# Patient Record
Sex: FEMALE | Race: ASIAN | NOT HISPANIC OR LATINO | Employment: OTHER | URBAN - METROPOLITAN AREA
[De-identification: names, ages, dates, MRNs, and addresses within clinical notes are randomized per-mention and may not be internally consistent; named-entity substitution may affect disease eponyms.]

---

## 2017-06-15 DIAGNOSIS — M54.16 LUMBAR RADICULOPATHY: ICD-10-CM

## 2017-06-15 RX ORDER — PREGABALIN 75 MG/1
75 CAPSULE ORAL 3 TIMES DAILY
Qty: 270 CAPSULE | Refills: 1 | Status: SHIPPED | OUTPATIENT
Start: 2017-06-15 | End: 2017-12-05

## 2017-06-15 NOTE — NURSING NOTE
Vashti Mcmillan, CNP refilled Lyrica Rx. Rx faxed to [facility] to [phone] on 6/15/17.

## 2017-12-05 DIAGNOSIS — M54.16 LUMBAR RADICULOPATHY: ICD-10-CM

## 2017-12-05 RX ORDER — PREGABALIN 75 MG/1
75 CAPSULE ORAL 3 TIMES DAILY
Qty: 270 CAPSULE | Refills: 3
Start: 2017-12-05 | End: 2018-12-07

## 2018-06-27 LAB — EJECTION FRACTION: 63

## 2018-12-07 DIAGNOSIS — M54.16 LUMBAR RADICULOPATHY: ICD-10-CM

## 2018-12-07 RX ORDER — PREGABALIN 75 MG/1
75 CAPSULE ORAL 3 TIMES DAILY
Qty: 270 CAPSULE | Refills: 0
Start: 2018-12-07 | End: 2019-02-11

## 2018-12-07 NOTE — TELEPHONE ENCOUNTER
----- Message from OLW Kruse CNP sent at 12/7/2018  9:50 AM CST -----  I got a refill request form from you.  We can refill the Lyrica one more time then have her PCP take over.      Refill called into  mailorder pharmacy on 12/7/18.

## 2019-02-11 ENCOUNTER — APPOINTMENT (OUTPATIENT)
Dept: GENERAL RADIOLOGY | Facility: CLINIC | Age: 74
DRG: 193 | End: 2019-02-11
Attending: EMERGENCY MEDICINE
Payer: MEDICARE

## 2019-02-11 ENCOUNTER — HOSPITAL ENCOUNTER (INPATIENT)
Facility: CLINIC | Age: 74
LOS: 3 days | Discharge: HOME | DRG: 193 | End: 2019-02-14
Attending: EMERGENCY MEDICINE | Admitting: INTERNAL MEDICINE
Payer: MEDICARE

## 2019-02-11 DIAGNOSIS — R06.2 WHEEZING: ICD-10-CM

## 2019-02-11 DIAGNOSIS — J18.9 PNEUMONIA OF LOWER LOBE DUE TO INFECTIOUS ORGANISM, UNSPECIFIED LATERALITY: ICD-10-CM

## 2019-02-11 DIAGNOSIS — J18.9 PNEUMONIA DUE TO INFECTIOUS ORGANISM, UNSPECIFIED LATERALITY, UNSPECIFIED PART OF LUNG: Primary | ICD-10-CM

## 2019-02-11 DIAGNOSIS — R05.9 COUGH: ICD-10-CM

## 2019-02-11 DIAGNOSIS — E86.0 DEHYDRATION: ICD-10-CM

## 2019-02-11 DIAGNOSIS — R50.9 FEVER, UNSPECIFIED FEVER CAUSE: ICD-10-CM

## 2019-02-11 DIAGNOSIS — R09.02 HYPOXIA: ICD-10-CM

## 2019-02-11 DIAGNOSIS — R06.02 SHORTNESS OF BREATH: ICD-10-CM

## 2019-02-11 LAB
ANION GAP SERPL CALCULATED.3IONS-SCNC: 8 MMOL/L (ref 3–14)
BASOPHILS # BLD AUTO: 0.1 10E9/L (ref 0–0.2)
BASOPHILS NFR BLD AUTO: 1 %
BUN SERPL-MCNC: 31 MG/DL (ref 7–30)
CALCIUM SERPL-MCNC: 9 MG/DL (ref 8.5–10.1)
CHLORIDE SERPL-SCNC: 105 MMOL/L (ref 94–109)
CO2 SERPL-SCNC: 26 MMOL/L (ref 20–32)
CREAT SERPL-MCNC: 1.51 MG/DL (ref 0.52–1.04)
DIFFERENTIAL METHOD BLD: ABNORMAL
EOSINOPHIL # BLD AUTO: 0.2 10E9/L (ref 0–0.7)
EOSINOPHIL NFR BLD AUTO: 1.4 %
ERYTHROCYTE [DISTWIDTH] IN BLOOD BY AUTOMATED COUNT: 13.6 % (ref 10–15)
FLUAV+FLUBV AG SPEC QL: NEGATIVE
FLUAV+FLUBV AG SPEC QL: NEGATIVE
GFR SERPL CREATININE-BSD FRML MDRD: 34 ML/MIN/{1.73_M2}
GLUCOSE SERPL-MCNC: 116 MG/DL (ref 70–99)
HCT VFR BLD AUTO: 42.3 % (ref 35–47)
HGB BLD-MCNC: 13.1 G/DL (ref 11.7–15.7)
IMM GRANULOCYTES # BLD: 0.1 10E9/L (ref 0–0.4)
IMM GRANULOCYTES NFR BLD: 0.4 %
LACTATE BLD-SCNC: 1.4 MMOL/L (ref 0.7–2)
LYMPHOCYTES # BLD AUTO: 1.5 10E9/L (ref 0.8–5.3)
LYMPHOCYTES NFR BLD AUTO: 12.8 %
MCH RBC QN AUTO: 29.4 PG (ref 26.5–33)
MCHC RBC AUTO-ENTMCNC: 31 G/DL (ref 31.5–36.5)
MCV RBC AUTO: 95 FL (ref 78–100)
MONOCYTES # BLD AUTO: 1.5 10E9/L (ref 0–1.3)
MONOCYTES NFR BLD AUTO: 12.7 %
NEUTROPHILS # BLD AUTO: 8.5 10E9/L (ref 1.6–8.3)
NEUTROPHILS NFR BLD AUTO: 71.7 %
NRBC # BLD AUTO: 0 10*3/UL
NRBC BLD AUTO-RTO: 0 /100
PLATELET # BLD AUTO: 212 10E9/L (ref 150–450)
PLATELET # BLD AUTO: 242 10E9/L (ref 150–450)
POTASSIUM SERPL-SCNC: 4.2 MMOL/L (ref 3.4–5.3)
PROCALCITONIN SERPL-MCNC: 0.2 NG/ML
RBC # BLD AUTO: 4.46 10E12/L (ref 3.8–5.2)
SODIUM SERPL-SCNC: 139 MMOL/L (ref 133–144)
SPECIMEN SOURCE: NORMAL
TROPONIN I SERPL-MCNC: <0.015 UG/L (ref 0–0.04)
WBC # BLD AUTO: 11.9 10E9/L (ref 4–11)

## 2019-02-11 PROCEDURE — 96365 THER/PROPH/DIAG IV INF INIT: CPT

## 2019-02-11 PROCEDURE — 85049 AUTOMATED PLATELET COUNT: CPT | Performed by: INTERNAL MEDICINE

## 2019-02-11 PROCEDURE — 93005 ELECTROCARDIOGRAM TRACING: CPT

## 2019-02-11 PROCEDURE — 25800030 ZZH RX IP 258 OP 636: Performed by: EMERGENCY MEDICINE

## 2019-02-11 PROCEDURE — 84145 PROCALCITONIN (PCT): CPT | Performed by: INTERNAL MEDICINE

## 2019-02-11 PROCEDURE — 36415 COLL VENOUS BLD VENIPUNCTURE: CPT | Performed by: INTERNAL MEDICINE

## 2019-02-11 PROCEDURE — 71046 X-RAY EXAM CHEST 2 VIEWS: CPT

## 2019-02-11 PROCEDURE — 83605 ASSAY OF LACTIC ACID: CPT | Performed by: EMERGENCY MEDICINE

## 2019-02-11 PROCEDURE — 96361 HYDRATE IV INFUSION ADD-ON: CPT

## 2019-02-11 PROCEDURE — 25800030 ZZH RX IP 258 OP 636: Performed by: INTERNAL MEDICINE

## 2019-02-11 PROCEDURE — 25000125 ZZHC RX 250: Performed by: INTERNAL MEDICINE

## 2019-02-11 PROCEDURE — 99223 1ST HOSP IP/OBS HIGH 75: CPT | Mod: AI | Performed by: INTERNAL MEDICINE

## 2019-02-11 PROCEDURE — 25000132 ZZH RX MED GY IP 250 OP 250 PS 637: Mod: GY | Performed by: INTERNAL MEDICINE

## 2019-02-11 PROCEDURE — 12000000 ZZH R&B MED SURG/OB

## 2019-02-11 PROCEDURE — 40000274 ZZH STATISTIC RCP CONSULT EA 30 MIN

## 2019-02-11 PROCEDURE — 40000275 ZZH STATISTIC RCP TIME EA 10 MIN

## 2019-02-11 PROCEDURE — 25000128 H RX IP 250 OP 636: Performed by: EMERGENCY MEDICINE

## 2019-02-11 PROCEDURE — 84484 ASSAY OF TROPONIN QUANT: CPT | Performed by: EMERGENCY MEDICINE

## 2019-02-11 PROCEDURE — 25000125 ZZHC RX 250: Performed by: EMERGENCY MEDICINE

## 2019-02-11 PROCEDURE — 94640 AIRWAY INHALATION TREATMENT: CPT | Mod: 76

## 2019-02-11 PROCEDURE — 80048 BASIC METABOLIC PNL TOTAL CA: CPT | Performed by: EMERGENCY MEDICINE

## 2019-02-11 PROCEDURE — 99285 EMERGENCY DEPT VISIT HI MDM: CPT | Mod: 25

## 2019-02-11 PROCEDURE — 94640 AIRWAY INHALATION TREATMENT: CPT

## 2019-02-11 PROCEDURE — A9270 NON-COVERED ITEM OR SERVICE: HCPCS | Mod: GY | Performed by: INTERNAL MEDICINE

## 2019-02-11 PROCEDURE — 87804 INFLUENZA ASSAY W/OPTIC: CPT | Performed by: EMERGENCY MEDICINE

## 2019-02-11 PROCEDURE — 85025 COMPLETE CBC W/AUTO DIFF WBC: CPT | Performed by: EMERGENCY MEDICINE

## 2019-02-11 PROCEDURE — 96375 TX/PRO/DX INJ NEW DRUG ADDON: CPT

## 2019-02-11 RX ORDER — PROCHLORPERAZINE MALEATE 5 MG
5 TABLET ORAL EVERY 6 HOURS PRN
Status: DISCONTINUED | OUTPATIENT
Start: 2019-02-11 | End: 2019-02-14 | Stop reason: HOSPADM

## 2019-02-11 RX ORDER — POLYETHYLENE GLYCOL 3350 17 G/17G
17 POWDER, FOR SOLUTION ORAL DAILY PRN
Status: DISCONTINUED | OUTPATIENT
Start: 2019-02-11 | End: 2019-02-14 | Stop reason: HOSPADM

## 2019-02-11 RX ORDER — CEFTRIAXONE 2 G/1
2 INJECTION, POWDER, FOR SOLUTION INTRAMUSCULAR; INTRAVENOUS EVERY 24 HOURS
Status: DISCONTINUED | OUTPATIENT
Start: 2019-02-12 | End: 2019-02-13

## 2019-02-11 RX ORDER — AMOXICILLIN 250 MG
1 CAPSULE ORAL 2 TIMES DAILY PRN
Status: DISCONTINUED | OUTPATIENT
Start: 2019-02-11 | End: 2019-02-14 | Stop reason: HOSPADM

## 2019-02-11 RX ORDER — IPRATROPIUM BROMIDE AND ALBUTEROL SULFATE 2.5; .5 MG/3ML; MG/3ML
3 SOLUTION RESPIRATORY (INHALATION) 4 TIMES DAILY
Status: DISCONTINUED | OUTPATIENT
Start: 2019-02-11 | End: 2019-02-14 | Stop reason: HOSPADM

## 2019-02-11 RX ORDER — MULTIPLE VITAMINS W/ MINERALS TAB 9MG-400MCG
1 TAB ORAL DAILY
COMMUNITY
End: 2022-02-22

## 2019-02-11 RX ORDER — SODIUM CHLORIDE 9 MG/ML
INJECTION, SOLUTION INTRAVENOUS CONTINUOUS
Status: DISCONTINUED | OUTPATIENT
Start: 2019-02-11 | End: 2019-02-14 | Stop reason: HOSPADM

## 2019-02-11 RX ORDER — MULTIPLE VITAMINS W/ MINERALS TAB 9MG-400MCG
1 TAB ORAL DAILY
Status: DISCONTINUED | OUTPATIENT
Start: 2019-02-12 | End: 2019-02-14 | Stop reason: HOSPADM

## 2019-02-11 RX ORDER — PREDNISONE 20 MG/1
60 TABLET ORAL DAILY
Status: DISCONTINUED | OUTPATIENT
Start: 2019-02-11 | End: 2019-02-14 | Stop reason: HOSPADM

## 2019-02-11 RX ORDER — ALBUTEROL SULFATE 0.83 MG/ML
3 SOLUTION RESPIRATORY (INHALATION)
Status: DISCONTINUED | OUTPATIENT
Start: 2019-02-11 | End: 2019-02-14 | Stop reason: HOSPADM

## 2019-02-11 RX ORDER — ONDANSETRON 2 MG/ML
4 INJECTION INTRAMUSCULAR; INTRAVENOUS EVERY 6 HOURS PRN
Status: DISCONTINUED | OUTPATIENT
Start: 2019-02-11 | End: 2019-02-14 | Stop reason: HOSPADM

## 2019-02-11 RX ORDER — OXYCODONE HYDROCHLORIDE 5 MG/1
5 TABLET ORAL EVERY 6 HOURS PRN
Status: DISCONTINUED | OUTPATIENT
Start: 2019-02-11 | End: 2019-02-14 | Stop reason: HOSPADM

## 2019-02-11 RX ORDER — IPRATROPIUM BROMIDE AND ALBUTEROL SULFATE 2.5; .5 MG/3ML; MG/3ML
3 SOLUTION RESPIRATORY (INHALATION)
Status: DISCONTINUED | OUTPATIENT
Start: 2019-02-11 | End: 2019-02-11

## 2019-02-11 RX ORDER — AMLODIPINE BESYLATE 10 MG/1
10 TABLET ORAL DAILY
Status: DISCONTINUED | OUTPATIENT
Start: 2019-02-12 | End: 2019-02-14 | Stop reason: HOSPADM

## 2019-02-11 RX ORDER — HEPARIN SODIUM 5000 [USP'U]/.5ML
5000 INJECTION, SOLUTION INTRAVENOUS; SUBCUTANEOUS EVERY 12 HOURS
Status: DISCONTINUED | OUTPATIENT
Start: 2019-02-11 | End: 2019-02-11

## 2019-02-11 RX ORDER — PROCHLORPERAZINE 25 MG
12.5 SUPPOSITORY, RECTAL RECTAL EVERY 12 HOURS PRN
Status: DISCONTINUED | OUTPATIENT
Start: 2019-02-11 | End: 2019-02-14 | Stop reason: HOSPADM

## 2019-02-11 RX ORDER — ONDANSETRON 4 MG/1
4 TABLET, ORALLY DISINTEGRATING ORAL EVERY 6 HOURS PRN
Status: DISCONTINUED | OUTPATIENT
Start: 2019-02-11 | End: 2019-02-14 | Stop reason: HOSPADM

## 2019-02-11 RX ORDER — NALOXONE HYDROCHLORIDE 0.4 MG/ML
.1-.4 INJECTION, SOLUTION INTRAMUSCULAR; INTRAVENOUS; SUBCUTANEOUS
Status: DISCONTINUED | OUTPATIENT
Start: 2019-02-11 | End: 2019-02-14 | Stop reason: HOSPADM

## 2019-02-11 RX ORDER — LEVOTHYROXINE SODIUM 137 UG/1
137 TABLET ORAL DAILY
COMMUNITY
End: 2023-01-01

## 2019-02-11 RX ORDER — ASPIRIN 81 MG/1
81 TABLET ORAL DAILY
COMMUNITY

## 2019-02-11 RX ORDER — METOPROLOL SUCCINATE 100 MG/1
100 TABLET, EXTENDED RELEASE ORAL DAILY
Status: DISCONTINUED | OUTPATIENT
Start: 2019-02-12 | End: 2019-02-14 | Stop reason: HOSPADM

## 2019-02-11 RX ORDER — PREGABALIN 75 MG/1
75 CAPSULE ORAL 2 TIMES DAILY
COMMUNITY
End: 2023-01-01

## 2019-02-11 RX ORDER — LEVOTHYROXINE SODIUM 125 UG/1
125 TABLET ORAL DAILY
Status: DISCONTINUED | OUTPATIENT
Start: 2019-02-12 | End: 2019-02-14 | Stop reason: HOSPADM

## 2019-02-11 RX ORDER — ASPIRIN 81 MG/1
81 TABLET ORAL DAILY
Status: DISCONTINUED | OUTPATIENT
Start: 2019-02-12 | End: 2019-02-14 | Stop reason: HOSPADM

## 2019-02-11 RX ORDER — HYDRALAZINE HYDROCHLORIDE 50 MG/1
50 TABLET, FILM COATED ORAL 3 TIMES DAILY
COMMUNITY
End: 2023-01-01

## 2019-02-11 RX ORDER — AMLODIPINE BESYLATE 10 MG/1
10 TABLET ORAL DAILY
COMMUNITY
End: 2019-11-20

## 2019-02-11 RX ORDER — CEFTRIAXONE 2 G/1
2 INJECTION, POWDER, FOR SOLUTION INTRAMUSCULAR; INTRAVENOUS ONCE
Status: COMPLETED | OUTPATIENT
Start: 2019-02-11 | End: 2019-02-11

## 2019-02-11 RX ORDER — LOSARTAN POTASSIUM 100 MG/1
50 TABLET ORAL 2 TIMES DAILY
COMMUNITY
End: 2023-01-01

## 2019-02-11 RX ORDER — HYDRALAZINE HYDROCHLORIDE 50 MG/1
50 TABLET, FILM COATED ORAL 2 TIMES DAILY
Status: DISCONTINUED | OUTPATIENT
Start: 2019-02-11 | End: 2019-02-14 | Stop reason: HOSPADM

## 2019-02-11 RX ORDER — AMOXICILLIN 250 MG
2 CAPSULE ORAL 2 TIMES DAILY PRN
Status: DISCONTINUED | OUTPATIENT
Start: 2019-02-11 | End: 2019-02-14 | Stop reason: HOSPADM

## 2019-02-11 RX ORDER — ACETAMINOPHEN 325 MG/1
650 TABLET ORAL EVERY 4 HOURS PRN
Status: DISCONTINUED | OUTPATIENT
Start: 2019-02-11 | End: 2019-02-14 | Stop reason: HOSPADM

## 2019-02-11 RX ORDER — PREGABALIN 75 MG/1
150 CAPSULE ORAL 2 TIMES DAILY
Status: DISCONTINUED | OUTPATIENT
Start: 2019-02-11 | End: 2019-02-14 | Stop reason: HOSPADM

## 2019-02-11 RX ORDER — SODIUM CHLORIDE 9 MG/ML
1000 INJECTION, SOLUTION INTRAVENOUS CONTINUOUS
Status: DISCONTINUED | OUTPATIENT
Start: 2019-02-11 | End: 2019-02-11

## 2019-02-11 RX ORDER — CHOLECALCIFEROL (VITAMIN D3) 50 MCG
2000 TABLET ORAL DAILY
COMMUNITY
End: 2023-01-01

## 2019-02-11 RX ORDER — FUROSEMIDE 40 MG
40 TABLET ORAL DAILY
COMMUNITY
End: 2022-02-22

## 2019-02-11 RX ORDER — BISACODYL 10 MG
10 SUPPOSITORY, RECTAL RECTAL DAILY PRN
Status: DISCONTINUED | OUTPATIENT
Start: 2019-02-11 | End: 2019-02-14 | Stop reason: HOSPADM

## 2019-02-11 RX ORDER — ATORVASTATIN CALCIUM 10 MG/1
10 TABLET, FILM COATED ORAL EVERY EVENING
Status: DISCONTINUED | OUTPATIENT
Start: 2019-02-11 | End: 2019-02-14 | Stop reason: HOSPADM

## 2019-02-11 RX ORDER — ATORVASTATIN CALCIUM 20 MG/1
20 TABLET, FILM COATED ORAL EVERY EVENING
COMMUNITY

## 2019-02-11 RX ORDER — METOPROLOL SUCCINATE 100 MG/1
100 TABLET, EXTENDED RELEASE ORAL DAILY
COMMUNITY
End: 2023-01-01

## 2019-02-11 RX ADMIN — AZITHROMYCIN MONOHYDRATE 500 MG: 500 INJECTION, POWDER, LYOPHILIZED, FOR SOLUTION INTRAVENOUS at 17:48

## 2019-02-11 RX ADMIN — IPRATROPIUM BROMIDE AND ALBUTEROL SULFATE 3 ML: .5; 3 SOLUTION RESPIRATORY (INHALATION) at 20:27

## 2019-02-11 RX ADMIN — IPRATROPIUM BROMIDE AND ALBUTEROL SULFATE 3 ML: .5; 3 SOLUTION RESPIRATORY (INHALATION) at 17:45

## 2019-02-11 RX ADMIN — PREGABALIN 150 MG: 75 CAPSULE ORAL at 19:47

## 2019-02-11 RX ADMIN — IPRATROPIUM BROMIDE AND ALBUTEROL SULFATE 3 ML: .5; 3 SOLUTION RESPIRATORY (INHALATION) at 13:36

## 2019-02-11 RX ADMIN — HYDRALAZINE HYDROCHLORIDE 50 MG: 50 TABLET, FILM COATED ORAL at 19:47

## 2019-02-11 RX ADMIN — CEFTRIAXONE SODIUM 2 G: 2 INJECTION, POWDER, FOR SOLUTION INTRAMUSCULAR; INTRAVENOUS at 17:00

## 2019-02-11 RX ADMIN — PREDNISONE 60 MG: 20 TABLET ORAL at 19:46

## 2019-02-11 RX ADMIN — SODIUM CHLORIDE: 9 INJECTION, SOLUTION INTRAVENOUS at 19:14

## 2019-02-11 RX ADMIN — ATORVASTATIN CALCIUM 10 MG: 10 TABLET, FILM COATED ORAL at 19:47

## 2019-02-11 RX ADMIN — SODIUM CHLORIDE 1000 ML: 9 INJECTION, SOLUTION INTRAVENOUS at 16:12

## 2019-02-11 ASSESSMENT — ACTIVITIES OF DAILY LIVING (ADL)
AMBULATION: 0-->INDEPENDENT
COGNITION: 0 - NO COGNITION ISSUES REPORTED
DRESS: 0-->INDEPENDENT
SWALLOWING: 0-->SWALLOWS FOODS/LIQUIDS WITHOUT DIFFICULTY
TOILETING: 0-->INDEPENDENT
RETIRED_EATING: 0-->INDEPENDENT
FALL_HISTORY_WITHIN_LAST_SIX_MONTHS: NO
RETIRED_COMMUNICATION: 0-->UNDERSTANDS/COMMUNICATES WITHOUT DIFFICULTY
TRANSFERRING: 0-->INDEPENDENT
BATHING: 0-->INDEPENDENT
ADLS_ACUITY_SCORE: 19

## 2019-02-11 ASSESSMENT — MIFFLIN-ST. JEOR: SCORE: 1478.39

## 2019-02-11 NOTE — H&P
[facility]    History and Physical - Hospitalist Service       Date of Admission:  2/11/2019    Assessment & Plan   Matthew Bowen is a 73 year old female admitted on 2/11/2019. She has a past medical history significant for hyperlipidemia, hypertension, hypothyroidism, and restrictive lung disease.  She presents to emergency room with 5 days of shortness of breath and cough.  Has also now developed fevers.  She is diagnosed with pneumonia.    1.  Pneumonia.  Community-acquired.  Check pro-calcitonin.  Does have a listed allergy to cephalosporins.  Allergy was from 50+ years ago.  Daughter is a physician.  Patient and family would like to reattempt cephalosporins at this time.  Was given a dose of IV ceftriaxone in the emergency room.  Continue IV ceftriaxone at this time.  Monitor closely for signs of allergic reaction.  Continue azithromycin.    2.  Acute kidney injury.  Start continuous IV fluids.  Avoid nephrotoxins as able.    3.  Hyperlipidemia.  Restart atorvastatin.    4.  Hypothyroidism.  Restart levothyroxine.    5.  Hypertension.  Restart amlodipine and metoprolol.  Hold losartan and furosemide for now.    6.  Chronic pain syndrome.  Continue Lyrica.  Have pain medications available as needed.    7.  Bronchospasm.  Likely due to pneumonia.  Antibiotics as noted above.  Duo nebs 4 times a day.  Albuterol more often if needed.  Prednisone 60 mg a day.     Diet: Regular  DVT Prophylaxis: Heparin SQ  Aleman Catheter: not present  Code Status: Full code    Disposition Plan   Expected discharge: 2 - 3 days, recommended to prior living arrangement   Entered: Francis Batista DO 02/11/2019, 4:42 PM       Francis Batista DO  [facility]    ______________________________________________________________________    Chief Complaint   Shortness of breath and cough.    History is obtained from the patient    History of Present Illness   Matthew Bowen is a 73 year old female who has  a past medical history significant for hypothyroidism, hyperlipidemia, hypertension, and restrictive lung disease.  She developed shortness of breath and cough 5 days ago.  Cough is productive of gray colored sputum.  Cough might be getting a little bit better since it started.  Shortness of breath is getting worse since it started.  Nothing at home seems to be helping the shortness of breath.  Today, she developed fevers and chills.  She has been coughing so much for the past few days that she is starting to get some pain in the ribs bilaterally when she coughs.  She does have some recent chronic problems with shortness of breath.  She has been working with cardiology.  Has had significant workup for her underlying chronic shortness of breath.  Is scheduled to follow-up with a pulmonologist in 2 weeks.  Current symptoms are significantly different than her chronic recent symptoms.  No known sick contacts.  Has not been eating and drinking as well as usual.  No other complaints.    Review of Systems    The 10 point Review of Systems is negative other than noted in the HPI.    Past Medical History    Hypertension.  Hyperlipidemia.  Restrictive lung disease.  Hypothyroidism.    Past Surgical History   I have reviewed this patient's surgical history and updated it with pertinent information if needed.  History reviewed. No pertinent surgical history.    Social History   I have reviewed this patient's social history and updated it with pertinent information if needed.  Does not smoke.  Rarely drinks alcohol.    Family History   Father with coronary artery disease.  Mother with breast cancer.    Prior to Admission Medications   Prior to Admission Medications   Prescriptions Last Dose Informant Patient Reported? Taking?   ASPIRIN PO   Yes No   Sig: Take 81 mg by mouth daily    Acetaminophen (TYLENOL PO)   Yes No   Sig: Take 500 mg by mouth every 4 hours as needed    Furosemide (LASIX PO)   Yes No   Sig: Take 40 mg by  "mouth daily    LEVOTHYROXINE SODIUM PO   Yes No   Sig: Take 125 mcg by mouth daily    LOSARTAN POTASSIUM PO   Yes No   Sig: Take 100 mg by mouth daily   METOPROLOL TARTRATE PO   Yes No   Sig: Take 100 mg by mouth 2 times daily    amLODIPine (NORVASC) 1 mg/mL   Yes No   Sig: Take 10 mg by mouth daily    amLODIPine-atorvastatin (CADUET) 10-10 MG per tablet   Yes No   Sig: Take 1 tablet by mouth daily   pregabalin (LYRICA) 75 MG capsule   No No   Sig: Take 1 capsule (75 mg) by mouth 3 times daily      Facility-Administered Medications: None     Allergies   Allergies   Allergen Reactions     Cephalosporins Other (See Comments)     02/11/2019 admission [facility]:  Pt reports that hives to a cephalosporin was \"50 years ago.\"  Tolerates amoxicillin and other beta lactams.       Physical Exam   Vital Signs: Temp: 100.3  F (37.9  C) Temp src: Oral BP: 146/72 Pulse: 96   Resp: (!) 32 SpO2: 96 % O2 Device: Nasal cannula Oxygen Delivery: 3 LPM  Weight: 220 lbs 0 oz    Gen:  NAD, A&Ox3.  Eyes:  PERRL, sclera anicteric.  OP:  MMM, no lesions.  Neck:  Supple.  CV:  Regular, no murmurs.  Lung: Extensive wheeze b/l, normal effort.  Ab:  +BS, soft.  Skin:  Warm, dry to touch.  No rash.  Ext:  No pitting edema LE b/l.      Data   Data reviewed today: I reviewed all medications, new labs and imaging results over the last 24 hours. I personally reviewed the EKG tracing showing Sinus rhythm.  No acute ischemia..    Recent Labs   Lab 02/11/19  1320   WBC 11.9*   HGB 13.1   MCV 95         POTASSIUM 4.2   CHLORIDE 105   CO2 26   BUN 31*   CR 1.51*   ANIONGAP 8   BARBARA 9.0   *   TROPI <0.015     "

## 2019-02-11 NOTE — ED NOTES
[facility]  ED Nurse Handoff Report    Matthew Bowen is a 73 year old female   ED Chief complaint: Cough and Shortness of Breath  . ED Diagnosis:   Final diagnoses:   Pneumonia of lower lobe due to infectious organism, unspecified laterality (H)   Cough   Shortness of breath   Fever, unspecified fever cause   Hypoxia   Dehydration     Allergies:   Allergies   Allergen Reactions     Cephalosporins Hives       Code Status: Full Code  Activity level - Baseline/Home:  Independent. Activity Level - Current:   Stand with Assist. Lift room needed: No. Bariatric: No   Needed: No   Isolation: No. Infection: Not Applicable.     Vital Signs:   Vitals:    02/11/19 1255 02/11/19 1336 02/11/19 1400   BP: 146/72     Pulse: 96     Resp: (!) 32     Temp: 100.3  F (37.9  C)     TempSrc: Oral     SpO2: (!) 87% 93% 96%   Weight: 99.8 kg (220 lb)         Cardiac Rhythm:  ,      Pain level: 0-10 Pain Scale: 3  Patient confused: No. Patient Falls Risk: Yes.   Elimination Status: Has voided   Patient Report - Initial Complaint: cough, sob. Focused Assessment: mild wheezes, rhonchi in lungs. Pt reports feeling sob  Tests Performed:   Labs Ordered and Resulted from Time of ED Arrival Up to the Time of Departure from the ED   CBC WITH PLATELETS DIFFERENTIAL - Abnormal; Notable for the following components:       Result Value    WBC 11.9 (*)     MCHC 31.0 (*)     Absolute Neutrophil 8.5 (*)     Absolute Monocytes 1.5 (*)     All other components within normal limits   BASIC METABOLIC PANEL - Abnormal; Notable for the following components:    Glucose 116 (*)     Urea Nitrogen 31 (*)     Creatinine 1.51 (*)     GFR Estimate 34 (*)     GFR Estimate If Black 39 (*)     All other components within normal limits   LACTIC ACID WHOLE BLOOD   TROPONIN I   INFLUENZA A/B ANTIGEN     XR Chest 2 Views   Preliminary Result   IMPRESSION: Two views of the chest are performed. Right lung is   grossly clear. No pneumothorax or pleural  effusions. Subtle   retrocardiac opacity obscures parts of the left hemidiaphragm. Heart   is enlarged. Aorta is calcified. Left lower lobe atelectasis or   infiltrate is possible.        Treatments provided: abx  Family Comments: children in room  OBS brochure/video discussed/provided to patient:  N/A  ED Medications:   Medications   ipratropium - albuterol 0.5 mg/2.5 mg/3 mL (DUONEB) neb solution 3 mL (3 mLs Nebulization Given 2/11/19 7816)   0.9% sodium chloride BOLUS (not administered)     Followed by   sodium chloride 0.9% infusion (not administered)   cefTRIAXone (ROCEPHIN) 2 g vial to attach to  ml bag for ADULTS or NS 50 ml bag for PEDS (not administered)   azithromycin (ZITHROMAX) 500 mg in sodium chloride 0.9 % 250 mL intermittent infusion (not administered)     Drips infusing:  Yes  For the majority of the shift, the patient's behavior Green. Interventions performed were frequent rounding.     Severe Sepsis OR Septic Shock Diagnosis Present: No      ED Nurse Name/Phone Number: Jacinda Freitas,   4:01 PM  RECEIVING UNIT ED HANDOFF REVIEW    Above ED Nurse Handoff Report was reviewed:  YES  Reviewed by: MALLY GLASS on February 11, 2019 at 5:55 PM

## 2019-02-11 NOTE — ED TRIAGE NOTES
Cough and SOB x 5 days- sent from clinic. Sats 90% and fever 103. ABC Intact alert and no distress.

## 2019-02-11 NOTE — ED PROVIDER NOTES
History     Chief Complaint:  Cough    HPI   Matthew Bowen is a 73 year old female, with a history of HTN, HDL, and hypothyroidism, who presents with her  to the emergency department for evaluation of a cough. The patient reports she has been experiencing a worsening productive cough and shortness of breath for five days prior to evaluation. She reports experiencing a fever, but notes that developed later than the cough and shortness of breath. She reports chest pain but believes it is due to the coughing. She denies any vomiting, diarrhea, dysuria, or frequency. She denies any exposure to other ill persons. She denies any history of asthma or use of alcohol, smoking or street drugs.     [facility]  Echo  Final Impressions:   1. Technically limited exam.   2. Normal LV size, borderline wall thickness, normal global systolic function with an estimated EF of 65 - 70%.   3. Grade 1 pattern of LV diastolic filling.   4. Right ventricular cavity size is normal, global systolic RV function is normal.   5. The aortic valve is not well visualized, mild stenosis with mean gradient of 14 mmHg and trivial regurgitation.   6. 2014 AHA/ACC guidelines for asymptomatic patients with mild aortic stenosis recommend followup echo in 3-5 years. Symptomatic patients should be referred for cardiology evaluation  As read by Radiology.    NM Lung Scan Ventilation and Perfusion  IMPRESSION:  1. There are findings consistent with a low probability for pulmonary embolism.  2. There is no convincing evidence for chronic pulmonary thromboembolic disease as a contributing cause of the patient`s pulmonary hypertension.  As read by Radiology.    Allergies:  Cephalosporins: hives     Medications:    Amlodipine  Caduet  Aspirin  Lasix  Levothyroxine  Losartan  Metoprolol  Lyrica    Past Medical History:    Cardiomegaly  HTN  Bell's palsy  Hypothyroidism  HDL  ALAINA    Past Surgical History:    Hysterectomy    section  Breast reduction  Uvalectomy  Bilateral knee replacements  Tympanoplasty  Laminectomy    Family History:    Heart disease  Cancer  Diabetes  HTN    Social History:  Smoking status: Never  Alcohol use: Yes  The patient presents to the emergency department with her .  Marital Status:   [2]     Review of Systems   All other systems reviewed and are negative.      Physical Exam   Patient Vitals for the past 24 hrs:   BP Temp Temp src Pulse Resp SpO2 Weight   02/11/19 1400 -- -- -- -- -- 96 % --   02/11/19 1336 -- -- -- -- -- 93 % --   02/11/19 1255 146/72 100.3  F (37.9  C) Oral 96 (!) 32 (!) 87 % 99.8 kg (220 lb)     Physical Exam  General: Resting on the bed.  Appears somewhat dyspneic.    Head: No obvious trauma to head.  Ears, Nose, Throat:  External ears normal.  Nose normal.    Eyes:  Conjunctivae clear.  Pupils are equal, round, and reactive.   Neck: Normal range of motion.  Neck supple.   CV: Regular rate and rhythm.  No murmurs.      Respiratory: Effort normal and breath sounds with crackles in the bases.  Wheezing appreciable anteriorly.  Gastrointestinal: Soft.  No distension. There is no tenderness.   Musculoskeletal: Non tender non edematous calves  Skin: Skin is warm and dry.  No rash noted.     Emergency Department Course   ECG (16:01:42):  Rate 72 bpm. DE interval 200. QRS duration 76. QT/QTc 416/455. P-R-T axes 52 -40 18. Normal sinus rhythm. Left axis deviation. Inferior infarct, age undetermined. Abnormal ECG. Interpreted at 1610 by Eduarda Aguayo MD.    Imaging:  Radiographic findings were communicated with the patient and family who voiced understanding of the findings.    XR Chest 2 Views  IMPRESSION: Two views of the chest are performed. Right lung is  grossly clear. No pneumothorax or pleural effusions. Subtle  retrocardiac opacity obscures parts of the left hemidiaphragm. Heart  is enlarged. Aorta is calcified. Left lower lobe atelectasis or  infiltrate is  possible.  As read by Radiology.    Laboratory:  CBC: WBC 11.9 (H) o/w WNL (HGB 13.1, )  BMP: Glucose 16 (H), Urea Nitrogen 31 (H), Creatinine 1.51 (H), GFR Estimate 34 (L) o/w WNL  Lactic Acid (1320): 1.4   Influenza A/B antigen: Negative    Interventions:  1336 DuoNeb 3 mL Nebulization  1612 NS 1L IV Bolus  1632 Rocephin 2 g IV   Azithromycin 500 mg IV    Emergency Department Course:  Past medical records, nursing notes, and vitals reviewed.  1306: I performed an exam of the patient and obtained history, as documented above.    IV inserted and blood drawn.    The patient was sent for a chest x-ray while in the emergency department, findings above.    1535: I rechecked the patient. Explained findings to the patient and her .     Findings and plan explained to the Patient and spouse who consents to admission.     1604: Discussed the patient with Dr. Batista, who will admit the patient to an adult med/surg bed for further monitoring, evaluation, and treatment.   Impression & Plan    Medical Decision Makin-year-old female with a history of obstructive sleep apnea, hypertension presents with cough, shortness of breath, fevers.  Vital signs notable for low-grade temp to 100.3 and hypoxia on arrival.  Broad differential was pursued including but not limited to PE, pneumonia, pneumothorax, effusion, acute coronary syndrome, electrolyte metabolic or renal dysfunction, sepsis, CHF, etc.  Patient has previously been worked up for PE and had a recent VQ scan that was negative.  Overall, does not seem likely to be a PE in the setting of recent cough and fevers.  Patient also had a recent echocardiogram that was otherwise unremarkable not overtly concerning for CHF.  Patient does have a preserved EF at this point.  Patient does not appear to be fluid overloaded.  CBC shows mild leukocytosis of 11.9 but otherwise no evidence of anemia.  BMP shows mild acute chronic kidney insufficiency with creatinine bump  to 1.51.  Suspect some mild dehydration.  No other electrolyte or metabolic abnormality.  Lactate is normal, not concerning for severe sepsis or septic shock.  Influenza swab was negative.  EKG looks relatively unremarkable without any acute ischemic changes.  Troponin is negative.  No evidence of arrhythmia.  Considered COPD or asthma but no history of these.  No smoking history.  She does have some mild wheezing, which is likely secondary to a viral illness vs pneumonia.  She had some improvement with nebulizer treatment.  Chest x-ray does show a subtle retrocardiac opacity.  In the setting of patient's cough, fevers felt that patient's diagnosis was likely a pneumonia.  No recent hospitalizations.  Seems most likely to be community-acquired pneumonia.  Had lengthy discussion about cephalosporin use with the patient and it appears that this was a allergic reaction that occurred over 40-50 years ago.  Patient only had hives with it.  No anaphylaxis.  She tolerates penicillins normally therefore felt that this is unlikely to be a true allergic reaction.  Family agrees and would prefer to try ceftriaxone and azithromycin.  Given patient's mild hypoxia in the setting of cough shortness of breath and pneumonia felt that admission was warranted.  Discussed with hospitalist who graciously accepted.    Diagnosis:    ICD-10-CM   1. Pneumonia of lower lobe due to infectious organism, unspecified laterality (H) J18.1   2. Cough R05   3. Shortness of breath R06.02   4. Fever, unspecified fever cause R50.9   5. Hypoxia R09.02   6. Dehydration E86.0     Disposition:  Admitted to adult med/surg.  Racheal Jackson  2/11/2019   [facility] EMERGENCY DEPARTMENT  Scribe Disclosure:  I, Racheal Jackson, am serving as a scribe at 1:06 PM on 2/11/2019 to document services personally performed by Eduarda Aguayo MD based on my observations and the provider's statements to me.      Eduarda Aguayo MD  02/11/19  2052

## 2019-02-11 NOTE — PHARMACY-ADMISSION MEDICATION HISTORY
Admission medication history interview status for this patient is complete. See [facility] admission navigator for allergy information, prior to admission medications and immunization status.     Medication history interview source(s):Patient and Family  Medication history resources (including written lists, pill bottles, clinic record):None  Primary pharmacy: mail order    Changes made to PTA medication list:  Added: vit d, mvi, hydralazine,   Deleted: -  Changed: caduet to amlodipine, atorvastatin separate, metoprolol to xl, lyrica to 150 mg BID, losartan to 50 mg BID    Actions taken by pharmacist (provider contacted, etc):None     Additional medication history information:None    Medication reconciliation/reorder completed by provider prior to medication history? No    Do you take OTC medications (eg tylenol, ibuprofen, fish oil, eye/ear drops, etc)? no(Y/N)    For patients on insulin therapy: no (Y/N)  Lantus/levemir/NPH/Mix 70/30 dose:   (Y/N) (see Med list for doses)   Sliding scale Novolog Y/N  If Yes, do you have a baseline novolog pre-meal dose:  units with meals  Patients eat three meals a day:   Y/N    How many episodes of hypoglycemia do you have per week: _______  How many missed doses do you have per week: ______  How many times do you check your blood glucose per day: _______  Do you have a Continuous glucose monitor (CGM)   Y/N (remind pt that not approved for hospital use)   Any Barriers to therapy - Be specific :  cost of medications, comfortable with giving injections (if applicable), comfortable and confident with current diabetes regimen: Y/N ______________      Prior to Admission medications    Medication Sig Last Dose Taking? Auth Provider   Acetaminophen (TYLENOL PO) Take 500 mg by mouth every 4 hours as needed   Yes Reported, Patient   amLODIPine (NORVASC) 10 MG tablet Take 10 mg by mouth daily 2/11/2019 at Unknown time Yes Unknown, Entered By History   aspirin 81 MG EC tablet Take 81 mg by  mouth daily 2/11/2019 at Unknown time Yes Unknown, Entered By History   atorvastatin (LIPITOR) 10 MG tablet Take 10 mg by mouth every evening 2/10/2019 at Unknown time Yes Unknown, Entered By History   furosemide (LASIX) 40 MG tablet Take 40 mg by mouth daily 2/11/2019 at Unknown time Yes Unknown, Entered By History   hydrALAZINE (APRESOLINE) 50 MG tablet Take 50 mg by mouth 2 times daily 2/11/2019 at x1 Yes Unknown, Entered By History   levothyroxine (SYNTHROID/LEVOTHROID) 125 MCG tablet Take 125 mcg by mouth daily 2/11/2019 at Unknown time Yes Unknown, Entered By History   losartan (COZAAR) 100 MG tablet Take 50 mg by mouth 2 times daily 2/11/2019 at x1 Yes Unknown, Entered By History   metoprolol succinate ER (TOPROL-XL) 100 MG 24 hr tablet Take 100 mg by mouth daily 2/11/2019 at Unknown time Yes Unknown, Entered By History   multivitamin w/minerals (THERA-VIT-M) tablet Take 1 tablet by mouth daily 2/11/2019 at Unknown time Yes Unknown, Entered By History   pregabalin (LYRICA) 75 MG capsule Take 150 mg by mouth 2 times daily 2/11/2019 at x1 Yes Unknown, Entered By History   vitamin D3 (CHOLECALCIFEROL) 1000 units (25 mcg) tablet Take 2,000 Units by mouth daily 2/11/2019 at Unknown time Yes Unknown, Entered By History

## 2019-02-12 ENCOUNTER — APPOINTMENT (OUTPATIENT)
Dept: ULTRASOUND IMAGING | Facility: CLINIC | Age: 74
DRG: 193 | End: 2019-02-12
Attending: INTERNAL MEDICINE
Payer: MEDICARE

## 2019-02-12 LAB
ANION GAP SERPL CALCULATED.3IONS-SCNC: 8 MMOL/L (ref 3–14)
BUN SERPL-MCNC: 33 MG/DL (ref 7–30)
CALCIUM SERPL-MCNC: 8 MG/DL (ref 8.5–10.1)
CHLORIDE SERPL-SCNC: 110 MMOL/L (ref 94–109)
CO2 SERPL-SCNC: 22 MMOL/L (ref 20–32)
CREAT SERPL-MCNC: 1.31 MG/DL (ref 0.52–1.04)
ERYTHROCYTE [DISTWIDTH] IN BLOOD BY AUTOMATED COUNT: 13.5 % (ref 10–15)
GFR SERPL CREATININE-BSD FRML MDRD: 40 ML/MIN/{1.73_M2}
GLUCOSE SERPL-MCNC: 161 MG/DL (ref 70–99)
GRAM STN SPEC: NORMAL
HCT VFR BLD AUTO: 38.7 % (ref 35–47)
HGB BLD-MCNC: 11.6 G/DL (ref 11.7–15.7)
INTERPRETATION ECG - MUSE: NORMAL
Lab: NORMAL
MCH RBC QN AUTO: 28.9 PG (ref 26.5–33)
MCHC RBC AUTO-ENTMCNC: 30 G/DL (ref 31.5–36.5)
MCV RBC AUTO: 96 FL (ref 78–100)
PLATELET # BLD AUTO: 194 10E9/L (ref 150–450)
POTASSIUM SERPL-SCNC: 4.3 MMOL/L (ref 3.4–5.3)
RBC # BLD AUTO: 4.02 10E12/L (ref 3.8–5.2)
SODIUM SERPL-SCNC: 140 MMOL/L (ref 133–144)
SPECIMEN SOURCE: NORMAL
WBC # BLD AUTO: 10.1 10E9/L (ref 4–11)

## 2019-02-12 PROCEDURE — 80048 BASIC METABOLIC PNL TOTAL CA: CPT | Performed by: INTERNAL MEDICINE

## 2019-02-12 PROCEDURE — 87070 CULTURE OTHR SPECIMN AEROBIC: CPT | Performed by: INTERNAL MEDICINE

## 2019-02-12 PROCEDURE — 85027 COMPLETE CBC AUTOMATED: CPT | Performed by: INTERNAL MEDICINE

## 2019-02-12 PROCEDURE — 36415 COLL VENOUS BLD VENIPUNCTURE: CPT | Performed by: INTERNAL MEDICINE

## 2019-02-12 PROCEDURE — 40000275 ZZH STATISTIC RCP TIME EA 10 MIN

## 2019-02-12 PROCEDURE — 25000132 ZZH RX MED GY IP 250 OP 250 PS 637: Mod: GY | Performed by: INTERNAL MEDICINE

## 2019-02-12 PROCEDURE — 94640 AIRWAY INHALATION TREATMENT: CPT | Mod: 76

## 2019-02-12 PROCEDURE — 99232 SBSQ HOSP IP/OBS MODERATE 35: CPT | Performed by: INTERNAL MEDICINE

## 2019-02-12 PROCEDURE — 99207 ZZC APP CREDIT; MD BILLING SHARED VISIT: CPT | Performed by: INTERNAL MEDICINE

## 2019-02-12 PROCEDURE — A9270 NON-COVERED ITEM OR SERVICE: HCPCS | Mod: GY | Performed by: INTERNAL MEDICINE

## 2019-02-12 PROCEDURE — 25000125 ZZHC RX 250: Performed by: INTERNAL MEDICINE

## 2019-02-12 PROCEDURE — 25800030 ZZH RX IP 258 OP 636: Performed by: INTERNAL MEDICINE

## 2019-02-12 PROCEDURE — 25000128 H RX IP 250 OP 636: Performed by: INTERNAL MEDICINE

## 2019-02-12 PROCEDURE — 76705 ECHO EXAM OF ABDOMEN: CPT

## 2019-02-12 PROCEDURE — 94640 AIRWAY INHALATION TREATMENT: CPT

## 2019-02-12 PROCEDURE — 87205 SMEAR GRAM STAIN: CPT | Performed by: INTERNAL MEDICINE

## 2019-02-12 PROCEDURE — 12000000 ZZH R&B MED SURG/OB

## 2019-02-12 RX ORDER — AZITHROMYCIN 250 MG/1
250 TABLET, FILM COATED ORAL DAILY
Status: DISCONTINUED | OUTPATIENT
Start: 2019-02-13 | End: 2019-02-13

## 2019-02-12 RX ADMIN — ACETAMINOPHEN 650 MG: 325 TABLET, FILM COATED ORAL at 22:08

## 2019-02-12 RX ADMIN — PREGABALIN 150 MG: 75 CAPSULE ORAL at 19:56

## 2019-02-12 RX ADMIN — ACETAMINOPHEN 650 MG: 325 TABLET, FILM COATED ORAL at 00:05

## 2019-02-12 RX ADMIN — IPRATROPIUM BROMIDE AND ALBUTEROL SULFATE 3 ML: .5; 3 SOLUTION RESPIRATORY (INHALATION) at 15:25

## 2019-02-12 RX ADMIN — IPRATROPIUM BROMIDE AND ALBUTEROL SULFATE 3 ML: .5; 3 SOLUTION RESPIRATORY (INHALATION) at 06:55

## 2019-02-12 RX ADMIN — IPRATROPIUM BROMIDE AND ALBUTEROL SULFATE 3 ML: .5; 3 SOLUTION RESPIRATORY (INHALATION) at 11:19

## 2019-02-12 RX ADMIN — HYDRALAZINE HYDROCHLORIDE 50 MG: 50 TABLET, FILM COATED ORAL at 19:56

## 2019-02-12 RX ADMIN — AZITHROMYCIN MONOHYDRATE 250 MG: 500 INJECTION, POWDER, LYOPHILIZED, FOR SOLUTION INTRAVENOUS at 09:50

## 2019-02-12 RX ADMIN — VITAMIN D, TAB 1000IU (100/BT) 2000 UNITS: 25 TAB at 08:48

## 2019-02-12 RX ADMIN — METOPROLOL SUCCINATE 100 MG: 100 TABLET, EXTENDED RELEASE ORAL at 08:49

## 2019-02-12 RX ADMIN — PREGABALIN 150 MG: 75 CAPSULE ORAL at 08:47

## 2019-02-12 RX ADMIN — HYDRALAZINE HYDROCHLORIDE 50 MG: 50 TABLET, FILM COATED ORAL at 08:49

## 2019-02-12 RX ADMIN — GUAIFENESIN 10 ML: 100 SOLUTION ORAL at 00:14

## 2019-02-12 RX ADMIN — CEFTRIAXONE SODIUM 2 G: 2 INJECTION, POWDER, FOR SOLUTION INTRAMUSCULAR; INTRAVENOUS at 14:26

## 2019-02-12 RX ADMIN — AMLODIPINE BESYLATE 10 MG: 10 TABLET ORAL at 08:48

## 2019-02-12 RX ADMIN — SODIUM CHLORIDE: 9 INJECTION, SOLUTION INTRAVENOUS at 04:39

## 2019-02-12 RX ADMIN — IPRATROPIUM BROMIDE AND ALBUTEROL SULFATE 3 ML: .5; 3 SOLUTION RESPIRATORY (INHALATION) at 20:45

## 2019-02-12 RX ADMIN — ACETAMINOPHEN 650 MG: 325 TABLET, FILM COATED ORAL at 08:48

## 2019-02-12 RX ADMIN — IPRATROPIUM BROMIDE AND ALBUTEROL SULFATE 3 ML: .5; 3 SOLUTION RESPIRATORY (INHALATION) at 00:32

## 2019-02-12 RX ADMIN — ASPIRIN 81 MG: 81 TABLET, COATED ORAL at 08:48

## 2019-02-12 RX ADMIN — ACETAMINOPHEN 650 MG: 325 TABLET, FILM COATED ORAL at 12:39

## 2019-02-12 RX ADMIN — GUAIFENESIN 10 ML: 100 SOLUTION ORAL at 12:39

## 2019-02-12 RX ADMIN — ATORVASTATIN CALCIUM 10 MG: 10 TABLET, FILM COATED ORAL at 19:56

## 2019-02-12 RX ADMIN — LEVOTHYROXINE SODIUM 125 MCG: 125 TABLET ORAL at 07:46

## 2019-02-12 RX ADMIN — GUAIFENESIN 10 ML: 100 SOLUTION ORAL at 19:55

## 2019-02-12 RX ADMIN — GUAIFENESIN 10 ML: 100 SOLUTION ORAL at 23:59

## 2019-02-12 RX ADMIN — GUAIFENESIN 10 ML: 100 SOLUTION ORAL at 08:47

## 2019-02-12 RX ADMIN — PREDNISONE 60 MG: 20 TABLET ORAL at 08:47

## 2019-02-12 RX ADMIN — MULTIPLE VITAMINS W/ MINERALS TAB 1 TABLET: TAB at 08:48

## 2019-02-12 ASSESSMENT — ACTIVITIES OF DAILY LIVING (ADL)
ADLS_ACUITY_SCORE: 11

## 2019-02-12 ASSESSMENT — MIFFLIN-ST. JEOR: SCORE: 1490.64

## 2019-02-12 NOTE — PLAN OF CARE
Pt A &O. VSS afebrile. 2L NC due to RA sats were 85%. LS diminished bilaterally and equal. Up with SBA to BR. Voiding adequate amounts. Pt refused Heparin so MD ordered PCD's. IV infusing. Losartan and Lasix held per md for creatine level of 1.51 explained to pt and daughter why it was held. Will continue to monitor. Pt plans to go home upon discharge with .

## 2019-02-12 NOTE — PROGRESS NOTES
"[facility] RCAT     Date: 2/11/19   Admission Dx: Pneumonia, bronchospasm  Pulmonary History  bronchospasm  Home Nebulizer/MDI Use: NA  Home Oxygen: NA  Acuity Level (RCAT flow sheet): 3  Aerosol Therapy initiated: Duoneb QID      Pulmonary Hygiene initiated: Coughing technique      Volume Expansion initiated: IS per nursing      Current Oxygen Requirements: 3L   Current SpO2: 93     Re-evaluation date: 2/14/2019    Patient Education:      See \"RT Assessments\" flow sheet for patient assessment scoring and Acuity Level Details.           "

## 2019-02-12 NOTE — PROVIDER NOTIFICATION
pt and daughter do not want to use heparin sq. just ambulation. but they want to talk with you in regards to that. can you call? thanks

## 2019-02-12 NOTE — PLAN OF CARE
A&Ox4, VSS: on 2L oxygen NC, Temp: 99.5. Up SBAx1, gbelt, IV pole to bathroom. Frequent productive cough, sputum culture obtained. Nabs, Robitussin and Tylenol given with relief. IS and fluids encouraged. Nursing continue to Monitor.

## 2019-02-12 NOTE — PROGRESS NOTES
Patient and daughter did not want to use subcutaneous Heparin for DVT prophylaxis so I changed it to PCD's.

## 2019-02-12 NOTE — PROGRESS NOTES
[facility]    Medicine Progress Note - Hospitalist Service       Date of Admission:  2/11/2019  Assessment & Plan   Matthew Bowen is a 73 year old female admitted on 2/11/2019. She has a past medical history significant for hyperlipidemia, hypertension, hypothyroidism, and restrictive lung disease.  She presents to emergency room with 5 days of shortness of breath and cough.  Has also now developed fevers.  She is diagnosed with pneumonia.     1.  Pneumonia.  Community-acquired.  Pro-calcitonin slightly elevated.  Continue azithromycin and IV ceftriaxone for one more day.      2.  Acute kidney injury. Creatinine down from 1.3  today.  Will stop IV fluid. Avoid nephrotoxins as able.     3.  Hyperlipidemia.  Continue atorvastatin.     4.  Hypothyroidism.  Continue Levothyroxine.     5.  Hypertension.  Continue amlodipine and metoprolol.  We will restart losartan and furosemide.     6.  Chronic pain syndrome.  Continue Lyrica.  Have pain medications available as needed.     7.  Bronchospasm.  Likely due to pneumonia.    Still complains of wheezing and shortness of breath.  Antibiotics as noted above.  Duo nebs 4 times a day.  Albuterol more often if needed.  Continue prednisone    8.  Right upper quadrant pain.  Right upper quadrant ultrasound showed no acute abnormality.           Diet: Combination Diet Regular Diet Adult    DVT Prophylaxis: Pneumatic Compression Devices  Aleman Catheter: not present  Code Status: Full Code      Disposition Plan   Expected discharge: Tomorrow, recommended to prior living arrangement   Entered: Francis Batista,  02/12/2019, 4:01 PM           ______________________________________________________________________    Interval History   She stated that she is still having cough and wheezing.  She also complains of mild shortness of breath.  She has no fever.    Data reviewed today: I reviewed all medications, new labs and imaging results over the last 24 hours.      Physical Exam   Vital Signs: Temp: 96.5  F (35.8  C) Temp src: Oral BP: 127/59 Pulse: 80   Resp: 18 SpO2: 95 % O2 Device: (S) None (Room air) Oxygen Delivery: 2 LPM  Weight: 227 lbs 9.6 oz  Gen:  NAD, A&Ox3.  Eyes:  PERRL, sclera anicteric.  OP:  MMM, no lesions.  Neck:  Supple.  CV:  Regular, no murmurs.  Lung:  Minimal wheeze b/l, normal effort.  Ab:  +BS, soft.  Skin:  Warm, dry to touch.  No rash.  Ext:  No pitting edema LE b/l.      Data   Recent Labs   Lab 02/12/19  0647 02/11/19  1934 02/11/19  1320   WBC 10.1  --  11.9*   HGB 11.6*  --  13.1   MCV 96  --  95    212 242     --  139   POTASSIUM 4.3  --  4.2   CHLORIDE 110*  --  105   CO2 22  --  26   BUN 33*  --  31*   CR 1.31*  --  1.51*   ANIONGAP 8  --  8   BARBARA 8.0*  --  9.0   *  --  116*   TROPI  --   --  <0.015

## 2019-02-12 NOTE — PROGRESS NOTES
Patient A&Ox4. VSS. Ambulates SBA. Intermittent productive cough, on RA, scheduled nebs and PRN robitussin. Patient currently NPO until ultrasound of abd which is being done d/t newly developed right side pain. +BS/gas. Voiding in adequate amounts. Will continue to monitor.

## 2019-02-13 LAB
ALBUMIN SERPL-MCNC: 2.7 G/DL (ref 3.4–5)
ALP SERPL-CCNC: 83 U/L (ref 40–150)
ALT SERPL W P-5'-P-CCNC: 59 U/L (ref 0–50)
ANION GAP SERPL CALCULATED.3IONS-SCNC: 9 MMOL/L (ref 3–14)
AST SERPL W P-5'-P-CCNC: 48 U/L (ref 0–45)
BILIRUB SERPL-MCNC: 0.2 MG/DL (ref 0.2–1.3)
BUN SERPL-MCNC: 38 MG/DL (ref 7–30)
CALCIUM SERPL-MCNC: 8.3 MG/DL (ref 8.5–10.1)
CHLORIDE SERPL-SCNC: 115 MMOL/L (ref 94–109)
CO2 SERPL-SCNC: 21 MMOL/L (ref 20–32)
CREAT SERPL-MCNC: 1.16 MG/DL (ref 0.52–1.04)
GFR SERPL CREATININE-BSD FRML MDRD: 46 ML/MIN/{1.73_M2}
GLUCOSE SERPL-MCNC: 122 MG/DL (ref 70–99)
POTASSIUM SERPL-SCNC: 4.4 MMOL/L (ref 3.4–5.3)
PROT SERPL-MCNC: 6.5 G/DL (ref 6.8–8.8)
SODIUM SERPL-SCNC: 145 MMOL/L (ref 133–144)

## 2019-02-13 PROCEDURE — A9270 NON-COVERED ITEM OR SERVICE: HCPCS | Mod: GY | Performed by: INTERNAL MEDICINE

## 2019-02-13 PROCEDURE — 25000132 ZZH RX MED GY IP 250 OP 250 PS 637: Mod: GY | Performed by: INTERNAL MEDICINE

## 2019-02-13 PROCEDURE — 25800030 ZZH RX IP 258 OP 636: Performed by: INTERNAL MEDICINE

## 2019-02-13 PROCEDURE — 80053 COMPREHEN METABOLIC PANEL: CPT | Performed by: INTERNAL MEDICINE

## 2019-02-13 PROCEDURE — 40000275 ZZH STATISTIC RCP TIME EA 10 MIN

## 2019-02-13 PROCEDURE — 99232 SBSQ HOSP IP/OBS MODERATE 35: CPT | Performed by: INTERNAL MEDICINE

## 2019-02-13 PROCEDURE — 94640 AIRWAY INHALATION TREATMENT: CPT | Mod: 76

## 2019-02-13 PROCEDURE — 25000125 ZZHC RX 250: Performed by: INTERNAL MEDICINE

## 2019-02-13 PROCEDURE — 94640 AIRWAY INHALATION TREATMENT: CPT

## 2019-02-13 PROCEDURE — 25000128 H RX IP 250 OP 636: Performed by: INTERNAL MEDICINE

## 2019-02-13 PROCEDURE — 99207 ZZC CDG-MDM COMPONENT: MEETS LOW - DOWN CODED: CPT | Performed by: INTERNAL MEDICINE

## 2019-02-13 PROCEDURE — 12000000 ZZH R&B MED SURG/OB

## 2019-02-13 PROCEDURE — 36415 COLL VENOUS BLD VENIPUNCTURE: CPT | Performed by: INTERNAL MEDICINE

## 2019-02-13 RX ORDER — FUROSEMIDE 40 MG
40 TABLET ORAL DAILY
Status: DISCONTINUED | OUTPATIENT
Start: 2019-02-13 | End: 2019-02-14 | Stop reason: HOSPADM

## 2019-02-13 RX ORDER — LEVOFLOXACIN 250 MG/1
500 TABLET, FILM COATED ORAL DAILY
Status: DISCONTINUED | OUTPATIENT
Start: 2019-02-13 | End: 2019-02-14 | Stop reason: HOSPADM

## 2019-02-13 RX ORDER — LOSARTAN POTASSIUM 50 MG/1
50 TABLET ORAL 2 TIMES DAILY
Status: DISCONTINUED | OUTPATIENT
Start: 2019-02-13 | End: 2019-02-14 | Stop reason: HOSPADM

## 2019-02-13 RX ADMIN — GUAIFENESIN 10 ML: 100 SOLUTION ORAL at 22:56

## 2019-02-13 RX ADMIN — METOPROLOL SUCCINATE 100 MG: 100 TABLET, EXTENDED RELEASE ORAL at 08:15

## 2019-02-13 RX ADMIN — HYDRALAZINE HYDROCHLORIDE 50 MG: 50 TABLET, FILM COATED ORAL at 08:16

## 2019-02-13 RX ADMIN — PREGABALIN 150 MG: 75 CAPSULE ORAL at 20:47

## 2019-02-13 RX ADMIN — LOSARTAN POTASSIUM 50 MG: 50 TABLET ORAL at 20:47

## 2019-02-13 RX ADMIN — AMLODIPINE BESYLATE 10 MG: 10 TABLET ORAL at 08:16

## 2019-02-13 RX ADMIN — SODIUM CHLORIDE: 9 INJECTION, SOLUTION INTRAVENOUS at 02:59

## 2019-02-13 RX ADMIN — VITAMIN D, TAB 1000IU (100/BT) 2000 UNITS: 25 TAB at 08:15

## 2019-02-13 RX ADMIN — AZITHROMYCIN MONOHYDRATE 250 MG: 250 TABLET ORAL at 08:14

## 2019-02-13 RX ADMIN — ACETAMINOPHEN 650 MG: 325 TABLET, FILM COATED ORAL at 02:58

## 2019-02-13 RX ADMIN — CEFTRIAXONE SODIUM 2 G: 2 INJECTION, POWDER, FOR SOLUTION INTRAMUSCULAR; INTRAVENOUS at 13:36

## 2019-02-13 RX ADMIN — IPRATROPIUM BROMIDE AND ALBUTEROL SULFATE 3 ML: .5; 3 SOLUTION RESPIRATORY (INHALATION) at 15:39

## 2019-02-13 RX ADMIN — LEVOFLOXACIN 500 MG: 250 TABLET, FILM COATED ORAL at 14:54

## 2019-02-13 RX ADMIN — IPRATROPIUM BROMIDE AND ALBUTEROL SULFATE 3 ML: .5; 3 SOLUTION RESPIRATORY (INHALATION) at 12:17

## 2019-02-13 RX ADMIN — ATORVASTATIN CALCIUM 10 MG: 10 TABLET, FILM COATED ORAL at 20:47

## 2019-02-13 RX ADMIN — LEVOTHYROXINE SODIUM 125 MCG: 125 TABLET ORAL at 07:52

## 2019-02-13 RX ADMIN — MULTIPLE VITAMINS W/ MINERALS TAB 1 TABLET: TAB at 08:16

## 2019-02-13 RX ADMIN — IPRATROPIUM BROMIDE AND ALBUTEROL SULFATE 3 ML: .5; 3 SOLUTION RESPIRATORY (INHALATION) at 20:08

## 2019-02-13 RX ADMIN — ASPIRIN 81 MG: 81 TABLET, COATED ORAL at 08:15

## 2019-02-13 RX ADMIN — ALBUTEROL SULFATE 2.5 MG: 2.5 SOLUTION RESPIRATORY (INHALATION) at 09:44

## 2019-02-13 RX ADMIN — HYDRALAZINE HYDROCHLORIDE 50 MG: 50 TABLET, FILM COATED ORAL at 20:47

## 2019-02-13 RX ADMIN — PREGABALIN 150 MG: 75 CAPSULE ORAL at 08:15

## 2019-02-13 RX ADMIN — FUROSEMIDE 40 MG: 40 TABLET ORAL at 14:54

## 2019-02-13 RX ADMIN — IPRATROPIUM BROMIDE AND ALBUTEROL SULFATE 3 ML: .5; 3 SOLUTION RESPIRATORY (INHALATION) at 07:35

## 2019-02-13 RX ADMIN — PREDNISONE 60 MG: 20 TABLET ORAL at 08:16

## 2019-02-13 ASSESSMENT — MIFFLIN-ST. JEOR: SCORE: 1513.77

## 2019-02-13 ASSESSMENT — ACTIVITIES OF DAILY LIVING (ADL)
ADLS_ACUITY_SCORE: 11

## 2019-02-13 NOTE — PLAN OF CARE
A&OX4, VSS: stable, CPAP,on sleeping comfortable. Up with SBA, gbelt and IV pole.Productive cough, PRN Robitussin given. Complains right lower abdomen pain. PRN Tylenol given with relief. Possible discharge today.

## 2019-02-13 NOTE — CONSULTS
Care Transition Initial Assessment - RN        Met with: Patient.  DATA   Active Problems:    Pneumonia       Cognitive Status: awake, alert and oriented.  [facility]: [facility]   Primary Care MD Name: Ayala   Contact information and PCP information verified: Yes  Lives With: spouse   Living Arrangements: house                 Insurance concerns: No Insurance issues identified  ASSESSMENT  Patient currently receives the following services:  NONE        Identified issues/concerns regarding health management:     CTS follow pt admitted with PNA dx, pt is noted to have a history of Restricted Lung disease.  Pt explains that she has a an appointment with Pulmonary next Monday,  the 18th. She does not wear Home 02 but does have a CPAP.      Explained per chart review noted visit with Cardiology Dr. Blackwell and inquired about heart failure history in an attempt to identifiy any educational barriers to assist with.  Pt reports that no she has not been told she has heart failure but does note that she has an appt with a pulmonary MD on Feb 18th and a heart cath on the 22nd.   Pt did not have interest in a PCP follow up appointment as she would like to have her other upcoming appt.'s first.      Pt seemed very informed there was no need for a PCP Handoff identified.   Provided PNA action plan       NO further needs     Swati Haro RN, BSN, Care Transitions Specialist   Care Transitions Team  [phone]

## 2019-02-13 NOTE — PROGRESS NOTES
[facility]    Medicine Progress Note - Hospitalist Service       Date of Admission:  2/11/2019  Assessment & Plan   Matthew Bowen is a 73 year old female admitted on 2/11/2019. She has a past medical history significant for hyperlipidemia, hypertension, hypothyroidism, and restrictive lung disease.  She presents to emergency room with 5 days of shortness of breath and cough.  Has also now developed fevers.  She is diagnosed with pneumonia.     1.  Pneumonia.  Community-acquired.   Continue azithromycin and IV ceftriaxone for one more day.   Anticipate discharge on oral antibiotic tomorrow.    2.  Acute kidney injury. Creatinine down from 1.3-1.16 today.  Will stop IV fluid. Avoid nephrotoxins as able.     3.  Hyperlipidemia.  Continue atorvastatin.     4.  Hypothyroidism.  Continue Levothyroxine.     5.  Hypertension.  Continue amlodipine and metoprolol.  We will restart losartan and furosemide.     6.  Chronic pain syndrome.  Continue Lyrica.  Have pain medications available as needed.     7.  Bronchospasm.  Likely due to pneumonia.  Still complains of wheezing and shortness of breath.  Antibiotics as noted above.  Duo nebs 4 times a day.  Albuterol more often if needed.  Will decrease prednisone to 40 mg a day, likely for a short course.    His weight is up by about 12 pounds.  Will resume Lasix    8.  Right upper quadrant pain.  Right upper quadrant ultrasound showed no acute abnormality.         Notified by RN that patient lost the IV line.  Will change her antibiotic to p.o. Levaquin.    Diet: Combination Diet Regular Diet Adult    DVT Prophylaxis: Pneumatic Compression Devices  Aleman Catheter: not present  Code Status: Full Code      Disposition Plan   Expected discharge: Tomorrow, recommended to prior living arrangement   Entered: Rafa Chavez MD, MD 02/13/2019, 2:35 PM           ______________________________________________________________________    Interval History   She stated  that she is still having cough and wheezing.  She also complains of mild shortness of breath.  She has no fever.    Data reviewed today: I reviewed all medications, new labs and imaging results over the last 24 hours.     Physical Exam   Vital Signs: Temp: 97.5  F (36.4  C) Temp src: Oral BP: 130/74 Pulse: 74   Resp: 20 SpO2: 94 % O2 Device: None (Room air)    Weight: 232 lbs 11.2 oz  Gen:  NAD, A&Ox3.  Eyes:  PERRL, sclera anicteric.  OP:  MMM, no lesions.  Neck:  Supple.  CV:  Regular, no murmurs.  Lung:  Minimal wheeze b/l, normal effort.  Ab:  +BS, soft.  Skin:  Warm, dry to touch.  No rash.  Ext:  No pitting edema LE b/l.      Data   Recent Labs   Lab 02/13/19  0650 02/12/19  0647 02/11/19  1934 02/11/19  1320   WBC  --  10.1  --  11.9*   HGB  --  11.6*  --  13.1   MCV  --  96  --  95   PLT  --  194 212 242   * 140  --  139   POTASSIUM 4.4 4.3  --  4.2   CHLORIDE 115* 110*  --  105   CO2 21 22  --  26   BUN 38* 33*  --  31*   CR 1.16* 1.31*  --  1.51*   ANIONGAP 9 8  --  8   BARBARA 8.3* 8.0*  --  9.0   * 161*  --  116*   ALBUMIN 2.7*  --   --   --    PROTTOTAL 6.5*  --   --   --    BILITOTAL 0.2  --   --   --    ALKPHOS 83  --   --   --    ALT 59*  --   --   --    AST 48*  --   --   --    TROPI  --   --   --  <0.015

## 2019-02-13 NOTE — PLAN OF CARE
Pt A&Ox4, VSS aferile. RA. Nonproductive coughing on and off. Ultrasound of abdomin done. Pt c/o right abdominal pain that was sharp when she was getting back to bed and flat MD aware. Once she had her head elevated it subsided. Tylenol was given for the pain. Up with SBA of 1 and gait belt. Creatinine 1.31 with Losartan and Lasix held again. Robitussin for coughing. Tolerated regular diet. BS hypoactive. Continues on Rocephin and Zithromax. Will continue to monitor. Possible discharge tomorrow.

## 2019-02-13 NOTE — PROVIDER NOTIFICATION
Admit hospitalist paged:  pt c/o sharp right abdominal pain now that she is back in bed and coughing a little more. She felt like she would pass out.  She is concerned that she may have a fractured rib from severe coughing she had prior to  admission. Ultrasound of her abdomen was done earlier. please advise if you would like anything additional done.     Dr. Kerr called at 2230 and is aware of her sharp pain and her concern about possible rib fracture. We will continue to monitor her at this time with no additional orders.

## 2019-02-13 NOTE — PROGRESS NOTES
Discharge Planner   Discharge Plans in progress: Home with support of Spouse   Barriers to discharge plan: Medical Readiness   Follow up plan: Pt has Pulmonary and Cardiac Cath lab appt. Scheduled- see RN CC note        Entered by: Swati Haro 02/13/2019 3:11 PM

## 2019-02-13 NOTE — PROVIDER NOTIFICATION
Do we need to continue IV Rocephin? Lost IV access. Pt still SOB, anticipating staying tonight. thanks

## 2019-02-14 VITALS
DIASTOLIC BLOOD PRESSURE: 67 MMHG | HEIGHT: 62 IN | WEIGHT: 231.2 LBS | OXYGEN SATURATION: 94 % | RESPIRATION RATE: 18 BRPM | BODY MASS INDEX: 42.55 KG/M2 | HEART RATE: 82 BPM | SYSTOLIC BLOOD PRESSURE: 158 MMHG | TEMPERATURE: 98 F

## 2019-02-14 LAB
BACTERIA SPEC CULT: NORMAL
PLATELET # BLD AUTO: 278 10E9/L (ref 150–450)
SPECIMEN SOURCE: NORMAL

## 2019-02-14 PROCEDURE — 94640 AIRWAY INHALATION TREATMENT: CPT | Mod: 76

## 2019-02-14 PROCEDURE — A9270 NON-COVERED ITEM OR SERVICE: HCPCS | Mod: GY | Performed by: INTERNAL MEDICINE

## 2019-02-14 PROCEDURE — 99239 HOSP IP/OBS DSCHRG MGMT >30: CPT | Performed by: INTERNAL MEDICINE

## 2019-02-14 PROCEDURE — 25000132 ZZH RX MED GY IP 250 OP 250 PS 637: Mod: GY | Performed by: INTERNAL MEDICINE

## 2019-02-14 PROCEDURE — 25000125 ZZHC RX 250: Performed by: INTERNAL MEDICINE

## 2019-02-14 PROCEDURE — 36415 COLL VENOUS BLD VENIPUNCTURE: CPT | Performed by: INTERNAL MEDICINE

## 2019-02-14 PROCEDURE — 94640 AIRWAY INHALATION TREATMENT: CPT

## 2019-02-14 PROCEDURE — 40000275 ZZH STATISTIC RCP TIME EA 10 MIN

## 2019-02-14 PROCEDURE — 85049 AUTOMATED PLATELET COUNT: CPT | Performed by: INTERNAL MEDICINE

## 2019-02-14 RX ORDER — PREDNISONE 20 MG/1
TABLET ORAL
Qty: 9 TABLET | Refills: 0 | Status: SHIPPED | OUTPATIENT
Start: 2019-02-14 | End: 2019-02-22

## 2019-02-14 RX ORDER — LEVOFLOXACIN 500 MG/1
500 TABLET, FILM COATED ORAL DAILY
Qty: 3 TABLET | Refills: 0 | Status: SHIPPED | OUTPATIENT
Start: 2019-02-15 | End: 2019-02-18

## 2019-02-14 RX ADMIN — IPRATROPIUM BROMIDE AND ALBUTEROL SULFATE 3 ML: .5; 3 SOLUTION RESPIRATORY (INHALATION) at 07:52

## 2019-02-14 RX ADMIN — FUROSEMIDE 40 MG: 40 TABLET ORAL at 09:12

## 2019-02-14 RX ADMIN — MULTIPLE VITAMINS W/ MINERALS TAB 1 TABLET: TAB at 09:11

## 2019-02-14 RX ADMIN — VITAMIN D, TAB 1000IU (100/BT) 2000 UNITS: 25 TAB at 09:11

## 2019-02-14 RX ADMIN — PREGABALIN 150 MG: 75 CAPSULE ORAL at 09:11

## 2019-02-14 RX ADMIN — PREDNISONE 60 MG: 20 TABLET ORAL at 09:11

## 2019-02-14 RX ADMIN — LEVOTHYROXINE SODIUM 125 MCG: 125 TABLET ORAL at 08:08

## 2019-02-14 RX ADMIN — AMLODIPINE BESYLATE 10 MG: 10 TABLET ORAL at 09:12

## 2019-02-14 RX ADMIN — HYDRALAZINE HYDROCHLORIDE 50 MG: 50 TABLET, FILM COATED ORAL at 09:11

## 2019-02-14 RX ADMIN — LEVOFLOXACIN 500 MG: 250 TABLET, FILM COATED ORAL at 09:11

## 2019-02-14 RX ADMIN — IPRATROPIUM BROMIDE AND ALBUTEROL SULFATE 3 ML: .5; 3 SOLUTION RESPIRATORY (INHALATION) at 11:39

## 2019-02-14 RX ADMIN — ASPIRIN 81 MG: 81 TABLET, COATED ORAL at 09:11

## 2019-02-14 RX ADMIN — METOPROLOL SUCCINATE 100 MG: 100 TABLET, EXTENDED RELEASE ORAL at 09:11

## 2019-02-14 RX ADMIN — LOSARTAN POTASSIUM 50 MG: 50 TABLET ORAL at 09:12

## 2019-02-14 ASSESSMENT — ACTIVITIES OF DAILY LIVING (ADL)
ADLS_ACUITY_SCORE: 13

## 2019-02-14 ASSESSMENT — MIFFLIN-ST. JEOR: SCORE: 1506.97

## 2019-02-14 NOTE — PLAN OF CARE
Discharge teaching done, questions answered. Personal belongings left with pt. Pt leaving via private car.

## 2019-02-14 NOTE — PLAN OF CARE
VS: high BP's  Orientation: A/O  Tele: NA  Glucose checks: NA  Activity: independent  Diet: regular  GI: WDL  : WDL  Respiratory: RA  IV: no access  Plan: discharge to home today

## 2019-02-14 NOTE — DISCHARGE SUMMARY
[facility]    Discharge Summary  Hospitalist    Date of Admission:  2/11/2019  Date of Discharge:  2/14/2019  1:00 PM  Discharging Provider: Rafa Chavez MD  Date of Service (when I saw the patient): 02/14/19    Discharge Diagnoses      1.  Pneumonia.  Community-acquired.    2. Acute hypoxic respiratory failure secondary to pneumonia     3.  Acute kidney injury     3.  Hyperlipidemia     4.  Hypothyroidism     5.  Hypertension     6.  Chronic pain syndrome     7.  Bronchospasm.  Likely due to pneumonia     8.  Right upper quadrant pain     History of Present Illness   Matthew Bowen is an 73 year old female who presented with shortness of breath. Please see H&P for details.     Hospital Course   Matthew Bowen is a 73 year old female admitted on 2/11/2019. She has a past medical history significant for hyperlipidemia, hypertension, hypothyroidism, and restrictive lung disease. She presents to emergency room with 5 days of shortness of breath and cough.  Has also now developed fevers. She is diagnosed with pneumonia.     1.  Pneumonia. Community-acquired. She was treated with azithromycin and IV ceftriaxone during hospital course. She was discharged on oral Levaquin to complete course of treatment.     2.  Acute kidney injury. She was initially given IV fluid. Her renal function improved. She was restarted on lasix.      3.  Hyperlipidemia. Continued on atorvastatin.     4.  Hypothyroidism. Continued on Levothyroxine.     5.  Hypertension. Continued on amlodipine and metoprolol. She was also restarted on  losartan and furosemide.     6.  Chronic pain syndrome. Continued on Lyrica.she was put on pain medications available as needed.     7.  Bronchospasm. Likely due to pneumonia. She had wheezing and shortness of breath. She was treated with antibiotics and Duo nebs 4 times a day.She was also put on short course of prednisone. She was restarted on Lasix     8.  Right upper quadrant suspected  to be muscular pain due to cough. Right upper quadrant ultrasound showed no acute abnormality. Her pain improved.     Code Status: Full Code      Significant Results and Procedures   Results for orders placed or performed during the hospital encounter of 02/11/19   XR Chest 2 Views    Narrative    CHEST TWO VIEWS February 11, 2019 3:01 PM     HISTORY: Cough.    COMPARISON: None.      Impression    IMPRESSION: Two views of the chest are performed. Right lung is  grossly clear. No pneumothorax or pleural effusions. Subtle  retrocardiac opacity obscures parts of the left hemidiaphragm. Heart  is enlarged. Aorta is calcified. Left lower lobe atelectasis or  infiltrate is possible.    THEE QURESHI MD   US Abdomen Limited    Narrative    ULTRASOUND ABDOMEN LIMITED  2/12/2019 5:07 PM     HISTORY: Right upper quadrant pain, fevers, suspected pneumonia.    COMPARISON: None.    FINDINGS: Liver is coarse in echogenicity without focal lesions.  Gallbladder demonstrates no cholelithiasis or cholecystitis.  Extrahepatic bile duct is normal in diameter. Pancreas is normal where  visualized. Right kidney is normal in size. There is no  hydronephrosis. Proximal aorta and IVC are nonaneurysmal.      Impression    IMPRESSION: No acute process demonstrated.    REG IZAGUIRRE MD         Pending Results    None  Code Status   Full Code       Primary Care Physician   Giselle Cai        Discharge Disposition   Discharged to home  Condition at discharge: Stable    Consultations This Hospital Stay   CARE COORDINATOR IP CONSULT    Time Spent on this Encounter   I, Rafa Chavez MD, personally saw the patient today and spent greater than 30 minutes discharging this patient.    Discharge Orders      Reason for your hospital stay    pneumonia     Follow-up and recommended labs and tests     Follow up with primary care provider, Giselle Cai, within 7 days     Activity    Your activity upon discharge: activity as tolerated     Full Code  "    Diet    Follow this diet upon discharge: Orders Placed This Encounter      Combination Diet Regular Diet Adult     Discharge Medications   Discharge Medication List as of 2/14/2019 12:07 PM      START taking these medications    Details   levofloxacin (LEVAQUIN) 500 MG tablet Take 1 tablet (500 mg) by mouth daily for 3 days, Disp-3 tablet, R-0, E-Prescribe      predniSONE (DELTASONE) 20 MG tablet Take 40 mg by mouth daily for 2 days, THEN 20 mg daily for 3 days, THEN 10 mg daily for 3 days., Disp-9 tablet, R-0, E-Prescribe         CONTINUE these medications which have NOT CHANGED    Details   Acetaminophen (TYLENOL PO) Take 500 mg by mouth every 4 hours as needed , Historical      amLODIPine (NORVASC) 10 MG tablet Take 10 mg by mouth daily, Historical      aspirin 81 MG EC tablet Take 81 mg by mouth daily, Historical      atorvastatin (LIPITOR) 10 MG tablet Take 10 mg by mouth every evening, Historical      furosemide (LASIX) 40 MG tablet Take 40 mg by mouth daily, Historical      hydrALAZINE (APRESOLINE) 50 MG tablet Take 50 mg by mouth 2 times daily, Historical      levothyroxine (SYNTHROID/LEVOTHROID) 125 MCG tablet Take 125 mcg by mouth daily, Historical      losartan (COZAAR) 100 MG tablet Take 50 mg by mouth 2 times daily, Historical      metoprolol succinate ER (TOPROL-XL) 100 MG 24 hr tablet Take 100 mg by mouth daily, Historical      multivitamin w/minerals (THERA-VIT-M) tablet Take 1 tablet by mouth daily, Historical      pregabalin (LYRICA) 75 MG capsule Take 150 mg by mouth 2 times daily, Historical      vitamin D3 (CHOLECALCIFEROL) 1000 units (25 mcg) tablet Take 2,000 Units by mouth daily, Historical             Allergies   Allergies   Allergen Reactions     Cephalosporins Other (See Comments)     02/11/2019 admission [facility]:  Pt reports that hives to a cephalosporin was \"50 years ago.\"  Tolerates amoxicillin and other beta lactams.     Data   Most Recent 3 CBC's:  Recent Labs   Lab Test " 02/14/19  0633 02/12/19  0647 02/11/19  1934 02/11/19  1320   WBC  --  10.1  --  11.9*   HGB  --  11.6*  --  13.1   MCV  --  96  --  95    194 212 242      Most Recent 3 BMP's:  Recent Labs   Lab Test 02/13/19  0650 02/12/19  0647 02/11/19  1320   * 140 139   POTASSIUM 4.4 4.3 4.2   CHLORIDE 115* 110* 105   CO2 21 22 26   BUN 38* 33* 31*   CR 1.16* 1.31* 1.51*   ANIONGAP 9 8 8   BARABRA 8.3* 8.0* 9.0   * 161* 116*     Most Recent 2 LFT's:  Recent Labs   Lab Test 02/13/19  0650   AST 48*   ALT 59*   ALKPHOS 83   BILITOTAL 0.2     Most Recent INR's and Anticoagulation Dosing History:  Anticoagulation Dose History     Recent Dosing and Labs Latest Ref Rng & Units 9/1/2009    INR 0.86 - 1.14 1.00        Most Recent 3 Troponin's:  Recent Labs   Lab Test 02/11/19  1320   TROPI <0.015     Most Recent Cholesterol Panel:No lab results found.  Most Recent 6 Bacteria Isolates From Any Culture (See EPIC Reports for Culture Details):  Recent Labs   Lab Test 02/12/19  0010   CULT Light growth  Normal deshaun       Most Recent TSH, T4 and A1c Labs:No lab results found.

## 2019-09-27 ENCOUNTER — TRANSFERRED RECORDS (OUTPATIENT)
Dept: HEALTH INFORMATION MANAGEMENT | Facility: CLINIC | Age: 74
End: 2019-09-27

## 2019-09-27 LAB — EJECTION FRACTION: 68

## 2019-11-04 ENCOUNTER — HOSPITAL ENCOUNTER (OUTPATIENT)
Dept: CARDIAC REHAB | Facility: CLINIC | Age: 74
End: 2019-11-04
Attending: INTERNAL MEDICINE
Payer: MEDICARE

## 2019-11-04 PROCEDURE — G0238 OTH RESP PROC, INDIV: HCPCS

## 2019-11-04 PROCEDURE — 40000244 ZZH STATISTIC VISIT PULM REHAB

## 2019-11-04 PROCEDURE — G0237 THERAPEUTIC PROCD STRG ENDUR: HCPCS

## 2019-11-20 ENCOUNTER — APPOINTMENT (OUTPATIENT)
Dept: CT IMAGING | Facility: CLINIC | Age: 74
DRG: 246 | End: 2019-11-20
Attending: EMERGENCY MEDICINE
Payer: MEDICARE

## 2019-11-20 ENCOUNTER — HOSPITAL ENCOUNTER (INPATIENT)
Facility: CLINIC | Age: 74
LOS: 2 days | Discharge: ANOTHER HOSPITAL | DRG: 246 | End: 2019-11-22
Attending: EMERGENCY MEDICINE | Admitting: INTERNAL MEDICINE
Payer: MEDICARE

## 2019-11-20 DIAGNOSIS — I21.4 NON-STEMI (NON-ST ELEVATED MYOCARDIAL INFARCTION) (H): Primary | ICD-10-CM

## 2019-11-20 DIAGNOSIS — I27.20 PULMONARY HYPERTENSION (H): ICD-10-CM

## 2019-11-20 DIAGNOSIS — R79.89 ELEVATED BRAIN NATRIURETIC PEPTIDE (BNP) LEVEL: ICD-10-CM

## 2019-11-20 DIAGNOSIS — R06.09 DYSPNEA ON EXERTION: ICD-10-CM

## 2019-11-20 DIAGNOSIS — I21.4 NSTEMI (NON-ST ELEVATED MYOCARDIAL INFARCTION) (H): ICD-10-CM

## 2019-11-20 PROBLEM — N17.9 ACUTE KIDNEY INJURY SUPERIMPOSED ON CHRONIC KIDNEY DISEASE (H): Status: ACTIVE | Noted: 2019-11-20

## 2019-11-20 PROBLEM — N18.9 ACUTE KIDNEY INJURY SUPERIMPOSED ON CHRONIC KIDNEY DISEASE (H): Status: ACTIVE | Noted: 2019-11-20

## 2019-11-20 LAB
ANION GAP SERPL CALCULATED.3IONS-SCNC: 7 MMOL/L (ref 3–14)
BASOPHILS # BLD AUTO: 0.1 10E9/L (ref 0–0.2)
BASOPHILS NFR BLD AUTO: 1.1 %
BUN SERPL-MCNC: 54 MG/DL (ref 7–30)
CALCIUM SERPL-MCNC: 8.7 MG/DL (ref 8.5–10.1)
CHLORIDE SERPL-SCNC: 109 MMOL/L (ref 94–109)
CO2 SERPL-SCNC: 27 MMOL/L (ref 20–32)
CREAT SERPL-MCNC: 1.58 MG/DL (ref 0.52–1.04)
D DIMER PPP FEU-MCNC: >20 UG/ML FEU (ref 0–0.5)
DIFFERENTIAL METHOD BLD: ABNORMAL
EOSINOPHIL # BLD AUTO: 0.2 10E9/L (ref 0–0.7)
EOSINOPHIL NFR BLD AUTO: 1.8 %
ERYTHROCYTE [DISTWIDTH] IN BLOOD BY AUTOMATED COUNT: 14.6 % (ref 10–15)
GFR SERPL CREATININE-BSD FRML MDRD: 32 ML/MIN/{1.73_M2}
GLUCOSE SERPL-MCNC: 109 MG/DL (ref 70–99)
HCT VFR BLD AUTO: 44 % (ref 35–47)
HGB BLD-MCNC: 13.7 G/DL (ref 11.7–15.7)
IMM GRANULOCYTES # BLD: 0.1 10E9/L (ref 0–0.4)
IMM GRANULOCYTES NFR BLD: 1 %
LYMPHOCYTES # BLD AUTO: 1.3 10E9/L (ref 0.8–5.3)
LYMPHOCYTES NFR BLD AUTO: 12.8 %
MCH RBC QN AUTO: 29.4 PG (ref 26.5–33)
MCHC RBC AUTO-ENTMCNC: 31.1 G/DL (ref 31.5–36.5)
MCV RBC AUTO: 94 FL (ref 78–100)
MONOCYTES # BLD AUTO: 1 10E9/L (ref 0–1.3)
MONOCYTES NFR BLD AUTO: 9.3 %
NEUTROPHILS # BLD AUTO: 7.8 10E9/L (ref 1.6–8.3)
NEUTROPHILS NFR BLD AUTO: 72 %
NRBC # BLD AUTO: 0.2 10*3/UL
NRBC BLD AUTO-RTO: 2 /100
NT-PROBNP SERPL-MCNC: 3368 PG/ML (ref 0–900)
PLATELET # BLD AUTO: 123 10E9/L (ref 150–450)
POTASSIUM SERPL-SCNC: 4.5 MMOL/L (ref 3.4–5.3)
RBC # BLD AUTO: 4.66 10E12/L (ref 3.8–5.2)
SODIUM SERPL-SCNC: 143 MMOL/L (ref 133–144)
TROPONIN I SERPL-MCNC: 0.13 UG/L (ref 0–0.04)
TROPONIN I SERPL-MCNC: 0.14 UG/L (ref 0–0.04)
TROPONIN I SERPL-MCNC: 0.15 UG/L (ref 0–0.04)
TSH SERPL DL<=0.005 MIU/L-ACNC: 1.37 MU/L (ref 0.4–4)
WBC # BLD AUTO: 10.5 10E9/L (ref 4–11)

## 2019-11-20 PROCEDURE — 25000125 ZZHC RX 250: Performed by: EMERGENCY MEDICINE

## 2019-11-20 PROCEDURE — 12000000 ZZH R&B MED SURG/OB

## 2019-11-20 PROCEDURE — 36415 COLL VENOUS BLD VENIPUNCTURE: CPT | Performed by: INTERNAL MEDICINE

## 2019-11-20 PROCEDURE — 96374 THER/PROPH/DIAG INJ IV PUSH: CPT | Mod: 59

## 2019-11-20 PROCEDURE — 83880 ASSAY OF NATRIURETIC PEPTIDE: CPT | Performed by: EMERGENCY MEDICINE

## 2019-11-20 PROCEDURE — 80048 BASIC METABOLIC PNL TOTAL CA: CPT | Performed by: EMERGENCY MEDICINE

## 2019-11-20 PROCEDURE — 84484 ASSAY OF TROPONIN QUANT: CPT | Performed by: INTERNAL MEDICINE

## 2019-11-20 PROCEDURE — 99285 EMERGENCY DEPT VISIT HI MDM: CPT | Mod: 25

## 2019-11-20 PROCEDURE — 84484 ASSAY OF TROPONIN QUANT: CPT | Performed by: EMERGENCY MEDICINE

## 2019-11-20 PROCEDURE — 25000132 ZZH RX MED GY IP 250 OP 250 PS 637: Mod: GY | Performed by: EMERGENCY MEDICINE

## 2019-11-20 PROCEDURE — 85025 COMPLETE CBC W/AUTO DIFF WBC: CPT | Performed by: EMERGENCY MEDICINE

## 2019-11-20 PROCEDURE — 25000128 H RX IP 250 OP 636: Performed by: EMERGENCY MEDICINE

## 2019-11-20 PROCEDURE — 84443 ASSAY THYROID STIM HORMONE: CPT | Performed by: EMERGENCY MEDICINE

## 2019-11-20 PROCEDURE — 85379 FIBRIN DEGRADATION QUANT: CPT | Performed by: EMERGENCY MEDICINE

## 2019-11-20 PROCEDURE — 93005 ELECTROCARDIOGRAM TRACING: CPT

## 2019-11-20 PROCEDURE — 96375 TX/PRO/DX INJ NEW DRUG ADDON: CPT

## 2019-11-20 PROCEDURE — 71275 CT ANGIOGRAPHY CHEST: CPT

## 2019-11-20 PROCEDURE — 25000132 ZZH RX MED GY IP 250 OP 250 PS 637: Mod: GY | Performed by: INTERNAL MEDICINE

## 2019-11-20 PROCEDURE — 99223 1ST HOSP IP/OBS HIGH 75: CPT | Mod: AI | Performed by: INTERNAL MEDICINE

## 2019-11-20 RX ORDER — NITROGLYCERIN 0.4 MG/1
0.4 TABLET SUBLINGUAL EVERY 5 MIN PRN
Status: DISCONTINUED | OUTPATIENT
Start: 2019-11-20 | End: 2019-11-21

## 2019-11-20 RX ORDER — IOPAMIDOL 755 MG/ML
500 INJECTION, SOLUTION INTRAVASCULAR ONCE
Status: COMPLETED | OUTPATIENT
Start: 2019-11-20 | End: 2019-11-20

## 2019-11-20 RX ORDER — PROCHLORPERAZINE MALEATE 5 MG
5 TABLET ORAL EVERY 6 HOURS PRN
Status: DISCONTINUED | OUTPATIENT
Start: 2019-11-20 | End: 2019-11-22 | Stop reason: HOSPADM

## 2019-11-20 RX ORDER — HEPARIN SODIUM 10000 [USP'U]/100ML
1000 INJECTION, SOLUTION INTRAVENOUS CONTINUOUS
Status: DISCONTINUED | OUTPATIENT
Start: 2019-11-20 | End: 2019-11-21

## 2019-11-20 RX ORDER — ATORVASTATIN CALCIUM 10 MG/1
10 TABLET, FILM COATED ORAL EVERY EVENING
Status: DISCONTINUED | OUTPATIENT
Start: 2019-11-20 | End: 2019-11-21

## 2019-11-20 RX ORDER — LIDOCAINE 40 MG/G
CREAM TOPICAL
Status: DISCONTINUED | OUTPATIENT
Start: 2019-11-20 | End: 2019-11-22 | Stop reason: HOSPADM

## 2019-11-20 RX ORDER — FUROSEMIDE 10 MG/ML
40 INJECTION INTRAMUSCULAR; INTRAVENOUS ONCE
Status: COMPLETED | OUTPATIENT
Start: 2019-11-20 | End: 2019-11-20

## 2019-11-20 RX ORDER — METOPROLOL SUCCINATE 100 MG/1
100 TABLET, EXTENDED RELEASE ORAL DAILY
Status: DISCONTINUED | OUTPATIENT
Start: 2019-11-21 | End: 2019-11-22 | Stop reason: HOSPADM

## 2019-11-20 RX ORDER — FLUTICASONE PROPIONATE 50 MCG
2 SPRAY, SUSPENSION (ML) NASAL 2 TIMES DAILY
COMMUNITY
End: 2023-01-01

## 2019-11-20 RX ORDER — PROCHLORPERAZINE 25 MG
12.5 SUPPOSITORY, RECTAL RECTAL EVERY 12 HOURS PRN
Status: DISCONTINUED | OUTPATIENT
Start: 2019-11-20 | End: 2019-11-22 | Stop reason: HOSPADM

## 2019-11-20 RX ORDER — HYDRALAZINE HYDROCHLORIDE 20 MG/ML
10 INJECTION INTRAMUSCULAR; INTRAVENOUS EVERY 4 HOURS PRN
Status: DISCONTINUED | OUTPATIENT
Start: 2019-11-20 | End: 2019-11-21

## 2019-11-20 RX ORDER — AMOXICILLIN 250 MG
1 CAPSULE ORAL 2 TIMES DAILY PRN
Status: DISCONTINUED | OUTPATIENT
Start: 2019-11-20 | End: 2019-11-22 | Stop reason: HOSPADM

## 2019-11-20 RX ORDER — HYDRALAZINE HYDROCHLORIDE 50 MG/1
50 TABLET, FILM COATED ORAL 3 TIMES DAILY
Status: DISCONTINUED | OUTPATIENT
Start: 2019-11-20 | End: 2019-11-21

## 2019-11-20 RX ORDER — VITAMIN B COMPLEX
2000 TABLET ORAL DAILY
Status: DISCONTINUED | OUTPATIENT
Start: 2019-11-21 | End: 2019-11-22 | Stop reason: HOSPADM

## 2019-11-20 RX ORDER — AMOXICILLIN 250 MG
2 CAPSULE ORAL 2 TIMES DAILY PRN
Status: DISCONTINUED | OUTPATIENT
Start: 2019-11-20 | End: 2019-11-22 | Stop reason: HOSPADM

## 2019-11-20 RX ORDER — ASPIRIN 325 MG
325 TABLET ORAL ONCE
Status: COMPLETED | OUTPATIENT
Start: 2019-11-20 | End: 2019-11-20

## 2019-11-20 RX ORDER — LEVOTHYROXINE SODIUM 125 UG/1
125 TABLET ORAL DAILY
Status: DISCONTINUED | OUTPATIENT
Start: 2019-11-21 | End: 2019-11-22 | Stop reason: HOSPADM

## 2019-11-20 RX ORDER — PREGABALIN 75 MG/1
150 CAPSULE ORAL 2 TIMES DAILY
Status: DISCONTINUED | OUTPATIENT
Start: 2019-11-20 | End: 2019-11-22 | Stop reason: HOSPADM

## 2019-11-20 RX ORDER — ONDANSETRON 2 MG/ML
4 INJECTION INTRAMUSCULAR; INTRAVENOUS EVERY 6 HOURS PRN
Status: DISCONTINUED | OUTPATIENT
Start: 2019-11-20 | End: 2019-11-22 | Stop reason: HOSPADM

## 2019-11-20 RX ORDER — ACETAMINOPHEN 325 MG/1
650 TABLET ORAL EVERY 4 HOURS PRN
Status: DISCONTINUED | OUTPATIENT
Start: 2019-11-20 | End: 2019-11-21

## 2019-11-20 RX ORDER — NALOXONE HYDROCHLORIDE 0.4 MG/ML
.1-.4 INJECTION, SOLUTION INTRAMUSCULAR; INTRAVENOUS; SUBCUTANEOUS
Status: DISCONTINUED | OUTPATIENT
Start: 2019-11-20 | End: 2019-11-21

## 2019-11-20 RX ORDER — LOSARTAN POTASSIUM 50 MG/1
50 TABLET ORAL 2 TIMES DAILY
Status: CANCELLED | OUTPATIENT
Start: 2019-11-20

## 2019-11-20 RX ORDER — BISACODYL 10 MG
10 SUPPOSITORY, RECTAL RECTAL DAILY PRN
Status: DISCONTINUED | OUTPATIENT
Start: 2019-11-20 | End: 2019-11-22 | Stop reason: HOSPADM

## 2019-11-20 RX ORDER — FLUTICASONE PROPIONATE 50 MCG
2 SPRAY, SUSPENSION (ML) NASAL 2 TIMES DAILY
Status: DISCONTINUED | OUTPATIENT
Start: 2019-11-20 | End: 2019-11-22 | Stop reason: HOSPADM

## 2019-11-20 RX ORDER — ONDANSETRON 4 MG/1
4 TABLET, ORALLY DISINTEGRATING ORAL EVERY 6 HOURS PRN
Status: DISCONTINUED | OUTPATIENT
Start: 2019-11-20 | End: 2019-11-22 | Stop reason: HOSPADM

## 2019-11-20 RX ORDER — ASPIRIN 81 MG/1
81 TABLET ORAL DAILY
Status: DISCONTINUED | OUTPATIENT
Start: 2019-11-21 | End: 2019-11-21

## 2019-11-20 RX ADMIN — HYDRALAZINE HYDROCHLORIDE 50 MG: 50 TABLET, FILM COATED ORAL at 22:23

## 2019-11-20 RX ADMIN — SODIUM CHLORIDE 90 ML: 9 INJECTION, SOLUTION INTRAVENOUS at 18:17

## 2019-11-20 RX ADMIN — HEPARIN SODIUM 850 UNITS/HR: 10000 INJECTION, SOLUTION INTRAVENOUS at 19:33

## 2019-11-20 RX ADMIN — IOPAMIDOL 75 ML: 755 INJECTION, SOLUTION INTRAVENOUS at 18:17

## 2019-11-20 RX ADMIN — ATORVASTATIN CALCIUM 10 MG: 10 TABLET, FILM COATED ORAL at 22:19

## 2019-11-20 RX ADMIN — PREGABALIN 150 MG: 75 CAPSULE ORAL at 22:19

## 2019-11-20 RX ADMIN — FUROSEMIDE 40 MG: 10 INJECTION, SOLUTION INTRAMUSCULAR; INTRAVENOUS at 17:17

## 2019-11-20 RX ADMIN — ASPIRIN 325 MG ORAL TABLET 325 MG: 325 PILL ORAL at 17:03

## 2019-11-20 ASSESSMENT — ENCOUNTER SYMPTOMS
NAUSEA: 0
DIAPHORESIS: 0
COUGH: 0
CHEST TIGHTNESS: 1
FEVER: 0
SHORTNESS OF BREATH: 1

## 2019-11-20 ASSESSMENT — MIFFLIN-ST. JEOR
SCORE: 1505.6
SCORE: 1474.76

## 2019-11-20 NOTE — Clinical Note
The first balloon was inserted into the circumflex and distal circumflex.Max pressure = 12 haider. Total duration = 30 seconds.     Max pressure = 14 haider. Total duration = 30 seconds.    Balloon reinflated a second time: Max pressure = 14 haider. Total duration = 30 seconds.

## 2019-11-20 NOTE — ED PROVIDER NOTES
History     Chief Complaint:  Shortness of Breath    HPI   Matthew Bowen is a 74 year old female with a history of hyperlipidemia, hypertension, and pulmonary hypertension who presents to the Emergency Department for evaluation of shortness of breath. The patient reports complaint of ongoing shortness of breath with exertion which she states has been worse since her recent surgery. She had a left parotidectomy surgery for a parotid mass on last Thursday (). She complains of feeling short of breath approximately 1 minute into any kind of exertion. She also endorses having intermittent chest tightness, but no chest pain. She states she has about 3-4 episodes of chest tightness per day and this comes on when she is feeling short of breath. She was seen in clinic earlier today and referred to the Emergency Department to rule out acute coronary disease and PE. She denies having any diaphoresis, nausea, arm pain, or leg swelling.  She denies having any recent fevers or cough.    Allergies:  No known drug allergies    Medications:    Aspirin 81 mg  Cholecalciferol   Cozaar  Hydralazine  Lasix  Levothyroxine   Lipitor  Lyrica   Metoprolol succinate  Norvasc    Past Medical History:    Angina pectoris  Bell's palsy  Cardiomegaly  Chronic hip pain  Hyperlipidemia  Hypertension  Hypothyroidism   Iron deficiency anemia  Morbid obesity   Obstructive sleep apnea  Pulmonary hypertension    Past Surgical History:    Breast reduction   section  Hysterectomy  Knee replacement, bilateral  Laminectomy   Left parotidectomy  Tympanoplasty   Uvulectomy     Family History:    Breast cancer  Diabetes  Heart disease  Hypertension    Social History:  The patient presents to the ED with her .  Marital status:   Smoking status: Never Smoker  Alcohol use: No    Review of Systems   Constitutional: Negative for diaphoresis and fever.   Respiratory: Positive for chest tightness and shortness of breath. Negative for  "cough.    Cardiovascular: Negative for chest pain and leg swelling.   Gastrointestinal: Negative for nausea.   All other systems reviewed and are negative.    Physical Exam     Patient Vitals for the past 24 hrs:   BP Temp Temp src Pulse Heart Rate Resp SpO2 Height Weight   11/20/19 2340 (!) 142/66 96.6  F (35.9  C) Oral -- 55 18 96 % -- --   11/20/19 2223 138/58 -- -- -- -- -- -- -- --   11/20/19 2102 (!) 165/91 96.5  F (35.8  C) Oral -- 50 18 97 % 1.549 m (5' 1\") 103.7 kg (228 lb 11.2 oz)   11/20/19 2030 -- -- -- -- 51 19 99 % -- --   11/20/19 2000 (!) 149/107 -- -- (!) 49 (!) 49 13 99 % -- --   11/20/19 1932 -- -- -- -- -- -- 98 % -- --   11/20/19 1930 (!) 184/87 -- -- 52 -- 22 90 % -- --   11/20/19 1900 (!) 162/88 -- -- 52 -- -- 95 % -- --   11/20/19 1800 (!) 191/100 -- -- 51 52 18 95 % -- --   11/20/19 1730 (!) 193/86 -- -- 51 51 29 97 % -- --   11/20/19 1700 (!) 177/84 -- -- 53 55 25 95 % -- --   11/20/19 1630 -- -- -- -- 54 18 96 % -- --   11/20/19 1600 121/76 -- -- 52 55 13 92 % -- --   11/20/19 1530 (!) 140/77 -- -- -- 54 17 94 % -- --   11/20/19 1515 (!) 149/80 -- -- 56 55 21 95 % -- --   11/20/19 1514 (!) 158/77 -- -- -- 57 24 94 % -- --   11/20/19 1400 118/60 97  F (36.1  C) Temporal 63 -- 20 96 % 1.575 m (5' 2\") 105.2 kg (232 lb)       Physical Exam  General: Alert, no acute distress; nontoxic appearing  HEENT:  Moist mucous membranes.  Conjunctiva normal.   CV:  RRR, no m/r/g, skin warm and well perfused  Pulm:  CTAB, no wheezes/ronchi/rales.  No acute distress, breathing comfortably  GI:  Soft, nontender, nondistended.  No rebound or guarding.  Normal bowel sounds  MSK:  Moving all extremities.  No focal areas of edema, erythema, or tenderness  Skin:  WWP, no rashes, 1+ lower extremity edema, skin color normal, no diaphoresis  Psych:  Well-appearing, normal affect, regular speech    Emergency Department Course   ECG (14:07:25):  Rate 59 bpm. ND interval 180. QRS duration 72. QT/QTc 466/461. P-R-T axes " 53 -45 2. Sinus bradycardia. Left axis deviation. Low voltage QRS. Inferior infarct, age undetermined. Cannot rule out Anterior infarct, age undetermined. Abnormal ECG. Interpreted at 1600 by Modesto Ho MD.    Imaging:  Radiographic findings were communicated with the patient who voiced understanding of the findings.    CT CHEST PULMONARY EMBOLISM W CONTRAST:  IMPRESSION:  1.  Negative for pulmonary embolism.  2.  Dense coronary artery calcifications in the LAD and circumflex coronary arteries.  3.  Mosaic attenuation changes in both lungs which can be seen in small airway disease.  As read by Radiology.    Laboratory:  CBC:  (L), o/w WNL (WBC 10.5, HGB 13.7)  BMP: Glucose 109 (H), Urea Nitrogen 54 (H), Creatinine 1.58 (H), GFR Estimate 32 (L), o/w WNL  BNP: 3,368 (H)  Troponin: 0.155 (HH)  D Dimer: >20.0 (H)    Interventions:  1703: Aspirin 325 mg PO  1717: Lasix 40 mg IV  1933: Heparin 25,000 units IV    Emergency Department Course:  Past medical records, nursing notes, and vitals reviewed.  1607: I performed an exam of the patient and obtained history, as documented above.   EKG was obtained, results above.  IV inserted and blood samples were collected and sent for laboratory testing, findings above.    1738: I rechecked the patient and explained the findings.   The patient was sent for a chest CT while in the emergency department, findings above.    1856: I rechecked the patient and explained the findings.    1927: I spoke to Dr. Kerr of the hospitalist service who accepts the patient for admission.     Findings and plan explained to the patient who consents to admission. Discussed the patient with Dr. Kerr, who will admit the patient to a cardiac telemetry bed for further monitoring, evaluation, and treatment.      Impression & Plan      Medical Decision Making:  Matthew Bowen is a 74 year old female who presents to the ER for evaluation of dyspnea on exertion associated with chest  tightness since 11/14.  The chest tightness is new and she is not currently having chest tightness or pain.  EKG shows new T wave inversion compared to previous EKG's 9/27 but otherwise no signs ST elevation.  Her vital signs are stable.  Work-up remarkable for slightly elevated troponin and BNP although patient does not look severely fluid overloaded.  I also considered PE as cause and her d-dimer significantly elevated and therefore PE study was obtained which fortunately was negative for this but did show nonspecific coronary artery calcifications.  No evidence of infectious process as well.  She received IV Lasix initially.  She subsequently received aspirin and given her dyspnea on exertion and chest pain with nonspecific EKG changes, concern for N STEMI and therefore heparin was started.  We will admit the patient to cardiac telemetry under care of medicine service for continued monitoring and care.  Patient admitted in stable condition.    Diagnosis:    ICD-10-CM   1. Dyspnea on exertion R06.09   2. NSTEMI (non-ST elevated myocardial infarction) (H) I21.4   3. Elevated brain natriuretic peptide (BNP) level R79.89       Disposition:  Admitted to cardiac telemetry in the care of Dr. Cirilo Marrufo  11/20/2019   [facility] EMERGENCY DEPARTMENT    Scribe Disclosure:  Anali HLODER, am serving as a scribe at 4:07 PM on 11/20/2019 to document services personally performed by Modesto Ho MD based on my observations and the provider's statements to me.        Modesto Ho MD  11/20/19 1896

## 2019-11-20 NOTE — Clinical Note
Stent deployed in the distal circumflex. Max pressure = 12 haider. Total duration = 30 seconds. Balloon reinflated a second time: Max pressure = 18 haider. Total duration = 30 seconds.

## 2019-11-20 NOTE — ED TRIAGE NOTES
Pt reports she has been dealing with shortness of breath for a long time. Pt had a surgery on Thursday and states the shortness of breath has been worse since then. Pt denies chest pain but does have some tightness. Pt sent over from clinic to rule out acute coronary disease or PE.

## 2019-11-21 ENCOUNTER — SURGERY (OUTPATIENT)
Age: 74
End: 2019-11-21
Payer: MEDICARE

## 2019-11-21 ENCOUNTER — APPOINTMENT (OUTPATIENT)
Dept: CARDIOLOGY | Facility: CLINIC | Age: 74
DRG: 246 | End: 2019-11-21
Attending: INTERNAL MEDICINE
Payer: MEDICARE

## 2019-11-21 LAB
ANION GAP SERPL CALCULATED.3IONS-SCNC: 6 MMOL/L (ref 3–14)
BUN SERPL-MCNC: 47 MG/DL (ref 7–30)
CALCIUM SERPL-MCNC: 8.6 MG/DL (ref 8.5–10.1)
CHLORIDE SERPL-SCNC: 110 MMOL/L (ref 94–109)
CHOLEST SERPL-MCNC: 133 MG/DL
CO2 SERPL-SCNC: 26 MMOL/L (ref 20–32)
CREAT SERPL-MCNC: 1.49 MG/DL (ref 0.52–1.04)
GFR SERPL CREATININE-BSD FRML MDRD: 34 ML/MIN/{1.73_M2}
GLUCOSE SERPL-MCNC: 97 MG/DL (ref 70–99)
HDLC SERPL-MCNC: 48 MG/DL
INTERPRETATION ECG - MUSE: NORMAL
KCT BLD-ACNC: 263 SEC (ref 75–150)
LDLC SERPL CALC-MCNC: 69 MG/DL
LMWH PPP CHRO-ACNC: 0.18 IU/ML
LMWH PPP CHRO-ACNC: 0.47 IU/ML
LMWH PPP CHRO-ACNC: <0.1 IU/ML
NONHDLC SERPL-MCNC: 85 MG/DL
POTASSIUM SERPL-SCNC: 4.4 MMOL/L (ref 3.4–5.3)
SODIUM SERPL-SCNC: 142 MMOL/L (ref 133–144)
TRIGL SERPL-MCNC: 80 MG/DL
TROPONIN I SERPL-MCNC: 0.1 UG/L (ref 0–0.04)
TROPONIN I SERPL-MCNC: 0.13 UG/L (ref 0–0.04)

## 2019-11-21 PROCEDURE — 25000128 H RX IP 250 OP 636: Performed by: INTERNAL MEDICINE

## 2019-11-21 PROCEDURE — 4A023N7 MEASUREMENT OF CARDIAC SAMPLING AND PRESSURE, LEFT HEART, PERCUTANEOUS APPROACH: ICD-10-PCS | Performed by: INTERNAL MEDICINE

## 2019-11-21 PROCEDURE — 27210794 ZZH OR GENERAL SUPPLY STERILE: Performed by: INTERNAL MEDICINE

## 2019-11-21 PROCEDURE — 94640 AIRWAY INHALATION TREATMENT: CPT

## 2019-11-21 PROCEDURE — 25000132 ZZH RX MED GY IP 250 OP 250 PS 637: Mod: GY | Performed by: INTERNAL MEDICINE

## 2019-11-21 PROCEDURE — C1769 GUIDE WIRE: HCPCS | Performed by: INTERNAL MEDICINE

## 2019-11-21 PROCEDURE — C9600 PERC DRUG-EL COR STENT SING: HCPCS | Performed by: INTERNAL MEDICINE

## 2019-11-21 PROCEDURE — 40000275 ZZH STATISTIC RCP TIME EA 10 MIN

## 2019-11-21 PROCEDURE — 25000125 ZZHC RX 250: Performed by: INTERNAL MEDICINE

## 2019-11-21 PROCEDURE — 99232 SBSQ HOSP IP/OBS MODERATE 35: CPT | Performed by: INTERNAL MEDICINE

## 2019-11-21 PROCEDURE — 93306 TTE W/DOPPLER COMPLETE: CPT | Mod: 26 | Performed by: INTERNAL MEDICINE

## 2019-11-21 PROCEDURE — 84484 ASSAY OF TROPONIN QUANT: CPT | Performed by: INTERNAL MEDICINE

## 2019-11-21 PROCEDURE — 25800030 ZZH RX IP 258 OP 636: Performed by: INTERNAL MEDICINE

## 2019-11-21 PROCEDURE — 93458 L HRT ARTERY/VENTRICLE ANGIO: CPT | Mod: 26 | Performed by: INTERNAL MEDICINE

## 2019-11-21 PROCEDURE — C1874 STENT, COATED/COV W/DEL SYS: HCPCS | Performed by: INTERNAL MEDICINE

## 2019-11-21 PROCEDURE — 99222 1ST HOSP IP/OBS MODERATE 55: CPT | Mod: 25 | Performed by: INTERNAL MEDICINE

## 2019-11-21 PROCEDURE — 36415 COLL VENOUS BLD VENIPUNCTURE: CPT | Performed by: INTERNAL MEDICINE

## 2019-11-21 PROCEDURE — 99152 MOD SED SAME PHYS/QHP 5/>YRS: CPT | Performed by: INTERNAL MEDICINE

## 2019-11-21 PROCEDURE — 93458 L HRT ARTERY/VENTRICLE ANGIO: CPT | Performed by: INTERNAL MEDICINE

## 2019-11-21 PROCEDURE — 80061 LIPID PANEL: CPT | Performed by: INTERNAL MEDICINE

## 2019-11-21 PROCEDURE — 93306 TTE W/DOPPLER COMPLETE: CPT

## 2019-11-21 PROCEDURE — B2111ZZ FLUOROSCOPY OF MULTIPLE CORONARY ARTERIES USING LOW OSMOLAR CONTRAST: ICD-10-PCS | Performed by: INTERNAL MEDICINE

## 2019-11-21 PROCEDURE — C1887 CATHETER, GUIDING: HCPCS | Performed by: INTERNAL MEDICINE

## 2019-11-21 PROCEDURE — 92928 PRQ TCAT PLMT NTRAC ST 1 LES: CPT | Mod: LC | Performed by: INTERNAL MEDICINE

## 2019-11-21 PROCEDURE — C1725 CATH, TRANSLUMIN NON-LASER: HCPCS | Performed by: INTERNAL MEDICINE

## 2019-11-21 PROCEDURE — 85520 HEPARIN ASSAY: CPT | Performed by: INTERNAL MEDICINE

## 2019-11-21 PROCEDURE — 85347 COAGULATION TIME ACTIVATED: CPT

## 2019-11-21 PROCEDURE — 027034Z DILATION OF CORONARY ARTERY, ONE ARTERY WITH DRUG-ELUTING INTRALUMINAL DEVICE, PERCUTANEOUS APPROACH: ICD-10-PCS | Performed by: INTERNAL MEDICINE

## 2019-11-21 PROCEDURE — 99153 MOD SED SAME PHYS/QHP EA: CPT | Performed by: INTERNAL MEDICINE

## 2019-11-21 PROCEDURE — C1894 INTRO/SHEATH, NON-LASER: HCPCS | Performed by: INTERNAL MEDICINE

## 2019-11-21 PROCEDURE — 80048 BASIC METABOLIC PNL TOTAL CA: CPT | Performed by: INTERNAL MEDICINE

## 2019-11-21 PROCEDURE — 12000000 ZZH R&B MED SURG/OB

## 2019-11-21 DEVICE — STENT SYNERGY DRUG ELUTING 2.50X28MM  H7493926028250: Type: IMPLANTABLE DEVICE | Status: FUNCTIONAL

## 2019-11-21 RX ORDER — HEPARIN SODIUM 1000 [USP'U]/ML
INJECTION, SOLUTION INTRAVENOUS; SUBCUTANEOUS
Status: DISCONTINUED | OUTPATIENT
Start: 2019-11-21 | End: 2019-11-21 | Stop reason: HOSPADM

## 2019-11-21 RX ORDER — SODIUM CHLORIDE 9 MG/ML
INJECTION, SOLUTION INTRAVENOUS CONTINUOUS
Status: DISCONTINUED | OUTPATIENT
Start: 2019-11-21 | End: 2019-11-21 | Stop reason: HOSPADM

## 2019-11-21 RX ORDER — ACETAMINOPHEN 325 MG/1
650 TABLET ORAL EVERY 4 HOURS PRN
Status: DISCONTINUED | OUTPATIENT
Start: 2019-11-21 | End: 2019-11-22 | Stop reason: HOSPADM

## 2019-11-21 RX ORDER — ASPIRIN 81 MG/1
243 TABLET ORAL ONCE
Status: COMPLETED | OUTPATIENT
Start: 2019-11-21 | End: 2019-11-21

## 2019-11-21 RX ORDER — FLUMAZENIL 0.1 MG/ML
0.2 INJECTION, SOLUTION INTRAVENOUS
Status: ACTIVE | OUTPATIENT
Start: 2019-11-21 | End: 2019-11-22

## 2019-11-21 RX ORDER — NITROGLYCERIN 0.4 MG/1
0.4 TABLET SUBLINGUAL EVERY 5 MIN PRN
Status: DISCONTINUED | OUTPATIENT
Start: 2019-11-21 | End: 2019-11-22 | Stop reason: HOSPADM

## 2019-11-21 RX ORDER — LORAZEPAM 2 MG/ML
0.5 INJECTION INTRAMUSCULAR
Status: DISCONTINUED | OUTPATIENT
Start: 2019-11-21 | End: 2019-11-21 | Stop reason: HOSPADM

## 2019-11-21 RX ORDER — NITROGLYCERIN 5 MG/ML
VIAL (ML) INTRAVENOUS
Status: DISCONTINUED
Start: 2019-11-21 | End: 2019-11-21 | Stop reason: HOSPADM

## 2019-11-21 RX ORDER — VERAPAMIL HYDROCHLORIDE 2.5 MG/ML
INJECTION, SOLUTION INTRAVENOUS
Status: DISCONTINUED
Start: 2019-11-21 | End: 2019-11-21 | Stop reason: HOSPADM

## 2019-11-21 RX ORDER — FENTANYL CITRATE 50 UG/ML
INJECTION, SOLUTION INTRAMUSCULAR; INTRAVENOUS
Status: DISCONTINUED
Start: 2019-11-21 | End: 2019-11-21 | Stop reason: HOSPADM

## 2019-11-21 RX ORDER — SODIUM CHLORIDE 9 MG/ML
INJECTION, SOLUTION INTRAVENOUS CONTINUOUS
Status: ACTIVE | OUTPATIENT
Start: 2019-11-21 | End: 2019-11-22

## 2019-11-21 RX ORDER — POTASSIUM CHLORIDE 1500 MG/1
20 TABLET, EXTENDED RELEASE ORAL
Status: DISCONTINUED | OUTPATIENT
Start: 2019-11-21 | End: 2019-11-21 | Stop reason: HOSPADM

## 2019-11-21 RX ORDER — HEPARIN SODIUM 1000 [USP'U]/ML
INJECTION, SOLUTION INTRAVENOUS; SUBCUTANEOUS
Status: DISCONTINUED
Start: 2019-11-21 | End: 2019-11-21 | Stop reason: HOSPADM

## 2019-11-21 RX ORDER — CLOPIDOGREL 300 MG/1
TABLET, FILM COATED ORAL
Status: DISCONTINUED
Start: 2019-11-21 | End: 2019-11-21 | Stop reason: HOSPADM

## 2019-11-21 RX ORDER — VERAPAMIL HYDROCHLORIDE 2.5 MG/ML
INJECTION, SOLUTION INTRAVENOUS
Status: DISCONTINUED | OUTPATIENT
Start: 2019-11-21 | End: 2019-11-21 | Stop reason: HOSPADM

## 2019-11-21 RX ORDER — CLOPIDOGREL BISULFATE 75 MG/1
TABLET ORAL
Status: DISCONTINUED | OUTPATIENT
Start: 2019-11-21 | End: 2019-11-21 | Stop reason: HOSPADM

## 2019-11-21 RX ORDER — ATROPINE SULFATE 0.1 MG/ML
0.5 INJECTION INTRAVENOUS EVERY 5 MIN PRN
Status: ACTIVE | OUTPATIENT
Start: 2019-11-21 | End: 2019-11-22

## 2019-11-21 RX ORDER — FUROSEMIDE 10 MG/ML
40 INJECTION INTRAMUSCULAR; INTRAVENOUS ONCE
Status: COMPLETED | OUTPATIENT
Start: 2019-11-21 | End: 2019-11-21

## 2019-11-21 RX ORDER — LIDOCAINE 40 MG/G
CREAM TOPICAL
Status: DISCONTINUED | OUTPATIENT
Start: 2019-11-21 | End: 2019-11-21 | Stop reason: HOSPADM

## 2019-11-21 RX ORDER — NITROGLYCERIN 5 MG/ML
VIAL (ML) INTRAVENOUS
Status: DISCONTINUED | OUTPATIENT
Start: 2019-11-21 | End: 2019-11-21 | Stop reason: HOSPADM

## 2019-11-21 RX ORDER — CLOPIDOGREL BISULFATE 75 MG/1
75 TABLET ORAL DAILY
Status: DISCONTINUED | OUTPATIENT
Start: 2019-11-22 | End: 2019-11-22 | Stop reason: HOSPADM

## 2019-11-21 RX ORDER — HYDRALAZINE HYDROCHLORIDE 20 MG/ML
10 INJECTION INTRAMUSCULAR; INTRAVENOUS EVERY 4 HOURS PRN
Status: DISCONTINUED | OUTPATIENT
Start: 2019-11-21 | End: 2019-11-22 | Stop reason: HOSPADM

## 2019-11-21 RX ORDER — FENTANYL CITRATE 50 UG/ML
INJECTION, SOLUTION INTRAMUSCULAR; INTRAVENOUS
Status: DISCONTINUED | OUTPATIENT
Start: 2019-11-21 | End: 2019-11-21 | Stop reason: HOSPADM

## 2019-11-21 RX ORDER — NALOXONE HYDROCHLORIDE 0.4 MG/ML
.2-.4 INJECTION, SOLUTION INTRAMUSCULAR; INTRAVENOUS; SUBCUTANEOUS
Status: DISCONTINUED | OUTPATIENT
Start: 2019-11-21 | End: 2019-11-21

## 2019-11-21 RX ORDER — ASPIRIN 81 MG/1
81 TABLET ORAL DAILY
Status: DISCONTINUED | OUTPATIENT
Start: 2019-11-22 | End: 2019-11-22 | Stop reason: HOSPADM

## 2019-11-21 RX ORDER — FENTANYL CITRATE 50 UG/ML
25-50 INJECTION, SOLUTION INTRAMUSCULAR; INTRAVENOUS
Status: ACTIVE | OUTPATIENT
Start: 2019-11-21 | End: 2019-11-22

## 2019-11-21 RX ORDER — NALOXONE HYDROCHLORIDE 0.4 MG/ML
.1-.4 INJECTION, SOLUTION INTRAMUSCULAR; INTRAVENOUS; SUBCUTANEOUS
Status: DISCONTINUED | OUTPATIENT
Start: 2019-11-21 | End: 2019-11-22 | Stop reason: HOSPADM

## 2019-11-21 RX ORDER — IOPAMIDOL 755 MG/ML
INJECTION, SOLUTION INTRAVASCULAR
Status: DISCONTINUED | OUTPATIENT
Start: 2019-11-21 | End: 2019-11-21 | Stop reason: HOSPADM

## 2019-11-21 RX ORDER — ATORVASTATIN CALCIUM 40 MG/1
80 TABLET, FILM COATED ORAL EVERY EVENING
Status: DISCONTINUED | OUTPATIENT
Start: 2019-11-21 | End: 2019-11-22 | Stop reason: HOSPADM

## 2019-11-21 RX ORDER — ALBUTEROL SULFATE 0.83 MG/ML
2.5 SOLUTION RESPIRATORY (INHALATION)
Status: DISCONTINUED | OUTPATIENT
Start: 2019-11-21 | End: 2019-11-22 | Stop reason: HOSPADM

## 2019-11-21 RX ORDER — LIDOCAINE HYDROCHLORIDE 10 MG/ML
INJECTION, SOLUTION EPIDURAL; INFILTRATION; INTRACAUDAL; PERINEURAL
Status: DISCONTINUED
Start: 2019-11-21 | End: 2019-11-21 | Stop reason: HOSPADM

## 2019-11-21 RX ORDER — LORAZEPAM 0.5 MG/1
0.5 TABLET ORAL
Status: DISCONTINUED | OUTPATIENT
Start: 2019-11-21 | End: 2019-11-21 | Stop reason: HOSPADM

## 2019-11-21 RX ADMIN — HEPARIN SODIUM 1000 UNITS: 1000 INJECTION, SOLUTION INTRAVENOUS; SUBCUTANEOUS at 17:02

## 2019-11-21 RX ADMIN — NITROGLYCERIN 300 MCG: 5 INJECTION, SOLUTION INTRAVENOUS at 17:03

## 2019-11-21 RX ADMIN — PREGABALIN 150 MG: 75 CAPSULE ORAL at 21:55

## 2019-11-21 RX ADMIN — HEPARIN SODIUM 3000 UNITS: 1000 INJECTION, SOLUTION INTRAVENOUS; SUBCUTANEOUS at 16:46

## 2019-11-21 RX ADMIN — ALBUTEROL SULFATE 2.5 MG: 2.5 SOLUTION RESPIRATORY (INHALATION) at 12:49

## 2019-11-21 RX ADMIN — ATORVASTATIN CALCIUM 80 MG: 40 TABLET, FILM COATED ORAL at 21:54

## 2019-11-21 RX ADMIN — NITROGLYCERIN 300 MCG: 5 INJECTION, SOLUTION INTRAVENOUS at 16:58

## 2019-11-21 RX ADMIN — FUROSEMIDE 40 MG: 10 INJECTION, SOLUTION INTRAMUSCULAR; INTRAVENOUS at 17:56

## 2019-11-21 RX ADMIN — MELATONIN 2000 UNITS: at 08:20

## 2019-11-21 RX ADMIN — FENTANYL CITRATE 25 MCG: 50 INJECTION, SOLUTION INTRAMUSCULAR; INTRAVENOUS at 16:27

## 2019-11-21 RX ADMIN — ASPIRIN 243 MG: 81 TABLET, COATED ORAL at 12:15

## 2019-11-21 RX ADMIN — Medication 3000 UNITS: at 08:30

## 2019-11-21 RX ADMIN — IOPAMIDOL 117 ML: 755 INJECTION, SOLUTION INTRAVENOUS at 17:12

## 2019-11-21 RX ADMIN — NITROGLYCERIN 200 MCG: 5 INJECTION, SOLUTION INTRAVENOUS at 16:28

## 2019-11-21 RX ADMIN — SODIUM CHLORIDE: 9 INJECTION, SOLUTION INTRAVENOUS at 13:54

## 2019-11-21 RX ADMIN — HYDRALAZINE HYDROCHLORIDE 10 MG: 20 INJECTION INTRAMUSCULAR; INTRAVENOUS at 17:56

## 2019-11-21 RX ADMIN — HYDRALAZINE HYDROCHLORIDE 50 MG: 50 TABLET, FILM COATED ORAL at 14:25

## 2019-11-21 RX ADMIN — MIDAZOLAM 0.5 MG: 1 INJECTION INTRAMUSCULAR; INTRAVENOUS at 16:26

## 2019-11-21 RX ADMIN — METOPROLOL SUCCINATE 100 MG: 100 TABLET, EXTENDED RELEASE ORAL at 10:00

## 2019-11-21 RX ADMIN — PREGABALIN 150 MG: 75 CAPSULE ORAL at 08:20

## 2019-11-21 RX ADMIN — HEPARIN SODIUM 5000 UNITS: 1000 INJECTION, SOLUTION INTRAVENOUS; SUBCUTANEOUS at 16:31

## 2019-11-21 RX ADMIN — HYDRALAZINE HYDROCHLORIDE 50 MG: 50 TABLET, FILM COATED ORAL at 08:20

## 2019-11-21 RX ADMIN — VERAPAMIL HYDROCHLORIDE 2.5 MG: 2.5 INJECTION, SOLUTION INTRAVENOUS at 16:28

## 2019-11-21 RX ADMIN — LEVOTHYROXINE SODIUM 125 MCG: 125 TABLET ORAL at 08:19

## 2019-11-21 RX ADMIN — HYDRALAZINE HYDROCHLORIDE 10 MG: 20 INJECTION INTRAMUSCULAR; INTRAVENOUS at 14:04

## 2019-11-21 RX ADMIN — LIDOCAINE HYDROCHLORIDE 1 ML: 10 INJECTION, SOLUTION INFILTRATION; PERINEURAL at 16:26

## 2019-11-21 RX ADMIN — CLOPIDOGREL BISULFATE 600 MG: 75 TABLET ORAL at 16:47

## 2019-11-21 RX ADMIN — ASPIRIN 81 MG: 81 TABLET ORAL at 08:20

## 2019-11-21 RX ADMIN — HYDRALAZINE HYDROCHLORIDE 75 MG: 25 TABLET, FILM COATED ORAL at 21:54

## 2019-11-21 ASSESSMENT — ACTIVITIES OF DAILY LIVING (ADL)
ADLS_ACUITY_SCORE: 13

## 2019-11-21 ASSESSMENT — MIFFLIN-ST. JEOR: SCORE: 1466.59

## 2019-11-21 NOTE — H&P
[facility]  History and Physical   Hospitalist Service    Erich Kerr MD    Matthew Bowen MRN# [id number]   YOB: 1945 Age: 74 year old      Date of Admission:  11/20/2019           Assessment and Plan:   Matthew Bowen is a 74-year-old female with history of hypertension, hypercholesterolemia, pulmonary hypertension which is thought to be the cause of one year or more of dyspnea on exertion, hypothyroidism, Bell's palsy, iron deficiency anemia, obesity, obstructive sleep apnea for which she uses CPAP with sleep, and recent left parotidectomy on 11/14 for mass ( which ended up being a benign cyst).  She was seen in the emergency department today with her daughter who is a pediatrician.  She went to clinic earlier in the day for evaluation of worsening shortness of breath and dyspnea on exertion.  She was referred to the emergency department for further evaluation.  She describes approximately one year of dyspnea on exertion.  She has had evaluation over the last year with echocardiogram, stress testing, and cardiac MRI.  She was found to have pulmonary hypertension which was thought to be the cause of her symptoms.  In the last several days she has noticed a dramatic increase in her shortness of breath and dyspnea with exertion.  Particularly, since her surgery on the 14th she has been very short of breath with even minimal activity.  Today, she also noticed some associated chest tightness.  She has not had chest pain or radiation of discomfort.  She has not had associated nausea, vomiting, diaphoresis, edema, or weight gain.  Emergency department evaluation showed some bradycardia and elevated blood pressures but otherwise unremarkable vital signs.  Oxygen saturations were in the mid 90% range on room air. ECG showed sinus rhythm with rate of 60, poor R wave progression in the precordial leads, inverted T waves in leads V1-V3 (previously noted in leads V1 and V2), and  previously noted Q wave in lead AVF and inverted T wave in lead III. No acute ischemic changes were noted.  Basic metabolic panel showed BUN of 54 and creatinine of 1.58 (it seems that her baseline creatinine is closer to 1.4).  CBC was unremarkable (mild thrombocytopenia with platelets of 123).  BNP was elevated at 3368.  Troponin was elevated at 0.155.  D-dimer was elevated at greater than 20.  Initially there was some concern that she may have congestive heart failure so she was given 40 mg of Lasix intravenously in the emergency department.  CT scan of chest was obtained.  This showed no pulmonary embolism.  It did show coronary artery calcification involving the LAD and circumflex arteries.  Also noted was a mosaic attenuation change in both lungs she could be indicative of small airway disease.  I was asked to admit Ms. Bowen to the hospital for further evaluation and treatment of non-ST elevation myocardial infarction, dyspnea on exertion, and acute on chronic kidney injury.    Problem list:    1.  Non-ST elevation myocardial infarction with symptoms of worsening shortness of breath and dyspnea on exertion, laboratory finding of elevated troponin, and imaging finding of coronary artery calcification of the LAD and circumflex arteries on CT.  Patient will be admitted as an inpatient to telemetry. Heparin drip was started in the emergency department and will be continued.  Daily aspirin will be continued. Serial troponins will be monitored.  Echocardiogram will be obtained in the morning.    Cardiology will be consulted.  Patient may require coronary angiogram.  Her renal function may dictate the timing of this.  Nitroglycerin will be available for use as needed for chest pain.    2.  Acute on chronic dyspnea on exertion.    3.  Question of congestive heart failure.  BNP is elevated.  Patient denies weight gain.  On exam she does not appear grossly volume overloaded.  She did receive 40 mg of intravenous Lasix  in the emergency department which was reasonable.  Will monitor for now.  We will hold her prior to admission daily oral Lasix with her acute kidney injury on chronic kidney disease.  Check basic metabolic panel in the morning.  Reassess tomorrow.  Cardiology is being consulted.    4.  Acute kidney injury on chronic kidney disease.  Creatinine is 1.58 here (her baseline seems to be at or just under 1.4).  She did receive a contrast load for CT of chest today.  She did receive IV Lasix for possible congestive heart failure.  Hold prior to admission Lasix and Losartan for now.  Follow serial basic metabolic panel.    5.  Obstructive sleep apnea.  She will use her CPAP machine with sleep.    6.  Hypertension.  Continue prior to admission metoprolol and hydralazine.  We will have IV hydralazine available for use as needed.  I am holding prior to admission Cozaar and Lasix for now with acute kidney injury.    7.  Dyslipidemia.  Continue prior to admission Lipitor.    8.  Pulmonary hypertension.    9.  Hypothyroidism.  Continue prior to admission levothyroxine.  Check TSH with reflex free thyroxine.    Full code  Heparin will cover DVT prophylaxis  Disposition: Admit as inpatient to telemetry           Code Status:   Full Code         Primary Care Physician:   Giselle Cai [phone]         Chief Complaint:   Shortness of breath with minimal activity    History is obtained from Matthew         History of Present Illness:   Matthew Bowen is a 74-year-old female with history of hypertension, hypercholesterolemia, pulmonary hypertension which is thought to be the cause of one year or more of dyspnea on exertion, hypothyroidism, Bell's palsy, iron deficiency anemia, obesity, obstructive sleep apnea for which she uses CPAP with sleep, and recent left parotidectomy on 11/14 for mass ( which ended up being a benign cyst).  She was seen in the emergency department today with her daughter who is a pediatrician.  She went to  clinic earlier in the day for evaluation of worsening shortness of breath and dyspnea on exertion.  She was referred to the emergency department for further evaluation.  She describes approximately one year of dyspnea on exertion.  She has had evaluation over the last year with echocardiogram, stress testing, and cardiac MRI.  She was found to have pulmonary hypertension which was thought to be the cause of her symptoms.  In the last several days she has noticed a dramatic increase in her shortness of breath and dyspnea with exertion.  Particularly, since her surgery on the 14th she has been very short of breath with even minimal activity.  Today, she also noticed some associated chest tightness.  She has not had chest pain or radiation of discomfort.  She has not had associated nausea, vomiting, diaphoresis, edema, or weight gain.  Emergency department evaluation showed some bradycardia and elevated blood pressures but otherwise unremarkable vital signs.  Oxygen saturations were in the mid 90% range on room air. ECG showed sinus rhythm with rate of 60, poor R wave progression in the precordial leads, inverted T waves in leads V1-V3 (previously noted in leads V1 and V2), and previously noted Q wave in lead AVF and inverted T wave in lead III. No acute ischemic changes were noted.  Basic metabolic panel showed BUN of 54 and creatinine of 1.58 (it seems that her baseline creatinine is closer to 1.4).  CBC was unremarkable (mild thrombocytopenia with platelets of 123).  BNP was elevated at 3368.  Troponin was elevated at 0.155.  D-dimer was elevated at greater than 20.  Initially there was some concern that she may have congestive heart failure so she was given 40 mg of Lasix intravenously in the emergency department.  CT scan of chest was obtained.  This showed no pulmonary embolism.  It did show coronary artery calcification involving the LAD and circumflex arteries.  Also noted was a mosaic attenuation change in  both lungs she could be indicative of small airway disease.  I was asked to admit Ms. Bowen to the hospital for further evaluation and treatment of non-ST elevation myocardial infarction, dyspnea on exertion, and acute on chronic kidney injury.           Past Medical History:     Patient Active Problem List   Diagnosis     Pneumonia     Non-STEMI (non-ST elevated myocardial infarction) (H)     Dyspnea on exertion     Acute kidney injury superimposed on chronic kidney disease (H)   Hypertension  Hypercholesterolemia  Pulmonary hypertension  Hypothyroidism  Bell's palsy  Iron deficiency anemia  Obesity with BMI of 42  Obstructive sleep apnea for which she uses CPAP with sleep          Past Surgical History:   Recent left parotidectomy on   Breast reduction surgery   section  Hysterectomy  Bilateral total knee replacements  Spine laminectomy  Tympanoplasty  Uvulectomy         Home Medications:     Prior to Admission medications    Medication Sig Last Dose Taking? Auth Provider   aspirin 81 MG EC tablet Take 81 mg by mouth daily 2019 at am Yes Unknown, Entered By History   atorvastatin (LIPITOR) 10 MG tablet Take 10 mg by mouth every evening 2019 at pm Yes Unknown, Entered By History   fluticasone (FLONASE) 50 MCG/ACT nasal spray Spray 2 sprays into both nostrils 2 times daily 2019 at am Yes Unknown, Entered By History   furosemide (LASIX) 40 MG tablet Take 40 mg by mouth daily 2019 at am Yes Unknown, Entered By History   hydrALAZINE (APRESOLINE) 50 MG tablet Take 50 mg by mouth 3 times daily  2019 at x1 Yes Unknown, Entered By History   levothyroxine (SYNTHROID/LEVOTHROID) 125 MCG tablet Take 125 mcg by mouth daily 2019 at am Yes Unknown, Entered By History   losartan (COZAAR) 100 MG tablet Take 50 mg by mouth 2 times daily 2019 at am Yes Unknown, Entered By History   metoprolol succinate ER (TOPROL-XL) 100 MG 24 hr tablet Take 100 mg by mouth daily 2019 at  "am Yes Unknown, Entered By History   multivitamin w/minerals (THERA-VIT-M) tablet Take 1 tablet by mouth daily 11/20/2019 at am Yes Unknown, Entered By History   pregabalin (LYRICA) 75 MG capsule Take 150 mg by mouth 3 times daily  11/20/2019 at x1 Yes Unknown, Entered By History   vitamin D3 (CHOLECALCIFEROL) 1000 units (25 mcg) tablet Take 2,000 Units by mouth daily 11/20/2019 at am Yes Unknown, Entered By History   Acetaminophen (TYLENOL PO) Take 500 mg by mouth every 4 hours as needed  Unknown at Unknown time  Reported, Patient            Allergies:   No Known Allergies         Social History:     Never smoked tobacco  Does not abuse alcohol          Family History:   Breast cancer  Diabetes  Coronary artery disease  Hypertension           Review of Systems:   The 10 point Review of Systems is negative other than as noted in the HPI.           Physical Exam:   Blood pressure (!) 149/107, pulse (!) 49, temperature 97  F (36.1  C), temperature source Temporal, resp. rate 13, height 1.575 m (5' 2\"), weight 105.2 kg (232 lb), SpO2 99 %.  232 lbs 0 oz      GENERAL: Pleasant and cooperative. No acute distress at rest.  EYES: Pupils equal and round. No scleral erythema or icterus.  ENT: External ears are normal without deformity. Posterior oropharynx is without erythem, swelling, or exudate.  NECK: Supple. No masses or swelling. No tenderness. Thyroid is normal without mass or tenderness.  CHEST: Clear to auscultation. Normal breath sounds. No retractions.   CV: Regular rate and rhythm. No JVD. Pulses normal.  ABDOMEN: Bowel sounds present. No tenderness. No masses or hernia.  EXTREMETIES: No clubbing, cyanosis, or ischemia.  SKIN: Warm and dry to touch. No wounds or rashes.  NEUROLOGIC: Strength and sensation are normal. Deep tendon reflexes are normal. Cranial nerves are normal.             Data:   All new lab and imaging data was reviewed.     Results for orders placed or performed during the hospital " encounter of 11/20/19 (from the past 24 hour(s))   EKG 12 lead   Result Value Ref Range    Interpretation ECG Click View Image link to view waveform and result    Troponin I   Result Value Ref Range    Troponin I ES 0.155 (HH) 0.000 - 0.045 ug/L   Basic metabolic panel   Result Value Ref Range    Sodium 143 133 - 144 mmol/L    Potassium 4.5 3.4 - 5.3 mmol/L    Chloride 109 94 - 109 mmol/L    Carbon Dioxide 27 20 - 32 mmol/L    Anion Gap 7 3 - 14 mmol/L    Glucose 109 (H) 70 - 99 mg/dL    Urea Nitrogen 54 (H) 7 - 30 mg/dL    Creatinine 1.58 (H) 0.52 - 1.04 mg/dL    GFR Estimate 32 (L) >60 mL/min/[1.73_m2]    GFR Estimate If Black 37 (L) >60 mL/min/[1.73_m2]    Calcium 8.7 8.5 - 10.1 mg/dL   CBC with platelets differential   Result Value Ref Range    WBC 10.5 4.0 - 11.0 10e9/L    RBC Count 4.66 3.8 - 5.2 10e12/L    Hemoglobin 13.7 11.7 - 15.7 g/dL    Hematocrit 44.0 35.0 - 47.0 %    MCV 94 78 - 100 fl    MCH 29.4 26.5 - 33.0 pg    MCHC 31.1 (L) 31.5 - 36.5 g/dL    RDW 14.6 10.0 - 15.0 %    Platelet Count 123 (L) 150 - 450 10e9/L    Diff Method Automated Method     % Neutrophils 72.0 %    % Lymphocytes 12.8 %    % Monocytes 9.3 %    % Eosinophils 1.8 %    % Basophils 1.1 %    % Immature Granulocytes 1.0 %    Nucleated RBCs 2 (H) 0 /100    Absolute Neutrophil 7.8 1.6 - 8.3 10e9/L    Absolute Lymphocytes 1.3 0.8 - 5.3 10e9/L    Absolute Monocytes 1.0 0.0 - 1.3 10e9/L    Absolute Eosinophils 0.2 0.0 - 0.7 10e9/L    Absolute Basophils 0.1 0.0 - 0.2 10e9/L    Abs Immature Granulocytes 0.1 0 - 0.4 10e9/L    Absolute Nucleated RBC 0.2    D dimer quantitative   Result Value Ref Range    D Dimer >20.0 (H) 0.0 - 0.50 ug/ml FEU   Nt probnp inpatient   Result Value Ref Range    N-Terminal Pro BNP Inpatient 3,368 (H) 0 - 900 pg/mL   CT Chest Pulmonary Embolism w Contrast    Narrative    EXAM: CT ANGIOGRAM CHEST PULMONARY EMBOLISM W CONTRAST  LOCATION: [facility]  DATE/TIME: 11/20/2019 6:16 PM    INDICATION: Chest  pain. Positive D-dimer. Shortness of breath.  COMPARISON: None.  TECHNIQUE: CT angiogram chest during arterial phase injection IV contrast. 2D and 3D MIP reconstructions were performed by the CT technologist. Dose reduction techniques were used.   CONTRAST: 75 mL [id number]-[id number].    FINDINGS:  ANGIOGRAM CHEST: Pulmonary arteries are normal caliber and negative for pulmonary emboli. Thoracic aorta is negative for dissection. No CT evidence of right heart strain.    LUNGS AND PLEURA: Mosaic attenuation changes in both lungs which can be seen in small airway disease. No significant pleural effusion. No consolidation.    MEDIASTINUM/AXILLAE: Dense coronary artery calcifications in the LAD, circumflex and right coronary arteries. No pericardial effusion.    UPPER ABDOMEN: Normal.    MUSCULOSKELETAL: Normal.      Impression    IMPRESSION:  1.  Negative for pulmonary embolism.  2.  Dense coronary artery calcifications in the LAD and circumflex coronary arteries.  3.  Mosaic attenuation changes in both lungs which can be seen in small airway disease.   Troponin I   Result Value Ref Range    Troponin I ES 0.143 (HH) 0.000 - 0.045 ug/L

## 2019-11-21 NOTE — PLAN OF CARE
Pt to angiogram ~ 1545. No pain all shift. IV heparin/IVF per protocol infused. Dyspneic on exertion to bathroom, desats to 88 room air. Pt requested oxygen for comfort. 2L NC %. Tele SB. BP's elevated this afternoon, discussed with Dr. Weller/Hospitalist - gave IV an po hydralazine. Holding lasix and losartan due to renal function. Bilateral base crackles. Some wheezing on expiration - prn neb given w/ pt stating breathing is improved.

## 2019-11-21 NOTE — PROGRESS NOTES
"Post procedure  She appears comfortable and being transferred to her hospital bed. The access site looks fine. Diagnostic CAG showed long calcified lesion in proximal RCA and focal stenosis in distal  CX. I am uncertain of the \"culprit vessel\", but given constraints of contrast limits and the complexity of RCA lesion which may require atherectomy, we treated CX lesion with plans to stage RCA lesion in 3 to 5 days once effect of contrast out of system... likely early next week.  The patient would prefer to have RCA  procedure done with her [facility] cardiologists.     Her EDP was very high ( 35) so we will give 40 mg IV furosemide this evening and use hydralazine for blood pressure control.    In AM we will reassess and decide on how to approach RCA lesion. If patient is ambulatory and stable, could discharge and proceed with PCI early next week.  If not stable, could arrange transfer to [facility].    I left a message with her daughter , who I spoke with earlier today on the phone.    Nithin  "

## 2019-11-21 NOTE — PLAN OF CARE
Patient up SBA. Denies SOB and chest tightness. Heparin gtt 8.5mL/hr. On tele. C-PAP in place. Tolerating diet. Voiding w/out difficulty. Reports having a BM. Troponin elevated at .131. Cardiology consulted.

## 2019-11-21 NOTE — PHARMACY-ADMISSION MEDICATION HISTORY
Admission medication history interview status for this patient is complete. See [facility] admission navigator for allergy information, prior to admission medications and immunization status.     Medication history interview source(s):Patient  Medication history resources (including written lists, pill bottles, clinic record):[facility] List, Care Everywhere  Primary pharmacy:[facility]    Changes made to PTA medication list:  Added: Flonase  Deleted: Amlodipine  Changed: Hydralazine from bid to tid, Lyrica from bid to tid    Actions taken by pharmacist (provider contacted, etc):None     Additional medication history information:None    Medication reconciliation/reorder completed by provider prior to medication history?  No     Do you take OTC medications (eg tylenol, ibuprofen, fish oil, eye/ear drops, etc)? Yes     For patients on insulin therapy: No    Prior to Admission medications    Medication Sig Last Dose Taking? Auth Provider   aspirin 81 MG EC tablet Take 81 mg by mouth daily 11/20/2019 at am Yes Unknown, Entered By History   atorvastatin (LIPITOR) 10 MG tablet Take 10 mg by mouth every evening 11/19/2019 at pm Yes Unknown, Entered By History   fluticasone (FLONASE) 50 MCG/ACT nasal spray Spray 2 sprays into both nostrils 2 times daily 11/20/2019 at am Yes Unknown, Entered By History   furosemide (LASIX) 40 MG tablet Take 40 mg by mouth daily 11/20/2019 at am Yes Unknown, Entered By History   hydrALAZINE (APRESOLINE) 50 MG tablet Take 50 mg by mouth 3 times daily  11/20/2019 at x1 Yes Unknown, Entered By History   levothyroxine (SYNTHROID/LEVOTHROID) 125 MCG tablet Take 125 mcg by mouth daily 11/20/2019 at am Yes Unknown, Entered By History   losartan (COZAAR) 100 MG tablet Take 50 mg by mouth 2 times daily 11/20/2019 at am Yes Unknown, Entered By History   metoprolol succinate ER (TOPROL-XL) 100 MG 24 hr tablet Take 100 mg by mouth daily 11/20/2019 at am Yes Unknown, Entered By History   multivitamin w/minerals  (THERA-VIT-M) tablet Take 1 tablet by mouth daily 11/20/2019 at am Yes Unknown, Entered By History   pregabalin (LYRICA) 75 MG capsule Take 150 mg by mouth 3 times daily  11/20/2019 at x1 Yes Unknown, Entered By History   vitamin D3 (CHOLECALCIFEROL) 1000 units (25 mcg) tablet Take 2,000 Units by mouth daily 11/20/2019 at am Yes Unknown, Entered By History   Acetaminophen (TYLENOL PO) Take 500 mg by mouth every 4 hours as needed  Unknown at Unknown time  Reported, Patient

## 2019-11-21 NOTE — CONSULTS
Cardiology consultation dictated    Assessment : 74 year old woman with known CAD based upon coronary calcification on CT,  morbid obesity, pulmonary hypertension ( had been on sildenafil), HTN, ALAINA, dyslipidemia , CKDZ ( Cr 1.49 /GFR 40) with progressive exertional dyspnea/cheset tightness, small troponin rise and new T wave inversion in precordial leads. Although pulmonary hypertension with RV strain could cause this picture, her presentation is very concerning for ACS. TTE at bedside shows no obvious RWMA.     Recommendation  1)  CAG possible PCI  2)  Mirian-procedural hydration   3) TTE ( done)   4) agree with current guideline directed medical therapy    I have examined the patient, reviewed the history, medications and pre procedural tests. I have explained to the patient the risks of death, MI, stroke, hematoma, possible urgent bypass surgery for failed PCI, use of stents, thienopyridine agents, possible peripheral vascular complications, arrhythmia, the use of FFR in clinical decision-making and alternative of medical therapy alone in regards to  coronary angiography, and possible percutaneous coronary intervention. The patient voiced understanding and wishes to proceed. The patient has a good right and left  radial pulse, normal right and left ulnar pulse and a normal Noel's sign.

## 2019-11-21 NOTE — PROVIDER NOTIFICATION
BP elevated. IV hydralazine 10mg given at the time Dr Delgadillo contacted RN writer. RN writer spoke to Dr. Weller who asked me to give 1600 dose of po hydralazine now, then recheck. Pt does not have any chest discomfort/symptoms.

## 2019-11-21 NOTE — ED NOTES
[facility]  ED Nurse Handoff Report    Matthew Bowen is a 74 year old female   ED Chief complaint: Shortness of Breath  . ED Diagnosis:   Final diagnoses:   Dyspnea on exertion   NSTEMI (non-ST elevated myocardial infarction) (H)   Elevated brain natriuretic peptide (BNP) level     Allergies: No Known Allergies    Code Status: Full Code  Activity level - Baseline/Home:  Independent. Activity Level - Current:   Stand by Assist. Lift room needed: No. Bariatric: No   Needed: No   Isolation: No. Infection: Not Applicable.     Vital Signs:   Vitals:    11/20/19 1700 11/20/19 1730 11/20/19 1800 11/20/19 1900   BP: (!) 177/84 (!) 193/86 (!) 191/100 (!) 162/88   Pulse: 53 51 51 52   Resp: 25 29 18    Temp:       TempSrc:       SpO2: 95% 97% 95% 95%   Weight:       Height:           Cardiac Rhythm:  ,      Pain level:    Patient confused: No. Patient Falls Risk: Yes.   Elimination Status: Has voided   Patient Report - Initial Complaint: Pt reports she has been dealing with shortness of breath for a long time. Pt had a surgery on Thursday and states the shortness of breath has been worse since then. Pt denies chest pain but does have some tightness. Pt sent over from clinic to rule out acute coronary disease or PE. .     Focused Assessment: Respiratory - Respiratory WDL: all  Rhythm/Pattern, Respiratory: depth regular; pattern regular; unlabored; shortness of breath (and unlabored at rest, labored and SOB with exertion) Expansion/Accessory Muscles/Retractions: expansion symmetric; no retractions; no use of accessory muscles  Breath Sounds: All Fields  Nailbeds: no discoloration  Mucous Membranes: pink; intact; moist  Cough Frequency: no cough   Head To Toe Assessment - All Lung Fields Breath Sounds: Anterior:; equal bilaterally; clear    Tests Performed: ekg, CT chest, labs. Abnormal Results:   Labs Ordered and Resulted from Time of ED Arrival Up to the Time of Departure from the ED   TROPONIN I -  Abnormal; Notable for the following components:       Result Value    Troponin I ES 0.155 (*)     All other components within normal limits   BASIC METABOLIC PANEL - Abnormal; Notable for the following components:    Glucose 109 (*)     Urea Nitrogen 54 (*)     Creatinine 1.58 (*)     GFR Estimate 32 (*)     GFR Estimate If Black 37 (*)     All other components within normal limits   CBC WITH PLATELETS DIFFERENTIAL - Abnormal; Notable for the following components:    MCHC 31.1 (*)     Platelet Count 123 (*)     Nucleated RBCs 2 (*)     All other components within normal limits   D DIMER QUANTITATIVE - Abnormal; Notable for the following components:    D Dimer >20.0 (*)     All other components within normal limits   NT PROBNP INPATIENT - Abnormal; Notable for the following components:    N-Terminal Pro BNP Inpatient 3,368 (*)     All other components within normal limits   TROPONIN I - Abnormal; Notable for the following components:    Troponin I ES 0.143 (*)     All other components within normal limits   MEASURE WEIGHT   NOTIFY PHYSICIAN   NOTIFY PHYSICIAN   PLATELETS MONITORED PER HEPARIN TREATMENT PROTOCOL (FOR MEANINGFUL USE     CT Chest Pulmonary Embolism w Contrast   Final Result   IMPRESSION:   1.  Negative for pulmonary embolism.   2.  Dense coronary artery calcifications in the LAD and circumflex coronary arteries.   3.  Mosaic attenuation changes in both lungs which can be seen in small airway disease.        .   Treatments provided: heparin drip, PIV, lasix   Family Comments: Roque (daughter) at bedside  OBS brochure/video discussed/provided to patient:  N/A  ED Medications:   Medications   heparin 25,000 units in 0.45% NaCl 250 mL infusion (850 Units/hr Intravenous New Bag 11/20/19 1933)   aspirin (ASA) tablet 325 mg (325 mg Oral Given 11/20/19 1703)   furosemide (LASIX) injection 40 mg (40 mg Intravenous Given 11/20/19 1717)   iopamidol (ISOVUE-370) solution 500 mL (75 mLs Intravenous Given 11/20/19  1817)   sodium chloride 0.9 % bag 500mL for CT scan flush use (90 mLs As instructed Given 11/20/19 1817)     Drips infusing:  Yes  For the majority of the shift, the patient's behavior Green. Interventions performed were NA.     Severe Sepsis OR Septic Shock Diagnosis Present: No    RECEIVING UNIT ED HANDOFF REVIEW    Above ED Nurse Handoff Report was reviewed: Yes  Reviewed by: Kymberly Galeano RN on November 20, 2019 at 8:22 PM         ED Nurse Name/Phone Number: Jennifer Cunningham RN,   7:42 PM

## 2019-11-21 NOTE — PLAN OF CARE
A/Ox4, SBA for all transfers, VSS, LS clear on CPAP overnight, Tele SB with prolonged QT, hep gtt maintained at 8.5ml/hr, denies pain and SOB, peak trop 0.155, tolerating low fat/low Na no caffeine diet, discharge plan not yet determined - continue with plan of care.

## 2019-11-21 NOTE — PROGRESS NOTES
[facility]  Hospitalist Progress Note      Assessment & Plan   Matthew Bowen is a 74 year old female who was admitted on 11/20/2019 with chest tightness and an NSTEMI.     NSTEMI w/ CAD   -Cardiology consulted - appreciate assistance  -TTE ordered  -CAG scheduled   -Continue heparin gtt  -ASA 81 mg daily continued   -ACEi held due to renal fxn  -Nitroglycerin prn   -Telemetry monitoring     CARMEN on CKD  -Creatinine baseline 1.4     Hypertension   -ACEi and lasix held due to CARMEN - monitor closely for volume overload  -Metoprolol 100 mg daily  -Hydralazine 75 mg TID  (increased from 50 mg TID); IV hydralazine prn     HLD  -High intensity statin increased atorvastatin 80 mg dialy     Dyspnea s/ hx of pulmonary HTN; Acute hypoxic respiratory failure  -Monitor for signs of volume overload  -Incentive spirometry  -Albuterol prn   -Titrate oxygen; goal >92%    ALAINA   -Compliant with home CPAP    Hypothyroidism  -Levothyroxine continued  -TSH wnl    FEN: Pre-procedure fluids/oral hydration; monitor; NPO until CAG; low fat low sodium, caffeine free  Activity: As tolerated  DVT Prophylaxis: Heparin gtt  Code Status: Full Code  Expected discharge: 2 - 3 days, recommended to prior living arrangement once CAG completed and cardiac plan established.    Jo Ann Delgadillo, DO  Text Page (7am - 6pm, M-F)    Interval History   Patient is resting in bed. Presented to the hospital with chest tightness and SOB. Continues to have some SOB and wheezing. No active chest pain at this time. No abdominal pain, dysuria, hematuria, or change in bowel or bladder fxn. Family present and updated in AM. Discussed with nursing.     -Data reviewed today: I reviewed all new labs and imaging results over the last 24 hours. I personally reviewed the EKG tracing.    Physical Exam   Temp: 97.3  F (36.3  C) Temp src: Oral BP: (!) 180/96 Pulse: (!) 49 Heart Rate: 52 Resp: 22 SpO2: 98 % O2 Device: Nasal cannula Oxygen Delivery: 2 LPM  Vitals:     11/20/19 1400 11/20/19 2102 11/21/19 0630   Weight: 105.2 kg (232 lb) 103.7 kg (228 lb 11.2 oz) 102.9 kg (226 lb 14.4 oz)     Vital Signs with Ranges  Temp:  [96.3  F (35.7  C)-97.3  F (36.3  C)] 97.3  F (36.3  C)  Pulse:  [49-63] 49  Heart Rate:  [49-61] 52  Resp:  [13-29] 22  BP: (118-193)/() 180/84  SpO2:  [89 %-99 %] 98 %  No intake/output data recorded.    Constitutional: Pleasant and cooperative. Mild distress. Increased work of breathing  HEENT: AT. NC. Conjunctiva non-injected. MMM  Respiratory: Increased work of breathing. Moves air bilaterally. Crackles in lung bases. Wheezing present.   Cardiovascular: RRR. No JVD. No murmurs  GI: Obese, round, soft, NT,ND. Normoactive BS+  Skin/Integumen: Warm and dry. No wounds or rashes.     Medications     HEParin 1,100 Units/hr (11/21/19 0830)     - MEDICATION INSTRUCTIONS -       - MEDICATION INSTRUCTIONS -       sodium chloride         aspirin  81 mg Oral Daily     atorvastatin  80 mg Oral QPM     fluticasone  2 spray Both Nostrils BID     heparin (porcine)         hydrALAZINE  50 mg Oral TID     levothyroxine  125 mcg Oral Daily     metoprolol succinate ER  100 mg Oral Daily     nitroGLYcerin         pregabalin  150 mg Oral BID     sodium chloride (PF)  3 mL Intracatheter Q8H     ticagrelor         vitamin D3  2,000 Units Oral Daily     Data   Recent Labs   Lab 11/21/19  0626 11/21/19  0142 11/20/19  2229  11/20/19  1533   WBC  --   --   --   --  10.5   HGB  --   --   --   --  13.7   MCV  --   --   --   --  94   PLT  --   --   --   --  123*     --   --   --  143   POTASSIUM 4.4  --   --   --  4.5   CHLORIDE 110*  --   --   --  109   CO2 26  --   --   --  27   BUN 47*  --   --   --  54*   CR 1.49*  --   --   --  1.58*   ANIONGAP 6  --   --   --  7   BARBARA 8.6  --   --   --  8.7   GLC 97  --   --   --  109*   TROPI 0.101* 0.130* 0.131*   < > 0.155*    < > = values in this interval not displayed.       Recent Results (from the past 24 hour(s))   CT Chest  Pulmonary Embolism w Contrast    Narrative    EXAM: CT ANGIOGRAM CHEST PULMONARY EMBOLISM W CONTRAST  LOCATION: [facility]  DATE/TIME: 2019 6:16 PM    INDICATION: Chest pain. Positive D-dimer. Shortness of breath.  COMPARISON: None.  TECHNIQUE: CT angiogram chest during arterial phase injection IV contrast. 2D and 3D MIP reconstructions were performed by the CT technologist. Dose reduction techniques were used.   CONTRAST: 75 mL Isovue-370.    FINDINGS:  ANGIOGRAM CHEST: Pulmonary arteries are normal caliber and negative for pulmonary emboli. Thoracic aorta is negative for dissection. No CT evidence of right heart strain.    LUNGS AND PLEURA: Mosaic attenuation changes in both lungs which can be seen in small airway disease. No significant pleural effusion. No consolidation.    MEDIASTINUM/AXILLAE: Dense coronary artery calcifications in the LAD, circumflex and right coronary arteries. No pericardial effusion.    UPPER ABDOMEN: Normal.    MUSCULOSKELETAL: Normal.      Impression    IMPRESSION:  1.  Negative for pulmonary embolism.  2.  Dense coronary artery calcifications in the LAD and circumflex coronary arteries.  3.  Mosaic attenuation changes in both lungs which can be seen in small airway disease.   Echocardiogram Complete    Narrative    [id number]  [id number]  [id number]  [id number]^MATA^JOSE^JANIE           [facility]  Echocardiography Laboratory  [address] [place] [address]        Name: ANA VINSON  MRN: [id number]  : 1945  Study Date: 2019 08:37 AM  Age: 74 yrs  Gender: Female  Patient Location: [facility]  Reason For Study: MI - Subendocardial  Ordering Physician: JOSE AUGUST  Performed By: Dominique Krishna     BSA: 2.0 m2  Height: 61 in  Weight: 228 lb  HR: 55  BP: 165/91 mmHg  _____________________________________________________________________________  __        Procedure  Complete Portable Echo  Adult.  _____________________________________________________________________________  __        Interpretation Summary     Technically difficult imaging.  No regional wall motion abnormalities noted.  Left ventricular systolic function is normal.  The visual ejection fraction is estimated at 55-60%.  The left ventricle is normal in size.  There is trace to mild mitral regurgitation.  Mild valvular aortic stenosis.  There is trace aortic regurgitation.     No old studies for comparison.  _____________________________________________________________________________  __        Left Ventricle  The left ventricle is normal in size. There is normal left ventricular wall  thickness. Left ventricular systolic function is normal. The visual ejection  fraction is estimated at 55-60%. Grade I or early diastolic dysfunction. No  regional wall motion abnormalities noted. There is no thrombus seen in the  left ventricle.     Right Ventricle  The right ventricle is normal in structure, function and size. There is no  mass or thrombus in the right ventricle.     Atria  Normal left atrial size. Right atrial size is normal. There is no atrial shunt  seen. The left atrial appendage is not well visualized.     Mitral Valve  The mitral valve leaflets appear normal. There is no evidence of stenosis,  fluttering, or prolapse. There is trace to mild mitral regurgitation. There is  no mitral valve stenosis.        Tricuspid Valve  Normal tricuspid valve. The right ventricular systolic pressure is  approximated at 19.8 mmHg plus the right atrial pressure. Right ventricle  systolic pressure estimate normal. There is mild (1+) tricuspid regurgitation.  There is no tricuspid stenosis.     Aortic Valve  There is mild trileaflet aortic sclerosis. There is trace aortic  regurgitation. Mild valvular aortic stenosis. The mean AoV pressure gradient  is 20.0 mmHg.     Pulmonic Valve  Normal pulmonic valve. There is no pulmonic valvular regurgitation.  There is  no pulmonic valvular stenosis.     Vessels  The aortic root is normal size. Normal size ascending aorta. Inferior vena  cava not well visualized for estimation of right atrial pressure. The  pulmonary artery is normal size.     Pericardium  The pericardium appears normal. There is no pleural effusion.        Rhythm  Sinus rhythm was noted.  _____________________________________________________________________________  __  MMode/2D Measurements & Calculations     IVSd: 1.1 cm  LVIDd: 5.4 cm  LVIDs: 4.2 cm  LVPWd: 1.0 cm  FS: 22.8 %  LV mass(C)d: 223.8 grams  LV mass(C)dI: 112.1 grams/m2  Ao root diam: 3.2 cm  LA dimension: 3.3 cm  asc Aorta Diam: 3.5 cm  LA/Ao: 1.0  LVOT diam: 1.9 cm  LVOT area: 2.9 cm2  LA Volume (BP): 42.2 ml  LA Volume Index (BP): 21.1 ml/m2  RWT: 0.39           Doppler Measurements & Calculations  MV E max sj: 109.0 cm/sec  MV A max sj: 118.4 cm/sec  MV E/A: 0.92  MV dec slope: 340.6 cm/sec2  MV dec time: 0.32 sec  Ao V2 max: 313.6 cm/sec  Ao max P.0 mmHg  Ao V2 mean: 208.7 cm/sec  Ao mean P.0 mmHg  Ao V2 VTI: 77.2 cm  IVAN(I,D): 1.6 cm2  IVAN(V,D): 1.6 cm2  LV V1 max P.4 mmHg  LV V1 max: 168.5 cm/sec  LV V1 VTI: 41.9 cm  SV(LVOT): 123.2 ml  SI(LVOT): 61.7 ml/m2  TR max sj: 222.6 cm/sec  TR max P.8 mmHg  AV Sj Ratio (DI): 0.54  IVAN Index (cm2/m2): 0.80  E/E' av.2  Lateral E/e': 19.1  Medial E/e': 19.3              _____________________________________________________________________________  __        Report approved by: Dr. Ari Myers 2019 01:18 PM

## 2019-11-22 ENCOUNTER — APPOINTMENT (OUTPATIENT)
Dept: OCCUPATIONAL THERAPY | Facility: CLINIC | Age: 74
DRG: 246 | End: 2019-11-22
Attending: INTERNAL MEDICINE
Payer: MEDICARE

## 2019-11-22 VITALS
HEART RATE: 49 BPM | SYSTOLIC BLOOD PRESSURE: 151 MMHG | WEIGHT: 223.9 LBS | HEIGHT: 61 IN | OXYGEN SATURATION: 94 % | DIASTOLIC BLOOD PRESSURE: 94 MMHG | RESPIRATION RATE: 18 BRPM | TEMPERATURE: 97 F | BODY MASS INDEX: 42.27 KG/M2

## 2019-11-22 LAB
ANION GAP SERPL CALCULATED.3IONS-SCNC: 6 MMOL/L (ref 3–14)
BUN SERPL-MCNC: 34 MG/DL (ref 7–30)
CALCIUM SERPL-MCNC: 8.9 MG/DL (ref 8.5–10.1)
CHLORIDE SERPL-SCNC: 108 MMOL/L (ref 94–109)
CO2 SERPL-SCNC: 27 MMOL/L (ref 20–32)
CREAT SERPL-MCNC: 1.3 MG/DL (ref 0.52–1.04)
ERYTHROCYTE [DISTWIDTH] IN BLOOD BY AUTOMATED COUNT: 14.9 % (ref 10–15)
GFR SERPL CREATININE-BSD FRML MDRD: 40 ML/MIN/{1.73_M2}
GLUCOSE SERPL-MCNC: 112 MG/DL (ref 70–99)
HCT VFR BLD AUTO: 39.9 % (ref 35–47)
HGB BLD-MCNC: 12.4 G/DL (ref 11.7–15.7)
MCH RBC QN AUTO: 28.6 PG (ref 26.5–33)
MCHC RBC AUTO-ENTMCNC: 31.1 G/DL (ref 31.5–36.5)
MCV RBC AUTO: 92 FL (ref 78–100)
PLATELET # BLD AUTO: 135 10E9/L (ref 150–450)
POTASSIUM SERPL-SCNC: 4.6 MMOL/L (ref 3.4–5.3)
RBC # BLD AUTO: 4.34 10E12/L (ref 3.8–5.2)
SODIUM SERPL-SCNC: 141 MMOL/L (ref 133–144)
WBC # BLD AUTO: 10.8 10E9/L (ref 4–11)

## 2019-11-22 PROCEDURE — 25000132 ZZH RX MED GY IP 250 OP 250 PS 637: Mod: GY | Performed by: INTERNAL MEDICINE

## 2019-11-22 PROCEDURE — 97535 SELF CARE MNGMENT TRAINING: CPT | Mod: GO | Performed by: REHABILITATION PRACTITIONER

## 2019-11-22 PROCEDURE — 80048 BASIC METABOLIC PNL TOTAL CA: CPT | Performed by: INTERNAL MEDICINE

## 2019-11-22 PROCEDURE — 36415 COLL VENOUS BLD VENIPUNCTURE: CPT | Performed by: INTERNAL MEDICINE

## 2019-11-22 PROCEDURE — 85027 COMPLETE CBC AUTOMATED: CPT | Performed by: INTERNAL MEDICINE

## 2019-11-22 PROCEDURE — 99239 HOSP IP/OBS DSCHRG MGMT >30: CPT | Performed by: INTERNAL MEDICINE

## 2019-11-22 PROCEDURE — 99231 SBSQ HOSP IP/OBS SF/LOW 25: CPT | Performed by: INTERNAL MEDICINE

## 2019-11-22 PROCEDURE — 97530 THERAPEUTIC ACTIVITIES: CPT | Mod: GO | Performed by: REHABILITATION PRACTITIONER

## 2019-11-22 PROCEDURE — 97166 OT EVAL MOD COMPLEX 45 MIN: CPT | Mod: GO | Performed by: REHABILITATION PRACTITIONER

## 2019-11-22 PROCEDURE — 25000128 H RX IP 250 OP 636: Performed by: PHYSICIAN ASSISTANT

## 2019-11-22 PROCEDURE — 97110 THERAPEUTIC EXERCISES: CPT | Mod: GO | Performed by: REHABILITATION PRACTITIONER

## 2019-11-22 RX ORDER — SILDENAFIL CITRATE 20 MG/1
20 TABLET ORAL 3 TIMES DAILY
Start: 2019-11-22 | End: 2022-02-22

## 2019-11-22 RX ORDER — FUROSEMIDE 10 MG/ML
40 INJECTION INTRAMUSCULAR; INTRAVENOUS ONCE
Status: COMPLETED | OUTPATIENT
Start: 2019-11-22 | End: 2019-11-22

## 2019-11-22 RX ORDER — CLOPIDOGREL BISULFATE 75 MG/1
75 TABLET ORAL DAILY
Qty: 30 TABLET | Refills: 0
Start: 2019-11-23 | End: 2023-01-01

## 2019-11-22 RX ORDER — FUROSEMIDE 40 MG
40 TABLET ORAL DAILY
Status: DISCONTINUED | OUTPATIENT
Start: 2019-11-22 | End: 2019-11-22

## 2019-11-22 RX ORDER — SILDENAFIL CITRATE 20 MG/1
20 TABLET ORAL 3 TIMES DAILY
Status: DISCONTINUED | OUTPATIENT
Start: 2019-11-22 | End: 2019-11-22 | Stop reason: HOSPADM

## 2019-11-22 RX ADMIN — HYDRALAZINE HYDROCHLORIDE 75 MG: 25 TABLET, FILM COATED ORAL at 15:52

## 2019-11-22 RX ADMIN — MELATONIN 2000 UNITS: at 08:15

## 2019-11-22 RX ADMIN — SILDENAFIL 20 MG: 20 TABLET, FILM COATED ORAL at 17:16

## 2019-11-22 RX ADMIN — LEVOTHYROXINE SODIUM 125 MCG: 125 TABLET ORAL at 08:15

## 2019-11-22 RX ADMIN — FUROSEMIDE 40 MG: 10 INJECTION, SOLUTION INTRAMUSCULAR; INTRAVENOUS at 10:39

## 2019-11-22 RX ADMIN — PREGABALIN 150 MG: 75 CAPSULE ORAL at 08:15

## 2019-11-22 RX ADMIN — HYDRALAZINE HYDROCHLORIDE 75 MG: 25 TABLET, FILM COATED ORAL at 08:15

## 2019-11-22 RX ADMIN — CLOPIDOGREL BISULFATE 75 MG: 75 TABLET ORAL at 08:16

## 2019-11-22 RX ADMIN — ASPIRIN 81 MG: 81 TABLET, COATED ORAL at 08:15

## 2019-11-22 ASSESSMENT — MIFFLIN-ST. JEOR: SCORE: 1452.98

## 2019-11-22 ASSESSMENT — ACTIVITIES OF DAILY LIVING (ADL)
PREVIOUS_RESPONSIBILITIES: MEAL PREP;HOUSEKEEPING;LAUNDRY;SHOPPING;MEDICATION MANAGEMENT;DRIVING
ADLS_ACUITY_SCORE: 15
ADLS_ACUITY_SCORE: 14
ADLS_ACUITY_SCORE: 14
IADL_COMMENTS: SPOUSE CAN A AS NEEDED
ADLS_ACUITY_SCORE: 15
ADLS_ACUITY_SCORE: 15

## 2019-11-22 NOTE — PLAN OF CARE
A/Ox4, SBA for all transfers, VSS, LS clear on RA refused CPAP at HS, Tele SR, TR band removed at 0300 (complicated removal r/t bleeding) - CMS intact - pt noncompliant with wearing arm board - using arm frequently despite education and various interventions, denies pain and SOB, tolerating low fat/low Na diet, discharge plan 1-2 days - will need to go to [facility] for follow up care - will assess in AM to determine further plan of care.    0615 - Pt had episode of confusion this AM - wandered out into hallway looking for . Was calm and cooperative and easily redirected. Verbalizes feeling confused and needed to be reoriented several times. Pt refused CPAP overnight - therefore did not fall asleep until 0500.

## 2019-11-22 NOTE — PROGRESS NOTES
Spoke with bedside RN in regards to ordered EKG. RN does not believe they need it. Unsure about the order. Told RN to notify if it is needed.      Gisele Portillo, RRT

## 2019-11-22 NOTE — PROGRESS NOTES
Cardiology      I called [facility] and they are aware to expect this patient.     Saskia Noe PA-C 11/22/2019 12:51 PM

## 2019-11-22 NOTE — DISCHARGE SUMMARY
[facility]  Discharge Summary Hospitalist    Date of Admission:  11/20/2019  Date of Discharge:  11/22/2019  Discharging Provider: Jo Ann Delgadillo DO  Date of Service (when I saw the patient): 11/22/19    Discharge Diagnoses   NSTEMI s/p CAG s/p circ lesions PCI  Pulmonary HTN  Chronic SOB  Acute hypoxic respiratory failure  HFpEF  DM  Severe ALAINA  CARMEN with CKD  Morbid Obesity  HTN  HLD  Hypothyroidism     History of Present Illness   Matthew Bowen is an 74 year old female who presented to [facility] on 11/20/2019 with increased chest tightness and was found to have a dynamic change in her troponins.    Hospital Course   Matthew Bowen was admitted on 11/20/2019.  The following problems were addressed during her hospitalization:    NSTEMI status post coronary angiogram with PCI to the distal circumflex artery: Patient had increased chest tightness admission without ischemic EKG changes.  Troponins remain elevated and cardiology was consulted.  Coronary angiogram completed on 11/21 with stent placed in the distal circumflex artery. Heparin ordered on admission and continued. ACEi held due to renal dysfunction on admission but can be resumed. ASA 81 mg continued. D-dimer was elevated and CTA was negative for PE. Showed dense calcifications in the LAD and circumflex arteries. Patient was transferred to [facility] for further interventions of the RCA occlusion per [facility].     Acute kidney injury: Mildly elevated Cr of 1.49 on admission. Improved on recheck with minimal intervention. Baseline Cr. 1.3    Acute hypoxic respiratory failure with acute on chronic HFpEF; pHTN: Secondary to volume overload and pulmonary edema. Patient treated with lasix and symptoms improved. Titrated off oxygen. Has chronic SOB with pHTN. Resumed home sildenafil 20 mg TID.     Jo Ann Delgadillo DO    Significant Results and Procedures   11/21/19 CAG: long calcified 80% lesion in proximal RCA and focal 80% stenosis in distal  Circ.    11/21/19 TTE IMPRESSION:  Technically difficult imaging.  No regional wall motion abnormalities noted.  Left ventricular systolic function is normal.  The visual ejection fraction is estimated at 55-60%.  The left ventricle is normal in size.  There is trace to mild mitral regurgitation.  Mild valvular aortic stenosis.  There is trace aortic regurgitation.     The ECG showed a sinus rhythm with new T-wave inversions in V1 through V3.  Her troponin level was mildly positive at 0.155.     Pending Results  None    Code Status   Full Code       Primary Care Physician   Giselle Cai    Physical Exam   Temp: 97  F (36.1  C) Temp src: Oral BP: (!) 151/94  Heart Rate: 67 Resp: 18 SpO2: 94 % O2 Device: None (Room air) Oxygen Delivery: 2 LPM  Vitals:    11/20/19 2102 11/21/19 0630 11/22/19 0657   Weight: 103.7 kg (228 lb 11.2 oz) 102.9 kg (226 lb 14.4 oz) 101.6 kg (223 lb 14.4 oz)     Vital Signs with Ranges  Temp:  [96.3  F (35.7  C)-97.8  F (36.6  C)] 97  F (36.1  C)  Heart Rate:  [48-73] 67  Resp:  [14-20] 18  BP: (115-190)/() 151/94  SpO2:  [93 %-99 %] 94 %  I/O last 3 completed shifts:  In: 480 [P.O.:480]  Out: 3550 [Urine:3550]    Constitutional: Pleasant and cooperative.NAD. Anxious for transfer.  HEENT: AT. NC. Conjunctiva non-injected. MMM  Respiratory: Comfortable without increased work of breathing. Moves air bilaterally. Crackles in lung bases. No wheezing  Cardiovascular: RRR. No JVD. No murmurs  GI: Obese, round, soft, NT,ND. Normoactive BS+  Skin/Integumen: Warm and dry. No wounds or rashes.      Discharge Disposition   Transferred to [facility] per Dr. Zapata  Condition at discharge: Stable    Consultations This Hospital Stay   PHARMACY TO DOSE HEPARIN  CARDIOLOGY IP CONSULT  PHARMACY TO DOSE HEPARIN  PHARMACY DISCHARGE EDUCATION BY PHARMACIST  NUTRITION SERVICES ADULT IP CONSULT  CARDIAC REHAB IP CONSULT  PHARMACY IP CONSULT  PHARMACY IP CONSULT  SMOKING CESSATION PROGRAM IP  CONSULT    Time Spent on this Encounter   IJo Ann DO, personally saw the patient today and spent greater than 30 minutes discharging this patient.    Discharge Orders      CARDIAC REHAB REFERRAL      Reason for your hospital stay    NSTEMI - transferred to [facility]     Activity - Up ad gricelda     Follow Up and recommended labs and tests    Will be arranged on discharge from [facility]     Full Code     Advance Diet as Tolerated    Follow this diet upon discharge: Orders Placed This Encounter      Low Fat Diet     Discharge Medications   Current Discharge Medication List      START taking these medications    Details   clopidogrel (PLAVIX) 75 MG tablet Take 1 tablet (75 mg) by mouth daily  Qty: 30 tablet, Refills: 0    Associated Diagnoses: Non-STEMI (non-ST elevated myocardial infarction) (H)      sildenafil (REVATIO) 20 MG tablet Take 1 tablet (20 mg) by mouth 3 times daily    Associated Diagnoses: Pulmonary hypertension (H)         CONTINUE these medications which have NOT CHANGED    Details   aspirin 81 MG EC tablet Take 81 mg by mouth daily      atorvastatin (LIPITOR) 10 MG tablet Take 10 mg by mouth every evening      fluticasone (FLONASE) 50 MCG/ACT nasal spray Spray 2 sprays into both nostrils 2 times daily      furosemide (LASIX) 40 MG tablet Take 40 mg by mouth daily      hydrALAZINE (APRESOLINE) 50 MG tablet Take 50 mg by mouth 3 times daily       levothyroxine (SYNTHROID/LEVOTHROID) 125 MCG tablet Take 125 mcg by mouth daily      losartan (COZAAR) 100 MG tablet Take 50 mg by mouth 2 times daily      metoprolol succinate ER (TOPROL-XL) 100 MG 24 hr tablet Take 100 mg by mouth daily      multivitamin w/minerals (THERA-VIT-M) tablet Take 1 tablet by mouth daily      pregabalin (LYRICA) 75 MG capsule Take 150 mg by mouth 3 times daily       vitamin D3 (CHOLECALCIFEROL) 1000 units (25 mcg) tablet Take 2,000 Units by mouth daily      Acetaminophen (TYLENOL PO) Take 500 mg by mouth every 4 hours as needed             Allergies   No Known Allergies  Data   Most Recent 3 CBC's:  Recent Labs   Lab Test 11/22/19  0721 11/20/19  1533 02/14/19  0633 02/12/19  0647   WBC 10.8 10.5  --  10.1   HGB 12.4 13.7  --  11.6*   MCV 92 94  --  96   * 123* 278 194      Most Recent 3 BMP's:  Recent Labs   Lab Test 11/22/19  0721 11/21/19  0626 11/20/19  1533    142 143   POTASSIUM 4.6 4.4 4.5   CHLORIDE 108 110* 109   CO2 27 26 27   BUN 34* 47* 54*   CR 1.30* 1.49* 1.58*   ANIONGAP 6 6 7   BARBARA 8.9 8.6 8.7   * 97 109*     Most Recent 2 LFT's:  Recent Labs   Lab Test 02/13/19  0650   AST 48*   ALT 59*   ALKPHOS 83   BILITOTAL 0.2     Most Recent INR's and Anticoagulation Dosing History:  Anticoagulation Dose History     Recent Dosing and Labs Latest Ref Rng & Units 9/1/2009    INR 0.86 - 1.14 1.00        Most Recent 3 Troponin's:  Recent Labs   Lab Test 11/21/19  0626 11/21/19  0142 11/20/19  2229   TROPI 0.101* 0.130* 0.131*     Most Recent Cholesterol Panel:  Recent Labs   Lab Test 11/21/19  1935   CHOL 133   LDL 69   HDL 48*   TRIG 80     Most Recent 6 Bacteria Isolates From Any Culture (See EPIC Reports for Culture Details):  Recent Labs   Lab Test 02/12/19  0010   CULT Light growth  Normal deshaun       Most Recent TSH, T4 and A1c Labs:  Recent Labs   Lab Test 11/20/19  1533   TSH 1.37     Results for orders placed or performed during the hospital encounter of 11/20/19   CT Chest Pulmonary Embolism w Contrast    Narrative    EXAM: CT ANGIOGRAM CHEST PULMONARY EMBOLISM W CONTRAST  LOCATION: [facility]  DATE/TIME: 11/20/2019 6:16 PM    INDICATION: Chest pain. Positive D-dimer. Shortness of breath.  COMPARISON: None.  TECHNIQUE: CT angiogram chest during arterial phase injection IV contrast. 2D and 3D MIP reconstructions were performed by the CT technologist. Dose reduction techniques were used.   CONTRAST: 75 mL Isovue-370.    FINDINGS:  ANGIOGRAM CHEST: Pulmonary arteries are normal caliber and negative for  pulmonary emboli. Thoracic aorta is negative for dissection. No CT evidence of right heart strain.    LUNGS AND PLEURA: Mosaic attenuation changes in both lungs which can be seen in small airway disease. No significant pleural effusion. No consolidation.    MEDIASTINUM/AXILLAE: Dense coronary artery calcifications in the LAD, circumflex and right coronary arteries. No pericardial effusion.    UPPER ABDOMEN: Normal.    MUSCULOSKELETAL: Normal.      Impression    IMPRESSION:  1.  Negative for pulmonary embolism.  2.  Dense coronary artery calcifications in the LAD and circumflex coronary arteries.  3.  Mosaic attenuation changes in both lungs which can be seen in small airway disease.   Echocardiogram Complete    Narrative    [id number]  [id number]  [id number]  [id number]^MATA^JOSE^JANIE           [facility]  Echocardiography Laboratory  [address] [place] [address]        Name: ANA VINSON  MRN: [id number]  : 1945  Study Date: 2019 08:37 AM  Age: 74 yrs  Gender: Female  Patient Location: [facility]  Reason For Study: MI - Subendocardial  Ordering Physician: JOSE AUGUST  Performed By: Domniique Krishna     BSA: 2.0 m2  Height: 61 in  Weight: 228 lb  HR: 55  BP: 165/91 mmHg  _____________________________________________________________________________  __        Procedure  Complete Portable Echo Adult.  _____________________________________________________________________________  __        Interpretation Summary     Technically difficult imaging.  No regional wall motion abnormalities noted.  Left ventricular systolic function is normal.  The visual ejection fraction is estimated at 55-60%.  The left ventricle is normal in size.  There is trace to mild mitral regurgitation.  Mild valvular aortic stenosis.  There is trace aortic regurgitation.     No old studies for comparison.  _____________________________________________________________________________  __        Left  Ventricle  The left ventricle is normal in size. There is normal left ventricular wall  thickness. Left ventricular systolic function is normal. The visual ejection  fraction is estimated at 55-60%. Grade I or early diastolic dysfunction. No  regional wall motion abnormalities noted. There is no thrombus seen in the  left ventricle.     Right Ventricle  The right ventricle is normal in structure, function and size. There is no  mass or thrombus in the right ventricle.     Atria  Normal left atrial size. Right atrial size is normal. There is no atrial shunt  seen. The left atrial appendage is not well visualized.     Mitral Valve  The mitral valve leaflets appear normal. There is no evidence of stenosis,  fluttering, or prolapse. There is trace to mild mitral regurgitation. There is  no mitral valve stenosis.        Tricuspid Valve  Normal tricuspid valve. The right ventricular systolic pressure is  approximated at 19.8 mmHg plus the right atrial pressure. Right ventricle  systolic pressure estimate normal. There is mild (1+) tricuspid regurgitation.  There is no tricuspid stenosis.     Aortic Valve  There is mild trileaflet aortic sclerosis. There is trace aortic  regurgitation. Mild valvular aortic stenosis. The mean AoV pressure gradient  is 20.0 mmHg.     Pulmonic Valve  Normal pulmonic valve. There is no pulmonic valvular regurgitation. There is  no pulmonic valvular stenosis.     Vessels  The aortic root is normal size. Normal size ascending aorta. Inferior vena  cava not well visualized for estimation of right atrial pressure. The  pulmonary artery is normal size.     Pericardium  The pericardium appears normal. There is no pleural effusion.        Rhythm  Sinus rhythm was noted.  _____________________________________________________________________________  __  MMode/2D Measurements & Calculations     IVSd: 1.1 cm  LVIDd: 5.4 cm  LVIDs: 4.2 cm  LVPWd: 1.0 cm  FS: 22.8 %  LV mass(C)d: 223.8 grams  LV  mass(C)dI: 112.1 grams/m2  Ao root diam: 3.2 cm  LA dimension: 3.3 cm  asc Aorta Diam: 3.5 cm  LA/Ao: 1.0  LVOT diam: 1.9 cm  LVOT area: 2.9 cm2  LA Volume (BP): 42.2 ml  LA Volume Index (BP): 21.1 ml/m2  RWT: 0.39           Doppler Measurements & Calculations  MV E max sj: 109.0 cm/sec  MV A max sj: 118.4 cm/sec  MV E/A: 0.92  MV dec slope: 340.6 cm/sec2  MV dec time: 0.32 sec  Ao V2 max: 313.6 cm/sec  Ao max P.0 mmHg  Ao V2 mean: 208.7 cm/sec  Ao mean P.0 mmHg  Ao V2 VTI: 77.2 cm  IVAN(I,D): 1.6 cm2  IVAN(V,D): 1.6 cm2  LV V1 max P.4 mmHg  LV V1 max: 168.5 cm/sec  LV V1 VTI: 41.9 cm  SV(LVOT): 123.2 ml  SI(LVOT): 61.7 ml/m2  TR max sj: 222.6 cm/sec  TR max P.8 mmHg  AV Sj Ratio (DI): 0.54  IVAN Index (cm2/m2): 0.80  E/E' av.2  Lateral E/e': 19.1  Medial E/e': 19.3              _____________________________________________________________________________  __        Report approved by: Dr. Ari Myers 2019 01:18 PM      Cardiac Catheterization    Narrative      Left ventricular filling pressures are severely elevated .    Prox RCA to Mid RCA lesion is 80% stenosed.    Mid LAD lesion is 25% stenosed.    Dist Cx lesion is 80% stenosed.    1st Mrg lesion is 50% stenosed.     1.  Successful angioplasty and stenting of a distal circumflex coronary   artery extending into the fourth obtuse marginal.  An 80% stenosis was   reduced to 0% with LANEY grade III flow placing a 2.5 x 20 mm Synergy   drug-eluting stent.  2.  There is a long moderate heavily calcified stenosis extending from the   proximal to mid right coronary artery tightest stenosis in the midsection   in the 70 to 80% range.  3.  Left ventricular end-diastolic pressure of 35 mmHg plus right atrial   pressure.  No ventriculogram performed due to contrast considerations.

## 2019-11-22 NOTE — PROGRESS NOTES
CM following patient as she was on the heart failure list. Angio yesterday showed long calcified lesion in proximal RCA and focal stenosis in distal. No mention of heart failure by Cardiologist or hospitalist after admission. No HF education done. Plans to stage RCA lesion in 3 to 5 days once contrast out of system. Patient wants to follow with her [facility] cardiologists.     CM will continue to follow patient until discharge for any additional needs.     Malinda Barnett RN, BSN, CPHN, CM  Inpatient Care Coordination  [facility]  [phone]

## 2019-11-22 NOTE — PLAN OF CARE
"CR/OT- Patient is  74-year-old woman with coronary artery disease, pulmonary hypertension, morbid obesity, severe obstructive sleep apnea, hypertension, and recent left parotid tumor resection, was admitted for dyspnea, chest tightness, abnormal ECG and elevated troponin level. 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  She has a chronic history of exertional dyspnea, which markedly worsened since her parotid surgery.  She also has been experiencing new episodes of chest tightness and heaviness.  She was seen in an outpatient clinic yesterday and because of worsening dyspnea was sent to the [facility] Emergency Room.  She underwent a CT of the chest that showed no pulmonary embolus, but there was again dense coronary calcification in the LAD and circumflex.\"    Per cardiology note on 11/21- \"Diagnostic CAG showed long calcified lesion in proximal RCA and focal stenosis in distal  CX. I am uncertain of the \"culprit vessel\", but given constraints of contrast limits and the complexity of RCA lesion which may require atherectomy, we treated CX lesion with plans to stage RCA lesion in 3 to 5 days once effect of contrast out of system... likely early next week.  The patient would prefer to have RCA  procedure done with her [facility] cardiologists.\"  Discharge Planner OT   Patient plan for discharge: per cardiology team, patient has opted to transfer to [facility]  Current status: BP at rest: 123/53, after short walk /102, HR remained in 70's and O2 on RA was 96% or above. Patient ambulating slow and unsteady requiring hand hold A for balance and support. Patient reporting similar dyspnea with exertion she was experiencing prior to this admission. Tolerating approx 50 feet of mobility before needing to return to room. Sat to rest before toileting, again experiencing increased SOB during and after toileting. Patient and spouse also report " mild confusion since left parotid tumor resection 1 week ago. Status and activity tolerance was discussed with cardiology team   Barriers to return to prior living situation: stairs to access upper level (12-15 steps), unsteady with mobility, OLMSTEAD with minimal activity, mild confusion  Recommendations for discharge: spoke with cardiology team and patient is opting to transfer to [facility], will defer further recommendations to new care team.  Rationale for recommendations: patient has opted to transfer to [facility] and resume care with her previous cardiology team. Will discharge from OT/CR services.       Entered by: Holly Dixon 11/22/2019 9:57 AM        Cardiac Rehab Discharge Summary    Reason for therapy discharge:    Discharged to patient opting to transfer to [facility] for further care     Progress towards therapy goal(s). See goals on Care Plan in [facility] electronic health record for goal details.  Goals not met.  Barriers to achieving goals:   discharge on same date as initial evaluation.    Therapy recommendation(s):    Defer further cardiac rehab to new care team

## 2019-11-22 NOTE — PROGRESS NOTES
"   11/22/19 3617   Quick Adds   Type of Visit Initial Occupational Therapy Evaluation   Living Environment   Lives With spouse   Living Arrangements house   Home Accessibility stairs within home   Living Environment Comment Lives with  in house, needs divided between floors (main bedroom/bathroom upstairs). Walk in shower with bench.    Stairs Within Home, Primary   Stairs, Within Home, Primary 12-15   Self-Care   Equipment Currently Used at Home   (pt has access to shower chair )   Activity/Exercise/Self-Care Comment reports SOB with activity (ambulating to bathroom in room), not present at baseline.    Functional Level   Ambulation 0-->independent   Transferring 0-->independent   Toileting 0-->independent   Bathing 0-->independent   Dressing 0-->independent   Eating 0-->independent   Communication 0-->understands/communicates without difficulty   Swallowing 0-->swallows foods/liquids without difficulty   Cognition 2 - difficulty with organizing thoughts   Fall history within last six months no   Which of the above functional risks had a recent onset or change? ambulation;cognition;dressing;transferring;toileting;bathing   Prior Functional Level Comment previously IND in direct self cares and IADLs   General Information   Onset of Illness/Injury or Date of Surgery - Date 11/20/19   Referring Physician Dr. Andre   Additional Occupational Profile Info/Pertinent History of Current Problem  Patient is 74-year-old woman with coronary artery disease, pulmonary hypertension, morbid obesity, severe obstructive sleep apnea, hypertension, and recent left parotid tumor resection, was admitted for dyspnea, chest tightness, abnormal ECG and elevated troponin level. 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  She has a chronic history of exertional dyspnea, which markedly worsened since her parotid surgery.  She also has been experiencing new episodes of " "chest tightness and heaviness.  She was seen in an outpatient clinic yesterday and because of worsening dyspnea was sent to the [facility] Emergency Room.  She underwent a CT of the chest that showed no pulmonary embolus, but there was again dense coronary calcification in the LAD and circumflex.\" Per cardiology note on 11/21- \"Diagnostic CAG showed long calcified lesion in proximal RCA and focal stenosis in distal  CX. I am uncertain of the \"culprit vessel\", but given constraints of contrast limits and the complexity of RCA lesion which may require atherectomy, we treated CX lesion with plans to stage RCA lesion in 3 to 5 days once effect of contrast out of system... likely early next week.  The patient would prefer to have RCA  procedure done with her [facility] cardiologists.\"   Precautions/Limitations fall precautions   Weight-Bearing Status - LUE nonweight-bearing  (due to angio site at L wrist)   General Observations patient was in chair with spouse present upon arrival, agreeable to activity with OT   Cognitive Status Examination   Orientation orientation to person, place and time   Level of Consciousness alert  (mild confusion per spouse, ongoing since parotid tumor resec)   Follows Commands (Cognition) WNL   Memory intact   Cognitive Comment Pt's  reports pt displayed confusion following surgery 1 wk prior, pt reports being confused at time of eval.     Visual Perception   Visual Perception Wears glasses   Pain Assessment   Patient Currently in Pain No   Posture   Posture not impaired   Range of Motion (ROM)   ROM Quick Adds No deficits were identified   Strength   Strength Comments general weakness and decontitioning noted    Hand Strength   Hand Strength Comments intact   Muscle Tone Assessment   Muscle Tone Quick Adds No deficits were identified   Coordination   Upper Extremity Coordination No deficits were identified   Mobility   Bed Mobility Comments not assessed   Transfer Skill: " "Sit to Stand   Level of [place]: Sit/Stand contact guard   Physical Assist/Nonphysical Assist: Sit/Stand supervision   Transfer Skill: Sit to Stand full weight-bearing   Toilet Transfer   Toilet Transfer Comments treatment initiated-defer to OT daily note for details   Balance   Balance Comments decreased balance noted, not at baseline, further assessment recommended by PT to evaluate for need for AD at dc   Grooming   Level of [place]: Grooming   (teratment initiated-defer to OT daily note for details)   Eating/Self Feeding   Level of [place]: Eating independent   Instrumental Activities of Daily Living (IADL)   Previous Responsibilities meal prep;housekeeping;laundry;shopping;medication management;driving   IADL Comments spouse can A as needed   Activities of Daily Living Analysis   Impairments Contributing to Impaired Activities of Daily Living balance impaired;strength decreased  (OLMSTEAD with minimal activity)   Clinical Impression   Criteria for Skilled Therapeutic Interventions Met yes, treatment indicated   OT Diagnosis decreased IND with ADLs/IADLs   Influenced by the following impairments balance impaired;strength decreased, OLMSTEAD with minimal activity   Assessment of Occupational Performance 5 or more Performance Deficits   Identified Performance Deficits decreased IND with ADLs/IADLs- dsg, toileting, bathing, functional mobility, community mobility, household chores   Clinical Decision Making (Complexity) High complexity   Therapy Frequency Daily   Predicted Duration of Therapy Intervention (days/wks) 1 time session   Anticipated Discharge Disposition Other (see comments)  (pt opts to transfer care to [facility])   Risks and Benefits of Treatment have been explained. Yes   Patient, Family & other staff in agreement with plan of care Yes   [facility] AM-PAC TM \"6 Clicks\"   2016, Trustees of [facility], under license to [employer].  All rights reserved.   6 Clicks Short Forms Daily " "Activity Inpatient Short Form   [facility] AM-PAC  \"6 Clicks\" Daily Activity Inpatient Short Form   1. Putting on and taking off regular lower body clothing? 3 - A Little   2. Bathing (including washing, rinsing, drying)? 2 - A Lot   3. Toileting, which includes using toilet, bedpan or urinal? 3 - A Little   4. Putting on and taking off regular upper body clothing? 3 - A Little   5. Taking care of personal grooming such as brushing teeth? 3 - A Little   6. Eating meals? 4 - None   Daily Activity Raw Score (Score out of 24.Lower scores equate to lower levels of function) 18   Total Evaluation Time   Total Evaluation Time (Minutes) 10     "

## 2019-11-22 NOTE — PROGRESS NOTES
[facility]  Cardiology Progress Note    Outpatient cardiologist: Dr. Ryan Blackwell at [facility]     Date of Service (when I saw the patient): 11/22/2019     Summary: Matthew Bowen is a 74 year old female with history of mixed pulmonary arterial hypertension picture with recent RCH on 3/11/19 with moderate PAH with elevated TPG and elevated diastolic pulmonary gradient.   Work-up has excluded CTEPH and significant rheumatologic disease. Recent cardiac MRI did not show any significant underlying structural or infiltrative abnormalities.  Vasodilator study was negative with inhaled NO and borderline fluid challenge response. She has been on on sildenafil since 03/2019 with significant improvement.   She also has a component of HFpEF, diabetes, ALAINA on CPAP, obesity, CKD, hypothyroidism, newly diagnosed obstructive CAD.   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   Since then she had worsening of her chronic SOB, and new chest heaviness and tightness.   She was seen in the clinic due to her sxs and sent to the ER on 11/20/2019.  D-Dimer >20.  CT chest negative for PE, but did show dense coronary calcification in the LAD and circumflex.    The ECG showed a sinus rhythm with new T-wave inversions in V1 through V3.  Her troponin level was mildly positive at 0.155.   Echo 11/21/19: LVEF 55-60%, no RWMAs, normal RV size and function, trace to mild MR, mild aortic stenosis, trace AI.   Cardiac cath 11/21/19: long calcified 80% pRCA lesion, 80% focal dCirc lesion.   S/p LIDA to Circ with plans for staged PCI of RCA.       Interval History   Tele: Sinus    No chest heaviness today, but still significant SOB compared to baseline.  She has some increased SOB at rest and significant OLMSTEAD.  She would not feel safe to discharge home with her current SOB and she and her  request transfer to [facility].         Assessment & Plan   "  CAD  - Presented with worsening of her baseline SOB, also new chest heaviness and chest tightness  - EKG showed TWI in anterior precordial leads  - Trop mildly elevated 0.155, then downward trending   - Echo 11/21/19: LVEF 55-60%, no RWMAs, normal RV size and function, trace to mild MR, mild aortic stenosis, trace AI.   - Cardiac cath 11/21/19: long calcified 80% lesion in proximal RCA and focal 80% stenosis in distal Circ.  Russ Andre and Nithin reviewed images.  No clear \"culprit\" vessel, but due to CKD and recent contrast use for PE study and complexity of RCA lesion, they treated the Circ lesion with plans for staged PCI of RCA  - LVEDP was elevated at 35 mmHg.  She did receive a dose of IV Lasix 40 mg  - ASA 81 mg lifelong, Plavix x 1 year min, Toprol  mg, atorvastatin 80 mg (increased from 10 mg)  - She walked with cardiac rehab today.  She continues to have increased SOB/OLMSTEAD from her baseline.  No clear chest heaviness or tightness.    We discussed options of transfer to [facility] for staged PCI of RCA this admission vs discharge to home with outpatient staged PCI of RCA.  She and her  prefer to transfer.       Mixed pulmonary arterial hypertension picture with WHO group 1 component  - Follows with [facility], Dr. Ryan Blackwell  - Ventilation perfusion scan excluded chronic thromboembolic pulmonary hypertension  - No history of rheumatic disease  - Cardiac MRI showed normal right and left ventricular chamber size and systolic performance  - Vasodilator study was negative with inhaled nitric oxide  - [facility] notes indicate she was on sildenafil 20 mg TID, but I do not see this here on her home med list  - Recently placed in pulmonary rehabilitation, but desaturated with exercise      Chronic SOB  - Multifactorial from mixed PAH above, but also component of HFpEF, likely deconditioning  - Recently placed in pulmonary rehabilitation, but desaturated with exercise      HFpEF  - NT pro BNP " was 3368  - Echo 11/21/19: LVEF 55-60%, no RWMAs, normal RV size and function, trace to mild MR, mild aortic stenosis, trace AI.   - LVEDP was 35 mmHg  - She received IV Lasix 40 mg after her cath  - Looks like admission weight 11/20/19 was 228 lbs and today 11/22/19 weight is 223 lbs  - Net negative I/Os 3.4 L  - She continues to have increased SOB and some mild increased JVD on exam.  Additional IV Lasix 40 mg today.      Diabetes      Severe ALAINA  - Status post previous unsuccessful uvulectomy  - On CPAP machine      Morbid obesity      CKD   - Creat was 1.58 on admission 11/20/19  - She had CT dye for CTPE and for cath  - creat 11/22/19 1.3  - Follow      HTN  - Home meds Lasix 40 mg, hydralazine 50 mg TID, losartan 50 mg BID, Toprol  mg  - She had SBPs up to 200 mmHg this admission  - Currently on hydralazine 75 mg TID, Toprol  mg.  ARB was being held due to CKD and need for dye  - /74 early AM, now 123/53  - Additional IV Lasix 40 mg today        DISPO: Will transfer to [facility] for staged PCI of RCA.  Will transfer hospitalist to hospitalist with general cardiology consultation.       Saskia Noe PA-C    Cardiology staff  I have personally examined the patient, taken her interim history, reviewed labs including her coronary angiogram and PCI and discussed management in detail with her and her family. She still feels she is too dyspneic to go home. No chest pain. We have administered extra furosemide for treatment of suspected acute diastolic heart failure ( LVEDP 34 yesterday). I would advise staged revascularization of RCA, but in view of renal insufficiency, this procedure cannot be performed before early next week. She may require atherectomy and significant contrast to complete.    In the meantime I would advise continued diuresis with serial BMPs to follow renal function and to see if we can improve her dyspnea. We have offered to continue her care in [facility] with transfer to [facility]  and PCI next week, but she and her family clearly prefer transfer to [facility] to complete her care. Will leave to the [facility] hospitalist service to arrange transfer today.       Patient Active Problem List   Diagnosis     Pneumonia     Non-STEMI (non-ST elevated myocardial infarction) (H)     Dyspnea on exertion     Acute kidney injury superimposed on chronic kidney disease (H)       Physical Exam   Temp: 97  F (36.1  C) Temp src: Oral BP: (!) 151/114  Heart Rate: 57(tele) Resp: 18 SpO2: 93 % O2 Device: None (Room air) Oxygen Delivery: 2 LPM  Vitals:    11/20/19 2102 11/21/19 0630 11/22/19 0657   Weight: 103.7 kg (228 lb 11.2 oz) 102.9 kg (226 lb 14.4 oz) 101.6 kg (223 lb 14.4 oz)     Vital Signs with Ranges  Temp:  [96.1  F (35.6  C)-97.8  F (36.6  C)] 97  F (36.1  C)  Heart Rate:  [48-66] 57  Resp:  [14-22] 18  BP: (115-200)/() 151/114  SpO2:  [93 %-99 %] 93 %  I/O last 3 completed shifts:  In: 50 [P.O.:50]  Out: 3450 [Urine:3450]    Constitutional: NAD. Obese  Respiratory: CTAB with rales or wheezing  Cardiovascular: RRR, s1s2, soft systolic murmur appreciated, mild JVD, no pitting edema  GI: obese, soft, BS+  Skin: warm, no rashes  Musculoskeletal: Moving all extremities, no pitting edema, LRA site: 2+ radial pulse, Noel test is normal   Neurologic: Alert  Neuropsychiatric: Normal affect       Data   Recent Labs   Lab 11/22/19  0721 11/21/19  0626 11/21/19  0142 11/20/19  2229  11/20/19  1533   WBC 10.8  --   --   --   --  10.5   HGB 12.4  --   --   --   --  13.7   MCV 92  --   --   --   --  94   *  --   --   --   --  123*    142  --   --   --  143   POTASSIUM 4.6 4.4  --   --   --  4.5   CHLORIDE 108 110*  --   --   --  109   CO2 27 26  --   --   --  27   BUN 34* 47*  --   --   --  54*   CR 1.30* 1.49*  --   --   --  1.58*   ANIONGAP 6 6  --   --   --  7   BARBARA 8.9 8.6  --   --   --  8.7   * 97  --   --   --  109*   TROPI  --  0.101* 0.130* 0.131*   < > 0.155*    < > = values in this  interval not displayed.     Recent Results (from the past 24 hour(s))   Echocardiogram Complete    Narrative    [id number]  [id number]  [id number]  [id number]^MATA^JOSE^JANIE           [facility]  Echocardiography Laboratory  [address] [place] [address]        Name: ANA VINSON  MRN: [id number]  : 1945  Study Date: 2019 08:37 AM  Age: 74 yrs  Gender: Female  Patient Location: [facility]  Reason For Study: MI - Subendocardial  Ordering Physician: JOSE AUGUST  Performed By: Dominique Krishna     BSA: 2.0 m2  Height: 61 in  Weight: 228 lb  HR: 55  BP: 165/91 mmHg  _____________________________________________________________________________  __        Procedure  Complete Portable Echo Adult.  _____________________________________________________________________________  __        Interpretation Summary     Technically difficult imaging.  No regional wall motion abnormalities noted.  Left ventricular systolic function is normal.  The visual ejection fraction is estimated at 55-60%.  The left ventricle is normal in size.  There is trace to mild mitral regurgitation.  Mild valvular aortic stenosis.  There is trace aortic regurgitation.     No old studies for comparison.  _____________________________________________________________________________  __        Left Ventricle  The left ventricle is normal in size. There is normal left ventricular wall  thickness. Left ventricular systolic function is normal. The visual ejection  fraction is estimated at 55-60%. Grade I or early diastolic dysfunction. No  regional wall motion abnormalities noted. There is no thrombus seen in the  left ventricle.     Right Ventricle  The right ventricle is normal in structure, function and size. There is no  mass or thrombus in the right ventricle.     Atria  Normal left atrial size. Right atrial size is normal. There is no atrial shunt  seen. The left atrial appendage is not well visualized.     Mitral  Valve  The mitral valve leaflets appear normal. There is no evidence of stenosis,  fluttering, or prolapse. There is trace to mild mitral regurgitation. There is  no mitral valve stenosis.        Tricuspid Valve  Normal tricuspid valve. The right ventricular systolic pressure is  approximated at 19.8 mmHg plus the right atrial pressure. Right ventricle  systolic pressure estimate normal. There is mild (1+) tricuspid regurgitation.  There is no tricuspid stenosis.     Aortic Valve  There is mild trileaflet aortic sclerosis. There is trace aortic  regurgitation. Mild valvular aortic stenosis. The mean AoV pressure gradient  is 20.0 mmHg.     Pulmonic Valve  Normal pulmonic valve. There is no pulmonic valvular regurgitation. There is  no pulmonic valvular stenosis.     Vessels  The aortic root is normal size. Normal size ascending aorta. Inferior vena  cava not well visualized for estimation of right atrial pressure. The  pulmonary artery is normal size.     Pericardium  The pericardium appears normal. There is no pleural effusion.        Rhythm  Sinus rhythm was noted.  _____________________________________________________________________________  __  MMode/2D Measurements & Calculations     IVSd: 1.1 cm  LVIDd: 5.4 cm  LVIDs: 4.2 cm  LVPWd: 1.0 cm  FS: 22.8 %  LV mass(C)d: 223.8 grams  LV mass(C)dI: 112.1 grams/m2  Ao root diam: 3.2 cm  LA dimension: 3.3 cm  asc Aorta Diam: 3.5 cm  LA/Ao: 1.0  LVOT diam: 1.9 cm  LVOT area: 2.9 cm2  LA Volume (BP): 42.2 ml  LA Volume Index (BP): 21.1 ml/m2  RWT: 0.39           Doppler Measurements & Calculations  MV E max caitlin: 109.0 cm/sec  MV A max caitlin: 118.4 cm/sec  MV E/A: 0.92  MV dec slope: 340.6 cm/sec2  MV dec time: 0.32 sec  Ao V2 max: 313.6 cm/sec  Ao max P.0 mmHg  Ao V2 mean: 208.7 cm/sec  Ao mean P.0 mmHg  Ao V2 VTI: 77.2 cm  IVAN(I,D): 1.6 cm2  IVAN(V,D): 1.6 cm2  LV V1 max P.4 mmHg  LV V1 max: 168.5 cm/sec  LV V1 VTI: 41.9 cm  SV(LVOT): 123.2 ml  SI(LVOT):  61.7 ml/m2  TR max sj: 222.6 cm/sec  TR max P.8 mmHg  AV Sj Ratio (DI): 0.54  IVAN Index (cm2/m2): 0.80  E/E' av.2  Lateral E/e': 19.1  Medial E/e': 19.3              _____________________________________________________________________________  __        Report approved by: Dr. Ari Myers 2019 01:18 PM      Cardiac Catheterization    Narrative      Left ventricular filling pressures are severely elevated .    Prox RCA to Mid RCA lesion is 80% stenosed.    Mid LAD lesion is 25% stenosed.    Dist Cx lesion is 80% stenosed.    1st Mrg lesion is 50% stenosed.     1.  Successful angioplasty and stenting of a distal circumflex coronary   artery extending into the fourth obtuse marginal.  An 80% stenosis was   reduced to 0% with LANEY grade III flow placing a 2.5 x 20 mm Synergy   drug-eluting stent.  2.  There is a long moderate heavily calcified stenosis extending from the   proximal to mid right coronary artery tightest stenosis in the midsection   in the 70 to 80% range.  3.  Left ventricular end-diastolic pressure of 35 mmHg plus right atrial   pressure.  No ventriculogram performed due to contrast considerations.       OUTSIDE Imaging reports from [facility]:    Echo 18   1. Normal LV size, borderline wall thickness, normal global systolic function with an estimated EF of 60 - 65%.   2. The aortic valve is normal and trileaflet, mild stenosis and trivial regurgitation. The aortic valve peak velocity is 2.40 m/s, the peak gradient is 23.0 mmHg, and the mean gradient is 12.0 mmHg. The aortic valve area is 1.64 cmÂ .   3. Right ventricular cavity size is normal, global systolic RV function is normal.   4. The mitral valve is normal, mild mitral regurgitation.  LVID (d) 4.6 cm     CT Cardiac Coronary 18  1.  Extensive nonobstructive coronary disease.   2.  Compromised coronary scan, but no lesions identified.   3.  Would suggest aggressive risk factor modification.   4.  Pulmonary CT  angiogram will be reported separately by Radiology.       Cardiac MRI 9/27/19  1. Normal left ventricular systolic function, LVEF 68%.  2. Normal right ventricular systolic function, RVEF 64%.  3. Mildly dilated main PA.  4. Normal pericardium.  5. Normal lungs.  6. No infiltrative cardiomyopathy.  7. Mild mid anteroseptal replacement fibrosis.      Medications     - MEDICATION INSTRUCTIONS -       - MEDICATION INSTRUCTIONS -       - MEDICATION INSTRUCTIONS -       Percutaneous Coronary Intervention orders placed (this is information for BPA alerting)       ACE/ARB/ARNI NOT PRESCRIBED         aspirin  81 mg Oral Daily     atorvastatin  80 mg Oral QPM     clopidogrel  75 mg Oral Daily     fluticasone  2 spray Both Nostrils BID     hydrALAZINE  75 mg Oral TID     levothyroxine  125 mcg Oral Daily     metoprolol succinate ER  100 mg Oral Daily     pregabalin  150 mg Oral BID     sodium chloride (PF)  3 mL Intracatheter Q8H     vitamin D3  2,000 Units Oral Daily

## 2019-11-22 NOTE — PLAN OF CARE
Vss with elevated BP this am that improved after am meds, no co pain/cp, salgado/sob.   Tele SR.  L radial site WDL but pt needs frequent reminders to NOT use that arm, repeated education performed throughout the shift.  Up to chair all day and did ambulate in halls with CR and up to the bathroom with SBA.  Alarms on for safety, L side of face slightly swollen.  Plan to transfer to [facility] for PCI to RCA, transport scheduled for 1900, report given to PATRICIA Barnes  ([facility] is aware she has requested H and P, progress notes and DC summary be printed and sent with the pt).  Continue poc and monitoring.

## 2019-11-22 NOTE — PLAN OF CARE
VSS, last uj=427/97. LS are dim, 97 on 2L. Pt has her Cpap on now. Pt had angio this shift, jm well, but TR band is still on, as it began to bleed. TR now has 7cc of air. CMS intact.  Pt up to  BR w sba x1, IV Lasix given, voiding good amts. POC reviewed w/ pt and family. Tele is SB.

## 2019-11-23 NOTE — PLAN OF CARE
Pt picked up by [facility]. Gabriel called to update about time patient left [facility]. Family present in room and took all pt belongings. AVS given to [facility].

## 2019-12-19 ENCOUNTER — HOSPITAL ENCOUNTER (OUTPATIENT)
Dept: CARDIAC REHAB | Facility: CLINIC | Age: 74
End: 2019-12-19
Attending: INTERNAL MEDICINE
Payer: MEDICARE

## 2019-12-19 DIAGNOSIS — I21.4 NON-STEMI (NON-ST ELEVATED MYOCARDIAL INFARCTION) (H): ICD-10-CM

## 2019-12-19 PROCEDURE — 93798 PHYS/QHP OP CAR RHAB W/ECG: CPT | Performed by: OCCUPATIONAL THERAPIST

## 2019-12-19 PROCEDURE — 93797 PHYS/QHP OP CAR RHAB WO ECG: CPT | Performed by: OCCUPATIONAL THERAPIST

## 2019-12-19 PROCEDURE — 40000575 ZZH STATISTIC OP CARDIAC VISIT #2: Performed by: OCCUPATIONAL THERAPIST

## 2019-12-19 PROCEDURE — 40000116 ZZH STATISTIC OP CR VISIT: Performed by: OCCUPATIONAL THERAPIST

## 2019-12-30 ENCOUNTER — HOSPITAL ENCOUNTER (OUTPATIENT)
Dept: CARDIAC REHAB | Facility: CLINIC | Age: 74
End: 2019-12-30
Attending: INTERNAL MEDICINE
Payer: MEDICARE

## 2019-12-30 PROCEDURE — 93798 PHYS/QHP OP CAR RHAB W/ECG: CPT

## 2019-12-30 PROCEDURE — 40000116 ZZH STATISTIC OP CR VISIT

## 2020-01-06 ENCOUNTER — HOSPITAL ENCOUNTER (OUTPATIENT)
Dept: CARDIAC REHAB | Facility: CLINIC | Age: 75
End: 2020-01-06
Attending: INTERNAL MEDICINE
Payer: MEDICARE

## 2020-01-06 PROCEDURE — 40000116 ZZH STATISTIC OP CR VISIT

## 2020-01-06 PROCEDURE — 93798 PHYS/QHP OP CAR RHAB W/ECG: CPT

## 2020-01-08 ENCOUNTER — HOSPITAL ENCOUNTER (OUTPATIENT)
Dept: CARDIAC REHAB | Facility: CLINIC | Age: 75
End: 2020-01-08
Attending: INTERNAL MEDICINE
Payer: MEDICARE

## 2020-01-08 PROCEDURE — 40000116 ZZH STATISTIC OP CR VISIT

## 2020-01-08 PROCEDURE — 93798 PHYS/QHP OP CAR RHAB W/ECG: CPT

## 2020-01-10 ENCOUNTER — HOSPITAL ENCOUNTER (OUTPATIENT)
Dept: CARDIAC REHAB | Facility: CLINIC | Age: 75
End: 2020-01-10
Attending: INTERNAL MEDICINE
Payer: MEDICARE

## 2020-01-10 PROCEDURE — 40000116 ZZH STATISTIC OP CR VISIT: Performed by: OCCUPATIONAL THERAPIST

## 2020-01-10 PROCEDURE — 93798 PHYS/QHP OP CAR RHAB W/ECG: CPT | Performed by: OCCUPATIONAL THERAPIST

## 2020-01-13 ENCOUNTER — HOSPITAL ENCOUNTER (OUTPATIENT)
Dept: CARDIAC REHAB | Facility: CLINIC | Age: 75
End: 2020-01-13
Attending: INTERNAL MEDICINE
Payer: MEDICARE

## 2020-01-13 PROCEDURE — 40000116 ZZH STATISTIC OP CR VISIT

## 2020-01-13 PROCEDURE — 93798 PHYS/QHP OP CAR RHAB W/ECG: CPT

## 2020-01-15 ENCOUNTER — HOSPITAL ENCOUNTER (OUTPATIENT)
Dept: CARDIAC REHAB | Facility: CLINIC | Age: 75
End: 2020-01-15
Attending: INTERNAL MEDICINE
Payer: MEDICARE

## 2020-01-15 PROCEDURE — 40000116 ZZH STATISTIC OP CR VISIT

## 2020-01-15 PROCEDURE — 93798 PHYS/QHP OP CAR RHAB W/ECG: CPT

## 2020-01-17 ENCOUNTER — HOSPITAL ENCOUNTER (OUTPATIENT)
Dept: CARDIAC REHAB | Facility: CLINIC | Age: 75
End: 2020-01-17
Attending: INTERNAL MEDICINE
Payer: MEDICARE

## 2020-01-17 PROCEDURE — 93798 PHYS/QHP OP CAR RHAB W/ECG: CPT

## 2020-01-17 PROCEDURE — 40000116 ZZH STATISTIC OP CR VISIT

## 2020-01-20 ENCOUNTER — HOSPITAL ENCOUNTER (OUTPATIENT)
Dept: CARDIAC REHAB | Facility: CLINIC | Age: 75
End: 2020-01-20
Attending: INTERNAL MEDICINE
Payer: MEDICARE

## 2020-01-20 PROCEDURE — 40000116 ZZH STATISTIC OP CR VISIT

## 2020-01-20 PROCEDURE — 93798 PHYS/QHP OP CAR RHAB W/ECG: CPT

## 2020-01-22 ENCOUNTER — HOSPITAL ENCOUNTER (OUTPATIENT)
Dept: CARDIAC REHAB | Facility: CLINIC | Age: 75
End: 2020-01-22
Attending: INTERNAL MEDICINE
Payer: MEDICARE

## 2020-01-22 PROCEDURE — 40000116 ZZH STATISTIC OP CR VISIT

## 2020-01-22 PROCEDURE — 93797 PHYS/QHP OP CAR RHAB WO ECG: CPT | Mod: 59

## 2020-01-22 PROCEDURE — 40000575 ZZH STATISTIC OP CARDIAC VISIT #2

## 2020-01-22 PROCEDURE — 93798 PHYS/QHP OP CAR RHAB W/ECG: CPT

## 2020-01-24 ENCOUNTER — HOSPITAL ENCOUNTER (OUTPATIENT)
Dept: CARDIAC REHAB | Facility: CLINIC | Age: 75
End: 2020-01-24
Attending: INTERNAL MEDICINE
Payer: MEDICARE

## 2020-01-24 PROCEDURE — 40000116 ZZH STATISTIC OP CR VISIT

## 2020-01-24 PROCEDURE — 93798 PHYS/QHP OP CAR RHAB W/ECG: CPT

## 2020-01-24 PROCEDURE — 93797 PHYS/QHP OP CAR RHAB WO ECG: CPT

## 2020-01-24 PROCEDURE — 40000575 ZZH STATISTIC OP CARDIAC VISIT #2

## 2020-01-27 ENCOUNTER — HOSPITAL ENCOUNTER (OUTPATIENT)
Dept: CARDIAC REHAB | Facility: CLINIC | Age: 75
End: 2020-01-27
Attending: INTERNAL MEDICINE
Payer: MEDICARE

## 2020-01-27 PROCEDURE — 93798 PHYS/QHP OP CAR RHAB W/ECG: CPT

## 2020-01-27 PROCEDURE — 40000116 ZZH STATISTIC OP CR VISIT

## 2020-01-29 ENCOUNTER — HOSPITAL ENCOUNTER (OUTPATIENT)
Dept: CARDIAC REHAB | Facility: CLINIC | Age: 75
End: 2020-01-29
Attending: INTERNAL MEDICINE
Payer: MEDICARE

## 2020-01-29 PROCEDURE — 40000116 ZZH STATISTIC OP CR VISIT

## 2020-01-29 PROCEDURE — 93798 PHYS/QHP OP CAR RHAB W/ECG: CPT

## 2020-01-31 ENCOUNTER — HOSPITAL ENCOUNTER (OUTPATIENT)
Dept: CARDIAC REHAB | Facility: CLINIC | Age: 75
End: 2020-01-31
Attending: INTERNAL MEDICINE
Payer: MEDICARE

## 2020-01-31 PROCEDURE — 93798 PHYS/QHP OP CAR RHAB W/ECG: CPT

## 2020-01-31 PROCEDURE — 40000116 ZZH STATISTIC OP CR VISIT

## 2020-02-10 ENCOUNTER — HOSPITAL ENCOUNTER (OUTPATIENT)
Dept: CARDIAC REHAB | Facility: CLINIC | Age: 75
End: 2020-02-10
Attending: INTERNAL MEDICINE
Payer: MEDICARE

## 2020-02-10 PROCEDURE — 93798 PHYS/QHP OP CAR RHAB W/ECG: CPT

## 2020-02-10 PROCEDURE — 40000116 ZZH STATISTIC OP CR VISIT

## 2020-02-12 ENCOUNTER — HOSPITAL ENCOUNTER (OUTPATIENT)
Dept: CARDIAC REHAB | Facility: CLINIC | Age: 75
End: 2020-02-12
Attending: INTERNAL MEDICINE
Payer: MEDICARE

## 2020-02-12 PROCEDURE — 40000116 ZZH STATISTIC OP CR VISIT

## 2020-02-12 PROCEDURE — 93798 PHYS/QHP OP CAR RHAB W/ECG: CPT

## 2020-02-14 ENCOUNTER — HOSPITAL ENCOUNTER (OUTPATIENT)
Dept: CARDIAC REHAB | Facility: CLINIC | Age: 75
End: 2020-02-14
Attending: INTERNAL MEDICINE
Payer: MEDICARE

## 2020-02-14 PROCEDURE — 93798 PHYS/QHP OP CAR RHAB W/ECG: CPT

## 2020-02-14 PROCEDURE — 40000116 ZZH STATISTIC OP CR VISIT

## 2020-02-17 ENCOUNTER — HOSPITAL ENCOUNTER (OUTPATIENT)
Dept: CARDIAC REHAB | Facility: CLINIC | Age: 75
End: 2020-02-17
Attending: INTERNAL MEDICINE
Payer: MEDICARE

## 2020-02-17 PROCEDURE — 93798 PHYS/QHP OP CAR RHAB W/ECG: CPT

## 2020-02-17 PROCEDURE — 40000116 ZZH STATISTIC OP CR VISIT

## 2020-02-19 ENCOUNTER — HOSPITAL ENCOUNTER (OUTPATIENT)
Dept: CARDIAC REHAB | Facility: CLINIC | Age: 75
End: 2020-02-19
Attending: INTERNAL MEDICINE
Payer: MEDICARE

## 2020-02-19 PROCEDURE — 40000116 ZZH STATISTIC OP CR VISIT

## 2020-02-19 PROCEDURE — 93798 PHYS/QHP OP CAR RHAB W/ECG: CPT

## 2020-02-21 ENCOUNTER — HOSPITAL ENCOUNTER (OUTPATIENT)
Dept: CARDIAC REHAB | Facility: CLINIC | Age: 75
End: 2020-02-21
Attending: INTERNAL MEDICINE
Payer: MEDICARE

## 2020-02-21 PROCEDURE — 93798 PHYS/QHP OP CAR RHAB W/ECG: CPT

## 2020-02-21 PROCEDURE — 40000116 ZZH STATISTIC OP CR VISIT

## 2020-02-24 ENCOUNTER — HOSPITAL ENCOUNTER (OUTPATIENT)
Dept: CARDIAC REHAB | Facility: CLINIC | Age: 75
End: 2020-02-24
Attending: INTERNAL MEDICINE
Payer: MEDICARE

## 2020-02-24 PROCEDURE — 93798 PHYS/QHP OP CAR RHAB W/ECG: CPT | Performed by: OCCUPATIONAL THERAPIST

## 2020-02-24 PROCEDURE — 40000116 ZZH STATISTIC OP CR VISIT: Performed by: OCCUPATIONAL THERAPIST

## 2020-02-26 ENCOUNTER — HOSPITAL ENCOUNTER (OUTPATIENT)
Dept: CARDIAC REHAB | Facility: CLINIC | Age: 75
End: 2020-02-26
Attending: INTERNAL MEDICINE
Payer: MEDICARE

## 2020-02-26 PROCEDURE — 40000116 ZZH STATISTIC OP CR VISIT

## 2020-02-26 PROCEDURE — 93798 PHYS/QHP OP CAR RHAB W/ECG: CPT

## 2020-02-28 ENCOUNTER — HOSPITAL ENCOUNTER (OUTPATIENT)
Dept: CARDIAC REHAB | Facility: CLINIC | Age: 75
End: 2020-02-28
Attending: INTERNAL MEDICINE
Payer: MEDICARE

## 2020-02-28 PROCEDURE — 40000116 ZZH STATISTIC OP CR VISIT

## 2020-02-28 PROCEDURE — 93798 PHYS/QHP OP CAR RHAB W/ECG: CPT

## 2020-03-09 ENCOUNTER — HOSPITAL ENCOUNTER (OUTPATIENT)
Dept: CARDIAC REHAB | Facility: CLINIC | Age: 75
End: 2020-03-09
Attending: INTERNAL MEDICINE
Payer: MEDICARE

## 2020-03-09 PROCEDURE — 93798 PHYS/QHP OP CAR RHAB W/ECG: CPT

## 2020-03-09 PROCEDURE — 40000116 ZZH STATISTIC OP CR VISIT

## 2020-03-11 ENCOUNTER — HOSPITAL ENCOUNTER (OUTPATIENT)
Dept: CARDIAC REHAB | Facility: CLINIC | Age: 75
End: 2020-03-11
Attending: INTERNAL MEDICINE
Payer: MEDICARE

## 2020-03-11 PROCEDURE — 40000116 ZZH STATISTIC OP CR VISIT

## 2020-03-11 PROCEDURE — 93798 PHYS/QHP OP CAR RHAB W/ECG: CPT

## 2021-11-22 ENCOUNTER — MEDICAL CORRESPONDENCE (OUTPATIENT)
Dept: HEALTH INFORMATION MANAGEMENT | Facility: CLINIC | Age: 76
End: 2021-11-22
Payer: COMMERCIAL

## 2022-02-08 DIAGNOSIS — Z11.59 ENCOUNTER FOR SCREENING FOR OTHER VIRAL DISEASES: Primary | ICD-10-CM

## 2022-02-22 RX ORDER — ISOSORBIDE DINITRATE 20 MG/1
20 TABLET ORAL
COMMUNITY
Start: 2020-01-02

## 2022-02-22 RX ORDER — AMLODIPINE BESYLATE 5 MG/1
5 TABLET ORAL 2 TIMES DAILY
COMMUNITY
Start: 2021-12-21

## 2022-02-22 RX ORDER — TORSEMIDE 20 MG/1
20 TABLET ORAL EVERY OTHER DAY
COMMUNITY
Start: 2022-01-10 | End: 2023-01-01

## 2022-02-22 RX ORDER — SERTRALINE HYDROCHLORIDE 100 MG/1
150 TABLET, FILM COATED ORAL DAILY
COMMUNITY
Start: 2022-01-27 | End: 2023-01-01

## 2022-02-22 RX ORDER — POTASSIUM CHLORIDE 1500 MG/1
40 TABLET, EXTENDED RELEASE ORAL DAILY
COMMUNITY
Start: 2022-01-27 | End: 2023-01-01

## 2022-02-22 RX ORDER — CETIRIZINE HYDROCHLORIDE 10 MG/1
10 TABLET ORAL DAILY PRN
COMMUNITY
Start: 2021-06-01 | End: 2023-01-01

## 2022-02-22 RX ORDER — METHOCARBAMOL 500 MG/1
TABLET, FILM COATED ORAL
Status: ON HOLD | COMMUNITY
Start: 2021-11-20 | End: 2022-02-28

## 2022-02-28 ENCOUNTER — ANESTHESIA (OUTPATIENT)
Dept: SURGERY | Facility: CLINIC | Age: 77
End: 2022-02-28
Payer: COMMERCIAL

## 2022-02-28 ENCOUNTER — ANESTHESIA EVENT (OUTPATIENT)
Dept: SURGERY | Facility: CLINIC | Age: 77
End: 2022-02-28
Payer: COMMERCIAL

## 2022-02-28 ENCOUNTER — HOSPITAL ENCOUNTER (OUTPATIENT)
Facility: CLINIC | Age: 77
Discharge: HOME | End: 2022-02-28
Attending: INTERNAL MEDICINE | Admitting: INTERNAL MEDICINE
Payer: COMMERCIAL

## 2022-02-28 VITALS
BODY MASS INDEX: 40.84 KG/M2 | RESPIRATION RATE: 18 BRPM | WEIGHT: 208 LBS | HEART RATE: 77 BPM | SYSTOLIC BLOOD PRESSURE: 140 MMHG | DIASTOLIC BLOOD PRESSURE: 94 MMHG | TEMPERATURE: 98.6 F | OXYGEN SATURATION: 98 % | HEIGHT: 60 IN

## 2022-02-28 LAB — COLONOSCOPY: NORMAL

## 2022-02-28 PROCEDURE — 258N000003 HC RX IP 258 OP 636: Performed by: NURSE ANESTHETIST, CERTIFIED REGISTERED

## 2022-02-28 PROCEDURE — 360N000075 HC SURGERY LEVEL 2, PER MIN: Performed by: INTERNAL MEDICINE

## 2022-02-28 PROCEDURE — 250N000011 HC RX IP 250 OP 636: Performed by: NURSE ANESTHETIST, CERTIFIED REGISTERED

## 2022-02-28 PROCEDURE — 250N000009 HC RX 250: Performed by: NURSE ANESTHETIST, CERTIFIED REGISTERED

## 2022-02-28 PROCEDURE — 710N000012 HC RECOVERY PHASE 2, PER MINUTE: Performed by: INTERNAL MEDICINE

## 2022-02-28 PROCEDURE — 88305 TISSUE EXAM BY PATHOLOGIST: CPT | Mod: 26 | Performed by: PATHOLOGY

## 2022-02-28 PROCEDURE — 370N000017 HC ANESTHESIA TECHNICAL FEE, PER MIN: Performed by: INTERNAL MEDICINE

## 2022-02-28 PROCEDURE — 88305 TISSUE EXAM BY PATHOLOGIST: CPT | Mod: TC | Performed by: INTERNAL MEDICINE

## 2022-02-28 PROCEDURE — 272N000001 HC OR GENERAL SUPPLY STERILE: Performed by: INTERNAL MEDICINE

## 2022-02-28 PROCEDURE — 999N000141 HC STATISTIC PRE-PROCEDURE NURSING ASSESSMENT: Performed by: INTERNAL MEDICINE

## 2022-02-28 RX ORDER — HYDROMORPHONE HCL IN WATER/PF 6 MG/30 ML
0.2 PATIENT CONTROLLED ANALGESIA SYRINGE INTRAVENOUS EVERY 5 MIN PRN
Status: DISCONTINUED | OUTPATIENT
Start: 2022-02-28 | End: 2022-02-28 | Stop reason: HOSPADM

## 2022-02-28 RX ORDER — HYDRALAZINE HYDROCHLORIDE 20 MG/ML
2.5-5 INJECTION INTRAMUSCULAR; INTRAVENOUS EVERY 10 MIN PRN
Status: DISCONTINUED | OUTPATIENT
Start: 2022-02-28 | End: 2022-02-28 | Stop reason: HOSPADM

## 2022-02-28 RX ORDER — SODIUM CHLORIDE, SODIUM LACTATE, POTASSIUM CHLORIDE, CALCIUM CHLORIDE 600; 310; 30; 20 MG/100ML; MG/100ML; MG/100ML; MG/100ML
INJECTION, SOLUTION INTRAVENOUS CONTINUOUS PRN
Status: DISCONTINUED | OUTPATIENT
Start: 2022-02-28 | End: 2022-02-28

## 2022-02-28 RX ORDER — ONDANSETRON 2 MG/ML
4 INJECTION INTRAMUSCULAR; INTRAVENOUS EVERY 30 MIN PRN
Status: DISCONTINUED | OUTPATIENT
Start: 2022-02-28 | End: 2022-02-28 | Stop reason: HOSPADM

## 2022-02-28 RX ORDER — POLYETHYLENE GLYCOL 3350 17 G/17G
1 POWDER, FOR SOLUTION ORAL DAILY PRN
COMMUNITY
End: 2023-01-01

## 2022-02-28 RX ORDER — ALBUTEROL SULFATE 90 UG/1
2 AEROSOL, METERED RESPIRATORY (INHALATION) EVERY 4 HOURS PRN
COMMUNITY
End: 2023-01-01

## 2022-02-28 RX ORDER — FENTANYL CITRATE 50 UG/ML
INJECTION, SOLUTION INTRAMUSCULAR; INTRAVENOUS PRN
Status: DISCONTINUED | OUTPATIENT
Start: 2022-02-28 | End: 2022-02-28

## 2022-02-28 RX ORDER — LABETALOL HYDROCHLORIDE 5 MG/ML
10 INJECTION, SOLUTION INTRAVENOUS
Status: DISCONTINUED | OUTPATIENT
Start: 2022-02-28 | End: 2022-02-28

## 2022-02-28 RX ORDER — FLUMAZENIL 0.1 MG/ML
0.2 INJECTION, SOLUTION INTRAVENOUS
Status: DISCONTINUED | OUTPATIENT
Start: 2022-02-28 | End: 2022-02-28 | Stop reason: HOSPADM

## 2022-02-28 RX ORDER — PROCHLORPERAZINE MALEATE 5 MG
5 TABLET ORAL EVERY 6 HOURS PRN
Status: DISCONTINUED | OUTPATIENT
Start: 2022-02-28 | End: 2022-02-28 | Stop reason: HOSPADM

## 2022-02-28 RX ORDER — HALOPERIDOL 5 MG/ML
1 INJECTION INTRAMUSCULAR
Status: DISCONTINUED | OUTPATIENT
Start: 2022-02-28 | End: 2022-02-28 | Stop reason: HOSPADM

## 2022-02-28 RX ORDER — DIMENHYDRINATE 50 MG/ML
25 INJECTION, SOLUTION INTRAMUSCULAR; INTRAVENOUS
Status: DISCONTINUED | OUTPATIENT
Start: 2022-02-28 | End: 2022-02-28

## 2022-02-28 RX ORDER — KETAMINE HYDROCHLORIDE 10 MG/ML
INJECTION INTRAMUSCULAR; INTRAVENOUS PRN
Status: DISCONTINUED | OUTPATIENT
Start: 2022-02-28 | End: 2022-02-28

## 2022-02-28 RX ORDER — ONDANSETRON 4 MG/1
4 TABLET, ORALLY DISINTEGRATING ORAL EVERY 30 MIN PRN
Status: DISCONTINUED | OUTPATIENT
Start: 2022-02-28 | End: 2022-02-28 | Stop reason: HOSPADM

## 2022-02-28 RX ORDER — LIDOCAINE 40 MG/G
CREAM TOPICAL
Status: DISCONTINUED | OUTPATIENT
Start: 2022-02-28 | End: 2022-02-28 | Stop reason: HOSPADM

## 2022-02-28 RX ORDER — LABETALOL HYDROCHLORIDE 5 MG/ML
10 INJECTION, SOLUTION INTRAVENOUS
Status: DISCONTINUED | OUTPATIENT
Start: 2022-02-28 | End: 2022-02-28 | Stop reason: HOSPADM

## 2022-02-28 RX ORDER — OXYCODONE HYDROCHLORIDE 5 MG/1
5 TABLET ORAL EVERY 4 HOURS PRN
Status: DISCONTINUED | OUTPATIENT
Start: 2022-02-28 | End: 2022-02-28 | Stop reason: HOSPADM

## 2022-02-28 RX ORDER — FENTANYL CITRATE 50 UG/ML
25 INJECTION, SOLUTION INTRAMUSCULAR; INTRAVENOUS
Status: DISCONTINUED | OUTPATIENT
Start: 2022-02-28 | End: 2022-02-28 | Stop reason: HOSPADM

## 2022-02-28 RX ORDER — NALOXONE HYDROCHLORIDE 0.4 MG/ML
0.4 INJECTION, SOLUTION INTRAMUSCULAR; INTRAVENOUS; SUBCUTANEOUS
Status: DISCONTINUED | OUTPATIENT
Start: 2022-02-28 | End: 2022-02-28 | Stop reason: HOSPADM

## 2022-02-28 RX ORDER — DIMENHYDRINATE 50 MG/ML
25 INJECTION, SOLUTION INTRAMUSCULAR; INTRAVENOUS
Status: DISCONTINUED | OUTPATIENT
Start: 2022-02-28 | End: 2022-02-28 | Stop reason: HOSPADM

## 2022-02-28 RX ORDER — ACETAMINOPHEN 325 MG/1
975 TABLET ORAL ONCE
Status: DISCONTINUED | OUTPATIENT
Start: 2022-02-28 | End: 2022-02-28 | Stop reason: HOSPADM

## 2022-02-28 RX ORDER — SODIUM CHLORIDE, SODIUM LACTATE, POTASSIUM CHLORIDE, CALCIUM CHLORIDE 600; 310; 30; 20 MG/100ML; MG/100ML; MG/100ML; MG/100ML
INJECTION, SOLUTION INTRAVENOUS CONTINUOUS
Status: DISCONTINUED | OUTPATIENT
Start: 2022-02-28 | End: 2022-02-28 | Stop reason: HOSPADM

## 2022-02-28 RX ORDER — ONDANSETRON 4 MG/1
4 TABLET, ORALLY DISINTEGRATING ORAL EVERY 6 HOURS PRN
Status: DISCONTINUED | OUTPATIENT
Start: 2022-02-28 | End: 2022-02-28 | Stop reason: HOSPADM

## 2022-02-28 RX ORDER — HYDROMORPHONE HCL IN WATER/PF 6 MG/30 ML
0.2 PATIENT CONTROLLED ANALGESIA SYRINGE INTRAVENOUS EVERY 5 MIN PRN
Status: DISCONTINUED | OUTPATIENT
Start: 2022-02-28 | End: 2022-02-28

## 2022-02-28 RX ORDER — OXYCODONE HYDROCHLORIDE 5 MG/1
5 TABLET ORAL EVERY 4 HOURS PRN
Status: DISCONTINUED | OUTPATIENT
Start: 2022-02-28 | End: 2022-02-28

## 2022-02-28 RX ORDER — NALOXONE HYDROCHLORIDE 0.4 MG/ML
0.2 INJECTION, SOLUTION INTRAMUSCULAR; INTRAVENOUS; SUBCUTANEOUS
Status: DISCONTINUED | OUTPATIENT
Start: 2022-02-28 | End: 2022-02-28 | Stop reason: HOSPADM

## 2022-02-28 RX ORDER — ALBUTEROL SULFATE 0.83 MG/ML
2.5 SOLUTION RESPIRATORY (INHALATION) EVERY 4 HOURS PRN
Status: DISCONTINUED | OUTPATIENT
Start: 2022-02-28 | End: 2022-02-28 | Stop reason: HOSPADM

## 2022-02-28 RX ORDER — FENTANYL CITRATE 50 UG/ML
25 INJECTION, SOLUTION INTRAMUSCULAR; INTRAVENOUS EVERY 5 MIN PRN
Status: DISCONTINUED | OUTPATIENT
Start: 2022-02-28 | End: 2022-02-28 | Stop reason: HOSPADM

## 2022-02-28 RX ORDER — DIPHENHYDRAMINE HYDROCHLORIDE 50 MG/ML
12.5 INJECTION INTRAMUSCULAR; INTRAVENOUS EVERY 6 HOURS PRN
Status: DISCONTINUED | OUTPATIENT
Start: 2022-02-28 | End: 2022-02-28 | Stop reason: HOSPADM

## 2022-02-28 RX ORDER — DIPHENHYDRAMINE HCL 12.5MG/5ML
12.5 LIQUID (ML) ORAL EVERY 6 HOURS PRN
Status: DISCONTINUED | OUTPATIENT
Start: 2022-02-28 | End: 2022-02-28 | Stop reason: HOSPADM

## 2022-02-28 RX ORDER — PROPOFOL 10 MG/ML
INJECTION, EMULSION INTRAVENOUS PRN
Status: DISCONTINUED | OUTPATIENT
Start: 2022-02-28 | End: 2022-02-28

## 2022-02-28 RX ORDER — TIZANIDINE 2 MG/1
2 TABLET ORAL
COMMUNITY
End: 2023-01-01

## 2022-02-28 RX ORDER — LIDOCAINE HYDROCHLORIDE 10 MG/ML
INJECTION, SOLUTION INFILTRATION; PERINEURAL PRN
Status: DISCONTINUED | OUTPATIENT
Start: 2022-02-28 | End: 2022-02-28

## 2022-02-28 RX ORDER — ALBUTEROL SULFATE 0.83 MG/ML
2.5 SOLUTION RESPIRATORY (INHALATION) EVERY 4 HOURS PRN
Status: DISCONTINUED | OUTPATIENT
Start: 2022-02-28 | End: 2022-02-28

## 2022-02-28 RX ORDER — ONDANSETRON 2 MG/ML
4 INJECTION INTRAMUSCULAR; INTRAVENOUS
Status: DISCONTINUED | OUTPATIENT
Start: 2022-02-28 | End: 2022-02-28 | Stop reason: HOSPADM

## 2022-02-28 RX ORDER — ACETAMINOPHEN 325 MG/1
975 TABLET ORAL ONCE
Status: DISCONTINUED | OUTPATIENT
Start: 2022-02-28 | End: 2022-02-28

## 2022-02-28 RX ORDER — ONDANSETRON 2 MG/ML
4 INJECTION INTRAMUSCULAR; INTRAVENOUS EVERY 6 HOURS PRN
Status: DISCONTINUED | OUTPATIENT
Start: 2022-02-28 | End: 2022-02-28 | Stop reason: HOSPADM

## 2022-02-28 RX ORDER — MEPERIDINE HYDROCHLORIDE 25 MG/ML
12.5 INJECTION INTRAMUSCULAR; INTRAVENOUS; SUBCUTANEOUS
Status: DISCONTINUED | OUTPATIENT
Start: 2022-02-28 | End: 2022-02-28 | Stop reason: HOSPADM

## 2022-02-28 RX ORDER — DIAZEPAM 10 MG/2ML
2.5 INJECTION, SOLUTION INTRAMUSCULAR; INTRAVENOUS
Status: DISCONTINUED | OUTPATIENT
Start: 2022-02-28 | End: 2022-02-28 | Stop reason: HOSPADM

## 2022-02-28 RX ORDER — ONDANSETRON 2 MG/ML
INJECTION INTRAMUSCULAR; INTRAVENOUS PRN
Status: DISCONTINUED | OUTPATIENT
Start: 2022-02-28 | End: 2022-02-28

## 2022-02-28 RX ADMIN — PROPOFOL 20 MG: 10 INJECTION, EMULSION INTRAVENOUS at 09:34

## 2022-02-28 RX ADMIN — ONDANSETRON HYDROCHLORIDE 4 MG: 2 INJECTION, SOLUTION INTRAVENOUS at 09:34

## 2022-02-28 RX ADMIN — LIDOCAINE HYDROCHLORIDE 20 MG: 10 INJECTION, SOLUTION INFILTRATION; PERINEURAL at 09:34

## 2022-02-28 RX ADMIN — FENTANYL CITRATE 50 MCG: 50 INJECTION, SOLUTION INTRAMUSCULAR; INTRAVENOUS at 09:34

## 2022-02-28 RX ADMIN — Medication 20 MG: at 09:34

## 2022-02-28 RX ADMIN — SODIUM CHLORIDE, POTASSIUM CHLORIDE, SODIUM LACTATE AND CALCIUM CHLORIDE: 600; 310; 30; 20 INJECTION, SOLUTION INTRAVENOUS at 09:10

## 2022-02-28 ASSESSMENT — NEW YORK HEART ASSOCIATION (NYHA) CLASSIFICATION: NYHA FUNCTIONAL CLASS: II

## 2022-02-28 ASSESSMENT — COPD QUESTIONNAIRES
COPD: 1
CAT_SEVERITY: MODERATE

## 2022-02-28 NOTE — ANESTHESIA PREPROCEDURE EVALUATION
"Anesthesia Pre-Procedure Evaluation    Patient: Matthew Bowen   MRN: [id number] : 1945        Procedure : Procedure(s):  COLONOSCOPY          Past Medical History:   Diagnosis Date     Antiplatelet or antithrombotic long-term use      Arthritis      Congestive heart failure (H)      Dyspnea on exertion      Heart attack (H)      Heart murmur      Hypertension      Irregular heart beat      Oxygen dependent     2L continuous at home.     Pulmonary hypertension (H)      Sleep apnea     Bipap     Stented coronary artery     x 1     Thyroid disease      Walking troubles       Past Surgical History:   Procedure Laterality Date     APPENDECTOMY       COLONOSCOPY       CV CORONARY ANGIOGRAM N/A 2019    Procedure: Coronary Angiogram;  Surgeon: Tay Andre MD;  Location:  HEART CARDIAC CATH LAB     CV LEFT HEART CATH N/A 2019    Procedure: Left Heart Cath;  Surgeon: Tay Andre MD;  Location:  HEART CARDIAC CATH LAB     CV PCI STENT DRUG ELUTING N/A 2019    Procedure: PCI Stent Drug Eluting;  Surgeon: Tay Andre MD;  Location:  HEART CARDIAC CATH LAB     GYN SURGERY      Hyst.     ORTHOPEDIC SURGERY      B\" TKs      No Known Allergies   Social History     Tobacco Use     Smoking status: Never Smoker     Smokeless tobacco: Never Used   Substance Use Topics     Alcohol use: Not on file     Comment: 2x/year      Wt Readings from Last 1 Encounters:   22 94.3 kg (208 lb)        Anesthesia Evaluation   Pt has had prior anesthetic. Type: General.    History of anesthetic complications  - PONV.      ROS/MED HX  ENT/Pulmonary:     (+) sleep apnea, moderate, uses CPAP, moderate,  COPD, O2 dependent, during Nighttime, recent URI, resolved,     Neurologic:  - neg neurologic ROS     Cardiovascular:     (+) Dyslipidemia hypertension-----Taking blood thinners Pt has received instructions: Instructions Given to patient: continued. CHF (dysfunction) etiology: diastolic  NYHA " classification: II. Irregular Heartbeat/Palpitations, valvular problems/murmurs pulmonary hypertension, Previous cardiac testing   Echo: Date: Results:    Stress Test: Date: Results:    ECG Reviewed: Date: Results:    Cath: Date: 11/19 Results:    Left ventricular filling pressures are severely elevated .    Prox RCA to Mid RCA lesion is 80% stenosed.    Mid LAD lesion is 25% stenosed.    Dist Cx lesion is 80% stenosed.    1st Mrg lesion is 50% stenosed.        METS/Exercise Tolerance:     Hematologic:  - neg hematologic  ROS     Musculoskeletal:   (+) arthritis,     GI/Hepatic:  - neg GI/hepatic ROS     Renal/Genitourinary: Comment: Class 3 obesity    (+) renal disease, type: CRI, Pt does not require dialysis,     Endo:     (+) thyroid problem,  Thyroid disease - Other nodular disease, Obesity,     Psychiatric/Substance Use:  - neg psychiatric ROS     Infectious Disease:  - neg infectious disease ROS     Malignancy:  - neg malignancy ROS     Other:  - neg other ROS          Physical Exam    Airway        Mallampati: III   TM distance: > 3 FB   Neck ROM: full   Mouth opening: > 3 cm    Respiratory Devices and Support         Dental       (+) missing      Cardiovascular   cardiovascular exam normal       Rhythm and rate: regular and normal     Pulmonary   pulmonary exam normal        breath sounds clear to auscultation       Other findings: Lab Test        11/22/19 11/20/19 02/14/19 02/12/19                       0721          1533          0633          0647          WBC          10.8         10.5          --          10.1          HGB          12.4         13.7          --          11.6*         MCV          92           94            --          96            PLT          135*         123*         278          194            Lab Test        11/22/19 11/21/19 11/20/19                       0721          0626          1533          NA           141          142          143           POTASSIUM     4.6          4.4          4.5           CHLORIDE     108          110*         109           CO2          27           26           27            BUN          34*          47*          54*           CR           1.30*        1.49*        1.58*         ANIONGAP     6            6            7             BARBARA          8.9          8.6          8.7           GLC          112*         97           109*                 EKG Interpretation:   Left axis deviation Low voltage QRS Inferior infarct (cited on or before 11-FEB-2019) Cannot rule out Anterior infarct , age undetermined Abnormal ECG When compared with ECG of 11-FEB-2019 16:01,    OUTSIDE LABS:  CBC:   Lab Results   Component Value Date    WBC 10.8 11/22/2019    WBC 10.5 11/20/2019    HGB 12.4 11/22/2019    HGB 13.7 11/20/2019    HCT 39.9 11/22/2019    HCT 44.0 11/20/2019     (L) 11/22/2019     (L) 11/20/2019     BMP:   Lab Results   Component Value Date     11/22/2019     11/21/2019    POTASSIUM 4.6 11/22/2019    POTASSIUM 4.4 11/21/2019    CHLORIDE 108 11/22/2019    CHLORIDE 110 (H) 11/21/2019    CO2 27 11/22/2019    CO2 26 11/21/2019    BUN 34 (H) 11/22/2019    BUN 47 (H) 11/21/2019    CR 1.30 (H) 11/22/2019    CR 1.49 (H) 11/21/2019     (H) 11/22/2019    GLC 97 11/21/2019     COAGS:   Lab Results   Component Value Date    PTT 34 09/01/2009    INR 1.00 09/01/2009     POC: No results found for: BGM, HCG, HCGS  HEPATIC:   Lab Results   Component Value Date    ALBUMIN 2.7 (L) 02/13/2019    PROTTOTAL 6.5 (L) 02/13/2019    ALT 59 (H) 02/13/2019    AST 48 (H) 02/13/2019    ALKPHOS 83 02/13/2019    BILITOTAL 0.2 02/13/2019     OTHER:   Lab Results   Component Value Date    LACT 1.4 02/11/2019    BARBARA 8.9 11/22/2019    TSH 1.37 11/20/2019       Anesthesia Plan    ASA Status:  3      Anesthesia Type: MAC.     - Reason for MAC: chronic cardiopulmonary disease, straight local not clinically adequate, immobility needed   Induction:  Intravenous.   Maintenance: TIVA.        Consents    Anesthesia Plan(s) and associated risks, benefits, and realistic alternatives discussed. Questions answered and patient/representative(s) expressed understanding.     - Discussed: Risks, Benefits and Alternatives for BOTH SEDATION and the PROCEDURE were discussed     - Discussed with:  Patient      - Extended Intubation/Ventilatory Support Discussed: No.      - Patient is DNR/DNI Status: No    Use of blood products discussed: No .     Postoperative Care    Pain management: IV analgesics, Oral pain medications, Multi-modal analgesia.   PONV prophylaxis: Ondansetron (or other 5HT-3)     Comments:                Rui Babin MD

## 2022-02-28 NOTE — DISCHARGE INSTRUCTIONS
DR. MARCO VALDIVIA M.D.   [facility] PHONE NUMBER:  [phone]    GENERAL ANESTHESIA OR SEDATION ADULT DISCHARGE INSTRUCTIONS   SPECIAL PRECAUTIONS FOR 24 HOURS AFTER SURGERY    IT IS NOT UNUSUAL TO FEEL LIGHT-HEADED OR FAINT, UP TO 24 HOURS AFTER SURGERY OR WHILE TAKING PAIN MEDICATION.  IF YOU HAVE THESE SYMPTOMS; SIT FOR A FEW MINUTES BEFORE STANDING AND HAVE SOMEONE ASSIST YOU WHEN YOU GET UP TO WALK OR USE THE BATHROOM.    YOU SHOULD REST AND RELAX FOR THE NEXT 24 HOURS AND YOU MUST MAKE ARRANGEMENTS TO HAVE SOMEONE STAY WITH YOU FOR AT LEAST 24 HOURS AFTER YOUR DISCHARGE.  AVOID HAZARDOUS AND STRENUOUS ACTIVITIES.  DO NOT MAKE IMPORTANT DECISIONS FOR 24 HOURS.    DO NOT DRIVE ANY VEHICLE OR OPERATE MECHANICAL EQUIPMENT FOR 24 HOURS FOLLOWING THE END OF YOUR SURGERY.  EVEN THOUGH YOU MAY FEEL NORMAL, YOUR REACTIONS MAY BE AFFECTED BY THE MEDICATION YOU HAVE RECEIVED.    DO NOT DRINK ALCOHOLIC BEVERAGES FOR 24 HOURS FOLLOWING YOUR SURGERY.    DRINK CLEAR LIQUIDS (APPLE JUICE, GINGER ALE, 7-UP, BROTH, ETC.).  PROGRESS TO YOUR REGULAR DIET AS YOU FEEL ABLE.    YOU MAY HAVE A DRY MOUTH, A SORE THROAT, MUSCLES ACHES OR TROUBLE SLEEPING.  THESE SHOULD GO AWAY AFTER 24 HOURS.    CALL YOUR DOCTOR FOR ANY OF THE FOLLOWING:  SIGNS OF INFECTION (FEVER, GROWING TENDERNESS AT THE SURGERY SITE, A LARGE AMOUNT OF DRAINAGE OR BLEEDING, SEVERE PAIN, FOUL-SMELLING DRAINAGE, REDNESS OR SWELLING.    IT HAS BEEN OVER 8 TO 10 HOURS SINCE SURGERY AND YOU ARE STILL NOT ABLE TO URINATE (PASS WATER).       COLONOSCOPY DISCHARGE INSTRUCTIONS    You may not drive, use heavy equipment or consume alcohol for 24 hours because the drugs you were given may cause dizziness, drowsiness, forgetfulness and slower reaction time.    You may resume your regular diet and medications.        What to watch for:    Problems rarely occur after the exam.  It is important for you to be aware of the early signs of a possible complication.  Call your doctor  immediately if you notice any of the followin.  Unusual or persistent abdominal pain (pain that does not move around like a gas pain).    2.  Passing bright red blood from your rectum.    3.  Black or bloody stools.    4.  Temperature above 100.6 degrees F (37.5 degrees C)    If you feel this has become a medical emergency please call 911.

## 2022-02-28 NOTE — ANESTHESIA POSTPROCEDURE EVALUATION
Patient: Matthew Bowen    Procedure: Procedure(s):  COLONOSCOPY       Anesthesia Type:  MAC    Note:  Disposition: Outpatient   Postop Pain Control: Uneventful            Sign Out: Well controlled pain   PONV: No   Neuro/Psych: Uneventful            Sign Out: Acceptable/Baseline neuro status   Airway/Respiratory: Uneventful            Sign Out: Acceptable/Baseline resp. status   CV/Hemodynamics: Uneventful            Sign Out: Acceptable CV status; No obvious hypovolemia; No obvious fluid overload   Other NRE: NONE   DID A NON-ROUTINE EVENT OCCUR? No           Last vitals:  Vitals Value Taken Time   /64 02/28/22 1000   Temp 98.6  F (37  C) 02/28/22 0956   Pulse 80 02/28/22 1000   Resp 16 02/28/22 1000   SpO2 95 % 02/28/22 1000       Electronically Signed By: Rui Babin MD  February 28, 2022  10:15 AM

## 2022-02-28 NOTE — ANESTHESIA CARE TRANSFER NOTE
Patient: Matthew Bowen    Procedure: Procedure(s):  COLONOSCOPY       Diagnosis: Colon cancer screening [Z12.11]  Diagnosis Additional Information: No value filed.    Anesthesia Type:   MAC     Note:      Level of Consciousness: awake  Oxygen Supplementation: face mask    Independent Airway: airway patency satisfactory and stable  Dentition: dentition unchanged  Vital Signs Stable: post-procedure vital signs reviewed and stable  Report to RN Given: handoff report given  Patient transferred to: Phase II    Handoff Report: Identifed the Patient, Identified the Reponsible Provider, Reviewed the pertinent medical history, Discussed the surgical course, Reviewed Intra-OP anesthesia mangement and issues during anesthesia, Set expectations for post-procedure period and Allowed opportunity for questions and acknowledgement of understanding      Vitals:  Vitals Value Taken Time   BP     Temp     Pulse     Resp     SpO2         Electronically Signed By: LOW Coates CRNA  February 28, 2022  9:57 AM

## 2022-03-01 LAB
ATRIAL RATE - MUSE: 66 BPM
DIASTOLIC BLOOD PRESSURE - MUSE: NORMAL MMHG
INTERPRETATION ECG - MUSE: NORMAL
P AXIS - MUSE: 57 DEGREES
PATH REPORT.COMMENTS IMP SPEC: NORMAL
PATH REPORT.COMMENTS IMP SPEC: NORMAL
PATH REPORT.FINAL DX SPEC: NORMAL
PATH REPORT.GROSS SPEC: NORMAL
PATH REPORT.MICROSCOPIC SPEC OTHER STN: NORMAL
PATH REPORT.RELEVANT HX SPEC: NORMAL
PHOTO IMAGE: NORMAL
PR INTERVAL - MUSE: 212 MS
QRS DURATION - MUSE: 74 MS
QT - MUSE: 436 MS
QTC - MUSE: 457 MS
R AXIS - MUSE: -55 DEGREES
SYSTOLIC BLOOD PRESSURE - MUSE: NORMAL MMHG
T AXIS - MUSE: 33 DEGREES
VENTRICULAR RATE- MUSE: 66 BPM

## 2023-01-01 ENCOUNTER — APPOINTMENT (OUTPATIENT)
Dept: SPEECH THERAPY | Facility: CLINIC | Age: 78
DRG: 003 | End: 2023-01-01
Payer: COMMERCIAL

## 2023-01-01 ENCOUNTER — APPOINTMENT (OUTPATIENT)
Dept: PHYSICAL THERAPY | Facility: CLINIC | Age: 78
DRG: 207 | End: 2023-01-01
Attending: INTERNAL MEDICINE
Payer: COMMERCIAL

## 2023-01-01 ENCOUNTER — APPOINTMENT (OUTPATIENT)
Dept: RADIOLOGY | Facility: HOSPITAL | Age: 78
DRG: 871 | End: 2023-01-01
Attending: STUDENT IN AN ORGANIZED HEALTH CARE EDUCATION/TRAINING PROGRAM
Payer: COMMERCIAL

## 2023-01-01 ENCOUNTER — HOSPITAL ENCOUNTER (INPATIENT)
Facility: CLINIC | Age: 78
LOS: 18 days | Discharge: ANOTHER HOSPITAL | DRG: 207 | End: 2023-12-23
Attending: INTERNAL MEDICINE | Admitting: STUDENT IN AN ORGANIZED HEALTH CARE EDUCATION/TRAINING PROGRAM
Payer: COMMERCIAL

## 2023-01-01 ENCOUNTER — APPOINTMENT (OUTPATIENT)
Dept: OCCUPATIONAL THERAPY | Facility: CLINIC | Age: 78
DRG: 003 | End: 2023-01-01
Payer: COMMERCIAL

## 2023-01-01 ENCOUNTER — APPOINTMENT (OUTPATIENT)
Dept: SPEECH THERAPY | Facility: CLINIC | Age: 78
DRG: 207 | End: 2023-01-01
Attending: INTERNAL MEDICINE
Payer: COMMERCIAL

## 2023-01-01 ENCOUNTER — APPOINTMENT (OUTPATIENT)
Dept: ULTRASOUND IMAGING | Facility: CLINIC | Age: 78
DRG: 003 | End: 2023-01-01
Attending: INTERNAL MEDICINE
Payer: COMMERCIAL

## 2023-01-01 ENCOUNTER — APPOINTMENT (OUTPATIENT)
Dept: GENERAL RADIOLOGY | Facility: CLINIC | Age: 78
DRG: 003 | End: 2023-01-01
Attending: ANESTHESIOLOGY
Payer: COMMERCIAL

## 2023-01-01 ENCOUNTER — APPOINTMENT (OUTPATIENT)
Dept: GENERAL RADIOLOGY | Facility: CLINIC | Age: 78
DRG: 003 | End: 2023-01-01
Attending: INTERNAL MEDICINE
Payer: COMMERCIAL

## 2023-01-01 ENCOUNTER — APPOINTMENT (OUTPATIENT)
Dept: CT IMAGING | Facility: CLINIC | Age: 78
DRG: 003 | End: 2023-01-01
Attending: ANESTHESIOLOGY
Payer: COMMERCIAL

## 2023-01-01 ENCOUNTER — APPOINTMENT (OUTPATIENT)
Dept: GENERAL RADIOLOGY | Facility: CLINIC | Age: 78
DRG: 003 | End: 2023-01-01
Attending: EMERGENCY MEDICINE
Payer: COMMERCIAL

## 2023-01-01 ENCOUNTER — RECORDS - HEALTHEAST (OUTPATIENT)
Dept: HEALTH INFORMATION MANAGEMENT | Facility: CLINIC | Age: 78
End: 2023-01-01

## 2023-01-01 ENCOUNTER — APPOINTMENT (OUTPATIENT)
Dept: OCCUPATIONAL THERAPY | Facility: CLINIC | Age: 78
DRG: 207 | End: 2023-01-01
Attending: INTERNAL MEDICINE
Payer: COMMERCIAL

## 2023-01-01 ENCOUNTER — APPOINTMENT (OUTPATIENT)
Dept: PHYSICAL THERAPY | Facility: CLINIC | Age: 78
DRG: 207 | End: 2023-01-01
Attending: STUDENT IN AN ORGANIZED HEALTH CARE EDUCATION/TRAINING PROGRAM
Payer: COMMERCIAL

## 2023-01-01 ENCOUNTER — APPOINTMENT (OUTPATIENT)
Dept: PHYSICAL THERAPY | Facility: CLINIC | Age: 78
DRG: 003 | End: 2023-01-01
Payer: COMMERCIAL

## 2023-01-01 ENCOUNTER — APPOINTMENT (OUTPATIENT)
Dept: GENERAL RADIOLOGY | Facility: CLINIC | Age: 78
DRG: 003 | End: 2023-01-01
Attending: HOSPITALIST
Payer: COMMERCIAL

## 2023-01-01 ENCOUNTER — APPOINTMENT (OUTPATIENT)
Dept: SPEECH THERAPY | Facility: CLINIC | Age: 78
DRG: 003 | End: 2023-01-01
Attending: HOSPITALIST
Payer: COMMERCIAL

## 2023-01-01 ENCOUNTER — APPOINTMENT (OUTPATIENT)
Dept: CT IMAGING | Facility: HOSPITAL | Age: 78
DRG: 871 | End: 2023-01-01
Attending: STUDENT IN AN ORGANIZED HEALTH CARE EDUCATION/TRAINING PROGRAM
Payer: COMMERCIAL

## 2023-01-01 ENCOUNTER — APPOINTMENT (OUTPATIENT)
Dept: OCCUPATIONAL THERAPY | Facility: CLINIC | Age: 78
DRG: 207 | End: 2023-01-01
Attending: STUDENT IN AN ORGANIZED HEALTH CARE EDUCATION/TRAINING PROGRAM
Payer: COMMERCIAL

## 2023-01-01 ENCOUNTER — APPOINTMENT (OUTPATIENT)
Dept: PHYSICAL THERAPY | Facility: CLINIC | Age: 78
DRG: 003 | End: 2023-01-01
Attending: HOSPITALIST
Payer: COMMERCIAL

## 2023-01-01 ENCOUNTER — ANESTHESIA EVENT (OUTPATIENT)
Dept: INTENSIVE CARE | Facility: CLINIC | Age: 78
DRG: 003 | End: 2023-01-01
Payer: COMMERCIAL

## 2023-01-01 ENCOUNTER — ANESTHESIA (OUTPATIENT)
Dept: INTENSIVE CARE | Facility: CLINIC | Age: 78
DRG: 003 | End: 2023-01-01
Payer: COMMERCIAL

## 2023-01-01 ENCOUNTER — ANCILLARY PROCEDURE (OUTPATIENT)
Dept: GENERAL RADIOLOGY | Facility: CLINIC | Age: 78
DRG: 207 | End: 2023-01-01
Attending: NURSE PRACTITIONER
Payer: COMMERCIAL

## 2023-01-01 ENCOUNTER — HOSPITAL ENCOUNTER (INPATIENT)
Facility: HOSPITAL | Age: 78
LOS: 2 days | DRG: 871 | End: 2023-12-25
Attending: STUDENT IN AN ORGANIZED HEALTH CARE EDUCATION/TRAINING PROGRAM | Admitting: INTERNAL MEDICINE
Payer: COMMERCIAL

## 2023-01-01 ENCOUNTER — ANESTHESIA EVENT (OUTPATIENT)
Dept: SURGERY | Facility: CLINIC | Age: 78
DRG: 003 | End: 2023-01-01
Payer: COMMERCIAL

## 2023-01-01 ENCOUNTER — APPOINTMENT (OUTPATIENT)
Dept: CT IMAGING | Facility: CLINIC | Age: 78
DRG: 003 | End: 2023-01-01
Attending: HOSPITALIST
Payer: COMMERCIAL

## 2023-01-01 ENCOUNTER — APPOINTMENT (OUTPATIENT)
Dept: CT IMAGING | Facility: CLINIC | Age: 78
DRG: 003 | End: 2023-01-01
Attending: EMERGENCY MEDICINE
Payer: COMMERCIAL

## 2023-01-01 ENCOUNTER — APPOINTMENT (OUTPATIENT)
Dept: OCCUPATIONAL THERAPY | Facility: CLINIC | Age: 78
DRG: 003 | End: 2023-01-01
Attending: INTERNAL MEDICINE
Payer: COMMERCIAL

## 2023-01-01 ENCOUNTER — APPOINTMENT (OUTPATIENT)
Dept: CARDIOLOGY | Facility: HOSPITAL | Age: 78
DRG: 871 | End: 2023-01-01
Attending: INTERNAL MEDICINE
Payer: COMMERCIAL

## 2023-01-01 ENCOUNTER — APPOINTMENT (OUTPATIENT)
Dept: SPEECH THERAPY | Facility: CLINIC | Age: 78
DRG: 207 | End: 2023-01-01
Attending: STUDENT IN AN ORGANIZED HEALTH CARE EDUCATION/TRAINING PROGRAM
Payer: COMMERCIAL

## 2023-01-01 ENCOUNTER — APPOINTMENT (OUTPATIENT)
Dept: CARDIOLOGY | Facility: CLINIC | Age: 78
DRG: 003 | End: 2023-01-01
Attending: HOSPITALIST
Payer: COMMERCIAL

## 2023-01-01 ENCOUNTER — APPOINTMENT (OUTPATIENT)
Dept: GENERAL RADIOLOGY | Facility: CLINIC | Age: 78
DRG: 003 | End: 2023-01-01
Attending: SURGERY
Payer: COMMERCIAL

## 2023-01-01 ENCOUNTER — APPOINTMENT (OUTPATIENT)
Dept: RADIOLOGY | Facility: HOSPITAL | Age: 78
DRG: 871 | End: 2023-01-01
Attending: INTERNAL MEDICINE
Payer: COMMERCIAL

## 2023-01-01 ENCOUNTER — HOSPITAL ENCOUNTER (INPATIENT)
Facility: CLINIC | Age: 78
LOS: 51 days | Discharge: NURSING, REHAB OR LONG-TERM CARE | DRG: 003 | End: 2023-12-05
Attending: EMERGENCY MEDICINE | Admitting: HOSPITALIST
Payer: COMMERCIAL

## 2023-01-01 ENCOUNTER — APPOINTMENT (OUTPATIENT)
Dept: INTERVENTIONAL RADIOLOGY/VASCULAR | Facility: CLINIC | Age: 78
DRG: 003 | End: 2023-01-01
Attending: PHYSICIAN ASSISTANT
Payer: COMMERCIAL

## 2023-01-01 ENCOUNTER — APPOINTMENT (OUTPATIENT)
Dept: CT IMAGING | Facility: CLINIC | Age: 78
DRG: 003 | End: 2023-01-01
Attending: INTERNAL MEDICINE
Payer: COMMERCIAL

## 2023-01-01 ENCOUNTER — ANESTHESIA (OUTPATIENT)
Dept: SURGERY | Facility: CLINIC | Age: 78
DRG: 003 | End: 2023-01-01
Payer: COMMERCIAL

## 2023-01-01 ENCOUNTER — ANCILLARY PROCEDURE (OUTPATIENT)
Dept: ULTRASOUND IMAGING | Facility: HOSPITAL | Age: 78
DRG: 871 | End: 2023-01-01
Attending: INTERNAL MEDICINE
Payer: COMMERCIAL

## 2023-01-01 VITALS
SYSTOLIC BLOOD PRESSURE: 150 MMHG | TEMPERATURE: 99 F | BODY MASS INDEX: 35.62 KG/M2 | DIASTOLIC BLOOD PRESSURE: 72 MMHG | HEART RATE: 81 BPM | HEIGHT: 60 IN | OXYGEN SATURATION: 94 % | RESPIRATION RATE: 10 BRPM | WEIGHT: 181.44 LBS

## 2023-01-01 VITALS
BODY MASS INDEX: 35.26 KG/M2 | SYSTOLIC BLOOD PRESSURE: 95 MMHG | DIASTOLIC BLOOD PRESSURE: 56 MMHG | OXYGEN SATURATION: 92 % | RESPIRATION RATE: 34 BRPM | WEIGHT: 180.56 LBS | TEMPERATURE: 102.3 F

## 2023-01-01 VITALS
BODY MASS INDEX: 36.6 KG/M2 | TEMPERATURE: 99.7 F | OXYGEN SATURATION: 95 % | HEART RATE: 85 BPM | DIASTOLIC BLOOD PRESSURE: 59 MMHG | SYSTOLIC BLOOD PRESSURE: 114 MMHG | WEIGHT: 187.39 LBS | RESPIRATION RATE: 17 BRPM

## 2023-01-01 DIAGNOSIS — S70.11XA CONTUSION OF RIGHT THIGH, INITIAL ENCOUNTER: ICD-10-CM

## 2023-01-01 DIAGNOSIS — Z99.81 OXYGEN DEPENDENT: ICD-10-CM

## 2023-01-01 DIAGNOSIS — J18.9 PNEUMONIA DUE TO INFECTIOUS ORGANISM, UNSPECIFIED LATERALITY, UNSPECIFIED PART OF LUNG: ICD-10-CM

## 2023-01-01 DIAGNOSIS — R53.1 GENERALIZED WEAKNESS: ICD-10-CM

## 2023-01-01 DIAGNOSIS — L24.A2 IRRITANT CONTACT DERMATITIS DUE TO FECAL INCONTINENCE: Primary | ICD-10-CM

## 2023-01-01 DIAGNOSIS — S09.90XA CLOSED HEAD INJURY, INITIAL ENCOUNTER: ICD-10-CM

## 2023-01-01 DIAGNOSIS — R09.02 HYPOXIA: ICD-10-CM

## 2023-01-01 DIAGNOSIS — Z91.89 AT RISK FOR IMPAIRED SKIN INTEGRITY: Primary | ICD-10-CM

## 2023-01-01 DIAGNOSIS — L89.892: ICD-10-CM

## 2023-01-01 LAB
1,3 BETA GLUCAN SER-MCNC: <31 PG/ML
1,3 BETA GLUCAN SER-MCNC: <31 PG/ML
ABO/RH(D): NORMAL
ACID FAST STAIN (ARUP): NORMAL
ACINETOBACTER SPECIES: NOT DETECTED
ALBUMIN MFR UR ELPH: <6 MG/DL
ALBUMIN SERPL BCG-MCNC: 2.4 G/DL (ref 3.5–5.2)
ALBUMIN SERPL BCG-MCNC: 2.7 G/DL (ref 3.5–5.2)
ALBUMIN SERPL BCG-MCNC: 2.9 G/DL (ref 3.5–5.2)
ALBUMIN SERPL BCG-MCNC: 2.9 G/DL (ref 3.5–5.2)
ALBUMIN SERPL BCG-MCNC: 3 G/DL (ref 3.5–5.2)
ALBUMIN SERPL BCG-MCNC: 3.1 G/DL (ref 3.5–5.2)
ALBUMIN SERPL BCG-MCNC: 3.3 G/DL (ref 3.5–5.2)
ALBUMIN SERPL BCG-MCNC: 4.4 G/DL (ref 3.5–5.2)
ALBUMIN UR-MCNC: 10 MG/DL
ALBUMIN UR-MCNC: 100 MG/DL
ALBUMIN UR-MCNC: 20 MG/DL
ALBUMIN UR-MCNC: NEGATIVE MG/DL
ALBUMIN UR-MCNC: NEGATIVE MG/DL
ALLEN'S TEST: ABNORMAL
ALLEN'S TEST: YES
ALLEN'S TEST: YES
ALP SERPL-CCNC: 100 U/L (ref 35–104)
ALP SERPL-CCNC: 105 U/L (ref 40–150)
ALP SERPL-CCNC: 113 U/L (ref 40–150)
ALP SERPL-CCNC: 114 U/L (ref 40–150)
ALP SERPL-CCNC: 120 U/L (ref 40–150)
ALP SERPL-CCNC: 134 U/L (ref 35–104)
ALP SERPL-CCNC: 138 U/L (ref 40–150)
ALP SERPL-CCNC: 89 U/L (ref 40–150)
ALT SERPL W P-5'-P-CCNC: 1559 U/L (ref 0–50)
ALT SERPL W P-5'-P-CCNC: 16 U/L (ref 0–50)
ALT SERPL W P-5'-P-CCNC: 27 U/L (ref 0–50)
ALT SERPL W P-5'-P-CCNC: 35 U/L (ref 0–50)
ALT SERPL W P-5'-P-CCNC: 40 U/L (ref 0–50)
ALT SERPL W P-5'-P-CCNC: 45 U/L (ref 0–50)
ALT SERPL W P-5'-P-CCNC: 46 U/L (ref 0–50)
ALT SERPL W P-5'-P-CCNC: 66 U/L (ref 0–50)
ANA PAT SER IF-IMP: ABNORMAL
ANA SER QL IF: ABNORMAL
ANA SER QL IF: NEGATIVE
ANA TITR SER IF: ABNORMAL {TITER}
ANCA AB PATTERN SER IF-IMP: NORMAL
ANION GAP SERPL CALCULATED.3IONS-SCNC: 10 MMOL/L (ref 7–15)
ANION GAP SERPL CALCULATED.3IONS-SCNC: 11 MMOL/L (ref 7–15)
ANION GAP SERPL CALCULATED.3IONS-SCNC: 12 MMOL/L (ref 7–15)
ANION GAP SERPL CALCULATED.3IONS-SCNC: 13 MMOL/L (ref 7–15)
ANION GAP SERPL CALCULATED.3IONS-SCNC: 14 MMOL/L (ref 7–15)
ANION GAP SERPL CALCULATED.3IONS-SCNC: 15 MMOL/L (ref 7–15)
ANION GAP SERPL CALCULATED.3IONS-SCNC: 16 MMOL/L (ref 7–15)
ANION GAP SERPL CALCULATED.3IONS-SCNC: 19 MMOL/L (ref 7–15)
ANION GAP SERPL CALCULATED.3IONS-SCNC: 22 MMOL/L (ref 7–15)
ANION GAP SERPL CALCULATED.3IONS-SCNC: 27 MMOL/L (ref 7–15)
ANION GAP SERPL CALCULATED.3IONS-SCNC: 4 MMOL/L (ref 7–15)
ANION GAP SERPL CALCULATED.3IONS-SCNC: 5 MMOL/L (ref 7–15)
ANION GAP SERPL CALCULATED.3IONS-SCNC: 5 MMOL/L (ref 7–15)
ANION GAP SERPL CALCULATED.3IONS-SCNC: 6 MMOL/L (ref 7–15)
ANION GAP SERPL CALCULATED.3IONS-SCNC: 7 MMOL/L (ref 7–15)
ANION GAP SERPL CALCULATED.3IONS-SCNC: 8 MMOL/L (ref 7–15)
ANION GAP SERPL CALCULATED.3IONS-SCNC: 9 MMOL/L (ref 7–15)
ANNOTATION COMMENT IMP: NOT DETECTED
ANTIBODY SCREEN: NEGATIVE
APPEARANCE FLD: ABNORMAL
APPEARANCE FLD: ABNORMAL
APPEARANCE UR: ABNORMAL
APPEARANCE UR: CLEAR
ASPERGILLUS AB TITR SER CF: NORMAL {TITER}
AST SERPL W P-5'-P-CCNC: 2128 U/L (ref 0–45)
AST SERPL W P-5'-P-CCNC: 32 U/L (ref 0–45)
AST SERPL W P-5'-P-CCNC: 34 U/L (ref 0–45)
AST SERPL W P-5'-P-CCNC: 37 U/L (ref 0–45)
AST SERPL W P-5'-P-CCNC: 37 U/L (ref 0–45)
AST SERPL W P-5'-P-CCNC: 38 U/L (ref 0–45)
AST SERPL W P-5'-P-CCNC: 45 U/L (ref 0–45)
AST SERPL W P-5'-P-CCNC: 52 U/L (ref 0–45)
ATRIAL RATE - MUSE: 56 BPM
ATRIAL RATE - MUSE: 70 BPM
ATRIAL RATE - MUSE: 88 BPM
ATRIAL RATE - MUSE: 90 BPM
B DERMAT AB SER-ACNC: 0.2 IV
BACTERIA #/AREA URNS HPF: ABNORMAL /HPF
BACTERIA ASPIRATE CULT: ABNORMAL
BACTERIA BLD CULT: ABNORMAL
BACTERIA BLD CULT: ABNORMAL
BACTERIA BLD CULT: NO GROWTH
BACTERIA BRONCH: ABNORMAL
BACTERIA BRONCH: NO GROWTH
BACTERIA BRONCH: NORMAL
BACTERIA SPEC CULT: ABNORMAL
BACTERIA SPEC CULT: ABNORMAL
BACTERIA SPEC CULT: NORMAL
BACTERIA SPT CULT: ABNORMAL
BACTERIA SPT CULT: NORMAL
BACTERIA SPT CULT: NORMAL
BACTERIA UR CULT: ABNORMAL
BACTERIA UR CULT: NORMAL
BASE EXCESS BLDA CALC-SCNC: -0.1 MMOL/L (ref -9–1.8)
BASE EXCESS BLDA CALC-SCNC: -0.4 MMOL/L (ref -9–1.8)
BASE EXCESS BLDA CALC-SCNC: -0.9 MMOL/L (ref -9–1.8)
BASE EXCESS BLDA CALC-SCNC: -12.1 MMOL/L
BASE EXCESS BLDA CALC-SCNC: -12.3 MMOL/L
BASE EXCESS BLDA CALC-SCNC: -17.9 MMOL/L
BASE EXCESS BLDA CALC-SCNC: -2.1 MMOL/L (ref -9–1.8)
BASE EXCESS BLDA CALC-SCNC: -2.6 MMOL/L (ref -9–1.8)
BASE EXCESS BLDA CALC-SCNC: -2.7 MMOL/L (ref -9–1.8)
BASE EXCESS BLDA CALC-SCNC: -2.9 MMOL/L (ref -9–1.8)
BASE EXCESS BLDA CALC-SCNC: -2.9 MMOL/L (ref -9–1.8)
BASE EXCESS BLDA CALC-SCNC: -3.7 MMOL/L (ref -9–1.8)
BASE EXCESS BLDA CALC-SCNC: -4 MMOL/L (ref -9–1.8)
BASE EXCESS BLDA CALC-SCNC: -4.2 MMOL/L (ref -9–1.8)
BASE EXCESS BLDA CALC-SCNC: -4.3 MMOL/L
BASE EXCESS BLDA CALC-SCNC: -6.2 MMOL/L
BASE EXCESS BLDA CALC-SCNC: -7.1 MMOL/L
BASE EXCESS BLDA CALC-SCNC: -9.9 MMOL/L
BASE EXCESS BLDA CALC-SCNC: 0.5 MMOL/L (ref -9–1.8)
BASE EXCESS BLDA CALC-SCNC: 1.2 MMOL/L (ref -9–1.8)
BASE EXCESS BLDA CALC-SCNC: 10.6 MMOL/L (ref -9–1.8)
BASE EXCESS BLDA CALC-SCNC: 2.4 MMOL/L
BASE EXCESS BLDA CALC-SCNC: 2.7 MMOL/L (ref -9–1.8)
BASE EXCESS BLDV CALC-SCNC: -1.9 MMOL/L
BASE EXCESS BLDV CALC-SCNC: 0.3 MMOL/L (ref -7.7–1.9)
BASE EXCESS BLDV CALC-SCNC: 1.7 MMOL/L (ref -7.7–1.9)
BASE EXCESS BLDV CALC-SCNC: 2.2 MMOL/L (ref -7.7–1.9)
BASE EXCESS BLDV CALC-SCNC: 4.3 MMOL/L (ref -7.7–1.9)
BASE EXCESS BLDV CALC-SCNC: 5.1 MMOL/L (ref -7.7–1.9)
BASE EXCESS BLDV CALC-SCNC: 7 MMOL/L (ref -7.7–1.9)
BASE EXCESS BLDV CALC-SCNC: 7.2 MMOL/L (ref -7.7–1.9)
BASO STIPL BLD QL SMEAR: PRESENT
BASO STIPL BLD QL SMEAR: PRESENT
BASO+EOS+MONOS # BLD AUTO: ABNORMAL 10*3/UL
BASO+EOS+MONOS NFR BLD AUTO: ABNORMAL %
BASOPHILS # BLD AUTO: 0 10E3/UL (ref 0–0.2)
BASOPHILS # BLD AUTO: 0 10E3/UL (ref 0–0.2)
BASOPHILS # BLD AUTO: 0.1 10E3/UL (ref 0–0.2)
BASOPHILS # BLD AUTO: ABNORMAL 10*3/UL
BASOPHILS # BLD AUTO: ABNORMAL 10*3/UL
BASOPHILS # BLD MANUAL: 0 10E3/UL (ref 0–0.2)
BASOPHILS # BLD MANUAL: 0 10E3/UL (ref 0–0.2)
BASOPHILS NFR BLD AUTO: 0 %
BASOPHILS NFR BLD AUTO: ABNORMAL %
BASOPHILS NFR BLD AUTO: ABNORMAL %
BASOPHILS NFR BLD MANUAL: 0 %
BASOPHILS NFR BLD MANUAL: 0 %
BILIRUB DIRECT SERPL-MCNC: 0.88 MG/DL (ref 0–0.3)
BILIRUB DIRECT SERPL-MCNC: <0.2 MG/DL (ref 0–0.3)
BILIRUB DIRECT SERPL-MCNC: <0.2 MG/DL (ref 0–0.3)
BILIRUB SERPL-MCNC: 0.2 MG/DL
BILIRUB SERPL-MCNC: 0.3 MG/DL
BILIRUB SERPL-MCNC: 0.3 MG/DL
BILIRUB SERPL-MCNC: 0.4 MG/DL
BILIRUB SERPL-MCNC: 0.5 MG/DL
BILIRUB SERPL-MCNC: 1 MG/DL
BILIRUB UR QL STRIP: NEGATIVE
BLD PROD TYP BPU: NORMAL
BLOOD COMPONENT TYPE: NORMAL
BM IGG SER IF-ACNC: 0 AU/ML
BUN SERPL-MCNC: 100.8 MG/DL (ref 8–23)
BUN SERPL-MCNC: 102 MG/DL (ref 8–23)
BUN SERPL-MCNC: 105.4 MG/DL (ref 8–23)
BUN SERPL-MCNC: 108.6 MG/DL (ref 8–23)
BUN SERPL-MCNC: 109.1 MG/DL (ref 8–23)
BUN SERPL-MCNC: 109.2 MG/DL (ref 8–23)
BUN SERPL-MCNC: 109.5 MG/DL (ref 8–23)
BUN SERPL-MCNC: 109.6 MG/DL (ref 8–23)
BUN SERPL-MCNC: 110.5 MG/DL (ref 8–23)
BUN SERPL-MCNC: 110.5 MG/DL (ref 8–23)
BUN SERPL-MCNC: 116.8 MG/DL (ref 8–23)
BUN SERPL-MCNC: 118.9 MG/DL (ref 8–23)
BUN SERPL-MCNC: 120.9 MG/DL (ref 8–23)
BUN SERPL-MCNC: 123 MG/DL (ref 8–23)
BUN SERPL-MCNC: 126 MG/DL (ref 8–23)
BUN SERPL-MCNC: 128.5 MG/DL (ref 8–23)
BUN SERPL-MCNC: 130.7 MG/DL (ref 8–23)
BUN SERPL-MCNC: 131.1 MG/DL (ref 8–23)
BUN SERPL-MCNC: 131.1 MG/DL (ref 8–23)
BUN SERPL-MCNC: 132.3 MG/DL (ref 8–23)
BUN SERPL-MCNC: 132.4 MG/DL (ref 8–23)
BUN SERPL-MCNC: 132.6 MG/DL (ref 8–23)
BUN SERPL-MCNC: 132.8 MG/DL (ref 8–23)
BUN SERPL-MCNC: 134.6 MG/DL (ref 8–23)
BUN SERPL-MCNC: 134.7 MG/DL (ref 8–23)
BUN SERPL-MCNC: 135.9 MG/DL (ref 8–23)
BUN SERPL-MCNC: 138.7 MG/DL (ref 8–23)
BUN SERPL-MCNC: 139.3 MG/DL (ref 8–23)
BUN SERPL-MCNC: 144.8 MG/DL (ref 8–23)
BUN SERPL-MCNC: 145.5 MG/DL (ref 8–23)
BUN SERPL-MCNC: 147.5 MG/DL (ref 8–23)
BUN SERPL-MCNC: 147.5 MG/DL (ref 8–23)
BUN SERPL-MCNC: 149.9 MG/DL (ref 8–23)
BUN SERPL-MCNC: 155.5 MG/DL (ref 8–23)
BUN SERPL-MCNC: 159.5 MG/DL (ref 8–23)
BUN SERPL-MCNC: 160.8 MG/DL (ref 8–23)
BUN SERPL-MCNC: 165 MG/DL (ref 8–23)
BUN SERPL-MCNC: 166 MG/DL (ref 8–23)
BUN SERPL-MCNC: 166.4 MG/DL (ref 8–23)
BUN SERPL-MCNC: 169.4 MG/DL (ref 8–23)
BUN SERPL-MCNC: 171.2 MG/DL (ref 8–23)
BUN SERPL-MCNC: 171.9 MG/DL (ref 8–23)
BUN SERPL-MCNC: 171.9 MG/DL (ref 8–23)
BUN SERPL-MCNC: 18.4 MG/DL (ref 8–23)
BUN SERPL-MCNC: 18.9 MG/DL (ref 8–23)
BUN SERPL-MCNC: 19.5 MG/DL (ref 8–23)
BUN SERPL-MCNC: 21.7 MG/DL (ref 8–23)
BUN SERPL-MCNC: 26.2 MG/DL (ref 8–23)
BUN SERPL-MCNC: 31.2 MG/DL (ref 8–23)
BUN SERPL-MCNC: 37.4 MG/DL (ref 8–23)
BUN SERPL-MCNC: 45.8 MG/DL (ref 8–23)
BUN SERPL-MCNC: 53.9 MG/DL (ref 8–23)
BUN SERPL-MCNC: 56.1 MG/DL (ref 8–23)
BUN SERPL-MCNC: 56.3 MG/DL (ref 8–23)
BUN SERPL-MCNC: 56.8 MG/DL (ref 8–23)
BUN SERPL-MCNC: 60.6 MG/DL (ref 8–23)
BUN SERPL-MCNC: 63.3 MG/DL (ref 8–23)
BUN SERPL-MCNC: 64.2 MG/DL (ref 8–23)
BUN SERPL-MCNC: 64.3 MG/DL (ref 8–23)
BUN SERPL-MCNC: 65.6 MG/DL (ref 8–23)
BUN SERPL-MCNC: 67.2 MG/DL (ref 8–23)
BUN SERPL-MCNC: 67.5 MG/DL (ref 8–23)
BUN SERPL-MCNC: 68.4 MG/DL (ref 8–23)
BUN SERPL-MCNC: 69.4 MG/DL (ref 8–23)
BUN SERPL-MCNC: 70 MG/DL (ref 8–23)
BUN SERPL-MCNC: 70.1 MG/DL (ref 8–23)
BUN SERPL-MCNC: 72 MG/DL (ref 8–23)
BUN SERPL-MCNC: 74 MG/DL (ref 8–23)
BUN SERPL-MCNC: 74 MG/DL (ref 8–23)
BUN SERPL-MCNC: 75.4 MG/DL (ref 8–23)
BUN SERPL-MCNC: 75.8 MG/DL (ref 8–23)
BUN SERPL-MCNC: 76.2 MG/DL (ref 8–23)
BUN SERPL-MCNC: 76.5 MG/DL (ref 8–23)
BUN SERPL-MCNC: 77.5 MG/DL (ref 8–23)
BUN SERPL-MCNC: 78.3 MG/DL (ref 8–23)
BUN SERPL-MCNC: 78.4 MG/DL (ref 8–23)
BUN SERPL-MCNC: 79 MG/DL (ref 8–23)
BUN SERPL-MCNC: 79.6 MG/DL (ref 8–23)
BUN SERPL-MCNC: 81 MG/DL (ref 8–23)
BUN SERPL-MCNC: 81.3 MG/DL (ref 8–23)
BUN SERPL-MCNC: 81.4 MG/DL (ref 8–23)
BUN SERPL-MCNC: 81.6 MG/DL (ref 8–23)
BUN SERPL-MCNC: 82.6 MG/DL (ref 8–23)
BUN SERPL-MCNC: 82.7 MG/DL (ref 8–23)
BUN SERPL-MCNC: 82.9 MG/DL (ref 8–23)
BUN SERPL-MCNC: 83.6 MG/DL (ref 8–23)
BUN SERPL-MCNC: 83.9 MG/DL (ref 8–23)
BUN SERPL-MCNC: 84.3 MG/DL (ref 8–23)
BUN SERPL-MCNC: 84.8 MG/DL (ref 8–23)
BUN SERPL-MCNC: 85.3 MG/DL (ref 8–23)
BUN SERPL-MCNC: 85.4 MG/DL (ref 8–23)
BUN SERPL-MCNC: 86.8 MG/DL (ref 8–23)
BUN SERPL-MCNC: 87.1 MG/DL (ref 8–23)
BUN SERPL-MCNC: 90.2 MG/DL (ref 8–23)
BUN SERPL-MCNC: 92.1 MG/DL (ref 8–23)
BUN SERPL-MCNC: 93.2 MG/DL (ref 8–23)
BUN SERPL-MCNC: 94.1 MG/DL (ref 8–23)
BUN SERPL-MCNC: 98.2 MG/DL (ref 8–23)
C DIFF TOX B STL QL: NEGATIVE
C PNEUM DNA SPEC QL NAA+PROBE: NOT DETECTED
C-ANCA TITR SER IF: NORMAL {TITER}
CA-I BLD-MCNC: 1.27 MMOL/L (ref 1.11–1.3)
CA-I BLD-MCNC: 5.1 MG/DL (ref 4.4–5.2)
CALCIUM SERPL-MCNC: 10 MG/DL (ref 8.8–10.2)
CALCIUM SERPL-MCNC: 7.1 MG/DL (ref 8.8–10.2)
CALCIUM SERPL-MCNC: 7.7 MG/DL (ref 8.8–10.2)
CALCIUM SERPL-MCNC: 7.9 MG/DL (ref 8.8–10.2)
CALCIUM SERPL-MCNC: 8 MG/DL (ref 8.8–10.2)
CALCIUM SERPL-MCNC: 8.1 MG/DL (ref 8.8–10.2)
CALCIUM SERPL-MCNC: 8.1 MG/DL (ref 8.8–10.2)
CALCIUM SERPL-MCNC: 8.3 MG/DL (ref 8.8–10.2)
CALCIUM SERPL-MCNC: 8.4 MG/DL (ref 8.8–10.2)
CALCIUM SERPL-MCNC: 8.4 MG/DL (ref 8.8–10.2)
CALCIUM SERPL-MCNC: 8.5 MG/DL (ref 8.8–10.2)
CALCIUM SERPL-MCNC: 8.6 MG/DL (ref 8.8–10.2)
CALCIUM SERPL-MCNC: 8.7 MG/DL (ref 8.8–10.2)
CALCIUM SERPL-MCNC: 8.8 MG/DL (ref 8.8–10.2)
CALCIUM SERPL-MCNC: 8.9 MG/DL (ref 8.8–10.2)
CALCIUM SERPL-MCNC: 9 MG/DL (ref 8.8–10.2)
CALCIUM SERPL-MCNC: 9.1 MG/DL (ref 8.8–10.2)
CALCIUM SERPL-MCNC: 9.2 MG/DL (ref 8.8–10.2)
CALCIUM SERPL-MCNC: 9.3 MG/DL (ref 8.8–10.2)
CALCIUM SERPL-MCNC: 9.4 MG/DL (ref 8.8–10.2)
CALCIUM SERPL-MCNC: 9.5 MG/DL (ref 8.8–10.2)
CALCIUM SERPL-MCNC: 9.6 MG/DL (ref 8.8–10.2)
CALCIUM SERPL-MCNC: 9.6 MG/DL (ref 8.8–10.2)
CALCIUM SERPL-MCNC: 9.7 MG/DL (ref 8.8–10.2)
CALCIUM SERPL-MCNC: 9.8 MG/DL (ref 8.8–10.2)
CALCIUM SERPL-MCNC: 9.8 MG/DL (ref 8.8–10.2)
CALCIUM SERPL-MCNC: 9.9 MG/DL (ref 8.8–10.2)
CALCIUM, IONIZED MEASURED: 1.05 MMOL/L (ref 1.11–1.3)
CCP AB SER IA-ACNC: 1.4 U/ML
CELL COUNT BODY FLUID SOURCE: ABNORMAL
CELL COUNT BODY FLUID SOURCE: ABNORMAL
CHLORIDE SERPL-SCNC: 100 MMOL/L (ref 98–107)
CHLORIDE SERPL-SCNC: 101 MMOL/L (ref 98–107)
CHLORIDE SERPL-SCNC: 102 MMOL/L (ref 98–107)
CHLORIDE SERPL-SCNC: 103 MMOL/L (ref 98–107)
CHLORIDE SERPL-SCNC: 104 MMOL/L (ref 98–107)
CHLORIDE SERPL-SCNC: 105 MMOL/L (ref 98–107)
CHLORIDE SERPL-SCNC: 106 MMOL/L (ref 98–107)
CHLORIDE SERPL-SCNC: 107 MMOL/L (ref 98–107)
CHLORIDE SERPL-SCNC: 108 MMOL/L (ref 98–107)
CHLORIDE SERPL-SCNC: 109 MMOL/L (ref 98–107)
CHLORIDE SERPL-SCNC: 110 MMOL/L (ref 98–107)
CHLORIDE SERPL-SCNC: 111 MMOL/L (ref 98–107)
CHLORIDE SERPL-SCNC: 112 MMOL/L (ref 98–107)
CHLORIDE SERPL-SCNC: 113 MMOL/L (ref 98–107)
CHLORIDE SERPL-SCNC: 114 MMOL/L (ref 98–107)
CHLORIDE SERPL-SCNC: 114 MMOL/L (ref 98–107)
CHLORIDE SERPL-SCNC: 115 MMOL/L (ref 98–107)
CHLORIDE SERPL-SCNC: 116 MMOL/L (ref 98–107)
CHLORIDE SERPL-SCNC: 117 MMOL/L (ref 98–107)
CHLORIDE SERPL-SCNC: 118 MMOL/L (ref 98–107)
CHLORIDE SERPL-SCNC: 118 MMOL/L (ref 98–107)
CHLORIDE SERPL-SCNC: 119 MMOL/L (ref 98–107)
CHLORIDE SERPL-SCNC: 120 MMOL/L (ref 98–107)
CHLORIDE SERPL-SCNC: 94 MMOL/L (ref 98–107)
CHLORIDE SERPL-SCNC: 97 MMOL/L (ref 98–107)
CHLORIDE SERPL-SCNC: 98 MMOL/L (ref 98–107)
CHLORIDE SERPL-SCNC: 99 MMOL/L (ref 98–107)
CITROBACTER SPECIES: NOT DETECTED
CK SERPL-CCNC: 36 U/L (ref 26–192)
COCCIDIOIDES AB TITR SER CF: NORMAL {TITER}
CODING SYSTEM: NORMAL
COHGB MFR BLD: 85.6 % (ref 95–96)
COHGB MFR BLD: 88.2 % (ref 95–96)
COHGB MFR BLD: 90.3 % (ref 95–96)
COHGB MFR BLD: 90.7 % (ref 95–96)
COHGB MFR BLD: 92.1 % (ref 95–96)
COHGB MFR BLD: 92.6 % (ref 95–96)
COHGB MFR BLD: 93.6 % (ref 95–96)
COHGB MFR BLD: 96.7 % (ref 95–96)
COLOR FLD: ABNORMAL
COLOR FLD: ABNORMAL
COLOR UR AUTO: ABNORMAL
COLOR UR AUTO: ABNORMAL
COLOR UR AUTO: YELLOW
CREAT SERPL-MCNC: 0.51 MG/DL (ref 0.51–0.95)
CREAT SERPL-MCNC: 0.54 MG/DL (ref 0.51–0.95)
CREAT SERPL-MCNC: 0.54 MG/DL (ref 0.51–0.95)
CREAT SERPL-MCNC: 0.57 MG/DL (ref 0.51–0.95)
CREAT SERPL-MCNC: 0.6 MG/DL (ref 0.51–0.95)
CREAT SERPL-MCNC: 0.62 MG/DL (ref 0.51–0.95)
CREAT SERPL-MCNC: 0.63 MG/DL (ref 0.51–0.95)
CREAT SERPL-MCNC: 0.64 MG/DL (ref 0.51–0.95)
CREAT SERPL-MCNC: 0.64 MG/DL (ref 0.51–0.95)
CREAT SERPL-MCNC: 0.65 MG/DL (ref 0.51–0.95)
CREAT SERPL-MCNC: 0.67 MG/DL (ref 0.51–0.95)
CREAT SERPL-MCNC: 0.69 MG/DL (ref 0.51–0.95)
CREAT SERPL-MCNC: 0.7 MG/DL (ref 0.51–0.95)
CREAT SERPL-MCNC: 0.71 MG/DL (ref 0.51–0.95)
CREAT SERPL-MCNC: 0.72 MG/DL (ref 0.51–0.95)
CREAT SERPL-MCNC: 0.73 MG/DL (ref 0.51–0.95)
CREAT SERPL-MCNC: 0.74 MG/DL (ref 0.51–0.95)
CREAT SERPL-MCNC: 0.75 MG/DL (ref 0.51–0.95)
CREAT SERPL-MCNC: 0.75 MG/DL (ref 0.51–0.95)
CREAT SERPL-MCNC: 0.76 MG/DL (ref 0.51–0.95)
CREAT SERPL-MCNC: 0.81 MG/DL (ref 0.51–0.95)
CREAT SERPL-MCNC: 0.82 MG/DL (ref 0.51–0.95)
CREAT SERPL-MCNC: 0.83 MG/DL (ref 0.51–0.95)
CREAT SERPL-MCNC: 0.83 MG/DL (ref 0.51–0.95)
CREAT SERPL-MCNC: 0.84 MG/DL (ref 0.51–0.95)
CREAT SERPL-MCNC: 0.85 MG/DL (ref 0.51–0.95)
CREAT SERPL-MCNC: 0.89 MG/DL (ref 0.51–0.95)
CREAT SERPL-MCNC: 0.89 MG/DL (ref 0.51–0.95)
CREAT SERPL-MCNC: 0.93 MG/DL (ref 0.51–0.95)
CREAT SERPL-MCNC: 0.94 MG/DL (ref 0.51–0.95)
CREAT SERPL-MCNC: 1.01 MG/DL (ref 0.51–0.95)
CREAT SERPL-MCNC: 1.02 MG/DL (ref 0.51–0.95)
CREAT SERPL-MCNC: 1.03 MG/DL (ref 0.51–0.95)
CREAT SERPL-MCNC: 1.04 MG/DL (ref 0.51–0.95)
CREAT SERPL-MCNC: 1.06 MG/DL (ref 0.51–0.95)
CREAT SERPL-MCNC: 1.08 MG/DL (ref 0.51–0.95)
CREAT SERPL-MCNC: 1.14 MG/DL (ref 0.51–0.95)
CREAT SERPL-MCNC: 1.15 MG/DL (ref 0.51–0.95)
CREAT SERPL-MCNC: 1.15 MG/DL (ref 0.51–0.95)
CREAT SERPL-MCNC: 1.16 MG/DL (ref 0.51–0.95)
CREAT SERPL-MCNC: 1.16 MG/DL (ref 0.51–0.95)
CREAT SERPL-MCNC: 1.17 MG/DL (ref 0.51–0.95)
CREAT SERPL-MCNC: 1.2 MG/DL (ref 0.51–0.95)
CREAT SERPL-MCNC: 1.21 MG/DL (ref 0.51–0.95)
CREAT SERPL-MCNC: 1.21 MG/DL (ref 0.51–0.95)
CREAT SERPL-MCNC: 1.25 MG/DL (ref 0.51–0.95)
CREAT SERPL-MCNC: 1.26 MG/DL (ref 0.51–0.95)
CREAT SERPL-MCNC: 1.29 MG/DL (ref 0.51–0.95)
CREAT SERPL-MCNC: 1.32 MG/DL (ref 0.51–0.95)
CREAT SERPL-MCNC: 1.34 MG/DL (ref 0.51–0.95)
CREAT SERPL-MCNC: 1.35 MG/DL (ref 0.51–0.95)
CREAT SERPL-MCNC: 1.35 MG/DL (ref 0.51–0.95)
CREAT SERPL-MCNC: 1.36 MG/DL (ref 0.51–0.95)
CREAT SERPL-MCNC: 1.36 MG/DL (ref 0.51–0.95)
CREAT SERPL-MCNC: 1.39 MG/DL (ref 0.51–0.95)
CREAT SERPL-MCNC: 1.4 MG/DL (ref 0.51–0.95)
CREAT SERPL-MCNC: 1.4 MG/DL (ref 0.51–0.95)
CREAT SERPL-MCNC: 1.42 MG/DL (ref 0.51–0.95)
CREAT SERPL-MCNC: 1.43 MG/DL (ref 0.51–0.95)
CREAT SERPL-MCNC: 1.46 MG/DL (ref 0.51–0.95)
CREAT SERPL-MCNC: 1.5 MG/DL (ref 0.51–0.95)
CREAT SERPL-MCNC: 1.51 MG/DL (ref 0.51–0.95)
CREAT SERPL-MCNC: 1.52 MG/DL (ref 0.51–0.95)
CREAT SERPL-MCNC: 1.53 MG/DL (ref 0.51–0.95)
CREAT SERPL-MCNC: 1.53 MG/DL (ref 0.51–0.95)
CREAT SERPL-MCNC: 1.54 MG/DL (ref 0.51–0.95)
CREAT SERPL-MCNC: 1.55 MG/DL (ref 0.51–0.95)
CREAT SERPL-MCNC: 1.56 MG/DL (ref 0.51–0.95)
CREAT SERPL-MCNC: 1.58 MG/DL (ref 0.51–0.95)
CREAT SERPL-MCNC: 1.65 MG/DL (ref 0.51–0.95)
CREAT SERPL-MCNC: 1.68 MG/DL (ref 0.51–0.95)
CREAT SERPL-MCNC: 1.7 MG/DL (ref 0.51–0.95)
CREAT SERPL-MCNC: 1.72 MG/DL (ref 0.51–0.95)
CREAT SERPL-MCNC: 1.74 MG/DL (ref 0.51–0.95)
CREAT SERPL-MCNC: 1.77 MG/DL (ref 0.51–0.95)
CREAT SERPL-MCNC: 1.9 MG/DL (ref 0.51–0.95)
CREAT SERPL-MCNC: 1.92 MG/DL (ref 0.51–0.95)
CREAT SERPL-MCNC: 1.92 MG/DL (ref 0.51–0.95)
CREAT SERPL-MCNC: 1.98 MG/DL (ref 0.51–0.95)
CREAT SERPL-MCNC: 2.08 MG/DL (ref 0.51–0.95)
CREAT SERPL-MCNC: 2.08 MG/DL (ref 0.51–0.95)
CREAT SERPL-MCNC: 2.13 MG/DL (ref 0.51–0.95)
CREAT SERPL-MCNC: 2.15 MG/DL (ref 0.51–0.95)
CREAT UR-MCNC: 12.7 MG/DL
CREAT UR-MCNC: 41.6 MG/DL
CROSSMATCH: NORMAL
CRP SERPL-MCNC: 196.67 MG/L
CRP SERPL-MCNC: 33.7 MG/L
CRP SERPL-MCNC: 548.77 MG/L
CRP SERPL-MCNC: 85.14 MG/L
CRP SERPL-MCNC: 99.1 MG/L
CTX-M: NORMAL
CYSTATIN C (ROCHE): 2.7 MG/L (ref 0.6–1)
CYSTATIN C (ROCHE): 3.2 MG/L (ref 0.6–1)
CYSTATIN C (ROCHE): 3.5 MG/L (ref 0.6–1)
CYSTATIN C (ROCHE): 3.7 MG/L (ref 0.6–1)
CYSTATIN C (ROCHE): 4.1 MG/L (ref 0.6–1)
DACRYOCYTES BLD QL SMEAR: SLIGHT
DEPRECATED HCO3 PLAS-SCNC: 13 MMOL/L (ref 22–29)
DEPRECATED HCO3 PLAS-SCNC: 15 MMOL/L (ref 22–29)
DEPRECATED HCO3 PLAS-SCNC: 16 MMOL/L (ref 22–29)
DEPRECATED HCO3 PLAS-SCNC: 18 MMOL/L (ref 22–29)
DEPRECATED HCO3 PLAS-SCNC: 19 MMOL/L (ref 22–29)
DEPRECATED HCO3 PLAS-SCNC: 20 MMOL/L (ref 22–29)
DEPRECATED HCO3 PLAS-SCNC: 21 MMOL/L (ref 22–29)
DEPRECATED HCO3 PLAS-SCNC: 22 MMOL/L (ref 22–29)
DEPRECATED HCO3 PLAS-SCNC: 23 MMOL/L (ref 22–29)
DEPRECATED HCO3 PLAS-SCNC: 24 MMOL/L (ref 22–29)
DEPRECATED HCO3 PLAS-SCNC: 24 MMOL/L (ref 22–29)
DEPRECATED HCO3 PLAS-SCNC: 25 MMOL/L (ref 22–29)
DEPRECATED HCO3 PLAS-SCNC: 26 MMOL/L (ref 22–29)
DEPRECATED HCO3 PLAS-SCNC: 27 MMOL/L (ref 22–29)
DEPRECATED HCO3 PLAS-SCNC: 27 MMOL/L (ref 22–29)
DEPRECATED HCO3 PLAS-SCNC: 28 MMOL/L (ref 22–29)
DEPRECATED HCO3 PLAS-SCNC: 29 MMOL/L (ref 22–29)
DEPRECATED HCO3 PLAS-SCNC: 30 MMOL/L (ref 22–29)
DEPRECATED HCO3 PLAS-SCNC: 31 MMOL/L (ref 22–29)
DEPRECATED HCO3 PLAS-SCNC: 32 MMOL/L (ref 22–29)
DEPRECATED HCO3 PLAS-SCNC: 33 MMOL/L (ref 22–29)
DEPRECATED HCO3 PLAS-SCNC: 34 MMOL/L (ref 22–29)
DEPRECATED HCO3 PLAS-SCNC: 35 MMOL/L (ref 22–29)
DEPRECATED HCO3 PLAS-SCNC: 35 MMOL/L (ref 22–29)
DEPRECATED HCO3 PLAS-SCNC: 36 MMOL/L (ref 22–29)
DEPRECATED HCO3 PLAS-SCNC: 38 MMOL/L (ref 22–29)
DEPRECATED HCO3 PLAS-SCNC: 39 MMOL/L (ref 22–29)
DEPRECATED HCO3 PLAS-SCNC: 41 MMOL/L (ref 22–29)
DEPRECATED M TB RPOB XXX QL NAA+PROBE: NORMAL
DIASTOLIC BLOOD PRESSURE - MUSE: NORMAL MMHG
EGFRCR SERPLBLD CKD-EPI 2021: 23 ML/MIN/1.73M2
EGFRCR SERPLBLD CKD-EPI 2021: 23 ML/MIN/1.73M2
EGFRCR SERPLBLD CKD-EPI 2021: 24 ML/MIN/1.73M2
EGFRCR SERPLBLD CKD-EPI 2021: 24 ML/MIN/1.73M2
EGFRCR SERPLBLD CKD-EPI 2021: 25 ML/MIN/1.73M2
EGFRCR SERPLBLD CKD-EPI 2021: 26 ML/MIN/1.73M2
EGFRCR SERPLBLD CKD-EPI 2021: 26 ML/MIN/1.73M2
EGFRCR SERPLBLD CKD-EPI 2021: 27 ML/MIN/1.73M2
EGFRCR SERPLBLD CKD-EPI 2021: 29 ML/MIN/1.73M2
EGFRCR SERPLBLD CKD-EPI 2021: 30 ML/MIN/1.73M2
EGFRCR SERPLBLD CKD-EPI 2021: 31 ML/MIN/1.73M2
EGFRCR SERPLBLD CKD-EPI 2021: 31 ML/MIN/1.73M2
EGFRCR SERPLBLD CKD-EPI 2021: 33 ML/MIN/1.73M2
EGFRCR SERPLBLD CKD-EPI 2021: 34 ML/MIN/1.73M2
EGFRCR SERPLBLD CKD-EPI 2021: 35 ML/MIN/1.73M2
EGFRCR SERPLBLD CKD-EPI 2021: 36 ML/MIN/1.73M2
EGFRCR SERPLBLD CKD-EPI 2021: 37 ML/MIN/1.73M2
EGFRCR SERPLBLD CKD-EPI 2021: 38 ML/MIN/1.73M2
EGFRCR SERPLBLD CKD-EPI 2021: 39 ML/MIN/1.73M2
EGFRCR SERPLBLD CKD-EPI 2021: 40 ML/MIN/1.73M2
EGFRCR SERPLBLD CKD-EPI 2021: 41 ML/MIN/1.73M2
EGFRCR SERPLBLD CKD-EPI 2021: 42 ML/MIN/1.73M2
EGFRCR SERPLBLD CKD-EPI 2021: 43 ML/MIN/1.73M2
EGFRCR SERPLBLD CKD-EPI 2021: 44 ML/MIN/1.73M2
EGFRCR SERPLBLD CKD-EPI 2021: 46 ML/MIN/1.73M2
EGFRCR SERPLBLD CKD-EPI 2021: 48 ML/MIN/1.73M2
EGFRCR SERPLBLD CKD-EPI 2021: 49 ML/MIN/1.73M2
EGFRCR SERPLBLD CKD-EPI 2021: 52 ML/MIN/1.73M2
EGFRCR SERPLBLD CKD-EPI 2021: 54 ML/MIN/1.73M2
EGFRCR SERPLBLD CKD-EPI 2021: 55 ML/MIN/1.73M2
EGFRCR SERPLBLD CKD-EPI 2021: 55 ML/MIN/1.73M2
EGFRCR SERPLBLD CKD-EPI 2021: 56 ML/MIN/1.73M2
EGFRCR SERPLBLD CKD-EPI 2021: 57 ML/MIN/1.73M2
EGFRCR SERPLBLD CKD-EPI 2021: 62 ML/MIN/1.73M2
EGFRCR SERPLBLD CKD-EPI 2021: 63 ML/MIN/1.73M2
EGFRCR SERPLBLD CKD-EPI 2021: 66 ML/MIN/1.73M2
EGFRCR SERPLBLD CKD-EPI 2021: 66 ML/MIN/1.73M2
EGFRCR SERPLBLD CKD-EPI 2021: 70 ML/MIN/1.73M2
EGFRCR SERPLBLD CKD-EPI 2021: 71 ML/MIN/1.73M2
EGFRCR SERPLBLD CKD-EPI 2021: 72 ML/MIN/1.73M2
EGFRCR SERPLBLD CKD-EPI 2021: 72 ML/MIN/1.73M2
EGFRCR SERPLBLD CKD-EPI 2021: 73 ML/MIN/1.73M2
EGFRCR SERPLBLD CKD-EPI 2021: 74 ML/MIN/1.73M2
EGFRCR SERPLBLD CKD-EPI 2021: 80 ML/MIN/1.73M2
EGFRCR SERPLBLD CKD-EPI 2021: 81 ML/MIN/1.73M2
EGFRCR SERPLBLD CKD-EPI 2021: 81 ML/MIN/1.73M2
EGFRCR SERPLBLD CKD-EPI 2021: 82 ML/MIN/1.73M2
EGFRCR SERPLBLD CKD-EPI 2021: 84 ML/MIN/1.73M2
EGFRCR SERPLBLD CKD-EPI 2021: 85 ML/MIN/1.73M2
EGFRCR SERPLBLD CKD-EPI 2021: 87 ML/MIN/1.73M2
EGFRCR SERPLBLD CKD-EPI 2021: 88 ML/MIN/1.73M2
EGFRCR SERPLBLD CKD-EPI 2021: 89 ML/MIN/1.73M2
EGFRCR SERPLBLD CKD-EPI 2021: 90 ML/MIN/1.73M2
EGFRCR SERPLBLD CKD-EPI 2021: >90 ML/MIN/1.73M2
ELLIPTOCYTES BLD QL SMEAR: SLIGHT
ELLIPTOCYTES BLD QL SMEAR: SLIGHT
ENA SS-A AB SER IA-ACNC: <0.5 U/ML
ENA SS-A AB SER IA-ACNC: NEGATIVE
ENA SS-B IGG SER IA-ACNC: <0.6 U/ML
ENA SS-B IGG SER IA-ACNC: NEGATIVE
ENTEROBACTER SPECIES: NOT DETECTED
ENTEROCOCCUS FAECALIS: NOT DETECTED
ENTEROCOCCUS FAECIUM: NOT DETECTED
EOSINOPHIL # BLD AUTO: 0 10E3/UL (ref 0–0.7)
EOSINOPHIL # BLD AUTO: 0.1 10E3/UL (ref 0–0.7)
EOSINOPHIL # BLD AUTO: 0.1 10E3/UL (ref 0–0.7)
EOSINOPHIL # BLD AUTO: ABNORMAL 10*3/UL
EOSINOPHIL # BLD AUTO: ABNORMAL 10*3/UL
EOSINOPHIL # BLD MANUAL: 0 10E3/UL (ref 0–0.7)
EOSINOPHIL # BLD MANUAL: 0 10E3/UL (ref 0–0.7)
EOSINOPHIL NFR BLD AUTO: 0 %
EOSINOPHIL NFR BLD AUTO: 1 %
EOSINOPHIL NFR BLD AUTO: ABNORMAL %
EOSINOPHIL NFR BLD AUTO: ABNORMAL %
EOSINOPHIL NFR BLD MANUAL: 0 %
EOSINOPHIL NFR BLD MANUAL: 1 %
ERYTHROCYTE [DISTWIDTH] IN BLOOD BY AUTOMATED COUNT: 16.3 % (ref 10–15)
ERYTHROCYTE [DISTWIDTH] IN BLOOD BY AUTOMATED COUNT: 16.7 % (ref 10–15)
ERYTHROCYTE [DISTWIDTH] IN BLOOD BY AUTOMATED COUNT: 16.7 % (ref 10–15)
ERYTHROCYTE [DISTWIDTH] IN BLOOD BY AUTOMATED COUNT: 16.8 % (ref 10–15)
ERYTHROCYTE [DISTWIDTH] IN BLOOD BY AUTOMATED COUNT: 16.9 % (ref 10–15)
ERYTHROCYTE [DISTWIDTH] IN BLOOD BY AUTOMATED COUNT: 17 % (ref 10–15)
ERYTHROCYTE [DISTWIDTH] IN BLOOD BY AUTOMATED COUNT: 17.2 % (ref 10–15)
ERYTHROCYTE [DISTWIDTH] IN BLOOD BY AUTOMATED COUNT: 17.5 % (ref 10–15)
ERYTHROCYTE [DISTWIDTH] IN BLOOD BY AUTOMATED COUNT: 18.1 % (ref 10–15)
ERYTHROCYTE [DISTWIDTH] IN BLOOD BY AUTOMATED COUNT: 18.3 % (ref 10–15)
ERYTHROCYTE [DISTWIDTH] IN BLOOD BY AUTOMATED COUNT: 18.3 % (ref 10–15)
ERYTHROCYTE [DISTWIDTH] IN BLOOD BY AUTOMATED COUNT: 18.4 % (ref 10–15)
ERYTHROCYTE [DISTWIDTH] IN BLOOD BY AUTOMATED COUNT: 18.8 % (ref 10–15)
ERYTHROCYTE [DISTWIDTH] IN BLOOD BY AUTOMATED COUNT: 19.4 % (ref 10–15)
ERYTHROCYTE [DISTWIDTH] IN BLOOD BY AUTOMATED COUNT: 19.6 % (ref 10–15)
ERYTHROCYTE [DISTWIDTH] IN BLOOD BY AUTOMATED COUNT: 19.8 % (ref 10–15)
ERYTHROCYTE [DISTWIDTH] IN BLOOD BY AUTOMATED COUNT: 20.1 % (ref 10–15)
ERYTHROCYTE [DISTWIDTH] IN BLOOD BY AUTOMATED COUNT: 20.4 % (ref 10–15)
ERYTHROCYTE [DISTWIDTH] IN BLOOD BY AUTOMATED COUNT: 20.5 % (ref 10–15)
ERYTHROCYTE [DISTWIDTH] IN BLOOD BY AUTOMATED COUNT: 20.7 % (ref 10–15)
ERYTHROCYTE [DISTWIDTH] IN BLOOD BY AUTOMATED COUNT: 20.7 % (ref 10–15)
ERYTHROCYTE [DISTWIDTH] IN BLOOD BY AUTOMATED COUNT: 20.8 % (ref 10–15)
ERYTHROCYTE [DISTWIDTH] IN BLOOD BY AUTOMATED COUNT: 20.9 % (ref 10–15)
ERYTHROCYTE [DISTWIDTH] IN BLOOD BY AUTOMATED COUNT: 21 % (ref 10–15)
ERYTHROCYTE [DISTWIDTH] IN BLOOD BY AUTOMATED COUNT: 21.1 % (ref 10–15)
ERYTHROCYTE [DISTWIDTH] IN BLOOD BY AUTOMATED COUNT: 21.2 % (ref 10–15)
ERYTHROCYTE [DISTWIDTH] IN BLOOD BY AUTOMATED COUNT: 21.3 % (ref 10–15)
ERYTHROCYTE [DISTWIDTH] IN BLOOD BY AUTOMATED COUNT: 21.5 % (ref 10–15)
ERYTHROCYTE [DISTWIDTH] IN BLOOD BY AUTOMATED COUNT: 21.6 % (ref 10–15)
ERYTHROCYTE [DISTWIDTH] IN BLOOD BY AUTOMATED COUNT: 21.6 % (ref 10–15)
ERYTHROCYTE [DISTWIDTH] IN BLOOD BY AUTOMATED COUNT: 21.8 % (ref 10–15)
ERYTHROCYTE [DISTWIDTH] IN BLOOD BY AUTOMATED COUNT: 21.9 % (ref 10–15)
ERYTHROCYTE [DISTWIDTH] IN BLOOD BY AUTOMATED COUNT: 22 % (ref 10–15)
ERYTHROCYTE [DISTWIDTH] IN BLOOD BY AUTOMATED COUNT: 22.1 % (ref 10–15)
ERYTHROCYTE [DISTWIDTH] IN BLOOD BY AUTOMATED COUNT: 22.4 % (ref 10–15)
ERYTHROCYTE [DISTWIDTH] IN BLOOD BY AUTOMATED COUNT: 22.4 % (ref 10–15)
ERYTHROCYTE [DISTWIDTH] IN BLOOD BY AUTOMATED COUNT: 22.5 % (ref 10–15)
ERYTHROCYTE [DISTWIDTH] IN BLOOD BY AUTOMATED COUNT: 22.8 % (ref 10–15)
ERYTHROCYTE [DISTWIDTH] IN BLOOD BY AUTOMATED COUNT: 22.9 % (ref 10–15)
ERYTHROCYTE [DISTWIDTH] IN BLOOD BY AUTOMATED COUNT: 23 % (ref 10–15)
ERYTHROCYTE [DISTWIDTH] IN BLOOD BY AUTOMATED COUNT: 23.2 % (ref 10–15)
ERYTHROCYTE [DISTWIDTH] IN BLOOD BY AUTOMATED COUNT: 23.3 % (ref 10–15)
ERYTHROCYTE [DISTWIDTH] IN BLOOD BY AUTOMATED COUNT: 23.6 % (ref 10–15)
ERYTHROCYTE [DISTWIDTH] IN BLOOD BY AUTOMATED COUNT: 23.9 % (ref 10–15)
ERYTHROCYTE [DISTWIDTH] IN BLOOD BY AUTOMATED COUNT: 24 % (ref 10–15)
ERYTHROCYTE [DISTWIDTH] IN BLOOD BY AUTOMATED COUNT: 24.4 % (ref 10–15)
ERYTHROCYTE [DISTWIDTH] IN BLOOD BY AUTOMATED COUNT: 24.8 % (ref 10–15)
ERYTHROCYTE [DISTWIDTH] IN BLOOD BY AUTOMATED COUNT: 25.1 % (ref 10–15)
ERYTHROCYTE [SEDIMENTATION RATE] IN BLOOD BY WESTERGREN METHOD: 89 MM/HR (ref 0–30)
ESCHERICHIA COLI: NOT DETECTED
FLUAV H1 2009 PAND RNA SPEC QL NAA+PROBE: NOT DETECTED
FLUAV H1 RNA SPEC QL NAA+PROBE: NOT DETECTED
FLUAV H3 RNA SPEC QL NAA+PROBE: NOT DETECTED
FLUAV RNA SPEC QL NAA+PROBE: NEGATIVE
FLUAV RNA SPEC QL NAA+PROBE: NOT DETECTED
FLUBV RNA RESP QL NAA+PROBE: NEGATIVE
FLUBV RNA SPEC QL NAA+PROBE: NOT DETECTED
G6PD RBC-CCNT: 25.1 U/G HB
GALACTOMANNAN AG BAL QL: NEGATIVE
GALACTOMANNAN AG SERPL QL IA: NEGATIVE
GALACTOMANNAN AG SERPL QL IA: NEGATIVE
GALACTOMANNAN AG SPEC IA-ACNC: 0.06
GALACTOMANNAN AG SPEC IA-ACNC: 0.08
GALACTOMANNAN AG SPEC IA-ACNC: 0.46
GAMMA INTERFERON BACKGROUND BLD IA-ACNC: 0.01 IU/ML
GFR SERPL CREATININE-BSD FRML MDRD: 10 ML/MIN/1.73M2
GFR SERPL CREATININE-BSD FRML MDRD: 12 ML/MIN/1.73M2
GFR SERPL CREATININE-BSD FRML MDRD: 13 ML/MIN/1.73M2
GFR SERPL CREATININE-BSD FRML MDRD: 14 ML/MIN/1.73M2
GFR SERPL CREATININE-BSD FRML MDRD: 18 ML/MIN/1.73M2
GLUCOSE BLD-MCNC: 139 MG/DL (ref 70–125)
GLUCOSE BLDC GLUCOMTR-MCNC: 100 MG/DL (ref 70–99)
GLUCOSE BLDC GLUCOMTR-MCNC: 101 MG/DL (ref 70–99)
GLUCOSE BLDC GLUCOMTR-MCNC: 101 MG/DL (ref 70–99)
GLUCOSE BLDC GLUCOMTR-MCNC: 102 MG/DL (ref 70–99)
GLUCOSE BLDC GLUCOMTR-MCNC: 103 MG/DL (ref 70–99)
GLUCOSE BLDC GLUCOMTR-MCNC: 104 MG/DL (ref 70–99)
GLUCOSE BLDC GLUCOMTR-MCNC: 105 MG/DL (ref 70–99)
GLUCOSE BLDC GLUCOMTR-MCNC: 106 MG/DL (ref 70–99)
GLUCOSE BLDC GLUCOMTR-MCNC: 107 MG/DL (ref 70–99)
GLUCOSE BLDC GLUCOMTR-MCNC: 108 MG/DL (ref 70–99)
GLUCOSE BLDC GLUCOMTR-MCNC: 108 MG/DL (ref 70–99)
GLUCOSE BLDC GLUCOMTR-MCNC: 109 MG/DL (ref 70–99)
GLUCOSE BLDC GLUCOMTR-MCNC: 110 MG/DL (ref 70–99)
GLUCOSE BLDC GLUCOMTR-MCNC: 111 MG/DL (ref 70–99)
GLUCOSE BLDC GLUCOMTR-MCNC: 112 MG/DL (ref 70–99)
GLUCOSE BLDC GLUCOMTR-MCNC: 114 MG/DL (ref 70–99)
GLUCOSE BLDC GLUCOMTR-MCNC: 115 MG/DL (ref 70–99)
GLUCOSE BLDC GLUCOMTR-MCNC: 116 MG/DL (ref 70–99)
GLUCOSE BLDC GLUCOMTR-MCNC: 117 MG/DL (ref 70–99)
GLUCOSE BLDC GLUCOMTR-MCNC: 118 MG/DL (ref 70–99)
GLUCOSE BLDC GLUCOMTR-MCNC: 119 MG/DL (ref 70–99)
GLUCOSE BLDC GLUCOMTR-MCNC: 120 MG/DL (ref 70–99)
GLUCOSE BLDC GLUCOMTR-MCNC: 121 MG/DL (ref 70–99)
GLUCOSE BLDC GLUCOMTR-MCNC: 122 MG/DL (ref 70–99)
GLUCOSE BLDC GLUCOMTR-MCNC: 123 MG/DL (ref 70–99)
GLUCOSE BLDC GLUCOMTR-MCNC: 124 MG/DL (ref 70–99)
GLUCOSE BLDC GLUCOMTR-MCNC: 125 MG/DL (ref 70–99)
GLUCOSE BLDC GLUCOMTR-MCNC: 126 MG/DL (ref 70–99)
GLUCOSE BLDC GLUCOMTR-MCNC: 127 MG/DL (ref 70–99)
GLUCOSE BLDC GLUCOMTR-MCNC: 128 MG/DL (ref 70–99)
GLUCOSE BLDC GLUCOMTR-MCNC: 129 MG/DL (ref 70–99)
GLUCOSE BLDC GLUCOMTR-MCNC: 129 MG/DL (ref 70–99)
GLUCOSE BLDC GLUCOMTR-MCNC: 130 MG/DL (ref 70–99)
GLUCOSE BLDC GLUCOMTR-MCNC: 131 MG/DL (ref 70–99)
GLUCOSE BLDC GLUCOMTR-MCNC: 132 MG/DL (ref 70–99)
GLUCOSE BLDC GLUCOMTR-MCNC: 133 MG/DL (ref 70–99)
GLUCOSE BLDC GLUCOMTR-MCNC: 134 MG/DL (ref 70–99)
GLUCOSE BLDC GLUCOMTR-MCNC: 135 MG/DL (ref 70–99)
GLUCOSE BLDC GLUCOMTR-MCNC: 136 MG/DL (ref 70–99)
GLUCOSE BLDC GLUCOMTR-MCNC: 137 MG/DL (ref 70–99)
GLUCOSE BLDC GLUCOMTR-MCNC: 138 MG/DL (ref 70–99)
GLUCOSE BLDC GLUCOMTR-MCNC: 139 MG/DL (ref 70–99)
GLUCOSE BLDC GLUCOMTR-MCNC: 140 MG/DL (ref 70–99)
GLUCOSE BLDC GLUCOMTR-MCNC: 141 MG/DL (ref 70–99)
GLUCOSE BLDC GLUCOMTR-MCNC: 141 MG/DL (ref 70–99)
GLUCOSE BLDC GLUCOMTR-MCNC: 142 MG/DL (ref 70–99)
GLUCOSE BLDC GLUCOMTR-MCNC: 142 MG/DL (ref 70–99)
GLUCOSE BLDC GLUCOMTR-MCNC: 143 MG/DL (ref 70–99)
GLUCOSE BLDC GLUCOMTR-MCNC: 144 MG/DL (ref 70–99)
GLUCOSE BLDC GLUCOMTR-MCNC: 145 MG/DL (ref 70–99)
GLUCOSE BLDC GLUCOMTR-MCNC: 146 MG/DL (ref 70–99)
GLUCOSE BLDC GLUCOMTR-MCNC: 147 MG/DL (ref 70–99)
GLUCOSE BLDC GLUCOMTR-MCNC: 148 MG/DL (ref 70–99)
GLUCOSE BLDC GLUCOMTR-MCNC: 149 MG/DL (ref 70–99)
GLUCOSE BLDC GLUCOMTR-MCNC: 150 MG/DL (ref 70–99)
GLUCOSE BLDC GLUCOMTR-MCNC: 151 MG/DL (ref 70–99)
GLUCOSE BLDC GLUCOMTR-MCNC: 152 MG/DL (ref 70–99)
GLUCOSE BLDC GLUCOMTR-MCNC: 153 MG/DL (ref 70–99)
GLUCOSE BLDC GLUCOMTR-MCNC: 153 MG/DL (ref 70–99)
GLUCOSE BLDC GLUCOMTR-MCNC: 154 MG/DL (ref 70–99)
GLUCOSE BLDC GLUCOMTR-MCNC: 154 MG/DL (ref 70–99)
GLUCOSE BLDC GLUCOMTR-MCNC: 155 MG/DL (ref 70–99)
GLUCOSE BLDC GLUCOMTR-MCNC: 155 MG/DL (ref 70–99)
GLUCOSE BLDC GLUCOMTR-MCNC: 156 MG/DL (ref 70–99)
GLUCOSE BLDC GLUCOMTR-MCNC: 157 MG/DL (ref 70–99)
GLUCOSE BLDC GLUCOMTR-MCNC: 158 MG/DL (ref 70–99)
GLUCOSE BLDC GLUCOMTR-MCNC: 159 MG/DL (ref 70–99)
GLUCOSE BLDC GLUCOMTR-MCNC: 160 MG/DL (ref 70–99)
GLUCOSE BLDC GLUCOMTR-MCNC: 161 MG/DL (ref 70–99)
GLUCOSE BLDC GLUCOMTR-MCNC: 162 MG/DL (ref 70–99)
GLUCOSE BLDC GLUCOMTR-MCNC: 163 MG/DL (ref 70–99)
GLUCOSE BLDC GLUCOMTR-MCNC: 164 MG/DL (ref 70–99)
GLUCOSE BLDC GLUCOMTR-MCNC: 165 MG/DL (ref 70–99)
GLUCOSE BLDC GLUCOMTR-MCNC: 166 MG/DL (ref 70–99)
GLUCOSE BLDC GLUCOMTR-MCNC: 167 MG/DL (ref 70–99)
GLUCOSE BLDC GLUCOMTR-MCNC: 168 MG/DL (ref 70–99)
GLUCOSE BLDC GLUCOMTR-MCNC: 169 MG/DL (ref 70–99)
GLUCOSE BLDC GLUCOMTR-MCNC: 170 MG/DL (ref 70–99)
GLUCOSE BLDC GLUCOMTR-MCNC: 171 MG/DL (ref 70–99)
GLUCOSE BLDC GLUCOMTR-MCNC: 172 MG/DL (ref 70–99)
GLUCOSE BLDC GLUCOMTR-MCNC: 173 MG/DL (ref 70–99)
GLUCOSE BLDC GLUCOMTR-MCNC: 173 MG/DL (ref 70–99)
GLUCOSE BLDC GLUCOMTR-MCNC: 174 MG/DL (ref 70–99)
GLUCOSE BLDC GLUCOMTR-MCNC: 175 MG/DL (ref 70–99)
GLUCOSE BLDC GLUCOMTR-MCNC: 176 MG/DL (ref 70–99)
GLUCOSE BLDC GLUCOMTR-MCNC: 176 MG/DL (ref 70–99)
GLUCOSE BLDC GLUCOMTR-MCNC: 177 MG/DL (ref 70–99)
GLUCOSE BLDC GLUCOMTR-MCNC: 178 MG/DL (ref 70–99)
GLUCOSE BLDC GLUCOMTR-MCNC: 180 MG/DL (ref 70–99)
GLUCOSE BLDC GLUCOMTR-MCNC: 182 MG/DL (ref 70–99)
GLUCOSE BLDC GLUCOMTR-MCNC: 183 MG/DL (ref 70–99)
GLUCOSE BLDC GLUCOMTR-MCNC: 184 MG/DL (ref 70–99)
GLUCOSE BLDC GLUCOMTR-MCNC: 185 MG/DL (ref 70–99)
GLUCOSE BLDC GLUCOMTR-MCNC: 186 MG/DL (ref 70–99)
GLUCOSE BLDC GLUCOMTR-MCNC: 187 MG/DL (ref 70–99)
GLUCOSE BLDC GLUCOMTR-MCNC: 188 MG/DL (ref 70–99)
GLUCOSE BLDC GLUCOMTR-MCNC: 189 MG/DL (ref 70–99)
GLUCOSE BLDC GLUCOMTR-MCNC: 190 MG/DL (ref 70–99)
GLUCOSE BLDC GLUCOMTR-MCNC: 191 MG/DL (ref 70–99)
GLUCOSE BLDC GLUCOMTR-MCNC: 191 MG/DL (ref 70–99)
GLUCOSE BLDC GLUCOMTR-MCNC: 192 MG/DL (ref 70–99)
GLUCOSE BLDC GLUCOMTR-MCNC: 193 MG/DL (ref 70–99)
GLUCOSE BLDC GLUCOMTR-MCNC: 193 MG/DL (ref 70–99)
GLUCOSE BLDC GLUCOMTR-MCNC: 194 MG/DL (ref 70–99)
GLUCOSE BLDC GLUCOMTR-MCNC: 195 MG/DL (ref 70–99)
GLUCOSE BLDC GLUCOMTR-MCNC: 199 MG/DL (ref 70–99)
GLUCOSE BLDC GLUCOMTR-MCNC: 200 MG/DL (ref 70–99)
GLUCOSE BLDC GLUCOMTR-MCNC: 201 MG/DL (ref 70–99)
GLUCOSE BLDC GLUCOMTR-MCNC: 202 MG/DL (ref 70–99)
GLUCOSE BLDC GLUCOMTR-MCNC: 203 MG/DL (ref 70–99)
GLUCOSE BLDC GLUCOMTR-MCNC: 209 MG/DL (ref 70–99)
GLUCOSE BLDC GLUCOMTR-MCNC: 210 MG/DL (ref 70–99)
GLUCOSE BLDC GLUCOMTR-MCNC: 212 MG/DL (ref 70–99)
GLUCOSE BLDC GLUCOMTR-MCNC: 221 MG/DL (ref 70–99)
GLUCOSE BLDC GLUCOMTR-MCNC: 223 MG/DL (ref 70–99)
GLUCOSE BLDC GLUCOMTR-MCNC: 229 MG/DL (ref 70–99)
GLUCOSE BLDC GLUCOMTR-MCNC: 230 MG/DL (ref 70–99)
GLUCOSE BLDC GLUCOMTR-MCNC: 42 MG/DL (ref 70–99)
GLUCOSE BLDC GLUCOMTR-MCNC: 55 MG/DL (ref 70–99)
GLUCOSE BLDC GLUCOMTR-MCNC: 59 MG/DL (ref 70–99)
GLUCOSE BLDC GLUCOMTR-MCNC: 61 MG/DL (ref 70–99)
GLUCOSE BLDC GLUCOMTR-MCNC: 62 MG/DL (ref 70–99)
GLUCOSE BLDC GLUCOMTR-MCNC: 66 MG/DL (ref 70–99)
GLUCOSE BLDC GLUCOMTR-MCNC: 66 MG/DL (ref 70–99)
GLUCOSE BLDC GLUCOMTR-MCNC: 68 MG/DL (ref 70–99)
GLUCOSE BLDC GLUCOMTR-MCNC: 70 MG/DL (ref 70–99)
GLUCOSE BLDC GLUCOMTR-MCNC: 72 MG/DL (ref 70–99)
GLUCOSE BLDC GLUCOMTR-MCNC: 73 MG/DL (ref 70–99)
GLUCOSE BLDC GLUCOMTR-MCNC: 77 MG/DL (ref 70–99)
GLUCOSE BLDC GLUCOMTR-MCNC: 78 MG/DL (ref 70–99)
GLUCOSE BLDC GLUCOMTR-MCNC: 80 MG/DL (ref 70–99)
GLUCOSE BLDC GLUCOMTR-MCNC: 81 MG/DL (ref 70–99)
GLUCOSE BLDC GLUCOMTR-MCNC: 81 MG/DL (ref 70–99)
GLUCOSE BLDC GLUCOMTR-MCNC: 85 MG/DL (ref 70–99)
GLUCOSE BLDC GLUCOMTR-MCNC: 86 MG/DL (ref 70–99)
GLUCOSE BLDC GLUCOMTR-MCNC: 88 MG/DL (ref 70–99)
GLUCOSE BLDC GLUCOMTR-MCNC: 91 MG/DL (ref 70–99)
GLUCOSE BLDC GLUCOMTR-MCNC: 97 MG/DL (ref 70–99)
GLUCOSE BLDC GLUCOMTR-MCNC: 98 MG/DL (ref 70–99)
GLUCOSE BLDC GLUCOMTR-MCNC: 98 MG/DL (ref 70–99)
GLUCOSE BLDC GLUCOMTR-MCNC: 99 MG/DL (ref 70–99)
GLUCOSE SERPL-MCNC: 104 MG/DL (ref 70–99)
GLUCOSE SERPL-MCNC: 105 MG/DL (ref 70–99)
GLUCOSE SERPL-MCNC: 106 MG/DL (ref 70–99)
GLUCOSE SERPL-MCNC: 107 MG/DL (ref 70–99)
GLUCOSE SERPL-MCNC: 107 MG/DL (ref 70–99)
GLUCOSE SERPL-MCNC: 108 MG/DL (ref 70–99)
GLUCOSE SERPL-MCNC: 110 MG/DL (ref 70–99)
GLUCOSE SERPL-MCNC: 110 MG/DL (ref 70–99)
GLUCOSE SERPL-MCNC: 112 MG/DL (ref 70–99)
GLUCOSE SERPL-MCNC: 113 MG/DL (ref 70–99)
GLUCOSE SERPL-MCNC: 113 MG/DL (ref 70–99)
GLUCOSE SERPL-MCNC: 114 MG/DL (ref 70–99)
GLUCOSE SERPL-MCNC: 115 MG/DL (ref 70–99)
GLUCOSE SERPL-MCNC: 115 MG/DL (ref 70–99)
GLUCOSE SERPL-MCNC: 116 MG/DL (ref 70–99)
GLUCOSE SERPL-MCNC: 116 MG/DL (ref 70–99)
GLUCOSE SERPL-MCNC: 117 MG/DL (ref 70–99)
GLUCOSE SERPL-MCNC: 118 MG/DL (ref 70–99)
GLUCOSE SERPL-MCNC: 118 MG/DL (ref 70–99)
GLUCOSE SERPL-MCNC: 119 MG/DL (ref 70–99)
GLUCOSE SERPL-MCNC: 121 MG/DL (ref 70–99)
GLUCOSE SERPL-MCNC: 122 MG/DL (ref 70–99)
GLUCOSE SERPL-MCNC: 123 MG/DL (ref 70–99)
GLUCOSE SERPL-MCNC: 124 MG/DL (ref 70–99)
GLUCOSE SERPL-MCNC: 124 MG/DL (ref 70–99)
GLUCOSE SERPL-MCNC: 125 MG/DL (ref 70–99)
GLUCOSE SERPL-MCNC: 126 MG/DL (ref 70–99)
GLUCOSE SERPL-MCNC: 127 MG/DL (ref 70–99)
GLUCOSE SERPL-MCNC: 128 MG/DL (ref 70–99)
GLUCOSE SERPL-MCNC: 128 MG/DL (ref 70–99)
GLUCOSE SERPL-MCNC: 129 MG/DL (ref 70–99)
GLUCOSE SERPL-MCNC: 130 MG/DL (ref 70–99)
GLUCOSE SERPL-MCNC: 131 MG/DL (ref 70–99)
GLUCOSE SERPL-MCNC: 131 MG/DL (ref 70–99)
GLUCOSE SERPL-MCNC: 132 MG/DL (ref 70–99)
GLUCOSE SERPL-MCNC: 133 MG/DL (ref 70–99)
GLUCOSE SERPL-MCNC: 134 MG/DL (ref 70–99)
GLUCOSE SERPL-MCNC: 135 MG/DL (ref 70–99)
GLUCOSE SERPL-MCNC: 136 MG/DL (ref 70–99)
GLUCOSE SERPL-MCNC: 137 MG/DL (ref 70–99)
GLUCOSE SERPL-MCNC: 138 MG/DL (ref 70–99)
GLUCOSE SERPL-MCNC: 139 MG/DL (ref 70–99)
GLUCOSE SERPL-MCNC: 140 MG/DL (ref 70–99)
GLUCOSE SERPL-MCNC: 141 MG/DL (ref 70–99)
GLUCOSE SERPL-MCNC: 144 MG/DL (ref 70–99)
GLUCOSE SERPL-MCNC: 146 MG/DL (ref 70–99)
GLUCOSE SERPL-MCNC: 146 MG/DL (ref 70–99)
GLUCOSE SERPL-MCNC: 147 MG/DL (ref 70–99)
GLUCOSE SERPL-MCNC: 148 MG/DL (ref 70–99)
GLUCOSE SERPL-MCNC: 148 MG/DL (ref 70–99)
GLUCOSE SERPL-MCNC: 149 MG/DL (ref 70–99)
GLUCOSE SERPL-MCNC: 150 MG/DL (ref 70–99)
GLUCOSE SERPL-MCNC: 151 MG/DL (ref 70–99)
GLUCOSE SERPL-MCNC: 151 MG/DL (ref 70–99)
GLUCOSE SERPL-MCNC: 152 MG/DL (ref 70–99)
GLUCOSE SERPL-MCNC: 152 MG/DL (ref 70–99)
GLUCOSE SERPL-MCNC: 153 MG/DL (ref 70–99)
GLUCOSE SERPL-MCNC: 154 MG/DL (ref 70–99)
GLUCOSE SERPL-MCNC: 155 MG/DL (ref 70–99)
GLUCOSE SERPL-MCNC: 156 MG/DL (ref 70–99)
GLUCOSE SERPL-MCNC: 157 MG/DL (ref 70–99)
GLUCOSE SERPL-MCNC: 157 MG/DL (ref 70–99)
GLUCOSE SERPL-MCNC: 159 MG/DL (ref 70–99)
GLUCOSE SERPL-MCNC: 160 MG/DL (ref 70–99)
GLUCOSE SERPL-MCNC: 160 MG/DL (ref 70–99)
GLUCOSE SERPL-MCNC: 162 MG/DL (ref 70–99)
GLUCOSE SERPL-MCNC: 162 MG/DL (ref 70–99)
GLUCOSE SERPL-MCNC: 163 MG/DL (ref 70–99)
GLUCOSE SERPL-MCNC: 164 MG/DL (ref 70–99)
GLUCOSE SERPL-MCNC: 165 MG/DL (ref 70–99)
GLUCOSE SERPL-MCNC: 170 MG/DL (ref 70–99)
GLUCOSE SERPL-MCNC: 171 MG/DL (ref 70–99)
GLUCOSE SERPL-MCNC: 172 MG/DL (ref 70–99)
GLUCOSE SERPL-MCNC: 172 MG/DL (ref 70–99)
GLUCOSE SERPL-MCNC: 174 MG/DL (ref 70–99)
GLUCOSE SERPL-MCNC: 176 MG/DL (ref 70–99)
GLUCOSE SERPL-MCNC: 178 MG/DL (ref 70–99)
GLUCOSE SERPL-MCNC: 179 MG/DL (ref 70–99)
GLUCOSE SERPL-MCNC: 185 MG/DL (ref 70–99)
GLUCOSE SERPL-MCNC: 190 MG/DL (ref 70–99)
GLUCOSE SERPL-MCNC: 194 MG/DL (ref 70–99)
GLUCOSE SERPL-MCNC: 196 MG/DL (ref 70–99)
GLUCOSE SERPL-MCNC: 206 MG/DL (ref 70–99)
GLUCOSE SERPL-MCNC: 216 MG/DL (ref 70–99)
GLUCOSE SERPL-MCNC: 216 MG/DL (ref 70–99)
GLUCOSE SERPL-MCNC: 50 MG/DL (ref 70–99)
GLUCOSE SERPL-MCNC: 70 MG/DL (ref 70–99)
GLUCOSE SERPL-MCNC: 72 MG/DL (ref 70–99)
GLUCOSE SERPL-MCNC: 79 MG/DL (ref 70–99)
GLUCOSE SERPL-MCNC: 85 MG/DL (ref 70–99)
GLUCOSE SERPL-MCNC: 96 MG/DL (ref 70–99)
GLUCOSE UR STRIP-MCNC: NEGATIVE MG/DL
GRAM STAIN RESULT: ABNORMAL
GRAM STAIN RESULT: NORMAL
GRANULAR CAST: 3 /LPF
H CAPSUL MYC AB TITR SER CF: NORMAL {TITER}
H CAPSUL YST AB TITR SER CF: NORMAL {TITER}
HADV DNA SPEC QL NAA+PROBE: NOT DETECTED
HBA1C MFR BLD: 5.6 %
HCO3 BLD-SCNC: 13 MMOL/L (ref 23–29)
HCO3 BLD-SCNC: 16 MMOL/L (ref 23–29)
HCO3 BLD-SCNC: 17 MMOL/L (ref 23–29)
HCO3 BLD-SCNC: 18 MMOL/L (ref 23–29)
HCO3 BLD-SCNC: 19 MMOL/L (ref 23–29)
HCO3 BLD-SCNC: 21 MMOL/L (ref 23–29)
HCO3 BLD-SCNC: 23 MMOL/L (ref 21–28)
HCO3 BLD-SCNC: 23 MMOL/L (ref 23–29)
HCO3 BLD-SCNC: 24 MMOL/L (ref 21–28)
HCO3 BLD-SCNC: 25 MMOL/L (ref 21–28)
HCO3 BLD-SCNC: 25 MMOL/L (ref 21–28)
HCO3 BLD-SCNC: 26 MMOL/L (ref 21–28)
HCO3 BLD-SCNC: 27 MMOL/L (ref 21–28)
HCO3 BLD-SCNC: 27 MMOL/L (ref 21–28)
HCO3 BLD-SCNC: 28 MMOL/L (ref 21–28)
HCO3 BLD-SCNC: 29 MMOL/L (ref 21–28)
HCO3 BLD-SCNC: 35 MMOL/L (ref 21–28)
HCO3 BLDA-SCNC: 26 MMOL/L (ref 23–29)
HCO3 BLDV-SCNC: 26 MMOL/L (ref 24–30)
HCO3 BLDV-SCNC: 28 MMOL/L (ref 21–28)
HCO3 BLDV-SCNC: 29 MMOL/L (ref 21–28)
HCO3 BLDV-SCNC: 30 MMOL/L (ref 21–28)
HCO3 BLDV-SCNC: 30 MMOL/L (ref 21–28)
HCO3 BLDV-SCNC: 31 MMOL/L (ref 21–28)
HCO3 BLDV-SCNC: 33 MMOL/L (ref 21–28)
HCO3 BLDV-SCNC: 33 MMOL/L (ref 21–28)
HCOV PNL SPEC NAA+PROBE: NOT DETECTED
HCT VFR BLD AUTO: 22.6 % (ref 35–47)
HCT VFR BLD AUTO: 24.2 % (ref 35–47)
HCT VFR BLD AUTO: 24.5 % (ref 35–47)
HCT VFR BLD AUTO: 24.7 % (ref 35–47)
HCT VFR BLD AUTO: 24.9 % (ref 35–47)
HCT VFR BLD AUTO: 25.2 % (ref 35–47)
HCT VFR BLD AUTO: 25.3 % (ref 35–47)
HCT VFR BLD AUTO: 25.4 % (ref 35–47)
HCT VFR BLD AUTO: 25.5 % (ref 35–47)
HCT VFR BLD AUTO: 25.5 % (ref 35–47)
HCT VFR BLD AUTO: 25.6 % (ref 35–47)
HCT VFR BLD AUTO: 25.7 % (ref 35–47)
HCT VFR BLD AUTO: 25.7 % (ref 35–47)
HCT VFR BLD AUTO: 25.8 % (ref 35–47)
HCT VFR BLD AUTO: 25.8 % (ref 35–47)
HCT VFR BLD AUTO: 25.9 % (ref 35–47)
HCT VFR BLD AUTO: 25.9 % (ref 35–47)
HCT VFR BLD AUTO: 26 % (ref 35–47)
HCT VFR BLD AUTO: 26.1 % (ref 35–47)
HCT VFR BLD AUTO: 26.2 % (ref 35–47)
HCT VFR BLD AUTO: 26.3 % (ref 35–47)
HCT VFR BLD AUTO: 26.6 % (ref 35–47)
HCT VFR BLD AUTO: 26.6 % (ref 35–47)
HCT VFR BLD AUTO: 26.8 % (ref 35–47)
HCT VFR BLD AUTO: 26.9 % (ref 35–47)
HCT VFR BLD AUTO: 27 % (ref 35–47)
HCT VFR BLD AUTO: 27 % (ref 35–47)
HCT VFR BLD AUTO: 27.1 % (ref 35–47)
HCT VFR BLD AUTO: 27.1 % (ref 35–47)
HCT VFR BLD AUTO: 27.2 % (ref 35–47)
HCT VFR BLD AUTO: 27.3 % (ref 35–47)
HCT VFR BLD AUTO: 27.4 % (ref 35–47)
HCT VFR BLD AUTO: 27.6 % (ref 35–47)
HCT VFR BLD AUTO: 27.6 % (ref 35–47)
HCT VFR BLD AUTO: 27.7 % (ref 35–47)
HCT VFR BLD AUTO: 28 % (ref 35–47)
HCT VFR BLD AUTO: 28.3 % (ref 35–47)
HCT VFR BLD AUTO: 28.4 % (ref 35–47)
HCT VFR BLD AUTO: 28.6 % (ref 35–47)
HCT VFR BLD AUTO: 28.7 % (ref 35–47)
HCT VFR BLD AUTO: 28.9 % (ref 35–47)
HCT VFR BLD AUTO: 29 % (ref 35–47)
HCT VFR BLD AUTO: 29.1 % (ref 35–47)
HCT VFR BLD AUTO: 29.5 % (ref 35–47)
HCT VFR BLD AUTO: 30 % (ref 35–47)
HCT VFR BLD AUTO: 30.3 % (ref 35–47)
HCT VFR BLD AUTO: 30.4 % (ref 35–47)
HCT VFR BLD AUTO: 30.4 % (ref 35–47)
HCT VFR BLD AUTO: 30.5 % (ref 35–47)
HCT VFR BLD AUTO: 30.8 % (ref 35–47)
HCT VFR BLD AUTO: 30.9 % (ref 35–47)
HCT VFR BLD AUTO: 31 % (ref 35–47)
HCT VFR BLD AUTO: 32.6 % (ref 35–47)
HCT VFR BLD AUTO: 33.2 % (ref 35–47)
HCT VFR BLD AUTO: 33.3 % (ref 35–47)
HCT VFR BLD AUTO: 33.6 % (ref 35–47)
HCT VFR BLD AUTO: 35.5 % (ref 35–47)
HCT VFR BLD AUTO: 37.1 % (ref 35–47)
HCT VFR BLD AUTO: 37.6 % (ref 35–47)
HGB BLD-MCNC: 10.2 G/DL (ref 11.7–15.7)
HGB BLD-MCNC: 11 G/DL (ref 11.7–15.7)
HGB BLD-MCNC: 11.3 G/DL (ref 11.7–15.7)
HGB BLD-MCNC: 11.6 G/DL (ref 11.7–15.7)
HGB BLD-MCNC: 6.5 G/DL (ref 11.7–15.7)
HGB BLD-MCNC: 6.8 G/DL (ref 11.7–15.7)
HGB BLD-MCNC: 6.9 G/DL (ref 11.7–15.7)
HGB BLD-MCNC: 7 G/DL (ref 11.7–15.7)
HGB BLD-MCNC: 7.1 G/DL (ref 11.7–15.7)
HGB BLD-MCNC: 7.2 G/DL (ref 11.7–15.7)
HGB BLD-MCNC: 7.4 G/DL (ref 11.7–15.7)
HGB BLD-MCNC: 7.5 G/DL (ref 11.7–15.7)
HGB BLD-MCNC: 7.6 G/DL (ref 11.7–15.7)
HGB BLD-MCNC: 7.6 G/DL (ref 11.7–15.7)
HGB BLD-MCNC: 7.7 G/DL (ref 11.7–15.7)
HGB BLD-MCNC: 7.8 G/DL (ref 11.7–15.7)
HGB BLD-MCNC: 7.9 G/DL (ref 11.7–15.7)
HGB BLD-MCNC: 8 G/DL (ref 11.7–15.7)
HGB BLD-MCNC: 8.1 G/DL (ref 11.7–15.7)
HGB BLD-MCNC: 8.2 G/DL (ref 11.7–15.7)
HGB BLD-MCNC: 8.2 G/DL (ref 11.7–15.7)
HGB BLD-MCNC: 8.3 G/DL (ref 11.7–15.7)
HGB BLD-MCNC: 8.4 G/DL (ref 11.7–15.7)
HGB BLD-MCNC: 8.5 G/DL (ref 11.7–15.7)
HGB BLD-MCNC: 8.6 G/DL (ref 11.7–15.7)
HGB BLD-MCNC: 8.7 G/DL (ref 11.7–15.7)
HGB BLD-MCNC: 8.7 G/DL (ref 11.7–15.7)
HGB BLD-MCNC: 8.8 G/DL (ref 11.7–15.7)
HGB BLD-MCNC: 8.9 G/DL (ref 11.7–15.7)
HGB BLD-MCNC: 9 G/DL (ref 11.7–15.7)
HGB BLD-MCNC: 9.1 G/DL (ref 11.7–15.7)
HGB BLD-MCNC: 9.2 G/DL (ref 11.7–15.7)
HGB BLD-MCNC: 9.3 G/DL (ref 11.7–15.7)
HGB BLD-MCNC: 9.4 G/DL (ref 11.7–15.7)
HGB BLD-MCNC: 9.6 G/DL (ref 11.7–15.7)
HGB BLD-MCNC: 9.8 G/DL (ref 11.7–15.7)
HGB BLD-MCNC: 9.9 G/DL (ref 11.7–15.7)
HGB UR QL STRIP: ABNORMAL
HGB UR QL STRIP: ABNORMAL
HGB UR QL STRIP: NEGATIVE
HIV 1+2 AB+HIV1 P24 AG SERPL QL IA: NONREACTIVE
HMPV RNA SPEC QL NAA+PROBE: NOT DETECTED
HOLD SPECIMEN: NORMAL
HPIV1 RNA SPEC QL NAA+PROBE: NOT DETECTED
HPIV2 RNA SPEC QL NAA+PROBE: NOT DETECTED
HPIV3 RNA SPEC QL NAA+PROBE: NOT DETECTED
HPIV4 RNA SPEC QL NAA+PROBE: NOT DETECTED
HYALINE CASTS: 5 /LPF
IGA SERPL-MCNC: 113 MG/DL (ref 84–499)
IGG SERPL-MCNC: 511 MG/DL (ref 610–1616)
IGG SERPL-MCNC: 535 MG/DL (ref 610–1616)
IGG1 SER-MCNC: 339 MG/DL (ref 382–929)
IGG2 SER-MCNC: 115 MG/DL (ref 242–700)
IGG3 SER-MCNC: 43 MG/DL (ref 22–176)
IGG4 SER-MCNC: 3 MG/DL (ref 4–86)
IGM SERPL-MCNC: 35 MG/DL (ref 35–242)
IMM GRANULOCYTES # BLD: 0.1 10E3/UL
IMM GRANULOCYTES # BLD: 0.1 10E3/UL
IMM GRANULOCYTES # BLD: 0.2 10E3/UL
IMM GRANULOCYTES # BLD: 0.6 10E3/UL
IMM GRANULOCYTES # BLD: 0.6 10E3/UL
IMM GRANULOCYTES # BLD: 0.9 10E3/UL
IMM GRANULOCYTES # BLD: ABNORMAL 10*3/UL
IMM GRANULOCYTES # BLD: ABNORMAL 10*3/UL
IMM GRANULOCYTES NFR BLD: 1 %
IMM GRANULOCYTES NFR BLD: 3 %
IMM GRANULOCYTES NFR BLD: 4 %
IMM GRANULOCYTES NFR BLD: 4 %
IMM GRANULOCYTES NFR BLD: ABNORMAL %
IMM GRANULOCYTES NFR BLD: ABNORMAL %
IMP: NORMAL
INR PPP: 1.28 (ref 0.85–1.15)
INTERPRETATION ECG - MUSE: NORMAL
ION CA PH 7.4: ABNORMAL
ISSUE DATE AND TIME: NORMAL
KETONES UR STRIP-MCNC: ABNORMAL MG/DL
KETONES UR STRIP-MCNC: NEGATIVE MG/DL
KLEBSIELLA OXYTOCA: NOT DETECTED
KLEBSIELLA PNEUMONIAE: NOT DETECTED
KOH PREPARATION: ABNORMAL
KPC: NORMAL
L PNEUMO DNA SPEC QL NAA+PROBE: NOT DETECTED
L PNEUMO1 AG UR QL IA: NEGATIVE
LACTATE BLD-SCNC: 1.4 MMOL/L (ref 0.7–2)
LACTATE SERPL-SCNC: 0.5 MMOL/L (ref 0.7–2)
LACTATE SERPL-SCNC: 0.8 MMOL/L (ref 0.7–2)
LACTATE SERPL-SCNC: 1.1 MMOL/L (ref 0.7–2)
LACTATE SERPL-SCNC: 1.2 MMOL/L (ref 0.7–2)
LACTATE SERPL-SCNC: 1.3 MMOL/L (ref 0.7–2)
LACTATE SERPL-SCNC: 1.4 MMOL/L (ref 0.7–2)
LACTATE SERPL-SCNC: 10.9 MMOL/L (ref 0.7–2)
LACTATE SERPL-SCNC: 3.4 MMOL/L (ref 0.7–2)
LACTATE SERPL-SCNC: 3.9 MMOL/L (ref 0.7–2)
LACTATE SERPL-SCNC: 7.2 MMOL/L (ref 0.7–2)
LEGIONELLA DNA SPEC NAA+PROBE: NOT DETECTED
LEUKOCYTE ESTERASE UR QL STRIP: ABNORMAL
LEUKOCYTE ESTERASE UR QL STRIP: NEGATIVE
LEUKOCYTE ESTERASE UR QL STRIP: NEGATIVE
LIPASE SERPL-CCNC: 14 U/L (ref 13–60)
LIPASE SERPL-CCNC: 49 U/L (ref 13–60)
LISTERIA SPECIES (DETECTED/NOT DETECTED): NOT DETECTED
LVEF ECHO: NORMAL
LVEF ECHO: NORMAL
LYMPHOCYTES # BLD AUTO: 0.7 10E3/UL (ref 0.8–5.3)
LYMPHOCYTES # BLD AUTO: 0.9 10E3/UL (ref 0.8–5.3)
LYMPHOCYTES # BLD AUTO: 1.1 10E3/UL (ref 0.8–5.3)
LYMPHOCYTES # BLD AUTO: 1.4 10E3/UL (ref 0.8–5.3)
LYMPHOCYTES # BLD AUTO: 1.6 10E3/UL (ref 0.8–5.3)
LYMPHOCYTES # BLD AUTO: 2.1 10E3/UL (ref 0.8–5.3)
LYMPHOCYTES # BLD AUTO: ABNORMAL 10*3/UL
LYMPHOCYTES # BLD AUTO: ABNORMAL 10*3/UL
LYMPHOCYTES # BLD MANUAL: 0.6 10E3/UL (ref 0.8–5.3)
LYMPHOCYTES # BLD MANUAL: 1.2 10E3/UL (ref 0.8–5.3)
LYMPHOCYTES NFR BLD AUTO: 13 %
LYMPHOCYTES NFR BLD AUTO: 3 %
LYMPHOCYTES NFR BLD AUTO: 4 %
LYMPHOCYTES NFR BLD AUTO: 6 %
LYMPHOCYTES NFR BLD AUTO: 7 %
LYMPHOCYTES NFR BLD AUTO: 7 %
LYMPHOCYTES NFR BLD AUTO: ABNORMAL %
LYMPHOCYTES NFR BLD AUTO: ABNORMAL %
LYMPHOCYTES NFR BLD MANUAL: 66 %
LYMPHOCYTES NFR BLD MANUAL: 72 %
LYMPHOCYTES NFR FLD MANUAL: 4 %
LYMPHOCYTES NFR FLD MANUAL: NORMAL %
M PNEUMO DNA SPEC QL NAA+PROBE: NOT DETECTED
M PNEUMO DNA SPEC QL NAA+PROBE: NOT DETECTED
M TB DNA SPEC QL NAA+PROBE: NOT DETECTED
M TB IFN-G BLD-IMP: ABNORMAL
M TB IFN-G CD4+ BCKGRND COR BLD-ACNC: 0.14 IU/ML
MAGNESIUM SERPL-MCNC: 1.8 MG/DL (ref 1.7–2.3)
MAGNESIUM SERPL-MCNC: 1.8 MG/DL (ref 1.7–2.3)
MAGNESIUM SERPL-MCNC: 1.9 MG/DL (ref 1.7–2.3)
MAGNESIUM SERPL-MCNC: 2 MG/DL (ref 1.7–2.3)
MAGNESIUM SERPL-MCNC: 2.1 MG/DL (ref 1.7–2.3)
MAGNESIUM SERPL-MCNC: 2.2 MG/DL (ref 1.7–2.3)
MAGNESIUM SERPL-MCNC: 2.3 MG/DL (ref 1.7–2.3)
MAGNESIUM SERPL-MCNC: 2.4 MG/DL (ref 1.7–2.3)
MAGNESIUM SERPL-MCNC: 2.5 MG/DL (ref 1.7–2.3)
MAGNESIUM SERPL-MCNC: 2.6 MG/DL (ref 1.7–2.3)
MAGNESIUM SERPL-MCNC: 2.7 MG/DL (ref 1.7–2.3)
MAGNESIUM SERPL-MCNC: 2.8 MG/DL (ref 1.7–2.3)
MAGNESIUM SERPL-MCNC: 2.9 MG/DL (ref 1.7–2.3)
MAGNESIUM SERPL-MCNC: 3 MG/DL (ref 1.7–2.3)
MCH RBC QN AUTO: 25.5 PG (ref 26.5–33)
MCH RBC QN AUTO: 25.6 PG (ref 26.5–33)
MCH RBC QN AUTO: 25.6 PG (ref 26.5–33)
MCH RBC QN AUTO: 25.8 PG (ref 26.5–33)
MCH RBC QN AUTO: 25.9 PG (ref 26.5–33)
MCH RBC QN AUTO: 26 PG (ref 26.5–33)
MCH RBC QN AUTO: 26.1 PG (ref 26.5–33)
MCH RBC QN AUTO: 26.2 PG (ref 26.5–33)
MCH RBC QN AUTO: 26.3 PG (ref 26.5–33)
MCH RBC QN AUTO: 26.4 PG (ref 26.5–33)
MCH RBC QN AUTO: 26.5 PG (ref 26.5–33)
MCH RBC QN AUTO: 26.6 PG (ref 26.5–33)
MCH RBC QN AUTO: 26.7 PG (ref 26.5–33)
MCH RBC QN AUTO: 26.8 PG (ref 26.5–33)
MCH RBC QN AUTO: 26.8 PG (ref 26.5–33)
MCH RBC QN AUTO: 26.9 PG (ref 26.5–33)
MCH RBC QN AUTO: 27 PG (ref 26.5–33)
MCH RBC QN AUTO: 27.1 PG (ref 26.5–33)
MCHC RBC AUTO-ENTMCNC: 27.9 G/DL (ref 31.5–36.5)
MCHC RBC AUTO-ENTMCNC: 28.2 G/DL (ref 31.5–36.5)
MCHC RBC AUTO-ENTMCNC: 28.2 G/DL (ref 31.5–36.5)
MCHC RBC AUTO-ENTMCNC: 28.4 G/DL (ref 31.5–36.5)
MCHC RBC AUTO-ENTMCNC: 28.5 G/DL (ref 31.5–36.5)
MCHC RBC AUTO-ENTMCNC: 28.7 G/DL (ref 31.5–36.5)
MCHC RBC AUTO-ENTMCNC: 28.8 G/DL (ref 31.5–36.5)
MCHC RBC AUTO-ENTMCNC: 28.8 G/DL (ref 31.5–36.5)
MCHC RBC AUTO-ENTMCNC: 28.9 G/DL (ref 31.5–36.5)
MCHC RBC AUTO-ENTMCNC: 28.9 G/DL (ref 31.5–36.5)
MCHC RBC AUTO-ENTMCNC: 29 G/DL (ref 31.5–36.5)
MCHC RBC AUTO-ENTMCNC: 29 G/DL (ref 31.5–36.5)
MCHC RBC AUTO-ENTMCNC: 29.2 G/DL (ref 31.5–36.5)
MCHC RBC AUTO-ENTMCNC: 29.3 G/DL (ref 31.5–36.5)
MCHC RBC AUTO-ENTMCNC: 29.4 G/DL (ref 31.5–36.5)
MCHC RBC AUTO-ENTMCNC: 29.5 G/DL (ref 31.5–36.5)
MCHC RBC AUTO-ENTMCNC: 29.6 G/DL (ref 31.5–36.5)
MCHC RBC AUTO-ENTMCNC: 29.7 G/DL (ref 31.5–36.5)
MCHC RBC AUTO-ENTMCNC: 29.8 G/DL (ref 31.5–36.5)
MCHC RBC AUTO-ENTMCNC: 29.9 G/DL (ref 31.5–36.5)
MCHC RBC AUTO-ENTMCNC: 30 G/DL (ref 31.5–36.5)
MCHC RBC AUTO-ENTMCNC: 30.3 G/DL (ref 31.5–36.5)
MCHC RBC AUTO-ENTMCNC: 30.3 G/DL (ref 31.5–36.5)
MCHC RBC AUTO-ENTMCNC: 30.4 G/DL (ref 31.5–36.5)
MCHC RBC AUTO-ENTMCNC: 30.5 G/DL (ref 31.5–36.5)
MCHC RBC AUTO-ENTMCNC: 30.6 G/DL (ref 31.5–36.5)
MCHC RBC AUTO-ENTMCNC: 30.9 G/DL (ref 31.5–36.5)
MCHC RBC AUTO-ENTMCNC: 31 G/DL (ref 31.5–36.5)
MCHC RBC AUTO-ENTMCNC: 31.1 G/DL (ref 31.5–36.5)
MCHC RBC AUTO-ENTMCNC: 31.3 G/DL (ref 31.5–36.5)
MCHC RBC AUTO-ENTMCNC: 31.3 G/DL (ref 31.5–36.5)
MCHC RBC AUTO-ENTMCNC: 31.4 G/DL (ref 31.5–36.5)
MCHC RBC AUTO-ENTMCNC: 31.6 G/DL (ref 31.5–36.5)
MCHC RBC AUTO-ENTMCNC: 31.7 G/DL (ref 31.5–36.5)
MCHC RBC AUTO-ENTMCNC: 32 G/DL (ref 31.5–36.5)
MCV RBC AUTO: 81 FL (ref 78–100)
MCV RBC AUTO: 81 FL (ref 78–100)
MCV RBC AUTO: 82 FL (ref 78–100)
MCV RBC AUTO: 83 FL (ref 78–100)
MCV RBC AUTO: 83 FL (ref 78–100)
MCV RBC AUTO: 84 FL (ref 78–100)
MCV RBC AUTO: 85 FL (ref 78–100)
MCV RBC AUTO: 86 FL (ref 78–100)
MCV RBC AUTO: 87 FL (ref 78–100)
MCV RBC AUTO: 88 FL (ref 78–100)
MCV RBC AUTO: 89 FL (ref 78–100)
MCV RBC AUTO: 90 FL (ref 78–100)
MCV RBC AUTO: 91 FL (ref 78–100)
MCV RBC AUTO: 92 FL (ref 78–100)
MCV RBC AUTO: 93 FL (ref 78–100)
METAMYELOCYTES # BLD MANUAL: 0.1 10E3/UL
METAMYELOCYTES # BLD MANUAL: 0.2 10E3/UL
METAMYELOCYTES NFR BLD MANUAL: 8 %
METAMYELOCYTES NFR BLD MANUAL: 9 %
MITOGEN IGNF BCKGRD COR BLD-ACNC: -0.01 IU/ML
MITOGEN IGNF BCKGRD COR BLD-ACNC: 0 IU/ML
MONOCYTES # BLD AUTO: 0.7 10E3/UL (ref 0–1.3)
MONOCYTES # BLD AUTO: 1.1 10E3/UL (ref 0–1.3)
MONOCYTES # BLD AUTO: 1.3 10E3/UL (ref 0–1.3)
MONOCYTES # BLD AUTO: 1.5 10E3/UL (ref 0–1.3)
MONOCYTES # BLD AUTO: 1.5 10E3/UL (ref 0–1.3)
MONOCYTES # BLD AUTO: 2 10E3/UL (ref 0–1.3)
MONOCYTES # BLD AUTO: ABNORMAL 10*3/UL
MONOCYTES # BLD AUTO: ABNORMAL 10*3/UL
MONOCYTES # BLD MANUAL: 0 10E3/UL (ref 0–1.3)
MONOCYTES # BLD MANUAL: 0 10E3/UL (ref 0–1.3)
MONOCYTES NFR BLD AUTO: 10 %
MONOCYTES NFR BLD AUTO: 4 %
MONOCYTES NFR BLD AUTO: 5 %
MONOCYTES NFR BLD AUTO: 6 %
MONOCYTES NFR BLD AUTO: 7 %
MONOCYTES NFR BLD AUTO: 8 %
MONOCYTES NFR BLD AUTO: ABNORMAL %
MONOCYTES NFR BLD AUTO: ABNORMAL %
MONOCYTES NFR BLD MANUAL: 0 %
MONOCYTES NFR BLD MANUAL: 1 %
MONOS+MACROS NFR FLD MANUAL: 8 %
MONOS+MACROS NFR FLD MANUAL: NORMAL %
MRSA DNA SPEC QL NAA+PROBE: NEGATIVE
MUCOUS THREADS #/AREA URNS LPF: PRESENT /LPF
MYELOCYTES # BLD MANUAL: 0.1 10E3/UL
MYELOCYTES NFR BLD MANUAL: 4 %
NDM: NORMAL
NEUTROPHILS # BLD AUTO: 12.8 10E3/UL (ref 1.6–8.3)
NEUTROPHILS # BLD AUTO: 16.3 10E3/UL (ref 1.6–8.3)
NEUTROPHILS # BLD AUTO: 16.9 10E3/UL (ref 1.6–8.3)
NEUTROPHILS # BLD AUTO: 18.3 10E3/UL (ref 1.6–8.3)
NEUTROPHILS # BLD AUTO: 18.3 10E3/UL (ref 1.6–8.3)
NEUTROPHILS # BLD AUTO: 18.4 10E3/UL (ref 1.6–8.3)
NEUTROPHILS # BLD AUTO: ABNORMAL 10*3/UL
NEUTROPHILS # BLD AUTO: ABNORMAL 10*3/UL
NEUTROPHILS # BLD MANUAL: 0.2 10E3/UL (ref 1.6–8.3)
NEUTROPHILS # BLD MANUAL: 0.3 10E3/UL (ref 1.6–8.3)
NEUTROPHILS NFR BLD AUTO: 79 %
NEUTROPHILS NFR BLD AUTO: 83 %
NEUTROPHILS NFR BLD AUTO: 84 %
NEUTROPHILS NFR BLD AUTO: 85 %
NEUTROPHILS NFR BLD AUTO: 86 %
NEUTROPHILS NFR BLD AUTO: 88 %
NEUTROPHILS NFR BLD AUTO: ABNORMAL %
NEUTROPHILS NFR BLD AUTO: ABNORMAL %
NEUTROPHILS NFR BLD MANUAL: 19 %
NEUTROPHILS NFR BLD MANUAL: 20 %
NEUTS BAND NFR FLD MANUAL: 89 %
NEUTS BAND NFR FLD MANUAL: NORMAL %
NITRATE UR QL: NEGATIVE
NRBC # BLD AUTO: 0 10E3/UL
NRBC # BLD AUTO: 1.6 10E3/UL
NRBC # BLD AUTO: 10.3 10E3/UL
NRBC # BLD AUTO: 3 10E3/UL
NRBC # BLD AUTO: 8.6 10E3/UL
NRBC BLD AUTO-RTO: 0 /100
NRBC BLD AUTO-RTO: 188 /100
NRBC BLD AUTO-RTO: 473 /100
NRBC BLD MANUAL-RTO: 370 %
NRBC BLD MANUAL-RTO: 572 %
NT-PROBNP SERPL-MCNC: 1456 PG/ML (ref 0–1800)
NT-PROBNP SERPL-MCNC: 2657 PG/ML (ref 0–1800)
NT-PROBNP SERPL-MCNC: 6064 PG/ML (ref 0–1800)
O2/TOTAL GAS SETTING VFR VENT: 100 %
O2/TOTAL GAS SETTING VFR VENT: 30 %
O2/TOTAL GAS SETTING VFR VENT: 40 %
O2/TOTAL GAS SETTING VFR VENT: 50 %
O2/TOTAL GAS SETTING VFR VENT: 50 %
O2/TOTAL GAS SETTING VFR VENT: 55 %
O2/TOTAL GAS SETTING VFR VENT: 60 %
O2/TOTAL GAS SETTING VFR VENT: 65 %
O2/TOTAL GAS SETTING VFR VENT: 70 %
O2/TOTAL GAS SETTING VFR VENT: 70 %
O2/TOTAL GAS SETTING VFR VENT: 75 %
O2/TOTAL GAS SETTING VFR VENT: 75 %
O2/TOTAL GAS SETTING VFR VENT: 80 %
O2/TOTAL GAS SETTING VFR VENT: 85 %
O2/TOTAL GAS SETTING VFR VENT: 90 %
O2/TOTAL GAS SETTING VFR VENT: 95 %
OBSERVATION IMP: NEGATIVE
OBSERVATION IMP: NEGATIVE
OXA (DETECTED/NOT DETECTED): NORMAL
OXYHGB MFR BLD: 74 % (ref 92–100)
OXYHGB MFR BLD: 86.9 % (ref 95–96)
OXYHGB MFR BLD: 87 % (ref 92–100)
OXYHGB MFR BLD: 88 % (ref 95–96)
OXYHGB MFR BLD: 88.8 % (ref 95–96)
OXYHGB MFR BLD: 90.4 % (ref 95–96)
OXYHGB MFR BLD: 90.7 % (ref 95–96)
OXYHGB MFR BLD: 91.9 % (ref 95–96)
OXYHGB MFR BLD: 94 % (ref 92–100)
OXYHGB MFR BLD: 94 % (ref 92–100)
OXYHGB MFR BLD: 94.9 % (ref 95–96)
OXYHGB MFR BLD: 95 % (ref 92–100)
OXYHGB MFR BLD: 96 % (ref 92–100)
OXYHGB MFR BLD: 97 % (ref 92–100)
OXYHGB MFR BLD: 98 % (ref 92–100)
OXYHGB MFR BLDV: 68 % (ref 70–75)
OXYHGB MFR BLDV: 69 % (ref 70–75)
OXYHGB MFR BLDV: 86 % (ref 70–75)
OXYHGB MFR BLDV: 94.6 % (ref 70–75)
P AXIS - MUSE: 44 DEGREES
P AXIS - MUSE: 57 DEGREES
P AXIS - MUSE: 59 DEGREES
P AXIS - MUSE: NORMAL DEGREES
PATH REPORT.COMMENTS IMP SPEC: NORMAL
PATH REPORT.COMMENTS IMP SPEC: NORMAL
PATH REPORT.FINAL DX SPEC: NORMAL
PATH REPORT.FINAL DX SPEC: NORMAL
PATH REPORT.GROSS SPEC: NORMAL
PATH REPORT.MICROSCOPIC SPEC OTHER STN: NORMAL
PATH REPORT.RELEVANT HX SPEC: NORMAL
PCO2 BLD: 39 MM HG (ref 35–45)
PCO2 BLD: 39 MM HG (ref 35–45)
PCO2 BLD: 40 MM HG (ref 35–45)
PCO2 BLD: 43 MM HG (ref 35–45)
PCO2 BLD: 44 MM HG (ref 35–45)
PCO2 BLD: 46 MM HG (ref 35–45)
PCO2 BLD: 47 MM HG (ref 35–45)
PCO2 BLD: 48 MM HG (ref 35–45)
PCO2 BLD: 49 MM HG (ref 35–45)
PCO2 BLD: 50 MM HG (ref 35–45)
PCO2 BLD: 51 MM HG (ref 35–45)
PCO2 BLD: 52 MM HG (ref 35–45)
PCO2 BLD: 54 MM HG (ref 35–45)
PCO2 BLD: 54 MM HG (ref 35–45)
PCO2 BLD: 56 MM HG (ref 35–45)
PCO2 BLD: 57 MM HG (ref 35–45)
PCO2 BLD: 59 MM HG (ref 35–45)
PCO2 BLDA: 74 MM HG (ref 35–45)
PCO2 BLDV: 52 MM HG (ref 40–50)
PCO2 BLDV: 52 MM HG (ref 40–50)
PCO2 BLDV: 56 MM HG (ref 40–50)
PCO2 BLDV: 56 MM HG (ref 40–50)
PCO2 BLDV: 58 MM HG (ref 40–50)
PCO2 BLDV: 62 MM HG (ref 40–50)
PCO2 BLDV: 64 MM HG (ref 40–50)
PCO2 BLDV: 70 MM HG (ref 35–50)
PEEP: 10 CM H2O
PEEP: 8 CM H2O
PEEP: 8 CM H2O
PH BLD: 7.04 [PH] (ref 7.37–7.44)
PH BLD: 7.14 [PH] (ref 7.37–7.44)
PH BLD: 7.2 [PH] (ref 7.37–7.44)
PH BLD: 7.21 [PH] (ref 7.37–7.44)
PH BLD: 7.24 [PH] (ref 7.37–7.44)
PH BLD: 7.26 [PH] (ref 7.35–7.45)
PH BLD: 7.27 [PH] (ref 7.35–7.45)
PH BLD: 7.27 [PH] (ref 7.35–7.45)
PH BLD: 7.28 [PH] (ref 7.35–7.45)
PH BLD: 7.28 [PH] (ref 7.35–7.45)
PH BLD: 7.28 [PH] (ref 7.37–7.44)
PH BLD: 7.29 [PH] (ref 7.35–7.45)
PH BLD: 7.3 [PH] (ref 7.35–7.45)
PH BLD: 7.3 [PH] (ref 7.35–7.45)
PH BLD: 7.3 [PH] (ref 7.37–7.44)
PH BLD: 7.31 [PH] (ref 7.35–7.45)
PH BLD: 7.37 [PH] (ref 7.35–7.45)
PH BLD: 7.37 [PH] (ref 7.35–7.45)
PH BLD: 7.42 [PH] (ref 7.35–7.45)
PH BLD: 7.47 [PH] (ref 7.35–7.45)
PH BLDA: 7.22 [PH] (ref 7.37–7.44)
PH BLDV: 7.18 [PH] (ref 7.35–7.45)
PH BLDV: 7.27 [PH] (ref 7.32–7.43)
PH BLDV: 7.27 [PH] (ref 7.32–7.43)
PH BLDV: 7.33 [PH] (ref 7.32–7.43)
PH BLDV: 7.36 [PH] (ref 7.32–7.43)
PH BLDV: 7.37 [PH] (ref 7.32–7.43)
PH BLDV: 7.37 [PH] (ref 7.32–7.43)
PH BLDV: 7.41 [PH] (ref 7.32–7.43)
PH UR STRIP: 5 [PH] (ref 5–7)
PH UR STRIP: 5.5 [PH] (ref 5–7)
PH UR STRIP: 6 [PH] (ref 5–7)
PH UR STRIP: 6 [PH] (ref 5–8)
PH UR STRIP: 7 [PH] (ref 5–7)
PH: 7.16 (ref 7.35–7.45)
PHOSPHATE SERPL-MCNC: 2 MG/DL (ref 2.5–4.5)
PHOSPHATE SERPL-MCNC: 2.4 MG/DL (ref 2.5–4.5)
PHOSPHATE SERPL-MCNC: 2.5 MG/DL (ref 2.5–4.5)
PHOSPHATE SERPL-MCNC: 2.5 MG/DL (ref 2.5–4.5)
PHOSPHATE SERPL-MCNC: 2.6 MG/DL (ref 2.5–4.5)
PHOSPHATE SERPL-MCNC: 2.9 MG/DL (ref 2.5–4.5)
PHOSPHATE SERPL-MCNC: 2.9 MG/DL (ref 2.5–4.5)
PHOSPHATE SERPL-MCNC: 3.1 MG/DL (ref 2.5–4.5)
PHOSPHATE SERPL-MCNC: 3.2 MG/DL (ref 2.5–4.5)
PHOSPHATE SERPL-MCNC: 3.5 MG/DL (ref 2.5–4.5)
PHOSPHATE SERPL-MCNC: 3.5 MG/DL (ref 2.5–4.5)
PHOSPHATE SERPL-MCNC: 3.7 MG/DL (ref 2.5–4.5)
PHOSPHATE SERPL-MCNC: 3.8 MG/DL (ref 2.5–4.5)
PHOSPHATE SERPL-MCNC: 3.9 MG/DL (ref 2.5–4.5)
PHOSPHATE SERPL-MCNC: 4 MG/DL (ref 2.5–4.5)
PHOSPHATE SERPL-MCNC: 4.1 MG/DL (ref 2.5–4.5)
PHOSPHATE SERPL-MCNC: 4.1 MG/DL (ref 2.5–4.5)
PHOSPHATE SERPL-MCNC: 4.2 MG/DL (ref 2.5–4.5)
PHOSPHATE SERPL-MCNC: 4.3 MG/DL (ref 2.5–4.5)
PHOSPHATE SERPL-MCNC: 4.3 MG/DL (ref 2.5–4.5)
PHOSPHATE SERPL-MCNC: 4.5 MG/DL (ref 2.5–4.5)
PHOSPHATE SERPL-MCNC: 4.5 MG/DL (ref 2.5–4.5)
PHOSPHATE SERPL-MCNC: 4.6 MG/DL (ref 2.5–4.5)
PHOSPHATE SERPL-MCNC: 4.7 MG/DL (ref 2.5–4.5)
PHOSPHATE SERPL-MCNC: 4.8 MG/DL (ref 2.5–4.5)
PHOSPHATE SERPL-MCNC: 4.8 MG/DL (ref 2.5–4.5)
PHOSPHATE SERPL-MCNC: 5.4 MG/DL (ref 2.5–4.5)
PHOSPHATE SERPL-MCNC: 5.4 MG/DL (ref 2.5–4.5)
PHOSPHATE SERPL-MCNC: 5.8 MG/DL (ref 2.5–4.5)
PHOSPHATE SERPL-MCNC: 6.3 MG/DL (ref 2.5–4.5)
PHOSPHATE SERPL-MCNC: 6.4 MG/DL (ref 2.5–4.5)
PHOSPHATE SERPL-MCNC: 6.6 MG/DL (ref 2.5–4.5)
PHOSPHATE SERPL-MCNC: 7.1 MG/DL (ref 2.5–4.5)
PLAT MORPH BLD: ABNORMAL
PLAT MORPH BLD: ABNORMAL
PLATELET # BLD AUTO: 102 10E3/UL (ref 150–450)
PLATELET # BLD AUTO: 113 10E3/UL (ref 150–450)
PLATELET # BLD AUTO: 131 10E3/UL (ref 150–450)
PLATELET # BLD AUTO: 139 10E3/UL (ref 150–450)
PLATELET # BLD AUTO: 141 10E3/UL (ref 150–450)
PLATELET # BLD AUTO: 150 10E3/UL (ref 150–450)
PLATELET # BLD AUTO: 156 10E3/UL (ref 150–450)
PLATELET # BLD AUTO: 157 10E3/UL (ref 150–450)
PLATELET # BLD AUTO: 163 10E3/UL (ref 150–450)
PLATELET # BLD AUTO: 169 10E3/UL (ref 150–450)
PLATELET # BLD AUTO: 172 10E3/UL (ref 150–450)
PLATELET # BLD AUTO: 175 10E3/UL (ref 150–450)
PLATELET # BLD AUTO: 184 10E3/UL (ref 150–450)
PLATELET # BLD AUTO: 185 10E3/UL (ref 150–450)
PLATELET # BLD AUTO: 187 10E3/UL (ref 150–450)
PLATELET # BLD AUTO: 187 10E3/UL (ref 150–450)
PLATELET # BLD AUTO: 194 10E3/UL (ref 150–450)
PLATELET # BLD AUTO: 196 10E3/UL (ref 150–450)
PLATELET # BLD AUTO: 197 10E3/UL (ref 150–450)
PLATELET # BLD AUTO: 198 10E3/UL (ref 150–450)
PLATELET # BLD AUTO: 203 10E3/UL (ref 150–450)
PLATELET # BLD AUTO: 203 10E3/UL (ref 150–450)
PLATELET # BLD AUTO: 204 10E3/UL (ref 150–450)
PLATELET # BLD AUTO: 205 10E3/UL (ref 150–450)
PLATELET # BLD AUTO: 205 10E3/UL (ref 150–450)
PLATELET # BLD AUTO: 210 10E3/UL (ref 150–450)
PLATELET # BLD AUTO: 210 10E3/UL (ref 150–450)
PLATELET # BLD AUTO: 212 10E3/UL (ref 150–450)
PLATELET # BLD AUTO: 215 10E3/UL (ref 150–450)
PLATELET # BLD AUTO: 217 10E3/UL (ref 150–450)
PLATELET # BLD AUTO: 222 10E3/UL (ref 150–450)
PLATELET # BLD AUTO: 223 10E3/UL (ref 150–450)
PLATELET # BLD AUTO: 226 10E3/UL (ref 150–450)
PLATELET # BLD AUTO: 228 10E3/UL (ref 150–450)
PLATELET # BLD AUTO: 231 10E3/UL (ref 150–450)
PLATELET # BLD AUTO: 231 10E3/UL (ref 150–450)
PLATELET # BLD AUTO: 232 10E3/UL (ref 150–450)
PLATELET # BLD AUTO: 234 10E3/UL (ref 150–450)
PLATELET # BLD AUTO: 238 10E3/UL (ref 150–450)
PLATELET # BLD AUTO: 242 10E3/UL (ref 150–450)
PLATELET # BLD AUTO: 244 10E3/UL (ref 150–450)
PLATELET # BLD AUTO: 245 10E3/UL (ref 150–450)
PLATELET # BLD AUTO: 250 10E3/UL (ref 150–450)
PLATELET # BLD AUTO: 254 10E3/UL (ref 150–450)
PLATELET # BLD AUTO: 259 10E3/UL (ref 150–450)
PLATELET # BLD AUTO: 264 10E3/UL (ref 150–450)
PLATELET # BLD AUTO: 268 10E3/UL (ref 150–450)
PLATELET # BLD AUTO: 273 10E3/UL (ref 150–450)
PLATELET # BLD AUTO: 276 10E3/UL (ref 150–450)
PLATELET # BLD AUTO: 282 10E3/UL (ref 150–450)
PLATELET # BLD AUTO: 285 10E3/UL (ref 150–450)
PLATELET # BLD AUTO: 309 10E3/UL (ref 150–450)
PLATELET # BLD AUTO: 322 10E3/UL (ref 150–450)
PLATELET # BLD AUTO: 327 10E3/UL (ref 150–450)
PLATELET # BLD AUTO: 341 10E3/UL (ref 150–450)
PLATELET # BLD AUTO: 351 10E3/UL (ref 150–450)
PLATELET # BLD AUTO: 389 10E3/UL (ref 150–450)
PLATELET # BLD AUTO: 412 10E3/UL (ref 150–450)
PLATELET # BLD AUTO: 419 10E3/UL (ref 150–450)
PLATELET # BLD AUTO: 460 10E3/UL (ref 150–450)
PLATELET # BLD AUTO: 472 10E3/UL (ref 150–450)
PLATELET # BLD AUTO: 484 10E3/UL (ref 150–450)
PLATELET # BLD AUTO: 98 10E3/UL (ref 150–450)
PO2 BLD: 104 MM HG (ref 80–105)
PO2 BLD: 110 MM HG (ref 80–105)
PO2 BLD: 110 MM HG (ref 80–105)
PO2 BLD: 113 MM HG (ref 80–105)
PO2 BLD: 117 MM HG (ref 80–105)
PO2 BLD: 177 MM HG (ref 80–105)
PO2 BLD: 41 MM HG (ref 80–105)
PO2 BLD: 58 MM HG (ref 80–105)
PO2 BLD: 59 MM HG (ref 75–85)
PO2 BLD: 61 MM HG (ref 80–105)
PO2 BLD: 62 MM HG (ref 75–85)
PO2 BLD: 64 MM HG (ref 80–105)
PO2 BLD: 64 MM HG (ref 80–105)
PO2 BLD: 66 MM HG (ref 75–85)
PO2 BLD: 66 MM HG (ref 75–85)
PO2 BLD: 67 MM HG (ref 75–85)
PO2 BLD: 70 MM HG (ref 75–85)
PO2 BLD: 85 MM HG (ref 80–105)
PO2 BLD: 87 MM HG (ref 75–85)
PO2 BLD: 87 MM HG (ref 80–105)
PO2 BLD: 89 MM HG (ref 80–105)
PO2 BLD: 93 MM HG (ref 80–105)
PO2 BLDA: 53 MM HG (ref 75–85)
PO2 BLDV: 125 MM HG (ref 25–47)
PO2 BLDV: 34 MM HG (ref 25–47)
PO2 BLDV: 36 MM HG (ref 25–47)
PO2 BLDV: 38 MM HG (ref 25–47)
PO2 BLDV: 41 MM HG (ref 25–47)
PO2 BLDV: 43 MM HG (ref 25–47)
PO2 BLDV: 55 MM HG (ref 25–47)
PO2 BLDV: 61 MM HG (ref 25–47)
POLYCHROMASIA BLD QL SMEAR: ABNORMAL
POLYCHROMASIA BLD QL SMEAR: SLIGHT
POTASSIUM BLD-SCNC: 5 MMOL/L (ref 3.5–5)
POTASSIUM SERPL-SCNC: 2.6 MMOL/L (ref 3.4–5.3)
POTASSIUM SERPL-SCNC: 2.9 MMOL/L (ref 3.4–5.3)
POTASSIUM SERPL-SCNC: 2.9 MMOL/L (ref 3.4–5.3)
POTASSIUM SERPL-SCNC: 3 MMOL/L (ref 3.4–5.3)
POTASSIUM SERPL-SCNC: 3 MMOL/L (ref 3.4–5.3)
POTASSIUM SERPL-SCNC: 3.1 MMOL/L (ref 3.4–5.3)
POTASSIUM SERPL-SCNC: 3.2 MMOL/L (ref 3.4–5.3)
POTASSIUM SERPL-SCNC: 3.3 MMOL/L (ref 3.4–5.3)
POTASSIUM SERPL-SCNC: 3.4 MMOL/L (ref 3.4–5.3)
POTASSIUM SERPL-SCNC: 3.5 MMOL/L (ref 3.4–5.3)
POTASSIUM SERPL-SCNC: 3.6 MMOL/L (ref 3.4–5.3)
POTASSIUM SERPL-SCNC: 3.7 MMOL/L (ref 3.4–5.3)
POTASSIUM SERPL-SCNC: 3.8 MMOL/L (ref 3.4–5.3)
POTASSIUM SERPL-SCNC: 3.9 MMOL/L (ref 3.4–5.3)
POTASSIUM SERPL-SCNC: 4 MMOL/L (ref 3.4–5.3)
POTASSIUM SERPL-SCNC: 4.1 MMOL/L (ref 3.4–5.3)
POTASSIUM SERPL-SCNC: 4.2 MMOL/L (ref 3.4–5.3)
POTASSIUM SERPL-SCNC: 4.3 MMOL/L (ref 3.4–5.3)
POTASSIUM SERPL-SCNC: 4.4 MMOL/L (ref 3.4–5.3)
POTASSIUM SERPL-SCNC: 4.5 MMOL/L (ref 3.4–5.3)
POTASSIUM SERPL-SCNC: 4.6 MMOL/L (ref 3.4–5.3)
POTASSIUM SERPL-SCNC: 4.7 MMOL/L (ref 3.4–5.3)
POTASSIUM SERPL-SCNC: 4.7 MMOL/L (ref 3.4–5.3)
POTASSIUM SERPL-SCNC: 4.9 MMOL/L (ref 3.4–5.3)
POTASSIUM SERPL-SCNC: 5 MMOL/L (ref 3.4–5.3)
POTASSIUM SERPL-SCNC: 5.1 MMOL/L (ref 3.4–5.3)
POTASSIUM SERPL-SCNC: 5.1 MMOL/L (ref 3.4–5.3)
POTASSIUM SERPL-SCNC: 5.2 MMOL/L (ref 3.4–5.3)
POTASSIUM SERPL-SCNC: 5.4 MMOL/L (ref 3.4–5.3)
POTASSIUM SERPL-SCNC: 5.4 MMOL/L (ref 3.4–5.3)
POTASSIUM SERPL-SCNC: 5.5 MMOL/L (ref 3.4–5.3)
POTASSIUM SERPL-SCNC: 5.7 MMOL/L (ref 3.4–5.3)
POTASSIUM SERPL-SCNC: 5.8 MMOL/L (ref 3.4–5.3)
POTASSIUM SERPL-SCNC: 5.9 MMOL/L (ref 3.4–5.3)
POTASSIUM SERPL-SCNC: 6.1 MMOL/L (ref 3.4–5.3)
POTASSIUM SERPL-SCNC: 6.3 MMOL/L (ref 3.4–5.3)
POTASSIUM SERPL-SCNC: 6.6 MMOL/L (ref 3.4–5.3)
POTASSIUM SERPL-SCNC: 7.2 MMOL/L (ref 3.4–5.3)
PR INTERVAL - MUSE: 182 MS
PR INTERVAL - MUSE: 194 MS
PR INTERVAL - MUSE: 210 MS
PR INTERVAL - MUSE: NORMAL MS
PROCALCITONIN SERPL IA-MCNC: 0.16 NG/ML
PROCALCITONIN SERPL IA-MCNC: 0.59 NG/ML
PROCALCITONIN SERPL IA-MCNC: 0.76 NG/ML
PROCALCITONIN SERPL IA-MCNC: 1 NG/ML
PROCALCITONIN SERPL IA-MCNC: 1.17 NG/ML
PROCALCITONIN SERPL IA-MCNC: 1.23 NG/ML
PROCALCITONIN SERPL IA-MCNC: 2.82 NG/ML
PROT SERPL-MCNC: 4.9 G/DL (ref 6.4–8.3)
PROT SERPL-MCNC: 5.3 G/DL (ref 6.4–8.3)
PROT SERPL-MCNC: 5.5 G/DL (ref 6.4–8.3)
PROT SERPL-MCNC: 5.8 G/DL (ref 6.4–8.3)
PROT SERPL-MCNC: 6.1 G/DL (ref 6.4–8.3)
PROT SERPL-MCNC: 6.4 G/DL (ref 6.4–8.3)
PROT SERPL-MCNC: 6.5 G/DL (ref 6.4–8.3)
PROT SERPL-MCNC: 7.3 G/DL (ref 6.4–8.3)
PROT/CREAT 24H UR: NORMAL MG/G{CREAT}
PROTEUS SPECIES: NOT DETECTED
PSEUDOMONAS AERUGINOSA: NOT DETECTED
QRS DURATION - MUSE: 76 MS
QRS DURATION - MUSE: 80 MS
QRS DURATION - MUSE: 86 MS
QRS DURATION - MUSE: 90 MS
QT - MUSE: 322 MS
QT - MUSE: 390 MS
QT - MUSE: 428 MS
QT - MUSE: 452 MS
QTC - MUSE: 389 MS
QTC - MUSE: 436 MS
QTC - MUSE: 462 MS
QTC - MUSE: 477 MS
QUANTIFERON MITOGEN: 0.15 IU/ML
QUANTIFERON NIL TUBE: 0.01 IU/ML
QUANTIFERON TB1 TUBE: 0 IU/ML
QUANTIFERON TB2 TUBE: 0.01
R AXIS - MUSE: -26 DEGREES
R AXIS - MUSE: -43 DEGREES
R AXIS - MUSE: -45 DEGREES
R AXIS - MUSE: -53 DEGREES
RADIOLOGIST FLAGS: ABNORMAL
RATE: 32 RR/MIN
RATE: 32 RR/MIN
RATE: 34 RR/MIN
RBC # BLD AUTO: 2.48 10E6/UL (ref 3.8–5.2)
RBC # BLD AUTO: 2.65 10E6/UL (ref 3.8–5.2)
RBC # BLD AUTO: 2.69 10E6/UL (ref 3.8–5.2)
RBC # BLD AUTO: 2.72 10E6/UL (ref 3.8–5.2)
RBC # BLD AUTO: 2.74 10E6/UL (ref 3.8–5.2)
RBC # BLD AUTO: 2.76 10E6/UL (ref 3.8–5.2)
RBC # BLD AUTO: 2.77 10E6/UL (ref 3.8–5.2)
RBC # BLD AUTO: 2.82 10E6/UL (ref 3.8–5.2)
RBC # BLD AUTO: 2.83 10E6/UL (ref 3.8–5.2)
RBC # BLD AUTO: 2.84 10E6/UL (ref 3.8–5.2)
RBC # BLD AUTO: 2.85 10E6/UL (ref 3.8–5.2)
RBC # BLD AUTO: 2.89 10E6/UL (ref 3.8–5.2)
RBC # BLD AUTO: 2.89 10E6/UL (ref 3.8–5.2)
RBC # BLD AUTO: 2.92 10E6/UL (ref 3.8–5.2)
RBC # BLD AUTO: 2.93 10E6/UL (ref 3.8–5.2)
RBC # BLD AUTO: 2.94 10E6/UL (ref 3.8–5.2)
RBC # BLD AUTO: 2.95 10E6/UL (ref 3.8–5.2)
RBC # BLD AUTO: 2.96 10E6/UL (ref 3.8–5.2)
RBC # BLD AUTO: 2.98 10E6/UL (ref 3.8–5.2)
RBC # BLD AUTO: 2.99 10E6/UL (ref 3.8–5.2)
RBC # BLD AUTO: 3.01 10E6/UL (ref 3.8–5.2)
RBC # BLD AUTO: 3.01 10E6/UL (ref 3.8–5.2)
RBC # BLD AUTO: 3.02 10E6/UL (ref 3.8–5.2)
RBC # BLD AUTO: 3.03 10E6/UL (ref 3.8–5.2)
RBC # BLD AUTO: 3.03 10E6/UL (ref 3.8–5.2)
RBC # BLD AUTO: 3.04 10E6/UL (ref 3.8–5.2)
RBC # BLD AUTO: 3.07 10E6/UL (ref 3.8–5.2)
RBC # BLD AUTO: 3.09 10E6/UL (ref 3.8–5.2)
RBC # BLD AUTO: 3.11 10E6/UL (ref 3.8–5.2)
RBC # BLD AUTO: 3.12 10E6/UL (ref 3.8–5.2)
RBC # BLD AUTO: 3.12 10E6/UL (ref 3.8–5.2)
RBC # BLD AUTO: 3.17 10E6/UL (ref 3.8–5.2)
RBC # BLD AUTO: 3.2 10E6/UL (ref 3.8–5.2)
RBC # BLD AUTO: 3.22 10E6/UL (ref 3.8–5.2)
RBC # BLD AUTO: 3.24 10E6/UL (ref 3.8–5.2)
RBC # BLD AUTO: 3.25 10E6/UL (ref 3.8–5.2)
RBC # BLD AUTO: 3.26 10E6/UL (ref 3.8–5.2)
RBC # BLD AUTO: 3.3 10E6/UL (ref 3.8–5.2)
RBC # BLD AUTO: 3.33 10E6/UL (ref 3.8–5.2)
RBC # BLD AUTO: 3.34 10E6/UL (ref 3.8–5.2)
RBC # BLD AUTO: 3.36 10E6/UL (ref 3.8–5.2)
RBC # BLD AUTO: 3.37 10E6/UL (ref 3.8–5.2)
RBC # BLD AUTO: 3.41 10E6/UL (ref 3.8–5.2)
RBC # BLD AUTO: 3.42 10E6/UL (ref 3.8–5.2)
RBC # BLD AUTO: 3.46 10E6/UL (ref 3.8–5.2)
RBC # BLD AUTO: 3.48 10E6/UL (ref 3.8–5.2)
RBC # BLD AUTO: 3.48 10E6/UL (ref 3.8–5.2)
RBC # BLD AUTO: 3.5 10E6/UL (ref 3.8–5.2)
RBC # BLD AUTO: 3.63 10E6/UL (ref 3.8–5.2)
RBC # BLD AUTO: 3.78 10E6/UL (ref 3.8–5.2)
RBC # BLD AUTO: 3.84 10E6/UL (ref 3.8–5.2)
RBC # BLD AUTO: 3.88 10E6/UL (ref 3.8–5.2)
RBC # BLD AUTO: 3.89 10E6/UL (ref 3.8–5.2)
RBC # BLD AUTO: 3.91 10E6/UL (ref 3.8–5.2)
RBC # BLD AUTO: 4.19 10E6/UL (ref 3.8–5.2)
RBC # BLD AUTO: 4.32 10E6/UL (ref 3.8–5.2)
RBC # BLD AUTO: 4.4 10E6/UL (ref 3.8–5.2)
RBC #/AREA URNS AUTO: ABNORMAL /HPF
RBC MORPH BLD: ABNORMAL
RBC MORPH BLD: ABNORMAL
RBC URINE: 1 /HPF
RBC URINE: 16 /HPF
RBC URINE: 4 /HPF
RBC URINE: 4 /HPF
RETICS # AUTO: 0.04 10E6/UL (ref 0.03–0.1)
RETICS/RBC NFR AUTO: 1.1 % (ref 0.5–2)
RHEUMATOID FACT SER NEPH-ACNC: 18 IU/ML
RSV RNA SPEC NAA+PROBE: NEGATIVE
RSV RNA SPEC QL NAA+PROBE: NOT DETECTED
RSV RNA SPEC QL NAA+PROBE: NOT DETECTED
RV+EV RNA SPEC QL NAA+PROBE: NOT DETECTED
S PNEUM AG SPEC QL: NEGATIVE
SA TARGET DNA: NEGATIVE
SAO2 % BLDV: 99.4 % (ref 70–75)
SARS-COV-2 RNA RESP QL NAA+PROBE: NEGATIVE
SCANNED LAB RESULT: NORMAL
SMUDGE CELLS BLD QL SMEAR: PRESENT
SODIUM BLD-SCNC: 144 MMOL/L (ref 135–145)
SODIUM SERPL-SCNC: 134 MMOL/L (ref 135–145)
SODIUM SERPL-SCNC: 136 MMOL/L (ref 135–145)
SODIUM SERPL-SCNC: 137 MMOL/L (ref 135–145)
SODIUM SERPL-SCNC: 137 MMOL/L (ref 135–145)
SODIUM SERPL-SCNC: 138 MMOL/L (ref 135–145)
SODIUM SERPL-SCNC: 139 MMOL/L (ref 135–145)
SODIUM SERPL-SCNC: 140 MMOL/L (ref 135–145)
SODIUM SERPL-SCNC: 141 MMOL/L (ref 135–145)
SODIUM SERPL-SCNC: 142 MMOL/L (ref 135–145)
SODIUM SERPL-SCNC: 143 MMOL/L (ref 135–145)
SODIUM SERPL-SCNC: 144 MMOL/L (ref 135–145)
SODIUM SERPL-SCNC: 145 MMOL/L (ref 135–145)
SODIUM SERPL-SCNC: 146 MMOL/L (ref 135–145)
SODIUM SERPL-SCNC: 147 MMOL/L (ref 135–145)
SODIUM SERPL-SCNC: 148 MMOL/L (ref 135–145)
SODIUM SERPL-SCNC: 149 MMOL/L (ref 135–145)
SODIUM SERPL-SCNC: 150 MMOL/L (ref 135–145)
SODIUM SERPL-SCNC: 151 MMOL/L (ref 135–145)
SODIUM SERPL-SCNC: 152 MMOL/L (ref 135–145)
SODIUM SERPL-SCNC: 153 MMOL/L (ref 135–145)
SODIUM SERPL-SCNC: 153 MMOL/L (ref 135–145)
SODIUM SERPL-SCNC: 155 MMOL/L (ref 135–145)
SODIUM UR-SCNC: 21 MMOL/L
SP GR UR STRIP: 1.01 (ref 1–1.03)
SP GR UR STRIP: 1.03 (ref 1–1.03)
SP GR UR STRIP: 1.03 (ref 1–1.03)
SPECIMEN EXPIRATION DATE: NORMAL
SQUAMOUS #/AREA URNS AUTO: ABNORMAL /LPF
SQUAMOUS EPITHELIAL: <1 /HPF
SQUAMOUS EPITHELIAL: ABNORMAL
STAPHYLOCOCCUS AUREUS: NOT DETECTED
STAPHYLOCOCCUS EPIDERMIDIS: DETECTED
STAPHYLOCOCCUS LUGDUNENSIS: NOT DETECTED
STOMATOCYTES BLD QL SMEAR: SLIGHT
STREPTOCOCCUS AGALACTIAE: NOT DETECTED
STREPTOCOCCUS ANGINOSUS GROUP: NOT DETECTED
STREPTOCOCCUS PNEUMONIAE: NOT DETECTED
STREPTOCOCCUS PYOGENES: NOT DETECTED
STREPTOCOCCUS SPECIES: NOT DETECTED
SUBCLASSES, PERCENT: 98 %
SYSTOLIC BLOOD PRESSURE - MUSE: NORMAL MMHG
T AXIS - MUSE: 12 DEGREES
T AXIS - MUSE: 155 DEGREES
T AXIS - MUSE: 20 DEGREES
T AXIS - MUSE: 50 DEGREES
TARGETS BLD QL SMEAR: SLIGHT
TEMPERATURE: 37 DEGREES C
TROPONIN T SERPL HS-MCNC: 418 NG/L
TSH SERPL DL<=0.005 MIU/L-ACNC: 0.51 UIU/ML (ref 0.3–4.2)
UNIT ABO/RH: NORMAL
UNIT NUMBER: NORMAL
UNIT STATUS: NORMAL
UNIT TYPE ISBT: 7300
UROBILINOGEN UR STRIP-ACNC: 0.2 E.U./DL
UROBILINOGEN UR STRIP-MCNC: <2 MG/DL
UROBILINOGEN UR STRIP-MCNC: <2 MG/DL
UROBILINOGEN UR STRIP-MCNC: NORMAL MG/DL
UROBILINOGEN UR STRIP-MCNC: NORMAL MG/DL
UUN UR-MCNC: 239 MG/DL (ref 801–1666)
VANCOMYCIN SERPL-MCNC: 17.4 UG/ML
VANCOMYCIN SERPL-MCNC: 21.6 UG/ML
VANCOMYCIN SERPL-MCNC: 24.2 UG/ML
VENTILATION MODE: ABNORMAL
VENTILATOR TIDAL VOLUME: 280 ML
VENTILATOR TIDAL VOLUME: 320 ML
VENTILATOR TIDAL VOLUME: 320 ML
VENTRICULAR RATE- MUSE: 56 BPM
VENTRICULAR RATE- MUSE: 70 BPM
VENTRICULAR RATE- MUSE: 88 BPM
VENTRICULAR RATE- MUSE: 90 BPM
VIM: NORMAL
WBC # BLD AUTO: 0.8 10E3/UL (ref 4–11)
WBC # BLD AUTO: 1.5 10E3/UL (ref 4–11)
WBC # BLD AUTO: 1.8 10E3/UL (ref 4–11)
WBC # BLD AUTO: 10.1 10E3/UL (ref 4–11)
WBC # BLD AUTO: 10.2 10E3/UL (ref 4–11)
WBC # BLD AUTO: 10.8 10E3/UL (ref 4–11)
WBC # BLD AUTO: 11.1 10E3/UL (ref 4–11)
WBC # BLD AUTO: 11.9 10E3/UL (ref 4–11)
WBC # BLD AUTO: 12.5 10E3/UL (ref 4–11)
WBC # BLD AUTO: 12.7 10E3/UL (ref 4–11)
WBC # BLD AUTO: 12.7 10E3/UL (ref 4–11)
WBC # BLD AUTO: 13.1 10E3/UL (ref 4–11)
WBC # BLD AUTO: 13.8 10E3/UL (ref 4–11)
WBC # BLD AUTO: 14.3 10E3/UL (ref 4–11)
WBC # BLD AUTO: 14.5 10E3/UL (ref 4–11)
WBC # BLD AUTO: 14.7 10E3/UL (ref 4–11)
WBC # BLD AUTO: 15.8 10E3/UL (ref 4–11)
WBC # BLD AUTO: 15.8 10E3/UL (ref 4–11)
WBC # BLD AUTO: 16.1 10E3/UL (ref 4–11)
WBC # BLD AUTO: 16.1 10E3/UL (ref 4–11)
WBC # BLD AUTO: 16.2 10E3/UL (ref 4–11)
WBC # BLD AUTO: 16.2 10E3/UL (ref 4–11)
WBC # BLD AUTO: 16.3 10E3/UL (ref 4–11)
WBC # BLD AUTO: 16.4 10E3/UL (ref 4–11)
WBC # BLD AUTO: 16.7 10E3/UL (ref 4–11)
WBC # BLD AUTO: 16.7 10E3/UL (ref 4–11)
WBC # BLD AUTO: 17.1 10E3/UL (ref 4–11)
WBC # BLD AUTO: 17.3 10E3/UL (ref 4–11)
WBC # BLD AUTO: 17.3 10E3/UL (ref 4–11)
WBC # BLD AUTO: 17.7 10E3/UL (ref 4–11)
WBC # BLD AUTO: 17.8 10E3/UL (ref 4–11)
WBC # BLD AUTO: 17.9 10E3/UL (ref 4–11)
WBC # BLD AUTO: 18 10E3/UL (ref 4–11)
WBC # BLD AUTO: 18.1 10E3/UL (ref 4–11)
WBC # BLD AUTO: 18.6 10E3/UL (ref 4–11)
WBC # BLD AUTO: 19.1 10E3/UL (ref 4–11)
WBC # BLD AUTO: 19.1 10E3/UL (ref 4–11)
WBC # BLD AUTO: 19.2 10E3/UL (ref 4–11)
WBC # BLD AUTO: 19.5 10E3/UL (ref 4–11)
WBC # BLD AUTO: 19.5 10E3/UL (ref 4–11)
WBC # BLD AUTO: 19.6 10E3/UL (ref 4–11)
WBC # BLD AUTO: 19.7 10E3/UL (ref 4–11)
WBC # BLD AUTO: 20 10E3/UL (ref 4–11)
WBC # BLD AUTO: 20 10E3/UL (ref 4–11)
WBC # BLD AUTO: 20.8 10E3/UL (ref 4–11)
WBC # BLD AUTO: 21.3 10E3/UL (ref 4–11)
WBC # BLD AUTO: 21.4 10E3/UL (ref 4–11)
WBC # BLD AUTO: 21.5 10E3/UL (ref 4–11)
WBC # BLD AUTO: 21.5 10E3/UL (ref 4–11)
WBC # BLD AUTO: 22.3 10E3/UL (ref 4–11)
WBC # BLD AUTO: 22.3 10E3/UL (ref 4–11)
WBC # BLD AUTO: 22.4 10E3/UL (ref 4–11)
WBC # BLD AUTO: 22.9 10E3/UL (ref 4–11)
WBC # BLD AUTO: 23.1 10E3/UL (ref 4–11)
WBC # BLD AUTO: 23.1 10E3/UL (ref 4–11)
WBC # BLD AUTO: 23.9 10E3/UL (ref 4–11)
WBC # BLD AUTO: 24.3 10E3/UL (ref 4–11)
WBC # BLD AUTO: 24.5 10E3/UL (ref 4–11)
WBC # BLD AUTO: 25 10E3/UL (ref 4–11)
WBC # BLD AUTO: 26 10E3/UL (ref 4–11)
WBC # BLD AUTO: 26.6 10E3/UL (ref 4–11)
WBC # BLD AUTO: 27.3 10E3/UL (ref 4–11)
WBC # BLD AUTO: 28 10E3/UL (ref 4–11)
WBC # BLD AUTO: 29.2 10E3/UL (ref 4–11)
WBC # BLD AUTO: 30 10E3/UL (ref 4–11)
WBC # BLD AUTO: ABNORMAL 10*3/UL
WBC # FLD AUTO: 1105 /UL
WBC #/AREA URNS AUTO: ABNORMAL /HPF
WBC URINE: 1 /HPF
WBC URINE: 5 /HPF
WBC URINE: 6 /HPF
WBC URINE: 97 /HPF
YEAST #/AREA URNS HPF: ABNORMAL /HPF

## 2023-01-01 PROCEDURE — 250N000009 HC RX 250: Performed by: NURSE PRACTITIONER

## 2023-01-01 PROCEDURE — 999N000040 HC STATISTIC CONSULT NO CHARGE VASC ACCESS

## 2023-01-01 PROCEDURE — 99232 SBSQ HOSP IP/OBS MODERATE 35: CPT | Performed by: STUDENT IN AN ORGANIZED HEALTH CARE EDUCATION/TRAINING PROGRAM

## 2023-01-01 PROCEDURE — 85027 COMPLETE CBC AUTOMATED: CPT | Performed by: HOSPITALIST

## 2023-01-01 PROCEDURE — 49440 PLACE GASTROSTOMY TUBE PERC: CPT

## 2023-01-01 PROCEDURE — 85007 BL SMEAR W/DIFF WBC COUNT: CPT | Performed by: INTERNAL MEDICINE

## 2023-01-01 PROCEDURE — 94003 VENT MGMT INPAT SUBQ DAY: CPT | Performed by: NURSE PRACTITIONER

## 2023-01-01 PROCEDURE — 250N000011 HC RX IP 250 OP 636: Performed by: HOSPITALIST

## 2023-01-01 PROCEDURE — 250N000011 HC RX IP 250 OP 636: Mod: JZ | Performed by: INTERNAL MEDICINE

## 2023-01-01 PROCEDURE — 83735 ASSAY OF MAGNESIUM: CPT | Performed by: INTERNAL MEDICINE

## 2023-01-01 PROCEDURE — 80048 BASIC METABOLIC PNL TOTAL CA: CPT | Performed by: INTERNAL MEDICINE

## 2023-01-01 PROCEDURE — 94003 VENT MGMT INPAT SUBQ DAY: CPT

## 2023-01-01 PROCEDURE — 97162 PT EVAL MOD COMPLEX 30 MIN: CPT | Mod: GP | Performed by: PHYSICAL THERAPIST

## 2023-01-01 PROCEDURE — 250N000013 HC RX MED GY IP 250 OP 250 PS 637: Performed by: HOSPITALIST

## 2023-01-01 PROCEDURE — 999N000009 HC STATISTIC AIRWAY CARE

## 2023-01-01 PROCEDURE — 250N000009 HC RX 250: Performed by: HOSPITALIST

## 2023-01-01 PROCEDURE — 97535 SELF CARE MNGMENT TRAINING: CPT | Mod: GO | Performed by: OCCUPATIONAL THERAPIST

## 2023-01-01 PROCEDURE — 97530 THERAPEUTIC ACTIVITIES: CPT | Mod: GO | Performed by: OCCUPATIONAL THERAPIST

## 2023-01-01 PROCEDURE — 80048 BASIC METABOLIC PNL TOTAL CA: CPT | Performed by: HOSPITALIST

## 2023-01-01 PROCEDURE — 84100 ASSAY OF PHOSPHORUS: CPT | Performed by: INTERNAL MEDICINE

## 2023-01-01 PROCEDURE — 99291 CRITICAL CARE FIRST HOUR: CPT | Performed by: SURGERY

## 2023-01-01 PROCEDURE — 84295 ASSAY OF SERUM SODIUM: CPT | Performed by: ANESTHESIOLOGY

## 2023-01-01 PROCEDURE — 250N000011 HC RX IP 250 OP 636: Performed by: INTERNAL MEDICINE

## 2023-01-01 PROCEDURE — 250N000013 HC RX MED GY IP 250 OP 250 PS 637: Performed by: INTERNAL MEDICINE

## 2023-01-01 PROCEDURE — 85027 COMPLETE CBC AUTOMATED: CPT | Performed by: INTERNAL MEDICINE

## 2023-01-01 PROCEDURE — 200N000001 HC R&B ICU

## 2023-01-01 PROCEDURE — 250N000011 HC RX IP 250 OP 636: Performed by: SURGERY

## 2023-01-01 PROCEDURE — 94640 AIRWAY INHALATION TREATMENT: CPT | Mod: 76

## 2023-01-01 PROCEDURE — 80202 ASSAY OF VANCOMYCIN: CPT | Performed by: HOSPITALIST

## 2023-01-01 PROCEDURE — 82784 ASSAY IGA/IGD/IGG/IGM EACH: CPT | Performed by: INTERNAL MEDICINE

## 2023-01-01 PROCEDURE — 250N000012 HC RX MED GY IP 250 OP 636 PS 637: Performed by: STUDENT IN AN ORGANIZED HEALTH CARE EDUCATION/TRAINING PROGRAM

## 2023-01-01 PROCEDURE — 250N000011 HC RX IP 250 OP 636: Mod: JZ | Performed by: HOSPITALIST

## 2023-01-01 PROCEDURE — 84145 PROCALCITONIN (PCT): CPT | Performed by: STUDENT IN AN ORGANIZED HEALTH CARE EDUCATION/TRAINING PROGRAM

## 2023-01-01 PROCEDURE — 250N000011 HC RX IP 250 OP 636: Performed by: PAIN MEDICINE

## 2023-01-01 PROCEDURE — 94668 MNPJ CHEST WALL SBSQ: CPT

## 2023-01-01 PROCEDURE — 99232 SBSQ HOSP IP/OBS MODERATE 35: CPT | Performed by: INTERNAL MEDICINE

## 2023-01-01 PROCEDURE — 87102 FUNGUS ISOLATION CULTURE: CPT | Performed by: INTERNAL MEDICINE

## 2023-01-01 PROCEDURE — 250N000011 HC RX IP 250 OP 636: Mod: JZ | Performed by: EMERGENCY MEDICINE

## 2023-01-01 PROCEDURE — 71045 X-RAY EXAM CHEST 1 VIEW: CPT

## 2023-01-01 PROCEDURE — 250N000013 HC RX MED GY IP 250 OP 250 PS 637: Performed by: ANESTHESIOLOGY

## 2023-01-01 PROCEDURE — 258N000003 HC RX IP 258 OP 636: Performed by: ANESTHESIOLOGY

## 2023-01-01 PROCEDURE — 99233 SBSQ HOSP IP/OBS HIGH 50: CPT

## 2023-01-01 PROCEDURE — 999N000157 HC STATISTIC RCP TIME EA 10 MIN

## 2023-01-01 PROCEDURE — 88305 TISSUE EXAM BY PATHOLOGIST: CPT | Mod: TC | Performed by: INTERNAL MEDICINE

## 2023-01-01 PROCEDURE — 83605 ASSAY OF LACTIC ACID: CPT | Performed by: STUDENT IN AN ORGANIZED HEALTH CARE EDUCATION/TRAINING PROGRAM

## 2023-01-01 PROCEDURE — 87633 RESP VIRUS 12-25 TARGETS: CPT | Performed by: INTERNAL MEDICINE

## 2023-01-01 PROCEDURE — 250N000009 HC RX 250: Performed by: INTERNAL MEDICINE

## 2023-01-01 PROCEDURE — 999N000127 HC STATISTIC PERIPHERAL IV START W US GUIDANCE

## 2023-01-01 PROCEDURE — 250N000013 HC RX MED GY IP 250 OP 250 PS 637: Performed by: NURSE PRACTITIONER

## 2023-01-01 PROCEDURE — 87305 ASPERGILLUS AG IA: CPT | Performed by: INTERNAL MEDICINE

## 2023-01-01 PROCEDURE — 99233 SBSQ HOSP IP/OBS HIGH 50: CPT | Performed by: INTERNAL MEDICINE

## 2023-01-01 PROCEDURE — C9113 INJ PANTOPRAZOLE SODIUM, VIA: HCPCS | Mod: JZ | Performed by: INTERNAL MEDICINE

## 2023-01-01 PROCEDURE — 250N000013 HC RX MED GY IP 250 OP 250 PS 637: Performed by: PAIN MEDICINE

## 2023-01-01 PROCEDURE — 82565 ASSAY OF CREATININE: CPT | Performed by: INTERNAL MEDICINE

## 2023-01-01 PROCEDURE — 250N000011 HC RX IP 250 OP 636: Performed by: STUDENT IN AN ORGANIZED HEALTH CARE EDUCATION/TRAINING PROGRAM

## 2023-01-01 PROCEDURE — 97530 THERAPEUTIC ACTIVITIES: CPT | Mod: GO

## 2023-01-01 PROCEDURE — 87040 BLOOD CULTURE FOR BACTERIA: CPT | Performed by: INTERNAL MEDICINE

## 2023-01-01 PROCEDURE — 36415 COLL VENOUS BLD VENIPUNCTURE: CPT | Performed by: HOSPITALIST

## 2023-01-01 PROCEDURE — 36600 WITHDRAWAL OF ARTERIAL BLOOD: CPT

## 2023-01-01 PROCEDURE — 84295 ASSAY OF SERUM SODIUM: CPT | Performed by: INTERNAL MEDICINE

## 2023-01-01 PROCEDURE — 87449 NOS EACH ORGANISM AG IA: CPT | Performed by: INTERNAL MEDICINE

## 2023-01-01 PROCEDURE — 97110 THERAPEUTIC EXERCISES: CPT | Mod: GP

## 2023-01-01 PROCEDURE — 87205 SMEAR GRAM STAIN: CPT | Performed by: HOSPITALIST

## 2023-01-01 PROCEDURE — 84443 ASSAY THYROID STIM HORMONE: CPT | Performed by: EMERGENCY MEDICINE

## 2023-01-01 PROCEDURE — 84156 ASSAY OF PROTEIN URINE: CPT | Performed by: NURSE PRACTITIONER

## 2023-01-01 PROCEDURE — 94799 UNLISTED PULMONARY SVC/PX: CPT

## 2023-01-01 PROCEDURE — 94640 AIRWAY INHALATION TREATMENT: CPT

## 2023-01-01 PROCEDURE — 85014 HEMATOCRIT: CPT | Performed by: HOSPITALIST

## 2023-01-01 PROCEDURE — 99233 SBSQ HOSP IP/OBS HIGH 50: CPT | Performed by: HOSPITALIST

## 2023-01-01 PROCEDURE — 250N000013 HC RX MED GY IP 250 OP 250 PS 637: Performed by: SURGERY

## 2023-01-01 PROCEDURE — 120N000017 HC R&B RESPIRATORY CARE

## 2023-01-01 PROCEDURE — 250N000013 HC RX MED GY IP 250 OP 250 PS 637: Performed by: STUDENT IN AN ORGANIZED HEALTH CARE EDUCATION/TRAINING PROGRAM

## 2023-01-01 PROCEDURE — 0B21XFZ CHANGE TRACHEOSTOMY DEVICE IN TRACHEA, EXTERNAL APPROACH: ICD-10-PCS | Performed by: OTOLARYNGOLOGY

## 2023-01-01 PROCEDURE — 999N000253 HC STATISTIC WEANING TRIALS

## 2023-01-01 PROCEDURE — 250N000009 HC RX 250: Performed by: STUDENT IN AN ORGANIZED HEALTH CARE EDUCATION/TRAINING PROGRAM

## 2023-01-01 PROCEDURE — 99291 CRITICAL CARE FIRST HOUR: CPT | Performed by: INTERNAL MEDICINE

## 2023-01-01 PROCEDURE — 96365 THER/PROPH/DIAG IV INF INIT: CPT

## 2023-01-01 PROCEDURE — 82803 BLOOD GASES ANY COMBINATION: CPT | Performed by: INTERNAL MEDICINE

## 2023-01-01 PROCEDURE — 92526 ORAL FUNCTION THERAPY: CPT | Mod: GN | Performed by: SPEECH-LANGUAGE PATHOLOGIST

## 2023-01-01 PROCEDURE — 999N000197 HC STATISTIC WOC PT EDUCATION, 0-15 MIN

## 2023-01-01 PROCEDURE — 84132 ASSAY OF SERUM POTASSIUM: CPT | Performed by: HOSPITALIST

## 2023-01-01 PROCEDURE — 87798 DETECT AGENT NOS DNA AMP: CPT | Performed by: INTERNAL MEDICINE

## 2023-01-01 PROCEDURE — 258N000001 HC RX 258: Performed by: INTERNAL MEDICINE

## 2023-01-01 PROCEDURE — 258N000003 HC RX IP 258 OP 636: Performed by: EMERGENCY MEDICINE

## 2023-01-01 PROCEDURE — 250N000009 HC RX 250: Performed by: ANESTHESIOLOGY

## 2023-01-01 PROCEDURE — G0463 HOSPITAL OUTPT CLINIC VISIT: HCPCS

## 2023-01-01 PROCEDURE — 87071 CULTURE AEROBIC QUANT OTHER: CPT | Performed by: INTERNAL MEDICINE

## 2023-01-01 PROCEDURE — 999N000065 XR CHEST PORT 1 VIEW

## 2023-01-01 PROCEDURE — 87205 SMEAR GRAM STAIN: CPT | Performed by: INTERNAL MEDICINE

## 2023-01-01 PROCEDURE — 83735 ASSAY OF MAGNESIUM: CPT | Performed by: STUDENT IN AN ORGANIZED HEALTH CARE EDUCATION/TRAINING PROGRAM

## 2023-01-01 PROCEDURE — 250N000011 HC RX IP 250 OP 636: Mod: JZ | Performed by: NURSE PRACTITIONER

## 2023-01-01 PROCEDURE — 83036 HEMOGLOBIN GLYCOSYLATED A1C: CPT | Performed by: INTERNAL MEDICINE

## 2023-01-01 PROCEDURE — 85025 COMPLETE CBC W/AUTO DIFF WBC: CPT | Performed by: HOSPITALIST

## 2023-01-01 PROCEDURE — 250N000011 HC RX IP 250 OP 636: Mod: JZ

## 2023-01-01 PROCEDURE — 0HC4XZZ EXTIRPATION OF MATTER FROM NECK SKIN, EXTERNAL APPROACH: ICD-10-PCS | Performed by: OTOLARYNGOLOGY

## 2023-01-01 PROCEDURE — 36415 COLL VENOUS BLD VENIPUNCTURE: CPT | Performed by: INTERNAL MEDICINE

## 2023-01-01 PROCEDURE — 99223 1ST HOSP IP/OBS HIGH 75: CPT | Performed by: INTERNAL MEDICINE

## 2023-01-01 PROCEDURE — 99232 SBSQ HOSP IP/OBS MODERATE 35: CPT | Performed by: HOSPITALIST

## 2023-01-01 PROCEDURE — 87637 SARSCOV2&INF A&B&RSV AMP PRB: CPT | Performed by: HOSPITALIST

## 2023-01-01 PROCEDURE — 87086 URINE CULTURE/COLONY COUNT: CPT | Performed by: ANESTHESIOLOGY

## 2023-01-01 PROCEDURE — 97530 THERAPEUTIC ACTIVITIES: CPT | Mod: GP | Performed by: PHYSICAL THERAPIST

## 2023-01-01 PROCEDURE — 94644 CONT INHLJ TX 1ST HOUR: CPT

## 2023-01-01 PROCEDURE — 99233 SBSQ HOSP IP/OBS HIGH 50: CPT | Performed by: STUDENT IN AN ORGANIZED HEALTH CARE EDUCATION/TRAINING PROGRAM

## 2023-01-01 PROCEDURE — 82803 BLOOD GASES ANY COMBINATION: CPT | Performed by: HOSPITALIST

## 2023-01-01 PROCEDURE — 84132 ASSAY OF SERUM POTASSIUM: CPT | Performed by: INTERNAL MEDICINE

## 2023-01-01 PROCEDURE — 120N000001 HC R&B MED SURG/OB

## 2023-01-01 PROCEDURE — 0B110F4 BYPASS TRACHEA TO CUTANEOUS WITH TRACHEOSTOMY DEVICE, OPEN APPROACH: ICD-10-PCS | Performed by: OTOLARYNGOLOGY

## 2023-01-01 PROCEDURE — 84100 ASSAY OF PHOSPHORUS: CPT | Performed by: STUDENT IN AN ORGANIZED HEALTH CARE EDUCATION/TRAINING PROGRAM

## 2023-01-01 PROCEDURE — 99291 CRITICAL CARE FIRST HOUR: CPT | Mod: 25 | Performed by: INTERNAL MEDICINE

## 2023-01-01 PROCEDURE — 92507 TX SP LANG VOICE COMM INDIV: CPT | Mod: GN

## 2023-01-01 PROCEDURE — 999N000125 HC STATISTIC PATIENT MED CONFERENCE < 30 MIN

## 2023-01-01 PROCEDURE — 99207 PR NO BILLABLE SERVICE THIS VISIT: CPT | Mod: 25 | Performed by: SURGERY

## 2023-01-01 PROCEDURE — 250N000011 HC RX IP 250 OP 636

## 2023-01-01 PROCEDURE — 94660 CPAP INITIATION&MGMT: CPT

## 2023-01-01 PROCEDURE — 99207 PR NO BILLABLE SERVICE THIS VISIT: CPT | Performed by: STUDENT IN AN ORGANIZED HEALTH CARE EDUCATION/TRAINING PROGRAM

## 2023-01-01 PROCEDURE — 83880 ASSAY OF NATRIURETIC PEPTIDE: CPT | Performed by: INTERNAL MEDICINE

## 2023-01-01 PROCEDURE — 83735 ASSAY OF MAGNESIUM: CPT | Performed by: EMERGENCY MEDICINE

## 2023-01-01 PROCEDURE — 31622 DX BRONCHOSCOPE/WASH: CPT

## 2023-01-01 PROCEDURE — 360N000075 HC SURGERY LEVEL 2, PER MIN: Performed by: OTOLARYNGOLOGY

## 2023-01-01 PROCEDURE — 250N000012 HC RX MED GY IP 250 OP 636 PS 637: Performed by: ANESTHESIOLOGY

## 2023-01-01 PROCEDURE — 250N000009 HC RX 250: Performed by: RADIOLOGY

## 2023-01-01 PROCEDURE — 97110 THERAPEUTIC EXERCISES: CPT | Mod: GP | Performed by: PHYSICAL THERAPIST

## 2023-01-01 PROCEDURE — 258N000003 HC RX IP 258 OP 636: Performed by: NURSE ANESTHETIST, CERTIFIED REGISTERED

## 2023-01-01 PROCEDURE — 999N000105 HC STATISTIC NO DOCUMENTATION TO SUPPORT CHARGE

## 2023-01-01 PROCEDURE — 97110 THERAPEUTIC EXERCISES: CPT | Mod: GO | Performed by: OCCUPATIONAL THERAPIST

## 2023-01-01 PROCEDURE — 87040 BLOOD CULTURE FOR BACTERIA: CPT | Performed by: HOSPITALIST

## 2023-01-01 PROCEDURE — 258N000003 HC RX IP 258 OP 636: Performed by: HOSPITALIST

## 2023-01-01 PROCEDURE — 94003 VENT MGMT INPAT SUBQ DAY: CPT | Performed by: INTERNAL MEDICINE

## 2023-01-01 PROCEDURE — 97530 THERAPEUTIC ACTIVITIES: CPT | Mod: GP

## 2023-01-01 PROCEDURE — 82550 ASSAY OF CK (CPK): CPT | Performed by: NURSE PRACTITIONER

## 2023-01-01 PROCEDURE — P9016 RBC LEUKOCYTES REDUCED: HCPCS | Performed by: STUDENT IN AN ORGANIZED HEALTH CARE EDUCATION/TRAINING PROGRAM

## 2023-01-01 PROCEDURE — 92507 TX SP LANG VOICE COMM INDIV: CPT | Mod: GN | Performed by: SPEECH-LANGUAGE PATHOLOGIST

## 2023-01-01 PROCEDURE — 99291 CRITICAL CARE FIRST HOUR: CPT | Performed by: ANESTHESIOLOGY

## 2023-01-01 PROCEDURE — 99291 CRITICAL CARE FIRST HOUR: CPT | Performed by: STUDENT IN AN ORGANIZED HEALTH CARE EDUCATION/TRAINING PROGRAM

## 2023-01-01 PROCEDURE — 99222 1ST HOSP IP/OBS MODERATE 55: CPT | Performed by: HOSPITALIST

## 2023-01-01 PROCEDURE — 99233 SBSQ HOSP IP/OBS HIGH 50: CPT | Performed by: PAIN MEDICINE

## 2023-01-01 PROCEDURE — 999N000065 XR ABDOMEN PORT 1 VIEW

## 2023-01-01 PROCEDURE — 80048 BASIC METABOLIC PNL TOTAL CA: CPT | Performed by: STUDENT IN AN ORGANIZED HEALTH CARE EDUCATION/TRAINING PROGRAM

## 2023-01-01 PROCEDURE — 272N000001 HC OR GENERAL SUPPLY STERILE: Performed by: OTOLARYNGOLOGY

## 2023-01-01 PROCEDURE — 999N000104 HC STATISTIC NO CHARGE

## 2023-01-01 PROCEDURE — 94645 CONT INHLJ TX EACH ADDL HOUR: CPT

## 2023-01-01 PROCEDURE — 93005 ELECTROCARDIOGRAM TRACING: CPT

## 2023-01-01 PROCEDURE — 82805 BLOOD GASES W/O2 SATURATION: CPT | Performed by: INTERNAL MEDICINE

## 2023-01-01 PROCEDURE — 84145 PROCALCITONIN (PCT): CPT | Performed by: EMERGENCY MEDICINE

## 2023-01-01 PROCEDURE — 86481 TB AG RESPONSE T-CELL SUSP: CPT | Performed by: INTERNAL MEDICINE

## 2023-01-01 PROCEDURE — 85027 COMPLETE CBC AUTOMATED: CPT | Performed by: STUDENT IN AN ORGANIZED HEALTH CARE EDUCATION/TRAINING PROGRAM

## 2023-01-01 PROCEDURE — 86900 BLOOD TYPING SEROLOGIC ABO: CPT | Performed by: INTERNAL MEDICINE

## 2023-01-01 PROCEDURE — 84295 ASSAY OF SERUM SODIUM: CPT | Performed by: STUDENT IN AN ORGANIZED HEALTH CARE EDUCATION/TRAINING PROGRAM

## 2023-01-01 PROCEDURE — 99207 PR NO BILLABLE SERVICE THIS VISIT: CPT | Performed by: INTERNAL MEDICINE

## 2023-01-01 PROCEDURE — 82805 BLOOD GASES W/O2 SATURATION: CPT | Performed by: ANESTHESIOLOGY

## 2023-01-01 PROCEDURE — 87103 BLOOD FUNGUS CULTURE: CPT | Performed by: HOSPITALIST

## 2023-01-01 PROCEDURE — 82374 ASSAY BLOOD CARBON DIOXIDE: CPT | Performed by: ANESTHESIOLOGY

## 2023-01-01 PROCEDURE — 250N000011 HC RX IP 250 OP 636: Mod: JZ | Performed by: STUDENT IN AN ORGANIZED HEALTH CARE EDUCATION/TRAINING PROGRAM

## 2023-01-01 PROCEDURE — 250N000011 HC RX IP 250 OP 636: Performed by: ANESTHESIOLOGY

## 2023-01-01 PROCEDURE — 999N000289 HC STATISTIC BRONCHOSCOPY ASST

## 2023-01-01 PROCEDURE — 82803 BLOOD GASES ANY COMBINATION: CPT | Performed by: ANESTHESIOLOGY

## 2023-01-01 PROCEDURE — 74230 X-RAY XM SWLNG FUNCJ C+: CPT

## 2023-01-01 PROCEDURE — 83880 ASSAY OF NATRIURETIC PEPTIDE: CPT | Performed by: NURSE PRACTITIONER

## 2023-01-01 PROCEDURE — 258N000003 HC RX IP 258 OP 636: Performed by: STUDENT IN AN ORGANIZED HEALTH CARE EDUCATION/TRAINING PROGRAM

## 2023-01-01 PROCEDURE — 370N000003 HC ANESTHESIA WARD SERVICE

## 2023-01-01 PROCEDURE — 272N000272 HC CONTINUOUS NEBULIZER MICRO PUMP

## 2023-01-01 PROCEDURE — 80048 BASIC METABOLIC PNL TOTAL CA: CPT | Performed by: ANESTHESIOLOGY

## 2023-01-01 PROCEDURE — 85018 HEMOGLOBIN: CPT | Performed by: INTERNAL MEDICINE

## 2023-01-01 PROCEDURE — 36415 COLL VENOUS BLD VENIPUNCTURE: CPT | Performed by: EMERGENCY MEDICINE

## 2023-01-01 PROCEDURE — 82610 CYSTATIN C: CPT | Performed by: INTERNAL MEDICINE

## 2023-01-01 PROCEDURE — 258N000003 HC RX IP 258 OP 636: Performed by: INTERNAL MEDICINE

## 2023-01-01 PROCEDURE — 272N000220 HC BRONCHOSCOPE, DISPOSABLE

## 2023-01-01 PROCEDURE — 250N000012 HC RX MED GY IP 250 OP 636 PS 637: Performed by: INTERNAL MEDICINE

## 2023-01-01 PROCEDURE — 250N000009 HC RX 250: Performed by: EMERGENCY MEDICINE

## 2023-01-01 PROCEDURE — 83690 ASSAY OF LIPASE: CPT | Performed by: INTERNAL MEDICINE

## 2023-01-01 PROCEDURE — 86923 COMPATIBILITY TEST ELECTRIC: CPT | Performed by: INTERNAL MEDICINE

## 2023-01-01 PROCEDURE — 82803 BLOOD GASES ANY COMBINATION: CPT | Performed by: SURGERY

## 2023-01-01 PROCEDURE — 86698 HISTOPLASMA ANTIBODY: CPT | Performed by: INTERNAL MEDICINE

## 2023-01-01 PROCEDURE — 93010 ELECTROCARDIOGRAM REPORT: CPT | Performed by: INTERNAL MEDICINE

## 2023-01-01 PROCEDURE — 82805 BLOOD GASES W/O2 SATURATION: CPT | Performed by: HOSPITALIST

## 2023-01-01 PROCEDURE — 86606 ASPERGILLUS ANTIBODY: CPT | Performed by: INTERNAL MEDICINE

## 2023-01-01 PROCEDURE — 82570 ASSAY OF URINE CREATININE: CPT | Performed by: STUDENT IN AN ORGANIZED HEALTH CARE EDUCATION/TRAINING PROGRAM

## 2023-01-01 PROCEDURE — 86140 C-REACTIVE PROTEIN: CPT | Performed by: INTERNAL MEDICINE

## 2023-01-01 PROCEDURE — 84295 ASSAY OF SERUM SODIUM: CPT

## 2023-01-01 PROCEDURE — 80202 ASSAY OF VANCOMYCIN: CPT | Performed by: STUDENT IN AN ORGANIZED HEALTH CARE EDUCATION/TRAINING PROGRAM

## 2023-01-01 PROCEDURE — 88305 TISSUE EXAM BY PATHOLOGIST: CPT | Mod: 26 | Performed by: PATHOLOGY

## 2023-01-01 PROCEDURE — 81001 URINALYSIS AUTO W/SCOPE: CPT | Performed by: STUDENT IN AN ORGANIZED HEALTH CARE EDUCATION/TRAINING PROGRAM

## 2023-01-01 PROCEDURE — 71275 CT ANGIOGRAPHY CHEST: CPT

## 2023-01-01 PROCEDURE — 86140 C-REACTIVE PROTEIN: CPT | Performed by: HOSPITALIST

## 2023-01-01 PROCEDURE — 250N000009 HC RX 250

## 2023-01-01 PROCEDURE — 87186 SC STD MICRODIL/AGAR DIL: CPT | Performed by: SURGERY

## 2023-01-01 PROCEDURE — 87070 CULTURE OTHR SPECIMN AEROBIC: CPT | Performed by: INTERNAL MEDICINE

## 2023-01-01 PROCEDURE — 87206 SMEAR FLUORESCENT/ACID STAI: CPT | Performed by: HOSPITALIST

## 2023-01-01 PROCEDURE — 99291 CRITICAL CARE FIRST HOUR: CPT | Mod: 25

## 2023-01-01 PROCEDURE — 99239 HOSP IP/OBS DSCHRG MGMT >30: CPT | Performed by: HOSPITALIST

## 2023-01-01 PROCEDURE — 81001 URINALYSIS AUTO W/SCOPE: CPT | Performed by: ANESTHESIOLOGY

## 2023-01-01 PROCEDURE — 83605 ASSAY OF LACTIC ACID: CPT | Performed by: INTERNAL MEDICINE

## 2023-01-01 PROCEDURE — 92610 EVALUATE SWALLOWING FUNCTION: CPT | Mod: GN | Performed by: SPEECH-LANGUAGE PATHOLOGIST

## 2023-01-01 PROCEDURE — 97166 OT EVAL MOD COMPLEX 45 MIN: CPT | Mod: GO | Performed by: OCCUPATIONAL THERAPIST

## 2023-01-01 PROCEDURE — 97168 OT RE-EVAL EST PLAN CARE: CPT | Mod: GO | Performed by: OCCUPATIONAL THERAPIST

## 2023-01-01 PROCEDURE — 94002 VENT MGMT INPAT INIT DAY: CPT | Mod: ZZRT

## 2023-01-01 PROCEDURE — 31645 BRNCHSC W/THER ASPIR 1ST: CPT | Performed by: INTERNAL MEDICINE

## 2023-01-01 PROCEDURE — 250N000011 HC RX IP 250 OP 636: Performed by: EMERGENCY MEDICINE

## 2023-01-01 PROCEDURE — 36415 COLL VENOUS BLD VENIPUNCTURE: CPT | Performed by: STUDENT IN AN ORGANIZED HEALTH CARE EDUCATION/TRAINING PROGRAM

## 2023-01-01 PROCEDURE — 89050 BODY FLUID CELL COUNT: CPT | Performed by: INTERNAL MEDICINE

## 2023-01-01 PROCEDURE — 71250 CT THORAX DX C-: CPT

## 2023-01-01 PROCEDURE — 87106 FUNGI IDENTIFICATION YEAST: CPT | Performed by: ANESTHESIOLOGY

## 2023-01-01 PROCEDURE — 94667 MNPJ CHEST WALL 1ST: CPT

## 2023-01-01 PROCEDURE — 999N000287 HC ICU ADULT ROUNDING, EACH 10 MINS

## 2023-01-01 PROCEDURE — 85610 PROTHROMBIN TIME: CPT | Performed by: PHYSICIAN ASSISTANT

## 2023-01-01 PROCEDURE — G0463 HOSPITAL OUTPT CLINIC VISIT: HCPCS | Mod: 25

## 2023-01-01 PROCEDURE — P9016 RBC LEUKOCYTES REDUCED: HCPCS | Performed by: INTERNAL MEDICINE

## 2023-01-01 PROCEDURE — 0B21XFZ CHANGE TRACHEOSTOMY DEVICE IN TRACHEA, EXTERNAL APPROACH: ICD-10-PCS | Performed by: NURSE PRACTITIONER

## 2023-01-01 PROCEDURE — 87640 STAPH A DNA AMP PROBE: CPT | Performed by: STUDENT IN AN ORGANIZED HEALTH CARE EDUCATION/TRAINING PROGRAM

## 2023-01-01 PROCEDURE — 97164 PT RE-EVAL EST PLAN CARE: CPT | Mod: GP | Performed by: PHYSICAL THERAPIST

## 2023-01-01 PROCEDURE — 258N000003 HC RX IP 258 OP 636: Performed by: SURGERY

## 2023-01-01 PROCEDURE — 85652 RBC SED RATE AUTOMATED: CPT | Performed by: HOSPITALIST

## 2023-01-01 PROCEDURE — 87389 HIV-1 AG W/HIV-1&-2 AB AG IA: CPT | Performed by: INTERNAL MEDICINE

## 2023-01-01 PROCEDURE — 97112 NEUROMUSCULAR REEDUCATION: CPT | Mod: GP | Performed by: PHYSICAL THERAPIST

## 2023-01-01 PROCEDURE — 999N000125 HC STATISTIC PATIENT MED CONFERENCE < 30 MIN: Performed by: SPEECH-LANGUAGE PATHOLOGIST

## 2023-01-01 PROCEDURE — 87385 HISTOPLASMA CAPSUL AG IA: CPT | Performed by: INTERNAL MEDICINE

## 2023-01-01 PROCEDURE — 250N000013 HC RX MED GY IP 250 OP 250 PS 637: Performed by: EMERGENCY MEDICINE

## 2023-01-01 PROCEDURE — 93971 EXTREMITY STUDY: CPT | Mod: RT

## 2023-01-01 PROCEDURE — 87149 DNA/RNA DIRECT PROBE: CPT | Performed by: STUDENT IN AN ORGANIZED HEALTH CARE EDUCATION/TRAINING PROGRAM

## 2023-01-01 PROCEDURE — 999N000150 HC STATISTIC PT MED CONFERENCE < 30 MIN

## 2023-01-01 PROCEDURE — 81003 URINALYSIS AUTO W/O SCOPE: CPT | Performed by: STUDENT IN AN ORGANIZED HEALTH CARE EDUCATION/TRAINING PROGRAM

## 2023-01-01 PROCEDURE — 87635 SARS-COV-2 COVID-19 AMP PRB: CPT | Performed by: INTERNAL MEDICINE

## 2023-01-01 PROCEDURE — 85060 BLOOD SMEAR INTERPRETATION: CPT | Performed by: PATHOLOGY

## 2023-01-01 PROCEDURE — 87070 CULTURE OTHR SPECIMN AEROBIC: CPT | Performed by: STUDENT IN AN ORGANIZED HEALTH CARE EDUCATION/TRAINING PROGRAM

## 2023-01-01 PROCEDURE — 97110 THERAPEUTIC EXERCISES: CPT | Mod: GO

## 2023-01-01 PROCEDURE — 84145 PROCALCITONIN (PCT): CPT | Performed by: INTERNAL MEDICINE

## 2023-01-01 PROCEDURE — 82248 BILIRUBIN DIRECT: CPT | Performed by: INTERNAL MEDICINE

## 2023-01-01 PROCEDURE — 87070 CULTURE OTHR SPECIMN AEROBIC: CPT | Performed by: HOSPITALIST

## 2023-01-01 PROCEDURE — 93308 TTE F-UP OR LMTD: CPT | Mod: 26 | Performed by: INTERNAL MEDICINE

## 2023-01-01 PROCEDURE — 97165 OT EVAL LOW COMPLEX 30 MIN: CPT | Mod: GO

## 2023-01-01 PROCEDURE — 0W3Q7ZZ CONTROL BLEEDING IN RESPIRATORY TRACT, VIA NATURAL OR ARTIFICIAL OPENING: ICD-10-PCS | Performed by: OTOLARYNGOLOGY

## 2023-01-01 PROCEDURE — 86038 ANTINUCLEAR ANTIBODIES: CPT | Performed by: INTERNAL MEDICINE

## 2023-01-01 PROCEDURE — 250N000011 HC RX IP 250 OP 636: Mod: JZ | Performed by: SURGERY

## 2023-01-01 PROCEDURE — 84145 PROCALCITONIN (PCT): CPT | Performed by: HOSPITALIST

## 2023-01-01 PROCEDURE — 87040 BLOOD CULTURE FOR BACTERIA: CPT | Performed by: EMERGENCY MEDICINE

## 2023-01-01 PROCEDURE — 258N000003 HC RX IP 258 OP 636

## 2023-01-01 PROCEDURE — 84300 ASSAY OF URINE SODIUM: CPT | Performed by: STUDENT IN AN ORGANIZED HEALTH CARE EDUCATION/TRAINING PROGRAM

## 2023-01-01 PROCEDURE — 93306 TTE W/DOPPLER COMPLETE: CPT | Mod: 26 | Performed by: GENERAL ACUTE CARE HOSPITAL

## 2023-01-01 PROCEDURE — 87149 DNA/RNA DIRECT PROBE: CPT | Performed by: SURGERY

## 2023-01-01 PROCEDURE — 87081 CULTURE SCREEN ONLY: CPT | Performed by: INTERNAL MEDICINE

## 2023-01-01 PROCEDURE — 272N000735 HC KIT, CIRCUIT WITH NEB, VOLERA

## 2023-01-01 PROCEDURE — 0BJ08ZZ INSPECTION OF TRACHEOBRONCHIAL TREE, VIA NATURAL OR ARTIFICIAL OPENING ENDOSCOPIC: ICD-10-PCS | Performed by: INTERNAL MEDICINE

## 2023-01-01 PROCEDURE — 5A1955Z RESPIRATORY VENTILATION, GREATER THAN 96 CONSECUTIVE HOURS: ICD-10-PCS | Performed by: INTERNAL MEDICINE

## 2023-01-01 PROCEDURE — 86235 NUCLEAR ANTIGEN ANTIBODY: CPT | Performed by: INTERNAL MEDICINE

## 2023-01-01 PROCEDURE — 84540 ASSAY OF URINE/UREA-N: CPT | Performed by: STUDENT IN AN ORGANIZED HEALTH CARE EDUCATION/TRAINING PROGRAM

## 2023-01-01 PROCEDURE — 250N000011 HC RX IP 250 OP 636: Mod: JW | Performed by: INTERNAL MEDICINE

## 2023-01-01 PROCEDURE — 88108 CYTOPATH CONCENTRATE TECH: CPT | Mod: TC | Performed by: INTERNAL MEDICINE

## 2023-01-01 PROCEDURE — 82805 BLOOD GASES W/O2 SATURATION: CPT | Performed by: STUDENT IN AN ORGANIZED HEALTH CARE EDUCATION/TRAINING PROGRAM

## 2023-01-01 PROCEDURE — 86037 ANCA TITER EACH ANTIBODY: CPT | Performed by: INTERNAL MEDICINE

## 2023-01-01 PROCEDURE — 82330 ASSAY OF CALCIUM: CPT | Performed by: INTERNAL MEDICINE

## 2023-01-01 PROCEDURE — 92523 SPEECH SOUND LANG COMPREHEN: CPT | Mod: GN | Performed by: SPEECH-LANGUAGE PATHOLOGIST

## 2023-01-01 PROCEDURE — 85048 AUTOMATED LEUKOCYTE COUNT: CPT | Performed by: HOSPITALIST

## 2023-01-01 PROCEDURE — 82330 ASSAY OF CALCIUM: CPT | Performed by: STUDENT IN AN ORGANIZED HEALTH CARE EDUCATION/TRAINING PROGRAM

## 2023-01-01 PROCEDURE — 84145 PROCALCITONIN (PCT): CPT | Performed by: ANESTHESIOLOGY

## 2023-01-01 PROCEDURE — 999N000185 HC STATISTIC TRANSPORT TIME EA 15 MIN

## 2023-01-01 PROCEDURE — 99222 1ST HOSP IP/OBS MODERATE 55: CPT | Performed by: INTERNAL MEDICINE

## 2023-01-01 PROCEDURE — 73552 X-RAY EXAM OF FEMUR 2/>: CPT | Mod: RT

## 2023-01-01 PROCEDURE — 82610 CYSTATIN C: CPT | Performed by: NURSE PRACTITIONER

## 2023-01-01 PROCEDURE — C9113 INJ PANTOPRAZOLE SODIUM, VIA: HCPCS | Performed by: INTERNAL MEDICINE

## 2023-01-01 PROCEDURE — 93306 TTE W/DOPPLER COMPLETE: CPT | Mod: 26 | Performed by: INTERNAL MEDICINE

## 2023-01-01 PROCEDURE — 83605 ASSAY OF LACTIC ACID: CPT | Performed by: HOSPITALIST

## 2023-01-01 PROCEDURE — 0DH63UZ INSERTION OF FEEDING DEVICE INTO STOMACH, PERCUTANEOUS APPROACH: ICD-10-PCS | Performed by: RADIOLOGY

## 2023-01-01 PROCEDURE — 250N000011 HC RX IP 250 OP 636: Mod: JZ | Performed by: ANESTHESIOLOGY

## 2023-01-01 PROCEDURE — 86200 CCP ANTIBODY: CPT | Performed by: INTERNAL MEDICINE

## 2023-01-01 PROCEDURE — 36415 COLL VENOUS BLD VENIPUNCTURE: CPT | Performed by: SURGERY

## 2023-01-01 PROCEDURE — 72170 X-RAY EXAM OF PELVIS: CPT

## 2023-01-01 PROCEDURE — 76705 ECHO EXAM OF ABDOMEN: CPT

## 2023-01-01 PROCEDURE — 87493 C DIFF AMPLIFIED PROBE: CPT | Performed by: HOSPITALIST

## 2023-01-01 PROCEDURE — 99239 HOSP IP/OBS DSCHRG MGMT >30: CPT | Mod: 25 | Performed by: INTERNAL MEDICINE

## 2023-01-01 PROCEDURE — 72125 CT NECK SPINE W/O DYE: CPT

## 2023-01-01 PROCEDURE — 99222 1ST HOSP IP/OBS MODERATE 55: CPT | Performed by: STUDENT IN AN ORGANIZED HEALTH CARE EDUCATION/TRAINING PROGRAM

## 2023-01-01 PROCEDURE — 74177 CT ABD & PELVIS W/CONTRAST: CPT

## 2023-01-01 PROCEDURE — 87641 MR-STAPH DNA AMP PROBE: CPT | Performed by: INTERNAL MEDICINE

## 2023-01-01 PROCEDURE — 83605 ASSAY OF LACTIC ACID: CPT | Performed by: EMERGENCY MEDICINE

## 2023-01-01 PROCEDURE — 97150 GROUP THERAPEUTIC PROCEDURES: CPT | Mod: GP

## 2023-01-01 PROCEDURE — 85014 HEMATOCRIT: CPT | Performed by: INTERNAL MEDICINE

## 2023-01-01 PROCEDURE — 87210 SMEAR WET MOUNT SALINE/INK: CPT | Performed by: INTERNAL MEDICINE

## 2023-01-01 PROCEDURE — 5A1945Z RESPIRATORY VENTILATION, 24-96 CONSECUTIVE HOURS: ICD-10-PCS | Performed by: INTERNAL MEDICINE

## 2023-01-01 PROCEDURE — 70450 CT HEAD/BRAIN W/O DYE: CPT

## 2023-01-01 PROCEDURE — 250N000009 HC RX 250: Performed by: PAIN MEDICINE

## 2023-01-01 PROCEDURE — 87206 SMEAR FLUORESCENT/ACID STAI: CPT | Performed by: INTERNAL MEDICINE

## 2023-01-01 PROCEDURE — 87081 CULTURE SCREEN ONLY: CPT | Performed by: STUDENT IN AN ORGANIZED HEALTH CARE EDUCATION/TRAINING PROGRAM

## 2023-01-01 PROCEDURE — 250N000011 HC RX IP 250 OP 636: Performed by: NURSE ANESTHETIST, CERTIFIED REGISTERED

## 2023-01-01 PROCEDURE — 82955 ASSAY OF G6PD ENZYME: CPT | Performed by: INTERNAL MEDICINE

## 2023-01-01 PROCEDURE — 81001 URINALYSIS AUTO W/SCOPE: CPT | Performed by: NURSE PRACTITIONER

## 2023-01-01 PROCEDURE — 94002 VENT MGMT INPAT INIT DAY: CPT

## 2023-01-01 PROCEDURE — 87640 STAPH A DNA AMP PROBE: CPT | Performed by: ANESTHESIOLOGY

## 2023-01-01 PROCEDURE — 87581 M.PNEUMON DNA AMP PROBE: CPT | Performed by: INTERNAL MEDICINE

## 2023-01-01 PROCEDURE — 71045 X-RAY EXAM CHEST 1 VIEW: CPT | Mod: 77

## 2023-01-01 PROCEDURE — 36569 INSJ PICC 5 YR+ W/O IMAGING: CPT

## 2023-01-01 PROCEDURE — C1769 GUIDE WIRE: HCPCS

## 2023-01-01 PROCEDURE — 80053 COMPREHEN METABOLIC PANEL: CPT | Performed by: INTERNAL MEDICINE

## 2023-01-01 PROCEDURE — 82374 ASSAY BLOOD CARBON DIOXIDE: CPT | Performed by: STUDENT IN AN ORGANIZED HEALTH CARE EDUCATION/TRAINING PROGRAM

## 2023-01-01 PROCEDURE — 87899 AGENT NOS ASSAY W/OPTIC: CPT | Performed by: INTERNAL MEDICINE

## 2023-01-01 PROCEDURE — 86431 RHEUMATOID FACTOR QUANT: CPT | Performed by: INTERNAL MEDICINE

## 2023-01-01 PROCEDURE — 83690 ASSAY OF LIPASE: CPT | Performed by: STUDENT IN AN ORGANIZED HEALTH CARE EDUCATION/TRAINING PROGRAM

## 2023-01-01 PROCEDURE — 99222 1ST HOSP IP/OBS MODERATE 55: CPT | Performed by: NURSE PRACTITIONER

## 2023-01-01 PROCEDURE — 71260 CT THORAX DX C+: CPT

## 2023-01-01 PROCEDURE — 85045 AUTOMATED RETICULOCYTE COUNT: CPT | Performed by: HOSPITALIST

## 2023-01-01 PROCEDURE — 84295 ASSAY OF SERUM SODIUM: CPT | Performed by: HOSPITALIST

## 2023-01-01 PROCEDURE — 82610 CYSTATIN C: CPT | Performed by: HOSPITALIST

## 2023-01-01 PROCEDURE — 36620 INSERTION CATHETER ARTERY: CPT | Performed by: INTERNAL MEDICINE

## 2023-01-01 PROCEDURE — 86140 C-REACTIVE PROTEIN: CPT | Performed by: NURSE PRACTITIONER

## 2023-01-01 PROCEDURE — 86900 BLOOD TYPING SEROLOGIC ABO: CPT | Performed by: STUDENT IN AN ORGANIZED HEALTH CARE EDUCATION/TRAINING PROGRAM

## 2023-01-01 PROCEDURE — 0B9C8ZX DRAINAGE OF RIGHT UPPER LUNG LOBE, VIA NATURAL OR ARTIFICIAL OPENING ENDOSCOPIC, DIAGNOSTIC: ICD-10-PCS | Performed by: INTERNAL MEDICINE

## 2023-01-01 PROCEDURE — 272N000448 HC KIT POWER PICC SOLO 5F TRIPLE LUMEN

## 2023-01-01 PROCEDURE — 87106 FUNGI IDENTIFICATION YEAST: CPT | Performed by: INTERNAL MEDICINE

## 2023-01-01 PROCEDURE — 99285 EMERGENCY DEPT VISIT HI MDM: CPT | Mod: 25

## 2023-01-01 PROCEDURE — 80053 COMPREHEN METABOLIC PANEL: CPT | Performed by: EMERGENCY MEDICINE

## 2023-01-01 PROCEDURE — 87641 MR-STAPH DNA AMP PROBE: CPT | Performed by: HOSPITALIST

## 2023-01-01 PROCEDURE — 81001 URINALYSIS AUTO W/SCOPE: CPT | Performed by: EMERGENCY MEDICINE

## 2023-01-01 PROCEDURE — 31624 DX BRONCHOSCOPE/LAVAGE: CPT | Performed by: INTERNAL MEDICINE

## 2023-01-01 PROCEDURE — 84484 ASSAY OF TROPONIN QUANT: CPT | Performed by: STUDENT IN AN ORGANIZED HEALTH CARE EDUCATION/TRAINING PROGRAM

## 2023-01-01 PROCEDURE — 250N000009 HC RX 250: Performed by: NURSE ANESTHETIST, CERTIFIED REGISTERED

## 2023-01-01 PROCEDURE — 96375 TX/PRO/DX INJ NEW DRUG ADDON: CPT

## 2023-01-01 PROCEDURE — 999N000055 HC STATISTIC END TITIAL CO2 MONITORING

## 2023-01-01 PROCEDURE — 250N000025 HC SEVOFLURANE, PER MIN: Performed by: OTOLARYNGOLOGY

## 2023-01-01 PROCEDURE — 87103 BLOOD FUNGUS CULTURE: CPT | Performed by: INTERNAL MEDICINE

## 2023-01-01 PROCEDURE — 87086 URINE CULTURE/COLONY COUNT: CPT | Performed by: SURGERY

## 2023-01-01 PROCEDURE — 85007 BL SMEAR W/DIFF WBC COUNT: CPT | Performed by: STUDENT IN AN ORGANIZED HEALTH CARE EDUCATION/TRAINING PROGRAM

## 2023-01-01 PROCEDURE — 250N000009 HC RX 250: Performed by: OTOLARYNGOLOGY

## 2023-01-01 PROCEDURE — 92526 ORAL FUNCTION THERAPY: CPT | Mod: GN

## 2023-01-01 PROCEDURE — 83516 IMMUNOASSAY NONANTIBODY: CPT | Performed by: INTERNAL MEDICINE

## 2023-01-01 PROCEDURE — 97161 PT EVAL LOW COMPLEX 20 MIN: CPT | Mod: GP | Performed by: PHYSICAL THERAPIST

## 2023-01-01 PROCEDURE — 87116 MYCOBACTERIA CULTURE: CPT | Performed by: HOSPITALIST

## 2023-01-01 PROCEDURE — 87186 SC STD MICRODIL/AGAR DIL: CPT | Performed by: STUDENT IN AN ORGANIZED HEALTH CARE EDUCATION/TRAINING PROGRAM

## 2023-01-01 PROCEDURE — 3E043XZ INTRODUCTION OF VASOPRESSOR INTO CENTRAL VEIN, PERCUTANEOUS APPROACH: ICD-10-PCS | Performed by: SURGERY

## 2023-01-01 PROCEDURE — 99239 HOSP IP/OBS DSCHRG MGMT >30: CPT | Performed by: INTERNAL MEDICINE

## 2023-01-01 PROCEDURE — 88108 CYTOPATH CONCENTRATE TECH: CPT | Mod: 26 | Performed by: PATHOLOGY

## 2023-01-01 PROCEDURE — 0B9D8ZX DRAINAGE OF RIGHT MIDDLE LUNG LOBE, VIA NATURAL OR ARTIFICIAL OPENING ENDOSCOPIC, DIAGNOSTIC: ICD-10-PCS | Performed by: INTERNAL MEDICINE

## 2023-01-01 PROCEDURE — 87637 SARSCOV2&INF A&B&RSV AMP PRB: CPT | Performed by: STUDENT IN AN ORGANIZED HEALTH CARE EDUCATION/TRAINING PROGRAM

## 2023-01-01 PROCEDURE — 5A1955Z RESPIRATORY VENTILATION, GREATER THAN 96 CONSECUTIVE HOURS: ICD-10-PCS | Performed by: NURSE PRACTITIONER

## 2023-01-01 PROCEDURE — 82248 BILIRUBIN DIRECT: CPT | Performed by: STUDENT IN AN ORGANIZED HEALTH CARE EDUCATION/TRAINING PROGRAM

## 2023-01-01 PROCEDURE — 82803 BLOOD GASES ANY COMBINATION: CPT

## 2023-01-01 PROCEDURE — 272N000001 HC OR GENERAL SUPPLY STERILE

## 2023-01-01 PROCEDURE — 86923 COMPATIBILITY TEST ELECTRIC: CPT | Performed by: STUDENT IN AN ORGANIZED HEALTH CARE EDUCATION/TRAINING PROGRAM

## 2023-01-01 PROCEDURE — 87205 SMEAR GRAM STAIN: CPT | Performed by: STUDENT IN AN ORGANIZED HEALTH CARE EDUCATION/TRAINING PROGRAM

## 2023-01-01 PROCEDURE — 3E043XZ INTRODUCTION OF VASOPRESSOR INTO CENTRAL VEIN, PERCUTANEOUS APPROACH: ICD-10-PCS | Performed by: INTERNAL MEDICINE

## 2023-01-01 PROCEDURE — 92611 MOTION FLUOROSCOPY/SWALLOW: CPT | Mod: GN

## 2023-01-01 PROCEDURE — 93306 TTE W/DOPPLER COMPLETE: CPT

## 2023-01-01 PROCEDURE — 370N000017 HC ANESTHESIA TECHNICAL FEE, PER MIN: Performed by: OTOLARYNGOLOGY

## 2023-01-01 PROCEDURE — 92597 ORAL SPEECH DEVICE EVAL: CPT | Mod: GN | Performed by: SPEECH-LANGUAGE PATHOLOGIST

## 2023-01-01 PROCEDURE — 73620 X-RAY EXAM OF FOOT: CPT | Mod: LT

## 2023-01-01 PROCEDURE — 82248 BILIRUBIN DIRECT: CPT | Performed by: NURSE PRACTITIONER

## 2023-01-01 PROCEDURE — 85025 COMPLETE CBC W/AUTO DIFF WBC: CPT | Performed by: EMERGENCY MEDICINE

## 2023-01-01 PROCEDURE — 999N000258 HC STATISTIC TRACH CHANGE

## 2023-01-01 PROCEDURE — 87556 M.TUBERCULO DNA AMP PROBE: CPT | Performed by: INTERNAL MEDICINE

## 2023-01-01 PROCEDURE — 80053 COMPREHEN METABOLIC PANEL: CPT | Performed by: HOSPITALIST

## 2023-01-01 PROCEDURE — 80202 ASSAY OF VANCOMYCIN: CPT | Performed by: INTERNAL MEDICINE

## 2023-01-01 RX ORDER — NALOXONE HYDROCHLORIDE 0.4 MG/ML
0.2 INJECTION, SOLUTION INTRAMUSCULAR; INTRAVENOUS; SUBCUTANEOUS
Status: DISCONTINUED | OUTPATIENT
Start: 2023-01-01 | End: 2023-01-01 | Stop reason: HOSPADM

## 2023-01-01 RX ORDER — TORSEMIDE 20 MG/1
20 TABLET ORAL DAILY
Status: DISCONTINUED | OUTPATIENT
Start: 2023-01-01 | End: 2023-01-01

## 2023-01-01 RX ORDER — FENTANYL CITRATE 50 UG/ML
50 INJECTION, SOLUTION INTRAMUSCULAR; INTRAVENOUS
Status: COMPLETED | OUTPATIENT
Start: 2023-01-01 | End: 2023-01-01

## 2023-01-01 RX ORDER — IOPAMIDOL 755 MG/ML
500 INJECTION, SOLUTION INTRAVASCULAR ONCE
Status: COMPLETED | OUTPATIENT
Start: 2023-01-01 | End: 2023-01-01

## 2023-01-01 RX ORDER — POTASSIUM CHLORIDE 20MEQ/15ML
20 LIQUID (ML) ORAL ONCE
Status: COMPLETED | OUTPATIENT
Start: 2023-01-01 | End: 2023-01-01

## 2023-01-01 RX ORDER — ONDANSETRON 4 MG/1
4 TABLET, ORALLY DISINTEGRATING ORAL EVERY 6 HOURS PRN
Status: DISCONTINUED | OUTPATIENT
Start: 2023-01-01 | End: 2023-01-01 | Stop reason: HOSPADM

## 2023-01-01 RX ORDER — CLOTRIMAZOLE 1 %
CREAM (GRAM) TOPICAL 2 TIMES DAILY
Status: DISCONTINUED | OUTPATIENT
Start: 2023-01-01 | End: 2023-01-01

## 2023-01-01 RX ORDER — DEXMEDETOMIDINE HYDROCHLORIDE 4 UG/ML
.1-1.2 INJECTION, SOLUTION INTRAVENOUS CONTINUOUS
Status: DISCONTINUED | OUTPATIENT
Start: 2023-01-01 | End: 2023-01-01 | Stop reason: HOSPADM

## 2023-01-01 RX ORDER — ALBUTEROL SULFATE 90 UG/1
2 AEROSOL, METERED RESPIRATORY (INHALATION) EVERY 4 HOURS PRN
Status: CANCELLED | OUTPATIENT
Start: 2023-01-01

## 2023-01-01 RX ORDER — HYDROXYZINE HYDROCHLORIDE 25 MG/1
25 TABLET, FILM COATED ORAL EVERY 6 HOURS PRN
Status: DISCONTINUED | OUTPATIENT
Start: 2023-01-01 | End: 2023-01-01 | Stop reason: HOSPADM

## 2023-01-01 RX ORDER — CHLORHEXIDINE GLUCONATE ORAL RINSE 1.2 MG/ML
15 SOLUTION DENTAL EVERY 12 HOURS
Status: DISCONTINUED | OUTPATIENT
Start: 2023-01-01 | End: 2023-01-01 | Stop reason: HOSPADM

## 2023-01-01 RX ORDER — POTASSIUM CHLORIDE 1500 MG/1
40 TABLET, EXTENDED RELEASE ORAL ONCE
Status: COMPLETED | OUTPATIENT
Start: 2023-01-01 | End: 2023-01-01

## 2023-01-01 RX ORDER — FENTANYL CITRATE 50 UG/ML
100 INJECTION, SOLUTION INTRAMUSCULAR; INTRAVENOUS ONCE
Status: COMPLETED | OUTPATIENT
Start: 2023-01-01 | End: 2023-01-01

## 2023-01-01 RX ORDER — ATOVAQUONE 750 MG/5ML
1500 SUSPENSION ORAL DAILY
COMMUNITY

## 2023-01-01 RX ORDER — TIZANIDINE 2 MG/1
2 TABLET ORAL
Status: DISCONTINUED | OUTPATIENT
Start: 2023-01-01 | End: 2023-01-01

## 2023-01-01 RX ORDER — METOLAZONE 5 MG/1
5 TABLET ORAL
Status: DISCONTINUED | OUTPATIENT
Start: 2023-01-01 | End: 2023-01-01

## 2023-01-01 RX ORDER — METHYLPREDNISOLONE SODIUM SUCCINATE 125 MG/2ML
60 INJECTION, POWDER, LYOPHILIZED, FOR SOLUTION INTRAMUSCULAR; INTRAVENOUS EVERY 8 HOURS
Status: DISCONTINUED | OUTPATIENT
Start: 2023-01-01 | End: 2023-01-01 | Stop reason: HOSPADM

## 2023-01-01 RX ORDER — DEXAMETHASONE SODIUM PHOSPHATE 4 MG/ML
INJECTION, SOLUTION INTRA-ARTICULAR; INTRALESIONAL; INTRAMUSCULAR; INTRAVENOUS; SOFT TISSUE PRN
Status: DISCONTINUED | OUTPATIENT
Start: 2023-01-01 | End: 2023-01-01

## 2023-01-01 RX ORDER — METOLAZONE 5 MG/1
5 TABLET ORAL ONCE
Status: COMPLETED | OUTPATIENT
Start: 2023-01-01 | End: 2023-01-01

## 2023-01-01 RX ORDER — METHYLPREDNISOLONE SODIUM SUCCINATE 125 MG/2ML
60 INJECTION, POWDER, LYOPHILIZED, FOR SOLUTION INTRAMUSCULAR; INTRAVENOUS EVERY 24 HOURS
Status: DISCONTINUED | OUTPATIENT
Start: 2023-01-01 | End: 2023-01-01 | Stop reason: ALTCHOICE

## 2023-01-01 RX ORDER — PREGABALIN 20 MG/ML
75 SOLUTION ORAL DAILY
Status: DISCONTINUED | OUTPATIENT
Start: 2023-01-01 | End: 2023-01-01

## 2023-01-01 RX ORDER — MEROPENEM 500 MG/1
500 INJECTION, POWDER, FOR SOLUTION INTRAVENOUS EVERY 12 HOURS
Status: DISCONTINUED | OUTPATIENT
Start: 2023-01-01 | End: 2023-01-01

## 2023-01-01 RX ORDER — NOREPINEPHRINE BITARTRATE 0.06 MG/ML
.01-.6 INJECTION, SOLUTION INTRAVENOUS CONTINUOUS
Status: DISCONTINUED | OUTPATIENT
Start: 2023-01-01 | End: 2023-01-01

## 2023-01-01 RX ORDER — POTASSIUM CHLORIDE 20MEQ/15ML
40 LIQUID (ML) ORAL ONCE
Status: COMPLETED | OUTPATIENT
Start: 2023-01-01 | End: 2023-01-01

## 2023-01-01 RX ORDER — AMLODIPINE BESYLATE 5 MG/1
5 TABLET ORAL DAILY
Status: DISCONTINUED | OUTPATIENT
Start: 2023-01-01 | End: 2023-01-01

## 2023-01-01 RX ORDER — ATOVAQUONE 750 MG/5ML
1500 SUSPENSION ORAL DAILY
Status: DISCONTINUED | OUTPATIENT
Start: 2023-01-01 | End: 2023-01-01

## 2023-01-01 RX ORDER — IPRATROPIUM BROMIDE AND ALBUTEROL SULFATE 2.5; .5 MG/3ML; MG/3ML
3 SOLUTION RESPIRATORY (INHALATION) EVERY 4 HOURS PRN
Status: DISCONTINUED | OUTPATIENT
Start: 2023-01-01 | End: 2023-01-01 | Stop reason: HOSPADM

## 2023-01-01 RX ORDER — PREDNISONE 20 MG/1
40 TABLET ORAL DAILY
Status: DISCONTINUED | OUTPATIENT
Start: 2024-04-24 | End: 2023-01-01 | Stop reason: HOSPADM

## 2023-01-01 RX ORDER — CLOTRIMAZOLE 1 %
CREAM (GRAM) TOPICAL 2 TIMES DAILY
Status: DISCONTINUED | OUTPATIENT
Start: 2023-01-01 | End: 2023-01-01 | Stop reason: HOSPADM

## 2023-01-01 RX ORDER — AZITHROMYCIN 500 MG/5ML
500 INJECTION, POWDER, LYOPHILIZED, FOR SOLUTION INTRAVENOUS ONCE
Status: COMPLETED | OUTPATIENT
Start: 2023-01-01 | End: 2023-01-01

## 2023-01-01 RX ORDER — DEXTROSE MONOHYDRATE 25 G/50ML
25 INJECTION, SOLUTION INTRAVENOUS ONCE
Status: COMPLETED | OUTPATIENT
Start: 2023-01-01 | End: 2023-01-01

## 2023-01-01 RX ORDER — ACETAMINOPHEN 325 MG/1
650 TABLET ORAL
Status: CANCELLED | OUTPATIENT
Start: 2023-01-01

## 2023-01-01 RX ORDER — ALBUTEROL SULFATE 5 MG/ML
2.5 SOLUTION RESPIRATORY (INHALATION) EVERY 4 HOURS PRN
Status: CANCELLED | OUTPATIENT
Start: 2023-01-01

## 2023-01-01 RX ORDER — OXYCODONE HYDROCHLORIDE 5 MG/1
5 TABLET ORAL EVERY 4 HOURS
Status: DISCONTINUED | OUTPATIENT
Start: 2023-01-01 | End: 2023-01-01

## 2023-01-01 RX ORDER — BUMETANIDE 0.5 MG/1
1 TABLET ORAL 3 TIMES DAILY
Status: DISCONTINUED | OUTPATIENT
Start: 2023-01-01 | End: 2023-01-01

## 2023-01-01 RX ORDER — NICOTINE POLACRILEX 4 MG
15-30 LOZENGE BUCCAL
Status: DISCONTINUED | OUTPATIENT
Start: 2023-01-01 | End: 2023-01-01 | Stop reason: HOSPADM

## 2023-01-01 RX ORDER — PREGABALIN 20 MG/ML
75 SOLUTION ORAL DAILY
Status: CANCELLED | OUTPATIENT
Start: 2023-01-01

## 2023-01-01 RX ORDER — PREDNISONE 50 MG/1
50 TABLET ORAL DAILY
Status: DISCONTINUED | OUTPATIENT
Start: 2023-01-01 | End: 2023-01-01

## 2023-01-01 RX ORDER — CHLORHEXIDINE GLUCONATE ORAL RINSE 1.2 MG/ML
15 SOLUTION DENTAL EVERY 12 HOURS
Status: CANCELLED | OUTPATIENT
Start: 2023-01-01

## 2023-01-01 RX ORDER — PHENYLEPHRINE HCL IN 0.9% NACL 50MG/250ML
.1-6 PLASTIC BAG, INJECTION (ML) INTRAVENOUS CONTINUOUS
Status: DISCONTINUED | OUTPATIENT
Start: 2023-01-01 | End: 2023-01-01

## 2023-01-01 RX ORDER — VANCOMYCIN HYDROCHLORIDE 1 G/200ML
1000 INJECTION, SOLUTION INTRAVENOUS EVERY 24 HOURS
Status: DISCONTINUED | OUTPATIENT
Start: 2023-01-01 | End: 2023-01-01

## 2023-01-01 RX ORDER — LIDOCAINE 50 MG/G
OINTMENT TOPICAL 3 TIMES DAILY
Status: CANCELLED | OUTPATIENT
Start: 2023-01-01

## 2023-01-01 RX ORDER — IPRATROPIUM BROMIDE AND ALBUTEROL SULFATE 2.5; .5 MG/3ML; MG/3ML
3 SOLUTION RESPIRATORY (INHALATION)
Status: DISCONTINUED | OUTPATIENT
Start: 2023-01-01 | End: 2023-01-01

## 2023-01-01 RX ORDER — ISOSORBIDE DINITRATE 20 MG/1
20 TABLET ORAL
Status: DISCONTINUED | OUTPATIENT
Start: 2023-01-01 | End: 2023-01-01

## 2023-01-01 RX ORDER — DEXTROSE MONOHYDRATE 25 G/50ML
25-50 INJECTION, SOLUTION INTRAVENOUS
Status: DISCONTINUED | OUTPATIENT
Start: 2023-01-01 | End: 2023-01-01

## 2023-01-01 RX ORDER — OXYCODONE HCL 5 MG/5 ML
5 SOLUTION, ORAL ORAL
Status: DISCONTINUED | OUTPATIENT
Start: 2023-01-01 | End: 2023-01-01 | Stop reason: HOSPADM

## 2023-01-01 RX ORDER — POTASSIUM CHLORIDE 7.45 MG/ML
10 INJECTION INTRAVENOUS ONCE
Status: COMPLETED | OUTPATIENT
Start: 2023-01-01 | End: 2023-01-01

## 2023-01-01 RX ORDER — LIDOCAINE 40 MG/G
CREAM TOPICAL
Status: DISCONTINUED | OUTPATIENT
Start: 2023-01-01 | End: 2023-01-01 | Stop reason: HOSPADM

## 2023-01-01 RX ORDER — POTASSIUM CHLORIDE 1500 MG/1
20 TABLET, EXTENDED RELEASE ORAL ONCE
Status: COMPLETED | OUTPATIENT
Start: 2023-01-01 | End: 2023-01-01

## 2023-01-01 RX ORDER — ACETAMINOPHEN 650 MG/20.3ML
650 LIQUID ORAL EVERY 4 HOURS PRN
Status: DISCONTINUED | OUTPATIENT
Start: 2023-01-01 | End: 2023-01-01 | Stop reason: HOSPADM

## 2023-01-01 RX ORDER — ACETAMINOPHEN 325 MG/1
650 TABLET ORAL 4 TIMES DAILY
Status: DISCONTINUED | OUTPATIENT
Start: 2023-01-01 | End: 2023-01-01

## 2023-01-01 RX ORDER — TORSEMIDE 20 MG/1
20 TABLET ORAL DAILY
Status: DISCONTINUED | OUTPATIENT
Start: 2023-01-01 | End: 2023-01-01 | Stop reason: HOSPADM

## 2023-01-01 RX ORDER — METHYLPREDNISOLONE SODIUM SUCCINATE 125 MG/2ML
60 INJECTION, POWDER, LYOPHILIZED, FOR SOLUTION INTRAMUSCULAR; INTRAVENOUS EVERY 8 HOURS
Status: DISCONTINUED | OUTPATIENT
Start: 2023-01-01 | End: 2023-01-01

## 2023-01-01 RX ORDER — TORSEMIDE 20 MG/1
20 TABLET ORAL EVERY OTHER DAY
Status: DISCONTINUED | OUTPATIENT
Start: 2023-01-01 | End: 2023-01-01 | Stop reason: HOSPADM

## 2023-01-01 RX ORDER — HYDRALAZINE HYDROCHLORIDE 20 MG/ML
5 INJECTION INTRAMUSCULAR; INTRAVENOUS EVERY 4 HOURS PRN
Status: DISCONTINUED | OUTPATIENT
Start: 2023-01-01 | End: 2023-01-01 | Stop reason: HOSPADM

## 2023-01-01 RX ORDER — SIMETHICONE 40MG/0.6ML
40 SUSPENSION, DROPS(FINAL DOSAGE FORM)(ML) ORAL 3 TIMES DAILY
COMMUNITY

## 2023-01-01 RX ORDER — ASPIRIN 81 MG/1
81 TABLET, CHEWABLE ORAL DAILY
Status: DISCONTINUED | OUTPATIENT
Start: 2023-01-01 | End: 2023-01-01

## 2023-01-01 RX ORDER — IPRATROPIUM BROMIDE AND ALBUTEROL SULFATE 2.5; .5 MG/3ML; MG/3ML
SOLUTION RESPIRATORY (INHALATION)
Status: COMPLETED
Start: 2023-01-01 | End: 2023-01-01

## 2023-01-01 RX ORDER — ACETAMINOPHEN 325 MG/1
650 TABLET ORAL 4 TIMES DAILY
Status: CANCELLED | OUTPATIENT
Start: 2023-01-01

## 2023-01-01 RX ORDER — ATORVASTATIN CALCIUM 20 MG/1
20 TABLET, FILM COATED ORAL EVERY EVENING
Status: DISCONTINUED | OUTPATIENT
Start: 2023-01-01 | End: 2023-01-01

## 2023-01-01 RX ORDER — AMOXICILLIN 250 MG
1 CAPSULE ORAL 2 TIMES DAILY
Status: DISCONTINUED | OUTPATIENT
Start: 2023-01-01 | End: 2023-01-01

## 2023-01-01 RX ORDER — ACETAMINOPHEN 325 MG/10.15ML
650 LIQUID ORAL EVERY 4 HOURS PRN
Status: DISCONTINUED | OUTPATIENT
Start: 2023-01-01 | End: 2023-01-01 | Stop reason: HOSPADM

## 2023-01-01 RX ORDER — DIPHENHYDRAMINE HCL 25 MG
50 CAPSULE ORAL
Status: CANCELLED | OUTPATIENT
Start: 2023-01-01

## 2023-01-01 RX ORDER — LORAZEPAM 2 MG/ML
0.5 CONCENTRATE ORAL EVERY 4 HOURS PRN
COMMUNITY

## 2023-01-01 RX ORDER — METHYLPREDNISOLONE SODIUM SUCCINATE 125 MG/2ML
60 INJECTION, POWDER, LYOPHILIZED, FOR SOLUTION INTRAMUSCULAR; INTRAVENOUS EVERY 8 HOURS
Status: CANCELLED | OUTPATIENT
Start: 2023-01-01

## 2023-01-01 RX ORDER — DEXTROSE MONOHYDRATE 100 MG/ML
INJECTION, SOLUTION INTRAVENOUS CONTINUOUS
Status: DISCONTINUED | OUTPATIENT
Start: 2023-01-01 | End: 2023-01-01 | Stop reason: ALTCHOICE

## 2023-01-01 RX ORDER — SODIUM CHLORIDE FOR INHALATION 3 %
3 VIAL, NEBULIZER (ML) INHALATION
Status: DISCONTINUED | OUTPATIENT
Start: 2023-01-01 | End: 2023-01-01

## 2023-01-01 RX ORDER — LABETALOL HYDROCHLORIDE 5 MG/ML
10 INJECTION, SOLUTION INTRAVENOUS EVERY 4 HOURS PRN
Status: DISCONTINUED | OUTPATIENT
Start: 2023-01-01 | End: 2023-01-01

## 2023-01-01 RX ORDER — FUROSEMIDE 10 MG/ML
60 INJECTION INTRAMUSCULAR; INTRAVENOUS EVERY 8 HOURS
Status: DISCONTINUED | OUTPATIENT
Start: 2023-01-01 | End: 2023-01-01

## 2023-01-01 RX ORDER — POTASSIUM CHLORIDE 1.5 G/1.58G
20 POWDER, FOR SOLUTION ORAL ONCE
Status: COMPLETED | OUTPATIENT
Start: 2023-01-01 | End: 2023-01-01

## 2023-01-01 RX ORDER — TORSEMIDE 20 MG/1
20 TABLET ORAL
Status: CANCELLED | OUTPATIENT
Start: 2023-01-01

## 2023-01-01 RX ORDER — HYDRALAZINE HYDROCHLORIDE 25 MG/1
25 TABLET, FILM COATED ORAL 3 TIMES DAILY
Status: CANCELLED | OUTPATIENT
Start: 2023-01-01

## 2023-01-01 RX ORDER — NICOTINE POLACRILEX 4 MG
15-30 LOZENGE BUCCAL
Status: DISCONTINUED | OUTPATIENT
Start: 2023-01-01 | End: 2023-01-01

## 2023-01-01 RX ORDER — ACETAMINOPHEN 650 MG/1
650 SUPPOSITORY RECTAL EVERY 4 HOURS PRN
Status: DISCONTINUED | OUTPATIENT
Start: 2023-01-01 | End: 2023-01-01

## 2023-01-01 RX ORDER — ACETAMINOPHEN 325 MG/1
650 TABLET ORAL EVERY 4 HOURS PRN
Status: DISCONTINUED | OUTPATIENT
Start: 2023-01-01 | End: 2023-01-01

## 2023-01-01 RX ORDER — METRONIDAZOLE 500 MG/100ML
500 INJECTION, SOLUTION INTRAVENOUS EVERY 8 HOURS
Status: DISCONTINUED | OUTPATIENT
Start: 2023-01-01 | End: 2023-01-01

## 2023-01-01 RX ORDER — NALOXONE HYDROCHLORIDE 0.4 MG/ML
0.4 INJECTION, SOLUTION INTRAMUSCULAR; INTRAVENOUS; SUBCUTANEOUS
Status: DISCONTINUED | OUTPATIENT
Start: 2023-01-01 | End: 2023-01-01 | Stop reason: HOSPADM

## 2023-01-01 RX ORDER — ONDANSETRON 4 MG/1
4 TABLET, ORALLY DISINTEGRATING ORAL EVERY 6 HOURS PRN
Status: CANCELLED | OUTPATIENT
Start: 2023-01-01

## 2023-01-01 RX ORDER — OLANZAPINE 2.5 MG/1
2.5 TABLET, FILM COATED ORAL
COMMUNITY

## 2023-01-01 RX ORDER — PREDNISONE 20 MG/1
60 TABLET ORAL 2 TIMES DAILY
Status: COMPLETED | OUTPATIENT
Start: 2023-01-01 | End: 2023-01-01

## 2023-01-01 RX ORDER — HEPARIN SODIUM 5000 [USP'U]/.5ML
5000 INJECTION, SOLUTION INTRAVENOUS; SUBCUTANEOUS EVERY 12 HOURS
Status: DISCONTINUED | OUTPATIENT
Start: 2023-01-01 | End: 2023-01-01

## 2023-01-01 RX ORDER — METOPROLOL SUCCINATE 50 MG/1
100 TABLET, EXTENDED RELEASE ORAL DAILY
Status: DISCONTINUED | OUTPATIENT
Start: 2023-01-01 | End: 2023-01-01

## 2023-01-01 RX ORDER — VECURONIUM BROMIDE 1 MG/ML
10 INJECTION, POWDER, LYOPHILIZED, FOR SOLUTION INTRAVENOUS ONCE
Status: COMPLETED | OUTPATIENT
Start: 2023-01-01 | End: 2023-01-01

## 2023-01-01 RX ORDER — CHLORTHALIDONE 25 MG/1
25 TABLET ORAL DAILY
Status: DISCONTINUED | OUTPATIENT
Start: 2023-01-01 | End: 2023-01-01 | Stop reason: HOSPADM

## 2023-01-01 RX ORDER — OXYCODONE HYDROCHLORIDE 5 MG/1
5 TABLET ORAL
Status: DISCONTINUED | OUTPATIENT
Start: 2023-01-01 | End: 2023-01-01

## 2023-01-01 RX ORDER — LIDOCAINE HYDROCHLORIDE 20 MG/ML
JELLY TOPICAL ONCE
Status: COMPLETED | OUTPATIENT
Start: 2023-01-01 | End: 2023-01-01

## 2023-01-01 RX ORDER — PREDNISONE 20 MG/1
40 TABLET ORAL
Status: DISCONTINUED | OUTPATIENT
Start: 2024-01-03 | End: 2023-01-01 | Stop reason: HOSPADM

## 2023-01-01 RX ORDER — BARIUM SULFATE 400 MG/ML
SUSPENSION ORAL ONCE
Status: DISCONTINUED | OUTPATIENT
Start: 2023-01-01 | End: 2023-01-01

## 2023-01-01 RX ORDER — NALOXONE HYDROCHLORIDE 0.4 MG/ML
0.4 INJECTION, SOLUTION INTRAMUSCULAR; INTRAVENOUS; SUBCUTANEOUS
Status: CANCELLED | OUTPATIENT
Start: 2023-01-01

## 2023-01-01 RX ORDER — PROPOFOL 10 MG/ML
5-75 INJECTION, EMULSION INTRAVENOUS CONTINUOUS
Status: DISCONTINUED | OUTPATIENT
Start: 2023-01-01 | End: 2023-01-01

## 2023-01-01 RX ORDER — DEXTROSE MONOHYDRATE 50 MG/ML
INJECTION, SOLUTION INTRAVENOUS CONTINUOUS
Status: DISCONTINUED | OUTPATIENT
Start: 2023-01-01 | End: 2023-01-01

## 2023-01-01 RX ORDER — PREDNISONE 20 MG/1
40 TABLET ORAL DAILY
Status: DISCONTINUED | OUTPATIENT
Start: 2023-12-28 | End: 2023-01-01

## 2023-01-01 RX ORDER — ALBUTEROL SULFATE 90 UG/1
2 AEROSOL, METERED RESPIRATORY (INHALATION) EVERY 4 HOURS PRN
Status: DISCONTINUED | OUTPATIENT
Start: 2023-01-01 | End: 2023-01-01 | Stop reason: HOSPADM

## 2023-01-01 RX ORDER — SODIUM CHLORIDE FOR INHALATION 3 %
3 VIAL, NEBULIZER (ML) INHALATION EVERY 6 HOURS
Status: DISCONTINUED | OUTPATIENT
Start: 2023-01-01 | End: 2023-01-01

## 2023-01-01 RX ORDER — ACETAMINOPHEN 325 MG/1
325 TABLET ORAL EVERY 6 HOURS PRN
Status: DISCONTINUED | OUTPATIENT
Start: 2023-01-01 | End: 2023-01-01

## 2023-01-01 RX ORDER — POLYETHYLENE GLYCOL 3350 17 G/17G
17 POWDER, FOR SOLUTION ORAL DAILY
Status: DISCONTINUED | OUTPATIENT
Start: 2023-01-01 | End: 2023-01-01

## 2023-01-01 RX ORDER — CETIRIZINE HYDROCHLORIDE 10 MG/1
10 TABLET ORAL DAILY PRN
Status: DISCONTINUED | OUTPATIENT
Start: 2023-01-01 | End: 2023-01-01

## 2023-01-01 RX ORDER — AMLODIPINE BESYLATE 2.5 MG/1
2.5 TABLET ORAL DAILY
Status: DISCONTINUED | OUTPATIENT
Start: 2023-01-01 | End: 2023-01-01

## 2023-01-01 RX ORDER — ACETAMINOPHEN 500 MG
1000 TABLET ORAL ONCE
Status: COMPLETED | OUTPATIENT
Start: 2023-01-01 | End: 2023-01-01

## 2023-01-01 RX ORDER — LEVOTHYROXINE SODIUM 150 UG/1
150 TABLET ORAL DAILY
COMMUNITY

## 2023-01-01 RX ORDER — LEVOTHYROXINE SODIUM 137 UG/1
150 TABLET ORAL DAILY
Status: DISCONTINUED | OUTPATIENT
Start: 2023-01-01 | End: 2023-01-01

## 2023-01-01 RX ORDER — DIPHENHYDRAMINE HYDROCHLORIDE 50 MG/ML
50 INJECTION INTRAMUSCULAR; INTRAVENOUS ONCE
Status: CANCELLED | OUTPATIENT
Start: 2023-01-01

## 2023-01-01 RX ORDER — HEPARIN SODIUM 5000 [USP'U]/.5ML
5000 INJECTION, SOLUTION INTRAVENOUS; SUBCUTANEOUS EVERY 8 HOURS
Status: DISCONTINUED | OUTPATIENT
Start: 2023-01-01 | End: 2023-01-01 | Stop reason: HOSPADM

## 2023-01-01 RX ORDER — MEROPENEM 1 G/1
1 INJECTION, POWDER, FOR SOLUTION INTRAVENOUS EVERY 8 HOURS
Status: DISCONTINUED | OUTPATIENT
Start: 2023-01-01 | End: 2023-01-01 | Stop reason: DRUGHIGH

## 2023-01-01 RX ORDER — PREGABALIN 20 MG/ML
75 SOLUTION ORAL DAILY
Status: DISCONTINUED | OUTPATIENT
Start: 2023-01-01 | End: 2023-01-01 | Stop reason: HOSPADM

## 2023-01-01 RX ORDER — ENOXAPARIN SODIUM 100 MG/ML
40 INJECTION SUBCUTANEOUS EVERY 24 HOURS
Status: DISCONTINUED | OUTPATIENT
Start: 2023-01-01 | End: 2023-01-01

## 2023-01-01 RX ORDER — BISACODYL 10 MG
10 SUPPOSITORY, RECTAL RECTAL DAILY PRN
COMMUNITY

## 2023-01-01 RX ORDER — NALOXONE HYDROCHLORIDE 0.4 MG/ML
0.2 INJECTION, SOLUTION INTRAMUSCULAR; INTRAVENOUS; SUBCUTANEOUS
Status: DISCONTINUED | OUTPATIENT
Start: 2023-01-01 | End: 2023-01-01

## 2023-01-01 RX ORDER — PREGABALIN 75 MG/1
75 CAPSULE ORAL 2 TIMES DAILY
Status: DISCONTINUED | OUTPATIENT
Start: 2023-01-01 | End: 2023-01-01

## 2023-01-01 RX ORDER — HEPARIN SODIUM 5000 [USP'U]/.5ML
5000 INJECTION, SOLUTION INTRAVENOUS; SUBCUTANEOUS EVERY 8 HOURS
Status: DISCONTINUED | OUTPATIENT
Start: 2023-01-01 | End: 2023-01-01

## 2023-01-01 RX ORDER — LIDOCAINE HYDROCHLORIDE AND EPINEPHRINE BITARTRATE 20; .01 MG/ML; MG/ML
INJECTION, SOLUTION SUBCUTANEOUS PRN
Status: DISCONTINUED | OUTPATIENT
Start: 2023-01-01 | End: 2023-01-01 | Stop reason: HOSPADM

## 2023-01-01 RX ORDER — FENTANYL CITRATE 50 UG/ML
25 INJECTION, SOLUTION INTRAMUSCULAR; INTRAVENOUS EVERY 5 MIN PRN
Status: ACTIVE | OUTPATIENT
Start: 2023-01-01 | End: 2023-01-01

## 2023-01-01 RX ORDER — CEFAZOLIN SODIUM 1 G/50ML
2000 SOLUTION INTRAVENOUS ONCE
Status: COMPLETED | OUTPATIENT
Start: 2023-01-01 | End: 2023-01-01

## 2023-01-01 RX ORDER — ACETAMINOPHEN 500 MG
500 TABLET ORAL EVERY 4 HOURS PRN
Status: DISCONTINUED | OUTPATIENT
Start: 2023-01-01 | End: 2023-01-01

## 2023-01-01 RX ORDER — BUMETANIDE 0.25 MG/ML
1 INJECTION INTRAMUSCULAR; INTRAVENOUS EVERY 8 HOURS
Status: DISCONTINUED | OUTPATIENT
Start: 2023-01-01 | End: 2023-01-01

## 2023-01-01 RX ORDER — ATORVASTATIN CALCIUM 20 MG/1
20 TABLET, FILM COATED ORAL EVERY EVENING
Status: DISCONTINUED | OUTPATIENT
Start: 2023-01-01 | End: 2023-01-01 | Stop reason: HOSPADM

## 2023-01-01 RX ORDER — MIDAZOLAM HCL IN 0.9 % NACL/PF 1 MG/ML
1-8 PLASTIC BAG, INJECTION (ML) INTRAVENOUS CONTINUOUS
Status: DISCONTINUED | OUTPATIENT
Start: 2023-01-01 | End: 2023-01-01 | Stop reason: ALTCHOICE

## 2023-01-01 RX ORDER — QUETIAPINE FUMARATE 100 MG/1
100 TABLET, FILM COATED ORAL 2 TIMES DAILY
COMMUNITY

## 2023-01-01 RX ORDER — OXYCODONE HYDROCHLORIDE 5 MG/1
5 TABLET ORAL
Status: CANCELLED | OUTPATIENT
Start: 2023-01-01

## 2023-01-01 RX ORDER — PHENYLEPHRINE HCL IN 0.9% NACL 50MG/250ML
.1-6 PLASTIC BAG, INJECTION (ML) INTRAVENOUS CONTINUOUS
Status: DISCONTINUED | OUTPATIENT
Start: 2023-01-01 | End: 2023-01-01 | Stop reason: HOSPADM

## 2023-01-01 RX ORDER — ACETAMINOPHEN 325 MG/10.15ML
650 LIQUID ORAL EVERY 4 HOURS PRN
Status: DISCONTINUED | OUTPATIENT
Start: 2023-01-01 | End: 2023-01-01

## 2023-01-01 RX ORDER — QUETIAPINE FUMARATE 50 MG/1
50 TABLET, FILM COATED ORAL ONCE
Status: COMPLETED | OUTPATIENT
Start: 2023-01-01 | End: 2023-01-01

## 2023-01-01 RX ORDER — SALIVA STIMULANT COMB. NO.3
1 SPRAY, NON-AEROSOL (ML) MUCOUS MEMBRANE 4 TIMES DAILY
Status: DISCONTINUED | OUTPATIENT
Start: 2023-01-01 | End: 2023-01-01 | Stop reason: HOSPADM

## 2023-01-01 RX ORDER — FENTANYL CITRATE 50 UG/ML
INJECTION, SOLUTION INTRAMUSCULAR; INTRAVENOUS
Status: DISCONTINUED
Start: 2023-01-01 | End: 2023-01-01 | Stop reason: HOSPADM

## 2023-01-01 RX ORDER — OLOPATADINE HYDROCHLORIDE 2 MG/ML
1 SOLUTION/ DROPS OPHTHALMIC DAILY
Status: DISCONTINUED | OUTPATIENT
Start: 2023-01-01 | End: 2023-01-01

## 2023-01-01 RX ORDER — HYDRALAZINE HYDROCHLORIDE 20 MG/ML
10 INJECTION INTRAMUSCULAR; INTRAVENOUS EVERY 4 HOURS PRN
Status: DISCONTINUED | OUTPATIENT
Start: 2023-01-01 | End: 2023-01-01

## 2023-01-01 RX ORDER — HYDRALAZINE HYDROCHLORIDE 25 MG/1
25 TABLET, FILM COATED ORAL 3 TIMES DAILY
Status: DISCONTINUED | OUTPATIENT
Start: 2023-01-01 | End: 2023-01-01 | Stop reason: HOSPADM

## 2023-01-01 RX ORDER — TORSEMIDE 20 MG/1
60 TABLET ORAL EVERY MORNING
Status: DISCONTINUED | OUTPATIENT
Start: 2023-01-01 | End: 2023-01-01

## 2023-01-01 RX ORDER — MICONAZOLE NITRATE 20 MG/G
CREAM TOPICAL 2 TIMES DAILY
Status: DISCONTINUED | OUTPATIENT
Start: 2023-01-01 | End: 2023-01-01 | Stop reason: HOSPADM

## 2023-01-01 RX ORDER — DEXTROSE MONOHYDRATE 50 MG/ML
INJECTION, SOLUTION INTRAVENOUS CONTINUOUS
Status: ACTIVE | OUTPATIENT
Start: 2023-01-01 | End: 2023-01-01

## 2023-01-01 RX ORDER — LEVOTHYROXINE SODIUM 150 UG/1
150 TABLET ORAL
Status: DISCONTINUED | OUTPATIENT
Start: 2023-01-01 | End: 2023-01-01 | Stop reason: HOSPADM

## 2023-01-01 RX ORDER — CEFTRIAXONE 2 G/1
2 INJECTION, POWDER, FOR SOLUTION INTRAMUSCULAR; INTRAVENOUS EVERY 24 HOURS
Qty: 140 ML | Refills: 0 | Status: DISCONTINUED | OUTPATIENT
Start: 2023-01-01 | End: 2023-01-01

## 2023-01-01 RX ORDER — SERTRALINE HYDROCHLORIDE 20 MG/ML
150 SOLUTION ORAL DAILY
COMMUNITY

## 2023-01-01 RX ORDER — DEXTROSE MONOHYDRATE 100 MG/ML
INJECTION, SOLUTION INTRAVENOUS CONTINUOUS PRN
Status: DISCONTINUED | OUTPATIENT
Start: 2023-01-01 | End: 2023-01-01 | Stop reason: HOSPADM

## 2023-01-01 RX ORDER — DEXTROSE MONOHYDRATE 100 MG/ML
INJECTION, SOLUTION INTRAVENOUS CONTINUOUS PRN
Status: CANCELLED | OUTPATIENT
Start: 2023-01-01

## 2023-01-01 RX ORDER — DEXTROSE MONOHYDRATE 25 G/50ML
25-50 INJECTION, SOLUTION INTRAVENOUS
Status: DISCONTINUED | OUTPATIENT
Start: 2023-01-01 | End: 2023-01-01 | Stop reason: HOSPADM

## 2023-01-01 RX ORDER — LOPERAMIDE HCL 1 MG/7.5ML
2 SOLUTION ORAL 4 TIMES DAILY PRN
Status: DISCONTINUED | OUTPATIENT
Start: 2023-01-01 | End: 2023-01-01 | Stop reason: HOSPADM

## 2023-01-01 RX ORDER — FUROSEMIDE 10 MG/ML
40 INJECTION INTRAMUSCULAR; INTRAVENOUS ONCE
Status: COMPLETED | OUTPATIENT
Start: 2023-01-01 | End: 2023-01-01

## 2023-01-01 RX ORDER — ASPIRIN 81 MG/1
81 TABLET, CHEWABLE ORAL DAILY
Status: CANCELLED | OUTPATIENT
Start: 2023-01-01

## 2023-01-01 RX ORDER — METOCLOPRAMIDE HYDROCHLORIDE 5 MG/ML
10 INJECTION INTRAMUSCULAR; INTRAVENOUS ONCE
Status: COMPLETED | OUTPATIENT
Start: 2023-01-01 | End: 2023-01-01

## 2023-01-01 RX ORDER — ACETAMINOPHEN 650 MG/20.3ML
650 LIQUID ORAL 4 TIMES DAILY
Status: DISCONTINUED | OUTPATIENT
Start: 2023-01-01 | End: 2023-01-01 | Stop reason: HOSPADM

## 2023-01-01 RX ORDER — MIDODRINE HYDROCHLORIDE 10 MG/1
10 TABLET ORAL EVERY 8 HOURS
Status: DISCONTINUED | OUTPATIENT
Start: 2023-01-01 | End: 2023-01-01

## 2023-01-01 RX ORDER — SODIUM CHLORIDE FOR INHALATION 3 %
3 VIAL, NEBULIZER (ML) INHALATION ONCE
Status: COMPLETED | OUTPATIENT
Start: 2023-01-01 | End: 2023-01-01

## 2023-01-01 RX ORDER — PREDNISONE 20 MG/1
60 TABLET ORAL
Status: DISCONTINUED | OUTPATIENT
Start: 2023-01-01 | End: 2023-01-01

## 2023-01-01 RX ORDER — VITAMIN B COMPLEX
50 TABLET ORAL DAILY
Status: COMPLETED | OUTPATIENT
Start: 2023-01-01 | End: 2023-01-01

## 2023-01-01 RX ORDER — PROCHLORPERAZINE 25 MG
12.5 SUPPOSITORY, RECTAL RECTAL EVERY 12 HOURS PRN
Status: DISCONTINUED | OUTPATIENT
Start: 2023-01-01 | End: 2023-01-01 | Stop reason: HOSPADM

## 2023-01-01 RX ORDER — CETIRIZINE HYDROCHLORIDE 10 MG/1
10 TABLET ORAL DAILY PRN
COMMUNITY

## 2023-01-01 RX ORDER — BUMETANIDE 0.5 MG/1
1 TABLET ORAL DAILY
Status: DISCONTINUED | OUTPATIENT
Start: 2023-01-01 | End: 2023-01-01

## 2023-01-01 RX ORDER — OXYCODONE HYDROCHLORIDE 5 MG/1
5 TABLET ORAL EVERY 4 HOURS PRN
Status: DISCONTINUED | OUTPATIENT
Start: 2023-01-01 | End: 2023-01-01

## 2023-01-01 RX ORDER — ATOVAQUONE 750 MG/5ML
1500 SUSPENSION ORAL DAILY
Status: CANCELLED | OUTPATIENT
Start: 2023-01-01

## 2023-01-01 RX ORDER — ALBUTEROL SULFATE 0.83 MG/ML
2.5 SOLUTION RESPIRATORY (INHALATION) 4 TIMES DAILY
Status: DISCONTINUED | OUTPATIENT
Start: 2023-01-01 | End: 2023-01-01 | Stop reason: HOSPADM

## 2023-01-01 RX ORDER — FUROSEMIDE 10 MG/ML
40 INJECTION INTRAMUSCULAR; INTRAVENOUS EVERY 12 HOURS
Status: COMPLETED | OUTPATIENT
Start: 2023-01-01 | End: 2023-01-01

## 2023-01-01 RX ORDER — CETIRIZINE HYDROCHLORIDE 10 MG/1
10 TABLET ORAL DAILY PRN
Status: CANCELLED | OUTPATIENT
Start: 2023-01-01

## 2023-01-01 RX ORDER — FENTANYL CITRATE 50 UG/ML
INJECTION, SOLUTION INTRAMUSCULAR; INTRAVENOUS
Status: COMPLETED
Start: 2023-01-01 | End: 2023-01-01

## 2023-01-01 RX ORDER — LIDOCAINE 50 MG/G
OINTMENT TOPICAL 3 TIMES DAILY
Status: DISCONTINUED | OUTPATIENT
Start: 2023-01-01 | End: 2023-01-01 | Stop reason: HOSPADM

## 2023-01-01 RX ORDER — LEVOTHYROXINE SODIUM 75 UG/1
150 TABLET ORAL
Status: DISCONTINUED | OUTPATIENT
Start: 2023-01-01 | End: 2023-01-01 | Stop reason: HOSPADM

## 2023-01-01 RX ORDER — ACETAMINOPHEN 325 MG/1
975 TABLET ORAL 3 TIMES DAILY
Status: DISCONTINUED | OUTPATIENT
Start: 2023-01-01 | End: 2023-01-01

## 2023-01-01 RX ORDER — GUAIFENESIN 600 MG/1
600 TABLET, EXTENDED RELEASE ORAL 2 TIMES DAILY
Status: DISCONTINUED | OUTPATIENT
Start: 2023-01-01 | End: 2023-01-01

## 2023-01-01 RX ORDER — LEVOTHYROXINE SODIUM 150 UG/1
150 TABLET ORAL
Status: CANCELLED | OUTPATIENT
Start: 2023-01-01

## 2023-01-01 RX ORDER — BISACODYL 10 MG
10 SUPPOSITORY, RECTAL RECTAL DAILY PRN
Status: DISCONTINUED | OUTPATIENT
Start: 2023-01-01 | End: 2023-01-01 | Stop reason: HOSPADM

## 2023-01-01 RX ORDER — MEROPENEM 500 MG/1
500 INJECTION, POWDER, FOR SOLUTION INTRAVENOUS EVERY 12 HOURS
Status: COMPLETED | OUTPATIENT
Start: 2023-01-01 | End: 2023-01-01

## 2023-01-01 RX ORDER — POTASSIUM CHLORIDE 20MEQ/15ML
10 LIQUID (ML) ORAL ONCE
Status: COMPLETED | OUTPATIENT
Start: 2023-01-01 | End: 2023-01-01

## 2023-01-01 RX ORDER — CARBOXYMETHYLCELLULOSE SODIUM 5 MG/ML
1 SOLUTION/ DROPS OPHTHALMIC
Status: DISCONTINUED | OUTPATIENT
Start: 2023-01-01 | End: 2023-01-01 | Stop reason: HOSPADM

## 2023-01-01 RX ORDER — NALOXONE HYDROCHLORIDE 0.4 MG/ML
0.2 INJECTION, SOLUTION INTRAMUSCULAR; INTRAVENOUS; SUBCUTANEOUS
Status: CANCELLED | OUTPATIENT
Start: 2023-01-01

## 2023-01-01 RX ORDER — DIPHENHYDRAMINE HCL 25 MG
50 CAPSULE ORAL ONCE
Status: CANCELLED | OUTPATIENT
Start: 2023-01-01 | End: 2023-01-01

## 2023-01-01 RX ORDER — METHYLPREDNISOLONE SODIUM SUCCINATE 125 MG/2ML
60 INJECTION, POWDER, LYOPHILIZED, FOR SOLUTION INTRAMUSCULAR; INTRAVENOUS ONCE
Status: COMPLETED | OUTPATIENT
Start: 2023-01-01 | End: 2023-01-01

## 2023-01-01 RX ORDER — METRONIDAZOLE 500 MG/100ML
500 INJECTION, SOLUTION INTRAVENOUS EVERY 12 HOURS
Status: DISCONTINUED | OUTPATIENT
Start: 2023-01-01 | End: 2023-01-01

## 2023-01-01 RX ORDER — MEROPENEM 500 MG/1
500 INJECTION, POWDER, FOR SOLUTION INTRAVENOUS EVERY 12 HOURS
Status: DISCONTINUED | OUTPATIENT
Start: 2023-01-01 | End: 2023-01-01 | Stop reason: HOSPADM

## 2023-01-01 RX ORDER — ACETAMINOPHEN 325 MG/1
650 TABLET ORAL 4 TIMES DAILY
Status: DISCONTINUED | OUTPATIENT
Start: 2023-01-01 | End: 2023-01-01 | Stop reason: HOSPADM

## 2023-01-01 RX ORDER — FENTANYL CITRATE 50 UG/ML
INJECTION, SOLUTION INTRAMUSCULAR; INTRAVENOUS PRN
Status: DISCONTINUED | OUTPATIENT
Start: 2023-01-01 | End: 2023-01-01

## 2023-01-01 RX ORDER — CLOTRIMAZOLE 1 %
CREAM (GRAM) TOPICAL 2 TIMES DAILY
Status: CANCELLED | OUTPATIENT
Start: 2023-01-01

## 2023-01-01 RX ORDER — CEFAZOLIN SODIUM 1 G/50ML
1250 SOLUTION INTRAVENOUS EVERY 24 HOURS
Status: DISCONTINUED | OUTPATIENT
Start: 2023-01-01 | End: 2023-01-01

## 2023-01-01 RX ORDER — HYDROXYZINE HYDROCHLORIDE 25 MG/1
25 TABLET, FILM COATED ORAL EVERY 6 HOURS PRN
Status: CANCELLED | OUTPATIENT
Start: 2023-01-01

## 2023-01-01 RX ORDER — ACETAMINOPHEN 325 MG/1
650 TABLET ORAL ONCE
Status: CANCELLED | OUTPATIENT
Start: 2023-01-01 | End: 2023-01-01

## 2023-01-01 RX ORDER — VECURONIUM BROMIDE 1 MG/ML
INJECTION, POWDER, LYOPHILIZED, FOR SOLUTION INTRAVENOUS
Status: COMPLETED
Start: 2023-01-01 | End: 2023-01-01

## 2023-01-01 RX ORDER — PROCHLORPERAZINE MALEATE 5 MG
5 TABLET ORAL EVERY 6 HOURS PRN
Status: DISCONTINUED | OUTPATIENT
Start: 2023-01-01 | End: 2023-01-01 | Stop reason: HOSPADM

## 2023-01-01 RX ORDER — ATOVAQUONE 750 MG/5ML
1500 SUSPENSION ORAL DAILY
Status: DISCONTINUED | OUTPATIENT
Start: 2023-01-01 | End: 2023-01-01 | Stop reason: HOSPADM

## 2023-01-01 RX ORDER — MICONAZOLE NITRATE 20 MG/G
CREAM TOPICAL 2 TIMES DAILY
COMMUNITY

## 2023-01-01 RX ORDER — ALBUTEROL SULFATE 0.83 MG/ML
SOLUTION RESPIRATORY (INHALATION)
Status: DISCONTINUED
Start: 2023-01-01 | End: 2023-01-01 | Stop reason: HOSPADM

## 2023-01-01 RX ORDER — TORSEMIDE 20 MG/1
20 TABLET ORAL
Status: DISCONTINUED | OUTPATIENT
Start: 2023-01-01 | End: 2023-01-01

## 2023-01-01 RX ORDER — CEFEPIME HYDROCHLORIDE 2 G/1
2 INJECTION, POWDER, FOR SOLUTION INTRAVENOUS EVERY 12 HOURS
Status: DISCONTINUED | OUTPATIENT
Start: 2023-01-01 | End: 2023-01-01

## 2023-01-01 RX ORDER — CHLORHEXIDINE GLUCONATE ORAL RINSE 1.2 MG/ML
15 SOLUTION DENTAL 2 TIMES DAILY
Status: DISCONTINUED | OUTPATIENT
Start: 2023-01-01 | End: 2023-01-01 | Stop reason: HOSPADM

## 2023-01-01 RX ORDER — SALIVA SUBSTITUTE COMBO NO.2
30 SOLUTION, ORAL MUCOUS MEMBRANE 4 TIMES DAILY
COMMUNITY

## 2023-01-01 RX ORDER — ATORVASTATIN CALCIUM 20 MG/1
20 TABLET, FILM COATED ORAL EVERY EVENING
Status: CANCELLED | OUTPATIENT
Start: 2023-01-01

## 2023-01-01 RX ORDER — CETIRIZINE HYDROCHLORIDE 10 MG/1
10 TABLET ORAL DAILY PRN
Status: DISCONTINUED | OUTPATIENT
Start: 2023-01-01 | End: 2023-01-01 | Stop reason: HOSPADM

## 2023-01-01 RX ORDER — MINERAL OIL/HYDROPHIL PETROLAT
OINTMENT (GRAM) TOPICAL DAILY
COMMUNITY

## 2023-01-01 RX ORDER — ALBUTEROL SULFATE 5 MG/ML
2.5 SOLUTION RESPIRATORY (INHALATION) EVERY 4 HOURS PRN
Status: DISCONTINUED | OUTPATIENT
Start: 2023-01-01 | End: 2023-01-01 | Stop reason: HOSPADM

## 2023-01-01 RX ORDER — ACETYLCYSTEINE 200 MG/ML
2 SOLUTION ORAL; RESPIRATORY (INHALATION) EVERY 4 HOURS
COMMUNITY

## 2023-01-01 RX ORDER — OLANZAPINE 2.5 MG/1
2.5 TABLET, FILM COATED ORAL
Status: DISCONTINUED | OUTPATIENT
Start: 2023-01-01 | End: 2023-01-01 | Stop reason: HOSPADM

## 2023-01-01 RX ORDER — SERTRALINE HYDROCHLORIDE 100 MG/1
100 TABLET, FILM COATED ORAL DAILY
Status: DISCONTINUED | OUTPATIENT
Start: 2023-01-01 | End: 2023-01-01

## 2023-01-01 RX ORDER — SODIUM CHLORIDE, SODIUM LACTATE, POTASSIUM CHLORIDE, CALCIUM CHLORIDE 600; 310; 30; 20 MG/100ML; MG/100ML; MG/100ML; MG/100ML
INJECTION, SOLUTION INTRAVENOUS CONTINUOUS PRN
Status: DISCONTINUED | OUTPATIENT
Start: 2023-01-01 | End: 2023-01-01

## 2023-01-01 RX ORDER — METHYLPREDNISOLONE SODIUM SUCCINATE 125 MG/2ML
60 INJECTION, POWDER, LYOPHILIZED, FOR SOLUTION INTRAMUSCULAR; INTRAVENOUS EVERY 12 HOURS
Status: DISCONTINUED | OUTPATIENT
Start: 2023-01-01 | End: 2023-01-01

## 2023-01-01 RX ORDER — QUETIAPINE FUMARATE 50 MG/1
100 TABLET, FILM COATED ORAL 2 TIMES DAILY
Status: DISCONTINUED | OUTPATIENT
Start: 2023-01-01 | End: 2023-01-01 | Stop reason: HOSPADM

## 2023-01-01 RX ORDER — ALBUTEROL SULFATE 0.83 MG/ML
2.5 SOLUTION RESPIRATORY (INHALATION) 4 TIMES DAILY
COMMUNITY

## 2023-01-01 RX ORDER — PREGABALIN 20 MG/ML
25 SOLUTION ORAL DAILY
Status: DISCONTINUED | OUTPATIENT
Start: 2023-01-01 | End: 2023-01-01 | Stop reason: HOSPADM

## 2023-01-01 RX ORDER — LIDOCAINE 40 MG/G
CREAM TOPICAL
Status: CANCELLED | OUTPATIENT
Start: 2023-01-01

## 2023-01-01 RX ORDER — FUROSEMIDE 10 MG/ML
60 INJECTION INTRAMUSCULAR; INTRAVENOUS ONCE
Status: COMPLETED | OUTPATIENT
Start: 2023-01-01 | End: 2023-01-01

## 2023-01-01 RX ORDER — QUETIAPINE FUMARATE 50 MG/1
50 TABLET, FILM COATED ORAL 2 TIMES DAILY
Status: DISCONTINUED | OUTPATIENT
Start: 2023-01-01 | End: 2023-01-01

## 2023-01-01 RX ORDER — DIPHENHYDRAMINE HYDROCHLORIDE 50 MG/ML
50 INJECTION INTRAMUSCULAR; INTRAVENOUS
Status: CANCELLED | OUTPATIENT
Start: 2023-01-01

## 2023-01-01 RX ORDER — POLYETHYLENE GLYCOL 3350 17 G/17G
17 POWDER, FOR SOLUTION ORAL DAILY PRN
Status: DISCONTINUED | OUTPATIENT
Start: 2023-01-01 | End: 2023-01-01 | Stop reason: HOSPADM

## 2023-01-01 RX ORDER — IOPAMIDOL 755 MG/ML
75 INJECTION, SOLUTION INTRAVASCULAR ONCE
Status: COMPLETED | OUTPATIENT
Start: 2023-01-01 | End: 2023-01-01

## 2023-01-01 RX ORDER — HYDRALAZINE HYDROCHLORIDE 50 MG/1
50 TABLET, FILM COATED ORAL 3 TIMES DAILY
Status: DISCONTINUED | OUTPATIENT
Start: 2023-01-01 | End: 2023-01-01

## 2023-01-01 RX ORDER — MIDODRINE HYDROCHLORIDE 5 MG/1
5 TABLET ORAL
Status: DISCONTINUED | OUTPATIENT
Start: 2023-01-01 | End: 2023-01-01

## 2023-01-01 RX ORDER — SALIVA STIMULANT COMB. NO.3
2 SPRAY, NON-AEROSOL (ML) MUCOUS MEMBRANE 4 TIMES DAILY PRN
Status: DISCONTINUED | OUTPATIENT
Start: 2023-01-01 | End: 2023-01-01

## 2023-01-01 RX ORDER — LABETALOL HYDROCHLORIDE 5 MG/ML
10-20 INJECTION, SOLUTION INTRAVENOUS EVERY 4 HOURS PRN
Status: DISCONTINUED | OUTPATIENT
Start: 2023-01-01 | End: 2023-01-01

## 2023-01-01 RX ORDER — FUROSEMIDE 10 MG/ML
60 INJECTION INTRAMUSCULAR; INTRAVENOUS ONCE
Status: DISCONTINUED | OUTPATIENT
Start: 2023-01-01 | End: 2023-01-01 | Stop reason: HOSPADM

## 2023-01-01 RX ORDER — FERROUS FUMARATE 324(106)MG
324 TABLET ORAL DAILY
Status: DISCONTINUED | OUTPATIENT
Start: 2023-01-01 | End: 2023-01-01

## 2023-01-01 RX ORDER — ONDANSETRON 2 MG/ML
4 INJECTION INTRAMUSCULAR; INTRAVENOUS EVERY 6 HOURS PRN
Status: DISCONTINUED | OUTPATIENT
Start: 2023-01-01 | End: 2023-01-01 | Stop reason: HOSPADM

## 2023-01-01 RX ORDER — DEXTROSE MONOHYDRATE 50 MG/ML
INJECTION, SOLUTION INTRAVENOUS CONTINUOUS
Status: DISCONTINUED | OUTPATIENT
Start: 2023-01-01 | End: 2023-01-01 | Stop reason: HOSPADM

## 2023-01-01 RX ORDER — HYDROMORPHONE HCL IN WATER/PF 6 MG/30 ML
0.2 PATIENT CONTROLLED ANALGESIA SYRINGE INTRAVENOUS
Status: DISCONTINUED | OUTPATIENT
Start: 2023-01-01 | End: 2023-01-01

## 2023-01-01 RX ORDER — LIDOCAINE HYDROCHLORIDE 20 MG/ML
INJECTION, SOLUTION INFILTRATION; PERINEURAL PRN
Status: DISCONTINUED | OUTPATIENT
Start: 2023-01-01 | End: 2023-01-01

## 2023-01-01 RX ORDER — ISOSORBIDE DINITRATE 20 MG/1
20 TABLET ORAL
Status: DISCONTINUED | OUTPATIENT
Start: 2023-01-01 | End: 2023-01-01 | Stop reason: HOSPADM

## 2023-01-01 RX ORDER — LEVALBUTEROL INHALATION SOLUTION 1.25 MG/3ML
1.25 SOLUTION RESPIRATORY (INHALATION) 4 TIMES DAILY
Status: DISCONTINUED | OUTPATIENT
Start: 2023-01-01 | End: 2023-01-01

## 2023-01-01 RX ORDER — NALOXONE HYDROCHLORIDE 0.4 MG/ML
0.4 INJECTION, SOLUTION INTRAMUSCULAR; INTRAVENOUS; SUBCUTANEOUS
Status: DISCONTINUED | OUTPATIENT
Start: 2023-01-01 | End: 2023-01-01

## 2023-01-01 RX ORDER — HYDROMORPHONE HCL IN WATER/PF 6 MG/30 ML
.2-.4 PATIENT CONTROLLED ANALGESIA SYRINGE INTRAVENOUS EVERY 30 MIN PRN
Status: DISCONTINUED | OUTPATIENT
Start: 2023-01-01 | End: 2023-01-01

## 2023-01-01 RX ORDER — AMLODIPINE BESYLATE 5 MG/1
5 TABLET ORAL 2 TIMES DAILY
Status: DISCONTINUED | OUTPATIENT
Start: 2023-01-01 | End: 2023-01-01 | Stop reason: HOSPADM

## 2023-01-01 RX ORDER — PREDNISONE 20 MG/1
20 TABLET ORAL DAILY
Status: DISCONTINUED | OUTPATIENT
Start: 2024-02-22 | End: 2023-01-01

## 2023-01-01 RX ORDER — METHYLPREDNISOLONE SODIUM SUCCINATE 125 MG/2ML
125 INJECTION, POWDER, LYOPHILIZED, FOR SOLUTION INTRAMUSCULAR; INTRAVENOUS EVERY 24 HOURS
Status: DISCONTINUED | OUTPATIENT
Start: 2023-01-01 | End: 2023-01-01

## 2023-01-01 RX ORDER — LORAZEPAM 2 MG/ML
0.5 INJECTION INTRAMUSCULAR
Status: COMPLETED | OUTPATIENT
Start: 2023-01-01 | End: 2023-01-01

## 2023-01-01 RX ORDER — GLIPIZIDE 10 MG/1
1 TABLET ORAL 4 TIMES DAILY PRN
Status: DISCONTINUED | OUTPATIENT
Start: 2023-01-01 | End: 2023-01-01

## 2023-01-01 RX ORDER — LEVOTHYROXINE SODIUM 150 UG/1
150 TABLET ORAL DAILY
Status: DISCONTINUED | OUTPATIENT
Start: 2023-01-01 | End: 2023-01-01

## 2023-01-01 RX ORDER — QUETIAPINE FUMARATE 100 MG/1
100 TABLET, FILM COATED ORAL 2 TIMES DAILY
Status: DISCONTINUED | OUTPATIENT
Start: 2023-01-01 | End: 2023-01-01

## 2023-01-01 RX ORDER — AZITHROMYCIN 500 MG/5ML
500 INJECTION, POWDER, LYOPHILIZED, FOR SOLUTION INTRAVENOUS EVERY 24 HOURS
Qty: 250 ML | Refills: 0 | Status: DISCONTINUED | OUTPATIENT
Start: 2023-01-01 | End: 2023-01-01

## 2023-01-01 RX ORDER — LORAZEPAM 2 MG/ML
0.5 CONCENTRATE ORAL EVERY 4 HOURS PRN
Status: DISCONTINUED | OUTPATIENT
Start: 2023-01-01 | End: 2023-01-01 | Stop reason: HOSPADM

## 2023-01-01 RX ORDER — MINERAL OIL/HYDROPHIL PETROLAT
OINTMENT (GRAM) TOPICAL DAILY
Status: DISCONTINUED | OUTPATIENT
Start: 2023-01-01 | End: 2023-01-01 | Stop reason: HOSPADM

## 2023-01-01 RX ORDER — BUMETANIDE 0.5 MG/1
1 TABLET ORAL
Status: DISCONTINUED | OUTPATIENT
Start: 2023-01-01 | End: 2023-01-01

## 2023-01-01 RX ORDER — SULFAMETHOXAZOLE AND TRIMETHOPRIM 200; 40 MG/5ML; MG/5ML
20 SUSPENSION ORAL
Status: DISCONTINUED | OUTPATIENT
Start: 2023-01-01 | End: 2023-01-01

## 2023-01-01 RX ORDER — PREDNISONE 5 MG/1
5 TABLET ORAL DAILY
Status: DISCONTINUED | OUTPATIENT
Start: 2024-07-31 | End: 2023-01-01 | Stop reason: HOSPADM

## 2023-01-01 RX ORDER — IPRATROPIUM BROMIDE AND ALBUTEROL SULFATE 2.5; .5 MG/3ML; MG/3ML
3 SOLUTION RESPIRATORY (INHALATION) ONCE
Status: COMPLETED | OUTPATIENT
Start: 2023-01-01 | End: 2023-01-01

## 2023-01-01 RX ORDER — NICOTINE POLACRILEX 4 MG
15-30 LOZENGE BUCCAL
Status: CANCELLED | OUTPATIENT
Start: 2023-01-01

## 2023-01-01 RX ORDER — METOLAZONE 5 MG/1
5 TABLET ORAL
Status: COMPLETED | OUTPATIENT
Start: 2023-01-01 | End: 2023-01-01

## 2023-01-01 RX ORDER — NOREPINEPHRINE BITARTRATE 0.02 MG/ML
INJECTION, SOLUTION INTRAVENOUS
Status: COMPLETED
Start: 2023-01-01 | End: 2023-01-01

## 2023-01-01 RX ORDER — LIDOCAINE HYDROCHLORIDE 10 MG/ML
INJECTION, SOLUTION EPIDURAL; INFILTRATION; INTRACAUDAL; PERINEURAL
Status: DISCONTINUED
Start: 2023-01-01 | End: 2023-01-01 | Stop reason: HOSPADM

## 2023-01-01 RX ORDER — MULTIVITAMIN,THERAPEUTIC
1 TABLET ORAL DAILY
Status: DISCONTINUED | OUTPATIENT
Start: 2023-01-01 | End: 2023-01-01

## 2023-01-01 RX ORDER — CEFEPIME HYDROCHLORIDE 2 G/1
2 INJECTION, POWDER, FOR SOLUTION INTRAVENOUS EVERY 24 HOURS
Status: DISCONTINUED | OUTPATIENT
Start: 2023-01-01 | End: 2023-01-01

## 2023-01-01 RX ORDER — HYDRALAZINE HYDROCHLORIDE 20 MG/ML
10-20 INJECTION INTRAMUSCULAR; INTRAVENOUS EVERY 4 HOURS PRN
Status: DISCONTINUED | OUTPATIENT
Start: 2023-01-01 | End: 2023-01-01

## 2023-01-01 RX ORDER — POLYETHYLENE GLYCOL 3350 17 G/17G
17 POWDER, FOR SOLUTION ORAL DAILY PRN
Status: DISCONTINUED | OUTPATIENT
Start: 2023-01-01 | End: 2023-01-01

## 2023-01-01 RX ORDER — TORSEMIDE 5 MG
20 TABLET ORAL DAILY
Status: DISCONTINUED | OUTPATIENT
Start: 2023-01-01 | End: 2023-01-01

## 2023-01-01 RX ORDER — IPRATROPIUM BROMIDE AND ALBUTEROL SULFATE 2.5; .5 MG/3ML; MG/3ML
3 SOLUTION RESPIRATORY (INHALATION)
Status: CANCELLED | OUTPATIENT
Start: 2023-01-01

## 2023-01-01 RX ORDER — GLYCOPYRROLATE 0.2 MG/ML
INJECTION, SOLUTION INTRAMUSCULAR; INTRAVENOUS PRN
Status: DISCONTINUED | OUTPATIENT
Start: 2023-01-01 | End: 2023-01-01

## 2023-01-01 RX ORDER — METHYLPREDNISOLONE SODIUM SUCCINATE 125 MG/2ML
60 INJECTION, POWDER, LYOPHILIZED, FOR SOLUTION INTRAMUSCULAR; INTRAVENOUS EVERY 6 HOURS
Status: DISCONTINUED | OUTPATIENT
Start: 2023-01-01 | End: 2023-01-01 | Stop reason: HOSPADM

## 2023-01-01 RX ORDER — VITAMIN B COMPLEX
50 TABLET ORAL DAILY
Status: DISCONTINUED | OUTPATIENT
Start: 2023-01-01 | End: 2023-01-01

## 2023-01-01 RX ORDER — DEXTROSE MONOHYDRATE 25 G/50ML
25-50 INJECTION, SOLUTION INTRAVENOUS
Status: CANCELLED | OUTPATIENT
Start: 2023-01-01

## 2023-01-01 RX ORDER — METHYLPREDNISOLONE SODIUM SUCCINATE 125 MG/2ML
125 INJECTION, POWDER, LYOPHILIZED, FOR SOLUTION INTRAMUSCULAR; INTRAVENOUS DAILY
Status: DISCONTINUED | OUTPATIENT
Start: 2023-01-01 | End: 2023-01-01

## 2023-01-01 RX ORDER — PREGABALIN 75 MG/1
75 CAPSULE ORAL DAILY
Status: DISCONTINUED | OUTPATIENT
Start: 2023-01-01 | End: 2023-01-01

## 2023-01-01 RX ORDER — CHLORTHALIDONE 25 MG/1
25 TABLET ORAL DAILY
COMMUNITY

## 2023-01-01 RX ORDER — ASPIRIN 81 MG/1
81 TABLET, CHEWABLE ORAL DAILY
Status: DISCONTINUED | OUTPATIENT
Start: 2023-01-01 | End: 2023-01-01 | Stop reason: HOSPADM

## 2023-01-01 RX ORDER — LORAZEPAM 2 MG/ML
0.5 INJECTION INTRAMUSCULAR EVERY 6 HOURS PRN
Status: DISCONTINUED | OUTPATIENT
Start: 2023-01-01 | End: 2023-01-01

## 2023-01-01 RX ORDER — VITAMIN B COMPLEX
50 TABLET ORAL DAILY
Status: CANCELLED | OUTPATIENT
Start: 2023-01-01

## 2023-01-01 RX ORDER — POTASSIUM CHLORIDE 7.45 MG/ML
10 INJECTION INTRAVENOUS
Status: DISCONTINUED | OUTPATIENT
Start: 2023-01-01 | End: 2023-01-01 | Stop reason: ALTCHOICE

## 2023-01-01 RX ORDER — PREDNISONE 20 MG/1
20 TABLET ORAL DAILY
Status: DISCONTINUED | OUTPATIENT
Start: 2024-06-19 | End: 2023-01-01 | Stop reason: HOSPADM

## 2023-01-01 RX ORDER — HYDRALAZINE HYDROCHLORIDE 20 MG/ML
20 INJECTION INTRAMUSCULAR; INTRAVENOUS EVERY 4 HOURS PRN
Status: DISCONTINUED | OUTPATIENT
Start: 2023-01-01 | End: 2023-01-01

## 2023-01-01 RX ORDER — GUAR GUM
1 PACKET (EA) ORAL 3 TIMES DAILY
Status: DISCONTINUED | OUTPATIENT
Start: 2023-01-01 | End: 2023-01-01

## 2023-01-01 RX ORDER — PREDNISONE 50 MG/1
100 TABLET ORAL DAILY
Status: DISCONTINUED | OUTPATIENT
Start: 2023-01-01 | End: 2023-01-01 | Stop reason: HOSPADM

## 2023-01-01 RX ORDER — HYDROXYZINE HYDROCHLORIDE 50 MG/1
50 TABLET, FILM COATED ORAL EVERY 6 HOURS PRN
Status: DISCONTINUED | OUTPATIENT
Start: 2023-01-01 | End: 2023-01-01 | Stop reason: HOSPADM

## 2023-01-01 RX ORDER — AMLODIPINE BESYLATE 5 MG/1
5 TABLET ORAL 2 TIMES DAILY
Status: CANCELLED | OUTPATIENT
Start: 2023-01-01

## 2023-01-01 RX ORDER — HYDROXYZINE HYDROCHLORIDE 50 MG/1
50 TABLET, FILM COATED ORAL EVERY 6 HOURS PRN
Status: CANCELLED | OUTPATIENT
Start: 2023-01-01

## 2023-01-01 RX ORDER — B COMPLEX C NO.10/FOLIC ACID 900MCG/5ML
5 LIQUID (ML) ORAL DAILY
COMMUNITY

## 2023-01-01 RX ORDER — LORAZEPAM 0.5 MG/1
0.5 TABLET ORAL ONCE
Status: COMPLETED | OUTPATIENT
Start: 2023-01-01 | End: 2023-01-01

## 2023-01-01 RX ORDER — B COMPLEX C NO.10/FOLIC ACID 900MCG/5ML
5 LIQUID (ML) ORAL DAILY
Status: CANCELLED | OUTPATIENT
Start: 2023-01-01

## 2023-01-01 RX ORDER — FENTANYL CITRATE 50 UG/ML
25-50 INJECTION, SOLUTION INTRAMUSCULAR; INTRAVENOUS EVERY 5 MIN PRN
Status: DISCONTINUED | OUTPATIENT
Start: 2023-01-01 | End: 2023-01-01

## 2023-01-01 RX ORDER — B COMPLEX C NO.10/FOLIC ACID 900MCG/5ML
5 LIQUID (ML) ORAL DAILY
Status: DISCONTINUED | OUTPATIENT
Start: 2023-01-01 | End: 2023-01-01 | Stop reason: HOSPADM

## 2023-01-01 RX ORDER — MIDAZOLAM HCL IN 0.9 % NACL/PF 1 MG/ML
1-8 PLASTIC BAG, INJECTION (ML) INTRAVENOUS CONTINUOUS
Status: DISCONTINUED | OUTPATIENT
Start: 2023-01-01 | End: 2023-01-01 | Stop reason: HOSPADM

## 2023-01-01 RX ORDER — PROPOFOL 10 MG/ML
INJECTION, EMULSION INTRAVENOUS
Status: COMPLETED
Start: 2023-01-01 | End: 2023-01-01

## 2023-01-01 RX ORDER — CLOPIDOGREL BISULFATE 75 MG/1
75 TABLET ORAL DAILY
Status: DISCONTINUED | OUTPATIENT
Start: 2023-01-01 | End: 2023-01-01

## 2023-01-01 RX ORDER — IPRATROPIUM BROMIDE AND ALBUTEROL SULFATE 2.5; .5 MG/3ML; MG/3ML
3 SOLUTION RESPIRATORY (INHALATION)
Status: DISCONTINUED | OUTPATIENT
Start: 2023-01-01 | End: 2023-01-01 | Stop reason: HOSPADM

## 2023-01-01 RX ORDER — GUAIFENESIN 600 MG/1
15 TABLET, EXTENDED RELEASE ORAL DAILY
Status: DISCONTINUED | OUTPATIENT
Start: 2023-01-01 | End: 2023-01-01

## 2023-01-01 RX ORDER — METHYLPREDNISOLONE SODIUM SUCCINATE 125 MG/2ML
125 INJECTION, POWDER, LYOPHILIZED, FOR SOLUTION INTRAMUSCULAR; INTRAVENOUS ONCE
Status: COMPLETED | OUTPATIENT
Start: 2023-01-01 | End: 2023-01-01

## 2023-01-01 RX ORDER — LIDOCAINE HYDROCHLORIDE AND EPINEPHRINE 10; 10 MG/ML; UG/ML
INJECTION, SOLUTION INFILTRATION; PERINEURAL
Status: DISCONTINUED
Start: 2023-01-01 | End: 2023-01-01 | Stop reason: HOSPADM

## 2023-01-01 RX ORDER — SERTRALINE HYDROCHLORIDE 20 MG/ML
150 SOLUTION ORAL DAILY
Status: DISCONTINUED | OUTPATIENT
Start: 2023-01-01 | End: 2023-01-01 | Stop reason: HOSPADM

## 2023-01-01 RX ORDER — AMOXICILLIN 250 MG
1 CAPSULE ORAL 2 TIMES DAILY PRN
Status: DISCONTINUED | OUTPATIENT
Start: 2023-01-01 | End: 2023-01-01

## 2023-01-01 RX ORDER — OXYCODONE HYDROCHLORIDE 5 MG/1
5 TABLET ORAL EVERY 8 HOURS
Status: DISCONTINUED | OUTPATIENT
Start: 2023-01-01 | End: 2023-01-01

## 2023-01-01 RX ORDER — NOREPINEPHRINE BITARTRATE 0.02 MG/ML
.01-.6 INJECTION, SOLUTION INTRAVENOUS CONTINUOUS
Status: DISCONTINUED | OUTPATIENT
Start: 2023-01-01 | End: 2023-01-01

## 2023-01-01 RX ORDER — OXYCODONE HYDROCHLORIDE 5 MG/1
5-10 TABLET ORAL
Status: DISCONTINUED | OUTPATIENT
Start: 2023-01-01 | End: 2023-01-01 | Stop reason: HOSPADM

## 2023-01-01 RX ORDER — MIDODRINE HYDROCHLORIDE 10 MG/1
10 TABLET ORAL EVERY 6 HOURS
Status: DISCONTINUED | OUTPATIENT
Start: 2023-01-01 | End: 2023-01-01

## 2023-01-01 RX ORDER — MEROPENEM 1 G/1
1 INJECTION, POWDER, FOR SOLUTION INTRAVENOUS EVERY 8 HOURS
Status: DISCONTINUED | OUTPATIENT
Start: 2023-01-01 | End: 2023-01-01

## 2023-01-01 RX ORDER — IPRATROPIUM BROMIDE AND ALBUTEROL SULFATE 2.5; .5 MG/3ML; MG/3ML
3 SOLUTION RESPIRATORY (INHALATION) EVERY 4 HOURS PRN
Status: DISCONTINUED | OUTPATIENT
Start: 2023-01-01 | End: 2023-01-01

## 2023-01-01 RX ORDER — LOPERAMIDE HCL 1 MG/7.5ML
2 SOLUTION ORAL 4 TIMES DAILY PRN
COMMUNITY

## 2023-01-01 RX ORDER — OXYCODONE HCL 5 MG/5 ML
5 SOLUTION, ORAL ORAL
COMMUNITY

## 2023-01-01 RX ORDER — HYDRALAZINE HYDROCHLORIDE 20 MG/ML
5 INJECTION INTRAMUSCULAR; INTRAVENOUS EVERY 4 HOURS PRN
Status: CANCELLED | OUTPATIENT
Start: 2023-01-01

## 2023-01-01 RX ORDER — LABETALOL HYDROCHLORIDE 5 MG/ML
20 INJECTION, SOLUTION INTRAVENOUS EVERY 4 HOURS PRN
Status: DISCONTINUED | OUTPATIENT
Start: 2023-01-01 | End: 2023-01-01

## 2023-01-01 RX ORDER — METHYLPREDNISOLONE SODIUM SUCCINATE 125 MG/2ML
125 INJECTION, POWDER, LYOPHILIZED, FOR SOLUTION INTRAMUSCULAR; INTRAVENOUS
Status: CANCELLED | OUTPATIENT
Start: 2023-01-01

## 2023-01-01 RX ORDER — POTASSIUM CHLORIDE 29.8 MG/ML
20 INJECTION INTRAVENOUS ONCE
Status: COMPLETED | OUTPATIENT
Start: 2023-01-01 | End: 2023-01-01

## 2023-01-01 RX ORDER — DEXMEDETOMIDINE HYDROCHLORIDE 4 UG/ML
.1-1.2 INJECTION, SOLUTION INTRAVENOUS CONTINUOUS
Status: DISCONTINUED | OUTPATIENT
Start: 2023-01-01 | End: 2023-01-01

## 2023-01-01 RX ORDER — PREGABALIN 20 MG/ML
25 SOLUTION ORAL DAILY
COMMUNITY

## 2023-01-01 RX ORDER — ACETYLCYSTEINE 200 MG/ML
2 SOLUTION ORAL; RESPIRATORY (INHALATION) 4 TIMES DAILY
Status: DISCONTINUED | OUTPATIENT
Start: 2023-01-01 | End: 2023-01-01

## 2023-01-01 RX ORDER — OXYCODONE HYDROCHLORIDE 5 MG/1
10 TABLET ORAL
Status: DISCONTINUED | OUTPATIENT
Start: 2023-01-01 | End: 2023-01-01

## 2023-01-01 RX ORDER — FLUTICASONE PROPIONATE 50 MCG
2 SPRAY, SUSPENSION (ML) NASAL 2 TIMES DAILY
Status: DISCONTINUED | OUTPATIENT
Start: 2023-01-01 | End: 2023-01-01

## 2023-01-01 RX ORDER — PREDNISONE 10 MG/1
10 TABLET ORAL DAILY
Status: DISCONTINUED | OUTPATIENT
Start: 2024-07-17 | End: 2023-01-01 | Stop reason: HOSPADM

## 2023-01-01 RX ORDER — MEROPENEM 1 G/1
1 INJECTION, POWDER, FOR SOLUTION INTRAVENOUS EVERY 12 HOURS
Status: DISCONTINUED | OUTPATIENT
Start: 2023-01-01 | End: 2023-01-01

## 2023-01-01 RX ORDER — PREGABALIN 75 MG/1
75 CAPSULE ORAL AT BEDTIME
Status: DISCONTINUED | OUTPATIENT
Start: 2023-01-01 | End: 2023-01-01

## 2023-01-01 RX ORDER — CARBOXYMETHYLCELLULOSE SODIUM 5 MG/ML
1 SOLUTION/ DROPS OPHTHALMIC
Status: CANCELLED | OUTPATIENT
Start: 2023-01-01

## 2023-01-01 RX ORDER — IPRATROPIUM BROMIDE AND ALBUTEROL SULFATE 2.5; .5 MG/3ML; MG/3ML
3 SOLUTION RESPIRATORY (INHALATION) EVERY 4 HOURS
Status: DISCONTINUED | OUTPATIENT
Start: 2023-01-01 | End: 2023-01-01

## 2023-01-01 RX ORDER — PIPERACILLIN SODIUM, TAZOBACTAM SODIUM 4; .5 G/20ML; G/20ML
4.5 INJECTION, POWDER, LYOPHILIZED, FOR SOLUTION INTRAVENOUS EVERY 6 HOURS
Status: DISCONTINUED | OUTPATIENT
Start: 2023-01-01 | End: 2023-01-01

## 2023-01-01 RX ORDER — MEROPENEM 1 G/1
1 INJECTION, POWDER, FOR SOLUTION INTRAVENOUS ONCE
Status: COMPLETED | OUTPATIENT
Start: 2023-01-01 | End: 2023-01-01

## 2023-01-01 RX ORDER — MIDAZOLAM HCL IN 0.9 % NACL/PF 1 MG/ML
1-8 PLASTIC BAG, INJECTION (ML) INTRAVENOUS CONTINUOUS
Status: DISCONTINUED | OUTPATIENT
Start: 2023-01-01 | End: 2023-01-01

## 2023-01-01 RX ORDER — DEXMEDETOMIDINE HYDROCHLORIDE 4 UG/ML
INJECTION, SOLUTION INTRAVENOUS CONTINUOUS PRN
Status: DISCONTINUED | OUTPATIENT
Start: 2023-01-01 | End: 2023-01-01

## 2023-01-01 RX ORDER — LIDOCAINE 50 MG/G
OINTMENT TOPICAL 3 TIMES DAILY
Status: DISCONTINUED | OUTPATIENT
Start: 2023-01-01 | End: 2023-01-01

## 2023-01-01 RX ORDER — INSULIN ASPART 100 [IU]/ML
1-6 INJECTION, SOLUTION INTRAVENOUS; SUBCUTANEOUS EVERY 6 HOURS
COMMUNITY

## 2023-01-01 RX ORDER — BUMETANIDE 0.25 MG/ML
2 INJECTION INTRAMUSCULAR; INTRAVENOUS ONCE
Status: COMPLETED | OUTPATIENT
Start: 2023-01-01 | End: 2023-01-01

## 2023-01-01 RX ORDER — ACETAMINOPHEN 325 MG/10.15ML
650 LIQUID ORAL EVERY 4 HOURS PRN
Status: CANCELLED | OUTPATIENT
Start: 2023-01-01

## 2023-01-01 RX ORDER — PREDNISONE 5 MG/ML
60 SOLUTION ORAL 2 TIMES DAILY WITH MEALS
Status: DISCONTINUED | OUTPATIENT
Start: 2023-01-01 | End: 2023-01-01

## 2023-01-01 RX ORDER — METHYLPREDNISOLONE SODIUM SUCCINATE 125 MG/2ML
60 INJECTION, POWDER, LYOPHILIZED, FOR SOLUTION INTRAMUSCULAR; INTRAVENOUS EVERY 24 HOURS
Status: CANCELLED | OUTPATIENT
Start: 2023-01-01

## 2023-01-01 RX ORDER — LEVOTHYROXINE SODIUM 150 UG/1
150 TABLET ORAL
Status: DISCONTINUED | OUTPATIENT
Start: 2023-01-01 | End: 2023-01-01

## 2023-01-01 RX ORDER — SIMETHICONE 40MG/0.6ML
40 SUSPENSION, DROPS(FINAL DOSAGE FORM)(ML) ORAL 3 TIMES DAILY
Status: DISCONTINUED | OUTPATIENT
Start: 2023-01-01 | End: 2023-01-01 | Stop reason: HOSPADM

## 2023-01-01 RX ORDER — CALCIUM GLUCONATE 20 MG/ML
1 INJECTION, SOLUTION INTRAVENOUS ONCE
Status: COMPLETED | OUTPATIENT
Start: 2023-01-01 | End: 2023-01-01

## 2023-01-01 RX ORDER — CEFTRIAXONE 2 G/1
2 INJECTION, POWDER, FOR SOLUTION INTRAMUSCULAR; INTRAVENOUS ONCE
Status: COMPLETED | OUTPATIENT
Start: 2023-01-01 | End: 2023-01-01

## 2023-01-01 RX ORDER — PROPOFOL 10 MG/ML
INJECTION, EMULSION INTRAVENOUS PRN
Status: DISCONTINUED | OUTPATIENT
Start: 2023-01-01 | End: 2023-01-01

## 2023-01-01 RX ORDER — POTASSIUM CHLORIDE 1.5 G/1.58G
20 POWDER, FOR SOLUTION ORAL 2 TIMES DAILY
Status: DISCONTINUED | OUTPATIENT
Start: 2023-01-01 | End: 2023-01-01

## 2023-01-01 RX ORDER — CHLORHEXIDINE GLUCONATE ORAL RINSE 1.2 MG/ML
15 SOLUTION DENTAL 2 TIMES DAILY
COMMUNITY

## 2023-01-01 RX ORDER — PREGABALIN 20 MG/ML
75 SOLUTION ORAL 2 TIMES DAILY
Status: DISCONTINUED | OUTPATIENT
Start: 2023-01-01 | End: 2023-01-01

## 2023-01-01 RX ORDER — PREDNISONE 50 MG/1
50 TABLET ORAL 2 TIMES DAILY WITH MEALS
Status: DISCONTINUED | OUTPATIENT
Start: 2023-01-01 | End: 2023-01-01 | Stop reason: HOSPADM

## 2023-01-01 RX ORDER — HEPARIN SODIUM 5000 [USP'U]/.5ML
5000 INJECTION, SOLUTION INTRAVENOUS; SUBCUTANEOUS EVERY 8 HOURS
Status: CANCELLED | OUTPATIENT
Start: 2023-01-01

## 2023-01-01 RX ORDER — HYDROXYZINE HYDROCHLORIDE 25 MG/1
50 TABLET, FILM COATED ORAL EVERY 6 HOURS PRN
Status: DISCONTINUED | OUTPATIENT
Start: 2023-01-01 | End: 2023-01-01

## 2023-01-01 RX ORDER — VITAMIN B COMPLEX
50 TABLET ORAL DAILY
Status: DISCONTINUED | OUTPATIENT
Start: 2023-01-01 | End: 2023-01-01 | Stop reason: HOSPADM

## 2023-01-01 RX ORDER — QUETIAPINE FUMARATE 50 MG/1
150 TABLET, FILM COATED ORAL 2 TIMES DAILY
Status: DISCONTINUED | OUTPATIENT
Start: 2023-01-01 | End: 2023-01-01

## 2023-01-01 RX ORDER — POTASSIUM CHLORIDE 29.8 MG/ML
20 INJECTION INTRAVENOUS
Status: COMPLETED | OUTPATIENT
Start: 2023-01-01 | End: 2023-01-01

## 2023-01-01 RX ORDER — FLUMAZENIL 0.1 MG/ML
0.2 INJECTION, SOLUTION INTRAVENOUS
Status: DISCONTINUED | OUTPATIENT
Start: 2023-01-01 | End: 2023-01-01

## 2023-01-01 RX ORDER — HYDROXYZINE HYDROCHLORIDE 25 MG/1
25 TABLET, FILM COATED ORAL EVERY 6 HOURS PRN
Status: DISCONTINUED | OUTPATIENT
Start: 2023-01-01 | End: 2023-01-01

## 2023-01-01 RX ORDER — PREDNISONE 50 MG/1
50 TABLET ORAL DAILY
Status: DISCONTINUED | OUTPATIENT
Start: 2024-03-27 | End: 2023-01-01 | Stop reason: HOSPADM

## 2023-01-01 RX ORDER — ONDANSETRON 2 MG/ML
4 INJECTION INTRAMUSCULAR; INTRAVENOUS EVERY 6 HOURS PRN
Status: CANCELLED | OUTPATIENT
Start: 2023-01-01

## 2023-01-01 RX ORDER — ACETYLCYSTEINE 200 MG/ML
2 SOLUTION ORAL; RESPIRATORY (INHALATION) 2 TIMES DAILY
Status: DISCONTINUED | OUTPATIENT
Start: 2023-01-01 | End: 2023-01-01 | Stop reason: HOSPADM

## 2023-01-01 RX ORDER — QUETIAPINE FUMARATE 100 MG/1
100 TABLET, FILM COATED ORAL ONCE
Status: COMPLETED | OUTPATIENT
Start: 2023-01-01 | End: 2023-01-01

## 2023-01-01 RX ORDER — BISACODYL 10 MG
10 SUPPOSITORY, RECTAL RECTAL DAILY PRN
Status: CANCELLED | OUTPATIENT
Start: 2023-01-01

## 2023-01-01 RX ADMIN — ASPIRIN 81 MG CHEWABLE TABLET 81 MG: 81 TABLET CHEWABLE at 08:36

## 2023-01-01 RX ADMIN — SODIUM CHLORIDE 250 MG: 9 INJECTION, SOLUTION INTRAVENOUS at 18:10

## 2023-01-01 RX ADMIN — ANORECTAL OINTMENT: 15.7; .44; 24; 20.6 OINTMENT TOPICAL at 09:06

## 2023-01-01 RX ADMIN — DEXTROSE MONOHYDRATE 1000 ML: 100 INJECTION, SOLUTION INTRAVENOUS at 10:28

## 2023-01-01 RX ADMIN — POTASSIUM CHLORIDE 20 MEQ: 1.5 POWDER, FOR SOLUTION ORAL at 08:25

## 2023-01-01 RX ADMIN — DEXMEDETOMIDINE HYDROCHLORIDE 1.2 MCG/KG/HR: 400 INJECTION INTRAVENOUS at 17:16

## 2023-01-01 RX ADMIN — ASPIRIN 81 MG CHEWABLE TABLET 81 MG: 81 TABLET CHEWABLE at 07:56

## 2023-01-01 RX ADMIN — QUETIAPINE FUMARATE 150 MG: 50 TABLET, FILM COATED ORAL at 21:23

## 2023-01-01 RX ADMIN — ANORECTAL OINTMENT: 15.7; .44; 24; 20.6 OINTMENT TOPICAL at 10:00

## 2023-01-01 RX ADMIN — HYDROMORPHONE HYDROCHLORIDE 0.2 MG: 0.2 INJECTION, SOLUTION INTRAMUSCULAR; INTRAVENOUS; SUBCUTANEOUS at 04:29

## 2023-01-01 RX ADMIN — DEXTROSE MONOHYDRATE 1000 ML: 100 INJECTION, SOLUTION INTRAVENOUS at 15:49

## 2023-01-01 RX ADMIN — HYDRALAZINE HYDROCHLORIDE 25 MG: 25 TABLET, FILM COATED ORAL at 05:01

## 2023-01-01 RX ADMIN — METHYLPREDNISOLONE SODIUM SUCCINATE 62.5 MG: 125 INJECTION INTRAMUSCULAR; INTRAVENOUS at 11:22

## 2023-01-01 RX ADMIN — HYDRALAZINE HYDROCHLORIDE 25 MG: 25 TABLET, FILM COATED ORAL at 21:18

## 2023-01-01 RX ADMIN — IPRATROPIUM BROMIDE AND ALBUTEROL SULFATE 3 ML: .5; 3 SOLUTION RESPIRATORY (INHALATION) at 07:00

## 2023-01-01 RX ADMIN — ATORVASTATIN CALCIUM 20 MG: 20 TABLET, FILM COATED ORAL at 20:20

## 2023-01-01 RX ADMIN — QUETIAPINE 100 MG: 100 TABLET ORAL at 09:27

## 2023-01-01 RX ADMIN — Medication 50 MCG: at 08:40

## 2023-01-01 RX ADMIN — IPRATROPIUM BROMIDE AND ALBUTEROL SULFATE 3 ML: .5; 3 SOLUTION RESPIRATORY (INHALATION) at 15:37

## 2023-01-01 RX ADMIN — IPRATROPIUM BROMIDE AND ALBUTEROL SULFATE 3 ML: .5; 3 SOLUTION RESPIRATORY (INHALATION) at 19:49

## 2023-01-01 RX ADMIN — LEVOTHYROXINE SODIUM 150 MCG: 75 TABLET ORAL at 05:56

## 2023-01-01 RX ADMIN — ACETYLCYSTEINE 2 ML: 200 SOLUTION ORAL; RESPIRATORY (INHALATION) at 12:25

## 2023-01-01 RX ADMIN — ACETAMINOPHEN 650 MG: 650 SOLUTION ORAL at 08:31

## 2023-01-01 RX ADMIN — HEPARIN SODIUM 5000 UNITS: 5000 INJECTION, SOLUTION INTRAVENOUS; SUBCUTANEOUS at 20:55

## 2023-01-01 RX ADMIN — IPRATROPIUM BROMIDE AND ALBUTEROL SULFATE 3 ML: .5; 3 SOLUTION RESPIRATORY (INHALATION) at 03:17

## 2023-01-01 RX ADMIN — HEPARIN SODIUM 5000 UNITS: 5000 INJECTION, SOLUTION INTRAVENOUS; SUBCUTANEOUS at 07:54

## 2023-01-01 RX ADMIN — ATOVAQUONE 1500 MG: 750 SUSPENSION ORAL at 08:29

## 2023-01-01 RX ADMIN — IPRATROPIUM BROMIDE AND ALBUTEROL SULFATE 3 ML: 2.5; .5 SOLUTION RESPIRATORY (INHALATION) at 16:11

## 2023-01-01 RX ADMIN — MICONAZOLE NITRATE: 20 CREAM TOPICAL at 22:01

## 2023-01-01 RX ADMIN — Medication 50 MCG: at 08:02

## 2023-01-01 RX ADMIN — SODIUM CHLORIDE SOLN NEBU 3% 3 ML: 3 NEBU SOLN at 07:07

## 2023-01-01 RX ADMIN — HYDROCORTISONE SODIUM SUCCINATE 50 MG: 100 INJECTION, POWDER, FOR SOLUTION INTRAMUSCULAR; INTRAVENOUS at 17:09

## 2023-01-01 RX ADMIN — CEFEPIME 2 G: 2 INJECTION, POWDER, FOR SOLUTION INTRAVENOUS at 08:49

## 2023-01-01 RX ADMIN — PREDNISONE 60 MG: 50 TABLET ORAL at 08:29

## 2023-01-01 RX ADMIN — INSULIN ASPART 1 UNITS: 100 INJECTION, SOLUTION INTRAVENOUS; SUBCUTANEOUS at 15:28

## 2023-01-01 RX ADMIN — ACETYLCYSTEINE 2 ML: 200 SOLUTION ORAL; RESPIRATORY (INHALATION) at 07:56

## 2023-01-01 RX ADMIN — METOLAZONE 5 MG: 5 TABLET ORAL at 15:38

## 2023-01-01 RX ADMIN — HEPARIN SODIUM 5000 UNITS: 5000 INJECTION, SOLUTION INTRAVENOUS; SUBCUTANEOUS at 20:58

## 2023-01-01 RX ADMIN — ALBUTEROL SULFATE 2.5 MG: 2.5 SOLUTION RESPIRATORY (INHALATION) at 08:08

## 2023-01-01 RX ADMIN — AMLODIPINE BESYLATE 5 MG: 5 TABLET ORAL at 21:12

## 2023-01-01 RX ADMIN — MIDAZOLAM HYDROCHLORIDE 7 MG/HR: 1 INJECTION, SOLUTION INTRAVENOUS at 18:58

## 2023-01-01 RX ADMIN — PREDNISONE 60 MG: 50 TABLET ORAL at 08:15

## 2023-01-01 RX ADMIN — IPRATROPIUM BROMIDE AND ALBUTEROL SULFATE 3 ML: .5; 3 SOLUTION RESPIRATORY (INHALATION) at 12:00

## 2023-01-01 RX ADMIN — ASPIRIN 81 MG CHEWABLE TABLET 81 MG: 81 TABLET CHEWABLE at 08:12

## 2023-01-01 RX ADMIN — ACETAMINOPHEN 975 MG: 325 TABLET, FILM COATED ORAL at 11:16

## 2023-01-01 RX ADMIN — CHLORHEXIDINE GLUCONATE 15 ML: 1.2 SOLUTION ORAL at 20:23

## 2023-01-01 RX ADMIN — Medication 50 MCG: at 09:03

## 2023-01-01 RX ADMIN — PIPERACILLIN AND TAZOBACTAM 4.5 G: 4; .5 INJECTION, POWDER, FOR SOLUTION INTRAVENOUS at 17:09

## 2023-01-01 RX ADMIN — INSULIN ASPART 1 UNITS: 100 INJECTION, SOLUTION INTRAVENOUS; SUBCUTANEOUS at 16:43

## 2023-01-01 RX ADMIN — INSULIN ASPART 1 UNITS: 100 INJECTION, SOLUTION INTRAVENOUS; SUBCUTANEOUS at 23:55

## 2023-01-01 RX ADMIN — METOPROLOL TARTRATE 12.5 MG: 100 TABLET, FILM COATED ORAL at 21:31

## 2023-01-01 RX ADMIN — SODIUM CHLORIDE 84 ML: 9 INJECTION, SOLUTION INTRAVENOUS at 13:53

## 2023-01-01 RX ADMIN — HYDROMORPHONE HYDROCHLORIDE 0.2 MG: 0.2 INJECTION, SOLUTION INTRAMUSCULAR; INTRAVENOUS; SUBCUTANEOUS at 18:39

## 2023-01-01 RX ADMIN — IPRATROPIUM BROMIDE AND ALBUTEROL SULFATE 3 ML: .5; 3 SOLUTION RESPIRATORY (INHALATION) at 11:51

## 2023-01-01 RX ADMIN — DEXMEDETOMIDINE HYDROCHLORIDE 0.2 MCG/KG/HR: 400 INJECTION INTRAVENOUS at 09:24

## 2023-01-01 RX ADMIN — POTASSIUM CHLORIDE 20 MEQ: 1.5 POWDER, FOR SOLUTION ORAL at 08:29

## 2023-01-01 RX ADMIN — GUAIFENESIN 600 MG: 600 TABLET, EXTENDED RELEASE ORAL at 08:47

## 2023-01-01 RX ADMIN — HEPARIN SODIUM 5000 UNITS: 5000 INJECTION, SOLUTION INTRAVENOUS; SUBCUTANEOUS at 20:23

## 2023-01-01 RX ADMIN — LIDOCAINE HYDROCHLORIDE 0.9 ML: 10 INJECTION, SOLUTION EPIDURAL; INFILTRATION; INTRACAUDAL; PERINEURAL at 11:25

## 2023-01-01 RX ADMIN — MICONAZOLE NITRATE: 20 CREAM TOPICAL at 09:06

## 2023-01-01 RX ADMIN — ATOVAQUONE 1500 MG: 750 SUSPENSION ORAL at 09:18

## 2023-01-01 RX ADMIN — Medication 40 MG: at 07:03

## 2023-01-01 RX ADMIN — METRONIDAZOLE 500 MG: 500 INJECTION, SOLUTION INTRAVENOUS at 01:23

## 2023-01-01 RX ADMIN — Medication 6 MCG/KG/MIN: at 01:04

## 2023-01-01 RX ADMIN — IPRATROPIUM BROMIDE AND ALBUTEROL SULFATE 3 ML: 2.5; .5 SOLUTION RESPIRATORY (INHALATION) at 15:05

## 2023-01-01 RX ADMIN — EPOPROSTENOL 20 NG/KG/MIN: 1.5 INJECTION, POWDER, LYOPHILIZED, FOR SOLUTION INTRAVENOUS at 03:51

## 2023-01-01 RX ADMIN — OXYCODONE HYDROCHLORIDE 5 MG: 5 TABLET ORAL at 04:46

## 2023-01-01 RX ADMIN — METHYLPREDNISOLONE SODIUM SUCCINATE 62.5 MG: 125 INJECTION INTRAMUSCULAR; INTRAVENOUS at 03:08

## 2023-01-01 RX ADMIN — Medication 1 SPRAY: at 09:42

## 2023-01-01 RX ADMIN — Medication 5 ML: at 09:16

## 2023-01-01 RX ADMIN — CHLORHEXIDINE GLUCONATE 15 ML: 1.2 SOLUTION ORAL at 08:25

## 2023-01-01 RX ADMIN — IPRATROPIUM BROMIDE AND ALBUTEROL SULFATE 3 ML: .5; 3 SOLUTION RESPIRATORY (INHALATION) at 07:35

## 2023-01-01 RX ADMIN — DEXMEDETOMIDINE HYDROCHLORIDE 1 MCG/KG/HR: 400 INJECTION INTRAVENOUS at 09:24

## 2023-01-01 RX ADMIN — LEVOTHYROXINE SODIUM 150 MCG: 0.03 TABLET ORAL at 08:25

## 2023-01-01 RX ADMIN — DEXTROSE MONOHYDRATE: 50 INJECTION, SOLUTION INTRAVENOUS at 04:34

## 2023-01-01 RX ADMIN — AMLODIPINE BESYLATE 5 MG: 5 TABLET ORAL at 21:31

## 2023-01-01 RX ADMIN — ISOSORBIDE DINITRATE 20 MG: 20 TABLET ORAL at 09:18

## 2023-01-01 RX ADMIN — GLUCAGON 1 MG: KIT at 11:54

## 2023-01-01 RX ADMIN — LEVOTHYROXINE SODIUM 150 MCG: 0.07 TABLET ORAL at 06:06

## 2023-01-01 RX ADMIN — IPRATROPIUM BROMIDE AND ALBUTEROL SULFATE 3 ML: .5; 3 SOLUTION RESPIRATORY (INHALATION) at 07:40

## 2023-01-01 RX ADMIN — PHENYLEPHRINE HYDROCHLORIDE 0.3 MCG/KG/MIN: 10 INJECTION INTRAVENOUS at 11:50

## 2023-01-01 RX ADMIN — IPRATROPIUM BROMIDE AND ALBUTEROL SULFATE 3 ML: .5; 3 SOLUTION RESPIRATORY (INHALATION) at 11:40

## 2023-01-01 RX ADMIN — ACETAMINOPHEN 975 MG: 325 TABLET, FILM COATED ORAL at 21:13

## 2023-01-01 RX ADMIN — CLOTRIMAZOLE: 0.01 CREAM TOPICAL at 20:38

## 2023-01-01 RX ADMIN — METRONIDAZOLE 500 MG: 500 INJECTION, SOLUTION INTRAVENOUS at 00:38

## 2023-01-01 RX ADMIN — METHYLPREDNISOLONE SODIUM SUCCINATE 125 MG: 125 INJECTION, POWDER, FOR SOLUTION INTRAMUSCULAR; INTRAVENOUS at 08:44

## 2023-01-01 RX ADMIN — HYDROXYZINE HYDROCHLORIDE 25 MG: 25 TABLET ORAL at 19:21

## 2023-01-01 RX ADMIN — LOPERAMIDE HCL 2 MG: 1 SOLUTION ORAL at 05:56

## 2023-01-01 RX ADMIN — MINERAL OIL AND PETROLATUM: 150; 830 OINTMENT OPHTHALMIC at 20:29

## 2023-01-01 RX ADMIN — QUETIAPINE 150 MG: 100 TABLET ORAL at 20:36

## 2023-01-01 RX ADMIN — SERTRALINE HYDROCHLORIDE 150 MG: 100 TABLET ORAL at 08:02

## 2023-01-01 RX ADMIN — SERTRALINE HYDROCHLORIDE 150 MG: 100 TABLET ORAL at 08:42

## 2023-01-01 RX ADMIN — PANTOPRAZOLE SODIUM 40 MG: 40 INJECTION, POWDER, LYOPHILIZED, FOR SOLUTION INTRAVENOUS at 08:26

## 2023-01-01 RX ADMIN — HYDRALAZINE HYDROCHLORIDE 5 MG: 20 INJECTION INTRAMUSCULAR; INTRAVENOUS at 01:14

## 2023-01-01 RX ADMIN — IPRATROPIUM BROMIDE AND ALBUTEROL SULFATE 3 ML: .5; 3 SOLUTION RESPIRATORY (INHALATION) at 19:30

## 2023-01-01 RX ADMIN — ACETAMINOPHEN 650 MG: 650 SOLUTION ORAL at 08:43

## 2023-01-01 RX ADMIN — CHLORHEXIDINE GLUCONATE 15 ML: 1.2 SOLUTION ORAL at 20:21

## 2023-01-01 RX ADMIN — ATORVASTATIN CALCIUM 20 MG: 20 TABLET, FILM COATED ORAL at 20:42

## 2023-01-01 RX ADMIN — SERTRALINE HYDROCHLORIDE 150 MG: 20 SOLUTION ORAL at 08:43

## 2023-01-01 RX ADMIN — QUETIAPINE 150 MG: 100 TABLET ORAL at 20:02

## 2023-01-01 RX ADMIN — SENNOSIDES AND DOCUSATE SODIUM 1 TABLET: 50; 8.6 TABLET ORAL at 08:10

## 2023-01-01 RX ADMIN — LEVALBUTEROL HYDROCHLORIDE 1.25 MG: 1.25 SOLUTION RESPIRATORY (INHALATION) at 15:51

## 2023-01-01 RX ADMIN — CLOTRIMAZOLE: 0.01 CREAM TOPICAL at 20:33

## 2023-01-01 RX ADMIN — ACETAMINOPHEN 650 MG: 325 TABLET, FILM COATED ORAL at 21:59

## 2023-01-01 RX ADMIN — BUMETANIDE 1 MG: 0.25 INJECTION INTRAMUSCULAR; INTRAVENOUS at 09:57

## 2023-01-01 RX ADMIN — Medication 5 ML: at 09:26

## 2023-01-01 RX ADMIN — OXYCODONE HYDROCHLORIDE 5 MG: 5 TABLET ORAL at 06:11

## 2023-01-01 RX ADMIN — METHYLPREDNISOLONE SODIUM SUCCINATE 62.5 MG: 125 INJECTION INTRAMUSCULAR; INTRAVENOUS at 01:54

## 2023-01-01 RX ADMIN — CLOTRIMAZOLE: 0.01 CREAM TOPICAL at 20:24

## 2023-01-01 RX ADMIN — INSULIN ASPART 2 UNITS: 100 INJECTION, SOLUTION INTRAVENOUS; SUBCUTANEOUS at 23:57

## 2023-01-01 RX ADMIN — PROPOFOL 45 MCG/KG/MIN: 10 INJECTION, EMULSION INTRAVENOUS at 03:27

## 2023-01-01 RX ADMIN — VANCOMYCIN HYDROCHLORIDE 750 MG: 1 INJECTION, POWDER, LYOPHILIZED, FOR SOLUTION INTRAVENOUS at 18:29

## 2023-01-01 RX ADMIN — INSULIN ASPART 1 UNITS: 100 INJECTION, SOLUTION INTRAVENOUS; SUBCUTANEOUS at 16:17

## 2023-01-01 RX ADMIN — IPRATROPIUM BROMIDE AND ALBUTEROL SULFATE 3 ML: .5; 3 SOLUTION RESPIRATORY (INHALATION) at 07:58

## 2023-01-01 RX ADMIN — HEPARIN SODIUM 5000 UNITS: 5000 INJECTION, SOLUTION INTRAVENOUS; SUBCUTANEOUS at 22:35

## 2023-01-01 RX ADMIN — ATORVASTATIN CALCIUM 20 MG: 20 TABLET, FILM COATED ORAL at 20:41

## 2023-01-01 RX ADMIN — HYDRALAZINE HYDROCHLORIDE 25 MG: 25 TABLET, FILM COATED ORAL at 06:02

## 2023-01-01 RX ADMIN — HEPARIN SODIUM 5000 UNITS: 5000 INJECTION, SOLUTION INTRAVENOUS; SUBCUTANEOUS at 13:04

## 2023-01-01 RX ADMIN — DEXMEDETOMIDINE HYDROCHLORIDE 0.8 MCG/KG/HR: 400 INJECTION INTRAVENOUS at 04:45

## 2023-01-01 RX ADMIN — LEVOTHYROXINE SODIUM 150 MCG: 150 TABLET ORAL at 06:10

## 2023-01-01 RX ADMIN — SIMETHICONE 40 MG: 20 EMULSION ORAL at 14:13

## 2023-01-01 RX ADMIN — MEROPENEM 500 MG: 500 INJECTION, POWDER, FOR SOLUTION INTRAVENOUS at 23:23

## 2023-01-01 RX ADMIN — METRONIDAZOLE 500 MG: 500 INJECTION, SOLUTION INTRAVENOUS at 17:10

## 2023-01-01 RX ADMIN — DEXMEDETOMIDINE HYDROCHLORIDE 0.6 MCG/KG/HR: 400 INJECTION INTRAVENOUS at 09:29

## 2023-01-01 RX ADMIN — PROPOFOL 10 MCG/KG/MIN: 10 INJECTION, EMULSION INTRAVENOUS at 09:40

## 2023-01-01 RX ADMIN — ASPIRIN 81 MG CHEWABLE TABLET 81 MG: 81 TABLET CHEWABLE at 08:32

## 2023-01-01 RX ADMIN — Medication 50 MCG: at 08:36

## 2023-01-01 RX ADMIN — IPRATROPIUM BROMIDE AND ALBUTEROL SULFATE 3 ML: .5; 3 SOLUTION RESPIRATORY (INHALATION) at 11:24

## 2023-01-01 RX ADMIN — HEPARIN SODIUM 5000 UNITS: 5000 INJECTION, SOLUTION INTRAVENOUS; SUBCUTANEOUS at 05:56

## 2023-01-01 RX ADMIN — EPOPROSTENOL 20 NG/KG/MIN: 1.5 INJECTION, POWDER, LYOPHILIZED, FOR SOLUTION INTRAVENOUS at 07:45

## 2023-01-01 RX ADMIN — IPRATROPIUM BROMIDE AND ALBUTEROL SULFATE 3 ML: 2.5; .5 SOLUTION RESPIRATORY (INHALATION) at 11:35

## 2023-01-01 RX ADMIN — IPRATROPIUM BROMIDE AND ALBUTEROL SULFATE 3 ML: 2.5; .5 SOLUTION RESPIRATORY (INHALATION) at 15:02

## 2023-01-01 RX ADMIN — PREGABALIN 75 MG: 20 SOLUTION ORAL at 08:21

## 2023-01-01 RX ADMIN — HEPARIN SODIUM 5000 UNITS: 5000 INJECTION, SOLUTION INTRAVENOUS; SUBCUTANEOUS at 21:12

## 2023-01-01 RX ADMIN — CLOTRIMAZOLE: 0.01 CREAM TOPICAL at 20:12

## 2023-01-01 RX ADMIN — METHYLPREDNISOLONE SODIUM SUCCINATE 62.5 MG: 125 INJECTION INTRAMUSCULAR; INTRAVENOUS at 02:09

## 2023-01-01 RX ADMIN — SERTRALINE HYDROCHLORIDE 150 MG: 100 TABLET ORAL at 08:31

## 2023-01-01 RX ADMIN — SERTRALINE HYDROCHLORIDE 150 MG: 20 SOLUTION ORAL at 08:29

## 2023-01-01 RX ADMIN — METHYLPREDNISOLONE SODIUM SUCCINATE 62.5 MG: 125 INJECTION INTRAMUSCULAR; INTRAVENOUS at 04:04

## 2023-01-01 RX ADMIN — Medication 5 ML: at 08:12

## 2023-01-01 RX ADMIN — METHYLPREDNISOLONE SODIUM SUCCINATE 62.5 MG: 125 INJECTION INTRAMUSCULAR; INTRAVENOUS at 04:22

## 2023-01-01 RX ADMIN — MEROPENEM 500 MG: 500 INJECTION, POWDER, FOR SOLUTION INTRAVENOUS at 11:43

## 2023-01-01 RX ADMIN — CEFEPIME 2 G: 2 INJECTION, POWDER, FOR SOLUTION INTRAVENOUS at 09:21

## 2023-01-01 RX ADMIN — METHYLPREDNISOLONE SODIUM SUCCINATE 125 MG: 125 INJECTION, POWDER, FOR SOLUTION INTRAMUSCULAR; INTRAVENOUS at 09:21

## 2023-01-01 RX ADMIN — INSULIN ASPART 1 UNITS: 100 INJECTION, SOLUTION INTRAVENOUS; SUBCUTANEOUS at 08:54

## 2023-01-01 RX ADMIN — POTASSIUM CHLORIDE 20 MEQ: 1.5 POWDER, FOR SOLUTION ORAL at 20:32

## 2023-01-01 RX ADMIN — CHLORTHALIDONE 25 MG: 25 TABLET ORAL at 08:35

## 2023-01-01 RX ADMIN — MIDODRINE HYDROCHLORIDE 5 MG: 5 TABLET ORAL at 12:25

## 2023-01-01 RX ADMIN — MIDAZOLAM HYDROCHLORIDE 7 MG/HR: 1 INJECTION, SOLUTION INTRAVENOUS at 17:44

## 2023-01-01 RX ADMIN — Medication 1 PACKET: at 17:11

## 2023-01-01 RX ADMIN — IPRATROPIUM BROMIDE AND ALBUTEROL SULFATE 3 ML: .5; 3 SOLUTION RESPIRATORY (INHALATION) at 15:28

## 2023-01-01 RX ADMIN — QUETIAPINE 150 MG: 100 TABLET ORAL at 20:07

## 2023-01-01 RX ADMIN — INSULIN ASPART 1 UNITS: 100 INJECTION, SOLUTION INTRAVENOUS; SUBCUTANEOUS at 12:41

## 2023-01-01 RX ADMIN — MINERAL OIL AND PETROLATUM: 150; 830 OINTMENT OPHTHALMIC at 04:34

## 2023-01-01 RX ADMIN — QUETIAPINE FUMARATE 150 MG: 50 TABLET, FILM COATED ORAL at 20:53

## 2023-01-01 RX ADMIN — IPRATROPIUM BROMIDE AND ALBUTEROL SULFATE 3 ML: .5; 3 SOLUTION RESPIRATORY (INHALATION) at 16:05

## 2023-01-01 RX ADMIN — INSULIN ASPART 2 UNITS: 100 INJECTION, SOLUTION INTRAVENOUS; SUBCUTANEOUS at 00:23

## 2023-01-01 RX ADMIN — MIDODRINE HYDROCHLORIDE 5 MG: 5 TABLET ORAL at 18:02

## 2023-01-01 RX ADMIN — ASPIRIN 81 MG: 81 TABLET, COATED ORAL at 08:57

## 2023-01-01 RX ADMIN — HEPARIN SODIUM 5000 UNITS: 5000 INJECTION, SOLUTION INTRAVENOUS; SUBCUTANEOUS at 05:13

## 2023-01-01 RX ADMIN — ACETAMINOPHEN 650 MG: 650 SOLUTION ORAL at 18:21

## 2023-01-01 RX ADMIN — VASOPRESSIN 2.4 UNITS/HR: 20 INJECTION, SOLUTION INTRAMUSCULAR; SUBCUTANEOUS at 10:15

## 2023-01-01 RX ADMIN — HYDRALAZINE HYDROCHLORIDE 50 MG: 50 TABLET, FILM COATED ORAL at 16:57

## 2023-01-01 RX ADMIN — SERTRALINE HYDROCHLORIDE 150 MG: 100 TABLET ORAL at 13:12

## 2023-01-01 RX ADMIN — METOPROLOL TARTRATE 12.5 MG: 100 TABLET, FILM COATED ORAL at 09:26

## 2023-01-01 RX ADMIN — ISOSORBIDE DINITRATE 20 MG: 20 TABLET ORAL at 16:40

## 2023-01-01 RX ADMIN — POTASSIUM CHLORIDE 20 MEQ: 29.8 INJECTION, SOLUTION INTRAVENOUS at 06:39

## 2023-01-01 RX ADMIN — IPRATROPIUM BROMIDE AND ALBUTEROL SULFATE 3 ML: .5; 3 SOLUTION RESPIRATORY (INHALATION) at 07:20

## 2023-01-01 RX ADMIN — FUROSEMIDE 40 MG: 10 INJECTION, SOLUTION INTRAMUSCULAR; INTRAVENOUS at 10:57

## 2023-01-01 RX ADMIN — WHITE PETROLATUM: 1.75 OINTMENT TOPICAL at 16:30

## 2023-01-01 RX ADMIN — ACETAMINOPHEN 975 MG: 325 TABLET, FILM COATED ORAL at 08:48

## 2023-01-01 RX ADMIN — METHYLPREDNISOLONE SODIUM SUCCINATE 62.5 MG: 125 INJECTION INTRAMUSCULAR; INTRAVENOUS at 09:39

## 2023-01-01 RX ADMIN — THERA TABS 1 TABLET: TAB at 08:17

## 2023-01-01 RX ADMIN — BUMETANIDE 1 MG: 0.5 TABLET ORAL at 16:11

## 2023-01-01 RX ADMIN — METOPROLOL TARTRATE 12.5 MG: 100 TABLET, FILM COATED ORAL at 20:34

## 2023-01-01 RX ADMIN — ACETAMINOPHEN 650 MG: 650 SOLUTION ORAL at 20:54

## 2023-01-01 RX ADMIN — PREDNISONE 60 MG: 50 TABLET ORAL at 07:52

## 2023-01-01 RX ADMIN — ACETYLCYSTEINE 2 ML: 200 SOLUTION ORAL; RESPIRATORY (INHALATION) at 20:45

## 2023-01-01 RX ADMIN — HEPARIN SODIUM 5000 UNITS: 10000 INJECTION, SOLUTION INTRAVENOUS; SUBCUTANEOUS at 05:15

## 2023-01-01 RX ADMIN — Medication 1 PACKET: at 08:42

## 2023-01-01 RX ADMIN — SULFAMETHOXAZOLE AND TRIMETHOPRIM 160 MG: 200; 40 SUSPENSION ORAL at 08:19

## 2023-01-01 RX ADMIN — PROPOFOL 45 MCG/KG/MIN: 10 INJECTION, EMULSION INTRAVENOUS at 08:16

## 2023-01-01 RX ADMIN — SENNOSIDES AND DOCUSATE SODIUM 1 TABLET: 8.6; 5 TABLET ORAL at 18:35

## 2023-01-01 RX ADMIN — HYDRALAZINE HYDROCHLORIDE 50 MG: 50 TABLET, FILM COATED ORAL at 21:09

## 2023-01-01 RX ADMIN — PREDNISONE 60 MG: 50 TABLET ORAL at 08:26

## 2023-01-01 RX ADMIN — HEPARIN SODIUM 5000 UNITS: 5000 INJECTION, SOLUTION INTRAVENOUS; SUBCUTANEOUS at 05:59

## 2023-01-01 RX ADMIN — SERTRALINE HYDROCHLORIDE 150 MG: 100 TABLET ORAL at 08:01

## 2023-01-01 RX ADMIN — ASPIRIN 81 MG CHEWABLE TABLET 81 MG: 81 TABLET CHEWABLE at 08:26

## 2023-01-01 RX ADMIN — OXYCODONE HYDROCHLORIDE 5 MG: 5 TABLET ORAL at 14:11

## 2023-01-01 RX ADMIN — ACETYLCYSTEINE 2 ML: 200 SOLUTION ORAL; RESPIRATORY (INHALATION) at 11:40

## 2023-01-01 RX ADMIN — LORAZEPAM 0.5 MG: 2 INJECTION INTRAMUSCULAR; INTRAVENOUS at 19:29

## 2023-01-01 RX ADMIN — SERTRALINE HYDROCHLORIDE 150 MG: 20 SOLUTION ORAL at 08:41

## 2023-01-01 RX ADMIN — HYDROXYZINE HYDROCHLORIDE 25 MG: 25 TABLET ORAL at 12:01

## 2023-01-01 RX ADMIN — MIDAZOLAM 1 MG: 1 INJECTION INTRAMUSCULAR; INTRAVENOUS at 09:24

## 2023-01-01 RX ADMIN — MEROPENEM 500 MG: 500 INJECTION, POWDER, FOR SOLUTION INTRAVENOUS at 21:55

## 2023-01-01 RX ADMIN — Medication 40 MG: at 08:39

## 2023-01-01 RX ADMIN — HYDROXYZINE HYDROCHLORIDE 50 MG: 25 TABLET ORAL at 20:34

## 2023-01-01 RX ADMIN — ACETAMINOPHEN 975 MG: 325 TABLET, FILM COATED ORAL at 08:00

## 2023-01-01 RX ADMIN — ALBUTEROL SULFATE 2.5 MG: 2.5 SOLUTION RESPIRATORY (INHALATION) at 16:03

## 2023-01-01 RX ADMIN — INSULIN ASPART 1 UNITS: 100 INJECTION, SOLUTION INTRAVENOUS; SUBCUTANEOUS at 16:21

## 2023-01-01 RX ADMIN — CETIRIZINE HYDROCHLORIDE 10 MG: 10 TABLET ORAL at 13:15

## 2023-01-01 RX ADMIN — OXYCODONE HYDROCHLORIDE 5 MG: 5 TABLET ORAL at 02:34

## 2023-01-01 RX ADMIN — ACETYLCYSTEINE 2 ML: 200 SOLUTION ORAL; RESPIRATORY (INHALATION) at 15:29

## 2023-01-01 RX ADMIN — IPRATROPIUM BROMIDE AND ALBUTEROL SULFATE 3 ML: 2.5; .5 SOLUTION RESPIRATORY (INHALATION) at 15:59

## 2023-01-01 RX ADMIN — Medication 5 ML: at 08:00

## 2023-01-01 RX ADMIN — HYDROMORPHONE HYDROCHLORIDE 0.2 MG: 0.2 INJECTION, SOLUTION INTRAMUSCULAR; INTRAVENOUS; SUBCUTANEOUS at 05:25

## 2023-01-01 RX ADMIN — AMLODIPINE BESYLATE 5 MG: 5 TABLET ORAL at 08:41

## 2023-01-01 RX ADMIN — Medication 15 ML: at 08:12

## 2023-01-01 RX ADMIN — METRONIDAZOLE 500 MG: 500 INJECTION, SOLUTION INTRAVENOUS at 16:35

## 2023-01-01 RX ADMIN — CHLORHEXIDINE GLUCONATE 15 ML: 1.2 SOLUTION ORAL at 20:33

## 2023-01-01 RX ADMIN — LEVOTHYROXINE SODIUM 150 MCG: 150 TABLET ORAL at 05:20

## 2023-01-01 RX ADMIN — CISATRACURIUM BESYLATE 3.5 MCG/KG/MIN: 10 INJECTION INTRAVENOUS at 18:32

## 2023-01-01 RX ADMIN — QUETIAPINE FUMARATE 150 MG: 50 TABLET, FILM COATED ORAL at 21:20

## 2023-01-01 RX ADMIN — INSULIN ASPART 1 UNITS: 100 INJECTION, SOLUTION INTRAVENOUS; SUBCUTANEOUS at 05:19

## 2023-01-01 RX ADMIN — DEXMEDETOMIDINE HYDROCHLORIDE 0.7 MCG/KG/HR: 400 INJECTION INTRAVENOUS at 10:47

## 2023-01-01 RX ADMIN — NOREPINEPHRINE BITARTRATE 0.17 MCG/KG/MIN: 0.02 INJECTION, SOLUTION INTRAVENOUS at 08:47

## 2023-01-01 RX ADMIN — ACETYLCYSTEINE 2 ML: 200 SOLUTION ORAL; RESPIRATORY (INHALATION) at 07:09

## 2023-01-01 RX ADMIN — Medication 50 MCG: at 08:18

## 2023-01-01 RX ADMIN — SIMETHICONE 40 MG: 20 EMULSION ORAL at 09:04

## 2023-01-01 RX ADMIN — INSULIN ASPART 1 UNITS: 100 INJECTION, SOLUTION INTRAVENOUS; SUBCUTANEOUS at 17:01

## 2023-01-01 RX ADMIN — POTASSIUM CHLORIDE 20 MEQ: 1.5 POWDER, FOR SOLUTION ORAL at 08:04

## 2023-01-01 RX ADMIN — Medication 50 MCG: at 08:42

## 2023-01-01 RX ADMIN — HEPARIN SODIUM 5000 UNITS: 5000 INJECTION, SOLUTION INTRAVENOUS; SUBCUTANEOUS at 13:29

## 2023-01-01 RX ADMIN — ACETYLCYSTEINE 2 ML: 200 SOLUTION ORAL; RESPIRATORY (INHALATION) at 20:24

## 2023-01-01 RX ADMIN — CETIRIZINE HYDROCHLORIDE 10 MG: 10 TABLET ORAL at 10:34

## 2023-01-01 RX ADMIN — ATORVASTATIN CALCIUM 20 MG: 20 TABLET, FILM COATED ORAL at 20:12

## 2023-01-01 RX ADMIN — IPRATROPIUM BROMIDE AND ALBUTEROL SULFATE 3 ML: .5; 3 SOLUTION RESPIRATORY (INHALATION) at 20:19

## 2023-01-01 RX ADMIN — MEROPENEM 1 G: 1 INJECTION, POWDER, FOR SOLUTION INTRAVENOUS at 13:21

## 2023-01-01 RX ADMIN — IPRATROPIUM BROMIDE AND ALBUTEROL SULFATE 3 ML: .5; 3 SOLUTION RESPIRATORY (INHALATION) at 12:01

## 2023-01-01 RX ADMIN — Medication 1 PACKET: at 09:05

## 2023-01-01 RX ADMIN — EPOPROSTENOL 10 NG/KG/MIN: 1.5 INJECTION, POWDER, LYOPHILIZED, FOR SOLUTION INTRAVENOUS at 23:12

## 2023-01-01 RX ADMIN — PREGABALIN 75 MG: 20 SOLUTION ORAL at 08:12

## 2023-01-01 RX ADMIN — METHYLPREDNISOLONE SODIUM SUCCINATE 125 MG: 125 INJECTION, POWDER, FOR SOLUTION INTRAMUSCULAR; INTRAVENOUS at 12:34

## 2023-01-01 RX ADMIN — METOPROLOL TARTRATE 12.5 MG: 100 TABLET, FILM COATED ORAL at 08:36

## 2023-01-01 RX ADMIN — QUETIAPINE 150 MG: 100 TABLET ORAL at 20:10

## 2023-01-01 RX ADMIN — Medication 1 SPRAY: at 17:02

## 2023-01-01 RX ADMIN — IPRATROPIUM BROMIDE AND ALBUTEROL SULFATE 3 ML: 2.5; .5 SOLUTION RESPIRATORY (INHALATION) at 15:48

## 2023-01-01 RX ADMIN — Medication 40 MG: at 08:46

## 2023-01-01 RX ADMIN — IPRATROPIUM BROMIDE AND ALBUTEROL SULFATE 3 ML: 2.5; .5 SOLUTION RESPIRATORY (INHALATION) at 19:40

## 2023-01-01 RX ADMIN — EPOPROSTENOL 20 NG/KG/MIN: 1.5 INJECTION, POWDER, LYOPHILIZED, FOR SOLUTION INTRAVENOUS at 15:34

## 2023-01-01 RX ADMIN — ANORECTAL OINTMENT: 15.7; .44; 24; 20.6 OINTMENT TOPICAL at 10:27

## 2023-01-01 RX ADMIN — CLOTRIMAZOLE: 0.01 CREAM TOPICAL at 08:20

## 2023-01-01 RX ADMIN — SIMETHICONE 40 MG: 20 EMULSION ORAL at 20:42

## 2023-01-01 RX ADMIN — DEXMEDETOMIDINE HYDROCHLORIDE 1 MCG/KG/HR: 400 INJECTION INTRAVENOUS at 11:45

## 2023-01-01 RX ADMIN — DEXMEDETOMIDINE HYDROCHLORIDE 0.8 MCG/KG/HR: 400 INJECTION INTRAVENOUS at 21:28

## 2023-01-01 RX ADMIN — IPRATROPIUM BROMIDE AND ALBUTEROL SULFATE 3 ML: .5; 3 SOLUTION RESPIRATORY (INHALATION) at 07:56

## 2023-01-01 RX ADMIN — INSULIN ASPART: 100 INJECTION, SOLUTION INTRAVENOUS; SUBCUTANEOUS at 08:12

## 2023-01-01 RX ADMIN — Medication 2 MCG/KG/MIN: at 17:03

## 2023-01-01 RX ADMIN — IOPAMIDOL 65 ML: 755 INJECTION, SOLUTION INTRAVENOUS at 13:53

## 2023-01-01 RX ADMIN — VASOPRESSIN 0.5 UNITS/HR: 20 INJECTION INTRAVENOUS at 22:57

## 2023-01-01 RX ADMIN — METHYLPREDNISOLONE SODIUM SUCCINATE 125 MG: 125 INJECTION, POWDER, FOR SOLUTION INTRAMUSCULAR; INTRAVENOUS at 05:24

## 2023-01-01 RX ADMIN — BUMETANIDE 1 MG: 0.25 INJECTION INTRAMUSCULAR; INTRAVENOUS at 17:18

## 2023-01-01 RX ADMIN — CHLORHEXIDINE GLUCONATE 15 ML: 1.2 SOLUTION ORAL at 20:01

## 2023-01-01 RX ADMIN — AMLODIPINE BESYLATE 5 MG: 5 TABLET ORAL at 21:09

## 2023-01-01 RX ADMIN — INSULIN ASPART 1 UNITS: 100 INJECTION, SOLUTION INTRAVENOUS; SUBCUTANEOUS at 21:20

## 2023-01-01 RX ADMIN — DEXMEDETOMIDINE HYDROCHLORIDE 0.6 MCG/KG/HR: 400 INJECTION INTRAVENOUS at 13:23

## 2023-01-01 RX ADMIN — METRONIDAZOLE 500 MG: 500 INJECTION, SOLUTION INTRAVENOUS at 08:27

## 2023-01-01 RX ADMIN — AMLODIPINE BESYLATE 5 MG: 5 TABLET ORAL at 08:43

## 2023-01-01 RX ADMIN — HEPARIN SODIUM 5000 UNITS: 5000 INJECTION, SOLUTION INTRAVENOUS; SUBCUTANEOUS at 22:02

## 2023-01-01 RX ADMIN — POTASSIUM CHLORIDE 40 MEQ: 20 SOLUTION ORAL at 02:55

## 2023-01-01 RX ADMIN — AMLODIPINE BESYLATE 5 MG: 5 TABLET ORAL at 20:31

## 2023-01-01 RX ADMIN — ACETAMINOPHEN 650 MG: 325 TABLET, FILM COATED ORAL at 07:13

## 2023-01-01 RX ADMIN — QUETIAPINE FUMARATE 150 MG: 50 TABLET, FILM COATED ORAL at 08:29

## 2023-01-01 RX ADMIN — PROPOFOL 30 MCG/KG/MIN: 10 INJECTION, EMULSION INTRAVENOUS at 15:52

## 2023-01-01 RX ADMIN — SERTRALINE HYDROCHLORIDE 150 MG: 100 TABLET ORAL at 08:17

## 2023-01-01 RX ADMIN — INSULIN ASPART 1 UNITS: 100 INJECTION, SOLUTION INTRAVENOUS; SUBCUTANEOUS at 07:56

## 2023-01-01 RX ADMIN — MIDAZOLAM 1 MG: 1 INJECTION INTRAMUSCULAR; INTRAVENOUS at 05:32

## 2023-01-01 RX ADMIN — IPRATROPIUM BROMIDE AND ALBUTEROL SULFATE 3 ML: 2.5; .5 SOLUTION RESPIRATORY (INHALATION) at 12:00

## 2023-01-01 RX ADMIN — ACETYLCYSTEINE 2 ML: 200 SOLUTION ORAL; RESPIRATORY (INHALATION) at 07:10

## 2023-01-01 RX ADMIN — LEVOTHYROXINE SODIUM 150 MCG: 150 TABLET ORAL at 06:19

## 2023-01-01 RX ADMIN — Medication 50 MCG: at 08:19

## 2023-01-01 RX ADMIN — ATORVASTATIN CALCIUM 20 MG: 20 TABLET, FILM COATED ORAL at 20:31

## 2023-01-01 RX ADMIN — QUETIAPINE 150 MG: 100 TABLET ORAL at 08:03

## 2023-01-01 RX ADMIN — QUETIAPINE 100 MG: 100 TABLET ORAL at 09:57

## 2023-01-01 RX ADMIN — CETIRIZINE HYDROCHLORIDE 10 MG: 10 TABLET ORAL at 18:46

## 2023-01-01 RX ADMIN — METRONIDAZOLE 500 MG: 500 INJECTION, SOLUTION INTRAVENOUS at 00:20

## 2023-01-01 RX ADMIN — LEVOTHYROXINE SODIUM 150 MCG: 0.07 TABLET ORAL at 18:36

## 2023-01-01 RX ADMIN — ACETAMINOPHEN 650 MG: 650 SOLUTION ORAL at 08:46

## 2023-01-01 RX ADMIN — BUMETANIDE 1 MG: 0.25 INJECTION INTRAMUSCULAR; INTRAVENOUS at 09:22

## 2023-01-01 RX ADMIN — ASPIRIN 81 MG: 81 TABLET, CHEWABLE ORAL at 09:14

## 2023-01-01 RX ADMIN — ACETAMINOPHEN 650 MG: 325 TABLET, FILM COATED ORAL at 21:56

## 2023-01-01 RX ADMIN — Medication 5 ML: at 08:02

## 2023-01-01 RX ADMIN — HEPARIN SODIUM 5000 UNITS: 5000 INJECTION, SOLUTION INTRAVENOUS; SUBCUTANEOUS at 22:19

## 2023-01-01 RX ADMIN — IPRATROPIUM BROMIDE AND ALBUTEROL SULFATE 3 ML: 2.5; .5 SOLUTION RESPIRATORY (INHALATION) at 15:50

## 2023-01-01 RX ADMIN — Medication 50 MCG: at 08:12

## 2023-01-01 RX ADMIN — SERTRALINE HYDROCHLORIDE 150 MG: 100 TABLET ORAL at 08:21

## 2023-01-01 RX ADMIN — DEXMEDETOMIDINE HYDROCHLORIDE 1 MCG/KG/HR: 400 INJECTION INTRAVENOUS at 08:09

## 2023-01-01 RX ADMIN — AZITHROMYCIN MONOHYDRATE 500 MG: 500 INJECTION, POWDER, LYOPHILIZED, FOR SOLUTION INTRAVENOUS at 15:13

## 2023-01-01 RX ADMIN — PREGABALIN 75 MG: 20 SOLUTION ORAL at 08:50

## 2023-01-01 RX ADMIN — ACETAMINOPHEN 650 MG: 650 SOLUTION ORAL at 08:19

## 2023-01-01 RX ADMIN — TORSEMIDE 20 MG: 20 TABLET ORAL at 08:36

## 2023-01-01 RX ADMIN — Medication 1 SPRAY: at 21:18

## 2023-01-01 RX ADMIN — SERTRALINE HYDROCHLORIDE 150 MG: 100 TABLET ORAL at 08:25

## 2023-01-01 RX ADMIN — SERTRALINE HYDROCHLORIDE 150 MG: 100 TABLET ORAL at 07:39

## 2023-01-01 RX ADMIN — METRONIDAZOLE 500 MG: 500 INJECTION, SOLUTION INTRAVENOUS at 16:28

## 2023-01-01 RX ADMIN — QUETIAPINE FUMARATE 150 MG: 50 TABLET, FILM COATED ORAL at 21:12

## 2023-01-01 RX ADMIN — ASPIRIN 81 MG CHEWABLE TABLET 81 MG: 81 TABLET CHEWABLE at 08:28

## 2023-01-01 RX ADMIN — FENTANYL CITRATE 100 MCG: 50 INJECTION, SOLUTION INTRAMUSCULAR; INTRAVENOUS at 03:59

## 2023-01-01 RX ADMIN — AMLODIPINE BESYLATE 5 MG: 5 TABLET ORAL at 08:18

## 2023-01-01 RX ADMIN — Medication 1 PACKET: at 09:32

## 2023-01-01 RX ADMIN — CEFEPIME 2 G: 2 INJECTION, POWDER, FOR SOLUTION INTRAVENOUS at 08:12

## 2023-01-01 RX ADMIN — INSULIN ASPART 1 UNITS: 100 INJECTION, SOLUTION INTRAVENOUS; SUBCUTANEOUS at 23:45

## 2023-01-01 RX ADMIN — DEXTROSE MONOHYDRATE 50 ML: 25 INJECTION, SOLUTION INTRAVENOUS at 18:37

## 2023-01-01 RX ADMIN — Medication 50 MCG: at 08:25

## 2023-01-01 RX ADMIN — Medication 1 PACKET: at 21:40

## 2023-01-01 RX ADMIN — Medication 50 MCG: at 08:26

## 2023-01-01 RX ADMIN — HYDROXYZINE HYDROCHLORIDE 25 MG: 25 TABLET ORAL at 14:05

## 2023-01-01 RX ADMIN — IPRATROPIUM BROMIDE AND ALBUTEROL SULFATE 3 ML: .5; 3 SOLUTION RESPIRATORY (INHALATION) at 08:03

## 2023-01-01 RX ADMIN — PREGABALIN 75 MG: 20 SOLUTION ORAL at 08:44

## 2023-01-01 RX ADMIN — METRONIDAZOLE 500 MG: 500 INJECTION, SOLUTION INTRAVENOUS at 09:44

## 2023-01-01 RX ADMIN — PROPOFOL 30 MCG/KG/MIN: 10 INJECTION, EMULSION INTRAVENOUS at 10:57

## 2023-01-01 RX ADMIN — POTASSIUM CHLORIDE 20 MEQ: 1.5 POWDER, FOR SOLUTION ORAL at 20:21

## 2023-01-01 RX ADMIN — INSULIN ASPART 1 UNITS: 100 INJECTION, SOLUTION INTRAVENOUS; SUBCUTANEOUS at 17:07

## 2023-01-01 RX ADMIN — ISOSORBIDE DINITRATE 20 MG: 20 TABLET ORAL at 17:07

## 2023-01-01 RX ADMIN — QUETIAPINE 100 MG: 100 TABLET ORAL at 20:05

## 2023-01-01 RX ADMIN — Medication 50 MCG: at 08:31

## 2023-01-01 RX ADMIN — Medication 5 ML: at 08:22

## 2023-01-01 RX ADMIN — HYDROMORPHONE HYDROCHLORIDE 0.4 MG: 0.2 INJECTION, SOLUTION INTRAMUSCULAR; INTRAVENOUS; SUBCUTANEOUS at 01:35

## 2023-01-01 RX ADMIN — IPRATROPIUM BROMIDE AND ALBUTEROL SULFATE 3 ML: .5; 3 SOLUTION RESPIRATORY (INHALATION) at 11:57

## 2023-01-01 RX ADMIN — Medication 20 MG: at 11:19

## 2023-01-01 RX ADMIN — Medication 5 ML: at 08:01

## 2023-01-01 RX ADMIN — Medication 50 MCG: at 08:57

## 2023-01-01 RX ADMIN — ACETYLCYSTEINE 2 ML: 200 SOLUTION ORAL; RESPIRATORY (INHALATION) at 15:51

## 2023-01-01 RX ADMIN — HEPARIN SODIUM 5000 UNITS: 5000 INJECTION, SOLUTION INTRAVENOUS; SUBCUTANEOUS at 21:56

## 2023-01-01 RX ADMIN — ACETAMINOPHEN 650 MG: 325 TABLET, FILM COATED ORAL at 17:58

## 2023-01-01 RX ADMIN — Medication 0.4 MG/HR: at 05:06

## 2023-01-01 RX ADMIN — TORSEMIDE 20 MG: 20 TABLET ORAL at 08:26

## 2023-01-01 RX ADMIN — QUETIAPINE FUMARATE 150 MG: 50 TABLET, FILM COATED ORAL at 20:35

## 2023-01-01 RX ADMIN — ISOSORBIDE DINITRATE 20 MG: 20 TABLET ORAL at 17:09

## 2023-01-01 RX ADMIN — IPRATROPIUM BROMIDE AND ALBUTEROL SULFATE 3 ML: .5; 3 SOLUTION RESPIRATORY (INHALATION) at 07:28

## 2023-01-01 RX ADMIN — ACETYLCYSTEINE 2 ML: 200 SOLUTION ORAL; RESPIRATORY (INHALATION) at 20:00

## 2023-01-01 RX ADMIN — Medication 1 SPRAY: at 09:48

## 2023-01-01 RX ADMIN — Medication 50 MCG: at 08:49

## 2023-01-01 RX ADMIN — CLOTRIMAZOLE: 0.01 CREAM TOPICAL at 11:39

## 2023-01-01 RX ADMIN — METHYLPREDNISOLONE SODIUM SUCCINATE 62.5 MG: 125 INJECTION INTRAMUSCULAR; INTRAVENOUS at 04:18

## 2023-01-01 RX ADMIN — PIPERACILLIN AND TAZOBACTAM 4.5 G: 4; .5 INJECTION, POWDER, FOR SOLUTION INTRAVENOUS at 12:29

## 2023-01-01 RX ADMIN — Medication 1 PACKET: at 15:42

## 2023-01-01 RX ADMIN — CHLORHEXIDINE GLUCONATE 0.12% ORAL RINSE 15 ML: 1.2 LIQUID ORAL at 20:50

## 2023-01-01 RX ADMIN — LIDOCAINE: 50 OINTMENT TOPICAL at 22:45

## 2023-01-01 RX ADMIN — ACETAMINOPHEN 650 MG: 650 SOLUTION ORAL at 12:01

## 2023-01-01 RX ADMIN — Medication 100 MCG/HR: at 08:18

## 2023-01-01 RX ADMIN — MICONAZOLE NITRATE: 20 CREAM TOPICAL at 09:21

## 2023-01-01 RX ADMIN — BUMETANIDE 1 MG: 0.5 TABLET ORAL at 16:22

## 2023-01-01 RX ADMIN — HEPARIN SODIUM 5000 UNITS: 5000 INJECTION, SOLUTION INTRAVENOUS; SUBCUTANEOUS at 05:49

## 2023-01-01 RX ADMIN — LIDOCAINE HYDROCHLORIDE 8 ML: 10; .005 INJECTION, SOLUTION EPIDURAL; INFILTRATION; INTRACAUDAL; PERINEURAL at 12:07

## 2023-01-01 RX ADMIN — HEPARIN SODIUM 5000 UNITS: 5000 INJECTION, SOLUTION INTRAVENOUS; SUBCUTANEOUS at 20:40

## 2023-01-01 RX ADMIN — ACETAMINOPHEN 650 MG: 650 SOLUTION ORAL at 08:42

## 2023-01-01 RX ADMIN — AMLODIPINE BESYLATE 5 MG: 5 TABLET ORAL at 08:16

## 2023-01-01 RX ADMIN — SERTRALINE HYDROCHLORIDE 150 MG: 100 TABLET ORAL at 08:26

## 2023-01-01 RX ADMIN — POTASSIUM CHLORIDE 10 MEQ: 20 SOLUTION ORAL at 16:12

## 2023-01-01 RX ADMIN — IPRATROPIUM BROMIDE AND ALBUTEROL SULFATE 3 ML: .5; 3 SOLUTION RESPIRATORY (INHALATION) at 15:08

## 2023-01-01 RX ADMIN — HYDROMORPHONE HYDROCHLORIDE 0.2 MG: 0.2 INJECTION, SOLUTION INTRAMUSCULAR; INTRAVENOUS; SUBCUTANEOUS at 02:42

## 2023-01-01 RX ADMIN — SERTRALINE HYDROCHLORIDE 150 MG: 100 TABLET ORAL at 07:53

## 2023-01-01 RX ADMIN — ATORVASTATIN CALCIUM 20 MG: 20 TABLET, FILM COATED ORAL at 20:54

## 2023-01-01 RX ADMIN — OXYCODONE HYDROCHLORIDE 5 MG: 5 TABLET ORAL at 18:05

## 2023-01-01 RX ADMIN — PIPERACILLIN AND TAZOBACTAM 4.5 G: 4; .5 INJECTION, POWDER, FOR SOLUTION INTRAVENOUS at 17:18

## 2023-01-01 RX ADMIN — CETIRIZINE HYDROCHLORIDE 10 MG: 10 TABLET ORAL at 15:44

## 2023-01-01 RX ADMIN — Medication 1 SPRAY: at 08:45

## 2023-01-01 RX ADMIN — LEVOTHYROXINE SODIUM 150 MCG: 150 TABLET ORAL at 05:55

## 2023-01-01 RX ADMIN — PREGABALIN 25 MG: 20 SOLUTION ORAL at 09:38

## 2023-01-01 RX ADMIN — ISOSORBIDE DINITRATE 20 MG: 20 TABLET ORAL at 08:42

## 2023-01-01 RX ADMIN — METHYLPREDNISOLONE SODIUM SUCCINATE 62.5 MG: 125 INJECTION INTRAMUSCULAR; INTRAVENOUS at 10:46

## 2023-01-01 RX ADMIN — ALBUTEROL SULFATE 2.5 MG: 2.5 SOLUTION RESPIRATORY (INHALATION) at 15:18

## 2023-01-01 RX ADMIN — CHLORHEXIDINE GLUCONATE 15 ML: 1.2 SOLUTION ORAL at 08:28

## 2023-01-01 RX ADMIN — DEXTROSE MONOHYDRATE 50 ML: 25 INJECTION, SOLUTION INTRAVENOUS at 23:11

## 2023-01-01 RX ADMIN — INSULIN ASPART 1 UNITS: 100 INJECTION, SOLUTION INTRAVENOUS; SUBCUTANEOUS at 20:28

## 2023-01-01 RX ADMIN — HYDRALAZINE HYDROCHLORIDE 5 MG: 20 INJECTION INTRAMUSCULAR; INTRAVENOUS at 10:47

## 2023-01-01 RX ADMIN — METOPROLOL TARTRATE 12.5 MG: 100 TABLET, FILM COATED ORAL at 09:41

## 2023-01-01 RX ADMIN — MINERAL OIL AND PETROLATUM: 150; 830 OINTMENT OPHTHALMIC at 12:17

## 2023-01-01 RX ADMIN — Medication 5 ML: at 08:19

## 2023-01-01 RX ADMIN — EPOPROSTENOL 10 NG/KG/MIN: 1.5 INJECTION, POWDER, LYOPHILIZED, FOR SOLUTION INTRAVENOUS at 03:58

## 2023-01-01 RX ADMIN — QUETIAPINE FUMARATE 150 MG: 50 TABLET, FILM COATED ORAL at 20:49

## 2023-01-01 RX ADMIN — MEROPENEM 1 G: 1 INJECTION, POWDER, FOR SOLUTION INTRAVENOUS at 06:02

## 2023-01-01 RX ADMIN — HYDROMORPHONE HYDROCHLORIDE 0.4 MG: 0.2 INJECTION, SOLUTION INTRAMUSCULAR; INTRAVENOUS; SUBCUTANEOUS at 00:50

## 2023-01-01 RX ADMIN — MIDODRINE HYDROCHLORIDE 10 MG: 10 TABLET ORAL at 00:15

## 2023-01-01 RX ADMIN — CEFEPIME 2 G: 2 INJECTION, POWDER, FOR SOLUTION INTRAVENOUS at 08:30

## 2023-01-01 RX ADMIN — DEXTROSE MONOHYDRATE: 50 INJECTION, SOLUTION INTRAVENOUS at 22:42

## 2023-01-01 RX ADMIN — CHLORHEXIDINE GLUCONATE 0.12% ORAL RINSE 15 ML: 1.2 LIQUID ORAL at 20:53

## 2023-01-01 RX ADMIN — PROPOFOL 35 MCG/KG/MIN: 10 INJECTION, EMULSION INTRAVENOUS at 11:57

## 2023-01-01 RX ADMIN — Medication 40 MG: at 07:28

## 2023-01-01 RX ADMIN — SERTRALINE HYDROCHLORIDE 150 MG: 100 TABLET ORAL at 08:39

## 2023-01-01 RX ADMIN — LIDOCAINE: 50 OINTMENT TOPICAL at 20:43

## 2023-01-01 RX ADMIN — LEVOTHYROXINE SODIUM 150 MCG: 150 TABLET ORAL at 06:07

## 2023-01-01 RX ADMIN — SERTRALINE HYDROCHLORIDE 150 MG: 100 TABLET ORAL at 07:57

## 2023-01-01 RX ADMIN — QUETIAPINE FUMARATE 150 MG: 50 TABLET, FILM COATED ORAL at 09:18

## 2023-01-01 RX ADMIN — ROCURONIUM BROMIDE 30 MG: 50 INJECTION, SOLUTION INTRAVENOUS at 18:38

## 2023-01-01 RX ADMIN — GUAIFENESIN 600 MG: 600 TABLET, EXTENDED RELEASE ORAL at 19:24

## 2023-01-01 RX ADMIN — METHYLPREDNISOLONE SODIUM SUCCINATE 125 MG: 125 INJECTION, POWDER, FOR SOLUTION INTRAMUSCULAR; INTRAVENOUS at 08:04

## 2023-01-01 RX ADMIN — PROPOFOL 45 MCG/KG/MIN: 10 INJECTION, EMULSION INTRAVENOUS at 07:40

## 2023-01-01 RX ADMIN — Medication 40 MG: at 08:29

## 2023-01-01 RX ADMIN — ATORVASTATIN CALCIUM 20 MG: 20 TABLET, FILM COATED ORAL at 19:31

## 2023-01-01 RX ADMIN — Medication 40 MG: at 08:17

## 2023-01-01 RX ADMIN — HEPARIN SODIUM 5000 UNITS: 5000 INJECTION, SOLUTION INTRAVENOUS; SUBCUTANEOUS at 08:17

## 2023-01-01 RX ADMIN — METRONIDAZOLE 500 MG: 500 INJECTION, SOLUTION INTRAVENOUS at 01:45

## 2023-01-01 RX ADMIN — ATOVAQUONE 1500 MG: 750 SUSPENSION ORAL at 08:22

## 2023-01-01 RX ADMIN — Medication 50 MCG: at 08:20

## 2023-01-01 RX ADMIN — METOPROLOL TARTRATE 12.5 MG: 100 TABLET, FILM COATED ORAL at 20:53

## 2023-01-01 RX ADMIN — CHLORHEXIDINE GLUCONATE 15 ML: 1.2 SOLUTION ORAL at 08:18

## 2023-01-01 RX ADMIN — LEVOTHYROXINE SODIUM 150 MCG: 75 TABLET ORAL at 06:20

## 2023-01-01 RX ADMIN — IPRATROPIUM BROMIDE AND ALBUTEROL SULFATE 3 ML: .5; 3 SOLUTION RESPIRATORY (INHALATION) at 11:17

## 2023-01-01 RX ADMIN — POTASSIUM & SODIUM PHOSPHATES POWDER PACK 280-160-250 MG 1 PACKET: 280-160-250 PACK at 08:28

## 2023-01-01 RX ADMIN — SERTRALINE HYDROCHLORIDE 150 MG: 100 TABLET ORAL at 08:19

## 2023-01-01 RX ADMIN — LEVOTHYROXINE SODIUM 150 MCG: 75 TABLET ORAL at 06:10

## 2023-01-01 RX ADMIN — PIPERACILLIN AND TAZOBACTAM 4.5 G: 4; .5 INJECTION, POWDER, FOR SOLUTION INTRAVENOUS at 18:23

## 2023-01-01 RX ADMIN — METOLAZONE 5 MG: 5 TABLET ORAL at 16:15

## 2023-01-01 RX ADMIN — INSULIN ASPART 1 UNITS: 100 INJECTION, SOLUTION INTRAVENOUS; SUBCUTANEOUS at 09:06

## 2023-01-01 RX ADMIN — AMLODIPINE BESYLATE 5 MG: 5 TABLET ORAL at 09:04

## 2023-01-01 RX ADMIN — Medication 2 MCG/KG/MIN: at 05:13

## 2023-01-01 RX ADMIN — POLYETHYLENE GLYCOL 3350 17 G: 17 POWDER, FOR SOLUTION ORAL at 08:09

## 2023-01-01 RX ADMIN — IPRATROPIUM BROMIDE AND ALBUTEROL SULFATE 3 ML: .5; 3 SOLUTION RESPIRATORY (INHALATION) at 16:20

## 2023-01-01 RX ADMIN — HYDRALAZINE HYDROCHLORIDE 25 MG: 25 TABLET, FILM COATED ORAL at 16:14

## 2023-01-01 RX ADMIN — Medication 1 PACKET: at 08:01

## 2023-01-01 RX ADMIN — IPRATROPIUM BROMIDE AND ALBUTEROL SULFATE 3 ML: .5; 3 SOLUTION RESPIRATORY (INHALATION) at 15:17

## 2023-01-01 RX ADMIN — IPRATROPIUM BROMIDE AND ALBUTEROL SULFATE 3 ML: .5; 3 SOLUTION RESPIRATORY (INHALATION) at 20:02

## 2023-01-01 RX ADMIN — Medication 0.4 MG/HR: at 05:03

## 2023-01-01 RX ADMIN — PREDNISONE 60 MG: 20 TABLET ORAL at 08:13

## 2023-01-01 RX ADMIN — INSULIN ASPART 1 UNITS: 100 INJECTION, SOLUTION INTRAVENOUS; SUBCUTANEOUS at 12:56

## 2023-01-01 RX ADMIN — HYDROCORTISONE SODIUM SUCCINATE 50 MG: 100 INJECTION, POWDER, FOR SOLUTION INTRAMUSCULAR; INTRAVENOUS at 18:13

## 2023-01-01 RX ADMIN — PREGABALIN 75 MG: 20 SOLUTION ORAL at 08:52

## 2023-01-01 RX ADMIN — INSULIN ASPART 1 UNITS: 100 INJECTION, SOLUTION INTRAVENOUS; SUBCUTANEOUS at 16:06

## 2023-01-01 RX ADMIN — PREDNISONE 60 MG: 5 SOLUTION ORAL at 09:31

## 2023-01-01 RX ADMIN — METOPROLOL TARTRATE 12.5 MG: 100 TABLET, FILM COATED ORAL at 08:12

## 2023-01-01 RX ADMIN — PREGABALIN 75 MG: 20 SOLUTION ORAL at 08:06

## 2023-01-01 RX ADMIN — IPRATROPIUM BROMIDE AND ALBUTEROL SULFATE 3 ML: .5; 3 SOLUTION RESPIRATORY (INHALATION) at 07:29

## 2023-01-01 RX ADMIN — IPRATROPIUM BROMIDE AND ALBUTEROL SULFATE 3 ML: .5; 3 SOLUTION RESPIRATORY (INHALATION) at 20:20

## 2023-01-01 RX ADMIN — HEPARIN SODIUM 5000 UNITS: 5000 INJECTION, SOLUTION INTRAVENOUS; SUBCUTANEOUS at 20:02

## 2023-01-01 RX ADMIN — VASOPRESSIN 2.4 UNITS/HR: 20 INJECTION, SOLUTION INTRAMUSCULAR; SUBCUTANEOUS at 19:26

## 2023-01-01 RX ADMIN — HEPARIN SODIUM 5000 UNITS: 5000 INJECTION, SOLUTION INTRAVENOUS; SUBCUTANEOUS at 08:42

## 2023-01-01 RX ADMIN — HYDROMORPHONE HYDROCHLORIDE 0.3 MG: 0.2 INJECTION, SOLUTION INTRAMUSCULAR; INTRAVENOUS; SUBCUTANEOUS at 15:43

## 2023-01-01 RX ADMIN — QUETIAPINE FUMARATE 50 MG: 50 TABLET ORAL at 19:47

## 2023-01-01 RX ADMIN — ACETAMINOPHEN 650 MG: 650 SOLUTION ORAL at 12:09

## 2023-01-01 RX ADMIN — DEXTROSE MONOHYDRATE: 100 INJECTION, SOLUTION INTRAVENOUS at 00:18

## 2023-01-01 RX ADMIN — BUMETANIDE 1 MG: 0.5 TABLET ORAL at 08:16

## 2023-01-01 RX ADMIN — LIDOCAINE: 50 OINTMENT TOPICAL at 21:53

## 2023-01-01 RX ADMIN — QUETIAPINE 150 MG: 100 TABLET ORAL at 20:30

## 2023-01-01 RX ADMIN — FUROSEMIDE 40 MG: 10 INJECTION, SOLUTION INTRAMUSCULAR; INTRAVENOUS at 00:09

## 2023-01-01 RX ADMIN — DEXMEDETOMIDINE HYDROCHLORIDE 1.2 MCG/KG/HR: 400 INJECTION INTRAVENOUS at 13:27

## 2023-01-01 RX ADMIN — DEXMEDETOMIDINE HYDROCHLORIDE 1 MCG/KG/HR: 400 INJECTION INTRAVENOUS at 19:42

## 2023-01-01 RX ADMIN — ATORVASTATIN CALCIUM 20 MG: 20 TABLET, FILM COATED ORAL at 19:41

## 2023-01-01 RX ADMIN — CLOPIDOGREL BISULFATE 75 MG: 75 TABLET ORAL at 13:23

## 2023-01-01 RX ADMIN — AMLODIPINE BESYLATE 5 MG: 5 TABLET ORAL at 08:48

## 2023-01-01 RX ADMIN — METHYLPREDNISOLONE SODIUM SUCCINATE 62.5 MG: 125 INJECTION INTRAMUSCULAR; INTRAVENOUS at 20:31

## 2023-01-01 RX ADMIN — LEVOTHYROXINE SODIUM 150 MCG: 75 TABLET ORAL at 05:48

## 2023-01-01 RX ADMIN — QUETIAPINE 150 MG: 100 TABLET ORAL at 08:11

## 2023-01-01 RX ADMIN — IPRATROPIUM BROMIDE AND ALBUTEROL SULFATE 3 ML: .5; 3 SOLUTION RESPIRATORY (INHALATION) at 07:19

## 2023-01-01 RX ADMIN — HYDROMORPHONE HYDROCHLORIDE 0.2 MG: 0.2 INJECTION, SOLUTION INTRAMUSCULAR; INTRAVENOUS; SUBCUTANEOUS at 11:53

## 2023-01-01 RX ADMIN — HYDROCORTISONE SODIUM SUCCINATE 50 MG: 100 INJECTION, POWDER, FOR SOLUTION INTRAMUSCULAR; INTRAVENOUS at 11:10

## 2023-01-01 RX ADMIN — METHYLPREDNISOLONE SODIUM SUCCINATE 62.5 MG: 125 INJECTION INTRAMUSCULAR; INTRAVENOUS at 02:23

## 2023-01-01 RX ADMIN — CHLORHEXIDINE GLUCONATE 15 ML: 1.2 SOLUTION ORAL at 20:20

## 2023-01-01 RX ADMIN — DEXMEDETOMIDINE HYDROCHLORIDE 0.6 MCG/KG/HR: 400 INJECTION INTRAVENOUS at 22:44

## 2023-01-01 RX ADMIN — AMLODIPINE BESYLATE 5 MG: 5 TABLET ORAL at 08:20

## 2023-01-01 RX ADMIN — SENNOSIDES AND DOCUSATE SODIUM 1 TABLET: 50; 8.6 TABLET ORAL at 20:25

## 2023-01-01 RX ADMIN — Medication 1 PACKET: at 22:17

## 2023-01-01 RX ADMIN — MIDODRINE HYDROCHLORIDE 5 MG: 5 TABLET ORAL at 08:43

## 2023-01-01 RX ADMIN — PROPOFOL 50 MCG/KG/MIN: 10 INJECTION, EMULSION INTRAVENOUS at 17:32

## 2023-01-01 RX ADMIN — ATOVAQUONE 1500 MG: 750 SUSPENSION ORAL at 08:25

## 2023-01-01 RX ADMIN — ACETAMINOPHEN 650 MG: 650 SOLUTION ORAL at 16:25

## 2023-01-01 RX ADMIN — ASPIRIN 81 MG: 81 TABLET, COATED ORAL at 08:40

## 2023-01-01 RX ADMIN — CHLORHEXIDINE GLUCONATE 15 ML: 1.2 SOLUTION ORAL at 08:29

## 2023-01-01 RX ADMIN — ATORVASTATIN CALCIUM 20 MG: 20 TABLET, FILM COATED ORAL at 19:24

## 2023-01-01 RX ADMIN — LEVOTHYROXINE SODIUM 150 MCG: 75 TABLET ORAL at 05:50

## 2023-01-01 RX ADMIN — INSULIN ASPART 1 UNITS: 100 INJECTION, SOLUTION INTRAVENOUS; SUBCUTANEOUS at 04:16

## 2023-01-01 RX ADMIN — INSULIN ASPART 1 UNITS: 100 INJECTION, SOLUTION INTRAVENOUS; SUBCUTANEOUS at 15:52

## 2023-01-01 RX ADMIN — Medication 1 MG: at 00:04

## 2023-01-01 RX ADMIN — Medication 40 MG: at 06:57

## 2023-01-01 RX ADMIN — PREGABALIN 75 MG: 75 CAPSULE ORAL at 21:08

## 2023-01-01 RX ADMIN — HEPARIN SODIUM 5000 UNITS: 5000 INJECTION, SOLUTION INTRAVENOUS; SUBCUTANEOUS at 20:05

## 2023-01-01 RX ADMIN — CEFEPIME 2 G: 2 INJECTION, POWDER, FOR SOLUTION INTRAVENOUS at 09:39

## 2023-01-01 RX ADMIN — HEPARIN SODIUM 5000 UNITS: 5000 INJECTION, SOLUTION INTRAVENOUS; SUBCUTANEOUS at 20:54

## 2023-01-01 RX ADMIN — DEXMEDETOMIDINE HYDROCHLORIDE 0.5 MCG/KG/HR: 400 INJECTION INTRAVENOUS at 03:47

## 2023-01-01 RX ADMIN — ASPIRIN 81 MG CHEWABLE TABLET 81 MG: 81 TABLET CHEWABLE at 08:16

## 2023-01-01 RX ADMIN — Medication 40 MG: at 08:25

## 2023-01-01 RX ADMIN — LEVOTHYROXINE SODIUM 150 MCG: 150 TABLET ORAL at 06:14

## 2023-01-01 RX ADMIN — LEVOTHYROXINE SODIUM 150 MCG: 150 TABLET ORAL at 05:56

## 2023-01-01 RX ADMIN — Medication 40 MG: at 09:18

## 2023-01-01 RX ADMIN — LOPERAMIDE HCL 2 MG: 1 SOLUTION ORAL at 21:53

## 2023-01-01 RX ADMIN — ACETYLCYSTEINE 2 ML: 200 SOLUTION ORAL; RESPIRATORY (INHALATION) at 07:28

## 2023-01-01 RX ADMIN — CHLORHEXIDINE GLUCONATE 15 ML: 1.2 SOLUTION ORAL at 20:30

## 2023-01-01 RX ADMIN — ORAL VEHICLES - SUSP 5 MG: SUSPENSION at 11:01

## 2023-01-01 RX ADMIN — IPRATROPIUM BROMIDE AND ALBUTEROL SULFATE 3 ML: .5; 3 SOLUTION RESPIRATORY (INHALATION) at 20:50

## 2023-01-01 RX ADMIN — INSULIN ASPART 1 UNITS: 100 INJECTION, SOLUTION INTRAVENOUS; SUBCUTANEOUS at 16:19

## 2023-01-01 RX ADMIN — QUETIAPINE FUMARATE 150 MG: 50 TABLET, FILM COATED ORAL at 09:41

## 2023-01-01 RX ADMIN — HYDROXYZINE HYDROCHLORIDE 25 MG: 25 TABLET ORAL at 00:22

## 2023-01-01 RX ADMIN — MIDAZOLAM HYDROCHLORIDE 1 MG: 1 INJECTION, SOLUTION INTRAMUSCULAR; INTRAVENOUS at 05:09

## 2023-01-01 RX ADMIN — Medication 0.4 MG/HR: at 06:41

## 2023-01-01 RX ADMIN — HYDRALAZINE HYDROCHLORIDE 5 MG: 20 INJECTION INTRAMUSCULAR; INTRAVENOUS at 05:19

## 2023-01-01 RX ADMIN — PROPOFOL 25 MCG/KG/MIN: 10 INJECTION, EMULSION INTRAVENOUS at 13:39

## 2023-01-01 RX ADMIN — IPRATROPIUM BROMIDE AND ALBUTEROL SULFATE 3 ML: .5; 3 SOLUTION RESPIRATORY (INHALATION) at 16:11

## 2023-01-01 RX ADMIN — POTASSIUM CHLORIDE 20 MEQ: 1.5 POWDER, FOR SOLUTION ORAL at 06:16

## 2023-01-01 RX ADMIN — CHLORHEXIDINE GLUCONATE 15 ML: 1.2 SOLUTION ORAL at 07:56

## 2023-01-01 RX ADMIN — METHYLPREDNISOLONE SODIUM SUCCINATE 62.5 MG: 125 INJECTION, POWDER, FOR SOLUTION INTRAMUSCULAR; INTRAVENOUS at 05:20

## 2023-01-01 RX ADMIN — METRONIDAZOLE 500 MG: 500 INJECTION, SOLUTION INTRAVENOUS at 16:32

## 2023-01-01 RX ADMIN — ISOSORBIDE DINITRATE 20 MG: 20 TABLET ORAL at 08:37

## 2023-01-01 RX ADMIN — ATOVAQUONE 1500 MG: 750 SUSPENSION ORAL at 08:46

## 2023-01-01 RX ADMIN — OXYCODONE HYDROCHLORIDE 5 MG: 5 TABLET ORAL at 11:52

## 2023-01-01 RX ADMIN — PREGABALIN 75 MG: 75 CAPSULE ORAL at 22:05

## 2023-01-01 RX ADMIN — INSULIN ASPART 1 UNITS: 100 INJECTION, SOLUTION INTRAVENOUS; SUBCUTANEOUS at 22:43

## 2023-01-01 RX ADMIN — PREGABALIN 75 MG: 20 SOLUTION ORAL at 09:18

## 2023-01-01 RX ADMIN — EPOPROSTENOL 20 NG/KG/MIN: 1.5 INJECTION, POWDER, LYOPHILIZED, FOR SOLUTION INTRAVENOUS at 22:46

## 2023-01-01 RX ADMIN — ACETAMINOPHEN 650 MG: 650 SOLUTION ORAL at 20:51

## 2023-01-01 RX ADMIN — ACETYLCYSTEINE 2 ML: 200 SOLUTION ORAL; RESPIRATORY (INHALATION) at 12:06

## 2023-01-01 RX ADMIN — NOREPINEPHRINE BITARTRATE 0.21 MCG/KG/MIN: 0.02 INJECTION, SOLUTION INTRAVENOUS at 17:32

## 2023-01-01 RX ADMIN — PREDNISONE 60 MG: 5 SOLUTION ORAL at 17:18

## 2023-01-01 RX ADMIN — HYDROXYZINE HYDROCHLORIDE 25 MG: 25 TABLET ORAL at 08:51

## 2023-01-01 RX ADMIN — SERTRALINE HYDROCHLORIDE 150 MG: 100 TABLET ORAL at 08:27

## 2023-01-01 RX ADMIN — Medication 5 ML: at 08:43

## 2023-01-01 RX ADMIN — METRONIDAZOLE 500 MG: 500 INJECTION, SOLUTION INTRAVENOUS at 17:04

## 2023-01-01 RX ADMIN — OXYCODONE HYDROCHLORIDE 5 MG: 5 SOLUTION ORAL at 19:13

## 2023-01-01 RX ADMIN — SERTRALINE HYDROCHLORIDE 150 MG: 20 SOLUTION ORAL at 08:24

## 2023-01-01 RX ADMIN — NOREPINEPHRINE BITARTRATE 0.47 MCG/KG/MIN: 1 INJECTION, SOLUTION, CONCENTRATE INTRAVENOUS at 01:07

## 2023-01-01 RX ADMIN — SULFAMETHOXAZOLE AND TRIMETHOPRIM 160 MG: 200; 40 SUSPENSION ORAL at 09:22

## 2023-01-01 RX ADMIN — CHLORHEXIDINE GLUCONATE 15 ML: 1.2 SOLUTION ORAL at 08:08

## 2023-01-01 RX ADMIN — OXYCODONE HYDROCHLORIDE 5 MG: 5 TABLET ORAL at 00:04

## 2023-01-01 RX ADMIN — SENNOSIDES AND DOCUSATE SODIUM 1 TABLET: 8.6; 5 TABLET ORAL at 02:05

## 2023-01-01 RX ADMIN — METHYLPREDNISOLONE SODIUM SUCCINATE 62.5 MG: 125 INJECTION INTRAMUSCULAR; INTRAVENOUS at 18:10

## 2023-01-01 RX ADMIN — GLYCOPYRROLATE 0.2 MG: 0.2 INJECTION, SOLUTION INTRAMUSCULAR; INTRAVENOUS at 11:20

## 2023-01-01 RX ADMIN — IPRATROPIUM BROMIDE AND ALBUTEROL SULFATE 3 ML: .5; 3 SOLUTION RESPIRATORY (INHALATION) at 08:55

## 2023-01-01 RX ADMIN — MIDAZOLAM HYDROCHLORIDE 2 MG: 1 INJECTION, SOLUTION INTRAMUSCULAR; INTRAVENOUS at 20:14

## 2023-01-01 RX ADMIN — TORSEMIDE 20 MG: 20 TABLET ORAL at 08:31

## 2023-01-01 RX ADMIN — VASOPRESSIN 0.5 UNITS/HR: 20 INJECTION INTRAVENOUS at 13:18

## 2023-01-01 RX ADMIN — IPRATROPIUM BROMIDE AND ALBUTEROL SULFATE 3 ML: 2.5; .5 SOLUTION RESPIRATORY (INHALATION) at 11:32

## 2023-01-01 RX ADMIN — MIDAZOLAM 2 MG: 1 INJECTION INTRAMUSCULAR; INTRAVENOUS at 04:04

## 2023-01-01 RX ADMIN — ISOSORBIDE DINITRATE 20 MG: 20 TABLET ORAL at 12:32

## 2023-01-01 RX ADMIN — IPRATROPIUM BROMIDE AND ALBUTEROL SULFATE 3 ML: .5; 3 SOLUTION RESPIRATORY (INHALATION) at 19:37

## 2023-01-01 RX ADMIN — IPRATROPIUM BROMIDE AND ALBUTEROL SULFATE 3 ML: .5; 3 SOLUTION RESPIRATORY (INHALATION) at 11:29

## 2023-01-01 RX ADMIN — ASPIRIN 81 MG CHEWABLE TABLET 81 MG: 81 TABLET CHEWABLE at 08:20

## 2023-01-01 RX ADMIN — DEXMEDETOMIDINE HYDROCHLORIDE 0.6 MCG/KG/HR: 400 INJECTION INTRAVENOUS at 06:41

## 2023-01-01 RX ADMIN — LEVOTHYROXINE SODIUM 150 MCG: 150 TABLET ORAL at 06:35

## 2023-01-01 RX ADMIN — ATORVASTATIN CALCIUM 20 MG: 20 TABLET, FILM COATED ORAL at 20:47

## 2023-01-01 RX ADMIN — POTASSIUM CHLORIDE 40 MEQ: 1500 TABLET, EXTENDED RELEASE ORAL at 08:57

## 2023-01-01 RX ADMIN — METRONIDAZOLE 500 MG: 500 INJECTION, SOLUTION INTRAVENOUS at 08:21

## 2023-01-01 RX ADMIN — ASPIRIN 81 MG CHEWABLE TABLET 81 MG: 81 TABLET CHEWABLE at 08:31

## 2023-01-01 RX ADMIN — CHLORHEXIDINE GLUCONATE 15 ML: 1.2 SOLUTION ORAL at 07:52

## 2023-01-01 RX ADMIN — HYDROMORPHONE HYDROCHLORIDE 0.4 MG: 0.2 INJECTION, SOLUTION INTRAMUSCULAR; INTRAVENOUS; SUBCUTANEOUS at 04:34

## 2023-01-01 RX ADMIN — AMLODIPINE BESYLATE 5 MG: 5 TABLET ORAL at 11:44

## 2023-01-01 RX ADMIN — METOLAZONE 5 MG: 5 TABLET ORAL at 10:51

## 2023-01-01 RX ADMIN — QUETIAPINE FUMARATE 150 MG: 50 TABLET, FILM COATED ORAL at 08:20

## 2023-01-01 RX ADMIN — METRONIDAZOLE 500 MG: 500 INJECTION, SOLUTION INTRAVENOUS at 08:45

## 2023-01-01 RX ADMIN — OXYCODONE HYDROCHLORIDE 5 MG: 5 TABLET ORAL at 09:59

## 2023-01-01 RX ADMIN — ACETAMINOPHEN 650 MG: 325 TABLET, FILM COATED ORAL at 00:27

## 2023-01-01 RX ADMIN — HYDROMORPHONE HYDROCHLORIDE 0.2 MG: 0.2 INJECTION, SOLUTION INTRAMUSCULAR; INTRAVENOUS; SUBCUTANEOUS at 03:54

## 2023-01-01 RX ADMIN — INSULIN ASPART 1 UNITS: 100 INJECTION, SOLUTION INTRAVENOUS; SUBCUTANEOUS at 19:57

## 2023-01-01 RX ADMIN — NOREPINEPHRINE BITARTRATE 0.23 MCG/KG/MIN: 0.02 INJECTION, SOLUTION INTRAVENOUS at 14:09

## 2023-01-01 RX ADMIN — BUMETANIDE 1 MG: 0.25 INJECTION INTRAMUSCULAR; INTRAVENOUS at 18:18

## 2023-01-01 RX ADMIN — PIPERACILLIN AND TAZOBACTAM 4.5 G: 4; .5 INJECTION, POWDER, FOR SOLUTION INTRAVENOUS at 12:35

## 2023-01-01 RX ADMIN — INSULIN ASPART 1 UNITS: 100 INJECTION, SOLUTION INTRAVENOUS; SUBCUTANEOUS at 04:19

## 2023-01-01 RX ADMIN — Medication 100 MCG/HR: at 19:00

## 2023-01-01 RX ADMIN — IPRATROPIUM BROMIDE AND ALBUTEROL SULFATE 3 ML: .5; 3 SOLUTION RESPIRATORY (INHALATION) at 11:35

## 2023-01-01 RX ADMIN — HEPARIN SODIUM 5000 UNITS: 5000 INJECTION, SOLUTION INTRAVENOUS; SUBCUTANEOUS at 10:23

## 2023-01-01 RX ADMIN — ASPIRIN 81 MG: 81 TABLET, COATED ORAL at 08:31

## 2023-01-01 RX ADMIN — LEVOTHYROXINE SODIUM 150 MCG: 150 TABLET ORAL at 06:03

## 2023-01-01 RX ADMIN — INSULIN ASPART 1 UNITS: 100 INJECTION, SOLUTION INTRAVENOUS; SUBCUTANEOUS at 06:37

## 2023-01-01 RX ADMIN — DOCUSATE SODIUM 50 MG: 50 LIQUID ORAL at 20:47

## 2023-01-01 RX ADMIN — Medication 40 MG: at 08:13

## 2023-01-01 RX ADMIN — LEVOTHYROXINE SODIUM 150 MCG: 75 TABLET ORAL at 05:43

## 2023-01-01 RX ADMIN — INSULIN ASPART 1 UNITS: 100 INJECTION, SOLUTION INTRAVENOUS; SUBCUTANEOUS at 12:39

## 2023-01-01 RX ADMIN — METRONIDAZOLE 500 MG: 500 INJECTION, SOLUTION INTRAVENOUS at 02:03

## 2023-01-01 RX ADMIN — MINERAL OIL AND PETROLATUM: 150; 830 OINTMENT OPHTHALMIC at 04:12

## 2023-01-01 RX ADMIN — MEROPENEM 1 G: 1 INJECTION, POWDER, FOR SOLUTION INTRAVENOUS at 05:09

## 2023-01-01 RX ADMIN — INSULIN ASPART 1 UNITS: 100 INJECTION, SOLUTION INTRAVENOUS; SUBCUTANEOUS at 04:52

## 2023-01-01 RX ADMIN — ATORVASTATIN CALCIUM 20 MG: 20 TABLET, FILM COATED ORAL at 21:31

## 2023-01-01 RX ADMIN — MIDODRINE HYDROCHLORIDE 10 MG: 10 TABLET ORAL at 00:25

## 2023-01-01 RX ADMIN — HEPARIN SODIUM 5000 UNITS: 5000 INJECTION, SOLUTION INTRAVENOUS; SUBCUTANEOUS at 11:11

## 2023-01-01 RX ADMIN — MIDAZOLAM IN SODIUM CHLORIDE 1 MG/HR: 1 INJECTION INTRAVENOUS at 20:58

## 2023-01-01 RX ADMIN — Medication 150 MCG/HR: at 04:25

## 2023-01-01 RX ADMIN — ACETYLCYSTEINE 2 ML: 200 SOLUTION ORAL; RESPIRATORY (INHALATION) at 15:28

## 2023-01-01 RX ADMIN — METOPROLOL TARTRATE 12.5 MG: 100 TABLET, FILM COATED ORAL at 21:53

## 2023-01-01 RX ADMIN — DEXMEDETOMIDINE HYDROCHLORIDE 0.7 MCG/KG/HR: 400 INJECTION INTRAVENOUS at 15:31

## 2023-01-01 RX ADMIN — OXYCODONE HYDROCHLORIDE 5 MG: 5 TABLET ORAL at 20:37

## 2023-01-01 RX ADMIN — SIMETHICONE 40 MG: 20 EMULSION ORAL at 13:52

## 2023-01-01 RX ADMIN — CETIRIZINE HYDROCHLORIDE 10 MG: 10 TABLET ORAL at 14:04

## 2023-01-01 RX ADMIN — OXYCODONE HYDROCHLORIDE 5 MG: 5 SOLUTION ORAL at 00:44

## 2023-01-01 RX ADMIN — IPRATROPIUM BROMIDE AND ALBUTEROL SULFATE 3 ML: .5; 3 SOLUTION RESPIRATORY (INHALATION) at 20:26

## 2023-01-01 RX ADMIN — PROPOFOL 20 MCG/KG/MIN: 10 INJECTION, EMULSION INTRAVENOUS at 04:11

## 2023-01-01 RX ADMIN — MICONAZOLE NITRATE: 20 CREAM TOPICAL at 21:15

## 2023-01-01 RX ADMIN — MIDAZOLAM HYDROCHLORIDE 7 MG/HR: 1 INJECTION, SOLUTION INTRAVENOUS at 20:13

## 2023-01-01 RX ADMIN — Medication 1 PACKET: at 22:37

## 2023-01-01 RX ADMIN — INSULIN ASPART 1 UNITS: 100 INJECTION, SOLUTION INTRAVENOUS; SUBCUTANEOUS at 11:54

## 2023-01-01 RX ADMIN — Medication 50 MCG: at 08:27

## 2023-01-01 RX ADMIN — BUMETANIDE 1 MG: 0.5 TABLET ORAL at 07:52

## 2023-01-01 RX ADMIN — IPRATROPIUM BROMIDE AND ALBUTEROL SULFATE 3 ML: .5; 3 SOLUTION RESPIRATORY (INHALATION) at 08:42

## 2023-01-01 RX ADMIN — METHYLPREDNISOLONE SODIUM SUCCINATE 62.5 MG: 125 INJECTION INTRAMUSCULAR; INTRAVENOUS at 18:09

## 2023-01-01 RX ADMIN — MEROPENEM 500 MG: 500 INJECTION, POWDER, FOR SOLUTION INTRAVENOUS at 10:08

## 2023-01-01 RX ADMIN — PREGABALIN 75 MG: 20 SOLUTION ORAL at 08:56

## 2023-01-01 RX ADMIN — POTASSIUM CHLORIDE 40 MEQ: 20 SOLUTION ORAL at 05:11

## 2023-01-01 RX ADMIN — MIDAZOLAM HYDROCHLORIDE 7 MG/HR: 1 INJECTION, SOLUTION INTRAVENOUS at 00:36

## 2023-01-01 RX ADMIN — HEPARIN SODIUM 5000 UNITS: 5000 INJECTION, SOLUTION INTRAVENOUS; SUBCUTANEOUS at 05:48

## 2023-01-01 RX ADMIN — Medication 1 PACKET: at 21:20

## 2023-01-01 RX ADMIN — INSULIN ASPART 1 UNITS: 100 INJECTION, SOLUTION INTRAVENOUS; SUBCUTANEOUS at 16:47

## 2023-01-01 RX ADMIN — SIMETHICONE 40 MG: 20 EMULSION ORAL at 08:12

## 2023-01-01 RX ADMIN — ATORVASTATIN CALCIUM 20 MG: 20 TABLET, FILM COATED ORAL at 19:43

## 2023-01-01 RX ADMIN — QUETIAPINE FUMARATE 150 MG: 50 TABLET, FILM COATED ORAL at 09:04

## 2023-01-01 RX ADMIN — HYDRALAZINE HYDROCHLORIDE 50 MG: 50 TABLET, FILM COATED ORAL at 08:37

## 2023-01-01 RX ADMIN — HYDROXYZINE HYDROCHLORIDE 25 MG: 25 TABLET ORAL at 20:17

## 2023-01-01 RX ADMIN — ACETAMINOPHEN 500 MG: 500 TABLET, FILM COATED ORAL at 18:39

## 2023-01-01 RX ADMIN — LEVOTHYROXINE SODIUM 150 MCG: 0.07 TABLET ORAL at 13:22

## 2023-01-01 RX ADMIN — CHLORHEXIDINE GLUCONATE 15 ML: 1.2 SOLUTION ORAL at 09:46

## 2023-01-01 RX ADMIN — CHLORHEXIDINE GLUCONATE 15 ML: 1.2 SOLUTION ORAL at 09:37

## 2023-01-01 RX ADMIN — ACETAMINOPHEN 650 MG: 650 SOLUTION ORAL at 21:11

## 2023-01-01 RX ADMIN — Medication 40 MG: at 07:53

## 2023-01-01 RX ADMIN — ASPIRIN 81 MG: 81 TABLET, COATED ORAL at 09:06

## 2023-01-01 RX ADMIN — QUETIAPINE FUMARATE 150 MG: 50 TABLET, FILM COATED ORAL at 08:17

## 2023-01-01 RX ADMIN — CHLORHEXIDINE GLUCONATE 0.12% ORAL RINSE 15 ML: 1.2 LIQUID ORAL at 21:12

## 2023-01-01 RX ADMIN — MIDAZOLAM HYDROCHLORIDE 2 MG: 1 INJECTION, SOLUTION INTRAMUSCULAR; INTRAVENOUS at 00:20

## 2023-01-01 RX ADMIN — MEROPENEM 1 G: 1 INJECTION, POWDER, FOR SOLUTION INTRAVENOUS at 22:09

## 2023-01-01 RX ADMIN — ACETAMINOPHEN 650 MG: 325 TABLET, FILM COATED ORAL at 20:12

## 2023-01-01 RX ADMIN — Medication 40 MG: at 06:34

## 2023-01-01 RX ADMIN — ACETYLCYSTEINE 2 ML: 200 SOLUTION ORAL; RESPIRATORY (INHALATION) at 07:13

## 2023-01-01 RX ADMIN — IOPAMIDOL 75 ML: 755 INJECTION, SOLUTION INTRAVENOUS at 05:15

## 2023-01-01 RX ADMIN — OXYCODONE HYDROCHLORIDE 5 MG: 5 TABLET ORAL at 05:29

## 2023-01-01 RX ADMIN — ISOSORBIDE DINITRATE 20 MG: 20 TABLET ORAL at 12:23

## 2023-01-01 RX ADMIN — IPRATROPIUM BROMIDE AND ALBUTEROL SULFATE 3 ML: .5; 3 SOLUTION RESPIRATORY (INHALATION) at 11:22

## 2023-01-01 RX ADMIN — METOLAZONE 5 MG: 5 TABLET ORAL at 10:14

## 2023-01-01 RX ADMIN — IPRATROPIUM BROMIDE AND ALBUTEROL SULFATE 3 ML: .5; 3 SOLUTION RESPIRATORY (INHALATION) at 15:30

## 2023-01-01 RX ADMIN — ISOSORBIDE DINITRATE 20 MG: 20 TABLET ORAL at 17:18

## 2023-01-01 RX ADMIN — SERTRALINE HYDROCHLORIDE 150 MG: 100 TABLET ORAL at 07:56

## 2023-01-01 RX ADMIN — Medication 1 PACKET: at 15:47

## 2023-01-01 RX ADMIN — HEPARIN SODIUM 5000 UNITS: 5000 INJECTION, SOLUTION INTRAVENOUS; SUBCUTANEOUS at 20:53

## 2023-01-01 RX ADMIN — MIDODRINE HYDROCHLORIDE 10 MG: 10 TABLET ORAL at 08:40

## 2023-01-01 RX ADMIN — Medication 15 ML: at 08:39

## 2023-01-01 RX ADMIN — HYDROMORPHONE HYDROCHLORIDE 0.4 MG: 0.2 INJECTION, SOLUTION INTRAMUSCULAR; INTRAVENOUS; SUBCUTANEOUS at 00:22

## 2023-01-01 RX ADMIN — PREGABALIN 75 MG: 75 CAPSULE ORAL at 20:07

## 2023-01-01 RX ADMIN — CHLORHEXIDINE GLUCONATE 0.12% ORAL RINSE 15 ML: 1.2 LIQUID ORAL at 21:46

## 2023-01-01 RX ADMIN — Medication 5 ML: at 07:46

## 2023-01-01 RX ADMIN — POTASSIUM CHLORIDE 40 MEQ: 20 SOLUTION ORAL at 05:29

## 2023-01-01 RX ADMIN — AMLODIPINE BESYLATE 5 MG: 5 TABLET ORAL at 20:51

## 2023-01-01 RX ADMIN — Medication 40 MG: at 06:41

## 2023-01-01 RX ADMIN — METHYLPREDNISOLONE SODIUM SUCCINATE 62.5 MG: 125 INJECTION INTRAMUSCULAR; INTRAVENOUS at 17:35

## 2023-01-01 RX ADMIN — PROPOFOL 45 MCG/KG/MIN: 10 INJECTION, EMULSION INTRAVENOUS at 19:38

## 2023-01-01 RX ADMIN — ATORVASTATIN CALCIUM 20 MG: 20 TABLET, FILM COATED ORAL at 20:05

## 2023-01-01 RX ADMIN — ACETAMINOPHEN 650 MG: 650 SOLUTION ORAL at 21:13

## 2023-01-01 RX ADMIN — ATORVASTATIN CALCIUM 20 MG: 20 TABLET, FILM COATED ORAL at 20:07

## 2023-01-01 RX ADMIN — QUETIAPINE 150 MG: 100 TABLET ORAL at 08:47

## 2023-01-01 RX ADMIN — AMLODIPINE BESYLATE 5 MG: 5 TABLET ORAL at 21:27

## 2023-01-01 RX ADMIN — CHLORHEXIDINE GLUCONATE 15 ML: 1.2 SOLUTION ORAL at 08:38

## 2023-01-01 RX ADMIN — ATORVASTATIN CALCIUM 20 MG: 20 TABLET, FILM COATED ORAL at 21:18

## 2023-01-01 RX ADMIN — SENNOSIDES AND DOCUSATE SODIUM 1 TABLET: 8.6; 5 TABLET ORAL at 08:01

## 2023-01-01 RX ADMIN — METOLAZONE 5 MG: 5 TABLET ORAL at 08:12

## 2023-01-01 RX ADMIN — Medication 1 PACKET: at 08:23

## 2023-01-01 RX ADMIN — ASPIRIN 81 MG: 81 TABLET, CHEWABLE ORAL at 08:17

## 2023-01-01 RX ADMIN — HYDROMORPHONE HYDROCHLORIDE 0.2 MG: 0.2 INJECTION, SOLUTION INTRAMUSCULAR; INTRAVENOUS; SUBCUTANEOUS at 23:58

## 2023-01-01 RX ADMIN — AMLODIPINE BESYLATE 5 MG: 5 TABLET ORAL at 08:17

## 2023-01-01 RX ADMIN — LABETALOL HYDROCHLORIDE 20 MG: 5 INJECTION, SOLUTION INTRAVENOUS at 06:33

## 2023-01-01 RX ADMIN — DEXMEDETOMIDINE HYDROCHLORIDE 0.8 MCG/KG/HR: 400 INJECTION INTRAVENOUS at 23:23

## 2023-01-01 RX ADMIN — CLOTRIMAZOLE: 0.01 CREAM TOPICAL at 21:16

## 2023-01-01 RX ADMIN — BUMETANIDE 1 MG: 0.5 TABLET ORAL at 08:29

## 2023-01-01 RX ADMIN — ACETAMINOPHEN 650 MG: 650 SUPPOSITORY RECTAL at 16:58

## 2023-01-01 RX ADMIN — Medication 50 MCG: at 08:30

## 2023-01-01 RX ADMIN — EPOPROSTENOL 20 NG/KG/MIN: 1.5 INJECTION, POWDER, LYOPHILIZED, FOR SOLUTION INTRAVENOUS at 10:56

## 2023-01-01 RX ADMIN — DEXMEDETOMIDINE HYDROCHLORIDE 0.9 MCG/KG/HR: 400 INJECTION INTRAVENOUS at 00:36

## 2023-01-01 RX ADMIN — SENNOSIDES AND DOCUSATE SODIUM 1 TABLET: 8.6; 5 TABLET ORAL at 16:39

## 2023-01-01 RX ADMIN — EPOPROSTENOL 20 NG/KG/MIN: 1.5 INJECTION, POWDER, LYOPHILIZED, FOR SOLUTION INTRAVENOUS at 22:26

## 2023-01-01 RX ADMIN — CHLORHEXIDINE GLUCONATE 0.12% ORAL RINSE 15 ML: 1.2 LIQUID ORAL at 08:43

## 2023-01-01 RX ADMIN — PREDNISONE 60 MG: 20 TABLET ORAL at 16:43

## 2023-01-01 RX ADMIN — LEVOTHYROXINE SODIUM 150 MCG: 150 TABLET ORAL at 06:04

## 2023-01-01 RX ADMIN — MIDAZOLAM HYDROCHLORIDE 2 MG: 1 INJECTION, SOLUTION INTRAMUSCULAR; INTRAVENOUS at 05:15

## 2023-01-01 RX ADMIN — SERTRALINE HYDROCHLORIDE 150 MG: 100 TABLET ORAL at 09:30

## 2023-01-01 RX ADMIN — ATOVAQUONE 1500 MG: 750 SUSPENSION ORAL at 09:32

## 2023-01-01 RX ADMIN — METOLAZONE 5 MG: 5 TABLET ORAL at 09:39

## 2023-01-01 RX ADMIN — CHLORHEXIDINE GLUCONATE 15 ML: 1.2 SOLUTION ORAL at 19:41

## 2023-01-01 RX ADMIN — MEROPENEM 500 MG: 500 INJECTION, POWDER, FOR SOLUTION INTRAVENOUS at 05:09

## 2023-01-01 RX ADMIN — INSULIN ASPART 1 UNITS: 100 INJECTION, SOLUTION INTRAVENOUS; SUBCUTANEOUS at 20:08

## 2023-01-01 RX ADMIN — POTASSIUM CHLORIDE 10 MEQ: 7.46 INJECTION, SOLUTION INTRAVENOUS at 05:00

## 2023-01-01 RX ADMIN — ISOSORBIDE DINITRATE 20 MG: 20 TABLET ORAL at 12:19

## 2023-01-01 RX ADMIN — QUETIAPINE FUMARATE 50 MG: 50 TABLET ORAL at 08:14

## 2023-01-01 RX ADMIN — INSULIN ASPART 2 UNITS: 100 INJECTION, SOLUTION INTRAVENOUS; SUBCUTANEOUS at 23:47

## 2023-01-01 RX ADMIN — AMLODIPINE BESYLATE 2.5 MG: 2.5 TABLET ORAL at 08:46

## 2023-01-01 RX ADMIN — MEROPENEM 1 G: 1 INJECTION, POWDER, FOR SOLUTION INTRAVENOUS at 21:33

## 2023-01-01 RX ADMIN — IPRATROPIUM BROMIDE AND ALBUTEROL SULFATE 3 ML: .5; 3 SOLUTION RESPIRATORY (INHALATION) at 19:33

## 2023-01-01 RX ADMIN — QUETIAPINE FUMARATE 150 MG: 50 TABLET, FILM COATED ORAL at 21:32

## 2023-01-01 RX ADMIN — QUETIAPINE 150 MG: 100 TABLET ORAL at 20:21

## 2023-01-01 RX ADMIN — EPOPROSTENOL 20 NG/KG/MIN: 1.5 INJECTION, POWDER, LYOPHILIZED, FOR SOLUTION INTRAVENOUS at 07:28

## 2023-01-01 RX ADMIN — HYDRALAZINE HYDROCHLORIDE 25 MG: 25 TABLET, FILM COATED ORAL at 06:25

## 2023-01-01 RX ADMIN — ACETAMINOPHEN 975 MG: 325 TABLET, FILM COATED ORAL at 09:00

## 2023-01-01 RX ADMIN — PREGABALIN 75 MG: 20 SOLUTION ORAL at 08:41

## 2023-01-01 RX ADMIN — IPRATROPIUM BROMIDE AND ALBUTEROL SULFATE 3 ML: .5; 3 SOLUTION RESPIRATORY (INHALATION) at 07:22

## 2023-01-01 RX ADMIN — HYDROXYZINE HYDROCHLORIDE 25 MG: 25 TABLET ORAL at 16:09

## 2023-01-01 RX ADMIN — HEPARIN SODIUM 5000 UNITS: 5000 INJECTION, SOLUTION INTRAVENOUS; SUBCUTANEOUS at 21:24

## 2023-01-01 RX ADMIN — ACETAMINOPHEN 975 MG: 325 TABLET, FILM COATED ORAL at 21:08

## 2023-01-01 RX ADMIN — SERTRALINE HYDROCHLORIDE 150 MG: 100 TABLET ORAL at 08:35

## 2023-01-01 RX ADMIN — CHLORHEXIDINE GLUCONATE 0.12% ORAL RINSE 15 ML: 1.2 LIQUID ORAL at 09:04

## 2023-01-01 RX ADMIN — HYDROMORPHONE HYDROCHLORIDE 0.2 MG: 0.2 INJECTION, SOLUTION INTRAMUSCULAR; INTRAVENOUS; SUBCUTANEOUS at 04:58

## 2023-01-01 RX ADMIN — LORAZEPAM 0.5 MG: 2 INJECTION INTRAMUSCULAR; INTRAVENOUS at 05:35

## 2023-01-01 RX ADMIN — AMLODIPINE BESYLATE 5 MG: 5 TABLET ORAL at 20:57

## 2023-01-01 RX ADMIN — DOCUSATE SODIUM 50 MG: 50 LIQUID ORAL at 20:27

## 2023-01-01 RX ADMIN — VASOPRESSIN 2.4 UNITS/HR: 20 INJECTION INTRAVENOUS at 19:35

## 2023-01-01 RX ADMIN — HEPARIN SODIUM 5000 UNITS: 5000 INJECTION, SOLUTION INTRAVENOUS; SUBCUTANEOUS at 05:34

## 2023-01-01 RX ADMIN — METHYLPREDNISOLONE SODIUM SUCCINATE 62.5 MG: 125 INJECTION INTRAMUSCULAR; INTRAVENOUS at 10:43

## 2023-01-01 RX ADMIN — PREGABALIN 75 MG: 20 SOLUTION ORAL at 08:40

## 2023-01-01 RX ADMIN — PREDNISONE 60 MG: 20 TABLET ORAL at 08:17

## 2023-01-01 RX ADMIN — INSULIN ASPART 2 UNITS: 100 INJECTION, SOLUTION INTRAVENOUS; SUBCUTANEOUS at 16:35

## 2023-01-01 RX ADMIN — QUETIAPINE 150 MG: 100 TABLET ORAL at 21:08

## 2023-01-01 RX ADMIN — QUETIAPINE FUMARATE 150 MG: 50 TABLET, FILM COATED ORAL at 20:17

## 2023-01-01 RX ADMIN — ATOVAQUONE 1500 MG: 750 SUSPENSION ORAL at 14:44

## 2023-01-01 RX ADMIN — PREGABALIN 25 MG: 20 SOLUTION ORAL at 08:30

## 2023-01-01 RX ADMIN — HEPARIN SODIUM 5000 UNITS: 5000 INJECTION, SOLUTION INTRAVENOUS; SUBCUTANEOUS at 08:04

## 2023-01-01 RX ADMIN — QUETIAPINE FUMARATE 150 MG: 50 TABLET, FILM COATED ORAL at 08:42

## 2023-01-01 RX ADMIN — ACETYLCYSTEINE 2 ML: 200 SOLUTION ORAL; RESPIRATORY (INHALATION) at 08:03

## 2023-01-01 RX ADMIN — FUROSEMIDE 60 MG: 10 INJECTION, SOLUTION INTRAMUSCULAR; INTRAVENOUS at 10:08

## 2023-01-01 RX ADMIN — SIMETHICONE 40 MG: 20 EMULSION ORAL at 13:47

## 2023-01-01 RX ADMIN — OXYCODONE HYDROCHLORIDE 5 MG: 5 TABLET ORAL at 12:10

## 2023-01-01 RX ADMIN — HEPARIN SODIUM 5000 UNITS: 5000 INJECTION, SOLUTION INTRAVENOUS; SUBCUTANEOUS at 08:33

## 2023-01-01 RX ADMIN — DEXMEDETOMIDINE HYDROCHLORIDE 0.5 MCG/KG/HR: 400 INJECTION INTRAVENOUS at 01:52

## 2023-01-01 RX ADMIN — ISOSORBIDE DINITRATE 20 MG: 20 TABLET ORAL at 16:16

## 2023-01-01 RX ADMIN — ATORVASTATIN CALCIUM 20 MG: 20 TABLET, FILM COATED ORAL at 20:30

## 2023-01-01 RX ADMIN — CLOTRIMAZOLE: 0.01 CREAM TOPICAL at 20:22

## 2023-01-01 RX ADMIN — ATORVASTATIN CALCIUM 20 MG: 20 TABLET, FILM COATED ORAL at 20:50

## 2023-01-01 RX ADMIN — CLOTRIMAZOLE: 0.01 CREAM TOPICAL at 08:26

## 2023-01-01 RX ADMIN — INSULIN ASPART 1 UNITS: 100 INJECTION, SOLUTION INTRAVENOUS; SUBCUTANEOUS at 17:25

## 2023-01-01 RX ADMIN — ASPIRIN 81 MG CHEWABLE TABLET 81 MG: 81 TABLET CHEWABLE at 08:38

## 2023-01-01 RX ADMIN — MIDAZOLAM HYDROCHLORIDE 1 MG: 1 INJECTION, SOLUTION INTRAMUSCULAR; INTRAVENOUS at 02:27

## 2023-01-01 RX ADMIN — CLOTRIMAZOLE: 0.01 CREAM TOPICAL at 20:17

## 2023-01-01 RX ADMIN — PREGABALIN 75 MG: 75 CAPSULE ORAL at 06:04

## 2023-01-01 RX ADMIN — BUMETANIDE 1 MG: 0.5 TABLET ORAL at 08:26

## 2023-01-01 RX ADMIN — ACETAMINOPHEN 650 MG: 650 SOLUTION ORAL at 16:20

## 2023-01-01 RX ADMIN — PREDNISONE 60 MG: 20 TABLET ORAL at 09:04

## 2023-01-01 RX ADMIN — Medication 15 ML: at 08:26

## 2023-01-01 RX ADMIN — CHLORHEXIDINE GLUCONATE 0.12% ORAL RINSE 15 ML: 1.2 LIQUID ORAL at 21:14

## 2023-01-01 RX ADMIN — ISOSORBIDE DINITRATE 20 MG: 20 TABLET ORAL at 09:21

## 2023-01-01 RX ADMIN — DEXMEDETOMIDINE HYDROCHLORIDE 0.5 MCG/KG/HR: 400 INJECTION INTRAVENOUS at 22:24

## 2023-01-01 RX ADMIN — MIDODRINE HYDROCHLORIDE 10 MG: 10 TABLET ORAL at 08:49

## 2023-01-01 RX ADMIN — IPRATROPIUM BROMIDE AND ALBUTEROL SULFATE 3 ML: 2.5; .5 SOLUTION RESPIRATORY (INHALATION) at 12:49

## 2023-01-01 RX ADMIN — LEVOTHYROXINE SODIUM 150 MCG: 150 TABLET ORAL at 06:33

## 2023-01-01 RX ADMIN — DEXTROSE 15 G: 15 GEL ORAL at 08:14

## 2023-01-01 RX ADMIN — IPRATROPIUM BROMIDE AND ALBUTEROL SULFATE 3 ML: 2.5; .5 SOLUTION RESPIRATORY (INHALATION) at 20:00

## 2023-01-01 RX ADMIN — METOPROLOL TARTRATE 12.5 MG: 100 TABLET, FILM COATED ORAL at 20:59

## 2023-01-01 RX ADMIN — INSULIN ASPART 1 UNITS: 100 INJECTION, SOLUTION INTRAVENOUS; SUBCUTANEOUS at 12:31

## 2023-01-01 RX ADMIN — Medication 5 ML: at 08:18

## 2023-01-01 RX ADMIN — MIDODRINE HYDROCHLORIDE 10 MG: 10 TABLET ORAL at 03:57

## 2023-01-01 RX ADMIN — INSULIN ASPART 1 UNITS: 100 INJECTION, SOLUTION INTRAVENOUS; SUBCUTANEOUS at 16:14

## 2023-01-01 RX ADMIN — HYDRALAZINE HYDROCHLORIDE 25 MG: 25 TABLET, FILM COATED ORAL at 12:40

## 2023-01-01 RX ADMIN — Medication 1 MG: at 23:32

## 2023-01-01 RX ADMIN — ACETYLCYSTEINE 2 ML: 200 SOLUTION ORAL; RESPIRATORY (INHALATION) at 20:08

## 2023-01-01 RX ADMIN — DEXMEDETOMIDINE HYDROCHLORIDE 0.5 MCG/KG/HR: 400 INJECTION INTRAVENOUS at 12:30

## 2023-01-01 RX ADMIN — AMLODIPINE BESYLATE 5 MG: 5 TABLET ORAL at 08:19

## 2023-01-01 RX ADMIN — SERTRALINE HYDROCHLORIDE 150 MG: 100 TABLET ORAL at 08:40

## 2023-01-01 RX ADMIN — ALTEPLASE 2 MG: 2.2 INJECTION, POWDER, LYOPHILIZED, FOR SOLUTION INTRAVENOUS at 16:15

## 2023-01-01 RX ADMIN — DEXTROSE MONOHYDRATE: 50 INJECTION, SOLUTION INTRAVENOUS at 18:20

## 2023-01-01 RX ADMIN — PREDNISONE 60 MG: 50 TABLET ORAL at 08:25

## 2023-01-01 RX ADMIN — ATORVASTATIN CALCIUM 20 MG: 20 TABLET, FILM COATED ORAL at 19:37

## 2023-01-01 RX ADMIN — IPRATROPIUM BROMIDE AND ALBUTEROL SULFATE 3 ML: .5; 3 SOLUTION RESPIRATORY (INHALATION) at 08:12

## 2023-01-01 RX ADMIN — SODIUM ZIRCONIUM CYCLOSILICATE 10 G: 10 POWDER, FOR SUSPENSION ORAL at 18:11

## 2023-01-01 RX ADMIN — ISOSORBIDE DINITRATE 20 MG: 20 TABLET ORAL at 09:16

## 2023-01-01 RX ADMIN — HEPARIN SODIUM 5000 UNITS: 5000 INJECTION, SOLUTION INTRAVENOUS; SUBCUTANEOUS at 08:25

## 2023-01-01 RX ADMIN — NOREPINEPHRINE BITARTRATE 0.03 MCG/KG/MIN: 0.02 INJECTION, SOLUTION INTRAVENOUS at 10:08

## 2023-01-01 RX ADMIN — MEROPENEM 500 MG: 500 INJECTION, POWDER, FOR SOLUTION INTRAVENOUS at 05:16

## 2023-01-01 RX ADMIN — Medication 5 ML: at 09:41

## 2023-01-01 RX ADMIN — THERA TABS 1 TABLET: TAB at 09:06

## 2023-01-01 RX ADMIN — CHLORHEXIDINE GLUCONATE 0.12% ORAL RINSE 15 ML: 1.2 LIQUID ORAL at 09:26

## 2023-01-01 RX ADMIN — DEXMEDETOMIDINE HYDROCHLORIDE 1 MCG/KG/HR: 400 INJECTION INTRAVENOUS at 05:39

## 2023-01-01 RX ADMIN — MIDAZOLAM 1 MG: 1 INJECTION INTRAMUSCULAR; INTRAVENOUS at 19:40

## 2023-01-01 RX ADMIN — SERTRALINE HYDROCHLORIDE 150 MG: 100 TABLET ORAL at 07:52

## 2023-01-01 RX ADMIN — ACETAMINOPHEN 650 MG: 650 SOLUTION ORAL at 09:15

## 2023-01-01 RX ADMIN — PREGABALIN 75 MG: 20 SOLUTION ORAL at 08:13

## 2023-01-01 RX ADMIN — QUETIAPINE FUMARATE 50 MG: 50 TABLET ORAL at 20:28

## 2023-01-01 RX ADMIN — LIDOCAINE: 50 OINTMENT TOPICAL at 22:42

## 2023-01-01 RX ADMIN — INSULIN ASPART 1 UNITS: 100 INJECTION, SOLUTION INTRAVENOUS; SUBCUTANEOUS at 16:20

## 2023-01-01 RX ADMIN — AMLODIPINE BESYLATE 5 MG: 5 TABLET ORAL at 08:25

## 2023-01-01 RX ADMIN — IPRATROPIUM BROMIDE AND ALBUTEROL SULFATE 3 ML: 2.5; .5 SOLUTION RESPIRATORY (INHALATION) at 20:04

## 2023-01-01 RX ADMIN — SODIUM CHLORIDE 250 MG: 9 INJECTION, SOLUTION INTRAVENOUS at 17:15

## 2023-01-01 RX ADMIN — FENTANYL CITRATE 25 MCG: 50 INJECTION INTRAMUSCULAR; INTRAVENOUS at 10:38

## 2023-01-01 RX ADMIN — QUETIAPINE 150 MG: 100 TABLET ORAL at 20:32

## 2023-01-01 RX ADMIN — HEPARIN SODIUM 5000 UNITS: 5000 INJECTION, SOLUTION INTRAVENOUS; SUBCUTANEOUS at 20:00

## 2023-01-01 RX ADMIN — INSULIN ASPART 1 UNITS: 100 INJECTION, SOLUTION INTRAVENOUS; SUBCUTANEOUS at 04:14

## 2023-01-01 RX ADMIN — DEXAMETHASONE SODIUM PHOSPHATE 4 MG: 4 INJECTION, SOLUTION INTRA-ARTICULAR; INTRALESIONAL; INTRAMUSCULAR; INTRAVENOUS; SOFT TISSUE at 11:19

## 2023-01-01 RX ADMIN — HEPARIN SODIUM 5000 UNITS: 5000 INJECTION, SOLUTION INTRAVENOUS; SUBCUTANEOUS at 20:57

## 2023-01-01 RX ADMIN — OXYCODONE HYDROCHLORIDE 5 MG: 5 TABLET ORAL at 09:54

## 2023-01-01 RX ADMIN — TORSEMIDE 20 MG: 20 TABLET ORAL at 08:17

## 2023-01-01 RX ADMIN — AZITHROMYCIN MONOHYDRATE 250 MG: 500 INJECTION, POWDER, LYOPHILIZED, FOR SOLUTION INTRAVENOUS at 14:33

## 2023-01-01 RX ADMIN — PREDNISONE 60 MG: 20 TABLET ORAL at 09:26

## 2023-01-01 RX ADMIN — EPOPROSTENOL 10 NG/KG/MIN: 1.5 INJECTION, POWDER, LYOPHILIZED, FOR SOLUTION INTRAVENOUS at 20:05

## 2023-01-01 RX ADMIN — AMLODIPINE BESYLATE 5 MG: 5 TABLET ORAL at 08:13

## 2023-01-01 RX ADMIN — BUMETANIDE 1 MG: 0.25 INJECTION INTRAMUSCULAR; INTRAVENOUS at 18:02

## 2023-01-01 RX ADMIN — Medication 40 MG: at 08:48

## 2023-01-01 RX ADMIN — METOPROLOL TARTRATE 12.5 MG: 100 TABLET, FILM COATED ORAL at 08:17

## 2023-01-01 RX ADMIN — ACETYLCYSTEINE 2 ML: 200 SOLUTION ORAL; RESPIRATORY (INHALATION) at 20:20

## 2023-01-01 RX ADMIN — DEXMEDETOMIDINE HYDROCHLORIDE 0.5 MCG/KG/HR: 400 INJECTION INTRAVENOUS at 02:09

## 2023-01-01 RX ADMIN — HEPARIN SODIUM 5000 UNITS: 5000 INJECTION, SOLUTION INTRAVENOUS; SUBCUTANEOUS at 06:13

## 2023-01-01 RX ADMIN — PREGABALIN 75 MG: 75 CAPSULE ORAL at 21:13

## 2023-01-01 RX ADMIN — METHYLPREDNISOLONE SODIUM SUCCINATE 62.5 MG: 125 INJECTION INTRAMUSCULAR; INTRAVENOUS at 02:44

## 2023-01-01 RX ADMIN — ACETAMINOPHEN 650 MG: 325 SUSPENSION ORAL at 03:34

## 2023-01-01 RX ADMIN — IPRATROPIUM BROMIDE AND ALBUTEROL SULFATE 3 ML: .5; 3 SOLUTION RESPIRATORY (INHALATION) at 20:44

## 2023-01-01 RX ADMIN — HYDROXYZINE HYDROCHLORIDE 50 MG: 50 TABLET, FILM COATED ORAL at 22:37

## 2023-01-01 RX ADMIN — HEPARIN SODIUM 5000 UNITS: 5000 INJECTION, SOLUTION INTRAVENOUS; SUBCUTANEOUS at 21:40

## 2023-01-01 RX ADMIN — ACETAMINOPHEN 650 MG: 650 SOLUTION ORAL at 12:51

## 2023-01-01 RX ADMIN — METHYLPREDNISOLONE SODIUM SUCCINATE 62.5 MG: 125 INJECTION INTRAMUSCULAR; INTRAVENOUS at 11:12

## 2023-01-01 RX ADMIN — LEVALBUTEROL HYDROCHLORIDE 1.25 MG: 1.25 SOLUTION RESPIRATORY (INHALATION) at 15:27

## 2023-01-01 RX ADMIN — SODIUM ZIRCONIUM CYCLOSILICATE 15 G: 10 POWDER, FOR SUSPENSION ORAL at 09:08

## 2023-01-01 RX ADMIN — CHLORHEXIDINE GLUCONATE 15 ML: 1.2 SOLUTION ORAL at 07:49

## 2023-01-01 RX ADMIN — INSULIN ASPART 1 UNITS: 100 INJECTION, SOLUTION INTRAVENOUS; SUBCUTANEOUS at 16:10

## 2023-01-01 RX ADMIN — HYDROXYZINE HYDROCHLORIDE 25 MG: 25 TABLET ORAL at 00:29

## 2023-01-01 RX ADMIN — GUAIFENESIN 600 MG: 600 TABLET, EXTENDED RELEASE ORAL at 18:38

## 2023-01-01 RX ADMIN — BISACODYL 10 MG: 10 SUPPOSITORY RECTAL at 13:16

## 2023-01-01 RX ADMIN — MICONAZOLE NITRATE: 20 CREAM TOPICAL at 08:37

## 2023-01-01 RX ADMIN — SIMETHICONE 40 MG: 20 EMULSION ORAL at 09:15

## 2023-01-01 RX ADMIN — ACETAMINOPHEN 650 MG: 650 SOLUTION ORAL at 12:23

## 2023-01-01 RX ADMIN — SODIUM CHLORIDE, POTASSIUM CHLORIDE, SODIUM LACTATE AND CALCIUM CHLORIDE 500 ML: 600; 310; 30; 20 INJECTION, SOLUTION INTRAVENOUS at 16:08

## 2023-01-01 RX ADMIN — SERTRALINE HYDROCHLORIDE 150 MG: 100 TABLET ORAL at 08:24

## 2023-01-01 RX ADMIN — HYDRALAZINE HYDROCHLORIDE 5 MG: 20 INJECTION INTRAMUSCULAR; INTRAVENOUS at 12:56

## 2023-01-01 RX ADMIN — CHLORHEXIDINE GLUCONATE 15 ML: 1.2 SOLUTION ORAL at 20:05

## 2023-01-01 RX ADMIN — THERA TABS 1 TABLET: TAB at 18:28

## 2023-01-01 RX ADMIN — Medication 1 PACKET: at 21:55

## 2023-01-01 RX ADMIN — NOREPINEPHRINE BITARTRATE 0.33 MCG/KG/MIN: 1 INJECTION, SOLUTION, CONCENTRATE INTRAVENOUS at 20:09

## 2023-01-01 RX ADMIN — PREGABALIN 75 MG: 75 CAPSULE ORAL at 20:55

## 2023-01-01 RX ADMIN — SERTRALINE HYDROCHLORIDE 150 MG: 20 SOLUTION ORAL at 08:19

## 2023-01-01 RX ADMIN — LEVOTHYROXINE SODIUM 150 MCG: 75 TABLET ORAL at 06:12

## 2023-01-01 RX ADMIN — Medication 40 MG: at 07:15

## 2023-01-01 RX ADMIN — ACETAMINOPHEN 650 MG: 650 SOLUTION ORAL at 17:06

## 2023-01-01 RX ADMIN — Medication 50 MCG: at 08:13

## 2023-01-01 RX ADMIN — ACETYLCYSTEINE 2 ML: 200 SOLUTION ORAL; RESPIRATORY (INHALATION) at 20:36

## 2023-01-01 RX ADMIN — MINERAL OIL AND PETROLATUM: 150; 830 OINTMENT OPHTHALMIC at 04:02

## 2023-01-01 RX ADMIN — Medication 5 ML: at 08:36

## 2023-01-01 RX ADMIN — DEXTROSE MONOHYDRATE: 50 INJECTION, SOLUTION INTRAVENOUS at 05:54

## 2023-01-01 RX ADMIN — MICONAZOLE NITRATE: 20 CREAM TOPICAL at 20:58

## 2023-01-01 RX ADMIN — PREGABALIN 25 MG: 20 SOLUTION ORAL at 08:42

## 2023-01-01 RX ADMIN — QUETIAPINE 150 MG: 100 TABLET ORAL at 08:01

## 2023-01-01 RX ADMIN — FENTANYL CITRATE 50 MCG: 50 INJECTION, SOLUTION INTRAMUSCULAR; INTRAVENOUS at 10:15

## 2023-01-01 RX ADMIN — PREDNISONE 60 MG: 20 TABLET ORAL at 08:29

## 2023-01-01 RX ADMIN — IPRATROPIUM BROMIDE AND ALBUTEROL SULFATE 3 ML: .5; 3 SOLUTION RESPIRATORY (INHALATION) at 09:13

## 2023-01-01 RX ADMIN — Medication 6 MCG/KG/MIN: at 02:52

## 2023-01-01 RX ADMIN — CLOTRIMAZOLE: 0.01 CREAM TOPICAL at 20:29

## 2023-01-01 RX ADMIN — POTASSIUM CHLORIDE 20 MEQ: 1.5 POWDER, FOR SOLUTION ORAL at 08:16

## 2023-01-01 RX ADMIN — Medication 15 ML: at 13:20

## 2023-01-01 RX ADMIN — MIDAZOLAM HYDROCHLORIDE 1 MG: 1 INJECTION, SOLUTION INTRAMUSCULAR; INTRAVENOUS at 20:13

## 2023-01-01 RX ADMIN — CEFEPIME 2 G: 2 INJECTION, POWDER, FOR SOLUTION INTRAVENOUS at 08:17

## 2023-01-01 RX ADMIN — METHYLPREDNISOLONE SODIUM SUCCINATE 62.5 MG: 125 INJECTION INTRAMUSCULAR; INTRAVENOUS at 05:14

## 2023-01-01 RX ADMIN — PREDNISONE 60 MG: 20 TABLET ORAL at 08:47

## 2023-01-01 RX ADMIN — CLOTRIMAZOLE: 0.01 CREAM TOPICAL at 08:03

## 2023-01-01 RX ADMIN — BUMETANIDE 1 MG: 0.5 TABLET ORAL at 15:52

## 2023-01-01 RX ADMIN — HEPARIN SODIUM 5000 UNITS: 5000 INJECTION, SOLUTION INTRAVENOUS; SUBCUTANEOUS at 08:40

## 2023-01-01 RX ADMIN — Medication 5 ML: at 08:25

## 2023-01-01 RX ADMIN — ACETYLCYSTEINE 2 ML: 200 SOLUTION ORAL; RESPIRATORY (INHALATION) at 07:51

## 2023-01-01 RX ADMIN — OXYCODONE HYDROCHLORIDE 10 MG: 5 TABLET ORAL at 02:28

## 2023-01-01 RX ADMIN — SENNOSIDES AND DOCUSATE SODIUM 1 TABLET: 8.6; 5 TABLET ORAL at 20:05

## 2023-01-01 RX ADMIN — ASPIRIN 81 MG: 81 TABLET, CHEWABLE ORAL at 08:13

## 2023-01-01 RX ADMIN — ATOVAQUONE 1500 MG: 750 SUSPENSION ORAL at 12:18

## 2023-01-01 RX ADMIN — PREGABALIN 75 MG: 20 SOLUTION ORAL at 08:00

## 2023-01-01 RX ADMIN — Medication 50 MCG: at 09:31

## 2023-01-01 RX ADMIN — CHLORHEXIDINE GLUCONATE 15 ML: 1.2 SOLUTION ORAL at 20:08

## 2023-01-01 RX ADMIN — HYDRALAZINE HYDROCHLORIDE 50 MG: 50 TABLET, FILM COATED ORAL at 08:16

## 2023-01-01 RX ADMIN — Medication 0.4 MG/HR: at 08:35

## 2023-01-01 RX ADMIN — HEPARIN SODIUM 5000 UNITS: 5000 INJECTION, SOLUTION INTRAVENOUS; SUBCUTANEOUS at 21:26

## 2023-01-01 RX ADMIN — ACETAMINOPHEN 650 MG: 650 SOLUTION ORAL at 21:27

## 2023-01-01 RX ADMIN — PROPOFOL 50 MCG/KG/MIN: 10 INJECTION, EMULSION INTRAVENOUS at 13:33

## 2023-01-01 RX ADMIN — MICONAZOLE NITRATE: 20 CREAM TOPICAL at 08:49

## 2023-01-01 RX ADMIN — OXYCODONE HYDROCHLORIDE 5 MG: 5 SOLUTION ORAL at 19:24

## 2023-01-01 RX ADMIN — Medication 5 ML: at 08:23

## 2023-01-01 RX ADMIN — DEXMEDETOMIDINE HYDROCHLORIDE 0.4 MCG/KG/HR: 400 INJECTION INTRAVENOUS at 20:00

## 2023-01-01 RX ADMIN — INSULIN ASPART 1 UNITS: 100 INJECTION, SOLUTION INTRAVENOUS; SUBCUTANEOUS at 12:34

## 2023-01-01 RX ADMIN — VASOPRESSIN 2.4 UNITS/HR: 20 INJECTION INTRAVENOUS at 04:08

## 2023-01-01 RX ADMIN — SODIUM BICARBONATE: 84 INJECTION, SOLUTION INTRAVENOUS at 15:01

## 2023-01-01 RX ADMIN — IPRATROPIUM BROMIDE AND ALBUTEROL SULFATE 3 ML: .5; 3 SOLUTION RESPIRATORY (INHALATION) at 20:21

## 2023-01-01 RX ADMIN — ASPIRIN 81 MG: 81 TABLET, COATED ORAL at 08:37

## 2023-01-01 RX ADMIN — ACETAMINOPHEN 650 MG: 650 SOLUTION ORAL at 08:17

## 2023-01-01 RX ADMIN — MICONAZOLE NITRATE: 20 CREAM TOPICAL at 21:18

## 2023-01-01 RX ADMIN — ATOVAQUONE 1500 MG: 750 SUSPENSION ORAL at 07:46

## 2023-01-01 RX ADMIN — Medication 30 MG: at 11:15

## 2023-01-01 RX ADMIN — EPOPROSTENOL 20 NG/KG/MIN: 1.5 INJECTION, POWDER, LYOPHILIZED, FOR SOLUTION INTRAVENOUS at 05:15

## 2023-01-01 RX ADMIN — HYDRALAZINE HYDROCHLORIDE 50 MG: 50 TABLET, FILM COATED ORAL at 21:18

## 2023-01-01 RX ADMIN — MIDAZOLAM HYDROCHLORIDE 7 MG/HR: 1 INJECTION, SOLUTION INTRAVENOUS at 01:57

## 2023-01-01 RX ADMIN — SERTRALINE HYDROCHLORIDE 150 MG: 20 SOLUTION ORAL at 09:03

## 2023-01-01 RX ADMIN — WHITE PETROLATUM: 1.75 OINTMENT TOPICAL at 09:42

## 2023-01-01 RX ADMIN — VASOPRESSIN 2.4 UNITS/HR: 20 INJECTION, SOLUTION INTRAMUSCULAR; SUBCUTANEOUS at 00:13

## 2023-01-01 RX ADMIN — ISOSORBIDE DINITRATE 20 MG: 20 TABLET ORAL at 11:16

## 2023-01-01 RX ADMIN — LEVOTHYROXINE SODIUM 150 MCG: 150 TABLET ORAL at 06:05

## 2023-01-01 RX ADMIN — QUETIAPINE FUMARATE 150 MG: 50 TABLET, FILM COATED ORAL at 08:12

## 2023-01-01 RX ADMIN — INSULIN ASPART 1 UNITS: 100 INJECTION, SOLUTION INTRAVENOUS; SUBCUTANEOUS at 16:15

## 2023-01-01 RX ADMIN — IPRATROPIUM BROMIDE AND ALBUTEROL SULFATE 3 ML: 2.5; .5 SOLUTION RESPIRATORY (INHALATION) at 16:16

## 2023-01-01 RX ADMIN — PREGABALIN 75 MG: 20 SOLUTION ORAL at 09:14

## 2023-01-01 RX ADMIN — ACETAMINOPHEN 650 MG: 650 SOLUTION ORAL at 16:16

## 2023-01-01 RX ADMIN — AMLODIPINE BESYLATE 5 MG: 5 TABLET ORAL at 08:42

## 2023-01-01 RX ADMIN — CLOTRIMAZOLE: 0.01 CREAM TOPICAL at 08:01

## 2023-01-01 RX ADMIN — INSULIN ASPART 1 UNITS: 100 INJECTION, SOLUTION INTRAVENOUS; SUBCUTANEOUS at 12:26

## 2023-01-01 RX ADMIN — Medication 0.5 MCG/KG/MIN: at 23:17

## 2023-01-01 RX ADMIN — Medication 150 MCG/HR: at 20:38

## 2023-01-01 RX ADMIN — BUMETANIDE 1 MG: 0.5 TABLET ORAL at 11:35

## 2023-01-01 RX ADMIN — SODIUM CHLORIDE 250 MG: 9 INJECTION, SOLUTION INTRAVENOUS at 06:13

## 2023-01-01 RX ADMIN — IPRATROPIUM BROMIDE AND ALBUTEROL SULFATE 3 ML: 2.5; .5 SOLUTION RESPIRATORY (INHALATION) at 19:19

## 2023-01-01 RX ADMIN — ASPIRIN 81 MG: 81 TABLET, COATED ORAL at 13:23

## 2023-01-01 RX ADMIN — CLOTRIMAZOLE: 0.01 CREAM TOPICAL at 20:21

## 2023-01-01 RX ADMIN — MICONAZOLE NITRATE: 20 CREAM TOPICAL at 09:33

## 2023-01-01 RX ADMIN — ATOVAQUONE 1500 MG: 750 SUSPENSION ORAL at 08:35

## 2023-01-01 RX ADMIN — ACETAMINOPHEN 650 MG: 325 TABLET, FILM COATED ORAL at 17:39

## 2023-01-01 RX ADMIN — IPRATROPIUM BROMIDE AND ALBUTEROL SULFATE 3 ML: .5; 3 SOLUTION RESPIRATORY (INHALATION) at 11:33

## 2023-01-01 RX ADMIN — HYDRALAZINE HYDROCHLORIDE 50 MG: 50 TABLET, FILM COATED ORAL at 08:57

## 2023-01-01 RX ADMIN — METHYLPREDNISOLONE SODIUM SUCCINATE 62.5 MG: 125 INJECTION INTRAMUSCULAR; INTRAVENOUS at 12:26

## 2023-01-01 RX ADMIN — POTASSIUM & SODIUM PHOSPHATES POWDER PACK 280-160-250 MG 1 PACKET: 280-160-250 PACK at 14:08

## 2023-01-01 RX ADMIN — PREGABALIN 75 MG: 20 SOLUTION ORAL at 08:46

## 2023-01-01 RX ADMIN — Medication 1 SPRAY: at 17:41

## 2023-01-01 RX ADMIN — INSULIN ASPART 1 UNITS: 100 INJECTION, SOLUTION INTRAVENOUS; SUBCUTANEOUS at 04:39

## 2023-01-01 RX ADMIN — PIPERACILLIN AND TAZOBACTAM 4.5 G: 4; .5 INJECTION, POWDER, FOR SOLUTION INTRAVENOUS at 00:42

## 2023-01-01 RX ADMIN — MICONAZOLE NITRATE: 20 CREAM TOPICAL at 10:27

## 2023-01-01 RX ADMIN — NOREPINEPHRINE BITARTRATE 0.3 MCG/KG/MIN: 0.02 INJECTION, SOLUTION INTRAVENOUS at 18:34

## 2023-01-01 RX ADMIN — CLOTRIMAZOLE: 0.01 CREAM TOPICAL at 08:40

## 2023-01-01 RX ADMIN — DEXTROSE MONOHYDRATE: 50 INJECTION, SOLUTION INTRAVENOUS at 10:50

## 2023-01-01 RX ADMIN — MINERAL OIL AND PETROLATUM: 150; 830 OINTMENT OPHTHALMIC at 12:58

## 2023-01-01 RX ADMIN — SIMETHICONE 40 MG: 20 EMULSION ORAL at 21:13

## 2023-01-01 RX ADMIN — Medication 1 SPRAY: at 22:01

## 2023-01-01 RX ADMIN — MIDAZOLAM HYDROCHLORIDE 2 MG: 1 INJECTION, SOLUTION INTRAMUSCULAR; INTRAVENOUS at 21:43

## 2023-01-01 RX ADMIN — Medication 50 MCG/HR: at 06:58

## 2023-01-01 RX ADMIN — CISATRACURIUM BESYLATE 3.5 MCG/KG/MIN: 10 INJECTION INTRAVENOUS at 00:36

## 2023-01-01 RX ADMIN — POTASSIUM & SODIUM PHOSPHATES POWDER PACK 280-160-250 MG 1 PACKET: 280-160-250 PACK at 12:43

## 2023-01-01 RX ADMIN — Medication 50 MCG: at 08:41

## 2023-01-01 RX ADMIN — Medication 1 PACKET: at 22:34

## 2023-01-01 RX ADMIN — CHLORHEXIDINE GLUCONATE 15 ML: 1.2 SOLUTION ORAL at 20:16

## 2023-01-01 RX ADMIN — SERTRALINE HYDROCHLORIDE 150 MG: 100 TABLET ORAL at 08:08

## 2023-01-01 RX ADMIN — AMLODIPINE BESYLATE 5 MG: 5 TABLET ORAL at 08:00

## 2023-01-01 RX ADMIN — HYDRALAZINE HYDROCHLORIDE 25 MG: 25 TABLET, FILM COATED ORAL at 04:18

## 2023-01-01 RX ADMIN — SERTRALINE HYDROCHLORIDE 150 MG: 100 TABLET ORAL at 08:20

## 2023-01-01 RX ADMIN — CLOTRIMAZOLE: 0.01 CREAM TOPICAL at 21:43

## 2023-01-01 RX ADMIN — IPRATROPIUM BROMIDE AND ALBUTEROL SULFATE 3 ML: .5; 3 SOLUTION RESPIRATORY (INHALATION) at 16:37

## 2023-01-01 RX ADMIN — HEPARIN SODIUM 5000 UNITS: 5000 INJECTION, SOLUTION INTRAVENOUS; SUBCUTANEOUS at 08:29

## 2023-01-01 RX ADMIN — Medication 1 PACKET: at 16:08

## 2023-01-01 RX ADMIN — ATORVASTATIN CALCIUM 20 MG: 20 TABLET, FILM COATED ORAL at 19:47

## 2023-01-01 RX ADMIN — ATORVASTATIN CALCIUM 20 MG: 20 TABLET, FILM COATED ORAL at 20:53

## 2023-01-01 RX ADMIN — SIMETHICONE 40 MG: 20 EMULSION ORAL at 08:42

## 2023-01-01 RX ADMIN — CHLORHEXIDINE GLUCONATE 15 ML: 1.2 SOLUTION ORAL at 20:07

## 2023-01-01 RX ADMIN — HEPARIN SODIUM 5000 UNITS: 5000 INJECTION, SOLUTION INTRAVENOUS; SUBCUTANEOUS at 21:55

## 2023-01-01 RX ADMIN — CLOTRIMAZOLE: 0.01 CREAM TOPICAL at 08:41

## 2023-01-01 RX ADMIN — LEVOTHYROXINE SODIUM 150 MCG: 0.07 TABLET ORAL at 05:20

## 2023-01-01 RX ADMIN — IPRATROPIUM BROMIDE AND ALBUTEROL SULFATE 3 ML: .5; 3 SOLUTION RESPIRATORY (INHALATION) at 12:03

## 2023-01-01 RX ADMIN — QUETIAPINE FUMARATE 150 MG: 50 TABLET, FILM COATED ORAL at 20:40

## 2023-01-01 RX ADMIN — TORSEMIDE 20 MG: 20 TABLET ORAL at 20:57

## 2023-01-01 RX ADMIN — LORAZEPAM 0.5 MG: 2 CONCENTRATE ORAL at 19:32

## 2023-01-01 RX ADMIN — Medication 5 ML: at 09:31

## 2023-01-01 RX ADMIN — ATORVASTATIN CALCIUM 20 MG: 20 TABLET, FILM COATED ORAL at 20:23

## 2023-01-01 RX ADMIN — LORAZEPAM 0.5 MG: 2 INJECTION INTRAMUSCULAR; INTRAVENOUS at 23:58

## 2023-01-01 RX ADMIN — Medication 1 MG: at 20:07

## 2023-01-01 RX ADMIN — HEPARIN SODIUM 5000 UNITS: 5000 INJECTION, SOLUTION INTRAVENOUS; SUBCUTANEOUS at 13:36

## 2023-01-01 RX ADMIN — CLOTRIMAZOLE: 0.01 CREAM TOPICAL at 09:28

## 2023-01-01 RX ADMIN — ASPIRIN 81 MG: 81 TABLET, CHEWABLE ORAL at 08:19

## 2023-01-01 RX ADMIN — METOPROLOL TARTRATE 12.5 MG: 100 TABLET, FILM COATED ORAL at 21:20

## 2023-01-01 RX ADMIN — INSULIN ASPART 1 UNITS: 100 INJECTION, SOLUTION INTRAVENOUS; SUBCUTANEOUS at 20:11

## 2023-01-01 RX ADMIN — METRONIDAZOLE 500 MG: 500 INJECTION, SOLUTION INTRAVENOUS at 08:47

## 2023-01-01 RX ADMIN — ACETAMINOPHEN 650 MG: 650 SOLUTION ORAL at 08:12

## 2023-01-01 RX ADMIN — INSULIN ASPART 1 UNITS: 100 INJECTION, SOLUTION INTRAVENOUS; SUBCUTANEOUS at 16:38

## 2023-01-01 RX ADMIN — HYDRALAZINE HYDROCHLORIDE 10 MG: 20 INJECTION INTRAMUSCULAR; INTRAVENOUS at 06:09

## 2023-01-01 RX ADMIN — MINERAL OIL AND PETROLATUM: 150; 830 OINTMENT OPHTHALMIC at 20:14

## 2023-01-01 RX ADMIN — IPRATROPIUM BROMIDE AND ALBUTEROL SULFATE 3 ML: .5; 3 SOLUTION RESPIRATORY (INHALATION) at 00:24

## 2023-01-01 RX ADMIN — IPRATROPIUM BROMIDE AND ALBUTEROL SULFATE 3 ML: .5; 3 SOLUTION RESPIRATORY (INHALATION) at 19:48

## 2023-01-01 RX ADMIN — WHITE PETROLATUM 57.7 %-MINERAL OIL 31.9 % EYE OINTMENT: at 08:24

## 2023-01-01 RX ADMIN — CLOTRIMAZOLE: 0.01 CREAM TOPICAL at 20:57

## 2023-01-01 RX ADMIN — IPRATROPIUM BROMIDE AND ALBUTEROL SULFATE 3 ML: .5; 3 SOLUTION RESPIRATORY (INHALATION) at 07:50

## 2023-01-01 RX ADMIN — ASPIRIN 81 MG CHEWABLE TABLET 81 MG: 81 TABLET CHEWABLE at 09:30

## 2023-01-01 RX ADMIN — DEXMEDETOMIDINE HYDROCHLORIDE 0.5 MCG/KG/HR: 400 INJECTION INTRAVENOUS at 13:30

## 2023-01-01 RX ADMIN — Medication 50 MCG: at 07:58

## 2023-01-01 RX ADMIN — HYDROCORTISONE SODIUM SUCCINATE 50 MG: 100 INJECTION, POWDER, FOR SOLUTION INTRAMUSCULAR; INTRAVENOUS at 23:33

## 2023-01-01 RX ADMIN — CISATRACURIUM BESYLATE 3 MCG/KG/MIN: 10 INJECTION INTRAVENOUS at 12:21

## 2023-01-01 RX ADMIN — METHYLPREDNISOLONE SODIUM SUCCINATE 62.5 MG: 125 INJECTION INTRAMUSCULAR; INTRAVENOUS at 20:05

## 2023-01-01 RX ADMIN — LEVALBUTEROL HYDROCHLORIDE 1.25 MG: 1.25 SOLUTION RESPIRATORY (INHALATION) at 08:51

## 2023-01-01 RX ADMIN — METHYLPREDNISOLONE SODIUM SUCCINATE 62.5 MG: 125 INJECTION, POWDER, FOR SOLUTION INTRAMUSCULAR; INTRAVENOUS at 23:22

## 2023-01-01 RX ADMIN — HYDRALAZINE HYDROCHLORIDE 50 MG: 50 TABLET, FILM COATED ORAL at 20:48

## 2023-01-01 RX ADMIN — MINERAL OIL AND PETROLATUM: 150; 830 OINTMENT OPHTHALMIC at 19:48

## 2023-01-01 RX ADMIN — OXYCODONE HYDROCHLORIDE 5 MG: 5 SOLUTION ORAL at 00:29

## 2023-01-01 RX ADMIN — METHYLPREDNISOLONE SODIUM SUCCINATE 62.5 MG: 125 INJECTION INTRAMUSCULAR; INTRAVENOUS at 18:32

## 2023-01-01 RX ADMIN — ACETAMINOPHEN 650 MG: 650 SOLUTION ORAL at 20:33

## 2023-01-01 RX ADMIN — POTASSIUM & SODIUM PHOSPHATES POWDER PACK 280-160-250 MG 1 PACKET: 280-160-250 PACK at 06:29

## 2023-01-01 RX ADMIN — GUAIFENESIN 600 MG: 600 TABLET, EXTENDED RELEASE ORAL at 21:18

## 2023-01-01 RX ADMIN — SERTRALINE HYDROCHLORIDE 150 MG: 20 SOLUTION ORAL at 08:36

## 2023-01-01 RX ADMIN — LEVOTHYROXINE SODIUM 150 MCG: 150 TABLET ORAL at 06:11

## 2023-01-01 RX ADMIN — CHLORHEXIDINE GLUCONATE 0.12% ORAL RINSE 15 ML: 1.2 LIQUID ORAL at 09:20

## 2023-01-01 RX ADMIN — MICONAZOLE NITRATE: 20 CREAM TOPICAL at 08:44

## 2023-01-01 RX ADMIN — HYDROMORPHONE HYDROCHLORIDE 0.2 MG: 0.2 INJECTION, SOLUTION INTRAMUSCULAR; INTRAVENOUS; SUBCUTANEOUS at 08:52

## 2023-01-01 RX ADMIN — Medication 40 MG: at 08:18

## 2023-01-01 RX ADMIN — MIDODRINE HYDROCHLORIDE 10 MG: 10 TABLET ORAL at 16:28

## 2023-01-01 RX ADMIN — ACETAMINOPHEN 650 MG: 325 TABLET, FILM COATED ORAL at 08:18

## 2023-01-01 RX ADMIN — GUAIFENESIN 600 MG: 600 TABLET, EXTENDED RELEASE ORAL at 07:38

## 2023-01-01 RX ADMIN — HEPARIN SODIUM 5000 UNITS: 5000 INJECTION, SOLUTION INTRAVENOUS; SUBCUTANEOUS at 21:19

## 2023-01-01 RX ADMIN — INSULIN ASPART 1 UNITS: 100 INJECTION, SOLUTION INTRAVENOUS; SUBCUTANEOUS at 09:21

## 2023-01-01 RX ADMIN — IPRATROPIUM BROMIDE AND ALBUTEROL SULFATE 3 ML: .5; 3 SOLUTION RESPIRATORY (INHALATION) at 20:51

## 2023-01-01 RX ADMIN — CLOTRIMAZOLE: 0.01 CREAM TOPICAL at 21:39

## 2023-01-01 RX ADMIN — ISOSORBIDE DINITRATE 20 MG: 20 TABLET ORAL at 16:43

## 2023-01-01 RX ADMIN — GUAIFENESIN 600 MG: 600 TABLET, EXTENDED RELEASE ORAL at 09:06

## 2023-01-01 RX ADMIN — CHLORHEXIDINE GLUCONATE 15 ML: 1.2 SOLUTION ORAL at 08:15

## 2023-01-01 RX ADMIN — FENTANYL CITRATE 100 MCG: 50 INJECTION, SOLUTION INTRAMUSCULAR; INTRAVENOUS at 06:46

## 2023-01-01 RX ADMIN — CHLORHEXIDINE GLUCONATE 15 ML: 1.2 SOLUTION ORAL at 20:24

## 2023-01-01 RX ADMIN — Medication 5 ML: at 08:45

## 2023-01-01 RX ADMIN — DEXTROSE MONOHYDRATE: 50 INJECTION, SOLUTION INTRAVENOUS at 22:19

## 2023-01-01 RX ADMIN — QUETIAPINE 150 MG: 100 TABLET ORAL at 08:18

## 2023-01-01 RX ADMIN — IPRATROPIUM BROMIDE AND ALBUTEROL SULFATE 3 ML: .5; 3 SOLUTION RESPIRATORY (INHALATION) at 07:47

## 2023-01-01 RX ADMIN — ISOSORBIDE DINITRATE 20 MG: 20 TABLET ORAL at 09:41

## 2023-01-01 RX ADMIN — CHLORHEXIDINE GLUCONATE 0.12% ORAL RINSE 15 ML: 1.2 LIQUID ORAL at 21:31

## 2023-01-01 RX ADMIN — HYDRALAZINE HYDROCHLORIDE 50 MG: 50 TABLET, FILM COATED ORAL at 15:44

## 2023-01-01 RX ADMIN — AMLODIPINE BESYLATE 5 MG: 5 TABLET ORAL at 07:53

## 2023-01-01 RX ADMIN — POTASSIUM CHLORIDE 20 MEQ: 1.5 POWDER, FOR SOLUTION ORAL at 12:09

## 2023-01-01 RX ADMIN — IPRATROPIUM BROMIDE AND ALBUTEROL SULFATE 3 ML: 2.5; .5 SOLUTION RESPIRATORY (INHALATION) at 19:18

## 2023-01-01 RX ADMIN — AMLODIPINE BESYLATE 5 MG: 5 TABLET ORAL at 21:23

## 2023-01-01 RX ADMIN — HYDROMORPHONE HYDROCHLORIDE 0.4 MG: 0.2 INJECTION, SOLUTION INTRAMUSCULAR; INTRAVENOUS; SUBCUTANEOUS at 20:21

## 2023-01-01 RX ADMIN — ACETYLCYSTEINE 2 ML: 200 SOLUTION ORAL; RESPIRATORY (INHALATION) at 19:18

## 2023-01-01 RX ADMIN — SERTRALINE HYDROCHLORIDE 150 MG: 20 SOLUTION ORAL at 09:15

## 2023-01-01 RX ADMIN — AMLODIPINE BESYLATE 2.5 MG: 2.5 TABLET ORAL at 08:38

## 2023-01-01 RX ADMIN — GUAIFENESIN 600 MG: 600 TABLET, EXTENDED RELEASE ORAL at 21:08

## 2023-01-01 RX ADMIN — ACETYLCYSTEINE 2 ML: 200 SOLUTION ORAL; RESPIRATORY (INHALATION) at 11:24

## 2023-01-01 RX ADMIN — Medication 40 MG: at 08:01

## 2023-01-01 RX ADMIN — HEPARIN SODIUM 5000 UNITS: 5000 INJECTION, SOLUTION INTRAVENOUS; SUBCUTANEOUS at 06:19

## 2023-01-01 RX ADMIN — PREDNISONE 60 MG: 50 TABLET ORAL at 08:16

## 2023-01-01 RX ADMIN — HEPARIN SODIUM 5000 UNITS: 5000 INJECTION, SOLUTION INTRAVENOUS; SUBCUTANEOUS at 20:30

## 2023-01-01 RX ADMIN — PIPERACILLIN AND TAZOBACTAM 4.5 G: 4; .5 INJECTION, POWDER, FOR SOLUTION INTRAVENOUS at 22:34

## 2023-01-01 RX ADMIN — OXYCODONE HYDROCHLORIDE 5 MG: 5 TABLET ORAL at 02:00

## 2023-01-01 RX ADMIN — EPOPROSTENOL 10 NG/KG/MIN: 1.5 INJECTION, POWDER, LYOPHILIZED, FOR SOLUTION INTRAVENOUS at 05:34

## 2023-01-01 RX ADMIN — SIMETHICONE 40 MG: 20 EMULSION ORAL at 20:34

## 2023-01-01 RX ADMIN — Medication 1 PACKET: at 21:21

## 2023-01-01 RX ADMIN — LEVOTHYROXINE SODIUM 150 MCG: 150 TABLET ORAL at 05:33

## 2023-01-01 RX ADMIN — ACETAMINOPHEN 650 MG: 650 SOLUTION ORAL at 09:26

## 2023-01-01 RX ADMIN — SERTRALINE HYDROCHLORIDE 150 MG: 100 TABLET ORAL at 08:41

## 2023-01-01 RX ADMIN — AMLODIPINE BESYLATE 5 MG: 5 TABLET ORAL at 08:29

## 2023-01-01 RX ADMIN — QUETIAPINE 150 MG: 100 TABLET ORAL at 20:54

## 2023-01-01 RX ADMIN — HYDRALAZINE HYDROCHLORIDE 25 MG: 25 TABLET, FILM COATED ORAL at 20:12

## 2023-01-01 RX ADMIN — LEVOTHYROXINE SODIUM 150 MCG: 75 TABLET ORAL at 05:45

## 2023-01-01 RX ADMIN — QUETIAPINE FUMARATE 150 MG: 50 TABLET, FILM COATED ORAL at 23:11

## 2023-01-01 RX ADMIN — LEVOTHYROXINE SODIUM 150 MCG: 75 TABLET ORAL at 06:01

## 2023-01-01 RX ADMIN — LEVALBUTEROL HYDROCHLORIDE 1.25 MG: 1.25 SOLUTION RESPIRATORY (INHALATION) at 08:39

## 2023-01-01 RX ADMIN — PREDNISONE 50 MG: 50 TABLET ORAL at 08:43

## 2023-01-01 RX ADMIN — IPRATROPIUM BROMIDE AND ALBUTEROL SULFATE 3 ML: .5; 3 SOLUTION RESPIRATORY (INHALATION) at 16:28

## 2023-01-01 RX ADMIN — HEPARIN SODIUM 5000 UNITS: 5000 INJECTION, SOLUTION INTRAVENOUS; SUBCUTANEOUS at 13:53

## 2023-01-01 RX ADMIN — CLOTRIMAZOLE: 0.01 CREAM TOPICAL at 21:47

## 2023-01-01 RX ADMIN — Medication 50 MCG: at 08:17

## 2023-01-01 RX ADMIN — PROPOFOL 35 MCG/KG/MIN: 10 INJECTION, EMULSION INTRAVENOUS at 00:14

## 2023-01-01 RX ADMIN — Medication 2 MCG/KG/MIN: at 09:25

## 2023-01-01 RX ADMIN — HYDRALAZINE HYDROCHLORIDE 5 MG: 20 INJECTION INTRAMUSCULAR; INTRAVENOUS at 18:44

## 2023-01-01 RX ADMIN — INSULIN ASPART 1 UNITS: 100 INJECTION, SOLUTION INTRAVENOUS; SUBCUTANEOUS at 20:16

## 2023-01-01 RX ADMIN — ATORVASTATIN CALCIUM 20 MG: 20 TABLET, FILM COATED ORAL at 21:19

## 2023-01-01 RX ADMIN — HYDRALAZINE HYDROCHLORIDE 25 MG: 25 TABLET, FILM COATED ORAL at 21:03

## 2023-01-01 RX ADMIN — Medication 40 MG: at 08:03

## 2023-01-01 RX ADMIN — HEPARIN SODIUM 5000 UNITS: 5000 INJECTION, SOLUTION INTRAVENOUS; SUBCUTANEOUS at 06:12

## 2023-01-01 RX ADMIN — AMLODIPINE BESYLATE 5 MG: 5 TABLET ORAL at 20:40

## 2023-01-01 RX ADMIN — THERA TABS 1 TABLET: TAB at 08:37

## 2023-01-01 RX ADMIN — OXYCODONE HYDROCHLORIDE 5 MG: 5 TABLET ORAL at 06:09

## 2023-01-01 RX ADMIN — BUMETANIDE 1 MG: 0.5 TABLET ORAL at 22:06

## 2023-01-01 RX ADMIN — HEPARIN SODIUM 5000 UNITS: 5000 INJECTION, SOLUTION INTRAVENOUS; SUBCUTANEOUS at 13:51

## 2023-01-01 RX ADMIN — HEPARIN SODIUM 5000 UNITS: 5000 INJECTION, SOLUTION INTRAVENOUS; SUBCUTANEOUS at 06:03

## 2023-01-01 RX ADMIN — IPRATROPIUM BROMIDE AND ALBUTEROL SULFATE 3 ML: .5; 3 SOLUTION RESPIRATORY (INHALATION) at 20:00

## 2023-01-01 RX ADMIN — PREDNISONE 60 MG: 20 TABLET ORAL at 09:16

## 2023-01-01 RX ADMIN — LEVOTHYROXINE SODIUM 150 MCG: 75 TABLET ORAL at 06:57

## 2023-01-01 RX ADMIN — Medication 50 MCG: at 08:08

## 2023-01-01 RX ADMIN — INSULIN ASPART 1 UNITS: 100 INJECTION, SOLUTION INTRAVENOUS; SUBCUTANEOUS at 04:01

## 2023-01-01 RX ADMIN — SENNOSIDES AND DOCUSATE SODIUM 1 TABLET: 8.6; 5 TABLET ORAL at 20:00

## 2023-01-01 RX ADMIN — CHLORHEXIDINE GLUCONATE 0.12% ORAL RINSE 15 ML: 1.2 LIQUID ORAL at 08:13

## 2023-01-01 RX ADMIN — ACETAMINOPHEN 650 MG: 325 TABLET, FILM COATED ORAL at 04:22

## 2023-01-01 RX ADMIN — PREDNISONE 50 MG: 50 TABLET ORAL at 16:20

## 2023-01-01 RX ADMIN — QUETIAPINE FUMARATE 150 MG: 50 TABLET, FILM COATED ORAL at 08:30

## 2023-01-01 RX ADMIN — INSULIN ASPART 1 UNITS: 100 INJECTION, SOLUTION INTRAVENOUS; SUBCUTANEOUS at 20:33

## 2023-01-01 RX ADMIN — CHLORHEXIDINE GLUCONATE 15 ML: 1.2 SOLUTION ORAL at 20:06

## 2023-01-01 RX ADMIN — Medication 1 PACKET: at 17:26

## 2023-01-01 RX ADMIN — METHYLPREDNISOLONE SODIUM SUCCINATE 62.5 MG: 125 INJECTION INTRAMUSCULAR; INTRAVENOUS at 02:07

## 2023-01-01 RX ADMIN — METRONIDAZOLE 500 MG: 500 INJECTION, SOLUTION INTRAVENOUS at 00:26

## 2023-01-01 RX ADMIN — Medication 100 MCG/HR: at 18:26

## 2023-01-01 RX ADMIN — LIDOCAINE HYDROCHLORIDE 1.5 ML: 10 INJECTION, SOLUTION EPIDURAL; INFILTRATION; INTRACAUDAL; PERINEURAL at 00:55

## 2023-01-01 RX ADMIN — CHLORHEXIDINE GLUCONATE 15 ML: 1.2 SOLUTION ORAL at 07:40

## 2023-01-01 RX ADMIN — CHLORHEXIDINE GLUCONATE 15 ML: 1.2 SOLUTION ORAL at 09:26

## 2023-01-01 RX ADMIN — MINERAL OIL AND PETROLATUM: 150; 830 OINTMENT OPHTHALMIC at 03:59

## 2023-01-01 RX ADMIN — Medication 1 SPRAY: at 21:14

## 2023-01-01 RX ADMIN — ROCURONIUM BROMIDE 20 MG: 50 INJECTION, SOLUTION INTRAVENOUS at 18:43

## 2023-01-01 RX ADMIN — Medication 40 MG: at 06:50

## 2023-01-01 RX ADMIN — MIDAZOLAM HYDROCHLORIDE 2 MG: 1 INJECTION, SOLUTION INTRAMUSCULAR; INTRAVENOUS at 08:46

## 2023-01-01 RX ADMIN — CHLORHEXIDINE GLUCONATE 15 ML: 1.2 SOLUTION ORAL at 20:53

## 2023-01-01 RX ADMIN — AMLODIPINE BESYLATE 5 MG: 5 TABLET ORAL at 08:57

## 2023-01-01 RX ADMIN — IPRATROPIUM BROMIDE AND ALBUTEROL SULFATE 3 ML: .5; 3 SOLUTION RESPIRATORY (INHALATION) at 20:32

## 2023-01-01 RX ADMIN — PREDNISONE 60 MG: 20 TABLET ORAL at 16:29

## 2023-01-01 RX ADMIN — ISOSORBIDE DINITRATE 20 MG: 20 TABLET ORAL at 08:18

## 2023-01-01 RX ADMIN — GUAIFENESIN 600 MG: 600 TABLET, EXTENDED RELEASE ORAL at 08:17

## 2023-01-01 RX ADMIN — IPRATROPIUM BROMIDE AND ALBUTEROL SULFATE 3 ML: .5; 3 SOLUTION RESPIRATORY (INHALATION) at 11:26

## 2023-01-01 RX ADMIN — SODIUM CHLORIDE 250 MG: 9 INJECTION, SOLUTION INTRAVENOUS at 06:01

## 2023-01-01 RX ADMIN — IPRATROPIUM BROMIDE AND ALBUTEROL SULFATE 3 ML: .5; 3 SOLUTION RESPIRATORY (INHALATION) at 07:05

## 2023-01-01 RX ADMIN — POTASSIUM CHLORIDE 10 MEQ: 20 SOLUTION ORAL at 05:17

## 2023-01-01 RX ADMIN — CHLORHEXIDINE GLUCONATE 15 ML: 1.2 SOLUTION ORAL at 20:32

## 2023-01-01 RX ADMIN — QUETIAPINE 100 MG: 100 TABLET ORAL at 08:30

## 2023-01-01 RX ADMIN — OXYCODONE HYDROCHLORIDE 5 MG: 5 TABLET ORAL at 14:58

## 2023-01-01 RX ADMIN — HEPARIN SODIUM 5000 UNITS: 5000 INJECTION, SOLUTION INTRAVENOUS; SUBCUTANEOUS at 14:28

## 2023-01-01 RX ADMIN — PREDNISONE 60 MG: 20 TABLET ORAL at 08:23

## 2023-01-01 RX ADMIN — PANTOPRAZOLE SODIUM 40 MG: 40 INJECTION, POWDER, LYOPHILIZED, FOR SOLUTION INTRAVENOUS at 07:50

## 2023-01-01 RX ADMIN — TORSEMIDE 20 MG: 20 TABLET ORAL at 08:43

## 2023-01-01 RX ADMIN — CHLORHEXIDINE GLUCONATE 15 ML: 1.2 SOLUTION ORAL at 08:01

## 2023-01-01 RX ADMIN — PREGABALIN 75 MG: 75 CAPSULE ORAL at 21:18

## 2023-01-01 RX ADMIN — MIDAZOLAM HYDROCHLORIDE 4 MG/HR: 1 INJECTION, SOLUTION INTRAVENOUS at 05:51

## 2023-01-01 RX ADMIN — ACETAMINOPHEN 650 MG: 650 SOLUTION ORAL at 16:45

## 2023-01-01 RX ADMIN — FUROSEMIDE 60 MG: 10 INJECTION, SOLUTION INTRAMUSCULAR; INTRAVENOUS at 17:02

## 2023-01-01 RX ADMIN — Medication 1 SPRAY: at 20:44

## 2023-01-01 RX ADMIN — NOREPINEPHRINE BITARTRATE 0.5 MCG/KG/MIN: 1 INJECTION, SOLUTION, CONCENTRATE INTRAVENOUS at 18:20

## 2023-01-01 RX ADMIN — ACETYLCYSTEINE 2 ML: 200 SOLUTION ORAL; RESPIRATORY (INHALATION) at 08:39

## 2023-01-01 RX ADMIN — IPRATROPIUM BROMIDE AND ALBUTEROL SULFATE 3 ML: 2.5; .5 SOLUTION RESPIRATORY (INHALATION) at 07:33

## 2023-01-01 RX ADMIN — LEVOTHYROXINE SODIUM 150 MCG: 150 TABLET ORAL at 05:43

## 2023-01-01 RX ADMIN — MICONAZOLE NITRATE: 20 CREAM TOPICAL at 20:53

## 2023-01-01 RX ADMIN — CLOTRIMAZOLE: 0.01 CREAM TOPICAL at 20:53

## 2023-01-01 RX ADMIN — Medication 1 PACKET: at 22:00

## 2023-01-01 RX ADMIN — MEROPENEM 1 G: 1 INJECTION, POWDER, FOR SOLUTION INTRAVENOUS at 09:47

## 2023-01-01 RX ADMIN — CHLORHEXIDINE GLUCONATE 15 ML: 1.2 SOLUTION ORAL at 19:43

## 2023-01-01 RX ADMIN — METHYLPREDNISOLONE SODIUM SUCCINATE 62.5 MG: 125 INJECTION INTRAMUSCULAR; INTRAVENOUS at 03:35

## 2023-01-01 RX ADMIN — Medication 50 MCG: at 08:29

## 2023-01-01 RX ADMIN — VASOPRESSIN 2.4 UNITS/HR: 20 INJECTION, SOLUTION INTRAMUSCULAR; SUBCUTANEOUS at 15:52

## 2023-01-01 RX ADMIN — PROPOFOL 35 MCG/KG/MIN: 10 INJECTION, EMULSION INTRAVENOUS at 21:34

## 2023-01-01 RX ADMIN — METOLAZONE 5 MG: 5 TABLET ORAL at 08:36

## 2023-01-01 RX ADMIN — MICONAZOLE NITRATE: 20 CREAM TOPICAL at 21:20

## 2023-01-01 RX ADMIN — ISOSORBIDE DINITRATE 20 MG: 20 TABLET ORAL at 17:10

## 2023-01-01 RX ADMIN — HYDROMORPHONE HYDROCHLORIDE 0.2 MG: 0.2 INJECTION, SOLUTION INTRAMUSCULAR; INTRAVENOUS; SUBCUTANEOUS at 10:29

## 2023-01-01 RX ADMIN — INSULIN ASPART 1 UNITS: 100 INJECTION, SOLUTION INTRAVENOUS; SUBCUTANEOUS at 04:18

## 2023-01-01 RX ADMIN — Medication 5 ML: at 08:48

## 2023-01-01 RX ADMIN — FUROSEMIDE 40 MG: 10 INJECTION, SOLUTION INTRAMUSCULAR; INTRAVENOUS at 11:43

## 2023-01-01 RX ADMIN — IPRATROPIUM BROMIDE AND ALBUTEROL SULFATE 3 ML: .5; 3 SOLUTION RESPIRATORY (INHALATION) at 15:29

## 2023-01-01 RX ADMIN — Medication 1 SPRAY: at 17:51

## 2023-01-01 RX ADMIN — HYDROXYZINE HYDROCHLORIDE 50 MG: 25 TABLET ORAL at 07:03

## 2023-01-01 RX ADMIN — WHITE PETROLATUM: 1.75 OINTMENT TOPICAL at 08:37

## 2023-01-01 RX ADMIN — IPRATROPIUM BROMIDE AND ALBUTEROL SULFATE 3 ML: .5; 3 SOLUTION RESPIRATORY (INHALATION) at 21:21

## 2023-01-01 RX ADMIN — OXYCODONE HYDROCHLORIDE 5 MG: 5 TABLET ORAL at 14:42

## 2023-01-01 RX ADMIN — DEXMEDETOMIDINE HYDROCHLORIDE 0.6 MCG/KG/HR: 400 INJECTION INTRAVENOUS at 11:36

## 2023-01-01 RX ADMIN — ALBUTEROL SULFATE 2.5 MG: 2.5 SOLUTION RESPIRATORY (INHALATION) at 10:05

## 2023-01-01 RX ADMIN — ALBUTEROL SULFATE 2.5 MG: 2.5 SOLUTION RESPIRATORY (INHALATION) at 11:25

## 2023-01-01 RX ADMIN — Medication 50 MCG: at 07:56

## 2023-01-01 RX ADMIN — ASPIRIN 81 MG: 81 TABLET, COATED ORAL at 08:08

## 2023-01-01 RX ADMIN — HYDRALAZINE HYDROCHLORIDE 25 MG: 25 TABLET, FILM COATED ORAL at 08:40

## 2023-01-01 RX ADMIN — ACETYLCYSTEINE 2 ML: 200 SOLUTION ORAL; RESPIRATORY (INHALATION) at 07:33

## 2023-01-01 RX ADMIN — IPRATROPIUM BROMIDE AND ALBUTEROL SULFATE 3 ML: .5; 3 SOLUTION RESPIRATORY (INHALATION) at 15:39

## 2023-01-01 RX ADMIN — IPRATROPIUM BROMIDE AND ALBUTEROL SULFATE 3 ML: .5; 3 SOLUTION RESPIRATORY (INHALATION) at 15:40

## 2023-01-01 RX ADMIN — METOLAZONE 5 MG: 5 TABLET ORAL at 08:47

## 2023-01-01 RX ADMIN — NOREPINEPHRINE BITARTRATE 0.03 MCG/KG/MIN: 0.02 INJECTION, SOLUTION INTRAVENOUS at 04:10

## 2023-01-01 RX ADMIN — HEPARIN SODIUM 5000 UNITS: 5000 INJECTION, SOLUTION INTRAVENOUS; SUBCUTANEOUS at 06:20

## 2023-01-01 RX ADMIN — INSULIN ASPART 1 UNITS: 100 INJECTION, SOLUTION INTRAVENOUS; SUBCUTANEOUS at 12:25

## 2023-01-01 RX ADMIN — HEPARIN SODIUM 5000 UNITS: 5000 INJECTION, SOLUTION INTRAVENOUS; SUBCUTANEOUS at 20:08

## 2023-01-01 RX ADMIN — MIDODRINE HYDROCHLORIDE 10 MG: 10 TABLET ORAL at 16:22

## 2023-01-01 RX ADMIN — SODIUM CHLORIDE 65 ML: 9 INJECTION, SOLUTION INTRAVENOUS at 12:52

## 2023-01-01 RX ADMIN — IPRATROPIUM BROMIDE AND ALBUTEROL SULFATE 3 ML: .5; 3 SOLUTION RESPIRATORY (INHALATION) at 15:41

## 2023-01-01 RX ADMIN — ASPIRIN 81 MG: 81 TABLET, COATED ORAL at 07:53

## 2023-01-01 RX ADMIN — INSULIN ASPART 1 UNITS: 100 INJECTION, SOLUTION INTRAVENOUS; SUBCUTANEOUS at 11:51

## 2023-01-01 RX ADMIN — SIMETHICONE 40 MG: 20 EMULSION ORAL at 09:16

## 2023-01-01 RX ADMIN — DEXTROSE MONOHYDRATE: 50 INJECTION, SOLUTION INTRAVENOUS at 11:24

## 2023-01-01 RX ADMIN — ATOVAQUONE 1500 MG: 750 SUSPENSION ORAL at 08:03

## 2023-01-01 RX ADMIN — LEVOTHYROXINE SODIUM 150 MCG: 0.07 TABLET ORAL at 06:04

## 2023-01-01 RX ADMIN — OXYCODONE HYDROCHLORIDE 5 MG: 5 SOLUTION ORAL at 21:29

## 2023-01-01 RX ADMIN — Medication 50 MCG: at 01:00

## 2023-01-01 RX ADMIN — ATOVAQUONE 1500 MG: 750 SUSPENSION ORAL at 08:28

## 2023-01-01 RX ADMIN — CLOPIDOGREL BISULFATE 75 MG: 75 TABLET ORAL at 08:57

## 2023-01-01 RX ADMIN — FENTANYL CITRATE 25 MCG: 50 INJECTION INTRAMUSCULAR; INTRAVENOUS at 10:31

## 2023-01-01 RX ADMIN — METHYLPREDNISOLONE SODIUM SUCCINATE 62.5 MG: 125 INJECTION INTRAMUSCULAR; INTRAVENOUS at 21:10

## 2023-01-01 RX ADMIN — Medication 5 ML: at 09:29

## 2023-01-01 RX ADMIN — MINERAL OIL AND PETROLATUM: 150; 830 OINTMENT OPHTHALMIC at 20:53

## 2023-01-01 RX ADMIN — Medication 15 ML: at 08:30

## 2023-01-01 RX ADMIN — METOPROLOL TARTRATE 12.5 MG: 100 TABLET, FILM COATED ORAL at 20:16

## 2023-01-01 RX ADMIN — SIMETHICONE 40 MG: 20 EMULSION ORAL at 13:20

## 2023-01-01 RX ADMIN — PREGABALIN 75 MG: 20 SOLUTION ORAL at 08:33

## 2023-01-01 RX ADMIN — CHLORHEXIDINE GLUCONATE 15 ML: 1.2 SOLUTION ORAL at 08:39

## 2023-01-01 RX ADMIN — SENNOSIDES 5 ML: 8.8 LIQUID ORAL at 20:47

## 2023-01-01 RX ADMIN — Medication 0.4 MG/HR: at 05:30

## 2023-01-01 RX ADMIN — IPRATROPIUM BROMIDE AND ALBUTEROL SULFATE 3 ML: .5; 3 SOLUTION RESPIRATORY (INHALATION) at 15:59

## 2023-01-01 RX ADMIN — HEPARIN SODIUM 5000 UNITS: 5000 INJECTION, SOLUTION INTRAVENOUS; SUBCUTANEOUS at 08:18

## 2023-01-01 RX ADMIN — CHLORHEXIDINE GLUCONATE 15 ML: 1.2 SOLUTION ORAL at 19:37

## 2023-01-01 RX ADMIN — Medication 15 ML: at 09:32

## 2023-01-01 RX ADMIN — Medication 5 ML: at 08:17

## 2023-01-01 RX ADMIN — ASPIRIN 81 MG CHEWABLE TABLET 81 MG: 81 TABLET CHEWABLE at 08:02

## 2023-01-01 RX ADMIN — SIMETHICONE 40 MG: 20 EMULSION ORAL at 21:12

## 2023-01-01 RX ADMIN — METOLAZONE 5 MG: 5 TABLET ORAL at 16:20

## 2023-01-01 RX ADMIN — MEROPENEM 500 MG: 500 INJECTION, POWDER, FOR SOLUTION INTRAVENOUS at 17:10

## 2023-01-01 RX ADMIN — IPRATROPIUM BROMIDE AND ALBUTEROL SULFATE 3 ML: .5; 3 SOLUTION RESPIRATORY (INHALATION) at 12:06

## 2023-01-01 RX ADMIN — PREDNISONE 60 MG: 50 TABLET ORAL at 08:30

## 2023-01-01 RX ADMIN — ATORVASTATIN CALCIUM 20 MG: 20 TABLET, FILM COATED ORAL at 20:37

## 2023-01-01 RX ADMIN — AMLODIPINE BESYLATE 5 MG: 5 TABLET ORAL at 08:21

## 2023-01-01 RX ADMIN — ACETYLCYSTEINE 2 ML: 200 SOLUTION ORAL; RESPIRATORY (INHALATION) at 16:05

## 2023-01-01 RX ADMIN — MIDODRINE HYDROCHLORIDE 10 MG: 10 TABLET ORAL at 20:01

## 2023-01-01 RX ADMIN — INSULIN ASPART 1 UNITS: 100 INJECTION, SOLUTION INTRAVENOUS; SUBCUTANEOUS at 13:10

## 2023-01-01 RX ADMIN — ACETYLCYSTEINE 2 ML: 200 SOLUTION ORAL; RESPIRATORY (INHALATION) at 21:02

## 2023-01-01 RX ADMIN — SODIUM BICARBONATE 50 MEQ: 84 INJECTION, SOLUTION INTRAVENOUS at 13:01

## 2023-01-01 RX ADMIN — LEVOTHYROXINE SODIUM 150 MCG: 150 TABLET ORAL at 06:21

## 2023-01-01 RX ADMIN — ATORVASTATIN CALCIUM 20 MG: 20 TABLET, FILM COATED ORAL at 21:12

## 2023-01-01 RX ADMIN — PREGABALIN 75 MG: 75 CAPSULE ORAL at 08:54

## 2023-01-01 RX ADMIN — OXYCODONE HYDROCHLORIDE 5 MG: 5 TABLET ORAL at 20:31

## 2023-01-01 RX ADMIN — QUETIAPINE FUMARATE 50 MG: 50 TABLET ORAL at 11:26

## 2023-01-01 RX ADMIN — PANTOPRAZOLE SODIUM 40 MG: 40 INJECTION, POWDER, LYOPHILIZED, FOR SOLUTION INTRAVENOUS at 08:33

## 2023-01-01 RX ADMIN — CHLORHEXIDINE GLUCONATE 0.12% ORAL RINSE 15 ML: 1.2 LIQUID ORAL at 21:23

## 2023-01-01 RX ADMIN — HYDROMORPHONE HYDROCHLORIDE 0.2 MG: 0.2 INJECTION, SOLUTION INTRAMUSCULAR; INTRAVENOUS; SUBCUTANEOUS at 00:53

## 2023-01-01 RX ADMIN — Medication 40 MG: at 07:54

## 2023-01-01 RX ADMIN — Medication 1 PACKET: at 17:16

## 2023-01-01 RX ADMIN — FENTANYL CITRATE 100 MCG: 50 INJECTION INTRAMUSCULAR; INTRAVENOUS at 10:42

## 2023-01-01 RX ADMIN — OXYCODONE HYDROCHLORIDE 5 MG: 5 TABLET ORAL at 15:58

## 2023-01-01 RX ADMIN — HYDROMORPHONE HYDROCHLORIDE 0.2 MG: 0.2 INJECTION, SOLUTION INTRAMUSCULAR; INTRAVENOUS; SUBCUTANEOUS at 00:36

## 2023-01-01 RX ADMIN — ACETYLCYSTEINE 2 ML: 200 SOLUTION ORAL; RESPIRATORY (INHALATION) at 20:13

## 2023-01-01 RX ADMIN — OXYCODONE HYDROCHLORIDE 5 MG: 5 TABLET ORAL at 22:33

## 2023-01-01 RX ADMIN — IPRATROPIUM BROMIDE AND ALBUTEROL SULFATE 3 ML: .5; 3 SOLUTION RESPIRATORY (INHALATION) at 11:25

## 2023-01-01 RX ADMIN — CLOPIDOGREL BISULFATE 75 MG: 75 TABLET ORAL at 07:39

## 2023-01-01 RX ADMIN — Medication 50 MCG: at 08:16

## 2023-01-01 RX ADMIN — HEPARIN SODIUM 5000 UNITS: 5000 INJECTION, SOLUTION INTRAVENOUS; SUBCUTANEOUS at 22:10

## 2023-01-01 RX ADMIN — DEXMEDETOMIDINE HYDROCHLORIDE 1 MCG/KG/HR: 400 INJECTION INTRAVENOUS at 15:50

## 2023-01-01 RX ADMIN — IPRATROPIUM BROMIDE AND ALBUTEROL SULFATE 3 ML: 2.5; .5 SOLUTION RESPIRATORY (INHALATION) at 07:10

## 2023-01-01 RX ADMIN — IPRATROPIUM BROMIDE AND ALBUTEROL SULFATE 3 ML: .5; 3 SOLUTION RESPIRATORY (INHALATION) at 16:19

## 2023-01-01 RX ADMIN — SODIUM CHLORIDE, SODIUM LACTATE, POTASSIUM CHLORIDE, CALCIUM CHLORIDE: 600; 310; 30; 20 INJECTION, SOLUTION INTRAVENOUS at 10:53

## 2023-01-01 RX ADMIN — LIDOCAINE HYDROCHLORIDE 50 MG: 20 INJECTION, SOLUTION INFILTRATION; PERINEURAL at 18:38

## 2023-01-01 RX ADMIN — SERTRALINE HYDROCHLORIDE 150 MG: 100 TABLET ORAL at 08:12

## 2023-01-01 RX ADMIN — IPRATROPIUM BROMIDE AND ALBUTEROL SULFATE 3 ML: .5; 3 SOLUTION RESPIRATORY (INHALATION) at 08:19

## 2023-01-01 RX ADMIN — ISOSORBIDE DINITRATE 20 MG: 20 TABLET ORAL at 18:20

## 2023-01-01 RX ADMIN — ACETAMINOPHEN 650 MG: 650 SOLUTION ORAL at 12:12

## 2023-01-01 RX ADMIN — LEVOTHYROXINE SODIUM 150 MCG: 150 TABLET ORAL at 05:23

## 2023-01-01 RX ADMIN — CHLORHEXIDINE GLUCONATE 0.12% ORAL RINSE 15 ML: 1.2 LIQUID ORAL at 21:27

## 2023-01-01 RX ADMIN — METOPROLOL TARTRATE 12.5 MG: 100 TABLET, FILM COATED ORAL at 21:59

## 2023-01-01 RX ADMIN — AMLODIPINE BESYLATE 5 MG: 5 TABLET ORAL at 09:21

## 2023-01-01 RX ADMIN — POTASSIUM CHLORIDE 20 MEQ: 20 SOLUTION ORAL at 06:36

## 2023-01-01 RX ADMIN — GUAIFENESIN 600 MG: 600 TABLET, EXTENDED RELEASE ORAL at 21:13

## 2023-01-01 RX ADMIN — BUMETANIDE 1 MG: 0.5 TABLET ORAL at 16:02

## 2023-01-01 RX ADMIN — CHLORHEXIDINE GLUCONATE 0.12% ORAL RINSE 15 ML: 1.2 LIQUID ORAL at 08:21

## 2023-01-01 RX ADMIN — HEPARIN SODIUM 5000 UNITS: 5000 INJECTION, SOLUTION INTRAVENOUS; SUBCUTANEOUS at 08:49

## 2023-01-01 RX ADMIN — IPRATROPIUM BROMIDE AND ALBUTEROL SULFATE 3 ML: .5; 3 SOLUTION RESPIRATORY (INHALATION) at 12:48

## 2023-01-01 RX ADMIN — VASOPRESSIN 1.8 UNITS/HR: 20 INJECTION INTRAVENOUS at 22:19

## 2023-01-01 RX ADMIN — Medication 1 PACKET: at 08:03

## 2023-01-01 RX ADMIN — CETIRIZINE HYDROCHLORIDE 10 MG: 10 TABLET ORAL at 15:54

## 2023-01-01 RX ADMIN — ATORVASTATIN CALCIUM 20 MG: 20 TABLET, FILM COATED ORAL at 20:01

## 2023-01-01 RX ADMIN — Medication 50 MCG: at 08:24

## 2023-01-01 RX ADMIN — CLOPIDOGREL BISULFATE 75 MG: 75 TABLET ORAL at 09:06

## 2023-01-01 RX ADMIN — Medication 5 ML: at 08:29

## 2023-01-01 RX ADMIN — Medication 1 PACKET: at 22:25

## 2023-01-01 RX ADMIN — QUETIAPINE FUMARATE 150 MG: 50 TABLET, FILM COATED ORAL at 21:27

## 2023-01-01 RX ADMIN — DEXMEDETOMIDINE HYDROCHLORIDE 0.6 MCG/KG/HR: 400 INJECTION INTRAVENOUS at 00:17

## 2023-01-01 RX ADMIN — IPRATROPIUM BROMIDE AND ALBUTEROL SULFATE 3 ML: 2.5; .5 SOLUTION RESPIRATORY (INHALATION) at 07:41

## 2023-01-01 RX ADMIN — Medication 125 MCG/HR: at 07:11

## 2023-01-01 RX ADMIN — MIDODRINE HYDROCHLORIDE 5 MG: 5 TABLET ORAL at 17:24

## 2023-01-01 RX ADMIN — IPRATROPIUM BROMIDE AND ALBUTEROL SULFATE 3 ML: 2.5; .5 SOLUTION RESPIRATORY (INHALATION) at 20:13

## 2023-01-01 RX ADMIN — HEPARIN SODIUM 5000 UNITS: 5000 INJECTION, SOLUTION INTRAVENOUS; SUBCUTANEOUS at 21:22

## 2023-01-01 RX ADMIN — OXYCODONE HYDROCHLORIDE 5 MG: 5 TABLET ORAL at 06:34

## 2023-01-01 RX ADMIN — HEPARIN SODIUM 5000 UNITS: 5000 INJECTION, SOLUTION INTRAVENOUS; SUBCUTANEOUS at 22:05

## 2023-01-01 RX ADMIN — ALBUTEROL SULFATE 2.5 MG: 2.5 SOLUTION RESPIRATORY (INHALATION) at 00:07

## 2023-01-01 RX ADMIN — GUAIFENESIN 600 MG: 600 TABLET, EXTENDED RELEASE ORAL at 20:47

## 2023-01-01 RX ADMIN — HYDRALAZINE HYDROCHLORIDE 10 MG: 20 INJECTION INTRAMUSCULAR; INTRAVENOUS at 00:22

## 2023-01-01 RX ADMIN — TORSEMIDE 20 MG: 20 TABLET ORAL at 10:49

## 2023-01-01 RX ADMIN — CEFEPIME 2 G: 2 INJECTION, POWDER, FOR SOLUTION INTRAVENOUS at 08:29

## 2023-01-01 RX ADMIN — ALBUTEROL SULFATE 2.5 MG: 2.5 SOLUTION RESPIRATORY (INHALATION) at 07:17

## 2023-01-01 RX ADMIN — METHYLPREDNISOLONE SODIUM SUCCINATE 62.5 MG: 125 INJECTION INTRAMUSCULAR; INTRAVENOUS at 20:21

## 2023-01-01 RX ADMIN — LEVOTHYROXINE SODIUM 150 MCG: 150 TABLET ORAL at 06:02

## 2023-01-01 RX ADMIN — Medication 100 MCG/HR: at 21:55

## 2023-01-01 RX ADMIN — DEXMEDETOMIDINE HYDROCHLORIDE 0.6 MCG/KG/HR: 400 INJECTION INTRAVENOUS at 03:33

## 2023-01-01 RX ADMIN — ISOSORBIDE DINITRATE 20 MG: 20 TABLET ORAL at 09:26

## 2023-01-01 RX ADMIN — ISOSORBIDE DINITRATE 20 MG: 20 TABLET ORAL at 12:20

## 2023-01-01 RX ADMIN — CHLORHEXIDINE GLUCONATE 0.12% ORAL RINSE 15 ML: 1.2 LIQUID ORAL at 09:14

## 2023-01-01 RX ADMIN — METHYLPREDNISOLONE SODIUM SUCCINATE 62.5 MG: 125 INJECTION, POWDER, FOR SOLUTION INTRAMUSCULAR; INTRAVENOUS at 17:26

## 2023-01-01 RX ADMIN — ATORVASTATIN CALCIUM 20 MG: 20 TABLET, FILM COATED ORAL at 23:11

## 2023-01-01 RX ADMIN — SULFAMETHOXAZOLE AND TRIMETHOPRIM 160 MG: 200; 40 SUSPENSION ORAL at 09:21

## 2023-01-01 RX ADMIN — BUMETANIDE 1 MG: 0.25 INJECTION INTRAMUSCULAR; INTRAVENOUS at 02:57

## 2023-01-01 RX ADMIN — ACETAMINOPHEN 650 MG: 650 SOLUTION ORAL at 12:11

## 2023-01-01 RX ADMIN — IPRATROPIUM BROMIDE AND ALBUTEROL SULFATE 3 ML: .5; 3 SOLUTION RESPIRATORY (INHALATION) at 11:19

## 2023-01-01 RX ADMIN — PREGABALIN 75 MG: 20 SOLUTION ORAL at 08:01

## 2023-01-01 RX ADMIN — ACETAMINOPHEN 650 MG: 650 SOLUTION ORAL at 14:08

## 2023-01-01 RX ADMIN — ASPIRIN 81 MG CHEWABLE TABLET 81 MG: 81 TABLET CHEWABLE at 08:01

## 2023-01-01 RX ADMIN — IPRATROPIUM BROMIDE AND ALBUTEROL SULFATE 3 ML: .5; 3 SOLUTION RESPIRATORY (INHALATION) at 16:29

## 2023-01-01 RX ADMIN — METOPROLOL TARTRATE 12.5 MG: 100 TABLET, FILM COATED ORAL at 21:07

## 2023-01-01 RX ADMIN — LEVOTHYROXINE SODIUM 150 MCG: 0.07 TABLET ORAL at 08:17

## 2023-01-01 RX ADMIN — VASOPRESSIN 2.4 UNITS/HR: 20 INJECTION, SOLUTION INTRAMUSCULAR; SUBCUTANEOUS at 08:58

## 2023-01-01 RX ADMIN — SERTRALINE HYDROCHLORIDE 150 MG: 100 TABLET ORAL at 08:37

## 2023-01-01 RX ADMIN — ASPIRIN 81 MG CHEWABLE TABLET 81 MG: 81 TABLET CHEWABLE at 08:18

## 2023-01-01 RX ADMIN — MEROPENEM 500 MG: 500 INJECTION, POWDER, FOR SOLUTION INTRAVENOUS at 18:11

## 2023-01-01 RX ADMIN — OXYCODONE HYDROCHLORIDE 10 MG: 5 TABLET ORAL at 12:05

## 2023-01-01 RX ADMIN — CHLORHEXIDINE GLUCONATE 0.12% ORAL RINSE 15 ML: 1.2 LIQUID ORAL at 08:17

## 2023-01-01 RX ADMIN — ALBUTEROL SULFATE 2.5 MG: 2.5 SOLUTION RESPIRATORY (INHALATION) at 20:49

## 2023-01-01 RX ADMIN — CLOTRIMAZOLE: 0.01 CREAM TOPICAL at 08:32

## 2023-01-01 RX ADMIN — AZITHROMYCIN MONOHYDRATE 250 MG: 500 INJECTION, POWDER, LYOPHILIZED, FOR SOLUTION INTRAVENOUS at 14:13

## 2023-01-01 RX ADMIN — IPRATROPIUM BROMIDE AND ALBUTEROL SULFATE 3 ML: .5; 3 SOLUTION RESPIRATORY (INHALATION) at 07:37

## 2023-01-01 RX ADMIN — NOREPINEPHRINE BITARTRATE 0.38 MCG/KG/MIN: 0.02 INJECTION, SOLUTION INTRAVENOUS at 13:18

## 2023-01-01 RX ADMIN — CHLORHEXIDINE GLUCONATE 15 ML: 1.2 SOLUTION ORAL at 20:25

## 2023-01-01 RX ADMIN — EPOPROSTENOL 20 NG/KG/MIN: 1.5 INJECTION, POWDER, LYOPHILIZED, FOR SOLUTION INTRAVENOUS at 12:02

## 2023-01-01 RX ADMIN — CLOTRIMAZOLE: 0.01 CREAM TOPICAL at 21:21

## 2023-01-01 RX ADMIN — ATOVAQUONE 1500 MG: 750 SUSPENSION ORAL at 08:45

## 2023-01-01 RX ADMIN — CHLORHEXIDINE GLUCONATE 15 ML: 1.2 SOLUTION ORAL at 08:26

## 2023-01-01 RX ADMIN — HYDROMORPHONE HYDROCHLORIDE 0.3 MG: 0.2 INJECTION, SOLUTION INTRAMUSCULAR; INTRAVENOUS; SUBCUTANEOUS at 11:26

## 2023-01-01 RX ADMIN — NOREPINEPHRINE BITARTRATE 0.18 MCG/KG/MIN: 0.02 INJECTION, SOLUTION INTRAVENOUS at 21:02

## 2023-01-01 RX ADMIN — INSULIN ASPART 2 UNITS: 100 INJECTION, SOLUTION INTRAVENOUS; SUBCUTANEOUS at 12:29

## 2023-01-01 RX ADMIN — HEPARIN SODIUM 5000 UNITS: 5000 INJECTION, SOLUTION INTRAVENOUS; SUBCUTANEOUS at 20:21

## 2023-01-01 RX ADMIN — HEPARIN SODIUM 5000 UNITS: 5000 INJECTION, SOLUTION INTRAVENOUS; SUBCUTANEOUS at 05:43

## 2023-01-01 RX ADMIN — Medication 1 SPRAY: at 14:02

## 2023-01-01 RX ADMIN — VECURONIUM BROMIDE 10 MG: 1 INJECTION, POWDER, LYOPHILIZED, FOR SOLUTION INTRAVENOUS at 06:50

## 2023-01-01 RX ADMIN — IPRATROPIUM BROMIDE AND ALBUTEROL SULFATE 3 ML: 2.5; .5 SOLUTION RESPIRATORY (INHALATION) at 07:47

## 2023-01-01 RX ADMIN — CHLORHEXIDINE GLUCONATE 0.12% ORAL RINSE 15 ML: 1.2 LIQUID ORAL at 08:42

## 2023-01-01 RX ADMIN — Medication 1 PACKET: at 08:02

## 2023-01-01 RX ADMIN — HYDROXYZINE HYDROCHLORIDE 50 MG: 25 TABLET ORAL at 02:14

## 2023-01-01 RX ADMIN — MIDAZOLAM 1 MG: 1 INJECTION INTRAMUSCULAR; INTRAVENOUS at 10:27

## 2023-01-01 RX ADMIN — ASPIRIN 81 MG: 81 TABLET, CHEWABLE ORAL at 09:26

## 2023-01-01 RX ADMIN — DEXMEDETOMIDINE HYDROCHLORIDE 0.8 MCG/KG/HR: 400 INJECTION INTRAVENOUS at 20:36

## 2023-01-01 RX ADMIN — INSULIN ASPART 1 UNITS: 100 INJECTION, SOLUTION INTRAVENOUS; SUBCUTANEOUS at 19:31

## 2023-01-01 RX ADMIN — PREGABALIN 75 MG: 20 SOLUTION ORAL at 11:22

## 2023-01-01 RX ADMIN — OXYCODONE HYDROCHLORIDE 5 MG: 5 TABLET ORAL at 18:04

## 2023-01-01 RX ADMIN — SERTRALINE HYDROCHLORIDE 150 MG: 20 SOLUTION ORAL at 08:13

## 2023-01-01 RX ADMIN — CETIRIZINE HYDROCHLORIDE 10 MG: 10 TABLET ORAL at 08:30

## 2023-01-01 RX ADMIN — MIDAZOLAM HYDROCHLORIDE 1 MG: 1 INJECTION, SOLUTION INTRAMUSCULAR; INTRAVENOUS at 22:09

## 2023-01-01 RX ADMIN — HYDROXYZINE HYDROCHLORIDE 50 MG: 50 TABLET, FILM COATED ORAL at 14:17

## 2023-01-01 RX ADMIN — IPRATROPIUM BROMIDE AND ALBUTEROL SULFATE 3 ML: .5; 3 SOLUTION RESPIRATORY (INHALATION) at 12:32

## 2023-01-01 RX ADMIN — EPOPROSTENOL 20 NG/KG/MIN: 1.5 INJECTION, POWDER, LYOPHILIZED, FOR SOLUTION INTRAVENOUS at 20:23

## 2023-01-01 RX ADMIN — Medication 50 MCG: at 07:57

## 2023-01-01 RX ADMIN — INSULIN ASPART 1 UNITS: 100 INJECTION, SOLUTION INTRAVENOUS; SUBCUTANEOUS at 08:39

## 2023-01-01 RX ADMIN — ACETAMINOPHEN 650 MG: 650 SOLUTION ORAL at 13:54

## 2023-01-01 RX ADMIN — CLOPIDOGREL BISULFATE 75 MG: 75 TABLET ORAL at 18:36

## 2023-01-01 RX ADMIN — METRONIDAZOLE 500 MG: 500 INJECTION, SOLUTION INTRAVENOUS at 18:49

## 2023-01-01 RX ADMIN — VASOPRESSIN 2.4 UNITS/HR: 20 INJECTION INTRAVENOUS at 12:39

## 2023-01-01 RX ADMIN — Medication 1 PACKET: at 15:58

## 2023-01-01 RX ADMIN — VASOPRESSIN 0.7 UNITS/HR: 20 INJECTION INTRAVENOUS at 06:42

## 2023-01-01 RX ADMIN — Medication 50 MCG: at 08:21

## 2023-01-01 RX ADMIN — ACETYLCYSTEINE 2 ML: 200 SOLUTION ORAL; RESPIRATORY (INHALATION) at 11:33

## 2023-01-01 RX ADMIN — IPRATROPIUM BROMIDE AND ALBUTEROL SULFATE 3 ML: .5; 3 SOLUTION RESPIRATORY (INHALATION) at 11:38

## 2023-01-01 RX ADMIN — ACETYLCYSTEINE 2 ML: 200 SOLUTION ORAL; RESPIRATORY (INHALATION) at 15:26

## 2023-01-01 RX ADMIN — ACETYLCYSTEINE 2 ML: 200 SOLUTION ORAL; RESPIRATORY (INHALATION) at 08:51

## 2023-01-01 RX ADMIN — PROPOFOL 35 MCG/KG/MIN: 10 INJECTION, EMULSION INTRAVENOUS at 23:26

## 2023-01-01 RX ADMIN — METOLAZONE 5 MG: 5 TABLET ORAL at 08:15

## 2023-01-01 RX ADMIN — OXYCODONE HYDROCHLORIDE 5 MG: 5 TABLET ORAL at 12:47

## 2023-01-01 RX ADMIN — SODIUM CHLORIDE 250 MG: 9 INJECTION, SOLUTION INTRAVENOUS at 06:35

## 2023-01-01 RX ADMIN — CEFEPIME 2 G: 2 INJECTION, POWDER, FOR SOLUTION INTRAVENOUS at 08:58

## 2023-01-01 RX ADMIN — IPRATROPIUM BROMIDE AND ALBUTEROL SULFATE 3 ML: .5; 3 SOLUTION RESPIRATORY (INHALATION) at 15:45

## 2023-01-01 RX ADMIN — CEFTRIAXONE 2 G: 2 INJECTION, POWDER, FOR SOLUTION INTRAMUSCULAR; INTRAVENOUS at 14:07

## 2023-01-01 RX ADMIN — ISOSORBIDE DINITRATE 20 MG: 20 TABLET ORAL at 12:28

## 2023-01-01 RX ADMIN — PREGABALIN 75 MG: 75 CAPSULE ORAL at 06:11

## 2023-01-01 RX ADMIN — HEPARIN SODIUM 5000 UNITS: 5000 INJECTION, SOLUTION INTRAVENOUS; SUBCUTANEOUS at 05:31

## 2023-01-01 RX ADMIN — LEVOTHYROXINE SODIUM 150 MCG: 75 TABLET ORAL at 06:13

## 2023-01-01 RX ADMIN — CAMPHOR AND MENTHOL: 5; 5 LOTION TOPICAL at 11:31

## 2023-01-01 RX ADMIN — MICONAZOLE NITRATE: 20 CREAM TOPICAL at 23:15

## 2023-01-01 RX ADMIN — METHYLPREDNISOLONE SODIUM SUCCINATE 62.5 MG: 125 INJECTION INTRAMUSCULAR; INTRAVENOUS at 12:05

## 2023-01-01 RX ADMIN — Medication 5 MCG/KG/MIN: at 19:05

## 2023-01-01 RX ADMIN — ATOVAQUONE 1500 MG: 750 SUSPENSION ORAL at 08:39

## 2023-01-01 RX ADMIN — METHYLPREDNISOLONE SODIUM SUCCINATE 125 MG: 125 INJECTION, POWDER, FOR SOLUTION INTRAMUSCULAR; INTRAVENOUS at 09:27

## 2023-01-01 RX ADMIN — CLOPIDOGREL BISULFATE 75 MG: 75 TABLET ORAL at 08:47

## 2023-01-01 RX ADMIN — POLYETHYLENE GLYCOL 3350 17 G: 17 POWDER, FOR SOLUTION ORAL at 16:28

## 2023-01-01 RX ADMIN — INSULIN ASPART 1 UNITS: 100 INJECTION, SOLUTION INTRAVENOUS; SUBCUTANEOUS at 12:13

## 2023-01-01 RX ADMIN — MIDAZOLAM 2 MG: 1 INJECTION INTRAMUSCULAR; INTRAVENOUS at 12:02

## 2023-01-01 RX ADMIN — OXYCODONE HYDROCHLORIDE 5 MG: 5 SOLUTION ORAL at 00:27

## 2023-01-01 RX ADMIN — MINERAL OIL AND PETROLATUM: 150; 830 OINTMENT OPHTHALMIC at 03:57

## 2023-01-01 RX ADMIN — HEPARIN SODIUM 5000 UNITS: 5000 INJECTION, SOLUTION INTRAVENOUS; SUBCUTANEOUS at 14:58

## 2023-01-01 RX ADMIN — Medication 40 MG: at 06:32

## 2023-01-01 RX ADMIN — CISATRACURIUM BESYLATE 3 MCG/KG/MIN: 10 INJECTION INTRAVENOUS at 05:15

## 2023-01-01 RX ADMIN — IPRATROPIUM BROMIDE AND ALBUTEROL SULFATE 3 ML: .5; 3 SOLUTION RESPIRATORY (INHALATION) at 07:51

## 2023-01-01 RX ADMIN — EPOPROSTENOL 10 NG/KG/MIN: 1.5 INJECTION, POWDER, LYOPHILIZED, FOR SOLUTION INTRAVENOUS at 02:50

## 2023-01-01 RX ADMIN — HEPARIN SODIUM 5000 UNITS: 5000 INJECTION, SOLUTION INTRAVENOUS; SUBCUTANEOUS at 22:34

## 2023-01-01 RX ADMIN — QUETIAPINE 150 MG: 100 TABLET ORAL at 20:12

## 2023-01-01 RX ADMIN — HYDRALAZINE HYDROCHLORIDE 25 MG: 25 TABLET, FILM COATED ORAL at 20:57

## 2023-01-01 RX ADMIN — LIDOCAINE: 50 OINTMENT TOPICAL at 09:31

## 2023-01-01 RX ADMIN — MIDAZOLAM HYDROCHLORIDE 4 MG/HR: 1 INJECTION, SOLUTION INTRAVENOUS at 09:32

## 2023-01-01 RX ADMIN — METOPROLOL TARTRATE 12.5 MG: 100 TABLET, FILM COATED ORAL at 21:27

## 2023-01-01 RX ADMIN — Medication 50 MCG: at 08:04

## 2023-01-01 RX ADMIN — ACETYLCYSTEINE 2 ML: 200 SOLUTION ORAL; RESPIRATORY (INHALATION) at 16:14

## 2023-01-01 RX ADMIN — HEPARIN SODIUM 5000 UNITS: 5000 INJECTION, SOLUTION INTRAVENOUS; SUBCUTANEOUS at 22:22

## 2023-01-01 RX ADMIN — CHLORHEXIDINE GLUCONATE 0.12% ORAL RINSE 15 ML: 1.2 LIQUID ORAL at 09:16

## 2023-01-01 RX ADMIN — AMLODIPINE BESYLATE 5 MG: 5 TABLET ORAL at 08:53

## 2023-01-01 RX ADMIN — METOPROLOL TARTRATE 12.5 MG: 100 TABLET, FILM COATED ORAL at 08:42

## 2023-01-01 RX ADMIN — CEFEPIME 2 G: 2 INJECTION, POWDER, FOR SOLUTION INTRAVENOUS at 08:53

## 2023-01-01 RX ADMIN — Medication 0.1 MG/HR: at 09:41

## 2023-01-01 RX ADMIN — THERA TABS 1 TABLET: TAB at 07:38

## 2023-01-01 RX ADMIN — EPOPROSTENOL 20 NG/KG/MIN: 1.5 INJECTION, POWDER, LYOPHILIZED, FOR SOLUTION INTRAVENOUS at 15:40

## 2023-01-01 RX ADMIN — SIMETHICONE 40 MG: 20 EMULSION ORAL at 16:29

## 2023-01-01 RX ADMIN — MIDAZOLAM 2 MG: 1 INJECTION INTRAMUSCULAR; INTRAVENOUS at 11:06

## 2023-01-01 RX ADMIN — VANCOMYCIN HYDROCHLORIDE 1750 MG: 5 INJECTION, POWDER, LYOPHILIZED, FOR SOLUTION INTRAVENOUS at 19:55

## 2023-01-01 RX ADMIN — HYDRALAZINE HYDROCHLORIDE 25 MG: 25 TABLET, FILM COATED ORAL at 12:26

## 2023-01-01 RX ADMIN — LORAZEPAM 0.5 MG: 2 CONCENTRATE ORAL at 23:45

## 2023-01-01 RX ADMIN — ATORVASTATIN CALCIUM 20 MG: 20 TABLET, FILM COATED ORAL at 20:18

## 2023-01-01 RX ADMIN — ALBUTEROL SULFATE 2.5 MG: 2.5 SOLUTION RESPIRATORY (INHALATION) at 12:23

## 2023-01-01 RX ADMIN — ACETYLCYSTEINE 2 ML: 200 SOLUTION ORAL; RESPIRATORY (INHALATION) at 07:37

## 2023-01-01 RX ADMIN — HYDROMORPHONE HYDROCHLORIDE 0.3 MG: 0.2 INJECTION, SOLUTION INTRAMUSCULAR; INTRAVENOUS; SUBCUTANEOUS at 11:14

## 2023-01-01 RX ADMIN — LABETALOL HYDROCHLORIDE 10 MG: 5 INJECTION, SOLUTION INTRAVENOUS at 04:31

## 2023-01-01 RX ADMIN — IPRATROPIUM BROMIDE AND ALBUTEROL SULFATE 3 ML: .5; 3 SOLUTION RESPIRATORY (INHALATION) at 05:42

## 2023-01-01 RX ADMIN — HYDROXYZINE HYDROCHLORIDE 25 MG: 25 TABLET ORAL at 10:07

## 2023-01-01 RX ADMIN — Medication 50 MCG: at 08:48

## 2023-01-01 RX ADMIN — ATORVASTATIN CALCIUM 20 MG: 20 TABLET, FILM COATED ORAL at 20:21

## 2023-01-01 RX ADMIN — CETIRIZINE HYDROCHLORIDE 10 MG: 10 TABLET ORAL at 10:07

## 2023-01-01 RX ADMIN — VANCOMYCIN HYDROCHLORIDE 1000 MG: 1 INJECTION, SOLUTION INTRAVENOUS at 16:01

## 2023-01-01 RX ADMIN — Medication 50 MCG: at 08:23

## 2023-01-01 RX ADMIN — IPRATROPIUM BROMIDE AND ALBUTEROL SULFATE 3 ML: .5; 3 SOLUTION RESPIRATORY (INHALATION) at 16:00

## 2023-01-01 RX ADMIN — ATORVASTATIN CALCIUM 20 MG: 20 TABLET, FILM COATED ORAL at 19:54

## 2023-01-01 RX ADMIN — EPOPROSTENOL 20 NG/KG/MIN: 1.5 INJECTION, POWDER, LYOPHILIZED, FOR SOLUTION INTRAVENOUS at 12:14

## 2023-01-01 RX ADMIN — QUETIAPINE 150 MG: 100 TABLET ORAL at 09:26

## 2023-01-01 RX ADMIN — ASPIRIN 81 MG CHEWABLE TABLET 81 MG: 81 TABLET CHEWABLE at 08:13

## 2023-01-01 RX ADMIN — HEPARIN SODIUM 5000 UNITS: 5000 INJECTION, SOLUTION INTRAVENOUS; SUBCUTANEOUS at 20:41

## 2023-01-01 RX ADMIN — LEVALBUTEROL HYDROCHLORIDE 1.25 MG: 1.25 SOLUTION RESPIRATORY (INHALATION) at 15:53

## 2023-01-01 RX ADMIN — ISOSORBIDE DINITRATE 20 MG: 20 TABLET ORAL at 11:06

## 2023-01-01 RX ADMIN — ACETAMINOPHEN 650 MG: 650 SOLUTION ORAL at 20:15

## 2023-01-01 RX ADMIN — OXYCODONE HYDROCHLORIDE 5 MG: 5 SOLUTION ORAL at 18:42

## 2023-01-01 RX ADMIN — BUMETANIDE 1 MG: 0.25 INJECTION INTRAMUSCULAR; INTRAVENOUS at 09:30

## 2023-01-01 RX ADMIN — PANTOPRAZOLE SODIUM 40 MG: 40 INJECTION, POWDER, LYOPHILIZED, FOR SOLUTION INTRAVENOUS at 08:17

## 2023-01-01 RX ADMIN — INSULIN ASPART 1 UNITS: 100 INJECTION, SOLUTION INTRAVENOUS; SUBCUTANEOUS at 04:05

## 2023-01-01 RX ADMIN — OXYCODONE HYDROCHLORIDE 5 MG: 5 TABLET ORAL at 10:03

## 2023-01-01 RX ADMIN — PREGABALIN 75 MG: 20 SOLUTION ORAL at 09:48

## 2023-01-01 RX ADMIN — MIDAZOLAM HYDROCHLORIDE 6 MG/HR: 1 INJECTION, SOLUTION INTRAVENOUS at 08:59

## 2023-01-01 RX ADMIN — Medication 1 SPRAY: at 08:38

## 2023-01-01 RX ADMIN — ACETAMINOPHEN 650 MG: 650 SOLUTION ORAL at 20:52

## 2023-01-01 RX ADMIN — LEVOTHYROXINE SODIUM 150 MCG: 150 TABLET ORAL at 05:09

## 2023-01-01 RX ADMIN — ACETYLCYSTEINE 2 ML: 200 SOLUTION ORAL; RESPIRATORY (INHALATION) at 20:29

## 2023-01-01 RX ADMIN — ACETAMINOPHEN 650 MG: 325 TABLET, FILM COATED ORAL at 09:29

## 2023-01-01 RX ADMIN — PREDNISONE 60 MG: 50 TABLET ORAL at 08:36

## 2023-01-01 RX ADMIN — AMLODIPINE BESYLATE 5 MG: 5 TABLET ORAL at 20:59

## 2023-01-01 RX ADMIN — ACETYLCYSTEINE 2 ML: 200 SOLUTION ORAL; RESPIRATORY (INHALATION) at 12:13

## 2023-01-01 RX ADMIN — ISOSORBIDE DINITRATE 20 MG: 20 TABLET ORAL at 12:29

## 2023-01-01 RX ADMIN — Medication 50 MCG: at 09:06

## 2023-01-01 RX ADMIN — PREGABALIN 75 MG: 20 SOLUTION ORAL at 09:24

## 2023-01-01 RX ADMIN — MINERAL OIL AND PETROLATUM: 150; 830 OINTMENT OPHTHALMIC at 12:12

## 2023-01-01 RX ADMIN — Medication 1 PACKET: at 16:22

## 2023-01-01 RX ADMIN — Medication 5 ML: at 08:39

## 2023-01-01 RX ADMIN — MINERAL OIL AND PETROLATUM: 150; 830 OINTMENT OPHTHALMIC at 11:35

## 2023-01-01 RX ADMIN — ASPIRIN 81 MG CHEWABLE TABLET 81 MG: 81 TABLET CHEWABLE at 08:42

## 2023-01-01 RX ADMIN — CLOTRIMAZOLE: 0.01 CREAM TOPICAL at 09:14

## 2023-01-01 RX ADMIN — HEPARIN SODIUM 5000 UNITS: 5000 INJECTION, SOLUTION INTRAVENOUS; SUBCUTANEOUS at 14:26

## 2023-01-01 RX ADMIN — HEPARIN SODIUM 5000 UNITS: 5000 INJECTION, SOLUTION INTRAVENOUS; SUBCUTANEOUS at 06:32

## 2023-01-01 RX ADMIN — Medication 1 PACKET: at 22:08

## 2023-01-01 RX ADMIN — AMLODIPINE BESYLATE 5 MG: 5 TABLET ORAL at 23:12

## 2023-01-01 RX ADMIN — IPRATROPIUM BROMIDE AND ALBUTEROL SULFATE 3 ML: 2.5; .5 SOLUTION RESPIRATORY (INHALATION) at 11:00

## 2023-01-01 RX ADMIN — ACETAMINOPHEN 650 MG: 650 SOLUTION ORAL at 21:31

## 2023-01-01 RX ADMIN — Medication 5 ML: at 09:25

## 2023-01-01 RX ADMIN — INSULIN ASPART 1 UNITS: 100 INJECTION, SOLUTION INTRAVENOUS; SUBCUTANEOUS at 20:24

## 2023-01-01 RX ADMIN — LEVOTHYROXINE SODIUM 150 MCG: 150 TABLET ORAL at 06:34

## 2023-01-01 RX ADMIN — ATORVASTATIN CALCIUM 20 MG: 20 TABLET, FILM COATED ORAL at 19:38

## 2023-01-01 RX ADMIN — MINERAL OIL AND PETROLATUM: 150; 830 OINTMENT OPHTHALMIC at 12:18

## 2023-01-01 RX ADMIN — MINERAL OIL AND PETROLATUM: 150; 830 OINTMENT OPHTHALMIC at 11:36

## 2023-01-01 RX ADMIN — ACETYLCYSTEINE 2 ML: 200 SOLUTION ORAL; RESPIRATORY (INHALATION) at 08:08

## 2023-01-01 RX ADMIN — HEPARIN SODIUM 5000 UNITS: 5000 INJECTION, SOLUTION INTRAVENOUS; SUBCUTANEOUS at 22:42

## 2023-01-01 RX ADMIN — MICONAZOLE NITRATE: 20 CREAM TOPICAL at 08:25

## 2023-01-01 RX ADMIN — Medication 40 MG: at 08:42

## 2023-01-01 RX ADMIN — POTASSIUM CHLORIDE 40 MEQ: 1500 TABLET, EXTENDED RELEASE ORAL at 08:16

## 2023-01-01 RX ADMIN — BUMETANIDE 1 MG: 0.25 INJECTION INTRAMUSCULAR; INTRAVENOUS at 02:03

## 2023-01-01 RX ADMIN — SIMETHICONE 40 MG: 20 EMULSION ORAL at 21:27

## 2023-01-01 RX ADMIN — CHLORHEXIDINE GLUCONATE 15 ML: 1.2 SOLUTION ORAL at 19:59

## 2023-01-01 RX ADMIN — METRONIDAZOLE 500 MG: 500 INJECTION, SOLUTION INTRAVENOUS at 09:02

## 2023-01-01 RX ADMIN — Medication 1 PACKET: at 22:10

## 2023-01-01 RX ADMIN — ISOSORBIDE DINITRATE 20 MG: 20 TABLET ORAL at 08:43

## 2023-01-01 RX ADMIN — BUMETANIDE 1 MG: 0.25 INJECTION INTRAMUSCULAR; INTRAVENOUS at 02:08

## 2023-01-01 RX ADMIN — Medication 40 MG: at 06:35

## 2023-01-01 RX ADMIN — HEPARIN SODIUM 5000 UNITS: 5000 INJECTION, SOLUTION INTRAVENOUS; SUBCUTANEOUS at 08:03

## 2023-01-01 RX ADMIN — ATOVAQUONE 1500 MG: 750 SUSPENSION ORAL at 09:24

## 2023-01-01 RX ADMIN — ISOSORBIDE DINITRATE 20 MG: 20 TABLET ORAL at 12:01

## 2023-01-01 RX ADMIN — POTASSIUM CHLORIDE FOR ORAL SOLUTION 20 MEQ: 1.5 POWDER, FOR SOLUTION ORAL at 05:23

## 2023-01-01 RX ADMIN — ACETAMINOPHEN 650 MG: 325 TABLET, FILM COATED ORAL at 12:40

## 2023-01-01 RX ADMIN — ISOSORBIDE DINITRATE 20 MG: 20 TABLET ORAL at 08:35

## 2023-01-01 RX ADMIN — ACETYLCYSTEINE 2 ML: 200 SOLUTION ORAL; RESPIRATORY (INHALATION) at 12:36

## 2023-01-01 RX ADMIN — ACETYLCYSTEINE 2 ML: 200 SOLUTION ORAL; RESPIRATORY (INHALATION) at 20:40

## 2023-01-01 RX ADMIN — ALBUTEROL SULFATE 2.5 MG: 2.5 SOLUTION RESPIRATORY (INHALATION) at 15:56

## 2023-01-01 RX ADMIN — CHLORTHALIDONE 25 MG: 25 TABLET ORAL at 12:31

## 2023-01-01 RX ADMIN — PHENYLEPHRINE HYDROCHLORIDE 0.5 MCG/KG/MIN: 50 INJECTION INTRAVENOUS at 05:34

## 2023-01-01 RX ADMIN — INSULIN ASPART 2 UNITS: 100 INJECTION, SOLUTION INTRAVENOUS; SUBCUTANEOUS at 12:05

## 2023-01-01 RX ADMIN — CHLORHEXIDINE GLUCONATE 0.12% ORAL RINSE 15 ML: 1.2 LIQUID ORAL at 09:03

## 2023-01-01 RX ADMIN — CHLORHEXIDINE GLUCONATE 15 ML: 1.2 SOLUTION ORAL at 08:19

## 2023-01-01 RX ADMIN — CHLORHEXIDINE GLUCONATE 15 ML: 1.2 SOLUTION ORAL at 20:38

## 2023-01-01 RX ADMIN — ALBUTEROL SULFATE 6 PUFF: 90 AEROSOL, METERED RESPIRATORY (INHALATION) at 12:04

## 2023-01-01 RX ADMIN — DEXMEDETOMIDINE HYDROCHLORIDE 0.3 MCG/KG/HR: 400 INJECTION INTRAVENOUS at 17:11

## 2023-01-01 RX ADMIN — CLOTRIMAZOLE: 0.01 CREAM TOPICAL at 08:23

## 2023-01-01 RX ADMIN — ATORVASTATIN CALCIUM 20 MG: 20 TABLET, FILM COATED ORAL at 22:35

## 2023-01-01 RX ADMIN — MIDAZOLAM HYDROCHLORIDE 1 MG/HR: 1 INJECTION, SOLUTION INTRAVENOUS at 08:14

## 2023-01-01 RX ADMIN — MIDAZOLAM HYDROCHLORIDE 7 MG/HR: 1 INJECTION, SOLUTION INTRAVENOUS at 12:24

## 2023-01-01 RX ADMIN — IPRATROPIUM BROMIDE AND ALBUTEROL SULFATE 3 ML: .5; 3 SOLUTION RESPIRATORY (INHALATION) at 07:44

## 2023-01-01 RX ADMIN — CLOTRIMAZOLE: 0.01 CREAM TOPICAL at 20:03

## 2023-01-01 RX ADMIN — VANCOMYCIN HYDROCHLORIDE 2000 MG: 10 INJECTION, POWDER, LYOPHILIZED, FOR SOLUTION INTRAVENOUS at 10:58

## 2023-01-01 RX ADMIN — INSULIN ASPART 2 UNITS: 100 INJECTION, SOLUTION INTRAVENOUS; SUBCUTANEOUS at 16:36

## 2023-01-01 RX ADMIN — CHLORHEXIDINE GLUCONATE 15 ML: 1.2 SOLUTION ORAL at 21:00

## 2023-01-01 RX ADMIN — OXYCODONE HYDROCHLORIDE 5 MG: 5 TABLET ORAL at 17:39

## 2023-01-01 RX ADMIN — INSULIN ASPART 1 UNITS: 100 INJECTION, SOLUTION INTRAVENOUS; SUBCUTANEOUS at 04:06

## 2023-01-01 RX ADMIN — IPRATROPIUM BROMIDE AND ALBUTEROL SULFATE 3 ML: .5; 3 SOLUTION RESPIRATORY (INHALATION) at 11:28

## 2023-01-01 RX ADMIN — HYDROXYZINE HYDROCHLORIDE 25 MG: 25 TABLET ORAL at 21:25

## 2023-01-01 RX ADMIN — IPRATROPIUM BROMIDE AND ALBUTEROL SULFATE 3 ML: .5; 3 SOLUTION RESPIRATORY (INHALATION) at 11:37

## 2023-01-01 RX ADMIN — EPOPROSTENOL 20 NG/KG/MIN: 1.5 INJECTION, POWDER, LYOPHILIZED, FOR SOLUTION INTRAVENOUS at 12:39

## 2023-01-01 RX ADMIN — MIDODRINE HYDROCHLORIDE 10 MG: 10 TABLET ORAL at 14:19

## 2023-01-01 RX ADMIN — MIDAZOLAM HYDROCHLORIDE 1 MG: 1 INJECTION, SOLUTION INTRAMUSCULAR; INTRAVENOUS at 04:43

## 2023-01-01 RX ADMIN — IPRATROPIUM BROMIDE AND ALBUTEROL SULFATE 3 ML: 2.5; .5 SOLUTION RESPIRATORY (INHALATION) at 20:29

## 2023-01-01 RX ADMIN — IPRATROPIUM BROMIDE AND ALBUTEROL SULFATE 3 ML: .5; 3 SOLUTION RESPIRATORY (INHALATION) at 16:41

## 2023-01-01 RX ADMIN — METHYLPREDNISOLONE SODIUM SUCCINATE 62.5 MG: 125 INJECTION, POWDER, FOR SOLUTION INTRAMUSCULAR; INTRAVENOUS at 08:40

## 2023-01-01 RX ADMIN — METOPROLOL TARTRATE 12.5 MG: 100 TABLET, FILM COATED ORAL at 08:18

## 2023-01-01 RX ADMIN — PHENYLEPHRINE HYDROCHLORIDE 150 MCG: 10 INJECTION INTRAVENOUS at 18:43

## 2023-01-01 RX ADMIN — SODIUM CHLORIDE SOLN NEBU 3% 3 ML: 3 NEBU SOLN at 06:17

## 2023-01-01 RX ADMIN — Medication 6 MCG/KG/MIN: at 23:13

## 2023-01-01 RX ADMIN — PROPOFOL 45 MCG/KG/MIN: 10 INJECTION, EMULSION INTRAVENOUS at 12:25

## 2023-01-01 RX ADMIN — MIDAZOLAM HYDROCHLORIDE 2 MG: 1 INJECTION, SOLUTION INTRAMUSCULAR; INTRAVENOUS at 10:13

## 2023-01-01 RX ADMIN — POTASSIUM CHLORIDE 20 MEQ: 1500 TABLET, EXTENDED RELEASE ORAL at 11:39

## 2023-01-01 RX ADMIN — IPRATROPIUM BROMIDE AND ALBUTEROL SULFATE 3 ML: .5; 3 SOLUTION RESPIRATORY (INHALATION) at 11:32

## 2023-01-01 RX ADMIN — INSULIN ASPART 1 UNITS: 100 INJECTION, SOLUTION INTRAVENOUS; SUBCUTANEOUS at 15:12

## 2023-01-01 RX ADMIN — IPRATROPIUM BROMIDE AND ALBUTEROL SULFATE 3 ML: .5; 3 SOLUTION RESPIRATORY (INHALATION) at 20:22

## 2023-01-01 RX ADMIN — HYDRALAZINE HYDROCHLORIDE 10 MG: 20 INJECTION INTRAMUSCULAR; INTRAVENOUS at 07:06

## 2023-01-01 RX ADMIN — Medication 5 ML: at 08:21

## 2023-01-01 RX ADMIN — Medication 40 MG: at 08:41

## 2023-01-01 RX ADMIN — ACETYLCYSTEINE 2 ML: 200 SOLUTION ORAL; RESPIRATORY (INHALATION) at 16:41

## 2023-01-01 RX ADMIN — METOPROLOL TARTRATE 12.5 MG: 100 TABLET, FILM COATED ORAL at 20:43

## 2023-01-01 RX ADMIN — ATORVASTATIN CALCIUM 20 MG: 20 TABLET, FILM COATED ORAL at 21:08

## 2023-01-01 RX ADMIN — HEPARIN SODIUM 5000 UNITS: 5000 INJECTION, SOLUTION INTRAVENOUS; SUBCUTANEOUS at 06:01

## 2023-01-01 RX ADMIN — LEVALBUTEROL HYDROCHLORIDE 1.25 MG: 1.25 SOLUTION RESPIRATORY (INHALATION) at 15:26

## 2023-01-01 RX ADMIN — HYDROMORPHONE HYDROCHLORIDE 0.2 MG: 0.2 INJECTION, SOLUTION INTRAMUSCULAR; INTRAVENOUS; SUBCUTANEOUS at 04:45

## 2023-01-01 RX ADMIN — IPRATROPIUM BROMIDE AND ALBUTEROL SULFATE 3 ML: 2.5; .5 SOLUTION RESPIRATORY (INHALATION) at 19:21

## 2023-01-01 RX ADMIN — PIPERACILLIN AND TAZOBACTAM 4.5 G: 4; .5 INJECTION, POWDER, FOR SOLUTION INTRAVENOUS at 23:20

## 2023-01-01 RX ADMIN — MINERAL OIL AND PETROLATUM: 150; 830 OINTMENT OPHTHALMIC at 04:01

## 2023-01-01 RX ADMIN — HYDROMORPHONE HYDROCHLORIDE 0.4 MG: 0.2 INJECTION, SOLUTION INTRAMUSCULAR; INTRAVENOUS; SUBCUTANEOUS at 00:56

## 2023-01-01 RX ADMIN — HYDROMORPHONE HYDROCHLORIDE 0.4 MG: 0.2 INJECTION, SOLUTION INTRAMUSCULAR; INTRAVENOUS; SUBCUTANEOUS at 11:01

## 2023-01-01 RX ADMIN — SIMETHICONE 40 MG: 20 EMULSION ORAL at 09:38

## 2023-01-01 RX ADMIN — MIDAZOLAM HYDROCHLORIDE 6 MG/HR: 1 INJECTION, SOLUTION INTRAVENOUS at 17:00

## 2023-01-01 RX ADMIN — CHLORHEXIDINE GLUCONATE 15 ML: 1.2 SOLUTION ORAL at 08:11

## 2023-01-01 RX ADMIN — MEROPENEM 500 MG: 500 INJECTION, POWDER, FOR SOLUTION INTRAVENOUS at 18:25

## 2023-01-01 RX ADMIN — MIDAZOLAM HYDROCHLORIDE 4 MG/HR: 1 INJECTION, SOLUTION INTRAVENOUS at 11:41

## 2023-01-01 RX ADMIN — HYDROMORPHONE HYDROCHLORIDE 0.2 MG: 0.2 INJECTION, SOLUTION INTRAMUSCULAR; INTRAVENOUS; SUBCUTANEOUS at 12:00

## 2023-01-01 RX ADMIN — MINERAL OIL AND PETROLATUM: 150; 830 OINTMENT OPHTHALMIC at 11:54

## 2023-01-01 RX ADMIN — IPRATROPIUM BROMIDE AND ALBUTEROL SULFATE 3 ML: 2.5; .5 SOLUTION RESPIRATORY (INHALATION) at 11:20

## 2023-01-01 RX ADMIN — METHYLPREDNISOLONE SODIUM SUCCINATE 62.5 MG: 125 INJECTION INTRAMUSCULAR; INTRAVENOUS at 18:25

## 2023-01-01 RX ADMIN — PREGABALIN 75 MG: 20 SOLUTION ORAL at 08:27

## 2023-01-01 RX ADMIN — PREGABALIN 75 MG: 20 SOLUTION ORAL at 08:29

## 2023-01-01 RX ADMIN — HYDROXYZINE HYDROCHLORIDE 50 MG: 50 TABLET, FILM COATED ORAL at 20:46

## 2023-01-01 RX ADMIN — OXYCODONE HYDROCHLORIDE 5 MG: 5 SOLUTION ORAL at 00:22

## 2023-01-01 RX ADMIN — CHLORHEXIDINE GLUCONATE 0.12% ORAL RINSE 15 ML: 1.2 LIQUID ORAL at 21:19

## 2023-01-01 RX ADMIN — ACETYLCYSTEINE 2 ML: 200 SOLUTION ORAL; RESPIRATORY (INHALATION) at 15:53

## 2023-01-01 RX ADMIN — HEPARIN SODIUM 5000 UNITS: 5000 INJECTION, SOLUTION INTRAVENOUS; SUBCUTANEOUS at 14:23

## 2023-01-01 RX ADMIN — LEVOTHYROXINE SODIUM 150 MCG: 150 TABLET ORAL at 05:29

## 2023-01-01 RX ADMIN — INSULIN ASPART 1 UNITS: 100 INJECTION, SOLUTION INTRAVENOUS; SUBCUTANEOUS at 20:03

## 2023-01-01 RX ADMIN — EPOPROSTENOL 20 NG/KG/MIN: 1.5 INJECTION, POWDER, LYOPHILIZED, FOR SOLUTION INTRAVENOUS at 13:45

## 2023-01-01 RX ADMIN — IPRATROPIUM BROMIDE AND ALBUTEROL SULFATE 3 ML: .5; 3 SOLUTION RESPIRATORY (INHALATION) at 15:56

## 2023-01-01 RX ADMIN — MIDAZOLAM HYDROCHLORIDE 2 MG: 1 INJECTION, SOLUTION INTRAMUSCULAR; INTRAVENOUS at 16:21

## 2023-01-01 RX ADMIN — MEROPENEM 500 MG: 500 INJECTION, POWDER, FOR SOLUTION INTRAVENOUS at 18:09

## 2023-01-01 RX ADMIN — ACETYLCYSTEINE 2 ML: 200 SOLUTION ORAL; RESPIRATORY (INHALATION) at 16:00

## 2023-01-01 RX ADMIN — ACETAMINOPHEN 650 MG: 650 SOLUTION ORAL at 17:11

## 2023-01-01 RX ADMIN — CHLORHEXIDINE GLUCONATE 0.12% ORAL RINSE 15 ML: 1.2 LIQUID ORAL at 20:57

## 2023-01-01 RX ADMIN — PREDNISONE 60 MG: 20 TABLET ORAL at 08:12

## 2023-01-01 RX ADMIN — ISOSORBIDE DINITRATE 20 MG: 20 TABLET ORAL at 08:20

## 2023-01-01 RX ADMIN — HYDRALAZINE HYDROCHLORIDE 50 MG: 50 TABLET, FILM COATED ORAL at 15:18

## 2023-01-01 RX ADMIN — HYDRALAZINE HYDROCHLORIDE 25 MG: 25 TABLET, FILM COATED ORAL at 20:03

## 2023-01-01 RX ADMIN — LORAZEPAM 0.5 MG: 2 CONCENTRATE ORAL at 14:00

## 2023-01-01 RX ADMIN — POTASSIUM CHLORIDE 20 MEQ: 29.8 INJECTION, SOLUTION INTRAVENOUS at 08:44

## 2023-01-01 RX ADMIN — ACETYLCYSTEINE 2 ML: 200 SOLUTION ORAL; RESPIRATORY (INHALATION) at 07:49

## 2023-01-01 RX ADMIN — ISOSORBIDE DINITRATE 20 MG: 20 TABLET ORAL at 12:12

## 2023-01-01 RX ADMIN — HYDROMORPHONE HYDROCHLORIDE 0.4 MG: 0.2 INJECTION, SOLUTION INTRAMUSCULAR; INTRAVENOUS; SUBCUTANEOUS at 14:11

## 2023-01-01 RX ADMIN — ATOVAQUONE 1500 MG: 750 SUSPENSION ORAL at 08:18

## 2023-01-01 RX ADMIN — METOPROLOL TARTRATE 12.5 MG: 100 TABLET, FILM COATED ORAL at 09:16

## 2023-01-01 RX ADMIN — AMLODIPINE BESYLATE 5 MG: 5 TABLET ORAL at 20:12

## 2023-01-01 RX ADMIN — HEPARIN SODIUM 5000 UNITS: 5000 INJECTION, SOLUTION INTRAVENOUS; SUBCUTANEOUS at 22:28

## 2023-01-01 RX ADMIN — Medication 50 MCG: at 08:46

## 2023-01-01 RX ADMIN — HEPARIN SODIUM 5000 UNITS: 5000 INJECTION, SOLUTION INTRAVENOUS; SUBCUTANEOUS at 20:16

## 2023-01-01 RX ADMIN — Medication 1 PACKET: at 08:22

## 2023-01-01 RX ADMIN — METHYLPREDNISOLONE SODIUM SUCCINATE 125 MG: 125 INJECTION, POWDER, FOR SOLUTION INTRAMUSCULAR; INTRAVENOUS at 09:00

## 2023-01-01 RX ADMIN — ACETYLCYSTEINE 2 ML: 200 SOLUTION ORAL; RESPIRATORY (INHALATION) at 19:20

## 2023-01-01 RX ADMIN — CHLORHEXIDINE GLUCONATE 0.12% ORAL RINSE 15 ML: 1.2 LIQUID ORAL at 08:30

## 2023-01-01 RX ADMIN — TORSEMIDE 60 MG: 20 TABLET ORAL at 08:53

## 2023-01-01 RX ADMIN — PREGABALIN 75 MG: 20 SOLUTION ORAL at 08:19

## 2023-01-01 RX ADMIN — PREDNISONE 60 MG: 20 TABLET ORAL at 09:17

## 2023-01-01 RX ADMIN — LABETALOL HYDROCHLORIDE 20 MG: 5 INJECTION, SOLUTION INTRAVENOUS at 07:18

## 2023-01-01 RX ADMIN — WHITE PETROLATUM: 1.75 OINTMENT TOPICAL at 08:16

## 2023-01-01 RX ADMIN — ATOVAQUONE 1500 MG: 750 SUSPENSION ORAL at 08:19

## 2023-01-01 RX ADMIN — HYDROCORTISONE SODIUM SUCCINATE 50 MG: 100 INJECTION, POWDER, FOR SOLUTION INTRAMUSCULAR; INTRAVENOUS at 23:58

## 2023-01-01 RX ADMIN — OXYCODONE HYDROCHLORIDE 10 MG: 5 TABLET ORAL at 15:58

## 2023-01-01 RX ADMIN — Medication 40 MG: at 06:54

## 2023-01-01 RX ADMIN — HEPARIN SODIUM 5000 UNITS: 5000 INJECTION, SOLUTION INTRAVENOUS; SUBCUTANEOUS at 13:47

## 2023-01-01 RX ADMIN — IPRATROPIUM BROMIDE AND ALBUTEROL SULFATE 3 ML: .5; 3 SOLUTION RESPIRATORY (INHALATION) at 20:30

## 2023-01-01 RX ADMIN — IPRATROPIUM BROMIDE AND ALBUTEROL SULFATE 3 ML: .5; 3 SOLUTION RESPIRATORY (INHALATION) at 11:52

## 2023-01-01 RX ADMIN — ALBUTEROL SULFATE 2.5 MG: 2.5 SOLUTION RESPIRATORY (INHALATION) at 07:09

## 2023-01-01 RX ADMIN — ACETAMINOPHEN 650 MG: 650 SOLUTION ORAL at 09:39

## 2023-01-01 RX ADMIN — METHYLPREDNISOLONE SODIUM SUCCINATE 62.5 MG: 125 INJECTION INTRAMUSCULAR; INTRAVENOUS at 20:12

## 2023-01-01 RX ADMIN — MIDAZOLAM HYDROCHLORIDE 2 MG/HR: 1 INJECTION, SOLUTION INTRAVENOUS at 12:09

## 2023-01-01 RX ADMIN — LEVOTHYROXINE SODIUM 150 MCG: 150 TABLET ORAL at 06:30

## 2023-01-01 RX ADMIN — INSULIN ASPART 2 UNITS: 100 INJECTION, SOLUTION INTRAVENOUS; SUBCUTANEOUS at 04:20

## 2023-01-01 RX ADMIN — IPRATROPIUM BROMIDE AND ALBUTEROL SULFATE 3 ML: 2.5; .5 SOLUTION RESPIRATORY (INHALATION) at 15:13

## 2023-01-01 RX ADMIN — LEVALBUTEROL HYDROCHLORIDE 1.25 MG: 1.25 SOLUTION RESPIRATORY (INHALATION) at 12:13

## 2023-01-01 RX ADMIN — PREGABALIN 25 MG: 20 SOLUTION ORAL at 08:12

## 2023-01-01 RX ADMIN — Medication 5 ML: at 08:42

## 2023-01-01 RX ADMIN — ACETYLCYSTEINE 2 ML: 200 SOLUTION ORAL; RESPIRATORY (INHALATION) at 11:36

## 2023-01-01 RX ADMIN — QUETIAPINE 150 MG: 100 TABLET ORAL at 09:31

## 2023-01-01 RX ADMIN — FAMOTIDINE 20 MG: 10 INJECTION, SOLUTION INTRAVENOUS at 11:10

## 2023-01-01 RX ADMIN — OXYCODONE HYDROCHLORIDE 5 MG: 5 SOLUTION ORAL at 17:06

## 2023-01-01 RX ADMIN — PHENYLEPHRINE HYDROCHLORIDE 0.5 MCG/KG/MIN: 50 INJECTION INTRAVENOUS at 21:24

## 2023-01-01 RX ADMIN — PREGABALIN 75 MG: 20 SOLUTION ORAL at 08:42

## 2023-01-01 RX ADMIN — ASPIRIN 81 MG CHEWABLE TABLET 81 MG: 81 TABLET CHEWABLE at 07:40

## 2023-01-01 RX ADMIN — INSULIN ASPART 1 UNITS: 100 INJECTION, SOLUTION INTRAVENOUS; SUBCUTANEOUS at 21:56

## 2023-01-01 RX ADMIN — IPRATROPIUM BROMIDE AND ALBUTEROL SULFATE 3 ML: .5; 3 SOLUTION RESPIRATORY (INHALATION) at 19:22

## 2023-01-01 RX ADMIN — CHLORHEXIDINE GLUCONATE 15 ML: 1.2 SOLUTION ORAL at 19:47

## 2023-01-01 RX ADMIN — HEPARIN SODIUM 5000 UNITS: 5000 INJECTION, SOLUTION INTRAVENOUS; SUBCUTANEOUS at 13:52

## 2023-01-01 RX ADMIN — OXYCODONE HYDROCHLORIDE 5 MG: 5 TABLET ORAL at 20:50

## 2023-01-01 RX ADMIN — IPRATROPIUM BROMIDE AND ALBUTEROL SULFATE 3 ML: .5; 3 SOLUTION RESPIRATORY (INHALATION) at 20:13

## 2023-01-01 RX ADMIN — PREDNISONE 60 MG: 20 TABLET ORAL at 16:45

## 2023-01-01 RX ADMIN — PREGABALIN 75 MG: 20 SOLUTION ORAL at 13:14

## 2023-01-01 RX ADMIN — CHLORHEXIDINE GLUCONATE 0.12% ORAL RINSE 15 ML: 1.2 LIQUID ORAL at 20:17

## 2023-01-01 RX ADMIN — Medication 150 MCG/HR: at 13:24

## 2023-01-01 RX ADMIN — DEXMEDETOMIDINE HYDROCHLORIDE 0.8 MCG/KG/HR: 400 INJECTION INTRAVENOUS at 14:11

## 2023-01-01 RX ADMIN — INSULIN ASPART 1 UNITS: 100 INJECTION, SOLUTION INTRAVENOUS; SUBCUTANEOUS at 12:43

## 2023-01-01 RX ADMIN — ACETAMINOPHEN 650 MG: 650 SOLUTION ORAL at 12:34

## 2023-01-01 RX ADMIN — INSULIN ASPART 1 UNITS: 100 INJECTION, SOLUTION INTRAVENOUS; SUBCUTANEOUS at 16:37

## 2023-01-01 RX ADMIN — LEVALBUTEROL HYDROCHLORIDE 1.25 MG: 1.25 SOLUTION RESPIRATORY (INHALATION) at 20:39

## 2023-01-01 RX ADMIN — METHYLPREDNISOLONE SODIUM SUCCINATE 125 MG: 125 INJECTION, POWDER, FOR SOLUTION INTRAMUSCULAR; INTRAVENOUS at 09:19

## 2023-01-01 RX ADMIN — BUMETANIDE 1 MG: 0.25 INJECTION INTRAMUSCULAR; INTRAVENOUS at 18:27

## 2023-01-01 RX ADMIN — ACETAMINOPHEN 650 MG: 650 SOLUTION ORAL at 16:39

## 2023-01-01 RX ADMIN — METHYLPREDNISOLONE SODIUM SUCCINATE 62.5 MG: 125 INJECTION, POWDER, FOR SOLUTION INTRAMUSCULAR; INTRAVENOUS at 23:24

## 2023-01-01 RX ADMIN — CHLORHEXIDINE GLUCONATE 15 ML: 1.2 SOLUTION ORAL at 08:48

## 2023-01-01 RX ADMIN — HYDROCORTISONE SODIUM SUCCINATE 50 MG: 100 INJECTION, POWDER, FOR SOLUTION INTRAMUSCULAR; INTRAVENOUS at 06:11

## 2023-01-01 RX ADMIN — CHLORHEXIDINE GLUCONATE 15 ML: 1.2 SOLUTION ORAL at 19:54

## 2023-01-01 RX ADMIN — SENNOSIDES AND DOCUSATE SODIUM 1 TABLET: 50; 8.6 TABLET ORAL at 22:05

## 2023-01-01 RX ADMIN — QUETIAPINE FUMARATE 150 MG: 50 TABLET, FILM COATED ORAL at 21:13

## 2023-01-01 RX ADMIN — HEPARIN SODIUM 5000 UNITS: 5000 INJECTION, SOLUTION INTRAVENOUS; SUBCUTANEOUS at 21:51

## 2023-01-01 RX ADMIN — DIATRIZOATE MEGLUMINE AND DIATRIZOATE SODIUM 30 ML: 660; 100 SOLUTION ORAL; RECTAL at 12:07

## 2023-01-01 RX ADMIN — ACETAMINOPHEN 650 MG: 650 SOLUTION ORAL at 20:49

## 2023-01-01 RX ADMIN — ACETAMINOPHEN 975 MG: 325 TABLET, FILM COATED ORAL at 21:17

## 2023-01-01 RX ADMIN — TORSEMIDE 20 MG: 20 TABLET ORAL at 21:08

## 2023-01-01 RX ADMIN — ASPIRIN 81 MG: 81 TABLET, COATED ORAL at 08:17

## 2023-01-01 RX ADMIN — IPRATROPIUM BROMIDE AND ALBUTEROL SULFATE 3 ML: .5; 3 SOLUTION RESPIRATORY (INHALATION) at 16:03

## 2023-01-01 RX ADMIN — SERTRALINE HYDROCHLORIDE 150 MG: 100 TABLET ORAL at 18:28

## 2023-01-01 RX ADMIN — ACETAMINOPHEN 650 MG: 650 SOLUTION ORAL at 23:45

## 2023-01-01 RX ADMIN — CHLORHEXIDINE GLUCONATE 15 ML: 1.2 SOLUTION ORAL at 13:13

## 2023-01-01 RX ADMIN — PREDNISONE 60 MG: 20 TABLET ORAL at 17:31

## 2023-01-01 RX ADMIN — ACETAMINOPHEN 500 MG: 500 TABLET, FILM COATED ORAL at 06:56

## 2023-01-01 RX ADMIN — SERTRALINE HYDROCHLORIDE 150 MG: 20 SOLUTION ORAL at 09:31

## 2023-01-01 RX ADMIN — METHYLPREDNISOLONE SODIUM SUCCINATE 62.5 MG: 125 INJECTION, POWDER, FOR SOLUTION INTRAMUSCULAR; INTRAVENOUS at 12:06

## 2023-01-01 RX ADMIN — HEPARIN SODIUM 5000 UNITS: 5000 INJECTION, SOLUTION INTRAVENOUS; SUBCUTANEOUS at 13:10

## 2023-01-01 RX ADMIN — SERTRALINE HYDROCHLORIDE 150 MG: 100 TABLET ORAL at 08:56

## 2023-01-01 RX ADMIN — LIDOCAINE: 50 OINTMENT TOPICAL at 21:42

## 2023-01-01 RX ADMIN — PREGABALIN 75 MG: 20 SOLUTION ORAL at 08:35

## 2023-01-01 RX ADMIN — MICONAZOLE NITRATE: 20 CREAM TOPICAL at 08:16

## 2023-01-01 RX ADMIN — INSULIN ASPART 1 UNITS: 100 INJECTION, SOLUTION INTRAVENOUS; SUBCUTANEOUS at 01:09

## 2023-01-01 RX ADMIN — ATORVASTATIN CALCIUM 20 MG: 20 TABLET, FILM COATED ORAL at 20:32

## 2023-01-01 RX ADMIN — SERTRALINE HYDROCHLORIDE 150 MG: 100 TABLET ORAL at 07:58

## 2023-01-01 RX ADMIN — ACETYLCYSTEINE 2 ML: 200 SOLUTION ORAL; RESPIRATORY (INHALATION) at 08:28

## 2023-01-01 RX ADMIN — Medication 50 MCG: at 09:26

## 2023-01-01 RX ADMIN — ANORECTAL OINTMENT: 15.7; .44; 24; 20.6 OINTMENT TOPICAL at 09:29

## 2023-01-01 RX ADMIN — METOPROLOL TARTRATE 12.5 MG: 100 TABLET, FILM COATED ORAL at 09:02

## 2023-01-01 RX ADMIN — PIPERACILLIN AND TAZOBACTAM 4.5 G: 4; .5 INJECTION, POWDER, FOR SOLUTION INTRAVENOUS at 11:53

## 2023-01-01 RX ADMIN — CLOTRIMAZOLE: 0.01 CREAM TOPICAL at 20:11

## 2023-01-01 RX ADMIN — CEFEPIME 2 G: 2 INJECTION, POWDER, FOR SOLUTION INTRAVENOUS at 08:33

## 2023-01-01 RX ADMIN — PREGABALIN 75 MG: 20 SOLUTION ORAL at 09:03

## 2023-01-01 RX ADMIN — HEPARIN SODIUM 5000 UNITS: 5000 INJECTION, SOLUTION INTRAVENOUS; SUBCUTANEOUS at 11:09

## 2023-01-01 RX ADMIN — BUMETANIDE 1 MG: 0.5 TABLET ORAL at 16:36

## 2023-01-01 RX ADMIN — ATOVAQUONE 1500 MG: 750 SUSPENSION ORAL at 08:17

## 2023-01-01 RX ADMIN — DEXMEDETOMIDINE HYDROCHLORIDE 1 MCG/KG/HR: 400 INJECTION INTRAVENOUS at 19:06

## 2023-01-01 RX ADMIN — ACETAMINOPHEN 650 MG: 650 SOLUTION ORAL at 12:28

## 2023-01-01 RX ADMIN — DEXTROSE MONOHYDRATE 25 G: 25 INJECTION, SOLUTION INTRAVENOUS at 16:57

## 2023-01-01 RX ADMIN — SERTRALINE HYDROCHLORIDE 150 MG: 100 TABLET ORAL at 07:40

## 2023-01-01 RX ADMIN — ENOXAPARIN SODIUM 40 MG: 40 INJECTION SUBCUTANEOUS at 21:03

## 2023-01-01 RX ADMIN — ASPIRIN 81 MG CHEWABLE TABLET 81 MG: 81 TABLET CHEWABLE at 08:17

## 2023-01-01 RX ADMIN — PREGABALIN 75 MG: 75 CAPSULE ORAL at 20:47

## 2023-01-01 RX ADMIN — SULFAMETHOXAZOLE AND TRIMETHOPRIM 160 MG: 200; 40 SUSPENSION ORAL at 08:46

## 2023-01-01 RX ADMIN — Medication 50 MCG: at 08:15

## 2023-01-01 RX ADMIN — Medication 1 PACKET: at 09:31

## 2023-01-01 RX ADMIN — MICONAZOLE NITRATE: 20 CREAM TOPICAL at 09:25

## 2023-01-01 RX ADMIN — EPOPROSTENOL 10 NG/KG/MIN: 1.5 INJECTION, POWDER, LYOPHILIZED, FOR SOLUTION INTRAVENOUS at 12:11

## 2023-01-01 RX ADMIN — ATOVAQUONE 1500 MG: 750 SUSPENSION ORAL at 08:42

## 2023-01-01 RX ADMIN — INSULIN ASPART 1 UNITS: 100 INJECTION, SOLUTION INTRAVENOUS; SUBCUTANEOUS at 04:53

## 2023-01-01 RX ADMIN — LEVALBUTEROL HYDROCHLORIDE 1.25 MG: 1.25 SOLUTION RESPIRATORY (INHALATION) at 08:10

## 2023-01-01 RX ADMIN — Medication 1 PACKET: at 23:23

## 2023-01-01 RX ADMIN — HYDROXYZINE HYDROCHLORIDE 25 MG: 25 TABLET ORAL at 19:24

## 2023-01-01 RX ADMIN — METOPROLOL TARTRATE 12.5 MG: 100 TABLET, FILM COATED ORAL at 21:12

## 2023-01-01 RX ADMIN — AMLODIPINE BESYLATE 2.5 MG: 2.5 TABLET ORAL at 08:18

## 2023-01-01 RX ADMIN — EPOPROSTENOL 20 NG/KG/MIN: 1.5 INJECTION, POWDER, LYOPHILIZED, FOR SOLUTION INTRAVENOUS at 16:06

## 2023-01-01 RX ADMIN — EPOPROSTENOL 20 NG/KG/MIN: 1.5 INJECTION, POWDER, LYOPHILIZED, FOR SOLUTION INTRAVENOUS at 23:45

## 2023-01-01 RX ADMIN — LEVALBUTEROL HYDROCHLORIDE 1.25 MG: 1.25 SOLUTION RESPIRATORY (INHALATION) at 11:17

## 2023-01-01 RX ADMIN — SODIUM CHLORIDE, POTASSIUM CHLORIDE, SODIUM LACTATE AND CALCIUM CHLORIDE 1000 ML: 600; 310; 30; 20 INJECTION, SOLUTION INTRAVENOUS at 04:13

## 2023-01-01 RX ADMIN — ACETAMINOPHEN 650 MG: 650 SOLUTION ORAL at 17:18

## 2023-01-01 RX ADMIN — ACETAMINOPHEN 650 MG: 650 SOLUTION ORAL at 20:55

## 2023-01-01 RX ADMIN — SIMETHICONE 40 MG: 20 EMULSION ORAL at 20:50

## 2023-01-01 RX ADMIN — PROPOFOL 20 MCG/KG/MIN: 10 INJECTION, EMULSION INTRAVENOUS at 19:49

## 2023-01-01 RX ADMIN — HYDROXYZINE HYDROCHLORIDE 25 MG: 25 TABLET ORAL at 23:23

## 2023-01-01 RX ADMIN — DEXAMETHASONE SODIUM PHOSPHATE 4 MG: 4 INJECTION, SOLUTION INTRA-ARTICULAR; INTRALESIONAL; INTRAMUSCULAR; INTRAVENOUS; SOFT TISSUE at 11:10

## 2023-01-01 RX ADMIN — ANORECTAL OINTMENT 0.44 G: 15.7; .44; 24; 20.6 OINTMENT TOPICAL at 09:14

## 2023-01-01 RX ADMIN — LEVOTHYROXINE SODIUM 150 MCG: 0.07 TABLET ORAL at 06:56

## 2023-01-01 RX ADMIN — MIDAZOLAM HYDROCHLORIDE 7 MG/HR: 1 INJECTION, SOLUTION INTRAVENOUS at 00:48

## 2023-01-01 RX ADMIN — Medication 1 PACKET: at 08:41

## 2023-01-01 RX ADMIN — Medication 9.6 MG: at 12:26

## 2023-01-01 RX ADMIN — VASOPRESSIN 2.4 UNITS/HR: 20 INJECTION INTRAVENOUS at 11:30

## 2023-01-01 RX ADMIN — SODIUM ZIRCONIUM CYCLOSILICATE 10 G: 5 POWDER, FOR SUSPENSION ORAL at 11:22

## 2023-01-01 RX ADMIN — ATOVAQUONE 1500 MG: 750 SUSPENSION ORAL at 09:29

## 2023-01-01 RX ADMIN — INSULIN ASPART 1 UNITS: 100 INJECTION, SOLUTION INTRAVENOUS; SUBCUTANEOUS at 11:46

## 2023-01-01 RX ADMIN — CEFEPIME 2 G: 2 INJECTION, POWDER, FOR SOLUTION INTRAVENOUS at 22:30

## 2023-01-01 RX ADMIN — Medication 5 ML: at 08:41

## 2023-01-01 RX ADMIN — HYDRALAZINE HYDROCHLORIDE 25 MG: 25 TABLET, FILM COATED ORAL at 20:20

## 2023-01-01 RX ADMIN — IPRATROPIUM BROMIDE AND ALBUTEROL SULFATE 3 ML: .5; 3 SOLUTION RESPIRATORY (INHALATION) at 07:45

## 2023-01-01 RX ADMIN — Medication 1 SPRAY: at 16:27

## 2023-01-01 RX ADMIN — MINERAL OIL AND PETROLATUM: 150; 830 OINTMENT OPHTHALMIC at 20:24

## 2023-01-01 RX ADMIN — IPRATROPIUM BROMIDE AND ALBUTEROL SULFATE 3 ML: 2.5; .5 SOLUTION RESPIRATORY (INHALATION) at 11:17

## 2023-01-01 RX ADMIN — SIMETHICONE 40 MG: 20 EMULSION ORAL at 22:00

## 2023-01-01 RX ADMIN — HYDRALAZINE HYDROCHLORIDE 25 MG: 25 TABLET, FILM COATED ORAL at 14:17

## 2023-01-01 RX ADMIN — QUETIAPINE 150 MG: 100 TABLET ORAL at 08:31

## 2023-01-01 RX ADMIN — METRONIDAZOLE 500 MG: 500 INJECTION, SOLUTION INTRAVENOUS at 11:11

## 2023-01-01 RX ADMIN — INSULIN ASPART 1 UNITS: 100 INJECTION, SOLUTION INTRAVENOUS; SUBCUTANEOUS at 16:04

## 2023-01-01 RX ADMIN — HYDRALAZINE HYDROCHLORIDE 25 MG: 25 TABLET, FILM COATED ORAL at 16:15

## 2023-01-01 RX ADMIN — PREDNISONE 60 MG: 20 TABLET ORAL at 08:43

## 2023-01-01 RX ADMIN — IPRATROPIUM BROMIDE AND ALBUTEROL SULFATE 3 ML: .5; 3 SOLUTION RESPIRATORY (INHALATION) at 11:08

## 2023-01-01 RX ADMIN — EPOPROSTENOL 20 NG/KG/MIN: 1.5 INJECTION, POWDER, LYOPHILIZED, FOR SOLUTION INTRAVENOUS at 19:30

## 2023-01-01 RX ADMIN — Medication 1 SPRAY: at 17:04

## 2023-01-01 RX ADMIN — HYDROMORPHONE HYDROCHLORIDE 0.2 MG: 0.2 INJECTION, SOLUTION INTRAMUSCULAR; INTRAVENOUS; SUBCUTANEOUS at 20:30

## 2023-01-01 RX ADMIN — PREGABALIN 75 MG: 20 SOLUTION ORAL at 09:22

## 2023-01-01 RX ADMIN — PREDNISONE 50 MG: 50 TABLET ORAL at 17:02

## 2023-01-01 RX ADMIN — ACETYLCYSTEINE 2 ML: 200 SOLUTION ORAL; RESPIRATORY (INHALATION) at 08:00

## 2023-01-01 RX ADMIN — PREGABALIN 75 MG: 20 SOLUTION ORAL at 09:30

## 2023-01-01 RX ADMIN — CLOTRIMAZOLE: 0.01 CREAM TOPICAL at 20:49

## 2023-01-01 RX ADMIN — POTASSIUM CHLORIDE 20 MEQ: 29.8 INJECTION, SOLUTION INTRAVENOUS at 06:32

## 2023-01-01 RX ADMIN — PREDNISONE 60 MG: 20 TABLET ORAL at 16:39

## 2023-01-01 RX ADMIN — MIDODRINE HYDROCHLORIDE 10 MG: 10 TABLET ORAL at 15:11

## 2023-01-01 RX ADMIN — EPOPROSTENOL 20 NG/KG/MIN: 1.5 INJECTION, POWDER, LYOPHILIZED, FOR SOLUTION INTRAVENOUS at 02:24

## 2023-01-01 RX ADMIN — MEROPENEM 500 MG: 500 INJECTION, POWDER, FOR SOLUTION INTRAVENOUS at 06:04

## 2023-01-01 RX ADMIN — SERTRALINE HYDROCHLORIDE 150 MG: 100 TABLET ORAL at 09:26

## 2023-01-01 RX ADMIN — VASOPRESSIN 1.6 UNITS/HR: 20 INJECTION INTRAVENOUS at 11:23

## 2023-01-01 RX ADMIN — NOREPINEPHRINE BITARTRATE 0.45 MCG/KG/MIN: 1 INJECTION, SOLUTION, CONCENTRATE INTRAVENOUS at 11:58

## 2023-01-01 RX ADMIN — OXYCODONE HYDROCHLORIDE 5 MG: 5 TABLET ORAL at 21:39

## 2023-01-01 RX ADMIN — IPRATROPIUM BROMIDE AND ALBUTEROL SULFATE 3 ML: 2.5; .5 SOLUTION RESPIRATORY (INHALATION) at 12:04

## 2023-01-01 RX ADMIN — HYDRALAZINE HYDROCHLORIDE 10 MG: 20 INJECTION INTRAMUSCULAR; INTRAVENOUS at 06:12

## 2023-01-01 RX ADMIN — ALBUTEROL SULFATE 2.5 MG: 2.5 SOLUTION RESPIRATORY (INHALATION) at 20:00

## 2023-01-01 RX ADMIN — ASPIRIN 81 MG CHEWABLE TABLET 81 MG: 81 TABLET CHEWABLE at 08:14

## 2023-01-01 RX ADMIN — AMLODIPINE BESYLATE 5 MG: 5 TABLET ORAL at 08:26

## 2023-01-01 RX ADMIN — NOREPINEPHRINE BITARTRATE 0.11 MCG/KG/MIN: 0.02 INJECTION, SOLUTION INTRAVENOUS at 02:04

## 2023-01-01 RX ADMIN — ALBUTEROL SULFATE 2.5 MG: 2.5 SOLUTION RESPIRATORY (INHALATION) at 20:21

## 2023-01-01 RX ADMIN — PREGABALIN 75 MG: 20 SOLUTION ORAL at 11:15

## 2023-01-01 RX ADMIN — IPRATROPIUM BROMIDE AND ALBUTEROL SULFATE 3 ML: 2.5; .5 SOLUTION RESPIRATORY (INHALATION) at 12:45

## 2023-01-01 RX ADMIN — AMLODIPINE BESYLATE 5 MG: 5 TABLET ORAL at 08:36

## 2023-01-01 RX ADMIN — QUETIAPINE 150 MG: 100 TABLET ORAL at 08:02

## 2023-01-01 RX ADMIN — PREGABALIN 75 MG: 75 CAPSULE ORAL at 20:25

## 2023-01-01 RX ADMIN — DEXMEDETOMIDINE HYDROCHLORIDE 0.4 MCG/KG/HR: 400 INJECTION INTRAVENOUS at 19:48

## 2023-01-01 RX ADMIN — LOPERAMIDE HCL 2 MG: 1 SOLUTION ORAL at 11:51

## 2023-01-01 RX ADMIN — LOPERAMIDE HCL 2 MG: 1 SOLUTION ORAL at 06:00

## 2023-01-01 RX ADMIN — METOPROLOL TARTRATE 12.5 MG: 100 TABLET, FILM COATED ORAL at 09:29

## 2023-01-01 RX ADMIN — CHLORHEXIDINE GLUCONATE 15 ML: 1.2 SOLUTION ORAL at 20:12

## 2023-01-01 RX ADMIN — CHLORHEXIDINE GLUCONATE 15 ML: 1.2 SOLUTION ORAL at 19:32

## 2023-01-01 RX ADMIN — CHLORHEXIDINE GLUCONATE 0.12% ORAL RINSE 15 ML: 1.2 LIQUID ORAL at 08:33

## 2023-01-01 RX ADMIN — MICONAZOLE NITRATE: 20 CREAM TOPICAL at 21:05

## 2023-01-01 RX ADMIN — TORSEMIDE 20 MG: 20 TABLET ORAL at 09:27

## 2023-01-01 RX ADMIN — IPRATROPIUM BROMIDE AND ALBUTEROL SULFATE 3 ML: .5; 3 SOLUTION RESPIRATORY (INHALATION) at 08:06

## 2023-01-01 RX ADMIN — ALBUTEROL SULFATE 2.5 MG: 2.5 SOLUTION RESPIRATORY (INHALATION) at 11:27

## 2023-01-01 RX ADMIN — QUETIAPINE FUMARATE 150 MG: 50 TABLET, FILM COATED ORAL at 09:21

## 2023-01-01 RX ADMIN — HEPARIN SODIUM 5000 UNITS: 5000 INJECTION, SOLUTION INTRAVENOUS; SUBCUTANEOUS at 14:13

## 2023-01-01 RX ADMIN — Medication 50 MCG/HR: at 13:53

## 2023-01-01 RX ADMIN — INSULIN ASPART 2 UNITS: 100 INJECTION, SOLUTION INTRAVENOUS; SUBCUTANEOUS at 00:48

## 2023-01-01 RX ADMIN — MIDAZOLAM HYDROCHLORIDE 2 MG: 1 INJECTION, SOLUTION INTRAMUSCULAR; INTRAVENOUS at 06:28

## 2023-01-01 RX ADMIN — ACETAMINOPHEN 650 MG: 325 TABLET, FILM COATED ORAL at 04:17

## 2023-01-01 RX ADMIN — MICONAZOLE NITRATE: 20 CREAM TOPICAL at 10:32

## 2023-01-01 RX ADMIN — DOCUSATE SODIUM 50 MG: 50 LIQUID ORAL at 08:29

## 2023-01-01 RX ADMIN — IPRATROPIUM BROMIDE AND ALBUTEROL SULFATE 3 ML: 2.5; .5 SOLUTION RESPIRATORY (INHALATION) at 15:16

## 2023-01-01 RX ADMIN — SULFAMETHOXAZOLE AND TRIMETHOPRIM 160 MG: 200; 40 SUSPENSION ORAL at 09:30

## 2023-01-01 RX ADMIN — HYDROCORTISONE SODIUM SUCCINATE 50 MG: 100 INJECTION, POWDER, FOR SOLUTION INTRAMUSCULAR; INTRAVENOUS at 11:09

## 2023-01-01 RX ADMIN — BUMETANIDE 0.5 MG/HR: 0.25 INJECTION INTRAMUSCULAR; INTRAVENOUS at 13:21

## 2023-01-01 RX ADMIN — ASPIRIN 81 MG CHEWABLE TABLET 81 MG: 81 TABLET CHEWABLE at 08:48

## 2023-01-01 RX ADMIN — WHITE PETROLATUM 57.7 %-MINERAL OIL 31.9 % EYE OINTMENT: at 08:09

## 2023-01-01 RX ADMIN — ATORVASTATIN CALCIUM 20 MG: 20 TABLET, FILM COATED ORAL at 21:13

## 2023-01-01 RX ADMIN — IPRATROPIUM BROMIDE AND ALBUTEROL SULFATE 3 ML: .5; 3 SOLUTION RESPIRATORY (INHALATION) at 20:39

## 2023-01-01 RX ADMIN — Medication 0.4 MG/HR: at 06:11

## 2023-01-01 RX ADMIN — POTASSIUM CHLORIDE 40 MEQ: 20 SOLUTION ORAL at 09:57

## 2023-01-01 RX ADMIN — ACETYLCYSTEINE 2 ML: 200 SOLUTION ORAL; RESPIRATORY (INHALATION) at 07:17

## 2023-01-01 RX ADMIN — SODIUM CHLORIDE 250 MG: 9 INJECTION, SOLUTION INTRAVENOUS at 17:58

## 2023-01-01 RX ADMIN — ATORVASTATIN CALCIUM 20 MG: 20 TABLET, FILM COATED ORAL at 21:47

## 2023-01-01 RX ADMIN — Medication 1 PACKET: at 22:42

## 2023-01-01 RX ADMIN — SERTRALINE HYDROCHLORIDE 150 MG: 100 TABLET ORAL at 08:04

## 2023-01-01 RX ADMIN — PREGABALIN 75 MG: 20 SOLUTION ORAL at 09:27

## 2023-01-01 RX ADMIN — IPRATROPIUM BROMIDE AND ALBUTEROL SULFATE 3 ML: .5; 3 SOLUTION RESPIRATORY (INHALATION) at 20:24

## 2023-01-01 RX ADMIN — HEPARIN SODIUM 5000 UNITS: 5000 INJECTION, SOLUTION INTRAVENOUS; SUBCUTANEOUS at 08:27

## 2023-01-01 RX ADMIN — ACETAMINOPHEN 650 MG: 650 SOLUTION ORAL at 09:32

## 2023-01-01 RX ADMIN — HEPARIN SODIUM 5000 UNITS: 5000 INJECTION, SOLUTION INTRAVENOUS; SUBCUTANEOUS at 06:28

## 2023-01-01 RX ADMIN — IPRATROPIUM BROMIDE AND ALBUTEROL SULFATE 3 ML: 2.5; .5 SOLUTION RESPIRATORY (INHALATION) at 20:36

## 2023-01-01 RX ADMIN — SIMETHICONE 40 MG: 20 EMULSION ORAL at 14:43

## 2023-01-01 RX ADMIN — AMLODIPINE BESYLATE 5 MG: 5 TABLET ORAL at 09:27

## 2023-01-01 RX ADMIN — IPRATROPIUM BROMIDE AND ALBUTEROL SULFATE 3 ML: .5; 3 SOLUTION RESPIRATORY (INHALATION) at 09:02

## 2023-01-01 RX ADMIN — AMLODIPINE BESYLATE 5 MG: 5 TABLET ORAL at 21:58

## 2023-01-01 RX ADMIN — ACETAMINOPHEN 650 MG: 325 TABLET, FILM COATED ORAL at 04:29

## 2023-01-01 RX ADMIN — MINERAL OIL AND PETROLATUM: 150; 830 OINTMENT OPHTHALMIC at 21:39

## 2023-01-01 RX ADMIN — DEXMEDETOMIDINE HYDROCHLORIDE 0.8 MCG/KG/HR: 400 INJECTION INTRAVENOUS at 13:39

## 2023-01-01 RX ADMIN — CHLORHEXIDINE GLUCONATE 15 ML: 1.2 SOLUTION ORAL at 08:02

## 2023-01-01 RX ADMIN — PROPOFOL 30 MCG/KG/MIN: 10 INJECTION, EMULSION INTRAVENOUS at 22:31

## 2023-01-01 RX ADMIN — OXYCODONE HYDROCHLORIDE 5 MG: 5 TABLET ORAL at 13:43

## 2023-01-01 RX ADMIN — TORSEMIDE 20 MG: 20 TABLET ORAL at 08:21

## 2023-01-01 RX ADMIN — LEVOTHYROXINE SODIUM 150 MCG: 0.07 TABLET ORAL at 06:11

## 2023-01-01 RX ADMIN — HYDRALAZINE HYDROCHLORIDE 50 MG: 50 TABLET, FILM COATED ORAL at 08:47

## 2023-01-01 RX ADMIN — MICONAZOLE NITRATE: 20 CREAM TOPICAL at 09:29

## 2023-01-01 RX ADMIN — WHITE PETROLATUM 57.7 %-MINERAL OIL 31.9 % EYE OINTMENT: at 15:40

## 2023-01-01 RX ADMIN — ATORVASTATIN CALCIUM 20 MG: 20 TABLET, FILM COATED ORAL at 20:51

## 2023-01-01 RX ADMIN — LEVALBUTEROL HYDROCHLORIDE 1.25 MG: 1.25 SOLUTION RESPIRATORY (INHALATION) at 20:13

## 2023-01-01 RX ADMIN — IPRATROPIUM BROMIDE AND ALBUTEROL SULFATE 3 ML: .5; 3 SOLUTION RESPIRATORY (INHALATION) at 08:26

## 2023-01-01 RX ADMIN — PREGABALIN 75 MG: 20 SOLUTION ORAL at 08:03

## 2023-01-01 RX ADMIN — ASPIRIN 81 MG: 81 TABLET, CHEWABLE ORAL at 08:36

## 2023-01-01 RX ADMIN — CETIRIZINE HYDROCHLORIDE 10 MG: 10 TABLET ORAL at 13:23

## 2023-01-01 RX ADMIN — IPRATROPIUM BROMIDE AND ALBUTEROL SULFATE 3 ML: .5; 3 SOLUTION RESPIRATORY (INHALATION) at 19:27

## 2023-01-01 RX ADMIN — ASPIRIN 81 MG: 81 TABLET, COATED ORAL at 08:27

## 2023-01-01 RX ADMIN — HEPARIN SODIUM 5000 UNITS: 5000 INJECTION, SOLUTION INTRAVENOUS; SUBCUTANEOUS at 05:15

## 2023-01-01 RX ADMIN — ATOVAQUONE 1500 MG: 750 SUSPENSION ORAL at 08:33

## 2023-01-01 RX ADMIN — LIDOCAINE: 50 OINTMENT TOPICAL at 16:11

## 2023-01-01 RX ADMIN — ACETYLCYSTEINE 2 ML: 200 SOLUTION ORAL; RESPIRATORY (INHALATION) at 20:22

## 2023-01-01 RX ADMIN — OXYCODONE HYDROCHLORIDE 5 MG: 5 TABLET ORAL at 05:10

## 2023-01-01 RX ADMIN — IPRATROPIUM BROMIDE AND ALBUTEROL SULFATE 3 ML: .5; 3 SOLUTION RESPIRATORY (INHALATION) at 15:14

## 2023-01-01 RX ADMIN — POTASSIUM CHLORIDE 20 MEQ: 1500 TABLET, EXTENDED RELEASE ORAL at 09:38

## 2023-01-01 RX ADMIN — CLOTRIMAZOLE: 0.01 CREAM TOPICAL at 08:54

## 2023-01-01 RX ADMIN — ACETYLCYSTEINE 2 ML: 200 SOLUTION ORAL; RESPIRATORY (INHALATION) at 11:52

## 2023-01-01 RX ADMIN — LEVOTHYROXINE SODIUM 150 MCG: 75 TABLET ORAL at 06:28

## 2023-01-01 RX ADMIN — MINERAL OIL AND PETROLATUM: 150; 830 OINTMENT OPHTHALMIC at 04:39

## 2023-01-01 RX ADMIN — CLOTRIMAZOLE: 0.01 CREAM TOPICAL at 08:18

## 2023-01-01 RX ADMIN — METOPROLOL TARTRATE 12.5 MG: 100 TABLET, FILM COATED ORAL at 08:19

## 2023-01-01 RX ADMIN — HYDROXYZINE HYDROCHLORIDE 25 MG: 25 TABLET ORAL at 16:44

## 2023-01-01 RX ADMIN — HYDROMORPHONE HYDROCHLORIDE 0.3 MG: 0.2 INJECTION, SOLUTION INTRAMUSCULAR; INTRAVENOUS; SUBCUTANEOUS at 03:55

## 2023-01-01 RX ADMIN — Medication 1 PACKET: at 21:27

## 2023-01-01 RX ADMIN — CLOTRIMAZOLE: 0.01 CREAM TOPICAL at 08:15

## 2023-01-01 RX ADMIN — ACETAMINOPHEN 650 MG: 650 SOLUTION ORAL at 21:46

## 2023-01-01 RX ADMIN — INSULIN ASPART 2 UNITS: 100 INJECTION, SOLUTION INTRAVENOUS; SUBCUTANEOUS at 20:21

## 2023-01-01 RX ADMIN — QUETIAPINE FUMARATE 100 MG: 50 TABLET ORAL at 21:47

## 2023-01-01 RX ADMIN — NOREPINEPHRINE BITARTRATE 0.3 MCG/KG/MIN: 0.02 INJECTION, SOLUTION INTRAVENOUS at 15:51

## 2023-01-01 RX ADMIN — ACETYLCYSTEINE 2 ML: 200 SOLUTION ORAL; RESPIRATORY (INHALATION) at 19:26

## 2023-01-01 RX ADMIN — PREGABALIN 75 MG: 20 SOLUTION ORAL at 09:33

## 2023-01-01 RX ADMIN — INSULIN ASPART 1 UNITS: 100 INJECTION, SOLUTION INTRAVENOUS; SUBCUTANEOUS at 20:13

## 2023-01-01 RX ADMIN — AZITHROMYCIN MONOHYDRATE 250 MG: 500 INJECTION, POWDER, LYOPHILIZED, FOR SOLUTION INTRAVENOUS at 16:04

## 2023-01-01 RX ADMIN — IPRATROPIUM BROMIDE AND ALBUTEROL SULFATE 3 ML: 2.5; .5 SOLUTION RESPIRATORY (INHALATION) at 15:06

## 2023-01-01 RX ADMIN — INSULIN ASPART 1 UNITS: 100 INJECTION, SOLUTION INTRAVENOUS; SUBCUTANEOUS at 23:57

## 2023-01-01 RX ADMIN — Medication 15 ML: at 08:21

## 2023-01-01 RX ADMIN — ACETYLCYSTEINE 2 ML: 200 SOLUTION ORAL; RESPIRATORY (INHALATION) at 07:58

## 2023-01-01 RX ADMIN — QUETIAPINE 150 MG: 100 TABLET ORAL at 08:38

## 2023-01-01 RX ADMIN — LEVOTHYROXINE SODIUM 150 MCG: 0.07 TABLET ORAL at 05:53

## 2023-01-01 RX ADMIN — HEPARIN SODIUM 5000 UNITS: 5000 INJECTION, SOLUTION INTRAVENOUS; SUBCUTANEOUS at 13:21

## 2023-01-01 RX ADMIN — MEROPENEM 1 G: 1 INJECTION, POWDER, FOR SOLUTION INTRAVENOUS at 22:14

## 2023-01-01 RX ADMIN — MIDAZOLAM HYDROCHLORIDE 2 MG: 1 INJECTION, SOLUTION INTRAMUSCULAR; INTRAVENOUS at 23:37

## 2023-01-01 RX ADMIN — Medication 5 ML: at 08:03

## 2023-01-01 RX ADMIN — MIDAZOLAM HYDROCHLORIDE 1 MG: 1 INJECTION, SOLUTION INTRAMUSCULAR; INTRAVENOUS at 00:06

## 2023-01-01 RX ADMIN — ROCURONIUM BROMIDE 20 MG: 50 INJECTION, SOLUTION INTRAVENOUS at 11:29

## 2023-01-01 RX ADMIN — MEROPENEM 500 MG: 500 INJECTION, POWDER, FOR SOLUTION INTRAVENOUS at 21:07

## 2023-01-01 RX ADMIN — TORSEMIDE 20 MG: 20 TABLET ORAL at 09:21

## 2023-01-01 RX ADMIN — SIMETHICONE 40 MG: 20 EMULSION ORAL at 13:21

## 2023-01-01 RX ADMIN — INSULIN ASPART 1 UNITS: 100 INJECTION, SOLUTION INTRAVENOUS; SUBCUTANEOUS at 17:58

## 2023-01-01 RX ADMIN — HYDRALAZINE HYDROCHLORIDE 20 MG: 20 INJECTION INTRAMUSCULAR; INTRAVENOUS at 08:47

## 2023-01-01 RX ADMIN — WHITE PETROLATUM: 1.75 OINTMENT TOPICAL at 09:19

## 2023-01-01 RX ADMIN — SODIUM CHLORIDE SOLN NEBU 3% 3 ML: 3 NEBU SOLN at 16:58

## 2023-01-01 RX ADMIN — IPRATROPIUM BROMIDE AND ALBUTEROL SULFATE 3 ML: .5; 3 SOLUTION RESPIRATORY (INHALATION) at 20:31

## 2023-01-01 RX ADMIN — ASPIRIN 81 MG: 81 TABLET, CHEWABLE ORAL at 09:40

## 2023-01-01 RX ADMIN — LIDOCAINE: 50 OINTMENT TOPICAL at 08:50

## 2023-01-01 RX ADMIN — HYDROMORPHONE HYDROCHLORIDE 0.3 MG: 0.2 INJECTION, SOLUTION INTRAMUSCULAR; INTRAVENOUS; SUBCUTANEOUS at 09:31

## 2023-01-01 RX ADMIN — IPRATROPIUM BROMIDE AND ALBUTEROL SULFATE 3 ML: 2.5; .5 SOLUTION RESPIRATORY (INHALATION) at 11:24

## 2023-01-01 RX ADMIN — CHLORHEXIDINE GLUCONATE 0.12% ORAL RINSE 15 ML: 1.2 LIQUID ORAL at 20:59

## 2023-01-01 RX ADMIN — ALBUTEROL SULFATE 2.5 MG: 2.5 SOLUTION RESPIRATORY (INHALATION) at 20:42

## 2023-01-01 RX ADMIN — PROPOFOL 120 MG: 10 INJECTION, EMULSION INTRAVENOUS at 09:56

## 2023-01-01 RX ADMIN — HYDRALAZINE HYDROCHLORIDE 25 MG: 25 TABLET, FILM COATED ORAL at 16:08

## 2023-01-01 RX ADMIN — Medication 1 PACKET: at 22:06

## 2023-01-01 RX ADMIN — LEVOTHYROXINE SODIUM 150 MCG: 75 TABLET ORAL at 06:32

## 2023-01-01 RX ADMIN — HEPARIN SODIUM 5000 UNITS: 5000 INJECTION, SOLUTION INTRAVENOUS; SUBCUTANEOUS at 08:01

## 2023-01-01 RX ADMIN — Medication 40 MG: at 09:29

## 2023-01-01 RX ADMIN — ASPIRIN 81 MG CHEWABLE TABLET 81 MG: 81 TABLET CHEWABLE at 07:52

## 2023-01-01 RX ADMIN — ACETYLCYSTEINE 2 ML: 200 SOLUTION ORAL; RESPIRATORY (INHALATION) at 08:11

## 2023-01-01 RX ADMIN — CHLORHEXIDINE GLUCONATE 15 ML: 1.2 SOLUTION ORAL at 20:41

## 2023-01-01 RX ADMIN — ISOSORBIDE DINITRATE 20 MG: 20 TABLET ORAL at 13:04

## 2023-01-01 RX ADMIN — ATORVASTATIN CALCIUM 20 MG: 20 TABLET, FILM COATED ORAL at 19:56

## 2023-01-01 RX ADMIN — ATOVAQUONE 1500 MG: 750 SUSPENSION ORAL at 08:12

## 2023-01-01 RX ADMIN — PREDNISONE 50 MG: 50 TABLET ORAL at 08:31

## 2023-01-01 RX ADMIN — HYDRALAZINE HYDROCHLORIDE 25 MG: 25 TABLET, FILM COATED ORAL at 21:08

## 2023-01-01 RX ADMIN — LEVOTHYROXINE SODIUM 150 MCG: 150 TABLET ORAL at 05:34

## 2023-01-01 RX ADMIN — Medication 5 ML: at 13:20

## 2023-01-01 RX ADMIN — SERTRALINE HYDROCHLORIDE 150 MG: 20 SOLUTION ORAL at 08:46

## 2023-01-01 RX ADMIN — CHLORHEXIDINE GLUCONATE 15 ML: 1.2 SOLUTION ORAL at 20:00

## 2023-01-01 RX ADMIN — PREDNISONE 60 MG: 50 TABLET ORAL at 08:58

## 2023-01-01 RX ADMIN — IPRATROPIUM BROMIDE AND ALBUTEROL SULFATE 3 ML: 2.5; .5 SOLUTION RESPIRATORY (INHALATION) at 07:24

## 2023-01-01 RX ADMIN — SERTRALINE HYDROCHLORIDE 150 MG: 100 TABLET ORAL at 08:48

## 2023-01-01 RX ADMIN — ACETYLCYSTEINE 2 ML: 200 SOLUTION ORAL; RESPIRATORY (INHALATION) at 20:35

## 2023-01-01 RX ADMIN — FUROSEMIDE 60 MG: 10 INJECTION, SOLUTION INTRAMUSCULAR; INTRAVENOUS at 02:01

## 2023-01-01 RX ADMIN — LEVALBUTEROL HYDROCHLORIDE 1.25 MG: 1.25 SOLUTION RESPIRATORY (INHALATION) at 20:29

## 2023-01-01 RX ADMIN — ACETAMINOPHEN 975 MG: 325 TABLET, FILM COATED ORAL at 16:57

## 2023-01-01 RX ADMIN — SODIUM ZIRCONIUM CYCLOSILICATE 10 G: 10 POWDER, FOR SUSPENSION ORAL at 12:09

## 2023-01-01 RX ADMIN — QUETIAPINE 150 MG: 100 TABLET ORAL at 08:48

## 2023-01-01 RX ADMIN — PROPOFOL 20 MCG/KG/MIN: 10 INJECTION, EMULSION INTRAVENOUS at 03:33

## 2023-01-01 RX ADMIN — PREDNISONE 60 MG: 50 TABLET ORAL at 08:27

## 2023-01-01 RX ADMIN — QUETIAPINE FUMARATE 150 MG: 50 TABLET, FILM COATED ORAL at 08:47

## 2023-01-01 RX ADMIN — IPRATROPIUM BROMIDE AND ALBUTEROL SULFATE 3 ML: .5; 3 SOLUTION RESPIRATORY (INHALATION) at 08:41

## 2023-01-01 RX ADMIN — HEPARIN SODIUM 5000 UNITS: 5000 INJECTION, SOLUTION INTRAVENOUS; SUBCUTANEOUS at 20:07

## 2023-01-01 RX ADMIN — AMLODIPINE BESYLATE 5 MG: 5 TABLET ORAL at 20:53

## 2023-01-01 RX ADMIN — AMLODIPINE BESYLATE 5 MG: 5 TABLET ORAL at 08:31

## 2023-01-01 RX ADMIN — HYDRALAZINE HYDROCHLORIDE 25 MG: 25 TABLET, FILM COATED ORAL at 14:08

## 2023-01-01 RX ADMIN — ISOSORBIDE DINITRATE 20 MG: 20 TABLET ORAL at 12:07

## 2023-01-01 RX ADMIN — HEPARIN SODIUM 5000 UNITS: 5000 INJECTION, SOLUTION INTRAVENOUS; SUBCUTANEOUS at 14:33

## 2023-01-01 RX ADMIN — ACETAMINOPHEN 975 MG: 325 TABLET, FILM COATED ORAL at 17:16

## 2023-01-01 RX ADMIN — MICONAZOLE NITRATE: 20 CREAM TOPICAL at 20:45

## 2023-01-01 RX ADMIN — NOREPINEPHRINE BITARTRATE 0.39 MCG/KG/MIN: 1 INJECTION, SOLUTION, CONCENTRATE INTRAVENOUS at 03:15

## 2023-01-01 RX ADMIN — SERTRALINE HYDROCHLORIDE 150 MG: 20 SOLUTION ORAL at 08:12

## 2023-01-01 RX ADMIN — OXYCODONE HYDROCHLORIDE 5 MG: 5 TABLET ORAL at 03:06

## 2023-01-01 RX ADMIN — LEVOTHYROXINE SODIUM 150 MCG: 150 TABLET ORAL at 06:08

## 2023-01-01 RX ADMIN — INSULIN ASPART 1 UNITS: 100 INJECTION, SOLUTION INTRAVENOUS; SUBCUTANEOUS at 16:01

## 2023-01-01 RX ADMIN — SODIUM CHLORIDE SOLN NEBU 3% 3 ML: 3 NEBU SOLN at 07:19

## 2023-01-01 RX ADMIN — PROPOFOL 45 MCG/KG/MIN: 10 INJECTION, EMULSION INTRAVENOUS at 16:21

## 2023-01-01 RX ADMIN — HYDRALAZINE HYDROCHLORIDE 50 MG: 50 TABLET, FILM COATED ORAL at 17:16

## 2023-01-01 RX ADMIN — INSULIN ASPART 1 UNITS: 100 INJECTION, SOLUTION INTRAVENOUS; SUBCUTANEOUS at 12:08

## 2023-01-01 RX ADMIN — AMLODIPINE BESYLATE 2.5 MG: 2.5 TABLET ORAL at 08:02

## 2023-01-01 RX ADMIN — FUROSEMIDE 40 MG: 10 INJECTION, SOLUTION INTRAMUSCULAR; INTRAVENOUS at 11:01

## 2023-01-01 RX ADMIN — ASPIRIN 81 MG: 81 TABLET, COATED ORAL at 18:36

## 2023-01-01 RX ADMIN — METOPROLOL TARTRATE 12.5 MG: 100 TABLET, FILM COATED ORAL at 21:58

## 2023-01-01 RX ADMIN — IPRATROPIUM BROMIDE AND ALBUTEROL SULFATE 3 ML: .5; 3 SOLUTION RESPIRATORY (INHALATION) at 11:05

## 2023-01-01 RX ADMIN — SODIUM CHLORIDE 250 MG: 9 INJECTION, SOLUTION INTRAVENOUS at 18:25

## 2023-01-01 RX ADMIN — Medication 150 MCG/HR: at 12:39

## 2023-01-01 RX ADMIN — DEXMEDETOMIDINE HYDROCHLORIDE 1.1 MCG/KG/HR: 400 INJECTION INTRAVENOUS at 14:54

## 2023-01-01 RX ADMIN — SODIUM CHLORIDE SOLN NEBU 3% 3 ML: 3 NEBU SOLN at 15:41

## 2023-01-01 RX ADMIN — METOPROLOL TARTRATE 12.5 MG: 100 TABLET, FILM COATED ORAL at 09:22

## 2023-01-01 RX ADMIN — POTASSIUM CHLORIDE 40 MEQ: 20 SOLUTION ORAL at 07:40

## 2023-01-01 RX ADMIN — ATORVASTATIN CALCIUM 20 MG: 20 TABLET, FILM COATED ORAL at 20:36

## 2023-01-01 RX ADMIN — HYDRALAZINE HYDROCHLORIDE 50 MG: 50 TABLET, FILM COATED ORAL at 21:13

## 2023-01-01 RX ADMIN — ACETAMINOPHEN 650 MG: 650 SOLUTION ORAL at 08:29

## 2023-01-01 RX ADMIN — Medication 1 PACKET: at 14:59

## 2023-01-01 RX ADMIN — QUETIAPINE 150 MG: 100 TABLET ORAL at 08:41

## 2023-01-01 RX ADMIN — INSULIN ASPART 1 UNITS: 100 INJECTION, SOLUTION INTRAVENOUS; SUBCUTANEOUS at 00:18

## 2023-01-01 RX ADMIN — ENOXAPARIN SODIUM 40 MG: 40 INJECTION SUBCUTANEOUS at 22:16

## 2023-01-01 RX ADMIN — SENNOSIDES AND DOCUSATE SODIUM 1 TABLET: 8.6; 5 TABLET ORAL at 20:23

## 2023-01-01 RX ADMIN — Medication 40 MG: at 07:57

## 2023-01-01 RX ADMIN — GUAIFENESIN 600 MG: 600 TABLET, EXTENDED RELEASE ORAL at 08:37

## 2023-01-01 RX ADMIN — Medication 5 ML: at 08:33

## 2023-01-01 RX ADMIN — QUETIAPINE FUMARATE 150 MG: 50 TABLET, FILM COATED ORAL at 20:42

## 2023-01-01 RX ADMIN — ISOSORBIDE DINITRATE 20 MG: 20 TABLET ORAL at 17:26

## 2023-01-01 RX ADMIN — MICONAZOLE NITRATE: 20 CREAM TOPICAL at 08:40

## 2023-01-01 RX ADMIN — IPRATROPIUM BROMIDE AND ALBUTEROL SULFATE 3 ML: 2.5; .5 SOLUTION RESPIRATORY (INHALATION) at 07:28

## 2023-01-01 RX ADMIN — Medication 1 PACKET: at 15:18

## 2023-01-01 RX ADMIN — Medication 1 PACKET: at 16:15

## 2023-01-01 RX ADMIN — ACETAMINOPHEN 650 MG: 650 SOLUTION ORAL at 16:06

## 2023-01-01 RX ADMIN — HEPARIN SODIUM 5000 UNITS: 5000 INJECTION, SOLUTION INTRAVENOUS; SUBCUTANEOUS at 08:15

## 2023-01-01 RX ADMIN — ISOSORBIDE DINITRATE 20 MG: 20 TABLET ORAL at 08:41

## 2023-01-01 RX ADMIN — DEXMEDETOMIDINE HYDROCHLORIDE 0.6 MCG/KG/HR: 400 INJECTION INTRAVENOUS at 20:06

## 2023-01-01 RX ADMIN — EPOPROSTENOL 20 NG/KG/MIN: 1.5 INJECTION, POWDER, LYOPHILIZED, FOR SOLUTION INTRAVENOUS at 19:22

## 2023-01-01 RX ADMIN — IPRATROPIUM BROMIDE AND ALBUTEROL SULFATE 3 ML: 2.5; .5 SOLUTION RESPIRATORY (INHALATION) at 07:57

## 2023-01-01 RX ADMIN — Medication 50 MCG: at 13:25

## 2023-01-01 RX ADMIN — DEXTROSE 15 G: 15 GEL ORAL at 04:15

## 2023-01-01 RX ADMIN — LIDOCAINE: 50 OINTMENT TOPICAL at 21:05

## 2023-01-01 RX ADMIN — PIPERACILLIN AND TAZOBACTAM 4.5 G: 4; .5 INJECTION, POWDER, FOR SOLUTION INTRAVENOUS at 23:59

## 2023-01-01 RX ADMIN — HYDRALAZINE HYDROCHLORIDE 10 MG: 20 INJECTION INTRAMUSCULAR; INTRAVENOUS at 08:50

## 2023-01-01 RX ADMIN — QUETIAPINE 150 MG: 100 TABLET ORAL at 08:40

## 2023-01-01 RX ADMIN — ASPIRIN 81 MG: 81 TABLET, CHEWABLE ORAL at 09:21

## 2023-01-01 RX ADMIN — HYDRALAZINE HYDROCHLORIDE 25 MG: 25 TABLET, FILM COATED ORAL at 05:13

## 2023-01-01 RX ADMIN — QUETIAPINE FUMARATE 150 MG: 50 TABLET, FILM COATED ORAL at 21:04

## 2023-01-01 RX ADMIN — NOREPINEPHRINE BITARTRATE 0.29 MCG/KG/MIN: 0.02 INJECTION, SOLUTION INTRAVENOUS at 08:16

## 2023-01-01 RX ADMIN — ISOSORBIDE DINITRATE 20 MG: 20 TABLET ORAL at 17:40

## 2023-01-01 RX ADMIN — DEXMEDETOMIDINE HYDROCHLORIDE 1.2 MCG/KG/HR: 400 INJECTION INTRAVENOUS at 09:47

## 2023-01-01 RX ADMIN — INSULIN ASPART 1 UNITS: 100 INJECTION, SOLUTION INTRAVENOUS; SUBCUTANEOUS at 17:04

## 2023-01-01 RX ADMIN — PIPERACILLIN AND TAZOBACTAM 4.5 G: 4; .5 INJECTION, POWDER, FOR SOLUTION INTRAVENOUS at 06:01

## 2023-01-01 RX ADMIN — PHENYLEPHRINE HYDROCHLORIDE 200 MCG: 10 INJECTION INTRAVENOUS at 18:39

## 2023-01-01 RX ADMIN — MICONAZOLE NITRATE: 20 CREAM TOPICAL at 09:42

## 2023-01-01 RX ADMIN — POTASSIUM CHLORIDE 40 MEQ: 20 SOLUTION ORAL at 06:43

## 2023-01-01 RX ADMIN — PIPERACILLIN AND TAZOBACTAM 4.5 G: 4; .5 INJECTION, POWDER, FOR SOLUTION INTRAVENOUS at 05:06

## 2023-01-01 RX ADMIN — LEVOTHYROXINE SODIUM 150 MCG: 150 TABLET ORAL at 06:00

## 2023-01-01 RX ADMIN — ACETAMINOPHEN 650 MG: 650 SOLUTION ORAL at 20:43

## 2023-01-01 RX ADMIN — ACETYLCYSTEINE 2 ML: 200 SOLUTION ORAL; RESPIRATORY (INHALATION) at 07:20

## 2023-01-01 RX ADMIN — QUETIAPINE FUMARATE 150 MG: 50 TABLET, FILM COATED ORAL at 09:15

## 2023-01-01 RX ADMIN — ACETYLCYSTEINE 2 ML: 200 SOLUTION ORAL; RESPIRATORY (INHALATION) at 11:17

## 2023-01-01 RX ADMIN — DEXMEDETOMIDINE HYDROCHLORIDE 0.7 MCG/KG/HR: 400 INJECTION INTRAVENOUS at 00:51

## 2023-01-01 RX ADMIN — Medication 40 MG: at 08:20

## 2023-01-01 RX ADMIN — IPRATROPIUM BROMIDE AND ALBUTEROL SULFATE 3 ML: .5; 3 SOLUTION RESPIRATORY (INHALATION) at 07:16

## 2023-01-01 RX ADMIN — CEFEPIME 2 G: 2 INJECTION, POWDER, FOR SOLUTION INTRAVENOUS at 21:17

## 2023-01-01 RX ADMIN — ATOVAQUONE 1500 MG: 750 SUSPENSION ORAL at 08:43

## 2023-01-01 RX ADMIN — HEPARIN SODIUM 5000 UNITS: 5000 INJECTION, SOLUTION INTRAVENOUS; SUBCUTANEOUS at 21:36

## 2023-01-01 RX ADMIN — TORSEMIDE 20 MG: 20 TABLET ORAL at 09:30

## 2023-01-01 RX ADMIN — INSULIN ASPART 1 UNITS: 100 INJECTION, SOLUTION INTRAVENOUS; SUBCUTANEOUS at 04:45

## 2023-01-01 RX ADMIN — BUMETANIDE 1 MG: 0.5 TABLET ORAL at 08:02

## 2023-01-01 RX ADMIN — HYDROMORPHONE HYDROCHLORIDE 0.2 MG: 0.2 INJECTION, SOLUTION INTRAMUSCULAR; INTRAVENOUS; SUBCUTANEOUS at 06:18

## 2023-01-01 RX ADMIN — IPRATROPIUM BROMIDE AND ALBUTEROL SULFATE 3 ML: .5; 3 SOLUTION RESPIRATORY (INHALATION) at 15:09

## 2023-01-01 RX ADMIN — ACETAMINOPHEN 975 MG: 325 TABLET, FILM COATED ORAL at 15:39

## 2023-01-01 RX ADMIN — POTASSIUM CHLORIDE 20 MEQ: 20 SOLUTION ORAL at 08:32

## 2023-01-01 RX ADMIN — INSULIN ASPART 1 UNITS: 100 INJECTION, SOLUTION INTRAVENOUS; SUBCUTANEOUS at 16:32

## 2023-01-01 RX ADMIN — PREDNISONE 60 MG: 20 TABLET ORAL at 16:23

## 2023-01-01 RX ADMIN — HYDROMORPHONE HYDROCHLORIDE 0.2 MG: 0.2 INJECTION, SOLUTION INTRAMUSCULAR; INTRAVENOUS; SUBCUTANEOUS at 06:44

## 2023-01-01 RX ADMIN — INSULIN ASPART 1 UNITS: 100 INJECTION, SOLUTION INTRAVENOUS; SUBCUTANEOUS at 16:35

## 2023-01-01 RX ADMIN — DEXMEDETOMIDINE HYDROCHLORIDE 0.8 MCG/KG/HR: 400 INJECTION INTRAVENOUS at 07:37

## 2023-01-01 RX ADMIN — PREDNISONE 60 MG: 20 TABLET ORAL at 16:47

## 2023-01-01 RX ADMIN — WHITE PETROLATUM: 1.75 OINTMENT TOPICAL at 08:45

## 2023-01-01 RX ADMIN — MIDODRINE HYDROCHLORIDE 5 MG: 5 TABLET ORAL at 11:23

## 2023-01-01 RX ADMIN — QUETIAPINE 150 MG: 100 TABLET ORAL at 21:55

## 2023-01-01 RX ADMIN — CLOTRIMAZOLE: 0.01 CREAM TOPICAL at 21:36

## 2023-01-01 RX ADMIN — WHITE PETROLATUM: 1.75 OINTMENT TOPICAL at 09:29

## 2023-01-01 RX ADMIN — AMLODIPINE BESYLATE 5 MG: 5 TABLET ORAL at 08:01

## 2023-01-01 RX ADMIN — METHYLPREDNISOLONE SODIUM SUCCINATE 62.5 MG: 125 INJECTION INTRAMUSCULAR; INTRAVENOUS at 21:19

## 2023-01-01 RX ADMIN — ACETYLCYSTEINE 2 ML: 200 SOLUTION ORAL; RESPIRATORY (INHALATION) at 16:07

## 2023-01-01 RX ADMIN — Medication 1 SPRAY: at 12:29

## 2023-01-01 RX ADMIN — HYDRALAZINE HYDROCHLORIDE 25 MG: 25 TABLET, FILM COATED ORAL at 04:09

## 2023-01-01 RX ADMIN — ACETAMINOPHEN 650 MG: 650 SOLUTION ORAL at 12:19

## 2023-01-01 RX ADMIN — ACETAMINOPHEN 650 MG: 650 SOLUTION ORAL at 17:31

## 2023-01-01 RX ADMIN — ACETAMINOPHEN 650 MG: 325 TABLET, FILM COATED ORAL at 12:26

## 2023-01-01 RX ADMIN — DEXMEDETOMIDINE HYDROCHLORIDE 0.8 MCG/KG/HR: 400 INJECTION INTRAVENOUS at 19:41

## 2023-01-01 RX ADMIN — CHLORHEXIDINE GLUCONATE 15 ML: 1.2 SOLUTION ORAL at 08:22

## 2023-01-01 RX ADMIN — PREGABALIN 75 MG: 20 SOLUTION ORAL at 08:25

## 2023-01-01 RX ADMIN — NOREPINEPHRINE BITARTRATE 0.17 MCG/KG/MIN: 0.06 INJECTION, SOLUTION INTRAVENOUS at 11:01

## 2023-01-01 RX ADMIN — INSULIN ASPART 1 UNITS: 100 INJECTION, SOLUTION INTRAVENOUS; SUBCUTANEOUS at 20:30

## 2023-01-01 RX ADMIN — LEVOTHYROXINE SODIUM 150 MCG: 150 TABLET ORAL at 06:09

## 2023-01-01 RX ADMIN — METOPROLOL TARTRATE 12.5 MG: 100 TABLET, FILM COATED ORAL at 08:48

## 2023-01-01 RX ADMIN — VANCOMYCIN HYDROCHLORIDE 1000 MG: 1 INJECTION, SOLUTION INTRAVENOUS at 16:48

## 2023-01-01 RX ADMIN — PREGABALIN 75 MG: 75 CAPSULE ORAL at 08:40

## 2023-01-01 RX ADMIN — ATORVASTATIN CALCIUM 20 MG: 20 TABLET, FILM COATED ORAL at 20:25

## 2023-01-01 RX ADMIN — PANTOPRAZOLE SODIUM 40 MG: 40 INJECTION, POWDER, LYOPHILIZED, FOR SOLUTION INTRAVENOUS at 08:49

## 2023-01-01 RX ADMIN — Medication 40 MG: at 06:40

## 2023-01-01 RX ADMIN — CHLORHEXIDINE GLUCONATE 15 ML: 1.2 SOLUTION ORAL at 08:17

## 2023-01-01 RX ADMIN — IPRATROPIUM BROMIDE AND ALBUTEROL SULFATE 3 ML: .5; 3 SOLUTION RESPIRATORY (INHALATION) at 07:36

## 2023-01-01 RX ADMIN — HYDROMORPHONE HYDROCHLORIDE 0.2 MG: 0.2 INJECTION, SOLUTION INTRAMUSCULAR; INTRAVENOUS; SUBCUTANEOUS at 09:20

## 2023-01-01 RX ADMIN — AMLODIPINE BESYLATE 5 MG: 5 TABLET ORAL at 20:18

## 2023-01-01 RX ADMIN — CHLORHEXIDINE GLUCONATE 15 ML: 1.2 SOLUTION ORAL at 19:36

## 2023-01-01 RX ADMIN — CHLORHEXIDINE GLUCONATE 15 ML: 1.2 SOLUTION ORAL at 07:57

## 2023-01-01 RX ADMIN — IPRATROPIUM BROMIDE AND ALBUTEROL SULFATE 3 ML: .5; 3 SOLUTION RESPIRATORY (INHALATION) at 11:47

## 2023-01-01 RX ADMIN — HYDROMORPHONE HYDROCHLORIDE 0.2 MG: 0.2 INJECTION, SOLUTION INTRAMUSCULAR; INTRAVENOUS; SUBCUTANEOUS at 02:52

## 2023-01-01 RX ADMIN — ACETAMINOPHEN 650 MG: 325 TABLET, FILM COATED ORAL at 09:03

## 2023-01-01 RX ADMIN — SODIUM CHLORIDE SOLN NEBU 3% 3 ML: 3 NEBU SOLN at 02:52

## 2023-01-01 RX ADMIN — METOPROLOL TARTRATE 12.5 MG: 100 TABLET, FILM COATED ORAL at 20:56

## 2023-01-01 RX ADMIN — ACETYLCYSTEINE 2 ML: 200 SOLUTION ORAL; RESPIRATORY (INHALATION) at 20:04

## 2023-01-01 RX ADMIN — CHLORHEXIDINE GLUCONATE 15 ML: 1.2 SOLUTION ORAL at 08:42

## 2023-01-01 RX ADMIN — HEPARIN SODIUM 5000 UNITS: 5000 INJECTION, SOLUTION INTRAVENOUS; SUBCUTANEOUS at 20:26

## 2023-01-01 RX ADMIN — PREDNISONE 60 MG: 20 TABLET ORAL at 18:22

## 2023-01-01 RX ADMIN — MICONAZOLE NITRATE: 20 CREAM TOPICAL at 20:55

## 2023-01-01 RX ADMIN — OXYCODONE HYDROCHLORIDE 10 MG: 5 TABLET ORAL at 23:00

## 2023-01-01 RX ADMIN — CHLORHEXIDINE GLUCONATE 15 ML: 1.2 SOLUTION ORAL at 20:11

## 2023-01-01 RX ADMIN — VASOPRESSIN 2.4 UNITS/HR: 20 INJECTION, SOLUTION INTRAMUSCULAR; SUBCUTANEOUS at 02:56

## 2023-01-01 RX ADMIN — Medication 1 PACKET: at 08:32

## 2023-01-01 RX ADMIN — QUETIAPINE FUMARATE 50 MG: 50 TABLET ORAL at 21:40

## 2023-01-01 RX ADMIN — ATORVASTATIN CALCIUM 20 MG: 20 TABLET, FILM COATED ORAL at 21:27

## 2023-01-01 RX ADMIN — AMLODIPINE BESYLATE 5 MG: 5 TABLET ORAL at 08:46

## 2023-01-01 RX ADMIN — HYDROXYZINE HYDROCHLORIDE 50 MG: 50 TABLET, FILM COATED ORAL at 06:19

## 2023-01-01 RX ADMIN — Medication 1 PACKET: at 08:04

## 2023-01-01 RX ADMIN — Medication 50 MCG: at 09:16

## 2023-01-01 RX ADMIN — IPRATROPIUM BROMIDE AND ALBUTEROL SULFATE 3 ML: 2.5; .5 SOLUTION RESPIRATORY (INHALATION) at 07:13

## 2023-01-01 RX ADMIN — HYDROMORPHONE HYDROCHLORIDE 0.2 MG: 0.2 INJECTION, SOLUTION INTRAMUSCULAR; INTRAVENOUS; SUBCUTANEOUS at 08:07

## 2023-01-01 RX ADMIN — IPRATROPIUM BROMIDE AND ALBUTEROL SULFATE 3 ML: .5; 3 SOLUTION RESPIRATORY (INHALATION) at 16:07

## 2023-01-01 RX ADMIN — CAMPHOR AND MENTHOL: 5; 5 LOTION TOPICAL at 16:13

## 2023-01-01 RX ADMIN — ISOSORBIDE DINITRATE 20 MG: 20 TABLET ORAL at 08:56

## 2023-01-01 RX ADMIN — ATORVASTATIN CALCIUM 20 MG: 20 TABLET, FILM COATED ORAL at 20:57

## 2023-01-01 RX ADMIN — DEXTROSE MONOHYDRATE 300 ML: 100 INJECTION, SOLUTION INTRAVENOUS at 16:35

## 2023-01-01 RX ADMIN — OXYCODONE HYDROCHLORIDE 5 MG: 5 SOLUTION ORAL at 15:25

## 2023-01-01 RX ADMIN — INSULIN ASPART 1 UNITS: 100 INJECTION, SOLUTION INTRAVENOUS; SUBCUTANEOUS at 19:51

## 2023-01-01 RX ADMIN — HYDROMORPHONE HYDROCHLORIDE 0.4 MG: 0.2 INJECTION, SOLUTION INTRAMUSCULAR; INTRAVENOUS; SUBCUTANEOUS at 06:09

## 2023-01-01 RX ADMIN — SERTRALINE HYDROCHLORIDE 150 MG: 20 SOLUTION ORAL at 08:17

## 2023-01-01 RX ADMIN — PANTOPRAZOLE SODIUM 40 MG: 40 INJECTION, POWDER, LYOPHILIZED, FOR SOLUTION INTRAVENOUS at 08:22

## 2023-01-01 RX ADMIN — PREDNISONE 60 MG: 50 TABLET ORAL at 08:14

## 2023-01-01 RX ADMIN — Medication 1 PACKET: at 14:55

## 2023-01-01 RX ADMIN — METHYLPREDNISOLONE SODIUM SUCCINATE 62.5 MG: 125 INJECTION INTRAMUSCULAR; INTRAVENOUS at 13:10

## 2023-01-01 RX ADMIN — HEPARIN SODIUM 5000 UNITS: 5000 INJECTION, SOLUTION INTRAVENOUS; SUBCUTANEOUS at 09:28

## 2023-01-01 RX ADMIN — QUETIAPINE FUMARATE 150 MG: 50 TABLET, FILM COATED ORAL at 08:23

## 2023-01-01 RX ADMIN — Medication 1 PACKET: at 18:18

## 2023-01-01 RX ADMIN — HYDROCORTISONE SODIUM SUCCINATE 50 MG: 100 INJECTION, POWDER, FOR SOLUTION INTRAMUSCULAR; INTRAVENOUS at 06:04

## 2023-01-01 RX ADMIN — HYDROMORPHONE HYDROCHLORIDE 0.2 MG: 0.2 INJECTION, SOLUTION INTRAMUSCULAR; INTRAVENOUS; SUBCUTANEOUS at 19:25

## 2023-01-01 RX ADMIN — CEFEPIME 2 G: 2 INJECTION, POWDER, FOR SOLUTION INTRAVENOUS at 21:10

## 2023-01-01 RX ADMIN — OXYCODONE HYDROCHLORIDE 5 MG: 5 SOLUTION ORAL at 16:20

## 2023-01-01 RX ADMIN — PREGABALIN 75 MG: 20 SOLUTION ORAL at 09:32

## 2023-01-01 RX ADMIN — DEXMEDETOMIDINE HYDROCHLORIDE 0.2 MCG/KG/HR: 400 INJECTION INTRAVENOUS at 00:39

## 2023-01-01 RX ADMIN — MIDODRINE HYDROCHLORIDE 10 MG: 10 TABLET ORAL at 02:00

## 2023-01-01 RX ADMIN — LEVOTHYROXINE SODIUM 150 MCG: 75 TABLET ORAL at 05:34

## 2023-01-01 RX ADMIN — MICONAZOLE NITRATE: 20 CREAM TOPICAL at 20:51

## 2023-01-01 RX ADMIN — IPRATROPIUM BROMIDE AND ALBUTEROL SULFATE 3 ML: .5; 3 SOLUTION RESPIRATORY (INHALATION) at 19:29

## 2023-01-01 RX ADMIN — IPRATROPIUM BROMIDE AND ALBUTEROL SULFATE 3 ML: .5; 3 SOLUTION RESPIRATORY (INHALATION) at 07:55

## 2023-01-01 RX ADMIN — CHLORHEXIDINE GLUCONATE 15 ML: 1.2 SOLUTION ORAL at 08:32

## 2023-01-01 RX ADMIN — HYDROMORPHONE HYDROCHLORIDE 0.2 MG: 0.2 INJECTION, SOLUTION INTRAMUSCULAR; INTRAVENOUS; SUBCUTANEOUS at 13:01

## 2023-01-01 RX ADMIN — PROPOFOL 5 MCG/KG/MIN: 10 INJECTION, EMULSION INTRAVENOUS at 04:16

## 2023-01-01 RX ADMIN — CHLORHEXIDINE GLUCONATE 15 ML: 1.2 SOLUTION ORAL at 08:49

## 2023-01-01 RX ADMIN — IPRATROPIUM BROMIDE AND ALBUTEROL SULFATE 3 ML: .5; 3 SOLUTION RESPIRATORY (INHALATION) at 08:37

## 2023-01-01 RX ADMIN — MICONAZOLE NITRATE: 20 CREAM TOPICAL at 20:18

## 2023-01-01 RX ADMIN — PREDNISONE 60 MG: 20 TABLET ORAL at 16:09

## 2023-01-01 RX ADMIN — HEPARIN SODIUM 5000 UNITS: 5000 INJECTION, SOLUTION INTRAVENOUS; SUBCUTANEOUS at 06:50

## 2023-01-01 RX ADMIN — MIDAZOLAM HYDROCHLORIDE 1 MG: 1 INJECTION, SOLUTION INTRAMUSCULAR; INTRAVENOUS at 17:01

## 2023-01-01 RX ADMIN — AMLODIPINE BESYLATE 5 MG: 5 TABLET ORAL at 09:39

## 2023-01-01 RX ADMIN — MINERAL OIL AND PETROLATUM: 150; 830 OINTMENT OPHTHALMIC at 13:10

## 2023-01-01 RX ADMIN — POTASSIUM CHLORIDE 20 MEQ: 20 SOLUTION ORAL at 06:21

## 2023-01-01 RX ADMIN — HYDRALAZINE HYDROCHLORIDE 25 MG: 25 TABLET, FILM COATED ORAL at 12:58

## 2023-01-01 RX ADMIN — Medication 1 PACKET: at 16:55

## 2023-01-01 RX ADMIN — TORSEMIDE 20 MG: 20 TABLET ORAL at 08:03

## 2023-01-01 RX ADMIN — Medication 50 MCG: at 07:38

## 2023-01-01 RX ADMIN — PREDNISONE 60 MG: 50 TABLET ORAL at 08:03

## 2023-01-01 RX ADMIN — AMLODIPINE BESYLATE 5 MG: 5 TABLET ORAL at 08:15

## 2023-01-01 RX ADMIN — Medication 1 PACKET: at 23:01

## 2023-01-01 RX ADMIN — ACETAMINOPHEN 650 MG: 325 TABLET, FILM COATED ORAL at 11:22

## 2023-01-01 RX ADMIN — MICONAZOLE NITRATE: 20 CREAM TOPICAL at 21:08

## 2023-01-01 RX ADMIN — ACETYLCYSTEINE 2 ML: 200 SOLUTION ORAL; RESPIRATORY (INHALATION) at 20:42

## 2023-01-01 RX ADMIN — IPRATROPIUM BROMIDE AND ALBUTEROL SULFATE 3 ML: .5; 3 SOLUTION RESPIRATORY (INHALATION) at 07:07

## 2023-01-01 RX ADMIN — DEXMEDETOMIDINE HYDROCHLORIDE 0.9 MCG/KG/HR: 400 INJECTION INTRAVENOUS at 22:39

## 2023-01-01 RX ADMIN — IPRATROPIUM BROMIDE AND ALBUTEROL SULFATE 3 ML: 2.5; .5 SOLUTION RESPIRATORY (INHALATION) at 07:51

## 2023-01-01 RX ADMIN — ISOSORBIDE DINITRATE 20 MG: 20 TABLET ORAL at 18:25

## 2023-01-01 RX ADMIN — ISOSORBIDE DINITRATE 20 MG: 20 TABLET ORAL at 16:24

## 2023-01-01 RX ADMIN — HYDROMORPHONE HYDROCHLORIDE 0.2 MG: 0.2 INJECTION, SOLUTION INTRAMUSCULAR; INTRAVENOUS; SUBCUTANEOUS at 01:05

## 2023-01-01 RX ADMIN — IPRATROPIUM BROMIDE AND ALBUTEROL SULFATE 3 ML: 2.5; .5 SOLUTION RESPIRATORY (INHALATION) at 11:03

## 2023-01-01 RX ADMIN — HEPARIN SODIUM 5000 UNITS: 5000 INJECTION, SOLUTION INTRAVENOUS; SUBCUTANEOUS at 08:30

## 2023-01-01 RX ADMIN — Medication 1 SPRAY: at 13:54

## 2023-01-01 RX ADMIN — CEFTRIAXONE 2 G: 2 INJECTION, POWDER, FOR SOLUTION INTRAMUSCULAR; INTRAVENOUS at 15:40

## 2023-01-01 RX ADMIN — IPRATROPIUM BROMIDE AND ALBUTEROL SULFATE 3 ML: .5; 3 SOLUTION RESPIRATORY (INHALATION) at 07:42

## 2023-01-01 RX ADMIN — HYDROMORPHONE HYDROCHLORIDE 0.2 MG: 0.2 INJECTION, SOLUTION INTRAMUSCULAR; INTRAVENOUS; SUBCUTANEOUS at 09:23

## 2023-01-01 RX ADMIN — QUETIAPINE 150 MG: 100 TABLET ORAL at 08:23

## 2023-01-01 RX ADMIN — ASPIRIN 81 MG CHEWABLE TABLET 81 MG: 81 TABLET CHEWABLE at 08:21

## 2023-01-01 RX ADMIN — Medication 50 MCG: at 07:53

## 2023-01-01 RX ADMIN — DEXMEDETOMIDINE HYDROCHLORIDE 1 MCG/KG/HR: 400 INJECTION INTRAVENOUS at 00:39

## 2023-01-01 RX ADMIN — ATOVAQUONE 1500 MG: 750 SUSPENSION ORAL at 08:24

## 2023-01-01 RX ADMIN — POLYETHYLENE GLYCOL 3350 17 G: 17 POWDER, FOR SOLUTION ORAL at 12:28

## 2023-01-01 RX ADMIN — DEXMEDETOMIDINE HYDROCHLORIDE 0.8 MCG/KG/HR: 400 INJECTION INTRAVENOUS at 02:25

## 2023-01-01 RX ADMIN — IPRATROPIUM BROMIDE AND ALBUTEROL SULFATE 3 ML: .5; 3 SOLUTION RESPIRATORY (INHALATION) at 15:42

## 2023-01-01 RX ADMIN — BUMETANIDE 1 MG: 0.5 TABLET ORAL at 08:03

## 2023-01-01 RX ADMIN — LEVOTHYROXINE SODIUM 150 MCG: 75 TABLET ORAL at 06:03

## 2023-01-01 RX ADMIN — LEVOTHYROXINE SODIUM 150 MCG: 150 TABLET ORAL at 05:48

## 2023-01-01 RX ADMIN — POTASSIUM CHLORIDE 20 MEQ: 1.5 POWDER, FOR SOLUTION ORAL at 20:23

## 2023-01-01 RX ADMIN — ISOSORBIDE DINITRATE 20 MG: 20 TABLET ORAL at 16:06

## 2023-01-01 RX ADMIN — POTASSIUM CHLORIDE 40 MEQ: 20 SOLUTION ORAL at 06:55

## 2023-01-01 RX ADMIN — Medication 20 MG: at 10:30

## 2023-01-01 RX ADMIN — CEFEPIME 2 G: 2 INJECTION, POWDER, FOR SOLUTION INTRAVENOUS at 22:03

## 2023-01-01 RX ADMIN — INSULIN ASPART 1 UNITS: 100 INJECTION, SOLUTION INTRAVENOUS; SUBCUTANEOUS at 19:56

## 2023-01-01 RX ADMIN — PIPERACILLIN AND TAZOBACTAM 4.5 G: 4; .5 INJECTION, POWDER, FOR SOLUTION INTRAVENOUS at 06:07

## 2023-01-01 RX ADMIN — MICONAZOLE NITRATE: 20 CREAM TOPICAL at 21:29

## 2023-01-01 RX ADMIN — ACETYLCYSTEINE 2 ML: 200 SOLUTION ORAL; RESPIRATORY (INHALATION) at 16:19

## 2023-01-01 RX ADMIN — IPRATROPIUM BROMIDE AND ALBUTEROL SULFATE 3 ML: .5; 3 SOLUTION RESPIRATORY (INHALATION) at 15:38

## 2023-01-01 RX ADMIN — Medication 1 PACKET: at 08:44

## 2023-01-01 RX ADMIN — LEVOTHYROXINE SODIUM 150 MCG: 150 TABLET ORAL at 05:14

## 2023-01-01 RX ADMIN — ACETAMINOPHEN 975 MG: 325 TABLET, FILM COATED ORAL at 15:18

## 2023-01-01 RX ADMIN — IPRATROPIUM BROMIDE AND ALBUTEROL SULFATE 3 ML: .5; 3 SOLUTION RESPIRATORY (INHALATION) at 08:02

## 2023-01-01 RX ADMIN — ACETAMINOPHEN 650 MG: 650 SOLUTION ORAL at 23:12

## 2023-01-01 RX ADMIN — CHLORHEXIDINE GLUCONATE 15 ML: 1.2 SOLUTION ORAL at 19:40

## 2023-01-01 RX ADMIN — BUMETANIDE 1 MG: 0.5 TABLET ORAL at 15:47

## 2023-01-01 RX ADMIN — LEVALBUTEROL HYDROCHLORIDE 1.25 MG: 1.25 SOLUTION RESPIRATORY (INHALATION) at 11:33

## 2023-01-01 RX ADMIN — Medication 100 MCG/HR: at 05:58

## 2023-01-01 RX ADMIN — Medication 1 PACKET: at 17:45

## 2023-01-01 RX ADMIN — ATOVAQUONE 1500 MG: 750 SUSPENSION ORAL at 18:46

## 2023-01-01 RX ADMIN — OXYCODONE HYDROCHLORIDE 5 MG: 5 SOLUTION ORAL at 21:48

## 2023-01-01 RX ADMIN — MICONAZOLE NITRATE: 20 CREAM TOPICAL at 09:19

## 2023-01-01 RX ADMIN — ACETAMINOPHEN 650 MG: 325 TABLET, FILM COATED ORAL at 20:00

## 2023-01-01 RX ADMIN — ACETAMINOPHEN 650 MG: 650 SOLUTION ORAL at 09:04

## 2023-01-01 RX ADMIN — AZITHROMYCIN MONOHYDRATE 250 MG: 500 INJECTION, POWDER, LYOPHILIZED, FOR SOLUTION INTRAVENOUS at 18:20

## 2023-01-01 RX ADMIN — IPRATROPIUM BROMIDE AND ALBUTEROL SULFATE 3 ML: .5; 3 SOLUTION RESPIRATORY (INHALATION) at 20:46

## 2023-01-01 RX ADMIN — ISOSORBIDE DINITRATE 20 MG: 20 TABLET ORAL at 16:29

## 2023-01-01 RX ADMIN — IPRATROPIUM BROMIDE AND ALBUTEROL SULFATE 3 ML: .5; 3 SOLUTION RESPIRATORY (INHALATION) at 23:02

## 2023-01-01 RX ADMIN — MIDAZOLAM 2 MG: 1 INJECTION INTRAMUSCULAR; INTRAVENOUS at 10:03

## 2023-01-01 RX ADMIN — AMLODIPINE BESYLATE 5 MG: 5 TABLET ORAL at 20:43

## 2023-01-01 RX ADMIN — QUETIAPINE FUMARATE 150 MG: 50 TABLET, FILM COATED ORAL at 09:31

## 2023-01-01 RX ADMIN — ACETAMINOPHEN 975 MG: 325 TABLET, FILM COATED ORAL at 06:25

## 2023-01-01 RX ADMIN — AMLODIPINE BESYLATE 5 MG: 5 TABLET ORAL at 09:32

## 2023-01-01 RX ADMIN — TORSEMIDE 20 MG: 20 TABLET ORAL at 20:12

## 2023-01-01 RX ADMIN — HEPARIN SODIUM 5000 UNITS: 5000 INJECTION, SOLUTION INTRAVENOUS; SUBCUTANEOUS at 05:20

## 2023-01-01 RX ADMIN — ISOSORBIDE DINITRATE 20 MG: 20 TABLET ORAL at 08:17

## 2023-01-01 RX ADMIN — ATORVASTATIN CALCIUM 20 MG: 20 TABLET, FILM COATED ORAL at 21:23

## 2023-01-01 RX ADMIN — DEXMEDETOMIDINE HYDROCHLORIDE 0.6 MCG/KG/HR: 400 INJECTION INTRAVENOUS at 16:08

## 2023-01-01 RX ADMIN — MIDAZOLAM HYDROCHLORIDE 4 MG: 1 INJECTION, SOLUTION INTRAMUSCULAR; INTRAVENOUS at 08:47

## 2023-01-01 RX ADMIN — AMLODIPINE BESYLATE 5 MG: 5 TABLET ORAL at 09:18

## 2023-01-01 RX ADMIN — ASPIRIN 81 MG: 81 TABLET, CHEWABLE ORAL at 08:43

## 2023-01-01 RX ADMIN — ASPIRIN 81 MG: 81 TABLET, CHEWABLE ORAL at 08:42

## 2023-01-01 RX ADMIN — IPRATROPIUM BROMIDE AND ALBUTEROL SULFATE 3 ML: 2.5; .5 SOLUTION RESPIRATORY (INHALATION) at 15:01

## 2023-01-01 RX ADMIN — ACETAMINOPHEN 650 MG: 650 SOLUTION ORAL at 16:38

## 2023-01-01 RX ADMIN — HEPARIN SODIUM 5000 UNITS: 5000 INJECTION, SOLUTION INTRAVENOUS; SUBCUTANEOUS at 22:36

## 2023-01-01 RX ADMIN — TORSEMIDE 20 MG: 20 TABLET ORAL at 08:42

## 2023-01-01 RX ADMIN — CHLORHEXIDINE GLUCONATE 15 ML: 1.2 SOLUTION ORAL at 08:16

## 2023-01-01 RX ADMIN — ACETAMINOPHEN 650 MG: 650 SOLUTION ORAL at 16:47

## 2023-01-01 RX ADMIN — HYDRALAZINE HYDROCHLORIDE 5 MG: 20 INJECTION INTRAMUSCULAR; INTRAVENOUS at 20:54

## 2023-01-01 RX ADMIN — WHITE PETROLATUM: 1.75 OINTMENT TOPICAL at 09:21

## 2023-01-01 RX ADMIN — HYDROMORPHONE HYDROCHLORIDE 0.3 MG: 0.2 INJECTION, SOLUTION INTRAMUSCULAR; INTRAVENOUS; SUBCUTANEOUS at 01:43

## 2023-01-01 RX ADMIN — IPRATROPIUM BROMIDE AND ALBUTEROL SULFATE 3 ML: 2.5; .5 SOLUTION RESPIRATORY (INHALATION) at 20:40

## 2023-01-01 RX ADMIN — METRONIDAZOLE 500 MG: 500 INJECTION, SOLUTION INTRAVENOUS at 09:19

## 2023-01-01 RX ADMIN — INSULIN ASPART 1 UNITS: 100 INJECTION, SOLUTION INTRAVENOUS; SUBCUTANEOUS at 05:00

## 2023-01-01 RX ADMIN — ATORVASTATIN CALCIUM 20 MG: 20 TABLET, FILM COATED ORAL at 20:56

## 2023-01-01 RX ADMIN — CEFEPIME 2 G: 2 INJECTION, POWDER, FOR SOLUTION INTRAVENOUS at 09:06

## 2023-01-01 RX ADMIN — INSULIN ASPART 1 UNITS: 100 INJECTION, SOLUTION INTRAVENOUS; SUBCUTANEOUS at 17:26

## 2023-01-01 RX ADMIN — DEXMEDETOMIDINE HYDROCHLORIDE 0.8 MCG/KG/HR: 400 INJECTION INTRAVENOUS at 07:51

## 2023-01-01 RX ADMIN — DEXTROSE MONOHYDRATE 50 ML: 25 INJECTION, SOLUTION INTRAVENOUS at 09:10

## 2023-01-01 RX ADMIN — VASOPRESSIN 1.6 UNITS/HR: 20 INJECTION INTRAVENOUS at 22:47

## 2023-01-01 RX ADMIN — QUETIAPINE 150 MG: 100 TABLET ORAL at 20:57

## 2023-01-01 RX ADMIN — QUETIAPINE FUMARATE 50 MG: 50 TABLET ORAL at 08:26

## 2023-01-01 RX ADMIN — METHYLPREDNISOLONE SODIUM SUCCINATE 62.5 MG: 125 INJECTION INTRAMUSCULAR; INTRAVENOUS at 12:15

## 2023-01-01 RX ADMIN — MIDAZOLAM 4 MG: 1 INJECTION INTRAMUSCULAR; INTRAVENOUS at 11:23

## 2023-01-01 RX ADMIN — DEXMEDETOMIDINE HYDROCHLORIDE 1.2 MCG/KG/HR: 400 INJECTION INTRAVENOUS at 20:52

## 2023-01-01 RX ADMIN — HYDRALAZINE HYDROCHLORIDE 5 MG: 20 INJECTION INTRAMUSCULAR; INTRAVENOUS at 06:19

## 2023-01-01 RX ADMIN — IPRATROPIUM BROMIDE AND ALBUTEROL SULFATE 3 ML: .5; 3 SOLUTION RESPIRATORY (INHALATION) at 15:18

## 2023-01-01 RX ADMIN — ATOVAQUONE 1500 MG: 750 SUSPENSION ORAL at 08:48

## 2023-01-01 RX ADMIN — IPRATROPIUM BROMIDE AND ALBUTEROL SULFATE 3 ML: .5; 3 SOLUTION RESPIRATORY (INHALATION) at 07:24

## 2023-01-01 RX ADMIN — Medication 5 ML: at 09:03

## 2023-01-01 RX ADMIN — METOPROLOL TARTRATE 12.5 MG: 100 TABLET, FILM COATED ORAL at 09:30

## 2023-01-01 RX ADMIN — IOPAMIDOL 95 ML: 755 INJECTION, SOLUTION INTRAVENOUS at 12:51

## 2023-01-01 RX ADMIN — ISOSORBIDE DINITRATE 20 MG: 20 TABLET ORAL at 08:44

## 2023-01-01 RX ADMIN — CHLORHEXIDINE GLUCONATE 15 ML: 1.2 SOLUTION ORAL at 08:30

## 2023-01-01 RX ADMIN — CLOTRIMAZOLE: 0.01 CREAM TOPICAL at 08:16

## 2023-01-01 RX ADMIN — ASPIRIN 81 MG CHEWABLE TABLET 81 MG: 81 TABLET CHEWABLE at 08:23

## 2023-01-01 RX ADMIN — HEPARIN SODIUM 5000 UNITS: 5000 INJECTION, SOLUTION INTRAVENOUS; SUBCUTANEOUS at 13:32

## 2023-01-01 RX ADMIN — TORSEMIDE 20 MG: 20 TABLET ORAL at 20:21

## 2023-01-01 RX ADMIN — ASPIRIN 81 MG CHEWABLE TABLET 81 MG: 81 TABLET CHEWABLE at 08:46

## 2023-01-01 RX ADMIN — OXYCODONE HYDROCHLORIDE 10 MG: 5 TABLET ORAL at 03:34

## 2023-01-01 RX ADMIN — Medication 15 ML: at 08:09

## 2023-01-01 RX ADMIN — Medication 50 MCG: at 18:36

## 2023-01-01 RX ADMIN — ACETAMINOPHEN 500 MG: 500 TABLET, FILM COATED ORAL at 00:11

## 2023-01-01 RX ADMIN — PHENYLEPHRINE HYDROCHLORIDE 100 MCG: 10 INJECTION INTRAVENOUS at 11:48

## 2023-01-01 RX ADMIN — HYDROMORPHONE HYDROCHLORIDE 0.2 MG: 0.2 INJECTION, SOLUTION INTRAMUSCULAR; INTRAVENOUS; SUBCUTANEOUS at 08:17

## 2023-01-01 RX ADMIN — MEROPENEM 500 MG: 500 INJECTION, POWDER, FOR SOLUTION INTRAVENOUS at 22:11

## 2023-01-01 RX ADMIN — POTASSIUM CHLORIDE 20 MEQ: 1.5 POWDER, FOR SOLUTION ORAL at 20:06

## 2023-01-01 RX ADMIN — ASPIRIN 81 MG: 81 TABLET, CHEWABLE ORAL at 08:20

## 2023-01-01 RX ADMIN — ATOVAQUONE 1500 MG: 750 SUSPENSION ORAL at 09:41

## 2023-01-01 RX ADMIN — AMLODIPINE BESYLATE 5 MG: 5 TABLET ORAL at 08:04

## 2023-01-01 RX ADMIN — CLOTRIMAZOLE: 0.01 CREAM TOPICAL at 08:42

## 2023-01-01 RX ADMIN — DEXMEDETOMIDINE HYDROCHLORIDE 0.5 MCG/KG/HR: 400 INJECTION INTRAVENOUS at 19:36

## 2023-01-01 RX ADMIN — INSULIN ASPART 1 UNITS: 100 INJECTION, SOLUTION INTRAVENOUS; SUBCUTANEOUS at 00:33

## 2023-01-01 RX ADMIN — PREGABALIN 75 MG: 20 SOLUTION ORAL at 09:37

## 2023-01-01 RX ADMIN — ACETYLCYSTEINE 2 ML: 200 SOLUTION ORAL; RESPIRATORY (INHALATION) at 19:21

## 2023-01-01 RX ADMIN — PREDNISONE 60 MG: 20 TABLET ORAL at 16:16

## 2023-01-01 RX ADMIN — METRONIDAZOLE 500 MG: 500 INJECTION, SOLUTION INTRAVENOUS at 02:26

## 2023-01-01 RX ADMIN — CHLORHEXIDINE GLUCONATE 15 ML: 1.2 SOLUTION ORAL at 08:03

## 2023-01-01 RX ADMIN — PIPERACILLIN AND TAZOBACTAM 4.5 G: 4; .5 INJECTION, POWDER, FOR SOLUTION INTRAVENOUS at 18:05

## 2023-01-01 RX ADMIN — MIDAZOLAM 1 MG: 1 INJECTION INTRAMUSCULAR; INTRAVENOUS at 09:06

## 2023-01-01 RX ADMIN — Medication 40 MG: at 06:30

## 2023-01-01 RX ADMIN — SODIUM CHLORIDE 8.2 UNITS: 9 INJECTION, SOLUTION INTRAVENOUS at 16:57

## 2023-01-01 RX ADMIN — ATORVASTATIN CALCIUM 20 MG: 20 TABLET, FILM COATED ORAL at 20:00

## 2023-01-01 RX ADMIN — HEPARIN SODIUM 5000 UNITS: 5000 INJECTION, SOLUTION INTRAVENOUS; SUBCUTANEOUS at 08:21

## 2023-01-01 RX ADMIN — PIPERACILLIN AND TAZOBACTAM 4.5 G: 4; .5 INJECTION, POWDER, FOR SOLUTION INTRAVENOUS at 05:21

## 2023-01-01 RX ADMIN — PREDNISONE 50 MG: 50 TABLET ORAL at 17:06

## 2023-01-01 RX ADMIN — MINERAL OIL AND PETROLATUM: 150; 830 OINTMENT OPHTHALMIC at 04:10

## 2023-01-01 RX ADMIN — CLOTRIMAZOLE: 0.01 CREAM TOPICAL at 21:27

## 2023-01-01 RX ADMIN — FENTANYL CITRATE 50 MCG: 50 INJECTION INTRAMUSCULAR; INTRAVENOUS at 11:27

## 2023-01-01 RX ADMIN — Medication 40 MG: at 08:28

## 2023-01-01 RX ADMIN — INSULIN ASPART 1 UNITS: 100 INJECTION, SOLUTION INTRAVENOUS; SUBCUTANEOUS at 05:14

## 2023-01-01 RX ADMIN — Medication 1 PACKET: at 16:12

## 2023-01-01 RX ADMIN — DEXMEDETOMIDINE HYDROCHLORIDE 0.5 MCG/KG/HR: 400 INJECTION INTRAVENOUS at 10:28

## 2023-01-01 RX ADMIN — ATOVAQUONE 1500 MG: 750 SUSPENSION ORAL at 08:36

## 2023-01-01 RX ADMIN — CHLORHEXIDINE GLUCONATE 0.12% ORAL RINSE 15 ML: 1.2 LIQUID ORAL at 14:04

## 2023-01-01 RX ADMIN — IPRATROPIUM BROMIDE AND ALBUTEROL SULFATE 3 ML: 2.5; .5 SOLUTION RESPIRATORY (INHALATION) at 07:49

## 2023-01-01 RX ADMIN — LIDOCAINE: 50 OINTMENT TOPICAL at 08:17

## 2023-01-01 RX ADMIN — LEVOTHYROXINE SODIUM 150 MCG: 75 TABLET ORAL at 05:31

## 2023-01-01 RX ADMIN — HEPARIN SODIUM 5000 UNITS: 5000 INJECTION, SOLUTION INTRAVENOUS; SUBCUTANEOUS at 20:06

## 2023-01-01 RX ADMIN — IPRATROPIUM BROMIDE AND ALBUTEROL SULFATE 3 ML: .5; 3 SOLUTION RESPIRATORY (INHALATION) at 15:15

## 2023-01-01 RX ADMIN — Medication 50 MCG: at 08:01

## 2023-01-01 RX ADMIN — SERTRALINE HYDROCHLORIDE 150 MG: 100 TABLET ORAL at 08:18

## 2023-01-01 RX ADMIN — ASPIRIN 81 MG CHEWABLE TABLET 81 MG: 81 TABLET CHEWABLE at 13:08

## 2023-01-01 RX ADMIN — ASPIRIN 81 MG CHEWABLE TABLET 81 MG: 81 TABLET CHEWABLE at 08:25

## 2023-01-01 RX ADMIN — PREDNISONE 60 MG: 50 TABLET ORAL at 08:12

## 2023-01-01 RX ADMIN — DEXTROSE MONOHYDRATE: 50 INJECTION, SOLUTION INTRAVENOUS at 09:42

## 2023-01-01 RX ADMIN — ACETAMINOPHEN 650 MG: 325 TABLET, FILM COATED ORAL at 18:25

## 2023-01-01 RX ADMIN — AMLODIPINE BESYLATE 5 MG: 5 TABLET ORAL at 08:37

## 2023-01-01 RX ADMIN — METRONIDAZOLE 500 MG: 500 INJECTION, SOLUTION INTRAVENOUS at 17:11

## 2023-01-01 RX ADMIN — FENTANYL CITRATE 50 MCG: 50 INJECTION INTRAMUSCULAR; INTRAVENOUS at 11:12

## 2023-01-01 RX ADMIN — IPRATROPIUM BROMIDE AND ALBUTEROL SULFATE 3 ML: .5; 3 SOLUTION RESPIRATORY (INHALATION) at 11:36

## 2023-01-01 RX ADMIN — ACETYLCYSTEINE 2 ML: 200 SOLUTION ORAL; RESPIRATORY (INHALATION) at 11:55

## 2023-01-01 RX ADMIN — BUMETANIDE 2 MG: 0.25 INJECTION INTRAMUSCULAR; INTRAVENOUS at 12:11

## 2023-01-01 RX ADMIN — ISOSORBIDE DINITRATE 20 MG: 20 TABLET ORAL at 12:15

## 2023-01-01 RX ADMIN — EPOPROSTENOL 20 NG/KG/MIN: 1.5 INJECTION, POWDER, LYOPHILIZED, FOR SOLUTION INTRAVENOUS at 08:18

## 2023-01-01 RX ADMIN — POLYETHYLENE GLYCOL 3350 17 G: 17 POWDER, FOR SOLUTION ORAL at 18:12

## 2023-01-01 RX ADMIN — METOLAZONE 5 MG: 5 TABLET ORAL at 20:41

## 2023-01-01 RX ADMIN — IPRATROPIUM BROMIDE AND ALBUTEROL SULFATE 3 ML: 2.5; .5 SOLUTION RESPIRATORY (INHALATION) at 11:44

## 2023-01-01 RX ADMIN — LIDOCAINE: 50 OINTMENT TOPICAL at 08:43

## 2023-01-01 RX ADMIN — LEVOTHYROXINE SODIUM 150 MCG: 150 TABLET ORAL at 06:13

## 2023-01-01 RX ADMIN — HYDROMORPHONE HYDROCHLORIDE 0.2 MG: 0.2 INJECTION, SOLUTION INTRAMUSCULAR; INTRAVENOUS; SUBCUTANEOUS at 06:16

## 2023-01-01 RX ADMIN — IPRATROPIUM BROMIDE AND ALBUTEROL SULFATE 3 ML: .5; 3 SOLUTION RESPIRATORY (INHALATION) at 15:34

## 2023-01-01 RX ADMIN — METRONIDAZOLE 500 MG: 500 INJECTION, SOLUTION INTRAVENOUS at 01:29

## 2023-01-01 RX ADMIN — HYDROMORPHONE HYDROCHLORIDE 0.4 MG: 0.2 INJECTION, SOLUTION INTRAMUSCULAR; INTRAVENOUS; SUBCUTANEOUS at 03:39

## 2023-01-01 RX ADMIN — IPRATROPIUM BROMIDE AND ALBUTEROL SULFATE 3 ML: 2.5; .5 SOLUTION RESPIRATORY (INHALATION) at 12:52

## 2023-01-01 RX ADMIN — METOLAZONE 5 MG: 5 TABLET ORAL at 11:04

## 2023-01-01 RX ADMIN — Medication 1 PACKET: at 09:37

## 2023-01-01 RX ADMIN — DEXMEDETOMIDINE HYDROCHLORIDE 0.4 MCG/KG/HR: 400 INJECTION INTRAVENOUS at 08:00

## 2023-01-01 RX ADMIN — TORSEMIDE 60 MG: 20 TABLET ORAL at 08:54

## 2023-01-01 RX ADMIN — CLOTRIMAZOLE: 0.01 CREAM TOPICAL at 07:52

## 2023-01-01 RX ADMIN — MIDODRINE HYDROCHLORIDE 5 MG: 5 TABLET ORAL at 08:31

## 2023-01-01 RX ADMIN — Medication 50 MCG: at 08:44

## 2023-01-01 RX ADMIN — BUMETANIDE 1 MG: 0.25 INJECTION INTRAMUSCULAR; INTRAVENOUS at 02:10

## 2023-01-01 RX ADMIN — Medication 5 ML: at 09:22

## 2023-01-01 RX ADMIN — METRONIDAZOLE 500 MG: 500 INJECTION, SOLUTION INTRAVENOUS at 00:10

## 2023-01-01 RX ADMIN — METRONIDAZOLE 500 MG: 500 INJECTION, SOLUTION INTRAVENOUS at 17:59

## 2023-01-01 RX ADMIN — ATORVASTATIN CALCIUM 20 MG: 20 TABLET, FILM COATED ORAL at 20:16

## 2023-01-01 RX ADMIN — CLOTRIMAZOLE: 0.01 CREAM TOPICAL at 20:01

## 2023-01-01 RX ADMIN — ACETYLCYSTEINE 2 ML: 200 SOLUTION ORAL; RESPIRATORY (INHALATION) at 07:36

## 2023-01-01 RX ADMIN — SIMETHICONE 40 MG: 20 EMULSION ORAL at 08:31

## 2023-01-01 RX ADMIN — ACETYLCYSTEINE 2 ML: 200 SOLUTION ORAL; RESPIRATORY (INHALATION) at 19:29

## 2023-01-01 RX ADMIN — CHLORHEXIDINE GLUCONATE 15 ML: 1.2 SOLUTION ORAL at 22:35

## 2023-01-01 RX ADMIN — Medication 50 MCG: at 08:52

## 2023-01-01 RX ADMIN — METHYLPREDNISOLONE SODIUM SUCCINATE 125 MG: 125 INJECTION, POWDER, FOR SOLUTION INTRAMUSCULAR; INTRAVENOUS at 09:32

## 2023-01-01 RX ADMIN — DEXMEDETOMIDINE HYDROCHLORIDE 1.2 MCG/KG/HR: 400 INJECTION INTRAVENOUS at 00:45

## 2023-01-01 RX ADMIN — Medication 15 ML: at 08:17

## 2023-01-01 RX ADMIN — HEPARIN SODIUM 5000 UNITS: 5000 INJECTION, SOLUTION INTRAVENOUS; SUBCUTANEOUS at 08:13

## 2023-01-01 RX ADMIN — IPRATROPIUM BROMIDE AND ALBUTEROL SULFATE 3 ML: 2.5; .5 SOLUTION RESPIRATORY (INHALATION) at 15:54

## 2023-01-01 RX ADMIN — MINERAL OIL AND PETROLATUM: 150; 830 OINTMENT OPHTHALMIC at 20:16

## 2023-01-01 RX ADMIN — HYDROXYZINE HYDROCHLORIDE 25 MG: 25 TABLET ORAL at 14:23

## 2023-01-01 RX ADMIN — IPRATROPIUM BROMIDE AND ALBUTEROL SULFATE 3 ML: 2.5; .5 SOLUTION RESPIRATORY (INHALATION) at 20:08

## 2023-01-01 RX ADMIN — MIDAZOLAM HYDROCHLORIDE 7 MG/HR: 1 INJECTION, SOLUTION INTRAVENOUS at 11:10

## 2023-01-01 RX ADMIN — ACETYLCYSTEINE 2 ML: 200 SOLUTION ORAL; RESPIRATORY (INHALATION) at 07:47

## 2023-01-01 RX ADMIN — VANCOMYCIN HYDROCHLORIDE 1000 MG: 1 INJECTION, SOLUTION INTRAVENOUS at 15:42

## 2023-01-01 RX ADMIN — PREGABALIN 75 MG: 75 CAPSULE ORAL at 08:48

## 2023-01-01 RX ADMIN — PROPOFOL 35 MCG/KG/MIN: 10 INJECTION, EMULSION INTRAVENOUS at 16:11

## 2023-01-01 RX ADMIN — PREGABALIN 75 MG: 20 SOLUTION ORAL at 08:23

## 2023-01-01 RX ADMIN — SODIUM ZIRCONIUM CYCLOSILICATE 10 G: 10 POWDER, FOR SUSPENSION ORAL at 11:11

## 2023-01-01 RX ADMIN — ACETYLCYSTEINE 2 ML: 200 SOLUTION ORAL; RESPIRATORY (INHALATION) at 19:39

## 2023-01-01 RX ADMIN — SIMETHICONE 40 MG: 20 EMULSION ORAL at 21:08

## 2023-01-01 RX ADMIN — CHLORHEXIDINE GLUCONATE 0.12% ORAL RINSE 15 ML: 1.2 LIQUID ORAL at 08:12

## 2023-01-01 RX ADMIN — PREGABALIN 75 MG: 75 CAPSULE ORAL at 11:16

## 2023-01-01 RX ADMIN — METHYLPREDNISOLONE SODIUM SUCCINATE 62.5 MG: 125 INJECTION, POWDER, FOR SOLUTION INTRAMUSCULAR; INTRAVENOUS at 08:56

## 2023-01-01 RX ADMIN — ROCURONIUM BROMIDE 50 MG: 50 INJECTION, SOLUTION INTRAVENOUS at 11:10

## 2023-01-01 RX ADMIN — POTASSIUM CHLORIDE 20 MEQ: 400 INJECTION, SOLUTION INTRAVENOUS at 09:33

## 2023-01-01 RX ADMIN — METOLAZONE 5 MG: 5 TABLET ORAL at 16:38

## 2023-01-01 RX ADMIN — ISOSORBIDE DINITRATE 20 MG: 20 TABLET ORAL at 13:10

## 2023-01-01 RX ADMIN — OXYCODONE HYDROCHLORIDE 5 MG: 5 TABLET ORAL at 21:21

## 2023-01-01 RX ADMIN — DEXMEDETOMIDINE HYDROCHLORIDE 0.9 MCG/KG/HR: 400 INJECTION INTRAVENOUS at 14:40

## 2023-01-01 RX ADMIN — CLOPIDOGREL BISULFATE 75 MG: 75 TABLET ORAL at 08:37

## 2023-01-01 RX ADMIN — Medication 5 ML: at 08:46

## 2023-01-01 RX ADMIN — OXYCODONE HYDROCHLORIDE 5 MG: 5 TABLET ORAL at 05:24

## 2023-01-01 RX ADMIN — WHITE PETROLATUM: 1.75 OINTMENT TOPICAL at 09:06

## 2023-01-01 RX ADMIN — CLOTRIMAZOLE: 0.01 CREAM TOPICAL at 21:55

## 2023-01-01 RX ADMIN — MINERAL OIL AND PETROLATUM: 150; 830 OINTMENT OPHTHALMIC at 20:54

## 2023-01-01 RX ADMIN — SERTRALINE HYDROCHLORIDE 150 MG: 100 TABLET ORAL at 08:29

## 2023-01-01 RX ADMIN — PREGABALIN 75 MG: 75 CAPSULE ORAL at 08:17

## 2023-01-01 RX ADMIN — MIDODRINE HYDROCHLORIDE 10 MG: 10 TABLET ORAL at 16:15

## 2023-01-01 RX ADMIN — HYDROMORPHONE HYDROCHLORIDE 0.2 MG: 0.2 INJECTION, SOLUTION INTRAMUSCULAR; INTRAVENOUS; SUBCUTANEOUS at 11:18

## 2023-01-01 RX ADMIN — ATORVASTATIN CALCIUM 20 MG: 20 TABLET, FILM COATED ORAL at 20:35

## 2023-01-01 RX ADMIN — IPRATROPIUM BROMIDE AND ALBUTEROL SULFATE 3 ML: .5; 3 SOLUTION RESPIRATORY (INHALATION) at 11:01

## 2023-01-01 RX ADMIN — CLOTRIMAZOLE: 0.01 CREAM TOPICAL at 20:06

## 2023-01-01 RX ADMIN — VANCOMYCIN HYDROCHLORIDE 1000 MG: 1 INJECTION, SOLUTION INTRAVENOUS at 15:58

## 2023-01-01 RX ADMIN — CHLORHEXIDINE GLUCONATE 15 ML: 1.2 SOLUTION ORAL at 08:35

## 2023-01-01 RX ADMIN — INSULIN ASPART 1 UNITS: 100 INJECTION, SOLUTION INTRAVENOUS; SUBCUTANEOUS at 00:54

## 2023-01-01 RX ADMIN — IPRATROPIUM BROMIDE AND ALBUTEROL SULFATE 3 ML: .5; 3 SOLUTION RESPIRATORY (INHALATION) at 16:18

## 2023-01-01 RX ADMIN — ASPIRIN 81 MG CHEWABLE TABLET 81 MG: 81 TABLET CHEWABLE at 07:57

## 2023-01-01 RX ADMIN — CLOTRIMAZOLE: 0.01 CREAM TOPICAL at 08:50

## 2023-01-01 RX ADMIN — Medication 40 MG: at 07:40

## 2023-01-01 RX ADMIN — VANCOMYCIN HYDROCHLORIDE 1000 MG: 1 INJECTION, SOLUTION INTRAVENOUS at 17:57

## 2023-01-01 RX ADMIN — AMLODIPINE BESYLATE 2.5 MG: 2.5 TABLET ORAL at 12:32

## 2023-01-01 RX ADMIN — INSULIN ASPART 1 UNITS: 100 INJECTION, SOLUTION INTRAVENOUS; SUBCUTANEOUS at 00:51

## 2023-01-01 RX ADMIN — METHYLPREDNISOLONE SODIUM SUCCINATE 62.5 MG: 125 INJECTION INTRAMUSCULAR; INTRAVENOUS at 03:07

## 2023-01-01 RX ADMIN — SULFAMETHOXAZOLE AND TRIMETHOPRIM 160 MG: 200; 40 SUSPENSION ORAL at 09:02

## 2023-01-01 RX ADMIN — ALBUTEROL SULFATE 2.5 MG: 2.5 SOLUTION RESPIRATORY (INHALATION) at 16:02

## 2023-01-01 RX ADMIN — IPRATROPIUM BROMIDE AND ALBUTEROL SULFATE 3 ML: .5; 3 SOLUTION RESPIRATORY (INHALATION) at 16:31

## 2023-01-01 RX ADMIN — CHLORHEXIDINE GLUCONATE 15 ML: 1.2 SOLUTION ORAL at 20:43

## 2023-01-01 RX ADMIN — SIMETHICONE 40 MG: 20 EMULSION ORAL at 13:10

## 2023-01-01 RX ADMIN — INSULIN ASPART 1 UNITS: 100 INJECTION, SOLUTION INTRAVENOUS; SUBCUTANEOUS at 19:41

## 2023-01-01 RX ADMIN — EPOPROSTENOL 10 NG/KG/MIN: 1.5 INJECTION, POWDER, LYOPHILIZED, FOR SOLUTION INTRAVENOUS at 16:34

## 2023-01-01 RX ADMIN — SIMETHICONE 40 MG: 20 EMULSION ORAL at 09:27

## 2023-01-01 RX ADMIN — POLYETHYLENE GLYCOL 3350 17 G: 17 POWDER, FOR SOLUTION ORAL at 08:01

## 2023-01-01 RX ADMIN — INSULIN ASPART 1 UNITS: 100 INJECTION, SOLUTION INTRAVENOUS; SUBCUTANEOUS at 20:59

## 2023-01-01 RX ADMIN — PIPERACILLIN AND TAZOBACTAM 4.5 G: 4; .5 INJECTION, POWDER, FOR SOLUTION INTRAVENOUS at 11:20

## 2023-01-01 RX ADMIN — SODIUM BICARBONATE 50 MEQ: 84 INJECTION, SOLUTION INTRAVENOUS at 16:44

## 2023-01-01 RX ADMIN — IPRATROPIUM BROMIDE AND ALBUTEROL SULFATE 3 ML: .5; 3 SOLUTION RESPIRATORY (INHALATION) at 11:55

## 2023-01-01 RX ADMIN — HEPARIN SODIUM 5000 UNITS: 5000 INJECTION, SOLUTION INTRAVENOUS; SUBCUTANEOUS at 21:28

## 2023-01-01 RX ADMIN — IPRATROPIUM BROMIDE AND ALBUTEROL SULFATE 3 ML: .5; 3 SOLUTION RESPIRATORY (INHALATION) at 07:57

## 2023-01-01 RX ADMIN — ACETAMINOPHEN 650 MG: 650 SOLUTION ORAL at 21:03

## 2023-01-01 RX ADMIN — QUETIAPINE 150 MG: 100 TABLET ORAL at 20:44

## 2023-01-01 RX ADMIN — IPRATROPIUM BROMIDE AND ALBUTEROL SULFATE 3 ML: .5; 3 SOLUTION RESPIRATORY (INHALATION) at 21:14

## 2023-01-01 RX ADMIN — HEPARIN SODIUM 5000 UNITS: 5000 INJECTION, SOLUTION INTRAVENOUS; SUBCUTANEOUS at 13:20

## 2023-01-01 RX ADMIN — TORSEMIDE 20 MG: 20 TABLET ORAL at 20:04

## 2023-01-01 RX ADMIN — METRONIDAZOLE 500 MG: 500 INJECTION, SOLUTION INTRAVENOUS at 17:09

## 2023-01-01 RX ADMIN — PREGABALIN 75 MG: 20 SOLUTION ORAL at 07:45

## 2023-01-01 RX ADMIN — HYDRALAZINE HYDROCHLORIDE 10 MG: 20 INJECTION INTRAMUSCULAR; INTRAVENOUS at 02:07

## 2023-01-01 RX ADMIN — LIDOCAINE: 50 OINTMENT TOPICAL at 16:02

## 2023-01-01 RX ADMIN — INSULIN ASPART 1 UNITS: 100 INJECTION, SOLUTION INTRAVENOUS; SUBCUTANEOUS at 16:23

## 2023-01-01 RX ADMIN — PREDNISONE 60 MG: 20 TABLET ORAL at 08:20

## 2023-01-01 RX ADMIN — INSULIN ASPART 2 UNITS: 100 INJECTION, SOLUTION INTRAVENOUS; SUBCUTANEOUS at 04:26

## 2023-01-01 RX ADMIN — Medication 1 PACKET: at 08:28

## 2023-01-01 RX ADMIN — HEPARIN SODIUM 5000 UNITS: 5000 INJECTION, SOLUTION INTRAVENOUS; SUBCUTANEOUS at 23:15

## 2023-01-01 RX ADMIN — SERTRALINE HYDROCHLORIDE 150 MG: 20 SOLUTION ORAL at 09:27

## 2023-01-01 RX ADMIN — CLOPIDOGREL BISULFATE 75 MG: 75 TABLET ORAL at 08:17

## 2023-01-01 RX ADMIN — INSULIN ASPART 1 UNITS: 100 INJECTION, SOLUTION INTRAVENOUS; SUBCUTANEOUS at 12:27

## 2023-01-01 RX ADMIN — OXYCODONE HYDROCHLORIDE 10 MG: 5 TABLET ORAL at 23:16

## 2023-01-01 RX ADMIN — NOREPINEPHRINE BITARTRATE 0.35 MCG/KG/MIN: 0.02 INJECTION, SOLUTION INTRAVENOUS at 11:01

## 2023-01-01 RX ADMIN — HYDROCORTISONE SODIUM SUCCINATE 50 MG: 100 INJECTION, POWDER, FOR SOLUTION INTRAMUSCULAR; INTRAVENOUS at 17:50

## 2023-01-01 RX ADMIN — PROPOFOL 80 MG: 10 INJECTION, EMULSION INTRAVENOUS at 18:38

## 2023-01-01 RX ADMIN — ACETAMINOPHEN 1000 MG: 500 TABLET, FILM COATED ORAL at 12:12

## 2023-01-01 RX ADMIN — Medication 0.3 MG/HR: at 00:07

## 2023-01-01 RX ADMIN — METOPROLOL TARTRATE 12.5 MG: 100 TABLET, FILM COATED ORAL at 08:39

## 2023-01-01 RX ADMIN — OXYCODONE HYDROCHLORIDE 10 MG: 5 TABLET ORAL at 08:13

## 2023-01-01 RX ADMIN — IPRATROPIUM BROMIDE AND ALBUTEROL SULFATE 3 ML: 2.5; .5 SOLUTION RESPIRATORY (INHALATION) at 21:01

## 2023-01-01 RX ADMIN — DEXMEDETOMIDINE HYDROCHLORIDE 0.9 MCG/KG/HR: 400 INJECTION INTRAVENOUS at 06:29

## 2023-01-01 RX ADMIN — IPRATROPIUM BROMIDE AND ALBUTEROL SULFATE 3 ML: .5; 3 SOLUTION RESPIRATORY (INHALATION) at 21:02

## 2023-01-01 RX ADMIN — ISOSORBIDE DINITRATE 20 MG: 20 TABLET ORAL at 11:39

## 2023-01-01 RX ADMIN — IPRATROPIUM BROMIDE AND ALBUTEROL SULFATE 3 ML: .5; 3 SOLUTION RESPIRATORY (INHALATION) at 20:07

## 2023-01-01 RX ADMIN — Medication 50 MCG: at 08:38

## 2023-01-01 RX ADMIN — ACETAMINOPHEN 650 MG: 325 TABLET, FILM COATED ORAL at 21:39

## 2023-01-01 RX ADMIN — MEROPENEM 1 G: 1 INJECTION, POWDER, FOR SOLUTION INTRAVENOUS at 05:26

## 2023-01-01 RX ADMIN — POTASSIUM CHLORIDE 20 MEQ: 20 SOLUTION ORAL at 05:34

## 2023-01-01 RX ADMIN — ENOXAPARIN SODIUM 40 MG: 40 INJECTION SUBCUTANEOUS at 08:08

## 2023-01-01 RX ADMIN — HEPARIN SODIUM 5000 UNITS: 5000 INJECTION, SOLUTION INTRAVENOUS; SUBCUTANEOUS at 08:39

## 2023-01-01 RX ADMIN — IPRATROPIUM BROMIDE AND ALBUTEROL SULFATE 3 ML: 2.5; .5 SOLUTION RESPIRATORY (INHALATION) at 11:45

## 2023-01-01 RX ADMIN — CLOTRIMAZOLE: 0.01 CREAM TOPICAL at 21:17

## 2023-01-01 RX ADMIN — MICONAZOLE NITRATE: 20 CREAM TOPICAL at 21:27

## 2023-01-01 RX ADMIN — TORSEMIDE 20 MG: 20 TABLET ORAL at 08:13

## 2023-01-01 RX ADMIN — METOPROLOL TARTRATE 12.5 MG: 100 TABLET, FILM COATED ORAL at 22:36

## 2023-01-01 RX ADMIN — IPRATROPIUM BROMIDE AND ALBUTEROL SULFATE 3 ML: .5; 3 SOLUTION RESPIRATORY (INHALATION) at 15:33

## 2023-01-01 RX ADMIN — HEPARIN SODIUM 5000 UNITS: 5000 INJECTION, SOLUTION INTRAVENOUS; SUBCUTANEOUS at 08:19

## 2023-01-01 RX ADMIN — LIDOCAINE: 50 OINTMENT TOPICAL at 17:39

## 2023-01-01 RX ADMIN — OXYCODONE HYDROCHLORIDE 5 MG: 5 TABLET ORAL at 22:38

## 2023-01-01 RX ADMIN — POTASSIUM CHLORIDE 40 MEQ: 20 SOLUTION ORAL at 02:34

## 2023-01-01 RX ADMIN — ACETAMINOPHEN 650 MG: 650 SOLUTION ORAL at 13:20

## 2023-01-01 RX ADMIN — SERTRALINE HYDROCHLORIDE 150 MG: 100 TABLET ORAL at 08:28

## 2023-01-01 RX ADMIN — HYDROXYZINE HYDROCHLORIDE 50 MG: 25 TABLET ORAL at 23:53

## 2023-01-01 RX ADMIN — HEPARIN SODIUM 5000 UNITS: 5000 INJECTION, SOLUTION INTRAVENOUS; SUBCUTANEOUS at 05:11

## 2023-01-01 RX ADMIN — HEPARIN SODIUM 5000 UNITS: 5000 INJECTION, SOLUTION INTRAVENOUS; SUBCUTANEOUS at 14:43

## 2023-01-01 RX ADMIN — ACETAMINOPHEN 650 MG: 650 SOLUTION ORAL at 13:04

## 2023-01-01 RX ADMIN — METHYLPREDNISOLONE SODIUM SUCCINATE 62.5 MG: 125 INJECTION, POWDER, FOR SOLUTION INTRAMUSCULAR; INTRAVENOUS at 20:35

## 2023-01-01 RX ADMIN — HYDRALAZINE HYDROCHLORIDE 25 MG: 25 TABLET, FILM COATED ORAL at 20:30

## 2023-01-01 RX ADMIN — MIDAZOLAM HYDROCHLORIDE 7 MG/HR: 1 INJECTION, SOLUTION INTRAVENOUS at 06:05

## 2023-01-01 RX ADMIN — Medication 1 PACKET: at 08:25

## 2023-01-01 RX ADMIN — DEXMEDETOMIDINE HYDROCHLORIDE 0.9 MCG/KG/HR: 400 INJECTION INTRAVENOUS at 19:22

## 2023-01-01 RX ADMIN — IPRATROPIUM BROMIDE AND ALBUTEROL SULFATE 3 ML: .5; 3 SOLUTION RESPIRATORY (INHALATION) at 11:30

## 2023-01-01 RX ADMIN — POTASSIUM & SODIUM PHOSPHATES POWDER PACK 280-160-250 MG 1 PACKET: 280-160-250 PACK at 11:21

## 2023-01-01 RX ADMIN — Medication 40 MG: at 09:22

## 2023-01-01 RX ADMIN — ANORECTAL OINTMENT: 15.7; .44; 24; 20.6 OINTMENT TOPICAL at 14:47

## 2023-01-01 RX ADMIN — METHYLPREDNISOLONE SODIUM SUCCINATE 62.5 MG: 125 INJECTION, POWDER, FOR SOLUTION INTRAMUSCULAR; INTRAVENOUS at 11:57

## 2023-01-01 RX ADMIN — METHYLPREDNISOLONE SODIUM SUCCINATE 62.5 MG: 125 INJECTION INTRAMUSCULAR; INTRAVENOUS at 12:58

## 2023-01-01 RX ADMIN — LORAZEPAM 0.5 MG: 2 INJECTION INTRAMUSCULAR; INTRAVENOUS at 08:46

## 2023-01-01 RX ADMIN — HYDROXYZINE HYDROCHLORIDE 50 MG: 50 TABLET, FILM COATED ORAL at 09:30

## 2023-01-01 RX ADMIN — METOLAZONE 5 MG: 5 TABLET ORAL at 16:22

## 2023-01-01 RX ADMIN — DEXMEDETOMIDINE HYDROCHLORIDE 0.7 MCG/KG/HR: 400 INJECTION INTRAVENOUS at 17:24

## 2023-01-01 RX ADMIN — INSULIN ASPART 1 UNITS: 100 INJECTION, SOLUTION INTRAVENOUS; SUBCUTANEOUS at 11:58

## 2023-01-01 RX ADMIN — PREDNISONE 50 MG: 50 TABLET ORAL at 09:40

## 2023-01-01 RX ADMIN — DEXTROSE 15 G: 15 GEL ORAL at 08:37

## 2023-01-01 RX ADMIN — TORSEMIDE 20 MG: 20 TABLET ORAL at 20:30

## 2023-01-01 RX ADMIN — INSULIN ASPART 1 UNITS: 100 INJECTION, SOLUTION INTRAVENOUS; SUBCUTANEOUS at 08:26

## 2023-01-01 RX ADMIN — QUETIAPINE FUMARATE 150 MG: 50 TABLET, FILM COATED ORAL at 20:58

## 2023-01-01 RX ADMIN — Medication 1 PACKET: at 22:40

## 2023-01-01 RX ADMIN — LEVOTHYROXINE SODIUM 150 MCG: 0.07 TABLET ORAL at 05:31

## 2023-01-01 RX ADMIN — Medication 25 MCG/HR: at 11:27

## 2023-01-01 RX ADMIN — CLOTRIMAZOLE: 0.01 CREAM TOPICAL at 22:44

## 2023-01-01 RX ADMIN — ACETAMINOPHEN 650 MG: 650 SOLUTION ORAL at 21:23

## 2023-01-01 RX ADMIN — FUROSEMIDE 60 MG: 10 INJECTION, SOLUTION INTRAMUSCULAR; INTRAVENOUS at 11:40

## 2023-01-01 RX ADMIN — IPRATROPIUM BROMIDE AND ALBUTEROL SULFATE 3 ML: .5; 3 SOLUTION RESPIRATORY (INHALATION) at 15:19

## 2023-01-01 RX ADMIN — Medication 50 MCG: at 08:37

## 2023-01-01 RX ADMIN — METOPROLOL TARTRATE 12.5 MG: 100 TABLET, FILM COATED ORAL at 08:30

## 2023-01-01 RX ADMIN — BUMETANIDE 1 MG: 0.5 TABLET ORAL at 08:13

## 2023-01-01 RX ADMIN — SERTRALINE HYDROCHLORIDE 150 MG: 100 TABLET ORAL at 08:14

## 2023-01-01 RX ADMIN — POTASSIUM & SODIUM PHOSPHATES POWDER PACK 280-160-250 MG 1 PACKET: 280-160-250 PACK at 15:47

## 2023-01-01 RX ADMIN — Medication 50 MCG: at 07:52

## 2023-01-01 RX ADMIN — HYDROMORPHONE HYDROCHLORIDE 0.2 MG: 0.2 INJECTION, SOLUTION INTRAMUSCULAR; INTRAVENOUS; SUBCUTANEOUS at 21:21

## 2023-01-01 RX ADMIN — DEXMEDETOMIDINE HYDROCHLORIDE 1 MCG/KG/HR: 400 INJECTION INTRAVENOUS at 10:08

## 2023-01-01 RX ADMIN — ISOSORBIDE DINITRATE 20 MG: 20 TABLET ORAL at 18:46

## 2023-01-01 RX ADMIN — WHITE PETROLATUM 57.7 %-MINERAL OIL 31.9 % EYE OINTMENT: at 00:59

## 2023-01-01 RX ADMIN — HYDROCORTISONE SODIUM SUCCINATE 50 MG: 100 INJECTION, POWDER, FOR SOLUTION INTRAMUSCULAR; INTRAVENOUS at 05:21

## 2023-01-01 RX ADMIN — MIDODRINE HYDROCHLORIDE 10 MG: 10 TABLET ORAL at 08:21

## 2023-01-01 RX ADMIN — VASOPRESSIN 1.2 UNITS/HR: 20 INJECTION INTRAVENOUS at 13:28

## 2023-01-01 RX ADMIN — Medication 100 MCG/HR: at 20:43

## 2023-01-01 RX ADMIN — LEVALBUTEROL HYDROCHLORIDE 1.25 MG: 1.25 SOLUTION RESPIRATORY (INHALATION) at 20:35

## 2023-01-01 RX ADMIN — HYDROXYZINE HYDROCHLORIDE 25 MG: 25 TABLET ORAL at 17:30

## 2023-01-01 RX ADMIN — OXYCODONE HYDROCHLORIDE 5 MG: 5 TABLET ORAL at 01:03

## 2023-01-01 RX ADMIN — HYDROXYZINE HYDROCHLORIDE 50 MG: 25 TABLET ORAL at 15:25

## 2023-01-01 RX ADMIN — ASPIRIN 81 MG CHEWABLE TABLET 81 MG: 81 TABLET CHEWABLE at 08:04

## 2023-01-01 RX ADMIN — ACETAMINOPHEN 650 MG: 650 SOLUTION ORAL at 12:15

## 2023-01-01 RX ADMIN — ASPIRIN 81 MG CHEWABLE TABLET 81 MG: 81 TABLET CHEWABLE at 08:40

## 2023-01-01 RX ADMIN — IPRATROPIUM BROMIDE AND ALBUTEROL SULFATE 3 ML: .5; 3 SOLUTION RESPIRATORY (INHALATION) at 20:28

## 2023-01-01 RX ADMIN — HYDROXYZINE HYDROCHLORIDE 25 MG: 25 TABLET ORAL at 19:52

## 2023-01-01 RX ADMIN — VANCOMYCIN HYDROCHLORIDE 2000 MG: 5 INJECTION, POWDER, LYOPHILIZED, FOR SOLUTION INTRAVENOUS at 06:06

## 2023-01-01 RX ADMIN — PREDNISONE 60 MG: 50 TABLET ORAL at 08:31

## 2023-01-01 RX ADMIN — HYDROMORPHONE HYDROCHLORIDE 0.2 MG: 0.2 INJECTION, SOLUTION INTRAMUSCULAR; INTRAVENOUS; SUBCUTANEOUS at 20:48

## 2023-01-01 RX ADMIN — IPRATROPIUM BROMIDE AND ALBUTEROL SULFATE 3 ML: 2.5; .5 SOLUTION RESPIRATORY (INHALATION) at 08:00

## 2023-01-01 RX ADMIN — ACETYLCYSTEINE 2 ML: 200 SOLUTION ORAL; RESPIRATORY (INHALATION) at 20:49

## 2023-01-01 RX ADMIN — MIDAZOLAM 0.5 MG: 1 INJECTION INTRAMUSCULAR; INTRAVENOUS at 10:34

## 2023-01-01 RX ADMIN — NOREPINEPHRINE BITARTRATE 0.03 MCG/KG/MIN: 0.06 INJECTION, SOLUTION INTRAVENOUS at 15:37

## 2023-01-01 RX ADMIN — IPRATROPIUM BROMIDE AND ALBUTEROL SULFATE 3 ML: 2.5; .5 SOLUTION RESPIRATORY (INHALATION) at 07:35

## 2023-01-01 RX ADMIN — HYDRALAZINE HYDROCHLORIDE 5 MG: 20 INJECTION INTRAMUSCULAR; INTRAVENOUS at 00:27

## 2023-01-01 RX ADMIN — AMLODIPINE BESYLATE 5 MG: 5 TABLET ORAL at 09:15

## 2023-01-01 RX ADMIN — CEFEPIME 2 G: 2 INJECTION, POWDER, FOR SOLUTION INTRAVENOUS at 08:16

## 2023-01-01 RX ADMIN — BUMETANIDE 0.5 MG/HR: 0.25 INJECTION INTRAMUSCULAR; INTRAVENOUS at 22:48

## 2023-01-01 RX ADMIN — ACETYLCYSTEINE 2 ML: 200 SOLUTION ORAL; RESPIRATORY (INHALATION) at 20:09

## 2023-01-01 RX ADMIN — DEXMEDETOMIDINE HYDROCHLORIDE 0.6 MCG/KG/HR: 400 INJECTION INTRAVENOUS at 18:13

## 2023-01-01 RX ADMIN — AMLODIPINE BESYLATE 5 MG: 5 TABLET ORAL at 21:19

## 2023-01-01 RX ADMIN — ALTEPLASE 2 MG: 2.2 INJECTION, POWDER, LYOPHILIZED, FOR SOLUTION INTRAVENOUS at 14:12

## 2023-01-01 RX ADMIN — TORSEMIDE 20 MG: 20 TABLET ORAL at 08:25

## 2023-01-01 RX ADMIN — EPOPROSTENOL 20 NG/KG/MIN: 1.5 INJECTION, POWDER, LYOPHILIZED, FOR SOLUTION INTRAVENOUS at 13:57

## 2023-01-01 RX ADMIN — ACETAMINOPHEN 650 MG: 325 TABLET, FILM COATED ORAL at 15:28

## 2023-01-01 RX ADMIN — PREGABALIN 75 MG: 75 CAPSULE ORAL at 06:26

## 2023-01-01 RX ADMIN — HYDRALAZINE HYDROCHLORIDE 5 MG: 20 INJECTION INTRAMUSCULAR; INTRAVENOUS at 04:22

## 2023-01-01 RX ADMIN — AMLODIPINE BESYLATE 5 MG: 5 TABLET ORAL at 20:35

## 2023-01-01 RX ADMIN — CEFEPIME 2 G: 2 INJECTION, POWDER, FOR SOLUTION INTRAVENOUS at 09:51

## 2023-01-01 RX ADMIN — Medication 40 MG: at 13:20

## 2023-01-01 RX ADMIN — HEPARIN SODIUM 5000 UNITS: 5000 INJECTION, SOLUTION INTRAVENOUS; SUBCUTANEOUS at 22:59

## 2023-01-01 RX ADMIN — HEPARIN SODIUM 5000 UNITS: 5000 INJECTION, SOLUTION INTRAVENOUS; SUBCUTANEOUS at 08:54

## 2023-01-01 RX ADMIN — Medication 40 MG: at 08:12

## 2023-01-01 RX ADMIN — IPRATROPIUM BROMIDE AND ALBUTEROL SULFATE 3 ML: .5; 3 SOLUTION RESPIRATORY (INHALATION) at 11:44

## 2023-01-01 RX ADMIN — OXYCODONE HYDROCHLORIDE 5 MG: 5 TABLET ORAL at 08:41

## 2023-01-01 RX ADMIN — ASPIRIN 81 MG CHEWABLE TABLET 81 MG: 81 TABLET CHEWABLE at 08:43

## 2023-01-01 RX ADMIN — CHLORHEXIDINE GLUCONATE 0.12% ORAL RINSE 15 ML: 1.2 LIQUID ORAL at 23:11

## 2023-01-01 RX ADMIN — INSULIN ASPART 1 UNITS: 100 INJECTION, SOLUTION INTRAVENOUS; SUBCUTANEOUS at 00:05

## 2023-01-01 RX ADMIN — METHYLPREDNISOLONE SODIUM SUCCINATE 125 MG: 125 INJECTION, POWDER, FOR SOLUTION INTRAMUSCULAR; INTRAVENOUS at 09:30

## 2023-01-01 RX ADMIN — Medication 50 MCG: at 13:11

## 2023-01-01 RX ADMIN — CLOTRIMAZOLE: 0.01 CREAM TOPICAL at 08:43

## 2023-01-01 RX ADMIN — IPRATROPIUM BROMIDE AND ALBUTEROL SULFATE 3 ML: .5; 3 SOLUTION RESPIRATORY (INHALATION) at 20:45

## 2023-01-01 RX ADMIN — THERA TABS 1 TABLET: TAB at 08:57

## 2023-01-01 RX ADMIN — OXYCODONE HYDROCHLORIDE 5 MG: 5 TABLET ORAL at 12:35

## 2023-01-01 RX ADMIN — HYDRALAZINE HYDROCHLORIDE 25 MG: 25 TABLET, FILM COATED ORAL at 13:43

## 2023-01-01 RX ADMIN — HYDROMORPHONE HYDROCHLORIDE 0.2 MG: 0.2 INJECTION, SOLUTION INTRAMUSCULAR; INTRAVENOUS; SUBCUTANEOUS at 15:35

## 2023-01-01 RX ADMIN — PREGABALIN 75 MG: 20 SOLUTION ORAL at 09:15

## 2023-01-01 RX ADMIN — ISOSORBIDE DINITRATE 20 MG: 20 TABLET ORAL at 16:44

## 2023-01-01 RX ADMIN — MIDAZOLAM 2 MG: 1 INJECTION INTRAMUSCULAR; INTRAVENOUS at 10:13

## 2023-01-01 RX ADMIN — IPRATROPIUM BROMIDE AND ALBUTEROL SULFATE 3 ML: .5; 3 SOLUTION RESPIRATORY (INHALATION) at 12:36

## 2023-01-01 RX ADMIN — Medication 40 MG: at 07:56

## 2023-01-01 RX ADMIN — TORSEMIDE 20 MG: 20 TABLET ORAL at 11:11

## 2023-01-01 RX ADMIN — ACETAMINOPHEN 650 MG: 650 SOLUTION ORAL at 13:10

## 2023-01-01 RX ADMIN — Medication 5 ML: at 08:28

## 2023-01-01 RX ADMIN — HYDROMORPHONE HYDROCHLORIDE 0.2 MG: 0.2 INJECTION, SOLUTION INTRAMUSCULAR; INTRAVENOUS; SUBCUTANEOUS at 06:19

## 2023-01-01 RX ADMIN — CEFEPIME 2 G: 2 INJECTION, POWDER, FOR SOLUTION INTRAVENOUS at 08:10

## 2023-01-01 RX ADMIN — LEVOTHYROXINE SODIUM 150 MCG: 150 TABLET ORAL at 05:10

## 2023-01-01 RX ADMIN — HEPARIN SODIUM 5000 UNITS: 5000 INJECTION, SOLUTION INTRAVENOUS; SUBCUTANEOUS at 09:01

## 2023-01-01 RX ADMIN — ATORVASTATIN CALCIUM 20 MG: 20 TABLET, FILM COATED ORAL at 21:09

## 2023-01-01 RX ADMIN — INSULIN ASPART 1 UNITS: 100 INJECTION, SOLUTION INTRAVENOUS; SUBCUTANEOUS at 12:40

## 2023-01-01 RX ADMIN — BUMETANIDE 1 MG: 0.5 TABLET ORAL at 15:58

## 2023-01-01 RX ADMIN — MEROPENEM 500 MG: 500 INJECTION, POWDER, FOR SOLUTION INTRAVENOUS at 17:35

## 2023-01-01 RX ADMIN — Medication 1 PACKET: at 22:18

## 2023-01-01 RX ADMIN — MIDODRINE HYDROCHLORIDE 10 MG: 10 TABLET ORAL at 08:17

## 2023-01-01 RX ADMIN — DEXMEDETOMIDINE HYDROCHLORIDE 0.9 MCG/KG/HR: 400 INJECTION INTRAVENOUS at 13:58

## 2023-01-01 RX ADMIN — EPOPROSTENOL 10 NG/KG/MIN: 1.5 INJECTION, POWDER, LYOPHILIZED, FOR SOLUTION INTRAVENOUS at 16:27

## 2023-01-01 RX ADMIN — CHLORHEXIDINE GLUCONATE 0.12% ORAL RINSE 15 ML: 1.2 LIQUID ORAL at 20:34

## 2023-01-01 RX ADMIN — ISOSORBIDE DINITRATE 20 MG: 20 TABLET ORAL at 17:16

## 2023-01-01 RX ADMIN — ASPIRIN 81 MG: 81 TABLET, CHEWABLE ORAL at 09:31

## 2023-01-01 RX ADMIN — INSULIN ASPART 1 UNITS: 100 INJECTION, SOLUTION INTRAVENOUS; SUBCUTANEOUS at 04:12

## 2023-01-01 RX ADMIN — ACETYLCYSTEINE 2 ML: 200 SOLUTION ORAL; RESPIRATORY (INHALATION) at 20:01

## 2023-01-01 RX ADMIN — IPRATROPIUM BROMIDE AND ALBUTEROL SULFATE 3 ML: .5; 3 SOLUTION RESPIRATORY (INHALATION) at 19:51

## 2023-01-01 RX ADMIN — ACETAMINOPHEN 650 MG: 650 SOLUTION ORAL at 16:29

## 2023-01-01 RX ADMIN — LIDOCAINE: 50 OINTMENT TOPICAL at 15:28

## 2023-01-01 RX ADMIN — CHLORHEXIDINE GLUCONATE 15 ML: 1.2 SOLUTION ORAL at 08:20

## 2023-01-01 RX ADMIN — SODIUM CHLORIDE 500 ML: 9 INJECTION, SOLUTION INTRAVENOUS at 08:40

## 2023-01-01 RX ADMIN — CEFEPIME 2 G: 2 INJECTION, POWDER, FOR SOLUTION INTRAVENOUS at 20:25

## 2023-01-01 RX ADMIN — IPRATROPIUM BROMIDE AND ALBUTEROL SULFATE 3 ML: .5; 3 SOLUTION RESPIRATORY (INHALATION) at 16:14

## 2023-01-01 RX ADMIN — GUAIFENESIN 600 MG: 600 TABLET, EXTENDED RELEASE ORAL at 08:56

## 2023-01-01 RX ADMIN — Medication 50 MCG: at 07:40

## 2023-01-01 RX ADMIN — SERTRALINE HYDROCHLORIDE 150 MG: 100 TABLET ORAL at 08:46

## 2023-01-01 RX ADMIN — MEROPENEM 1 G: 1 INJECTION, POWDER, FOR SOLUTION INTRAVENOUS at 13:17

## 2023-01-01 RX ADMIN — ACETAMINOPHEN 650 MG: 650 SOLUTION ORAL at 09:20

## 2023-01-01 RX ADMIN — ACETYLCYSTEINE 2 ML: 200 SOLUTION ORAL; RESPIRATORY (INHALATION) at 20:39

## 2023-01-01 RX ADMIN — HYDROXYZINE HYDROCHLORIDE 25 MG: 25 TABLET ORAL at 16:45

## 2023-01-01 RX ADMIN — SODIUM CHLORIDE SOLN NEBU 3% 3 ML: 3 NEBU SOLN at 19:38

## 2023-01-01 RX ADMIN — PROPOFOL 30 MG: 10 INJECTION, EMULSION INTRAVENOUS at 09:58

## 2023-01-01 RX ADMIN — MIDAZOLAM HYDROCHLORIDE 1 MG: 1 INJECTION, SOLUTION INTRAMUSCULAR; INTRAVENOUS at 20:31

## 2023-01-01 RX ADMIN — INSULIN ASPART 1 UNITS: 100 INJECTION, SOLUTION INTRAVENOUS; SUBCUTANEOUS at 16:27

## 2023-01-01 RX ADMIN — IPRATROPIUM BROMIDE AND ALBUTEROL SULFATE 3 ML: .5; 3 SOLUTION RESPIRATORY (INHALATION) at 12:38

## 2023-01-01 RX ADMIN — QUETIAPINE 150 MG: 100 TABLET ORAL at 08:20

## 2023-01-01 RX ADMIN — HEPARIN SODIUM 5000 UNITS: 5000 INJECTION, SOLUTION INTRAVENOUS; SUBCUTANEOUS at 14:31

## 2023-01-01 RX ADMIN — SIMETHICONE 40 MG: 20 EMULSION ORAL at 20:59

## 2023-01-01 RX ADMIN — CHLORHEXIDINE GLUCONATE 15 ML: 1.2 SOLUTION ORAL at 08:41

## 2023-01-01 RX ADMIN — POTASSIUM CHLORIDE 40 MEQ: 20 SOLUTION ORAL at 10:43

## 2023-01-01 RX ADMIN — ASPIRIN 81 MG CHEWABLE TABLET 81 MG: 81 TABLET CHEWABLE at 09:26

## 2023-01-01 RX ADMIN — METOPROLOL TARTRATE 12.5 MG: 100 TABLET, FILM COATED ORAL at 20:50

## 2023-01-01 RX ADMIN — IPRATROPIUM BROMIDE AND ALBUTEROL SULFATE 3 ML: .5; 3 SOLUTION RESPIRATORY (INHALATION) at 08:04

## 2023-01-01 RX ADMIN — INSULIN ASPART 1 UNITS: 100 INJECTION, SOLUTION INTRAVENOUS; SUBCUTANEOUS at 00:35

## 2023-01-01 RX ADMIN — CEFTRIAXONE 2 G: 2 INJECTION, POWDER, FOR SOLUTION INTRAMUSCULAR; INTRAVENOUS at 13:38

## 2023-01-01 RX ADMIN — SERTRALINE HYDROCHLORIDE 150 MG: 20 SOLUTION ORAL at 09:24

## 2023-01-01 RX ADMIN — LEVALBUTEROL HYDROCHLORIDE 1.25 MG: 1.25 SOLUTION RESPIRATORY (INHALATION) at 12:25

## 2023-01-01 RX ADMIN — DEXMEDETOMIDINE HYDROCHLORIDE 1.2 MCG/KG/HR: 400 INJECTION INTRAVENOUS at 21:06

## 2023-01-01 RX ADMIN — MEROPENEM 500 MG: 500 INJECTION, POWDER, FOR SOLUTION INTRAVENOUS at 05:22

## 2023-01-01 RX ADMIN — Medication 50 MCG: at 09:41

## 2023-01-01 RX ADMIN — OXYCODONE HYDROCHLORIDE 5 MG: 5 TABLET ORAL at 10:13

## 2023-01-01 RX ADMIN — HEPARIN SODIUM 5000 UNITS: 5000 INJECTION, SOLUTION INTRAVENOUS; SUBCUTANEOUS at 21:34

## 2023-01-01 RX ADMIN — CHLORHEXIDINE GLUCONATE 15 ML: 1.2 SOLUTION ORAL at 20:10

## 2023-01-01 RX ADMIN — METRONIDAZOLE 500 MG: 500 INJECTION, SOLUTION INTRAVENOUS at 00:09

## 2023-01-01 RX ADMIN — CISATRACURIUM BESYLATE 3 MCG/KG/MIN: 10 INJECTION, SOLUTION INTRAVENOUS at 07:09

## 2023-01-01 RX ADMIN — OXYCODONE HYDROCHLORIDE 5 MG: 5 TABLET ORAL at 04:47

## 2023-01-01 RX ADMIN — OXYCODONE HYDROCHLORIDE 5 MG: 5 SOLUTION ORAL at 23:53

## 2023-01-01 RX ADMIN — METRONIDAZOLE 500 MG: 500 INJECTION, SOLUTION INTRAVENOUS at 08:14

## 2023-01-01 RX ADMIN — VANCOMYCIN HYDROCHLORIDE 750 MG: 1 INJECTION, POWDER, LYOPHILIZED, FOR SOLUTION INTRAVENOUS at 17:45

## 2023-01-01 RX ADMIN — METRONIDAZOLE 500 MG: 500 INJECTION, SOLUTION INTRAVENOUS at 18:20

## 2023-01-01 RX ADMIN — PROPOFOL 20 MCG/KG/MIN: 10 INJECTION, EMULSION INTRAVENOUS at 08:19

## 2023-01-01 RX ADMIN — HEPARIN SODIUM 5000 UNITS: 5000 INJECTION, SOLUTION INTRAVENOUS; SUBCUTANEOUS at 20:12

## 2023-01-01 RX ADMIN — METHYLPREDNISOLONE SODIUM SUCCINATE 62.5 MG: 125 INJECTION INTRAMUSCULAR; INTRAVENOUS at 10:01

## 2023-01-01 RX ADMIN — LEVOTHYROXINE SODIUM 150 MCG: 150 TABLET ORAL at 06:01

## 2023-01-01 RX ADMIN — IPRATROPIUM BROMIDE AND ALBUTEROL SULFATE 3 ML: .5; 3 SOLUTION RESPIRATORY (INHALATION) at 19:16

## 2023-01-01 RX ADMIN — ATORVASTATIN CALCIUM 20 MG: 20 TABLET, FILM COATED ORAL at 20:11

## 2023-01-01 RX ADMIN — ATOVAQUONE 1500 MG: 750 SUSPENSION ORAL at 08:31

## 2023-01-01 RX ADMIN — ACETAMINOPHEN 650 MG: 650 SOLUTION ORAL at 08:22

## 2023-01-01 RX ADMIN — Medication 1 SPRAY: at 21:04

## 2023-01-01 RX ADMIN — CHLORHEXIDINE GLUCONATE 15 ML: 1.2 SOLUTION ORAL at 19:56

## 2023-01-01 RX ADMIN — METOPROLOL TARTRATE 12.5 MG: 100 TABLET, FILM COATED ORAL at 20:44

## 2023-01-01 RX ADMIN — IPRATROPIUM BROMIDE AND ALBUTEROL SULFATE 3 ML: .5; 3 SOLUTION RESPIRATORY (INHALATION) at 15:12

## 2023-01-01 RX ADMIN — MIDAZOLAM HYDROCHLORIDE 2 MG: 1 INJECTION, SOLUTION INTRAMUSCULAR; INTRAVENOUS at 15:32

## 2023-01-01 RX ADMIN — CHLORHEXIDINE GLUCONATE 15 ML: 1.2 SOLUTION ORAL at 08:21

## 2023-01-01 RX ADMIN — HEPARIN SODIUM 5000 UNITS: 5000 INJECTION, SOLUTION INTRAVENOUS; SUBCUTANEOUS at 14:10

## 2023-01-01 RX ADMIN — INSULIN ASPART 1 UNITS: 100 INJECTION, SOLUTION INTRAVENOUS; SUBCUTANEOUS at 23:58

## 2023-01-01 RX ADMIN — QUETIAPINE 150 MG: 100 TABLET ORAL at 21:39

## 2023-01-01 RX ADMIN — SENNOSIDES 5 ML: 8.8 LIQUID ORAL at 20:24

## 2023-01-01 RX ADMIN — PREDNISONE 60 MG: 20 TABLET ORAL at 17:07

## 2023-01-01 RX ADMIN — ACETAMINOPHEN 650 MG: 650 SOLUTION ORAL at 13:32

## 2023-01-01 RX ADMIN — MIDAZOLAM HYDROCHLORIDE 1 MG: 1 INJECTION, SOLUTION INTRAMUSCULAR; INTRAVENOUS at 07:55

## 2023-01-01 RX ADMIN — SERTRALINE HYDROCHLORIDE 150 MG: 100 TABLET ORAL at 09:06

## 2023-01-01 RX ADMIN — HEPARIN SODIUM 5000 UNITS: 5000 INJECTION, SOLUTION INTRAVENOUS; SUBCUTANEOUS at 06:10

## 2023-01-01 RX ADMIN — MINERAL OIL AND PETROLATUM: 150; 830 OINTMENT OPHTHALMIC at 20:03

## 2023-01-01 RX ADMIN — METHYLPREDNISOLONE SODIUM SUCCINATE 125 MG: 125 INJECTION, POWDER, FOR SOLUTION INTRAMUSCULAR; INTRAVENOUS at 09:40

## 2023-01-01 RX ADMIN — QUETIAPINE FUMARATE 150 MG: 50 TABLET, FILM COATED ORAL at 09:27

## 2023-01-01 RX ADMIN — HEPARIN SODIUM 5000 UNITS: 5000 INJECTION, SOLUTION INTRAVENOUS; SUBCUTANEOUS at 08:31

## 2023-01-01 RX ADMIN — POTASSIUM CHLORIDE 20 MEQ: 20 SOLUTION ORAL at 04:34

## 2023-01-01 RX ADMIN — MIDAZOLAM HYDROCHLORIDE 1 MG: 1 INJECTION, SOLUTION INTRAMUSCULAR; INTRAVENOUS at 22:35

## 2023-01-01 RX ADMIN — Medication 1 PACKET: at 21:34

## 2023-01-01 RX ADMIN — VASOPRESSIN 1.2 UNITS/HR: 20 INJECTION INTRAVENOUS at 03:45

## 2023-01-01 RX ADMIN — INSULIN ASPART 1 UNITS: 100 INJECTION, SOLUTION INTRAVENOUS; SUBCUTANEOUS at 00:39

## 2023-01-01 RX ADMIN — MIDAZOLAM HYDROCHLORIDE 7 MG/HR: 1 INJECTION, SOLUTION INTRAVENOUS at 09:27

## 2023-01-01 RX ADMIN — FUROSEMIDE 60 MG: 10 INJECTION, SOLUTION INTRAMUSCULAR; INTRAVENOUS at 03:36

## 2023-01-01 RX ADMIN — Medication 5 ML: at 08:20

## 2023-01-01 RX ADMIN — HYDROMORPHONE HYDROCHLORIDE 0.2 MG: 0.2 INJECTION, SOLUTION INTRAMUSCULAR; INTRAVENOUS; SUBCUTANEOUS at 06:26

## 2023-01-01 RX ADMIN — MIDAZOLAM 0.5 MG: 1 INJECTION INTRAMUSCULAR; INTRAVENOUS at 10:39

## 2023-01-01 RX ADMIN — Medication 50 MCG: at 08:28

## 2023-01-01 RX ADMIN — MEROPENEM 500 MG: 500 INJECTION, POWDER, FOR SOLUTION INTRAVENOUS at 10:58

## 2023-01-01 RX ADMIN — PREGABALIN 75 MG: 20 SOLUTION ORAL at 08:17

## 2023-01-01 RX ADMIN — INSULIN ASPART 1 UNITS: 100 INJECTION, SOLUTION INTRAVENOUS; SUBCUTANEOUS at 04:09

## 2023-01-01 RX ADMIN — METRONIDAZOLE 500 MG: 500 INJECTION, SOLUTION INTRAVENOUS at 08:54

## 2023-01-01 RX ADMIN — ACETYLCYSTEINE 2 ML: 200 SOLUTION ORAL; RESPIRATORY (INHALATION) at 07:57

## 2023-01-01 RX ADMIN — MINERAL OIL AND PETROLATUM: 150; 830 OINTMENT OPHTHALMIC at 11:55

## 2023-01-01 RX ADMIN — PREDNISONE 60 MG: 20 TABLET ORAL at 09:21

## 2023-01-01 RX ADMIN — IPRATROPIUM BROMIDE AND ALBUTEROL SULFATE 3 ML: 2.5; .5 SOLUTION RESPIRATORY (INHALATION) at 19:26

## 2023-01-01 RX ADMIN — DEXMEDETOMIDINE HYDROCHLORIDE 1.1 MCG/KG/HR: 400 INJECTION INTRAVENOUS at 05:35

## 2023-01-01 RX ADMIN — SENNOSIDES 5 ML: 8.8 LIQUID ORAL at 08:24

## 2023-01-01 RX ADMIN — ASPIRIN 81 MG: 81 TABLET, COATED ORAL at 08:41

## 2023-01-01 RX ADMIN — PANTOPRAZOLE SODIUM 40 MG: 40 INJECTION, POWDER, LYOPHILIZED, FOR SOLUTION INTRAVENOUS at 08:08

## 2023-01-01 RX ADMIN — METRONIDAZOLE 500 MG: 500 INJECTION, SOLUTION INTRAVENOUS at 09:38

## 2023-01-01 RX ADMIN — ASPIRIN 81 MG: 81 TABLET, CHEWABLE ORAL at 08:28

## 2023-01-01 RX ADMIN — LEVOTHYROXINE SODIUM 150 MCG: 150 TABLET ORAL at 06:16

## 2023-01-01 RX ADMIN — SIMETHICONE 40 MG: 20 EMULSION ORAL at 14:33

## 2023-01-01 RX ADMIN — ATORVASTATIN CALCIUM 20 MG: 20 TABLET, FILM COATED ORAL at 18:39

## 2023-01-01 RX ADMIN — HYDRALAZINE HYDROCHLORIDE 25 MG: 25 TABLET, FILM COATED ORAL at 12:16

## 2023-01-01 RX ADMIN — LEVOTHYROXINE SODIUM 150 MCG: 150 TABLET ORAL at 05:52

## 2023-01-01 RX ADMIN — INSULIN ASPART 1 UNITS: 100 INJECTION, SOLUTION INTRAVENOUS; SUBCUTANEOUS at 20:22

## 2023-01-01 RX ADMIN — ATORVASTATIN CALCIUM 20 MG: 20 TABLET, FILM COATED ORAL at 20:04

## 2023-01-01 RX ADMIN — ACETAMINOPHEN 650 MG: 650 SOLUTION ORAL at 21:19

## 2023-01-01 RX ADMIN — Medication 50 MCG: at 09:22

## 2023-01-01 RX ADMIN — ACETAMINOPHEN 650 MG: 325 TABLET, FILM COATED ORAL at 22:42

## 2023-01-01 RX ADMIN — METOPROLOL TARTRATE 12.5 MG: 100 TABLET, FILM COATED ORAL at 21:13

## 2023-01-01 RX ADMIN — INSULIN ASPART 1 UNITS: 100 INJECTION, SOLUTION INTRAVENOUS; SUBCUTANEOUS at 00:07

## 2023-01-01 RX ADMIN — HEPARIN SODIUM 5000 UNITS: 10000 INJECTION, SOLUTION INTRAVENOUS; SUBCUTANEOUS at 15:02

## 2023-01-01 RX ADMIN — QUETIAPINE FUMARATE 150 MG: 50 TABLET, FILM COATED ORAL at 08:13

## 2023-01-01 RX ADMIN — Medication 1 PACKET: at 15:32

## 2023-01-01 RX ADMIN — EPOPROSTENOL 20 NG/KG/MIN: 1.5 INJECTION, POWDER, LYOPHILIZED, FOR SOLUTION INTRAVENOUS at 04:30

## 2023-01-01 RX ADMIN — CHLORHEXIDINE GLUCONATE 0.12% ORAL RINSE 15 ML: 1.2 LIQUID ORAL at 20:42

## 2023-01-01 RX ADMIN — ACETYLCYSTEINE 2 ML: 200 SOLUTION ORAL; RESPIRATORY (INHALATION) at 07:24

## 2023-01-01 RX ADMIN — QUETIAPINE 150 MG: 100 TABLET ORAL at 13:12

## 2023-01-01 RX ADMIN — HYDRALAZINE HYDROCHLORIDE 5 MG: 20 INJECTION INTRAMUSCULAR; INTRAVENOUS at 00:04

## 2023-01-01 RX ADMIN — HEPARIN SODIUM 5000 UNITS: 5000 INJECTION, SOLUTION INTRAVENOUS; SUBCUTANEOUS at 14:08

## 2023-01-01 RX ADMIN — EPOPROSTENOL 20 NG/KG/MIN: 1.5 INJECTION, POWDER, LYOPHILIZED, FOR SOLUTION INTRAVENOUS at 07:04

## 2023-01-01 RX ADMIN — LEVOTHYROXINE SODIUM 150 MCG: 75 TABLET ORAL at 06:23

## 2023-01-01 RX ADMIN — PROPOFOL 35 MCG/KG/MIN: 10 INJECTION, EMULSION INTRAVENOUS at 05:05

## 2023-01-01 RX ADMIN — METRONIDAZOLE 500 MG: 500 INJECTION, SOLUTION INTRAVENOUS at 02:00

## 2023-01-01 RX ADMIN — SERTRALINE HYDROCHLORIDE 150 MG: 20 SOLUTION ORAL at 09:41

## 2023-01-01 RX ADMIN — POTASSIUM CHLORIDE FOR ORAL SOLUTION 20 MEQ: 1.5 POWDER, FOR SOLUTION ORAL at 01:23

## 2023-01-01 RX ADMIN — MIDODRINE HYDROCHLORIDE 10 MG: 10 TABLET ORAL at 20:50

## 2023-01-01 RX ADMIN — HYDRALAZINE HYDROCHLORIDE 5 MG: 20 INJECTION INTRAMUSCULAR; INTRAVENOUS at 00:40

## 2023-01-01 RX ADMIN — CEFEPIME 2 G: 2 INJECTION, POWDER, FOR SOLUTION INTRAVENOUS at 22:05

## 2023-01-01 RX ADMIN — CHLORHEXIDINE GLUCONATE 0.12% ORAL RINSE 15 ML: 1.2 LIQUID ORAL at 20:54

## 2023-01-01 RX ADMIN — OXYCODONE HYDROCHLORIDE 5 MG: 5 SOLUTION ORAL at 00:02

## 2023-01-01 RX ADMIN — HYDROMORPHONE HYDROCHLORIDE 0.4 MG: 0.2 INJECTION, SOLUTION INTRAMUSCULAR; INTRAVENOUS; SUBCUTANEOUS at 12:18

## 2023-01-01 RX ADMIN — IPRATROPIUM BROMIDE AND ALBUTEROL SULFATE 3 ML: .5; 3 SOLUTION RESPIRATORY (INHALATION) at 20:15

## 2023-01-01 RX ADMIN — ASPIRIN 81 MG: 81 TABLET, CHEWABLE ORAL at 08:32

## 2023-01-01 RX ADMIN — METOPROLOL TARTRATE 12.5 MG: 100 TABLET, FILM COATED ORAL at 08:25

## 2023-01-01 RX ADMIN — PREGABALIN 75 MG: 20 SOLUTION ORAL at 08:20

## 2023-01-01 RX ADMIN — CHLORHEXIDINE GLUCONATE 0.12% ORAL RINSE 15 ML: 1.2 LIQUID ORAL at 09:41

## 2023-01-01 RX ADMIN — ASPIRIN 81 MG: 81 TABLET, CHEWABLE ORAL at 09:16

## 2023-01-01 RX ADMIN — DEXMEDETOMIDINE HYDROCHLORIDE 1 MCG/KG/HR: 400 INJECTION INTRAVENOUS at 05:15

## 2023-01-01 RX ADMIN — ACETYLCYSTEINE 2 ML: 200 SOLUTION ORAL; RESPIRATORY (INHALATION) at 15:47

## 2023-01-01 RX ADMIN — METHYLPREDNISOLONE SODIUM SUCCINATE 62.5 MG: 125 INJECTION INTRAMUSCULAR; INTRAVENOUS at 11:31

## 2023-01-01 RX ADMIN — THERA TABS 1 TABLET: TAB at 08:47

## 2023-01-01 RX ADMIN — HEPARIN SODIUM 5000 UNITS: 5000 INJECTION, SOLUTION INTRAVENOUS; SUBCUTANEOUS at 21:27

## 2023-01-01 RX ADMIN — LEVOTHYROXINE SODIUM 150 MCG: 0.07 TABLET ORAL at 06:26

## 2023-01-01 RX ADMIN — ACETYLCYSTEINE 2 ML: 200 SOLUTION ORAL; RESPIRATORY (INHALATION) at 07:41

## 2023-01-01 RX ADMIN — HYDROMORPHONE HYDROCHLORIDE 0.2 MG: 0.2 INJECTION, SOLUTION INTRAMUSCULAR; INTRAVENOUS; SUBCUTANEOUS at 00:23

## 2023-01-01 RX ADMIN — QUETIAPINE FUMARATE 150 MG: 50 TABLET, FILM COATED ORAL at 20:54

## 2023-01-01 RX ADMIN — HEPARIN SODIUM 5000 UNITS: 5000 INJECTION, SOLUTION INTRAVENOUS; SUBCUTANEOUS at 22:07

## 2023-01-01 RX ADMIN — SERTRALINE HYDROCHLORIDE 150 MG: 100 TABLET ORAL at 13:24

## 2023-01-01 RX ADMIN — LORAZEPAM 0.5 MG: 0.5 TABLET ORAL at 17:01

## 2023-01-01 RX ADMIN — HYDRALAZINE HYDROCHLORIDE 50 MG: 50 TABLET, FILM COATED ORAL at 15:40

## 2023-01-01 RX ADMIN — DEXMEDETOMIDINE HYDROCHLORIDE 0.3 MCG/KG/HR: 400 INJECTION INTRAVENOUS at 11:11

## 2023-01-01 RX ADMIN — HEPARIN SODIUM 5000 UNITS: 5000 INJECTION, SOLUTION INTRAVENOUS; SUBCUTANEOUS at 20:43

## 2023-01-01 RX ADMIN — Medication 150 MCG/HR: at 20:57

## 2023-01-01 RX ADMIN — METOPROLOL TARTRATE 12.5 MG: 100 TABLET, FILM COATED ORAL at 23:12

## 2023-01-01 RX ADMIN — HYDROCORTISONE SODIUM SUCCINATE 50 MG: 100 INJECTION, POWDER, FOR SOLUTION INTRAMUSCULAR; INTRAVENOUS at 00:50

## 2023-01-01 RX ADMIN — AMLODIPINE BESYLATE 5 MG: 5 TABLET ORAL at 08:02

## 2023-01-01 RX ADMIN — Medication 5 ML: at 09:18

## 2023-01-01 RX ADMIN — PROPOFOL 30 MCG/KG/MIN: 10 INJECTION, EMULSION INTRAVENOUS at 04:37

## 2023-01-01 RX ADMIN — MEROPENEM 500 MG: 500 INJECTION, POWDER, FOR SOLUTION INTRAVENOUS at 09:16

## 2023-01-01 RX ADMIN — PREGABALIN 75 MG: 20 SOLUTION ORAL at 08:59

## 2023-01-01 RX ADMIN — ACETAMINOPHEN 650 MG: 650 SOLUTION ORAL at 17:09

## 2023-01-01 RX ADMIN — Medication 1 SPRAY: at 13:46

## 2023-01-01 RX ADMIN — SERTRALINE HYDROCHLORIDE 150 MG: 20 SOLUTION ORAL at 09:28

## 2023-01-01 RX ADMIN — ATOVAQUONE 1500 MG: 750 SUSPENSION ORAL at 08:01

## 2023-01-01 RX ADMIN — PREDNISONE 60 MG: 20 TABLET ORAL at 17:18

## 2023-01-01 RX ADMIN — ASPIRIN 81 MG: 81 TABLET, COATED ORAL at 07:38

## 2023-01-01 RX ADMIN — HEPARIN SODIUM 5000 UNITS: 5000 INJECTION, SOLUTION INTRAVENOUS; SUBCUTANEOUS at 09:27

## 2023-01-01 RX ADMIN — Medication 40 MG: at 08:09

## 2023-01-01 RX ADMIN — METHYLPREDNISOLONE SODIUM SUCCINATE 62.5 MG: 125 INJECTION, POWDER, FOR SOLUTION INTRAMUSCULAR; INTRAVENOUS at 18:10

## 2023-01-01 RX ADMIN — HEPARIN SODIUM 5000 UNITS: 5000 INJECTION, SOLUTION INTRAVENOUS; SUBCUTANEOUS at 06:16

## 2023-01-01 RX ADMIN — CHLORHEXIDINE GLUCONATE 15 ML: 1.2 SOLUTION ORAL at 20:54

## 2023-01-01 RX ADMIN — POTASSIUM CHLORIDE 20 MEQ: 20 SOLUTION ORAL at 13:19

## 2023-01-01 RX ADMIN — INSULIN ASPART 1 UNITS: 100 INJECTION, SOLUTION INTRAVENOUS; SUBCUTANEOUS at 08:49

## 2023-01-01 RX ADMIN — INSULIN ASPART 1 UNITS: 100 INJECTION, SOLUTION INTRAVENOUS; SUBCUTANEOUS at 07:58

## 2023-01-01 RX ADMIN — METHYLPREDNISOLONE SODIUM SUCCINATE 62.5 MG: 125 INJECTION INTRAMUSCULAR; INTRAVENOUS at 09:32

## 2023-01-01 RX ADMIN — PREGABALIN 75 MG: 20 SOLUTION ORAL at 09:40

## 2023-01-01 RX ADMIN — OXYCODONE HYDROCHLORIDE 5 MG: 5 SOLUTION ORAL at 12:27

## 2023-01-01 RX ADMIN — MIDAZOLAM 2 MG: 1 INJECTION INTRAMUSCULAR; INTRAVENOUS at 15:35

## 2023-01-01 RX ADMIN — METRONIDAZOLE 500 MG: 500 INJECTION, SOLUTION INTRAVENOUS at 16:15

## 2023-01-01 RX ADMIN — ACETYLCYSTEINE 2 ML: 200 SOLUTION ORAL; RESPIRATORY (INHALATION) at 07:55

## 2023-01-01 RX ADMIN — HYDROMORPHONE HYDROCHLORIDE 0.2 MG: 0.2 INJECTION, SOLUTION INTRAMUSCULAR; INTRAVENOUS; SUBCUTANEOUS at 18:56

## 2023-01-01 RX ADMIN — TORSEMIDE 20 MG: 20 TABLET ORAL at 09:04

## 2023-01-01 RX ADMIN — HYDRALAZINE HYDROCHLORIDE 25 MG: 25 TABLET, FILM COATED ORAL at 22:41

## 2023-01-01 RX ADMIN — Medication 40 MG: at 06:44

## 2023-01-01 RX ADMIN — VANCOMYCIN HYDROCHLORIDE 1250 MG: 5 INJECTION, POWDER, LYOPHILIZED, FOR SOLUTION INTRAVENOUS at 07:57

## 2023-01-01 RX ADMIN — MICONAZOLE NITRATE: 20 CREAM TOPICAL at 08:45

## 2023-01-01 RX ADMIN — LABETALOL HYDROCHLORIDE 10 MG: 5 INJECTION, SOLUTION INTRAVENOUS at 09:11

## 2023-01-01 RX ADMIN — ACETYLCYSTEINE 2 ML: 200 SOLUTION ORAL; RESPIRATORY (INHALATION) at 19:51

## 2023-01-01 RX ADMIN — Medication 40 MG: at 08:45

## 2023-01-01 RX ADMIN — MIDAZOLAM HYDROCHLORIDE 7 MG/HR: 1 INJECTION, SOLUTION INTRAVENOUS at 23:19

## 2023-01-01 RX ADMIN — ACETAMINOPHEN 650 MG: 650 SOLUTION ORAL at 14:05

## 2023-01-01 RX ADMIN — BUMETANIDE 1 MG: 0.5 TABLET ORAL at 08:25

## 2023-01-01 RX ADMIN — Medication 125 MCG/HR: at 02:58

## 2023-01-01 RX ADMIN — ASPIRIN 81 MG: 81 TABLET, CHEWABLE ORAL at 09:04

## 2023-01-01 RX ADMIN — MIDAZOLAM HYDROCHLORIDE 7 MG/HR: 1 INJECTION, SOLUTION INTRAVENOUS at 17:16

## 2023-01-01 RX ADMIN — POLYETHYLENE GLYCOL 3350 17 G: 17 POWDER, FOR SOLUTION ORAL at 15:58

## 2023-01-01 RX ADMIN — Medication 40 MG: at 08:22

## 2023-01-01 RX ADMIN — PHENYLEPHRINE HYDROCHLORIDE 200 MCG: 10 INJECTION INTRAVENOUS at 09:56

## 2023-01-01 RX ADMIN — ACETAMINOPHEN 650 MG: 325 TABLET, FILM COATED ORAL at 14:17

## 2023-01-01 RX ADMIN — IPRATROPIUM BROMIDE AND ALBUTEROL SULFATE 3 ML: .5; 3 SOLUTION RESPIRATORY (INHALATION) at 07:38

## 2023-01-01 RX ADMIN — IPRATROPIUM BROMIDE AND ALBUTEROL SULFATE 3 ML: .5; 3 SOLUTION RESPIRATORY (INHALATION) at 11:34

## 2023-01-01 RX ADMIN — AMLODIPINE BESYLATE 5 MG: 5 TABLET ORAL at 09:29

## 2023-01-01 RX ADMIN — QUETIAPINE FUMARATE 50 MG: 50 TABLET ORAL at 20:54

## 2023-01-01 RX ADMIN — TORSEMIDE 60 MG: 20 TABLET ORAL at 08:37

## 2023-01-01 RX ADMIN — PANTOPRAZOLE SODIUM 40 MG: 40 INJECTION, POWDER, LYOPHILIZED, FOR SOLUTION INTRAVENOUS at 08:30

## 2023-01-01 RX ADMIN — CALCIUM GLUCONATE 1 G: 20 INJECTION, SOLUTION INTRAVENOUS at 16:39

## 2023-01-01 RX ADMIN — IPRATROPIUM BROMIDE AND ALBUTEROL SULFATE 3 ML: .5; 3 SOLUTION RESPIRATORY (INHALATION) at 19:45

## 2023-01-01 RX ADMIN — HEPARIN SODIUM 5000 UNITS: 5000 INJECTION, SOLUTION INTRAVENOUS; SUBCUTANEOUS at 21:05

## 2023-01-01 RX ADMIN — IPRATROPIUM BROMIDE AND ALBUTEROL SULFATE 3 ML: 2.5; .5 SOLUTION RESPIRATORY (INHALATION) at 15:46

## 2023-01-01 RX ADMIN — Medication 80 MG: at 09:56

## 2023-01-01 RX ADMIN — IPRATROPIUM BROMIDE AND ALBUTEROL SULFATE 3 ML: .5; 3 SOLUTION RESPIRATORY (INHALATION) at 15:47

## 2023-01-01 RX ADMIN — ACETAMINOPHEN 975 MG: 325 TABLET, FILM COATED ORAL at 22:03

## 2023-01-01 RX ADMIN — FUROSEMIDE 60 MG: 10 INJECTION, SOLUTION INTRAMUSCULAR; INTRAVENOUS at 18:11

## 2023-01-01 RX ADMIN — HYDROMORPHONE HYDROCHLORIDE 0.4 MG: 0.2 INJECTION, SOLUTION INTRAMUSCULAR; INTRAVENOUS; SUBCUTANEOUS at 12:10

## 2023-01-01 RX ADMIN — PANTOPRAZOLE SODIUM 40 MG: 40 INJECTION, POWDER, LYOPHILIZED, FOR SOLUTION INTRAVENOUS at 09:01

## 2023-01-01 RX ADMIN — HEPARIN SODIUM 5000 UNITS: 5000 INJECTION, SOLUTION INTRAVENOUS; SUBCUTANEOUS at 22:49

## 2023-01-01 RX ADMIN — METHYLPREDNISOLONE SODIUM SUCCINATE 125 MG: 125 INJECTION, POWDER, FOR SOLUTION INTRAMUSCULAR; INTRAVENOUS at 08:36

## 2023-01-01 RX ADMIN — OXYCODONE HYDROCHLORIDE 5 MG: 5 TABLET ORAL at 18:00

## 2023-01-01 RX ADMIN — IPRATROPIUM BROMIDE AND ALBUTEROL SULFATE 3 ML: .5; 3 SOLUTION RESPIRATORY (INHALATION) at 16:24

## 2023-01-01 RX ADMIN — POTASSIUM CHLORIDE 20 MEQ: 20 SOLUTION ORAL at 06:55

## 2023-01-01 RX ADMIN — OXYCODONE HYDROCHLORIDE 5 MG: 5 TABLET ORAL at 22:11

## 2023-01-01 RX ADMIN — IPRATROPIUM BROMIDE AND ALBUTEROL SULFATE 3 ML: 2.5; .5 SOLUTION RESPIRATORY (INHALATION) at 19:37

## 2023-01-01 RX ADMIN — MICONAZOLE NITRATE: 20 CREAM TOPICAL at 21:35

## 2023-01-01 ASSESSMENT — ACTIVITIES OF DAILY LIVING (ADL)
ADLS_ACUITY_SCORE: 36
ADLS_ACUITY_SCORE: 44
ADLS_ACUITY_SCORE: 34
ADLS_ACUITY_SCORE: 36
ADLS_ACUITY_SCORE: 36
ADLS_ACUITY_SCORE: 40
ADLS_ACUITY_SCORE: 26
ADLS_ACUITY_SCORE: 33
ADLS_ACUITY_SCORE: 36
ADLS_ACUITY_SCORE: 30
ADLS_ACUITY_SCORE: 44
ADLS_ACUITY_SCORE: 36
ADLS_ACUITY_SCORE: 48
FALL_HISTORY_WITHIN_LAST_SIX_MONTHS: YES
ADLS_ACUITY_SCORE: 48
ADLS_ACUITY_SCORE: 26
ADLS_ACUITY_SCORE: 33
ADLS_ACUITY_SCORE: 36
ADLS_ACUITY_SCORE: 40
ADLS_ACUITY_SCORE: 29
ADLS_ACUITY_SCORE: 36
ADLS_ACUITY_SCORE: 36
ADLS_ACUITY_SCORE: 34
ADLS_ACUITY_SCORE: 29
ADLS_ACUITY_SCORE: 36
ADLS_ACUITY_SCORE: 34
ADLS_ACUITY_SCORE: 36
ADLS_ACUITY_SCORE: 33
ADLS_ACUITY_SCORE: 33
ADLS_ACUITY_SCORE: 40
ADLS_ACUITY_SCORE: 36
ADLS_ACUITY_SCORE: 36
ADLS_ACUITY_SCORE: 32
ADLS_ACUITY_SCORE: 30
ADLS_ACUITY_SCORE: 34
ADLS_ACUITY_SCORE: 34
ADLS_ACUITY_SCORE: 44
ADLS_ACUITY_SCORE: 44
ADLS_ACUITY_SCORE: 36
ADLS_ACUITY_SCORE: 33
ADLS_ACUITY_SCORE: 30
ADLS_ACUITY_SCORE: 48
ADLS_ACUITY_SCORE: 34
ADLS_ACUITY_SCORE: 30
ADLS_ACUITY_SCORE: 48
ADLS_ACUITY_SCORE: 36
ADLS_ACUITY_SCORE: 40
ADLS_ACUITY_SCORE: 46
ADLS_ACUITY_SCORE: 33
ADLS_ACUITY_SCORE: 36
ADLS_ACUITY_SCORE: 36
ADLS_ACUITY_SCORE: 46
ADLS_ACUITY_SCORE: 36
ADLS_ACUITY_SCORE: 34
ADLS_ACUITY_SCORE: 36
ADLS_ACUITY_SCORE: 48
ADLS_ACUITY_SCORE: 32
ADLS_ACUITY_SCORE: 34
ADLS_ACUITY_SCORE: 30
ADLS_ACUITY_SCORE: 36
ADLS_ACUITY_SCORE: 33
ADLS_ACUITY_SCORE: 34
ADLS_ACUITY_SCORE: 48
ADLS_ACUITY_SCORE: 36
ADLS_ACUITY_SCORE: 48
ADLS_ACUITY_SCORE: 48
ADLS_ACUITY_SCORE: 36
ADLS_ACUITY_SCORE: 32
ADLS_ACUITY_SCORE: 52
ADLS_ACUITY_SCORE: 40
ADLS_ACUITY_SCORE: 44
DRESSING/BATHING_DIFFICULTY: NO
ADLS_ACUITY_SCORE: 36
ADLS_ACUITY_SCORE: 48
ADLS_ACUITY_SCORE: 46
ADLS_ACUITY_SCORE: 34
ADLS_ACUITY_SCORE: 40
ADLS_ACUITY_SCORE: 36
ADLS_ACUITY_SCORE: 40
ADLS_ACUITY_SCORE: 23
ADLS_ACUITY_SCORE: 48
ADLS_ACUITY_SCORE: 36
ADLS_ACUITY_SCORE: 32
ADLS_ACUITY_SCORE: 34
ADLS_ACUITY_SCORE: 34
ADLS_ACUITY_SCORE: 43
ADLS_ACUITY_SCORE: 36
ADLS_ACUITY_SCORE: 36
ADLS_ACUITY_SCORE: 26
ADLS_ACUITY_SCORE: 36
ADLS_ACUITY_SCORE: 23
ADLS_ACUITY_SCORE: 40
ADLS_ACUITY_SCORE: 30
ADLS_ACUITY_SCORE: 33
ADLS_ACUITY_SCORE: 46
ADLS_ACUITY_SCORE: 32
ADLS_ACUITY_SCORE: 37
ADLS_ACUITY_SCORE: 44
ADLS_ACUITY_SCORE: 29
ADLS_ACUITY_SCORE: 50
ADLS_ACUITY_SCORE: 36
ADLS_ACUITY_SCORE: 32
ADLS_ACUITY_SCORE: 36
ADLS_ACUITY_SCORE: 40
ADLS_ACUITY_SCORE: 52
ADLS_ACUITY_SCORE: 34
ADLS_ACUITY_SCORE: 48
ADLS_ACUITY_SCORE: 34
ADLS_ACUITY_SCORE: 32
ADLS_ACUITY_SCORE: 46
ADLS_ACUITY_SCORE: 36
ADLS_ACUITY_SCORE: 32
ADLS_ACUITY_SCORE: 30
ADLS_ACUITY_SCORE: 36
ADLS_ACUITY_SCORE: 36
ADLS_ACUITY_SCORE: 29
ADLS_ACUITY_SCORE: 36
ADLS_ACUITY_SCORE: 34
ADLS_ACUITY_SCORE: 40
ADLS_ACUITY_SCORE: 50
ADLS_ACUITY_SCORE: 34
ADLS_ACUITY_SCORE: 34
ADLS_ACUITY_SCORE: 33
ADLS_ACUITY_SCORE: 30
ADLS_ACUITY_SCORE: 36
ADLS_ACUITY_SCORE: 40
ADLS_ACUITY_SCORE: 34
ADLS_ACUITY_SCORE: 30
ADLS_ACUITY_SCORE: 36
ADLS_ACUITY_SCORE: 36
ADLS_ACUITY_SCORE: 28
ADLS_ACUITY_SCORE: 36
ADLS_ACUITY_SCORE: 33
ADLS_ACUITY_SCORE: 43
ADLS_ACUITY_SCORE: 30
ADLS_ACUITY_SCORE: 48
ADLS_ACUITY_SCORE: 33
ADLS_ACUITY_SCORE: 38
ADLS_ACUITY_SCORE: 36
ADLS_ACUITY_SCORE: 40
ADLS_ACUITY_SCORE: 34
ADLS_ACUITY_SCORE: 48
ADLS_ACUITY_SCORE: 34
ADLS_ACUITY_SCORE: 34
ADLS_ACUITY_SCORE: 29
ADLS_ACUITY_SCORE: 36
ADLS_ACUITY_SCORE: 36
ADLS_ACUITY_SCORE: 48
ADLS_ACUITY_SCORE: 36
ADLS_ACUITY_SCORE: 40
ADLS_ACUITY_SCORE: 32
ADLS_ACUITY_SCORE: 29
ADLS_ACUITY_SCORE: 40
ADLS_ACUITY_SCORE: 48
ADLS_ACUITY_SCORE: 36
ADLS_ACUITY_SCORE: 29
ADLS_ACUITY_SCORE: 32
ADLS_ACUITY_SCORE: 36
ADLS_ACUITY_SCORE: 44
ADLS_ACUITY_SCORE: 48
ADLS_ACUITY_SCORE: 36
ADLS_ACUITY_SCORE: 52
ADLS_ACUITY_SCORE: 36
ADLS_ACUITY_SCORE: 48
ADLS_ACUITY_SCORE: 36
ADLS_ACUITY_SCORE: 40
ADLS_ACUITY_SCORE: 48
ADLS_ACUITY_SCORE: 34
ADLS_ACUITY_SCORE: 50
WALKING_OR_CLIMBING_STAIRS_DIFFICULTY: NO
ADLS_ACUITY_SCORE: 36
ADLS_ACUITY_SCORE: 35
ADLS_ACUITY_SCORE: 36
ADLS_ACUITY_SCORE: 46
ADLS_ACUITY_SCORE: 36
ADLS_ACUITY_SCORE: 32
ADLS_ACUITY_SCORE: 48
ADLS_ACUITY_SCORE: 46
ADLS_ACUITY_SCORE: 32
ADLS_ACUITY_SCORE: 48
ADLS_ACUITY_SCORE: 26
ADLS_ACUITY_SCORE: 48
ADLS_ACUITY_SCORE: 46
ADLS_ACUITY_SCORE: 40
ADLS_ACUITY_SCORE: 29
ADLS_ACUITY_SCORE: 36
ADLS_ACUITY_SCORE: 34
ADLS_ACUITY_SCORE: 36
ADLS_ACUITY_SCORE: 48
ADLS_ACUITY_SCORE: 43
ADLS_ACUITY_SCORE: 36
ADLS_ACUITY_SCORE: 34
ADLS_ACUITY_SCORE: 36
ADLS_ACUITY_SCORE: 46
ADLS_ACUITY_SCORE: 32
ADLS_ACUITY_SCORE: 33
ADLS_ACUITY_SCORE: 32
ADLS_ACUITY_SCORE: 23
ADLS_ACUITY_SCORE: 36
ADLS_ACUITY_SCORE: 34
ADLS_ACUITY_SCORE: 52
ADLS_ACUITY_SCORE: 48
ADLS_ACUITY_SCORE: 36
ADLS_ACUITY_SCORE: 26
ADLS_ACUITY_SCORE: 48
ADLS_ACUITY_SCORE: 36
ADLS_ACUITY_SCORE: 36
ADLS_ACUITY_SCORE: 50
ADLS_ACUITY_SCORE: 32
ADLS_ACUITY_SCORE: 36
NUMBER_OF_TIMES_PATIENT_HAS_FALLEN_WITHIN_LAST_SIX_MONTHS: 3
ADLS_ACUITY_SCORE: 48
ADLS_ACUITY_SCORE: 36
ADLS_ACUITY_SCORE: 32
ADLS_ACUITY_SCORE: 40
ADLS_ACUITY_SCORE: 48
ADLS_ACUITY_SCORE: 32
ADLS_ACUITY_SCORE: 34
ADLS_ACUITY_SCORE: 36
ADLS_ACUITY_SCORE: 36
ADLS_ACUITY_SCORE: 38
ADLS_ACUITY_SCORE: 32
ADLS_ACUITY_SCORE: 34
ADLS_ACUITY_SCORE: 40
ADLS_ACUITY_SCORE: 26
ADLS_ACUITY_SCORE: 32
ADLS_ACUITY_SCORE: 36
ADLS_ACUITY_SCORE: 40
ADLS_ACUITY_SCORE: 46
ADLS_ACUITY_SCORE: 36
ADLS_ACUITY_SCORE: 52
ADLS_ACUITY_SCORE: 36
ADLS_ACUITY_SCORE: 35
ADLS_ACUITY_SCORE: 50
ADLS_ACUITY_SCORE: 34
ADLS_ACUITY_SCORE: 36
ADLS_ACUITY_SCORE: 34
ADLS_ACUITY_SCORE: 23
ADLS_ACUITY_SCORE: 36
ADLS_ACUITY_SCORE: 48
ADLS_ACUITY_SCORE: 36
ADLS_ACUITY_SCORE: 46
ADLS_ACUITY_SCORE: 29
ADLS_ACUITY_SCORE: 48
ADLS_ACUITY_SCORE: 36
ADLS_ACUITY_SCORE: 40
ADLS_ACUITY_SCORE: 40
ADLS_ACUITY_SCORE: 36
ADLS_ACUITY_SCORE: 50
ADLS_ACUITY_SCORE: 48
ADLS_ACUITY_SCORE: 36
ADLS_ACUITY_SCORE: 36
ADLS_ACUITY_SCORE: 44
ADLS_ACUITY_SCORE: 32
HEARING_DIFFICULTY_OR_DEAF: NO
ADLS_ACUITY_SCORE: 36
ADLS_ACUITY_SCORE: 36
ADLS_ACUITY_SCORE: 46
ADLS_ACUITY_SCORE: 48
ADLS_ACUITY_SCORE: 36
ADLS_ACUITY_SCORE: 36
ADLS_ACUITY_SCORE: 48
ADLS_ACUITY_SCORE: 52
ADLS_ACUITY_SCORE: 40
ADLS_ACUITY_SCORE: 40
ADLS_ACUITY_SCORE: 48
ADLS_ACUITY_SCORE: 23
ADLS_ACUITY_SCORE: 36
ADLS_ACUITY_SCORE: 36
ADLS_ACUITY_SCORE: 38
ADLS_ACUITY_SCORE: 40
ADLS_ACUITY_SCORE: 30
ADLS_ACUITY_SCORE: 44
ADLS_ACUITY_SCORE: 36
ADLS_ACUITY_SCORE: 34
ADLS_ACUITY_SCORE: 32
ADLS_ACUITY_SCORE: 30
ADLS_ACUITY_SCORE: 34
ADLS_ACUITY_SCORE: 40
ADLS_ACUITY_SCORE: 34
ADLS_ACUITY_SCORE: 36
ADLS_ACUITY_SCORE: 32
ADLS_ACUITY_SCORE: 40
ADLS_ACUITY_SCORE: 46
ADLS_ACUITY_SCORE: 36
ADLS_ACUITY_SCORE: 48
ADLS_ACUITY_SCORE: 44
ADLS_ACUITY_SCORE: 50
ADLS_ACUITY_SCORE: 34
ADLS_ACUITY_SCORE: 40
ADLS_ACUITY_SCORE: 23
ADLS_ACUITY_SCORE: 36
ADLS_ACUITY_SCORE: 52
ADLS_ACUITY_SCORE: 48
ADLS_ACUITY_SCORE: 36
ADLS_ACUITY_SCORE: 30
ADLS_ACUITY_SCORE: 36
ADLS_ACUITY_SCORE: 36
ADLS_ACUITY_SCORE: 37
ADLS_ACUITY_SCORE: 52
ADLS_ACUITY_SCORE: 36
ADLS_ACUITY_SCORE: 48
ADLS_ACUITY_SCORE: 34
ADLS_ACUITY_SCORE: 34
ADLS_ACUITY_SCORE: 36
ADLS_ACUITY_SCORE: 48
ADLS_ACUITY_SCORE: 43
ADLS_ACUITY_SCORE: 36
ADLS_ACUITY_SCORE: 36
ADLS_ACUITY_SCORE: 38
ADLS_ACUITY_SCORE: 43
ADLS_ACUITY_SCORE: 28
ADLS_ACUITY_SCORE: 48
ADLS_ACUITY_SCORE: 46
ADLS_ACUITY_SCORE: 36
ADLS_ACUITY_SCORE: 36
ADLS_ACUITY_SCORE: 48
ADLS_ACUITY_SCORE: 44
ADLS_ACUITY_SCORE: 36
DIFFICULTY_EATING/SWALLOWING: NO
ADLS_ACUITY_SCORE: 26
ADLS_ACUITY_SCORE: 36
ADLS_ACUITY_SCORE: 30
ADLS_ACUITY_SCORE: 34
ADLS_ACUITY_SCORE: 40
ADLS_ACUITY_SCORE: 46
ADLS_ACUITY_SCORE: 46
DOING_ERRANDS_INDEPENDENTLY_DIFFICULTY: NO
ADLS_ACUITY_SCORE: 36
ADLS_ACUITY_SCORE: 29
ADLS_ACUITY_SCORE: 36
ADLS_ACUITY_SCORE: 48
ADLS_ACUITY_SCORE: 36
ADLS_ACUITY_SCORE: 36
ADLS_ACUITY_SCORE: 48
ADLS_ACUITY_SCORE: 52
ADLS_ACUITY_SCORE: 36
ADLS_ACUITY_SCORE: 29
ADLS_ACUITY_SCORE: 48
ADLS_ACUITY_SCORE: 36
ADLS_ACUITY_SCORE: 36
ADLS_ACUITY_SCORE: 29
ADLS_ACUITY_SCORE: 48
ADLS_ACUITY_SCORE: 36
ADLS_ACUITY_SCORE: 48
ADLS_ACUITY_SCORE: 38
ADLS_ACUITY_SCORE: 36
ADLS_ACUITY_SCORE: 36
ADLS_ACUITY_SCORE: 50
WEAR_GLASSES_OR_BLIND: NO
ADLS_ACUITY_SCORE: 36
ADLS_ACUITY_SCORE: 32
ADLS_ACUITY_SCORE: 29
ADLS_ACUITY_SCORE: 40
ADLS_ACUITY_SCORE: 26
ADLS_ACUITY_SCORE: 44
ADLS_ACUITY_SCORE: 33
ADLS_ACUITY_SCORE: 48
ADLS_ACUITY_SCORE: 36
ADLS_ACUITY_SCORE: 48
ADLS_ACUITY_SCORE: 36
ADLS_ACUITY_SCORE: 30
ADLS_ACUITY_SCORE: 34
ADLS_ACUITY_SCORE: 36
ADLS_ACUITY_SCORE: 44
ADLS_ACUITY_SCORE: 36
ADLS_ACUITY_SCORE: 44
ADLS_ACUITY_SCORE: 36
ADLS_ACUITY_SCORE: 29
ADLS_ACUITY_SCORE: 44
ADLS_ACUITY_SCORE: 34
ADLS_ACUITY_SCORE: 32
ADLS_ACUITY_SCORE: 48
ADLS_ACUITY_SCORE: 36
ADLS_ACUITY_SCORE: 30
ADLS_ACUITY_SCORE: 48
ADLS_ACUITY_SCORE: 40
ADLS_ACUITY_SCORE: 40
ADLS_ACUITY_SCORE: 48
ADLS_ACUITY_SCORE: 30
ADLS_ACUITY_SCORE: 32
ADLS_ACUITY_SCORE: 36
ADLS_ACUITY_SCORE: 52
ADLS_ACUITY_SCORE: 36
ADLS_ACUITY_SCORE: 48
ADLS_ACUITY_SCORE: 32
ADLS_ACUITY_SCORE: 26
ADLS_ACUITY_SCORE: 26
ADLS_ACUITY_SCORE: 30
ADLS_ACUITY_SCORE: 36
ADLS_ACUITY_SCORE: 36
ADLS_ACUITY_SCORE: 32
ADLS_ACUITY_SCORE: 26
ADLS_ACUITY_SCORE: 23
ADLS_ACUITY_SCORE: 52
ADLS_ACUITY_SCORE: 33
ADLS_ACUITY_SCORE: 35
ADLS_ACUITY_SCORE: 36
ADLS_ACUITY_SCORE: 46
ADLS_ACUITY_SCORE: 34
ADLS_ACUITY_SCORE: 32
ADLS_ACUITY_SCORE: 32
ADLS_ACUITY_SCORE: 40
ADLS_ACUITY_SCORE: 36
ADLS_ACUITY_SCORE: 36
ADLS_ACUITY_SCORE: 52
ADLS_ACUITY_SCORE: 34
ADLS_ACUITY_SCORE: 32
ADLS_ACUITY_SCORE: 48
ADLS_ACUITY_SCORE: 40
ADLS_ACUITY_SCORE: 36
ADLS_ACUITY_SCORE: 44
ADLS_ACUITY_SCORE: 36
ADLS_ACUITY_SCORE: 29
ADLS_ACUITY_SCORE: 43
ADLS_ACUITY_SCORE: 48
ADLS_ACUITY_SCORE: 36
ADLS_ACUITY_SCORE: 48
ADLS_ACUITY_SCORE: 30
DEPENDENT_IADLS:: TRANSPORTATION
ADLS_ACUITY_SCORE: 33
ADLS_ACUITY_SCORE: 46
ADLS_ACUITY_SCORE: 36
ADLS_ACUITY_SCORE: 40
ADLS_ACUITY_SCORE: 48
ADLS_ACUITY_SCORE: 46
ADLS_ACUITY_SCORE: 50
ADLS_ACUITY_SCORE: 36
ADLS_ACUITY_SCORE: 46
ADLS_ACUITY_SCORE: 35
ADLS_ACUITY_SCORE: 32
ADLS_ACUITY_SCORE: 32
ADLS_ACUITY_SCORE: 36
ADLS_ACUITY_SCORE: 34
ADLS_ACUITY_SCORE: 34
ADLS_ACUITY_SCORE: 36
ADLS_ACUITY_SCORE: 52
ADLS_ACUITY_SCORE: 48
ADLS_ACUITY_SCORE: 36
ADLS_ACUITY_SCORE: 36
ADLS_ACUITY_SCORE: 34
ADLS_ACUITY_SCORE: 52
ADLS_ACUITY_SCORE: 52
ADLS_ACUITY_SCORE: 36
ADLS_ACUITY_SCORE: 34
ADLS_ACUITY_SCORE: 48
ADLS_ACUITY_SCORE: 36
ADLS_ACUITY_SCORE: 38
ADLS_ACUITY_SCORE: 36
ADLS_ACUITY_SCORE: 40
ADLS_ACUITY_SCORE: 32
ADLS_ACUITY_SCORE: 48
ADLS_ACUITY_SCORE: 48
ADLS_ACUITY_SCORE: 36
ADLS_ACUITY_SCORE: 33
ADLS_ACUITY_SCORE: 36
ADLS_ACUITY_SCORE: 26
ADLS_ACUITY_SCORE: 36
ADLS_ACUITY_SCORE: 46
ADLS_ACUITY_SCORE: 52
ADLS_ACUITY_SCORE: 33
ADLS_ACUITY_SCORE: 40
ADLS_ACUITY_SCORE: 36
ADLS_ACUITY_SCORE: 36
ADLS_ACUITY_SCORE: 29
ADLS_ACUITY_SCORE: 36
ADLS_ACUITY_SCORE: 40
ADLS_ACUITY_SCORE: 48
ADLS_ACUITY_SCORE: 36
ADLS_ACUITY_SCORE: 48
ADLS_ACUITY_SCORE: 23
ADLS_ACUITY_SCORE: 34
ADLS_ACUITY_SCORE: 34
ADLS_ACUITY_SCORE: 43
ADLS_ACUITY_SCORE: 35
ADLS_ACUITY_SCORE: 32
ADLS_ACUITY_SCORE: 48
ADLS_ACUITY_SCORE: 36
ADLS_ACUITY_SCORE: 48
ADLS_ACUITY_SCORE: 46
ADLS_ACUITY_SCORE: 36
ADLS_ACUITY_SCORE: 40
ADLS_ACUITY_SCORE: 36
CHANGE_IN_FUNCTIONAL_STATUS_SINCE_ONSET_OF_CURRENT_ILLNESS/INJURY: YES
ADLS_ACUITY_SCORE: 36
ADLS_ACUITY_SCORE: 40
ADLS_ACUITY_SCORE: 31
ADLS_ACUITY_SCORE: 32
ADLS_ACUITY_SCORE: 33
ADLS_ACUITY_SCORE: 48
ADLS_ACUITY_SCORE: 26
ADLS_ACUITY_SCORE: 36
ADLS_ACUITY_SCORE: 36
ADLS_ACUITY_SCORE: 23
ADLS_ACUITY_SCORE: 36
ADLS_ACUITY_SCORE: 36
ADLS_ACUITY_SCORE: 34
ADLS_ACUITY_SCORE: 40
ADLS_ACUITY_SCORE: 48
ADLS_ACUITY_SCORE: 32
ADLS_ACUITY_SCORE: 36
ADLS_ACUITY_SCORE: 36
ADLS_ACUITY_SCORE: 50
ADLS_ACUITY_SCORE: 36
ADLS_ACUITY_SCORE: 35
ADLS_ACUITY_SCORE: 23
ADLS_ACUITY_SCORE: 48
ADLS_ACUITY_SCORE: 36
ADLS_ACUITY_SCORE: 32
ADLS_ACUITY_SCORE: 46
ADLS_ACUITY_SCORE: 32
ADLS_ACUITY_SCORE: 26
ADLS_ACUITY_SCORE: 44
ADLS_ACUITY_SCORE: 33
ADLS_ACUITY_SCORE: 36
ADLS_ACUITY_SCORE: 52
ADLS_ACUITY_SCORE: 38
ADLS_ACUITY_SCORE: 32
ADLS_ACUITY_SCORE: 36
ADLS_ACUITY_SCORE: 36
ADLS_ACUITY_SCORE: 32
ADLS_ACUITY_SCORE: 44
ADLS_ACUITY_SCORE: 32
ADLS_ACUITY_SCORE: 36
ADLS_ACUITY_SCORE: 48
ADLS_ACUITY_SCORE: 36
ADLS_ACUITY_SCORE: 36
ADLS_ACUITY_SCORE: 23
ADLS_ACUITY_SCORE: 36
ADLS_ACUITY_SCORE: 48
ADLS_ACUITY_SCORE: 36
ADLS_ACUITY_SCORE: 34
ADLS_ACUITY_SCORE: 43
ADLS_ACUITY_SCORE: 36
ADLS_ACUITY_SCORE: 34
ADLS_ACUITY_SCORE: 30
ADLS_ACUITY_SCORE: 36
ADLS_ACUITY_SCORE: 52
ADLS_ACUITY_SCORE: 32
ADLS_ACUITY_SCORE: 26
ADLS_ACUITY_SCORE: 36
ADLS_ACUITY_SCORE: 32
ADLS_ACUITY_SCORE: 36
ADLS_ACUITY_SCORE: 52
ADLS_ACUITY_SCORE: 32
ADLS_ACUITY_SCORE: 36
ADLS_ACUITY_SCORE: 36
ADLS_ACUITY_SCORE: 52
ADLS_ACUITY_SCORE: 36
ADLS_ACUITY_SCORE: 50
ADLS_ACUITY_SCORE: 52
ADLS_ACUITY_SCORE: 28
ADLS_ACUITY_SCORE: 29
ADLS_ACUITY_SCORE: 52
ADLS_ACUITY_SCORE: 48
ADLS_ACUITY_SCORE: 36
ADLS_ACUITY_SCORE: 33
ADLS_ACUITY_SCORE: 44
ADLS_ACUITY_SCORE: 29
ADLS_ACUITY_SCORE: 48
ADLS_ACUITY_SCORE: 36
ADLS_ACUITY_SCORE: 48
ADLS_ACUITY_SCORE: 44
ADLS_ACUITY_SCORE: 23
ADLS_ACUITY_SCORE: 36
ADLS_ACUITY_SCORE: 29
ADLS_ACUITY_SCORE: 40
ADLS_ACUITY_SCORE: 34
ADLS_ACUITY_SCORE: 52
ADLS_ACUITY_SCORE: 26
ADLS_ACUITY_SCORE: 36
ADLS_ACUITY_SCORE: 32
ADLS_ACUITY_SCORE: 46
ADLS_ACUITY_SCORE: 32
ADLS_ACUITY_SCORE: 33
ADLS_ACUITY_SCORE: 37
ADLS_ACUITY_SCORE: 36
ADLS_ACUITY_SCORE: 36
ADLS_ACUITY_SCORE: 34
ADLS_ACUITY_SCORE: 26
ADLS_ACUITY_SCORE: 48
ADLS_ACUITY_SCORE: 40
ADLS_ACUITY_SCORE: 26
ADLS_ACUITY_SCORE: 50
ADLS_ACUITY_SCORE: 46
ADLS_ACUITY_SCORE: 36
ADLS_ACUITY_SCORE: 29
ADLS_ACUITY_SCORE: 32
ADLS_ACUITY_SCORE: 36
ADLS_ACUITY_SCORE: 40
ADLS_ACUITY_SCORE: 44
ADLS_ACUITY_SCORE: 29
ADLS_ACUITY_SCORE: 48
ADLS_ACUITY_SCORE: 34
DEPENDENT_IADLS:: CLEANING;COOKING;LAUNDRY;SHOPPING;MEAL PREPARATION;MEDICATION MANAGEMENT;MONEY MANAGEMENT;TRANSPORTATION;INCONTINENCE
ADLS_ACUITY_SCORE: 36
ADLS_ACUITY_SCORE: 48
ADLS_ACUITY_SCORE: 36
ADLS_ACUITY_SCORE: 48
DIFFICULTY_COMMUNICATING: NO
ADLS_ACUITY_SCORE: 46
ADLS_ACUITY_SCORE: 52
ADLS_ACUITY_SCORE: 44
ADLS_ACUITY_SCORE: 48
ADLS_ACUITY_SCORE: 30
ADLS_ACUITY_SCORE: 33
ADLS_ACUITY_SCORE: 50
ADLS_ACUITY_SCORE: 52
ADLS_ACUITY_SCORE: 32
ADLS_ACUITY_SCORE: 36
ADLS_ACUITY_SCORE: 36
ADLS_ACUITY_SCORE: 26
ADLS_ACUITY_SCORE: 36
ADLS_ACUITY_SCORE: 48
ADLS_ACUITY_SCORE: 23
ADLS_ACUITY_SCORE: 36
ADLS_ACUITY_SCORE: 44
ADLS_ACUITY_SCORE: 36
ADLS_ACUITY_SCORE: 33
ADLS_ACUITY_SCORE: 33
ADLS_ACUITY_SCORE: 36
ADLS_ACUITY_SCORE: 33
ADLS_ACUITY_SCORE: 33
ADLS_ACUITY_SCORE: 40
ADLS_ACUITY_SCORE: 34
ADLS_ACUITY_SCORE: 23
ADLS_ACUITY_SCORE: 52
ADLS_ACUITY_SCORE: 36
ADLS_ACUITY_SCORE: 40
ADLS_ACUITY_SCORE: 44
ADLS_ACUITY_SCORE: 36
ADLS_ACUITY_SCORE: 32
ADLS_ACUITY_SCORE: 34
ADLS_ACUITY_SCORE: 26
ADLS_ACUITY_SCORE: 34
ADLS_ACUITY_SCORE: 32
ADLS_ACUITY_SCORE: 36
ADLS_ACUITY_SCORE: 36
ADLS_ACUITY_SCORE: 48
ADLS_ACUITY_SCORE: 52
ADLS_ACUITY_SCORE: 48
ADLS_ACUITY_SCORE: 36
ADLS_ACUITY_SCORE: 36
ADLS_ACUITY_SCORE: 44
ADLS_ACUITY_SCORE: 32
ADLS_ACUITY_SCORE: 48
ADLS_ACUITY_SCORE: 20
ADLS_ACUITY_SCORE: 46
ADLS_ACUITY_SCORE: 40
ADLS_ACUITY_SCORE: 26
ADLS_ACUITY_SCORE: 34
ADLS_ACUITY_SCORE: 36
ADLS_ACUITY_SCORE: 36
ADLS_ACUITY_SCORE: 38
ADLS_ACUITY_SCORE: 48
ADLS_ACUITY_SCORE: 36
ADLS_ACUITY_SCORE: 40
ADLS_ACUITY_SCORE: 36
ADLS_ACUITY_SCORE: 33
ADLS_ACUITY_SCORE: 48
ADLS_ACUITY_SCORE: 36
ADLS_ACUITY_SCORE: 36
ADLS_ACUITY_SCORE: 32
ADLS_ACUITY_SCORE: 48
ADLS_ACUITY_SCORE: 36
ADLS_ACUITY_SCORE: 52
ADLS_ACUITY_SCORE: 34
ADLS_ACUITY_SCORE: 36
ADLS_ACUITY_SCORE: 36
ADLS_ACUITY_SCORE: 46
ADLS_ACUITY_SCORE: 36
CONCENTRATING,_REMEMBERING_OR_MAKING_DECISIONS_DIFFICULTY: NO
ADLS_ACUITY_SCORE: 23
ADLS_ACUITY_SCORE: 36
ADLS_ACUITY_SCORE: 33
ADLS_ACUITY_SCORE: 32
ADLS_ACUITY_SCORE: 36
ADLS_ACUITY_SCORE: 48
ADLS_ACUITY_SCORE: 36
ADLS_ACUITY_SCORE: 34
ADLS_ACUITY_SCORE: 46
ADLS_ACUITY_SCORE: 48
ADLS_ACUITY_SCORE: 48
ADLS_ACUITY_SCORE: 33
ADLS_ACUITY_SCORE: 52
ADLS_ACUITY_SCORE: 43
ADLS_ACUITY_SCORE: 40
ADLS_ACUITY_SCORE: 34
ADLS_ACUITY_SCORE: 36
ADLS_ACUITY_SCORE: 36
ADLS_ACUITY_SCORE: 48
ADLS_ACUITY_SCORE: 26
ADLS_ACUITY_SCORE: 40
ADLS_ACUITY_SCORE: 30
ADLS_ACUITY_SCORE: 48
ADLS_ACUITY_SCORE: 36
ADLS_ACUITY_SCORE: 36
ADLS_ACUITY_SCORE: 52
ADLS_ACUITY_SCORE: 36
ADLS_ACUITY_SCORE: 46
ADLS_ACUITY_SCORE: 36
ADLS_ACUITY_SCORE: 35
ADLS_ACUITY_SCORE: 36
ADLS_ACUITY_SCORE: 29
ADLS_ACUITY_SCORE: 52
ADLS_ACUITY_SCORE: 50
ADLS_ACUITY_SCORE: 44
ADLS_ACUITY_SCORE: 36
ADLS_ACUITY_SCORE: 36
ADLS_ACUITY_SCORE: 40
ADLS_ACUITY_SCORE: 34
ADLS_ACUITY_SCORE: 23
ADLS_ACUITY_SCORE: 36
ADLS_ACUITY_SCORE: 44
ADLS_ACUITY_SCORE: 40
ADLS_ACUITY_SCORE: 36
ADLS_ACUITY_SCORE: 36
ADLS_ACUITY_SCORE: 23
ADLS_ACUITY_SCORE: 36
ADLS_ACUITY_SCORE: 36
ADLS_ACUITY_SCORE: 23
ADLS_ACUITY_SCORE: 40
ADLS_ACUITY_SCORE: 34
ADLS_ACUITY_SCORE: 34
ADLS_ACUITY_SCORE: 36
ADLS_ACUITY_SCORE: 48
ADLS_ACUITY_SCORE: 23
ADLS_ACUITY_SCORE: 46
ADLS_ACUITY_SCORE: 32
ADLS_ACUITY_SCORE: 34
ADLS_ACUITY_SCORE: 35
ADLS_ACUITY_SCORE: 36
ADLS_ACUITY_SCORE: 28
ADLS_ACUITY_SCORE: 26
ADLS_ACUITY_SCORE: 34
ADLS_ACUITY_SCORE: 40
ADLS_ACUITY_SCORE: 44
ADLS_ACUITY_SCORE: 23
ADLS_ACUITY_SCORE: 48
ADLS_ACUITY_SCORE: 34
ADLS_ACUITY_SCORE: 30
ADLS_ACUITY_SCORE: 48
ADLS_ACUITY_SCORE: 36
ADLS_ACUITY_SCORE: 52
ADLS_ACUITY_SCORE: 36
ADLS_ACUITY_SCORE: 48
ADLS_ACUITY_SCORE: 44
ADLS_ACUITY_SCORE: 34
PREVIOUS_RESPONSIBILITIES: MEDICATION MANAGEMENT
ADLS_ACUITY_SCORE: 36
ADLS_ACUITY_SCORE: 48
ADLS_ACUITY_SCORE: 44
ADLS_ACUITY_SCORE: 36
ADLS_ACUITY_SCORE: 23
ADLS_ACUITY_SCORE: 33
ADLS_ACUITY_SCORE: 36
TOILETING_ISSUES: NO
ADLS_ACUITY_SCORE: 48
ADLS_ACUITY_SCORE: 36
ADLS_ACUITY_SCORE: 33
ADLS_ACUITY_SCORE: 40
ADLS_ACUITY_SCORE: 33
ADLS_ACUITY_SCORE: 36
ADLS_ACUITY_SCORE: 36
ADLS_ACUITY_SCORE: 52
ADLS_ACUITY_SCORE: 36
ADLS_ACUITY_SCORE: 44
ADLS_ACUITY_SCORE: 48
ADLS_ACUITY_SCORE: 44
ADLS_ACUITY_SCORE: 48
ADLS_ACUITY_SCORE: 29
ADLS_ACUITY_SCORE: 52
ADLS_ACUITY_SCORE: 35
ADLS_ACUITY_SCORE: 29
ADLS_ACUITY_SCORE: 36
ADLS_ACUITY_SCORE: 34
ADLS_ACUITY_SCORE: 33
ADLS_ACUITY_SCORE: 36
ADLS_ACUITY_SCORE: 36
ADLS_ACUITY_SCORE: 32
ADLS_ACUITY_SCORE: 36
ADLS_ACUITY_SCORE: 30
ADLS_ACUITY_SCORE: 36
ADLS_ACUITY_SCORE: 34
ADLS_ACUITY_SCORE: 44
ADLS_ACUITY_SCORE: 48
ADLS_ACUITY_SCORE: 36
ADLS_ACUITY_SCORE: 36
ADLS_ACUITY_SCORE: 25
ADLS_ACUITY_SCORE: 30
ADLS_ACUITY_SCORE: 48
ADLS_ACUITY_SCORE: 34
ADLS_ACUITY_SCORE: 36
ADLS_ACUITY_SCORE: 48
ADLS_ACUITY_SCORE: 34
ADLS_ACUITY_SCORE: 26
ADLS_ACUITY_SCORE: 34
ADLS_ACUITY_SCORE: 36
ADLS_ACUITY_SCORE: 36
ADLS_ACUITY_SCORE: 52
ADLS_ACUITY_SCORE: 23
ADLS_ACUITY_SCORE: 32
ADLS_ACUITY_SCORE: 36
ADLS_ACUITY_SCORE: 36
ADLS_ACUITY_SCORE: 40
ADLS_ACUITY_SCORE: 40
ADLS_ACUITY_SCORE: 48
ADLS_ACUITY_SCORE: 48
ADLS_ACUITY_SCORE: 36
ADLS_ACUITY_SCORE: 36
ADLS_ACUITY_SCORE: 50
ADLS_ACUITY_SCORE: 36
ADLS_ACUITY_SCORE: 48
ADLS_ACUITY_SCORE: 52
ADLS_ACUITY_SCORE: 44
ADLS_ACUITY_SCORE: 40
ADLS_ACUITY_SCORE: 34
ADLS_ACUITY_SCORE: 48

## 2023-10-15 PROBLEM — S70.11XA CONTUSION OF RIGHT THIGH, INITIAL ENCOUNTER: Status: ACTIVE | Noted: 2023-01-01

## 2023-10-15 PROBLEM — J18.9 PNEUMONIA DUE TO INFECTIOUS ORGANISM, UNSPECIFIED LATERALITY, UNSPECIFIED PART OF LUNG: Status: ACTIVE | Noted: 2023-01-01

## 2023-10-15 PROBLEM — Z99.81 OXYGEN DEPENDENT: Status: ACTIVE | Noted: 2023-01-01

## 2023-10-15 PROBLEM — S09.90XA CLOSED HEAD INJURY, INITIAL ENCOUNTER: Status: ACTIVE | Noted: 2023-01-01

## 2023-10-15 PROBLEM — R53.1 GENERALIZED WEAKNESS: Status: ACTIVE | Noted: 2023-01-01

## 2023-10-15 NOTE — PROGRESS NOTES
Transferred to Med/Surg 5 via cart. 2 LPM of O2 via NC at baseline. VSS. Bruising to right upper arm/shoulder, right knee. Family at bedside. Abx Zithromax given. Reports 4/10 pain to right hip/thigh from fall, ok if she does not move it. All belongings sent with pt to floor.     /61   Pulse 75   Temp 98.1  F (36.7  C) (Oral)   Resp 20   Ht 1.524 m (5')   Wt 86.2 kg (190 lb)   SpO2 98%   BMI 37.11 kg/m

## 2023-10-15 NOTE — ED PROVIDER NOTES
History     Chief Complaint:  Generalized Weakness       HPI   Matthew Bowen is a 78 year old female with a history of CHF on chronic oxygen therapy, hypertension, thyroid disease and pulmonary hypertension who presents emergency department with concerns for increased shakiness, weakness, and a fall.  She reports that she was walking back from the bathroom when she felt weak and fell.  She notes she was having increased shakiness which she has had intermittently over the past few months, she thinks this prompted the fall.  She fell and hit the right side of her head as well as the right side of her body.  She was on the floor for approximately 1 hour and her  was helped trying to help get her up, but ultimately had to call her daughter to have help.  She is noting mild right-sided neck pain and right leg pain.  She denies a headache.  She denies vision changes.  She is chronically short of breath but this seems unchanged.  She notes she was recently diagnosed with pneumonia 1 month ago and was on 2 rounds of antibiotics and still has a cough.  She denies fevers.  She denies chest pain or palpitations.  She notes her right leg is painful above the knee and into the hip.  She denies new numbness or tingling.  Her daughter at the bedside notes she has some bruising to her right arm.  Daughter notes that her mother seemed much shakier than normal when they got to her house and was having difficulty even getting onto the toilet after they got her up, so that prompted the visit.  Per her daughter, the patient is not confused at this time and otherwise seems at baseline.      Independent Historian:   Daughter - They report as above    Review of External Notes:   Outside clinic notes reviewed.  PT visit from 10/12/2023 noted, chronic weakness of right leg with plantarflexion issues.      Medications:    Acetaminophen (TYLENOL PO)  albuterol (PROAIR HFA/PROVENTIL HFA/VENTOLIN HFA) 108 (90 Base) MCG/ACT  inhaler  amLODIPine (NORVASC) 5 MG tablet  aspirin 81 MG EC tablet  atorvastatin (LIPITOR) 20 MG tablet  cetirizine (ZYRTEC) 10 MG tablet  clopidogrel (PLAVIX) 75 MG tablet  EYE ALLERGY ITCH RELIEF 0.2 % ophthalmic solution  FERROUS FUMARATE PO  fluticasone (FLONASE) 50 MCG/ACT nasal spray  hydrALAZINE (APRESOLINE) 50 MG tablet  hypromellose (ARTIFICIAL TEARS) 0.5 % SOLN ophthalmic solution  isosorbide dinitrate (ISORDIL) 20 MG tablet  levothyroxine (SYNTHROID/LEVOTHROID) 137 MCG tablet  losartan (COZAAR) 100 MG tablet  metoprolol succinate ER (TOPROL-XL) 100 MG 24 hr tablet  Multiple Vitamins-Minerals (MULTIVITAMIN ADULTS PO)  polyethylene glycol (MIRALAX) 17 g packet  potassium chloride ER (KLOR-CON M) 20 MEQ CR tablet  pregabalin (LYRICA) 75 MG capsule  sertraline (ZOLOFT) 100 MG tablet  tiZANidine (ZANAFLEX) 2 MG tablet  torsemide (DEMADEX) 20 MG tablet  vitamin D3 (CHOLECALCIFEROL) 50 mcg (2000 units) tablet        Past Medical History:    Past Medical History:   Diagnosis Date    Antiplatelet or antithrombotic long-term use     Arthritis     Congestive heart failure (H)     Dyspnea on exertion     Heart attack (H)     Heart murmur     Hypertension     Irregular heart beat     Oxygen dependent     Pulmonary hypertension (H)     Sleep apnea     Stented coronary artery     Thyroid disease     Walking troubles        Past Surgical History:    Past Surgical History:   Procedure Laterality Date    APPENDECTOMY      COLONOSCOPY      COLONOSCOPY N/A 2/28/2022    Procedure: COLONOSCOPY;  Surgeon: Kolby Dunn MD;  Location: [facility] OR    CV CORONARY ANGIOGRAM N/A 11/21/2019    Procedure: Coronary Angiogram;  Surgeon: Tay Andre MD;  Location: [facility]    CV LEFT HEART CATH N/A 11/21/2019    Procedure: Left Heart Cath;  Surgeon: Tay Andre MD;  Location: [facility]    CV PCI STENT DRUG ELUTING N/A 11/21/2019    Procedure: PCI Stent Drug Eluting;  Surgeon: Thai  "Tay DALAL MD;  Location:  HEART CARDIAC CATH LAB    GYN SURGERY      Hyst.    ORTHOPEDIC SURGERY      B\" TKs        Physical Exam   Patient Vitals for the past 24 hrs:   BP Temp Temp src Pulse Resp SpO2 Height Weight   10/15/23 0929 -- -- -- -- -- 98 % -- --   10/15/23 0925 (!) 182/84 98.8  F (37.1  C) Oral 96 18 (!) 88 % 1.524 m (5') 86.2 kg (190 lb)        Physical Exam  General: Elderly female sitting upright  Eyes: PERRL, Conjunctive within normal limits.  No scleral icterus.  ENT: Moist mucous membranes, oropharynx clear.   CV: Normal S1S2.  Systolic murmur appreciated.  Regular rate and rhythm  Resp: Coarse breath sounds bilaterally with audible wheezing noted on regular breathing.  Mildly increased work of breathing.  No tachypnea.  GI: Abdomen is soft, nontender and nondistended. No palpable masses. No rebound or guarding.  MSK: No pitting edema.  Tender over the right pelvis and diffusely over the right femur without palpable deformity or crepitus.  Able to range the right lower extremity at the right hip and knee, but slowly.  Nontender over the right upper arm or right anterior knee.  Normal active range of motion of the bilateral upper extremities.  Skin: Warm and dry.  Bruising noted over the right upper arm and right anterior knee.  No rashes or lesions or ecchymoses on visible skin.  Neuro: Alert and oriented. Responds appropriately to all questions and commands. No focal findings appreciated. Normal muscle tone.  Sensation intact to light touch over all dermatomes of the right lower extremity.  Psych: Normal mood and affect. Pleasant.     Emergency Department Course   ECG    ECG results from 10/15/23   EKG 12-lead, tracing only     Value    Systolic Blood Pressure     Diastolic Blood Pressure     Ventricular Rate 90    Atrial Rate 90    ND Interval 210    QRS Duration 86        QTc 477    P Axis 59    R AXIS -53    T Axis 50    Interpretation ECG      Sinus rhythm with 1st degree A-V block " with Premature atrial complexes  Left axis deviation  Abnormal ECG  When compared with ECG of 28-FEB-2022 09:16,  No significant change was found           Imaging:  CT Chest/Abdomen/Pelvis w Contrast   Preliminary Result   IMPRESSION:   1.  Lingular consolidative opacity with air bronchograms suspicious for infection/pneumonia.   2.  Additional bilateral upper lobe groundglass opacities are likely infectious/inflammatory.   3.  Subacute left posterior 11th and 12th rib fractures.   4.  No convincing acute process within the abdomen or pelvis.      XR Pelvis 1/2 Views   Final Result   IMPRESSION: Advanced degenerative arthritis of both hips with hypertrophic changes. Multiple osteochondral loose bodies superior of the right femoral neck.      Mild degenerative arthritis of both SI joints. Lower lumbar facet arthropathy and degenerative interspace narrowing. Incidental note of ossification of the right iliolumbar ligament.      Right total knee arthroplasty. No knee joint effusion.      XR Femur Right 2 Views   Final Result   IMPRESSION: Advanced degenerative arthritis of both hips with hypertrophic changes. Multiple osteochondral loose bodies superior of the right femoral neck.      Mild degenerative arthritis of both SI joints. Lower lumbar facet arthropathy and degenerative interspace narrowing. Incidental note of ossification of the right iliolumbar ligament.      Right total knee arthroplasty. No knee joint effusion.      XR Chest 1 View   Final Result   IMPRESSION: Cardiomegaly with central vascular congestion. Stable prominent pericardial fat pad along the left cardiac margin. Mild bibasilar atelectasis/scarring. No focal pneumonic consolidation or pleural effusion.      Cervical spine CT w/o contrast   Final Result   IMPRESSION:   1.  No acute cervical spine fracture.    2.  Reversal of the usual cervical lordosis is nonspecific and can be seen with muscle spasm/soft tissue injury, but is unchanged versus  01/25/2023 and may be positional/chronic.            CT Head w/o Contrast   Final Result   IMPRESSION:   1.  No acute intracranial injury.                   Laboratory:  Labs Ordered and Resulted from Time of ED Arrival to Time of ED Departure   COMPREHENSIVE METABOLIC PANEL - Abnormal       Result Value    Sodium 143      Potassium 3.8      Carbon Dioxide (CO2) 30 (*)     Anion Gap 14      Urea Nitrogen 21.7      Creatinine 1.21 (*)     GFR Estimate 46 (*)     Calcium 9.6      Chloride 99      Glucose 116 (*)     Alkaline Phosphatase 134 (*)     AST 32      ALT 16      Protein Total 7.3      Albumin 4.4      Bilirubin Total 0.4     ROUTINE UA WITH MICROSCOPIC REFLEX TO CULTURE - Abnormal    Color Urine Straw      Appearance Urine Clear      Glucose Urine Negative      Bilirubin Urine Negative      Ketones Urine Negative      Specific Gravity Urine 1.009      Blood Urine Negative      pH Urine 7.0      Protein Albumin Urine Negative      Urobilinogen Urine Normal      Nitrite Urine Negative      Leukocyte Esterase Urine Small (*)     Mucus Urine Present (*)     RBC Urine 1      WBC Urine 5     CBC WITH PLATELETS AND DIFFERENTIAL - Abnormal    WBC Count 21.5 (*)     RBC Count 4.40      Hemoglobin 11.6 (*)     Hematocrit 37.6      MCV 86      MCH 26.4 (*)     MCHC 30.9 (*)     RDW 17.0 (*)     Platelet Count 254      % Neutrophils 86      % Lymphocytes 7      % Monocytes 6      Mids % (Monos, Eos, Basos)        % Eosinophils 0      % Basophils 0      % Immature Granulocytes 1      NRBCs per 100 WBC 0      Absolute Neutrophils 18.3 (*)     Absolute Lymphocytes 1.6      Absolute Monocytes 1.3      Mids Abs (Monos, Eos, Basos)        Absolute Eosinophils 0.1      Absolute Basophils 0.1      Absolute Immature Granulocytes 0.1      Absolute NRBCs 0.0     PROCALCITONIN - Abnormal    Procalcitonin 0.16 (*)    MAGNESIUM - Normal    Magnesium 2.2     TSH WITH FREE T4 REFLEX - Normal    TSH 0.51     LACTIC ACID WHOLE BLOOD -  Normal    Lactic Acid 1.2     BLOOD CULTURE   BLOOD CULTURE        Emergency Department Course & Assessments:      Interventions:  Medications   cefTRIAXone (ROCEPHIN) 2 g vial to attach to  ml bag for ADULTS or NS 50 ml bag for PEDS (2 g Intravenous $New Bag 10/15/23 1407)   azithromycin 500 mg (ZITHROMAX) in 0.9% NaCl 250 mL intermittent infusion 500 mg (has no administration in time range)   acetaminophen (TYLENOL) tablet 1,000 mg (1,000 mg Oral $Given 10/15/23 1212)   sodium chloride for CT scan flush use (65 mLs Intravenous $Given 10/15/23 1252)   iopamidol (ISOVUE-370) solution 500 mL (95 mLs Intravenous $Given 10/15/23 1251)        Assessments:  Past medical records, nursing notes, and vitals reviewed.   I performed an exam of the patient and obtained history, as documented above.   I reassessed the patient.  She denies any new concerns.  I discussed findings of today's evaluation and recommendation for CT abdomen chest given unclear cause of infection.  She is agreeable with this plan as is her daughter.  Reassessed the patient.  Discussed findings of pneumonia.  Daughter reports patient does seem tachypneic to her and seems weaker than normal here.  I discussed plan of admission which she is agreeable to.    Independent Interpretation (X-rays, CTs, rhythm strip):  I evaluated the patient's head CT..  No evidence of intracranial hemorrhage or skull fracture.  I evaluated the patient's extremities.  No evidence of fracture on limb x-ray.  No evidence of infiltrate on chest x-ray.    Consultations/Discussion of Management or Tests:  I discussed the patient with Dr. Apple of the hospitalist service regarding admission.  He accepts the patient to a medical bed.    Social Determinants of Health affecting care:   None    Disposition:  The patient was admitted to the hospital under the care of Dr. Apple.     Impression & Plan    Medical Decision Making:    Matthew Bowen is a 78-year-old female who is oxygen  dependent CHF, hypertension, thyroid disease and pulmonary hypertension presents emergency department with a fall resulting from increased shakiness and weakness.  Fortunately regards to the fall, there is no sign of worrisome injury.  She also has ongoing cough despite antibiotic treatment for pneumonia twice over the recent month which daughter reports as Zenonaquin.  She is maintaining her normal supplemental oxygen.  She is febrile.  She is not tachycardic or hypotensive.  She does appear to have mildly increased work of breathing.  X-ray did not show signs of pneumonia, clinical suspicion was high and therefore given unclear source of infection which was supported by vitals and laboratory results and due to abdominal pain noted on repeat assessment of the abdomen, CT chest abdomen pelvis was performed.  Fortunately no acute infectious abdominal process is noted.  This did however show a lingular infiltrate that is likely the etiology of her infectious presentation.  Given her recent multiple courses of biotics for pneumonia with evidence of ongoing bacterial infection in the setting of increasing weakness and higher risk, the patient be admitted for IV antibiotics and ongoing supportive care.  She and her family felt comfortable with this plan.  All questions were answered prior to admission.      Diagnosis:    ICD-10-CM    1. Pneumonia due to infectious organism, unspecified laterality, unspecified part of lung  J18.9       2. Generalized weakness  R53.1       3. Closed head injury, initial encounter  S09.90XA       4. Contusion of right thigh, initial encounter  S70.11XA       5. Oxygen dependent  Z99.81            10/15/2023   Tomeka Scales MD Jonkman, Tracy Dianne, MD  10/15/23 1424

## 2023-10-15 NOTE — H&P
[facility]    History and Physical - Hospitalist Service       Date of Admission:  10/15/2023    Assessment & Plan     Matthew Bowen is a 78 year old female w/PMH chronic hypoxic respiratory failure due to pulmonary HTN, ALAINA requiring CPAP, chronic diastolic HF, CAD, CKD stage 3, morbid obesity, HTN,  depression who presents from home with family with fever, cough.    Recurrent PNA, suspected CAP  Possible bronchiectasis  Sepsis  -presents with recurrent PNA, 3rd time in last month or so. Last completed treatment 2 weeks ago with persistent cough. Presents with worsening fatigue, productive cough, weakness and falls.  -sepsis criteria met with WBC 21, fever 101.1. Lactic was normal and viral studies negative  -CT w/lingular consolidative opacity w/air bronchograms and bilateral upper lobe ground glass, also with bibasilar atelectasis  -cont Rocephin/Azithro and await sputum and blood cx, if decompensates will broaden  -ST to eval in setting of recurent PNA but denies specific swallowing complaints  -pulm toilet with IS, flutter valve, mucinex    Chronic hypoxic respiratory failure  Hx ALAINA, pulm HTN, chronic diastolic HF  -currenlty remains on baseline O2 of 2L despite findings above  -hold home torsemide for now and resume home inhaler, BB  -CPAP ordered    Falls  Subacute rib fracture  -imaging negative for acute fractures, bleeding but subacute rib fractures noted  -pain control with tylenol and PT to eval    CKD stage 3  -Cr 1.21 on admission but appears to be near baseline  -holding diuretics as above today, hold home ARB  -avoid fluids and allow to drink    HTN  -BP is borderline so hold home hydralazine, imdur, norvasc today and resume as appropriate    Hx morbid obesity, anemia, CAD, hypothyroidism, mood disorder  -resume home ASA, statin, plavix, lyrica, zoloft     Diet: Regular Diet Adult    DVT Prophylaxis: Pneumatic Compression Devices  Aleman Catheter: Not present  Lines: None      Cardiac Monitoring: None  Code Status:  full code      Eldon Apple DO  Hospitalist Service  [facility]  Securely message with RealMatch (more info)  Text page via [employer] Paging/Directory     ______________________________________________________________________    Chief Complaint   Cough, fever    History of Present Illness   Matthew Bowen is a 78 year old female w/PMH chronic hypoxic respiratory failure due to pulmonary HTN, ALAINA requiring CPAP, chronic diastolic HF, CAD, CKD stage 3, morbid obesity, HTN,  depression who presents from home with family with fever, cough. She had had a recurrent pneumonia over the past month and has been treated twice for 7 days each with levaquin. The last treatment was completed about 2 weeks ago she thinks but has had a persistent cough since that time. Also reports some shaking and weakness and today had a fever at home and multiple falls so family brought her in. Reports that cough is productive and denies any CP, NV, abd pain.    In ED had CT showing evidence of pneumonia with elevated WBC's and fever to 101. Was given Rocephin and azithro and started on some fluids. Was requiring about her baseline of 2L NC. Lactic was WNL, procal 0.16.      Past Medical History    Past Medical History:   Diagnosis Date    Antiplatelet or antithrombotic long-term use     Arthritis     Congestive heart failure (H)     Dyspnea on exertion     Heart attack (H)     Heart murmur     Hypertension     Irregular heart beat     Oxygen dependent     2L continuous at home.    Pulmonary hypertension (H)     Sleep apnea     Bipap    Stented coronary artery     x 1    Thyroid disease     Walking troubles        Past Surgical History   Past Surgical History:   Procedure Laterality Date    APPENDECTOMY      COLONOSCOPY      COLONOSCOPY N/A 2/28/2022    Procedure: COLONOSCOPY;  Surgeon: Kolby Dunn MD;  Location: [facility] OR    CV CORONARY ANGIOGRAM N/A 11/21/2019    Procedure:  "Coronary Angiogram;  Surgeon: Tay Andre MD;  Location:  HEART CARDIAC CATH LAB    CV LEFT HEART CATH N/A 2019    Procedure: Left Heart Cath;  Surgeon: Tay Andre MD;  Location:  HEART CARDIAC CATH LAB    CV PCI STENT DRUG ELUTING N/A 2019    Procedure: PCI Stent Drug Eluting;  Surgeon: Tay Andre MD;  Location:  HEART CARDIAC CATH LAB    GYN SURGERY      Hyst.    ORTHOPEDIC SURGERY      B\" TKs       Prior to Admission Medications   Prior to Admission Medications   Prescriptions Last Dose Informant Patient Reported? Taking?   Acetaminophen (TYLENOL PO)   Yes No   Sig: Take 500 mg by mouth every 4 hours as needed    EYE ALLERGY ITCH RELIEF 0.2 % ophthalmic solution   Yes No   Sig: INSTILL 1 DROP IN BOTH EYES EVERY DAY   FERROUS FUMARATE PO   Yes No   Sig: Take by mouth daily 9 mg iron-400 mcg tablet   Multiple Vitamins-Minerals (MULTIVITAMIN ADULTS PO)   Yes No   Sig: Take 1 tablet by mouth daily   albuterol (PROAIR HFA/PROVENTIL HFA/VENTOLIN HFA) 108 (90 Base) MCG/ACT inhaler   Yes No   Sig: Inhale 2 puffs into the lungs every 4 hours as needed for shortness of breath / dyspnea or wheezing   amLODIPine (NORVASC) 5 MG tablet   Yes No   Sig: Take 5 mg by mouth daily    aspirin 81 MG EC tablet   Yes No   Sig: Take 81 mg by mouth daily   atorvastatin (LIPITOR) 20 MG tablet   Yes No   Sig: Take 20 mg by mouth every evening    cetirizine (ZYRTEC) 10 MG tablet   Yes No   Sig: Take 10 mg by mouth daily as needed    clopidogrel (PLAVIX) 75 MG tablet   No No   Sig: Take 1 tablet (75 mg) by mouth daily   fluticasone (FLONASE) 50 MCG/ACT nasal spray   Yes No   Sig: Spray 2 sprays into both nostrils 2 times daily   hydrALAZINE (APRESOLINE) 50 MG tablet   Yes No   Sig: Take 50 mg by mouth 3 times daily    hypromellose (ARTIFICIAL TEARS) 0.5 % SOLN ophthalmic solution   Yes No   Si drop 4 times daily as needed for dry eyes   isosorbide dinitrate (ISORDIL) 20 MG tablet   Yes No "   Sig: Take 20 mg by mouth 3 times daily    levothyroxine (SYNTHROID/LEVOTHROID) 137 MCG tablet   Yes No   Sig: Take 137 mcg by mouth daily    losartan (COZAAR) 100 MG tablet   Yes No   Sig: Take 50 mg by mouth 2 times daily   metoprolol succinate ER (TOPROL-XL) 100 MG 24 hr tablet   Yes No   Sig: Take 100 mg by mouth daily   polyethylene glycol (MIRALAX) 17 g packet   Yes No   Sig: Take 1 packet by mouth daily as needed for constipation   potassium chloride ER (KLOR-CON M) 20 MEQ CR tablet   Yes No   Sig: Take 40 mEq by mouth daily    pregabalin (LYRICA) 75 MG capsule   Yes No   Sig: Take 75 mg by mouth At Bedtime    sertraline (ZOLOFT) 100 MG tablet   Yes No   Sig: Take 100 mg by mouth daily    tiZANidine (ZANAFLEX) 2 MG tablet   Yes No   Sig: Take 2 mg by mouth nightly as needed for muscle spasms   torsemide (DEMADEX) 20 MG tablet   Yes No   Sig: Take 60 mg by mouth every morning    vitamin D3 (CHOLECALCIFEROL) 50 mcg (2000 units) tablet   Yes No   Sig: Take 2,000 Units by mouth daily       Facility-Administered Medications: None        Review of Systems    The 10 point Review of Systems is negative other than noted in the HPI or here.    Social History   I have reviewed this patient's social history and updated it with pertinent information if needed.  Social History     Tobacco Use    Smoking status: Never    Smokeless tobacco: Never         Family History   No significant family history reported to me    Allergies   No Known Allergies     Physical Exam   Vital Signs: Temp: (!) 101.1  F (38.4  C) Temp src: Rectal BP: 134/61 Pulse: 75   Resp: 18 SpO2: 98 % O2 Device: Nasal cannula Oxygen Delivery: 2 LPM  Weight: 190 lbs 0 oz    Constitutional: awake, fatigued, cooperative  Eyes: pupils equal, round and reactive to light and conjunctiva normal  ENT: normocepalic, without obvious abnormality, atramatic  Respiratory: no increased work of breathing, diminished air entry and ronchi at bases, no wheezing, on  oxygen  Cardiovascular: regular rate and rhythm, systolic murmur, and no edema  GI: normal bowel sounds, soft, and non-distended  Skin: no rashes, no bleeding  Neurologic: alert, oriented, no focal deficits but appears weak    60 MINUTES SPENT BY ME on the date of service doing chart review, history, exam, documentation & further activities per the note.      Data   ------------------------- PAST 24 HR DATA REVIEWED -----------------------------------------------    I have personally reviewed the following data over the past 24 hrs:    21.5 (H)  \   11.6 (L)   / 254     143 99 21.7 /  116 (H)   3.8 30 (H) 1.21 (H) \     ALT: 16 AST: 32 AP: 134 (H) TBILI: 0.4   ALB: 4.4 TOT PROTEIN: 7.3 LIPASE: N/A     TSH: 0.51 T4: N/A A1C: N/A     Procal: 0.16 (H) CRP: N/A Lactic Acid: 1.2         Imaging results reviewed over the past 24 hrs:   Recent Results (from the past 24 hour(s))   CT Head w/o Contrast    Narrative    EXAM: CT HEAD WITHOUT CONTRAST  LOCATION: [facility]  DATE: 10/15/2023    INDICATION: Trauma, head injury.  COMPARISON: Brain MRI 11/19/2021.  TECHNIQUE: Routine CT Head without IV contrast. Multiplanar reformats. Dose reduction techniques were used.    FINDINGS:  INTRACRANIAL CONTENTS: No hemorrhage. Presumed arachnoid cyst along the upper midline cerebellum unchanged versus the prior MRI. No CT evidence of acute infarct. Mild presumed chronic small vessel ischemic changes. Minor generalized volume loss. No   hydrocephalus.     VISUALIZED ORBITS/SINUSES/MASTOIDS: No intraorbital abnormality. No paranasal sinus mucosal disease. Small amount of fluid right mastoid air cells.    BONES/SOFT TISSUES: No acute abnormality.      Impression    IMPRESSION:  1.  No acute intracranial injury.       Cervical spine CT w/o contrast    Narrative    EXAM: CT CERVICAL SPINE WITHOUT CONTRAST  LOCATION: [facility]  DATE: 10/15/2023    INDICATION: Trauma, neck pain.  COMPARISON: Neck  CT 01/25/2023  TECHNIQUE: Routine CT Cervical Spine without IV contrast. Multiplanar reformats. Dose reduction techniques were used.    FINDINGS:  Mild reversal of the usual cervical lordosis. 2 mm anterolisthesis of C3 on C4 or not significantly changed. Cervical vertebral body heights preserved.  No acute cervical spine fracture. No prevertebral soft tissue swelling. Multilevel degenerative   changes without evidence for high-grade spinal canal narrowing.      Impression    IMPRESSION:  1.  No acute cervical spine fracture.   2.  Reversal of the usual cervical lordosis is nonspecific and can be seen with muscle spasm/soft tissue injury, but is unchanged versus 01/25/2023 and may be positional/chronic.       XR Chest 1 View    Narrative    EXAM: XR CHEST 1 VIEW  LOCATION: [facility]  DATE: 10/15/2023    INDICATION: cough, recent pna, increased weakness  COMPARISON: CT chest 09/01/2023, chest x-ray 08/18/2023      Impression    IMPRESSION: Cardiomegaly with central vascular congestion. Stable prominent pericardial fat pad along the left cardiac margin. Mild bibasilar atelectasis/scarring. No focal pneumonic consolidation or pleural effusion.   XR Femur Right 2 Views    Narrative    EXAM: XR PELVIS 1/2 VIEWS, XR FEMUR RIGHT 2 VIEWS  LOCATION: [facility]  DATE: 10/15/2023    INDICATION: Trauma, pain.  COMPARISON: 04/29/2022      Impression    IMPRESSION: Advanced degenerative arthritis of both hips with hypertrophic changes. Multiple osteochondral loose bodies superior of the right femoral neck.    Mild degenerative arthritis of both SI joints. Lower lumbar facet arthropathy and degenerative interspace narrowing. Incidental note of ossification of the right iliolumbar ligament.    Right total knee arthroplasty. No knee joint effusion.   XR Pelvis 1/2 Views    Narrative    EXAM: XR PELVIS 1/2 VIEWS, XR FEMUR RIGHT 2 VIEWS  LOCATION: [facility]  DATE:  10/15/2023    INDICATION: Trauma, pain.  COMPARISON: 04/29/2022      Impression    IMPRESSION: Advanced degenerative arthritis of both hips with hypertrophic changes. Multiple osteochondral loose bodies superior of the right femoral neck.    Mild degenerative arthritis of both SI joints. Lower lumbar facet arthropathy and degenerative interspace narrowing. Incidental note of ossification of the right iliolumbar ligament.    Right total knee arthroplasty. No knee joint effusion.   CT Chest/Abdomen/Pelvis w Contrast    Narrative    EXAM: CT CHEST/ABDOMEN/PELVIS W CONTRAST  LOCATION: [facility]  DATE: 10/15/2023    INDICATION: Cough and abdominal pain.  COMPARISON: CT chest 09/01/2023.  TECHNIQUE: CT scan of the chest, abdomen, and pelvis was performed following injection of IV contrast. Multiplanar reformats were obtained. Dose reduction techniques were used.   CONTRAST: 95mL Isovue 370    FINDINGS:   LUNGS AND PLEURA: Lingula consolidative opacity with air bronchogram. Additional scattered groundglass opacities within the right upper lobe and left upper lobe. Similar bibasilar bronchiectasis. No pleural effusion. No new or growing suspicious   pulmonary nodule.    MEDIASTINUM/AXILLAE: No lymphadenopathy. No thoracic aortic aneurysms.    CORONARY ARTERY CALCIFICATION: Severe.    HEPATOBILIARY: Similar subcentimeter hepatic dome hypodensity, likely benign. No suspicious mass. Gallbladder is unremarkable.    PANCREAS: Normal.    SPLEEN: Normal.    ADRENAL GLANDS: Similar mild adrenal gland thickening.    KIDNEYS/BLADDER: Mild left cortical thinning and scarring. Left lower pole nonobstructing calculus. Since 2021 similar left mid pole hypodensity, likely benign. No collecting system dilatation. Urinary bladder is unremarkable.    BOWEL: No obstruction or inflammatory change. Moderate stool in the colon.    LYMPH NODES: No enlarged lymph node.    VASCULATURE: Nonaneurysmal abdominal aorta. Severe  atherosclerosis.    PELVIC ORGANS: Uterus is absent.    MUSCULOSKELETAL: Subacute left posterior left 11th and 12th rib fractures. Moderate to severe bilateral hip degenerative changes. No convincing acute fracture of the pelvis. Moderate degenerative changes of the thoracolumbar spine.       Impression    IMPRESSION:  1.  Lingular consolidative opacity with air bronchograms suspicious for infection/pneumonia.  2.  Additional bilateral upper lobe groundglass opacities are likely infectious/inflammatory.  3.  Subacute left posterior 11th and 12th rib fractures.  4.  No convincing acute process within the abdomen or pelvis.

## 2023-10-15 NOTE — ED NOTES
[facility]  ED Nurse Handoff Report    ED Chief complaint: Generalized Weakness  . ED Diagnosis:   Final diagnoses:   Pneumonia due to infectious organism, unspecified laterality, unspecified part of lung   Generalized weakness   Closed head injury, initial encounter   Contusion of right thigh, initial encounter   Oxygen dependent       Allergies: No Known Allergies    Code Status: Full Code    Activity level - Baseline/Home:  independent .  Activity Level - Current:   assist of 1.   Lift room needed: No.   Bariatric: No   Needed: No   Isolation: No.   Infection: Not Applicable.     Respiratory status: Nasal cannula    Vital Signs (within 30 minutes):   Vitals:    10/15/23 1320 10/15/23 1330 10/15/23 1350 10/15/23 1410   BP: 119/60 125/62  134/61   Pulse: 79 79 78 75   Resp:       Temp:       TempSrc:       SpO2: 94% 95% 96% 98%   Weight:       Height:           Cardiac Rhythm:  ,      Pain level:    Patient confused: No.   Patient Falls Risk: arm band in place, activity supervised, and mobility aid in reach.   Elimination Status: Has voided     Patient Report - Initial Complaint: weakness/fall.   Focused Assessment:  CRESCENCIO Bowen is a 78 year old female with a history of CHF on chronic oxygen therapy, hypertension, thyroid disease and pulmonary hypertension who presents emergency department with concerns for increased shakiness, weakness, and a fall.  She reports that she was walking back from the bathroom when she felt weak and fell.  She notes she was having increased shakiness which she has had intermittently over the past few months, she thinks this prompted the fall.  She fell and hit the right side of her head as well as the right side of her body.  She was on the floor for approximately 1 hour and her  was helped trying to help get her up, but ultimately had to call her daughter to have help.  She is noting mild right-sided neck pain and right leg pain.  She  denies a headache.  She denies vision changes.  She is chronically short of breath but this seems unchanged.  She notes she was recently diagnosed with pneumonia 1 month ago and was on 2 rounds of antibiotics and still has a cough.  She denies fevers.  She denies chest pain or palpitations.  She notes her right leg is painful above the knee and into the hip.  She denies new numbness or tingling.  Her daughter at the bedside notes she has some bruising to her right arm.  Daughter notes that her mother seemed much shakier than normal when they got to her house and was having difficulty even getting onto the toilet after they got her up, so that prompted the visit.  Per her daughter, the patient is not confused at this time and otherwise seems at baseline.      Physical Exam  General: Elderly female sitting upright  Eyes: PERRL, Conjunctive within normal limits.  No scleral icterus.  ENT: Moist mucous membranes, oropharynx clear.   CV: Normal S1S2, no murmur, rub or gallop. Regular rate and rhythm  Resp: Coarse breath sounds bilaterally with audible wheezing noted on regular breathing.  Mildly increased work of breathing.  No tachypnea.  GI: Abdomen is soft, nontender and nondistended. No palpable masses. No rebound or guarding.  MSK: No pitting edema.  Tender over the right pelvis and diffusely over the right femur without palpable deformity or crepitus.  Able to range the right lower extremity at the right hip and knee, but slowly.  Nontender over the right upper arm or right anterior knee.  Normal active range of motion of the bilateral upper extremities.  Skin: Warm and dry.  Bruising noted over the right upper arm and right anterior knee.  No rashes or lesions or ecchymoses on visible skin.  Neuro: Alert and oriented. Responds appropriately to all questions and commands. No focal findings appreciated. Normal muscle tone.  Sensation intact to light touch over all dermatomes of the right lower extremity.  Psych:  Normal mood and affect. Pleasant.     Abnormal Results:   Labs Ordered and Resulted from Time of ED Arrival to Time of ED Departure   COMPREHENSIVE METABOLIC PANEL - Abnormal       Result Value    Sodium 143      Potassium 3.8      Carbon Dioxide (CO2) 30 (*)     Anion Gap 14      Urea Nitrogen 21.7      Creatinine 1.21 (*)     GFR Estimate 46 (*)     Calcium 9.6      Chloride 99      Glucose 116 (*)     Alkaline Phosphatase 134 (*)     AST 32      ALT 16      Protein Total 7.3      Albumin 4.4      Bilirubin Total 0.4     ROUTINE UA WITH MICROSCOPIC REFLEX TO CULTURE - Abnormal    Color Urine Straw      Appearance Urine Clear      Glucose Urine Negative      Bilirubin Urine Negative      Ketones Urine Negative      Specific Gravity Urine 1.009      Blood Urine Negative      pH Urine 7.0      Protein Albumin Urine Negative      Urobilinogen Urine Normal      Nitrite Urine Negative      Leukocyte Esterase Urine Small (*)     Mucus Urine Present (*)     RBC Urine 1      WBC Urine 5     CBC WITH PLATELETS AND DIFFERENTIAL - Abnormal    WBC Count 21.5 (*)     RBC Count 4.40      Hemoglobin 11.6 (*)     Hematocrit 37.6      MCV 86      MCH 26.4 (*)     MCHC 30.9 (*)     RDW 17.0 (*)     Platelet Count 254      % Neutrophils 86      % Lymphocytes 7      % Monocytes 6      Mids % (Monos, Eos, Basos)        % Eosinophils 0      % Basophils 0      % Immature Granulocytes 1      NRBCs per 100 WBC 0      Absolute Neutrophils 18.3 (*)     Absolute Lymphocytes 1.6      Absolute Monocytes 1.3      Mids Abs (Monos, Eos, Basos)        Absolute Eosinophils 0.1      Absolute Basophils 0.1      Absolute Immature Granulocytes 0.1      Absolute NRBCs 0.0     PROCALCITONIN - Abnormal    Procalcitonin 0.16 (*)    MAGNESIUM - Normal    Magnesium 2.2     TSH WITH FREE T4 REFLEX - Normal    TSH 0.51     LACTIC ACID WHOLE BLOOD - Normal    Lactic Acid 1.2     BLOOD CULTURE   BLOOD CULTURE        CT Chest/Abdomen/Pelvis w Contrast    Preliminary Result   IMPRESSION:   1.  Lingular consolidative opacity with air bronchograms suspicious for infection/pneumonia.   2.  Additional bilateral upper lobe groundglass opacities are likely infectious/inflammatory.   3.  Subacute left posterior 11th and 12th rib fractures.   4.  No convincing acute process within the abdomen or pelvis.      XR Pelvis 1/2 Views   Final Result   IMPRESSION: Advanced degenerative arthritis of both hips with hypertrophic changes. Multiple osteochondral loose bodies superior of the right femoral neck.      Mild degenerative arthritis of both SI joints. Lower lumbar facet arthropathy and degenerative interspace narrowing. Incidental note of ossification of the right iliolumbar ligament.      Right total knee arthroplasty. No knee joint effusion.      XR Femur Right 2 Views   Final Result   IMPRESSION: Advanced degenerative arthritis of both hips with hypertrophic changes. Multiple osteochondral loose bodies superior of the right femoral neck.      Mild degenerative arthritis of both SI joints. Lower lumbar facet arthropathy and degenerative interspace narrowing. Incidental note of ossification of the right iliolumbar ligament.      Right total knee arthroplasty. No knee joint effusion.      XR Chest 1 View   Final Result   IMPRESSION: Cardiomegaly with central vascular congestion. Stable prominent pericardial fat pad along the left cardiac margin. Mild bibasilar atelectasis/scarring. No focal pneumonic consolidation or pleural effusion.      Cervical spine CT w/o contrast   Final Result   IMPRESSION:   1.  No acute cervical spine fracture.    2.  Reversal of the usual cervical lordosis is nonspecific and can be seen with muscle spasm/soft tissue injury, but is unchanged versus 01/25/2023 and may be positional/chronic.            CT Head w/o Contrast   Final Result   IMPRESSION:   1.  No acute intracranial injury.                Treatments provided: see mar/see above   Family  Comments: none   OBS brochure/video discussed/provided to patient:  Yes  ED Medications:   Medications   cefTRIAXone (ROCEPHIN) 2 g vial to attach to  ml bag for ADULTS or NS 50 ml bag for PEDS (2 g Intravenous $New Bag 10/15/23 1407)   azithromycin 500 mg (ZITHROMAX) in 0.9% NaCl 250 mL intermittent infusion 500 mg (has no administration in time range)   acetaminophen (TYLENOL) tablet 1,000 mg (1,000 mg Oral $Given 10/15/23 1212)   sodium chloride for CT scan flush use (65 mLs Intravenous $Given 10/15/23 1252)   iopamidol (ISOVUE-370) solution 500 mL (95 mLs Intravenous $Given 10/15/23 1251)       Drips infusing:  Yes  For the majority of the shift this patient was Green.   Interventions performed were none .    Sepsis treatment initiated: No    Cares/treatment/interventions/medications to be completed following ED care: see above     ED Nurse Name: Cynthia Hawkins RN  2:13 PM  RECEIVING UNIT ED HANDOFF REVIEW    Above ED Nurse Handoff Report was reviewed: Yes  Reviewed by: Bobby Acevedo, PATRICIA on October 15, 2023 at 4:25 PM

## 2023-10-15 NOTE — ED TRIAGE NOTES
Pt arrives in wheelchair with family for weakness and tremors. Started three months ago but severely worsened over the last couple days. Received flu, COVID, shingles shot Wednesday. Pt fell at 6am and hit head in front and reports right hip pain. Is on blood thinners. Normally on O2 for CHF. O2 tank was off upon arrival and pt was 88% RA. Placed on 2L oxygen NC.

## 2023-10-15 NOTE — PHARMACY-ADMISSION MEDICATION HISTORY
Pharmacist Admission Medication History    Admission medication history is complete. The information provided in this note is only as accurate as the sources available at the time of the update.    Information Source(s): Patient, Clinic records, and CareEverywhere/SureScripts via in-person    Pertinent Information: pt uses [facility] mail order and [facility] on [address] in [place]    Changes made to PTA medication list:  Added: None  Deleted: albuterol inhaler, cetirizine, eye-itch relief, ferrous fumarate, Flonase, losartan, metoprolol, Miralax, Zanaflex  Changed: levothyroxine to 150 mcg, Lyrica to BID, sertraline to 150 mg    Medication Affordability: no issues       Allergies reviewed with patient and updates made in EHR: yes    Medication History Completed By: Kermit Severson, [facility] 10/15/2023 4:08 PM    PTA Med List   Medication Sig Last Dose    Acetaminophen (TYLENOL PO) Take 1,000 mg by mouth 3 times daily 10/14/2023 at pm    amLODIPine (NORVASC) 5 MG tablet Take 5 mg by mouth daily  10/14/2023 at am    aspirin 81 MG EC tablet Take 81 mg by mouth daily 10/14/2023 at am    atorvastatin (LIPITOR) 20 MG tablet Take 20 mg by mouth every evening  10/14/2023 at pm    clopidogrel (PLAVIX) 75 MG tablet Take 1 tablet (75 mg) by mouth daily 10/14/2023 at am    hydrALAZINE (APRESOLINE) 50 MG tablet Take 50 mg by mouth 3 times daily  10/14/2023 at pm    isosorbide dinitrate (ISORDIL) 20 MG tablet Take 20 mg by mouth 3 times daily  10/14/2023 at pm    levothyroxine (SYNTHROID/LEVOTHROID) 150 MCG tablet Take 150 mcg by mouth daily 10/14/2023 at am    Multiple Vitamins-Minerals (MULTIVITAMIN ADULTS PO) Take 1 tablet by mouth daily 10/14/2023 at am    potassium chloride ER (KLOR-CON M) 20 MEQ CR tablet Take 40 mEq by mouth daily  10/14/2023 at am    pregabalin (LYRICA) 75 MG capsule Take 75 mg by mouth 2 times daily 10/14/2023 at pm    sertraline (ZOLOFT) 100 MG tablet Take 150 mg by mouth daily 10/14/2023 at am    torsemide  (DEMADEX) 20 MG tablet Take 60 mg by mouth every morning  10/14/2023 at am    vitamin D3 (CHOLECALCIFEROL) 50 mcg (2000 units) tablet Take 2,000 Units by mouth daily  10/14/2023 at am

## 2023-10-16 NOTE — PROGRESS NOTES
"   10/16/23 1146   Appointment Info   Signing Clinician's Name / Credentials (PT) Makenzie Bales DPT   Living Environment   People in Home spouse   Current Living Arrangements house   Home Accessibility stairs to enter home;stairs within home   Number of Stairs, Main Entrance 2  (short steps (<4\"))   Stair Railings, Main Entrance none   Number of Stairs, Within Home, Primary greater than 10 stairs   Stair Railings, Within Home, Primary   (with 1 rail)   Transportation Anticipated family or friend will provide   Living Environment Comments Pt is IND  with mobility (states she uses a walker \"only about 2% of the time\"); basic ADLs, has a hired caregiver that comes x3/wk to assist with some cooking, light house cleaning and will be starting to help with bathing per pt's dtr   Self-Care   Usual Activity Tolerance moderate   Current Activity Tolerance fair   Equipment Currently Used at Home   (4WW)   Fall history within last six months yes   Number of times patient has fallen within last six months 3   General Information   Onset of Illness/Injury or Date of Surgery 10/15/23   Referring Physician Eldon Apple, DO   Patient/Family Therapy Goals Statement (PT) to get stronger   Pertinent History of Current Problem (include personal factors and/or comorbidities that impact the POC) 78 year old female w/PMH chronic hypoxic respiratory failure due to pulmonary HTN, ALAINA requiring CPAP, chronic diastolic HF, CAD, CKD stage 3, morbid obesity, HTN,  depression who presents from home with family with fever, cough.   Existing Precautions/Restrictions fall;oxygen therapy device and L/min  (2LPM is baseline)   General Observations sitting in recliner, NAD   Cognition   Affect/Mental Status (Cognition) WFL   Orientation Status (Cognition) oriented x 3   Follows Commands (Cognition) WFL   Pain Assessment   Patient Currently in Pain   (reports R knee pain)   Integumentary/Edema   Integumentary/Edema Comments see RN assessment "   Posture    Posture Forward head position;Protracted shoulders   Range of Motion (ROM)   Range of Motion ROM is WFL   Strength (Manual Muscle Testing)   Strength (Manual Muscle Testing) Deficits observed during functional mobility   Strength Comments decreased activity tolerance   Bed Mobility   Comment, (Bed Mobility) not assessed this date; in chair at beginning and end of session   Transfers   Transfers sit-stand transfer   Sit-Stand Transfer   Sit-Stand [place] (Transfers) contact guard   Assistive Device (Sit-Stand Transfers) walker, front-wheeled   Comment, (Sit-Stand Transfer) pulls to walker   Gait/Stairs (Locomotion)   [place] Level (Gait) contact guard   Assistive Device (Gait) walker, front-wheeled   Distance in Feet 5' for eval; add'l for treat   Pattern (Gait) step-through   Balance   Balance Comments Fair, no overt LOB noted   Clinical Impression   Criteria for Skilled Therapeutic Intervention Yes, treatment indicated   PT Diagnosis (PT) decreased functional mobility   Influenced by the following impairments weakness/decreased activity tolerance   Functional limitations due to impairments bed mobility, transfers, ambulation, stair climbing   Clinical Presentation (PT Evaluation Complexity) stable   Clinical Presentation Rationale clinical judgement   Clinical Decision Making (Complexity) low complexity   Planned Therapy Interventions (PT) balance training;bed mobility training;gait training;home exercise program;neuromuscular re-education;patient/family education;ROM (range of motion);stair training;strengthening;transfer training;risk factor education;progressive activity/exercise;home program guidelines   Risk & Benefits of therapy have been explained evaluation/treatment results reviewed;care plan/treatment goals reviewed;risks/benefits reviewed;current/potential barriers reviewed;participants voiced agreement with care plan;participants included;patient;spouse/significant other   PT  Total Evaluation Time   PT Eval, Low Complexity Minutes (15239) 6   Physical Therapy Goals   PT Frequency Daily   PT Predicted Duration/Target Date for Goal Attainment 10/23/23   PT Goals Bed Mobility;Transfers;Gait;Stairs   PT: Bed Mobility Supervision/stand-by assist;Supine to/from sit   PT: Transfers Supervision/stand-by assist;Sit to/from stand;Bed to/from chair;Assistive device   PT: Gait Supervision/stand-by assist;Rolling walker;150 feet   PT: Stairs Supervision/stand-by assist;2 stairs  (short steps (aerobic step height))   PT Discharge Planning   PT Plan trial aerobic step x2 reps; inc ambulation distance (bring 4WW), assess bed mobility   PT Discharge Recommendation (DC Rec) home with assist;home with home care physical therapy   PT Rationale for DC Rec Pt slightly below baseline for mobility.  Rec home with family assist on stairs and HHPT to maximize strength and mobility.   PT Brief overview of current status CGA with 2WW up to ~80'; 2L O2 baseline   PT Equipment Needed at Discharge   (has appropriate equip)   Total Session Time   Timed Code Treatment Minutes 18   Total Session Time (sum of timed and untimed services) 24

## 2023-10-16 NOTE — CONSULTS
Care Management Initial Consult    General Information  Assessment completed with: Patient, Family, Patient, brother in law, sister in law  Type of CM/SW Visit: Initial Assessment    Primary Care Provider verified and updated as needed: Yes   Readmission within the last 30 days: no previous admission in last 30 days      Reason for Consult: discharge planning    Communication Assessment  Patient's communication style: spoken language (English or Bilingual)    Hearing Difficulty or Deaf: no   Wear Glasses or Blind: no    Cognitive  Cognitive/Neuro/Behavioral: .WDL except, orientation  Level of Consciousness: alert  Arousal Level: opens eyes spontaneously  Orientation: oriented x 4  Mood/Behavior: calm, cooperative  Best Language: 0 - No aphasia  Speech: clear, spontaneous, logical    Living Environment:   People in home: spouse  Yuliya  Current living Arrangements: house      Able to return to prior arrangements: yes  Living Arrangement Comments: Lives with spouse in home with living area on main level. No stairs in home to navigate. 2 stairs to enter home.    Family/Social Support:  Care provided by: self, spouse/significant other  Provides care for: no one  Marital Status:   , Children  Ramrha       Description of Support System: Supportive, Involved    Support Assessment: Adequate family and caregiver support    Current Resources:   Patient receiving home care services: No     Community Resources: Housekeeping/[employer]  Equipment currently used at home: walker, rolling (Uses 4WW only when she leaves the home)  Supplies currently used at home: Oxygen Tubing/Supplies      Lifestyle & Psychosocial Needs:  Social Determinants of Health     Food Insecurity: Not on file   Depression: Not at risk (1/15/2019)    PHQ-2     PHQ-2 Score: 0   Housing Stability: Not on file   Tobacco Use: Low Risk  (3/1/2022)    Patient History     Smoking Tobacco Use: Never     Smokeless Tobacco Use: Never     Passive  "Exposure: Not on file   Financial Resource Strain: Not on file   Alcohol Use: Not on file   Transportation Needs: Not on file   Physical Activity: Not on file   Interpersonal Safety: Not on file   Stress: Not on file   Social Connections: Not on file       Functional Status:  Prior to admission patient needed assistance:   Dependent ADLs:: Independent  Dependent IADLs:: Transportation  Assesssment of Functional Status: Not at baseline with mobility      Additional Information:  Patient was admitted on 10/15/23 from home with fever, productive cough, worsening fatigue, weakness, and falls. Dx: recurrent PNA, sepsis, chronic hypoxic respiratory failure, subacute rib fracture. CM following for discharge planning and home care recs. URR this admission is 13%.   Met with patient in room, family (brother in law and sister in law) at bedside. Patient states she goes by \"Jeni\", speaks English. Confirmed PCP is Dr Fatou Bay @ [facility]. Address and contact info confirmed. Pt lives in single family home with spouse Yuliya who is reportedly very supportive and cares for her. Pt/spouse live on main floor of home and do not have any stairs to navigate once in home. 2 stairs to get into home. Pt in independent at baseline for mobility in home but reports she does use a 4WW when she leaves the home. Spouse drives her to [employer]. Patient confirmed her home O2 is provided by [facility]. Per RT note, patient also uses home BIPAP. Patient does not currently have home skilled services. Pt states she does have private pay help at home with cleaning/cooking 3x/week through [employer]. Pt states that her spouse or dtr Roque will transport her home at discharge.   PT is recommending home with home care PT at discharge. Pt is in agreement with this recommendation. Pt states she has used [facility] in the past and would prefer to use them again if possible. Pt agrees to home PT/OT and also RN if necessary for post " hospital assessments. Pt states she was previously set up for Outpatient PT at [facility] but prefers to go home with home care and transition to outpatient therapy again when appropriate. Writer will send referral to [facility] to review for acceptance.     ADDENDUM 1340: Per [facility] Epic Hub Team, [facility] has declined referral due to capacity. [facility] is able to accept referral without any delays in start of care if patient does discharge tomorrow as planned. Spoke with patient who agrees with home care referral to [facility]. [facility] hub notified. Planning discharge to home tomorrow 10/17/23.       Ramila Alvarado, PATRICIA  Care Coordinator  [facility]  [phone]

## 2023-10-16 NOTE — PLAN OF CARE
Assessments:   A/O x4, BP slightly elevated. Up Ax1 to bedside commode. On chronic 2L O2/nasal cannula. BiPAP placed by RT. Denies nausea. Good appetite. Voiding well, bmx1.     Major shift events:  Sputum sample with some blood tinge collected, sent to lab    Treatment Plan: continue Rocephin, azithromycin, O2 supplement, PT consult    Bedside Nurse: Bobby Acevedo RN

## 2023-10-16 NOTE — PLAN OF CARE
Goal Outcome Evaluation:      Plan of Care Reviewed With: patient    Overall Patient Progress: improvingOverall Patient Progress: improving       End of Shift Summary  For vital signs and complete assessments, please see documentation flowsheets.     Pertinent assessments: Pt A&Ox4, forgetful at times, up with ax1 and walker. Lung sounds diminished, VSS on 2L NC. No c/o  pain or nausea. PIV infusing. Up in chair for meals    Major Shift Events: none    Treatment Plan: Continue IV abx, encourage incentive spirometry, await blood and sputum cultures, video swallow study in am

## 2023-10-16 NOTE — PROGRESS NOTES
Pt reports she uses home BIPAP and not CPAP. A BiPAP of  10/5 @ 30% was applied to the pt via the mask for an ALAINA.  The bridge of the nose looks good and remains intact. Pt is tolerating it well. Will continue to monitor and assess the pt's current respiratory status and needs.    Beth Barnett, RT

## 2023-10-16 NOTE — PROGRESS NOTES
[facility]    Medicine Progress Note - Hospitalist Service    Date of Admission:  10/15/2023    Assessment & Plan    Matthew Bowen is a 78 year old female w/PMH chronic hypoxic respiratory failure due to pulmonary HTN, ALAINA requiring CPAP, chronic diastolic HF, CAD, CKD stage 3, morbid obesity, HTN,  depression who presents from home with family with fever, cough.     Recurrent PNA, suspected CAP  Possible bronchiectasis  Sepsis  -presents with recurrent PNA, 3rd time in last month or so. Last completed treatment 2 weeks ago with persistent cough. Presents with worsening fatigue, productive cough, weakness and falls.  -sepsis criteria met with WBC 21, fever 101.1. Lactic was normal and viral studies negative  -CT w/lingular consolidative opacity w/air bronchograms and bilateral upper lobe ground glass, also with bibasilar atelectasis  -pulm toilet with IS, flutter valve, mucinex  -cont Rocephin/Azithro, reorder sputum cultures due to poor specimen  -blood cx so far negative  -seen by ST, plan for video swallow tomorrow     Chronic hypoxic respiratory failure  Hx ALAINA, pulm HTN, chronic diastolic HF  -currenlty remains on baseline O2 of 2L despite findings above  -CPAP ordered  -resume home diuretics today     Falls  Subacute rib fracture  -imaging negative for acute fractures, bleeding but subacute rib fractures noted  -pain control with tylenol and PT to eval     CKD stage 3  -Cr 1.21 on admission but appears to be near baseline  -hold home ARB, ok to resume diuretics today and monitor     HTN  -resume home diuretics, norvasc, hydralazine and isordil today     Hx morbid obesity, anemia, CAD, hypothyroidism, mood disorder  -resume home ASA, statin, plavix, lyrica, zoloft        Diet: Combination Diet Regular Diet Adult    DVT Prophylaxis: Pneumatic Compression Devices  Aleman Catheter: Not present  Lines: None     Cardiac Monitoring: None  Code Status: Full Code        Disposition Plan    Possible discharge home tomorrow, may need C             Eldon Apple,   Hospitalist Service  [facility]  Securely message with [employer] (more info)  Text page via [employer] Paging/Directory   ______________________________________________________________________    Interval History   No overnight events but still with cough, reportedly productive and thick but sputum cx was non diagnostic. On baseline O2. BP has been elevated and home meds resumed. Afebrile, leukocytosis improving    Physical Exam   Vital Signs: Temp: 99.5  F (37.5  C) Temp src: Oral BP: (!) 163/78 Pulse: 81   Resp: 20 SpO2: 92 % O2 Device: Nasal cannula Oxygen Delivery: 2 LPM  Weight: 180 lbs 1.85 oz  Constitutional: awake, fatigued, cooperative  Eyes: pupils equal, round and reactive to light and conjunctiva normal  ENT: normocepalic, without obvious abnormality, atramatic  Respiratory: no increased work of breathing, diminished air entry and ronchi at bases, no wheezing, on oxygen  Cardiovascular: regular rate and rhythm, systolic murmur, and no edema  GI: normal bowel sounds, soft, and non-distended  Skin: no rashes, no bleeding  Neurologic: alert, oriented, no focal deficits but appears weak    45 MINUTES SPENT BY ME on the date of service doing chart review, history, exam, documentation & further activities per the note.      Data   ------------------------- PAST 24 HR DATA REVIEWED -----------------------------------------------    I have personally reviewed the following data over the past 24 hrs:    20.0 (H)  \   10.2 (L)   / 205     146 (H) 105 18.4 /  106 (H)   3.7 30 (H) 1.15 (H) \     Procal: N/A CRP: N/A Lactic Acid: 0.5 (L)         Imaging results reviewed over the past 24 hrs:   No results found for this or any previous visit (from the past 24 hour(s)).

## 2023-10-16 NOTE — PROGRESS NOTES
CLINICAL SWALLOW EVALUATION       10/16/23 1301   Appointment Info   Signing Clinician's Name / Credentials (SLP) Denisha Granger M.A., CCC-SLP, [facility]   General Information   Onset of Illness/Injury or Date of Surgery 10/15/23   Referring Physician Dr. Apple   Patient/Family Therapy Goal Statement (SLP) Patient would like to avoid pneumonia recurrences.   Pertinent History of Current Problem Patient admitted with recurrent pneumonia. Her PMH is significant for 2L baseline oxygen dependence with ALAINA and CPAP use, HTN, CAD, and CKD.  She also has distant hx of Bell's Palsy with remaining left sided facial weakness. Patient denies isssues with coughing during liquid intake or feeling of food stuck in her throat, also denies acid reflux or heartburn/indigestion symptoms.   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   Type of Evaluation   Type of Evaluation Swallow Evaluation   Oral Motor   Oral Musculature anomalies present   Structural Abnormalities present   Mucosal Quality good   Dentition (Oral Motor)   Dentition (Oral Motor) natural dentition   Facial Symmetry (Oral Motor)   Facial Symmetry (Oral Motor) left side impairment  (Hx Bell's palsy)   Left Side Facial Asymmetry   (Mild)   Tongue Function (Oral Motor)   Tongue ROM (Oral Motor) WNL   Jaw Function (Oral Motor)   Jaw Function (Oral Motor) WNL   Cough/Swallow/Gag Reflex (Oral Motor)   Soft Palate/Velum (Oral Motor) WNL   Volitional Throat Clear/Cough (Oral Motor) WNL   Vocal Quality/Secretion Management (Oral Motor)   Vocal Quality (Oral Motor) WNL   General Swallowing Observations   Past History of Dysphagia None per patient   Comment, Secretions/Suctioning 2L   Respiratory Support supplemental oxygen   Current Diet/Method of Nutritional Intake (General Swallowing Observations, NIS) thin liquids (level 0);regular diet   Swallowing Evaluation Clinical swallow evaluation   Clinical Swallow Evaluation    Feeding Assistance set up only required   Additional evaluation(s) completed today Yes;Recommended   Rationale for completing additional evaluation Video swallow indicated to assess silent aspiration and oropharyngeal, cervical esophageal swallow function.   Clinical Swallow Evaluation Textures Trialed thin liquids;solid foods;mildly thick liquids   Clinical Swallow Eval: Thin Liquid Texture Trial   Mode of Presentation, Thin Liquids cup;self-fed   Volume of Liquid or Food Presented 3 oz   Oral Phase of Swallow WFL   Pharyngeal Phase of Swallow impaired;repeated swallows;other (see comments)  (Belching)   Diagnostic Statement No overt Sx of aspiration, bolus holding and belch after swallow may suggest esophageal dysfunction   Clinical Swallow Eval: Mildly Thick Liquids   Mode of Presentation spoon;self-fed   Volume Presented 2 oz   Oral Phase WFL   Pharyngeal Phase intact   Diagnostic Statement No overt Sx of aspiration   Clinical Swallow Evaluation: Solid Food Texture Trial   Mode of Presentation spoon;self-fed   Volume Presented 2 bites   Oral Phase WFL   Pharyngeal Phase intact   Diagnostic Statement No overt Sx of aspiration   Esophageal Phase of Swallow   Patient reports or presents with symptoms of esophageal dysphagia Yes   Esophageal comments Belching   Swallowing Recommendations   Diet Consistency Recommendations thin liquids (level 0);regular diet   Supervision Level for Intake patient independent   Mode of Delivery Recommendations bolus size, small;slow rate of intake   Monitoring/Assistance Required (Eating/Swallowing) monitor for cough or change in vocal quality with intake;stop eating activities when fatigue is present   Recommended Feeding/Eating Techniques (Swallow Eval) maintain upright sitting position for eating;maintain upright posture during/after eating for 30 minutes;provide 6 smaller meals throughout day   Medication Administration Recommendations, Swallowing (SLP) As preferred   Clinical  Impression   Criteria for Skilled Therapeutic Interventions Met (SLP Eval) Yes, treatment indicated   SLP Diagnosis Minimal pharyngoesophageal dysphagia   Risks & Benefits of therapy have been explained evaluation/treatment results reviewed;risks/benefits reviewed;patient   SLP Total Evaluation Time   Eval: oral/pharyngeal swallow function, clinical swallow Minutes (53493) 25   SLP Goals   Therapy Frequency (SLP Eval) 2 times/week   SLP Predicted Duration/Target Date for Goal Attainment 10/23/23   SLP Goals Swallow   SLP: Safely tolerate diet without signs/symptoms of aspiration Regular diet;Thin liquids;Independently;With use of swallow precautions;With use of compensatory swallow strategies   Interventions   Interventions Quick Adds Swallowing Dysfunction   Swallowing Dysfunction &/or Oral Function for Feeding   Treatment of Swallowing Dysfunction &/or Oral Function for Feeding Minutes (67339) 8   Symptoms Noted During/After Treatment Fatigue   Treatment Detail/Skilled Intervention Patient trained re: generalized safe swallowing and reflux precaution strategies, educated re: esophageal dysphagia sx and contribution to reflux and/or pneumonia concern.  Patient verbalized comprehension of education and training today.   SLP Discharge Planning   SLP Plan Video swallow study to assess silent aspiration risk, esophageal dysphagia screen   SLP Discharge Recommendation home   SLP Rationale for DC Rec Expect patient may meet swallowing goals by discharge   SLP Brief overview of current status  Clinical swallow evaluation completed: recommend continue regular diet with thin liquids with slow rate of intake, upright positioning, and small bites/sips.  Recommend video swallow study for 10/17 to assess silent aspiration risk and esophageal dysphagia screen   Total Session Time   Total Session Time (sum of timed and untimed services) 33

## 2023-10-16 NOTE — PLAN OF CARE
End of Shift Summary  For vital signs and complete assessments, please see documentation flowsheets.     Pertinent assessments: VSS. Pt wore BiPAP tonight with 2L baseline, no issues noted. LS diminished. BS x4. Pt denies pain and nausea. Regular diet. Ax1 with Belt and Walker to ambulate; using BSC at this time. PIV - SL. A&O, forgetful; alarm on bed for safety. Slept well.    Major Shift Events: none    Treatment Plan: Zithromax, Rocephin, PT and Speech consults, Encourage activity, Discharge TBD.

## 2023-10-17 NOTE — PROGRESS NOTES
[facility]    Medicine Progress Note - Hospitalist Service    Date of Admission:  10/15/2023    Assessment & Plan   Matthew Bowen is a 78 year old female w/PMH chronic hypoxic respiratory failure due to pulmonary HTN, ALAINA requiring CPAP, chronic diastolic HF, CAD, CKD stage 3, morbid obesity, HTN,  depression who presents from home with family with fever, cough.     Weakness  Arthritis bilateral hips  L foot pain  -having multifactorial weakness, now reporting some hip pain but CT showed severe degenerative disease and likely chronic  -reports taking lyrica BID and tylenol 1000mg TID so dosing adjusted, low dosing likely contributing to pain  -XR R foot although appears soft tissue pain  -PT following, likely C on discharge    Recurrent PNA, suspected CAP  Possible bronchiectasis  Sepsis  -presents with recurrent PNA, 3rd time in last month or so. Last completed treatment 2 weeks ago with persistent cough. Presents with worsening fatigue, productive cough, weakness and falls.  -sepsis criteria met with WBC 21, fever 101.1. Lactic was normal and viral studies negative  -CT w/lingular consolidative opacity w/air bronchograms and bilateral upper lobe ground glass, also with bibasilar atelectasis  -cont Rocephin/Azithro, reorder sputum cultures due to poor specimen  -blood cx so far negative  -seen by ST, video swallow negative. No dysphagia symptoms noted so no further workup indicated at this time.     Chronic hypoxic respiratory failure  Hx ALAINA, pulm HTN, chronic diastolic HF  -currenlty remains on baseline O2 of 2L despite findings above  -CPAP ordered  -resumed home diuretics     Falls  Subacute rib fracture  -imaging negative for acute fractures, bleeding but subacute rib fractures noted  -pain control, PT as above     CKD stage 3  -Cr 1.21 on admission but appears to be near baseline  -BP borderline so cont to hold home ARB, resumed diuretics      HTN  -resumed home diuretics, norvasc,  hydralazine and isordil      Hx morbid obesity, anemia, CAD, hypothyroidism, mood disorder  -resume home ASA, statin, plavix, lyrica, zoloft      Diet: Combination Diet Regular Diet Adult    DVT Prophylaxis: Pneumatic Compression Devices  Aleman Catheter: Not present  Lines: None     Cardiac Monitoring: None  Code Status: Full Code        Disposition Plan    Await improvement, possibly home w/HHC next 24-48 hours         Eldon Apple DO  Hospitalist Service  [facility]  Securely message with [employer] (more info)  Text page via [employer] Paging/Directory   ______________________________________________________________________    Interval History   Feeling weak still, increased pain as her home meds were not resumed to their usual doses. Cough improved overall, afebrile. Passed video swallow without signs of aspiration. Reports some L foot pain and bilateral hip pain    Physical Exam   Vital Signs: Temp: 98.5  F (36.9  C) Temp src: Oral BP: 118/53 Pulse: 69   Resp: 20 SpO2: 97 % O2 Device: Nasal cannula    Weight: 180 lbs 1.85 oz  Constitutional: awake, fatigued, cooperative  Eyes: pupils equal, round and reactive to light and conjunctiva normal  ENT: normocepalic, without obvious abnormality, atramatic  Respiratory: no increased work of breathing, diminished air entry and ronchi at bases, no wheezing, on oxygen  Cardiovascular: regular rate and rhythm, systolic murmur, and no edema  GI: normal bowel sounds, soft, and non-distended  Skin: no rashes, no bleeding  Neurologic: alert, oriented, no focal deficits but appears weak, pain to palpation of L foot    45 MINUTES SPENT BY ME on the date of service doing chart review, history, exam, documentation & further activities per the note.      Data   ------------------------- PAST 24 HR DATA REVIEWED -----------------------------------------------    I have personally reviewed the following data over the past 24 hrs:    21.4 (H)  \   11.3 (L)   / 238      142 97 (L) 18.9 /  127 (H)   3.7 30 (H) 1.17 (H) \       Imaging results reviewed over the past 24 hrs:   Recent Results (from the past 24 hour(s))   XR Video Swallow with SLP or OT    Narrative    VIDEO SWALLOW WITH SLP OR OT    10/17/2023 9:50 AM     HISTORY: Recurrent pneumonia. Evaluate for aspiration.    COMPARISON: None.    TECHNIQUE: Fluoroscopic assistance was provided to speech pathology  for evaluation of the patient's swallowing mechanism. Small amounts of  thin, pudding, and solid consistencies of barium were administered  during real-time fluoroscopy in the lateral projection. A total of 1  minute of fluoroscopy time was utilized for the exam. A total of 7  fluoroscopic cine clips were obtained.    FINDINGS: Patient was able to swallow the varying consistencies of  barium without difficulty. Mild flash penetration was noted when the  patient swallowed thin barium. No deep laryngeal penetration or tanesha  tracheal aspiration. Note that only the cervical esophagus was  evaluated.       Impression    IMPRESSION:   1. Mild flash penetration was noted when the patient swallowed thin  barium.  2. No evidence for aspiration.    Please see further details in report by speech pathology.     STONEY ALMONTE MD         SYSTEM ID:  [id number]   XR Foot Left 2 Views    Narrative    FOOT LEFT TWO VIEWS 10/17/2023 12:10 PM     HISTORY: Foot pain, rule out fracture.    COMPARISON: None.       Impression    IMPRESSION: Degenerative changes throughout the midfoot which are  moderate at the navicular cuneiform and second TMT joints. No evidence  of acute fracture. Bulky distal Achilles tendon enthesopathy.  Calcaneal heel spur.     OSIRIS DOE MD         SYSTEM ID:  [id number]

## 2023-10-17 NOTE — PLAN OF CARE
Goal Outcome Evaluation:    End of Shift Summary  For vital signs and complete assessments, please see documentation flowsheets.     Pertinent assessments:   A&Ox4, forgetful at times. VSS on 2L NC (baseline). C/o R hip pain, tylenol given and ice applied w/ relief.     Major Shift Events: video swallow complete.  Treatment Plan: Rocephin and Zithromax, encourage incentive spirometry.

## 2023-10-17 NOTE — PLAN OF CARE
Assessments: A&Ox4, forgetful at times. VSS, wore BiPAP overnight. C/o R hip pain & left foot pain, tylenol given. Infreq cough, OLMSTEAD. Sputum collection needed. Up Ax1 with walker.     Treatment Plan: Continue Rocephin and Zithromax, encourage incentive spirometry, video swallow study in am  Bedside Nurse: Cynthia Aponte RN

## 2023-10-17 NOTE — PROGRESS NOTES
A BiPAP of  10/5 @ 30% was applied to the pt via the mask for an ALAINA.  The bridge of the nose looks good and remains intact. Pt is tolerating it well. Will continue to monitor and assess the pt's current respiratory status and needs.

## 2023-10-17 NOTE — PROGRESS NOTES
"Video Fluoroscopic Swallow Study (VFSS)     10/17/23 2446   Appointment Info   Signing Clinician's Name / Credentials (SLP) Eunice Nichols MS CCC-SLP   General Information   Onset of Illness/Injury or Date of Surgery 10/15/23   Referring Physician Dr. Apple   Patient/Family Therapy Goal Statement (SLP) Patient would like to avoid pneumonia recurrences.   Pertinent History of Current Problem   Per prior SLP/CSE: \"Patient admitted with recurrent pneumonia. Her PMH is significant for 2L baseline oxygen dependence with ALAINA and CPAP use, HTN, CAD, and CKD.  She also has distant hx of Bell's Palsy with remaining left sided facial weakness. Patient denies isssues with coughing during liquid intake or feeling of food stuck in her throat, also denies acid reflux or heartburn/indigestion symptoms.\" VFSS to further assess oropharyngeal swallow mechanism, aspiration risk.     General Observations   Pt alert, upright in chair though crooked, significant hip pain and difficulty repositioning fully straight - somewhat difficult positioning with shoulder interfering at times. Pt grimmacing in pain during evaluation - RN notified.     Type of Evaluation   Type of Evaluation Swallow Evaluation   General Swallowing Observations   Swallowing Evaluation Videofluoroscopic swallow study (VFSS)   VFSS Evaluation   Radiologist Dr. Lake   Views Taken left lateral   Physical Location of Procedure [facility], radiology dept, fluoroscopy suite   VFSS Textures Trialed thin liquids;pureed;solid foods   VFSS Eval: Thin Liquid Texture Trial   Mode of Presentation, Thin Liquid cup;straw;self-fed   Order of Presentation 1 single cup, 2 single cup, 3 single straw, 4 sequential straw, 7 sequential straw   Preparatory Phase poor bolus control   Oral Phase, Thin Liquid impaired AP movement;premature pharyngeal entry   Bolus Location When Swallow Triggered pyriforms   Pharyngeal Phase, Thin Liquid impaired hyolaryngeal " excursion;impaired epiglottic movement;impaired tongue base retraction;impaired pharyngoesophageal segment opening  (min reduced, diffuse)   Rosenbek's Penetration Aspiration Scale: Thin Liquid Trial Results 2 - contrast enters airway, remains above the vocal cords, no residue remains (penetration)   VFSS Evaluation: Puree Solid Texture Trial   Mode of Presentation, Puree spoon   Order of Presentation 5   Preparatory Phase WFL   Oral Phase, Puree WFL   Bolus Location When Swallow Triggered posterior angle of ramus   Pharyngeal Phase, Puree impaired hyolaryngel excursion;impaired epiglottic movement;impaired tongue base retraction;impaired pharyngoesophageal segment opening  (min reduced, diffuse)   Rosenbek's Penetration Aspiration Scale: Puree Food Trial Results 1 - no aspiration, contrast does not enter airway   VFSS Evaluation: Solid Food Texture Trial   Mode of Presentation, Solid spoon   Order of Presentation 6   Preparatory Phase WFL   Oral Phase, Solid WFL   Bolus Location When Swallow Triggered posterior angle of ramus   Pharyngeal Phase, Solid impaired hyolaryngel excursion;impaired epiglottic movement;impaired tongue base retraction;impaired pharyngoesophageal segment opening  (min reduced, diffuse)   Rosenbek's Penetration Aspiration Scale: Solid Food Trial Results 1 - no aspiration, contrast does not enter airway   Esophageal Phase of Swallow   Patient reports or presents with symptoms of esophageal dysphagia Yes   Esophageal comments unable to complete given pain, positioning - pt unable to manuever herself within chair   Swallowing Recommendations   Diet Consistency Recommendations thin liquids (level 0);regular diet   Supervision Level for Intake patient independent   Mode of Delivery Recommendations bolus size, small;slow rate of intake   Monitoring/Assistance Required (Eating/Swallowing) monitor for cough or change in vocal quality with intake;stop eating activities when fatigue is present    Recommended Feeding/Eating Techniques (Swallow Eval) maintain upright sitting position for eating;maintain upright posture during/after eating for 30 minutes;provide 6 smaller meals throughout day   Medication Administration Recommendations, Swallowing (SLP) as preferred   Clinical Impression   Criteria for Skilled Therapeutic Interventions Met (SLP Eval) Yes, treatment indicated   SLP Diagnosis minimal oropharyngeal dysphagia, potential esophageal component   Risks & Benefits of therapy have been explained evaluation/treatment results reviewed;participants included;patient   Clinical Impression Comments   Video fluoroscopic swallow study compelted with thin liquids, puree solids, regular solids. See above re: pain, positioning limiting extent of exam/unable to complete esophageal sweep. Pt currently presents with minimal oropharyngeal dysphagia. Mastication, oral propulsion and clearance appear WFL with solids. Reduced oral control, premature bolus spillage to the pyriform sinuses observed with thin liquids (timely initiation at the base of tongue with solids). Minimally reduced ROM of the following: base of tongue retraction, hyolaryngeal elevation/excursion, epiglottic inversion, pharyngoesophageal segment opening during the swallow. Notable prominence of cricopharyngeus, which did not impede bolus transit into upper esophagus. X1 instance of piecemeal swallow/ min vallecular and pyriform sinus residue on first swallow of thin liquids, IND double swallow clearing; no other retention observed. Trace before/during laryngeal penetration, above the cords, clearing upon swallow completion with thin liquids 2/2 reduced oral control, premature bolus spillage, delay in laryngeal closure -- pain and positioning could also be contributing. No aspiration observed.     SLP Total Evaluation Time   Evaluation, videofluoroscopic eval of swallow function Minutes (37636) 18   SLP Goals   Therapy Frequency (SLP Eval) 2  times/week   SLP Predicted Duration/Target Date for Goal Attainment 10/23/23   SLP Goals Swallow   SLP: Safely tolerate diet without signs/symptoms of aspiration Regular diet;Thin liquids;Independently;With use of swallow precautions;With use of compensatory swallow strategies   SLP Discharge Planning   SLP Plan PO tolerance, strategies, ?esophagram   SLP Discharge Recommendation home   SLP Rationale for DC Rec Expect patient willmeet swallowing goals by discharge   SLP Brief overview of current status  VFSS completed, minimal oropharyngeal dyshagia, no aspiration. Recommend continue regular/thin diet when upright/midline, upright for all PO/30 minutes after. Consider further esophageal dysphagia work up (e.g., XR esophagram) pending pt's pain levels, ability to stand.   Total Session Time   Total Session Time (sum of timed and untimed services) 18

## 2023-10-17 NOTE — PLAN OF CARE
Assessments:   A&Ox4, forgetful at times. Up with Ax1 with walker, some dyspnea on exertion. Vitally stable. On chronic 2L O2 via nasal cannula. BiPAP placed by RT for overnight. Denies pain, n/v.     Treatment Plan: Continue Rocephin and Zithromax, encourage incentive spirometry, video swallow study in am    Bedside Nurse: Bobby Acevedo RN

## 2023-10-18 NOTE — PLAN OF CARE
Pertinent assessments: A&Ox4, forgetful at times. VSS on 2L NC (baseline). C/o R hip pain, scheduled tylenol given. Productive cough. Very slow with movements & unsteady on feet. Unable to make it to the bathroom, using bedside commode Ax1 with walker & belt.     Major Shift Events: None  Treatment Plan: Rocephin and Zithromax, encourage incentive spirometry.  Bedside Nurse: Cynthia Aponte RN

## 2023-10-18 NOTE — PLAN OF CARE
Pt is alert and oriented x4 with some forgetfulness. Pt denies pain or discomfort. VSS. No acute distress noted. pt wears BIPAP at night and oxygen at day time. Pt is on Rocephin and Azithromycin for Pneumonia. Pt is assist of two in transfer and cares. pt uses bedside commode. Pt is sleeping in bed. Bed alarm in place and call light within reach. Will continue to monitor and assess pt.

## 2023-10-18 NOTE — PROGRESS NOTES
[facility]    Hospitalist Progress Note      Assessment & Plan   Matthew Bowen is a 78 year old female w/PMH chronic hypoxic respiratory failure due to pulmonary HTN, ALAINA requiring CPAP, chronic diastolic HF, CAD, CKD stage 3, morbid obesity, HTN,  depression who presents from home with family with fever, cough.    #Sepsis secondary to recurrent PNA, unclear etiology.  Possible bronchiectasis: Presents with recurrent PNA, 3rd time in last month or so. Last completed treatment 2 weeks ago with persistent cough. Presents with worsening fatigue, productive cough, weakness and falls.  -On admission, patient febrile to 101.1, white count of 21.  Lactic acid was within normal limits.  She underwent a CT scan that showed a lingular consolidative opacity with air bronchograms with bilateral upper lobe groundglass opacities.  She was started on ceftriaxone and azithromycin.  -2 attempts at getting sputum cultures were done but poor specimens on both.  -Patient is still having persistent leukocytosis.  I added on a procalcitonin on 10/18 which actually showed an increase.  He is having low-grade fevers of 99.9.  She is still feeling generally fatigued with cough.  -I did broaden her from ceftriaxone to cefepime.  We will continue azithromycin.  I will check a MRSA swab.  -We will also check a COVID and influenza as I do not see these were done on admission.  This was still not explain the white count and lingular consolidation.  -Swallow has evaluated the patient and she is okay with a regular diet.  -Given lack of improvement will consult pulmonary medicine for guidance.  May need ID consultation pending pulmonary opinion.     #Generalized weakness. Chronic arthritis. Falls at home. Left Foot pain: Having multifactorial weakness, now reporting some hip pain but CT showed severe degenerative disease and likely chronic.  She had an x-ray of her foot also showed degenerative changes without fracture.   She tells me she is following with podiatry.  -Physical therapy following.  Continue to work with her while she is inpatient.  -Fall precautions  -Home meds ordered.      #Chronic hypoxic respiratory failure.  Hx ALAINA, pulm HTN, chronic diastolic HF  -currenlty remains on baseline O2 of 2L despite findings above  -CPAP ordered  -resumed home diuretics     #CKD stage 3: Cr 1.21 on admission but appears to be near baseline.  Has remained stable.     #HTN: resumed home diuretics, norvasc, hydralazine and isordil      #Hx morbid obesity, anemia, CAD, hypothyroidism, mood disorder: resume home ASA, statin, plavix, lyrica, zoloft    #Hypokalemia: Replacement     Dispo: Unclear.  Would need to show some improvement but she is still generally weak, leukocytosis with shortness of breath and cough.  Await pulmonary input.  Likely at least couple more days.   DVT Prophylaxis: SCD's given falls. If continues to be in hospital for prolonged time will start SQH.   Code Status: Full Code      Daughter, Dr. Roque Bowen updated by phone on 10/18 per patient request    Hermilo Bills MD    Interval History   Assumed care.  Patient notes she is feeling about the same.  Feeling fatigued this morning.  Still coughing with some shortness of breath.  Denies chest pain.  No worsening leg pain.  Did work with PT a bit yesterday.  She denies nausea vomiting.  Did have a low-grade fever this morning to 99.9.    -Data reviewed today: I reviewed all new labs and imaging results over the last 24 hours. I personally reviewed   Physical Exam   Temp: 99.6  F (37.6  C) Temp src: Oral BP: (!) 163/71 Pulse: 97   Resp: 24 SpO2: 92 % O2 Device: Nasal cannula Oxygen Delivery: 2 LPM  Vitals:    10/15/23 0925 10/15/23 1646 10/16/23 0644   Weight: 86.2 kg (190 lb) 85.6 kg (188 lb 11.4 oz) 81.7 kg (180 lb 1.9 oz)     Vital Signs with Ranges  Temp:  [98.9  F (37.2  C)-100.3  F (37.9  C)] 99.6  F (37.6  C)  Pulse:  [69-97] 97  Resp:  [18-28] 24  BP:  (124-163)/(58-71) 163/71  FiO2 (%):  [30 %] 30 %  SpO2:  [90 %-96 %] 92 %  I/O last 3 completed shifts:  In: 350 [P.O.:350]  Out: -     Constitutional: Generally deconditioned.  Answers appropriately.  Pleasant.  HEENT: Normocephalic. MMM, No elevation of JVD noted.   Respiratory: Nl WOB, he does have some rhonchi noted at the left mid lung.  No wheezes.  Diminished at both bases.  Cardiovascular: Regular, no murmur  GI: Obese, BS+, NT, ND  Skin/Integumen: WWP, no rash. No edema  Neuro: CNII-XII intact. Moves all extremities. No tremor. A&Ox3.    Medications    - MEDICATION INSTRUCTIONS -        acetaminophen  975 mg Oral TID    amLODIPine  5 mg Oral Daily    aspirin  81 mg Oral Daily    atorvastatin  20 mg Oral QPM    azithromycin  250 mg Intravenous Q24H    ceFEPIme  2 g Intravenous Q12H    clopidogrel  75 mg Oral Daily    guaiFENesin  600 mg Oral BID    hydrALAZINE  50 mg Oral TID    isosorbide dinitrate  20 mg Oral TID    levothyroxine  150 mcg Oral QAM AC    multivitamin, therapeutic  1 tablet Oral Daily    potassium chloride  20 mEq Oral Once    potassium chloride  40 mEq Oral Once    pregabalin  75 mg Oral BID    sertraline  150 mg Oral Daily    torsemide  60 mg Oral QAM    vitamin D3  50 mcg Oral Daily       Data   Recent Labs   Lab 10/18/23  0619 10/17/23  1048 10/16/23  0555 10/15/23  0939   WBC 19.5* 21.4* 20.0* 21.5*   HGB 11.0* 11.3* 10.2* 11.6*   MCV 85 86 87 86    238 205 254    142 146* 143   POTASSIUM 3.0* 3.7 3.7 3.8   CHLORIDE 99 97* 105 99   CO2 30* 30* 30* 30*   BUN 19.5 18.9 18.4 21.7   CR 1.15* 1.17* 1.15* 1.21*   ANIONGAP 13 15 11 14   BARBARA 9.4 9.8 9.3 9.6   * 127* 106* 116*   ALBUMIN  --   --   --  4.4   PROTTOTAL  --   --   --  7.3   BILITOTAL  --   --   --  0.4   ALKPHOS  --   --   --  134*   ALT  --   --   --  16   AST  --   --   --  32       Recent Results (from the past 24 hour(s))   XR Video Swallow with SLP or OT    Narrative    VIDEO SWALLOW WITH SLP OR OT     10/17/2023 9:50 AM     HISTORY: Recurrent pneumonia. Evaluate for aspiration.    COMPARISON: None.    TECHNIQUE: Fluoroscopic assistance was provided to speech pathology  for evaluation of the patient's swallowing mechanism. Small amounts of  thin, pudding, and solid consistencies of barium were administered  during real-time fluoroscopy in the lateral projection. A total of 1  minute of fluoroscopy time was utilized for the exam. A total of 7  fluoroscopic cine clips were obtained.    FINDINGS: Patient was able to swallow the varying consistencies of  barium without difficulty. Mild flash penetration was noted when the  patient swallowed thin barium. No deep laryngeal penetration or tanesha  tracheal aspiration. Note that only the cervical esophagus was  evaluated.       Impression    IMPRESSION:   1. Mild flash penetration was noted when the patient swallowed thin  barium.  2. No evidence for aspiration.    Please see further details in report by speech pathology.     TSONEY ALMONTE MD         SYSTEM ID:  [id number]   XR Foot Left 2 Views    Narrative    FOOT LEFT TWO VIEWS 10/17/2023 12:10 PM     HISTORY: Foot pain, rule out fracture.    COMPARISON: None.       Impression    IMPRESSION: Degenerative changes throughout the midfoot which are  moderate at the navicular cuneiform and second TMT joints. No evidence  of acute fracture. Bulky distal Achilles tendon enthesopathy.  Calcaneal heel spur.     OSIRIS DOE MD         SYSTEM ID:  [id number]

## 2023-10-18 NOTE — PROGRESS NOTES
A BiPAP of  10/5 @ 30% was applied to the pt via the mask for NOC use.  The bridge of the nose looks good and remains intact. Pt is tolerating it well. Will continue to monitor and assess the pt's current respiratory status and needs.    Broderick Berry RT on 10/18/2023 at 3:51 AM

## 2023-10-18 NOTE — PROGRESS NOTES
Speech Language Therapy Discharge Summary    Reason for therapy discharge:    All goals and outcomes met, no further needs identified.    Progress towards therapy goal(s). See goals on Care Plan in [facility] electronic health record for goal details.  Goals met    Therapy recommendation(s):    No further therapy is recommended.    Recommend the pt continue a regular texture diet/thin liquids (Videoswallow was completed 10/17/23). Rec pt sit uprght during and for at least 30 min after PO. Rec consideration of referral for an esophagram if ability to participate improves (current barrier is pain when standing).

## 2023-10-19 NOTE — PLAN OF CARE
Goal Outcome Evaluation:      Plan of Care Reviewed With: patient        Labs/Protocols: K  Vitals: /52 (BP Location: Left arm)   Pulse 76   Temp 98.6  F (37  C) (Oral)   Resp 28   Ht 1.524 m (5')   Wt 81.7 kg (180 lb 1.9 oz)   SpO2 95%   BMI 35.18 kg/m    Cardiac: hypertensive   Respiratory: 3 L NC  Neuro: A/Ox4  GI/: WNL  Skin: bruise R arm   LDAs: L forearm - SL    Diet: Regular   Activity: 2 assist, thiago steady   Pain: generalized pain (arthritic), controlled with tylenol   Plan: off droplet precautions, IV abx, pain management, therapies, electrolyte replacement

## 2023-10-19 NOTE — PLAN OF CARE
To Do:  End of Shift Summary  For vital signs and complete assessments, please see documentation flowsheets.     Pertinent assessments: A/Ox4. VSS on 3L NC. Nonproductive cough. Pt stated unable to get a sputum sample and should be able to when resp comes in morning. Pt not oob this shift, slept well. Attempted assist of 2 with sera steady and belt and pt was unable to stand. Used a bedpan.     Major Shift Events: None.     Treatment Plan: Abx. Encourage incentive spirometry. Symptom management. O2 therapy. Encourage mobility.    Bedside Nurse: Dominique Jackson RN

## 2023-10-19 NOTE — PROGRESS NOTES
A Cpap of +6 @ 30% was applied to the pt via the mask for NOC use.  The bridge of the nose looks good and remains intact. Pt is tolerating it well. Will continue to monitor and assess the pt's current respiratory status and needs.    Broderick Berry RT on 10/19/2023 at 4:03 AM
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
## 2023-10-19 NOTE — PLAN OF CARE
To Do:  End of Shift Summary  For vital signs and complete assessments, please see documentation flowsheets.     Pertinent assessments: A&Ox4. VSS on 3L NC (2baseline). C/o BLE leg pain, scheduled tylenol given. Nonproductive cough. Very weak with movements & unsteady on feet. (Sera steady). IVF SL. Triggered sepsis. Lactic level WDL. K+ replaced w/ AM recheck.     Major Shift Events: Pulmonary consult. MRSA, Influenza, Covid all negative. Respiratory panel PCR in process, Pt. on droplet precaution. Urine sample sent for microbiology.     Treatment Plan: Sputum induction,   Maxipime and Zithromax, encourage incentive spirometry.    Bedside Nurse: Cora Reyes RN

## 2023-10-19 NOTE — PROGRESS NOTES
[facility]    Hospitalist Progress Note      Assessment & Plan   Matthew Bowen is a 78 year old female w/PMH chronic hypoxic respiratory failure due to pulmonary HTN, ALAINA requiring CPAP, chronic diastolic HF, CAD, CKD stage 3, morbid obesity, HTN,  depression who presents from home with family with fever, cough.     #Sepsis secondary to recurrent PNA, unclear etiology.  Possible bronchiectasis: Presents with recurrent PNA, 3rd time in last month or so. Last completed treatment 2 weeks ago with persistent cough. Presents with worsening fatigue, productive cough, weakness and falls.  -On admission, patient febrile to 101.1, white count of 21.  Lactic acid was within normal limits.  She underwent a CT scan that showed a lingular consolidative opacity with air bronchograms with bilateral upper lobe groundglass opacities.  She was started on ceftriaxone and azithromycin.  -2 attempts at getting sputum cultures were done but poor specimens on both.  -Patient is still having persistent leukocytosis.  I added on a procalcitonin on 10/18 which actually showed an increase.  She was having low-grade fevers of 99.9.  She is still feeling generally fatigued with cough.  -I did broaden her from ceftriaxone to cefepime on 10/18.  We will continue azithromycin.  MRSA swab checked and negative.   -COVID, influenza also negative. Respiratory viral panel negative.  Strep and legionella antigens negative.   -Pulmonary consultation requested.  Appreciate input.  Continuing workup for bronchiectasis along with eval for CVID, organizing pneumonia, fungal infx, chronic eosinophilic pneumonia. Holding on bronch at this point.     -Swallow has evaluated the patient and she is okay with a regular diet.  Continue dysphagia precautions.   -We need to get a proper sputum sample.  Attempted multiple times.  Appreciate RT assistance to try to induce a sputum sample.      #Leukocytosis: Suspect related to above.  From what  I can see in care everywhere has generally been 9-12 range in 0881-3108.  Its a neutrophil predominance which does speak for acute inflammatory state like infection as above.  I did add on a smear.  Monitor in setting of treatment of above.      #Generalized weakness. Chronic arthritis. Falls at home. Left Foot pain: Having multifactorial weakness, with diffuse body aches and pains.  Imaging on admission including CT head, cervical spine, CT CAP did not show acute fracture.  She had an x-ray of her foot also showed degenerative changes without fracture.  She tells me she is following with podiatry.  -Physical therapy following.  Continue to work with her while she is inpatient.  -Fall precautions  -Home meds ordered.   -She will need TCU once she is medically ready.      #Chronic hypoxic respiratory failure.  Hx ALAINA, pulm HTN, chronic diastolic HF  -currenlty remains on baseline O2 of 2L despite findings above  -CPAP ordered  -Hold diuretics on 10/19 as appears dry. Giving a bit of IVF back.      #CKD stage 3: Cr 1.21 on admission but appears to be near baseline. Uptrending a bit on 10/19 and appears dry on exam. Holding diuretic and giving a bit of fluid back.      #HTN: resumed home norvasc, hydralazine and isordil with hold parameters. Hold diuretic on 10/19.      #Hx morbid obesity, anemia, CAD, hypothyroidism, mood disorder: resume home ASA, statin, plavix, lyrica, zoloft     #Hypokalemia: Replacement     Dispo: Unclear.  Would need to show some improvement but she is still generally weak, leukocytosis with shortness of breath and cough.  Await pulmonary input.  Likely at least couple more days.   DVT Prophylaxis: SCD's given falls. If continues to be in hospital for prolonged time will start SQH.   Code Status: Full Code      Hermilo Bills MD    Interval History   Unchanged.  Describes diffuse body aches. Very weak and fatigued. Still with cough but couldn't get sputum sample. No clear fevers over last 24 hours.      -Data reviewed today: I reviewed all new labs and imaging results over the last 24 hours. I personally reviewed     Physical Exam   Temp: 98.5  F (36.9  C) Temp src: Axillary BP: 125/65 Pulse: 86   Resp: 28 SpO2: 94 % O2 Device: BiPAP/CPAP Oxygen Delivery: 3 LPM  Vitals:    10/15/23 0925 10/15/23 1646 10/16/23 0644   Weight: 86.2 kg (190 lb) 85.6 kg (188 lb 11.4 oz) 81.7 kg (180 lb 1.9 oz)     Vital Signs with Ranges  Temp:  [98.5  F (36.9  C)-99.7  F (37.6  C)] 98.5  F (36.9  C)  Pulse:  [74-86] 86  Resp:  [22-28] 28  BP: ()/(46-65) 125/65  FiO2 (%):  [30 %] 30 %  SpO2:  [90 %-100 %] 94 %  No intake/output data recorded.    Constitutional: Generally deconditioned.  Answers appropriately.  Pleasant.  HEENT: Normocephalic. MMM, No elevation of JVD noted.   Respiratory: Nl WOB, he does have some rhonchi noted at the left mid lung.  No wheezes.  Diminished at both bases.  Cardiovascular: Regular, no murmur  GI: Obese, BS+, NT, ND  Skin/Integumen: WWP, no rash. No edema  Neuro: CNII-XII intact. Moves all extremities. No tremor. A&Ox3.    Medications    - MEDICATION INSTRUCTIONS -        acetaminophen  975 mg Oral TID    amLODIPine  5 mg Oral Daily    aspirin  81 mg Oral Daily    atorvastatin  20 mg Oral QPM    azithromycin  250 mg Intravenous Q24H    ceFEPIme  2 g Intravenous Q12H    clopidogrel  75 mg Oral Daily    guaiFENesin  600 mg Oral BID    hydrALAZINE  50 mg Oral TID    isosorbide dinitrate  20 mg Oral TID    levothyroxine  150 mcg Oral QAM AC    multivitamin, therapeutic  1 tablet Oral Daily    pregabalin  75 mg Oral BID    sertraline  150 mg Oral Daily    sodium chloride 0.9%  500 mL Intravenous Once    [Held by provider] torsemide  60 mg Oral QAM    vitamin D3  50 mcg Oral Daily       Data   Recent Labs   Lab 10/19/23  0556 10/18/23  1540 10/18/23  0619 10/17/23  1048 10/16/23  0555 10/15/23  0939   WBC 19.2*  --  19.5*  19.5* 21.4*   < > 21.5*   HGB 10.2*  --  11.0* 11.3*   < > 11.6*   MCV 83  --   85 86   < > 86     --  242 238   < > 254     --  142 142   < > 143   POTASSIUM 3.7 3.6 3.0* 3.7   < > 3.8   CHLORIDE 100  --  99 97*   < > 99   CO2 30*  --  30* 30*   < > 30*   BUN 26.2*  --  19.5 18.9   < > 21.7   CR 1.29*  --  1.15* 1.17*   < > 1.21*   ANIONGAP 12  --  13 15   < > 14   BARBARA 9.7  --  9.4 9.8   < > 9.6   *  --  123* 127*   < > 116*   ALBUMIN  --   --   --   --   --  4.4   PROTTOTAL  --   --   --   --   --  7.3   BILITOTAL  --   --   --   --   --  0.4   ALKPHOS  --   --   --   --   --  134*   ALT  --   --   --   --   --  16   AST  --   --   --   --   --  32    < > = values in this interval not displayed.       No results found for this or any previous visit (from the past 24 hour(s)).

## 2023-10-19 NOTE — PROGRESS NOTES
Pt given 3% Sodium Chloride neb for sputum induction. Pt unable to obtain sputum at this time. Specimen cup left at bedside.     Broderick Berry RT on 10/19/2023 at 6:28 AM

## 2023-10-20 NOTE — PROGRESS NOTES
A Cpap of +6 @ 30% was applied to the pt via the mask for NOC use.  The bridge of the nose looks good and remains intact. Pt is tolerating it well. Will continue to monitor and assess the pt's current respiratory status and needs.    Yandy Ventura, RT

## 2023-10-20 NOTE — PROGRESS NOTES
Care Management Follow Up    Length of Stay (days): 5    Expected Discharge Date: 10/23/2023     Concerns to be Addressed: all concerns addressed in this encounter       Anticipated Discharge Disposition: Home Care, Home     Patient/family educated on Medicare website which has current facility and service quality ratings:  yes  Education Provided on the Discharge Plan:  yes  Patient/Family in Agreement with the Plan: yes      Additional Information:  CM following for discharge planning. CM met with patient and  at the bedside and let them know that PT recommending TCU stay at discharge, patient and  are agreeable. They would like to discuss facility options with their daughter this evening and give us their top three choices tomorrow. CM provided SNF packet and medicare.gov facility list. Will continue to follow for discharge planning.     Jemima Cohen RN, BSN  Inpatient Care Coordination  [facility]  [phone]

## 2023-10-20 NOTE — CONSULTS
[facility]    Infectious Disease Consultation     Date of Admission:  10/15/2023  Date of Consult (When I saw the patient): 10/20/23    Assessment & Plan   Matthew Bowen is a 78 year old who was admitted on 10/15/2023.     Impression: 1 78-year-old female, some chronic underlying lung disease, on oxygen, bronchiectasis probable, some history of pneumonia but now 6 weeks or so of worsening respiratory symptoms, 2 prior courses of antibiotics, now admitted with infiltrates, no major sepsis but elevated procalcitonin and white count implying bacterial, not really improving on antibiotics concern for more chronic type pathogen in this patient with underlying lung disease by this history    2 chronic lung disease on oxygen some underlying bronchiectasis, some prior pneumonias  3 chronic kidney disease    REC 1 temp to get a sputum for AFB not really concerned about tuberculosis but more atypical organisms, get QuantiFERON TB Gold test, [place] immigrant no prior TB and was previously negative has traveled back subsequently  2 pulmonary following, possible further work-up if not improving felt to possibly be C VID  Call if issues this weekend        Axel rCuz MD    Reason for Consult   Reason for consult: I was asked to evaluate this patient for nonresolving pneumonia.    Primary Care Physician   [facility]    Chief Complaint   Cough and shortness of breath    History is obtained from the patient and medical records    History of Present Illness   Matthew Bowen is a 78 year old female who presents with history of some chronic underlying lung disease including some bronchiectasis.  Several pneumonia episodes in the past, then over the last 6 weeks or so increased shortness of breath and cough, with that has significant infiltrates.  2 prior courses of antibiotics including substantial atypical coverage without improvement, now hospitalized some hypoxia, has infiltrates, pulmonary  "seen and thinks may be noninfectious type lung disease.  Multiple studies pending, getting cefepime but not much better.    History of pneumonia, no recent exposures or travel history of note, is from [place], immigrated age 31 was PPD negative but has been back since that time no known TB or exposures but no recent testing    Past Medical History   I have reviewed this patient's medical history and updated it with pertinent information if needed.   Past Medical History:   Diagnosis Date    Antiplatelet or antithrombotic long-term use     Arthritis     Congestive heart failure (H)     Dyspnea on exertion     Heart attack (H)     Heart murmur     Hypertension     Irregular heart beat     Oxygen dependent     2L continuous at home.    Pulmonary hypertension (H)     Sleep apnea     Bipap    Stented coronary artery     x 1    Thyroid disease     Walking troubles        Past Surgical History   I have reviewed this patient's surgical history and updated it with pertinent information if needed.  Past Surgical History:   Procedure Laterality Date    APPENDECTOMY      COLONOSCOPY      COLONOSCOPY N/A 2/28/2022    Procedure: COLONOSCOPY;  Surgeon: Kolby Dunn MD;  Location: [facility] OR    CV CORONARY ANGIOGRAM N/A 11/21/2019    Procedure: Coronary Angiogram;  Surgeon: Tay Andre MD;  Location:  HEART CARDIAC CATH LAB    CV LEFT HEART CATH N/A 11/21/2019    Procedure: Left Heart Cath;  Surgeon: Tay Andre MD;  Location: [facility]ARD[facility] C[facility]    CV PCI STENT DRUG ELUTING N/A 11/21/2019    Procedure: PCI Stent Drug Eluting;  Surgeon: Tay Andre MD;  Location: [facility] HEART CARDIAC CATH LAB    GYN SURGERY      Hyst.    ORTHOPEDIC SURGERY      B\" TKs       Prior to Admission Medications   Prior to Admission Medications   Prescriptions Last Dose Informant Patient Reported? Taking?   Acetaminophen (TYLENOL PO) 10/14/2023 at pm  Yes Yes   Sig: Take 1,000 mg by mouth 3 times daily   Multiple " Vitamins-Minerals (MULTIVITAMIN ADULTS PO) 10/14/2023 at am  Yes Yes   Sig: Take 1 tablet by mouth daily   amLODIPine (NORVASC) 5 MG tablet 10/14/2023 at am  Yes Yes   Sig: Take 5 mg by mouth daily    aspirin 81 MG EC tablet 10/14/2023 at am  Yes Yes   Sig: Take 81 mg by mouth daily   atorvastatin (LIPITOR) 20 MG tablet 10/14/2023 at pm  Yes Yes   Sig: Take 20 mg by mouth every evening    clopidogrel (PLAVIX) 75 MG tablet 10/14/2023 at am  No Yes   Sig: Take 1 tablet (75 mg) by mouth daily   hydrALAZINE (APRESOLINE) 50 MG tablet 10/14/2023 at pm  Yes Yes   Sig: Take 50 mg by mouth 3 times daily    hypromellose (ARTIFICIAL TEARS) 0.5 % SOLN ophthalmic solution  at PRN  Yes No   Si drop 4 times daily as needed for dry eyes   isosorbide dinitrate (ISORDIL) 20 MG tablet 10/14/2023 at pm  Yes Yes   Sig: Take 20 mg by mouth 3 times daily    levothyroxine (SYNTHROID/LEVOTHROID) 150 MCG tablet 10/14/2023 at am  Yes Yes   Sig: Take 150 mcg by mouth daily   potassium chloride ER (KLOR-CON M) 20 MEQ CR tablet 10/14/2023 at am  Yes Yes   Sig: Take 40 mEq by mouth daily    pregabalin (LYRICA) 75 MG capsule 10/14/2023 at pm  Yes Yes   Sig: Take 75 mg by mouth 2 times daily   sertraline (ZOLOFT) 100 MG tablet 10/14/2023 at am  Yes Yes   Sig: Take 150 mg by mouth daily   torsemide (DEMADEX) 20 MG tablet 10/14/2023 at am  Yes Yes   Sig: Take 60 mg by mouth every morning    vitamin D3 (CHOLECALCIFEROL) 50 mcg (2000 units) tablet 10/14/2023 at am  Yes Yes   Sig: Take 2,000 Units by mouth daily       Facility-Administered Medications: None     Allergies   No Known Allergies    Immunization History   Immunization History   Administered Date(s) Administered    COVID-19 12+ () (Pfizer) 10/12/2023    COVID-19 Bivalent 12+ (Pfizer) 2022, 2023    COVID-19 MONOVALENT 12+ (Pfizer) 2021, 2021, 10/15/2021    COVID-19 Monovalent 18+ (Moderna) 2022       Social History   I have reviewed this patient's  "social history and updated it with pertinent information if needed. Matthew Bowen  reports that she has never smoked. She has never used smokeless tobacco.  [place] immigrant, no other recent travels or exposure    Family History   I have reviewed this patient's family history and updated it with pertinent information if needed.   No family history on file.    Review of Systems   The 10 point Review of Systems is negative    Physical Exam   Temp: 99.6  F (37.6  C) Temp src: Oral BP: 132/56 Pulse: 90   Resp: 30 SpO2: 90 % (popped back up to 90) O2 Device: Nasal cannula Oxygen Delivery: 4 LPM  Vital Signs with Ranges  Temp:  [98.4  F (36.9  C)-99.6  F (37.6  C)] 99.6  F (37.6  C)  Pulse:  [73-90] 90  Resp:  [30] 30  BP: (113-155)/(52-65) 132/56  SpO2:  [72 %-96 %] 90 %  180 lbs 1.85 oz  Body mass index is 35.18 kg/m .    GENERAL APPEARANCE:  awake  EYES: Eyes grossly normal to inspection  NECK: no adenopathy  RESP: lungs congested bilateral  CV: regular rates and rhythm  LYMPHATICS: normal ant/post cervical and supraclavicular nodes  ABDOMEN: soft, nontender  MS: extremities normal  SKIN: no suspicious lesions or rashes        Data   All laboratory and imaging data in the past 24 hours reviewed  No results for input(s): \"CULT\" in the last 168 hours.  Recent Labs   Lab Test 02/12/19  0010   CULT Light growth  Normal deshaun            All cultures:  Recent Labs   Lab 10/19/23  0929 10/18/23  0849 10/18/23  0844 10/17/23  0844 10/15/23  2057 10/15/23  1135 10/15/23  0941   CULTURE Culture in progress No growth after 2 days No growth after 2 days >10 Squamous epithelial cells/low power field indicates oral contamination. Please recollect. >10 Squamous epithelial cells/low power field indicates oral contamination. Please recollect. No growth after 4 days No Growth      Blood culture:  Results for orders placed or performed during the hospital encounter of 10/15/23   Blood Culture Hand, Left    Specimen: Hand, Left; Blood "   Result Value Ref Range    Culture No growth after 2 days    Blood Culture Arm, Left    Specimen: Arm, Left; Blood   Result Value Ref Range    Culture No growth after 2 days    Blood Culture Arm, Left    Specimen: Arm, Left; Blood   Result Value Ref Range    Culture No growth after 4 days    Blood Culture Peripheral Blood    Specimen: Peripheral Blood   Result Value Ref Range    Culture No Growth       Urine culture:  No results found for this or any previous visit.

## 2023-10-20 NOTE — PLAN OF CARE
To Do:  End of Shift Summary  For vital signs and complete assessments, please see documentation flowsheets.     Pertinent assessments: A&Ox4. VSS on 4L NC (2baseline). C/o back & BLE leg pain, scheduled tylenol given. productive cough. Very weak with movements & unsteady on feet. (Sera steady w/ 3 assist). IVF SL. K+ replaced w/ AM recheck.      Major Shift Events: Lasix IV admin. Chest X-ray complete. On Hep subcutaneous. ID and pulmonary consult.      Treatment Plan: Abx. Symptom management. O2 therapy. Encourage mobility.     Bedside Nurse: Cora Reyes RN

## 2023-10-20 NOTE — PLAN OF CARE
To Do:  End of Shift Summary  For vital signs and complete assessments, please see documentation flowsheets.     Pertinent assessments: A/Ox4. VSS on 3L NC. Nonproductive cough. Ambulated from chair to the bathroom, then to bed assist of two with sera steady\belt. Significantly impaired mobility, c/o back pain and BLE pain. Denied PRN pain medication and is using a heat pack.     Major Shift Events: None.     Treatment Plan: Abx. Symptom management. O2 therapy. Encourage mobility.    Bedside Nurse: Dominique Jackson RN

## 2023-10-20 NOTE — PROGRESS NOTES
[facility]    Hospitalist Progress Note      Assessment & Plan   Matthew Bowen is a 78 year old female w/PMH chronic hypoxic respiratory failure due to pulmonary HTN, ALAINA requiring CPAP, chronic diastolic HF, CAD, CKD stage 3, morbid obesity, HTN,  depression who presents from home with family with fever, cough.    Plan for today:  -ID consult.  Continue cefepime; finished azithromycin.  We will obtain a chest x-ray given slightly increased oxygen needs and give a 60 mg dose of IV Lasix empirically.  -Follow-up with pulmonary medicine.  -Encourage patient to get up to the chair.  Discussed with nursing.     #Sepsis and acute on chronic hypoxemic respiratory failure secondary to recurrent PNA, unclear etiology.  Bronchiectasis: Presents with recurrent PNA, 3rd time in last month or so. Last completed treatment 2 weeks ago with persistent cough. Presents with worsening fatigue, productive cough, weakness and falls.  -On admission, patient febrile to 101.1, white count of 21.  Lactic acid was within normal limits.  She underwent a CT scan that showed a lingular consolidative opacity with air bronchograms with bilateral upper lobe groundglass opacities.  She was started on ceftriaxone and azithromycin.  -2 attempts at getting sputum cultures were done but poor specimens on both.  -Patient is still having persistent leukocytosis.  I added on a procalcitonin on 10/18 which actually showed an increase.  She was having low-grade fevers of 99.9.  She is still feeling generally fatigued with cough.  -I did broaden her from ceftriaxone to cefepime on 10/18.  Finished azithromycin.  MRSA swab checked and negative.   -COVID, influenza also negative. Respiratory viral panel negative.  Strep and legionella antigens negative.   -Pulmonary consultation requested.  Appreciate input.  Continuing workup for bronchiectasis along with eval for CVID, organizing pneumonia, fungal infx, chronic eosinophilic  pneumonia. Holding on bronch at this point.     -Patient's MARLI was very weakly positive at 1:40.  Her rheumatoid factor was very weakly positive.  Her other autoimmune markers are negative thus far.  -We do have an adequate sputum sample at this point that does show 1+ gram-positive cocci and yeast.  Discussed with infectious disease.  Suspect that yeast may be Candida and not pathogenic.  We will need to follow final culture results.  -In terms of her antibodies her IgG mildly low but unclear what to make of this.  We will discuss with pulmonary medicine.  -We will repeat a chest x-ray given slightly increased oxygen needs and subjective shortness of breath.  Empirically give 60 mg IV Lasix.  -Swallow has evaluated the patient and she is okay with a regular diet.  Continue dysphagia precautions.   -We need to get a proper sputum sample.  Attempted multiple times.  Appreciate RT assistance to try to induce a sputum sample.       #Leukocytosis: Suspect related to above.  From what I can see in care everywhere has generally been 9-12 range in [id number].  Its a neutrophil predominance which does speak for acute inflammatory state like infection as above.  Peripheral smear showed nonspecific elevation of white count which is seen in any inflammatory condition including infection, stress, autoimmune or inflammatory conditions.     #Generalized weakness. Chronic arthritis. Falls at home. Left Foot pain: Having multifactorial weakness, with diffuse body aches and pains.  Imaging on admission including CT head, cervical spine, CT CAP did not show acute fracture.  She had an x-ray of her foot also showed degenerative changes without fracture.  She tells me she is following with podiatry.  -Physical therapy following.  Continue to work with her while she is inpatient.  -Fall precautions  -Home meds ordered.   -She will need TCU once she is medically ready.      #Hx ALAINA, pulm HTN, chronic diastolic HF:   -CPAP ordered  -IV  diuresis as above.  Continue other cardiac medications with hold parameters     #CKD stage 3: Cr 1.21 on admission but appears to be near baseline.      #HTN: resumed home norvasc, hydralazine and isordil with hold parameters.  IV diuretic given on 10/20.     #Hx morbid obesity, anemia, CAD, hypothyroidism, mood disorder: resume home ASA, statin, plavix, lyrica, zoloft     #Hypokalemia: Replacement     Dispo: Unclear.  Would need to show some improvement but she is still generally weak, leukocytosis with shortness of breath and cough.  Await pulmonary input.  Likely at least couple more days.   DVT Prophylaxis: [facility] as not moving much  Code Status: Full Code      Hermilo Bills MD    Interval History   Patient continues to feel short of breath.  Oxygen needs up a bit to 4 to 5 L.  She notes that she still has diffuse pains and aches.  Specifically mentions pain of her right shoulder.  She feels like the timing of her Tylenol is off.  Tmax 99.6 today.    -Data reviewed today: I reviewed all new labs and imaging results over the last 24 hours. I personally reviewed   Physical Exam   Temp: 99.6  F (37.6  C) Temp src: Oral BP: 132/56 Pulse: 90   Resp: 30 SpO2: 90 % (popped back up to 90) O2 Device: Nasal cannula Oxygen Delivery: 4 LPM  Vitals:    10/15/23 0925 10/15/23 1646 10/16/23 0644   Weight: 86.2 kg (190 lb) 85.6 kg (188 lb 11.4 oz) 81.7 kg (180 lb 1.9 oz)     Vital Signs with Ranges  Temp:  [98.4  F (36.9  C)-99.6  F (37.6  C)] 99.6  F (37.6  C)  Pulse:  [73-90] 90  Resp:  [30] 30  BP: (113-155)/(52-65) 132/56  SpO2:  [72 %-96 %] 90 %  No intake/output data recorded.    Constitutional: + Tachypnea.  She is conversational but appears generally fatigued  HEENT: Normocephalic. MMM, No elevation of JVD noted.   Respiratory: N+ tachypnea.  She has inspiratory crackles to the mid lung bilaterally.  No wheezes.  Cardiovascular: Regular, no murmur  GI: Obese, BS+, NT, ND  Skin/Integumen: WWP, no rash.  Mild bilateral  edema.  Neuro: CNII-XII intact. Moves all extremities but generally fatigued. No tremor. A&Ox3.    Medications    - MEDICATION INSTRUCTIONS -        acetaminophen  975 mg Oral TID    amLODIPine  5 mg Oral Daily    aspirin  81 mg Oral Daily    atorvastatin  20 mg Oral QPM    ceFEPIme  2 g Intravenous Q12H    clopidogrel  75 mg Oral Daily    guaiFENesin  600 mg Oral BID    heparin ANTICOAGULANT  5,000 Units Subcutaneous Q12H    hydrALAZINE  50 mg Oral TID    isosorbide dinitrate  20 mg Oral TID    levothyroxine  150 mcg Oral QAM AC    multivitamin, therapeutic  1 tablet Oral Daily    pregabalin  75 mg Oral BID    sertraline  150 mg Oral Daily    [Held by provider] torsemide  60 mg Oral QAM    vitamin D3  50 mcg Oral Daily       Data   Recent Labs   Lab 10/20/23  0643 10/19/23  0556 10/18/23  1540 10/18/23  0619 10/16/23  0555 10/15/23  0939   WBC 21.3* 19.7*  19.2*  --  19.5*  19.5*   < > 21.5*   HGB 9.9* 10.2*  10.2*  --  11.0*   < > 11.6*   MCV 81 86  83  --  85   < > 86    264  268  --  242   < > 254    142  --  142   < > 143   POTASSIUM 3.0* 3.7 3.6 3.0*   < > 3.8   CHLORIDE 99 100  --  99   < > 99   CO2 28 30*  --  30*   < > 30*   BUN 31.2* 26.2*  --  19.5   < > 21.7   CR 1.20* 1.29*  --  1.15*   < > 1.21*   ANIONGAP 13 12  --  13   < > 14   BARBARA 9.5 9.7  --  9.4   < > 9.6   * 108*  --  123*   < > 116*   ALBUMIN  --   --   --   --   --  4.4   PROTTOTAL  --   --   --   --   --  7.3   BILITOTAL  --   --   --   --   --  0.4   ALKPHOS  --   --   --   --   --  134*   ALT  --   --   --   --   --  16   AST  --   --   --   --   --  32    < > = values in this interval not displayed.       No results found for this or any previous visit (from the past 24 hour(s)).
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
## 2023-10-20 NOTE — CONSULTS
"CLINICAL NUTRITION SERVICES  -  ASSESSMENT NOTE      Recommendations:   - Continue diet as ordered, food from home as needed.  - Vanilla Ensure 1x/day in the AM (w/ breakfast) per pt request.  - Ok to continue daily MVI/M from a nutrition standpoint.       MALNUTRITION:  % Weight Loss: Weight loss does not meet criteria and suspect inaccurate admit wts vs use of bed scale  % Intake:  Decreased intake does not meet criteria for malnutrition   Subcutaneous Fat Loss: None observed   Muscle Loss: None observed   Fluid Retention: None documented    Malnutrition Diagnosis: Patient does not meet two of the above criteria necessary for diagnosing malnutrition          REASON FOR ASSESSMENT  Matthew Bowen is a 78 year old female seen by Registered Dietitian for LOS.    PMH of: CHF, CAD, CKD, chronic O2 use, Bell's Palsy w/ L-sided facial weakness.    Admit 2/2: Recurrent PNA, sepsis, fall w/ rib fractures.    NUTRITION HISTORY  - Information obtained from patient and chart.  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  - Diet at home: Regular w/ consistent meals daily.  - Barriers to PO intakes: Denies changes to PO intakes PTA including no low appetite, skipping of meals, or smaller portions.  - Use of oral supplements: Takes Ensure prn.  - Allergies: NKFA.      CURRENT NUTRITION ORDERS  Diet Order:     Regular    Current Intake/Tolerance:  SLP following and completed VFSS on 10/17 - ok'ed diet as above.  75% intakes per flowsheet review (only 2 documented PO intakes since admission).  Ordering meals BID-TID and patient also tells me her  has been bringing in food daily + Ensure since she is not always fond of the food via room service.  She requested being sent Ensure once daily in the AM.  Does tell me she is feeling weak and is requiring 4 L NC.      ANTHROPOMETRICS  Height: 5' 0\"  Weight: 180 lbs 1.85 oz  Body mass index is 35.18 kg/m .  Weight Status:  Obesity Grade II BMI 35-39.9  Weight History:  Wt Readings from Last 10 " Encounters:   10/16/23 81.7 kg (180 lb 1.9 oz)   02/28/22 94.3 kg (208 lb)   11/22/19 101.6 kg (223 lb 14.4 oz)   02/14/19 104.9 kg (231 lb 3.2 oz)   11/02/16 97 kg (213 lb 12.8 oz)   10/26/16 96.4 kg (212 lb 9.6 oz)     - Wt of 190# from 10/05/2023.  - Wt of 192# from 8/18/2023.  - Wt of 195# from 3/10/2023.  - No current documentation of edema and very different bed scale wts over a period of a day (188# vs 180#).   - Patient reports her weight has been in the high 180's or low 190's and denies wt loss PTA.    LABS: Reviewed and noted K low this AM.     MEDICATIONS: Reviewed.    GI: Stooling patterns noted w/ recorded BMs 10/15-10/17 and 10/19.      SKIN: No current documentation of PI.       ASSESSED NUTRITION NEEDS PER APPROVED PRACTICE GUIDELINES:    Dosing Weight 86 kg   Estimated Energy Needs: 20-25 Kcal/Kg  Justification: maintenance  Estimated Protein Needs: 1-1.2 g pro/Kg  Justification: preservation of lean body mass  Estimated Fluid Needs: per MD      NUTRITION DIAGNOSIS:  Predicted inadequate nutrient intake (energy/protein) related to potential for decline in PO intakes pending LOS and appetite + respiratory needs.    NUTRITION INTERVENTIONS  Recommendations / Nutrition Prescription  See above.    Implementation  Nutrition education: Provided education on above.    Medical Food Supplement: As above.     Collaboration and Referral of Nutrition care: Discussed POC with team during rounds.     Nutrition goals:  Patient to consume at least 75% of meals or supplements TID while acutely admitted.      MONITORING AND EVALUATION:  Progress towards goals will be monitored and evaluated per protocol and Practice Guidelines          Hyacinth Yap RDN, LD  Clinical Dietitian  3rd floor/ICU: [phone]  All other floors: [phone]  Weekend/holiday: [phone]  Office: [phone]

## 2023-10-21 NOTE — PLAN OF CARE
To Do:  End of Shift Summary  For vital signs and complete assessments, please see documentation flowsheets.     Pertinent assessments: A&Ox4. VSS on 45 L of HFNC (2 baseline). On continuous pulse Ox. Pt. complains of all joints being tender, scheduled tylenol given. Productive cough. Very weak with movements & unsteady on feet. (Sera steady w/ 3 assist). IVF SL. Pt. to be NPO. Pt. oxygenations needs not improving with multiple interventions. MD [place]. [place] [facility] [place]. Pt's stable upon departure with RT and flyer RN accompanying with transport.      Major Shift Events: Chest X-ray, Echo, CT for PE study complete. Hypertonic nebs for airway clearance.   Treatment Plan: Abx. Symptom management. O2 therapy. Encourage mobility.    Bedside Nurse: Cora Reyes RN

## 2023-10-21 NOTE — PROGRESS NOTES
[facility]  Respiratory Care Note    Pt was placed back on the BiPAP 14/7 @ 50% for a trip to CT. Upon returning to her room he was placed back on the HFNC, but settings were increased to 50  LPM @ 70%, after a brief period she wasn't able to maintain her sats and with an increase in WOB she was placed back on the BiPAP with the O2 increased to 100%. She is currently tolerating it well. Will continue to monitor and assess the pt's current respiratory status and needs.     Vital signs:  Temp: (!) 100.6  F (38.1  C) Temp src: Axillary BP: (!) 169/72 Pulse: 97   Resp: (!) 35 SpO2: 97 % O2 Device: High Flow Nasal Cannula (HFNC) Oxygen Delivery: (S) 50 LPM Height: 152.4 cm (5') Weight: 85.4 kg (188 lb 4.4 oz)  Estimated body mass index is 36.77 kg/m  as calculated from the following:    Height as of this encounter: 1.524 m (5').    Weight as of this encounter: 85.4 kg (188 lb 4.4 oz).      Past Medical History:   Diagnosis Date    Antiplatelet or antithrombotic long-term use     Arthritis     Congestive heart failure (H)     Dyspnea on exertion     Heart attack (H)     Heart murmur     Hypertension     Irregular heart beat     Oxygen dependent     2L continuous at home.    Pulmonary hypertension (H)     Sleep apnea     Bipap    Stented coronary artery     x 1    Thyroid disease     Walking troubles        Past Surgical History:   Procedure Laterality Date    APPENDECTOMY      COLONOSCOPY      COLONOSCOPY N/A 2/28/2022    Procedure: COLONOSCOPY;  Surgeon: Kolby Dunn MD;  Location: [facility] OR    CV CORONARY ANGIOGRAM N/A 11/21/2019    Procedure: Coronary Angiogram;  Surgeon: Tay Andre MD;  Location: [facility] HEART CARDIAC CATH LAB    CV LEFT HEART CATH N/A 11/21/2019    Procedure: Left Heart Cath;  Surgeon: Tay Andre MD;  Location: [facility] HEART CARDIAC CATH LAB    CV PCI STENT DRUG ELUTING N/A 11/21/2019    Procedure: PCI Stent Drug Eluting;  Surgeon: Tay Andre  "MD;  Location:  HEART CARDIAC CATH LAB    GYN SURGERY      Hyst.    ORTHOPEDIC SURGERY      B\" TKs       No family history on file.    Social History     Tobacco Use    Smoking status: Never    Smokeless tobacco: Never   Substance Use Topics    Alcohol use: Not on file     Comment: 2x/year     Ari GIFFORD  [facility]  10/21/2023    "

## 2023-10-21 NOTE — PROGRESS NOTES
A CPAP of  +6 @ 45% was applied to the pt via the mask for NOC use. The bridge of the nose looks good and remains intact. Pt is tolerating it well. Will continue to monitor and assess the pt's current respiratory status and needs.      Gregory Blanco, RT

## 2023-10-21 NOTE — PLAN OF CARE
[facility]  Q2VS  Transferred from 5th floor  Alert and oriented x4  NPO  Coughing up thick sputum  Nasal swab for Influenza/Sars/Covid/RSV - Negative  Pt on HFNC @ 40L, 60%  LS diminished, Right side with crackles, left is diminished /absent.  O2 Sat 86- 92  No skin issues observed.         BP (!) 150/71 (BP Location: Left arm, Patient Position: Semi-Gonzalez's)   Pulse 91   Temp 98.3  F (36.8  C) (Oral)   Resp 24   Ht 1.524 m (5')   Wt 85.4 kg (188 lb 4.4 oz)   SpO2 91%   BMI 36.77 kg/m

## 2023-10-21 NOTE — PROGRESS NOTES
Patient O2 need increased overnight on CPAP at 55% and this morning on oxymask at 10 to 15 LPM with sat in 80's. Lots of oral secretions noted. HFNC was started with settings of 45 LPM and 80% FiO2. Pt given Duoneb for SOB. BS coarse diminished. Will continue to monitor and assess pt's respiratory status and needs.    Gregory Blanco, RT

## 2023-10-21 NOTE — PLAN OF CARE
For vital signs and complete assessments, please see documentation flowsheets.    [id number]   Pertinent assessments: Pt A&Ox4. VSS. Overnight CPAP @ 55%. Denies nausea. C/O pain. Given scheduled pain med. PIV saline locked. Daughter stayed at bedside overnight.    Major Shift Events: SpO2 dropped to low 80's while on 10-15 L oxymask. Placed on HFNC.    Treatment Plan: Pain management. Symptom management. O2 supp. Monitor mentation    Bedside Nurse: Torres Benaivdes RN

## 2023-10-21 NOTE — PLAN OF CARE
To Do:  End of Shift Summary  For vital signs and complete assessments, please see documentation flowsheets.     Pertinent assessments: Assumed care [id number]. AxO x4. VSS except BP of 112/46 so held scheduled hydralazine. Placed continuous pulse ox for low O2 of 84-88% on 4.5L (2L baseline). C/O L wrist and right ankle pain, given scheduled tylenol, was reported effective. Assist x2 with thiago steady and gait belt. PIV is SL.     Major Shift Events Confusion and disorientation.     Treatment Plan: ID, respiratory therapy, Pulmonology, will discharge to TCU  Bedside Nurse: Alyce Zendejas RN       Goal Outcome Evaluation:      Plan of Care Reviewed With: patient, family    Overall Patient Progress: decliningOverall Patient Progress: declining

## 2023-10-21 NOTE — PROGRESS NOTES
"[facility]  Respiratory Care Note    [facility] RCAT     Date: 10/21/2023  Admission Dx:Fever, Cough  Pulmonary History: Chronic hypoxic respiratory failure due to pulmonary HTN,   Home Nebulizer/MDI Use: Albuterol MDI Q4 prn  Home Oxygen: None  Acuity Level (RCAT flow sheet): 3  Aerosol Therapy initiated: Duoneb QID, Sodium Chloride Q6, Albuterol MDI Q4 prn  Pulmonary Hygiene initiated: Cough and Deep breathing techniquesTID  Volume Expansion initiated: IS TID  Current Oxygen Requirements: HFNC 30 LPM @ 55%  Current SpO2: 90%  Re-evaluation date: 10/24/2023  Patient Education: Education was performed with the patient in regards to indications/benefits and possible side effects of bronchodilators. Will continue to do education with patient.       See \"RT Assessments\" flow sheet for patient assessment scoring and Acuity Level Details.    Vital signs:  Temp: 98.3  F (36.8  C) Temp src: Oral BP: 119/57 Pulse: 85   Resp: 20 SpO2: 90 % O2 Device: High Flow Nasal Cannula (HFNC) Oxygen Delivery: 30 LPM Height: 152.4 cm (5') Weight: 85.4 kg (188 lb 4.4 oz)  Estimated body mass index is 36.77 kg/m  as calculated from the following:    Height as of this encounter: 1.524 m (5').    Weight as of this encounter: 85.4 kg (188 lb 4.4 oz).      Past Medical History:   Diagnosis Date    Antiplatelet or antithrombotic long-term use     Arthritis     Congestive heart failure (H)     Dyspnea on exertion     Heart attack (H)     Heart murmur     Hypertension     Irregular heart beat     Oxygen dependent     2L continuous at home.    Pulmonary hypertension (H)     Sleep apnea     Bipap    Stented coronary artery     x 1    Thyroid disease     Walking troubles        Past Surgical History:   Procedure Laterality Date    APPENDECTOMY      COLONOSCOPY      COLONOSCOPY N/A 2/28/2022    Procedure: COLONOSCOPY;  Surgeon: Kolby Dunn MD;  Location: [facility] OR    CV CORONARY ANGIOGRAM N/A 11/21/2019    Procedure: " "Coronary Angiogram;  Surgeon: Tay Andre MD;  Location:  HEART CARDIAC CATH LAB    CV LEFT HEART CATH N/A 11/21/2019    Procedure: Left Heart Cath;  Surgeon: Tay Andre MD;  Location:  HEART CARDIAC CATH LAB    CV PCI STENT DRUG ELUTING N/A 11/21/2019    Procedure: PCI Stent Drug Eluting;  Surgeon: Tay Andre MD;  Location:  HEART CARDIAC CATH LAB    GYN SURGERY      Hyst.    ORTHOPEDIC SURGERY      B\" TKs       No family history on file.    Social History     Tobacco Use    Smoking status: Never    Smokeless tobacco: Never   Substance Use Topics    Alcohol use: Not on file     Comment: 2x/year     Prior to Admission Medications   Prescriptions Last Dose Informant Patient Reported? Taking?   Acetaminophen (TYLENOL PO)     Yes No   Sig: Take 500 mg by mouth every 4 hours as needed    EYE ALLERGY ITCH RELIEF 0.2 % ophthalmic solution     Yes No   Sig: INSTILL 1 DROP IN BOTH EYES EVERY DAY   FERROUS FUMARATE PO     Yes No   Sig: Take by mouth daily 9 mg iron-400 mcg tablet   Multiple Vitamins-Minerals (MULTIVITAMIN ADULTS PO)     Yes No   Sig: Take 1 tablet by mouth daily   albuterol (PROAIR HFA/PROVENTIL HFA/VENTOLIN HFA) 108 (90 Base) MCG/ACT inhaler     Yes No   Sig: Inhale 2 puffs into the lungs every 4 hours as needed for shortness of breath / dyspnea or wheezing   amLODIPine (NORVASC) 5 MG tablet     Yes No   Sig: Take 5 mg by mouth daily    aspirin 81 MG EC tablet     Yes No   Sig: Take 81 mg by mouth daily   atorvastatin (LIPITOR) 20 MG tablet     Yes No   Sig: Take 20 mg by mouth every evening    cetirizine (ZYRTEC) 10 MG tablet     Yes No   Sig: Take 10 mg by mouth daily as needed    clopidogrel (PLAVIX) 75 MG tablet     No No   Sig: Take 1 tablet (75 mg) by mouth daily   fluticasone (FLONASE) 50 MCG/ACT nasal spray     Yes No   Sig: Spray 2 sprays into both nostrils 2 times daily   hydrALAZINE (APRESOLINE) 50 MG tablet     Yes No   Sig: Take 50 mg by mouth 3 times daily  "   hypromellose (ARTIFICIAL TEARS) 0.5 % SOLN ophthalmic solution     Yes No   Si drop 4 times daily as needed for dry eyes   isosorbide dinitrate (ISORDIL) 20 MG tablet     Yes No   Sig: Take 20 mg by mouth 3 times daily    levothyroxine (SYNTHROID/LEVOTHROID) 137 MCG tablet     Yes No   Sig: Take 137 mcg by mouth daily    losartan (COZAAR) 100 MG tablet     Yes No   Sig: Take 50 mg by mouth 2 times daily   metoprolol succinate ER (TOPROL-XL) 100 MG 24 hr tablet     Yes No   Sig: Take 100 mg by mouth daily   polyethylene glycol (MIRALAX) 17 g packet     Yes No   Sig: Take 1 packet by mouth daily as needed for constipation   potassium chloride ER (KLOR-CON M) 20 MEQ CR tablet     Yes No   Sig: Take 40 mEq by mouth daily    pregabalin (LYRICA) 75 MG capsule     Yes No   Sig: Take 75 mg by mouth At Bedtime    sertraline (ZOLOFT) 100 MG tablet     Yes No   Sig: Take 100 mg by mouth daily    tiZANidine (ZANAFLEX) 2 MG tablet     Yes No   Sig: Take 2 mg by mouth nightly as needed for muscle spasms   torsemide (DEMADEX) 20 MG tablet     Yes No   Sig: Take 60 mg by mouth every morning    vitamin D3 (CHOLECALCIFEROL) 50 mcg (2000 units) tablet     Yes No   Sig: Take 2,000 Units by mouth daily       Facility-Administered Medications: None         Study Result    Narrative & Impression   CHEST ONE VIEW  10/20/2023 1:44 PM      HISTORY: Shortness of breath. Increased O2 requirements.     COMPARISON: Chest radiograph 10/15/2023.                                                                      IMPRESSION: Worsening right upper and left basilar opacities  consistent with worsening infection.  Probable small pleural effusion.  No pneumothorax. Similar enlarged cardiomediastinal silhouette. Aortic  calcification.     MD Ari VALDERRAMA [facility]  10/21/2023

## 2023-10-21 NOTE — PROGRESS NOTES
Patient Transfer Information  Patient connected to monitoring equipment on arrival: yes Continuous pulse oximetry     Patient connected to wall oxygen on arrival: Yes    Belongings: Transferred with patient    Safety check completed: Yes

## 2023-10-21 NOTE — PHARMACY-VANCOMYCIN DOSING SERVICE
Pharmacy Vancomycin Initial Note  Date of Service 2023  Patient's  1945  78 year old, female    Indication: Sepsis    Current estimated CrCl = Estimated Creatinine Clearance: 33.3 mL/min (A) (based on SCr of 1.35 mg/dL (H)).    Creatinine for last 3 days  10/19/2023:  5:56 AM Creatinine 1.29 mg/dL  10/20/2023:  6:43 AM Creatinine 1.20 mg/dL  10/21/2023:  7:07 AM Creatinine 1.35 mg/dL    Recent Vancomycin Level(s) for last 3 days  No results found for requested labs within last 3 days.      Vancomycin IV Administrations (past 72 hours)        No vancomycin orders with administrations in past 72 hours.                    Nephrotoxins and other renal medications (From now, onward)      Start     Dose/Rate Route Frequency Ordered Stop    10/22/23 1800  vancomycin (VANCOCIN) 1,000 mg in 200 mL dextrose intermittent infusion         1,000 mg  200 mL/hr over 1 Hours Intravenous EVERY 24 HOURS 10/21/23 1823      10/21/23 1830  vancomycin (VANCOCIN) 1,750 mg in 0.9% NaCl 500 mL intermittent infusion         1,750 mg  over 2 Hours Intravenous ONCE 10/21/23 1823              Contrast Orders - past 72 hours (72h ago, onward)      Start     Dose/Rate Route Frequency Stop    10/21/23 1400  iopamidol (ISOVUE-370) solution 500 mL         500 mL Intravenous ONCE 10/21/23 1353            Insight[employer] Prediction of Planned Initial Vancomycin Regimen  Loading dose: 1750 mg at 18:30 10/21/2023.  Regimen: 1000 mg IV every 24 hours.  Start time: 18:20 on 10/21/2023  Exposure target: AUC24 (range)400-600 mg/L.hr   AUC24,ss: 466 mg/L.hr  Probability of AUC24 > 400: 67 %  Ctrough,ss: 14.9 mg/L  Probability of Ctrough,ss > 20: 24 %  Probability of nephrotoxicity (Lodise JAE ): 10 %          Plan:  Start vancomycin  1000 mg IV q24h with 1750mg IV load   Vancomycin monitoring method: AUC  Vancomycin therapeutic monitoring goal: 400-600 mg*h/L  Pharmacy will check vancomycin levels as appropriate in 1-3 Days.    Serum  creatinine levels will be ordered a minimum of twice weekly.      Ferny Ness RPH

## 2023-10-21 NOTE — PROGRESS NOTES
[facility]    Hospitalist Progress Note      Assessment & Plan   Matthew Bowen is a 78 year old female w/PMH chronic hypoxic respiratory failure due to pulmonary HTN, ALAINA requiring CPAP, chronic diastolic HF, CAD, CKD stage 3, morbid obesity, HTN,  depression who presents from home with family with fever, cough.     Plan for today:  -Increased O2 needs today after going to bathroom and coughing large amount of sputum. She is now on high flow NC.  Will obtain a portable CXR. Discussed with daughter and patient that we may need to repeat CT pulmonary embolism study despite slight increase in creatinine.  Both voiced understanding.   -Will work on airway clearance therapies. Schedule hypertonic nebs. Aerobika therapy. Schedule guaifenesin.  -Discussed with on call pulmonary medicine/intensivist, they recommended repeating COVID, influenza and RSV studies. Adding percussive therapy for clearance. Agreed with TTE and CT PE.   -Will get a TTE to evaluate cardiac function and assess volume status.   -Given increased O2 needs, will move to [facility].  High flow NC and bipap ordered to maintain saturations.   -Scheduled for bronchoscopy for Monday. Discussed with patient and daughter.   -Await new ID input  -NPO today given increased O2 needs.  If she moves back to [place] then may be able to start diet.     Addendum: I discussed case with on call intensivist/pulmonologist. We will move patient to ICU as patient now requiring continuous Bipap. CT scan shows developing ARDS/multifocal pneumonia.  TTE with EF 60-65% without RWMA.  Moderate aortic stenosis is noted.    -Will give a dose of 60 mg IV lasix to help to optimize lung function.  IVC does not seem to show florid/significant fluid overload.    -Will also start hydrocortisone to help with inflammation related to multifocal pneumonia. Discussed at length with family at bedside.    -Also adding vancomycin to cover for possible MRSA despite negative MRSA  swabs and sputum culture given clinical decline.      #Sepsis and acute on chronic hypoxemic respiratory failure secondary to recurrent PNA, unclear etiology.  Bronchiectasis: Presents with recurrent PNA, 3rd time in last month or so. Last completed treatment 2 weeks ago with persistent cough. Presents with worsening fatigue, productive cough, weakness and falls.  -On admission, patient febrile to 101.1, white count of 21.  Lactic acid was within normal limits.  She underwent a CT scan that showed a lingular consolidative opacity with air bronchograms with bilateral upper lobe groundglass opacities.  She was started on ceftriaxone and azithromycin.  -2 attempts at getting sputum cultures were done early in admission but poor specimens on both.  Sputum culture from 10/19 better that showed gram positive cocci and yeast. Discussed with ID, yeast prevalent in sputum samples and likely candida that is not related to illness.  Need to await speciation.   -MRSA swab negative.   -Patient initially on ceftriaxone and azithromycin.  Broadened to cefepime and azithro on 10/18.  Flagyl added on 10/20.  Finished azithromycin course on 10/20.   -Patient is still having persistent leukocytosis and rising inflammatory markers (CRP and procalcitonin).   -COVID, influenza also negative. Respiratory viral panel negative.  Strep and legionella antigens negative.   -Pulmonary consultation requested.  Appreciate input.  Bronchoscopy tentatively planned for Monday in OR.     -Patient's MARLI was very weakly positive at 1:40.  Her rheumatoid factor was very weakly positive.  Her other autoimmune markers are negative thus far.  -In terms of her antibodies her IgG mildly low but unclear what to make of this.  Pulmonary notes need to check these again at later time to determine any clinical significance.   -Swallow has evaluated the patient and she is okay with a regular diet.  Continue dysphagia precautions.   -We will work on airway  clearance therapies with scheduled hypertonic nebs, aerobika therapy, guaifenesin. Appreciate RT assistance.   -Given increased O2 requirements, may need to get CT PE for better evaluation.  I did discuss with patient and her daughter who voice understanding that kidney function may decline as a result.  We will get portable CXR first.   -Will get a TTE to evaluate cardiac function and to assess volume status.   -Wean O2 as able. Currently requiring high flow.      #Leukocytosis: Suspect related to above.  From what I can see in care everywhere has generally been 9-12 range in 6601-1606.  Its a neutrophil predominance which does speak for acute inflammatory state like infection as above.  Peripheral smear showed nonspecific elevation of white count which is seen in any inflammatory condition including infection, stress, autoimmune or inflammatory conditions.     #Generalized weakness. Chronic arthritis. Falls at home. Left Foot pain: Having multifactorial weakness, with diffuse body aches and pains.  Imaging on admission including CT head, cervical spine, CT CAP did not show acute fracture.  She had an x-ray of her foot also showed degenerative changes without fracture.  She tells me she is following with podiatry.  -Physical therapy following.  Continue to work with her while she is inpatient.  -Fall precautions  -Home meds ordered.   -She will need TCU once she is medically ready.      #Hx ALAINA, pulm HTN, chronic diastolic HF:   -CPAP ordered  -Received 60 mg IV lasix on 10/20. Given her softer BP's on 10/21 will hold her antihypertensives.       #CKD stage 3: Cr 1.21 on admission. Slightly higher on 10/21 at 1.35.      #HTN: As above. Holding BP meds on 10/21 to allow for possible diuresis.      #Hx morbid obesity, anemia, CAD, hypothyroidism, mood disorder: resume home ASA, statin, plavix, lyrica, zoloft     #Hypokalemia: Replacement     Dispo: Several more days.   DVT Prophylaxis: SQ as not moving much  Code  Status: Full Code      Hermilo Bills MD    Interval History   Patient daughter at bedside. Patient was reportedly on 4L via NC and went to bathroom. She then started to cough and brought up large amount of mucous. Her sats then dropped and required high flow NC.  Pt notes some chest tightness but not pain. Thick secretions. No fever. She notes some tingling in her right foot. Not hungry.  She is quite weak.     -Data reviewed today: I reviewed all new labs and imaging results over the last 24 hours. I personally reviewed   Physical Exam   Temp: 98.9  F (37.2  C) Temp src: Oral BP: 106/48 Pulse: 74   Resp: 26 SpO2: (!) (P) 86 % O2 Device: (P) Oxymask Oxygen Delivery: (P) 15 LPM  Vitals:    10/15/23 1646 10/16/23 0644 10/20/23 1900   Weight: 85.6 kg (188 lb 11.4 oz) 81.7 kg (180 lb 1.9 oz) 85.4 kg (188 lb 4.4 oz)     Vital Signs with Ranges  Temp:  [98.2  F (36.8  C)-98.9  F (37.2  C)] 98.9  F (37.2  C)  Pulse:  [74-86] 74  Resp:  [16-28] 26  BP: (106-152)/(46-68) 106/48  FiO2 (%):  [45 %-55 %] 55 %  SpO2:  [72 %-94 %] (P) 86 %  No intake/output data recorded.    Constitutional: + Tachypnea.  Fatigued. High flow in place.   HEENT: Normocephalic. MMM, No elevation of JVD noted.   Respiratory: + tachypnea.  Coarse BS bilaterally. No wheeze.   Cardiovascular: Regular, no murmur  GI: Obese, BS+, NT, ND  Skin/Integumen: WWP, no rash.  Mild bilateral edema.  Neuro: CNII-XII intact. Moves all extremities but generally fatigued. No tremor. A&Ox3.    Medications    - MEDICATION INSTRUCTIONS -        acetaminophen  975 mg Oral TID    [Held by provider] amLODIPine  5 mg Oral Daily    aspirin  81 mg Oral Daily    atorvastatin  20 mg Oral QPM    ceFEPIme  2 g Intravenous Q12H    clopidogrel  75 mg Oral Daily    guaiFENesin  600 mg Oral BID    heparin ANTICOAGULANT  5,000 Units Subcutaneous Q12H    [Held by provider] hydrALAZINE  50 mg Oral TID    [Held by provider] isosorbide dinitrate  20 mg Oral TID    levothyroxine  150 mcg Oral  QAM AC    metroNIDAZOLE  500 mg Intravenous Q8H    multivitamin, therapeutic  1 tablet Oral Daily    pregabalin  75 mg Oral BID    sertraline  150 mg Oral Daily    sodium chloride  3 mL Nebulization Q6H    vitamin D3  50 mcg Oral Daily       Data   Recent Labs   Lab 10/21/23  0707 10/20/23  1451 10/20/23  0643 10/19/23  0556 10/16/23  0555 10/15/23  0939   WBC 22.3*  --  21.3* 19.7*  19.2*   < > 21.5*   HGB 9.6*  --  9.9* 10.2*  10.2*   < > 11.6*   MCV 84  --  81 86  83   < > 86     --  285 264  268   < > 254     --  140 142   < > 143   POTASSIUM 3.7 3.7 3.0* 3.7   < > 3.8   CHLORIDE 103  --  99 100   < > 99   CO2 28  --  28 30*   < > 30*   BUN 37.4*  --  31.2* 26.2*   < > 21.7   CR 1.35*  --  1.20* 1.29*   < > 1.21*   ANIONGAP 12  --  13 12   < > 14   BARBARA 9.8  --  9.5 9.7   < > 9.6   *  --  113* 108*   < > 116*   ALBUMIN  --   --   --   --   --  4.4   PROTTOTAL  --   --   --   --   --  7.3   BILITOTAL  --   --   --   --   --  0.4   ALKPHOS  --   --   --   --   --  134*   ALT  --   --   --   --   --  16   AST  --   --   --   --   --  32    < > = values in this interval not displayed.       Recent Results (from the past 24 hour(s))   XR Chest Port 1 View    Narrative    CHEST ONE VIEW  10/20/2023 1:44 PM     HISTORY: Shortness of breath. Increased O2 requirements.    COMPARISON: Chest radiograph 10/15/2023.      Impression    IMPRESSION: Worsening right upper and left basilar opacities  consistent with worsening infection.  Probable small pleural effusion.  No pneumothorax. Similar enlarged cardiomediastinal silhouette. Aortic  calcification.    TRAE BANGURA MD         SYSTEM ID:  [id number]

## 2023-10-22 NOTE — ANESTHESIA PREPROCEDURE EVALUATION
"Anesthesia Pre-Procedure Evaluation    Patient: Matthew Bowen   MRN: [id number] : 1945        Procedure : Procedure(s):  Bronchoscopy with lavage          Past Medical History:   Diagnosis Date    Antiplatelet or antithrombotic long-term use     Arthritis     Congestive heart failure (H)     Dyspnea on exertion     Heart attack (H)     Heart murmur     Hypertension     Irregular heart beat     Oxygen dependent     2L continuous at home.    Pulmonary hypertension (H)     Sleep apnea     Bipap    Stented coronary artery     x 1    Thyroid disease     Walking troubles       Past Surgical History:   Procedure Laterality Date    APPENDECTOMY      COLONOSCOPY      COLONOSCOPY N/A 2022    Procedure: COLONOSCOPY;  Surgeon: Kolby Dunn MD;  Location: [facility] OR    CV CORONARY ANGIOGRAM N/A 2019    Procedure: Coronary Angiogram;  Surgeon: Tay Andre MD;  Location:  HEART CARDIAC CATH LAB    CV LEFT HEART CATH N/A 2019    Procedure: Left Heart Cath;  Surgeon: Tay Andre MD;  Location:  HE[facility] CARDIAC CATH LAB    CV PCI STENT DRUG ELUTING N/A 2019    Procedure: PCI Stent Drug Eluting;  Surgeon: Tay Andre MD;  Location: [facility]ARD[facility]    GYN SURGERY      Hyst.    ORTHOPEDIC SURGERY      B\" TKs      No Known Allergies   Social History     Tobacco Use    Smoking status: Never    Smokeless tobacco: Never   Substance Use Topics    Alcohol use: Not on file     Comment: 2x/year      Wt Readings from Last 1 Encounters:   10/21/23 85.7 kg (188 lb 14.4 oz)        Anesthesia Evaluation            ROS/MED HX  ENT/Pulmonary:     (+) sleep apnea,                            recent URI,          Neurologic:       Cardiovascular:     (+)  hypertension- -   -  - -   Taking blood thinners   CHF     OLMSTEAD.               Irregular Heartbeat/Palpitations, valvular problems/murmurs type: AS    pulmonary hypertension,      METS/Exercise Tolerance:     Hematologic:     " "  Musculoskeletal:       GI/Hepatic:       Renal/Genitourinary:     (+) renal disease,             Endo:     (+)          thyroid problem,            Psychiatric/Substance Use:       Infectious Disease:       Malignancy:       Other:            Physical Exam    Airway        Mallampati: III   TM distance: > 3 FB   Neck ROM: full   Mouth opening: > 3 cm    Respiratory Devices and Support    PS/BIPAP:  FiO2: 100; %     Dental       (+) Modest Abnormalities - crowns, retainers, 1 or 2 missing teeth      Cardiovascular          Rhythm and rate: regular and normal     Pulmonary                   OUTSIDE LABS:  CBC:   Lab Results   Component Value Date    WBC 20.0 (H) 10/22/2023    WBC 22.3 (H) 10/21/2023    WBC 22.3 (H) 10/21/2023    HGB 8.9 (L) 10/22/2023    HGB 9.6 (L) 10/21/2023    HCT 28.3 (L) 10/22/2023    HCT 30.3 (L) 10/21/2023     10/22/2023     10/21/2023     BMP:   Lab Results   Component Value Date     (H) 10/22/2023     10/21/2023    POTASSIUM 3.4 10/22/2023    POTASSIUM 3.4 10/22/2023    CHLORIDE 108 (H) 10/22/2023    CHLORIDE 103 10/21/2023    CO2 29 10/22/2023    CO2 28 10/21/2023    BUN 45.8 (H) 10/22/2023    BUN 37.4 (H) 10/21/2023    CR 1.29 (H) 10/22/2023    CR 1.35 (H) 10/21/2023     (H) 10/22/2023     (H) 10/22/2023     COAGS:   Lab Results   Component Value Date    PTT 34 09/01/2009    INR 1.00 09/01/2009     POC: No results found for: \"BGM\", \"HCG\", \"HCGS\"  HEPATIC:   Lab Results   Component Value Date    ALBUMIN 2.9 (L) 10/22/2023    PROTTOTAL 6.5 10/22/2023    ALT 27 10/22/2023    AST 52 (H) 10/22/2023    ALKPHOS 100 10/22/2023    BILITOTAL 0.3 10/22/2023     OTHER:   Lab Results   Component Value Date    PH 7.37 10/21/2023    LACT 1.4 10/21/2023    BARBARA 9.7 10/22/2023    PHOS 2.9 10/22/2023    MAG 2.5 (H) 10/22/2023    TSH 0.51 10/15/2023       Anesthesia Plan    ASA Status:  3    NPO Status:  ELEVATED Aspiration Risk/Unknown       Induction: RSI.    "   Techniques and Equipment:     - Airway: Video-Laryngoscope       Consents            Postoperative Care            Comments:                LOW Corral CRNA

## 2023-10-22 NOTE — CONSULTS
"Clinical Nutrition Brief Note     Please refer to full assessment completed on 10/20 for more information.     Received a consult for \"Registered Dietitian to order TF per Medical Nutrition Therapy Guidelines\"     Updates since assessment on 10/20 --  - pulmonology following   - pt transferred to the ICU and intubated on 10/22    Labs:  Labs:  Electrolytes  Potassium (mmol/L)   Date Value   10/22/2023 3.4   10/22/2023 3.4   10/21/2023 3.7   11/22/2019 4.6   11/21/2019 4.4   11/20/2019 4.5     Phosphorus (mg/dL)   Date Value   10/22/2023 2.9    Blood Glucose  Glucose (mg/dL)   Date Value   10/22/2023 130 (H)   11/22/2019 112 (H)   11/21/2019 97   11/20/2019 109 (H)   02/13/2019 122 (H)   02/12/2019 161 (H)     GLUCOSE BY METER POCT (mg/dL)   Date Value   10/22/2023 121 (H)   10/22/2023 128 (H)   10/22/2023 117 (H)   10/21/2023 124 (H)   10/21/2023 101 (H)    Inflammatory Markers  WBC (10e9/L)   Date Value   11/22/2019 10.8   11/20/2019 10.5   02/12/2019 10.1     WBC Count (10e3/uL)   Date Value   10/22/2023 20.0 (H)   10/21/2023 22.3 (H)   10/21/2023 22.3 (H)     Albumin (g/dL)   Date Value   10/22/2023 2.9 (L)   10/15/2023 4.4   02/13/2019 2.7 (L)      Magnesium (mg/dL)   Date Value   10/22/2023 2.5 (H)   10/15/2023 2.2     Sodium (mmol/L)   Date Value   10/22/2023 149 (H)   10/21/2023 143   10/20/2023 140   11/22/2019 141   11/21/2019 142   11/20/2019 143    Renal  Urea Nitrogen (mg/dL)   Date Value   10/22/2023 45.8 (H)   10/21/2023 37.4 (H)   10/20/2023 31.2 (H)   11/22/2019 34 (H)   11/21/2019 47 (H)   11/20/2019 54 (H)     Creatinine (mg/dL)   Date Value   10/22/2023 1.29 (H)   10/21/2023 1.35 (H)   10/20/2023 1.20 (H)   11/22/2019 1.30 (H)   11/21/2019 1.49 (H)   11/20/2019 1.58 (H)     Additional  Triglycerides (mg/dL)   Date Value   11/21/2019 80     Ketones Urine (mg/dL)   Date Value   10/15/2023 Negative        Medications:  - pressor x 1  - propofol @ 23.1 mL/hr provides 610 kcal       acetaminophen  975 " mg Oral TID    acetylcysteine  2 mL Nebulization 4x Daily    [Held by provider] amLODIPine  5 mg Oral Daily    aspirin  81 mg Oral Daily    atorvastatin  20 mg Oral QPM    ceFEPIme  2 g Intravenous Q12H    clopidogrel  75 mg Oral Daily    famotidine  20 mg Intravenous Q12H    guaiFENesin  600 mg Oral BID    heparin ANTICOAGULANT  5,000 Units Subcutaneous Q12H    [Held by provider] hydrALAZINE  50 mg Oral TID    hydrocortisone sodium succinate PF  50 mg Intravenous Q6H    [Held by provider] isosorbide dinitrate  20 mg Oral TID    levalbuterol  1.25 mg Nebulization 4x Daily    levothyroxine  150 mcg Oral QAM AC    metroNIDAZOLE  500 mg Intravenous Q8H    multivitamin, therapeutic  1 tablet Oral Daily    pregabalin  75 mg Oral BID    sertraline  150 mg Oral Daily    sodium chloride (PF)  10-40 mL Intracatheter Q7 Days    sodium chloride (PF)  3 mL Intracatheter Q8H    vancomycin  1,000 mg Intravenous Q24H    vitamin D3  50 mcg Oral Daily       HYDROmorphone 0.2 mg/hr (10/22/23 1116)    - MEDICATION INSTRUCTIONS -      norepinephrine 0.2 mcg/kg/min (10/22/23 1120)    - MEDICATION INSTRUCTIONS -      - MEDICATION INSTRUCTIONS -      propofol 45 mcg/kg/min (10/22/23 1116)      acetaminophen **OR** acetaminophen, albuterol, artificial tears, artificial tears, cetirizine, glucose **OR** dextrose **OR** glucagon, HYDROmorphone, lidocaine 4%, lidocaine (buffered or not buffered), lidocaine (buffered or not buffered), melatonin, midazolam, naloxone **OR** naloxone **OR** naloxone **OR** naloxone, - MEDICATION INSTRUCTIONS -, ondansetron **OR** ondansetron, - MEDICATION INSTRUCTIONS -, - MEDICATION INSTRUCTIONS -, polyethylene glycol, propofol, sodium chloride (PF), sodium chloride (PF), sodium chloride (PF), tiZANidine       ASSESSED NUTRITION NEEDS PER APPROVED PRACTICE GUIDELINES:   Dosing Weight 86 kg (actual body weight) - energy; 45.5 kg (ideal body weight) - protein   Estimated Energy Needs: 946-1204 kcal/day (11-14  Kcal/Kg actual body weight)   Justification: vented   Estimated Protein Needs: 91 g protein/day (2 g pro/Kg IBW)   Justification: vented, hypercatabolism with acute illness   Estimated Fluid Needs: per MD    Recommendations:  Discontinue oral nutritional supplements    Recommend the following EN regimen -   Type of Feeding Tube: pending placement   Enteral Frequency: Continuous   Enteral Regimen: Vital High Protein at 10 mL/hr x 22 hours (hold 1 hour before and after levothyroxine)   Total Enteral Provisions: 220 mL daily provides 220 kcal, 19 g protein, 24 g CHO, 0 g fiber and 184 mL free water. Meets < 100% of DRI's. - change to liquid MVI+M  + 4 pkts ProsourceTF 20 = 320 kcal + 80 g protein (ADDENDUM: override SSRI warning as  a packet of ProSource contains 132 MILLIGRAMS of tryptophan. The warning is for a level of 1-4 GRAMS of tryptophan)   + Propofol @ 23.1 mL/hr = 610 kcal     Total Provisions = 1150 kcal (13.4 kcal/kg actual body weight) + 99 g protein (2.2 g/kg IBW)     Free Water Flush: standard 60 mL q 4 hours    Daily electrolyte check - mag 2.5 (H), phos 2.9 (WNL), K 3.4 (WNL), Na 149 (H)   Daily weights  Start and hold at 10 mL/hr - RD to adjusted pending kcal from propofol     Niesha Rollins RD, LD  Clinical Dietitian     Pager - 3rd floor/ICU: [phone]  Pager - All other floors: [phone]  Pager - Weekend/holiday: [phone]  Office phone: [phone]

## 2023-10-22 NOTE — PROCEDURES
[facility]    Arterial line placement    Date/Time: 10/22/2023 1:56 PM    Performed by: Marie Yin MD  Authorized by: Marie Yin MD      UNIVERSAL PROTOCOL   Site Marked: Yes  Prior Images Obtained and Reviewed:  NA  Required items: Required blood products, implants, devices and special equipment available    Patient identity confirmed:  Arm band, provided demographic data and hospital-assigned identification number  Patient was reevaluated immediately before administering moderate or deep sedation or anesthesia  Confirmation Checklist:  Patient's identity using two indicators, relevant allergies, procedure was appropriate and matched the consent or emergent situation and correct equipment/implants were available  Time out: Immediately prior to the procedure a time out was called    Universal Protocol: the Joint Commission Universal Protocol was followed    Preparation: Patient was prepped and draped in usual sterile fashion    ESBL (mL):  3  Indication:  multiple ABGs respiratory failure hemodynamic monitoring  Location:  Right radial      SEDATION  Patient Sedated: Yes    Sedation:  See MAR for details  Vital signs: Vital signs monitored during sedation      PROCEDURE DETAILS  Noel's Test Normal?: Noel's test not abnormal  Needle Gauge:  18  Seldinger technique: Seldinger technique used    Number of Attempts:  2  Post-procedure:  Line sutured  CMS: normal    PROCEDURE    Patient Tolerance:  Patient tolerated the procedure well with no immediate complications  Length of time physician/provider present for 1:1 monitoring during sedation: 15

## 2023-10-22 NOTE — PROCEDURES
[facility]    Triple Lumen PICC Placement    Date/Time: 10/22/2023 1:07 AM    Performed by: Larry Escalante RN  Authorized by: Eldon Apple DO  Indications: vascular access      UNIVERSAL PROTOCOL   Site Marked: Yes  Prior Images Obtained and Reviewed:  Yes  Required items: Required blood products, implants, devices and special equipment available    Patient identity confirmed:  Verbally with patient, arm band and hospital-assigned identification number  NA - No sedation, light sedation, or local anesthesia  Confirmation Checklist:  Patient's identity using two indicators, relevant allergies, procedure was appropriate and matched the consent or emergent situation and correct equipment/implants were available  Time out: Immediately prior to the procedure a time out was called    Universal Protocol: the Joint Commission Universal Protocol was followed    Preparation: Patient was prepped and draped in usual sterile fashion       ANESTHESIA    Local Anesthetic:  Lidocaine 1% without epinephrine  Anesthetic Total (mL):  1.5      SEDATION    Patient Sedated: No        Skin prep agent: skin prep agent completely dried prior to procedure  Sterile barriers: maximum sterile barriers were used: cap, mask, sterile gown, sterile gloves, and large sterile sheet  Hand hygiene: hand hygiene performed prior to central venous catheter insertion  Type of line used: PICC  Catheter type: triple lumen  Lumen type: valved and power PICC  Lumen Identification: Red, Gray and White  Catheter size: 5 Fr  Brand: Bard  Lot number: [id number]  Placement method: venipuncture, MST and ultrasound  Number of attempts: 1  Difficulty threading catheter: no  Successful placement: yes  Orientation: right    Location: basilic vein  Arm circumference: adults 10 cm  Extremity circumference: 42  Visible catheter length: 2  Total catheter length: 46  Internal Lumen Volume: 44 mL    Dressing and securement: chlorhexidine patch  applied, line secured, site cleansed, subcutaneous anchor securement system, transparent dressing, transparent securement dressing, securement device and sterile dressing applied  Post procedure assessment: blood return through all ports, placement verified by 3CG technology and placement verified by x-ray  PROCEDURE   Patient Tolerance:  Patient tolerated the procedure well with no immediate complicationsDescribe Procedure: Nursing note  Triple lumen PICC placed on the right basilic vein successfully on one attempt with good blood return noted. CXR confirmed the tip of the catheter is in the mid superior vena cava.. PICC is ready for use  Disposal: sharps and needle count correct at the end of procedure, needles and guidewire disposed in sharps container

## 2023-10-22 NOTE — PROGRESS NOTES
Intensivist note  Patient transferred to ICU today with worsening hypoxemia.     PMHx significant for:  History of recurrent pneumonia  Pulmonary HTN  ALAINA on CPAP  CADz with history of diastolic CHF  CKD (creatinine 1.35)   Morbid obesity     CXR and CT chest reviewed and evidence of diffuse bilateral alveolar infiltrates.    Labs reviewed    Assessment and Plan  Acute hypoxemic respiratory failure- her cultures are non-revealing and she remains on broad spectrum antibiotics. She received a dose of IV Lasix 60 mgms earlier and has another scheduled for 0400. She will continue on bipap and will titrate down support as tolerated, especially oxygen.     I assessed her via AV link and she seemed quite compensated. I spoke with patient and daughter via link.     I spent 35 minutes of critical care time with this patient.     Jeanie hill  October 21, 2023

## 2023-10-22 NOTE — PROGRESS NOTES
[facility]    Medicine Progress Note - Hospitalist Service    Date of Admission:  10/15/2023    Assessment & Plan   Matthew Bowen is a 78 year old female admitted on 10/15/2023 with working diagnoses of sepsis in addition to acute on chronic hypoxic respiratory failure due to recurrent pneumonia after presenting with complaint of fever and cough.  She deteriorated slowly over the initial week of hospitalization, initially requiring supplemental oxygen and progressing to HFNC and ultimately BiPAP.  She was transferred to the ICU on 10/21/2023 due to this progression in respiratory instability.    Medical history includes chronic hypoxic respiratory failure (for which she is chronically on 2 L oxygen by nasal cannula) due to ALAINA requiring CPAP, Obesity Hypoventilation Syndrome, HFpEF, morbid obesity.  Although the chart indicates that Ms. Bowen has pulmonary hypertension, it is the opinion of cardiology that this is overstated.  (The apparent enlargement of the pulmonary arteries is not itself diagnostic of pHTN.  Her RV function is felt to be normal per cards.) Additional medical concerns include CAD, moderate Aortic Valve Stenosis and mild MR, hypertension and depression.    Ms. Bowen was noted to be tachypneic and requiring 70% FiO2 while on BiPAP when I first assessed her this am.     Systems review:  Neuro: Alert and coherent.  Generally weak, but living independently with .    Intubated and sedated at this time. Had proven difficult initially to get comfortable, so she was given relatively high doses of Propofol and her BP dropped.  Sedation regimen now includes Propofol (working on titrating this off), Midazolam drip with boluses and dilaudid infusion with boluses prn.     CV: Echo completed 10/21/23: HFpEF, EF 55-60%, Mod AORTIC STENOSIS with AoV gradient of 20.0, MR trace.    HR 80, BP: 120-150/70-90.   At the time of intubation, the pt's BP dropped and she was started on  norepi, though with off-titration of the Propofol, it seems that she is requiring less and less of the norepi.   Cardiology discussed with Russ Yin and Csota.      Pulmonary: Progressing bilat GGI.  Prod cough.  Infectious eval neg at this time.   Initially today on BiPAP 14/7, FiO2 90%.  RR 30-40's. Despite this, pt developed acute hypoxic and hypercapnic resp failure.  Vent: CMV/AC 20/FiO2 85%/Vt 300/PEEP 10.  Veletri started today at 10 ng/kg/min.  Last Arterial Blood Gas: 7.28/pCO2 59/pO2 113/Bicarb 28    GI/nutrition: RUQ tenderness.     Renal/: Baseline stage III CKD, baseline creat runs between 1.2 and 1.5.  Creat: 1.29, Na 149, Cl 108.  Hgb 20.0.    ID/heme: extensive infectious eval has been negative.     Endocrine: no baseline issues.   Now on Hydrocortisone 60 mg IV q 6   Add ISS now, while on steroids and tube feeds.          Diet: Snacks/Supplements Adult: Ensure Enlive; With Meals  NPO per Anesthesia Guidelines for Procedure/Surgery Except for: Meds  NPO for Medical/Clinical Reasons Except for: Meds    DVT Prophylaxis: Heparin SQ  Aleman Catheter: Not present  Lines: PRESENT      PICC 10/22/23 Triple Lumen Right Basilic multiple med gtt. ready for use-Site Assessment: WDL      Cardiac Monitoring: ACTIVE order. Indication: Tachyarrhythmias, acute (48 hours)  Code Status: Full Code      Clinically Significant Risk Factors         # Hypernatremia: Highest Na = 149 mmol/L in last 2 days, will monitor as appropriate                 # Obesity: Estimated body mass index is 36.89 kg/m  as calculated from the following:    Height as of this encounter: 1.524 m (5').    Weight as of this encounter: 85.7 kg (188 lb 14.4 oz).             Disposition Plan     Expected Discharge Date: 10/23/2023      Destination: home with help/services;home with family              Amadou Raygoza MD  Hospitalist Service  [facility]  Securely message with [employer] (more info)  Text page via [employer]  [employer]/Directory   ______________________________________________________________________    Interval History   Chart reviewed, pt interviewed.     I also met and discussed the case briefly with the pt's daughter, Roque.    Physical Exam   Vital Signs: Temp: 98.5  F (36.9  C) Temp src: Axillary BP: (!) 153/80 Pulse: 80   Resp: (!) 32 SpO2: 95 % O2 Device: BiPAP/CPAP Oxygen Delivery: (S) 50 LPM  Weight: 188 lbs 14.4 oz    General Appearance: Pleasant and communicative despite being tachypneic on BiPAP.    Respiratory: Increase resp rat,e but appears remarkably comfortable  Cardiovascular: RRR, no murmur  GI: soft, NTND  Skin: no appreciabel edema  Other:      Medical Decision Making       50 MINUTES SPENT BY ME on the date of service doing chart review, history, exam, documentation & further activities per the note.      Data   Results for orders placed or performed during the hospital encounter of 10/15/23 (from the past 24 hour(s))   Glucose by meter   Result Value Ref Range    GLUCOSE BY METER POCT 124 (H) 70 - 99 mg/dL   Blood gas arterial and oxyhgb   Result Value Ref Range    pH Arterial 7.37 7.35 - 7.45    pCO2 Arterial 50 (H) 35 - 45 mm Hg    pO2 Arterial 117 (H) 80 - 105 mm Hg    Bicarbonate Arterial 29 (H) 21 - 28 mmol/L    Oxyhemoglobin Arterial 98 92 - 100 %    Base Excess/Deficit 2.7 (H) -9.0 - 1.8 mmol/L    FIO2 100     Noel's Test Yes    Glucose by meter   Result Value Ref Range    GLUCOSE BY METER POCT 117 (H) 70 - 99 mg/dL   XR Chest Port 1 View    Narrative    EXAM: CHEST SINGLE VIEW PORTABLE  LOCATION: [facility]  DATE/TIME: 10/22/2023 1:03 AM CDT    INDICATION: Nurse placed peripherally inserted central catheter.  COMPARISON: 10/21/2023 at 0827 hours.      Impression    IMPRESSION:   1. Interval placement of a right upper extremity peripherally inserted central catheter with distal tip obscured, but most likely in the mid superior vena cava.  2. No other significant change  since the recent comparison study.  3. Moderately extensive airspace opacities within both lungs, most prominent in the upper right lung and lower left lung, again noted.    Triple Lumen PICC Placement    Narrative    Larry Escalante RN     10/22/2023  1:12 AM  [facility]    Triple Lumen PICC Placement    Date/Time: 10/22/2023 1:07 AM    Performed by: Larry Escalante RN  Authorized by: Eldon Apple DO  Indications: vascular access      UNIVERSAL PROTOCOL   Site Marked: Yes  Prior Images Obtained and Reviewed:  Yes  Required items: Required blood products, implants, devices and special   equipment available    Patient identity confirmed:  Verbally with patient, arm band and   hospital-assigned identification number  NA - No sedation, light sedation, or local anesthesia  Confirmation Checklist:  Patient's identity using two indicators, relevant   allergies, procedure was appropriate and matched the consent or emergent   situation and correct equipment/implants were available  Time out: Immediately prior to the procedure a time out was called    Universal Protocol: the Joint Commission Universal Protocol was followed    Preparation: Patient was prepped and draped in usual sterile fashion       ANESTHESIA    Local Anesthetic:  Lidocaine 1% without epinephrine  Anesthetic Total (mL):  1.5      SEDATION    Patient Sedated: No        Skin prep agent: skin prep agent completely dried prior to procedure  Sterile barriers: maximum sterile barriers were used: cap, mask, sterile   gown, sterile gloves, and large sterile sheet  Hand hygiene: hand hygiene performed prior to central venous catheter   insertion  Type of line used: PICC  Catheter type: triple lumen  Lumen type: valved and power PICC  Lumen Identification: Red, Gray and White  Catheter size: 5 Fr  Brand: Bard  Lot number: [id number]  Placement method: venipuncture, MST and ultrasound  Number of attempts: 1  Difficulty threading  catheter: no  Successful placement: yes  Orientation: right    Location: basilic vein  Arm circumference: adults 10 cm  Extremity circumference: 42  Visible catheter length: 2  Total catheter length: 46  Internal Lumen Volume: 44 mL    Dressing and securement: chlorhexidine patch applied, line secured, site   cleansed, subcutaneous anchor securement system, transparent dressing,   transparent securement dressing, securement device and sterile dressing   applied  Post procedure assessment: blood return through all ports, placement   verified by 3CG technology and placement verified by x-ray  PROCEDURE   Patient Tolerance:  Patient tolerated the procedure well with no immediate   complicationsDescribe Procedure: Nursing note  Triple lumen PICC placed on the right basilic vein successfully on one   attempt with good blood return noted. CXR confirmed the tip of the   catheter is in the mid superior vena cava.. PICC is ready for use  Disposal: sharps and needle count correct at the end of procedure, needles   and guidewire disposed in sharps container   Glucose by meter   Result Value Ref Range    GLUCOSE BY METER POCT 128 (H) 70 - 99 mg/dL   Basic metabolic panel   Result Value Ref Range    Sodium 149 (H) 135 - 145 mmol/L    Potassium 3.4 3.4 - 5.3 mmol/L    Chloride 108 (H) 98 - 107 mmol/L    Carbon Dioxide (CO2) 29 22 - 29 mmol/L    Anion Gap 12 7 - 15 mmol/L    Urea Nitrogen 45.8 (H) 8.0 - 23.0 mg/dL    Creatinine 1.29 (H) 0.51 - 0.95 mg/dL    GFR Estimate 42 (L) >60 mL/min/1.73m2    Calcium 9.7 8.8 - 10.2 mg/dL    Glucose 130 (H) 70 - 99 mg/dL   CBC with platelets   Result Value Ref Range    WBC Count 20.0 (H) 4.0 - 11.0 10e3/uL    RBC Count 3.48 (L) 3.80 - 5.20 10e6/uL    Hemoglobin 8.9 (L) 11.7 - 15.7 g/dL    Hematocrit 28.3 (L) 35.0 - 47.0 %    MCV 81 78 - 100 fL    MCH 25.6 (L) 26.5 - 33.0 pg    MCHC 31.4 (L) 31.5 - 36.5 g/dL    RDW 16.9 (H) 10.0 - 15.0 %    Platelet Count 389 150 - 450 10e3/uL   Blood gas  venous   Result Value Ref Range    pH Venous 7.36 7.32 - 7.43    pCO2 Venous 56 (H) 40 - 50 mm Hg    pO2 Venous 43 25 - 47 mm Hg    Bicarbonate Venous 31 (H) 21 - 28 mmol/L    Base Excess/Deficit 5.1 (H) -7.7 - 1.9 mmol/L    FIO2 80    Hepatic panel   Result Value Ref Range    Protein Total 6.5 6.4 - 8.3 g/dL    Albumin 2.9 (L) 3.5 - 5.2 g/dL    Bilirubin Total 0.3 <=1.2 mg/dL    Alkaline Phosphatase 100 35 - 104 U/L    AST 52 (H) 0 - 45 U/L    ALT 27 0 - 50 U/L    Bilirubin Direct <0.20 0.00 - 0.30 mg/dL   WBC and differential    Narrative    The following orders were created for panel order WBC and differential.  Procedure                               Abnormality         Status                     ---------                               -----------         ------                     WBC and Differential[[id number]]         Abnormal            Final result                 Please view results for these tests on the individual orders.   WBC and Differential   Result Value Ref Range    WBC Count      % Neutrophils 88 %    % Lymphocytes 3 %    % Monocytes 5 %    Mids % (Monos, Eos, Basos)      % Eosinophils 0 %    % Basophils 0 %    % Immature Granulocytes 4 %    Absolute Neutrophils 18.3 (H) 1.6 - 8.3 10e3/uL    Absolute Lymphocytes 0.7 (L) 0.8 - 5.3 10e3/uL    Absolute Monocytes 1.1 0.0 - 1.3 10e3/uL    Mids Abs (Monos, Eos, Basos)      Absolute Eosinophils 0.0 0.0 - 0.7 10e3/uL    Absolute Basophils 0.1 0.0 - 0.2 10e3/uL    Absolute Immature Granulocytes 0.9 (H) <=0.4 10e3/uL   Glucose by meter   Result Value Ref Range    GLUCOSE BY METER POCT 121 (H) 70 - 99 mg/dL   Potassium   Result Value Ref Range    Potassium 3.4 3.4 - 5.3 mmol/L   Magnesium   Result Value Ref Range    Magnesium 2.5 (H) 1.7 - 2.3 mg/dL   Nt probnp inpatient   Result Value Ref Range    N terminal Pro BNP Inpatient 6,064 (H) 0 - 1,800 pg/mL   Ionized Calcium   Result Value Ref Range    Calcium Ionized Whole Blood 5.1 4.4 - 5.2 mg/dL   Phosphorus    Result Value Ref Range    Phosphorus 2.9 2.5 - 4.5 mg/dL   Lipase   Result Value Ref Range    Lipase 49 13 - 60 U/L   XR Abdomen Port 1 View    Narrative    EXAM: XR ABDOMEN PORT 1 VIEW  LOCATION: [facility]  DATE: 10/22/2023    INDICATION: Tube placement  COMPARISON: None.      Impression    IMPRESSION: NG tube tip and sidehole in the stomach. Moderate degenerative change in the spine and both hips.   XR Chest Port 1 View    Narrative    EXAM: XR CHEST PORT 1 VIEW  LOCATION: [facility]  DATE: 10/22/2023    INDICATION: ARDS vs. multifocal pneumonia, status post endotracheal tube placement  COMPARISON: Chest radiograph 10/22/2023 12:37 AM..      Impression    IMPRESSION:    Endotracheal tube tip is 2.2 cm above the ashley. Unchanged right PICC with tip in the mid SVC.    Multifocal patchy airspace opacities most prominent within the right upper lung and left lower lung have not significant changed. No new airspace opacities. No pleural effusions or pneumothorax.    Stable cardiomediastinal silhouette. Aortic atherosclerotic calcifications.   Cell count with differential fluid - BAL Site 1    Narrative    The following orders were created for panel order Cell count with differential fluid - BAL Site 1.  Procedure                               Abnormality         Status                     ---------                               -----------         ------                     Cell Count Body Fluid[[id number]]        Abnormal            Final result               Differential Body Fluid[[id number]]                          Final result                 Please view results for these tests on the individual orders.   Acid-Fast Bacilli Culture and Stain    Specimen: Bronchus, Right; Bronchial Alveolar Lavage    Narrative    The following orders were created for panel order Acid-Fast Bacilli Culture and Stain.  Procedure                               Abnormality         Status                      ---------                               -----------         ------                     Acid-Fast Bacilli Cultur...[[id number]]                      In process                   Please view results for these tests on the individual orders.   Koh prep - BAL Site 1    Specimen: Bronchus, Right; Bronchial Alveolar Lavage   Result Value Ref Range    KOH Preparation 3+ Budding yeast with pseudohyphae (A)     KOH Preparation Reference Range: No fungal elements seen. (A)    Respiratory Aerobic Bacterial Culture with Gram Stain    Specimen: Lung, Middle Lobe, Right; Bronchial washing   Result Value Ref Range    Gram Stain Result >25 PMNs/low power field (A)     Gram Stain Result 1+ Budding yeast with pseudohyphae (A)    Acid-Fast Bacilli Culture and Stain *Canceled*    Specimen: Bronchus, Right; Washings    Narrative    The following orders were created for panel order Acid-Fast Bacilli Culture and Stain.  Procedure                               Abnormality         Status                     ---------                               -----------         ------                     Acid-Fast Bacilli Cultur...[[id number]]                                                   Please view results for these tests on the individual orders.   Cell Count Body Fluid   Result Value Ref Range    Color Red (A) Colorless, Yellow    Clarity Cloudy (A) Clear    Cell Count Fluid Source Bronchus, Right     Narrative    No reference ranges have been established.  This result  should be interpreted in the context of the patient's clinical condition and   compared to simultaneous measurement in the patient's blood.        Large clot present, no cell count performed.   Differential Body Fluid   Result Value Ref Range    % Neutrophils      % Lymphocytes      % Monocyte/Macrophages      Narrative    Specimen clotted. Slide reviewed, no abnormal cells seen.  No reference ranges have been established. This result should be interpreted in the  context of the patient's clinical condition and compared to simultaneous measurement in the patient's blood.   Glucose by meter   Result Value Ref Range    GLUCOSE BY METER POCT 137 (H) 70 - 99 mg/dL   Lactic acid whole blood   Result Value Ref Range    Lactic Acid 1.1 0.7 - 2.0 mmol/L   Blood gas arterial   Result Value Ref Range    pH Arterial 7.28 (L) 7.35 - 7.45    pCO2 Arterial 59 (H) 35 - 45 mm Hg    pO2 Arterial 113 (H) 80 - 105 mm Hg    FIO2 100     Bicarbonate Arterial 28 21 - 28 mmol/L    Base Excess/Deficit 0.5 -9.0 - 1.8 mmol/L    Noel's Test Artline    XR Chest Port 1 View    Narrative    EXAM: XR CHEST PORT 1 VIEW  LOCATION: [facility]  DATE: 10/22/2023    INDICATION: Endotracheal tube positioning  COMPARISON: 10/22/2023      Impression    IMPRESSION: Endotracheal tube tip 5.4 cm above ashley. Enteric tube tip below diaphragm. Right PICC tip in the low SVC. Increased multifocal bilateral infiltrates, right greater than left. There may be small left pleural effusion. Enlarged cardiac   silhouette. Obscured pulmonary vascularity. Aortic calcifications.   Blood gas venous with oxyhemoglobin   Result Value Ref Range    pH Venous 7.27 (L) 7.32 - 7.43    pCO2 Venous 62 (H) 40 - 50 mm Hg    pO2 Venous 61 (H) 25 - 47 mm Hg    Bicarbonate Venous 28 21 - 28 mmol/L    FIO2 100     Oxyhemoglobin Venous 86 (H) 70 - 75 %    Base Excess/Deficit 0.3 -7.7 - 1.9 mmol/L   Glucose by meter   Result Value Ref Range    GLUCOSE BY METER POCT 143 (H) 70 - 99 mg/dL

## 2023-10-22 NOTE — PROGRESS NOTES
RT Note:    Patient intubated by CRNA with 8.0 ETT, initially secured 23cm at teeth with commercial tube bedolla. Placement confirmed by positive color change on ETCO2 detector and bilateral breath sounds.     Placed on ventilator with initial settings of CMV 18/400/+10/100. RT assisted MD with bronchoscopy/BAL. ETT securement withdrawn 1.5cm per verbal order from MD after bronchoscopy, now secured at 21cm at teeth.     Continuous Velitri started inline with ventilator. Ventilator settings adjusted to CMV 20/300/+10/100%.     Patient receiving xopenex and mucomyst QID inline. Suctioning large amounts of thick secretions. Chest percussion started QID with mechanical percussor, plan is to start Volera treatments inline with ventilator once circuits are in stock.

## 2023-10-22 NOTE — ANESTHESIA PROCEDURE NOTES
Airway       Patient location during procedure: ICU       Procedure Start/Stop Times: 10/22/2023 9:58 AM  Staff -        CRNA: Danielle Castorena APRN CRNA       Performed By: CRNA  Consent for Airway        Urgency: elective  Report Obtained from Primary Care Team       History regarding most recent potassium obtained: Yes       History regarding presence/absence of renal failure obtained:Yes       History regarding stroke/CVA obtained:Yes       History regarding presence/absence of NM disorder: YesIndications and Patient Condition       Indications for airway management: respiratory insufficiency       Mallampati: III     Induction type:RSI       Mask difficulty assessment: 1 - vent by mask    Final Airway Details       Final airway type: endotracheal airway       Successful airway: ETT - single  Endotracheal Airway Details        ETT size (mm): 8.0       Cuffed: yes       Successful intubation technique: video laryngoscopy       VL Blade Size: Glidescope 3       Grade View of Cords: 1       Adjucts: stylet       Position: Right       Measured from: gums/teeth       Secured at (cm): 22    Post intubation assessment        ETT secured, Vent settings by primary/ICU team, Primary/ICU team to review CXR, Sedation to be ordered by primary/ICU team and No apparent complications       Placement verified by: capnometry, equal breath sounds and chest rise        Number of attempts at approach: 1       Number of other approaches attempted: 0       Secured with: commercial tube bedolla       Ease of procedure: easy       Dentition: Unchanged    Medication(s) Administered   Medication Administration Time: 10/22/2023 9:58 AM    Additional Comments       Patient preoxygenated prior to induction.  Atraumatic intubation. Appropriate ETT placement confirmed by positive ETCO2, visible chest rise, and equal/bilateral breath sounds by intensivist. ETT secured with tube bedolla by RT.

## 2023-10-22 NOTE — CONSULTS
[facility]    Cardiology Consultation     Date of Admission:  10/15/2023    Review of the result(s) of each unique test - Last ECG echocardiogram CBC BMP.     Assessment & Plan   Matthew Bowen is a 78 year old female who was admitted on 10/15/2023.     1.  Respiratory failure/ARDS from multifocal pneumonia  2.  Multiple hospital admissions for recurrent pneumonias  3.  Underlying bronchiectasis/basal scarring  4.  Obstructive sleep apnea on CPAP  5.  History of coronary artery disease s/p PCI of circumflex in 2019, moderate proximal to mid RCA lesion remains-medically managed, 2 subsequent stress test have been negative  6.  History of heart failure with preserved ejection fraction-NT-proBNP 10/22 6060, 10/20 4757  7.  Moderate aortic stenosis  8.  CKD  9.  Anemia    Recommendations:   1.  Definitely a component of heart failure exacerbation due to overall picture (increased wt, rise in BNP, s/s, off pre-pressors since admission) although, the major underlying etiology is recurrent episodes of pneumonia leading to sepsis.  Her admission weight was 85>>82 kgs and she is currently at 86 kg.  Has been started on diuresis and has put out 2 L.  We will continue diuresis for now.   2.  Review of echocardiogram shows normal BiV size and function and a right atrial pressure which was normal.  3.  Troponin is mildly elevated in noncoronary fashion and is likely secondary to acute issues.  We will manage this conservatively for now.  4.  Agree with holding all BP meds due to sepsis. Also, hold Plavix. Last PCI in 2019 and even though she has residual RCA dz that in itself is not an indication for DAPT. Plus, she is anemic  5. We indication for ischemic work up at this time.  Will follow with you.    Critical Care: Total critical care time spent: 50    PAT Quiroz  Staff Cardiologist  [facility]    History of Present Illness   Matthew Bowen is a 78 year old female with past medical  history of HFpEF, moderate aortic stenosis, coronary artery disease, anemia, sleep apnea, hypertension, CKD, obesity, depression, chronic hypoxic failure from chronic bronchiectasis and lung scarring (CT 9/1/2023), recurrent pneumonia who was admitted to the hospital for fever and cough.  The patient carries a diagnosis of pulmonary hypertension but I note that on prior echocardiogram in 2019 her pulmonary artery systolic pressure was 20+ RA, which is normal.  She also had an echocardiogram in 06/20/2023 and 12/20/2022 on which PASP could not be estimated due to poor TR jet.  Her RV function remains normal so does the RV size.  He only has trace tricuspid regurgitation.  Her other cardiac medical history includes that of NSTEMI in 2019 for which she under went PCI of mid to distal circumflex with a 2.5 mm LIDA.  She was also noticed to have moderate to severe stenosis of the proximal-mid RCA which was a heavily calcified lesion and was not intervened on.  Plan was to bring her back for a staged arthrectomy/PCI but somehow this did not happen.  She subsequently had 2 stress test both of which were negative for ischemia.  She is followed at [facility] her cardiologist is at [facility].  She has not had any recent admissions to the hospital for HFpEF exacerbation.  Her last cardiology note her dry weight is around 95 kg / 210 pounds.  She has been well maintained on 60 mg of torsemide  daily.    The patient was now admitted to the hospital for complaints of fever and cough.  She has had a few recent admissions for similar complaints during which she has been diagnosed with a pneumonia.  This presentation appears similar.  Suspicion was confirmed with a CT of the chest which showed bilateral groundglass opacities and atelectasis.  Cardiology has now been consulted to rule out HFpEF exacerbation because she has progressed to ARDS and is requiring continuous BiPAP to maintain saturation.  She had an echocardiogram in this hospital  which shows moderate aortic stenosis which is unchanged from prior recent echocardiograms.  BiV size and function is normal and so his right atrial pressure.  Pulmonary artery pressure not be calculated because of lack of TR jet    Testing:    PFT 1/28/19  Spirometry is consistent with a severe restrictive defect.  Lung volumes measured by body plethysmography are mildly decreased.  Diffusing capacity is moderately decreased.  No prior study available for comparison.     Echo 6/27/18   1. Normal LV size, borderline wall thickness, normal global systolic function with an estimated EF of 60 - 65%.   2. The aortic valve is normal and trileaflet, mild stenosis and trivial regurgitation. The aortic valve peak velocity is 2.40 m/s, the peak gradient is 23.0 mmHg, and the mean gradient is 12.0 mmHg. The aortic valve area is 1.64 cmÂ .   3. Right ventricular cavity size is normal, global systolic RV function is normal.   4. The mitral valve is normal, mild mitral regurgitation.  LVID (d) 4.6 cm     CT Cardiac Coronary 11/12/18  1.  Extensive nonobstructive coronary disease.   2.  Compromised coronary scan, but no lesions identified.   3.  Would suggest aggressive risk factor modification.   4.  Pulmonary CT angiogram will be reported separately by Radiology.      CTA Chest 11/12/18  1. No evidence of pulmonary embolism.  2. Changes of pulmonary hypertension with enlarged central pulmonary arteries and mosaic attenuation in the lungs that likely represents small vessel disease.  3. Mild cardiomegaly.     CT Chest 3/11/19  1. Mild bronchiectasis within the lower lobes and lingula. Areas of ground-glass and reticular opacity within the posterior costophrenic angles may relate to fibrosis or atelectasis. There is no honeycombing.  2. Dilatation of the main pulmonary artery as before, a finding seen with pulmonary arterial hypertension.  3. Stable subpleural micronodule right upper lobe.     Cardiac MRI 9/27/19  1.    Normal  left ventricular systolic function, LVEF 68%.  2.    Normal right ventricular systolic function, RVEF 64%.  3.    Mildly dilated main PA.  4.    Normal pericardium.  5.    Normal lungs.  6.    No infiltrative cardiomyopathy.  7.    Mild mid anteroseptal replacement fibrosis.     CORS 11/21/19 ([facility])   Left ventricular filling pressures are severely elevated .    Prox RCA to Mid RCA lesion is 80% stenosed.    Mid LAD lesion is 25% stenosed.    Dist Cx lesion is 80% stenosed- PCI    1st Mrg lesion is 50% stenosed.      PET 4/21/21   1. Adequate pharmacologic stress test with regadenoson.   2. The pharmacologic stress ECG was negative for ischemia.   3. Myocardial perfusion was normal.   4. Left ventricular cavity size was normal (resting EDV 98 ml).   5. Overall left ventricular systolic function was normal without wall motion abnormalities. The stress LVEF was visually estimated to be 65%.   6. Compared to prior study of 8/24/16, there is no significant change.    CT cap w 5/7/21  1. No acute abnormality in the chest, abdomen, or pelvis.   2. Dilated main pulmonary artery again noted, compatible with pulmonary hypertension.   3. Severe 3 vessel calcified coronary artery disease.   4. Minimal fibrotic changes again noted in the bilateral lower lobes, stable from March 2019.   5. Sigmoid diverticulosis.   6. No abdominal or pelvic ascites with clinical history of abdominal distention.     TTE 5/25/21  Final Impressions:   1. Normal LV size, mildly increased wall thickness, EF of 65 - 70%.   2. Normal RV size and systolic function.   3. Aortic valve is not well-visualized: trivial regurgitation and moderate stenosis (MG 19 mmhg, Vmax 2.9 m/s, IVAN 1.1 cm2, DI 0.42, SVi 37 mL/m2.  Compared to prior exam images and report of 7/28/2020, there has been no significant change.    TTE 12/30/21  Limited Echocardiogram performed   1. Normal LV size,normal global systolic function with an estimated EF of 65 - 70%.   2.  The aortic valve is calcified, moderate stenosis and trivial regurgitation.The aortic valve peak velocity is 3.0 m/s, the peak gradient is 35 mmHg, and the mean gradient is 18 mmHg. The aortic valve area is 1.34 cmÂ  with a dimensionless index of 0.43. The stroke volume index is 47.4 ml/mÂ .   3. Mildly enlarged left atrium.   4. The inferior vena cava is dilated, respiratory size variation less than 50%.   5. Severe MAC present.    TTE: 6/12/2023   1. Technically limited exam.    2. Normal left ventricular size, normal wall thickness, normal global systolic function, calculated EF of 61 %.    3. Right ventricular cavity size is not well visualized, global systolic RV function is normal.    4. Left atrial size is not well visualized.    5. The aortic valve is calcified, moderate stenosis and trivial regurgitation. The aortic valve peak velocity is 3.5 m/s, the peak gradient is 48 mmHg, and the mean gradient is 21 mmHg. The aortic valve area is 1.18 cmÂ  with a dimensionless index of 0.31. The stroke volume index is 48.3 ml/mÂ .    6. The mitral valve is mild mitral annular calcification, mild mitral regurgitation.    7. Tricuspid valve is normal.    8. The ascending aorta is dilated with a maximal diameter of 3.9 cm.    9. No pericardial effusion.       Past Medical History   Past Medical History:   Diagnosis Date    Antiplatelet or antithrombotic long-term use     Arthritis     Congestive heart failure (H)     Dyspnea on exertion     Heart attack (H)     Heart murmur     Hypertension     Irregular heart beat     Oxygen dependent     2L continuous at home.    Pulmonary hypertension (H)     Sleep apnea     Bipap    Stented coronary artery     x 1    Thyroid disease     Walking troubles        Past Surgical History   Past Surgical History:   Procedure Laterality Date    APPENDECTOMY      COLONOSCOPY      COLONOSCOPY N/A 2/28/2022    Procedure: COLONOSCOPY;  Surgeon: Kolby Dunn MD;  Location:  OR    CV  "CORONARY ANGIOGRAM N/A 2019    Procedure: Coronary Angiogram;  Surgeon: Tay Andre MD;  Location:  HEART CARDIAC CATH LAB    CV LEFT HEART CATH N/A 2019    Procedure: Left Heart Cath;  Surgeon: Tay Andre MD;  Location:  HEART CARDIAC CATH LAB    CV PCI STENT DRUG ELUTING N/A 2019    Procedure: PCI Stent Drug Eluting;  Surgeon: Tay Andre MD;  Location:  HEART CARDIAC CATH LAB    GYN SURGERY      Hyst.    ORTHOPEDIC SURGERY      B\" TKs       Prior to Admission Medications   Prior to Admission Medications   Prescriptions Last Dose Informant Patient Reported? Taking?   Acetaminophen (TYLENOL PO) 10/14/2023 at pm  Yes Yes   Sig: Take 1,000 mg by mouth 3 times daily   Multiple Vitamins-Minerals (MULTIVITAMIN ADULTS PO) 10/14/2023 at am  Yes Yes   Sig: Take 1 tablet by mouth daily   amLODIPine (NORVASC) 5 MG tablet 10/14/2023 at am  Yes Yes   Sig: Take 5 mg by mouth daily    aspirin 81 MG EC tablet 10/14/2023 at am  Yes Yes   Sig: Take 81 mg by mouth daily   atorvastatin (LIPITOR) 20 MG tablet 10/14/2023 at pm  Yes Yes   Sig: Take 20 mg by mouth every evening    clopidogrel (PLAVIX) 75 MG tablet 10/14/2023 at am  No Yes   Sig: Take 1 tablet (75 mg) by mouth daily   hydrALAZINE (APRESOLINE) 50 MG tablet 10/14/2023 at pm  Yes Yes   Sig: Take 50 mg by mouth 3 times daily    hypromellose (ARTIFICIAL TEARS) 0.5 % SOLN ophthalmic solution  at PRN  Yes No   Si drop 4 times daily as needed for dry eyes   isosorbide dinitrate (ISORDIL) 20 MG tablet 10/14/2023 at pm  Yes Yes   Sig: Take 20 mg by mouth 3 times daily    levothyroxine (SYNTHROID/LEVOTHROID) 150 MCG tablet 10/14/2023 at am  Yes Yes   Sig: Take 150 mcg by mouth daily   potassium chloride ER (KLOR-CON M) 20 MEQ CR tablet 10/14/2023 at am  Yes Yes   Sig: Take 40 mEq by mouth daily    pregabalin (LYRICA) 75 MG capsule 10/14/2023 at pm  Yes Yes   Sig: Take 75 mg by mouth 2 times daily   sertraline (ZOLOFT) 100 MG " tablet 10/14/2023 at am  Yes Yes   Sig: Take 150 mg by mouth daily   torsemide (DEMADEX) 20 MG tablet 10/14/2023 at am  Yes Yes   Sig: Take 60 mg by mouth every morning    vitamin D3 (CHOLECALCIFEROL) 50 mcg (2000 units) tablet 10/14/2023 at am  Yes Yes   Sig: Take 2,000 Units by mouth daily       Facility-Administered Medications: None     Current Facility-Administered Medications   Medication Dose Route Frequency    acetaminophen  975 mg Oral TID    acetylcysteine  2 mL Nebulization 4x Daily    [Held by provider] amLODIPine  5 mg Oral Daily    aspirin  81 mg Oral Daily    atorvastatin  20 mg Oral QPM    ceFEPIme  2 g Intravenous Q12H    clopidogrel  75 mg Oral Daily    famotidine  20 mg Intravenous Q12H    guaiFENesin  600 mg Oral BID    heparin ANTICOAGULANT  5,000 Units Subcutaneous Q12H    [Held by provider] hydrALAZINE  50 mg Oral TID    hydrocortisone sodium succinate PF  50 mg Intravenous Q6H    [Held by provider] isosorbide dinitrate  20 mg Oral TID    levalbuterol  1.25 mg Nebulization 4x Daily    levothyroxine  150 mcg Oral QAM AC    metroNIDAZOLE  500 mg Intravenous Q8H    multivitamins w/minerals  15 mL Per Feeding Tube Daily    pregabalin  75 mg Oral BID    protein modular  2 packet Per Feeding Tube BID    sertraline  150 mg Oral Daily    sodium chloride (PF)  10-40 mL Intracatheter Q7 Days    sodium chloride (PF)  3 mL Intracatheter Q8H    vancomycin  1,000 mg Intravenous Q24H    vitamin D3  50 mcg Oral Daily     Current Facility-Administered Medications   Medication Last Rate    dextrose      epoprostenol 10 ng/kg/min (10/22/23 1211)    HYDROmorphone 0.2 mg/hr (10/22/23 1116)    midazolam 2 mg/hr (10/22/23 1209)    - MEDICATION INSTRUCTIONS -      norepinephrine 0.2 mcg/kg/min (10/22/23 1120)    - MEDICATION INSTRUCTIONS -      - MEDICATION INSTRUCTIONS -      propofol 45 mcg/kg/min (10/22/23 1116)     Allergies   No Known Allergies    Social History    reports that she has never smoked. She has  "never used smokeless tobacco.    Family History   No premature CAD     Review of Systems   A comprehensive review of system was performed and is negative other than that noted in the HPI or here.     Physical Exam   Vital Signs with Ranges  Temp:  [97.6  F (36.4  C)-101.2  F (38.4  C)] 99.5  F (37.5  C)  Pulse:  [] 81  Resp:  [16-48] 20  BP: ()/() 138/65  FiO2 (%):  [60 %-100 %] 100 %  SpO2:  [88 %-100 %] 98 %  Wt Readings from Last 4 Encounters:   10/21/23 85.7 kg (188 lb 14.4 oz)   02/28/22 94.3 kg (208 lb)   11/22/19 101.6 kg (223 lb 14.4 oz)   02/14/19 104.9 kg (231 lb 3.2 oz)     I/O last 3 completed shifts:  In: 36 [I.V.:36]  Out: 1750 [Urine:1750]      Vitals: /65   Pulse 81   Temp 99.5  F (37.5  C)   Resp 20   Ht 1.524 m (5')   Wt 85.7 kg (188 lb 14.4 oz)   SpO2 98%   BMI 36.89 kg/m      Physical Exam:   General - Alert and in no acute distress  Respiratory - Diffuse crackles  Cardiovascular - s1 normal normal s2, systolic murmur  Gastrointestinal - Non distended  Psych - Appropriate affect     No lab results found in last 7 days.    Invalid input(s): \"TROPONINIES\"    Recent Labs   Lab 10/22/23  0916 10/22/23  0754 10/22/23  0520 10/22/23  0427 10/21/23  1431 10/21/23  0707 10/20/23  1451 10/20/23  0643   WBC  --   --  20.0*  --   --  22.3*  22.3*  --  21.3*   HGB  --   --  8.9*  --   --  9.6*  --  9.9*   MCV  --   --  81  --   --  84  --  81   PLT  --   --  389  --   --  341  --  285   NA  --   --  149*  --   --  143  --  140   POTASSIUM 3.4  --  3.4  --   --  3.7   < > 3.0*   CHLORIDE  --   --  108*  --   --  103  --  99   CO2  --   --  29  --   --  28  --  28   BUN  --   --  45.8*  --   --  37.4*  --  31.2*   CR  --   --  1.29*  --   --  1.35*  --  1.20*   GFRESTIMATED  --   --  42*  --   --  40*  --  46*   ANIONGAP  --   --  12  --   --  12  --  13   BARBARA  --   --  9.7  --   --  9.8  --  9.5   GLC  --  121* 130* 128*   < > 116*  --  113*   ALBUMIN  --   --  2.9*  --   --   " "--   --   --    PROTTOTAL  --   --  6.5  --   --   --   --   --    BILITOTAL  --   --  0.3  --   --   --   --   --    ALKPHOS  --   --  100  --   --   --   --   --    ALT  --   --  27  --   --   --   --   --    AST  --   --  52*  --   --   --   --   --     < > = values in this interval not displayed.     Recent Labs   Lab Test 11/21/19 1935   CHOL 133   HDL 48*   LDL 69   TRIG 80     Recent Labs   Lab 10/22/23  0520 10/21/23  0707 10/20/23  0643 10/19/23  0556   WBC 20.0* 22.3*  22.3* 21.3* 19.7*  19.2*   HGB 8.9* 9.6* 9.9* 10.2*  10.2*   HCT 28.3* 30.3* 30.9* 33.3*  32.6*   MCV 81 84 81 86  83    341 285 264  268   RETICABSCT  --   --   --  0.041   RETP  --   --   --  1.1     Recent Labs   Lab 10/22/23  0520 10/21/23  1940 10/21/23  0836   PH  --  7.37  --    PHV 7.36  --  7.33   PO2  --  117*  --    PO2V 43  --  55*   PCO2  --  50*  --    PCO2V 56*  --  56*   HCO3  --  29*  --    HCO3V 31*  --  30*     Recent Labs   Lab 10/22/23  0916 10/20/23  1451   NTBNPI 6,064* 2,657*     No results for input(s): \"DD\" in the last 168 hours.  No results for input(s): \"SED\", \"CRP\" in the last 168 hours.  Recent Labs   Lab 10/22/23  0520 10/21/23  0707 10/20/23  0643    341 285     No results for input(s): \"TSH\" in the last 168 hours.  No results for input(s): \"COLOR\", \"APPEARANCE\", \"URINEGLC\", \"URINEBILI\", \"URINEKETONE\", \"SG\", \"UBLD\", \"URINEPH\", \"PROTEIN\", \"UROBILINOGEN\", \"NITRITE\", \"LEUKEST\", \"RBCU\", \"WBCU\" in the last 168 hours.    Imaging:  Recent Results (from the past 48 hour(s))   XR Chest Port 1 View    Narrative    CHEST ONE VIEW  10/20/2023 1:44 PM     HISTORY: Shortness of breath. Increased O2 requirements.    COMPARISON: Chest radiograph 10/15/2023.      Impression    IMPRESSION: Worsening right upper and left basilar opacities  consistent with worsening infection.  Probable small pleural effusion.  No pneumothorax. Similar enlarged cardiomediastinal silhouette. Aortic  calcification.    TRAE LIMA" MD SVETA         SYSTEM ID:  [id number]   XR Chest Port 1 View    Narrative    EXAM: XR CHEST PORTABLE 1 VIEW  LOCATION: [facility]  DATE: 10/21/2023    INDICATION: Unresolving pneumonia, increased O2 requirements.  COMPARISON: 10/20/2023 CXR and 10/15/2023 CT CAP.      Impression    IMPRESSION: Cardiac enlargement. Enlargement of the central pulmonary arteries. Focal consolidation right upper lobe and consolidation throughout the left lower lobe. Possible small volume of left basilar pleural fluid. No pneumothorax. No significant   change.     Echocardiogram Complete   Result Value    LVEF  60-65%    Narrative    [id number]  [id number]  [id number]  [id number]^KELLY     [facility]  Echocardiography Laboratory  [address] [place] [address]     Name: ANA VINSON  MRN: [id number]  : 1945  Study Date: 10/21/2023 09:51 AM  Age: 78 yrs  Gender: Female  Patient Location: [facility]  Reason For Study: SOB  Ordering Physician: KELLY ENRIQUEZ  Performed By: Johnathan Boston RDCS     BSA: 1.8 m2  Height: 60 in  Weight: 188 lb  HR: 79  BP: 106/48 mmHg  ______________________________________________________________________________  Procedure  Complete Portable Echo Adult. Complete Echo Adult.  ______________________________________________________________________________  Interpretation Summary     Left ventricular systolic function is normal. The visual ejection fraction is  60-65%. No regional wall motion abnormalities noted.  The right ventricle is normal in size and function.  Moderate valvular aortic stenosis with aortic valve area is 1.1 cm^2. The  mean  AoV pressure gradient is 29.5 mmHg.  Comapred to the prior study, aortic stenosis has progressed from mild to  moderate. Otherwise, no change.  ______________________________________________________________________________  Left Ventricle  The left ventricle is normal in size. There is normal left ventricular  wall  thickness. Grade I or early diastolic dysfunction. Left ventricular systolic  function is normal. The visual ejection fraction is 60-65%. No regional wall  motion abnormalities noted.     Right Ventricle  The right ventricle is normal in size and function.     Atria  The left atrium is moderately dilated. Right atrial size is normal.     Mitral Valve  The mitral valve leaflets appear normal. There is no evidence of stenosis,  fluttering, or prolapse. There is trace mitral regurgitation.     Tricuspid Valve  Normal tricuspid valve. There is trace tricuspid regurgitation. Right  ventricular systolic pressure could not be approximated due to inadequate  tricuspid regurgitation.     Aortic Valve  Normal tricuspid aortic valve. Moderate valvular aortic stenosis. The  calculated aortic valve are is 1.1 cm^2. The mean AoV pressure gradient is  29.5 mmHg.     Pulmonic Valve  The pulmonic valve is not well visualized. There is trace pulmonic valvular  regurgitation.     Vessels  Normal size aorta. Normal size ascending aorta. IVC diameter <2.1 cm  collapsing >50% with sniff suggests a normal RA pressure of 3 mmHg.     Pericardium  There is no pericardial effusion.     Rhythm  Sinus rhythm was noted.  ______________________________________________________________________________  MMode/2D Measurements & Calculations     IVC diam: 1.9 cm  Ao root diam: 3.1 cm  LA dimension: 3.7 cm  asc Aorta Diam: 3.9 cm  LA/Ao: 1.2  LVOT diam: 1.9 cm  LVOT area: 2.9 cm2  Ao root diam index Ht(cm/m): 2.0  Ao root diam index BSA (cm/m2): 1.7  Asc Ao diam index BSA (cm/m2): 2.1  Asc Ao diam index Ht(cm/m): 2.6  LA Volume (BP): 90.7 ml  LA Volume Index (BP): 49.8 ml/m2     RV Base: 3.1 cm  TAPSE: 2.2 cm     Time Measurements  Aortic HR: 75.4 BPM     Doppler Measurements & Calculations  MV E max caitlin: 115.0 cm/sec  MV A max caitlin: 140.5 cm/sec  MV E/A: 0.82  MV dec slope: 389.4 cm/sec2  MV dec time: 0.30 sec  Ao V2 max: 372.5 cm/sec  Ao max PG:  55.0 mmHg  Ao V2 mean: 255.3 cm/sec  Ao mean P.5 mmHg  Ao V2 VTI: 74.6 cm  IVAN(I,D): 1.1 cm2  IVAN(V,D): 1.0 cm2  LV V1 max P.2 mmHg  LV V1 max: 133.7 cm/sec  LV V1 VTI: 29.4 cm  CO(LVOT): 6.4 l/min  CI(LVOT): 3.5 l/min/m2  SV(LVOT): 84.3 ml  SI(LVOT): 46.3 ml/m2  AV Sj Ratio (DI): 0.36  IVAN Index (cm2/m2): 0.62  E/E' av.9  Lateral E/e': 14.3     Medial E/e': 19.6  RV S Sj: 15.4 cm/sec     ______________________________________________________________________________  Report approved by: Lindsey Quiroz 10/21/2023 05:27 PM         CT Chest Pulmonary Embolism w Contrast    Narrative    EXAM: CT CHEST PULMONARY EMBOLISM W CONTRAST  LOCATION: [facility]  DATE: 10/21/2023    INDICATION: worsening hypoxemia. R O PE.; Female sex; Not pregnant; No prior imaging in the last 24 hours; Pulmonary Embolism Rule Out Criteria (PERC) score > 0; Revised [place] Score (RGS) not >= 11; No D dimer result available; D dimer not ordered  COMPARISON: Same day chest radiograph, 10/15/2023  TECHNIQUE: CT chest pulmonary angiogram during arterial phase injection of IV contrast. Multiplanar reformats and MIP reconstructions were performed. Dose reduction techniques were used.   CONTRAST: 65mL Isovue 370    FINDINGS:  ANGIOGRAM CHEST: Pulmonary arteries are dilated but negative for pulmonary emboli. Thoracic aorta is negative for dissection. No CT evidence of right heart strain.    LUNGS AND PLEURA: Slight improvement in dense consolidation in the lingula. However, there are multiple new/worsening areas of groundglass opacification and consolidation throughout both lungs. No significant pleural effusion.    MEDIASTINUM/AXILLAE: Enlarged heart. No pericardial effusion. Interval enlargement of mediastinal and hilar lymph nodes, likely reactive to adjacent inflammation, no specific follow-up recommended.    CORONARY ARTERY CALCIFICATION: Severe.    UPPER ABDOMEN: Unremarkable.    MUSCULOSKELETAL: No acute  bony abnormality. Unchanged old left rib fractures.        Impression    IMPRESSION:  1.  Extensive new/increased bilateral groundglass opacities and consolidation, favor multifocal pneumonia and/or ARDS.  2.  Findings suggestive of pulmonary hypertension.  3.  No pulmonary embolus.   XR Chest Port 1 View    Narrative    EXAM: CHEST SINGLE VIEW PORTABLE  LOCATION: [facility]  DATE/TIME: 10/22/2023 1:03 AM CDT    INDICATION: Nurse placed peripherally inserted central catheter.  COMPARISON: 10/21/2023 at 0827 hours.      Impression    IMPRESSION:   1. Interval placement of a right upper extremity peripherally inserted central catheter with distal tip obscured, but most likely in the mid superior vena cava.  2. No other significant change since the recent comparison study.  3. Moderately extensive airspace opacities within both lungs, most prominent in the upper right lung and lower left lung, again noted.    XR Abdomen Port 1 View    Narrative    EXAM: XR ABDOMEN PORT 1 VIEW  LOCATION: [facility]  DATE: 10/22/2023    INDICATION: Tube placement  COMPARISON: None.      Impression    IMPRESSION: NG tube tip and sidehole in the stomach. Moderate degenerative change in the spine and both hips.   XR Chest Port 1 View    Narrative    EXAM: XR CHEST PORT 1 VIEW  LOCATION: [facility]  DATE: 10/22/2023    INDICATION: ARDS vs. multifocal pneumonia, status post endotracheal tube placement  COMPARISON: Chest radiograph 10/22/2023 12:37 AM..      Impression    IMPRESSION:    Endotracheal tube tip is 2.2 cm above the ashley. Unchanged right PICC with tip in the mid SVC.    Multifocal patchy airspace opacities most prominent within the right upper lung and left lower lung have not significant changed. No new airspace opacities. No pleural effusions or pneumothorax.    Stable cardiomediastinal silhouette. Aortic atherosclerotic calcifications.       Echo:  No results  found for this or any previous visit (from the past 4320 hour(s)).    This note was completed in part using dictation via the Dragon voice recognition software. Some word and grammatical errors may occur and must be interpreted in the appropriate clinical context.  If there are any questions pertaining to this issue, please contact me for further clarification.

## 2023-10-22 NOTE — CONSULTS
Matthew Bowen MRN# [id number]   Age: 78 year old YOB: 1945     Date of Admission:  10/15/2023      Primary care provider: Rosalind Rider         Assessment and Plan:   Matthew Bowen is a 78 year old female with obstructive sleep apnea, thyroid disease, chronic hypoxic respiratory failure (2 L at home) admitted on 10/15/2023 with fever and cough.  She transferred to the ICU on 10/21/2023 with worsening hypoxic respiratory failure and is now being treated for ARDS.    Neuro:   #Generalized weakness  #Falls at home  #Chronic pain   - continue PTA lyrica and zoloft   RASS goal -3/-4 to allow for rest and tolerance to the ventilator.  We will wean daily and as able.  -Continue Dilaudid drip with bumps, propofol drip with bumps, Versed drip with bumps added today given softer pressures with propofol.    Cardiac:   #Chronic diastolic HF  #HTN  #CAD s/p PCI to distal L circumflex, known 80% stenosis of RCA, 25% stenosis LAD, 50% stenosis first marginal (2019 left heart cath)  #Ao stenosis, mild MR  She is followed by Dr. Blackwell (cardiology), last seen in June 2023.  TTE showing LVEF of 60 to 65% with grade 1 diastolic dysfunction, moderate aortic stenosis with aortic valve area 1.1 and mean pressure gradient 29.5. Ao stenosis has worsened.  There was some concern for pulmonary hypertension with this noted in the chart but there is no mention in cardiology note or on TTE.  - MAP goal > 65, NE to maintain this goal (likely propofol induced)  -Cardiology consulted for cardiac contribution to groundglass opacities in setting of worsening aortic stenosis and mitral regurg  -Continue aspirin 81 mg daily, Lipitor, plavix  -Currently holding PTA hydralazine, Isordil, amlodipine  -holding PTA torsemide   - CVP today     Pulm:   Vent Mode: CMV/AC  (Continuous Mandatory Ventilation/ Assist Control)  FiO2 (%): 100 %  Resp Rate (Set): 20 breaths/min  Tidal Volume (Set, mL): 300 mL  PEEP (cm H2O): 10  cmH2O  Resp: 21  #Acute on chronic hypoxic respiratory failure , 2 L at baseline   #ALAINA on home cpap  #ARDS  #Recurrent pneumonia  #Bibasilar bronchiectasis   Patient with recurrent pneumonias over the last several months.  Initially followed by the pulmonary consult team.  Admission CT with lingular consolidation and air bronchograms as well as upper lobe predominant groundglass opacities.  She does have bibasilar bronchiectasis and evaluation for this has been negative.  Broad work-up performed with negative cultures at this time (see below), mildly elevated RF, borderline MARLI (1:40).  Currently requiring significant vent support with PEEP of 10, FiO2 100% and inhaled Veletri.  Also consider worsening aortic stenosis contributing to worsening hypoxia.  Etiology of ARDS may be secondary to bacterial versus other infection, inflammatory such as pneumonitis,  vs. DAH.   -Low tidal volume ventilation, target plat less than 30 (achieved), higher PEEP  -Permissive hypercapnia  -Veletri  -Wean support as able  -Bronc with lavage today -1 time aliquot lavage form showing fluid pink-tinged, not consistent with diffuse alveolar hemorrhage but likely more indicative of ongoing blood thinning medications (Plavix) - follow-up these studies  - scheduled levalbuterol/mucomyst QID, CPT QID  - follow-up bronch studies  - continue hydrocortisone 50 mg Q6H for severe CAP  - pulm consult team to see tomorrow   - follow-up: repeat MARLI, ANCA, anti-GBM      Renal:   #CKD 3   Patient's baseline creatinine is approximately 1.2, currently at her baseline.  Will monitor for evidence of AIN in the setting of receiving IV contrast.   -Monitor I's and O's  -Avoid nephrotoxins  -BMP  -Daily weight    GI:   #Abdominal tenderness on exam  Last BM was 10/19.  Suggest tenderness secondary to constipation given stool burden on abdominal x-ray.  AST mildly elevated at 52.  We will add on lipase.  -Bowel regimen initiated    ID:   #Sepsis   #Lobar  pneumonia on admission   #Low IgG   Patient with lingular consolidation on admission.  Procalcitonin from 0.16-1.0 in the setting of some CKD.  Sputum culture positive for staph epi and Candida which are typically nonpathogenic.  She was initiated on ceftriaxone/azithromycin (completed a 5-day course), and then transition to cefepime/Flagyl.  Vancomycin added overnight with decompensation.  MRSA nares notably negative but will follow-up cultures. IgG low on 10/18 - unreliable in the setting of acute infection.   -Galactomannan pending, Fungitell negative  -Fungal antigen/antibodies pending, low suspicion given findings on CT and negative Fungitell  -Follow-up [facility] studies  -Narrow antibiotics as able  -ID following, appreciated    Heme:   #Leukocytosis, persistent and stable since admission  #Normocytic anemia   No signs of bleeding. Continue to monitor.   - daily CBC     Endo:  #Morbid obesity   #Hypothyroidism  Prior discharge weight 95 kg, currently 85.7 kg.   Monitor BG's per ICU protocol with goal 140 and 180.TSH 0.51 on admission.   - continue PTA levothyroxine    ICU:   DVT ppx: heparin  GI ppx: protonix  Lines: ETT, mitchell, OG  Code: Full  Family: Discussed with family at bedside     Critical Care Time: 70 min.  I spent this time (excluding procedures) personally providing and directing critical care services at the bedside and on the critical care unit.       Date of Service (when I saw the patient): October 22, 2023      Marie Yin MD  Pulmonary, Allergy, Critical Care, and Sleep Medicine   Pager: [phone]    This note was created using dictation software and may contain errors.  Please contact the creator for any clarifications that are needed.            Chief Complaint:     Hypoxic respiratory failure         History of Present Illness:     History obtained from patient, family, EMR.    Patient was admitted from home on 10/15/2023 with fever, productive cough, and falls.  She was found to have a  lingular consolidation as well as upper lobe predominant groundglass opacities, leukocytosis concerning for infection.  Pulmonary was consulted and following patient.  Infectious disease is also following.  She was treated with 5 days of azithromycin and 3 days of ceftriaxone.  She was broadened to cefepime on 10/18 with the addition of metronidazole given ongoing leukocytosis and symptoms.  On 10/21/2023 there was concern for mucous plugging with movement and worsening oxygenation and she was moved to the ICU for ongoing cares.    Overnight she was initiated on BiPAP 14/7 and weaned to FiO2 70%.  In the morning she did have worsening oxygenation and the decision to intubate her was discussed with patient, daughter,  and other family members who were in agreement.  Bronchoscopy with lavage and therapeutic suctioning was performed after intubation.        Review of Systems:   A 13 point ROS was performed and was negative except as listed above in the HPI.  She does have bilateral pain in her lower extremities which is her baseline.  She has some diffuse tenderness in her abdomen.         Past Medical and Surgical History:     Past Medical History:   Diagnosis Date    Antiplatelet or antithrombotic long-term use     Arthritis     Congestive heart failure (H)     Dyspnea on exertion     Heart attack (H)     Heart murmur     Hypertension     Irregular heart beat     Oxygen dependent     2L continuous at home.    Pulmonary hypertension (H)     Sleep apnea     Bipap    Stented coronary artery     x 1    Thyroid disease     Walking troubles      Past Surgical History:   Procedure Laterality Date    APPENDECTOMY      COLONOSCOPY      COLONOSCOPY N/A 2/28/2022    Procedure: COLONOSCOPY;  Surgeon: Kolby Dunn MD;  Location: [facility] OR    CV CORONARY ANGIOGRAM N/A 11/21/2019    Procedure: Coronary Angiogram;  Surgeon: Tay Andre MD;  Location: [facility] HEART CARDIAC CATH LAB    CV LEFT HEART CATH N/A 11/21/2019  "   Procedure: Left Heart Cath;  Surgeon: Tay Andre MD;  Location:  HEART CARDIAC CATH LAB    CV PCI STENT DRUG ELUTING N/A 11/21/2019    Procedure: PCI Stent Drug Eluting;  Surgeon: Tay Andre MD;  Location:  HEART CARDIAC CATH LAB    GYN SURGERY      Hyst.    ORTHOPEDIC SURGERY      B\" TKs           Family History:     No family history on file.  Pertinent family history reviewed.        Social History:     Social History     Socioeconomic History    Marital status:      Spouse name: Not on file    Number of children: Not on file    Years of education: Not on file    Highest education level: Not on file   Occupational History    Not on file   Tobacco Use    Smoking status: Never    Smokeless tobacco: Never   Substance and Sexual Activity    Alcohol use: Not on file     Comment: 2x/year    Drug use: Not on file    Sexual activity: Not on file   Other Topics Concern    Parent/sibling w/ CABG, MI or angioplasty before 65F 55M? Not Asked   Social History Narrative    Not on file     Social Determinants of Health     Financial Resource Strain: Not on file   Food Insecurity: Not on file   Transportation Needs: Not on file   Physical Activity: Not on file   Stress: Not on file   Social Connections: Not on file   Interpersonal Safety: Not on file   Housing Stability: Not on file            Allergies and Medications:   No Known Allergies  Medications Prior to Admission   Medication Sig Dispense Refill Last Dose    Acetaminophen (TYLENOL PO) Take 1,000 mg by mouth 3 times daily   10/14/2023 at pm    amLODIPine (NORVASC) 5 MG tablet Take 5 mg by mouth daily    10/14/2023 at am    aspirin 81 MG EC tablet Take 81 mg by mouth daily   10/14/2023 at am    atorvastatin (LIPITOR) 20 MG tablet Take 20 mg by mouth every evening    10/14/2023 at pm    clopidogrel (PLAVIX) 75 MG tablet Take 1 tablet (75 mg) by mouth daily 30 tablet 0 10/14/2023 at am    hydrALAZINE (APRESOLINE) 50 MG tablet Take 50 mg by " mouth 3 times daily    10/14/2023 at pm    isosorbide dinitrate (ISORDIL) 20 MG tablet Take 20 mg by mouth 3 times daily    10/14/2023 at pm    levothyroxine (SYNTHROID/LEVOTHROID) 150 MCG tablet Take 150 mcg by mouth daily   10/14/2023 at am    Multiple Vitamins-Minerals (MULTIVITAMIN ADULTS PO) Take 1 tablet by mouth daily   10/14/2023 at am    potassium chloride ER (KLOR-CON M) 20 MEQ CR tablet Take 40 mEq by mouth daily    10/14/2023 at am    pregabalin (LYRICA) 75 MG capsule Take 75 mg by mouth 2 times daily   10/14/2023 at pm    sertraline (ZOLOFT) 100 MG tablet Take 150 mg by mouth daily   10/14/2023 at am    torsemide (DEMADEX) 20 MG tablet Take 60 mg by mouth every morning    10/14/2023 at am    vitamin D3 (CHOLECALCIFEROL) 50 mcg (2000 units) tablet Take 2,000 Units by mouth daily    10/14/2023 at am    hypromellose (ARTIFICIAL TEARS) 0.5 % SOLN ophthalmic solution 1 drop 4 times daily as needed for dry eyes    at PRN         Physical Exam:   Temp:  [97.6  F (36.4  C)-101.2  F (38.4  C)] 99.7  F (37.6  C)  Pulse:  [] 85  Resp:  [16-48] 21  BP: ()/() 102/97  MAP:  [72 mmHg-78 mmHg] 77 mmHg  Arterial Line BP: (115-130)/(47-56) 125/54  FiO2 (%):  [60 %-100 %] 100 %  SpO2:  [86 %-100 %] 97 %    Intake/Output Summary (Last 24 hours) at 10/22/2023 1404  Last data filed at 10/22/2023 1336  Gross per 24 hour   Intake 396 ml   Output 2525 ml   Net -2129 ml       Constitutional:   Awake, alert and in mild distress     Eyes:   Nonicteric, ALYSSA     ENT:    oral mucosa dry without lesions     Neck:   Supple without supraclavicular or cervical lymphadenopathy     Lungs:   Good air flow b/l. Coarse rhonchi. No crackles.  No wheezes.     Cardiovascular:   Normal S1 and S2.  RRR.  No murmur, gallop or rub.  Radial, DP pulses normal and symmetric     Abdomen:   NABS, soft, + tender in all quadrants, nondistended.  No HSM.     Musculoskeletal:   No edema. Strength 5/5 and symmetric Tender lower  extremities.      Neurologic:   Alert and conversant. Cranial nerves II-XII grossly intact.       Skin:   Warm, dry.  No rash on limited exam.           Data:   All laboratory and imaging data personally reviewed.    CMP  Recent Labs   Lab 10/22/23  1242 10/22/23  0916 10/22/23  0754 10/22/23  0520 10/22/23  0427 10/21/23  1431 10/21/23  0707 10/20/23  1451 10/20/23  0643 10/19/23  0556   NA  --   --   --  149*  --   --  143  --  140 142   POTASSIUM  --  3.4  --  3.4  --   --  3.7 3.7 3.0* 3.7   CHLORIDE  --   --   --  108*  --   --  103  --  99 100   CO2  --   --   --  29  --   --  28  --  28 30*   ANIONGAP  --   --   --  12  --   --  12  --  13 12   *  --  121* 130* 128*   < > 116*  --  113* 108*   BUN  --   --   --  45.8*  --   --  37.4*  --  31.2* 26.2*   CR  --   --   --  1.29*  --   --  1.35*  --  1.20* 1.29*   GFRESTIMATED  --   --   --  42*  --   --  40*  --  46* 42*   BARBARA  --   --   --  9.7  --   --  9.8  --  9.5 9.7   MAG  --  2.5*  --   --   --   --   --   --   --   --    PHOS  --  2.9  --   --   --   --   --   --   --   --    PROTTOTAL  --   --   --  6.5  --   --   --   --   --   --    ALBUMIN  --   --   --  2.9*  --   --   --   --   --   --    BILITOTAL  --   --   --  0.3  --   --   --   --   --   --    ALKPHOS  --   --   --  100  --   --   --   --   --   --    AST  --   --   --  52*  --   --   --   --   --   --    ALT  --   --   --  27  --   --   --   --   --   --     < > = values in this interval not displayed.     CBC  Recent Labs   Lab 10/22/23  0520 10/21/23  0707 10/20/23  0643 10/19/23  0556   WBC 20.0* 22.3*  22.3* 21.3* 19.7*  19.2*   RBC 3.48* 3.63* 3.84 3.89  3.91   HGB 8.9* 9.6* 9.9* 10.2*  10.2*   HCT 28.3* 30.3* 30.9* 33.3*  32.6*   MCV 81 84 81 86  83   MCH 25.6* 26.4* 25.8* 26.2*  26.1*   MCHC 31.4* 31.7 32.0 30.6*  31.3*   RDW 16.9* 16.9* 16.3* 16.8*  16.7*    341 285 264  268     INRNo lab results found in last 7 days.  Arterial Blood Gas  Recent Labs   Lab  "10/22/23  1305 10/22/23  0520 10/21/23  1940 10/21/23  0836   PH 7.28*  --  7.37  --    PCO2 59*  --  50*  --    PO2 113*  --  117*  --    HCO3 28  --  29*  --    O2PER 100 80 100 95     Urine Studies    Recent Labs   Lab Test 10/15/23  1108   URINEPH 7.0   NITRITE Negative   LEUKEST Small*   WBCU 5     CMV viral loads  No lab results found.  No results found for: \"CMQNT\", \"CMVQ\"  EBV viral loads   No lab results found.  No results found for: \"EBVDN\", \"EBRES\", \"EBVSP\", \"EBVPC\", \"EBVPCR\"  Respiratory Virus Testing    No results found for: \"RS\", \"FLUAG\"   Sputum Cultures in the last 3 months:  Specimen Description   Date Value Ref Range Status   02/12/2019 Sputum  Final   02/12/2019 Sputum  Final    Culture Micro   Date Value Ref Range Status   02/12/2019 Light growth  Normal deshaun    Final        CT CAP with contrast - FINDINGS:   LUNGS AND PLEURA: Lingula consolidative opacity with air bronchogram. Additional scattered groundglass opacities within the right upper lobe and left upper lobe. Similar bibasilar bronchiectasis. No pleural effusion. No new or growing suspicious   pulmonary nodule.     MEDIASTINUM/AXILLAE: No lymphadenopathy. No thoracic aortic aneurysms.     CORONARY ARTERY CALCIFICATION: Severe.     HEPATOBILIARY: Similar subcentimeter hepatic dome hypodensity, likely benign. No suspicious mass. Gallbladder is unremarkable.     PANCREAS: Normal.     SPLEEN: Normal.     ADRENAL GLANDS: Similar mild adrenal gland thickening.     KIDNEYS/BLADDER: Mild left cortical thinning and scarring. Left lower pole nonobstructing calculus. Since 2021 similar left mid pole hypodensity, likely benign. No collecting system dilatation. Urinary bladder is unremarkable.     BOWEL: No obstruction or inflammatory change. Moderate stool in the colon.     LYMPH NODES: No enlarged lymph node.     VASCULATURE: Nonaneurysmal abdominal aorta. Severe atherosclerosis.     PELVIC ORGANS: Uterus is absent.     MUSCULOSKELETAL: Subacute " left posterior left 11th and 12th rib fractures. Moderate to severe bilateral hip degenerative changes. No convincing acute fracture of the pelvis. Moderate degenerative changes of the thoracolumbar spine.                                                                       IMPRESSION:  1.  Lingular consolidative opacity with air bronchograms suspicious for infection/pneumonia.  2.  Additional bilateral upper lobe groundglass opacities are likely infectious/inflammatory.  3.  Subacute left posterior 11th and 12th rib fractures.  4.  No convincing acute process within the abdomen or pelvis.     CTPE - 10/21/23 - FINDINGS:  ANGIOGRAM CHEST: Pulmonary arteries are dilated but negative for pulmonary emboli. Thoracic aorta is negative for dissection. No CT evidence of right heart strain.     LUNGS AND PLEURA: Slight improvement in dense consolidation in the lingula. However, there are multiple new/worsening areas of groundglass opacification and consolidation throughout both lungs. No significant pleural effusion.     MEDIASTINUM/AXILLAE: Enlarged heart. No pericardial effusion. Interval enlargement of mediastinal and hilar lymph nodes, likely reactive to adjacent inflammation, no specific follow-up recommended.     CORONARY ARTERY CALCIFICATION: Severe.     UPPER ABDOMEN: Unremarkable.     MUSCULOSKELETAL: No acute bony abnormality. Unchanged old left rib fractures.                                                                         IMPRESSION:  1.  Extensive new/increased bilateral groundglass opacities and consolidation, favor multifocal pneumonia and/or ARDS.  2.  Findings suggestive of pulmonary hypertension.  3.  No pulmonary embolus.

## 2023-10-22 NOTE — PLAN OF CARE
Shift Events: Intubated @ 1000. Bronchoscopy completed. Art-line placed.     Neuro: Sedated RASS goal -4/-5 on Versed, Propofol and Dilaudid. T max 99.7  CV: SR with rare PVCs. Levophed .21 difficulty to wean.  ID: Vanco, Flagyl and Cefepime  Pulm: FiO2 weaned from 100% to 75%. PEEP 10. Veletri added. Moderate cloudy creamy secretions. Bronch samples pending.   GI: Gastric TF @ 10. FWF 100q4. 's this afternoon. Sliding scale insulin added. No BM.   : Indwelling mitchell. UO borderline around 30 ml/hr.   Lines/gtts: PICC, Art-Line, PIV x2  Skin: Bruising  Labs: Frequent labs drawn. K replaced.     Plan: Continue to wean sedation, pressors and O2 requirements as pt tolerates. Follow bronchoscopy results.     Right wrist and Left wrist restraints continued 10/22/2023    Clinical Justification: Pulling lines, pulling tubes, and pulling equipment  Less Restrictive Alternative: Repositioning, Disguise equipment, Reorientation, De-escalation, Pain management  Attending Physician Notified: MD ordered restraint,     New orders placed Yes  Length of Order: 1 Day      Hazel Mata, RN

## 2023-10-22 NOTE — PLAN OF CARE
ICU End of Shift Summary.  For vital signs and complete assessments, please see documentation flowsheets.      Pertinent assessments:   Neuro: alert and oriented. Denies pain.  Cardiac: SR. VSS.   Resp: Continous bipap - 90% fio2. LS crackles/dim. Good cough, nonproductive. Mucous membranes dry - frequent swabs with mouth moisture. Pt. O2 saturations drop significantly without bipap - 70%.   GI: WDL. NPO. BG q4hr.   : purewick in place intermittent retention - pt. Able to urinate with one time dose of lasix - pt. States takes lasix @ home for this reason.    Skin: see flowsheets for details.  Lines: 2 PIV, R PICC      Major Shift Events:   New admit to ICU  Contacted [facility] regarding PICC placement for trending labs/electrolytes - orders placed for vascular access. PICC placed @ 0100.  Weaned fio2 as tolerated.   Pt. States unable to urinate, bladder scan 430. Contacted [facility] regarding bladder management order. Orders placed. Pt. Able to urinate with one time dose lasix.     Plan (Upcoming Events): Continue plan of care  Discharge/Transfer Needs: TBD     Bedside Shift Report Completed : Y  Bedside Safety Check Completed: Y

## 2023-10-22 NOTE — PROGRESS NOTES
An ABG was completed on the pt's right arm @ 1936 on an FIO2 of 100%.  Pressure was held until bleeding stopped.  No complications noted during the procedure.

## 2023-10-22 NOTE — ANESTHESIA CARE TRANSFER NOTE
Patient: Matthew Bowen    Procedure: * No procedures listed *       Diagnosis: * No pre-op diagnosis entered *  Diagnosis Additional Information: No value filed.    Anesthesia Type:   No value filed.     Note:    Oropharynx: endotracheal tube in place  Level of Consciousness: iatrogenic sedation  Patient oxygen source: vent.    Independent Airway: airway patency not satisfactory and stable  Dentition: dentition unchanged  Vital Signs Stable: post-procedure vital signs reviewed and stable  Report to RN Given: handoff report given  Patient transferred to: ICU    ICU Handoff: Call for PAUSE to initiate/utilize ICU HANDOFF, Identified Patient, Identified Responsible Provider, Reviewed the Pertinent Medical History, Discussed Surgical Course, Reviewed Intra-OP Anesthesia Management and Issues during Anesthesia, Set Expectations for Post Procedure Period and Allowed Opportunity for Questions and Acknowledgement of Understanding      Vitals:  Vitals Value Taken Time   BP 96/68 10/22/23 1005   Temp     Pulse 78 10/22/23 1008   Resp 27 10/22/23 1008   SpO2 89 % 10/22/23 1008   Vitals shown include unfiled device data.    Electronically Signed By: LOW Corral CRNA  October 22, 2023  10:09 AM

## 2023-10-22 NOTE — PROVIDER NOTIFICATION
Reviewed with Dr. Yin increased sodium of 152.   TORB to increase FWF to 100ml/q4 and recheck Na+ at Midnight.

## 2023-10-22 NOTE — PLAN OF CARE
Right wrist and Left wrist restraints initiated on patient on 10/22/2023 at 12:00 PM    Clinical Justification: Pulling lines, pulling tubes, and pulling equipment  Less Restrictive Alternative: Repositioning, Disguise equipment, Reorientation, De-escalation, Pain management  Attending Physician Notified: MD ordered restraint,     Order received: Yes     Family Notification: Other   Criteria explained to daughter  Patient's Response: No evidence of learning  Restraint care Plan initiated: Yes    Hazel Mata RN

## 2023-10-22 NOTE — PROGRESS NOTES
A BIPAP 14/7 @ 80-95% was applied to the pt via the mask for NOC use. The bridge of the nose looks good and remains intact. Pt is tolerating it well. Will continue to monitor and assess the pt's current respiratory status and needs.         Yandy Ventura, RT

## 2023-10-22 NOTE — PROCEDURES
ICU BRONCHOSCOPY    Procedure(s):    Bronchoscopy  Therapeutic suctioniong  Bronchalveolar Lavage (RML site(s), see below for details)    Indication:  ARDS, diffuse infiltrates     Procedure Details:     The bronchoscope was inserted through the mouth via ETT.    Airway Examination:  A complete airway examination was performed from the distal trachea to the subsegmental level in each lobe of both lungs.  Pertinent findings include: serosanguinous and fibrinous return from lavage. Some thick mucoid secretions noted.                                                                                                                                                                             BAL:  A bronchoalveolar lavage was performed.  The scope was wedged in the RML Lateral and then 90 ml of normal saline was instilled.  Gentle suction was then applied with a total return of 30 ml of fluid.         Therapeutic suctioning was performed.  Specimens were sent to the lab.    Any disposable equipment was visually inspected and deemed to be intact immediately post procedure.      Recommendations:     -->  F/up bronch results     ==========================================================    Attending of Record:   Dr. Marie Yin    Trainee(s) Present: None    Medications:    9 ml 1% lidocaine    Sedation Time: Total sedation time was 15 minutes of continuous bedside 1:1 monitoring.     Time Out:  Performed by verifying the correct patient, correct procedure, and ensuring that all equipment / support staff are present.     The patient's medical record has been reviewed.  The indication for the procedure was reviewed.  The necessary history and physical examination was performed and reviewed.  The risks, benefits and alternatives of the procedure were discussed with the the patient (pre-intubation) in detail and questions were answered to the best of my ability.  Verbal consent was obtained.  Written consent was obtained.   This procedure was deemed emergent / urgent.  The proposed procedure and the patient's identification were verified prior to the procedure by the physician and the nurse, respiratory therapist.    Bronchoscopy Risk Assessment Guidelines:      A. Patient symptoms to consider when assessing pulmonary TB risk are:    I. Cough greater than 3 weeks; and fever, hemoptysis, pleuritic chest    pain, weight loss greater than 10 lbs, night sweats, fatigue, infiltrates on    upper lobes or superior segments of lower lobes, cavitation on chest    x-ray.   B. Patient risk factors to consider when assessing pulmonary TB risk are:    I. Exposure to known TB case, foreign-born persons (within 5 years of    arrival to [place]), residence in a crowded setting (correctional facility,     long-term care center, etc.), persons with HIV or immunosuppression.    A Tuberculosis risk assessment was performed:   This patient has NO KNOWN RISK of Tuberculosis (proceed with bronchoscopy)    The procedure was performed in a negative airflow room: No. The reason the patient could not be moved to a negative airflow room was hemodynamic/respiratory insufficiency.     The patient was assessed for the adequacy for the procedure and to receive medications.     Mental Status:  Alert and oriented x 3  Airway examination:  Class III (Visualization of only the base of uvula)  Pulmonary:  Decreased breathsounds in bilateral bases, coarse b/l  CV:  RRR, no murmurs or gallops  ASA Grade:  (IV)  Severe systemic disease that is an incapacitating disease that is a constant threat to life    Immediately before administration of medications the patient was re-assessed for adequacy to receive sedatives including the heart rate, respiratory rate, mental status, oxygen saturation, blood pressure and adequacy of pulmonary ventilation. These same parameters were continuously monitored throughout the procedure.

## 2023-10-22 NOTE — PLAN OF CARE
Notified provider about indwelling mitchell catheter discussed removal or continued need.    Did provider choose to remove indwelling mitchell catheter? NO    Provider's mitchell indication for keeping indwelling mitchell catheter: Indication for continued use: Strict 1-2 Hour I & O if external catheters are not an option    Is there an order for indwelling mitchell catheter? YES    *If there is a plan to keep mitchell catheter in place at discharge daily notification with provider is not necessary, but please add a notation in the treatment team sticky note that the patient will be discharging with the catheter.

## 2023-10-23 NOTE — PROGRESS NOTES
[facility] ICU VENTILATOR RESPIRATORY NOTE    Date of Admission: 10/15/23  Date of Intubation (most recent): 10/22/23  Reason for Mechanical Ventilation: Resp failure  Number of Days on Mechanical Ventilation: 2    Met Criteria for Pressure Support Trial: No  Reason for No Pressure Support Trial: PEEP >8      Vital Signs:  Temp: 98.2  F (36.8  C) Temp src: Bladder   Pulse: 65   Resp: 20 SpO2: 100 % O2 Device: Mechanical Ventilator       ABG Results:   Recent Labs   Lab 10/23/23  0355 10/22/23  2016 10/22/23  1449 10/22/23  1305 10/22/23  0520 10/21/23  1940   PH 7.29* 7.29*  --  7.28*  --  7.37   PCO2 56* 57*  --  59*  --  50*   PO2 104 85  --  113*  --  117*   HCO3 27 27  --  28  --  29*   O2PER 70 75 100 100   < > 100    < > = values in this interval not displayed.       Vent Mode: CMV/AC  (Continuous Mandatory Ventilation/ Assist Control)  FiO2 (%): 80 %  Resp Rate (Set): 20 breaths/min  Tidal Volume (Set, mL): 300 mL  PEEP (cm H2O): 10 cmH2O  Resp: 20      Plan:  continue to monitor and wean as able    Eduarda Son, RT

## 2023-10-23 NOTE — PROGRESS NOTES
ICU staff  DOS 10/23/2023    Ms Bowen is a 79 y/o woman transferred to the ICU 10/21. She has a history of chronic hypoxemia on 2L oxygen at baseline, recurrent pneumonias with bronchiectasis/basilar scarring, CAD, aortic stenosis, and diastolic heart failure. She was intubated yesterday for acute on chronic hypoxemic respiratory failure.     Vitals:   Temp:  [96.1  F (35.6  C)-99.9  F (37.7  C)] 98.8  F (37.1  C)  Pulse:  [48-99] 74  Resp:  [9-44] 19  BP: ()/(50-97) 102/97  MAP:  [51 mmHg-91 mmHg] 73 mmHg  Arterial Line BP: ()/(-31-60) 121/48  FiO2 (%):  [65 %-100 %] 80 %  SpO2:  [86 %-100 %] 92 %     Vent Mode: CMV/AC  (Continuous Mandatory Ventilation/ Assist Control)  FiO2 (%): 80 %  Resp Rate (Set): 20 breaths/min  Tidal Volume (Set, mL): 300 mL  PEEP (cm H2O): 10 cmH2O  Resp: 19    I/O last 3 completed shifts:  In: 3124.71 [I.V.:1994.71; NG/GT:1010]  Out: 1385 [Urine:1385]    NE 0.11-0.21 overnight  Epoprostenol 20  Hydromorphone 0.3  Midazolam 4  Propofol 30    Exam:  Gen: Intubated, sedated  HEENT: NC/AT, anicteric  Pulm: Coarse breath sounds, ppk 20 (29-33), last ppl 28  Cor: RRR  Abdomen/GI: Soft, obese, nondistended  : Aleman in place  Extremities: Warm, mildly edematous; right foot extended  Skin: Well-perfused  Neuro: Sedated  Psych: Unable to assess    Data:   Labs reviewed  - WBCs 26 (20)  - Cr 1.5 (1.4), Na 150 still  - Pct 1  - BNP 6k and trending up  Imaging personally reviewed  - CXR this morning at bedside read showed persistent bilateral infiltrates, R>L, to my interpretation not much changed from yesterday  Cultures reviewed  - Nothing from BAL so far  - B1-3 d-glucan negative  - Sputum/trach aspirate with C albicans, S epi    Assessment/plan:  79 y/o woman with chronic hypoxemia, CAD, diastolic heart failure with preserved EF, moderate aortic stenosis admitted 10/15 with fever and cough. Transferred to the ICU 10/21 and intubated 10/22 for worsening hypoxemia.  CNS:  Intubated/sedated.  # Pain  # Sedation  # Weakness/frequent falls  - Hydromorphone  - On propofol, midazolam (hypotension limited propofol dose)  - PT/OT when able  Pulm: Airway pressures seem to be improving -- peaks down to around 20.   # Acute hypoxemic respiratory failure  # Chronic hypoxemia  # ARDS  # Bronchiectasis  - Multifactorial etiology presumed -- pneumonia, baseline bronchiectasis/scarring, heart failure/aortic stenosis  - Continue current vent support; SpO2 around 90% is a reasonable target given baseline hypoxemia; room to go up on PEEP if needed to support FiO2 wean  - Continue epoprostenol, steroids, nAC, levalbuterol  - Drops saturations and becomes less hemodynamically stable with repositioning; even though P/F is 130 favor not proning at the moment  - Pulmonary consultation team will see; following on BAL studies  CV: Remains on some pressor support.   # Septic shock  # Diastolic heart failure  # CAD s/p PCI in 2019  # Moderate aortic stenosis  # Essential hypertension  - Wean pressor as able  - Stress steroids  - Echo from 10/21 showed LVEF 60-65%, AoV pressure gradient 29 with area 1.1 cm2.  - Gentle diuresis in light of elevated CVP, BNP  - Hold antihypertensives  GI: Abdomen benign.  # Nutrition  - Continue tube feedings  : UOP adequate overall, clinically looks volume up  # CKD, baseline 1.2  # Hypernatremia  - Gentle diuresis, follow Cr  - Increase enteral free water  Heme: Hb 8.8 (8.9)  # Anemia, chronic  - No indications to transfuse  Msk: Extremities warm, well-perfused.   Endo: Glucose 125-162  # Stress/steroid hyperglycemia  # Hypothyroidism  - Levothyroxine  - Continue insulin sliding scale  ID: Afebrile, WBCs up.  # Leukocytosis  # Pneumonia  - Continue cefepime, metronidazole, vancomycin  - Yeast in sputum not likely pathogen; follow  - ID following  ICU: SQH, PPI.  - Family updated at bedside today.    This patient is critically ill to my assessment and requires ICU monitoring  and cares. A total of 45 minutes critical care time spent on 10/23/2023, exclusive of procedures.     uRi Payton MD, PhD  Surgical critical care  10/23/2023, 10:55 AM    Clinically Significant Risk Factors         # Hypernatremia: Highest Na = 152 mmol/L in last 2 days, will monitor as appropriate   # Hypercalcemia: corrected calcium is >10.1, will monitor as appropriate    # Hypoalbuminemia: Lowest albumin = 2.9 g/dL at 10/22/2023  5:20 AM, will monitor as appropriate            # Obesity: Estimated body mass index is 37.46 kg/m  as calculated from the following:    Height as of this encounter: 1.524 m (5').    Weight as of this encounter: 87 kg (191 lb 12.8 oz).

## 2023-10-23 NOTE — PROGRESS NOTES
Physical Therapy Discharge Summary    Reason for therapy discharge:    Change in medical status.    Progress towards therapy goal(s). See goals on Care Plan in [facility] electronic health record for goal details.  Goals not met.  Barriers to achieving goals:   Pt now intubated and sedated in ICU.    Therapy recommendation(s):    Please re-consult once pt able to participate in PT after further medical management.

## 2023-10-23 NOTE — PLAN OF CARE
.ciGoal Outcome Evaluation:      Plan of Care Reviewed With: spouse, caregiver    Overall Patient Progress: no changeOverall Patient Progress: no change               ICU End of Shift Summary.  For vital signs and complete assessments, please see documentation flowsheets.      Pertinent assessments:   Neuro: RASS 4, on propofol and Versed for sedation  Cardiac: Tele; SR, SA at times with PAC's and PVC's. On levo and vasopressin to keep MAP Above 65  Resp: on full vent support, FIO2 90%, RR 20,  and Peep of 10. Scants secretions. LS: coarse with exp wheezes.  GI: NPO this am for US, no stool, Stool softener given  : Aleman in place, average UOP  Skin: no changes, turned Q 2 hours  Lines: 2 PIV, PICC, Art line  Drips: Levo, Vasopressin, Dilaudid, Propofol and Versed    Major Shift Events:   CXR completed this am  Abd US ordered, TF stopped  One time dose of lasix given  Cards consulted, please see note  , 120, 111  Vasopressin added  T-max 100.9. Ice backs applied and Room temp adjusted. Last temp WDL  Levo switched to higher concentration, titrated down as able  Abd US completed  ID consulted, please see note  Family updated  Plan (Upcoming Events): continue vent management, wean as able, wean pressors as able. Continue abx, follow up labs and Cx  Discharge/Transfer Needs: TBD     Bedside Shift Report Completed : yes  Bedside Safety Check Completed: yes

## 2023-10-23 NOTE — PROGRESS NOTES
Right wrist and Left wrist restraints discontinued at 10:00 AM on 10/23/2023.  Patient is sedated to RASS of -4  Restraint discontinue criteria met, patient is calm, cooperative and safe. Restraints removed.     Patient's Response: No evidence of learning  Family Notification: Other  Attending Physician Notified: MD ordered restraint,      Vi Flowers RN

## 2023-10-23 NOTE — PROGRESS NOTES
CLINICAL NUTRITION SERVICES - BRIEF NOTE      CURRENT NUTRITION SUPPORT: EN  Access: OGT  Formula/Rate/Provisions: Vital HP @ 10 ml/hr x 22 hrs (holding 1 hr before and after levothyroxine) provides 220 kcal, 19 g protein, 24 g CHO, 0 g fiber, 184 ml free H2O  Flushes: H2O- 60 ml q 4 hrs    Modulars: Prosource TF 20 x 4 pkts/day = 320 kcal/80 g protein    Propofol at current rate provides 407 kcal/day    Total energy/protein provisions: 947 kcal/99 g protei       NEW FINDINGS   Patient discussed this morning during IDT rounds--will increase TF rate and flushes per Dr. Payton.    Labs:  - Na 150  - Mg 2.7  - P 4.6  - BUN 67.2  - Cr 1.5  - .77  - WBC 26.6    Medications/Infusions:  - Lipitor  - Cortef  - sliding scale insulin  - levothyroxine  - Mvit/M  - pantoprazole  - Miralax  - senna-docusate  - vit D3   dextrose      epoprostenol 10 ng/kg/min (10/23/23 0700)    HYDROmorphone 0.3 mg/hr (10/23/23 1400)    midazolam 4 mg/hr (10/23/23 1400)    - MEDICATION INSTRUCTIONS -      norepinephrine 0.11 mcg/kg/min (10/23/23 1400)    - MEDICATION INSTRUCTIONS -      - MEDICATION INSTRUCTIONS -      propofol 30 mcg/kg/min (10/23/23 1400)    vasopressin 2.4 Units/hr (10/23/23 1400)        ASSESSED NUTRITION NEEDS PER APPROVED PRACTICE GUIDELINES:   Dosing Weight 86 kg (actual body weight) - energy; 45.5 kg (ideal body weight) - protein   Estimated Energy Needs: 946-1204 kcal/day (11-14 Kcal/Kg actual body weight)   Justification: vented   Estimated Protein Needs: 91 g protein/day (2 g pro/Kg IBW)   Justification: vented, hypercatabolism with acute illness   Estimated Fluid Needs: per MD      INTERVENTIONS  Implementation  Enteral Nutrition - Modify rate - Increase Vital HP to 30 ml/hr x 22 hrs (660 ml), which provides 660 kcal, 57 g protein, 73 g CHO, 0 g fiber, 548 ml free H2O    Decrease Prosource TF 20 to 2 pkts/day = 160 kcal/40 g protein    Propofol at current rate provides 407 kcal/day    Total energy/protein  provisions, all sources = 1227 kcal (~14 kcal/kg per actual weight of 86 kg)/97 g protein (2.1 g/kg per IBW of 45.5 kg)    Increase fluids to 125 ml q 4 hrs per discussion with Dr. Payton    Monitoring/Evaluation  Will continue to monitor and evaluate per protocol.    Elizabeth Ortiz RD, LD, [facility]  Pager - 3rd floor/ICU: [phone]  Pager - All other floors: [phone]  Pager - Weekend/holiday: [phone]  Office: [phone]
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
## 2023-10-23 NOTE — PHARMACY-VANCOMYCIN DOSING SERVICE
Pharmacy Vancomycin Note  Date of Service 2023  Patient's  1945   78 year old, female    Indication: Sepsis  Day of Therapy: 3  Current vancomycin regimen:  1000 mg IV q24h  Current vancomycin monitoring method: AUC  Current vancomycin therapeutic monitoring goal: 400-600 mg*h/L    InsightRX Prediction of Current Vancomycin Regimen  Loading dose: N/A  Regimen: 1000 mg IV every 24 hours.  Start time: 18:00 on 10/23/2023  Exposure target: AUC24 (range)400-600 mg/L.hr   AUC24,ss: 591 mg/L.hr  Probability of AUC24 > 400: 99 %  Ctrough,ss: 19.4 mg/L  Probability of Ctrough,ss > 20: 45 %  Probability of nephrotoxicity (Lodise JAE ): 16 %      Current estimated CrCl = Estimated Creatinine Clearance: 30.3 mL/min (A) (based on SCr of 1.5 mg/dL (H)).    Creatinine for last 3 days  10/21/2023:  7:07 AM Creatinine 1.35 mg/dL  10/22/2023:  5:20 AM Creatinine 1.29 mg/dL;  4:35 PM Creatinine 1.40 mg/dL  10/23/2023:  5:22 AM Creatinine 1.50 mg/dL    Recent Vancomycin Levels (past 3 days)  10/23/2023:  4:41 PM Vancomycin 17.4 ug/mL    Vancomycin IV Administrations (past 72 hours)                     vancomycin (VANCOCIN) 1,000 mg in 200 mL dextrose intermittent infusion (mg) 1,000 mg New Bag 10/22/23 1757    vancomycin (VANCOCIN) 1,750 mg in 0.9% NaCl 500 mL intermittent infusion (mg) 1,750 mg Given 10/21/23 1955                    Nephrotoxins and other renal medications (From now, onward)      Start     Dose/Rate Route Frequency Ordered Stop    10/23/23 1830  vancomycin (VANCOCIN) 750 mg in sodium chloride 0.9 % 250 mL intermittent infusion         750 mg  over 90 Minutes Intravenous EVERY 24 HOURS 10/23/23 1805      10/23/23 1130  vasopressin 0.2 units/mL in NS (PITRESSIN) standard conc infusion         2.4 Units/hr  12 mL/hr  Intravenous CONTINUOUS 10/23/23 1113      10/23/23 1100  norepinephrine (LEVOPHED) 16 mg in  mL infusion MAX CONC CENTRAL LINE         0.01-0.6 mcg/kg/min × 85.7 kg  0.8-48.2  mL/hr  Intravenous CONTINUOUS 10/23/23 1052                 Contrast Orders - past 72 hours (72h ago, onward)      Start     Dose/Rate Route Frequency Stop    10/21/23 1400  iopamidol (ISOVUE-370) solution 500 mL         500 mL Intravenous ONCE 10/21/23 1353            Interpretation of levels and current regimen:  Vancomycin level is reflective of -600    Has serum creatinine changed greater than 50% in last 72 hours: No    Urine output:  unable to determine    Renal Function: Worsening    InsightRX Prediction of Planned New Vancomycin Regimen  Loading dose: N/A  Regimen: 750 mg IV every 24 hours.  Start time: 18:00 on 10/23/2023  Exposure target: AUC24 (range)400-600 mg/L.hr   AUC24,ss: 452 mg/L.hr  Probability of AUC24 > 400: 74 %  Ctrough,ss: 14.8 mg/L  Probability of Ctrough,ss > 20: 10 %  Probability of nephrotoxicity (Lodise JAE 2009): 10 %      Plan:  Decrease Dose to 750mg IV q24h  for a lower toxicity from 16 to 10 and d/t worsening RF as AUC is very near 600  Vancomycin monitoring method: AUC  Vancomycin therapeutic monitoring goal: 400-600 mg*h/L  Pharmacy will check vancomycin levels as appropriate in 1-3 Days.  Serum creatinine levels will be ordered daily for the first week of therapy and at least twice weekly for subsequent weeks.    Ferny Ness RPH

## 2023-10-23 NOTE — PROGRESS NOTES
"SPIRITUAL HEALTH SERVICES Progress Note  [facility] ICU    Saw pt Matthew \"Jeni\" Andrés and her spouse \"Genaro\" per length of stay. Pt is intubated and sedated.    Patient/Family Understanding of Illness and Goals of Care - Genaro tearfully shared, \"I'm okay. This is hard,\" as he reflected on being in the hospital with Jeni for a week, their move to the ICU and Jeni's intubation yesterday.    Distress and Loss - Genaro expressed sadness as he described going home in the evenings this week alone and without Jeni.    Strengths, Coping, and Resources -   Genaro and Pt feel the immense support of their family, particularly their daughter Roque (who is a doctor at [employer]) and their son (who is flying in from [place] tomorrow), as well as extended family members in [place], [place], [place], and elsewhere (a few of whom called Genaro during our visit).  Genaro shared that prayer has been an important coping resource in this time. Genaro also played on his phone an om meditation, which has been healing for him to listen to in the mornings at home.    Meaning, Beliefs, and Spirituality -   Pt and Genaro are both from [place] and are Christianity. Pt's nephew and another close relative are Christianity priests and have been checking-in on their family this week.   Genaro has been going to their temple (located in [facility]) these last nine evenings during the Christianity celebration of [facility], honoring the \"universal mother\": \"We've been calling on the universal mother for her prayers and healing for Jeni.\"   Genaro welcomed my prayers.    Plan of Care - Genaro welcomes  support - I will follow while on the unit. [facility] remains available.    Yelena Borja MDiv  Staff   [facility] Pager: [phone]    [facility] available 24/7 for emergent requests/referrals, either by having the on-call  paged or by entering an ASAP/STAT consult in Epic (this will also page the on-call ).   "

## 2023-10-23 NOTE — PROGRESS NOTES
Contacted daughter regarding pt. Wedding ring on L ring finger - writer asked daughter to remove in case pt. Becomes more edematous. Daughter Roque agreed to remove wedding ring.    Writer removed wedding ring, placed in bag with pt. Sticker on it and placed in locked box inside the room. Daughter Roque states will take it home with her tomorrow AM.     2245: Contacted Tele Hub regarding pt. HR - Sinus arrhythmia with frequent PVC and PAC. EKG obtained. Orders placed for Mg and K check. Possibly Levo sensitivity? Weaned levo 0.15 per Tele Hub RN.   Will update TMD [place].     Pt. Continuing to have frequent bigeminal PVC, PAC with HR dropping to 40s and MAPs 60. Tele Hub MD placed orders for high replacement protocol for K.

## 2023-10-23 NOTE — PLAN OF CARE
ICU End of Shift Summary.  For vital signs and complete assessments, please see documentation flowsheets.      Pertinent assessments:   Neuro: RASS -4 on versed, dilaudid and prop. Hypothermic bare hugger applied.  Cardiac: SR and SA with frequent, bigeminal PACs, PVCs. Dropping HR 55 and MAPs to 60s. Maintain MAPs greater than 65 with Levo gtt.   Resp: Vent supported. Fio2 70%. Peep 10. LS coarse. Abdominal muscles usedModerate secretions.   GI: OG in place - TF @ 10 FWF 100mL q4hr. Bowel regimen started. BG q4 with sliding scale.  : Aleman in place.  Skin: see flowsheets for details.  Lines: R PICC, L PIV x2  Drips: Prop, Dilaudid, Versed, Levo    Major Shift Events:   See progress note contacted tele hub regarding rhythm, more frequent SA and PACs, PVCs, dropping HR to 50s and BP dropping. Mg and K drawn WNL. Possible Levo sensitivity? Weaned Levo per Tele Hub and will add another pressor if needed vs increasing Levo.   Weaned prop and increased versed - RR 30s, RR improved.   See progress notes. High replacement protocol for K.     Plan (Upcoming Events): Continue plan of care  Discharge/Transfer Needs: TBD     Bedside Shift Report Completed : Y  Bedside Safety Check Completed: Y    Right wrist and Left wrist restraints continued 10/23/2023    Clinical Justification: Pulling lines, pulling tubes, and pulling equipment  Less Restrictive Alternative: Repositioning, Disguise equipment, Reorientation, De-escalation, Pain management  Attending Physician Notified: MD ordered restraint,     New orders placed Yes  Length of Order: 1 Day      Angela Chaparro RN

## 2023-10-23 NOTE — PLAN OF CARE
Problem: Enteral Nutrition  Goal: Safe, Effective Therapy Delivery  Outcome: Progressing  Goal: Feeding Tolerance  Outcome: Progressing   Goal Outcome Evaluation: Propofol down, increased goal rate of TF to 30 ml/hr x 22 hrs and decreased Prosource TF 20 to 2 pkts/day. RD will to continue to monitor closely and adjust TF as appropriate.

## 2023-10-23 NOTE — PROGRESS NOTES
[facility]  Infectious Disease Progress Note          Assessment and Plan:   Date of Admission:  10/15/2023  Date of Consult (When I saw the patient): 10/20/23        Assessment & Plan  Matthew Bowen is a 78 year old who was admitted on 10/15/2023.      Impression: 1 78-year-old female, some chronic underlying lung disease, on oxygen, bronchiectasis probable, some history of pneumonia but now 6 weeks or so of worsening respiratory symptoms, 2 prior courses of antibiotics, now admitted with infiltrates, no major sepsis but elevated procalcitonin and white count implying bacterial, not really improving on antibiotics concern for more chronic type pathogen in this patient with underlying lung disease by this history     2 chronic lung disease on oxygen some underlying bronchiectasis, some prior pneumonias  3 chronic kidney disease     REC 1 dramatic worsening, in the ICU intubated and sedated.  No new major fever, so far no positive microbiology.  Fungal studies all negative or pending.  BAL studies now pending.  On broader antibiotics at this point, continue for now awaiting microbiology and clinical progress  2 discussed with family including daughter who is a [employer] pediatrician                Interval History:     Intubated and sedated and doing well; no cp, sob, n/v/d, or abd pain.  In ICU, intubated and sedated, BAL studies pending, all prior fungal and microbiologic studies negative QuantiFERON-TB gold negative              Medications:      acetylcysteine  2 mL Nebulization 4x Daily    aspirin  81 mg Oral Daily    atorvastatin  20 mg Oral QPM    ceFEPIme  2 g Intravenous Q12H    chlorhexidine  15 mL Mouth/Throat Q12H    heparin ANTICOAGULANT  5,000 Units Subcutaneous Q12H    hydrocortisone sodium succinate PF  50 mg Intravenous Q6H    insulin aspart  1-6 Units Subcutaneous Q4H    levalbuterol  1.25 mg Nebulization 4x Daily    levothyroxine  150 mcg Oral QAM AC    metroNIDAZOLE   500 mg Intravenous Q8H    multivitamins w/minerals  15 mL Per Feeding Tube Daily    pantoprazole  40 mg Per Feeding Tube QAM AC    Or    pantoprazole  40 mg Intravenous QAM AC    polyethylene glycol  17 g Oral Daily    pregabalin  75 mg Oral BID    protein modular  1 packet Per Feeding Tube BID    senna-docusate  1 tablet Oral BID    sertraline  150 mg Oral Daily    sodium chloride (PF)  10-40 mL Intracatheter Q7 Days    sodium chloride (PF)  3 mL Intracatheter Q8H    vancomycin  1,000 mg Intravenous Q24H    vitamin D3  50 mcg Oral Daily                  Physical Exam:   Blood pressure (!) 102/97, pulse 66, temperature 99.7  F (37.6  C), resp. rate 13, height 1.524 m (5'), weight 87 kg (191 lb 12.8 oz), SpO2 96%.  Wt Readings from Last 2 Encounters:   10/23/23 87 kg (191 lb 12.8 oz)   02/28/22 94.3 kg (208 lb)     Vital Signs with Ranges  Temp:  [96.1  F (35.6  C)-100.9  F (38.3  C)] 99.7  F (37.6  C)  Pulse:  [48-99] 66  Resp:  [10-44] 13  MAP:  [65 mmHg-93 mmHg] 86 mmHg  Arterial Line BP: ()/(28-63) 133/57  FiO2 (%):  [65 %-90 %] 80 %  SpO2:  [91 %-100 %] 96 %    Constitutional: Intubated and sedated     Lungs: Clear to auscultation bilaterally, no crackles or wheezing   Cardiovascular: Regular rate and rhythm, normal S1 and S2, and no murmur noted   Abdomen: Normal bowel sounds, soft, non-distended, non-tender   Skin: No rashes, no cyanosis, no edema   Other:           Data:   All microbiology laboratory data reviewed.  Recent Labs   Lab Test 10/23/23  0522 10/22/23  0520 10/21/23  0707   WBC 26.6* 20.0* 22.3*  22.3*   HGB 8.8* 8.9* 9.6*   HCT 28.3* 28.3* 30.3*   MCV 84 81 84   * 389 341     Recent Labs   Lab Test 10/23/23  0522 10/22/23  1635 10/22/23  0520   CR 1.50* 1.40* 1.29*     No lab results found.  Recent Labs   Lab Test 02/12/19  0010   CULT Light growth  Normal deshaun

## 2023-10-23 NOTE — PROGRESS NOTES
[facility] ICU VENTILATOR RESPIRATORY NOTE  Date of Admission: 10/15/2023  Date of Intubation (most recent): 10/22/2023  Reason for Mechanical Ventilation: Respiratory Failure  Number of Days on Mechanical Ventilation: 2  Met Criteria for Pressure Support Trial: N  Length of Pressure Support Trial: N/A  Reason for Stopping Pressure Support Trial: N/A  Reason for No Pressure Support Trial: PEEP 10, 65%    ABG Results:    Latest Reference Range & Units 10/22/23 20:16   pH Arterial 7.35 - 7.45  7.29 (L)   pCO2 Arterial 35 - 45 mm Hg 57 (H)   PO2 Arterial 80 - 105 mm Hg 85   Bicarbonate Arterial 21 - 28 mmol/L 27   Base Excess Art -9.0 - 1.8 mmol/L -0.1   FIO2  75   Oxyhemoglobin Arterial 92 - 100 % 94   (L): Data is abnormally low  (H): Data is abnormally high  ETT appearance on chest x-ray: Endotracheal tube tip 5.4 cm above ashley    Plan:  Wean as tolerated.    Yandy Ventura, RT

## 2023-10-23 NOTE — PROGRESS NOTES
[facility] [place]    Medicine Progress Note - Hospitalist Service    Date of Admission:  10/15/2023    Assessment & Plan   Matthew Bowen is a 78 year old female admitted on 10/15/2023 with working diagnoses of sepsis in addition to acute on chronic hypoxic respiratory failure due to recurrent pneumonia after presenting with complaint of fever and cough.  She deteriorated slowly over the initial week of hospitalization, initially requiring supplemental oxygen and progressing to HFNC and ultimately BiPAP.  She was transferred to the ICU on 10/21/2023 due to this progression in respiratory instability.    Medical history includes chronic hypoxic respiratory failure (for which she is chronically on 2 L oxygen by nasal cannula) due to ALAINA requiring CPAP, Obesity Hypoventilation Syndrome, HFpEF, morbid obesity.  Although the chart indicates that Ms. Bowen has pulmonary hypertension, it is the opinion of cardiology that this is overstated.  (The apparent enlargement of the pulmonary arteries is not itself diagnostic of pHTN.  Her RV function is felt to be normal per cards.) Additional medical concerns include CAD, moderate Aortic Valve Stenosis and mild MR, hypertension and depression.    Ms. Bowen was noted to be tachypneic and requiring 70% FiO2 while on BiPAP when I first assessed her this am.     Systems review:  Neuro: Alert and coherent.  Generally weak, but living independently with .    Intubated and sedated at this time. Had proven difficult initially to get comfortable, so she was given relatively high doses of Propofol and her BP dropped.  Sedation regimen now includes Propofol (working on titrating this off), Midazolam drip with boluses and dilaudid infusion with boluses prn.     CV: Echo completed 10/21/23: HFpEF, EF 55-60%, Mod AORTIC STENOSIS with AoV gradient of 20.0, MR trace.    HR 80, BP: 120-150/70-90.   At the time of intubation, the pt's BP dropped and she was started on  norepi, though with off-titration of the Propofol, it seems that she is requiring less and less of the norepi.   Cardiology discussed with Russ Yin and Costa.      Pulmonary: Progressing bilat GGI.  Prod cough.  Infectious eval neg at this time. Intubated 10/22 (day #2 today)  Initially today on BiPAP 14/7, FiO2 90%.  RR 30-40's. Despite this, pt developed acute hypoxic and hypercapnic resp failure.  Vent: CMV/AC 20/FiO2 85%/Vt 300/PEEP 10.  Veletri started today at 10 ng/kg/min.  Last Arterial Blood Gas: 7.28/pCO2 59/pO2 113/Bicarb 28    GI/nutrition: RUQ tenderness.   -currently on Tube Feeds.    Renal/: Baseline stage III CKD, baseline creat runs between 1.2 and 1.5.  Creat: 1.29, Na 149, Cl 108.  Hgb 20.0.    ID/heme: extensive infectious eval has been negative.   The possibility of Diffuse Alveolar Hemorrhage is also considered, though less likely.  Not the nidus for smoking of his dad    Endocrine: no baseline issues.   Now on Hydrocortisone 60 mg IV q 6   Add ISS now, while on steroids and tube feeds.         Diet: NPO per Anesthesia Guidelines for Procedure/Surgery Except for: Meds  Adult Formula Drip Feeding: Continuous Vital High Protein; Other - Specify in Comment; Goal Rate: 10 mL/hr x 22 hours (please hold 1 hour before and after levothyroxine); mL/hr; When FT placed and ok to use, start and hold at 10 mL/hr - RD to adju...    DVT Prophylaxis: Heparin SQ  Aleman Catheter: PRESENT, indication: Strict 1-2 Hour I&O  Lines: PRESENT      PICC 10/22/23 Triple Lumen Right Basilic multiple med gtt. ready for use-Site Assessment: WDL except;Ecchymotic  Arterial Line 10/22/23 Radial-Site Assessment: WDL      Cardiac Monitoring: ACTIVE order. Indication: Tachyarrhythmias, acute (48 hours)  Code Status: Full Code      Clinically Significant Risk Factors         # Hypernatremia: Highest Na = 152 mmol/L in last 2 days, will monitor as appropriate   # Hypercalcemia: corrected calcium is >10.1, will monitor as  appropriate    # Hypoalbuminemia: Lowest albumin = 2.9 g/dL at 10/22/2023  5:20 AM, will monitor as appropriate            # Obesity: Estimated body mass index is 37.46 kg/m  as calculated from the following:    Height as of this encounter: 1.524 m (5').    Weight as of this encounter: 87 kg (191 lb 12.8 oz).             Disposition Plan     Expected Discharge Date: 10/24/2023      Destination: home with help/services;home with family              Amadou Raygoza MD  Hospitalist Service  [facility]  Securely message with [employer] (more info)  Text page via [facility] Paging/Directory   ______________________________________________________________________    Interval History   Chart reviewed, pt interviewed.     I also met and discussed the case briefly with the pt's daughter, Roque.    Physical Exam   Vital Signs: Temp: 98.1  F (36.7  C) Temp src: Bladder BP: (!) 102/97 Pulse: 60   Resp: 20 SpO2: 96 % O2 Device: Mechanical Ventilator    Weight: 191 lbs 12.8 oz    General Appearance: Pleasant and communicative despite being tachypneic on BiPAP.    Respiratory: Increase resp rat,e but appears remarkably comfortable  Cardiovascular: RRR, no murmur  GI: soft, NTND  Skin: no appreciabel edema  Other:      Medical Decision Making       50 MINUTES SPENT BY ME on the date of service doing chart review, history, exam, documentation & further activities per the note.      Data   Results for orders placed or performed during the hospital encounter of 10/15/23 (from the past 24 hour(s))   Potassium   Result Value Ref Range    Potassium 3.4 3.4 - 5.3 mmol/L   Magnesium   Result Value Ref Range    Magnesium 2.5 (H) 1.7 - 2.3 mg/dL   Nt probnp inpatient   Result Value Ref Range    N terminal Pro BNP Inpatient 6,064 (H) 0 - 1,800 pg/mL   Ionized Calcium   Result Value Ref Range    Calcium Ionized Whole Blood 5.1 4.4 - 5.2 mg/dL   Phosphorus   Result Value Ref Range    Phosphorus 2.9 2.5 - 4.5 mg/dL   Lipase    Result Value Ref Range    Lipase 49 13 - 60 U/L   XR Abdomen Port 1 View    Narrative    EXAM: XR ABDOMEN PORT 1 VIEW  LOCATION: [facility]  DATE: 10/22/2023    INDICATION: Tube placement  COMPARISON: None.      Impression    IMPRESSION: NG tube tip and sidehole in the stomach. Moderate degenerative change in the spine and both hips.   XR Chest Port 1 View    Narrative    EXAM: XR CHEST PORT 1 VIEW  LOCATION: [facility]  DATE: 10/22/2023    INDICATION: ARDS vs. multifocal pneumonia, status post endotracheal tube placement  COMPARISON: Chest radiograph 10/22/2023 12:37 AM..      Impression    IMPRESSION:    Endotracheal tube tip is 2.2 cm above the ahsley. Unchanged right PICC with tip in the mid SVC.    Multifocal patchy airspace opacities most prominent within the right upper lung and left lower lung have not significant changed. No new airspace opacities. No pleural effusions or pneumothorax.    Stable cardiomediastinal silhouette. Aortic atherosclerotic calcifications.   Cell count with differential fluid - BAL Site 1    Narrative    The following orders were created for panel order Cell count with differential fluid - BAL Site 1.  Procedure                               Abnormality         Status                     ---------                               -----------         ------                     Cell Count Body Fluid[[id number]]        Abnormal            Final result               Differential Body Fluid[[id number]]                          Final result                 Please view results for these tests on the individual orders.   Acid-Fast Bacilli Culture and Stain    Specimen: Bronchus, Right; Bronchial Alveolar Lavage    Narrative    The following orders were created for panel order Acid-Fast Bacilli Culture and Stain.  Procedure                               Abnormality         Status                     ---------                               -----------          ------                     Acid-Fast Bacilli Cultur...[[id number]]                      In process                   Please view results for these tests on the individual orders.   Koh prep - BAL Site 1    Specimen: Bronchus, Right; Bronchial Alveolar Lavage   Result Value Ref Range    KOH Preparation 3+ Budding yeast with pseudohyphae (A)     KOH Preparation Reference Range: No fungal elements seen. (A)    Respiratory Aerobic Bacterial Culture with Gram Stain    Specimen: Lung, Middle Lobe, Right; Bronchial washing   Result Value Ref Range    Gram Stain Result >25 PMNs/low power field (A)     Gram Stain Result 1+ Budding yeast with pseudohyphae (A)    Acid-Fast Bacilli Culture and Stain *Canceled*    Specimen: Bronchus, Right; Washings    Narrative    The following orders were created for panel order Acid-Fast Bacilli Culture and Stain.  Procedure                               Abnormality         Status                     ---------                               -----------         ------                     Acid-Fast Bacilli Cultur...[[id number]]                                                   Please view results for these tests on the individual orders.   Cell Count Body Fluid   Result Value Ref Range    Color Red (A) Colorless, Yellow    Clarity Cloudy (A) Clear    Cell Count Fluid Source Bronchus, Right     Narrative    No reference ranges have been established.  This result  should be interpreted in the context of the patient's clinical condition and   compared to simultaneous measurement in the patient's blood.        Large clot present, no cell count performed.   Differential Body Fluid   Result Value Ref Range    % Neutrophils      % Lymphocytes      % Monocyte/Macrophages      Narrative    Specimen clotted. Slide reviewed, no abnormal cells seen.  No reference ranges have been established. This result should be interpreted in the context of the patient's clinical condition and compared to  simultaneous measurement in the patient's blood.   Glucose by meter   Result Value Ref Range    GLUCOSE BY METER POCT 137 (H) 70 - 99 mg/dL   Lactic acid whole blood   Result Value Ref Range    Lactic Acid 1.1 0.7 - 2.0 mmol/L   Blood gas arterial   Result Value Ref Range    pH Arterial 7.28 (L) 7.35 - 7.45    pCO2 Arterial 59 (H) 35 - 45 mm Hg    pO2 Arterial 113 (H) 80 - 105 mm Hg    FIO2 100     Bicarbonate Arterial 28 21 - 28 mmol/L    Base Excess/Deficit 0.5 -9.0 - 1.8 mmol/L    Noel's Test Artline    XR Chest Port 1 View    Narrative    EXAM: XR CHEST PORT 1 VIEW  LOCATION: [facility]  DATE: 10/22/2023    INDICATION: Endotracheal tube positioning  COMPARISON: 10/22/2023      Impression    IMPRESSION: Endotracheal tube tip 5.4 cm above ashley. Enteric tube tip below diaphragm. Right PICC tip in the low SVC. Increased multifocal bilateral infiltrates, right greater than left. There may be small left pleural effusion. Enlarged cardiac   silhouette. Obscured pulmonary vascularity. Aortic calcifications.   Blood gas venous with oxyhemoglobin   Result Value Ref Range    pH Venous 7.27 (L) 7.32 - 7.43    pCO2 Venous 62 (H) 40 - 50 mm Hg    pO2 Venous 61 (H) 25 - 47 mm Hg    Bicarbonate Venous 28 21 - 28 mmol/L    FIO2 100     Oxyhemoglobin Venous 86 (H) 70 - 75 %    Base Excess/Deficit 0.3 -7.7 - 1.9 mmol/L   Glucose by meter   Result Value Ref Range    GLUCOSE BY METER POCT 143 (H) 70 - 99 mg/dL   Basic metabolic panel   Result Value Ref Range    Sodium 152 (H) 135 - 145 mmol/L    Potassium 4.1 3.4 - 5.3 mmol/L    Chloride 112 (H) 98 - 107 mmol/L    Carbon Dioxide (CO2) 25 22 - 29 mmol/L    Anion Gap 15 7 - 15 mmol/L    Urea Nitrogen 53.9 (H) 8.0 - 23.0 mg/dL    Creatinine 1.40 (H) 0.51 - 0.95 mg/dL    GFR Estimate 38 (L) >60 mL/min/1.73m2    Calcium 10.0 8.8 - 10.2 mg/dL    Glucose 163 (H) 70 - 99 mg/dL   Magnesium   Result Value Ref Range    Magnesium 2.6 (H) 1.7 - 2.3 mg/dL   Blood gas  arterial with oxyhemoglobin   Result Value Ref Range    pH Arterial 7.29 (L) 7.35 - 7.45    pCO2 Arterial 57 (H) 35 - 45 mm Hg    pO2 Arterial 85 80 - 105 mm Hg    Bicarbonate Arterial 27 21 - 28 mmol/L    Oxyhemoglobin Arterial 94 92 - 100 %    Base Excess/Deficit -0.1 -9.0 - 1.8 mmol/L    FIO2 75     Noel's Test Artline    Glucose by meter   Result Value Ref Range    GLUCOSE BY METER POCT 166 (H) 70 - 99 mg/dL   Potassium   Result Value Ref Range    Potassium 3.7 3.4 - 5.3 mmol/L   Magnesium   Result Value Ref Range    Magnesium 2.7 (H) 1.7 - 2.3 mg/dL   Glucose by meter   Result Value Ref Range    GLUCOSE BY METER POCT 145 (H) 70 - 99 mg/dL   Sodium   Result Value Ref Range    Sodium 148 (H) 135 - 145 mmol/L   Blood gas arterial with oxyhemoglobin   Result Value Ref Range    pH Arterial 7.29 (L) 7.35 - 7.45    pCO2 Arterial 56 (H) 35 - 45 mm Hg    pO2 Arterial 104 80 - 105 mm Hg    Bicarbonate Arterial 27 21 - 28 mmol/L    Oxyhemoglobin Arterial 96 92 - 100 %    Base Excess/Deficit -0.4 -9.0 - 1.8 mmol/L    FIO2 70     Noel's Test Artline    Glucose by meter   Result Value Ref Range    GLUCOSE BY METER POCT 145 (H) 70 - 99 mg/dL   Basic metabolic panel   Result Value Ref Range    Sodium 150 (H) 135 - 145 mmol/L    Potassium 3.7 3.4 - 5.3 mmol/L    Chloride 113 (H) 98 - 107 mmol/L    Carbon Dioxide (CO2) 25 22 - 29 mmol/L    Anion Gap 12 7 - 15 mmol/L    Urea Nitrogen 67.2 (H) 8.0 - 23.0 mg/dL    Creatinine 1.50 (H) 0.51 - 0.95 mg/dL    GFR Estimate 35 (L) >60 mL/min/1.73m2    Calcium 9.9 8.8 - 10.2 mg/dL    Glucose 162 (H) 70 - 99 mg/dL   CBC with platelets   Result Value Ref Range    WBC Count 26.6 (H) 4.0 - 11.0 10e3/uL    RBC Count 3.37 (L) 3.80 - 5.20 10e6/uL    Hemoglobin 8.8 (L) 11.7 - 15.7 g/dL    Hematocrit 28.3 (L) 35.0 - 47.0 %    MCV 84 78 - 100 fL    MCH 26.1 (L) 26.5 - 33.0 pg    MCHC 31.1 (L) 31.5 - 36.5 g/dL    RDW 17.5 (H) 10.0 - 15.0 %    Platelet Count 484 (H) 150 - 450 10e3/uL   Magnesium    Result Value Ref Range    Magnesium 2.7 (H) 1.7 - 2.3 mg/dL   Phosphorus   Result Value Ref Range    Phosphorus 4.6 (H) 2.5 - 4.5 mg/dL   Glucose by meter   Result Value Ref Range    GLUCOSE BY METER POCT 125 (H) 70 - 99 mg/dL

## 2023-10-23 NOTE — PROGRESS NOTES
CHART REVIEW  77 YO Female admitted with respiratory failure, chronic lung disease with recurrent pneumonia/ARDS, PH, CKD and HFpEF.   Echo- LVEF 60-65%, Moderate AS  NTBNP increased to 6,064  Intubated, on pressors for septic shock, wbc increasing 26.6  Cr rising to 1.5, lasix stopped  Fluid balance +1.2 L since admit    Primary respiratory insult by chest CT- multifocal pneumonia/ARDS  Recommend monitor fluid balance and consider nephrology consult with worsening renal function.   Will follow peripherally, please call with questions.

## 2023-10-24 NOTE — PROGRESS NOTES
[facility]  Infectious Disease Progress Note          Assessment and Plan:   Date of Admission:  10/15/2023  Date of Consult (When I saw the patient): 10/20/23        Assessment & Plan  Matthew Bowen is a 78 year old who was admitted on 10/15/2023.      Impression: 1 78-year-old female, some chronic underlying lung disease, on oxygen, bronchiectasis probable, some history of pneumonia but now 6 weeks or so of worsening respiratory symptoms, 2 prior courses of antibiotics, now admitted with infiltrates, no major sepsis but elevated procalcitonin and white count implying bacterial, not really improving on antibiotics concern for more chronic type pathogen in this patient with underlying lung disease by this history     2 chronic lung disease on oxygen some underlying bronchiectasis, some prior pneumonias  3 chronic kidney disease     REC 1 dramatic worsening occurred without clear explanation, now in e ICU intubated and sedated.  No new major fever, so far no positive microbiology.  Fungal studies all negative or pending.  Candida not a significant pathogen here BAL studies neg or pending  On broader antibiotics at this point, continue for now awaiting microbiology and clinical progress  2 discussed with fdaughter                 Interval History:     Intubated and sedated and same, so far no improvement in ICU, intubated and sedated, BAL studies pending, or negative all prior fungal and microbiologic studies negative QuantiFERON-TB gold negative              Medications:      acetylcysteine  2 mL Nebulization 4x Daily    aspirin  81 mg Oral Daily    atorvastatin  20 mg Oral QPM    [START ON 10/25/2023] ceFEPIme  2 g Intravenous Q24H    chlorhexidine  15 mL Mouth/Throat Q12H    heparin ANTICOAGULANT  5,000 Units Subcutaneous Q12H    insulin aspart  1-6 Units Subcutaneous Q4H    levalbuterol  1.25 mg Nebulization 4x Daily    levothyroxine  150 mcg Oral QAM AC    methylPREDNISolone  125 mg  Intravenous Q24H    metroNIDAZOLE  500 mg Intravenous Q8H    multivitamins w/minerals  15 mL Per Feeding Tube Daily    pantoprazole  40 mg Per Feeding Tube QAM AC    Or    pantoprazole  40 mg Intravenous QAM AC    polyethylene glycol  17 g Oral Daily    pregabalin  75 mg Oral BID    protein modular  2 packet Per Feeding Tube BID    senna-docusate  1 tablet Oral BID    sertraline  150 mg Oral Daily    sodium chloride (PF)  10-40 mL Intracatheter Q7 Days    sodium chloride (PF)  3 mL Intracatheter Q8H    vancomycin  750 mg Intravenous Q24H    vitamin D3  50 mcg Oral Daily                  Physical Exam:   Blood pressure 137/66, pulse 59, temperature 97.5  F (36.4  C), resp. rate 24, height 1.524 m (5'), weight 87 kg (191 lb 12.8 oz), SpO2 97%.  Wt Readings from Last 2 Encounters:   10/23/23 87 kg (191 lb 12.8 oz)   02/28/22 94.3 kg (208 lb)     Vital Signs with Ranges  Temp:  [96.4  F (35.8  C)-98.6  F (37  C)] 97.5  F (36.4  C)  Pulse:  [49-66] 59  Resp:  [11-26] 24  BP: (116-137)/(58-66) 137/66  MAP:  [66 mmHg-103 mmHg] 66 mmHg  Arterial Line BP: (106-156)/(41-74) 107/41  FiO2 (%):  [65 %-100 %] 75 %  SpO2:  [83 %-100 %] 97 %    Constitutional: Intubated and sedated about same     Lungs: Clear to auscultation bilaterally, no crackles or wheezing   Cardiovascular: Regular rate and rhythm, normal S1 and S2, and no murmur noted   Abdomen: Normal bowel sounds, soft, non-distended, non-tender   Skin: No rashes, no cyanosis, no edema   Other:           Data:   All microbiology laboratory data reviewed.  Recent Labs   Lab Test 10/24/23  0612 10/23/23  0522 10/22/23  0520   WBC 23.1* 26.6* 20.0*   HGB 8.5* 8.8* 8.9*   HCT 27.3* 28.3* 28.3*   MCV 84 84 81   * 484* 389     Recent Labs   Lab Test 10/24/23  0612 10/23/23  0522 10/22/23  1635   CR 1.58* 1.50* 1.40*     No lab results found.  Recent Labs   Lab Test 02/12/19  0010   CULT Light growth  Normal deshaun

## 2023-10-24 NOTE — PROGRESS NOTES
Clinical Nutrition Brief Note     Please refer to previous RD notes for more information.     Prop increased back to 18 mL/hr which provides 475 kcal and D5 started. Plan to decrease TF rate.     Medications:   acetylcysteine  2 mL Nebulization 4x Daily    aspirin  81 mg Oral Daily    atorvastatin  20 mg Oral QPM    ceFEPIme  2 g Intravenous Q12H    chlorhexidine  15 mL Mouth/Throat Q12H    heparin ANTICOAGULANT  5,000 Units Subcutaneous Q12H    hydrocortisone sodium succinate PF  50 mg Intravenous Q6H    insulin aspart  1-6 Units Subcutaneous Q4H    levalbuterol  1.25 mg Nebulization 4x Daily    levothyroxine  150 mcg Oral QAM AC    metroNIDAZOLE  500 mg Intravenous Q8H    multivitamins w/minerals  15 mL Per Feeding Tube Daily    pantoprazole  40 mg Per Feeding Tube QAM AC    Or    pantoprazole  40 mg Intravenous QAM AC    polyethylene glycol  17 g Oral Daily    pregabalin  75 mg Oral BID    protein modular  1 packet Per Feeding Tube BID    senna-docusate  1 tablet Oral BID    sertraline  150 mg Oral Daily    sodium chloride (PF)  10-40 mL Intracatheter Q7 Days    sodium chloride (PF)  3 mL Intracatheter Q8H    vancomycin  750 mg Intravenous Q24H    vitamin D3  50 mcg Oral Daily       dextrose      epoprostenol 10 ng/kg/min (10/23/23 2005)    HYDROmorphone 0.3 mg/hr (10/24/23 0745)    midazolam 4 mg/hr (10/24/23 0600)    - MEDICATION INSTRUCTIONS -      norepinephrine 0.03 mcg/kg/min (10/24/23 0745)    - MEDICATION INSTRUCTIONS -      - MEDICATION INSTRUCTIONS -      propofol 45 mcg/kg/min (10/24/23 0740)    vasopressin 2.4 Units/hr (10/24/23 0745)      acetaminophen **OR** acetaminophen, albuterol, artificial tears, artificial tears, cetirizine, dextrose, glucose **OR** dextrose **OR** glucagon, glucose **OR** dextrose **OR** [DISCONTINUED] glucagon, HYDROmorphone, lidocaine 4%, lidocaine (buffered or not buffered), lidocaine (buffered or not buffered), melatonin, midazolam, naloxone **OR** naloxone **OR**  naloxone **OR** naloxone, - MEDICATION INSTRUCTIONS -, ondansetron **OR** ondansetron, - MEDICATION INSTRUCTIONS -, - MEDICATION INSTRUCTIONS -, polyethylene glycol, propofol, sodium chloride (PF), sodium chloride (PF), sodium chloride (PF), tiZANidine     Labs:  Na 153 (H)     Labs:  Electrolytes  Potassium (mmol/L)   Date Value   10/24/2023 3.7   10/23/2023 3.7   10/22/2023 3.7   11/22/2019 4.6   11/21/2019 4.4   11/20/2019 4.5     Phosphorus (mg/dL)   Date Value   10/24/2023 4.7 (H)   10/23/2023 4.6 (H)   10/22/2023 2.9    Blood Glucose  Glucose (mg/dL)   Date Value   10/24/2023 185 (H)   11/22/2019 112 (H)   11/21/2019 97   11/20/2019 109 (H)   02/13/2019 122 (H)   02/12/2019 161 (H)     GLUCOSE BY METER POCT (mg/dL)   Date Value   10/24/2023 154 (H)   10/24/2023 148 (H)   10/24/2023 163 (H)   10/23/2023 169 (H)   10/23/2023 111 (H)     Hemoglobin A1C (%)   Date Value   10/22/2023 5.6    Inflammatory Markers  WBC (10e9/L)   Date Value   11/22/2019 10.8   11/20/2019 10.5   02/12/2019 10.1     WBC Count (10e3/uL)   Date Value   10/24/2023 23.1 (H)   10/23/2023 26.6 (H)   10/22/2023 20.0 (H)     Albumin (g/dL)   Date Value   10/22/2023 2.9 (L)   10/15/2023 4.4   02/13/2019 2.7 (L)      Magnesium (mg/dL)   Date Value   10/24/2023 2.6 (H)   10/23/2023 2.7 (H)   10/22/2023 2.7 (H)     Sodium (mmol/L)   Date Value   10/24/2023 153 (H)   10/23/2023 150 (H)   10/23/2023 148 (H)   11/22/2019 141   11/21/2019 142   11/20/2019 143    Renal  Urea Nitrogen (mg/dL)   Date Value   10/24/2023 90.2 (H)   10/23/2023 67.2 (H)   10/22/2023 53.9 (H)   11/22/2019 34 (H)   11/21/2019 47 (H)   11/20/2019 54 (H)     Creatinine (mg/dL)   Date Value   10/24/2023 1.58 (H)   10/23/2023 1.50 (H)   10/22/2023 1.40 (H)   11/22/2019 1.30 (H)   11/21/2019 1.49 (H)   11/20/2019 1.58 (H)     Additional  Triglycerides (mg/dL)   Date Value   11/21/2019 80     Ketones Urine (mg/dL)   Date Value   10/15/2023 Negative        ASSESSED NUTRITION NEEDS PER  APPROVED PRACTICE GUIDELINES:   Dosing Weight 86 kg (actual body weight) - energy; 45.5 kg (ideal body weight) - protein   Estimated Energy Needs: 946-1204 kcal/day (11-14 Kcal/Kg actual body weight)   Justification: vented   Estimated Protein Needs: 91 g protein/day (2 g pro/Kg IBW)   Justification: vented, hypercatabolism with acute illness   Estimated Fluid Needs: per MD    Recommendations:  Enteral Nutrition - Modify rate - Decrease Vital HP to 10 ml/hr x 22 hrs - 220 mL daily provides 220 kcal, 19 g protein, 24 g CHO, 0 g fiber and 184 mL free water.      Increase Prosource TF 20 to 4 pkts/day = 320 kcal/80 g protein    Starting D5 @ 50 mL/hr = 204 kcal      Propofol at current rate (18 mL/hr) provides 475 kcal/day     Total energy/protein provisions, all sources = 1219 kcal (~14.2 kcal/kg per actual weight of 86 kg - slightly overfeeding with kcal from prop and D5) + 99 g protein (~2.2 g/kg per IBW of 45.5 kg)    Increase free water flush to 100 mL Q 2 hours per MD Niesha Rollins RD, LD  Clinical Dietitian     Pager - 3rd floor/ICU: [phone]  Pager - All other floors: [phone]  Pager - Weekend/holiday: [phone]  Office phone: [phone]

## 2023-10-24 NOTE — PROGRESS NOTES
[facility] ICU VENTILATOR RESPIRATORY NOTE    Date of Admission: 10/15/23  Date of Intubation (most recent): 10/22/23  Reason for Mechanical Ventilation: Resp failure  Number of Days on Mechanical Ventilation: 3    Met Criteria for Pressure Support Trial: No  Reason for No Pressure Support Trial: FiO2 >50%      Vital Signs:  Temp: 97.5  F (36.4  C) Temp src: Bladder BP: 137/66 Pulse: 60   Resp: 24 SpO2: 96 % O2 Device: Mechanical Ventilator       ABG Results:   Recent Labs   Lab 10/24/23  0614 10/23/23  0355 10/22/23  2016 10/22/23  1449 10/22/23  1305   PH 7.27* 7.29* 7.29*  --  7.28*   PCO2 54* 56* 57*  --  59*   PO2 89 104 85  --  113*   HCO3 25 27 27  --  28   O2PER 85 70 75 100 100     Significant events: Increased RR from 20 to 24    Vent Mode: CMV/AC  (Continuous Mandatory Ventilation/ Assist Control)  FiO2 (%): 75 %  Resp Rate (Set): 24 breaths/min  Tidal Volume (Set, mL): 300 mL  PEEP (cm H2O): 10 cmH2O  Resp: 24      Plan:  Continue to monitor and wean as able    Eduarda Son, RT

## 2023-10-24 NOTE — PLAN OF CARE
Goal Outcome Evaluation:       ICU End of Shift Summary.  For vital signs and complete assessments, please see documentation flowsheets.      Pertinent assessments:   Neuro: RASS -4 on Prop and Versed. Dilaudid gtt for pain.  Cardiac: SB with SA occasional PAC and PVC. MAPs greater than 65 on Vaso.  Resp: Vent supported. Fio2 75% Peep 10. LS coarse exp wheezes. Minimal secretions  GI: BM x2; soft, loose brown/tan. TF @ goal rate of 10 with  q 2 hrs. BG q4 with sliding scale.  : mitchell in place good UOP.  Skin: see flowsheets for details  Lines: PICC, 2 PIV  Drips: Dilaudid, Prop, Versed, Vaso     Major Shift Events:   Levo stopped, vaso running at 1.2 attaining MAPS over 65  Pt. Sensitive to big turns, HR drops to 40s. Prop @ 35  TF goal rate adjusted to 10. D5 in water at 50 ml/hr started. Watch BS-- solumedrol on board as well  FiO2 titrated to 75%   Free water flushes adjusted to 100 q2 sodium slowly trending down  Potassium replaced recheck in am       Plan (Upcoming Events): Continue plan of care     Discharge/Transfer Needs: TBD     Bedside Shift Report Completed : Y  Bedside Safety Check Completed: Y       Notified provider about indwelling mitchell catheter discussed removal or continued need.    Did provider choose to remove indwelling mitchell catheter? NO    Provider's mitchell indication for keeping indwelling mitchell catheter: Indication for continued use: Strict 1-2 Hour I & O if external catheters are not an option    Is there an order for indwelling mitchell catheter? YES    *If there is a plan to keep mitchell catheter in place at discharge daily notification with provider is not necessary, but please add a notation in the treatment team sticky note that the patient will be discharging with the catheter.

## 2023-10-24 NOTE — PLAN OF CARE
ICU End of Shift Summary.  For vital signs and complete assessments, please see documentation flowsheets.      Pertinent assessments:   Neuro: RASS -4 on Prop and Versed. Dilaudid gtt for pain.  Cardiac: SB with SA occasional PAC and PVC. MAPs greater than 65 on Levo and Vaso.  Resp: Vent supported. Fio2 85% Peep 10. LS coarse exp wheezes.  GI: BM x2; soft, loose brown/tan. TF @ goal rate of 30 with  q 4 hrs. BG q4 with sliding scale.  : mitchell in place good UOP.  Skin: see flowsheets for details  Lines: PICC, 2 PIV  Drips: Dilaudid, Prop, Versed, Levo, Vaso    Major Shift Events:   Pt. Sensitive to big turns, HR drops to 40s. After clean up, pt. O2 needs increased. fio2 increased to maintain sat above 92%.   Abdominal accessory muscles used with increasing RR 23. Pt not compliant with vent. Increased prop.   TF increased to goal rate of 30.       Plan (Upcoming Events): Continue plan of care    Discharge/Transfer Needs: TBD     Bedside Shift Report Completed : Y  Bedside Safety Check Completed: Y

## 2023-10-24 NOTE — PROGRESS NOTES
[facility] ICU VENTILATOR RESPIRATORY NOTE  Date of Admission: 10/15/2023  Date of Intubation (most recent): 10/22/2023  Reason for Mechanical Ventilation: Respiratory Failure  Number of Days on Mechanical Ventilation: 3  Met Criteria for Pressure Support Trial: N  Length of Pressure Support Trial: N/A  Reason for Stopping Pressure Support Trial: N/A  Reason for No Pressure Support Trial: PEEP 10, 68%     ETT appearance on chest x-ray: Endotracheal tube tip 5.4 cm above ashley     Plan:  Wean as tolerated.     MAI Chambers

## 2023-10-24 NOTE — PROGRESS NOTES
[facility]    Medicine Progress Note - Hospitalist Service    Date of Admission:  10/15/2023    Assessment & Plan   Matthew Bowen is a 78 year old female admitted on 10/15/2023 with working diagnoses of sepsis in addition to acute on chronic hypoxic respiratory failure due to recurrent pneumonia after presenting with complaint of fever and cough.  She deteriorated slowly over the initial week of hospitalization, initially requiring supplemental oxygen and progressing to HFNC and ultimately BiPAP.  She was transferred to the ICU on 10/21/2023 due to this progression in respiratory instability.    Medical history includes chronic hypoxic respiratory failure (for which she is chronically on 2 L oxygen by nasal cannula) due to ALAINA requiring CPAP, Obesity Hypoventilation Syndrome, HFpEF, morbid obesity.  Although the chart indicates that Ms. Bowen has pulmonary hypertension, it is the opinion of cardiology that this is overstated.  (The apparent enlargement of the pulmonary arteries is not itself diagnostic of pHTN.  Her RV function is felt to be normal per cards.) Additional medical concerns include CAD, moderate Aortic Valve Stenosis and mild MR, hypertension and depression.    Issues today:  Ongoing katiana and desat episodes with cares  Hypernatremia and creatinine worse  Trial of steroids started under Pulm recommendation. (Stop hydrocortisone stress dosing and start Methylprednisolone 125 mg IV daily).  Pulmonary following.  Persistent hypoxia, considering proning to improve the oxygenation?  Pt is felt to be too tenuous for proning today (due to HD instability with movement).  Resp acidosis.  Increase RR to 24.  (Vt of 300 is low, but the plateau pressure was 29 this am)    Systems review:  Neuro: Alert and coherent at baseline.  Generally weak, but living independently with .    Intubated and sedated at this time. Had proven difficult initially to get comfortable, so she was given  relatively high doses of Propofol and her BP dropped.  Sedation regimen now includes Propofol (working on titrating this off), Midazolam drip with boluses and dilaudid infusion with boluses prn.     CV: Echo completed 10/21/23: HFpEF, EF 55-60%, Mod AORTIC STENOSIS with AoV gradient of 20.0, MR trace.    HR 80, BP: 120-150/70-90.   At the time of intubation, the pt's BP dropped and she was started on norepi, though with off-titration of the Propofol, it seems that she is requiring less and less of the norepi.   Cardiology discussed with Russ Yin and Costa.      Pulmonary: Progressing bilat GGI.  Prod cough.  Infectious eval neg at this time. Intubated 10/22 (day #2 today)  Initially today on BiPAP 14/7, FiO2 90%.  RR 30-40's. Despite this, pt developed acute hypoxic and hypercapnic resp failure.  Vent: CMV/AC 20/FiO2 85%/Vt 300/PEEP 10.  Veletri started today at 10 ng/kg/min.  Blood gas arterial and oxyhgb   Result Value Ref Range    pH Arterial 7.27 (L) 7.35 - 7.45    pCO2 Arterial 54 (H) 35 - 45 mm Hg    pO2 Arterial 89 80 - 105 mm Hg    Bicarbonate Arterial 25 21 - 28 mmol/L    Oxyhemoglobin Arterial 95 92 - 100 %    Base Excess/Deficit -2.1 -9.0 - 1.8 mmol/L    FIO2 85     Noel's Test Artline        GI/nutrition: RUQ tenderness.   -currently on Tube Feeds.    Renal/: Baseline stage III CKD, baseline creat runs between 1.2 and 1.5.  Basic metabolic panel   Result Value Ref Range    Sodium 153 (H) 135 - 145 mmol/L    Potassium 3.7 3.4 - 5.3 mmol/L    Chloride 119 (H) 98 - 107 mmol/L    Carbon Dioxide (CO2) 24 22 - 29 mmol/L    Anion Gap 10 7 - 15 mmol/L    Urea Nitrogen 90.2 (H) 8.0 - 23.0 mg/dL    Creatinine 1.58 (H) 0.51 - 0.95 mg/dL    GFR Estimate 33 (L) >60 mL/min/1.73m2    Calcium 9.2 8.8 - 10.2 mg/dL    Glucose 185 (H) 70 - 99 mg/dL      Magnesium   Result Value Ref Range    Magnesium 2.6 (H) 1.7 - 2.3 mg/dL   Phosphorus   Result Value Ref Range    Phosphorus 4.7 (H) 2.5 - 4.5 mg/dL        ID/heme:  extensive infectious eval has been negative.   The possibility of Diffuse Alveolar Hemorrhage is also considered, though less likely.    CRP inflammation   Result Value Ref Range    CRP Inflammation 196.67 (H) <5.00 mg/L     CBC with platelets   Result Value Ref Range    WBC Count 23.1 (H) 4.0 - 11.0 10e3/uL    RBC Count 3.25 (L) 3.80 - 5.20 10e6/uL    Hemoglobin 8.5 (L) 11.7 - 15.7 g/dL    Hematocrit 27.3 (L) 35.0 - 47.0 %    MCV 84 78 - 100 fL    MCH 26.2 (L) 26.5 - 33.0 pg    MCHC 31.1 (L) 31.5 - 36.5 g/dL    RDW 18.1 (H) 10.0 - 15.0 %    Platelet Count 460 (H) 150 - 450 10e3/uL     Endocrine: no baseline issues.   Now on Hydrocortisone 60 mg IV q 6   Add ISS now, while on steroids and tube feeds.         Diet: NPO per Anesthesia Guidelines for Procedure/Surgery Except for: Meds  Adult Formula Drip Feeding: Continuous Vital High Protein; Other - Specify in Comment; Goal Rate: 30 mL/hr x 22 hours (please hold 1 hour before and after levothyroxine); mL/hr; Increase rate to 20 ml/hr and advance by 10 ml q 8 hrs to goal rate o...    DVT Prophylaxis: Heparin SQ  Aleman Catheter: PRESENT, indication: Strict 1-2 Hour I&O  Lines: PRESENT      PICC 10/22/23 Triple Lumen Right Basilic multiple med gtt. ready for use-Site Assessment: WDL except;Ecchymotic  Arterial Line 10/22/23 Radial-Site Assessment: WDL      Cardiac Monitoring: ACTIVE order. Indication: Tachyarrhythmias, acute (48 hours)  Code Status: Full Code      Clinically Significant Risk Factors         # Hypernatremia: Highest Na = 153 mmol/L in last 2 days, will monitor as appropriate      # Hypoalbuminemia: Lowest albumin = 2.9 g/dL at 10/22/2023  5:20 AM, will monitor as appropriate            # Obesity: Estimated body mass index is 37.46 kg/m  as calculated from the following:    Height as of this encounter: 1.524 m (5').    Weight as of this encounter: 87 kg (191 lb 12.8 oz).             Disposition Plan     Expected Discharge Date: 10/24/2023       Destination: home with help/services;home with family              Amadou Raygoza MD  Hospitalist Service  [facility]  Securely message with [employer] (more info)  Text page via "[employer] Paging/Directory   ______________________________________________________________________    Interval History   Chart reviewed. Discussed case with team including intensivist and nursing staff.   I also met and discussed the case briefly with the pt's daughter, Roque.    Physical Exam   Vital Signs: Temp: 97.5  F (36.4  C) Temp src: Bladder   Pulse: 52   Resp: 20 SpO2: 97 % O2 Device: Mechanical Ventilator    Weight: 191 lbs 12.8 oz    General Appearance: Pleasant and communicative despite being tachypneic on BiPAP.    Respiratory: Increase resp rat,e but appears remarkably comfortable  Cardiovascular: RRR, no murmur  GI: soft, NTND  Skin: no appreciable edema  Other:      Medical Decision Making     50 MINUTES SPENT BY ME on the date of service doing chart review, history, exam, documentation & further activities per the note.      Data   Results for orders placed or performed during the hospital encounter of 10/15/23 (from the past 24 hour(s))   Glucose by meter   Result Value Ref Range    GLUCOSE BY METER POCT 125 (H) 70 - 99 mg/dL   XR Chest Port 1 View    Narrative    CHEST ONE VIEW  10/23/2023 9:52 AM     HISTORY: Check ETT position.    COMPARISON: October 22, 2023      Impression    IMPRESSION: Endotracheal tube tip approximately 3 cm from the ashley.  Extensive bilateral infiltrates appear slightly worse, although this  may be related to different level of inspiration. No significant  change in remaining tubes and lines.    REG IZAGUIRRE MD         SYSTEM ID:  [id number]   Glucose by meter   Result Value Ref Range    GLUCOSE BY METER POCT 120 (H) 70 - 99 mg/dL   US Abdomen Limited    Narrative    US ABDOMEN LIMITED 10/23/2023 3:51 PM    CLINICAL HISTORY: Question possible cholecystitis, right  upper  quadrant tenderness prior to intubation  TECHNIQUE: Limited abdominal ultrasound.    COMPARISON: February 12, 2019    FINDINGS:    GALLBLADDER: Gallbladder sludge. No gallstones, wall thickening, or  pericholecystic fluid. Negative sonographic Bass's sign.    BILE DUCTS: There is no biliary dilatation. The common duct measures 6  mm.    LIVER: Unremarkable where seen.    RIGHT KIDNEY: No hydronephrosis.    PANCREAS: The visualized portions of the pancreas are normal.    No ascites.      Impression    IMPRESSION:  Some gallbladder sludge is present, no shadowing stones or  cholecystitis demonstrated.    REG IZAGUIRRE MD         SYSTEM ID:  [id number]   Glucose by meter   Result Value Ref Range    GLUCOSE BY METER POCT 111 (H) 70 - 99 mg/dL   Vancomycin level   Result Value Ref Range    Vancomycin 17.4   ug/mL   Glucose by meter   Result Value Ref Range    GLUCOSE BY METER POCT 169 (H) 70 - 99 mg/dL   Glucose by meter   Result Value Ref Range    GLUCOSE BY METER POCT 163 (H) 70 - 99 mg/dL   Glucose by meter   Result Value Ref Range    GLUCOSE BY METER POCT 148 (H) 70 - 99 mg/dL   Basic metabolic panel   Result Value Ref Range    Sodium 153 (H) 135 - 145 mmol/L    Potassium 3.7 3.4 - 5.3 mmol/L    Chloride 119 (H) 98 - 107 mmol/L    Carbon Dioxide (CO2) 24 22 - 29 mmol/L    Anion Gap 10 7 - 15 mmol/L    Urea Nitrogen 90.2 (H) 8.0 - 23.0 mg/dL    Creatinine 1.58 (H) 0.51 - 0.95 mg/dL    GFR Estimate 33 (L) >60 mL/min/1.73m2    Calcium 9.2 8.8 - 10.2 mg/dL    Glucose 185 (H) 70 - 99 mg/dL   CBC with platelets   Result Value Ref Range    WBC Count 23.1 (H) 4.0 - 11.0 10e3/uL    RBC Count 3.25 (L) 3.80 - 5.20 10e6/uL    Hemoglobin 8.5 (L) 11.7 - 15.7 g/dL    Hematocrit 27.3 (L) 35.0 - 47.0 %    MCV 84 78 - 100 fL    MCH 26.2 (L) 26.5 - 33.0 pg    MCHC 31.1 (L) 31.5 - 36.5 g/dL    RDW 18.1 (H) 10.0 - 15.0 %    Platelet Count 460 (H) 150 - 450 10e3/uL   Magnesium   Result Value Ref Range    Magnesium 2.6 (H) 1.7 -  2.3 mg/dL   Phosphorus   Result Value Ref Range    Phosphorus 4.7 (H) 2.5 - 4.5 mg/dL   CRP inflammation   Result Value Ref Range    CRP Inflammation 196.67 (H) <5.00 mg/L   Blood gas arterial and oxyhgb   Result Value Ref Range    pH Arterial 7.27 (L) 7.35 - 7.45    pCO2 Arterial 54 (H) 35 - 45 mm Hg    pO2 Arterial 89 80 - 105 mm Hg    Bicarbonate Arterial 25 21 - 28 mmol/L    Oxyhemoglobin Arterial 95 92 - 100 %    Base Excess/Deficit -2.1 -9.0 - 1.8 mmol/L    FIO2 85     Noel's Test Artline    Glucose by meter   Result Value Ref Range    GLUCOSE BY METER POCT 154 (H) 70 - 99 mg/dL

## 2023-10-25 NOTE — PLAN OF CARE
Shift Events: No significant change. Labile VS with repositioning. Seems to have poorer tolerance laying on left side.     Neuro: RASS -4. Moved foot, bites ETT, not following commands. Weaned off Propofol and increased Versed and Dilaudid. T max 99.  CV: Bradycardic low 40's with bijeminal PVCs seemed to lessen after stopping Propofol. SB 50-60's. Labile pressures. Vasopressin titrated as needed. Misael ordered if needed but MAPs stabilized after 500ml IVFB.   ID: Vanco dc'd. Remains on Flagyl and Cefepime.   Pulm: Multiple vent changes. FIO2 weaned from 100 to 75% PEEP 12.   GI: 3 loose small stools. TF increased to 30ml/hr. FWF remains at 150q2.  : Received one dose of 40mg IV lasix with no significant increase in UO.  Lines/gtts: PICC, A-line, PIV x2  Skin: Bruising to BUE and abdomen       Plan: Continue with current cares.    Notified provider about indwelling mitchell catheter discussed removal or continued need.    Did provider choose to remove indwelling mitchell catheter? NO    Provider's mitchell indication for keeping indwelling mitchell catheter: Indication for continued use: Strict 1-2 Hour I & O if external catheters are not an option    Is there an order for indwelling mitchell catheter? YES    *If there is a plan to keep mitchell catheter in place at discharge daily notification with provider is not necessary, but please add a notation in the treatment team sticky note that the patient will be discharging with the catheter.

## 2023-10-25 NOTE — PLAN OF CARE
Goal Outcome Evaluation:      Plan of Care Reviewed With: patient    Overall Patient Progress: improvingOverall Patient Progress: improving     ICU End of Shift Summary.  For vital signs and complete assessments, please see documentation flowsheets.     Neuro: RASS -4, continues on prop/dilaudid/versed.  Cardiac: tele SB 40s-50s w/ pvc/pac, bp requiring vaso to maintain maps bradies to low 40s w/ cares and turns.  Resp: Vent supported. Fio2 weaned to 60. Desats with cares and turns and takes 1-2 min to recover.  GI: TF @ 10, FWF, liquid stool  : mitchell in place w/ good output q2  Skin: scattered bruising  Lines:picc, art, piv  Drips: vaso, dilaudid, prop, versed, d5    Major Shift Events:   Na to 152, fwf increased to 150q2  Able to wean fio2 to 60  K+ replaced per protocol, recheck tomorrow    Plan (Upcoming Events): wean as able, continue abx/steroids       Bedside Shift Report Completed : y  Bedside Safety Check Completed: nakul

## 2023-10-25 NOTE — PROGRESS NOTES
ICU staff  DOS 10/25/2023    No major overnight events noted. Weaned off propofol and NE, still on a bit of vasopressin; labile BP with turns. Oxygenation variable -- FiO2 down to 0.6 overnight, up to 0.95 now with epoprostenol at full strength. Overbreathing the vent.     Vitals:   Temp:  [96.4  F (35.8  C)-99.5  F (37.5  C)] 98.8  F (37.1  C)  Pulse:  [42-63] 50  Resp:  [0-32] 27  BP: (116-137)/(58-66) 137/66  MAP:  [53 mmHg-104 mmHg] 70 mmHg  Arterial Line BP: ()/(31-74) 115/48  FiO2 (%):  [60 %-100 %] 100 %  SpO2:  [83 %-100 %] 92 %     Vent Mode: CMV/AC  (Continuous Mandatory Ventilation/ Assist Control)  FiO2 (%): 100 %  Resp Rate (Set): 24 breaths/min  Tidal Volume (Set, mL): 300 mL  PEEP (cm H2O): 10 cmH2O  Resp: 27    I/O last 3 completed shifts:  In: 4120.15 [I.V.:2095.15; NG/GT:1640]  Out: 1950 [Urine:1950]    NE off   1.2-2.4  Propofol off  Midazolam 4-5  Hydromorphone 0.3  Epo 20    Exam:  Gen: Intubated, sedated  HEENT: NC/AT  Pulm: Fairly clear, distant  Cor: RRR, mildly bradycardic  Abdomen/GI: Soft, nondistended  : Aleman in place  Extremities: Warm  Skin: Well-perfused  Neuro: Sedated though is beginning to respond to exam  Psych: Unable to assess    Data:   Labs reviewed  - Cr stable at 1.53, though BUN trending up  - Still hypernatremic at 152  - WBCs 30  Nothing new on imaging or micro    Assessment/plan:  77 y/o woman with chronic hypoxemic respiratory failure, bronchiectasis, diastolic heart failure, CAD admitted with fever/cough 10/15. Decompensated 10/21 with intubation for worsening respiratory failure 10/22. Mild improvement in pressor demand over the last 48 hours, with some improvement in oxygenation (though this has been labile).  CNS: Intubated, sedated.  # Pain  # Sedation  # Weakness/frequent falls  - Continue hydromorphone, midazolam  - Stay off propofol if able  - Lighten sedation as able  Pulm: Airway pressures somewhat variable, but looking OK this morning with peaks low  20s. 7.28/51/110/24  # Acute on chronic hypoxemic respiratory failure  # Bronchiectasis  # Pneumonia, ?inflammatory pneumonitis  - Nothing new identified on cultures  - Started on methylprednisolone yesterday for possible inflammatory component  - Increase PEEP to 12 this morning, try to wean O2  - In light of chronic hypoxemia, SpO2 target >89% is OK  - Mild, stable respiratory acidosis with Vt ~6.7 mL/kg IBW; pH acceptably compensated  - Keep epoprostenol at 20 ng for now  - Pulmonary consult team following  CV: Less pressor demand overall  # Septic shock  # Diastolic heart failure with preserved EF  # CAD s/p PCI 2019  # Essential hypertension  # Aortic stenosis  - Wean pressors; OK to use vasopressin for now as she is tolerating well at low dose  - Will continue some gentle diuresis  GI: Abdomen benign.  # Nutrition  - Continue tube feedings  : UOP adequate, though positive fluid balance last 24 hours. Na stably high.  # Hypernatremia  # Hypervolemia  # CARMEN on CKD, stable  - Free water increased overnight, checking sodium this evening  - Gentle diuresis  Heme: Hb 8.3 (8.5)  # Anemia of chronic disease  - No indication to transfuse  Msk: Extremities warm, well-perfused.   Endo: Glucose 119-161.  # Stress/steroid hyperglycemia  # Hypothyroidism  - Insulin sliding scale  - Levothyroxine  ID: Afebrile, WBCs 30  # Leukocytosis  # Pneumonia  # Sepsis  - WBCs likely secondary to increased steroid dose  - Continue broad-spectrum abx  ICU: [facility], PPI  - Family updated at bedside    This patient is critically ill to my assessment and requires ICU monitoring and cares. A total of 40 minutes critical care time spent on 10/25/2023, exclusive of procedures.     Rui Payton MD, PhD  Surgical critical care  10/25/2023, 11:17 AM    Clinically Significant Risk Factors         # Hypernatremia: Highest Na = 153 mmol/L in last 2 days, will monitor as appropriate      # Hypoalbuminemia: Lowest albumin = 2.9 g/dL at 10/22/2023   5:20 AM, will monitor as appropriate            # Obesity: Estimated body mass index is 37.95 kg/m  as calculated from the following:    Height as of this encounter: 1.524 m (5').    Weight as of this encounter: 88.1 kg (194 lb 4.8 oz).

## 2023-10-25 NOTE — PROGRESS NOTES
[facility]    Medicine Progress Note - Hospitalist Service    Date of Admission:  10/15/2023    Assessment & Plan   Matthew Bowen is a 78 year old female w/PMH chronic hypoxic respiratory failure due to pulmonary HTN, ALAINA requiring CPAP, chronic diastolic HF, CAD, CKD stage 3, morbid obesity, HTN,  depression who presents from home with family with fever, cough.     Acute hypoxic respiratory failure s/p intubation  Possible ARDS  Pneumonia, recurrent w/bronchiectasis  -Presents with recurrent PNA, 3rd time in last month or so. Last completed treatment 2 weeks ago with persistent cough. Presents with worsening fatigue, productive cough, weakness and falls.  -On admission, patient febrile to 101.1, white count of 21.  Lactic acid was within normal limits.  She underwent a CT scan that showed a lingular consolidative opacity with air bronchograms with bilateral upper lobe groundglass opacities.  She was started on ceftriaxone and azithromycin.  -Sputum culture from 10/19 better that showed gram positive cocci and yeast. Discussed with ID, yeast prevalent in sputum samples and likely candida that is not related to illness.  Need to await speciation.   -Patient initially on ceftriaxone and azithromycin.  Broadened to cefepime and azithro on 10/18.  Flagyl added on 10/20.  Finished azithromycin course on 10/20.   -COVID, influenza, Respiratory viral panel negative.  Strep and legionella antigens negative.   -intubated 10/21 due to worsening respiratory status and bronch done showing serosanguinous and fibrinous return with thick mucoid secretions noted  -ID, pulm and ICU team all following.  -remains on cefepime since 18th, flagyll 20th and Vanc since 21st-cont and await further recs  -on solumedrol 125 daily 10/24, continue for now     Shock  -suspected to be infectious, EF is preserved  -on vasopressin, wean as able    Hypernatremia  -persistent, free water being  adjusted  -monitor    Leukocytosis: Suspect related to above.  From what I can see in care everywhere has generally been 9-12 range in [id number].       Generalized weakness. Chronic arthritis. Falls at home. Left Foot pain: -Having multifactorial weakness, with diffuse body aches and pains.  Imaging on admission including CT head, cervical spine, CT CAP did not show acute fracture.  She had an x-ray of her foot also showed degenerative changes without fracture.    -readdress when appropriate     Hx ALAINA, pulm HTN, chronic diastolic HF:   -CPAP ordered  -Received 60 mg IV lasix on 10/20. Given her softer BP's on 10/21 will hold her antihypertensives.       CKD stage 3: Cr 1.21 on admission. Slightly higher on 10/21 at 1.35.      HTN: holding     Hx morbid obesity, anemia, CAD, hypothyroidism, mood disorder: resume home ASA, statin, plavix, lyrica, zoloft     Hypokalemia: Replacement         Diet: NPO per Anesthesia Guidelines for Procedure/Surgery Except for: Meds  Adult Formula Drip Feeding: Continuous Vital High Protein; Other - Specify in Comment; Goal Rate: 10 mL/hr x 22 hours (please hold 1 hour before and after levothyroxine); mL/hr; Decrease rate back to 10 mL/hr - kcal from prop and D5; Do not advanc...    DVT Prophylaxis: Heparin SQ  Aleman Catheter: PRESENT, indication: Strict 1-2 Hour I&O  Lines: PRESENT      PICC 10/22/23 Triple Lumen Right Basilic multiple med gtt. ready for use-Site Assessment: Ecchymotic  Arterial Line 10/22/23 Radial-Site Assessment: WDL      Cardiac Monitoring: ACTIVE order. Indication: Tachyarrhythmias, acute (48 hours)  Code Status: Full Code        Disposition Plan   Await significant improvement         Eldon Apple DO  Hospitalist Service  [facility]  Securely message with [employer] (more info)  Text page via [employer] Paging/Directory   ______________________________________________________________________    Interval History   Remains intubated and sedated,  O2 sats have been labile and BP generally ok except with movement. Remains on vasopressin, sedatives. No significant change    Physical Exam   Vital Signs: Temp: 99.1  F (37.3  C) Temp src: Bladder BP: 137/66 Pulse: 52   Resp: 28 SpO2: 90 % O2 Device: Mechanical Ventilator    Weight: 194 lbs 4.8 oz    Constitutional: intubated and sedated, not following commands  Eyes: pupils equal, round and reactive to light and conjunctiva normal  ENT: ETT in place  Respiratory: ventilatory breath sounds, no wheezing, diminished air entry  Cardiovascular: regular rate and rhythm and no murmur noted  GI: normal bowel sounds, soft, and non-distended  Skin: no bruising or bleeding  Neurologic: sedated, not following commands, makes occasional spontaneous movements    60 MINUTES SPENT BY ME on the date of service doing chart review, history, exam, documentation & further activities per the note.      Data   ------------------------- PAST 24 HR DATA REVIEWED -----------------------------------------------    I have personally reviewed the following data over the past 24 hrs:    30.0 (H)  \   8.3 (L)   / 472 (H)     152 (H) 119 (H) 100.8 (H) /  133 (H)   3.6 23 1.53 (H) \       Imaging results reviewed over the past 24 hrs:   No results found for this or any previous visit (from the past 24 hour(s)).

## 2023-10-25 NOTE — PROGRESS NOTES
Intensivist brief update  DOS: 10/25/2023    Spoke with bedside RN while en route.  - Had briefly needed second pressor, but got a lot of ectopy with NE. Asked for phenylephrine instead.  - Gave 500 mL LR as well.    On my arrival, Ms Bowen is somewhat dyssynchronous with the ventilator. Her midazolam has been turned up to 7 mg/hr. Her lungs are a bit coarse. She is pulling larger than the set tidal volume. She is regular rhythm with rate in the 50s-60s. Clear UOP into the mitchell. The remainder of her exam is unchanged.    Vent settings at my visit were 24/300/12/100 with SpO2 93%.    Increased PEEP to 14 and Vt to 350 without significant change in airway pressures. She appeared a bit more comfortable. She remains on epoprostenol full-strength.    Check ABG in an hour. OK to target SpO2 >89%.     Rui Payton MD, PhD  Surgical critical care  October 25, 2023, 5:13 PM

## 2023-10-25 NOTE — PROGRESS NOTES
"ICU Staff:     I examined the patient at the bedside in the [facility] ICU. I managed this patient at the bedside in the [facility] ICU for their critical illness. I reviewed this patient's medical records, progress notes, and imaging studies. The patient is a 78-year-old woman who was admitted to the ICU for acute hypoxic respiratory failure due to ARDS. The patient remains unstable and demonstrates bradycardia when she is moved or turned; for this reason, prone positioning has not been an option in her care. The patient's  was updated at the bedside today.      PAST MEDICAL HISTORY:  Past Medical History:   Diagnosis Date    Antiplatelet or antithrombotic long-term use     Arthritis     Congestive heart failure (H)     Dyspnea on exertion     Heart attack (H)     Heart murmur     Hypertension     Irregular heart beat     Oxygen dependent     2L continuous at home.    Pulmonary hypertension (H)     Sleep apnea     Bipap    Stented coronary artery     x 1    Thyroid disease     Walking troubles         PAST SURGICAL HISTORY:  Past Surgical History:   Procedure Laterality Date    APPENDECTOMY      COLONOSCOPY      COLONOSCOPY N/A 2/28/2022    Procedure: COLONOSCOPY;  Surgeon: Kolby Dunn MD;  Location: [facility] OR    CV CORONARY ANGIOGRAM N/A 11/21/2019    Procedure: Coronary Angiogram;  Surgeon: Tay Andre MD;  Location:  HEART CARDIAC CATH LAB    CV LEFT HEART CATH N/A 11/21/2019    Procedure: Left Heart Cath;  Surgeon: Tay Andre MD;  Location: [facility] HEART CARDIAC CATH LAB    CV PCI STENT DRUG ELUTING N/A 11/21/2019    Procedure: PCI Stent Drug Eluting;  Surgeon: Tay Andre MD;  Location: [facility] HEART CARDIAC CATH LAB    GYN SURGERY      Hyst.    ORTHOPEDIC SURGERY      B\" TKs        MEDICATIONS:  Current Facility-Administered Medications   Medication    acetaminophen (TYLENOL) tablet 650 mg    Or    acetaminophen (TYLENOL) Suppository 650 mg    acetylcysteine " (MUCOMYST) 20 % nebulizer solution 2 mL    albuterol (PROVENTIL HFA/VENTOLIN HFA) inhaler    artificial tears (GENTEAL) 0.1-0.2-0.3 % ophthalmic solution 1 drop    aspirin (ASA) chewable tablet 81 mg    atorvastatin (LIPITOR) tablet 20 mg    carboxymethylcellulose PF (REFRESH PLUS) 0.5 % ophthalmic solution 1 drop    [START ON 10/25/2023] ceFEPIme (MAXIPIME) 2 g vial to attach to  mL bag for ADULTS or 50 mL bag for PEDS    cetirizine (zyrTEC) tablet 10 mg    chlorhexidine (PERIDEX) 0.12 % solution 15 mL    dextrose 10% infusion    dextrose 5% infusion    glucose gel 15-30 g    Or    dextrose 50 % injection 25-50 mL    Or    glucagon injection 1 mg    glucose gel 15-30 g    Or    dextrose 50 % injection 25-50 mL    epoprostenol (VELETRI) 20 mcg/mL in sterile water inhalation solution    heparin ANTICOAGULANT injection 5,000 Units    HYDROmorphone (DILAUDID) 0.2 mg/mL infusion ADULT/PEDS GREATER than or EQUAL to 20 kg    HYDROmorphone (DILAUDID) injection 0.2-0.4 mg    insulin aspart (NovoLOG) injection (RAPID ACTING)    levalbuterol (XOPENEX) neb solution 1.25 mg    levothyroxine (SYNTHROID/LEVOTHROID) tablet 150 mcg    lidocaine (LMX4) cream    lidocaine 1 % 0.1-1 mL    lidocaine 1 % 0.1-5 mL    melatonin tablet 1 mg    methylPREDNISolone sodium succinate (solu-MEDROL) injection 125 mg    metroNIDAZOLE (FLAGYL) infusion 500 mg    midazolam (VERSED) drip - ADULT 100 mg/100 mL in NS (pre-mix)    midazolam (VERSED) injection 1-4 mg    multivitamins w/minerals liquid 15 mL    naloxone (NARCAN) injection 0.2 mg    Or    naloxone (NARCAN) injection 0.4 mg    Or    naloxone (NARCAN) injection 0.2 mg    Or    naloxone (NARCAN) injection 0.4 mg    No lozenges or gum should be given while patient on BIPAP/AVAPS/AVAPS AE    norepinephrine (LEVOPHED) 16 mg in  mL infusion MAX CONC CENTRAL LINE    ondansetron (ZOFRAN ODT) ODT tab 4 mg    Or    ondansetron (ZOFRAN) injection 4 mg    pantoprazole (PROTONIX) 2 mg/mL  suspension 40 mg    Or    pantoprazole (PROTONIX) IV push injection 40 mg    Patient is already receiving mechanical prophylaxis    Patient may continue current oral medications    polyethylene glycol (MIRALAX) Packet 17 g    polyethylene glycol (MIRALAX) Packet 17 g    pregabalin (LYRICA) capsule 75 mg    propofol (DIPRIVAN) bolus from bag or syringe pump    propofol (DIPRIVAN) infusion    protein modular (PROSOURCE TF20) packet 2 packet    senna-docusate (SENOKOT-S/PERICOLACE) 8.6-50 MG per tablet 1 tablet    sertraline (ZOLOFT) tablet 150 mg    sodium chloride (PF) 0.9% PF flush 10-20 mL    sodium chloride (PF) 0.9% PF flush 10-40 mL    sodium chloride (PF) 0.9% PF flush 10-40 mL    sodium chloride (PF) 0.9% PF flush 3 mL    sodium chloride (PF) 0.9% PF flush 3 mL    tiZANidine (ZANAFLEX) tablet 2 mg    vancomycin (VANCOCIN) 750 mg in sodium chloride 0.9 % 250 mL intermittent infusion    vasopressin 0.2 units/mL in NS (PITRESSIN) standard conc infusion    Vitamin D3 (CHOLECALCIFEROL) tablet 50 mcg        ALLERGIES:  No Known Allergies     SOCIAL HISTORY:  Social History     Socioeconomic History    Marital status:    Tobacco Use    Smoking status: Never    Smokeless tobacco: Never       FAMILY HISTORY:  No family history on file.    Vital Signs: /66   Pulse 58   Temp 98.1  F (36.7  C)   Resp 23   Ht 1.524 m (5')   Wt 87 kg (191 lb 12.8 oz)   SpO2 98%   BMI 37.46 kg/m      GEN: The patient is intubated and sedated. The patient's family is at the bedside.       HEENT: Pupils 2 mm bilaterally; ET tube in place.       Chest: Course BS bilaterally     Cor: Bradycardia at a rate of 50; no murmur.      ABD: Soft, no masses.      Ext: Warm and well perfused.       Neuro: intubated and sedated.        A/P: The patient is a 78-year-old woman who was admitted to the ICU for acute hypoxic respiratory failure due to ARDS. The patient remains unstable and demonstrates bradycardia when she is moved or  turned; for this reason, prone positioning has not been an option in her care.       Neuro: The patient is intubated and sedated. No new neurologic issues.       Cardiac: The patient has known diastolic heart failure;  the patient also has a history of CAD and stent placement in 2019; the patient is also known to have moderate aortic stenosis. Bradycardia persists. The patient was weaned of the norepinephrine today.       Pulm: ARDS with severe hypoxia and elevated plateau pressures on low tidal volume ventilation.       GI: Will continue enteral nutritional support.       : The patient has CKD; the UO continues to be adequate.       HEME: Chronic anemia; will continue to follow the Hgb.        ENDO: Hypothyroidism; will continue levothyroxine; will continue ISS.     ID: On broad spectrum coverage for pneumonia with cefepime, metronidazole, and vancomycin.      I personally examined and evaluated the patient today in the [facility] ICU. The patient remains critically ill today. All of the ICU patient care orders and treatments were placed at my direction. I personally managed the patient's ICU supportive measures that were required as the patient's critical illness created a continuous threat of loss of life for the patient. Key critical care decisions were made by me today to maintain life-saving, effective ICU-supportive care. I spent 35 minutes providing critical care services at the bedside and in the critical care unit, evaluating the patient, directing care, and reviewing the patient's laboratory values and the patient's radiologic imaging reports associated with the patient's critical illness. I have evaluated all laboratory values and imaging studies from the past 24 hours. I actively assessed this acute critically ill patient, and I personally provided supportive interventions that were required because the patient has acute and persistent, imminently life-threatening organ system failure. The  critical care provided required high complexity decision making and clinical evaluation as the patient has an acute critical illness that impairs one or more vital organs. The patient has a high probability of imminent or life-threatening deterioration. I personally spent 35 minutes of Critical Care Time, which was exclusive of any time required to perform any bedside procedures. The Critical Care Time was required to personally provide and direct critical care services at the bedside and in the critical care unit for this acutely critically ill patient. Critical care time was required to evaluate the patient's hemodynamic stability due to the bradycardia and to determine that the patient was not a candidate for a prone positioning, to update the patient's family at the bedside today, and to wean the patient from the norepinephrine today. I personally managed the patient's sedation, pain control and analgesia, metabolic abnormalities, nutritional status, and vasoactive medications.     Kolby Hodgson MD, PhD

## 2023-10-25 NOTE — PROVIDER NOTIFICATION
Pt required second pressor to keep MAP >65. Restarted Levophed, HR into the 90's with increased ectopy and hypertensive with SBP into the 170's.  Paged Dr. Payton with [facility] to administer 500ml LR bolus over one hour and phenylephrine gtt to keep MAP >65.

## 2023-10-25 NOTE — PROGRESS NOTES
ICU VENTILATOR RESPIRATORY NOTE  Date of Admission: 10/15/2023  Date of Intubation (most recent): 10/22/2023  Reason for Mechanical Ventilation: Respiratory Failure  Number of Days on Mechanical Ventilation: 4  Met Criteria for Pressure Support Trial: N  Length of Pressure Support Trial: N/A  Reason for Stopping Pressure Support Trial: N/A  Reason for No Pressure Support Trial: PEEP +10     ETT appearance on chest x-ray: Endotracheal tube tip 5.4 cm above ashley     Plan:  Wean as tolerated.     Yandy Ventura R

## 2023-10-25 NOTE — PROGRESS NOTES
[facility] ICU VENTILATOR RESPIRATORY NOTE    Date of Admission: 10/15/23  Date of Intubation (most recent): 10/22/23  Reason for Mechanical Ventilation: Resp failure  Number of Days on Mechanical Ventilation: 4    Met Criteria for Pressure Support Trial: No  Length of Pressure Support Trial: NA  Reason for Stopping Pressure Support Trial: NA  Reason for No Pressure Support Trial: PEEP +14     Significant Events Today: increased PEEP to 14. Vt to 350    Vital Signs:  Temp: 99.1  F (37.3  C) Temp src: Bladder   Pulse: 61   Resp: 25 SpO2: 93 % O2 Device: Mechanical Ventilator       ABG Results:   Recent Labs   Lab 10/25/23  0423 10/24/23  0614 10/23/23  0355 10/22/23  2016   PH 7.28* 7.27* 7.29* 7.29*   PCO2 51* 54* 56* 57*   PO2 110* 89 104 85   HCO3 24 25 27 27   O2PER 70 85 70 75         Vent Mode: CMV/AC  (Continuous Mandatory Ventilation/ Assist Control)  FiO2 (%): 100 %  Resp Rate (Set): 24 breaths/min  Tidal Volume (Set, mL): (S) 350 mL  PEEP (cm H2O): (S) 14 cmH2O  Resp: 25      Plan:  Continue to monitor    Eduarda Son, RT
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
## 2023-10-25 NOTE — PROGRESS NOTES
[facility]  Infectious Disease Progress Note          Assessment and Plan:   Date of Admission:  10/15/2023  Date of Consult (When I saw the patient): 10/20/23        Assessment & Plan  Matthew Bowen is a 78 year old who was admitted on 10/15/2023.      Impression: 1 78-year-old female, some chronic underlying lung disease, on oxygen, bronchiectasis probable, some history of pneumonia but now 6 weeks or so of worsening respiratory symptoms, 2 prior courses of antibiotics, now admitted with infiltrates, no major sepsis but elevated procalcitonin and white count implying bacterial, not really improving on antibiotics concern for more chronic type pathogen in this patient with underlying lung disease by this history     2 chronic lung disease on oxygen some underlying bronchiectasis, some prior pneumonias  3 chronic kidney disease     REC 1 dramatic worsening occurred without clear explanation, now in e ICU intubated and sedated.  No new major fever,  no positive microbiology.  Fungal studies all negative   Candida not a significant pathogen here BAL studies neg so far  On broader antibiotics at this point, continue for now awaiting microbiology and clinical progress continue cefepime and Flagyl but discontinue Vanco already has some renal insufficiency have not isolated MRSA or any other pathogens and should have if they were the issue here not worth the toxicity  2 discussed with daughter                 Interval History:     Intubated and sedated and same, so far no improvement in ICU, intubated and sedated, BAL studiesnegative all prior fungal and microbiologic studies negative QuantiFERON-TB gold negative              Medications:      acetylcysteine  2 mL Nebulization 4x Daily    aspirin  81 mg Oral Daily    atorvastatin  20 mg Oral QPM    ceFEPIme  2 g Intravenous Q24H    chlorhexidine  15 mL Mouth/Throat Q12H    furosemide  40 mg Intravenous Q12H    heparin ANTICOAGULANT  5,000 Units  Subcutaneous Q12H    insulin aspart  1-6 Units Subcutaneous Q4H    levalbuterol  1.25 mg Nebulization 4x Daily    levothyroxine  150 mcg Oral QAM AC    methylPREDNISolone  125 mg Intravenous Q24H    metroNIDAZOLE  500 mg Intravenous Q8H    multivitamins w/minerals  15 mL Per Feeding Tube Daily    pantoprazole  40 mg Per Feeding Tube QAM AC    Or    pantoprazole  40 mg Intravenous QAM AC    polyethylene glycol  17 g Oral Daily    [START ON 10/26/2023] pregabalin  75 mg Per Feeding Tube Daily    protein modular  2 packet Per Feeding Tube BID    senna-docusate  1 tablet Oral BID    sertraline  150 mg Oral Daily    sodium chloride (PF)  10-40 mL Intracatheter Q7 Days    sodium chloride (PF)  3 mL Intracatheter Q8H    vitamin D3  50 mcg Oral Daily                  Physical Exam:   Blood pressure 137/66, pulse 56, temperature 99  F (37.2  C), resp. rate (!) 42, height 1.524 m (5'), weight 88.1 kg (194 lb 4.8 oz), SpO2 91%.  Wt Readings from Last 2 Encounters:   10/25/23 88.1 kg (194 lb 4.8 oz)   02/28/22 94.3 kg (208 lb)     Vital Signs with Ranges  Temp:  [96.8  F (36  C)-99.5  F (37.5  C)] 99  F (37.2  C)  Pulse:  [42-63] 56  Resp:  [0-42] 42  MAP:  [53 mmHg-104 mmHg] 70 mmHg  Arterial Line BP: ()/(31-71) 113/44  FiO2 (%):  [60 %-100 %] 100 %  SpO2:  [88 %-100 %] 91 %    Constitutional: Intubated and sedated about same     Lungs: Clear to auscultation bilaterally, no crackles or wheezing   Cardiovascular: Regular rate and rhythm, normal S1 and S2, and no murmur noted   Abdomen: Normal bowel sounds, soft, non-distended, non-tender   Skin: No rashes, no cyanosis, no edema   Other:           Data:   All microbiology laboratory data reviewed.  Recent Labs   Lab Test 10/25/23  0421 10/24/23  0612 10/23/23  0522   WBC 30.0* 23.1* 26.6*   HGB 8.3* 8.5* 8.8*   HCT 27.1* 27.3* 28.3*   MCV 84 84 84   * 460* 484*     Recent Labs   Lab Test 10/25/23  0421 10/24/23  0612 10/23/23  0522   CR 1.53* 1.58* 1.50*     No lab  results found.  Recent Labs   Lab Test 02/12/19  0010   CULT Light growth  Normal deshaun

## 2023-10-25 NOTE — PROGRESS NOTES
CLINICAL NUTRITION SERVICES - BRIEF NOTE    - Propofol off, will increase TF rate to meet nutritional needs  - Current regimen:  Enteral Nutrition - Vital HP 10 ml/hr x 22 hrs - 220 mL daily provides 220 kcal, 19 g protein, 24 g CHO, 0 g fiber and 184 mL free water    Prosource TF 20, 4 pkts/day = 320 kcal/80 g protein     Starting D5 @ 50 mL/hr = 204 kcal      Propofol off    Current provisions, all sources = 744 kcal, 99g protein    Dosing Weight 86 kg (actual body weight) - energy; 45.5 kg (ideal body weight) - protein   Estimated Energy Needs: 946-1204 kcal/day (11-14 Kcal/Kg actual body weight)   Justification: vented   Estimated Protein Needs: 91 g protein/day (2 g pro/Kg IBW)   Justification: vented, hypercatabolism with acute illness   Estimated Fluid Needs: per MD    INTERVENTIONS  Recommendations / Nutrition Prescription  EN modification:  Enteral Nutrition - Vital HP 30 ml/hr x 22 hrs - 660 mL daily provides 660 kcal, 57 g protein, 73 g CHO, 0 g fiber and 551 mL free water    Prosource TF 20, 2 pkts/day = 160 kcal/40 g protein     Starting D5 @ 50 mL/hr = 204 kcal      Propofol off    Current provisions, all sources = 1024 kcal, 97g protein    Implementation  Collaboration with other providers - discussed pt in interdisciplinary rounds  Enteral Nutrition - modified    Monalisa Pena RD, LD  Clinical Dietitian - [facility]

## 2023-10-25 NOTE — PROGRESS NOTES
Tele-ICU cross cover    BMP this morning continues to show hypernatremia, now 152 meq- similar to 10/22.  With hyperCl- 119. NAGMA present. BUN continues to climb; Mag elevated.   Free water deficit 1.9L (goal Na-145).  On FWF-100mL/2hrs and 50cc/hr of D5W.   Will increase FWF-150mL/2hrs and keep D5W to 50cc/hr. Recheck BMP in 6hrs to assess speed of correction; as such, will keep D5W rate at 50cc/hr for now.    Understanding that she has ARDS, want to minimize net positive fluid balance as well as prevent rapid correction.     In continued CARMEN, sCr likely does not reflect true renal function considering pt retaining electrolytes (Mag) and BUN climbing.   -Renally adjusted Lyrica for GFR<30  -Added cystatin c for GFR est  -Consider TF adjustment to decrease nitrogen load if possible to help with rising BUN    -Wean FiO2 based off of spO2 and ABG    Esdras Vilchis MD  [facility],  of Medicine  Pulmonary/Critical Care Medicine  October 25, 2023

## 2023-10-26 NOTE — PROVIDER NOTIFICATION
Contacted tele-hub ICU and spoke with PATRICIA Burrell to communicate reduction of Na+ 146 from 152 and to inquire if adjustments to IVF or FWF needed to be made. Awaiting call back.

## 2023-10-26 NOTE — PROGRESS NOTES
ICU staff  DOS 10/26/2023    Some adjustments made to vent overnight -- airway pressures were reading higher (mid- to high 30s vs low 20s during day shift). FiO2 needs down from 100-65% overnight, though is holding right around 89% sats this morning. Hemodynamics remain quite labile, with hypotension following turns/repositioning; HR is irritable and quite reactive to pressors.    Vitals:   Temp:  [98.2  F (36.8  C)-99.1  F (37.3  C)] 98.8  F (37.1  C)  Pulse:  [] 46  Resp:  [8-61] 26  MAP:  [56 mmHg-186 mmHg] 75 mmHg  Arterial Line BP: ()/() 121/51  FiO2 (%):  [65 %-100 %] 65 %  SpO2:  [87 %-100 %] 95 %     Vent Mode: CMV/AC  (Continuous Mandatory Ventilation/ Assist Control)  FiO2 (%): 65 %  Resp Rate (Set): 26 breaths/min  Tidal Volume (Set, mL): 300 mL  PEEP (cm H2O): 14 cmH2O  Resp: 26    I/O last 3 completed shifts:  In: 3800.74 [I.V.:1490.74; NG/GT:1995]  Out: 3235 [Urine:3235]    NE off  Misael off   1.6  Hydromorphone 0.4  Midazolam 7    Exam:  Gen: Intubated, sedated  HEENT: NC/AT  Pulm: Occasional coarse rhonchi; peak 19-24 this morning  Cor: RRR, though mildly bradycardic  Abdomen/GI: Soft, nondistended  : Aleman in place, urine clear  Extremities: Warm  Skin: Well-perfused  Neuro: Sedated but reacts to exam  Psych: Unable to assess    Data:   Labs reviewed  - WBCs 26 (30)  - BUN trending up, now 109, but creatinine down at 1.5  - 7.3/49/87/24, P/F roughly 140 this morning  Nothing new on imaging or cultures    Assessment/plan:  79 y/o woman with history of bronchiectasis, chronic hypoxemic respiratory failure, diastolic heart failure, admitted with fevers/cough 10/15. ICU transfer 10/21 for worsening hypoxemica, intubated 10/22. Course since notable for persistent hypoxemia.  CNS: Intubated, sedated.  # Pain  # Sedation  - Continue hydromorphone, midazolam  - Lighten if able, though she has some episodes of dyssynchrony and tube biting so this may not be feasible today.  Pulm:  Tolerating mechanical ventilation. This morning, in light of lower airway pressures (peak 19 at my visit), increased Vt back to 350 as it seems to make her more comfortable. P/F ratio is better today.  # Acute on chronic mixed respiratory failure  # Bronchiectasis  # Pulmonary infiltrates  # Pneumonia, ?inflammatory pneumonitis  - Given BMI 38, suspect we can tolerate plateau 30-35 as it is likely spuriously high; unclear why the large discrepancy in airway pressures over the course of a day -- doesn't seem to be waking up or fighting the ventilator too much. ?Secretions  - OK to switch from percussive therapy to suction lavage, as she tolerates it better and it seems more productive.  - Keeping epoprostenol at 20. Would like to start weaning this soon.  - Pulmonary consults team following; appreciate their assistance.  CV: On/off a bit of pressor, seems to do best with vasopressin.  # Septic/multifactorial shock  # Essential hypertension  # Diastolic heart failure with preserved EF  # Moderate aortic stenosis  - Wean pressors, favor vasopressin vs catecholamines as she does better in terms of hemodynamic lability with it; phenyl better-tolerated than NE  - Gentle diuresis  - Can trial low-dose midodrine to see if this helps come off the pressor  GI: Abdomen benign  # Nutrition  # Obesity  - Continue tube feedings  - Switch to renal formula  : UOP good, Na down to 146 but slowly climbing since D5 stopped.  # Hypernatremia  # Acute on chronic renal insufficiency  - Metolazone today  - Check Na this evening, may re-instate D5 if she's still coming up  - Rising BUN -- ?related to TF.   - Consider nephrology evaluation  Heme: Hb 8.1 (8.3)  # Anemia of critical illness  - No indications to transfuse  Msk: Extremities warm, well-perfused.   Endo: Glucose 116-176  # Stress/steroid hyperglycemia  # Hypothyroidism  - Insulin sliding scale  - Levothyroxine  ID: Afebrile, WBCs 26.  # Sepsis  # Pneumonia  - Continue  broad-spectrum abx  - ID following  ICU: SQH, PPI.  - Updated family at bedside    This patient is critically ill to my assessment and requires ICU monitoring and cares. A total of 40 minutes critical care time spent on 10/26/2023, exclusive of procedures.     Rui Payton MD, PhD  Surgical critical care  10/26/2023, 10:43 AM    Clinically Significant Risk Factors         # Hypernatremia: Highest Na = 152 mmol/L in last 2 days, will monitor as appropriate      # Hypoalbuminemia: Lowest albumin = 2.9 g/dL at 10/22/2023  5:20 AM, will monitor as appropriate            # Obesity: Estimated body mass index is 38.41 kg/m  as calculated from the following:    Height as of this encounter: 1.524 m (5').    Weight as of this encounter: 89.2 kg (196 lb 10.4 oz).

## 2023-10-26 NOTE — PROVIDER NOTIFICATION
Notified Dr. Payton of plateau pressures of 36. RT also observed PIPs to be less when laying flat. RN trialed bolus of Dilaudid with no change in PIP.  TORB to reduce PEEP from 14 to 12 and trial different bed positioning while maintaining HOB 30 degrees. Dr. Payton in agreement with trying to place post pyloric feeding tube as well when pt more stable. RN notified RT of changes to PEEP and updated daughter at bedside.

## 2023-10-26 NOTE — PLAN OF CARE
Problem: Enteral Nutrition  Goal: Absence of Aspiration Signs and Symptoms  Outcome: Progressing   Goal Outcome Evaluation:    BUN is trending up. After discussion in rounds, team would like to lower protein provisions from TF, will discontinue 2 packets of prosource for now and increase rate to 40 mL/hr to meet at least 90% of kcal needs (providing 20g less protein). See full note.    Monalisa Pena RD, LD  Clinical Dietitian - [facility]

## 2023-10-26 NOTE — PLAN OF CARE
ICU End of Shift Summary.  For vital signs and complete assessments, please see documentation flowsheets.      Pertinent assessments:   Neuro: RASS -4 on Versed. Bites ETT, not following commands. Dilaudid gtt for pain.   Cardiac: SB 40s-50s / short run of ST - with ectopy PVCs and PACS, more frequent after diurese. MAPs greater than 65 on vaso. With the ectopy - MAPs drop to low 60s.   Resp: Vent supported. Fio2 65%. Peep 14. Vent changes RR 26 &  with follow-up ABG. Minimal secretions.   GI: Medium BM loose, brown, TF @ goal rate of 30 FWF 150cc q2hr.   : mitchell in place - good UOP with diurese.  Skin: see flowsheets for details.  Lines: R PICC, 2 PIV  Drips: Versed, Dilaudid, Vaso    Major Shift Events:            Unable to tolerate turns - pt. Hemodynamically unstable, pt. Desats and HR in low 40s. Slight pillow tucks for q2 turns.    D5 discontinued sodium levels trending down.  K replaced  See progress note for details regarding diurese 500cc of urine. Ectopy more frequent with MAPs dropping to 60s with PVCs/PACs.   Vent changes with follow up ABGs.       Plan (Upcoming Events): Continue plan of care  Discharge/Transfer Needs: TBD     Bedside Shift Report Completed : Y  Bedside Safety Check Completed: Y

## 2023-10-26 NOTE — PROGRESS NOTES
CLINICAL NUTRITION SERVICES - REASSESSMENT NOTE    Recommendations Ordered by Registered Dietitian (RD):   BUN is trending up. After discussion in rounds, team would like to lower protein provisions from TF, will discontinue 2 packets of prosource for now and increase rate to 40 mL/hr to meet at least 90% of kcal needs (providing 20g less protein).    Updated regimen:  Vital HP 40 ml/hr x 22 hrs (880 ml), which provides 880 kcal, 76 g protein, 97 g CHO, 0 g fiber, 735 ml free H2O    Malnutrition:   % Weight Loss: Weight loss does not meet criteria and suspect inaccurate admit wts vs use of bed scale  % Intake:  Decreased intake does not meet criteria for malnutrition   Subcutaneous Fat Loss: None observed   Muscle Loss: None observed   Fluid Retention: None documented     Malnutrition Diagnosis: Patient does not meet two of the above criteria necessary for diagnosing malnutrition     EVALUATION OF PROGRESS TOWARD GOALS   Diet:  NPO    Nutrition Support:    10/25: prop off, current regimen: Vital HP 30 ml/hr x 22 hrs + 2 pkts prosource    10/22: TF initiated: Vital High Protein at 10 mL/hr x 22 hours + 4 pkts prosource    Intake/Tolerance:    - 10/23 evening: Abd US ordered, TF stopped - reached goal rate again 10/24 AM  - Na 150 (H), water flushes per MD - currently at 150 mL q 2 hours, D5 stopped  - .2 (H), Cr 1.50 (H) - CKD. BUN is trending up, however Cr has stayed relatively stable. After discussion in rounds, team would like to lower protein provisions, will discontinue the 2 packets of prosource for now.  - Mg 2.7 (H), phos and K normal    ASSESSED NUTRITION NEEDS 10/22:  Dosing Weight 86 kg (actual body weight) - energy; 45.5 kg (ideal body weight) - protein   Estimated Energy Needs: 946-1204 kcal/day (11-14 Kcal/Kg actual body weight)   Justification: vented   Estimated Protein Needs: 91 g protein/day (2 g pro/Kg IBW)   Justification: vented, hypercatabolism with acute illness   Estimated Fluid Needs:  per MD    NEW FINDINGS:   General: pt transferred to ICU and intubated 10/22    Meds: medium sliding scale insulin, levothyroxine, liquid MVI/M, miralax, senokot, vitamin D3, phenylephrine, vasopressin    Weight: trending up, fluid component  10/26/23 0500 89.2 kg (196 lb 10.4 oz) Bed scale   10/25/23 0430 88.1 kg (194 lb 4.8 oz) Bed scale   10/23/23 0545 87 kg (191 lb 12.8 oz) Bed scale   10/21/23 1930 85.7 kg (188 lb 14.4 oz) Bed scale   10/20/23 1900 85.4 kg (188 lb 4.4 oz) Bed scale   10/16/23 0644 81.7 kg (180 lb 1.9 oz) Bed scale     Previous Goals:   Patient to consume at least 75% of meals or supplements TID while acutely admitted.    Evaluation: Not met, patient now NPO, intubated    Previous Nutrition Diagnosis:   Predicted inadequate nutrient intake (energy/protein) related to potential for decline in PO intakes pending LOS and appetite + respiratory needs.   Evaluation: Declining, updated below    CURRENT NUTRITION DIAGNOSIS  Inadequate protein-energy intake related to TF interruptions as evidenced by TF held for ultrasound x1 day    INTERVENTIONS  Recommendations / Nutrition Prescription  EN regimen modified - see above    Implementation  EN Composition, EN Schedule - modified, see above  Collaboration with other providers - discussed pt in interdisciplinary rounds    Goals  EN to meet % of kcal needs  BUN to trend down with decrease of protein provisions    MONITORING AND EVALUATION:  Progress towards goals will be monitored and evaluated per protocol and Practice Guidelines    Monalisa Pena RD, LD  Clinical Dietitian - [facility]

## 2023-10-26 NOTE — PROGRESS NOTES
[facility] ICU VENTILATOR RESPIRATORY NOTE  Date of Admission: 10/15/23  Date of Intubation (most recent): 10/22/23  Reason for Mechanical Ventilation: Respiratory failure  Number of Days on Mechanical Ventilation: 5  Met Criteria for Pressure Support Trial: No  Reason for No Pressure Support Trial: PEEP >8, FIO2 >50%  Vent Mode: CMV/AC  (Continuous Mandatory Ventilation/ Assist Control)  FiO2 (%): 65 %  Resp Rate (Set): 26 breaths/min  Tidal Volume (Set, mL): 300 mL  PEEP (cm H2O): 14 cmH2O  Resp: 25    ABG Results: Last Arterial Blood Gas:  pH Arterial   Date Value Ref Range Status   10/26/2023 7.29 (L) 7.35 - 7.45 Final     pCO2 Arterial   Date Value Ref Range Status   10/26/2023 49 (H) 35 - 45 mm Hg Final     pO2 Arterial   Date Value Ref Range Status   10/26/2023 87 80 - 105 mm Hg Final     Bicarbonate Arterial   Date Value Ref Range Status   10/26/2023 24 21 - 28 mmol/L Final     Base Excess/Deficit   Date Value Ref Range Status   10/26/2023 -2.9 -9.0 - 1.8 mmol/L Final       Plan:  Continue to monitor.    Beth Barnett, RT

## 2023-10-26 NOTE — PROVIDER NOTIFICATION
Notified Dr. Payton of pt's plateau pressures measuring at 32 per RT. FiO2 has been weaned down to 55%. Also reviewed Na+149.  No new orders at this time. Continue with current course of treatment.

## 2023-10-26 NOTE — PROGRESS NOTES
Tele Hub contacted regarding UOP 500cc after diuretic, with a run of tachy then back to katiana, /63 - with increased ectopy. MD tara will continue to monitor pt @ this time. Vent changes were made RR 26 and . Repeat gas in 30 min of changes.

## 2023-10-26 NOTE — PROGRESS NOTES
[facility]    Medicine Progress Note - Hospitalist Service    Date of Admission:  10/15/2023    Assessment & Plan   Matthew Bowen is a 78 year old female w/PMH chronic hypoxic respiratory failure due to pulmonary HTN, ALAINA requiring CPAP, chronic diastolic HF, CAD, CKD stage 3, morbid obesity, HTN,  depression who presents from home with family with fever, cough. Acutely worsened respiratory status on 10/21 requiring intubation. Unclear etiology for acute on chronic hypoxic respiratory failure, most likely infectious v autoimmune v other in setting of unknown underlying lung disease. Has required careful titration of pressors due to arrhythmias and labile HR/Bps.     Acute hypoxic respiratory failure s/p intubation  Possible ARDS  Pneumonia, recurrent w/bronchiectasis  -Presents with recurrent PNA, 3rd time in last month or so. Last completed treatment 2 weeks ago with persistent cough. Presents with worsening fatigue, productive cough, weakness and falls.  -On admission, patient febrile to 101.1, white count of 21.  Lactic acid was within normal limits.  She underwent a CT scan that showed a lingular consolidative opacity with air bronchograms with bilateral upper lobe groundglass opacities.  She was started on ceftriaxone and azithromycin.  -Sputum culture from 10/19 better that showed gram positive cocci and yeast. Discussed with ID, yeast prevalent in sputum samples and likely candida that is not related to illness.  Need to await speciation.   -Patient initially on ceftriaxone and azithromycin.  Broadened to cefepime and azithro on 10/18.  Flagyl added on 10/20.  Finished azithromycin course on 10/20.   -COVID, influenza, Respiratory viral panel negative.  Strep and legionella antigens negative.   -intubated 10/21 due to worsening respiratory status and bronch done showing serosanguinous and fibrinous return with thick mucoid secretions noted  -ID, pulm and ICU team all  following.  -remains on cefepime since 18th, flagyll 20th and Vanc since 21st-cont and await further recs  -on solumedrol 125 daily 10/24 and epoprostenol max dose, continue for now    Abx  Azithromycin 10/15-10/19  Ceftriaxone 10/15-10/17  Cefepime 10/18-  Metronidazole 10/20-  Vancomycin 10/21-    Infectious/metabolic encephalopathy  -continue hydromorphone and midazolam    Shock  -suspected to be infectious, EF is preserved  -on vasopressin, wean as able  -trial midodrine 5mg    Hypernatremia  -persistent ~150, due to TPN, insensible losses  -free water at 150ml q2h but caution warranted in setting of positive fluid balance and HF  -monitor    Sacral decubitus ulcer  - WOC consulkt    Leukocytosis: Suspect related to above as well as steroids.  From what I can see in care everywhere has generally been 9-12 range in 0421-2805.       Generalized weakness. Chronic arthritis. Falls at home. Left Foot pain:   -Having multifactorial weakness, with diffuse body aches and pains.  Imaging on admission including CT head, cervical spine, CT CAP did not show acute fracture.  She had an x-ray of her foot also showed degenerative changes without fracture.    -readdress when appropriate     Hx ALAINA, pulm HTN, chronic diastolic HF:   -Gently diuresing with 40mg IV lasix BID. Robust UOP with metolazone 5mg, will continue for volume overload/natriuresis.   - Given her softer BP's on 10/21 holding PTA antihypertensives.       CKD stage 3  Uremia  Cr 1.21 on admission. Currently ~1.5. Suspect underlying renal function may be worse than creatinine reflects as eGFR by cystatin C estimate is 13. Urea has been steadily increasing since admission.   -consider renal consult  -switch to low-protein tube feeds     HTN: holding PTA antihypertensives     Hx morbid obesity, anemia, CAD, hypothyroidism, mood disorder: resume home ASA, statin, plavix, lyrica, zoloft     Hypokalemia: Replacement         Diet: NPO per Anesthesia Guidelines for  Procedure/Surgery Except for: Meds  Adult Formula Drip Feeding: Continuous Vital High Protein; Other - Specify in Comment; Goal Rate: 30 mL/hr x 22 hours (please hold 1 hour before and after levothyroxine); mL/hr; increase rate to 20 mL/hr now, after 4 hours, increase to goal 30 mL/...    DVT Prophylaxis: Heparin SQ  Aleman Catheter: PRESENT, indication: Strict 1-2 Hour I&O  Lines: PRESENT      PICC 10/22/23 Triple Lumen Right Basilic multiple med gtt. ready for use-Site Assessment: Ecchymotic  Arterial Line 10/22/23 Radial-Site Assessment: WDL      Cardiac Monitoring: ACTIVE order. Indication: Tachyarrhythmias, acute (48 hours)  Code Status: Full Code        Disposition Plan   Await significant improvement         Dereck Reed, MS4  Hospitalist Service  [facility]  Securely message with [employer] (more info)  Text page via [employer] Paging/Directory   ______________________________________________________________________    Interval History   Remains intubated and sedated, continues to have labile HR (bradycardia down to 30s with turn requiring jerica, improved with IVF as well as a lloyd of sinus tach). TV reduced overnight due to high plateau pressures. Metolazone added overnight with robust UOP.    Physical Exam   Vital Signs: Temp: 98.8  F (37.1  C) Temp src: Bladder   Pulse: (!) 46   Resp: 26 SpO2: 95 % O2 Device: Mechanical Ventilator    Weight: 196 lbs 10.41 oz    Constitutional: intubated and sedated, not following commands  Eyes: pupils equal, round and reactive to light and conjunctiva normal  ENT: ETT in place  Respiratory: ventilatory breath sounds, no wheezing, diminished air entry  Cardiovascular: bradycardic, regular rhythm and no murmur noted. LE pitting edema to mid-shin  GI: normal bowel sounds, soft, and non-distended  Skin: no bruising or bleeding  Neurologic: sedated, not following commands, makes occasional spontaneous movements    60 MINUTES SPENT BY ME on the date of  service doing chart review, history, exam, documentation & further activities per the note.      Data   ------------------------- PAST 24 HR DATA REVIEWED -----------------------------------------------    I have personally reviewed the following data over the past 24 hrs:    26.0 (H)  \   8.1 (L)   / 419     150 (H) 115 (H) 109.2 (H) /  116 (H)   3.6 23 1.50 (H) \       Imaging results reviewed over the past 24 hrs:   No results found for this or any previous visit (from the past 24 hour(s)).

## 2023-10-26 NOTE — CONSULTS
[facility] Nurse Inpatient Assessment     Consulted for: buttock wound      Patient History (according to Hospitalist provider note(s) 10/25/23:      Matthew Bowen is a 78 year old female w/PMH chronic hypoxic respiratory failure due to pulmonary HTN, ALAINA requiring CPAP, chronic diastolic HF, CAD, CKD stage 3, morbid obesity, HTN,  depression who presents from home with family with fever, cough.     Acute hypoxic respiratory failure s/p intubation  Possible ARDS  Pneumonia, recurrent w/bronchiectasis  -Presents with recurrent PNA, 3rd time in last month or so. Last completed treatment 2 weeks ago with persistent cough. Presents with worsening fatigue, productive cough, weakness and falls.  -On admission, patient febrile to 101.1, white count of 21.  Lactic acid was within normal limits.  She underwent a CT scan that showed a lingular consolidative opacity with air bronchograms with bilateral upper lobe groundglass opacities.  She was started on ceftriaxone and azithromycin.  -Sputum culture from 10/19 better that showed gram positive cocci and yeast. Discussed with ID, yeast prevalent in sputum samples and likely candida that is not related to illness.  Need to await speciation.   -Patient initially on ceftriaxone and azithromycin.  Broadened to cefepime and azithro on 10/18.  Flagyl added on 10/20.  Finished azithromycin course on 10/20.   -COVID, influenza, Respiratory viral panel negative.  Strep and legionella antigens negative.   -intubated 10/21 due to worsening respiratory status and bronch done showing serosanguinous and fibrinous return with thick mucoid secretions noted  -ID, pulm and ICU team all following.  -remains on cefepime since 18th, flagyll 20th and Vanc since 21st-cont and await further recs  -on solumedrol 125 daily 10/24, continue for now    Assessment:      Areas visualized during today's visit: Sacrum/coccyx    Wound location: gluteal cleft    Last photo:  10/26/23  Wound due to: Incontinence Associated Dermatitis (IAD)  Wound history/plan of care: wound is moisture related and pt having almost constant loose stools, not over any bony prominence. Had a sacral mepilex in place at time of WOC assessment but with frequent loose stools this was removed and will use Darlyn and CAP instead   Wound base: 100 % dermis, superficial     Palpation of the wound bed: normal      Drainage: scant     Description of drainage: serosanguinous     Measurements (length x width x depth, in cm): 1.2  x 0.5  x  0.1 cm      Tunneling: N/A     Undermining: N/A  Periwound skin: Intact      Color: normal and consistent with surrounding tissue      Temperature: normal   Odor: none  Pain: unable to assess due to  sedation , none  Pain interventions prior to dressing change: N/A  Treatment goal: Protection  STATUS: initial assessment  Supplies ordered: supplies stored on unit and discussed with RN and family present in room       Treatment Plan:     Gluteal cleft wound(s): BIDand PRN with incontinence episodes  Cleanse the area with Darlyn cleanse and protect, very gently with soft cloth.   Apply thin layer of critic aid paste on open or red areas   With repeat application, do not scrub the paste, only remove soiled paste and reapply.   If complete removal of paste is necessary use baby oil/mineral oil (#[id number]) and soft wash cloth.   Ensure pt has Aron-cushion (#[id number]) while sitting up in the chair.   Use only one Covidien pad in between mattress and pt. No brief in bed.     Orders: Written    RECOMMEND PRIMARY TEAM ORDER: None, at this time  Education provided: plan of care and wound progress  Discussed plan of care with: Family and Nurse  WOC nurse follow-up plan: weekly  Notify WOC if wound(s) deteriorate.  Nursing to notify the Provider(s) and re-consult the WOC Nurse if new skin concern.    DATA:     Current support surface: Standard  Low air loss (JENNIFER pump, Isolibrium, Pulsate, skin guard,  etc)  Containment of urine/stool: Incontinent pad in bed  BMI: Body mass index is 38.41 kg/m .   Active diet order: Orders Placed This Encounter      NPO per Anesthesia Guidelines for Procedure/Surgery Except for: Meds     Output: I/O last 3 completed shifts:  In: 3414.09 [I.V.:929.09; NG/GT:1935]  Out: 3910 [Urine:3910]     Labs:   Recent Labs   Lab 10/26/23  0511 10/23/23  0522 10/22/23  0520   ALBUMIN  --   --  2.9*   HGB 8.1*   < > 8.9*   WBC 26.0*   < > 20.0*   A1C  --   --  5.6    < > = values in this interval not displayed.     Pressure injury risk assessment:   Sensory Perception: 2-->very limited  Moisture: 3-->occasionally moist  Activity: 1-->bedfast  Mobility: 2-->very limited  Nutrition: 3-->adequate  Friction and Shear: 1-->problem  Compa Score: 12    Cheryl Martin RN CWOCN  Pager no longer is use, please contact through [employer] group: [facility]  Dept. Office Number: [phone]

## 2023-10-26 NOTE — PROGRESS NOTES
[facility]  Infectious Disease Progress Note          Assessment and Plan:   Date of Admission:  10/15/2023  Date of Consult (When I saw the patient): 10/20/23        Assessment & Plan  Matthew Bowen is a 78 year old who was admitted on 10/15/2023.      Impression: 1 78-year-old female, some chronic underlying lung disease, on oxygen, bronchiectasis probable, some history of pneumonia but now 6 weeks or so of worsening respiratory symptoms, 2 prior courses of antibiotics, now admitted with infiltrates, no major sepsis but elevated procalcitonin and white count implying bacterial, not really improving on antibiotics concern for more chronic type pathogen in this patient with underlying lung disease by this history     2 chronic lung disease on oxygen some underlying bronchiectasis, some prior pneumonias  3 chronic kidney disease     REC 1 dramatic worsening occurred without clear explanation, now in ICU intubated and sedated.  No new major fever,  no positive microbiology.  Fungal studies all negative   Candida and staph epi not significant pathogens here BAL studies neg so far for the longer term pathogens including only Candida and no other fungi  On broader antibiotics at this point, continue for now awaiting microbiology and clinical progress continue cefepime and Flagyl but discontinue Vanco already has some renal insufficiency have not isolated MRSA or any other pathogens and should have if they were the issue here , not worth the toxicity  2 discussed with daughter                 Interval History:     Intubated and sedated overall same, so far no improvement in ICU, intubated and sedated, BAL studiesnegative all prior fungal and microbiologic studies negative QuantiFERON-TB gold negative              Medications:      acetylcysteine  2 mL Nebulization 4x Daily    aspirin  81 mg Oral Daily    atorvastatin  20 mg Oral QPM    ceFEPIme  2 g Intravenous Q24H    chlorhexidine  15 mL  Mouth/Throat Q12H    heparin ANTICOAGULANT  5,000 Units Subcutaneous Q12H    insulin aspart  1-6 Units Subcutaneous Q4H    ipratropium - albuterol 0.5 mg/2.5 mg/3 mL  3 mL Nebulization 4x daily    levothyroxine  150 mcg Oral QAM AC    methylPREDNISolone  125 mg Intravenous Q24H    metroNIDAZOLE  500 mg Intravenous Q8H    midodrine  5 mg Oral TID w/meals    multivitamins w/minerals  15 mL Per Feeding Tube Daily    pantoprazole  40 mg Per Feeding Tube QAM AC    Or    pantoprazole  40 mg Intravenous QAM AC    polyethylene glycol  17 g Oral Daily    pregabalin  75 mg Per Feeding Tube Daily    protein modular  2 packet Per Feeding Tube BID    senna-docusate  1 tablet Oral BID    sertraline  150 mg Oral Daily    sodium chloride (PF)  10-40 mL Intracatheter Q7 Days    sodium chloride (PF)  3 mL Intracatheter Q8H    vitamin D3  50 mcg Oral Daily                  Physical Exam:   Blood pressure 137/66, pulse 50, temperature 98.8  F (37.1  C), resp. rate 25, height 1.524 m (5'), weight 89.2 kg (196 lb 10.4 oz), SpO2 93%.  Wt Readings from Last 2 Encounters:   10/26/23 89.2 kg (196 lb 10.4 oz)   02/28/22 94.3 kg (208 lb)     Vital Signs with Ranges  Temp:  [98.2  F (36.8  C)-99.1  F (37.3  C)] 98.8  F (37.1  C)  Pulse:  [] 50  Resp:  [8-61] 25  MAP:  [56 mmHg-186 mmHg] 73 mmHg  Arterial Line BP: ()/() 118/50  FiO2 (%):  [65 %-100 %] 65 %  SpO2:  [86 %-100 %] 93 %    Constitutional: Intubated and sedated about same     Lungs: Clear to auscultation bilaterally, no crackles or wheezing   Cardiovascular: Regular rate and rhythm, normal S1 and S2, and no murmur noted   Abdomen: Normal bowel sounds, soft, non-distended, non-tender   Skin: No rashes, no cyanosis, no edema   Other:           Data:   All microbiology laboratory data reviewed.  Recent Labs   Lab Test 10/26/23  0511 10/25/23  0421 10/24/23  0612   WBC 26.0* 30.0* 23.1*   HGB 8.1* 8.3* 8.5*   HCT 25.9* 27.1* 27.3*   MCV 83 84 84    472* 460*      Recent Labs   Lab Test 10/26/23  0511 10/26/23  0012 10/25/23  1800   CR 1.50* 1.52* 1.53*     No lab results found.  Recent Labs   Lab Test 02/12/19  0010   CULT Light growth  Normal deshaun

## 2023-10-26 NOTE — PROGRESS NOTES
Tele-ICU cross cover note:    Na has improved, Stop D5W, continued FWF for now.  Added metolazone 5mg BID to accompany previously ordered Lasix 40mg BID dosing.   Of note, BUN and Mag reflect decreased renal function. Cystatin C 10/25 morning est GFR-13.   Renal/electrolyte abnormalities hyperNa (improved), hyperchloremia, volume overload and uremia (BUN has ~doubled in 3 days).  No urgent need for RRT.   Vent settings of TV-350, RR-20, PEEP-14 showed improved PaO2 with FiO2-65% and elevated but stable PaCO2 (likely dead space has increased during this acute illness). However, plateau-36 on these settings.  Decreased TV-300cc, RR-26, continued PEEP-14 with stable ABG. Plateau now recorded as 34 cmH20 (mild improvement, but above goal still; may not have been an inspiratory hold maneuver).      Addendum-  Na continues to climb again to 150. Received metolazone 5mg x1 yesterday.  Day team to adjust Lasix/metolazone regimen.       Esdras Vilchis MD  [facility],  of Medicine  Pulmonary/Critical Care Medicine  October 26, 2023

## 2023-10-27 NOTE — PROGRESS NOTES
[facility]  Infectious Disease Progress Note          Assessment and Plan:   Date of Admission:  10/15/2023  Date of Consult (When I saw the patient): 10/20/23        Assessment & Plan  Matthew Bowen is a 78 year old who was admitted on 10/15/2023.      Impression: 1 78-year-old female, some chronic underlying lung disease, on oxygen, bronchiectasis probable, some history of pneumonia but now 6 weeks or so of worsening respiratory symptoms, 2 prior courses of antibiotics, now admitted with infiltrates, no major sepsis but elevated procalcitonin and white count implying bacterial, not really improving on antibiotics concern for more chronic type pathogen in this patient with underlying lung disease by this history     2 chronic lung disease on oxygen some underlying bronchiectasis, some prior pneumonias  3 chronic kidney disease     REC 1 dramatic worsening occurred without clear explanation, now in ICU intubated and sedated.  No new major fever,  no positive microbiology.  Fungal studies all negative   Candida and staph epi not significant pathogens here BAL studies neg so far for the longer term pathogens including only Candida and no other fungi  2 On broader antibiotics at this point, continue for now awaiting microbiology and clinical progress continue cefepime and Flagyl but discontinue Vanco already has some renal insufficiency have not isolated MRSA or any other pathogens and should have if they were the issue here , not worth the toxicity  3 slight improvement, pressors off and oxygenation slightly better still no major fever positive microbiology    4 discussed with daughter   Call if issues this weekend                Interval History:     Intubated and sedated overall slight improvement with pressors off and oxygenation slightly better, in ICU, intubated and sedated, BAL studies negative all prior fungal and microbiologic studies negative QuantiFERON-TB gold negative               Medications:      acetylcysteine  2 mL Nebulization 4x Daily    aspirin  81 mg Oral Daily    atorvastatin  20 mg Oral QPM    ceFEPIme  2 g Intravenous Q24H    chlorhexidine  15 mL Mouth/Throat Q12H    heparin ANTICOAGULANT  5,000 Units Subcutaneous Q12H    insulin aspart  1-6 Units Subcutaneous Q4H    ipratropium - albuterol 0.5 mg/2.5 mg/3 mL  3 mL Nebulization 4x daily    levothyroxine  150 mcg Oral QAM AC    methylPREDNISolone  125 mg Intravenous Q24H    metroNIDAZOLE  500 mg Intravenous Q8H    midodrine  5 mg Oral TID w/meals    multivitamins w/minerals  15 mL Per Feeding Tube Daily    pantoprazole  40 mg Per Feeding Tube QAM AC    Or    pantoprazole  40 mg Intravenous QAM AC    polyethylene glycol  17 g Oral Daily    pregabalin  75 mg Per Feeding Tube Daily    protein modular  2 packet Per Feeding Tube BID    senna-docusate  1 tablet Oral BID    sertraline  150 mg Oral Daily    sodium chloride (PF)  10-40 mL Intracatheter Q7 Days    sodium chloride (PF)  3 mL Intracatheter Q8H    vitamin D3  50 mcg Oral Daily                  Physical Exam:   Blood pressure 137/66, pulse 67, temperature 98.4  F (36.9  C), temperature source Bladder, resp. rate 28, height 1.524 m (5'), weight 89 kg (196 lb 3.4 oz), SpO2 96%.  Wt Readings from Last 2 Encounters:   10/27/23 89 kg (196 lb 3.4 oz)   02/28/22 94.3 kg (208 lb)     Vital Signs with Ranges  Temp:  [97.9  F (36.6  C)-99.9  F (37.7  C)] 98.4  F (36.9  C)  Pulse:  [45-67] 67  Resp:  [13-33] 28  MAP:  [59 mmHg-96 mmHg] 92 mmHg  Arterial Line BP: (100-153)/(9-64) 145/58  FiO2 (%):  [50 %-70 %] 70 %  SpO2:  [87 %-100 %] 96 %    Constitutional: Intubated and sedated about same slight improvement in oxygenation     Lungs: Clear to auscultation bilaterally, no crackles or wheezing   Cardiovascular: Regular rate and rhythm, normal S1 and S2, and no murmur noted   Abdomen: Normal bowel sounds, soft, non-distended, non-tender   Skin: No rashes, no cyanosis, no edema   Other:            Data:   All microbiology laboratory data reviewed.  Recent Labs   Lab Test 10/27/23  0615 10/26/23  0511 10/25/23  0421   WBC 29.2* 26.0* 30.0*   HGB 8.6* 8.1* 8.3*   HCT 27.2* 25.9* 27.1*   MCV 82 83 84    419 472*     Recent Labs   Lab Test 10/27/23  0615 10/27/23  0042 10/26/23  1806   CR 1.32* 1.36* 1.40*     No lab results found.  Recent Labs   Lab Test 02/12/19  0010   CULT Light growth  Normal deshaun

## 2023-10-27 NOTE — PROGRESS NOTES
"SPIRITUAL HEALTH SERVICES ([facility]) Progress Note  [facility].  Unit       Saw pt Matthew Bowen per LOS.     Patient/Family Understanding of Illness and Goals of Care - Mr Bowen has a good understand of Mrs Bowen's  illness and Goals of Care. He is aided in this by his daughter who is a physician.      Distress and Loss - Mr Bowen is not visibly distressed though deeply concerned about his wife of 60 years. Her health is fragile and he says \"every little improvement, we welcome.\"      Strengths, Coping, and Resources - He is well supported by his L[employer]-day sohail. He and his wife attend a temple dedicated to [employer]. They are part of the [facility] [employer]-day community here in the [facility] and Mrs Bowen is being prayed for by many fellow Hindus  both in the [place] and [place]. They originally hail from [place]. While familiar with [place], Mr Bowen prefers to take personal phone calls from those concerned about his and Mrs Bowen's  well being.      Meaning, Beliefs, and Spirituality - Mr Bowen's sohail is very important to him and is helping him at this time. His L[employer]-day  has offered prayers for Mrs. Bowen. They do not need much to do a prayer service, even over the phone. Water and/or fruit and/or flowers will suffice.      Plan of Care - Mr Bowen is very open to [facility] visits. [facility] will follow as able during LOS.        Shey Yao, Ph.D.,[facility]  , Spiritual Health Services      [facility] available 24/7 for emergency requests/referrals, either by having the on-call  paged or by entering an ASAP/STAT consult in Epic (this will also page the on-call ).       "

## 2023-10-27 NOTE — PROGRESS NOTES
ICU staff  DOS 10/27/2023    No major changes overnight. FiO2 up a touch this AM, but is off pressors. Airway pressures remain labile -- peak 19-20 at times, 30-40 at others with plateau ranging from 10s-30s.     Vitals:   Temp:  [97.9  F (36.6  C)-99.9  F (37.7  C)] 99.3  F (37.4  C)  Pulse:  [45-70] 62  Resp:  [0-65] 26  MAP:  [59 mmHg-96 mmHg] 64 mmHg  Arterial Line BP: (102-153)/(-39-64) 106/43  FiO2 (%):  [50 %-75 %] 70 %  SpO2:  [88 %-100 %] 94 %     Vent Mode: CMV/AC  (Continuous Mandatory Ventilation/ Assist Control)  FiO2 (%): 70 %  Resp Rate (Set): 26 breaths/min  Tidal Volume (Set, mL): 350 mL  PEEP (cm H2O): 14 cmH2O  Resp: 26    I/O last 3 completed shifts:  In: 3105.35 [I.V.:505.35; NG/GT:1800]  Out: 2900 [Urine:2900]    Vasopressin 1.2 -> off  Hydromorphone 0.4  Midazolam 7  Epo 20    Exam:  Gen: Intubated, sedated  HEENT: NC/AT  Pulm: Fairly clear  Cor: RRR  Abdomen/GI: Soft, nondistended  : Aleman in place  Extremities: Warm, mild edema  Skin: Well-perfused  Neuro: Sedated  Psych: Unable to assess    Data:   Labs reviewed  - Cr down to 1.32, BUN plateau at 110  No new micro  Imaging personally reviewed  - CXR with some improvement in bilateral infiltrates    Assessment/plan:  77 y/o woman with bronchiectasis, chronic hypoxemic respiratory failure, recurrent pulmonary infections, heart failure, CAD admitted 10/15 with fevers/cough. Transferred to the ICU 10/21 and intubated 10/22 for hypoxemic respiratory failure.   CNS: Intubated, sedated.  # Pain  # Sedation  - On hydromorphone, midazolam -- hypotensive with propofol, too bradycardic overall to try dex  - Lighten as able  Pulm: No major changes, pressures/oxygenation variable but overall looks more comfortable at Vt 350. General trend has been to slow improvement  # Acute on chronic mixed respiratory failure  # Chronic hypoxemia  # ALAINA history  # Bronchiectasis  - Continue current vent settings; PEEP at 14 this morning as she is tolerating from a  pressure standpoint  - Given body habitus, OK to tolerate plateau 30-35  - Would like to come down on epoprostenol if FiO2 can stay consistently <0.65  CV: Off pressors this morning.  # Septic/multifactorial shock  # Essential hypertension  # Diastolic heart failure with preserved EF  # Moderate aortic stenosis  - Off pressors, follow  - Continuing gentle diuresis  - Continue midodrine  GI: Abdomen benign  # Nutrition  # Obesity  - Continue tube feedings  : UOP good overall, Na 148-150  # Hypernatremia  # Acute on chronic renal insufficiency  - Repeating metolazone  - BUN seems to have hit a plateau, follow tomorrow  Heme: Hb 8.6 (8.1)  # Anemia of chronic disease  - No indication to transfuse  Msk: Extremities warm, well-perfused.  Endo: Glucose 104-131.  # Stress/steroid hyperglycemia  # Hypothyroidism  - Continue sliding scale insulin, levothyroxine  ID: Afebrile, WBCs 29  # Pneumonia  # Sepsis  - Continues on broad-spectrum antibiotic  - Leukocytosis likely related to steroid  ICU: SQH, PPI.    This patient is critically ill to my assessment and requires ICU monitoring and cares. A total of 40 minutes critical care time spent on 10/27/2023, exclusive of procedures.     Rui Payton MD, PhD  Surgical critical care  10/27/2023, 3:19 PM    Clinically Significant Risk Factors        # Hypokalemia: Lowest K = 3.3 mmol/L in last 2 days, will replace as needed  # Hypernatremia: Highest Na = 151 mmol/L in last 2 days, will monitor as appropriate      # Hypoalbuminemia: Lowest albumin = 2.9 g/dL at 10/22/2023  5:20 AM, will monitor as appropriate            # Obesity: Estimated body mass index is 38.32 kg/m  as calculated from the following:    Height as of this encounter: 1.524 m (5').    Weight as of this encounter: 89 kg (196 lb 3.4 oz).

## 2023-10-27 NOTE — PLAN OF CARE
ICU End of Shift Summary.  For vital signs and complete assessments, please see documentation flowsheets.      Pertinent assessments:   Neuro: RASS -4 on Versed for sedation and dilaudid gtt for pain. Pt. Biting ETT and PIPs high 40s - PRN bolus of versed and dilaudid given with improvement.  Cardiac: SB rare ectopy. BP less labile on Vaso gtt and Midodrine.   Resp: Vent supported. Fio2 weaned to 50%. Peep 12. . LS coarse/dim.  GI: Small loose stool, TF @ 40 ml/hr. FWF 150q2.  : mitchell in place - good UOP diurese with metolazone.  Skin: see flowsheets for details.  Lines: R PICC, A line, PIV x2   Drips: Versed, Diladiud, Vaso.    Major Shift Events:   Weaned vaso off for an hour, and back on titration to MAP goal of 65.   K replaced  PIPs high, coughing and biting ETT PRN boluses given see MAR    Plan (Upcoming Events): Continue plan of care  Discharge/Transfer Needs: TBD     Bedside Shift Report Completed : Y  Bedside Safety Check Completed: Y

## 2023-10-27 NOTE — PROGRESS NOTES
[facility]  Respiratory Care Note      [facility] ICU VENTILATOR RESPIRATORY NOTE  Date of Admission: 10/15/23  Date of Intubation (most recent): 10/22/23  Reason for Mechanical Ventilation: Respiratory failure  Number of Days on Mechanical Ventilation: 6  Met Criteria for Pressure Support Trial: No  Reason for No Pressure Support Trial: PEEP +14 and FiO2 >50%  Significant Events Today:   ABG Results: 7.29/49/87/24 @ 0230  ETT appearance on chest x-ray: Endotracheal tube tip approximately 4.8 cm from the ashley.      Vent Mode: CMV/AC  (Continuous Mandatory Ventilation/ Assist Control)  FiO2 (%): 70 %  Resp Rate (Set): 26 breaths/min  Tidal Volume (Set, mL): 350 mL  PEEP (cm H2O): 14 cmH2O  Resp: 26    Vital signs:  Temp: 99.3  F (37.4  C) Temp src: Bladder   Pulse: 62   Resp: 26 SpO2: 99 % O2 Device: Mechanical Ventilator Oxygen Delivery: 55 LPM Height: 152.4 cm (5') Weight: 89 kg (196 lb 3.4 oz)  Estimated body mass index is 38.32 kg/m  as calculated from the following:    Height as of this encounter: 1.524 m (5').    Weight as of this encounter: 89 kg (196 lb 3.4 oz).      Recent Labs   Lab 10/26/23  0230 10/26/23  0012 10/25/23  1800 10/25/23  0423   PH 7.29* 7.26* 7.30* 7.28*   PCO2 49* 52* 46* 51*   PO2 87 93 177* 110*   HCO3 24 23 23 24   O2PER 65 65 90 70     Past Medical History:   Diagnosis Date    Antiplatelet or antithrombotic long-term use     Arthritis     Congestive heart failure (H)     Dyspnea on exertion     Heart attack (H)     Heart murmur     Hypertension     Irregular heart beat     Oxygen dependent     2L continuous at home.    Pulmonary hypertension (H)     Sleep apnea     Bipap    Stented coronary artery     x 1    Thyroid disease     Walking troubles        Past Surgical History:   Procedure Laterality Date    APPENDECTOMY      COLONOSCOPY      COLONOSCOPY N/A 2/28/2022    Procedure: COLONOSCOPY;  Surgeon: Kolby Dunn MD;  Location: [facility] OR    CV CORONARY  "ANGIOGRAM N/A 11/21/2019    Procedure: Coronary Angiogram;  Surgeon: Tay Andre MD;  Location:  HEART CARDIAC CATH LAB    CV LEFT HEART CATH N/A 11/21/2019    Procedure: Left Heart Cath;  Surgeon: Tay Andre MD;  Location:  HEART CARDIAC CATH LAB    CV PCI STENT DRUG ELUTING N/A 11/21/2019    Procedure: PCI Stent Drug Eluting;  Surgeon: Tay Andre MD;  Location:  HEART CARDIAC CATH LAB    GYN SURGERY      Hyst.    ORTHOPEDIC SURGERY      B\" TKs       No family history on file.    Social History     Tobacco Use    Smoking status: Never    Smokeless tobacco: Never   Substance Use Topics    Alcohol use: Not on file     Comment: 2x/year     Ari GIFFORD  [facility]  10/27/2023    "

## 2023-10-27 NOTE — PROGRESS NOTES
[facility] ICU VENTILATOR RESPIRATORY NOTE  Date of Admission: 10/15/23  Date of Intubation (most recent): 10/22/23  Reason for Mechanical Ventilation: Respiratory failure  Number of Days on Mechanical Ventilation: 5  Met Criteria for Pressure Support Trial: no  Reason for No Pressure Support Trial: PEEP +12 and FiO2 >50%  Significant Events Today: PEEP drop to +12 and VT increase to 350  ABG Results:   Recent Labs   Lab 10/26/23  0230 10/26/23  0012 10/25/23  1800 10/25/23  0423   PH 7.29* 7.26* 7.30* 7.28*   PCO2 49* 52* 46* 51*   PO2 87 93 177* 110*   HCO3 24 23 23 24   O2PER 65 65 90 70      ETT appearance on chest x-ray: Endotracheal tube tip approximately 3 cm from the ashley.     Pt remains on mechanical ventilator with the following settings:  Vent Mode: CMV/AC  (Continuous Mandatory Ventilation/ Assist Control)  FiO2 (%): 55 %  Resp Rate (Set): 26 breaths/min  Tidal Volume (Set, mL): 350 mL  PEEP (cm H2O): (S) 12 cmH2O  Resp: 25  BS coarse and diminished. Suction with lavage for moderate thick cloudy secretions.    Plan:  Will continue to monitor pt's respiratory status closely.    Holly Barry RT, RT  10/26/2023 6:08 PM

## 2023-10-27 NOTE — PLAN OF CARE
Shift Events: TV increased and PEEP decreased. Did better with weaning of FiO2.    HR/SpO2 and BP tolerated repositioning slightly better late afternoon.     Neuro: RASS -4 on Versed and Dilaudid. T max 99.9  CV: SB 50-60's. Midodrine started today. Pressures a little less labile this afternoon. Titrating Vasopressin as needed.   ID: Flagyl and Cefepime  Pulm: FiO2 weaned to 55%. PEEP 12. Pocketing more oral secretions. Crackles RLL.  GI: Multiple small loose stools. TF increased to 40 ml/hr. Prosource dc'd. FWF 150ml q2.  : Aleman. Diuresed 2400 mls today with Metolazone.  Lines/gtts: PICC, A-line, PIV  Skin: Bruised, excoriation on sacrum- bleeding. WOC consulted.     Plan: Continue with current course of treatment.

## 2023-10-27 NOTE — PROGRESS NOTES
[facility]    Medicine Progress Note - Hospitalist Service    Date of Admission:  10/15/2023    Assessment & Plan   Matthew Bowen is a 78 year old female w/PMH chronic hypoxic respiratory failure due to pulmonary HTN, LAAINA requiring CPAP, chronic diastolic HF, CAD, CKD stage 3, morbid obesity, HTN,  depression who presents from home with family with fever, cough. Acutely worsened respiratory status on 10/21 requiring intubation. Unclear etiology for acute on chronic hypoxic respiratory failure, most likely infectious v autoimmune v other in setting of unknown underlying lung disease. Has required careful titration of pressors due to arrhythmias and labile HR/Bps.     Acute hypoxic respiratory failure s/p intubation  Possible ARDS  Pneumonia, recurrent w/bronchiectasis  -Presents with recurrent PNA, 3rd time in last month or so. Last completed treatment 2 weeks ago with persistent cough. Presents with worsening fatigue, productive cough, weakness and falls.  -On admission, patient febrile to 101.1, white count of 21.  Lactic acid was within normal limits.  She underwent a CT scan that showed a lingular consolidative opacity with air bronchograms with bilateral upper lobe groundglass opacities.  She was started on ceftriaxone and azithromycin.  -Sputum culture from 10/19 better that showed gram positive cocci and yeast. Discussed with ID, yeast prevalent in sputum samples and likely candida that is not related to illness.  Need to await speciation.   -Patient initially on ceftriaxone and azithromycin.  Broadened to cefepime and azithro on 10/18.  Flagyl added on 10/20.  Finished azithromycin course on 10/20.   -COVID, influenza, Respiratory viral panel negative.  Strep and legionella antigens negative.   -intubated 10/21 due to worsening respiratory status and bronch done showing serosanguinous and fibrinous return with thick mucoid secretions noted  -ID, pulm and ICU team all  following.  -remains on cefepime since 18th, flagyll 20th and Vanc since 21st-cont and await further recs  -on solumedrol 125 daily 10/24 and epoprostenol max dose, continue for now  -previously diuresed with improvement, cont on PRN basis    Abx  Azithromycin 10/15-10/19  Ceftriaxone 10/15-10/17  Cefepime 10/18-  Metronidazole 10/20-  Vancomycin 10/21-    Infectious/metabolic encephalopathy  -continue hydromorphone and midazolam    Shock  -suspected to be infectious, EF is preserved  -on vasopressin, wean as able  -continue midodrine 5mg TID    Hypernatremia  -persistent ~150, due to TPN, insensible losses  -free water at 150ml q2h but caution warranted in setting of positive fluid balance and HF  -monitor    Gluteal cleft irritant dermatitis   - WOC consulted, appreciate recs    Leukocytosis: Suspect related to above as well as steroids.  From what I can see in care everywhere has generally been 9-12 range in 2916-8920.       Generalized weakness. Chronic arthritis. Falls at home. Left Foot pain:   -Having multifactorial weakness, with diffuse body aches and pains.  Imaging on admission including CT head, cervical spine, CT CAP did not show acute fracture.  She had an x-ray of her foot also showed degenerative changes without fracture.    -readdress when appropriate     Hx ALAINA, pulm HTN, chronic diastolic HF:   - metolazone 5mg today for volume overload/natriuresis.   - Given her softer BP's on 10/21 holding PTA antihypertensives.       CKD stage 3  Uremia  Cr 1.21 on admission. Currently ~1.5. Suspect underlying renal function may be worse than creatinine reflects as eGFR by cystatin C estimate is 13. Urea has been steadily increasing since admission but has now stabilized some   -continue low-protein tube feeds  -serial labs     HTN: holding PTA antihypertensives     Hx morbid obesity, anemia, CAD, hypothyroidism, mood disorder: resume home ASA, statin, plavix, lyrica, zoloft     Hypokalemia: Replacement          Diet: NPO per Anesthesia Guidelines for Procedure/Surgery Except for: Meds  Adult Formula Drip Feeding: Continuous Vital High Protein; Other - Specify in Comment; Goal Rate: 40 mL/hr x 22 hours (please hold 1 hour before and after levothyroxine); mL/hr; increase rate to 40 mL/hr now, discontinue prosource; Do not advance ...    DVT Prophylaxis: Heparin SQ  Aleman Catheter: PRESENT, indication: Strict 1-2 Hour I&O  Lines: PRESENT      PICC 10/22/23 Triple Lumen Right Basilic multiple med gtt. ready for use-Site Assessment: Ecchymotic  Arterial Line 10/22/23 Radial-Site Assessment: WDL      Cardiac Monitoring: ACTIVE order. Indication: Tachyarrhythmias, acute (48 hours)  Code Status: Full Code        Disposition Plan   Await significant improvement         Dereck Reed, MS4  Hospitalist Service  [facility]  Securely message with [employer] (more info)  Text page via [employer] Paging/Directory   ______________________________________________________________________    Interval History   No acute events overnight. Episodes of ETT biting and increased inspiratory pressures requiring boluses of dilaudid and versed - improved with mouthguard. Some improvement in hemodynamics (tolerating vaso wean) and oxygenation.    Physical Exam   Vital Signs: Temp: 99.3  F (37.4  C) Temp src: Bladder   Pulse: 60   Resp: 25 SpO2: 96 % O2 Device: Mechanical Ventilator Oxygen Delivery: 55 LPM  Weight: 196 lbs 3.35 oz    Constitutional: intubated and sedated, not following commands  Eyes: pupils equal, round and reactive to light and conjunctiva normal  ENT: ETT in place  Respiratory: ventilatory breath sounds, no wheezing, diminished air entry  Cardiovascular: bradycardic, regular rhythm and no murmur noted. LE pitting edema to mid-shin  GI: normal bowel sounds, soft, and non-distended  Skin: no bruising or bleeding  Neurologic: sedated, not following commands, makes occasional spontaneous movements    60 MINUTES  SPENT BY ME on the date of service doing chart review, history, exam, documentation & further activities per the note.      Data   ------------------------- PAST 24 HR DATA REVIEWED -----------------------------------------------    I have personally reviewed the following data over the past 24 hrs:    29.2 (H)  \   8.6 (L)   / 412     151 (H) 119 (H) 110.5 (H) /  150 (H)   3.9 21 (L) 1.35 (H) \       Imaging results reviewed over the past 24 hrs:   Recent Results (from the past 24 hour(s))   XR Chest Port 1 View    Narrative    EXAM: XR CHEST PORT 1 VIEW  LOCATION: [facility]  DATE: 10/26/2023    INDICATION: Re evaluate ETT placement. Develops cuff leak with repositioning.  COMPARISON: 10/23/2023      Impression    IMPRESSION: Endotracheal tube terminates 4.8 cm above the ashley. Enteric decompression tube descends below the diaphragm, tip outside the field-of-view. Right upper extremity PICC tip in the mid SVC.    Bilateral hazy airspace opacities, slightly improved compared to 10/23/2023. No definite pleural effusion or pneumothorax.    Stable cardiomediastinal silhouette.

## 2023-10-27 NOTE — PROGRESS NOTES
[facility] ICU VENTILATOR RESPIRATORY NOTE  Date of Admission: 10/15/23  Date of Intubation (most recent): 10/22/23  Reason for Mechanical Ventilation: Respiratory failure  Number of Days on Mechanical Ventilation: 6  Met Criteria for Pressure Support Trial: No  Reason for No Pressure Support Trial: PEEP >8, FIO2 >50%  Vent Mode: CMV/AC  (Continuous Mandatory Ventilation/ Assist Control)  FiO2 (%): (S) 50 %  Resp Rate (Set): 26 breaths/min  Tidal Volume (Set, mL): 350 mL  PEEP (cm H2O): 12 cmH2O  Resp: 25    Plan: continue to monitor.    Beth Barnett RT

## 2023-10-28 NOTE — PLAN OF CARE
ICU End of Shift Summary.  For vital signs and complete assessments, please see documentation flowsheets.      Pertinent assessments: Tmax 100.4, came down with removing blankets and fan blowing on patient. No further temps the rest of night. Sedated with Rass of -4 throughout night. CPOT 0. Remained on full vent support with increased FiO2 needs throughout night, currently up to 70% from 55% at start of shift. Minimal secretions. Patient does better while laying on right side, does not tolerate turns well. Vasopressin on for a couple hours tonight, now off, maps slowly trending down but still greater than 65 currently. Bradycardic in 50's. Good urine output. Smear of BM.  Major Shift Events: Bath completed. Repositioned throughout night. Pt does better on right side.   Plan (Upcoming Events): Continue with current POC.   Discharge/Transfer Needs:      Bedside Shift Report Completed   Bedside Safety Check Completed    Goal Outcome Evaluation:      Plan of Care Reviewed With: patient    Overall Patient Progress: no changeOverall Patient Progress: no change

## 2023-10-28 NOTE — PLAN OF CARE
Problem: Enteral Nutrition  Goal: Feeding Tolerance  Outcome: Not Progressing   Goal Outcome Evaluation:      Plan of Care Reviewed With: other (see comments) (chart review and discussion with ICU Physicican)    Overall Patient Progress: decliningOverall Patient Progress: declining    Outcome Evaluation: Worsening BUN, elevated phos on potassium replacement. Discontinued Prosource TF20 per Physician request.     Adjusted TF formula to lower protein Renal formula per Physician request dt elevated BUN  Increased water flushes to maintain current fluid intake with decreased TF rate w/ more concentrated formula.Novasource Renal @ 30ml/hr x 22 hours  w/ 175ml water flushes every 2 hours will proivde 1320kcals, 60g protein, 121g CHO, 66g fat, 473ml free water, 2100ml water from flushes for a total of 2573ml fluid daily.

## 2023-10-28 NOTE — PROGRESS NOTES
[facility]    Medicine Progress Note - Hospitalist Service    Date of Admission:  10/15/2023    Assessment & Plan   Matthew Bowen is a 78 year old female w/PMH chronic hypoxic respiratory failure due to pulmonary HTN, ALAINA requiring CPAP, chronic diastolic HF, CAD, CKD stage 3, morbid obesity, HTN,  depression who presents from home with family with fever, cough. Acutely worsened respiratory status on 10/21 requiring intubation. Unclear etiology for acute on chronic hypoxic respiratory failure, most likely infectious v autoimmune v other in setting of unknown underlying lung disease. Currently off pressors.     Acute hypoxic respiratory failure s/p intubation  Possible ARDS  Pneumonia, recurrent w/bronchiectasis  -Presents with recurrent PNA, 3rd time in last month or so. Last completed treatment 2 weeks ago with persistent cough. Presents with worsening fatigue, productive cough, weakness and falls.  -On admission, patient febrile to 101.1, white count of 21.  Lactic acid was within normal limits.  She underwent a CT scan that showed a lingular consolidative opacity with air bronchograms with bilateral upper lobe groundglass opacities.  She was started on ceftriaxone and azithromycin.  -Sputum culture from 10/19 better that showed Staph epi and C. albicans. Discussed with ID, yeast prevalent in sputum samples and likely candida that is not related to illness.   -Patient initially on ceftriaxone and azithromycin.  Broadened to cefepime and azithro on 10/18.  Flagyl added on 10/20.  Finished azithromycin course on 10/20.   -COVID, influenza, Respiratory viral panel negative.  Strep and legionella antigens negative.   -intubated 10/21 due to worsening respiratory status and bronch done showing serosanguinous and fibrinous return with thick mucoid secretions noted  -ID, pulm and ICU team all following.  -remains on cefepime since 18th, flagyll 20th and Vanc since 21st-cont and await further  recs  -on solumedrol 125 daily 10/24   -decrease epoprostenol 10 ng/kg/min  -previously diuresed with improvement, cont on PRN basis. Adjusting free water as below    Abx  Azithromycin 10/15-10/19  Ceftriaxone 10/15-10/17  Cefepime 10/18-  Metronidazole 10/20-  Vancomycin 10/21-    Infectious/metabolic encephalopathy  -continue hydromorphone and midazolam    Shock  -suspected to be infectious, EF is preserved  -intermittently on vasopressin, wean as able  -increase midodrine to 10mg q8h    Hypernatremia  -persistent ~150, due to TPN, insensible losses  -free water increased to 200ml q2h  -monitor    Gluteal cleft irritant dermatitis   - WOC consulted, appreciate recs    Leukocytosis: Suspect related to above as well as steroids.  From what I can see in care everywhere has generally been 9-12 range in [id number].       Generalized weakness. Chronic arthritis. Falls at home. Left Foot pain:   -Having multifactorial weakness, with diffuse body aches and pains.  Imaging on admission including CT head, cervical spine, CT CAP did not show acute fracture.  She had an x-ray of her foot also showed degenerative changes without fracture.    -readdress when appropriate     Hx ALAINA, pulm HTN, chronic diastolic HF:   - metolazone for volume overload/natriuresis.   - Given her softer BPs holding PTA antihypertensives.       CKD stage 3  Uremia  Cr 1.21 on admission. Currently ~1.5. Suspect underlying renal function may be worse than creatinine reflects as eGFR by cystatin C estimate is 13. Urea has been steadily increasing since admission while creatinine has stabilized.  -transitioned to low-protein tube feeds, nephrology consulted but discussed and no formal consult at this time. Agree with present management  -serial labs     HTN: holding PTA antihypertensives     Hx morbid obesity, anemia, CAD, hypothyroidism, mood disorder: resume home ASA, statin, plavix, lyrica, zoloft     Hypokalemia: Replacement         Diet: NPO per  Anesthesia Guidelines for Procedure/Surgery Except for: Meds  Adult Formula Drip Feeding: Continuous Vital High Protein; Other - Specify in Comment; Goal Rate: 40 mL/hr x 22 hours (please hold 1 hour before and after levothyroxine); mL/hr; increase rate to 40 mL/hr now, discontinue prosource; Do not advance ...    DVT Prophylaxis: Heparin SQ  Aleman Catheter: PRESENT, indication: Strict 1-2 Hour I&O  Lines: PRESENT      PICC 10/22/23 Triple Lumen Right Basilic multiple med gtt. ready for use-Site Assessment: WDL except;Ecchymotic  Arterial Line 10/22/23 Radial-Site Assessment: WDL      Cardiac Monitoring: ACTIVE order. Indication: Tachyarrhythmias, acute (48 hours)  Code Status: Full Code        Disposition Plan   Await significant improvement         Dereck Reed, MS4  Hospitalist Service  [facility]  Securely message with [employer] (more info)  Text page via [employer] Paging/Directory   ______________________________________________________________________    Interval History   No acute events overnight. Borderline temp (100.4) yesterday evening, came down with passive cooling. Variable oxygen requirements, FiO2 at 55% then required uptitration to 70%, now back to 55%. Required several hours of vaso but has tolerated being off pressors for 8+ hrs.    Physical Exam   Vital Signs: Temp: 98.2  F (36.8  C) Temp src: Bladder   Pulse: 52   Resp: 26 SpO2: 99 % O2 Device: Mechanical Ventilator    Weight: 206 lbs 12.66 oz    Constitutional: intubated and sedated, not following commands  Eyes: pupils equal, round and reactive to light and conjunctiva normal  ENT: ETT in place  Respiratory: ventilatory breath sounds, no wheezing, diminished air entry  Cardiovascular: bradycardic, regular rhythm and no murmur noted. LE pitting edema to mid-shin  GI: normal bowel sounds, soft, and non-distended  Skin: no bruising or bleeding  Neurologic: sedated, not following commands, makes occasional spontaneous  movements    60 MINUTES SPENT BY ME on the date of service doing chart review, history, exam, documentation & further activities per the note.      Data   ------------------------- PAST 24 HR DATA REVIEWED -----------------------------------------------    I have personally reviewed the following data over the past 24 hrs:    28.0 (H)  \   7.9 (L)   / 351     152 (H) 120 (H) 120.9 (H) /  117 (H)   3.7 22 1.39 (H) \       Imaging results reviewed over the past 24 hrs:   No results found for this or any previous visit (from the past 24 hour(s)).

## 2023-10-28 NOTE — PROGRESS NOTES
CLINICAL NUTRITION SERVICES - BRIEF NOTE     Nutrition Prescription    RECOMMENDATIONS FOR MDs/PROVIDERS TO ORDER:  none    Malnutrition Status:    Patient does not meet criteria    Recommendations already ordered by Registered Dietitian (RD):  Discontinued Prosource TF20 per Physician request    Adjusted TF formula to lower protein Renal formula per Physician request dt elevated BUN  Increased water flushes to maintain current fluid intake with decreased TF rate w/ more concentrated formula    Novasource Renal @ 30ml/hr x 22 hours  w/ 175ml water flushes every 2 hours will proivde 1320kcals, 60g protein, 121g CHO, 66g fat, 473ml free water, 2100ml water from flushes for a total of 2573ml fluid daily.    Future/Additional Recommendations:  Consider reassessing protein needs pending resolving kidney function as pt ultimately has increased protein needs while vented with sepsis       EVALUATION OF THE PROGRESS TOWARD GOALS   Diet: NPO, Adult Formula Drip Feeding: Continuous   Nutrition Support: Pt with OG tube placed 10/22/23. Receiving Vital High Protein @ 40ml/hr x 22 hours (held 1 hour before and after levothyroxine) w/ 150ml water flushes every 2 hours  Intake: Vital High Protein providing 880kcals, 77g protein, 98g CHO, 20g fat, 736ml free water, 1800ml water from flushes for a total of 2536ml fluid daily.    2pkts Prosource TF20 BID brings totals to 1200kcals, 157g protein daily.      NEW FINDINGS   Received call from ICU physician requesting Prosource TF20 be discontinued as previously discussed along with potential of switching over to a lower protein TF formula. Pt now has a moisture related gluteal wound per WOC notes. Discussed options regarding available TF formulas based upon pt's Phosphorus lab values. Plan is to switch to Renal formula to decrease protein intake with more aggressive potassium replacement if needed as only Renal formula available without fiber is also low in potassium.     10/26:  "Physician considered adjusting to Renal, lower protein TF formula dt concern of increasing BUN suspected to be dt TF    10/27: Pt was off of pressors and then on and off overnight    Date/Time Weight   10/28/23 0341 93.8 kg (206 lb 12.7 oz)   10/27/23 0600 89 kg (196 lb 3.4 oz)   10/26/23 0500 89.2 kg (196 lb 10.4 oz)   10/25/23 0430 88.1 kg (194 lb 4.8 oz)   10/23/23 0545 87 kg (191 lb 12.8 oz)   10/21/23 1930 85.7 kg (188 lb 14.4 oz)   10/20/23 1900 85.4 kg (188 lb 4.4 oz)   10/16/23 0644 81.7 kg (180 lb 1.9 oz)   10/15/23 1646 85.6 kg (188 lb 11.4 oz)   10/15/23 0925 86.2 kg (190 lb)     Net fluid up 3.4L (7.5lbs) since admit per I/Os    ANTHROPOMETRICS  Height: 152.4 cm (5' 0\")  Most Recent Weight: 93.8 kg (206 lb 12.7 oz)    IBW: 45.5 kg  BMI: Obesity Grade III BMI >40  Weight History:   Wt Readings from Last 30 Encounters:   10/28/23 93.8 kg (206 lb 12.7 oz)   02/28/22 94.3 kg (208 lb)   11/22/19 101.6 kg (223 lb 14.4 oz)   02/14/19 104.9 kg (231 lb 3.2 oz)   11/02/16 97 kg (213 lb 12.8 oz)   10/26/16 96.4 kg (212 lb 9.6 oz)     ASSESSED NUTRITION NEEDS 10/22:  Dosing Weight 86 kg (actual body weight) - energy; 45.5 kg (ideal body weight) - protein   Estimated Energy Needs: 946-1204 kcal/day (11-14 Kcal/Kg actual body weight)   Justification: vented   Estimated Protein Needs: 55+ g protein/day (1.2+ g pro/Kg IBW)   Justification: vented, hypercatabolism with acute illness, CKD   Estimated Fluid Needs: 1390-1670ml (25-30 mlkg ABW) or per MD    PHYSICAL FINDINGS  See malnutrition section below.  Dry skin  Non-healing wound  (abrasion/excoriation on sacrum/coccyx dt moisture)    GI CONCERNS  LBM 10/27    LABS  Reviewed: Na 152, .9, Cr 1.39, GFR 39, Mg 2.9, Phos 4.6, hgb 7.9, hematocrit 25.4, rbc 3.01, MCH 26.2, MCHC 31.1, RDW 18.8    MEDICATIONS  Reviewed: maxipime, novolog, synthroid/levothroid, solu-medrol, flagyl, multivitamin w/ minerals, protonix, potassium chloride infusion, prosource TF20, " cholecalciferol, vasopressin
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
## 2023-10-28 NOTE — PROGRESS NOTES
ICU staff  DOS 10/28/2023    No major overnight events -- airway pressures with plateau low 30s overall, off pressors, FiO2 50-60%.     Vitals:   Temp:  [98.1  F (36.7  C)-100.4  F (38  C)] 98.1  F (36.7  C)  Pulse:  [48-75] 58  Resp:  [14-45] 28  MAP:  [59 mmHg-85 mmHg] 73 mmHg  Arterial Line BP: (100-148)/(-23-60) 115/54  FiO2 (%):  [55 %-70 %] 65 %  SpO2:  [88 %-99 %] 88 %     Vent Mode: CMV/AC  (Continuous Mandatory Ventilation/ Assist Control)  FiO2 (%): 65 %  Resp Rate (Set): 26 breaths/min  Tidal Volume (Set, mL): 350 mL  PEEP (cm H2O): 14 cmH2O  Resp: 28    I/O last 3 completed shifts:  In: 3005.05 [I.V.:255.05; NG/GT:1950]  Out: 2770 [Urine:2770]    Hydromorphone 0.4  Midazolam 6-7  Epo 20    Exam:  Gen: Sedated, not responding to my exam  HEENT: NC/AT  Pulm: Clear  Cor: RRR  Abdomen/GI: Soft, nondistended  : Aleman in place  Extremities: Warm, nonedematous  Skin: Well-perfused  Neuro: Sedated  Psych: Unable to assess    Data:   Labs reviewed  - BUN up a bit at 120, Na still 152  Nothing new on imaging or cultures    Assessment/plan:  77 y/o woman with bronchiectasis, chronic hypoxemic respiratory failure, recurrent pulmonary infections admitted for fever/cough and transferred to ICU 10/22 with worsening respiratory failure. Slow improvement overall.  CNS: Sedated, appears comfortable.  # Weakness/frequent falls  # Acute/chronic pain  # Sedation  - Continue hydromorphone, midazolam  Pulm: SpO2 a little better overall. Airway pressures a little higher, but not concerningly so at the moment.  # Acute on chronic mixed respiratory failure  # Chronic hypoxemia  # Bronchiectasis  # Recurrent pneumonias  - Continue current vent settings  - Accepting plateau pressure 30-35 in light of body habitus  - Bring epoprostenol to 10 ng today  - Continue steroid  CV: Off pressors today.  # Shock, multifactorial  # Essential hypertension  # Diastolic heart failure with preserved EF  # Moderate aortic stenosis  - Continues  on midodrine  GI: Abdomen benign  # Nutrition  - Continue tube feeding  - Team spoke with RD --> switch to renal formula, stop extra protein  : UOP good, Na 152, BUN up a bit  # Hypernatremia  # Acute on chronic renal insufficiency  - BUN up a little, though on review of chart it looks like she is still on standard TF -> switch to renal formula  - Metolazone today  Heme: Hb 7.9 (8.6)  # Anemia of chronic disease  - no indication to transfuse  Msk: Extremities warm, well-perfused.   Endo: Glucose 117-131  # Stress/steroid hyperglycemia  # Hypothyroidism  - Continue insulin sliding scale  - Levothyroxine  ID: Afebrile, WBCs stable around 28  # Pneumonia  - Continues on abx  - ID following  ICU: SQH, PPI  - Family updated at bedside    This patient is critically ill to my assessment and requires ICU monitoring and cares. A total of 35 minutes critical care time spent on 10/28/2023, exclusive of procedures.     Rui Payton MD, PhD  Surgical critical care  10/28/2023, 1:49 PM    Clinically Significant Risk Factors        # Hypokalemia: Lowest K = 3.3 mmol/L in last 2 days, will replace as needed  # Hypernatremia: Highest Na = 152 mmol/L in last 2 days, will monitor as appropriate      # Hypoalbuminemia: Lowest albumin = 2.9 g/dL at 10/22/2023  5:20 AM, will monitor as appropriate            # Severe Obesity: Estimated body mass index is 40.39 kg/m  as calculated from the following:    Height as of this encounter: 1.524 m (5').    Weight as of this encounter: 93.8 kg (206 lb 12.7 oz).

## 2023-10-28 NOTE — CONSULTS
Renal Medicine       Case reviewed with Russ Payton and Zechariah   No formal renal consult at this time    Call with questions         GODWIN Paniagua    [facility]  [phone]

## 2023-10-28 NOTE — PLAN OF CARE
Goal Outcome Evaluation:      Plan of Care Reviewed With: patient    Overall Patient Progress: no changeOverall Patient Progress: no change       ICU End of Shift Summary.  For vital signs and complete assessments, please see documentation flowsheets.     Pertinent assessments: Remains on full vent support and maintain sats>90%. Suctioned for moderate secretions. Neurologically sedated on midazolam and dilaudid. MAP at goal off vasopressin. Rhythm sinus with occ ectopy. Diuresing well via mitchell. TF continued at goal. Family updated at bedside  Major Shift Events: As outlined above  Plan (Upcoming Events): Trend BMP q6hrs  Discharge/Transfer Needs: TBD    Bedside Shift Report Completed : y  Bedside Safety Check Completed: y     Notified provider about indwelling mitchell catheter discussed removal or continued need.    Did provider choose to remove indwelling mitchell catheter? NO    Provider's mitchell indication for keeping indwelling mitchell catheter: Indication for continued use: Strict 1-2 Hour I & O if external catheters are not an option    Is there an order for indwelling mitchell catheter? YES

## 2023-10-28 NOTE — PLAN OF CARE
VS: afebrile, bradycardic, BP WDL. NEURO: sedated, unresponsive, RASS -4. CARDIAC/TELE: SB, upper 60s during PM. RESP: LS course & diminished, CMV/AC, O2 sat  in low 90s, FIO2 55%-70% throughout. GI/: tube feeds switched to Novasource Renal 30 mL/hr at 1400, mitchell cath draining to gravity w/ good output, 1x loose BM in AM, all quads audible. SKIN: generalized 2+ edema, scattered bruising, sacral wound w/ small drainage open to air. IV SITES: R PICC, 2x L PIV, R ART.     POC: Continue to monitor respiratory status, sedation status, fluid balance, and abx treatment.    Goal Outcome Evaluation:      Plan of Care Reviewed With: patient, spouse, family    Overall Patient Progress: no changeOverall Patient Progress: no change

## 2023-10-28 NOTE — PROGRESS NOTES
[facility] ICU VENTILATOR RESPIRATORY NOTE    Date of Admission: 10/15/23    Date of Intubation (most recent): 10/22/23    Reason for Mechanical Ventilation: Respiratory Failure    Number of Days on Mechanical Ventilation: 7    Met Criteria for Pressure Support Trial: No    Length of Pressure Support Trial:     Reason for Stopping Pressure Support Trial:     Reason for No Pressure Support Trial: PEEP >8, Fio2 >50%    Significant Events Today:     ABG Results:   Recent Labs   Lab 10/26/23  0230 10/26/23  0012 10/25/23  1800 10/25/23  0423   PH 7.29* 7.26* 7.30* 7.28*   PCO2 49* 52* 46* 51*   PO2 87 93 177* 110*   HCO3 24 23 23 24   O2PER 65 65 90 70       ETT appearance on chest x-ray: ETT is approximately 3cm from the ashley.     Plan: Wean and extubate    Vent Mode: CMV/AC  (Continuous Mandatory Ventilation/ Assist Control)  FiO2 (%): 65 %  Resp Rate (Set): 26 breaths/min  Tidal Volume (Set, mL): 350 mL  PEEP (cm H2O): 14 cmH2O  Resp: 25    Bs coarse/diminished. Suctioned for scant creamy secretions. Duoneb/Mucomyst given as ordered. Veletri continues to run in line. Will continue to assess and monitor.    Broderick Berry RT on 10/28/2023 at 7:15 AM

## 2023-10-28 NOTE — PROGRESS NOTES
[facility] ICU VENTILATOR RESPIRATORY NOTE    Date of Admission: 10/15/23  Date of Intubation (most recent): 10/22/23  Reason for Mechanical Ventilation: Respiratory failure  Number of Days on Mechanical Ventilation: 7    Met Criteria for Pressure Support Trial: No  Reason for No Pressure Support Trial: PEEP >8      Vital Signs:  Temp: 98.8  F (37.1  C) Temp src: Bladder   Pulse: 66   Resp: 26 SpO2: 92 % O2 Device: Mechanical Ventilator       ABG Results:   Recent Labs   Lab 10/26/23  0230 10/26/23  0012 10/25/23  1800 10/25/23  0423   PH 7.29* 7.26* 7.30* 7.28*   PCO2 49* 52* 46* 51*   PO2 87 93 177* 110*   HCO3 24 23 23 24   O2PER 65 65 90 70       Vent Mode: CMV/AC  (Continuous Mandatory Ventilation/ Assist Control)  FiO2 (%): 75 %  Resp Rate (Set): 26 breaths/min  Tidal Volume (Set, mL): 350 mL  PEEP (cm H2O): 14 cmH2O  Resp: 26      Plan:  continue to monitor    Eduarda Son, RT

## 2023-10-29 NOTE — PLAN OF CARE
ICU End of Shift Summary.  For vital signs and complete assessments, please see documentation flowsheets.      Pertinent assessments: Afebrile. Sedated with rass of 4 throughout night, on full vent support. Able to titrate FiO2 down slightly tonight from 75% to 65% currently. Pt does well on right side, rotated between supine and right side and patient seemed to do better with very minimal desaturations tonight. Minimal secretions suctioned overnight. SB to SR rate mostly 58-60's. Good urine output overnight. No BM's. BP's maintaining Map's greater than 65 without pressor support. Bath given.   Major Shift Events: FiO2 decreased  Plan (Upcoming Events): Continue Abx and current POC.   Discharge/Transfer Needs: TBD pending progress.     Bedside Shift Report Completed   Bedside Safety Check Completed    Goal Outcome Evaluation:      Plan of Care Reviewed With: patient    Overall Patient Progress: no changeOverall Patient Progress: no change

## 2023-10-29 NOTE — PROGRESS NOTES
[facility] ICU VENTILATOR RESPIRATORY NOTE  Date of Admission: 10/15/2023  Date of Intubation (most recent): 10/21/2023  Reason for Mechanical Ventilation: Respiratory Failure  Number of Days on Mechanical Ventilation: 8  Met Criteria for Pressure Support Trial: No  Reason for No Pressure Support Trial: High ventilator settings  Significant Events Today: MD weaned Peep to 12 today.  Pt continues on 1/2 strength Veletri    ETT appearance on chest x-ray: 4.8cm above ashley    Plan:  Wean off Veletri and vent settings as able  Swati Drake RN on 10/29/2023 at 5:57 PM

## 2023-10-29 NOTE — PROGRESS NOTES
[facility]    Medicine Progress Note - Hospitalist Service    Date of Admission:  10/15/2023    Assessment & Plan   Matthew Bowen is a 78 year old female w/PMH chronic hypoxic respiratory failure due to pulmonary HTN, ALAINA requiring CPAP, chronic diastolic HF, CAD, CKD stage 3, morbid obesity, HTN,  depression who presents from home with family with fever, cough. Acutely worsened respiratory status on 10/21 requiring intubation. Unclear etiology for acute on chronic hypoxic respiratory failure, most likely infectious v autoimmune v other in setting of unknown underlying lung disease. Currently off pressors.     Acute hypoxic respiratory failure s/p intubation  Possible ARDS  Pneumonia, recurrent w/bronchiectasis  -Presents with recurrent PNA, 3rd time in last month or so. Last completed treatment 2 weeks ago with persistent cough. Presents with worsening fatigue, productive cough, weakness and falls.  -On admission, patient febrile to 101.1, white count of 21.  Lactic acid was within normal limits.  She underwent a CT scan that showed a lingular consolidative opacity with air bronchograms with bilateral upper lobe groundglass opacities.  She was started on ceftriaxone and azithromycin.  -Sputum culture from 10/19 better that showed Staph epi and C. albicans. Discussed with ID, yeast prevalent in sputum samples and likely candida that is not related to illness.   -Patient initially on ceftriaxone and azithromycin.  Broadened to cefepime and azithro on 10/18.  Flagyl added on 10/20.  Finished azithromycin course on 10/20.   -COVID, influenza, Respiratory viral panel negative.  Strep and legionella antigens negative.   -intubated 10/21 due to worsening respiratory status and bronch done showing serosanguinous and fibrinous return with thick mucoid secretions noted  -ID, pulm and ICU team all following.  -remains on cefepime since 18th, flagyll 20th and Vanc since 21st-cont and await further  recs  -on solumedrol 125 daily 10/24   -previously diuresed with improvement, cont on PRN basis. Adjusting free water as below  -cont to wean off epoprestenol     Abx  Azithromycin 10/15-10/19  Ceftriaxone 10/15-10/17  Cefepime 10/18-  Metronidazole 10/20-  Vancomycin 10/21-    Infectious/metabolic encephalopathy  -continue hydromorphone and midazolam    Shock  -suspected to be infectious, EF is preserved  -intermittently on vasopressin, wean as able  -increased midodrine to 10mg q8h, now off pressors    Hypernatremia  -persistent ~150, due to TPN, insensible losses  -free water increased to 200ml q2h without significant change  -monitor    Gluteal cleft irritant dermatitis   - WOC consulted, appreciate recs    Leukocytosis: Suspect related to above as well as steroids.  From what I can see in care everywhere has generally been 9-12 range in [id number].       Generalized weakness. Chronic arthritis. Falls at home. Left Foot pain:   -Having multifactorial weakness, with diffuse body aches and pains.  Imaging on admission including CT head, cervical spine, CT CAP did not show acute fracture.  She had an x-ray of her foot also showed degenerative changes without fracture.    -readdress when appropriate     Hx ALAINA, pulm HTN, chronic diastolic HF:   - metolazone for volume overload/natriuresis.   - Given her softer BPs holding PTA antihypertensives.       CKD stage 3  Uremia  Cr 1.21 on admission. Currently ~1.5. Suspect underlying renal function may be worse than creatinine reflects as eGFR by cystatin C estimate is 13. Urea has been steadily increasing since admission while creatinine has stabilized.  -transitioned to low-protein tube feeds, nephrology consulted but discussed and no formal consult at this time. Agree with present management  -serial labs     HTN: holding PTA antihypertensives     Hx morbid obesity, anemia, CAD, hypothyroidism, mood disorder: resume home ASA, statin, plavix, lyrica, zoloft      Hypokalemia: Replacement           Diet: NPO per Anesthesia Guidelines for Procedure/Surgery Except for: Meds  Adult Formula Drip Feeding: Continuous Novasource Renal; Orogastric tube; Goal Rate: 30 mL/hr x 22 hours (please hold 1 hour before and after levothyroxine); mL/hr    DVT Prophylaxis: Heparin SQ  Aleman Catheter: PRESENT, indication: Strict 1-2 Hour I&O  Lines: PRESENT      PICC 10/22/23 Triple Lumen Right Basilic multiple med gtt. ready for use-Site Assessment: WDL except;Ecchymotic  Arterial Line 10/22/23 Radial-Site Assessment: WDL      Cardiac Monitoring: ACTIVE order. Indication: Tachyarrhythmias, acute (48 hours)  Code Status: Full Code        Eldon Apple DO  Hospitalist Service  [facility]  Securely message with [employer] (more info)  Text page via [facility] Paging/Directory   ______________________________________________________________________    Interval History   Off pressors, no significant changes otherwise. PEEP being decreased today. Remains sedated on ventilator. Discussed with family at bedside    Physical Exam   Vital Signs: Temp: 99.1  F (37.3  C) Temp src: Bladder   Pulse: 56   Resp: 25 SpO2: 91 % O2 Device: Mechanical Ventilator    Weight: 208 lbs 15.94 oz  Constitutional: intubated and sedated  Eyes: pupils equal, round and reactive to light and conjunctiva normal  ENT: ETT in place  Respiratory: ventilatory breath sounds, no wheezing, diminished air entry  Cardiovascular: regular rate and rhythm, no murmur noted. Mild LE edema  GI: normal bowel sounds, soft, and non-distended  Skin: no bruising or bleeding  Neurologic: sedated, not following commands    60 MINUTES SPENT BY ME on the date of service doing chart review, history, exam, documentation & further activities per the note.      Data   ------------------------- PAST 24 HR DATA REVIEWED -----------------------------------------------    I have personally reviewed the following data over the past 24  hrs:    27.3 (H)  \   7.7 (L)   / 322     152 (H) 120 (H) 134.6 (H) /  81   3.8 21 (L) 1.51 (H) \       Imaging results reviewed over the past 24 hrs:   No results found for this or any previous visit (from the past 24 hour(s)).

## 2023-10-29 NOTE — PLAN OF CARE
Goal Outcome Evaluation:      Plan of Care Reviewed With: spouse, child, family, sibling    Overall Patient Progress: no changeOverall Patient Progress: no change       ICU End of Shift Summary.  For vital signs and complete assessments, please see documentation flowsheets.     Pertinent assessments:  Remains on full vent support and maintain sats >89%. Suctioned for moderate secretions.Veletri continued in line. Neurologically sedated on midazolam and dilaudid. MAP at goal on midodrine. Rhythm sinus katiana with occ ectopy. Diuresing okay via mitchell. TF continued at goal and tolerating well. Family updated at bedside.   Major Shift Events: As outlined above  Plan (Upcoming Events): Trend BMP Q6hrs. Monitor Hgb.   Discharge/Transfer Needs: TBD    Bedside Shift Report Completed : y  Bedside Safety Check Completed: y     CAUTI  Notified provider about indwelling mitchell catheter discussed removal or continued need.    Did provider choose to remove indwelling mitchell catheter? NO    Provider's mitchell indication for keeping indwelling mitchell catheter: Indication for continued use: Strict 1-2 Hour I & O if external catheters are not an option    Is there an order for indwelling mitchell catheter? YES

## 2023-10-29 NOTE — PROGRESS NOTES
[facility] ICU VENTILATOR RESPIRATORY NOTE  Date of Admission: 10/15/2023  Date of Intubation (most recent): 10/21/2023  Reason for Mechanical Ventilation: Respiratory Failure  Number of Days on Mechanical Ventilation: 8  Met Criteria for Pressure Support Trial: No  Reason for No Pressure Support Trial: High ventilator settings  Significant Events Today: None today, weaning down FiO2 as able    Pt continues on 1/2 strength Veletri    ETT appearance on chest x-ray: 4.8cm above ashley    Plan:  Wean off Veletri and vent settings as able

## 2023-10-29 NOTE — PROGRESS NOTES
ICU staff  DOS 10/29/2023    No major overnight events.     Vitals:   Temp:  [98.1  F (36.7  C)-99.5  F (37.5  C)] 99.1  F (37.3  C)  Pulse:  [55-68] 64  Resp:  [10-77] 17  MAP:  [67 mmHg-88 mmHg] 76 mmHg  Arterial Line BP: (107-137)/(44-61) 122/50  FiO2 (%):  [60 %-75 %] 60 %  SpO2:  [84 %-97 %] 94 %     Vent Mode: CMV/AC  (Continuous Mandatory Ventilation/ Assist Control)  FiO2 (%): 60 %  Resp Rate (Set): 26 breaths/min  Tidal Volume (Set, mL): 350 mL  PEEP (cm H2O): 12 cmH2O  Resp: 17    I/O last 3 completed shifts:  In: 3727.78 [I.V.:207.78; NG/GT:2820]  Out: 2370 [Urine:2370]    Hydromorphone 0.4  Midazolam 7  Epo 10    Exam:  Gen: Intubated, sedated  HEENT: NC/AT  Pulm: Clear  Cor: RRR  Abdomen/GI: Soft, nondistended  : Aleman in place, urine clear  Extremities: Warm, nonedematous  Skin: Well-perfused  Neuro: Sedated  Psych: Unable to assess    Data:   Labs reviewed  - Na 150-152  - Cr 1.5 (ranging 1.36-1.52) -- at baseline  - Hb 7.7, WBCs 27 and not really changed  Nothing new on cultures, no new imaging    Assessment/plan:  77 y/o woman with chronic hypoxemic respiratory failure, bronchiectasis, recurrent pneumonias, diastolic heart failure admitted 10/15 with cough/weakness. Transferred to ICU and intubated 10/22 with acute decompensation and intubation. Slowly making progress, but still on quite a bit of support.  CNS: Intubated, appears comfortable.  # Weakness/frequent falls  # Acute/chronic pain  # Sedation  - Continues on hydromorphone, midazolam  - Lighten if able -- has been requiring sedation for vent synchrony while hypoxemic  Pulm: No hypoxemic episodes overnight, FiO2 0.6-0.7. Tolerated halving epoprostenol.  # Acute on chronic mixed respiratory failure  # Chronic hypoxemia (2L at baseline)  # Bronchiectasis  # Recurrent pneumonia  # ?Inflammatory pneumonitis  - Continue ventilator support, turned PEEP to 12  - If tolerating, could cut to quarter-strength epoprostenol (vs stopping)  - Accepting  plateau 30-35 in light of body habitus  - On methylprednisolone per pulmonary, ?able to taper soon  - Pulmonology following  CV: Remains off pressor.  # Shock, multifactorial, resolved  # Essential hypertension  # Diastolic heart failure with preserved EF  # Moderate aortic stenosis  - On low-dose midodrine  GI: Abdomen benign.  # Nutrition  - Continue tube feeding  - Switched to renal-only formula  : UOP good, Na unchanged, BUN up  # Hypernatremia  # Uremia  # Acute on chronic renal insufficiency  - Creatinine at baseline  - Na really doesn't change with added free water; giving metolazone again today  - Uremia likely from exogenous protein administration, steroids  Heme: Hb 7.7 (7.9)  # Anemia of critical illness  - No indication to transfuse  Msk: Extremities warm, well-perfused.   Endo: Glucose .  # Stress/steroid hyperglycemia  # Hypothroidism  - Sliding scale insulin  - Levothyroxine  ID: Afebrile, WBC 27  # Pneumonia  # Leukocytosis  - Continues on cefepime, metronidazole  - Re-assess with ID in the next few days regarding course -- may need longer duration for source control given bronchiectasis  - WBCs likely from steroids -- temporally correlates to increased dose  ICU: SQH, PPI  - Family updated at bedside    This patient is critically ill to my assessment and requires ICU monitoring and cares. A total of 35 minutes critical care time spent on 10/29/2023, exclusive of procedures.     Rui Payton MD, PhD  Surgical critical care  10/29/2023, 12:54 PM    Clinically Significant Risk Factors         # Hypernatremia: Highest Na = 152 mmol/L in last 2 days, will monitor as appropriate      # Hypoalbuminemia: Lowest albumin = 2.9 g/dL at 10/22/2023  5:20 AM, will monitor as appropriate            # Severe Obesity: Estimated body mass index is 40.82 kg/m  as calculated from the following:    Height as of this encounter: 1.524 m (5').    Weight as of this encounter: 94.8 kg (208 lb 15.9 oz).

## 2023-10-29 NOTE — PROGRESS NOTES
"SPIRITUAL HEALTH SERVICES Progress Note  RH  373    Visit per triage from previous .  Daughter, Roque and her spouse were in the room.  Patient is intubated and unable to respond.    I introduced myself as the .  Roque said that she was fine and that her family had a \"\" who keeps in touch with the family.  No other needs expressed at this time.    Karishma Vo MA  Associate   [phone] - pager  [facility] remains available 24/7 for emergent requests/referrals, either by having the on-call  paged or by entering an ASAP/STAT consult in Epic (this will also page the on-call ). Routine Epic consults receive an initial response within 24 hours.  "

## 2023-10-30 NOTE — PLAN OF CARE
ICU End of Shift Summary.  For vital signs and complete assessments, please see documentation flowsheets.      Pertinent assessments: Afebrile. Patient sedated with rass of -4 overnight. Remained on full vent support. Patient tolerating decrease in peep done on previous shift. FiO2 titrated down to 50% tonight. Tolerating turns more tonight than previously. Bradycardic mostly in 50's but now upper 40's this am. BP's higher tonight up to 140's Midodrene held. Good urine output. Large loose BM overnight. Bath given.   Major Shift Events:   Plan (Upcoming Events): Continue to wean vent as able.  Discharge/Transfer Needs:      Bedside Shift Report Completed   Bedside Safety Check Completed    Goal Outcome Evaluation:      Plan of Care Reviewed With: patient    Overall Patient Progress: no changeOverall Patient Progress: no change

## 2023-10-30 NOTE — PROGRESS NOTES
[facility]  Infectious Disease Progress Note          Assessment and Plan:   Date of Admission:  10/15/2023  Date of Consult (When I saw the patient): 10/20/23        Assessment & Plan  Matthew Bowen is a 78 year old who was admitted on 10/15/2023.      Impression: 1 78-year-old female, some chronic underlying lung disease, on oxygen, bronchiectasis probable, some history of pneumonia but now 6 weeks or so of worsening respiratory symptoms, 2 prior courses of antibiotics, now admitted with infiltrates, no major sepsis but elevated procalcitonin and white count implying bacterial, not really improving on antibiotics concern for more chronic type pathogen in this patient with underlying lung disease by this history     2 chronic lung disease on oxygen some underlying bronchiectasis, some prior pneumonias  3 chronic kidney disease     REC 1 dramatic worsening without clear explanation, continues in ICU intubated and sedated.  No new major fever,  no positive microbiology.  Fungal studies all negative   Candida and staph epi not significant pathogens here BAL studies neg so far for the longer term pathogens including only Candida and no other fungi  2 continue cefepime and Flagyl but no clear actual infection, primarily preventative at this point is at high risk and could not tolerate further infection ,discontinued Vanco already has some renal insufficiency have not isolated MRSA or any other pathogens and should have if they were the issue here , not worth the toxicity  3 slight improvement, pressors off and oxygenation slightly better still no major fever positive microbiology, still up to 100% oxygen    4 discussed with daughter                   Interval History:     Intubated and sedated overall slight improvement with pressors off abut oxygenation s remains problematic, in ICU,  BAL studies negative all prior fungal and microbiologic studies negative QuantiFERON-TB gold negative               Medications:      acetylcysteine  2 mL Nebulization 4x Daily    albuterol        aspirin  81 mg Oral or Feeding Tube Daily    atorvastatin  20 mg Oral or Feeding Tube QPM    ceFEPIme  2 g Intravenous Q24H    chlorhexidine  15 mL Mouth/Throat Q12H    heparin ANTICOAGULANT  5,000 Units Subcutaneous Q12H    insulin aspart  1-6 Units Subcutaneous Q4H    ipratropium - albuterol 0.5 mg/2.5 mg/3 mL  3 mL Nebulization 4x daily    levothyroxine  150 mcg Oral or Feeding Tube QAM AC    methylPREDNISolone  125 mg Intravenous Q24H    metroNIDAZOLE  500 mg Intravenous Q8H    midodrine  10 mg Oral or Feeding Tube Q8H    pantoprazole  40 mg Per Feeding Tube QAM AC    Or    pantoprazole  40 mg Intravenous QAM AC    pregabalin  75 mg Per Feeding Tube Daily    sertraline  150 mg Oral or Feeding Tube Daily    sodium chloride (PF)  10-40 mL Intracatheter Q7 Days    sodium chloride (PF)  3 mL Intracatheter Q8H    vitamin D3  50 mcg Oral or Feeding Tube Daily                  Physical Exam:   Blood pressure 137/66, pulse 60, temperature 98.4  F (36.9  C), resp. rate 25, height 1.524 m (5'), weight 94.9 kg (209 lb 3.5 oz), SpO2 100%.  Wt Readings from Last 2 Encounters:   10/30/23 94.9 kg (209 lb 3.5 oz)   02/28/22 94.3 kg (208 lb)     Vital Signs with Ranges  Temp:  [97.3  F (36.3  C)-98.8  F (37.1  C)] 98.4  F (36.9  C)  Pulse:  [46-70] 60  Resp:  [0-52] 25  MAP:  [63 mmHg-91 mmHg] 67 mmHg  Arterial Line BP: (107-145)/(38-62) 109/45  FiO2 (%):  [45 %-100 %] 95 %  SpO2:  [83 %-100 %] 100 %    Constitutional: Intubated and sedated about same slight improvement in oxygenation     Lungs: Clear to auscultation bilaterally, no crackles or wheezing   Cardiovascular: Regular rate and rhythm, normal S1 and S2, and no murmur noted   Abdomen: Normal bowel sounds, soft, non-distended, non-tender   Skin: No rashes, no cyanosis, no edema   Other:           Data:   All microbiology laboratory data reviewed.  Recent Labs   Lab Test 10/30/23  8857  10/29/23  0411 10/28/23  0340   WBC 24.3* 27.3* 28.0*   HGB 7.7* 7.7* 7.9*   HCT 25.8* 25.8* 25.4*   MCV 88 88 84    322 351     Recent Labs   Lab Test 10/30/23  0400 10/30/23  0027 10/29/23  1749   CR 1.58* 1.58* 1.54*     No lab results found.  Recent Labs   Lab Test 02/12/19  0010   CULT Light growth  Normal deshaun

## 2023-10-30 NOTE — PROGRESS NOTES
"CLINICAL NUTRITION SERVICES - BRIEF NOTE     Nutrition Prescription    RECOMMENDATIONS FOR MDs/PROVIDERS TO ORDER:  none    Malnutrition Status:    Patient does not meet criteria    Recommendations already ordered by Registered Dietitian (RD):  Continue TF as ordered  Discontinued multivitamin as pt continues to have elevated phos and mg    Future/Additional Recommendations:  Consider reassessing protein needs pending kidney function as pt ultimately has increased protein needs while vented with sepsis       EVALUATION OF THE PROGRESS TOWARD GOALS   Diet: NPO, Adult Formula Drip Feeding: Continuous   Nutrition Support: Pt with OG tube placed 10/22/23. Receiving Novasource Renal @ 30ml/hr x 22 hours (held 1 hour before and after levothyroxine) w/ 200ml water flushes every 2 hours  Intake: Novasource Renal providing 1320kcals, 60g protein, 121g CHO, 66g fat, 473ml free water, 2400ml water from flushes for a total of 2873ml fluid daily.     NEW FINDINGS   Plan not to increase flushes today per ICU Physicians.     10/29: Off pressors  10/28: Adjusted to Renal formula and decreased protein per ICU Physician  10/27: Pt was off of pressors and then on and off overnight  10/26: Physician considered adjusting to Renal, lower protein TF formula dt concern of increasing BUN suspected to be dt TF    10/30/23 0500 94.9 kg (209 lb 3.5 oz) -- KW   10/29/23 0400 94.8 kg (208 lb 15.9 oz)       10/28/23 0341 93.8 kg (206 lb 12.7 oz)   10/27/23 0600 89 kg (196 lb 3.4 oz)   10/26/23 0500 89.2 kg (196 lb 10.4 oz)   10/25/23 0430 88.1 kg (194 lb 4.8 oz)   10/23/23 0545 87 kg (191 lb 12.8 oz)   10/21/23 1930 85.7 kg (188 lb 14.4 oz)   10/20/23 1900 85.4 kg (188 lb 4.4 oz)   10/16/23 0644 81.7 kg (180 lb 1.9 oz)   10/15/23 1646 85.6 kg (188 lb 11.4 oz)   10/15/23 0925 86.2 kg (190 lb)     Net fluid up 6.5L (14.3lbs) since 10/16 per I/Os    ANTHROPOMETRICS  Height: 152.4 cm (5' 0\")  Most Recent Weight: 93.8 kg (206 lb 12.7 oz)    IBW: 45.5 " kg  BMI: Obesity Grade III BMI >40  Weight History:   Wt Readings from Last 30 Encounters:   10/30/23 94.9 kg (209 lb 3.5 oz)   02/28/22 94.3 kg (208 lb)   11/22/19 101.6 kg (223 lb 14.4 oz)   02/14/19 104.9 kg (231 lb 3.2 oz)   11/02/16 97 kg (213 lb 12.8 oz)   10/26/16 96.4 kg (212 lb 9.6 oz)     ASSESSED NUTRITION NEEDS 10/22:  Dosing Weight 86 kg (actual body weight) - energy; 45.5 kg (ideal body weight) - protein   Estimated Energy Needs: 946-1204 kcal/day (11-14 Kcal/Kg actual body weight)   Justification: vented   Estimated Protein Needs: 55+ g protein/day (1.2+ g pro/Kg IBW)   Justification: vented, hypercatabolism with acute illness, CKD   Estimated Fluid Needs: 1390-1670ml (25-30 mlkg ABW) or per MD    PHYSICAL FINDINGS  See malnutrition section below.  Dry skin  abrasion/excoriation on sacrum/coccyx dt moisture    GI CONCERNS  LBM 10/30  (3 stools yesterday, 2 so far today)    LABS  Reviewed: Na 148, .5, Cr 1.58, GFR 33, Mg 3.0, Phos 5.4, hgb 7.7, hematocrit 25.8, rbc 2.95, MCH 26.1, MCHC 29.8, RDW 20.5    MEDICATIONS  Reviewed: maxipime, lasix, novolog, synthroid/levothroid, solu-medrol, flagyl, multivitamin w/ minerals, protonix, cholecalciferol

## 2023-10-30 NOTE — PROGRESS NOTES
Critical access hospital ICU VENTILATOR RESPIRATORY NOTE  Date of Admission: 10/15/2023  Date of Intubation (most recent): 10/21/2023  Reason for Mechanical Ventilation: Respiratory Failure  Number of Days on Mechanical Ventilation: 9  Met Criteria for Pressure Support Trial: No  Reason for No Pressure Support Trial: High ventilator settings  Significant Events Today: Increase in FIo2 to 100% and peep raised to 14 today.  Pt continues on 1/2 strength Veletri due to increased o2 needs. Pt has started to go down in o2 needs after lasix    ETT appearance on chest x-ray: 4.8cm above ashley    Plan:  Wean off Veletri and vent settings as able  Swati Drake RN on 10/29/2023 at 5:57 PM

## 2023-10-30 NOTE — PROGRESS NOTES
[facility]    Medicine Progress Note - Hospitalist Service    Date of Admission:  10/15/2023    Assessment & Plan   Matthew Bowen is a 78 year old female w/PMH chronic hypoxic respiratory failure due to pulmonary HTN, ALAINA requiring CPAP, chronic diastolic HF, CAD, CKD stage 3, morbid obesity, HTN,  depression who presents from home with family with fever, cough. Acutely worsened respiratory status on 10/21 requiring intubation. Unclear etiology for acute on chronic hypoxic respiratory failure, most likely infectious v autoimmune v other in setting of unknown underlying lung disease. Currently off pressors.     Acute hypoxic respiratory failure s/p intubation  Possible ARDS  Pneumonia, recurrent w/bronchiectasis  -Presents with recurrent PNA, 3rd time in last month or so. Last completed treatment 2 weeks ago with persistent cough. Presents with worsening fatigue, productive cough, weakness and falls.  -On admission, patient febrile to 101.1, white count of 21.  Lactic acid was within normal limits.  She underwent a CT scan that showed a lingular consolidative opacity with air bronchograms with bilateral upper lobe groundglass opacities.  She was started on ceftriaxone and azithromycin.  -Sputum culture from 10/19 better that showed Staph epi and C. albicans. Discussed with ID, yeast prevalent in sputum samples and likely candida that is not related to illness.   -Patient initially on ceftriaxone and azithromycin.  Broadened to cefepime and azithro on 10/18.  Flagyl added on 10/20.  Finished azithromycin course on 10/20.   -COVID, influenza, Respiratory viral panel negative.  Strep and legionella antigens negative.   -intubated 10/21 due to worsening respiratory status and bronch done showing serosanguinous and fibrinous return with thick mucoid secretions noted  -ID, pulm and ICU team all following.  -remains on cefepime since 18th, flagyll 20th and Vanc since 21st-cont and await further  recs  -previously diuresed with improvement, cont on PRN basis. Adjusting free water as below  -cont to wean off epoprestenol as tolerated  -begin to wean off IV methylprednisolone  -CXR today in light of increasing oxygenation requirements/increased WOB    Abx  Azithromycin 10/15-10/19  Ceftriaxone 10/15-10/17  Cefepime 10/18-  Metronidazole 10/20-  Vancomycin 10/21-    Infectious/metabolic encephalopathy  -continue hydromorphone and midazolam    Shock  -suspected to be infectious, EF is preserved  -Continue midodrine 10mg q8h, now off pressors    Hypernatremia  -persistent ~150, due to TPN, insensible losses  -free water increased to 200ml q2h with mild improvement  -monitor    Gluteal cleft irritant dermatitis   - WOC consulted, appreciate recs    Leukocytosis: Suspect related to above as well as steroids.  From what I can see in care everywhere has generally been 9-12 range in [id number].       Generalized weakness. Chronic arthritis. Falls at home. Left Foot pain:   -Having multifactorial weakness, with diffuse body aches and pains.  Imaging on admission including CT head, cervical spine, CT CAP did not show acute fracture.  She had an x-ray of her foot also showed degenerative changes without fracture.    -readdress when appropriate     Hx ALAINA, pulm HTN, chronic diastolic HF:   - Metolazone/lasix PRN for volume overload/natriuresis.   - Given her softer BPs holding PTA antihypertensives.       CKD stage 3  Uremia  Cr 1.21 on admission. Currently ~1.5. Suspect underlying renal function may be worse than creatinine reflects as eGFR by cystatin C estimate is 13. Urea has been steadily increasing since admission while creatinine has stabilized.  -transitioned to low-protein tube feeds, nephrology consulted but discussed and no formal consult at this time. Agree with present management  -serial labs     HTN: holding PTA antihypertensives     Hx morbid obesity, anemia, CAD, hypothyroidism, mood disorder: resume home  ASA, statin, plavix, lyrica, zoloft     Hypokalemia: Replacement           Diet: NPO per Anesthesia Guidelines for Procedure/Surgery Except for: Meds  Adult Formula Drip Feeding: Continuous Novasource Renal; Orogastric tube; Goal Rate: 30 mL/hr x 22 hours (please hold 1 hour before and after levothyroxine); mL/hr    DVT Prophylaxis: Heparin SQ  Aleman Catheter: PRESENT, indication: Strict 1-2 Hour I&O  Lines: PRESENT      PICC 10/22/23 Triple Lumen Right Basilic multiple med gtt. ready for use-Site Assessment: WDL except;Ecchymotic  Arterial Line 10/22/23 Radial-Site Assessment: WDL      Cardiac Monitoring: ACTIVE order. Indication: Tachyarrhythmias, acute (48 hours)  Code Status: Full Code        Dereck Reed  Hospitalist Service  [facility]  Securely message with [email or web address] (more info)  Text page via [employer] Paging/Directory   ______________________________________________________________________    Interval History   Uneventful overnight, had decreased FiO2 requirements and midodrine held due to HTN. In AM, increased FiO2 requirements to 100%, PEEP back to 14 and increased belly breathing.     Physical Exam   Vital Signs: Temp: 98.4  F (36.9  C) Temp src: Bladder   Pulse: 70   Resp: (!) 34 SpO2: 90 % O2 Device: Mechanical Ventilator    Weight: 209 lbs 3.46 oz  Constitutional: intubated and sedated  Eyes: pupils equal, round and reactive to light and conjunctiva normal  ENT: ETT in place  Respiratory: ventilatory breath sounds, belly breathing, no wheezing, diminished air entry  Cardiovascular: regular rate and rhythm, no murmur noted. Mild LE edema  GI: normal bowel sounds, soft, and non-distended  Skin: no bruising or bleeding  Neurologic: sedated, not following commands    60 MINUTES SPENT BY ME on the date of service doing chart review, history, exam, documentation & further activities per the note.      Data   ------------------------- PAST 24 HR DATA REVIEWED  -----------------------------------------------    I have personally reviewed the following data over the past 24 hrs:    24.3 (H)  \   7.7 (L)   / 282     148 (H) 117 (H) 128.5 (H) /  98   3.9 20 (L) 1.58 (H) \       Imaging results reviewed over the past 24 hrs:   No results found for this or any previous visit (from the past 24 hour(s)).

## 2023-10-30 NOTE — PLAN OF CARE
Problem: Enteral Nutrition  Goal: Feeding Tolerance  Outcome: Progressing   Goal Outcome Evaluation:      Plan of Care Reviewed With: other (see comments) (chart review)    Overall Patient Progress: no changeOverall Patient Progress: no change    Outcome Evaluation: Pt's BUN/Cr ration remains elevated. Continue TF as ordered. Discontinued multivitamin dt elevated Phos and Mg.

## 2023-10-30 NOTE — PROGRESS NOTES
[facility] ICU VENTILATOR RESPIRATORY NOTE  Date of Admission: 10/15/23  Date of Intubation (most recent): 10/21/23  Reason for Mechanical Ventilation: Respiratory failure  Number of Days on Mechanical Ventilation: 9  Met Criteria for Pressure Support Trial: No  Reason for No Pressure Support Trial: PEEP >8  Vent Mode: CMV/AC  (Continuous Mandatory Ventilation/ Assist Control)  FiO2 (%): 50 %  Resp Rate (Set): 26 breaths/min  Tidal Volume (Set, mL): 350 mL  PEEP (cm H2O): 12 cmH2O  Resp: 17    Plan:  Continue to monitor.    Beth Barnett, RT

## 2023-10-30 NOTE — PLAN OF CARE
Continues on full vent support. Needed increase in FiO2 to 100% for awhile r/t desaturation today into 80's. IV Lasix/xray - Weaned FiO2 back down to 50%. Minimal secretions. Lungs coarse. Tele SR/SB. TF at goal. Dilaudid 0.4, Versed 7. Good UOP in mitchell. Loose stools. POC reveiwed at length with family at bedside. See Flowsheet charting.

## 2023-10-30 NOTE — PROGRESS NOTES
ICU Progress    No major overnight events.   Tolerated decreasing FiO2 as well a a drop in PEEP to 12 yesterday.  Had some desats with turning this morning but recovered with suctioning.    Update:  Nurse reports that she was unable to fully recover sats and so increased FiO2 to 1.0.    Vitals:   Temp:  [97.3  F (36.3  C)-99.1  F (37.3  C)] 98.4  F (36.9  C)  Pulse:  [46-64] 49  Resp:  [16-77] 16  MAP:  [66 mmHg-91 mmHg] 73 mmHg  Arterial Line BP: (111-145)/(45-62) 119/52  FiO2 (%):  [45 %-65 %] 45 %  SpO2:  [84 %-100 %] 90 %     Vent Mode: CMV/AC  (Continuous Mandatory Ventilation/ Assist Control)  FiO2 (%): 45 %  Resp Rate (Set): 26 breaths/min  Tidal Volume (Set, mL): 350 mL  PEEP (cm H2O): 12 cmH2O  Resp: 16    I/O last 3 completed shifts:  In: 3381 [I.V.:216; NG/GT:2520]  Out: 2365 [Urine:2365]    Hydromorphone 0.4  Midazolam 7  Epo 10    Exam:  Gen: Intubated, sedated  HEENT: NC/AT  Pulm: Clear  Cor: RRR  Abdomen/GI: Soft, nondistended  : Aleman in place, urine clear  Extremities: Warm, nonedematous  Skin: Well-perfused  Neuro: Sedated  Psych: Unable to assess    Data:   Labs reviewed  - Na 148   - Cr 1.58 (ranging 1.36-1.52) -- at baseline  - Hb 7.7 (7.7)  - WBCs 24.3 (27.3)  has remained at this level  Nothing new on cultures, no new imaging    Assessment/plan:  77 y/o woman with chronic hypoxemic respiratory failure, bronchiectasis, recurrent pneumonias, diastolic heart failure admitted 10/15 with cough/weakness. Transferred to ICU and intubated 10/22 with acute decompensation and intubation. Slowly making progress, but still on quite a bit of support.    Update: Given patient unable to fully recover after turning without FiO2 at 1.0, will check CXR and hold off on reducing epo for now.  Will also temporarily increase PEEP to 12.      CNS: Intubated, appears comfortable.  # Weakness/frequent falls  # Acute/chronic pain  # Sedation  - Continues on hydromorphone, midazolam  - Lighten if able -- has been  requiring sedation for vent synchrony while hypoxemic  Pulm: No hypoxemic episodes overnight, FiO2 0.5. Tolerated half strength Epo since Saturday  # Acute on chronic mixed respiratory failure  # Chronic hypoxemia (2L at baseline)  # Bronchiectasis  # Recurrent pneumonia  # ?Inflammatory pneumonitis  - Continue ventilator support, PEEP at 12  - Will cut to quarter-strength epoprostenol (vs stopping)  - Accepting plateau 30-35 in light of body habitus  - On methylprednisolone per pulmonary, taper per their protocol  - Pulmonology following  CV: Remains off pressor.  # Shock, multifactorial, resolved  # Essential hypertension  # Diastolic heart failure with preserved EF  # Moderate aortic stenosis  - On low-dose midodrine (held last night as pressures on the high side)  GI: Abdomen benign.  # Nutrition  - Continue tube feeding  - Switched to renal-only formula  : UOP good, Na 148 today , BUN remains ~130 (128 today)  # Hypernatremia  # Uremia  # Acute on chronic renal insufficiency  - Creatinine at baseline  - Small Na change in last 24 hours after added free water and metolazone.  Will dose again today, and given patient's increasing volume status will additionally dose lasix 40 mg IV  - Uremia likely from exogenous protein administration, steroids  Heme: Hb 7.7 (7.7)  # Anemia of critical illness  - No indication to transfuse  Msk: Extremities warm, well-perfused.   Endo: Glucose 118-146.  # Stress/steroid hyperglycemia  # Hypothroidism  - Sliding scale insulin  - Levothyroxine  ID: Afebrile, WBC 24  # Pneumonia  # Leukocytosis  - Continues on cefepime, metronidazole  - Re-assess with ID in the next few days regarding course -- may need longer duration for source control given bronchiectasis  - WBCs likely from steroids -- temporally correlates to increased dose  ICU: SQH, PPI  - Family updated at bedside    This patient is critically ill to my assessment and requires ICU monitoring and cares. A total of 35  minutes critical care time spent on 10/29/2023, exclusive of procedures.     Stephen Paul MD    Clinically Significant Risk Factors         # Hypernatremia: Highest Na = 152 mmol/L in last 2 days, will monitor as appropriate      # Hypoalbuminemia: Lowest albumin = 2.9 g/dL at 10/22/2023  5:20 AM, will monitor as appropriate            # Severe Obesity: Estimated body mass index is 40.86 kg/m  as calculated from the following:    Height as of this encounter: 1.524 m (5').    Weight as of this encounter: 94.9 kg (209 lb 3.5 oz).

## 2023-10-31 NOTE — PLAN OF CARE
Continues on full vent support. Started paralytic for desynchrony with much improvement. Lungs coarse, minimal secretions.Tele SB/SR. Dilaudid 0.4/Versed 6. TF at goal. Lasix Scheduled - good urine output in Mitchell. Large loose BM this morning. See Flowsheet Charting.     Notified provider about indwelling mitchell catheter discussed removal or continued need.    Did provider choose to remove indwelling mitchell catheter? NO    Provider's mitchell indication for keeping indwelling mitchell catheter: Indication for continued use: Strict 1-2 Hour I & O if external catheters are not an option    Is there an order for indwelling mitchell catheter? YES    *If there is a plan to keep mitchell catheter in place at discharge daily notification with provider is not necessary, but please add a notation in the treatment team sticky note that the patient will be discharging with the catheter.

## 2023-10-31 NOTE — PLAN OF CARE
ICU End of Shift Summary.  For vital signs and complete assessments, please see documentation flowsheets.      Pertinent assessments: Afebrile. Sedated with Rass of -4 on full vent support. FiO2 remained at 50% most of night, just increased to 55% now for desating. Versed bolus given x 2 tonight for over breathing vent/ dyssynchrony. Bradycardic in 50's overnight. BP's soft at times, improved after midodrene. No Bm's overnight. Good urine output.    Major Shift Events: Bath given  Plan (Upcoming Events): Continue Abx, wean vent as able.  Discharge/Transfer Needs: TBD pending progress     Bedside Shift Report Completed   Bedside Safety Check Completed  Goal Outcome Evaluation:      Plan of Care Reviewed With: patient    Overall Patient Progress: no changeOverall Patient Progress: no change

## 2023-10-31 NOTE — PROGRESS NOTES
ICU Progress    After initial difficulties with maintaining saturations yesterday, patient was able to be weaned back to FiO2 50% with saturations remaining in the low 90s.  Nurse reports intermittent breath stacking despite additional sedation.  Dose of Lasix yesterday did result in diuresis but not enough.    Vitals:   Temp:  [97.3  F (36.3  C)-99  F (37.2  C)] 98.1  F (36.7  C)  Pulse:  [49-70] 49  Resp:  [15-36] 21  MAP:  [59 mmHg-77 mmHg] 69 mmHg  Arterial Line BP: ()/(39-53) 110/49  FiO2 (%):  [50 %-95 %] 70 %  SpO2:  [89 %-100 %] 90 %     Vent Mode: CMV/AC  (Continuous Mandatory Ventilation/ Assist Control)  FiO2 (%): (S) 70 %  Resp Rate (Set): 26 breaths/min  Tidal Volume (Set, mL): 350 mL  PEEP (cm H2O): 14 cmH2O  Resp: 21    I/O last 3 completed shifts:  In: 3321 [I.V.:216; NG/GT:2415]  Out: 1980 [Urine:1980]    Hydromorphone 0.4  Midazolam 7  Epo off this morning    Exam:  Gen: Intubated, sedated  HEENT: NC/AT  Pulm: rhonchi, dis-synchronous with vent  Cor: RRR  Abdomen/GI: Soft, nondistended  : Aleman in place, urine clear  Extremities: Warm, edematous  Skin: Well-perfused  Neuro: Sedated  Psych: Unable to assess    Data:   Labs reviewed  - Na 144 (148)   - Cr 1.72 (1.58) (at baseline 1.3-1.5)  - Hb 7.8 (7.7)  - WBCs 24.5 (24.3)  has remained at this level  Nothing new on cultures  CXR reviewed.  Pleural fluid L>R, possibly increased opacities in lungs bilaterally    Assessment/plan:  79 y/o woman with chronic hypoxemic respiratory failure, bronchiectasis, recurrent pneumonias, diastolic heart failure admitted 10/15 with cough/weakness. Transferred to ICU and intubated 10/22 with acute decompensation and intubation. Slowly making progress, but still on quite a bit of support and now showing signs of both volume overload and vent dis-synchrony complicating the matter .      CNS: Intubated, appears well sedated despite vent dis-synch.  # Weakness/frequent falls  # Acute/chronic pain  # Sedation  -  Continues on hydromorphone, midazolam  Pulm: No hypoxemic episodes overnight, FiO2 0.5. Tolerated half strength Epo since Saturday  # Acute on chronic mixed respiratory failure  # Chronic hypoxemia (2L at baseline)  # Bronchiectasis  # Recurrent pneumonia  # ?Inflammatory pneumonitis  - Continue ventilator support, PEEP at 12  - Will stop veletri today as it appears that the greater benefit will come from diuresis and improving vent synchrony  - Accepting plateau 30-35 in light of body habitus  - Will paralyze today to improve vent synchrony and hopefully improve overall oxygenation.  - On methylprednisolone per pulmonary, taper per their protocol  - Pulmonology following  CV: Remains off pressors  # Shock, multifactorial, resolved  # Essential hypertension  # Diastolic heart failure with preserved EF  # Moderate aortic stenosis  - On low-dose midodrine but pressures borderline low - will increase.  GI: Abdomen benign.  # Nutrition  - Continue tube feeding  - Switched to renal-only formula  : UOP adequate, Na 144 today , BUN remains ~130 (132 today)  # Hypernatremia, resolved  # Uremia  # Acute on chronic renal insufficiency  - Creatinine up again above baseline  - Sodium appears corrected with added free water and metolazone.  Given current volume status, will stop free water and schedule lasix and continue to follow.  - Uremia likely from exogenous protein administration, steroids  Heme: Hb 7.8 (7.7)  # Anemia of critical illness  - No indication to transfuse  Msk: Extremities warm, well-perfused.   Endo: Glucose 90-130s  # Stress/steroid hyperglycemia  # Hypothroidism  - Sliding scale insulin  - Levothyroxine  ID: Afebrile, WBC 24.5  # Leukocytosis  - Has been on cefipime for 14 days without any positive culture.  Do not think at this point that we are treating anything specific and so would stop the cefipime and follow.  Will repeat procal in am  - WBCs likely from steroids -- temporally correlates to  increased dose  ICU: SQH, PPI  - Family updated at bedside    This patient is critically ill to my assessment and requires ICU monitoring and cares. A total of 35 minutes critical care time spent on 10/29/2023, exclusive of procedures.     Stephen Paul MD    Clinically Significant Risk Factors         # Hypernatremia: Highest Na = 152 mmol/L in last 2 days, will monitor as appropriate      # Hypoalbuminemia: Lowest albumin = 2.9 g/dL at 10/22/2023  5:20 AM, will monitor as appropriate            # Severe Obesity: Estimated body mass index is 41.33 kg/m  as calculated from the following:    Height as of this encounter: 1.524 m (5').    Weight as of this encounter: 96 kg (211 lb 10.3 oz).

## 2023-10-31 NOTE — PROGRESS NOTES
[facility]  Infectious Disease Progress Note          Assessment and Plan:   Date of Admission:  10/15/2023  Date of Consult (When I saw the patient): 10/20/23        Assessment & Plan  Matthew Bowen is a 78 year old who was admitted on 10/15/2023.      Impression: 1 78-year-old female, some chronic underlying lung disease, on oxygen, bronchiectasis probable, some history of pneumonia but now 6 weeks or so of worsening respiratory symptoms, 2 prior courses of antibiotics, now admitted with infiltrates, no major sepsis but elevated procalcitonin and white count implying bacterial, not really improving on antibiotics concern for more chronic type pathogen in this patient with underlying lung disease by this history     2 chronic lung disease on oxygen some underlying bronchiectasis, some prior pneumonias  3 chronic kidney disease     REC 1 dramatic worsening without clear explanation, continues in ICU intubated and sedated.  No new major fever,  no positive microbiology.  Fungal studies all negative   Candida and staph epi not significant pathogens here BAL studies neg so far for the longer term pathogens including only Candida and no other fungi  2 On cefepime and Flagyl but no clear actual infection, primarily preventative at this point is at high risk and could not tolerate further infection ,discontinued Vanco already has some renal insufficiency have not isolated MRSA or any other pathogens and should have if they were the issue here , not worth the toxicity  3 still major and ongoing hypoxia, no clear infection cause    4 discussed with daughter                   Interval History:     Intubated and sedated overall samepressors off abut oxygenation s remains problematic, in ICU,  BAL studies negative all prior fungal and microbiologic studies negative QuantiFERON-TB gold negative              Medications:      acetylcysteine  2 mL Nebulization 4x Daily    artificial tears   Both Eyes Q8H     aspirin  81 mg Oral or Feeding Tube Daily    atorvastatin  20 mg Oral or Feeding Tube QPM    chlorhexidine  15 mL Mouth/Throat Q12H    furosemide  60 mg Intravenous Q8H    heparin ANTICOAGULANT  5,000 Units Subcutaneous Q12H    insulin aspart  1-6 Units Subcutaneous Q4H    ipratropium - albuterol 0.5 mg/2.5 mg/3 mL  3 mL Nebulization 4x daily    levothyroxine  150 mcg Oral or Feeding Tube QAM AC    methylPREDNISolone  62.5 mg Intravenous Q24H    midodrine  10 mg Oral or Feeding Tube Q6H    pantoprazole  40 mg Per Feeding Tube QAM AC    pregabalin  75 mg Per Feeding Tube Daily    sertraline  150 mg Oral or Feeding Tube Daily    sodium chloride (PF)  10-40 mL Intracatheter Q7 Days    sodium chloride (PF)  3 mL Intracatheter Q8H    vitamin D3  50 mcg Oral or Feeding Tube Daily                  Physical Exam:   Blood pressure 137/66, pulse 57, temperature 97.5  F (36.4  C), resp. rate 20, height 1.524 m (5'), weight 96 kg (211 lb 10.3 oz), SpO2 100%.  Wt Readings from Last 2 Encounters:   10/31/23 96 kg (211 lb 10.3 oz)   02/28/22 94.3 kg (208 lb)     Vital Signs with Ranges  Temp:  [97.3  F (36.3  C)-99  F (37.2  C)] 97.5  F (36.4  C)  Pulse:  [48-61] 57  Resp:  [15-36] 20  MAP:  [8 mmHg-77 mmHg] 63 mmHg  Arterial Line BP: (0-123)/(0-53) 106/43  FiO2 (%):  [50 %-70 %] 60 %  SpO2:  [89 %-100 %] 100 %    Constitutional: Intubated and sedated about same slight improvement in oxygenation     Lungs: Clear to auscultation bilaterally, no crackles or wheezing   Cardiovascular: Regular rate and rhythm, normal S1 and S2, and no murmur noted   Abdomen: Normal bowel sounds, soft, non-distended, non-tender   Skin: No rashes, no cyanosis, no edema   Other:           Data:   All microbiology laboratory data reviewed.  Recent Labs   Lab Test 10/31/23  0416 10/30/23  0400 10/29/23  0411   WBC 24.5* 24.3* 27.3*   HGB 7.8* 7.7* 7.7*   HCT 26.0* 25.8* 25.8*   MCV 88 88 88    282 322     Recent Labs   Lab Test 10/31/23  0415  10/31/23  0020 10/30/23  1825   CR 1.72* 1.70* 1.68*     No lab results found.  Recent Labs   Lab Test 02/12/19  0010   CULT Light growth  Normal deshaun

## 2023-10-31 NOTE — PROGRESS NOTES
[facility]    Medicine Progress Note - Hospitalist Service    Date of Admission:  10/15/2023    Assessment & Plan   Matthew Bowen is a 78 year old female w/PMH chronic hypoxic respiratory failure due to pulmonary HTN, ALAINA requiring CPAP, chronic diastolic HF, CAD, CKD stage 3, morbid obesity, HTN,  depression who presents from home with family with fever, cough. Acutely worsened respiratory status on 10/21 requiring intubation. Unclear etiology for acute on chronic hypoxic respiratory failure, most likely infectious v autoimmune v other in setting of unknown underlying lung disease.      Acute hypoxic respiratory failure s/p intubation  Possible ARDS  Pneumonia, recurrent w/bronchiectasis  -Presents with recurrent PNA, 3rd time in last month or so. Last completed treatment 2 weeks ago with persistent cough. Presents with worsening fatigue, productive cough, weakness and falls.  -On admission, patient febrile to 101.1, white count of 21.  Lactic acid was within normal limits.  She underwent a CT scan that showed a lingular consolidative opacity with air bronchograms with bilateral upper lobe groundglass opacities.  She was started on ceftriaxone and azithromycin.  -Sputum culture from 10/19 better that showed Staph epi and C. albicans. Discussed with ID, yeast prevalent in sputum samples and likely candida that is not related to illness.   -COVID, influenza, Respiratory viral panel negative.  Strep and legionella antigens negative.   -intubated 10/21 due to worsening respiratory status and bronch done showing serosanguinous and fibrinous return with thick mucoid secretions noted  -discussed on rounds and stopping Cefepime, flagyl and Vanc today. ID following, appreciate help. Antibiotic dates below  -seen by pulm, steroids decreased from 125 to 60mg daily on 10/31. May benefit from IVIG if  euvolemic and not improving  -intensivist stopping veletri and starting paralytics and lasix 60 q8  today    Abx  Azithromycin 10/15-10/19  Ceftriaxone 10/15-10/17  Cefepime 10/18-31  Metronidazole 10/20-31  Vancomycin 10/21-31    Infectious/metabolic encephalopathy  -continue hydromorphone and midazolam    Shock  -suspected to be infectious, EF is preserved  -Continue midodrine 10mg q8h, now off pressors    Hypernatremia  -persistent ~150, due to TPN, insensible losses  -free water increased to 200ml q2h with mild improvement  -monitor    Gluteal cleft irritant dermatitis   - WOC consulted, appreciate recs    Leukocytosis: Suspect related to above as well as steroids.  From what I can see in care everywhere has generally been 9-12 range in [id number].       Generalized weakness. Chronic arthritis. Falls at home. Left Foot pain:   -Having multifactorial weakness, with diffuse body aches and pains.  Imaging on admission including CT head, cervical spine, CT CAP did not show acute fracture.  She had an x-ray of her foot also showed degenerative changes without fracture.    -readdress when appropriate     Hx ALAINA, pulm HTN, chronic diastolic HF:   - Metolazone/lasix PRN for volume overload/natriuresis.   - Given her softer BPs holding PTA antihypertensives.       CKD stage 3  Uremia  Cr 1.21 on admission. Currently ~1.5. Suspect underlying renal function may be worse than creatinine reflects as eGFR by cystatin C estimate is 13. Urea has been steadily increasing since admission while creatinine has stabilized.  -transitioned to low-protein tube feeds, nephrology consulted but discussed and no formal consult at this time. Agree with present management  -serial labs     HTN: holding PTA antihypertensives     Hx morbid obesity, anemia, CAD, hypothyroidism, mood disorder: on ASA, statin, plavix, lyrica, zoloft at home     Hypokalemia: Replacement       Diet: NPO per Anesthesia Guidelines for Procedure/Surgery Except for: Meds  Adult Formula Drip Feeding: Continuous Novasource Renal; Orogastric tube; Goal Rate: 30 mL/hr x  22 hours (please hold 1 hour before and after levothyroxine); mL/hr    DVT Prophylaxis: Heparin SQ  Aleman Catheter: PRESENT, indication: Strict 1-2 Hour I&O  Lines: PRESENT      PICC 10/22/23 Triple Lumen Right Basilic multiple med gtt. ready for use-Site Assessment: WDL except;Ecchymotic  Arterial Line 10/22/23 Radial-Site Assessment: WDL      Cardiac Monitoring: ACTIVE order. Indication: Tachyarrhythmias, acute (48 hours)  Code Status: Full Code        Eldon Apple DO  Hospitalist Service  [facility]  Securely message with [employer] (more info)  Text page via linkedÃ¼ Paging/Directory   ______________________________________________________________________    Interval History   Oxygenation improved after diuresis, remains intubated and sedated. No significant change clinically.     Physical Exam   Vital Signs: Temp: 98.1  F (36.7  C) Temp src: Bladder   Pulse: (!) 49   Resp: 21 SpO2: 90 % O2 Device: Mechanical Ventilator    Weight: 211 lbs 10.27 oz  Constitutional: intubated and sedated, not following commands  Eyes: pupils equal, round and reactive to light and conjunctiva normal  ENT: ETT in place  Respiratory: ventilatory breath sounds, no wheezing, diminished air entry  Cardiovascular:  regular rate adn rhythm and no murmur noted. LE pitting edema to mid-shin  GI: normal bowel sounds, soft, and non-distended  Skin: no bruising or bleeding  Neurologic: sedated, not following commands, makes occasional spontaneous movements    60 MINUTES SPENT BY ME on the date of service doing chart review, history, exam, documentation & further activities per the note.      Data   ------------------------- PAST 24 HR DATA REVIEWED -----------------------------------------------    I have personally reviewed the following data over the past 24 hrs:    24.5 (H)  \   7.8 (L)   / 244     144 113 (H) 132.3 (H) /  91   4.0 20 (L) 1.72 (H) \     Procal: N/A CRP: 33.70 (H) Lactic Acid: N/A         Imaging results  reviewed over the past 24 hrs:   Recent Results (from the past 24 hour(s))   XR Chest Port 1 View    Narrative    CHEST ONE VIEW  10/30/2023 12:17 PM     HISTORY: Increasing oxygen.    COMPARISON: Chest radiograph 10/26/2023.      Impression    IMPRESSION: Endotracheal tube terminates approximately 4 cm from the  ashley. Enteric tube courses below the diaphragm. Right PICC  terminates at the mid SVC.    Bilateral, right greater than left, airspace opacities appear slightly  worsened compared to prior. Probable small left layering pleural  effusion. No pneumothorax. Similar cardiomediastinal silhouette and  aortic calcifications.    TRAE BANGURA MD         SYSTEM ID:  [id number]   XR Chest Port 1 View    Narrative    CHEST ONE VIEW PORTABLE  10/31/2023 12:01 PM     HISTORY: ETT placement.    COMPARISON: Chest x-ray 10/30/2023.      Impression    IMPRESSION: ET tube tip is 4.4 cm proximal to the ashley. Nasogastric  tube in place. Right PICC line tip at the mid SVC, stable in position.  Diffuse bilateral pulmonary opacities appear mildly increased on the  left and stable on the right. Stable cardiac silhouette that is  borderline prominent.

## 2023-10-31 NOTE — PROGRESS NOTES
[facility] ICU VENTILATOR RESPIRATORY NOTE  Date of Admission: 10/15/23  Date of Intubation (most recent): 10/21/23  Reason for Mechanical Ventilation: Respiratory failure  Number of Days on Mechanical Ventilation: 10  Met Criteria for Pressure Support Trial: No  Reason for No Pressure Support Trial: PEEP >8  Vent Mode: CMV/AC  (Continuous Mandatory Ventilation/ Assist Control)  FiO2 (%): 50 %  Resp Rate (Set): 26 breaths/min  Tidal Volume (Set, mL): 350 mL  PEEP (cm H2O): 14 cmH2O  Resp: 22    Beth Barnett RT

## 2023-10-31 NOTE — PROGRESS NOTES
[facility] ICU VENTILATOR RESPIRATORY NOTE  Date of Admission: 10/15/23  Date of Intubation (most recent): 10/22/23  Reason for Mechanical Ventilation: Respiratory failure  Number of Days on Mechanical Ventilation: 10  Met Criteria for Pressure Support Trial: no  Reason for No Pressure Support Trial: PEEP +14 and FiO2 >50%  Significant Events Today: started on paralytic and stopped veleteri  Pt remains on mechanical ventilator with the following settings:  Vent Mode: CMV/AC  (Continuous Mandatory Ventilation/ Assist Control)  FiO2 (%): (S) 50 %  Resp Rate (Set): 26 breaths/min  Tidal Volume (Set, mL): 350 mL  PEEP (cm H2O): 14 cmH2O  Resp: 26    Vital signs:  Temp: 97  F (36.1  C) Temp src: Bladder   Pulse: 53   Resp: 26 SpO2: 96 % O2 Device: Mechanical Ventilator Oxygen Delivery: 55 LPM     ETT appearance on chest x-ray: ET tube tip is 4.4 cm proximal to the ashley.     Plan:  Will continue to monitor pt's respiratory status closely.    Holly Barry RT, RT  10/31/2023 7:41 PM

## 2023-10-31 NOTE — PROGRESS NOTES
[facility]   Pulmonary Progress Note  Matthew Bowen MRN: [id number]  1945  Date of Admission:10/15/2023  Date of Service: 10/30/2023  ___________________________________    Assessment & Plan  Matthew Bowen is a 78 year old female, w/ PMHx most significant for hypertension, ALAINA, chronic diastolic heart failure and CKD, admitted w/ acute hypoxemic respiratory failure.  She had been treated for pneumonia x3 in the last 2 to 3 months.  She received broad-spectrum antibiotics during this current admission, she has been intubated since 10/22, her chest imaging studies showed bilateral groundglass opacities and dense consolidation, she did have work-up with bronchoscopy and BAL, I did not see the cell count on the BAL, the cultures have been negative, she did have an autoimmune work-up that was negative except for mild elevation of the RF and borderline nonspecific elevated MARLI, notably she did have low IgG level.  She has been treated with the high-dose steroids for 7 days now with Solu-Medrol 125 mg once daily.  She was diuresed over the weekend, but she is still positive on her I/O.  With the worsening ARDS, and no specific etiology, this could be inflammatory pneumonitis such as acute interstitial pneumonitis (Hamman Rich syndrome), other etiologies include organizing, she did have some CT scan findings that could be consistent with Atoll sign on the CT scan 10/21/2023.  Interestingly there are case reports of organizing pneumonia with immunoglobulin deficiency, IVIG treatment could be considered in these cases, but currently she does have some fluid overload signs with the pitting edema bilaterally, so would recommend to continue aggressive diuresis first.      Acute hypoxemic respiratory failure  Severe ARDS  Decreased IgG level  History of recurrent pneumonia treated with antibiotics     -Recommend to check inflammatory markers with CRP and ESR   -Can decrease the Solu-Medrol dose to half dose  starting tomorrow   -Recommend aggressive diuresis, recommend to target I/O- 1 to 2 L   -Continue vent management per primary and ICU, she has significant vent dyssynchrony, can consider neuromuscular blockade.  Agree with inhaled Veletri.   -Defer antimicrobial to primary and ICU   -If still no significant improvement despite diuretics and achieving euvolemia, IVIG can be considered       Chart documentation was completed, in part, with [employer] voice-recognition software. Even though reviewed, some grammatical, spelling, and word errors may remain.    Kolton Katz MD  Pulmonary & Critical Care       Interval History    - Nursing notes reviewed.   -Patient had slight improvement in her oxygenation this weekend with diuresis, but she worsened again this morning requiring increased PEEP to 14 and increased FiO2 support   -She is still currently on inhaled Veletri half dose   -Not on pressors.    Physical Exam Temp:  [97.3  F (36.3  C)-98.6  F (37  C)] 97.5  F (36.4  C)  Pulse:  [46-70] 51  Resp:  [0-52] 24  MAP:  [59 mmHg-91 mmHg] 70 mmHg  Arterial Line BP: ()/(38-62) 116/47  FiO2 (%):  [45 %-100 %] 50 %  SpO2:  [83 %-100 %] 92 %  I/O last 3 completed shifts:  In: 3408.95 [I.V.:193.95; NG/GT:2555]  Out: 2390 [Urine:2390]  Wt Readings from Last 1 Encounters:   10/30/23 94.9 kg (209 lb 3.5 oz)    Body mass index is 40.86 kg/m . Vent Mode: CMV/AC  (Continuous Mandatory Ventilation/ Assist Control)  FiO2 (%): (S) 50 %  Resp Rate (Set): 26 breaths/min  Tidal Volume (Set, mL): 350 mL  PEEP (cm H2O): 14 cmH2O  Resp: 24      Exam:  General: NAD  HEENT: ET tube was noted  CV: RRR, no murmur, click, rub  Resp: Equal b/l air entry, bilateral crackles, no wheezing.  Abd: Soft, ND  Extremities: Pitting edema +2 bilaterally.  Neuro: Deeply sedated on the ventilator, does not follow commands.    Data   Labs: reviewed in EMR and notable labs listed below.  CMP:   Recent Labs   Lab 10/30/23  2010 10/30/23  1825 10/30/23  1618  10/30/23  1359 10/30/23  0739 10/30/23  0400 10/30/23  0358 10/30/23  0027 10/29/23  0847 10/29/23  0411 10/28/23  0728 10/28/23  0340 10/27/23  0815 10/27/23  0615   NA  --  145  --  148*  --  148*  --  147*   < > 152*   < > 152*   < > 150*   POTASSIUM  --  4.6  --  4.4  --  3.9  --  4.1   < > 3.8   < > 3.7   < > 3.5  3.5   CHLORIDE  --  114*  --  117*  --  117*  --  116*   < > 120*   < > 120*   < > 117*   CO2  --  19*  --  20*  --  20*  --  20*   < > 21*   < > 22   < > 23   ANIONGAP  --  12  --  11  --  11  --  11   < > 11   < > 10   < > 10   * 159* 149* 160*   < > 135*   < > 107*   < > 128*   < > 131*   < > 126*   BUN  --  132.4*  --  130.7*  --  128.5*  --  131.1*   < > 134.6*   < > 120.9*   < > 110.5*   CR  --  1.68*  --  1.56*  --  1.58*  --  1.58*   < > 1.51*   < > 1.39*   < > 1.32*   GFRESTIMATED  --  31*  --  34*  --  33*  --  33*   < > 35*   < > 39*   < > 41*   ABRBARA  --  8.9  --  9.0  --  8.8  --  9.0   < > 9.0   < > 8.9   < > 9.1   MAG  --   --   --   --   --  3.0*  --   --   --  3.0*  --  2.9*  --  2.9*   PHOS  --   --   --   --   --  5.4*  --   --   --  5.4*  --  4.6*  --  3.7    < > = values in this interval not displayed.     CBC:   Recent Labs   Lab 10/30/23  0400 10/29/23  0411 10/28/23  0340 10/27/23  0615   WBC 24.3* 27.3* 28.0* 29.2*   RBC 2.95* 2.94* 3.01* 3.30*   HGB 7.7* 7.7* 7.9* 8.6*   HCT 25.8* 25.8* 25.4* 27.2*   MCV 88 88 84 82   MCH 26.1* 26.2* 26.2* 26.1*   MCHC 29.8* 29.8* 31.1* 31.6   RDW 20.5* 20.1* 18.8* 18.3*    322 351 412       Culture Data   7-Day Micro Results       Collected Updated Procedure Result Status      10/24/2023 1100 10/24/2023 1149 Symptomatic COVID-19 Virus (Coronavirus) by PCR Nose [[id number]]    Swab from Nose    Final result Component Value   SARS CoV2 PCR Negative   NEGATIVE: SARS-CoV-2 (COVID-19) RNA not detected, presumed negative.                      Virology Data:   Lab Results   Component Value Date    FLUAH1 Not Detected 10/18/2023     "FLUAH3 Not Detected 10/18/2023    [id number] Not Detected 10/18/2023    IFLUB Not Detected 10/18/2023    RSVA Not Detected 10/18/2023    RSVB Not Detected 10/18/2023    PIV1 Not Detected 10/18/2023    PIV2 Not Detected 10/18/2023    PIV3 Not Detected 10/18/2023    HMPV Not Detected 10/18/2023       Most Recent Breeze Pulmonary Function Testing (FVC/FEV1 only)  No results found for: \"20002\"  No results found for: \"20003\"  No results found for: \"20015\"  No results found for: \"20016\"    Imaging: reviewed in EMR and notable imaging listed below.    Chest x-ray on 10/30/2023  IMPRESSION: Endotracheal tube terminates approximately 4 cm from the  ashley. Enteric tube courses below the diaphragm. Right PICC  terminates at the mid SVC.  Bilateral, right greater than left, airspace opacities appear slightly  worsened compared to prior. Probable small left layering pleural  effusion. No pneumothorax. Similar cardiomediastinal silhouette and  aortic calcifications.    Medications    acetylcysteine  2 mL Nebulization 4x Daily    albuterol        aspirin  81 mg Oral or Feeding Tube Daily    atorvastatin  20 mg Oral or Feeding Tube QPM    ceFEPIme  2 g Intravenous Q24H    chlorhexidine  15 mL Mouth/Throat Q12H    heparin ANTICOAGULANT  5,000 Units Subcutaneous Q12H    insulin aspart  1-6 Units Subcutaneous Q4H    ipratropium - albuterol 0.5 mg/2.5 mg/3 mL  3 mL Nebulization 4x daily    levothyroxine  150 mcg Oral or Feeding Tube QAM AC    [START ON 10/31/2023] methylPREDNISolone  62.5 mg Intravenous Q24H    metroNIDAZOLE  500 mg Intravenous Q8H    midodrine  10 mg Oral or Feeding Tube Q8H    pantoprazole  40 mg Per Feeding Tube QAM AC    Or    pantoprazole  40 mg Intravenous QAM AC    pregabalin  75 mg Per Feeding Tube Daily    sertraline  150 mg Oral or Feeding Tube Daily    sodium chloride (PF)  10-40 mL Intracatheter Q7 Days    sodium chloride (PF)  3 mL Intracatheter Q8H    vitamin D3  50 mcg Oral or Feeding Tube Daily       "

## 2023-11-01 NOTE — PROGRESS NOTES
[facility] ICU VENTILATOR RESPIRATORY NOTE  Date of Admission: 10/15/2023  Date of Intubation (most recent): 10/21/2023  Reason for Mechanical Ventilation: Hypoxic respiratory failure  Number of Days on Mechanical Ventilation: 11  Met Criteria for Pressure Support Trial: No  Reason for No Pressure Support Trial: Significant vent support (+14)  ETT appearance on chest x-ray: in good position    Plan:  Continue to wean down ventilation support    Elba Mika, RT

## 2023-11-01 NOTE — PROGRESS NOTES
Clinical Nutrition Brief Note     Please refer to previous RD note for more information.     Prosource pkts reduced on 10/26 and she was changed to a renal formula on 10/28. This did not appear to have an affect on BUN. Plan to transition back to recommended protein dosing per critical care guidelines given conversation during rounds.     Currently on the following regimen --  Novasource Renal @ 30 ml/hr x 22 hours (held 1 hour before and after levothyroxine) providing 660 mL, 1320 kcals, 60 g protein, 121g CHO, 66g fat, 473ml free water  FRF = 100 mL Q 4 hours per MD    ASSESSED NUTRITION NEEDS 10/22:  Dosing Weight 86 kg (actual body weight) - energy; 45.5 kg (ideal body weight) - protein   Estimated Energy Needs: 946-1204 kcal/day (11-14 Kcal/Kg actual body weight)   Justification: vented   Estimated Protein Needs: 91 g protein/day (2 g pro/Kg IBW)   Justification: vented, hypercatabolism with acute illness   Estimated Fluid Needs: per MD    Labs:  BUN trending up, Creatinine trending up  Labs:  Electrolytes  Potassium (mmol/L)   Date Value   11/01/2023 3.9   11/01/2023 3.5   10/31/2023 4.5   11/22/2019 4.6   11/21/2019 4.4   11/20/2019 4.5     Phosphorus (mg/dL)   Date Value   11/01/2023 6.4 (H)   10/31/2023 6.3 (H)   10/30/2023 5.4 (H)   10/29/2023 5.4 (H)   10/28/2023 4.6 (H)    Blood Glucose  Glucose (mg/dL)   Date Value   11/01/2023 122 (H)   10/31/2023 152 (H)   11/22/2019 112 (H)   11/21/2019 97   11/20/2019 109 (H)   02/13/2019 122 (H)   02/12/2019 161 (H)     GLUCOSE BY METER POCT (mg/dL)   Date Value   11/01/2023 119 (H)   11/01/2023 102 (H)   11/01/2023 118 (H)   11/01/2023 127 (H)   10/31/2023 172 (H)     Hemoglobin A1C (%)   Date Value   10/22/2023 5.6    Inflammatory Markers  WBC (10e9/L)   Date Value   11/22/2019 10.8   11/20/2019 10.5   02/12/2019 10.1     WBC Count (10e3/uL)   Date Value   11/01/2023 25.0 (H)   10/31/2023 24.5 (H)   10/30/2023 24.3 (H)     Albumin (g/dL)   Date Value   10/22/2023  2.9 (L)   10/15/2023 4.4   02/13/2019 2.7 (L)      Magnesium (mg/dL)   Date Value   11/01/2023 2.9 (H)   10/31/2023 3.0 (H)   10/30/2023 3.0 (H)     Sodium (mmol/L)   Date Value   11/01/2023 139   10/31/2023 143   10/31/2023 144   11/22/2019 141   11/21/2019 142   11/20/2019 143    Renal  Urea Nitrogen (mg/dL)   Date Value   11/01/2023 145.5 (H)   10/31/2023 138.7 (H)   10/31/2023 132.3 (H)   11/22/2019 34 (H)   11/21/2019 47 (H)   11/20/2019 54 (H)     Creatinine (mg/dL)   Date Value   11/01/2023 1.90 (H)   10/31/2023 1.92 (H)   10/31/2023 1.72 (H)   11/22/2019 1.30 (H)   11/21/2019 1.49 (H)   11/20/2019 1.58 (H)     Additional  Triglycerides (mg/dL)   Date Value   11/21/2019 80     Ketones Urine (mg/dL)   Date Value   10/15/2023 Negative        Medications:   acetylcysteine  2 mL Nebulization 4x Daily    artificial tears   Both Eyes Q8H    aspirin  81 mg Oral or Feeding Tube Daily    atorvastatin  20 mg Oral or Feeding Tube QPM    chlorhexidine  15 mL Mouth/Throat Q12H    heparin ANTICOAGULANT  5,000 Units Subcutaneous Q12H    insulin aspart  1-6 Units Subcutaneous Q4H    ipratropium - albuterol 0.5 mg/2.5 mg/3 mL  3 mL Nebulization 4x daily    levothyroxine  150 mcg Oral or Feeding Tube QAM AC    methylPREDNISolone  62.5 mg Intravenous Q24H    midodrine  10 mg Oral or Feeding Tube Q6H    pantoprazole  40 mg Per Feeding Tube QAM AC    pregabalin  75 mg Per Feeding Tube Daily    sertraline  150 mg Oral or Feeding Tube Daily    sodium chloride (PF)  10-40 mL Intracatheter Q7 Days    sodium chloride (PF)  3 mL Intracatheter Q8H    vitamin D3  50 mcg Oral or Feeding Tube Daily       bumetanide 0.5 mg/hr (11/01/23 1400)    cisatracurium 3 mcg/kg/min (11/01/23 1400)    dextrose      HYDROmorphone 0.4 mg/hr (11/01/23 1400)    midazolam 6 mg/hr (11/01/23 1400)    - MEDICATION INSTRUCTIONS -      - MEDICATION INSTRUCTIONS -      - MEDICATION INSTRUCTIONS -      vasopressin Stopped (11/01/23 0009)      acetaminophen,  acetaminophen **OR** acetaminophen, albuterol, albuterol, artificial tears, artificial tears, cetirizine, dextrose, glucose **OR** dextrose **OR** glucagon, HYDROmorphone, lidocaine 4%, lidocaine (buffered or not buffered), melatonin, midazolam, naloxone **OR** naloxone **OR** naloxone **OR** naloxone, - MEDICATION INSTRUCTIONS -, ondansetron **OR** ondansetron, - MEDICATION INSTRUCTIONS -, - MEDICATION INSTRUCTIONS -, polyethylene glycol, senna-docusate, sodium chloride (PF), sodium chloride (PF), sodium chloride (PF)     Recommendations:  Enteral Nutrition - Modify Rate - Novasource Renal @ 25 mL/hr x 22 hours = 550 mL, 1100 kcal, 50 g protein, 101 g CHO, 0 g fiber, 394 mL free water   + 2 pkts prosourceTF 20 = 160 kcal + 40 g protein     Total provisions = 1260 kcal (14 kcal/kg) + 90 g protein (2 g/kg)     Niesha Rollins RD, LD  Clinical Dietitian     Pager - 3rd floor/ICU: [phone]  Pager - All other floors: [phone]  Pager - Weekend/holiday: [phone]  Office phone: [phone]

## 2023-11-01 NOTE — PROVIDER NOTIFICATION
Tele hub called for persistent MAP's below 65 now, hovering 59-62. Midodrine given 2 hours ago. Requested reinitiating of titratable Vaso if pressor ordered as pt did not do well with Levo or Misael previously.     Order received for titratable vasopressin

## 2023-11-01 NOTE — PROGRESS NOTES
ICU Progress    Has significantly improved respiratory mechanics since implementing pharmacological paralysis.  Was put on vasopressin yesterday for a short while for a slight drop in MAPs but was not needed to be maintained.    Vitals:   Temp:  [97  F (36.1  C)-98.4  F (36.9  C)] 97.9  F (36.6  C)  Pulse:  [47-61] 52  Resp:  [14-47] 25  MAP:  [8 mmHg-91 mmHg] 70 mmHg  Arterial Line BP: (0-142)/(0-58) 123/43  FiO2 (%):  [35 %-70 %] 40 %  SpO2:  [89 %-100 %] 96 %     Vent Mode: CMV/AC  (Continuous Mandatory Ventilation/ Assist Control)  FiO2 (%): 50 %  Resp Rate (Set): 26 breaths/min  Tidal Volume (Set, mL): 350 mL  PEEP (cm H2O): 14 cmH2O  Resp: 25    I/O last 3 completed shifts:  In: 3734.6 [I.V.:524.6; NG/GT:2640]  Out: 2515 [Urine:2515]    Hydromorphone 0.4  Midazolam 7  Epo off since 10/31/23    Exam:  Gen: Intubated, sedated, paralyzed  HEENT: NC/AT  Pulm: rhonchi  Cor: RRR  Abdomen/GI: Soft, nondistended  : Aleman in place, urine clear  Extremities: Warm, edematous, some redness in RUE  Skin: Well-perfused  Neuro: Sedated  Psych: Unable to assess    Data:   Labs reviewed  - Na 139 (144)   - Cr 1.9 (1.72) (at baseline 1.3-1.5)  - Hb 7.6 (7.8)  - WBCs 25 (24.5) has remained at this level  - Procalcitonin 0.76  Nothing new on cultures  No new imaging yesterday    Assessment/plan:  77 y/o woman with chronic hypoxemic respiratory failure, bronchiectasis, recurrent pneumonias, diastolic heart failure admitted 10/15 with cough/weakness. Transferred to ICU and intubated 10/22 with acute decompensation and intubation. Slowly making progress, but still on quite a bit of support though stabilized with paralysis.  Has significant volume overload with response to intermittent lasix though showing uptrending creatinine now.    CNS: Intubated, appropriately sedated with paralysis  # Weakness/frequent falls  # Acute/chronic pain  # Sedation  - Continues on hydromorphone, midazolam  Pulm: No hypoxemic episodes overnight, FiO2  0.5   # Acute on chronic mixed respiratory failure  # Chronic hypoxemia (2L at baseline)  # Bronchiectasis  # Recurrent pneumonia  # ?Inflammatory pneumonitis  - Continue ventilator support, PEEP at 14 now.  Given paralysis and FiO2 ~0.5 will begin working on reducing PEEP which may also help with BP as well as volume status.  Continue pharmacologic paralysis for now.  - Off Veletri  - Accepting plateau 30-35 in light of body habitus  - On methylprednisolone will taper by half q 3 days  - Pulmonology following  CV: Needed vasopressin for a short while yesterday  # Shock, multifactorial, resolved  # Essential hypertension  # Diastolic heart failure with preserved EF  # Moderate aortic stenosis  - On midodrine but pressures do drop with lasix dosing.  Will switch from intermittent bolus to continuous infusion  GI: Abdomen benign.  # Nutrition  - Continue tube feeding  - Switched to renal-only formula without any effect on BUN.  Will change back to appropriate protein dosing today,  : UOP adequate, Na 139 today , Cr uptrending, now at 1.9, BUN remains quite high  # Hypernatremia, resolved  # Uremia  # Acute on chronic renal insufficiency.   - Creatinine up again above baseline.  Current elevation in creatinine could be related to congestion.  Will follow.  - Sodium has corrected but patient substantially volume up.  Responding to lasix bolus dosing but will change to bumex drip to try and prevent pressure swings being seen with the bolusing.  - Uremia likely related to steroids as there was no change with decreased protein in feedings.  Heme: Hb 7.6 (7.8)  # Anemia of critical illness  - No indication to transfuse  Msk: Extremities warm, well-perfused, some pitting edema.  RUE with new area of rubor near PICC line  - Duplex study RUE  Endo: Glucose 90-130s  # Stress/steroid hyperglycemia  # Hypothroidism  - Sliding scale insulin  - Levothyroxine  ID: Afebrile, WBC 25  # Leukocytosis  - Antibiotics stopped as it is  unclear that any active infection is being treated.  Procal this morning 0.76 suggestive of possibly a chronic infection but at this point I believe it would be best to keep off antibiotics and should she decompensate or become febrile to pan-culture.  - WBCs likely from steroids  ICU: SQH, PPI  - Family updated at bedside    This patient is critically ill to my assessment and requires ICU monitoring and cares. A total of 40 minutes critical care time spent on 11/1/2023, exclusive of procedures.     Stephen Paul MD    Clinically Significant Risk Factors         # Hypernatremia: Highest Na = 148 mmol/L in last 2 days, will monitor as appropriate      # Hypoalbuminemia: Lowest albumin = 2.9 g/dL at 10/22/2023  5:20 AM, will monitor as appropriate    # Acute Kidney Injury, unspecified: based on a >150% or 0.3 mg/dL increase in last creatinine compared to past 90 day average, will monitor renal function         # Severe Obesity: Estimated body mass index is 40.77 kg/m  as calculated from the following:    Height as of this encounter: 1.524 m (5').    Weight as of this encounter: 94.7 kg (208 lb 12.4 oz).

## 2023-11-01 NOTE — PLAN OF CARE
ICU End of Shift Summary.  For vital signs and complete assessments, please see documentation flowsheets.      Pertinent assessments: Afebrile. Sedated and medically paralyzed on full vent support. FiO2 able to be titrated down to 40%. No major desats with turns. 2/4 twitches obtained, BIS 30-40's. Good urine output but no significant diuresis after lasix given tonight. No BM's overnight. Bath given.   Major Shift Events: K replaced recheck scheduled for tomorrow morning however with lasix every 8 hours should be checked sooner. FiO2 down to 40%. Vaso started for very short time then off and no longer needed so far.   Plan (Upcoming Events): Continue to wean vent as able.  Discharge/Transfer Needs:      Bedside Shift Report Completed   Bedside Safety Check Completed  Goal Outcome Evaluation:      Plan of Care Reviewed With: patient    Overall Patient Progress: no changeOverall Patient Progress: no change

## 2023-11-01 NOTE — PROGRESS NOTES
[facility]    Medicine Progress Note - Hospitalist Service    Date of Admission:  10/15/2023    Assessment & Plan   Matthew Bowen is a 78 year old female w/PMH chronic hypoxic respiratory failure due to pulmonary HTN, ALAINA requiring CPAP, chronic diastolic HF, CAD, CKD stage 3, morbid obesity, HTN,  depression who presents from home with family with fever, cough. Acutely worsened respiratory status on 10/21 requiring intubation. Unclear etiology for acute on chronic hypoxic respiratory failure, most likely infectious v autoimmune v other in setting of unknown underlying lung disease.      Acute hypoxic respiratory failure s/p intubation  Possible ARDS  Pneumonia, recurrent w/bronchiectasis  -Presents with recurrent PNA, 3rd time in last month or so. Last completed treatment 2 weeks ago with persistent cough. Presents with worsening fatigue, productive cough, weakness and falls.  -On admission, patient febrile to 101.1, white count of 21.  Lactic acid was within normal limits.  She underwent a CT scan that showed a lingular consolidative opacity with air bronchograms with bilateral upper lobe groundglass opacities.  She was started on ceftriaxone and azithromycin.  -Sputum culture from 10/19 better that showed Staph epi and C. albicans. Discussed with ID, yeast prevalent in sputum samples and likely candida that is not related to illness.   -COVID, influenza, Respiratory viral panel negative.  Strep and legionella antigens negative.   -intubated 10/21 due to worsening respiratory status and bronch done showing serosanguinous and fibrinous return with thick mucoid secretions noted  -discussed on rounds and stopping Cefepime, flagyl and Vanc today. ID following, appreciate help. Antibiotic dates below  -seen by pulm, steroids decreased from 125 to 60mg daily on 10/31. May benefit from IVIG if  euvolemic and not improving  -intensivist stopping veletri and starting paralytics and lasix 60 q8  today    Abx  Azithromycin 10/15-10/19  Ceftriaxone 10/15-10/17  Cefepime 10/18-31  Metronidazole 10/20-31  Vancomycin 10/21-31    Infectious/metabolic encephalopathy  -continue hydromorphone and midazolam    Shock  -suspected to be infectious, EF is preserved  -Continue midodrine 10mg q8h, now off pressors    Hypernatremia  -persistent ~150, due to TPN, insensible losses  -free water increased to 200ml q2h with mild improvement  -monitor    Gluteal cleft irritant dermatitis   - WOC consulted, appreciate recs    Leukocytosis: Suspect related to above as well as steroids.  From what I can see in care everywhere has generally been 9-12 range in [id number].       Generalized weakness. Chronic arthritis. Falls at home. Left Foot pain:   -Having multifactorial weakness, with diffuse body aches and pains.  Imaging on admission including CT head, cervical spine, CT CAP did not show acute fracture.  She had an x-ray of her foot also showed degenerative changes without fracture.    -readdress when appropriate     Hx LAAINA, pulm HTN, chronic diastolic HF:   - Metolazone/lasix PRN for volume overload/natriuresis.   - Given her softer BPs holding PTA antihypertensives.       CKD stage 3  Uremia  Cr 1.21 on admission. Currently ~1.5. Suspect underlying renal function may be worse than creatinine reflects as eGFR by cystatin C estimate is 13. Urea has been steadily increasing since admission while creatinine has stabilized.  -transitioned to low-protein tube feeds, nephrology consulted but discussed and no formal consult at this time. Agree with present management  -serial labs     HTN: holding PTA antihypertensives     Hx morbid obesity, anemia, CAD, hypothyroidism, mood disorder: on ASA, statin, plavix, lyrica, zoloft at home     Hypokalemia: Replacement           Diet: NPO per Anesthesia Guidelines for Procedure/Surgery Except for: Meds  Adult Formula Drip Feeding: Continuous Novasource Renal; Orogastric tube; Goal Rate: 30 mL/hr  x 22 hours (please hold 1 hour before and after levothyroxine); mL/hr    DVT Prophylaxis: Heparin SQ  Aleman Catheter: PRESENT, indication: Strict 1-2 Hour I&O  Lines: PRESENT      PICC 10/22/23 Triple Lumen Right Basilic multiple med gtt. ready for use-Site Assessment: WDL;Ecchymotic  Arterial Line 10/22/23 Radial-Site Assessment: WDL      Cardiac Monitoring: ACTIVE order. Indication: Tachyarrhythmias, acute (48 hours)  Code Status: Full Code      Clinically Significant Risk Factors         # Hypernatremia: Highest Na = 148 mmol/L in last 2 days, will monitor as appropriate      # Hypoalbuminemia: Lowest albumin = 2.9 g/dL at 10/22/2023  5:20 AM, will monitor as appropriate    # Acute Kidney Injury, unspecified: based on a >150% or 0.3 mg/dL increase in last creatinine compared to past 90 day average, will monitor renal function         # Severe Obesity: Estimated body mass index is 40.77 kg/m  as calculated from the following:    Height as of this encounter: 1.524 m (5').    Weight as of this encounter: 94.7 kg (208 lb 12.4 oz).             Disposition Plan             Teresa Smith MD  Hospitalist Service  [facility]  Securely message with [employer] (more info)  Text page via [employer] Paging/Directory   ______________________________________________________________________    Interval History     No fevers/chills. Intubated and sedated.    Physical Exam   Vital Signs: Temp: 97.9  F (36.6  C) Temp src: Bladder   Pulse: 52   Resp: 25 SpO2: 96 % O2 Device: Mechanical Ventilator    Weight: 208 lbs 12.41 oz    Gen - Intubated and sedated.  Lungs - CTA B.  Heart - RR,S1+S2 nml, no m/g/r.  Abd - soft, NT, ND, + BS.  Ext - trace edema.    Medical Decision Making       80 MINUTES SPENT BY ME on the date of service doing chart review, history, exam, documentation & further activities per the note.      Data     I have personally reviewed the following data over the past 24 hrs:    25.0 (H)  \   7.6 (L)    / 223     139 107 145.5 (H) /  102 (H)   3.5 18 (L) 1.90 (H) \     Procal: 0.76 (H) CRP: N/A Lactic Acid: N/A         Imaging results reviewed over the past 24 hrs:   Recent Results (from the past 24 hour(s))   XR Chest Port 1 View    Narrative    CHEST ONE VIEW PORTABLE  10/31/2023 12:01 PM     HISTORY: ETT placement.    COMPARISON: Chest x-ray 10/30/2023.      Impression    IMPRESSION: ET tube tip is 4.4 cm proximal to the ashley. Nasogastric  tube in place. Right PICC line tip at the mid SVC, stable in position.  Diffuse bilateral pulmonary opacities appear mildly increased on the  left and stable on the right. Stable cardiac silhouette that is  borderline prominent.    YULY DCIK MD         SYSTEM ID:  [id number]

## 2023-11-01 NOTE — PLAN OF CARE
Continues on full vent support. FiO2 45%, PEEP weaned to 12. Lungs coarse. Afebrile. Tele SR/SB with Occasional PVCs. CPOT 0, Dilaudid 0.4. Nimbex 3 and synchronous with vent. RASS -5. Versed 7. Started Bumex 0.5 gtt, Great UOP within goal range. Loose stools. TF at goal, lowered H2O flushes to 100q4. POC reviewed at length with family. See flowsheet charting.       Goal Outcome Evaluation:      Plan of Care Reviewed With: patient, family    Overall Patient Progress: no changeOverall Patient Progress: no change    Outcome Evaluation: Able to wean PEEP to 12 today. Continues on full vent support with paralytic.    Notified provider about indwelling mitchell catheter discussed removal or continued need.    Did provider choose to remove indwelling mitchell catheter? NO    Provider's mitchell indication for keeping indwelling mitchell catheter: Indication for continued use: Retention    Is there an order for indwelling mitchell catheter? YES    *If there is a plan to keep mitchell catheter in place at discharge daily notification with provider is not necessary, but please add a notation in the treatment team sticky note that the patient will be discharging with the catheter.

## 2023-11-01 NOTE — PROGRESS NOTES
[facility]   Pulmonary Progress Note  Matthew Bowen MRN: [id number]  1945  Date of Admission:10/15/2023  Date of Service: 11/01/2023  ___________________________________    Assessment & Plan  Matthew Bowen is a 78 year old female, w/ PMHx most significant for hypertension, ALAINA, chronic diastolic heart failure and CKD, admitted w/ acute hypoxemic respiratory failure.  She had been treated for pneumonia x3 in the last 2 to 3 months.  She received broad-spectrum antibiotics during this current admission, she has been intubated since 10/22, her chest imaging studies showed bilateral groundglass opacities and dense consolidation, she did have work-up with bronchoscopy and BAL, I did not see the cell count on the BAL, the cultures have been negative, she did have an autoimmune work-up that was negative except for mild elevation of the RF and borderline nonspecific elevated MARLI, notably she did have low IgG level.  She has been treated with the high-dose steroids for 7 days now with Solu-Medrol 125 mg once daily.  She was diuresed over the weekend, but she is still positive on her I/O.  With the worsening ARDS, and no specific etiology, this could be inflammatory pneumonitis such as acute interstitial pneumonitis (Hamman Rich syndrome), other etiologies include organizing, she did have some CT scan findings that could be consistent with Atoll sign on the CT scan 10/21/2023.  Interestingly there are case reports of organizing pneumonia with immunoglobulin deficiency, IVIG treatment could be considered in these cases, but currently she does have some fluid overload signs with the pitting edema bilaterally, so would recommend to continue aggressive diuresis first.    Has been improving with diuretics and paralytics.      Acute hypoxemic respiratory failure  Severe ARDS  Decreased IgG level  History of recurrent pneumonia treated with antibiotics     -With improvement and significant trending down of the  inflammatory marker, I recommend to start tapering her steroids, I recommend slow taper over 6 to 8 weeks, can transition from IV Solu-Medrol to prednisone 60 mg daily, then decreasing the daily dose by 5 mg every 2 weeks until she reaches 20 mg daily, at that time she would need to follow-up with pulmonology and repeat CT scan imaging of her chest to determine the next step of the taper   -The prednisone taper above is for organizing pneumonia treatment, with this prolonged course of prednisone she would need to be on PJP prophylaxis, typically I would recommend Bactrim, but because of her kidney function might consider either atovaquone or dapsone (after checking G6PD level)   -Recommend aggressive diuresis   -Reviewed her ventilator, I did distress index which indicated potentially overinflation, might consider decreasing the PEEP to 10 as tolerated      Chart documentation was completed, in part, with [employer] voice-recognition software. Even though reviewed, some grammatical, spelling, and word errors may remain.    Kolton Katz MD  Pulmonary & Critical Care       Interval History    - Nursing notes reviewed.   -Patient was started on neuromuscular blockade drip, she is deeply sedated.   -Vent is reviewed, her PEEP was decreased to 12, she is tolerating that better.    Physical Exam Temp:  [97  F (36.1  C)-98.4  F (36.9  C)] 97.9  F (36.6  C)  Pulse:  [47-65] 65  Resp:  [14-47] 26  MAP:  [60 mmHg-91 mmHg] 67 mmHg  Arterial Line BP: (105-142)/(28-58) 117/43  FiO2 (%):  [35 %-50 %] 45 %  SpO2:  [90 %-97 %] 94 %  I/O last 3 completed shifts:  In: 2933.93 [I.V.:343.93; NG/GT:1960]  Out: 3090 [Urine:3090]  Wt Readings from Last 1 Encounters:   11/01/23 94.7 kg (208 lb 12.4 oz)    Body mass index is 40.77 kg/m . Vent Mode: CMV/AC  (Continuous Mandatory Ventilation/ Assist Control)  FiO2 (%): 45 %  Resp Rate (Set): 26 breaths/min  Tidal Volume (Set, mL): 350 mL  PEEP (cm H2O): 12 cmH2O  Resp: 26      Exam:  General:  Critically ill  HEENT: ET tube was noted  CV: RRR, systolic murmur best heard at the RUSB  Resp: Equal b/l air entry, bilateral crackles, no wheezing.  Abd: Soft, ND  Extremities: Pitting edema +2 bilaterally.  Neuro: Deeply sedated and paralyzed on the ventilator, does not follow commands.    Data   Labs: reviewed in EMR and notable labs listed below.  CMP:   Recent Labs   Lab 11/01/23  1630 11/01/23  1333 11/01/23  1211 11/01/23  0758 11/01/23  0411 10/31/23  1945/23  1705 10/31/23  0825 10/31/23  0416 10/31/23  0415 10/31/23  0020 10/30/23  0739 10/30/23  0400 10/29/23  0847 10/29/23  0411   NA  --   --   --   --  139  --  143  --  144  --  144   < > 148*   < > 152*   POTASSIUM  --  3.9  --   --  3.5  --  4.5  --  4.0  --  4.1   < > 3.9   < > 3.8   CHLORIDE  --   --   --   --  107  --  111*  --  113*  --  113*   < > 117*   < > 120*   CO2  --   --   --   --  18*  --  19*  --  20*  --  19*   < > 20*   < > 21*   ANIONGAP  --   --   --   --  14  --  13  --  11  --  12   < > 11   < > 11   *  --  119* 102* 118*  122*   < > 152*   < > 113*   < > 149*   < > 135*   < > 128*   BUN  --   --   --   --  145.5*  --  138.7*  --  132.3*  --  134.7*   < > 128.5*   < > 134.6*   CR  --   --   --   --  1.90*  --  1.92*  --  1.72*  --  1.70*   < > 1.58*   < > 1.51*   GFRESTIMATED  --   --   --   --  27*  --  26*  --  30*  --  30*   < > 33*   < > 35*   BARBARA  --   --   --   --  8.5*  --  8.6*  --  8.6*  --  8.7*   < > 8.8   < > 9.0   MAG  --   --   --   --  2.9*  --   --   --  3.0*  --   --   --  3.0*  --  3.0*   PHOS  --   --   --   --  6.4*  --   --   --  6.3*  --   --   --  5.4*  --  5.4*    < > = values in this interval not displayed.     CBC:   Recent Labs   Lab 11/01/23  0411 10/31/23  0416 10/30/23  0400 10/29/23  0411   WBC 25.0* 24.5* 24.3* 27.3*   RBC 2.98* 2.96* 2.95* 2.94*   HGB 7.6* 7.8* 7.7* 7.7*   HCT 26.3* 26.0* 25.8* 25.8*   MCV 88 88 88 88   MCH 25.5* 26.4* 26.1* 26.2*   MCHC 28.9* 30.0* 29.8* 29.8*   RDW  21.8* 21.2* 20.5* 20.1*    244 282 322       Culture Data   7-Day Micro Results       No results found for the last 168 hours.              Virology Data:   Lab Results   Component Value Date    FLUAH1 Not Detected 10/18/2023    FLUAH3 Not Detected 10/18/2023    SX2111 Not Detected 10/18/2023    IFLUB Not Detected 10/18/2023    RSVA Not Detected 10/18/2023    RSVB Not Detected 10/18/2023    PIV1 Not Detected 10/18/2023    PIV2 Not Detected 10/18/2023    PIV3 Not Detected 10/18/2023    HMPV Not Detected 10/18/2023       Most Recent Breeze Pulmonary Function Testing (FVC/FEV1 only)  No prior PFT in the chart    Imaging: reviewed in EMR and notable imaging listed below.    Chest x-ray on 10/31/2023  IMPRESSION: ET tube tip is 4.4 cm proximal to the ashley. Nasogastric  tube in place. Right PICC line tip at the mid SVC, stable in position.  Diffuse bilateral pulmonary opacities appear mildly increased on the  left and stable on the right. Stable cardiac silhouette that is  borderline prominent.    Medications    acetylcysteine  2 mL Nebulization 4x Daily    artificial tears   Both Eyes Q8H    aspirin  81 mg Oral or Feeding Tube Daily    atorvastatin  20 mg Oral or Feeding Tube QPM    chlorhexidine  15 mL Mouth/Throat Q12H    heparin ANTICOAGULANT  5,000 Units Subcutaneous Q12H    insulin aspart  1-6 Units Subcutaneous Q4H    ipratropium - albuterol 0.5 mg/2.5 mg/3 mL  3 mL Nebulization 4x daily    levothyroxine  150 mcg Oral or Feeding Tube QAM AC    methylPREDNISolone  62.5 mg Intravenous Q24H    midodrine  10 mg Oral or Feeding Tube Q6H    pantoprazole  40 mg Per Feeding Tube QAM AC    pregabalin  75 mg Per Feeding Tube Daily    protein modular  2 packet Per Feeding Tube Daily    sertraline  150 mg Oral or Feeding Tube Daily    sodium chloride (PF)  10-40 mL Intracatheter Q7 Days    sodium chloride (PF)  3 mL Intracatheter Q8H    vitamin D3  50 mcg Oral or Feeding Tube Daily

## 2023-11-01 NOTE — PROGRESS NOTES
[facility]  Infectious Disease Progress Note          Assessment and Plan:   Date of Admission:  10/15/2023  Date of Consult (When I saw the patient): 10/20/23        Assessment & Plan  Matthew Bowen is a 78 year old who was admitted on 10/15/2023.      Impression: 1 78-year-old female, some chronic underlying lung disease, on oxygen, bronchiectasis probable, some history of pneumonia but now 6 weeks or so of worsening respiratory symptoms, 2 prior courses of antibiotics, now admitted with infiltrates, no major sepsis but elevated procalcitonin and white count implying bacterial, not really improving on antibiotics concern for more chronic type pathogen in this patient with underlying lung disease by this history     2 chronic lung disease on oxygen some underlying bronchiectasis, some prior pneumonias  3 chronic kidney disease     REC 1 ongoing respiratory issues may be slight improvement, agree with now off antibiotics has had an extended course did not actually grow anything, certainly at risk for superinfection but ongoing antibiotics more likely due to side effects and resistance than benefit                  Interval History:     Intubated and sedated slight improvement including pressors off abut oxygenation s remains problematic, in ICU,  BAL studies negative all prior fungal and microbiologic studies negative QuantiFERON-TB gold negative              Medications:      acetylcysteine  2 mL Nebulization 4x Daily    artificial tears   Both Eyes Q8H    aspirin  81 mg Oral or Feeding Tube Daily    atorvastatin  20 mg Oral or Feeding Tube QPM    chlorhexidine  15 mL Mouth/Throat Q12H    heparin ANTICOAGULANT  5,000 Units Subcutaneous Q12H    insulin aspart  1-6 Units Subcutaneous Q4H    ipratropium - albuterol 0.5 mg/2.5 mg/3 mL  3 mL Nebulization 4x daily    levothyroxine  150 mcg Oral or Feeding Tube QAM AC    methylPREDNISolone  62.5 mg Intravenous Q24H    midodrine  10 mg Oral or  Feeding Tube Q6H    pantoprazole  40 mg Per Feeding Tube QAM AC    pregabalin  75 mg Per Feeding Tube Daily    sertraline  150 mg Oral or Feeding Tube Daily    sodium chloride (PF)  10-40 mL Intracatheter Q7 Days    sodium chloride (PF)  3 mL Intracatheter Q8H    vitamin D3  50 mcg Oral or Feeding Tube Daily                  Physical Exam:   Blood pressure 137/66, pulse 57, temperature 98.2  F (36.8  C), resp. rate 23, height 1.524 m (5'), weight 94.7 kg (208 lb 12.4 oz), SpO2 94%.  Wt Readings from Last 2 Encounters:   11/01/23 94.7 kg (208 lb 12.4 oz)   02/28/22 94.3 kg (208 lb)     Vital Signs with Ranges  Temp:  [97  F (36.1  C)-98.4  F (36.9  C)] 98.2  F (36.8  C)  Pulse:  [47-61] 57  Resp:  [14-47] 23  MAP:  [8 mmHg-91 mmHg] 72 mmHg  Arterial Line BP: (0-142)/(0-58) 119/49  FiO2 (%):  [35 %-60 %] 50 %  SpO2:  [90 %-100 %] 94 %    Constitutional: Intubated and sedated about same slight improvement in oxygenation     Lungs: Clear to auscultation bilaterally, no crackles or wheezing   Cardiovascular: Regular rate and rhythm, normal S1 and S2, and no murmur noted   Abdomen: Normal bowel sounds, soft, non-distended, non-tender   Skin: No rashes, no cyanosis, no edema   Other:           Data:   All microbiology laboratory data reviewed.  Recent Labs   Lab Test 11/01/23  0411 10/31/23  0416 10/30/23  0400   WBC 25.0* 24.5* 24.3*   HGB 7.6* 7.8* 7.7*   HCT 26.3* 26.0* 25.8*   MCV 88 88 88    244 282     Recent Labs   Lab Test 11/01/23  0411 10/31/23  1705 10/31/23  0416   CR 1.90* 1.92* 1.72*     No lab results found.  Recent Labs   Lab Test 02/12/19  0010   CULT Light growth  Normal deshaun

## 2023-11-01 NOTE — PROGRESS NOTES
[facility] ICU VENTILATOR RESPIRATORY NOTE  Date of Admission: 10/15/23  Date of Intubation (most recent): 10/22/23  Reason for Mechanical Ventilation: Respiratory failure  Number of Days on Mechanical Ventilation: 11  Met Criteria for Pressure Support Trial: no  Reason for No Pressure Support Trial: PEEP +12 and on paralytic  Significant Events Today: PEEP drop to +12 from +14    Pt remains on mechanical ventilator with the following settings:  Vent Mode: CMV/AC  (Continuous Mandatory Ventilation/ Assist Control)  FiO2 (%): 45 %  Resp Rate (Set): 26 breaths/min  Tidal Volume (Set, mL): 350 mL  PEEP (cm H2O): 12 cmH2O  Resp: 26    Vital signs:  Temp: 97.9  F (36.6  C) Temp src: Bladder   Pulse: 68   Resp: 26 SpO2: 94 % O2 Device: Mechanical Ventilator Oxygen Delivery: 55 LPM        ETT appearance on chest x-ray: ET tube tip is 4.4 cm proximal to the ashley.      Plan:  Will continue to monitor pt's respiratory status closely.    Holly Barry RT, RT  11/1/2023 6:35 PM

## 2023-11-02 NOTE — PLAN OF CARE
Problem: Enteral Nutrition  Goal: Safe, Effective Therapy Delivery  Outcome: Progressing  Goal: Feeding Tolerance  Outcome: Progressing   Goal Outcome Evaluation: patient meeting needs, added Nephronex d/t concentrated formula volume not meeting DRIs, continue to monitor closely and adjust EN prn

## 2023-11-02 NOTE — PROGRESS NOTES
ICU Progress    Did well overnight with drop in PEEP to 12, continued paralysis and bumex drip.      Vitals:   Temp:  [97.5  F (36.4  C)-98.4  F (36.9  C)] 97.5  F (36.4  C)  Pulse:  [51-70] 55  Resp:  [17-26] 26  MAP:  [61 mmHg-100 mmHg] 84 mmHg  Arterial Line BP: (103-153)/(37-65) 140/55  FiO2 (%):  [45 %-50 %] 50 %  SpO2:  [90 %-96 %] 96 %     Vent Mode: CMV/AC  (Continuous Mandatory Ventilation/ Assist Control)  FiO2 (%): 50 %  Resp Rate (Set): 26 breaths/min  Tidal Volume (Set, mL): 350 mL  PEEP (cm H2O): 12 cmH2O  Resp: 26    I/O last 3 completed shifts:  In: 1945.79 [I.V.:233.29; NG/GT:1085]  Out: 4280 [Urine:4280]    Hydromorphone 0.4  Midazolam 7    Exam:  Gen: Intubated, sedated, paralyzed  HEENT: NC/AT  Pulm: rhonchi  Cor: RRR  Abdomen/GI: Soft, nondistended  : Aleman in place, urine clear  Extremities: Warm, edematous, some redness in RUE  Skin: Well-perfused  Neuro: Sedated  Psych: Unable to assess    Data:   Labs reviewed  - Na 141(139)   - Cr 2.13 (1.9) (baseline 1.3-1.5)  - Hb 7.9 (7.6)  - WBCs 23.9 (25) has remained at this level  - Procalcitonin 0.76 (11/1/23)  Nothing new on cultures  RUE Duplex scan normal    Assessment/plan:  79 y/o woman with chronic hypoxemic respiratory failure, bronchiectasis, recurrent pneumonias, diastolic heart failure admitted 10/15 with cough/weakness. Transferred to ICU and intubated 10/22 with acute decompensation and intubation. Slowly making progress, but still on quite a bit of support though stabilized with paralysis.  Has significant volume overload with good response to bumex drip.    CNS: Intubated, appropriately sedated with paralysis  # Weakness/frequent falls  # Acute/chronic pain  # Sedation  - Continues on hydromorphone, midazolam  Pulm: No hypoxemic episodes overnight, FiO2 0.5   # Acute on chronic mixed respiratory failure  # Chronic hypoxemia (2L at baseline)  # Bronchiectasis  # Recurrent pneumonia  # ?Inflammatory pneumonitis  - Continue ventilator  support, PEEP at 12 now.  Given paralysis and FiO2 ~0.5 will begin working on reducing PEEP (down to 10 today) which may also help with BP as well as volume status.  Continue pharmacologic paralysis for now. No need to adjust FiO2 lower than 0.5  - Off Veletri  - Accepting plateau 30-35 in light of body habitus  - Transitioning to long term oral steroid taper as recommended by Pulmonologist  - Pulmonology following  - Recheck CXR in am  CV: Pressures have remained good with bumex gtt  # Shock, multifactorial, resolved  # Essential hypertension  # Diastolic heart failure with preserved EF  # Moderate aortic stenosis  - On midodrine   GI: Abdomen benign.  # Nutrition  - Continue tube feeding  - Switched to renal-only formula without any effect on BUN.  Changed back to appropriate protein dosing today  : UOP good with bumex gtt, Na has normalized , Cr uptrending, now at 2.13, BUN remains quite high  # Hypernatremia, resolved  # Uremia  # Acute on chronic renal insufficiency.   - Creatinine up again.  Current elevation in creatinine could be related to congestion vs directly related to fairly aggressive use of diuresis.  Either way, at this time I believe it is more beneficial to remove additional volume at this time.  Will closely follow and evaluate  - Sodium has corrected but patient substantially volume up.  Responding nicely to bumex gtt with -2.3 L yesterday.  - Uremia likely related to steroids as there was no change with decreased protein in feedings.  Heme: Hb stable  # Anemia of critical illness  - No indication to transfuse  Msk: Extremities warm, well-perfused, some pitting edema.  RUE remains edematous, normal doppler study.  - Duplex study RUE  Endo: Glucose well controlled overall  # Stress/steroid hyperglycemia  # Hypothroidism  - Sliding scale insulin  - Levothyroxine  ID: Afebrile, WBC 23.9  # Leukocytosis  - Antibiotics stopped 11/1/23 and will follow for indication of decompensation/infection  -  WBCs likely from steroids  ICU: SQH, PPI  - Family updated at bedside    This patient is critically ill to my assessment and requires ICU monitoring and cares. A total of 40 minutes critical care time spent on 11/2/2023, exclusive of procedures.     Stephne Paul MD    Clinically Significant Risk Factors        # Hypokalemia: Lowest K = 3.3 mmol/L in last 2 days, will replace as needed       # Hypoalbuminemia: Lowest albumin = 2.9 g/dL at 10/22/2023  5:20 AM, will monitor as appropriate    # Acute Kidney Injury, unspecified: based on a >150% or 0.3 mg/dL increase in last creatinine compared to past 90 day average, will monitor renal function         # Severe Obesity: Estimated body mass index is 40.43 kg/m  as calculated from the following:    Height as of this encounter: 1.524 m (5').    Weight as of this encounter: 93.9 kg (207 lb 0.2 oz).

## 2023-11-02 NOTE — PROGRESS NOTES
[facility]    Medicine Progress Note - Hospitalist Service    Date of Admission:  10/15/2023    Assessment & Plan   Matthew Bowen is a 78 year old female w/PMH chronic hypoxic respiratory failure due to pulmonary HTN, ALAINA requiring CPAP, chronic diastolic HF, CAD, CKD stage 3, morbid obesity, HTN,  depression who presents from home with family with fever, cough. Acutely worsened respiratory status on 10/21 requiring intubation. Unclear etiology for acute on chronic hypoxic respiratory failure, most likely infectious v autoimmune v other in setting of unknown underlying lung disease.      Acute hypoxic respiratory failure s/p intubation  Possible ARDS  Pneumonia, recurrent w/bronchiectasis  -Presents with recurrent PNA, 3rd time in last month or so. Last completed treatment 2 weeks ago with persistent cough. Presents with worsening fatigue, productive cough, weakness and falls.  -On admission, patient febrile to 101.1, white count of 21.  Lactic acid was within normal limits.  She underwent a CT scan that showed a lingular consolidative opacity with air bronchograms with bilateral upper lobe groundglass opacities.  She was started on ceftriaxone and azithromycin.  -Sputum culture from 10/19 better that showed Staph epi and C. albicans. Discussed with ID, yeast prevalent in sputum samples and likely candida that is not related to illness.   -COVID, influenza, Respiratory viral panel negative.  Strep and legionella antigens negative.   -intubated 10/21 due to worsening respiratory status and bronch done showing serosanguinous and fibrinous return with thick mucoid secretions noted  -discussed on rounds and stopping Cefepime, flagyl and Vanc today. ID following, appreciate help. Antibiotic dates below  -seen by pulm, steroids decreased from 125 to 60mg daily on 10/31. May benefit from IVIG if  euvolemic and not improving.  - plan for prolonged taper of prednisone from 60 mg q day to decreasing  by 5 mg every 2 weeks.  - will check G6PD level and start dapsone versus atovaquone.  - bumex gtt.    Abx  Azithromycin 10/15-10/19  Ceftriaxone 10/15-10/17  Cefepime 10/18-31  Metronidazole 10/20-31  Vancomycin 10/21-31    Infectious/metabolic encephalopathy  -continue hydromorphone and midazolam    Shock  -suspected to be infectious, EF is preserved  -Continue midodrine 10mg q8h, now off pressors    Hypernatremia  -persistent ~150, due to TPN, insensible losses  -resolved.    Gluteal cleft irritant dermatitis   - WOC consulted, appreciate recs    Leukocytosis: Suspect related to above as well as steroids.  From what I can see in care everywhere has generally been 9-12 range in 3685-5949.       Generalized weakness. Chronic arthritis. Falls at home. Left Foot pain:   -Having multifactorial weakness, with diffuse body aches and pains.  Imaging on admission including CT head, cervical spine, CT CAP did not show acute fracture.  She had an x-ray of her foot also showed degenerative changes without fracture.    -readdress when appropriate     Hx ALAINA, pulm HTN, chronic diastolic HF:   - Metolazone/lasix PRN for volume overload/natriuresis.   - Given her softer BPs holding PTA antihypertensives.       CKD stage 3  Uremia  Cr 1.21 on admission. Currently ~1.5. Suspect underlying renal function may be worse than creatinine reflects as eGFR by cystatin C estimate is 13. Urea has been steadily increasing since admission while creatinine has stabilized.  -transitioned to low-protein tube feeds, nephrology consulted but discussed and no formal consult at this time. Agree with present management  -serial labs     HTN: holding PTA antihypertensives     Hx morbid obesity, anemia, CAD, hypothyroidism, mood disorder: on ASA, statin, plavix, lyrica, zoloft at home     Hypokalemia: Replacement           Diet: NPO per Anesthesia Guidelines for Procedure/Surgery Except for: Meds  Adult Formula Drip Feeding: Continuous Novasource Renal;  Orogastric tube; Goal Rate: 25 mL/hr x 22 hours (please hold 1 hour before and after levothyroxine); mL/hr; Decrease rate to 25 mL/hr    DVT Prophylaxis: Heparin SQ  Aleman Catheter: PRESENT, indication: Strict 1-2 Hour I&O  Lines: PRESENT      PICC 10/22/23 Triple Lumen Right Basilic multiple med gtt. ready for use-Site Assessment: WDL except;Ecchymotic  Arterial Line 10/22/23 Radial-Site Assessment: WDL      Cardiac Monitoring: ACTIVE order. Indication: Tachyarrhythmias, acute (48 hours)  Code Status: Full Code      Clinically Significant Risk Factors        # Hypokalemia: Lowest K = 3.3 mmol/L in last 2 days, will replace as needed       # Hypoalbuminemia: Lowest albumin = 2.9 g/dL at 10/22/2023  5:20 AM, will monitor as appropriate    # Acute Kidney Injury, unspecified: based on a >150% or 0.3 mg/dL increase in last creatinine compared to past 90 day average, will monitor renal function         # Severe Obesity: Estimated body mass index is 40.43 kg/m  as calculated from the following:    Height as of this encounter: 1.524 m (5').    Weight as of this encounter: 93.9 kg (207 lb 0.2 oz).             Disposition Plan             Teresa Smith MD  Hospitalist Service  [facility]  Securely message with [employer] (more info)  Text page via [facility] Paging/Directory   ______________________________________________________________________    Interval History     Intubated and sedated.     Physical Exam   Vital Signs: Temp: 97.5  F (36.4  C) Temp src: Bladder BP: (!) 152/74 Pulse: 57   Resp: 26 SpO2: 95 % O2 Device: Mechanical Ventilator    Weight: 207 lbs .19 oz    Gen - Intubated and sedated.  Lungs - CTA B.  Heart - RR,S1+S2 nml, no m/g/r.  Abd - soft, NT, ND, + BS.  Ext - trace edema.    Medical Decision Making       80 MINUTES SPENT BY ME on the date of service doing chart review, history, exam, documentation & further activities per the note.      Data     I have personally reviewed the  following data over the past 24 hrs:    23.9 (H)  \   7.9 (L)   / 196     141 106 149.9 (H) /  86   3.3 (L) 22 2.13 (H) \       Imaging results reviewed over the past 24 hrs:   Recent Results (from the past 24 hour(s))   US Upper Extremity Venous Duplex Right    Narrative    ULTRASOUND RIGHT UPPER EXTREMITY VENOUS DUPLEX November 1, 2023 1:27  PM     HISTORY: Right upper extremity swelling with PICC, request made for  evaluation of deep vein thrombosis.    COMPARISON: None.    TECHNIQUE: Color Doppler and spectral waveform analysis performed  throughout the deep and superficial veins of the right upper chest  cavity.    FINDINGS: The right internal jugular, subclavian, axillary, and  brachial veins demonstrate normal blood flow, compression (where  applicable), and augmentation. Basilic and cephalic veins are patent.  Radial and ulnar veins are compressible. Contralateral left subclavian  vein is patent.      Impression    IMPRESSION: Negative for deep vein thrombosis in the right upper  extremity.    ARMANI JULIAN MD         SYSTEM ID:  [id number]

## 2023-11-02 NOTE — PLAN OF CARE
Goal Outcome Evaluation:      ICU End of Shift Summary.  For vital signs and complete assessments, please see documentation flowsheets.      Pertinent assessments: pt remains intubated, sedated and paralyzed. 0 out of 4 twitches, but pt has synchrony with the vent. Versed and dilaudid for sedation/pain control. Large urine output with bumex drip, 175-250 ml/hour. Right PICc intact, infusing well. Right A line with appropriate waveform. Off vasopressin all shift. Tube feeds at goal rate 25 ml/hour with 100 ml/ 4 hour free water flushes.   Major Shift Events: off vasopressin all shift, potassium replaced per protocol.  Plan (Upcoming Events): continue current ICU cares  Discharge/Transfer Needs: unclear     Bedside Shift Report Completed : y  Bedside Safety Check Completed:  y

## 2023-11-02 NOTE — PLAN OF CARE
Goal Outcome Evaluation:       ICU End of Shift Summary.  For vital signs and complete assessments, please see documentation flowsheets.      Pertinent assessments:   Neuro: RASS -5 on Versed and dilaudad gtt for pain. Nimbex running 4/4 twitches tried to titrate down but became asynchronous with vent   Cardiac: SR. MAPs greater than 65 on Vaso.  Resp: Vent supported. Fio2 55% Peep 10. LS coarse. Minimal secretions  GI: BM x3; soft, loose brown/tan. TF @ goal rate of 25 with  q 4 hrs. BG q4 with sliding scale.  : mitchell in place. UOP ranging from 200-275 q2. Paused bumex this am due to low K  Skin: see flowsheets for details  Lines: R PICC, 2 PIV  Drips: Dilaudid, Versed, Vaso, Nimbex     Major Shift Events:   Replaced K   Paused  bumex drip due to low K   Vaso restarted   FiO2 titrated to 55% peep 10 tolerating well  3 loose BM  Tapering steroids         Plan (Upcoming Events): repeat chest x ray tomorrow am, continue to wean O2     Discharge/Transfer Needs: TBD     Bedside Shift Report Completed : Y  Bedside Safety Check Completed: Y    Notified provider about indwelling mitchell catheter discussed removal or continued need.    Did provider choose to remove indwelling mitchell catheter? NO    Provider's mitchell indication for keeping indwelling mitchell catheter: Indication for continued use: Strict 1-2 Hour I & O if external catheters are not an option    Is there an order for indwelling mitchell catheter? YES    *If there is a plan to keep mitchell catheter in place at discharge daily notification with provider is not necessary, but please add a notation in the treatment team sticky note that the patient will be discharging with the catheter.

## 2023-11-02 NOTE — CONSULTS
[facility] Nurse Inpatient Assessment     Consulted for: buttock wound      Patient History (according to Hospitalist provider note(s) 10/25/23:      Matthew Bowen is a 78 year old female w/PMH chronic hypoxic respiratory failure due to pulmonary HTN, ALAINA requiring CPAP, chronic diastolic HF, CAD, CKD stage 3, morbid obesity, HTN,  depression who presents from home with family with fever, cough.     Acute hypoxic respiratory failure s/p intubation  Possible ARDS  Pneumonia, recurrent w/bronchiectasis  -Presents with recurrent PNA, 3rd time in last month or so. Last completed treatment 2 weeks ago with persistent cough. Presents with worsening fatigue, productive cough, weakness and falls.  -On admission, patient febrile to 101.1, white count of 21.  Lactic acid was within normal limits.  She underwent a CT scan that showed a lingular consolidative opacity with air bronchograms with bilateral upper lobe groundglass opacities.  She was started on ceftriaxone and azithromycin.  -Sputum culture from 10/19 better that showed gram positive cocci and yeast. Discussed with ID, yeast prevalent in sputum samples and likely candida that is not related to illness.  Need to await speciation.   -Patient initially on ceftriaxone and azithromycin.  Broadened to cefepime and azithro on 10/18.  Flagyl added on 10/20.  Finished azithromycin course on 10/20.   -COVID, influenza, Respiratory viral panel negative.  Strep and legionella antigens negative.   -intubated 10/21 due to worsening respiratory status and bronch done showing serosanguinous and fibrinous return with thick mucoid secretions noted  -ID, pulm and ICU team all following.  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 Had a sacral mepilex in place at time of WOC assessment but with frequent loose stools this was removed and will use Darlyn and CAP instead   Wound base: 100 % dermis, superficial     Palpation of the wound bed: normal      Drainage: scant     Description of drainage: serosanguinous     Measurements (length x width x depth, in cm): 0.5 x 0.3  x  0.1 cm      Tunneling: N/A     Undermining: N/A  Periwound skin: Intact      Color: normal and consistent with surrounding tissue      Temperature: normal   Odor: none  Pain: unable to assess due to  sedation , none  Pain interventions prior to dressing change: N/A  Treatment goal: Protection  STATUS: improving  Supplies ordered: supplies stored on unit and discussed with RN and family present in room       Treatment Plan:     Gluteal cleft wound(s): BIDand PRN with incontinence episodes  Cleanse the area with Darlyn cleanse and protect, very gently with soft cloth.   Apply thin layer of critic aid paste on open or red areas   With repeat application, do not scrub the paste, only remove soiled paste and reapply.   If complete removal of paste is necessary use baby oil/mineral oil (#970624) and soft wash cloth.   Ensure pt has Aron-cushion (#860005) while sitting up in the chair.   Use only one Covidien pad in between mattress and pt. No brief in bed.     Orders: Written    RECOMMEND PRIMARY TEAM ORDER: None, at this time  Education provided: plan of care and wound progress  Discussed plan of care with: Family and Nurse  WOC nurse follow-up plan: weekly  Notify WOC if wound(s) deteriorate.  Nursing to notify the Provider(s) and re-consult the WOC Nurse if new skin concern.    DATA:     Current support surface: Standard  Low air loss (JENNIFER pump, Isolibrium, Pulsate, skin guard,  etc)  Containment of urine/stool: Incontinent pad in bed and Indwelling catheter  BMI: Body mass index is 40.43 kg/m .   Active diet order: Orders Placed This Encounter      NPO per Anesthesia Guidelines for Procedure/Surgery Except for: Meds     Output: I/O last 3 completed shifts:  In: 1945.79 [I.V.:233.29; NG/GT:1085]  Out: 4280 [Urine:4280]     Labs:   Recent Labs   Lab 11/02/23  0456   HGB 7.9*   WBC 23.9*     Pressure injury risk assessment:   Sensory Perception: 1-->completely limited  Moisture: 3-->occasionally moist  Activity: 1-->bedfast  Mobility: 1-->completely immobile  Nutrition: 3-->adequate  Friction and Shear: 2-->potential problem  Compa Score: 11    Olena Crowe RN CWOCN  Contact [facility] Nurse ([place])  Dept. Office Number: [phone]

## 2023-11-02 NOTE — PROGRESS NOTES
[facility] ICU VENTILATOR RESPIRATORY NOTE  Date of Admission: 10/15/23  Date of Intubation (most recent): 10/22/23  Reason for Mechanical Ventilation: Respiratory failure  Number of Days on Mechanical Ventilation: 12  Met Criteria for Pressure Support Trial: no  Reason for No Pressure Support Trial: PEEP +10 and on paralytic  Significant Events Today: PEEP drop to +10 from +12    Pt remains on mechanical ventilator with the following settings:   Vent Mode: CMV/AC  (Continuous Mandatory Ventilation/ Assist Control)  FiO2 (%): 55 %  Resp Rate (Set): 26 breaths/min  Tidal Volume (Set, mL): 350 mL  PEEP (cm H2O): 10 cmH2O  Resp: 26    ETT appearance on chest x-ray: ET tube tip is 4.4 cm proximal to the ashley.     Vital signs:  Temp: 98.6  F (37  C) Temp src: Bladder BP: 121/51 Pulse: 62   Resp: (P) 26 SpO2: 95 % O2 Device: Mechanical Ventilator      Plan:  Will continue to monitor pt's respiratory status closely.    Holly Barry RT, RT  11/2/2023 6:14 PM

## 2023-11-02 NOTE — PROGRESS NOTES
CLINICAL NUTRITION SERVICES - REASSESSMENT NOTE     Nutrition Prescription      Malnutrition Status:    % Intake: Decreased intake does not meet criteria- meeting needs via EN  % Weight Loss: Unable to assess due to fluid status  Subcutaneous Fat Loss: None observed  Muscle Loss: None observed--very difficult with number of monitors, tubing, lines, SCDs on in addition to fluid status  Fluid Accumulation/Edema: Mild to moderate  Malnutrition Diagnosis: Unable to assess    Recommendations already ordered by Registered Dietitian (RD):  Continue current EN orders, order Nephronex to meet DRIs    Future/Additional Recommendations:  Monitor TF tolerance, labs--adjust TF as appropriate     EVALUATION OF THE PROGRESS TOWARD GOALS   Diet: NPO    Nutrition Support:    Access: OGT  Formula/Rate/Provisions:  Novasource Renal @ 25 mL/hr x 22 hours = 550 mL, 1100 kcal, 50 g protein, 101 g CHO, 0 g fiber, 394 mL free water   Flushes: H2O- 100 ml q 4 hrs    Modulars: Prosource TF 20 x 2 pkts/day = 160 kcal/40 gm protein (added back yesterday after trial of lower protein as requested by Provider did not improve BUN)    Total provisions = 1260 kcal (14 kcal/kg) + 90 g protein (2 g/kg)      Intake/Tolerance:  patient receiving >90% of prescribed volumes     NEW FINDINGS   General: patient discussed this morning during IDT rounds. Patient with severe ARDS, currently paralyzed with good vent synchrony, Bumex on hold, pressor restarted today    Weight: Pt fluid up, + 5.3 L per I/Os  Date/Time Weight Weight Method   11/02/23 0400 93.9 kg (207 lb 0.2 oz) Bed scale   11/01/23 0358 94.7 kg (208 lb 12.4 oz) Bed scale   10/31/23 0420 96 kg (211 lb 10.3 oz) Bed scale   10/30/23 0500 94.9 kg (209 lb 3.5 oz) Bed scale   10/29/23 0400 94.8 kg (208 lb 15.9 oz) Bed scale   10/28/23 0341 93.8 kg (206 lb 12.7 oz) Bed scale   10/27/23 0600 89 kg (196 lb 3.4 oz) Bed scale   10/26/23 0500 89.2 kg (196 lb 10.4 oz) Bed scale   10/25/23 0430 88.1 kg (194 lb  4.8 oz) Bed scale   10/23/23 0545 87 kg (191 lb 12.8 oz) Bed scale   10/21/23 1930 85.7 kg (188 lb 14.4 oz) Bed scale   10/20/23 1900 85.4 kg (188 lb 4.4 oz) Bed scale   10/16/23 0644 81.7 kg (180 lb 1.9 oz) Bed scale   10/15/23 1646 85.6 kg (188 lb 11.4 oz) Bed scale   10/15/23 0925 86.2 kg (190 lb) --     Labs:   - K 2.6- replacing  - Mg 2.4  - P 7.1  - .9  - Cr 2.13  - WBC 23.9    GI: 1-3 loose stools per day    Skin: no concerns noted    Meds:   - Lipitor  - sliding scale insulin  - levothyroxine  - pantoprazole  - prednisone  - Vit D3     bumetanide Stopped (11/02/23 1028)    cisatracurium 3.5 mcg/kg/min (11/02/23 1305)    dextrose      HYDROmorphone 0.4 mg/hr (11/02/23 1200)    midazolam 7 mg/hr (11/02/23 1200)    - MEDICATION INSTRUCTIONS -      - MEDICATION INSTRUCTIONS -      - MEDICATION INSTRUCTIONS -      vasopressin 0.5 Units/hr (11/02/23 1318)        ASSESSED NUTRITION NEEDS 10/22:  Dosing Weight 86 kg (actual body weight) - energy; 45.5 kg (ideal body weight) - protein   Estimated Energy Needs: 946-1204 kcal/day (11-14 Kcal/Kg actual body weight)   Justification: vented   Estimated Protein Needs: 91 g protein/day (2 g pro/Kg IBW)   Justification: vented, hypercatabolism with acute illness   Estimated Fluid Needs: per MD    MALNUTRITION  As noted above    Previous Goals   EN to meet % of kcal needs  Evaluation: Met  BUN to trend down with decrease of protein provisions  Evaluation: no longer applicable    Previous Nutrition Diagnosis  Inadequate protein-energy intake related to TF interruptions as evidenced by TF held for ultrasound x1 day   Evaluation: No longer applicable, nutrition diagnosis changed below    CURRENT NUTRITION DIAGNOSIS  Inadequate oral intake related to respiratory needs necessitating NPO status as evidenced by reliance on NS to meet needs      INTERVENTIONS  Implementation  Enteral Nutrition - continue as ordered  Order Nephronex to meet DRIs  Collaboration and  Referral of Nutrition care: patient discussed this morning during IDT rounds    Goals  Total avg nutritional intake to meet a minimum of 11 kcal/kg per energy DW of 86 kg and 2 g PRO/kg daily per protein DW of 45.5 kg    Monitoring/Evaluation  Progress toward goals will be monitored and evaluated per protocol.    Elizabeth Ortzi, RD, LD, [facility]  Pager - 3rd floor/ICU: [phone]  Pager - All other floors: [phone]  Pager - Weekend/holiday: [phone]  Office: [phone]

## 2023-11-02 NOTE — PROGRESS NOTES
[facility]  Infectious Disease Progress Note          Assessment and Plan:   Date of Admission:  10/15/2023  Date of Consult (When I saw the patient): 10/20/23        Assessment & Plan  Matthew Bowen is a 78 year old who was admitted on 10/15/2023.      Impression: 1 78-year-old female, some chronic underlying lung disease, on oxygen, bronchiectasis probable, some history of pneumonia but now 6 weeks or so of worsening respiratory symptoms, 2 prior courses of antibiotics, now admitted with infiltrates, no major sepsis but elevated procalcitonin and white count implying bacterial, not really improving on antibiotics concern for more chronic type pathogen in this patient with underlying lung disease by this history     2 chronic lung disease on oxygen some underlying bronchiectasis, some prior pneumonias  3 chronic kidney disease     REC 1 ongoing respiratory issues may be slight improvement, agree with now off antibiotics has had an extended course did not actually grow anything, certainly at risk for superinfection but ongoing antibiotics more likely due to side effects and resistance than benefit  2 note possibly prolonged steroids if so agree pneumocystis prophylaxis needed, renal function still abnormal options here would be low-dose Bactrim single strength 3 times a week, or alternatively would do Mepron prior would avoid dapsone here                  Interval History:     Intubated and sedated slight improvement including pressors off abut oxygenation s remains problematic, in ICU,  BAL studies negative all prior fungal and microbiologic studies negative QuantiFERON-TB gold negative              Medications:      acetylcysteine  2 mL Nebulization 4x Daily    artificial tears   Both Eyes Q8H    aspirin  81 mg Oral or Feeding Tube Daily    atorvastatin  20 mg Oral or Feeding Tube QPM    chlorhexidine  15 mL Mouth/Throat Q12H    heparin ANTICOAGULANT  5,000 Units Subcutaneous Q12H     insulin aspart  1-6 Units Subcutaneous Q4H    ipratropium - albuterol 0.5 mg/2.5 mg/3 mL  3 mL Nebulization 4x daily    levothyroxine  150 mcg Oral or Feeding Tube QAM AC    [Held by provider] midodrine  10 mg Oral or Feeding Tube Q6H    pantoprazole  40 mg Per Feeding Tube QAM AC    potassium chloride  20 mEq Oral or Feeding Tube Once    predniSONE  60 mg Oral Daily    Followed by    [START ON 11/16/2023] predniSONE  55 mg Oral Daily    Followed by    [START ON 11/30/2023] predniSONE  50 mg Oral Daily    Followed by    [START ON 12/14/2023] predniSONE  45 mg Oral Daily    Followed by    [START ON 12/28/2023] predniSONE  40 mg Oral Daily    Followed by    [START ON 1/11/2024] predniSONE  35 mg Oral Daily    Followed by    [START ON 1/25/2024] predniSONE  30 mg Oral Daily    Followed by    [START ON 2/8/2024] predniSONE  25 mg Oral Daily    Followed by    [START ON 2/22/2024] predniSONE  20 mg Oral Daily    pregabalin  75 mg Per Feeding Tube Daily    protein modular  2 packet Per Feeding Tube Daily    sertraline  150 mg Oral or Feeding Tube Daily    sodium chloride (PF)  10-40 mL Intracatheter Q7 Days    sodium chloride (PF)  3 mL Intracatheter Q8H    vitamin D3  50 mcg Oral or Feeding Tube Daily                  Physical Exam:   Blood pressure 109/61, pulse 61, temperature 98.2  F (36.8  C), resp. rate 26, height 1.524 m (5'), weight 93.9 kg (207 lb 0.2 oz), SpO2 95%.  Wt Readings from Last 2 Encounters:   11/02/23 93.9 kg (207 lb 0.2 oz)   02/28/22 94.3 kg (208 lb)     Vital Signs with Ranges  Temp:  [97.5  F (36.4  C)-98.2  F (36.8  C)] 98.2  F (36.8  C)  Pulse:  [54-70] 61  Resp:  [23-26] 26  BP: (109-152)/(61-74) 109/61  MAP:  [61 mmHg-100 mmHg] 63 mmHg  Arterial Line BP: (103-153)/(40-65) 106/44  FiO2 (%):  [45 %-60 %] 60 %  SpO2:  [90 %-97 %] 95 %    Constitutional: Intubated and sedated about same slight improvement in oxygenation     Lungs: Clear to auscultation bilaterally, no crackles or wheezing    Cardiovascular: Regular rate and rhythm, normal S1 and S2, and no murmur noted   Abdomen: Normal bowel sounds, soft, non-distended, non-tender   Skin: No rashes, no cyanosis, no edema   Other:           Data:   All microbiology laboratory data reviewed.  Recent Labs   Lab Test 11/02/23  0456 11/01/23  0411 10/31/23  0416   WBC 23.9* 25.0* 24.5*   HGB 7.9* 7.6* 7.8*   HCT 26.1* 26.3* 26.0*   MCV 86 88 88    223 244     Recent Labs   Lab Test 11/02/23  0456 11/01/23  1830 11/01/23  0411   CR 2.13* 1.98* 1.90*     No lab results found.  Recent Labs   Lab Test 02/12/19  0010   CULT Light growth  Normal deshaun

## 2023-11-02 NOTE — PROGRESS NOTES
DATE/TIME OF CALL RECEIVED FROM LAB:  11/02/23 at 10:45 AM   LAB TEST:  K+  LAB VALUE:  2.6  PROVIDER NOTIFIED?: Yes  PROVIDER NAME: Luis  DATE/TIME LAB VALUE REPORTED TO PROVIDER: 1030 11/2  MECHANISM OF PROVIDER NOTIFICATION: Face-To-Face  PROVIDER RESPONSE: replace K+ per protocol

## 2023-11-02 NOTE — PROGRESS NOTES
[facility] ICU VENTILATOR RESPIRATORY NOTE    Date of Admission: 10/15/2023  Date of Intubation (most recent): 10/21/2023  Reason for Mechanical Ventilation: Hypoxic respiratory failure  Number of Days on Mechanical Ventilation: 12  Met Criteria for Pressure Support Trial: No  Reason for No Pressure Support Trial: Significant vent support (+12)  ETT appearance on chest x-ray: in good position     Vent Mode: CMV/AC  (Continuous Mandatory Ventilation/ Assist Control)  FiO2 (%): 50 %  Resp Rate (Set): 26 breaths/min  Tidal Volume (Set, mL): 350 mL  PEEP (cm H2O): 12 cmH2O  Resp: 26    /66   Pulse 55   Temp 97.5  F (36.4  C)   Resp 26   Ht 1.524 m (5')   Wt 93.9 kg (207 lb 0.2 oz)   SpO2 95%   BMI 40.43 kg/m      BS: Diminished/Clear  Secretions: Minimal     Plan:  Continue to wean down ventilation support     Elba Mika RT

## 2023-11-03 NOTE — PROGRESS NOTES
[facility]  Infectious Disease Progress Note          Assessment and Plan:   Date of Admission:  10/15/2023  Date of Consult (When I saw the patient): 10/20/23        Assessment & Plan  Matthew Bowen is a 78 year old who was admitted on 10/15/2023.      Impression: 1 78-year-old female, some chronic underlying lung disease, on oxygen, bronchiectasis probable, some history of pneumonia but now 6 weeks or so of worsening respiratory symptoms, 2 prior courses of antibiotics, now admitted with infiltrates, no major sepsis but elevated procalcitonin and white count implying bacterial, not really improving on antibiotics concern for more chronic type pathogen in this patient with underlying lung disease by this history     2 chronic lung disease on oxygen some underlying bronchiectasis, some prior pneumonias  3 chronic kidney disease     REC 1 ongoing respiratory issues may be slight improvement, agree with now off antibiotics has had an extended course did not actually grow anything, certainly at risk for superinfection but ongoing antibiotics more likely due to side effects and resistance than benefit  2 note possibly prolonged steroids if so agree pneumocystis prophylaxis needed, renal function still abnormal options here would be low-dose Bactrim single strength 3 times a week, or alternatively would do Mepron prior ,would avoid dapsone and his overall side effect set of issues, probably start Mepron 1500 mg orally anytime                  Interval History:     Intubated and sedated slight improvement including pressors off abut oxygenation s remains problematic, in ICU,  BAL studies negative all prior fungal and microbiologic studies negative QuantiFERON-TB gold negative              Medications:      artificial tears   Both Eyes Q8H    aspirin  81 mg Oral or Feeding Tube Daily    atorvastatin  20 mg Oral or Feeding Tube QPM    atovaquone  1,500 mg Per Feeding Tube Daily    B and C vitamin  Complex with folic acid  5 mL Oral or Feeding Tube Daily    chlorhexidine  15 mL Mouth/Throat Q12H    heparin ANTICOAGULANT  5,000 Units Subcutaneous Q12H    insulin aspart  1-6 Units Subcutaneous Q4H    ipratropium - albuterol 0.5 mg/2.5 mg/3 mL  3 mL Nebulization 4x daily    levothyroxine  150 mcg Oral or Feeding Tube QAM AC    [Held by provider] midodrine  10 mg Oral or Feeding Tube Q6H    pantoprazole  40 mg Per Feeding Tube QAM AC    predniSONE  60 mg Oral Daily    Followed by    [START ON 11/16/2023] predniSONE  55 mg Oral Daily    Followed by    [START ON 11/30/2023] predniSONE  50 mg Oral Daily    Followed by    [START ON 12/14/2023] predniSONE  45 mg Oral Daily    Followed by    [START ON 12/28/2023] predniSONE  40 mg Oral Daily    Followed by    [START ON 1/11/2024] predniSONE  35 mg Oral Daily    Followed by    [START ON 1/25/2024] predniSONE  30 mg Oral Daily    Followed by    [START ON 2/8/2024] predniSONE  25 mg Oral Daily    Followed by    [START ON 2/22/2024] predniSONE  20 mg Oral Daily    pregabalin  75 mg Per Feeding Tube Daily    protein modular  2 packet Per Feeding Tube Daily    QUEtiapine  50 mg Per Feeding Tube BID    sertraline  150 mg Oral or Feeding Tube Daily    sodium chloride (PF)  10-40 mL Intracatheter Q7 Days    sodium chloride (PF)  3 mL Intracatheter Q8H    vitamin D3  50 mcg Oral or Feeding Tube Daily                  Physical Exam:   Blood pressure 138/65, pulse 61, temperature 98.8  F (37.1  C), resp. rate 26, height 1.524 m (5'), weight 91.9 kg (202 lb 9.6 oz), SpO2 90%.  Wt Readings from Last 2 Encounters:   11/03/23 91.9 kg (202 lb 9.6 oz)   02/28/22 94.3 kg (208 lb)     Vital Signs with Ranges  Temp:  [98.1  F (36.7  C)-99  F (37.2  C)] 98.8  F (37.1  C)  Pulse:  [55-69] 61  Resp:  [26-27] 26  BP: (121-138)/(51-65) 138/65  MAP:  [57 mmHg-87 mmHg] 59 mmHg  Arterial Line BP: ()/(8-60) 103/40  FiO2 (%):  [50 %-60 %] 50 %  SpO2:  [85 %-100 %] 90 %    Constitutional:  Intubated and sedated about same slight improvement in oxygenation     Lungs: Clear to auscultation bilaterally, no crackles or wheezing   Cardiovascular: Regular rate and rhythm, normal S1 and S2, and no murmur noted   Abdomen: Normal bowel sounds, soft, non-distended, non-tender   Skin: No rashes, no cyanosis, no edema   Other:           Data:   All microbiology laboratory data reviewed.  Recent Labs   Lab Test 11/03/23  0517 11/02/23  0456 11/01/23  0411   WBC 21.5* 23.9* 25.0*   HGB 7.7* 7.9* 7.6*   HCT 25.3* 26.1* 26.3*   MCV 88 86 88    196 223     Recent Labs   Lab Test 11/03/23  0517 11/02/23  1700 11/02/23  0456   CR 2.08* 2.15* 2.13*     No lab results found.  Recent Labs   Lab Test 02/12/19  0010   CULT Light growth  Normal deshaun

## 2023-11-03 NOTE — PLAN OF CARE
Goal Outcome Evaluation:    ICU End of Shift Summary.  For vital signs and complete assessments, please see documentation flowsheets.      Pertinent assessments: pt remains intubated, sedated and paralyzed. RASS -5, BIS 30-50 overnight, spikes with movement. Vasopressin weaned off. Good urine output via mitchell,  Bumex stopped yesterday. 1 bowel movement overnight, weight down 2 kg.  Major Shift Events: see above  Plan (Upcoming Events): continue current ICU cares  Discharge/Transfer Needs: unclear     Bedside Shift Report Completed : y  Bedside Safety Check Completed:  y

## 2023-11-03 NOTE — PROGRESS NOTES
[facility] ICU VENTILATOR RESPIRATORY NOTE  Date of Admission: 10/15/23  Date of Intubation (most recent): 10/22/23  Reason for Mechanical Ventilation: Respiratory failure  Number of Days on Mechanical Ventilation: 13  Met Criteria for Pressure Support Trial: no  Reason for No Pressure Support Trial: PEEP +10 and on paralytic       Pt remains on mechanical ventilator with the following settings:   Vent Mode: CMV/AC  (Continuous Mandatory Ventilation/ Assist Control)  FiO2 (%): 55 %  Resp Rate (Set): 26 breaths/min  Tidal Volume (Set, mL): 350 mL  PEEP (cm H2O): 10 cmH2O  Resp: 26     ETT appearance on chest x-ray: ET tube tip is 4.4 cm proximal to the ashley.       Plan:  Will continue to monitor pt's respiratory status closely    Yandy Ventura, RT

## 2023-11-03 NOTE — PROGRESS NOTES
[facility]    Medicine Progress Note - Hospitalist Service    Date of Admission:  10/15/2023    Assessment & Plan   Matthew Bowen is a 78 year old female w/PMH chronic hypoxic respiratory failure due to pulmonary HTN, ALAINA requiring CPAP, chronic diastolic HF, CAD, CKD stage 3, morbid obesity, HTN,  depression who presents from home with family with fever, cough. Acutely worsened respiratory status on 10/21 requiring intubation. Unclear etiology for acute on chronic hypoxic respiratory failure, most likely infectious v autoimmune v other in setting of unknown underlying lung disease.      Acute hypoxic respiratory failure s/p intubation  Possible ARDS  Pneumonia, recurrent w/bronchiectasis  -Presents with recurrent PNA, 3rd time in last month or so. Last completed treatment 2 weeks ago with persistent cough. Presents with worsening fatigue, productive cough, weakness and falls.  -On admission, patient febrile to 101.1, white count of 21.  Lactic acid was within normal limits.  She underwent a CT scan that showed a lingular consolidative opacity with air bronchograms with bilateral upper lobe groundglass opacities.  She was started on ceftriaxone and azithromycin.  -Sputum culture from 10/19 better that showed Staph epi and C. albicans. Discussed with ID, yeast prevalent in sputum samples and likely candida that is not related to illness.   -COVID, influenza, Respiratory viral panel negative.  Strep and legionella antigens negative.   -intubated 10/21 due to worsening respiratory status and bronch done showing serosanguinous and fibrinous return with thick mucoid secretions noted  -discussed on rounds and stopping Cefepime, flagyl and Vanc today. ID following, appreciate help. Antibiotic dates below  -seen by pulm, steroids decreased from 125 to 60mg daily on 10/31. May benefit from IVIG if  euvolemic and not improving.  - plan for prolonged taper of prednisone from 60 mg q day to decreasing  by 5 mg every 2 weeks.  - will check G6PD level and start dapsone versus atovaquone. G6PD level pending.  - bumex gtt, will hold as on auto-diuresis.  - start mepron for PJP ppx.    Abx  Azithromycin 10/15-10/19  Ceftriaxone 10/15-10/17  Cefepime 10/18-31  Metronidazole 10/20-31  Vancomycin 10/21-31    Infectious/metabolic encephalopathy  -continue hydromorphone and midazolam    Shock  -suspected to be infectious, EF is preserved  -Continue midodrine 10mg q8h, now off pressors    Hypernatremia  -persistent ~150, due to TPN, insensible losses  -resolved.    Gluteal cleft irritant dermatitis   - WOC consulted, appreciate recs    Leukocytosis: Suspect related to above as well as steroids.  From what I can see in care everywhere has generally been 9-12 range in [id number].       Generalized weakness. Chronic arthritis. Falls at home. Left Foot pain:   -Having multifactorial weakness, with diffuse body aches and pains.  Imaging on admission including CT head, cervical spine, CT CAP did not show acute fracture.  She had an x-ray of her foot also showed degenerative changes without fracture.    -readdress when appropriate     Hx ALAINA, pulm HTN, chronic diastolic HF:   - Metolazone/lasix PRN for volume overload/natriuresis.   - Given her softer BPs holding PTA antihypertensives.       CKD stage 3  Uremia  Cr 1.21 on admission. Currently ~2.08. Suspect underlying renal function may be worse than creatinine reflects as eGFR by cystatin C estimate is 13. Urea has been steadily increasing since admission while creatinine has stabilized.  -transitioned to low-protein tube feeds, nephrology consulted but discussed and no formal consult at this time. Agree with present management  -serial labs     HTN: holding PTA antihypertensives     Hx morbid obesity, anemia, CAD, hypothyroidism, mood disorder: on ASA, statin, plavix, lyrica, zoloft at home     Hypokalemia: Replacement           Diet: NPO per Anesthesia Guidelines for  Procedure/Surgery Except for: Meds  Adult Formula Drip Feeding: Continuous Novasource Renal; Orogastric tube; Goal Rate: 25 mL/hr x 22 hours (please hold 1 hour before and after levothyroxine); mL/hr; Decrease rate to 25 mL/hr    DVT Prophylaxis: Heparin SQ  Aleman Catheter: PRESENT, indication: Strict 1-2 Hour I&O  Lines: PRESENT      PICC 10/22/23 Triple Lumen Right Basilic multiple med gtt. ready for use-Site Assessment: WDL except;Ecchymotic  Arterial Line 10/22/23 Radial-Site Assessment: WDL      Cardiac Monitoring: ACTIVE order. Indication: Tachyarrhythmias, acute (48 hours)  Code Status: Full Code      Clinically Significant Risk Factors        # Hypokalemia: Lowest K = 2.6 mmol/L in last 2 days, will replace as needed       # Hypoalbuminemia: Lowest albumin = 2.9 g/dL at 10/22/2023  5:20 AM, will monitor as appropriate    # Acute Kidney Injury, unspecified: based on a >150% or 0.3 mg/dL increase in last creatinine compared to past 90 day average, will monitor renal function         # Obesity: Estimated body mass index is 39.57 kg/m  as calculated from the following:    Height as of this encounter: 1.524 m (5').    Weight as of this encounter: 91.9 kg (202 lb 9.6 oz).             Disposition Plan             Teresa Smith MD  Hospitalist Service  [facility]  Securely message with [employer] (more info)  Text page via [facility] Paging/Directory   ______________________________________________________________________    Interval History     Intubated and sedated.      Physical Exam   Vital Signs: Temp: 98.4  F (36.9  C) Temp src: Bladder BP: 138/65 Pulse: 57   Resp: 26 SpO2: 99 % O2 Device: Mechanical Ventilator    Weight: 202 lbs 9.64 oz    Gen - Intubated and sedated.  Lungs - CTA B.  Heart - RR,S1+S2 nml, no m/g/r.  Abd - soft, NT, ND, + BS.  Ext - trace edema.    Medical Decision Making       80 MINUTES SPENT BY ME on the date of service doing chart review, history, exam, documentation &  further activities per the note.      Data     I have personally reviewed the following data over the past 24 hrs:    21.5 (H)  \   7.7 (L)   / 185     144 108 (H) 160.8 (H) /  119 (H)   4.1 21 (L) 2.08 (H) \       Imaging results reviewed over the past 24 hrs:   Recent Results (from the past 24 hour(s))   XR Chest Port 1 View    Narrative    CHEST ONE VIEW  11/3/2023 8:01 AM     HISTORY: Hypoxemic respiratory failure, volume overload    COMPARISON: 10/31/2023.      Impression    IMPRESSION: Enlarged cardiopericardial silhouette. Pulmonary vascular  congestion and interstitial pulmonary edema appear similar to mildly  increased compared to prior. Dense left basilar consolidation which  may represent atelectasis or aspiration/pneumonia appears similar.  Probable trace bilateral pleural effusions. No pneumothorax.    Endotracheal tube with distal tip projecting at the mid thoracic  trachea approximately 4.5 cm proximal to the ashley. Right upper  extremity PICC with distal tip projecting at the superior vena cava.  Enteric tube courses below the diaphragm with distal tip beyond the  inferior field of view.    TAMRA ESCAMILLA MD         SYSTEM ID:  [id number]

## 2023-11-03 NOTE — PROGRESS NOTES
"SPIRITUAL HEALTH SERVICES Progress Note  [facility] ICU    Referral Source: Follow-up    Brief supportive visit with Pt's spouse Genaro - Pt is intubated. Genaro reported, \"I'm doing okay, hanging in there. They are still trying to find the cause of the pneumonia... 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 Genaro continues to welcome my prayers.     Plan: I will continue to follow while on the unit. [facility] remains available.    Yelena Borja MDiv  Staff   [facility] Pager: [phone]    [facility] available 24/7 for emergent requests/referrals, either by having the on-call  paged or by entering an ASAP/STAT consult in Epic (this will also page the on-call ).    "

## 2023-11-03 NOTE — CONSULTS
ENT consult -I was asked to see this patient in consultation for possible tracheotomy. Review of records performed. Apparently she has history of chronic respiratory failure issues due to pulmonary hypertension, underlying obstructive sleep apnea, heart failure issues. Has had more acute respiratory compromise superimposed on her chronic issues. Has been intubated now for few weeks without immediate plan for extubation. Have been asked to perform tracheotomy to assist with further airway management.    Patient is somnolent/ sedated. Orotracheally intubated. Laryngeal landmarks palpable without any palpable thyroid goiter or cervical adenopathy.    Assessment and plan-- respiratory failure with prolonged intubation. If fails to make progress towards extubation in the next few to seven days, can plan to proceed with tracheostomy. Tracheotomy has been scheduled for Wednesday November 8 at 8:00 a.m.  I will follow-up early next week to reassess patient's status. If no progress towards extubation in the interim,will discuss details of tracheotomy procedure with family at that time and obtain consent. Please call if any questions or concerns in the interim.

## 2023-11-03 NOTE — PLAN OF CARE
Goal Outcome Evaluation:      Plan of Care Reviewed With: spouse, child, family    Overall Patient Progress: improvingOverall Patient Progress: improving         ICU End of Shift Summary.  For vital signs and complete assessments, please see documentation flowsheets.     Pertinent assessments: Remains on full vent support and maintaining sats >90%. Suctioned for minimal secretions. Neurologically sedated on propofol and dilaudid effective for pain. Cisatracurium stopped and remains synchronized with the vent. Needing vasopressors intermittently to maintain MAP at goal. Rhythm sinus, no ectopy. Mitchell with adequate uop. TF continued at goal,she is tolerating well. ENT consult complete. Family updated at bedside.      Major Shift Events: As outline above  Plan (Upcoming Events): Plan for trach on 11/8  Discharge/Transfer Needs: TBD    Bedside Shift Report Completed : y  Bedside Safety Check Completed: y     Notified provider about indwelling mitchell catheter discussed removal or continued need.    Did provider choose to remove indwelling mitchell catheter? NO    Provider's mitchell indication for keeping indwelling mitchell catheter: Indication for continued use: Strict 1-2 Hour I & O if external catheters are not an option    Is there an order for indwelling mitchell catheter? YES

## 2023-11-03 NOTE — PROGRESS NOTES
ICU Progress    Continues to show improvement in respiratory status slowly.  Was able to come off  overnight as well as bumex gtt with good UOP maintained      Vitals:   Temp:  [97.7  F (36.5  C)-99  F (37.2  C)] 98.2  F (36.8  C)  Pulse:  [54-69] 55  Resp:  [26-27] 27  BP: (109-152)/(51-74) 138/65  MAP:  [56 mmHg-92 mmHg] 74 mmHg  Arterial Line BP: ()/(8-62) 120/51  FiO2 (%):  [50 %-60 %] 55 %  SpO2:  [90 %-100 %] 94 %     Vent Mode: CMV/AC  (Continuous Mandatory Ventilation/ Assist Control)  FiO2 (%): 55 %  Resp Rate (Set): 26 breaths/min  Tidal Volume (Set, mL): 350 mL  PEEP (cm H2O): 10 cmH2O  Resp: 27    I/O last 3 completed shifts:  In: 1979.07 [I.V.:464.07; NG/GT:940]  Out: 3605 [Urine:3605]    Hydromorphone 0.4  Midazolam 7    Exam:  Gen: Intubated, sedated, paralyzed  HEENT: NC/AT  Pulm: rhonchi  Cor: RRR  Abdomen/GI: Soft, nondistended  : Aleman in place, urine clear  Extremities: Warm, edematous, some redness in RUE  Skin: Well-perfused  Neuro: Sedated  Psych: Unable to assess    Data:   Labs reviewed  - Na 144 (141)   - Cr 2.08 (2.13) (baseline 1.3-1.5)  -  (130-160 recently)  - Hb 7.7 (7.9)  - WBCs 21.5 (23.9) has remained at this level  - Procalcitonin 0.76 (11/1/23)  No new cultures  CXR this morning pending    Assessment/plan:  77 y/o woman with chronic hypoxemic respiratory failure, bronchiectasis, recurrent pneumonias, diastolic heart failure admitted 10/15 with cough/weakness. Transferred to ICU and intubated 10/22 with acute decompensation and intubation. Slowly making progress, but still on quite a bit of support though stabilized with paralysis.     Volume status improving significantly over last 48 hours with concomitant improvement in vent needs.    CNS: Intubated, appropriately sedated with paralysis  # Weakness/frequent falls  # Acute/chronic pain  # Sedation  - Continues on hydromorphone, midazolam but at high risk of developing delirium.  Plan to stop midaz and change to  propofol today and will begin giving seroquel, especially with plan to stop paralytic today.  Pulm: No hypoxemic episodes overnight, FiO2 0.5-gurpreet   # Acute on chronic mixed respiratory failure  # Chronic hypoxemia (2L at baseline)  # Bronchiectasis  # Recurrent pneumonia  # ?Inflammatory pneumonitis  - Continue ventilator support, PEEP at 10 and FiO2 remaining at 0.5 (0.45-0.55).  This combination should allow for reduced sedation needs with better synchrony so will stop paralytic to assess.   - Accepting plateau 30-35 in light of body habitus  - Transitioning to long term oral steroid taper as recommended by Pulmonologist  - Pulmonology following  - CXR pending  - Given likely prolonged weaning from vent, discussed trach with daughter.  She is in agreement and will discuss with patient's .  Would be beneficial in the long run to set up for the beginning of the week.  CV: Pressures have remained good overnight and was able to wean off   # Shock, multifactorial, resolved  # Essential hypertension  # Diastolic heart failure with preserved EF  # Moderate aortic stenosis  - Midodrine held with decreasing renal function.  Will restart if needed   GI: Abdomen benign.  # Nutrition  - Continue tube feeding  - Switched to renal-only formula without any effect on BUN.  Changed back to appropriate protein dosing today  : UOP good with bumex off overnight,  Cr looks to be stabilized (maybe coming down) now at 2.08, BUN remains quite high  # Hypernatremia, resolved  # Uremia  # Acute on chronic renal insufficiency.   - Creatinine appears to have stabilized.  UOP and electrolytes remain okay so will continue to follow.  Overall, patient still total volume up but much improved in last 48 hours.   - Sodium has remained normal - follow  - Uremia likely multifactorial though largely due to steroids and CARMEN  Heme: Hb stable  # Anemia of critical illness  - No indication to transfuse  Msk: Extremities warm, well-perfused,  some pitting edema but overall improved.    - Should begin some ROM therapy  Endo: Glucose well controlled overall  # Stress/steroid hyperglycemia  # Hypothroidism  - Sliding scale insulin  - Levothyroxine  ID: Afebrile, WBC 21.5 today  # Leukocytosis  - Antibiotics stopped 11/1/23 and will follow for indication of decompensation/infection  - WBCs likely from steroids, no clear indication of active infection.  Can repeat Procal for surveillance in am  - Would benefit from starting PJP prophylaxis though will avoid Bactrim  ICU: SQH, PPI  - Family updated at bedside    This patient is critically ill to my assessment and requires ICU monitoring and cares. A total of 40 minutes critical care time spent on 11/3/2023, exclusive of procedures.     Stephen Paul MD    Clinically Significant Risk Factors        # Hypokalemia: Lowest K = 2.6 mmol/L in last 2 days, will replace as needed       # Hypoalbuminemia: Lowest albumin = 2.9 g/dL at 10/22/2023  5:20 AM, will monitor as appropriate    # Acute Kidney Injury, unspecified: based on a >150% or 0.3 mg/dL increase in last creatinine compared to past 90 day average, will monitor renal function         # Obesity: Estimated body mass index is 39.57 kg/m  as calculated from the following:    Height as of this encounter: 1.524 m (5').    Weight as of this encounter: 91.9 kg (202 lb 9.6 oz).

## 2023-11-04 NOTE — PROGRESS NOTES
[facility]    Medicine Progress Note - Hospitalist Service    Date of Admission:  10/15/2023    Assessment & Plan   Matthew Bowen is a 78 year old female w/PMH chronic hypoxic respiratory failure due to pulmonary HTN, ALAINA requiring CPAP, chronic diastolic HF, CAD, CKD stage 3, morbid obesity, HTN,  depression who presents from home with family with fever, cough. Acutely worsened respiratory status on 10/21 requiring intubation. Unclear etiology for acute on chronic hypoxic respiratory failure, most likely infectious v autoimmune v other in setting of unknown underlying lung disease.      Acute hypoxic respiratory failure s/p intubation  Possible ARDS  Pneumonia, recurrent w/bronchiectasis  -Presents with recurrent PNA, 3rd time in last month or so. Last completed treatment 2 weeks ago with persistent cough. Presents with worsening fatigue, productive cough, weakness and falls.  -On admission, patient febrile to 101.1, white count of 21.  Lactic acid was within normal limits.  She underwent a CT scan that showed a lingular consolidative opacity with air bronchograms with bilateral upper lobe groundglass opacities.  She was started on ceftriaxone and azithromycin.  -Sputum culture from 10/19 better that showed Staph epi and C. albicans. Discussed with ID, yeast prevalent in sputum samples and likely candida that is not related to illness.   -COVID, influenza, Respiratory viral panel negative.  Strep and legionella antigens negative.   -intubated 10/21 due to worsening respiratory status and bronch done showing serosanguinous and fibrinous return with thick mucoid secretions noted  -discussed on rounds and stopping Cefepime, flagyl and Vanc today. ID following, appreciate help. Antibiotic dates below  -seen by pulm, steroids decreased from 125 to 60mg daily on 10/31. May benefit from IVIG if  euvolemic and not improving.  - plan for prolonged taper of prednisone from 60 mg q day to decreasing  by 5 mg every 2 weeks.  - will check G6PD level and start dapsone versus atovaquone. G6PD level pending.  - bumex gtt, will hold as on auto-diuresis.  - started mepron for PJP ppx 11/3.    Abx  Azithromycin 10/15-10/19  Ceftriaxone 10/15-10/17  Cefepime 10/18-31  Metronidazole 10/20-31  Vancomycin 10/21-31    Infectious/metabolic encephalopathy  -continue hydromorphone and midazolam    Shock  -suspected to be infectious, EF is preserved  -Continue midodrine 10mg q8h, now off pressors    Hypernatremia  -persistent ~150, due to TPN, insensible losses  -resolved.    Gluteal cleft irritant dermatitis   - WOC consulted, appreciate recs    Leukocytosis: Suspect related to above as well as steroids.  From what I can see in care everywhere has generally been 9-12 range in [id number].       Generalized weakness. Chronic arthritis. Falls at home. Left Foot pain:   -Having multifactorial weakness, with diffuse body aches and pains.  Imaging on admission including CT head, cervical spine, CT CAP did not show acute fracture.  She had an x-ray of her foot also showed degenerative changes without fracture.    -readdress when appropriate     Hx ALAINA, pulm HTN, chronic diastolic HF:   - Metolazone/lasix PRN for volume overload/natriuresis.   - Given her softer BPs holding PTA antihypertensives.       CKD stage 3  Uremia  Cr 1.21 on admission. Currently ~2.08. Suspect underlying renal function may be worse than creatinine reflects as eGFR by cystatin C estimate is 13. Urea has been steadily increasing since admission while creatinine has stabilized.  -transitioned to low-protein tube feeds, nephrology consulted but discussed and no formal consult at this time. Agree with present management  -serial labs     HTN: holding PTA antihypertensives     Hx morbid obesity, anemia, CAD, hypothyroidism, mood disorder: on ASA, statin, plavix, lyrica, zoloft at home     Hypokalemia: Replacement           Diet: NPO per Anesthesia Guidelines for  Procedure/Surgery Except for: Meds  Adult Formula Drip Feeding: Continuous Novasource Renal; Orogastric tube; Goal Rate: 25 mL/hr x 22 hours (please hold 1 hour before and after levothyroxine); mL/hr; Decrease rate to 25 mL/hr    DVT Prophylaxis: Heparin SQ  Aleman Catheter: PRESENT, indication: Strict 1-2 Hour I&O  Lines: PRESENT      PICC 10/22/23 Triple Lumen Right Basilic multiple med gtt. ready for use-Site Assessment: WDL except;Ecchymotic  Arterial Line 10/22/23 Radial-Site Assessment: WDL      Cardiac Monitoring: ACTIVE order. Indication: Tachyarrhythmias, acute (48 hours)  Code Status: Full Code      Clinically Significant Risk Factors        # Hypokalemia: Lowest K = 2.6 mmol/L in last 2 days, will replace as needed  # Hypernatremia: Highest Na = 147 mmol/L in last 2 days, will monitor as appropriate      # Hypoalbuminemia: Lowest albumin = 2.9 g/dL at 10/22/2023  5:20 AM, will monitor as appropriate    # Acute Kidney Injury, unspecified: based on a >150% or 0.3 mg/dL increase in last creatinine compared to past 90 day average, will monitor renal function         # Obesity: Estimated body mass index is 39.57 kg/m  as calculated from the following:    Height as of this encounter: 1.524 m (5').    Weight as of this encounter: 91.9 kg (202 lb 9.6 oz).             Disposition Plan             Teresa Smith MD  Hospitalist Service  [facility]  Securely message with [employer] (more info)  Text page via [employer] Paging/Directory   ______________________________________________________________________    Interval History     Intubated and sedated.       Physical Exam   Vital Signs: Temp: 97.5  F (36.4  C) Temp src: Bladder BP: 131/57 Pulse: 53   Resp: 20 SpO2: 92 % O2 Device: Mechanical Ventilator    Weight: 202 lbs 9.64 oz    Gen - Intubated and sedated.  Lungs - CTA B.  Heart - RR,S1+S2 nml, no m/g/r.  Abd - soft, NT, ND, + BS.  Ext - trace edema.    Medical Decision Making       80 MINUTES  SPENT BY ME on the date of service doing chart review, history, exam, documentation & further activities per the note.      Data     I have personally reviewed the following data over the past 24 hrs:    17.9 (H)  \   7.4 (L)   / 187     147 (H) 110 (H) 171.9 (H) /  132 (H)   3.9 22 1.92 (H) \     Procal: 1.17 (H) CRP: N/A Lactic Acid: N/A         Imaging results reviewed over the past 24 hrs:   No results found for this or any previous visit (from the past 24 hour(s)).

## 2023-11-04 NOTE — PROGRESS NOTES
Pending sale to [facility] ICU VENTILATOR RESPIRATORY NOTE  Date of Admission: 10/15/2023  Date of Intubation (most recent): 10/22/2023  Reason for Mechanical Ventilation: Respiratory failure  Number of Days on Mechanical Ventilation: 13  Met Criteria for Pressure Support Trial: No  Reason for No Pressure Support Trial: pt came off Nimbex later morning and has been intermittently on vasopressors. Will assess vent weaning in AM.   Significant Events Today: PEEP titrated down from 10 to +8 and pt continue to tolerate well  ABG Results:   Recent Labs   Lab 11/03/23  0517   PH 7.27*   PCO2 49*   PO2 110*   HCO3 23   O2PER 55     ETT appearance on chest x-ray: 4.5 cm above ashley    Vent Mode: CMV/AC  (Continuous Mandatory Ventilation/ Assist Control)  FiO2 (%): 45 %  Resp Rate (Set): 26 breaths/min  Tidal Volume (Set, mL): 350 mL  PEEP (cm H2O): 8 cmH2O  Resp: 26        Plan:  Assess for vent weaning in AM and continue to monitor pt's respiratory status.      Gregory Blanco, RT

## 2023-11-04 NOTE — PLAN OF CARE
ICU End of Shift Summary.  For vital signs and complete assessments, please see documentation flowsheets.      Pertinent assessments:   Neuro: RASS 4 on Prop. Dilaudid gtt for pain control. PRN bolus dilaudid given HTN.  Cardiac: SB. MAPs greater than 65 - Vaso weaned off since 0130.  Resp: Vent supported. Minimal secretions. Peep 8. Fio2 50%. LS coarse.  GI: Small brown smear, TF @ 25ml/hr FWF 100ml q4hr.   : mitchell in place - good uop.  Skin: see flowsheets for details.  Lines: R radial a line, R PICC, 2 PIVs  Drips: Prop, Dilaudid    Major Shift Events:   Vaso weaned off @ 0130    Plan (Upcoming Events): Continue plan of care  Discharge/Transfer Needs: TBD     Bedside Shift Report Completed : Y  Bedside Safety Check Completed: Y

## 2023-11-04 NOTE — PROGRESS NOTES
ICU Progress    Continues to slowly improve on respiratory status.  Required pressor for short period yesterday but off again since 0130.    No other significant changes.      Vitals:   Temp:  [96.8  F (36  C)-99.3  F (37.4  C)] 96.8  F (36  C)  Pulse:  [48-66] 48  Resp:  [20-29] 20  BP: (131)/(57) 131/57  MAP:  [57 mmHg-94 mmHg] 84 mmHg  Arterial Line BP: ()/(20-62) 132/57  FiO2 (%):  [45 %-55 %] 50 %  SpO2:  [85 %-100 %] 93 %     Vent Mode: Other (see comments) (PC-PSV)  FiO2 (%): 50 %  Resp Rate (Set): 14 breaths/min  Tidal Volume (Set, mL): 350 mL  PEEP (cm H2O): 8 cmH2O  Pressure Support (cm H2O): 12 cmH2O  Inspiratory Pressure (cm H2O) (Drager Sanam): 41  Resp: 20    I/O last 3 completed shifts:  In: 1791.47 [I.V.:396.47; NG/GT:870]  Out: 2610 [Urine:2610]    Hydromorphone 0.4 mg/hr  Propofol 20 mcg/kg/min    Exam:  Gen: Intubated, sedated, paralyzed  HEENT: NC/AT  Pulm: rhonchi  Cor: RRR  Abdomen/GI: Soft, nondistended  : Aleman in place, urine clear  Extremities: Warm, edematous, some redness in RUE  Skin: Well-perfused  Neuro: Sedated  Psych: Unable to assess    Data:   Labs reviewed  - ABG 7.3/48/61/24 on FiO2 0.5 PC/PS 41 Vt~480 R 16-18   - Na 147 (144)  - Cr 1.92 (2.08) (baseline 1.3-1.5)  - .9 (160) (130-160 recently)  - Hb 7.7 (7.9)  - WBCs 21.5 (23.9) has remained at this level  - Procalcitonin 1.17 (0.76 on 11/1/23)  No new cultures  CXR 11/3 essentially unchanged from previous imaging.    Assessment/plan:  79 y/o woman with chronic hypoxemic respiratory failure, bronchiectasis, recurrent pneumonias, diastolic heart failure admitted 10/15 with cough/weakness. Transferred to ICU and intubated 10/22 with acute decompensation and intubation. Slowly making progress with PEEP down to 8 and FiO2 remaining ~0.5.  Will alter vent settings this morning to try and find something which may be more comfortable as patient seems to get dyssynchronous fairly easily and would prefer not to re-paralyze.      Volume status continues to improve.  Has been off bumex since 11/2 and has good urine output.  Continue to follow.    CNS: Intubated, appropriately sedated with paralysis  # Weakness/frequent falls  # Acute/chronic pain  # Sedation  - Sedation with hydromorphone and propofol  but at high risk of developing delirium especially with long use of midaz. Seroquel started yesterday, will increase dose.  Will try to avoid further paralytic  Pulm: No hypoxemic episodes overnight, FiO2 0.5-gurpreet   # Acute on chronic mixed respiratory failure  # Chronic hypoxemia (2L at baseline)  # Bronchiectasis  # Recurrent pneumonia  # ?Inflammatory pneumonitis  - Continue ventilator support, PEEP at 8 and FiO2 remaining at 0.5 (0.45-0.55).  Patient does appear to get dyssynchronous and uncomfortable despite what seems to be good sedation.  Will adjust modes today to try and find a more comfortable vent strategy.  - Accepting plateau 30-35 in light of body habitus  - Transitioning to long term oral steroid taper as recommended by Pulmonologist  - Pulmonology following, appreciate suggestion of Mupron  CV: Pressures have remained good overnight and was able to wean off   # Shock, multifactorial, resolved  # Essential hypertension  # Diastolic heart failure with preserved EF  # Moderate aortic stenosis  - Midodrine restarted   GI: Abdomen benign.  # Nutrition  - Continue tube feeding  - Switched to renal-only formula without any effect on BUN.  Changed back to appropriate protein dosing today  : UOP good with bumex off overnight,  Cr looks to be downtrending, BUN remains quite high  # Hypernatremia  # Uremia  # Acute on chronic renal insufficiency.   - Creatinine appears to have stabilized.  UOP and potassium remain okay so will continue to follow.  Overall, patient still total volume up but much improved in last 48 hours.   - Sodium has elevated, resume FWF  - Uremia likely multifactorial though largely due to steroids and CARMEN  Heme:  Hb stable - recheck tomorrow  # Anemia of critical illness  - No indication to transfuse  Msk: Extremities warm, well-perfused, some pitting edema but overall improved.    - Should begin some ROM therapy  Endo: Glucose well controlled overall  # Stress/steroid hyperglycemia  # Hypothroidism  - Sliding scale insulin  - Levothyroxine  ID: Afebrile, Recheck CBC tomorrow  # Leukocytosis  - Antibiotics stopped 11/1/23 and will follow for indication of decompensation/infection  - WBCs likely from steroids, no clear indication of active infection.  Can repeat Procal for surveillance in am  - Starting PJP prophylaxis, appreciate recs from Pulmonologist  ICU: SQH, PPI  - Family updated at bedside    This patient is critically ill to my assessment and requires ICU monitoring and cares. A total of 35 minutes critical care time spent on 11/4/2023, exclusive of procedures.     Stephen Paul MD    Clinically Significant Risk Factors        # Hypokalemia: Lowest K = 2.6 mmol/L in last 2 days, will replace as needed  # Hypernatremia: Highest Na = 147 mmol/L in last 2 days, will monitor as appropriate      # Hypoalbuminemia: Lowest albumin = 2.9 g/dL at 10/22/2023  5:20 AM, will monitor as appropriate    # Acute Kidney Injury, unspecified: based on a >150% or 0.3 mg/dL increase in last creatinine compared to past 90 day average, will monitor renal function         # Obesity: Estimated body mass index is 39.57 kg/m  as calculated from the following:    Height as of this encounter: 1.524 m (5').    Weight as of this encounter: 91.9 kg (202 lb 9.6 oz).

## 2023-11-04 NOTE — PROGRESS NOTES
[facility]  Infectious Disease Progress Note          Assessment and Plan:   Date of Admission:  10/15/2023  Date of Consult (When I saw the patient): 10/20/23        Assessment & Plan  Matthew Bowen is a 78 year old who was admitted on 10/15/2023.      Impression: 1 78-year-old female, some chronic underlying lung disease, on oxygen, bronchiectasis probable, some history of pneumonia but now 6 weeks or so of worsening respiratory symptoms, 2 prior courses of antibiotics, now admitted with infiltrates, no major sepsis but elevated procalcitonin and white count implying bacterial, not really improving on antibiotics concern for more chronic type pathogen in this patient with underlying lung disease by this history     2 chronic lung disease on oxygen some underlying bronchiectasis, some prior pneumonias  3 chronic kidney disease     REC 1 ongoing respiratory issues may be slight improvement, agree with now off antibiotics has had an extended course did not actually grow anything, certainly at risk for superinfection but ongoing antibiotics more likely due to side effects and resistance than benefit  2 note possibly prolonged steroids if so agree pneumocystis prophylaxis needed, renal function still abnormal options here would be low-dose Bactrim single strength 3 times a week, or alternatively would do Mepron prior ,would avoid dapsone and his overall side effect set of issues,would  start Mepron 1500 mg orally daily  Follow peripherally at this point                  Interval History:     Intubated and sedated slight improvement including pressors off abut oxygenation s remains problematic, in ICU,  BAL studies negative all prior fungal and microbiologic studies negative QuantiFERON-TB gold negative              Medications:      artificial tears   Both Eyes Q8H    aspirin  81 mg Oral or Feeding Tube Daily    atorvastatin  20 mg Oral or Feeding Tube QPM    atovaquone  1,500 mg Per Feeding  Tube Daily    B and C vitamin Complex with folic acid  5 mL Oral or Feeding Tube Daily    chlorhexidine  15 mL Mouth/Throat Q12H    heparin ANTICOAGULANT  5,000 Units Subcutaneous Q12H    insulin aspart  1-6 Units Subcutaneous Q4H    ipratropium - albuterol 0.5 mg/2.5 mg/3 mL  3 mL Nebulization 4x daily    levothyroxine  150 mcg Oral or Feeding Tube QAM AC    midodrine  10 mg Oral or Feeding Tube Q8H    pantoprazole  40 mg Per Feeding Tube QAM AC    predniSONE  60 mg Oral Daily    Followed by    [START ON 11/16/2023] predniSONE  55 mg Oral Daily    Followed by    [START ON 11/30/2023] predniSONE  50 mg Oral Daily    Followed by    [START ON 12/14/2023] predniSONE  45 mg Oral Daily    Followed by    [START ON 12/28/2023] predniSONE  40 mg Oral Daily    Followed by    [START ON 1/11/2024] predniSONE  35 mg Oral Daily    Followed by    [START ON 1/25/2024] predniSONE  30 mg Oral Daily    Followed by    [START ON 2/8/2024] predniSONE  25 mg Oral Daily    Followed by    [START ON 2/22/2024] predniSONE  20 mg Oral Daily    pregabalin  75 mg Per Feeding Tube Daily    protein modular  2 packet Per Feeding Tube Daily    QUEtiapine  50 mg Per Feeding Tube BID    sertraline  150 mg Oral or Feeding Tube Daily    sodium chloride (PF)  10-40 mL Intracatheter Q7 Days    sodium chloride (PF)  3 mL Intracatheter Q8H    vitamin D3  50 mcg Oral or Feeding Tube Daily                  Physical Exam:   Blood pressure 131/57, pulse 53, temperature 97.5  F (36.4  C), resp. rate 20, height 1.524 m (5'), weight 91.9 kg (202 lb 9.6 oz), SpO2 92%.  Wt Readings from Last 2 Encounters:   11/03/23 91.9 kg (202 lb 9.6 oz)   02/28/22 94.3 kg (208 lb)     Vital Signs with Ranges  Temp:  [96.8  F (36  C)-99.3  F (37.4  C)] 97.5  F (36.4  C)  Pulse:  [48-66] 53  Resp:  [20-29] 20  BP: (131)/(57) 131/57  MAP:  [59 mmHg-97 mmHg] 82 mmHg  Arterial Line BP: (103-144)/(20-71) 128/51  FiO2 (%):  [45 %-50 %] 50 %  SpO2:  [85 %-100 %] 92 %    Constitutional:  Intubated and sedated about same slight improvement in oxygenation     Lungs: Clear to auscultation bilaterally, no crackles or wheezing   Cardiovascular: Regular rate and rhythm, normal S1 and S2, and no murmur noted   Abdomen: Normal bowel sounds, soft, non-distended, non-tender   Skin: No rashes, no cyanosis, no edema   Other:           Data:   All microbiology laboratory data reviewed.  Recent Labs   Lab Test 11/04/23  0603 11/03/23  0517 11/02/23  0456   WBC 17.9* 21.5* 23.9*   HGB 7.4* 7.7* 7.9*   HCT 24.9* 25.3* 26.1*   MCV 90 88 86    185 196     Recent Labs   Lab Test 11/04/23  0603 11/03/23  1704 11/03/23  0517   CR 1.92* 2.08* 2.08*     No lab results found.  Recent Labs   Lab Test 02/12/19  0010   CULT Light growth  Normal deshaun

## 2023-11-04 NOTE — PROGRESS NOTES
11/04/23 0902   Appointment Info   Signing Clinician's Name / Credentials (OT) Catrina Son, OTR/L   Living Environment   People in Home spouse   Current Living Arrangements house   Home Accessibility stairs to enter home   Number of Stairs, Main Entrance 2  (short stairs)   Living Environment Comments Main level living possible.   Self-Care   Usual Activity Tolerance good   Current Activity Tolerance poor   Equipment Currently Used at Home shower chair;grab bar, tub/shower  (Step in shower. 4WW)   Fall history within last six months yes   Number of times patient has fallen within last six months 3   Activity/Exercise/Self-Care Comment Family reports at baseline pt was independent in self-cares except had assist with donning shoes. Uses 4WW for community mobility. Report at baseline bathing was very fatiguing for patient   Instrumental Activities of Daily Living (IADL)   Previous Responsibilities medication management   IADL Comments Pt is retired RN. Has a hired caregiver that comes x3/wk to assist with some cooking, light house cleaning and will be starting to help with bathing per pt's dtr. Pt does not drive.   General Information   Onset of Illness/Injury or Date of Surgery 10/21/23   Referring Physician Teresa Smith MD, MD   Patient/Family Therapy Goal Statement (OT) Improve pt's independence   Additional Occupational Profile Info/Pertinent History of Current Problem Per Hospitalist note- 78 year old female w/PMH chronic hypoxic respiratory failure due to pulmonary HTN, ALAINA requiring CPAP, chronic diastolic HF, CAD, CKD stage 3, morbid obesity, HTN,  depression who presents from home with family with fever, cough. Acutely worsened respiratory status on 10/21 requiring intubation. Unclear etiology for acute on chronic hypoxic respiratory failure, most likely infectious v autoimmune v other in setting of unknown underlying lung disease. OT Order received with goal of family training for PROM    Existing Precautions/Restrictions fall  (Intubated)   General Observations and Info Edematous BUEs. Each BUE elevated on 3 pillows. BLE Rooke boots on   Cognitive Status Examination   Cognitive Status Comments Pt taken off sedation an hour prior to session. Pt not following any commands.   Visual Perception   Visual Impairment/Limitations corrective lenses full-time   Pain Assessment   Patient Currently in Pain   (No nonverbal indicators of pain)   Strength Comprehensive (MMT)   Comment, General Manual Muscle Testing (MMT) Assessment No active movement in BUEs   Transfers   Transfer Comments Pt dependent with mobility   Activities of Daily Living   Additional Documentation   (Pt dependent with self-cares)   Clinical Impression   Criteria for Skilled Therapeutic Interventions Met (OT) Yes, treatment indicated   OT Diagnosis Decline function   OT Problem List-Impairments impacting ADL activity tolerance impaired;strength;balance;communication;mobility;motor control;range of motion (ROM)   Assessment of Occupational Performance 5 or more Performance Deficits   Identified Performance Deficits grooming, bathing, toileting, dressing, functional mobility   Planned Therapy Interventions (OT) ROM   Clinical Decision Making Complexity (OT) problem focused assessment/low complexity   Risk & Benefits of therapy have been explained evaluation/treatment results reviewed;care plan/treatment goals reviewed;risks/benefits reviewed;current/potential barriers reviewed;participants voiced agreement with care plan;participants included;daughter;spouse/significant other   OT Total Evaluation Time   OT Eval, Low Complexity Minutes (65537) 6   OT Goals   Therapy Frequency (OT) 2 times/day   OT Predicted Duration/Target Date for Goal Attainment 11/18/23   OT Goals OT Goal 1   OT: Goal 1 Pt's family will independently demonstrate PROM program to maintain pt's joint integrity for future self-care tasks.   Interventions   Interventions Quick  Adds Therapeutic Procedures/Exercise   Therapeutic Procedures/Exercise   Therapeutic Procedure: strength, endurance, ROM, flexibillity minutes (00271) 25   Symptoms Noted During/After Treatment   (no nonverbal indicators of intolerance)   Treatment Detail/Skilled Intervention Upon arrival pt in ICU and intubated. Pt's daughter and spouse present and supportive. Pt seen for a focus on enhancing joint integrity in anticipation of future BUE motion during self care tasks. Pt engaged in 10 reps of the following PROM with writer on one extremity and spouse on the other: shoulder flexion, shoulder external rotation, elbow flexion/extension, supination/pronation, wrist flexion/extension, digit flexion/extension, heel extension. Family issued instruction handout and participated in education regarding intensity, frequency, duration, signs of intolerance. Pt HR low 50s at rest and mid 50s during exercise. O2 sat WNL with intubation. Pt unable to follow simple commands during session despite tactile and verbal cues. Pt off Precedex for an hour   OT Discharge Planning   OT Plan Have family teachback PROM exercises using handout. Re-eval once following commands   OT Discharge Recommendation (DC Rec)   (Will update once pt no longer intubated)   OT Rationale for DC Rec OT order received with goal of training pt's family in PROM program due to pt's prolonged intubation. Pt's spouse educated in PROM program using instruction handout and would benefit from followup on proper technique. Will increase frequency of OT once pt able to follow commands. Pt off Precedex this AM and not following commands   OT Brief overview of current status Dependent on self cares and mobility. Edematous BUEs   Total Session Time   Timed Code Treatment Minutes 25   Total Session Time (sum of timed and untimed services) 31

## 2023-11-04 NOTE — PROGRESS NOTES
[facility] ICU VENTILATOR RESPIRATORY NOTE  Date of Admission: 10/15/2023  Date of Intubation (most recent): 10/22/2023  Reason for Mechanical Ventilation: Respiratory failure  Number of Days on Mechanical Ventilation: 14  Met Criteria for Pressure Support Trial: Yes  Reason for No Pressure Support Trial:  Pt instantly fail weaning due to low volumes and high RSB as well as increase WOB  Significant Events Today: Pt mode changed by MD to PC-PSV and pt continues to tolerate well   ABG Results:   Recent Labs   Lab 11/04/23  1106 11/04/23  0756 11/03/23  0517   PH 7.31* 7.30* 7.27*   PCO2 47* 48* 49*   PO2 64* 61* 110*   HCO3 23 24 23   O2PER 50 50 55     ETT appearance on chest x-ray: 4.5 cm above ashley    Vent settings: PC-PSV/ 16/ 42 insP/ +8/ 50%    Vent Mode: -- (PC-PSV)  FiO2 (%): 50 %  Resp Rate (Set): (S) 16 breaths/min  Tidal Volume (Set, mL): 350 mL  PEEP (cm H2O): 8 cmH2O  Pressure Support (cm H2O): 12 cmH2O  Inspiratory Pressure (cm H2O) (Drager Sanam): (S) 42  Resp: 22          Plan:  Continue current mode PC-PSV per MD with current settings per order. Pt failed weaning instantly this morning. RT to continue to monitor and assess pt's respiratory status and need.      Gregory Blanco, RT

## 2023-11-04 NOTE — PROGRESS NOTES
[facility] ICU VENTILATOR RESPIRATORY NOTE  Date of Admission: 10/15/23  Date of Intubation (most recent): 10/22/23  Reason for Mechanical Ventilation: Respiratory failure  Number of Days on Mechanical Ventilation: 14  Met Criteria for Pressure Support Trial: Yes, Started at 0539 and Ended at 0541. Tottal of 2 minutes.  Reason for Stopping Pressure Support Trial: O2 Sat 85%  Pt remains on mechanical ventilator with the following settings:   Vent Mode: CMV/AC  (Continuous Mandatory Ventilation/ Assist Control)  FiO2 (%): 50%  Resp Rate (Set): 26 breaths/min  Tidal Volume (Set, mL): 350 mL  PEEP (cm H2O): 8 cmH2O  Resp: 26     ETT appearance on chest x-ray: ET tube tip is 4.4 cm proximal to the ashley.        Plan:  Will continue to monitor pt's respiratory status closely     Yandy Ventura, RT

## 2023-11-05 NOTE — PROGRESS NOTES
[facility]    Medicine Progress Note - Hospitalist Service    Date of Admission:  10/15/2023    Assessment & Plan   Matthew Bowen is a 78 year old female w/PMH chronic hypoxic respiratory failure due to pulmonary HTN, ALAINA requiring CPAP, chronic diastolic HF, CAD, CKD stage 3, morbid obesity, HTN,  depression who presents from home with family with fever, cough. Acutely worsened respiratory status on 10/21 requiring intubation. Unclear etiology for acute on chronic hypoxic respiratory failure, most likely infectious v autoimmune v other in setting of unknown underlying lung disease.      Acute hypoxic respiratory failure s/p intubation  Possible ARDS  Pneumonia, recurrent w/bronchiectasis  -Presents with recurrent PNA, 3rd time in last month or so. Last completed treatment 2 weeks ago with persistent cough. Presents with worsening fatigue, productive cough, weakness and falls.  -On admission, patient febrile to 101.1, white count of 21.  Lactic acid was within normal limits.  She underwent a CT scan that showed a lingular consolidative opacity with air bronchograms with bilateral upper lobe groundglass opacities.  She was started on ceftriaxone and azithromycin.  -Sputum culture from 10/19 better that showed Staph epi and C. albicans. Discussed with ID, yeast prevalent in sputum samples and likely candida that is not related to illness.   -COVID, influenza, Respiratory viral panel negative.  Strep and legionella antigens negative.   -intubated 10/21 due to worsening respiratory status and bronch done showing serosanguinous and fibrinous return with thick mucoid secretions noted  -discussed on rounds and stopping Cefepime, flagyl and Vanc today. ID following, appreciate help. Antibiotic dates below  -seen by pulm, steroids decreased from 125 to 60mg daily on 10/31. May benefit from IVIG if  euvolemic and not improving.  - plan for prolonged taper of prednisone from 60 mg q day to decreasing  by 5 mg every 2 weeks.  - will check G6PD level and start dapsone versus atovaquone. G6PD level pending.  - bumex gtt, will hold as on auto-diuresis. Started on metolazone bid 11/5.  - started mepron for PJP ppx 11/3.    Abx  Azithromycin 10/15-10/19  Ceftriaxone 10/15-10/17  Cefepime 10/18-31  Metronidazole 10/20-31  Vancomycin 10/21-31    Infectious/metabolic encephalopathy  -continue hydromorphone and midazolam    Shock  -suspected to be infectious, EF is preserved  -Continue midodrine 10mg q8h, now off pressors    Hypernatremia  - increase free water to 200 mls q 4 hours.    Gluteal cleft irritant dermatitis   - WOC consulted, appreciate recs    Leukocytosis: Suspect related to above as well as steroids.  From what I can see in care everywhere has generally been 9-12 range in [id number].       Generalized weakness. Chronic arthritis. Falls at home. Left Foot pain:   -Having multifactorial weakness, with diffuse body aches and pains.  Imaging on admission including CT head, cervical spine, CT CAP did not show acute fracture.  She had an x-ray of her foot also showed degenerative changes without fracture.    -readdress when appropriate     Hx ALAINA, pulm HTN, chronic diastolic HF:   - Metolazone/lasix PRN for volume overload/natriuresis.   - Given her softer BPs holding PTA antihypertensives.       CKD stage 3  Uremia  Cr 1.21 on admission. Currently ~1.77. Suspect underlying renal function may be worse than creatinine reflects as eGFR by cystatin C estimate is 13. Urea has been steadily increasing since admission while creatinine has stabilized.  -transitioned to low-protein tube feeds, nephrology consulted but discussed and no formal consult at this time. Agree with present management  -serial labs     HTN: holding PTA antihypertensives     Hx morbid obesity, anemia, CAD, hypothyroidism, mood disorder: on ASA, statin, plavix, lyrica, zoloft at home               Diet: NPO per Anesthesia Guidelines for  Procedure/Surgery Except for: Meds  Adult Formula Drip Feeding: Continuous Novasource Renal; Orogastric tube; Goal Rate: 25 mL/hr x 22 hours (please hold 1 hour before and after levothyroxine); mL/hr; Decrease rate to 25 mL/hr    DVT Prophylaxis: Heparin SQ  Aleman Catheter: PRESENT, indication: Strict 1-2 Hour I&O  Lines: PRESENT      PICC 10/22/23 Triple Lumen Right Basilic multiple med gtt. ready for use-Site Assessment: WDL except;Ecchymotic  Arterial Line 10/22/23 Radial-Site Assessment: WDL      Cardiac Monitoring: ACTIVE order. Indication: Tachyarrhythmias, acute (48 hours)  Code Status: Full Code      Clinically Significant Risk Factors         # Hypernatremia: Highest Na = 150 mmol/L in last 2 days, will monitor as appropriate      # Hypoalbuminemia: Lowest albumin = 2.9 g/dL at 10/22/2023  5:20 AM, will monitor as appropriate            # Obesity: Estimated body mass index is 39.35 kg/m  as calculated from the following:    Height as of this encounter: 1.524 m (5').    Weight as of this encounter: 91.4 kg (201 lb 8 oz).             Disposition Plan             Teresa Smith MD  Hospitalist Service  [facility]  Securely message with [employer] (more info)  Text page via [facility] Paging/Directory   ______________________________________________________________________    Interval History     Intubated and sedated.       Physical Exam   Vital Signs: Temp: 98.8  F (37.1  C) Temp src: Bladder   Pulse: 65   Resp: 26 SpO2: 93 % O2 Device: Mechanical Ventilator    Weight: 201 lbs 8.01 oz    Gen - Intubated and sedated.  Lungs - CTA B.  Heart - RR,S1+S2 nml, no m/g/r.  Abd - soft, NT, ND, + BS.  Ext - trace edema.    Medical Decision Making       80 MINUTES SPENT BY ME on the date of service doing chart review, history, exam, documentation & further activities per the note.      Data     I have personally reviewed the following data over the past 24 hrs:    17.8 (H)  \   8.4 (L)   / 204     150  (H) 112 (H) 171.2 (H) /  141 (H)   3.5 23 1.77 (H) \       Imaging results reviewed over the past 24 hrs:   No results found for this or any previous visit (from the past 24 hour(s)).

## 2023-11-05 NOTE — PROGRESS NOTES
ICU Progress    Had done well all day yesterday but during the night developed increasing O2 needs.  Had not been on any continuous sedation.  Appears to be dyssynchronous with the vent again on current settings.      Vitals:   Temp:  [97.5  F (36.4  C)-99.1  F (37.3  C)] 98.8  F (37.1  C)  Pulse:  [52-73] 65  Resp:  [19-28] 26  MAP:  [71 mmHg-124 mmHg] 99 mmHg  Arterial Line BP: (119-191)/(45-72) 152/60  FiO2 (%):  [50 %-75 %] 75 %  SpO2:  [88 %-97 %] 93 %     Vent Mode: Other (see comments) (PC-PSV)  FiO2 (%): 75 %  Resp Rate (Set): 16 breaths/min  Tidal Volume (Set, mL): 350 mL  PEEP (cm H2O): 8 cmH2O  Pressure Support (cm H2O): 12 cmH2O  Inspiratory Pressure (cm H2O) (Drager Sanam): 42  Resp: 26    I/O last 3 completed shifts:  In: 1653.7 [I.V.:68.7; NG/GT:1060]  Out: 2645 [Urine:2645]    Hydromorphone prn  Propofol off    Exam:  Gen: Intubated, reacts to stimuli, dyssynchronous with vent.  HEENT: NC/AT  Pulm: rhonchi  Cor: RRR  Abdomen/GI: Soft, nondistended  : Aleman in place, urine clear  Extremities: Warm, edematous, some redness in RUE probably improved  Skin: Well-perfused  Neuro: Sedated  Psych: Unable to assess    Data:   Labs reviewed  - ABG 7.37/43/58/25 (before patient turned to FiO2 0.75)   - Na 150 (147)  - Cr 1.77 (1.92) (baseline 1.3-1.5)  - .2 (171.9) (130-160 recently)  - Hgb 8.4 (7.7)  - WBCs 17.8 (21.5) has been 20-25 consistently  - Procalcitonin 1.17 on 11/4 (0.76 on 11/1/23)  No new cultures  CXR 11/3 essentially unchanged from previous imaging.    Assessment/plan:  79 y/o woman with chronic hypoxemic respiratory failure, bronchiectasis, recurrent pneumonias, diastolic heart failure admitted 10/15 with cough/weakness. Transferred to ICU and intubated 10/22 with acute decompensation and intubation.     Progress is slow going with increased FiO2 need overnight however patient was not being sedated and was definitely dyssynchronous this morning. Will readjust vent mode, increase PEEP  and add back in sedation before trying to wean again.  Patient is somewhat difficult to keep sedated enough to prevent dyssynchrony and she may require being paralyzed again for a short while but would prefer to avoid that if at all possible.     Volume status continues to improve.  Has been off bumex since 11/2 though UOP has dropped somewhat and Na is again elevated.  Will start metolazone again today to address both issues.    CNS: Intubated, reacts but not interactive  # Weakness/frequent falls  # Acute/chronic pain  # Sedation  - Sedation was off all day yesterday but patient clearly dyssynchronous. Given chronic pain, will restart continuous dilaudid and add precedex to try and improve synchrony. Will try to avoid further paralytic  Pulm: No hypoxemic episodes overnight, FiO2 0.75 this morning   # Acute on chronic mixed respiratory failure  # Chronic hypoxemia (2L at baseline)  # Bronchiectasis  # Recurrent pneumonia  # ?Inflammatory pneumonitis  - Continue ventilator support. Will adjust modes today to try and find a more comfortable vent strategy and improve synchrony again.  For now, PEEP to 10 and then wean FiO2  - Accepting plateau 30-35 in light of body habitus  - Transitioning to long term oral steroid taper as recommended by Pulmonologist  - Pulmonology following  CV: Pressures have remained good overnight and was able to wean off   # Shock, multifactorial, resolved  # Essential hypertension  # Diastolic heart failure with preserved EF  # Moderate aortic stenosis  - Midodrine restarted   GI: Abdomen benign.  # Nutrition  - Continue tube feeding  - Switched to renal-only formula without any effect on BUN.  Changed back to appropriate protein dosing.  : UOP good with bumex off overnight,  Cr looks to be downtrending, BUN remains quite high  # Hypernatremia  # Uremia  # Acute on chronic renal insufficiency.   - Creatinine still downtrending.  UOP has dropped a little though potassium remain okay so  will continue to follow.  Overall, patient still total volume up but much improved in last few days   - Sodium has elevated despite FWF increase yesterday.  Will add back metolazone.  Will also follow BMP twice daily today  - Uremia likely multifactorial though largely due to steroids and CARMEN  Heme: Hb stable - every other day checks  # Anemia of critical illness  - No indication to transfuse  Msk: Extremities warm, well-perfused, some pitting edema but overall improved.    - Evaluation for ROM therapy  Endo: Glucose well controlled overall  # Stress/steroid hyperglycemia  # Hypothroidism  - Sliding scale insulin  - Levothyroxine  ID: Afebrile, WBC down to 17.8, every other day checks  # Leukocytosis  - Antibiotics stopped 11/1/23  - WBCs likely from steroids, no clear indication of active infection.  Can repeat Procal for surveillance if needed  - PJP prophylaxis with Mepron  ICU: SQH, PPI  - Family updated at bedside    This patient is critically ill to my assessment and requires ICU monitoring and cares. A total of 35 minutes critical care time spent on 11/5/2023, exclusive of procedures.     Stephen Paul MD    Clinically Significant Risk Factors         # Hypernatremia: Highest Na = 150 mmol/L in last 2 days, will monitor as appropriate      # Hypoalbuminemia: Lowest albumin = 2.9 g/dL at 10/22/2023  5:20 AM, will monitor as appropriate            # Obesity: Estimated body mass index is 39.35 kg/m  as calculated from the following:    Height as of this encounter: 1.524 m (5').    Weight as of this encounter: 91.4 kg (201 lb 8 oz).

## 2023-11-05 NOTE — PLAN OF CARE
ICU End of Shift Summary.  For vital signs and complete assessments, please see documentation flowsheets.      Pertinent assessments:   Neuro: Unresponsive off all sedation - PRN dilaudid given for HTN, and increased RR with minimal success.   Cardiac: SR. MAPs at goal. HTN  Resp: Vent supported. LS coarse, exp. Wheezes. Minimal secretions. Sats above 90%  GI: TF @ 25.  q4hr. Smear for BM, brown.   : Aleman in place good uop.  Skin: see flowsheets for details.  Lines: PICC, 2 PIVs    Main Events: HTN, and Fio2 needs increased, trend ABG.     Plan (Upcoming Events): Continue plan of care.  Discharge/Transfer Needs: TBD     Bedside Shift Report Completed : Y  Bedside Safety Check Completed: Y

## 2023-11-06 NOTE — PROGRESS NOTES
Per intensivist note, family not yet decided if they want to proceed with tracheotomy, confirmed that with patient's nurse just now.  Trach scheduled for 8:00 AM 11/8/23 but if family has not yet decided if they wish to proceed, should cancel the surgery in advance so don't waste valuable OR time that morning that could be used by another service.  Please contact me before 10:00 AM Tuesday 10/7/23 with final decision so can notfiy OR if planning to cancel surgery. (My phone is [phone])

## 2023-11-06 NOTE — PLAN OF CARE
Goal Outcome Evaluation:      Plan of Care Reviewed With: patient    Overall Patient Progress: no changeOverall Patient Progress: no change     ICU End of Shift Summary.  For vital signs and complete assessments, please see documentation flowsheets.      Pertinent assessments: RASS -4. Slowly titrating down dex, as intermittently pt will have increase in RR up to 40s. Lungs coarse, small amounts thick cloudy secretions ett and oral. Maintained sats with fio2 90%. Tele SB-SR, bp maintained maps>65 without needing pressor support. Incontinent of loose brown stool. Aleman w/adequate output.     Lines: picc, art line, piv  Drips: dex, dilaudid    Plan (Upcoming Events): continue weaning sedation and vent support as able       Bedside Shift Report Completed : y  Bedside Safety Check Completed: y    0454- notified tele hub RNKam, of K+ 3.1 this am- no protocol in place- requested replacement or protocol. Tele RN to talk to tele MD   8237- received order for k+ protocol

## 2023-11-06 NOTE — PROGRESS NOTES
[facility]   Pulmonary Progress Note  Matthew Bowen MRN: [id number]  1945  Date of Admission:10/15/2023  Date of Service: 11/06/2023  ___________________________________    Assessment & Plan  Matthew Bowen is a 78 year old female, w/ PMHx most significant for hypertension, ALAINA, chronic diastolic heart failure and CKD, admitted w/ acute hypoxemic respiratory failure.  She had been treated for pneumonia x3 in the last 2 to 3 months.  She received broad-spectrum antibiotics during this current admission, she has been intubated since 10/22, her chest imaging studies showed bilateral groundglass opacities and dense consolidation, she did have work-up with bronchoscopy and BAL, I did not see the cell count on the BAL, the cultures have been negative, she did have an autoimmune work-up that was negative except for mild elevation of the RF and borderline nonspecific elevated MARLI, notably she did have low IgG level.  She has been treated with the high-dose steroids for 7 days now with Solu-Medrol 125 mg once daily.  She was diuresed over the weekend 10/28-10/29, but she is still positive on her I/O.  With the worsening ARDS, and no specific etiology, this could be inflammatory pneumonitis such as acute interstitial pneumonitis (Hamman Rich syndrome), other etiologies include organizing, she did have some CT scan findings that could be consistent with Atoll sign on the CT scan 10/21/2023.  Interestingly there are case reports of organizing pneumonia with immunoglobulin deficiency, IVIG treatment could be considered in these cases, but would continue aggressive diuresis first before these considerations.     Has been improving with diuretics       Acute hypoxemic respiratory failure  Severe ARDS  Decreased IgG level  History of recurrent pneumonia treated with antibiotics     -With improvement and significant trending down of the inflammatory marker, I recommend to start tapering her steroids, I recommend  slow taper over 6 to 8 weeks, can transition from IV Solu-Medrol to prednisone 60 mg daily, then decreasing the daily dose by 5 mg every 2 weeks until she reaches 20 mg daily, at that time she would need to follow-up with pulmonology and repeat CT scan imaging of her chest to determine the next step of the taper   -The prednisone taper above is for organizing pneumonia treatment, with this prolonged course of prednisone she would need to be on PJP prophylaxis, typically I would recommend Bactrim, but because of her kidney function might consider either atovaquone or dapsone (after checking G6PD level)   -Recommend aggressive diuresis (weight coming down, Cr improving)   -agree with SIMV if tolerating and making improvements    I have personally reviewed the daily labs, imaging studies, cultures and discussed the case with referring physician and consulting physicians.     Rajesh Nicole MD  Pulmonary & Critical Care   Department of Medicine  Division of Pulmonary, Allergy, Critical Care and Sleep Medicine   [facility], [facility]  [phone] (Text Page)       Interval History    - Nursing notes reviewed.   -fio2 improving, now 50s. On SIMV since 11/5/23    -Vent is reviewed   - 750cc yesterday, wt coming down    Physical Exam Temp:  [97.3  F (36.3  C)-99.9  F (37.7  C)] 99.7  F (37.6  C)  Pulse:  [52-75] 75  Resp:  [20-32] 24  BP: (142)/(66) 142/66  MAP:  [67 mmHg-104 mmHg] 80 mmHg  Arterial Line BP: (116-155)/(8-70) 131/53  FiO2 (%):  [35 %-50 %] 35 %  SpO2:  [90 %-100 %] 96 %  I/O last 3 completed shifts:  In: 2365.27 [I.V.:525.27; NG/GT:1290]  Out: 3120 [Urine:3120]  Wt Readings from Last 1 Encounters:   11/06/23 89.8 kg (197 lb 15.6 oz)    Body mass index is 38.66 kg/m . Vent Mode: SIMV/PS  (Synchronized Intermittent Mandatory Ventilation with Pressure Support)  FiO2 (%): 35 %  Resp Rate (Set): 20 breaths/min  Tidal Volume (Set, mL): 480 mL  PEEP (cm H2O): (S) 8 cmH2O  Pressure Support (cm H2O):  15 cmH2O  Inspiratory Pressure (cm H2O) (Drager Sanam): 42  Resp: 24      Exam:  General: Critically ill  HEENT: ET tube was noted  CV: RRR, systolic murmur best heard at the RUSB  Resp: Equal b/l air entry, bilateral crackles, no wheezing.  Abd: Soft, ND  Extremities: Pitting edema +2 bilaterally.  Neuro: Deeply sedated and paralyzed on the ventilator, does not follow commands.    Data   Labs: reviewed in EMR and notable labs listed below.  CMP:   Recent Labs   Lab 11/06/23  1209 11/06/23  1122 11/06/23  0820 11/06/23  0404 11/05/23  1624 11/05/23  1553 11/05/23  0840 11/05/23  0415 11/05/23  0010 11/04/23  2044 11/04/23  0755 11/04/23  0603 11/03/23  0824 11/03/23  0517   NA  --   --   --  149*  --  149*  --  150*  --  145  --  147*   < > 144   POTASSIUM  --  3.5  --  3.1*  --  3.4  --  3.5  --   --   --  3.9   < > 4.1   CHLORIDE  --   --   --  112*  --  111*  --  112*  --   --   --  110*   < > 108*   CO2  --   --   --  22  --  22  --  23  --   --   --  22   < > 21*   ANIONGAP  --   --   --  15  --  16*  --  15  --   --   --  15   < > 15   *  --  110* 136*  154*   < > 190*   < > 141*   < >  --    < > 151*   < > 125*   BUN  --   --   --  166.0*  --  169.4*  --  171.2*  --   --   --  171.9*   < > 160.8*   CR  --   --   --  1.65*  --  1.74*  --  1.77*  --   --   --  1.92*   < > 2.08*   GFRESTIMATED  --   --   --  31*  --  30*  --  29*  --   --   --  26*   < > 24*   BARBARA  --   --   --  8.7*  --  8.9  --  8.8  --   --   --  8.7*   < > 8.5*   MAG  --   --   --  2.5*  --   --   --  2.6*  --   --   --  2.8*  --  2.8*   PHOS  --   --   --  4.5  --   --   --  4.8*  --   --   --  5.8*  --  6.6*    < > = values in this interval not displayed.     CBC:   Recent Labs   Lab 11/06/23  0404 11/05/23  0415 11/04/23  0603 11/03/23  0517   WBC 16.7* 17.8* 17.9* 21.5*   RBC 3.26* 3.17* 2.76* 2.89*   HGB 8.7* 8.4* 7.4* 7.7*   HCT 28.6* 27.7* 24.9* 25.3*   MCV 88 87 90 88   MCH 26.7 26.5 26.8 26.6   MCHC 30.4* 30.3* 29.7* 30.4*    RDW 24.4* 23.9* 22.9* 22.4*    204 187 185       Culture Data   7-Day Micro Results       No results found for the last 168 hours.              Virology Data:   Lab Results   Component Value Date    FLUAH1 Not Detected 10/18/2023    FLUAH3 Not Detected 10/18/2023    VQ2057 Not Detected 10/18/2023    IFLUB Not Detected 10/18/2023    RSVA Not Detected 10/18/2023    RSVB Not Detected 10/18/2023    PIV1 Not Detected 10/18/2023    PIV2 Not Detected 10/18/2023    PIV3 Not Detected 10/18/2023    HMPV Not Detected 10/18/2023       Most Recent Breeze Pulmonary Function Testing (FVC/FEV1 only)  No prior PFT in the chart    Imaging: reviewed in EMR and notable imaging listed below.    Chest x-ray on 10/31/2023  IMPRESSION: ET tube tip is 4.4 cm proximal to the ashley. Nasogastric  tube in place. Right PICC line tip at the mid SVC, stable in position.  Diffuse bilateral pulmonary opacities appear mildly increased on the  left and stable on the right. Stable cardiac silhouette that is  borderline prominent.    Medications    artificial tears   Both Eyes Q8H    aspirin  81 mg Oral or Feeding Tube Daily    atorvastatin  20 mg Oral or Feeding Tube QPM    atovaquone  1,500 mg Per Feeding Tube Daily    B and C vitamin Complex with folic acid  5 mL Oral or Feeding Tube Daily    chlorhexidine  15 mL Mouth/Throat Q12H    clotrimazole   Topical BID    heparin ANTICOAGULANT  5,000 Units Subcutaneous Q12H    insulin aspart  1-6 Units Subcutaneous Q4H    ipratropium - albuterol 0.5 mg/2.5 mg/3 mL  3 mL Nebulization 4x daily    levothyroxine  150 mcg Oral or Feeding Tube QAM AC    metolazone  5 mg Oral or Feeding Tube BID    midodrine  10 mg Oral or Feeding Tube Q8H    pantoprazole  40 mg Per Feeding Tube QAM AC    predniSONE  60 mg Oral Daily    Followed by    [START ON 11/16/2023] predniSONE  55 mg Oral Daily    Followed by    [START ON 11/30/2023] predniSONE  50 mg Oral Daily    Followed by    [START ON 12/14/2023] predniSONE  45  mg Oral Daily    Followed by    [START ON 12/28/2023] predniSONE  40 mg Oral Daily    Followed by    [START ON 1/11/2024] predniSONE  35 mg Oral Daily    Followed by    [START ON 1/25/2024] predniSONE  30 mg Oral Daily    Followed by    [START ON 2/8/2024] predniSONE  25 mg Oral Daily    Followed by    [START ON 2/22/2024] predniSONE  20 mg Oral Daily    pregabalin  75 mg Per Feeding Tube Daily    protein modular  2 packet Per Feeding Tube Daily    QUEtiapine  50 mg Per Feeding Tube BID    sertraline  150 mg Oral or Feeding Tube Daily    sodium chloride (PF)  10-40 mL Intracatheter Q7 Days    sodium chloride (PF)  3 mL Intracatheter Q8H    vitamin D3  50 mcg Oral or Feeding Tube Daily

## 2023-11-06 NOTE — PROGRESS NOTES
H ICU VENTILATOR RESPIRATORY NOTE  Date of Admission: 10/15/23  Date of Intubation (most recent): 10/22/23  Reason for Mechanical Ventilation: Respiratory failure  Number of Days on Mechanical Ventilation: 16  Met Criteria for Pressure Support Trial: No. Pt is on PEEP +10     ETT appearance on chest x-ray: ET tube tip is 4.4 cm proximal to the ashley.        Plan:  Will continue to monitor pt's respiratory status closely     Yandy Ventura, RT

## 2023-11-06 NOTE — PLAN OF CARE
Goal Outcome Evaluation:      Plan of Care Reviewed With: spouse, child    Overall Patient Progress: improvingOverall Patient Progress: improving     ICU End of Shift Summary.  For vital signs and complete assessments, please see documentation flowsheets.     Pertinent assessments: RASS goal -1 to -2, continues to be -5. Intermittently bites ETT. LS coarse/diminished/expiratory wheezes. BS audible - BM this shift. Mitchell in place. Tele - SR.  Major Shift Events:   - Vent settings changed   - FiO2 weaned to 40%, tolerated well   - Precedex added and dilaudid restarted  - Sodium elevated, metalazone BID added. Free water flush increased to 200mL every 4 hours  - Moist groin noted, physician notified - antifungal cream ordered  Plan (Upcoming Events): Continue to wean FiO2 as tolerated  Discharge/Transfer Needs: TBD    Bedside Shift Report Completed : Y  Bedside Safety Check Completed: Y     Notified provider about indwelling mitchell catheter discussed removal or continued need.    Did provider choose to remove indwelling mitchell catheter? NO    Provider's mitchell indication for keeping indwelling mitchell catheter: Indication for continued use: Strict 1-2 Hour I & O if external catheters are not an option    Is there an order for indwelling mitchell catheter? YES    *If there is a plan to keep mitchell catheter in place at discharge daily notification with provider is not necessary, but please add a notation in the treatment team sticky note that the patient will be discharging with the catheter.

## 2023-11-06 NOTE — PROGRESS NOTES
Count includes the [facility] ICU VENTILATOR RESPIRATORY NOTE  Date of Admission: 10/15/2023  Date of Intubation (most recent): 10/22/2023  Reason for Mechanical Ventilation: Respiratory failure  Number of Days on Mechanical Ventilation: 15  Met Criteria for Pressure Support Trial: NO  Reason for No Pressure Support Trial: PEEP >8   Significant Events Today: New vent settings updated; PEEP increased from +8 to +10.   ABG Results:   Recent Labs   Lab 11/05/23  0415 11/04/23  1106 11/04/23  0756 11/03/23  0517   PH 7.37 7.31* 7.30* 7.27*   PCO2 43 47* 48* 49*   PO2 58* 64* 61* 110*   HCO3 25 23 24 23   O2PER 65 50 50 55     ETT appearance on chest x-ray: 4.5 cm above ashley    Vent Mode: SIMV/PS  (Synchronized Intermittent Mandatory Ventilation with Pressure Support)  FiO2 (%): 40 %  Resp Rate (Set): 20 breaths/min  Tidal Volume (Set, mL): 480 mL  PEEP (cm H2O): 10 cmH2O  Pressure Support (cm H2O): (S) 15 cmH2O  Inspiratory Pressure (cm H2O) (Drager Sanam): 42  Resp: 24      Plan: RT to continue to monitor and assess pt's respiratory status and need.      Gregory Blanco, RT

## 2023-11-06 NOTE — PROGRESS NOTES
[facility]    Medicine Progress Note - Hospitalist Service    Date of Admission:  10/15/2023    Assessment & Plan   Matthew Bowen is a 78 year old female w/PMH chronic hypoxic respiratory failure due to pulmonary HTN, ALAINA requiring CPAP, chronic diastolic HF, CAD, CKD stage 3, morbid obesity, HTN,  depression who presents from home with family with fever, cough. Acutely worsened respiratory status on 10/21 requiring intubation. Unclear etiology for acute on chronic hypoxic respiratory failure, most likely infectious v autoimmune v other in setting of unknown underlying lung disease.      Acute hypoxic respiratory failure s/p intubation  Possible ARDS  Pneumonia, recurrent w/bronchiectasis  -Presents with recurrent PNA, 3rd time in last month or so. Last completed treatment 2 weeks ago with persistent cough. Presents with worsening fatigue, productive cough, weakness and falls.  -On admission, patient febrile to 101.1, white count of 21.  Lactic acid was within normal limits.  She underwent a CT scan that showed a lingular consolidative opacity with air bronchograms with bilateral upper lobe groundglass opacities.  She was started on ceftriaxone and azithromycin.  -Sputum culture from 10/19 better that showed Staph epi and C. albicans. Discussed with ID, yeast prevalent in sputum samples and likely candida that is not related to illness.   -COVID, influenza, Respiratory viral panel negative.  Strep and legionella antigens negative.   -intubated 10/21 due to worsening respiratory status and bronch done showing serosanguinous and fibrinous return with thick mucoid secretions noted  -discussed on rounds and stopping Cefepime, flagyl and Vanc today. ID following, appreciate help. Antibiotic dates below  -seen by pulm, steroids decreased from 125 to 60mg daily on 10/31. May benefit from IVIG if  euvolemic and not improving.  - plan for prolonged taper of prednisone from 60 mg q day to decreasing  by 5 mg every 2 weeks.  - will check G6PD level and start dapsone versus atovaquone. G6PD level pending.  - bumex gtt, will hold as on auto-diuresis. Started on metolazone bid 11/5.  - started mepron for PJP ppx 11/3.    Abx  Azithromycin 10/15-10/19  Ceftriaxone 10/15-10/17  Cefepime 10/18-31  Metronidazole 10/20-31  Vancomycin 10/21-31    Infectious/metabolic encephalopathy  -continue hydromorphone and midazolam    Shock  -suspected to be infectious, EF is preserved  -Continue midodrine 10mg q8h, now off pressors    Hypernatremia  - increase free water to 250 mls q 4 hours.    Gluteal cleft irritant dermatitis   - WOC consulted, appreciate recs    Leukocytosis: Suspect related to above as well as steroids.  From what I can see in care everywhere has generally been 9-12 range in [id number].       Generalized weakness. Chronic arthritis. Falls at home. Left Foot pain:   -Having multifactorial weakness, with diffuse body aches and pains.  Imaging on admission including CT head, cervical spine, CT CAP did not show acute fracture.  She had an x-ray of her foot also showed degenerative changes without fracture.    -readdress when appropriate     Hx ALAINA, pulm HTN, chronic diastolic HF:   - Metolazone/lasix PRN for volume overload/natriuresis.   - Given her softer BPs holding PTA antihypertensives.       CKD stage 3  Uremia  Cr 1.21 on admission. Currently ~1.77. Suspect underlying renal function may be worse than creatinine reflects as eGFR by cystatin C estimate is 13. Urea has been steadily increasing since admission while creatinine has stabilized.  -transitioned to low-protein tube feeds, nephrology consulted but discussed and no formal consult at this time. Agree with present management  -serial labs     HTN: holding PTA antihypertensives     Hx morbid obesity, anemia, CAD, hypothyroidism, mood disorder: on ASA, statin, plavix, lyrica, zoloft at home               Diet: NPO per Anesthesia Guidelines for  Procedure/Surgery Except for: Meds  Adult Formula Drip Feeding: Continuous Novasource Renal; Orogastric tube; Goal Rate: 25 mL/hr x 22 hours (please hold 1 hour before and after levothyroxine); mL/hr; Decrease rate to 25 mL/hr    DVT Prophylaxis: Heparin SQ  Aleman Catheter: PRESENT, indication: Strict 1-2 Hour I&O  Lines: PRESENT      PICC 10/22/23 Triple Lumen Right Basilic multiple med gtt. ready for use-Site Assessment: WDL except;Ecchymotic  Arterial Line 10/22/23 Radial-Site Assessment: WDL      Cardiac Monitoring: ACTIVE order. Indication: Tachyarrhythmias, acute (48 hours)  Code Status: Full Code      Clinically Significant Risk Factors        # Hypokalemia: Lowest K = 3.1 mmol/L in last 2 days, will replace as needed  # Hypernatremia: Highest Na = 150 mmol/L in last 2 days, will monitor as appropriate      # Hypoalbuminemia: Lowest albumin = 2.9 g/dL at 10/22/2023  5:20 AM, will monitor as appropriate            # Obesity: Estimated body mass index is 38.66 kg/m  as calculated from the following:    Height as of this encounter: 1.524 m (5').    Weight as of this encounter: 89.8 kg (197 lb 15.6 oz).             Disposition Plan             Teresa Smith MD  Hospitalist Service  [facility]  Securely message with [employer] (more info)  Text page via [facility] Paging/Directory   ______________________________________________________________________    Interval History     Intubated and sedated.    Physical Exam   Vital Signs: Temp: 98.6  F (37  C) Temp src: Bladder BP: (!) 142/66 Pulse: 52   Resp: 20 SpO2: 93 % O2 Device: Mechanical Ventilator    Weight: 197 lbs 15.57 oz    Gen - Intubated and sedated.  Lungs - CTA B.  Heart - RR,S1+S2 nml, no m/g/r.  Abd - soft, NT, ND, + BS.  Ext - trace edema.    Medical Decision Making       80 MINUTES SPENT BY ME on the date of service doing chart review, history, exam, documentation & further activities per the note.      Data     I have personally  reviewed the following data over the past 24 hrs:    16.7 (H)  \   8.7 (L)   / 184     149 (H) 112 (H) 166.0 (H) /  110 (H)   3.1 (L) 22 1.65 (H) \       Imaging results reviewed over the past 24 hrs:   No results found for this or any previous visit (from the past 24 hour(s)).

## 2023-11-06 NOTE — PROGRESS NOTES
ICU staff  DOS 11/6/2023    No major overnight events. This morning is sedate and doesn't react to exam.    Vitals:   Temp:  [97.3  F (36.3  C)-99.9  F (37.7  C)] 99.7  F (37.6  C)  Pulse:  [52-75] 75  Resp:  [20-32] 24  BP: (142)/(66) 142/66  MAP:  [70 mmHg-104 mmHg] 70 mmHg  Arterial Line BP: (121-155)/(8-70) 121/47  FiO2 (%):  [40 %-55 %] 40 %  SpO2:  [90 %-100 %] 93 %     Vent Mode: SIMV/PS  (Synchronized Intermittent Mandatory Ventilation with Pressure Support)  FiO2 (%): 40 %  Resp Rate (Set): 20 breaths/min  Tidal Volume (Set, mL): 480 mL  PEEP (cm H2O): 10 cmH2O  Pressure Support (cm H2O): 15 cmH2O  Inspiratory Pressure (cm H2O) (Drager Sanam): 42  Resp: 24    I/O last 3 completed shifts:  In: 2365.27 [I.V.:525.27; NG/GT:1290]  Out: 3120 [Urine:3120]    Dex 0.2-0.3  Hydromorphone 0.3-0.4    Exam:  Gen: Intubated, sedated  HEENT: NC/AT  Pulm: Clear, airway pressures a little high but unchanged  Cor: RRR  Abdomen/GI: Soft, nondistended  : mitchell in place  Extremities: Warm, nonedematous  Skin: Well-perfused  Neuro: Sedated  Psych: Unable to assess    Data:   Labs reviewed  - WBCs 16.7, Hb 8.7, both stable  - Cr 1.65 (1.74),  but slowly coming down  - Na 149  Nothing new for cultures or imaging    Assessment/plan:  77 y/o woman with chronic hypoxemic respiratory failure, bronchiectasis, and recurrent pneumonia admitted 10/15 with cough/weakness. Transferred to ICU and intubated 10/22.   CNS: Intubated, sedated.  # Weakness/frequent falls  # Chronic pain  # Sedation  - Continue to lighten sedation as able, balancing some alertness with vent synchrony  Pulm: Tolerating ventilation, is slowly coming down on vent needs.  # Acute on chronic mixed respiratory failure  # Chronic hypoxemia on home oxygen (2L baseline)  # Bronchiectasis  # Recurrent pneumonia  # ?Inflammatory pneumonitis  - On SIMV 20 with PS 15, does OK with spontaneous breaths  - Can wean PEEP to 8, if tolerated we can start coming down on the  set rate slowly  - PS trials if able  - Plateau 30-35 acceptable given body habitus  - Steroid taper for inflammatory process  - Tracheostomy consultation done by ENT, family is unsure if they are ready to proceed; continue to discuss  CV: Remains off pressors.  # Shock, resolved  # Essential hypertension  # CAD s/p PCI  # Diastolic heart failure  # Moderate aortic stenosis  - Continues on midodrine  GI: Abdomen benign.  # Nutrition  - Continue tube feedings  : UOP adequate, no real change in renal labs  # Hypernatremia  # Hypervolemia  # Uremia  # Acute on chronic renal insufficiency  - Creatinine stable and roughly at baseline  - BUN down a little from the last few days  - Continue metolazone for natriuresis and follow  Heme: Hb 8.7 (8.4)  # Anemia of chronic disease  - No indication to transfuse  Msk: Extremities warm, well-perfused.   Endo: Glucose 110-194.  # Hyperglycemia  # Hypothyroidism  - Continue insulin sliding scale, levothyroxine  ID: WBCs stable in mid-teens.  # Leukocytosis  - Likely steroid-related  - No clear evidence of infection, remains off antibiotic  - PJP prophylaxis  ICU: SQH, PPI.  - Family updated at bedside    This patient is critically ill to my assessment and requires ICU monitoring and cares. A total of 40 minutes critical care time spent on 11/6/2023, exclusive of procedures.     Rui Payton MD, PhD  Surgical critical care  11/6/2023, 12:26 PM    Clinically Significant Risk Factors        # Hypokalemia: Lowest K = 3.1 mmol/L in last 2 days, will replace as needed  # Hypernatremia: Highest Na = 150 mmol/L in last 2 days, will monitor as appropriate      # Hypoalbuminemia: Lowest albumin = 2.9 g/dL at 10/22/2023  5:20 AM, will monitor as appropriate            # Obesity: Estimated body mass index is 38.66 kg/m  as calculated from the following:    Height as of this encounter: 1.524 m (5').    Weight as of this encounter: 89.8 kg (197 lb 15.6 oz).

## 2023-11-07 NOTE — PLAN OF CARE
Goal Outcome Evaluation:      Plan of Care Reviewed With: patient, spouse, child    Overall Patient Progress: improvingOverall Patient Progress: improving       ICU End of Shift Summary.  For vital signs and complete assessments, please see documentation flowsheets.     Pertinent assessments: Remains on mechanical ventilation and maintaining sats >90%, RR/Psupp down. Tolerated PS trial for an hr. Suctioned for minimal secretions. Neurologically unresponsive to external stimuli but slightly opens eyes. Hydromorphone gtt discontinued, transitioned to PO oxycodone and effective for pain. MAP at goal off vasopressors. Rhythm sinus with occ atrial ectopy. Diuresing well via mitchell. TF continued at goal, she is tolerating well. D5 initiated for Na+ correction. Family updated at bedside.        Major Shift Events: As outlined above    Plan (Upcoming Events): Trend Na+ q6hrs  Discharge/Transfer Needs: TBD    Bedside Shift Report Completed : y  Bedside Safety Check Completed: y     Notified provider about indwelling mitchell catheter discussed removal or continued need.    Did provider choose to remove indwelling mitchell catheter? NO    Provider's mitchell indication for keeping indwelling mitchell catheter: Indication for continued use: Strict 1-2 Hour I & O if external catheters are not an option    Is there an order for indwelling mitchell catheter? YES

## 2023-11-07 NOTE — PROGRESS NOTES
Received call from patient's nurse this AM that trach on hold at this time.  Have canceled tracheotomy for tomorrow.  Please contact us if needs to be rescheduled in the future.

## 2023-11-07 NOTE — PROGRESS NOTES
[facility]    Medicine Progress Note - Hospitalist Service    Date of Admission:  10/15/2023    Assessment & Plan   Matthew Bowen is a 78 year old female w/PMH chronic hypoxic respiratory failure due to pulmonary HTN, ALAINA requiring CPAP, chronic diastolic HF, CAD, CKD stage 3, morbid obesity, HTN,  depression who presents from home with family with fever, cough. Acutely worsened respiratory status on 10/21 requiring intubation. Unclear etiology for acute on chronic hypoxic respiratory failure, most likely infectious v autoimmune v other in setting of unknown underlying lung disease.      Acute hypoxic respiratory failure s/p intubation  Possible ARDS  Pneumonia, recurrent w/bronchiectasis  -Presents with recurrent PNA, 3rd time in last month or so. Last completed treatment 2 weeks ago with persistent cough. Presents with worsening fatigue, productive cough, weakness and falls.  -On admission, patient febrile to 101.1, white count of 21.  Lactic acid was within normal limits.  She underwent a CT scan that showed a lingular consolidative opacity with air bronchograms with bilateral upper lobe groundglass opacities.  She was started on ceftriaxone and azithromycin.  -Sputum culture from 10/19 better that showed Staph epi and C. albicans. Discussed with ID, yeast prevalent in sputum samples and likely candida that is not related to illness.   -COVID, influenza, Respiratory viral panel negative.  Strep and legionella antigens negative.   -intubated 10/21 due to worsening respiratory status and bronch done showing serosanguinous and fibrinous return with thick mucoid secretions noted  -discussed on rounds and stopping Cefepime, flagyl and Vanc today. ID following, appreciate help. Antibiotic dates below  -seen by pulm, steroids decreased from 125 to 60mg daily on 10/31. May benefit from IVIG if  euvolemic and not improving.  - plan for prolonged taper of prednisone from 60 mg q day to decreasing  by 5 mg every 2 weeks.  - will check G6PD level and start dapsone versus atovaquone. G6PD level pending.  - bumex gtt, will hold as on auto-diuresis. Started on metolazone bid 11/5.  - started mepron for PJP ppx 11/3.    Abx  Azithromycin 10/15-10/19  Ceftriaxone 10/15-10/17  Cefepime 10/18-31  Metronidazole 10/20-31  Vancomycin 10/21-31    Infectious/metabolic encephalopathy  -continue hydromorphone and precedex.    Shock  -suspected to be infectious, EF is preserved  -Continue midodrine 10mg q8h, now off pressors    Hypernatremia  - increased free water to 250 mls q 4 hours.  - will start D5W at 125 mls/hr.    Gluteal cleft irritant dermatitis   - WOC consulted, appreciate recs    Leukocytosis: Suspect related to above as well as steroids.  From what I can see in care everywhere has generally been 9-12 range in [id number].       Generalized weakness. Chronic arthritis. Falls at home. Left Foot pain:   -Having multifactorial weakness, with diffuse body aches and pains.  Imaging on admission including CT head, cervical spine, CT CAP did not show acute fracture.  She had an x-ray of her foot also showed degenerative changes without fracture.    -readdress when appropriate     Hx ALAINA, pulm HTN, chronic diastolic HF:   - Metolazone/lasix PRN for volume overload/natriuresis.   - Given her softer BPs holding PTA antihypertensives.       CKD stage 3  Uremia  Cr 1.21 on admission. Currently ~1.55. Suspect underlying renal function may be worse than creatinine reflects as eGFR by cystatin C estimate is 13. Urea has been steadily increasing since admission while creatinine has stabilized.  -transitioned to low-protein tube feeds, nephrology consulted but discussed and no formal consult at this time. Agree with present management  -serial labs     HTN: holding PTA antihypertensives     Hx morbid obesity, anemia, CAD, hypothyroidism, mood disorder: on ASA, statin, plavix, lyrica, zoloft at home     Acute on Chronic diastolic  CHF.  - On metolazone.          Diet: NPO per Anesthesia Guidelines for Procedure/Surgery Except for: Meds  Adult Formula Drip Feeding: Continuous Novasource Renal; Orogastric tube; Goal Rate: 25 mL/hr x 22 hours (please hold 1 hour before and after levothyroxine); mL/hr; Decrease rate to 25 mL/hr    DVT Prophylaxis: Heparin SQ  Aleman Catheter: PRESENT, indication: Strict 1-2 Hour I&O  Lines: PRESENT      PICC 10/22/23 Triple Lumen Right Basilic multiple med gtt. ready for use-Site Assessment: WDL except;Ecchymotic  Arterial Line 10/22/23 Radial-Site Assessment: WDL      Cardiac Monitoring: ACTIVE order. Indication: Tachyarrhythmias, acute (48 hours)  Code Status: Full Code      Clinically Significant Risk Factors        # Hypokalemia: Lowest K = 3.1 mmol/L in last 2 days, will replace as needed  # Hypernatremia: Highest Na = 153 mmol/L in last 2 days, will monitor as appropriate      # Hypoalbuminemia: Lowest albumin = 2.9 g/dL at 10/22/2023  5:20 AM, will monitor as appropriate            # Obesity: Estimated body mass index is 38.66 kg/m  as calculated from the following:    Height as of this encounter: 1.524 m (5').    Weight as of this encounter: 89.8 kg (197 lb 15.6 oz).             Disposition Plan             Teresa Smith MD  Hospitalist Service  [facility]  Securely message with [employer] (more info)  Text page via [facility] Paging/Directory   ______________________________________________________________________    Interval History     Intubated and sedated.     Physical Exam   Vital Signs: Temp: 99.7  F (37.6  C) Temp src: Bladder   Pulse: 82   Resp: 21 SpO2: 92 % O2 Device: Mechanical Ventilator    Weight: 197 lbs 15.57 oz    Gen - Intubated and sedated.  Lungs - CTA B.  Heart - RR,S1+S2 nml, no m/g/r.  Abd - soft, NT, ND, + BS.  Ext - trace edema.       Medical Decision Making       80 MINUTES SPENT BY ME on the date of service doing chart review, history, exam, documentation &  further activities per the note.      Data     I have personally reviewed the following data over the past 24 hrs:    17.1 (H)  \   8.5 (L)   / 157     153 (H) 115 (H) 165.0 (H) /  106 (H)   3.3 (L) 25 1.55 (H) \       Imaging results reviewed over the past 24 hrs:   No results found for this or any previous visit (from the past 24 hour(s)).

## 2023-11-07 NOTE — PLAN OF CARE
Goal Outcome Evaluation:           Overall Patient Progress: improvingOverall Patient Progress: improving     ICU End of Shift Summary.  For vital signs and complete assessments, please see documentation flowsheets.     Pertinent assessments: Remains on mechanical ventilation and maintaining sats >91%, Fio2/PEEP down. Suctioned for minimal secretions. Neurologically unresponsive off dexmedetomidine. Low dose hydromorphone effective for pain. MAP at goal off vasopressors. Rhythm sinus, no ectopy. Diuresing well via mitchell. TF continued at goal,she is tolerating well. FHF updated for Na+ correction. Family updated at bedside.       Major Shift Events: Dex off and dilaudid titrated down. PEEP/FiO2 down to 8 & 30% respectively.   Plan (Upcoming Events): POC ongoing.   Discharge/Transfer Needs: TBD    Bedside Shift Report Completed : y  Bedside Safety Check Completed: y     Notified provider about indwelling mitchell catheter discussed removal or continued need.    Did provider choose to remove indwelling mitchell catheter? NO    Provider's mitchell indication for keeping indwelling mitchell catheter: Indication for continued use: Strict 1-2 Hour I & O if external catheters are not an option    Is there an order for indwelling mitchell catheter? YES

## 2023-11-07 NOTE — PROGRESS NOTES
[facility] ICU VENTILATOR RESPIRATORY NOTE  Date of Admission: 10/15/2023  Date of Intubation (most recent): 10/22/2023  Reason for Mechanical Ventilation: Respiratory failure  Number of Days on Mechanical Ventilation: 16  Met Criteria for Pressure Support Trial: Yes  Reason for No Pressure Support Trial: Start weaning tomorrow in AM per provider  Significant Events Today: PEEP titrated down from +10 to +8  ABG Results:   Recent Labs   Lab 11/05/23  0415 11/04/23  1106 11/04/23  0756 11/03/23  0517   PH 7.37 7.31* 7.30* 7.27*   PCO2 43 47* 48* 49*   PO2 58* 64* 61* 110*   HCO3 25 23 24 23   O2PER 65 50 50 55     ETT appearance on chest x-ray: 4.5 cm above ashley    Vent Mode: SIMV/PS  (Synchronized Intermittent Mandatory Ventilation with Pressure Support)  FiO2 (%): 30 %  Resp Rate (Set): 20 breaths/min  Tidal Volume (Set, mL): 480 mL  PEEP (cm H2O): 8 cmH2O  Pressure Support (cm H2O): 15 cmH2O  Inspiratory Pressure (cm H2O) (Drager Sanam): 42  Resp: 21      Plan: PEEP was dropped down to +8 this afternoon and weaning trial to be initiated tomorrow in AM. RT to continue to monitor and assess pt's respiratory status and need.      Gregory Blanco, RT

## 2023-11-07 NOTE — PROGRESS NOTES
ICU staff  DOS 11/7/2023    No major changes overnight. A little hypertensive this morning. On SBT at my visit, though RSBI trending into the 80s so we put her back to SIMV.    Vitals:   Temp:  [99.3  F (37.4  C)-99.9  F (37.7  C)] 99.7  F (37.6  C)  Pulse:  [67-96] 92  Resp:  [21] 21  MAP:  [67 mmHg-115 mmHg] 84 mmHg  Arterial Line BP: (116-188)/(12-66) 141/54  FiO2 (%):  [30 %-35 %] 30 %  SpO2:  [89 %-96 %] 90 %     Vent Mode: SIMV  (Synchronized Intermittent Mandatory Ventilation)  FiO2 (%): 30 %  Resp Rate (Set): 20 breaths/min  Tidal Volume (Set, mL): 480 mL  PEEP (cm H2O): 8 cmH2O  Pressure Support (cm H2O): 12 cmH2O  Resp: 21    I/O last 3 completed shifts:  In: 2366.43 [I.V.:49.43; NG/GT:1775]  Out: 2885 [Urine:2885]    Exam:  Gen: Intubated, no apparent distress  HEENT: NC/AT  Pulm: Tachypneic and somewhat shallow on 10/8  Cor: RRR  Abdomen/GI: Soft, nondistended  : Aleman in place  Extremities: Warm  Skin: Well-perfused  Neuro: Eyes open, not following  Psych: Unable to assess    Data:   Labs reviewed  - Na up again at 153  - Cr stable 1.55  Nothing new on culture or imaging    Assessment/plan:  79 y/o woman with chronic hypoxemic respiratory failure, bronchiectasis and recurrent pneumonia admitted 10/15 with weakness and cough. Deteriorated, required intubation 10/22. Showing some slow progress though remains on significant ventilator support.  CNS: Intubated, sedated.  # Weakness/frequent falls  # Acute/chronic pain  # Sedation  - Oxycodone with prn hydromorphone, hold on dex for now  Pulm: Tolerating vent, slow reduction in support needs.   # Acute on chronic mixed respiratory failure  # Chronic hypoxemia with home oxygen  # Bronchiectasis  # Recurrent pneumonia  # Inflammatory/organizing pneumonitis  - SIMV pressure support to 12 today, if tolerating this afternoon turn rate to 18 and see how she does  - PS trials at least daily though she is not close to extubation readiness by my evaluation today  -  Steroid taper  - Tracheostomy held for now per family request  CV: A bit hypertensive this morning.  # Shock, resolved  # Essential hypertension  # CAD s/p PCI  # Diastolic heart failure  # Moderate aortic stenosis  - Hold midodrine; prn antihypertensives  GI: Abdomen benign  # Nutrition  - Continue tube feedings  : UOP adequate, still hypernatremic.  # Hypernatremia  # CARMEN on CKD, at baseline  # Uremia  # Hypervolemia  - Loop diuretic today, hold on metolazone  - Increase free water  Heme: Hb 8.5  # Anemia of chronic disease  - No indications to transfuse  Msk: Extremities warm, well-perfused.   Endo: Glucose 106-126  # Hypothyroidism  - Levothyroxine  ID: Afebrile, WBCs stable.  # Leukocytosis  # ?HCAP, treated  - Remains off abx  - PJP prophylaxis while on steroid  ICU: SQH, PPI  - Family updated at bedside    This patient is critically ill to my assessment and requires ICU monitoring and cares. A total of 40 minutes critical care time spent on 11/7/2023, exclusive of procedures.     Rui Payton MD, PhD  Surgical critical care  11/7/2023, 12:27 PM    Clinically Significant Risk Factors        # Hypokalemia: Lowest K = 3.1 mmol/L in last 2 days, will replace as needed  # Hypernatremia: Highest Na = 153 mmol/L in last 2 days, will monitor as appropriate      # Hypoalbuminemia: Lowest albumin = 2.9 g/dL at 10/22/2023  5:20 AM, will monitor as appropriate            # Obesity: Estimated body mass index is 38.66 kg/m  as calculated from the following:    Height as of this encounter: 1.524 m (5').    Weight as of this encounter: 89.8 kg (197 lb 15.6 oz).

## 2023-11-07 NOTE — PLAN OF CARE
Goal Outcome Evaluation:      Plan of Care Reviewed With: patient, child    Overall Patient Progress: no changeOverall Patient Progress: no change     ICU End of Shift Summary.  For vital signs and complete assessments, please see documentation flowsheets.     Pertinent assessments:   Neuro: RASS goal of -2. Starting to wake up, movement of upper extremities and initiating own cough. Dilaudid gtt for pain, utilized prn dilaudid for breakthrough pain. Afebrile.    CV: SR, occasional ectopy noted. HTN, seemingly pain related. PRN dilaudid improves.    Resp: Remains on mechanical ventilation. Wean inappropriate this am as BP's not stable. LS coarse.   GI/: Aleman in place with adequate UO. BS audible throughout, 1 small BM. Tolerating TF at goal.   Lines: R radial art line. R PICC.   Skin: Moisture to sacrum, barrier cream applied. Scattered bruising.   Major Shift Events: BP's elevated, utilizing prn dilaudid for pain which helps with HTN. Supplementing K this am.   Plan (Upcoming Events): Wean vent as able. Monitor electrolytes. Continue supportive and ICU cares.   Discharge/Transfer Needs: TBD    Bedside Shift Report Completed : Y  Bedside Safety Check Completed: Y

## 2023-11-07 NOTE — PROGRESS NOTES
[facility] ICU VENTILATOR RESPIRATORY NOTE  Date of Admission: 10/15/23  Date of Intubation (most recent): 10/22/23  Reason for Mechanical Ventilation: Respiratory failure  Number of Days on Mechanical Ventilation: 17  Met Criteria for Pressure Support Trial: Yes  SBT preformed: No; blood pressure to high off of sedation. Unable to wean this morning. To follow up in AM with MD. RN and RT agree      ETT appearance on chest x-ray: ET tube tip is 4.4 cm proximal to the ashley.        Plan:  Will continue to monitor pt's respiratory status closely

## 2023-11-07 NOTE — PROGRESS NOTES
[facility] ICU VENTILATOR RESPIRATORY NOTE    Date of Admission: 10/15/23  Date of Intubation (most recent): 10/22/23  Reason for Mechanical Ventilation: Respiratory failure  Number of Days on Mechanical Ventilation: 17    Met Criteria for Pressure Support Trial: Yes  Length of Pressure Support Trial: 60 min  Reason for Stopping Pressure Support Trial: RSBI elevated    Significant Events Today: 60 min wean    Vital Signs:  Temp: 99.5  F (37.5  C) Temp src: Bladder   Pulse: 93     SpO2: 98 % O2 Device: Mechanical Ventilator Oxygen Delivery: 55 LPM     ABG Results:   Recent Labs   Lab 11/07/23  0910 11/05/23  0415 11/04/23  1106 11/04/23  0756   PH 7.42 7.37 7.31* 7.30*   PCO2 40 43 47* 48*   PO2 41* 58* 64* 61*   HCO3 26 25 23 24   O2PER 30 65 50 50     VBG Results: Venous Blood Gas  Recent Labs   Lab 11/07/23  0910 11/05/23  0415 11/04/23  1106 11/04/23  0756   O2PER 30 65 50 50         Vent Mode: SIMV  (Synchronized Intermittent Mandatory Ventilation)  FiO2 (%): 30 %  Resp Rate (Set): 20 breaths/min  Tidal Volume (Set, mL): 480 mL  PEEP (cm H2O): 8 cmH2O  Pressure Support (cm H2O): 12 cmH2O  Resp: 21      Plan:  continue to monitor and wean as able    Eduarda Son, RT

## 2023-11-08 NOTE — PROGRESS NOTES
[facility]   Pulmonary Progress Note  Matthew Bowen MRN: [id number]  1945  Date of Admission:10/15/2023  Date of Service: 11/08/2023  ___________________________________    Assessment & Plan  Matthew Bowen is a 78 year old female, w/ PMHx most significant for hypertension, ALAINA, chronic diastolic heart failure and CKD, admitted w/ acute hypoxemic respiratory failure.  She had been treated for pneumonia x3 in the last 2 to 3 months.  She received broad-spectrum antibiotics during this current admission, she has been intubated since 10/22, her chest imaging studies showed bilateral groundglass opacities and dense consolidation, she did have work-up with bronchoscopy and BAL, I did not see the cell count on the BAL, the cultures have been negative, she did have an autoimmune work-up that was negative except for mild elevation of the RF and borderline nonspecific elevated MARLI, notably she did have low IgG level.  She has been treated with the high-dose steroids for 7 days now with Solu-Medrol 125 mg once daily.  She was diuresed over the weekend 10/28-10/29, but she is still positive on her I/O.  With the worsening ARDS, and no specific etiology, this could be inflammatory pneumonitis such as acute interstitial pneumonitis (Hamman Rich syndrome), other etiologies include organizing, she did have some CT scan findings that could be consistent with Atoll sign on the CT scan 10/21/2023.  Interestingly there are case reports of organizing pneumonia with immunoglobulin deficiency, IVIG treatment could be considered in these cases, but would continue aggressive diuresis first before these considerations.     11/8/2023: very slow improvements overtime which fit with a possible /AIP/post-ARDS lung injury that will take time to improve and settle out. Reasonable to proceed with trach if not extubating in the next few days    Acute hypoxemic respiratory failure  Severe ARDS  Decreased IgG level  History of  recurrent pneumonia treated with antibiotics   -prednisone 60 mg daily,   ? then decreasing the daily dose by 5 mg every 2 weeks until she reaches 20 mg daily,   ? follow-up with pulmonology and repeat CT scan imaging of her chest   - Atovaquone for PJP ppx   - continue diuresis as tolerated   - agree with SIMV if tolerating and making improvements    I have personally reviewed the daily labs, imaging studies, cultures and discussed the case with referring physician and consulting physicians.     Rajesh Nicole MD  Pulmonary & Critical Care   Department of Medicine  Division of Pulmonary, Allergy, Critical Care and Sleep Medicine   [facility]  [phone] (Text Page)       Interval History    - Nursing notes reviewed.   -fio2 improving, now 35. On SIMV since 11/5/23    -Vent is reviewed   - even I/O yesterday, wt coming down    Physical Exam Temp:  [99  F (37.2  C)-100.4  F (38  C)] 99.1  F (37.3  C)  Pulse:  [73-94] 89  Resp:  [25-27] 27  BP: (146-209)/() 146/69  MAP:  [70 mmHg-131 mmHg] 85 mmHg  Arterial Line BP: (117-196)/(36-82) 154/36  FiO2 (%):  [35 %-95 %] 35 %  SpO2:  [88 %-97 %] 97 %  I/O last 3 completed shifts:  In: 4922.84 [I.V.:2232.84; NG/GT:2140]  Out: 5050 [Urine:5050]  Wt Readings from Last 1 Encounters:   11/08/23 88.8 kg (195 lb 12.3 oz)    Body mass index is 38.23 kg/m . Vent Mode: SIMV/PS  (Synchronized Intermittent Mandatory Ventilation with Pressure Support)  FiO2 (%): 35 %  Resp Rate (Set): 18 breaths/min  Tidal Volume (Set, mL): 480 mL  PEEP (cm H2O): 8 cmH2O  Pressure Support (cm H2O): 12 cmH2O  Resp: 27      Exam:  General: Critically ill  HEENT: ET tube was noted  CV: RRR, systolic murmur best heard at the RUSB  Resp: Equal b/l air entry, bilateral crackles, no wheezing.  Abd: Soft, ND  Extremities: Pitting edema +2 bilaterally.  Neuro: Deeply sedated and paralyzed on the ventilator, does not follow commands.    Data   Labs: reviewed in EMR and notable  labs listed below.  CMP:   Recent Labs   Lab 11/08/23  1551 11/08/23  1215 11/08/23  1157 11/08/23  0849 11/08/23  0754 11/08/23  0441 11/08/23  0433 11/08/23  0035 11/07/23 1953 11/07/23  1731 11/07/23  0415 11/07/23  0410 11/06/23 1956 11/06/23  1704 11/06/23  0820 11/06/23  0404 11/05/23  0840 11/05/23 0415   NA  --  142  --   --   --  142  --  145  --  149*   < > 153*  --  151*  --  149*   < > 150*   POTASSIUM  --   --   --  3.8  --  3.6  --  2.9*  --  3.5   < > 3.3*  --  3.5   < > 3.1*   < > 3.5   CHLORIDE  --   --   --   --   --  105  --   --   --  111*  --  115*  --  113*  --  112*   < > 112*   CO2  --   --   --   --   --  25  --   --   --  24  --  25  --  22  --  22   < > 23   ANIONGAP  --   --   --   --   --  12  --   --   --  14  --  13  --  16*  --  15   < > 15   *  --  163*  --  117* 149*   < >  --    < > 194*   < > 118*   < > 179*   < > 136*  154*   < > 141*   BUN  --   --   --   --   --  147.5*  --   --   --  159.5*  --  165.0*  --  171.9*  --  166.0*   < > 171.2*   CR  --   --   --   --   --  1.29*  --   --   --  1.46*  --  1.55*  --  1.52*  --  1.65*   < > 1.77*   GFRESTIMATED  --   --   --   --   --  42*  --   --   --  36*  --  34*  --  35*  --  31*   < > 29*   BARBARA  --   --   --   --   --  8.7*  --   --   --  8.7*  --  8.6*  --  8.5*  --  8.7*   < > 8.8   MAG  --   --   --   --   --  2.1  --   --   --   --   --  2.5*  --   --   --  2.5*  --  2.6*   PHOS  --   --   --   --   --  3.9  --   --   --   --   --  4.3  --   --   --  4.5  --  4.8*    < > = values in this interval not displayed.     CBC:   Recent Labs   Lab 11/08/23  0441 11/07/23  0410 11/06/23  0404 11/05/23  0415   WBC 20.8* 17.1* 16.7* 17.8*   RBC 3.48* 3.22* 3.26* 3.17*   HGB 9.3* 8.5* 8.7* 8.4*   HCT 30.4* 28.7* 28.6* 27.7*   MCV 87 89 88 87   MCH 26.7 26.4* 26.7 26.5   MCHC 30.6* 29.6* 30.4* 30.3*   RDW 24.8* 25.1* 24.4* 23.9*    157 184 204       Culture Data   7-Day Micro Results       No results found for the last  168 hours.              Virology Data:   Lab Results   Component Value Date    FLUAH1 Not Detected 10/18/2023    FLUAH3 Not Detected 10/18/2023    IZ9810 Not Detected 10/18/2023    IFLUB Not Detected 10/18/2023    RSVA Not Detected 10/18/2023    RSVB Not Detected 10/18/2023    PIV1 Not Detected 10/18/2023    PIV2 Not Detected 10/18/2023    PIV3 Not Detected 10/18/2023    HMPV Not Detected 10/18/2023       Most Recent [place]ze Pulmonary Function Testing (FVC/FEV1 only)  No prior PFT in the chart    Imaging: reviewed in EMR and notable imaging listed below.    Chest x-ray on 10/31/2023  IMPRESSION: ET tube tip is 4.4 cm proximal to the ashley. Nasogastric  tube in place. Right PICC line tip at the mid SVC, stable in position.  Diffuse bilateral pulmonary opacities appear mildly increased on the  left and stable on the right. Stable cardiac silhouette that is  borderline prominent.    Medications    artificial tears   Both Eyes Q8H    aspirin  81 mg Oral or Feeding Tube Daily    atorvastatin  20 mg Oral or Feeding Tube QPM    atovaquone  1,500 mg Per Feeding Tube Daily    B and C vitamin Complex with folic acid  5 mL Oral or Feeding Tube Daily    bumetanide  1 mg Per Feeding Tube BID    chlorhexidine  15 mL Mouth/Throat Q12H    clotrimazole   Topical BID    heparin ANTICOAGULANT  5,000 Units Subcutaneous Q12H    insulin aspart  1-6 Units Subcutaneous Q4H    ipratropium - albuterol 0.5 mg/2.5 mg/3 mL  3 mL Nebulization 4x daily    levothyroxine  150 mcg Oral or Feeding Tube QAM AC    oxyCODONE  5 mg Per Feeding Tube Q4H    pantoprazole  40 mg Per Feeding Tube QAM AC    predniSONE  60 mg Oral Daily    Followed by    [START ON 11/16/2023] predniSONE  55 mg Oral Daily    Followed by    [START ON 11/30/2023] predniSONE  50 mg Oral Daily    Followed by    [START ON 12/14/2023] predniSONE  45 mg Oral Daily    Followed by    [START ON 12/28/2023] predniSONE  40 mg Oral Daily    Followed by    [START ON 1/11/2024] predniSONE  35  mg Oral Daily    Followed by    [START ON 1/25/2024] predniSONE  30 mg Oral Daily    Followed by    [START ON 2/8/2024] predniSONE  25 mg Oral Daily    Followed by    [START ON 2/22/2024] predniSONE  20 mg Oral Daily    pregabalin  75 mg Per Feeding Tube Daily    protein modular  2 packet Per Feeding Tube Daily    [Held by provider] QUEtiapine  50 mg Per Feeding Tube BID    sertraline  150 mg Oral or Feeding Tube Daily    sodium chloride (PF)  10-40 mL Intracatheter Q7 Days    sodium chloride (PF)  3 mL Intracatheter Q8H    vitamin D3  50 mcg Oral or Feeding Tube Daily

## 2023-11-08 NOTE — PROGRESS NOTES
[facility]  Hospitalist Progress Note  Hermilo Ratliff MD 11/08/2023    Reason for Stay (Diagnosis): hypoxic resp failure         Assessment and Plan:      Summary of Stay: Matthew Bowen is a 78 year old female w/PMH chronic hypoxic respiratory failure due to pulmonary HTN, ALAINA requiring CPAP, chronic diastolic HF, CAD, CKD stage 3, morbid obesity, HTN,  depression who presents from home with family with fever, cough. Acutely worsened respiratory status on 10/21 requiring intubation. Unclear etiology for acute on chronic hypoxic respiratory failure, most likely infectious v autoimmune v other in setting of underlying lung disease.      Plans today:  - no significant changes made today  - I updated dtr (pediatrician) at bedside     Acute hypoxic respiratory failure s/p intubation  Possible ARDS  Pneumonia, recurrent w/bronchiectasis  -Presents with recurrent PNA, 3rd time in last month or so. Last completed treatment 2 weeks ago with persistent cough. Presents with worsening fatigue, productive cough, weakness and falls.  -On admission, patient febrile to 101.1, white count of 21.  Lactic acid was within normal limits.  She underwent a CT scan that showed a lingular consolidative opacity with air bronchograms with bilateral upper lobe groundglass opacities.  She was started on ceftriaxone and azithromycin.  - blood cx 10/15 and 10/18 NGTD  -Sputum culture from 10/19 and 10/21 grew Staph epi and C. albicans. ID feels candida that is a colonizer and not related to illness.   -COVID, influenza, Respiratory viral panel negative.  Strep and legionella antigens negative.   - MRSA PCR neg  - Fungitell neg.  Aspergillus ag neg.  Blastomyces ab neg.  Nocardia cx neg.  AFB sputum stain neg  -intubated 10/21 due to worsening respiratory status and bronch done showing serosanguinous and fibrinous return with thick mucoid secretions noted  -seen by pulm, steroids decreased from 125 to 60mg daily on 10/31.  May benefit from IVIG if  euvolemic and not improving.  - plan for prolonged taper of prednisone from 60 mg q day to decreasing by 5 mg every 2 weeks (see orders).  - was treated with bumex gtt; transitioned to oral Bumex on 11/7  - started atovaquone for PJP ppx 11/3.  - now off all other systemic antimicrobials (beyond ppx atovaquone)  - family considering trach; likely next week if they choose to pursue this     Abx  Azithromycin 10/15-10/19  Ceftriaxone 10/15-10/17  Cefepime 10/18-31  Metronidazole 10/20-31  Vancomycin 10/21-31     Infectious/metabolic encephalopathy  -receiving hydromorphone and precedex.     Shock  -suspected to be infectious, EF is preserved  -Continue midodrine 10mg q8h, now off pressors     Hypernatremia  - increased free water to 300 mls q 4 hours.     Gluteal cleft irritant dermatitis   - WOC consulted, appreciate recs     Leukocytosis: Suspect related to above as well as steroids.  From what I can see in care everywhere has generally been 9-12 range in 1476-1834.       Generalized weakness. Chronic arthritis. Falls at home. Left Foot pain:   -Having multifactorial weakness, with diffuse body aches and pains.  Imaging on admission including CT head, cervical spine, CT CAP did not show acute fracture.  She had an x-ray of her foot also showed degenerative changes without fracture.    -readdress when appropriate     Hx ALAINA, pulm HTN, chronic diastolic HF:   - Metolazone/lasix PRN for volume overload/natriuresis.   - Given her softer BPs holding PTA antihypertensives.       CKD stage 3  Uremia  Cr 1.21 on admission. Currently ~1.55. Suspect underlying renal function may be worse than creatinine reflects as eGFR by is 13. Urea has been steadily increasing since admission while creatinine has stabilized.  -transitioned to low-protein tube feeds, nephrology consulted by my colleague and they discussed case with no formal consult at this time.   -serial labs     HTN: holding PTA  antihypertensives     Hx morbid obesity, anemia, CAD, hypothyroidism, mood disorder: on ASA, statin, plavix, lyrica, zoloft at home             Diet: NPO per Anesthesia Guidelines for Procedure/Surgery Except for: Meds  Adult Formula Drip Feeding: Continuous Novasource Renal; Orogastric tube; Goal Rate: 25 mL/hr x 22 hours (please hold 1 hour before and after levothyroxine); mL/hr; Decrease rate to 25 mL/hr    DVT Prophylaxis: Heparin SQ  Aleman Catheter: PRESENT, indication: Strict 1-2 Hour I&O  Lines: PRESENT      PICC 10/22/23 Triple Lumen Right Basilic multiple med gtt. ready for use-Site Assessment: WDL except;Ecchymotic  Arterial Line 10/22/23 Radial-Site Assessment: WDL      Cardiac Monitoring: ACTIVE order. Indication: Tachyarrhythmias, acute (48 hours)  Code Status: Full Code     DISPO:  unclear.  Remains intubated/sedated in ICU          Interval History (Subjective):      Unable to obtain any hx from patient.  Intubated/sedated.  Dtr at bedside and discussed patient with her                  Physical Exam:      Last Vital Signs:  BP (!) 146/69   Pulse 86   Temp 99.3  F (37.4  C) (Bladder)   Resp 27   Ht 1.524 m (5')   Wt 88.8 kg (195 lb 12.3 oz)   SpO2 (!) 89%   BMI 38.23 kg/m        Intake/Output Summary (Last 24 hours) at 11/8/2023 1448  Last data filed at 11/8/2023 1400  Gross per 24 hour   Intake 4922.84 ml   Output 5050 ml   Net -127.16 ml       Constitutional: Intubated, sedated     Respiratory: Clear to auscultation bilaterally, no crackles or wheezing   Cardiovascular: Regular rate and rhythm, normal S1 and S2, and no murmur noted   Abdomen: Normal bowel sounds, soft, non-distended, non-tender   Skin: No rashes, no cyanosis, dry to touch   Neuro:  Limited exam due to sedation   Extremities: No edema, normal range of motion   Other(s): Dtr at bedside during my evaluation today       All other systems: Negative          Medications:      All current medications were reviewed with changes  reflected in problem list.         Data:      All new lab and imaging data was reviewed.   Labs:  Recent Labs   Lab 11/08/23  1215 11/08/23  1157 11/08/23  0849 11/08/23  0754 11/08/23  0441 11/08/23  0433 11/08/23  0035 11/07/23  1953 11/07/23  1731 11/07/23  0415 11/07/23  0410 11/06/23  0820 11/06/23  0404     --   --   --  142  --  145  --  149*   < > 153*   < > 149*   POTASSIUM  --   --  3.8  --  3.6  --  2.9*  --  3.5   < > 3.3*   < > 3.1*   CHLORIDE  --   --   --   --  105  --   --   --  111*  --  115*   < > 112*   CO2  --   --   --   --  25  --   --   --  24  --  25   < > 22   ANIONGAP  --   --   --   --  12  --   --   --  14  --  13   < > 15   GLC  --  163*  --  117* 149*   < >  --    < > 194*   < > 118*   < > 136*  154*   BUN  --   --   --   --  147.5*  --   --   --  159.5*  --  165.0*   < > 166.0*   CR  --   --   --   --  1.29*  --   --   --  1.46*  --  1.55*   < > 1.65*   GFRESTIMATED  --   --   --   --  42*  --   --   --  36*  --  34*   < > 31*   BARBARA  --   --   --   --  8.7*  --   --   --  8.7*  --  8.6*   < > 8.7*   MAG  --   --   --   --  2.1  --   --   --   --   --  2.5*  --  2.5*   PHOS  --   --   --   --  3.9  --   --   --   --   --  4.3  --  4.5    < > = values in this interval not displayed.      Imaging:   No results found for this or any previous visit (from the past 24 hour(s)).

## 2023-11-08 NOTE — PROGRESS NOTES
[facility] ICU VENTILATOR RESPIRATORY NOTE     Date of Admission: 10/15/23  Date of Intubation (most recent): 10/22/23  Reason for Mechanical Ventilation: Respiratory failure  Number of Days on Mechanical Ventilation: 18     Met Criteria for Pressure Support Trial: Yes  Length of Pressure Support Trial: 240 min this AM and then 15 minutes at 1100  Reason for Stopping Pressure Support Trial: RSBI elevated, second wean stopped 2/2 hypertension     Significant Events Today: 240 min wean     Vital Signs:  Temp: 99.5  F (37.5  C) Temp src: Bladder   Pulse: 93     SpO2: 98 % O2 Device: Mechanical Ventilator Oxygen Delivery: 55 LPM      ABG Results:          Recent Labs   Lab 11/07/23  0910 11/05/23  0415 11/04/23  1106 11/04/23  0756   PH 7.42 7.37 7.31* 7.30*   PCO2 40 43 47* 48*   PO2 41* 58* 64* 61*   HCO3 26 25 23 24   O2PER 30 65 50 50           Vent Mode: SIMV  (Synchronized Intermittent Mandatory Ventilation)  FiO2 (%): 30 %  Resp Rate (Set): 20 breaths/min  Tidal Volume (Set, mL): 480 mL  PEEP (cm H2O): 8 cmH2O  Pressure Support (cm H2O): 12 cmH2O  Resp: 21        Plan:  continue to monitor and wean as able     Eduarda Son, RT

## 2023-11-08 NOTE — PLAN OF CARE
ICU End of Shift Summary.  For vital signs and complete assessments, please see documentation flowsheets.      Pertinent assessments: Pt minimally responsive. Slightly opens eyes but does not track . Required precedex for less than 1 hr d/t elevated BP and overbreathing vent. PRN dilaudid given. Tele SR with PACs. Tmax 100.4. Hypertensive at times, hydralazine given x2. Aleman in place with adequate UOP. TF running at goal of 25 with FWF. BS+, no BM thi s shift.     Plan (Upcoming Events): tbd  Discharge/Transfer Needs: tbd     Bedside Shift Report Completed : yes  Bedside Safety Check Completed: yes

## 2023-11-08 NOTE — PROGRESS NOTES
ICU staff  DOS 11/8/2023    No significant changes. Eyes open at my visit this morning around 0630. Tolerated three hours of pressure support.    Vitals:   Temp:  [99  F (37.2  C)-100.4  F (38  C)] 99.3  F (37.4  C)  Pulse:  [73-94] 86  Resp:  [25-27] 27  BP: (146-209)/() 146/69  MAP:  [70 mmHg-131 mmHg] 72 mmHg  Arterial Line BP: (117-196)/(39-82) 129/47  FiO2 (%):  [30 %-95 %] 35 %  SpO2:  [88 %-98 %] 89 %     Vent Mode: SIMV/PS  (Synchronized Intermittent Mandatory Ventilation with Pressure Support)  FiO2 (%): 35 %  Resp Rate (Set): 18 breaths/min  Tidal Volume (Set, mL): 480 mL  PEEP (cm H2O): 8 cmH2O  Pressure Support (cm H2O): 12 cmH2O  Resp: 27    I/O last 3 completed shifts:  In: 4796.32 [I.V.:2236.32; NG/GT:2010]  Out: 4825 [Urine:4825]    Off sedation    Exam:  Gen: Mechanically ventilated, no apparent distress  HEENT: NC/AT, eyes open  Pulm: Clear  Cor: RRR  Abdomen/GI: Soft, nondistended  : Aleman in place  Extremities: Warm, mild edema  Skin: Well-perfused  Neuro: Reacts to family  Psych: Unable to assess    Data:   Labs reviewed  - Na 142 from 145<-149  - Cr down at 1.29, BUN trending down at 147  - WBCs still around 20, not much change  Nothing new on cultures or imaging    Assessment/plan:  79 y/o woman with multiple comorbid conditions, including recurring pneumonias, bronchiectasis and chronic hypoxemia, admitted 10/15 with weakness and cough. Required intubation 10/22. Slow progress.  CNS: Intubated, off sedation.  # Weakness/frequent falls  # Acute/chronic pain  - Keep off sedation as able  - Oxycodone for pain, has IV analgesics prn  Pulm: Tolerating some time on PS mode, turned down SIMV rate  # Acute on chronic mixed respiratory failure  # Bronchiectasis  # Chronic hypoxemia on home oxygen  # Recurrent pneumonia  # Inflammatory/organizing pneumonia  - Continue PS trials as tolerated  - SIMV wean in background  - Steroid taper  CV: No new changes  # Shock, resolved  # Essential  hypertension  # CAD s/p PCI  # Diastolic heart failure  # Moderate aortic stenosis  - PRN antihypertensives for now  GI: Abdomen benign.  # Nutrition  - Continues on tube feedings.  : UOP adequate, Na corrected quickly yesterday.  # Hypernatremia  # CARMEN on CKD, at baseline  # Uremia, improving  - Good response to bumetanide, continues today  - Stop IV D5  Heme: Hb 9.3 (8.5)  # Anemia of chronic disease  - No indications to transfuse  Msk: Extremities warm, well-perfused.   Endo: Glucose 149-194  # Stress/steroid hyperglycemia  # Hypothyroidism  - Continue levothyroxine, prn insulin  ID: Afebrile, WBCs stable.  # Stress/steroid leukocytosis  # HCAP, s/p treatment  - Continue off abx  - PJP prophylaxis   ICU: SQH, PPI  - Spoke with family at bedside    This patient is critically ill to my assessment and requires ICU monitoring and cares. A total of 35 minutes critical care time spent on 11/8/2023, exclusive of procedures.     Rui Payton MD, PhD  Surgical critical care  11/8/2023, 12:38 PM    Clinically Significant Risk Factors        # Hypokalemia: Lowest K = 2.9 mmol/L in last 2 days, will replace as needed  # Hypernatremia: Highest Na = 153 mmol/L in last 2 days, will monitor as appropriate      # Hypoalbuminemia: Lowest albumin = 2.9 g/dL at 10/22/2023  5:20 AM, will monitor as appropriate            # Obesity: Estimated body mass index is 38.23 kg/m  as calculated from the following:    Height as of this encounter: 1.524 m (5').    Weight as of this encounter: 88.8 kg (195 lb 12.3 oz).

## 2023-11-08 NOTE — PLAN OF CARE
VS: VSS, O2 saturation >90% most of shift, BP labile.   NEURO: Some spontaneous eye movement in AM, relatively unresponsive during PM.  CARDIAC/TELE: SR, occasional PACs, MAPs ranging low 60s - low 90s, generalized edema noted.   RESP: LS course & diminished, CMV (480, 18, 8, 35%), pressure support overnight until 1000 tolerated well, pressure support trial at 1130 tolerated poorly w/ increase in BP and minor O2 desaturation.   GI/: TF 25 ml\hr, 300 ml\q4 flushes, patient tolerating well, OG, no BM on shift, mitchell catheter draining to gravity w/ good output.   SKIN: Scattered bruising, reddened area on sacrum w/ small wound.  IV SITES: R PICC, R ART, L PIV.  DRIPS: none    POC: continue to monitor BP, respiratory, and fluid status. Tracheostomy if no plan of extubation in the coming days.     Goal Outcome Evaluation:      Plan of Care Reviewed With: patient, spouse, child    Overall Patient Progress: no changeOverall Patient Progress: no change

## 2023-11-08 NOTE — PROGRESS NOTES
[facility] ICU VENTILATOR RESPIRATORY NOTE  Date of Admission: 10/15/23  Date of Intubation (most recent): 10/22/23  Reason for Mechanical Ventilation: Respiratory failure  Number of Days on Mechanical Ventilation: 18  Met Criteria for Pressure Support Trial: Yes  Length of Pressure Support Trial: currently on CPAP/PS of 12/8, trial began @ 0420  Vent Mode: (S) CPAP/PS  (Continuous positive airway pressure with Pressure Support)  FiO2 (%): 35 %  Resp Rate (Set): 20 breaths/min  Tidal Volume (Set, mL): 480 mL  PEEP (cm H2O): 8 cmH2O  Pressure Support (cm H2O): 12 cmH2O  Resp: 27    Plan:  RT will monitor.    Beth Barnett RT

## 2023-11-08 NOTE — PROGRESS NOTES
[facility] Nurse Inpatient Assessment     Consulted for: buttock wound      Patient History (according to Hospitalist provider note(s) 10/25/23:      Matthew Bowen is a 78 year old female w/PMH chronic hypoxic respiratory failure due to pulmonary HTN, ALAINA requiring CPAP, chronic diastolic HF, CAD, CKD stage 3, morbid obesity, HTN,  depression who presents from home with family with fever, cough.     Acute hypoxic respiratory failure s/p intubation  Possible ARDS  Pneumonia, recurrent w/bronchiectasis  -Presents with recurrent PNA, 3rd time in last month or so. Last completed treatment 2 weeks ago with persistent cough. Presents with worsening fatigue, productive cough, weakness and falls.  -On admission, patient febrile to 101.1, white count of 21.  Lactic acid was within normal limits.  She underwent a CT scan that showed a lingular consolidative opacity with air bronchograms with bilateral upper lobe groundglass opacities.  She was started on ceftriaxone and azithromycin.  -Sputum culture from 10/19 better that showed gram positive cocci and yeast. Discussed with ID, yeast prevalent in sputum samples and likely candida that is not related to illness.  Need to await speciation.   -Patient initially on ceftriaxone and azithromycin.  Broadened to cefepime and azithro on 10/18.  Flagyl added on 10/20.  Finished azithromycin course on 10/20.   -COVID, influenza, Respiratory viral panel negative.  Strep and legionella antigens negative.   -intubated 10/21 due to worsening respiratory status and bronch done showing serosanguinous and fibrinous return with thick mucoid secretions noted  -ID, pulm and ICU team all following.  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   Wound base: 100 % dermis, superficial     Palpation of the wound bed: normal      Drainage: scant     Description of drainage: serosanguinous     Measurements (length x width x depth, in cm): 0.3 x 0.3  x  0.1 cm      Tunneling: N/A     Undermining: N/A  Periwound skin: Intact      Color: normal and consistent with surrounding tissue      Temperature: normal   Odor: none  Pain: unable to assess due to  sedation , none  Pain interventions prior to dressing change: N/A  Treatment goal: Protection  STATUS: improving  Supplies ordered: supplies stored on unit and discussed with RN and family present in room       Treatment Plan:     Gluteal cleft wound(s): BID and PRN with incontinence episodes  Cleanse the area with Darlyn cleanse and protect, very gently with soft cloth.   Apply thin layer of critic aid paste on open or red areas   With repeat application, do not scrub the paste, only remove soiled paste and reapply.   If complete removal of paste is necessary use baby oil/mineral oil (#[id number]) and soft wash cloth.   Ensure pt has Aron-cushion (#[id number]) while sitting up in the chair.   Use only one Covidien pad in between mattress and pt. No brief in bed.     Orders: Reviewed    RECOMMEND PRIMARY TEAM ORDER: None, at this time  Education provided: plan of care and wound progress  Discussed plan of care with: Family and Nurse  WOC nurse follow-up plan: weekly  Notify WOC if wound(s) deteriorate.  Nursing to notify the Provider(s) and re-consult the WOC Nurse if new skin concern.    DATA:     Current support surface: Standard  Low air loss (JENNIFER pump, Isolibrium, Pulsate, skin guard, etc)  Containment of urine/stool: Incontinent pad in bed and Indwelling catheter  BMI: Body mass index is 38.23 kg/m .   Active diet order: Orders  Placed This Encounter      NPO per Anesthesia Guidelines for Procedure/Surgery Except for: Meds     Output: I/O last 3 completed shifts:  In: 4796.32 [I.V.:2236.32; NG/GT:2010]  Out: 4825 [Urine:4825]     Labs:   Recent Labs   Lab 11/08/23  0441   HGB 9.3*   WBC 20.8*     Pressure injury risk assessment:   Sensory Perception: 1-->completely limited  Moisture: 2-->very moist  Activity: 1-->bedfast  Mobility: 1-->completely immobile  Nutrition: 3-->adequate  Friction and Shear: 1-->problem  Compa Score: 9    PATRICIA Lorenzana  [facility]  Dept. Office Number: [phone]

## 2023-11-09 NOTE — PLAN OF CARE
Problem: Enteral Nutrition  Goal: Feeding Tolerance  Outcome: Progressing   Goal Outcome Evaluation:    Patient meeting needs w/ TF, continue to monitor closely and adjust EN as needed    Monalisa Pena RD, LD  Clinical Dietitian - [facility]

## 2023-11-09 NOTE — PROGRESS NOTES
ICU staff  DOS 11/9/2023    No major changes. Tolerating longer periods of pressure support ventilation -- 6 hours today at 12/8.     Vitals:   Temp:  [99.1  F (37.3  C)-100.6  F (38.1  C)] 100  F (37.8  C)  Pulse:  [] 82  Resp:  [25-27] 25  MAP:  [59 mmHg-108 mmHg] 66 mmHg  Arterial Line BP: (104-181)/(36-73) 113/43  FiO2 (%):  [35 %] 35 %  SpO2:  [90 %-97 %] 94 %     Vent Mode: SIMV/PS  (Synchronized Intermittent Mandatory Ventilation with Pressure Support)  FiO2 (%): 35 %  Resp Rate (Set): (S) 16 breaths/min  Tidal Volume (Set, mL): 480 mL  PEEP (cm H2O): 8 cmH2O  Pressure Support (cm H2O): 12 cmH2O  Resp: 25    I/O last 3 completed shifts:  In: 2765 [NG/GT:2215]  Out: 4000 [Urine:4000]    Exam:  Gen: Intubated, eyes open, no apparent distress  HEENT: NC/AT  Pulm: Doing OK on 12/8 at my evaluation  Cor: RRR  Abdomen/GI: Soft, nondistended  : Aleman  Extremities: Mild edema  Skin: Well-perfused  Neuro: Eyes to voice  Psych: Unable to assess    Data:   Labs reviewed and without much change  - Cr 1.16,   - Plt 131  No new cultures or imaging    Assessment/plan:  79 yo woman with diastolic heart failure, chronic hypoxemia on home O2, bronchiectasis and recurrent pulmonary infections admitted 10/15 with weakness/falls and dyspnea. Progressed to respiratory failure requiring intubation 10/22. Making slow progress overall but is tolerating more periods of pressure support.  CNS: Off sedation, opening eyes at times.  # Weakness/frequent falls  # Acute/chronic pain  - Continue oxycodone  - Staying off sedation as able  Pulm: Tolerated ~6 hours of PS today, back on SIMV with rate reduced to 16.  # Acute on chronic mixed respiratory failure  # Chronic hypoxemia on home oxygen  # Recurrent pneumonias  # Bronchiectasis  # Inflammatory/organizing pneumonia  - Continue PS trials with ongoing SIMV wean  - Steroid taper  - Family prefers to wait on tracheostomy  CV: No major changes.  # Shock, resolved  # Essential  hypertension  # CAD s/p PCI  # Diastolic heart failure  # Moderate aortic stenosis  - PRN antihypertensives  GI: Abodmen benign.  # Nutrition  - Continue tube feedings  : UOP adequate, Na stable  # Hypernatremia, resolved  # CARMEN on CKD, at baseline  - Continue gentle diuresis  Heme: Hb 8.9 (9.3)  # Anemia of chronic disease  - No indication to transfuse  Msk: Extremities warm, well-perfused.   Endo: Glucose 119-149.  # Stress/steroid hyperglycemia  # Hypothyroidism  - Glycemic monitoring  - Levothyroxine  ID: Afebrile, WBCs stable.  # Leukocytosis secondary to steroid  # HCAP, s/p treatment  - Follow off antibiotic  ICU: SQ, PPI    This patient is critically ill to my assessment and requires ICU monitoring and cares. A total of 35 minutes critical care time spent on 11/9/2023, exclusive of procedures.     Rui Payton MD, PhD  Surgical critical care  11/9/2023, 2:17 PM    Clinically Significant Risk Factors        # Hypokalemia: Lowest K = 2.9 mmol/L in last 2 days, will replace as needed  # Hypernatremia: Highest Na = 149 mmol/L in last 2 days, will monitor as appropriate      # Hypoalbuminemia: Lowest albumin = 2.9 g/dL at 10/22/2023  5:20 AM, will monitor as appropriate            # Obesity: Estimated body mass index is 38.06 kg/m  as calculated from the following:    Height as of this encounter: 1.524 m (5').    Weight as of this encounter: 88.4 kg (194 lb 14.2 oz).

## 2023-11-09 NOTE — PROGRESS NOTES
CLINICAL NUTRITION SERVICES - REASSESSMENT NOTE    Recommendations Ordered by Registered Dietitian (RD):   Continue current EN orders    Future/Additional Recommendations:   Monitor TF tolerance, labs--adjust TF as appropriate    Malnutrition:   % Intake: Decreased intake does not meet criteria- meeting needs via EN  % Weight Loss: Unable to assess due to fluid status  Subcutaneous Fat Loss: None observed  Muscle Loss: None observed--very difficult with number of monitors, tubing, lines, SCDs on in addition to fluid status  Fluid Accumulation/Edema: trace-mild    Malnutrition Diagnosis: Unable to assess     EVALUATION OF PROGRESS TOWARD GOALS   Diet:  NPO    Nutrition Support:    Access: OGT  Formula/Rate/Provisions:  Novasource Renal @ 25 mL/hr x 22 hours = 550 mL, 1100 kcal, 50 g protein, 101 g CHO, 0 g fiber, 394 mL free water   Flushes: H2O - 300 ml q 4 hrs (MD adjusting)     Modulars: Prosource TF 20 x 2 pkts/day = 160 kcal/40 gm protein (added back after trial of lower protein as requested by Provider did not improve BUN)     Total provisions = 1260 kcal (14 kcal/kg) + 90 g protein (2 g/kg)      Intake/Tolerance:    TF continuously running at goal for last week  I/O 5707/3951    ASSESSED NUTRITION NEEDS 10/22:  Dosing Weight 86 kg (actual body weight) - energy; 45.5 kg (ideal body weight) - protein   Estimated Energy Needs: 946-1204 kcal/day (11-14 Kcal/Kg actual body weight)   Justification: vented   Estimated Protein Needs: 91 g protein/day (2 g pro/Kg IBW)   Justification: vented, hypercatabolism with acute illness   Estimated Fluid Needs: per MD    NEW FINDINGS:   Weight: wt trending down (diuresing)  11/09/23 0030 88.4 kg (194 lb 14.2 oz) Bed scale   11/08/23 0600 88.8 kg (195 lb 12.3 oz) Bed scale   11/06/23 0530 89.8 kg (197 lb 15.6 oz) Bed scale   11/05/23 0500 91.4 kg (201 lb 8 oz) Bed scale   11/03/23 0615 91.9 kg (202 lb 9.6 oz) Bed scale   11/02/23 0400 93.9 kg (207 lb 0.2 oz) Bed scale      Labs:  K 3.2 (L), Mg and Phos normal  .3 (H), Cr 1.16 (H) - both trending down  -171 - on medium sliding scale insulin    GI: last BM 11/6    Skin: no concerns noted     Meds:  Nephronex  Bumex  Medium sliding scale insulin  Levothyroxine  Prednisone  Vitamine D3    Previous Goals:   Total avg nutritional intake to meet a minimum of 11 kcal/kg per energy DW of 86 kg and 2 g PRO/kg daily per protein DW of 45.5 kg   Evaluation: Met    Previous Nutrition Diagnosis:   Inadequate oral intake related to respiratory needs necessitating NPO status as evidenced by reliance on NS to meet needs     Evaluation: No change    CURRENT NUTRITION DIAGNOSIS  Inadequate oral intake related to respiratory needs necessitating NPO status as evidenced by reliance on NS to meet needs       INTERVENTIONS  Recommendations / Nutrition Prescription  Enteral Nutrition - continue as ordered  Collaboration and Referral of Nutrition care: patient discussed this morning during IDT rounds    Goals  Total avg nutritional intake to meet a minimum of 11 kcal/kg per energy DW of 86 kg and 2 g PRO/kg daily per protein DW of 45.5 kg     MONITORING AND EVALUATION:  Progress towards goals will be monitored and evaluated per protocol and Practice Guidelines    Monalisa Pena RD, LD  Clinical Dietitian - [facility]

## 2023-11-09 NOTE — PROGRESS NOTES
[facility]  Hospitalist Progress Note  Hermilo Ratliff MD 11/09/2023    Reason for Stay (Diagnosis): resp failure         Assessment and Plan:      Summary of Stay: Matthew Bowen is a 78 year old female w/PMH chronic hypoxic respiratory failure due to pulmonary HTN, ALAINA requiring CPAP, chronic diastolic HF, CAD, CKD stage 3, morbid obesity, HTN,  depression who presents from home with family with fever, cough. Acutely worsened respiratory status on 10/21 requiring intubation. Unclear etiology for acute on chronic hypoxic respiratory failure, most likely infectious v autoimmune v other in setting of underlying lung disease.  remains stable on [facility] ventilator with weaning trials being performed     Plans today:  - no significant changes made today  - continue to wean from vent as able  - I updated dtr and spouse at bedside today     Acute hypoxic respiratory failure s/p intubation  Possible ARDS  Pneumonia, recurrent w/bronchiectasis  -Presents with recurrent PNA, 3rd time in last month or so. Last completed treatment 2 weeks ago with persistent cough. Presents with worsening fatigue, productive cough, weakness and falls.  -On admission, patient febrile to 101.1, white count of 21.  Lactic acid was within normal limits.  She underwent a CT scan that showed a lingular consolidative opacity with air bronchograms with bilateral upper lobe groundglass opacities.  She was started on ceftriaxone and azithromycin.  - blood cx 10/15 and 10/18 NGTD  -Sputum culture from 10/19 and 10/21 grew Staph epi and C. albicans. ID feels candida that is a colonizer and not related to illness.   -COVID, influenza, Respiratory viral panel negative.  Strep and legionella antigens negative.   - MRSA PCR neg  - Fungitell neg.  Aspergillus ag neg.  Blastomyces ab neg.  Nocardia cx neg.  AFB sputum stain neg  -intubated 10/21 due to worsening respiratory status and bronch done showing serosanguinous and fibrinous return with  thick mucoid secretions noted  -seen by pulm, steroids decreased from 125 to 60mg daily on 10/31. May benefit from IVIG if  euvolemic and not improving.  - plan for prolonged taper of prednisone from 60 mg q day to decreasing by 5 mg every 2 weeks (see orders).  - was treated with bumex gtt; transitioned to oral Bumex on 11/7  - started atovaquone for PJP ppx 11/3.  - now off all other systemic antimicrobials (beyond ppx atovaquone)  - family considering trach; likely next week if they choose to pursue this     Abx  Azithromycin 10/15-10/19  Ceftriaxone 10/15-10/17  Cefepime 10/18-31  Metronidazole 10/20-31  Vancomycin 10/21-31     Infectious/metabolic encephalopathy  -receiving hydromorphone and precedex.     Shock  -suspected to be infectious, EF is preserved  -Continue midodrine 10mg q8h, now off pressors     Hypernatremia  - increased free water to 300 mls q 4 hours.     Gluteal cleft irritant dermatitis   - WOC consulted, appreciate recs     Leukocytosis: Suspect related to above as well as steroids.  From what I can see in care everywhere has generally been 9-12 range in [id number].       Generalized weakness. Chronic arthritis. Falls at home. Left Foot pain:   -Having multifactorial weakness, with diffuse body aches and pains.  Imaging on admission including CT head, cervical spine, CT CAP did not show acute fracture.  She had an x-ray of her foot also showed degenerative changes without fracture.    -readdress when appropriate     Hx ALAINA, pulm HTN, chronic diastolic HF:   - Metolazone/lasix PRN for volume overload/natriuresis.   - Given her softer BPs holding PTA antihypertensives.       CKD stage 3  Uremia  Cr 1.21 on admission. Currently ~1.55. Suspect underlying renal function may be worse than creatinine reflects as eGFR by is 13. Urea has been steadily increasing since admission while creatinine has stabilized.  -transitioned to low-protein tube feeds, nephrology consulted by my colleague and they  discussed case with no formal consult at this time.   -serial labs     HTN: holding PTA antihypertensives     Hx morbid obesity, anemia, CAD, hypothyroidism, mood disorder: on ASA, statin, plavix, lyrica, zoloft at home              Diet: NPO per Anesthesia Guidelines for Procedure/Surgery Except for: Meds  Adult Formula Drip Feeding: Continuous Novasource Renal; Orogastric tube; Goal Rate: 25 mL/hr x 22 hours (please hold 1 hour before and after levothyroxine); mL/hr; Decrease rate to 25 mL/hr    DVT Prophylaxis: Heparin SQ  Aleman Catheter: PRESENT, indication: Strict 1-2 Hour I&O  Lines: PRESENT      PICC 10/22/23 Triple Lumen Right Basilic multiple med gtt. ready for use-Site Assessment: WDL except;Ecchymotic  Arterial Line 10/22/23 Radial-Site Assessment: WDL      Cardiac Monitoring: ACTIVE order. Indication: Tachyarrhythmias, acute (48 hours)  Code Status: Full Code     DISPO:  unclear.  Remains intubated/sedated in ICU        Interval History (Subjective):      Unable to obtain history from this patient.  Intubated/sedated on vent.  Family at bedside and updated today                  Physical Exam:      Last Vital Signs:  BP (!) 146/69   Pulse 98   Temp (!) 100.6  F (38.1  C) (Bladder)   Resp 25   Ht 1.524 m (5')   Wt 88.4 kg (194 lb 14.2 oz)   SpO2 91%   BMI 38.06 kg/m        Intake/Output Summary (Last 24 hours) at 11/9/2023 1313  Last data filed at 11/9/2023 1200  Gross per 24 hour   Intake 2750 ml   Output 3950 ml   Net -1200 ml       Constitutional: Intubated/sedated     Respiratory: Clear to auscultation bilaterally, no crackles or wheezing   Cardiovascular: Regular rate and rhythm, normal S1 and S2, and no murmur noted   Abdomen: Normal bowel sounds, soft, non-distended, non-tender   Skin: No rashes, no cyanosis, dry to touch   Neuro: Opens eyes to voice, turns head.  Limited exam   Extremities: No edema, normal range of motion   Other(s):        All other systems: Negative          Medications:       All current medications were reviewed with changes reflected in problem list.         Data:      All new lab and imaging data was reviewed.   Labs:  Recent Labs   Lab 11/09/23  0629   WBC 19.1*   HGB 8.9*   HCT 29.1*   MCV 87   *      Imaging:   No results found for this or any previous visit (from the past 24 hour(s)).

## 2023-11-09 NOTE — PLAN OF CARE
ICU End of Shift Summary.  For vital signs and complete assessments, please see documentation flowsheets.      Pertinent assessments: Pt mostly unresponsive. Slightly opens eyes but does not track. PRN dilaudid given overnight for pt having elevated RR and elevated BPs. Tele SR with PACs. Low grade temp, 99.9 max. Aleman in place with adequate UOP. Slightly pink tinged this AM. BS+, TF running at goal with FWF. BS+, no BM this shift, last documented BM 11/6.       Major Shift Events:    -Wean started at 0523 this AM, pt tolerating well    Plan (Upcoming Events): tbd  Discharge/Transfer Needs: tbd     Bedside Shift Report Completed : yes  Bedside Safety Check Completed: yes

## 2023-11-09 NOTE — PLAN OF CARE
ICU End of Shift Summary.  For vital signs and complete assessments, please see documentation flowsheets.      Pertinent assessments: Continues on full vent support, PS from 0520-11:30 & started again at 1520,minimal secretions from suctioning, VSS except Tmax at 100.6,  responds to voice by opening eyes but not following commands, Pt has been off sedation for days,Dilaudid X1 given for overbreathing the vent settings, Tele: SR, active BS no BM on this shift, mitchell intact with good UOP.    Major Shift Events: Tolerating longer vent weans than previous.    Plan (Upcoming Events): wean vent as able.    Discharge/Transfer Needs: TBD     Bedside Shift Report Completed : Y  Bedside Safety Check Completed: Y    Goal Outcome Evaluation:      Plan of Care Reviewed With: spouse, patient, child    Overall Patient Progress: no changeOverall Patient Progress: no change

## 2023-11-09 NOTE — PROGRESS NOTES
[facility] hospital ICU VENTILATOR RESPIRATORY NOTE  Date of Admission: 10/15/23  Date of Intubation (most recent): 10/22/23  Reason for Mechanical Ventilation: Respiratory failure  Number of Days on Mechanical Ventilation: 19  Met Criteria for Pressure Support Trial: Yes  Length of Pressure Support Trial: currently on CPAP/PS of 12/8, trial began @ 0523  Vent Mode: (S) CPAP/PS  (Continuous positive airway pressure with Pressure Support)  FiO2 (%): 35 %  Resp Rate (Set): 18 breaths/min  Tidal Volume (Set, mL): 480 mL  PEEP (cm H2O): 8 cmH2O  Pressure Support (cm H2O): 12 cmH2O  Resp: 25    Beth Barnett, RT

## 2023-11-09 NOTE — PROGRESS NOTES
Respiratory Therapy Note    [facility] ICU VENTILATOR RESPIRATORY NOTE  Date of Admission: 10/15/23  Date of Intubation (most recent): 10/22/23  Reason for Mechanical Ventilation: Respiratory Failure  Number of Days on Mechanical Ventilation: 19  Met Criteria for Pressure Support Trial: Yes  Length of Pressure Support Trial: About 6 hours this morning and currently on another CPAP/PS trial as of 1518  Reason for Stopping Pressure Support Trial: CPAP/PS trial stopped this AM due to increased heart rate and increased RR  Significant Events Today: Patient tolerated 6 hr CPAP/PS trial this AM    Plan:  Continue to monitor patient's respiratory status.    Vent Mode: (S) CPAP/PS  (Continuous positive airway pressure with Pressure Support)  FiO2 (%): 35 %  Resp Rate (Set): (S) 16 breaths/min  Tidal Volume (Set, mL): 480 mL  PEEP (cm H2O): 8 cmH2O  Pressure Support (cm H2O): 12 cmH2O  Resp: 25    Patient will continue to receive Duoneb QID. RT to follow.     RT Kermit  5:17 PM November 9, 2023

## 2023-11-10 NOTE — PROGRESS NOTES
[facility] ICU VENTILATOR RESPIRATORY NOTE  Date of Admission: 10/15/23  Date of Intubation (most recent): 10/22/23  Reason for Mechanical Ventilation: Respiratory failure  Number of Days on Mechanical Ventilation: 20  Met Criteria for Pressure Support Trial: Yes  Length of Pressure Support Trial: currently on CPAP/PS of 12/8, trial began @ 0435    Vent Mode: CPAP/PS  (Continuous positive airway pressure with Pressure Support)  FiO2 (%): 35 %  Resp Rate (Set): 16 breaths/min  Tidal Volume (Set, mL): 480 mL  PEEP (cm H2O): 8 cmH2O  Pressure Support (cm H2O): 12 cmH2O  Resp: 19    Vital signs:  Temp: 99.1  F (37.3  C) Temp src: Bladder BP: (!) 146/69 Pulse: 73   Resp: 19 SpO2: 92 % O2 Device: Mechanical Ventilator Oxygen Delivery: 55 LPM Height: 152.4 cm (5') Weight: 88.4 kg (194 lb 14.2 oz)  Estimated body mass index is 38.06 kg/m  as calculated from the following:    Height as of this encounter: 1.524 m (5').    Weight as of this encounter: 88.4 kg (194 lb 14.2 oz).

## 2023-11-10 NOTE — PROGRESS NOTES
[facility]  Hospitalist Progress Note  Hermilo Ratliff MD 11/10/2023    Reason for Stay (Diagnosis): resp failure         Assessment and Plan:      Summary of Stay: Matthew Bowen is a 78 year old female w/PMH chronic hypoxic respiratory failure due to pulmonary HTN, ALAINA requiring CPAP, chronic diastolic HF, CAD, CKD stage 3, morbid obesity, HTN,  depression who presents from home with family with fever, cough. Acutely worsened respiratory status on 10/21 requiring intubation. Unclear etiology for acute on chronic hypoxic respiratory failure, most likely infectious v autoimmune v other in setting of underlying lung disease.  currently being treated for presumed ARDS.  Oxygen requirements remain stable on mechanical ventilator with ongoing daily weaning trials.       Plans today:  -Decrease scheduled oxycodone frequency from every 4 hours to every 8 hours  -Decreased IV Dilaudid as needed frequency to every 3 hours as needed.  Limit dosing as tolerated to minimize encephalopathy and improve cognition  - decrease frequency of Bumex to once a day   - start amlodipine 5 mg daily  - change Labetalol dose to 10-20 mg IV prn   - change hydralazine dose to 10-20 mg IV prn  -Continue daily weaning trials.  Respiratory therapy note yesterday patient tolerated 6 hours pressure support trial     Acute hypoxic respiratory failure s/p intubation  Possible ARDS  Pneumonia, recurrent w/bronchiectasis  -Presents with recurrent PNA, 3rd time in last month or so. Last completed treatment 2 weeks ago with persistent cough. Presents with worsening fatigue, productive cough, weakness and falls.  -On admission, patient febrile to 101.1, white count of 21.  Lactic acid was within normal limits.  She underwent a CT scan that showed a lingular consolidative opacity with air bronchograms with bilateral upper lobe groundglass opacities.  She was started on ceftriaxone and azithromycin.  - blood cx 10/15 and 10/18  NGTD  -Sputum culture from 10/19 and 10/21 grew Staph epi and C. albicans. ID feels candida that is a colonizer and not related to illness.   -COVID, influenza, Respiratory viral panel negative.  Strep and legionella antigens negative.   - MRSA PCR neg  - Fungitell neg.  Aspergillus ag neg.  Blastomyces ab neg.  Nocardia cx neg.  AFB sputum stain neg  -intubated 10/21 due to worsening respiratory status and bronch done showing serosanguinous and fibrinous return with thick mucoid secretions noted  -seen by pulm, steroids decreased from 125 to 60mg daily on 10/31. May benefit from IVIG if  euvolemic and not improving.  - plan for prolonged taper of prednisone from 60 mg q day to decreasing by 5 mg every 2 weeks (see orders).  - was treated with bumex gtt; transitioned to oral Bumex on 11/7  - started atovaquone for PJP ppx 11/3.  - now off all other systemic antimicrobials (beyond ppx atovaquone)  - family considering trach; likely next week if they choose to pursue this (and if unable to be extubated by that time)     Abx  Azithromycin 10/15-10/19  Ceftriaxone 10/15-10/17  Cefepime 10/18-31  Metronidazole 10/20-31  Vancomycin 10/21-31     Infectious/metabolic encephalopathy  - showing small improvements; opens eyes; tracks movement; no purposeful movement of extremities yet  - sedative gtts have discontinued   - receiving intermittent IV dilaudid prn; minimize as able to encourage continued clearing of encephalopathy  - frequency of scheduled oxycodone and prn dilaudid decreased 11/10     Shock/hypotension  - resolved  -suspected to be infectious  - EF is normal on TTE  - was receiving midodrine; now discontinued    HTN hx  - labile BP; receiving intermittent anti-htn prn  - resume home dose of amlodipine 5 mg daily       Hypernatremia  - resolved  - increased free water to 300 mls q 4 hours.     Gluteal cleft irritant dermatitis   - WOC consulted, appreciate recs     Leukocytosis:   - due to steroids.    - based on  chart review WBC count has been 9-12 range historically.       Generalized weakness. Chronic arthritis. Falls at home. Left Foot pain:   -Having multifactorial weakness, with diffuse body aches and pains.  Imaging on admission including CT head, cervical spine, CT CAP did not show acute fracture.  She had an x-ray of her foot also showed degenerative changes without fracture.    -readdress when appropriate     Hx ALAINA, pulm HTN, chronic diastolic HF:   - Metolazone/lasix PRN for volume overload/natriuresis.   - Given her softer BPs holding PTA antihypertensives.       CKD stage 3  Uremia  Cr 1.21 on admission. Currently ~1.55. Suspect underlying renal function may be worse than creatinine reflects as eGFR by is 13. Urea has been steadily increasing since admission while creatinine has stabilized.  -transitioned to low-protein tube feeds, nephrology consulted by my colleague and they discussed case with no formal consult at this time.   -serial labs     HTN: holding PTA antihypertensives    -Acute (on Chronic) Diastolic Heart Failure exacerbation  - normal EF on TTE  - resolved with IV diuresis  - receiving daily IV Bumex  - monitor volume status/electrolytes daily     Hx morbid obesity, anemia, CAD, hypothyroidism, mood disorder: on ASA, statin, plavix, lyrica, zoloft at home              Diet: NPO per Anesthesia Guidelines for Procedure/Surgery Except for: Meds  Adult Formula Drip Feeding: Continuous Novasource Renal; Orogastric tube; Goal Rate: 25 mL/hr x 22 hours (please hold 1 hour before and after levothyroxine); mL/hr; Decrease rate to 25 mL/hr    DVT Prophylaxis: Heparin SQ  Aleman Catheter: PRESENT, indication: Strict 1-2 Hour I&O  Lines: PRESENT      PICC 10/22/23 Triple Lumen Right Basilic multiple med gtt. ready for use-Site Assessment: WDL except;Ecchymotic  Arterial Line 10/22/23 Radial-Site Assessment: WDL      Cardiac Monitoring: ACTIVE order. Indication: Tachyarrhythmias, acute (48 hours)  Code Status:  Full Code     DISPO:  unclear.  Remains intubated/sedated in ICU.  Anticipate LTACH need on discharge        Interval History (Subjective):      Patient unable to provide any historical information.  Remains intubated.  Patient discussed with bedside nurse and with care team during ICU rounds today.  Having some labile blood pressure requiring intermittent intravenous antihypertensives for management.                    Physical Exam:      Last Vital Signs:  BP (!) 142/65   Pulse 75   Temp 99.7  F (37.6  C)   Resp 27   Ht 1.524 m (5')   Wt 88.4 kg (194 lb 14.2 oz)   SpO2 98%   BMI 38.06 kg/m        Intake/Output Summary (Last 24 hours) at 11/10/2023 1249  Last data filed at 11/10/2023 1000  Gross per 24 hour   Intake 2160 ml   Output 3500 ml   Net -1340 ml       Constitutional: Opens eyes.  Will track movement with eyes.  Does not follow commands; no purposeful movement of extremities     Respiratory: Clear to auscultation bilaterally, no crackles or wheezing   Cardiovascular: Regular rate and rhythm, normal S1 and S2, and no murmur noted   Abdomen: Normal bowel sounds, soft, non-distended, non-tender   Skin: No rashes, no cyanosis, dry to touch   Neuro: As above   Extremities: No edema, normal range of motion   Other(s):        All other systems: Negative          Medications:      All current medications were reviewed with changes reflected in problem list.         Data:      All new lab and imaging data was reviewed.   Labs:  Recent Labs   Lab 11/10/23  0412 11/09/23  0629 11/08/23  0441   WBC 16.1* 19.1* 20.8*   HGB 8.1* 8.9* 9.3*   HCT 27.0* 29.1* 30.4*   MCV 88 87 87   PLT 98* 131* 156      Imaging:   No results found for this or any previous visit (from the past 24 hour(s)).

## 2023-11-10 NOTE — PROGRESS NOTES
ICU staff  DOS 11/10/2023    No major changes overnight. Didn't tolerate as long of a PS trial this morning. A bit more hypertensive.    Vitals:   Temp:  [98.2  F (36.8  C)-99.7  F (37.6  C)] 99.7  F (37.6  C)  Pulse:  [69-88] 75  Resp:  [19-27] 27  BP: (142-195)/(65-77) 142/65  MAP:  [65 mmHg-106 mmHg] 79 mmHg  Arterial Line BP: (105-177)/(45-71) 127/59  FiO2 (%):  [35 %-40 %] 40 %  SpO2:  [90 %-100 %] 98 %     Vent Mode: CPAP/PS  (Continuous positive airway pressure with Pressure Support)  FiO2 (%): 40 %  Resp Rate (Set): 16 breaths/min  Tidal Volume (Set, mL): 480 mL  PEEP (cm H2O): 8 cmH2O  Pressure Support (cm H2O): 12 cmH2O  Resp: 27    I/O last 3 completed shifts:  In: 2720 [NG/GT:2195]  Out: 3800 [Urine:3800]    Off sedation    Exam:  Gen: Eyes open, seems to be tracking visually  HEENT: NC/AT  Pulm: Coarse  Cor: RRR  Abdomen/GI: Soft, nondistended  : Aleman  Extremities: Warm, nonedematous, AFO boots  Skin: Well-perfused  Neuro: Eyes open, seems to track/interact with family, not following for me  Psych: Unable to assess    Data:   Labs reviewed and without much change, Cr/BUN still trending down    Assessment/plan:  77 y/o woman with diastolic heart failure, chronic hypoxemia on home O2, bronchiectasis, recurrent pneumonias. Admitted 10/15 with weakness/frequent falls. Progressive dyspnea/hypoxemia requiring intubation 10/22. No etiology identified, empirically treating infection and inflammatory pneumonitis. Some improvement overall.  CNS: Off sedation, opens eyes.  # Weakness/frequent falls  # Acute/chronic pain  # Toxic/metabolic encephalopathy   - Continue oxycodone  - Keep off sedation  - Supportive cares  Pulm: On SIMV at my visit, rate reduced to 16.   # Acute on chronic mixed respiratory failure  # Chronic hypoxemia on home oxygen  # Bronchiectasis  # Recurrent pneumonia  # Inflammatory/organizing pneumonia  - Continue PS trials as tolerated, working down on inspiratory pressure  - Reduce SIMV  rate as able  - Steroid taper  CV: Required some prn antihypertensives in the last 24 hours.  # Shock, resolved  # Essential hypertension  # CAD   # Diastolic heart failure  # Moderate aortic stenosis  - Resumed amlodipine, continue prn antihypertensives  GI: Abdomen benign.  # Nutrition  - Continues on tube feeds  : UOP adequate, Na normalized  # CARMEN on CKD, at baseline  # Hypernatremia, resolved  - Continue diuresis, may be able to scale back  Heme: Hb 8.1 (8.9), platelets on slow downtrend at 98  # Anemia of chronic disease  # Thrombocytopenia  - No indication to transfuse  Msk: Extremities warm, well-perfused.   Endo: Glucose 104-117  # Stress/steroid hyperglycemia  # Hypothyroidism  - Continue levothyroxine  ID: Afebrile, WBCs 16.   # Leukocytosis  # HCAP s/p treatment  - Off antibiotic  ICU: SQH, PPI    This patient is critically ill to my assessment and requires ICU monitoring and cares. A total of 35 minutes critical care time spent on 11/10/2023, exclusive of procedures.     Rui Payton MD, PhD  Surgical critical care  11/10/2023, 1:09 PM    Clinically Significant Risk Factors        # Hypokalemia: Lowest K = 3.1 mmol/L in last 2 days, will replace as needed       # Hypoalbuminemia: Lowest albumin = 2.9 g/dL at 10/22/2023  5:20 AM, will monitor as appropriate   # Thrombocytopenia: Lowest platelets = 98 in last 2 days, will monitor for bleeding          # Obesity: Estimated body mass index is 38.06 kg/m  as calculated from the following:    Height as of this encounter: 1.524 m (5').    Weight as of this encounter: 88.4 kg (194 lb 14.2 oz).

## 2023-11-10 NOTE — PLAN OF CARE
ICU End of Shift Summary. For vital signs, labs, and complete assessment please see Documentation Flowsheet    Pertinent Assessment:  RASS goal changed to 0/-1  Has eyes open briefly but is starting to track, intermittently squirmy/restless  Does not follow commands  Labile B/P, mostly resolved after labetalol     Major Shift Events:  OG switched to NG/[place] feed  Mitchell removed  Vent settings adjusted  PTA Norvasc resumed  Weaned for 9 Hrs today    Discharge/Transfer needs: TBD    Bedside shift Report Completed: Y  Bedside Safety Check Completed: Y  -----------------------------------------------------  Notified provider about indwelling mitchell catheter discussed removal or continued need.    Did provider choose to remove indwelling mitchell catheter? YES    Provider's mitchell indication for keeping indwelling mitchell catheter: Indication for continued use: Strict 1-2 Hour I & O if external catheters are not an option    Is there an order for indwelling mitchell catheter? YES    *If there is a plan to keep mitchell catheter in place at discharge daily notification with provider is not necessary, but please add a notation in the treatment team sticky note that the patient will be discharging with the catheter.

## 2023-11-10 NOTE — PLAN OF CARE
Goal Outcome Evaluation:      Plan of Care Reviewed With: patient    Overall Patient Progress: improvingOverall Patient Progress: improving         ICU End of Shift Summary.  For vital signs and complete assessments, please see documentation flowsheets.     Pertinent assessments:   Neuro: Opens eyes to voice, does no follow commands. Slight movement in extremities. Low grade fever.   CV: SR with PACS and PVCS. BP's labile, did not tolerate large turns to the left.   Resp: SIMV for most of night. PS wean this am. LS coarse. Small amount of thin secretions.   GI/: Aleman in place with adequate UO. BS audible throughout. LBM 11/6, PRN senna given. Tolerating TF at goal at 25ml/hr.   Lines: R PICC line, R radial art line, PIV x1.   Skin: Extensive bruising on abdomen. Barrier cream applied to sacrum/buttocks.   Major Shift Events: Weaning this am on PS. Supplementing K.   Plan (Upcoming Events): Monitor electrolytes. Continue supportive and ICU cares.   Discharge/Transfer Needs: TBD    Bedside Shift Report Completed : Y  Bedside Safety Check Completed: HALEIGH

## 2023-11-10 NOTE — PROGRESS NOTES
[facility]  Respiratory Care Note    [facility] ICU VENTILATOR RESPIRATORY NOTE  Date of Admission: 10/15/2023  Date of Intubation (most recent): 10/22/2023  Reason for Mechanical Ventilation: Respiratory Failure  Number of Days on Mechanical Ventilation: 20  Met Criteria for Pressure Support Trial: Yes  Length of Pressure Support Trial: Started at 1115 this morning and is currently still on it.   Reason for Stopping Pressure Support Trial:   Reason for No Pressure Support Trial:   Significant Events Today:   ETT appearance on chest x-ray: 4.5 cm above Talisha    Plan: Will continue to monitor and assess the pt's current respiratory status and needs, in hopes of liberating her from the ventilator.     Vent Mode: CPAP/PS  (Continuous positive airway pressure with Pressure Support)  FiO2 (%): 40 %  Resp Rate (Set): 16 breaths/min  Tidal Volume (Set, mL): 480 mL  PEEP (cm H2O): 8 cmH2O  Pressure Support (cm H2O): 12 cmH2O  Resp: 25    Vital signs:  Temp: 98.3  F (36.8  C) Temp src: Temporal BP: (!) 142/65 Pulse: 81   Resp: 25 SpO2: 96 % O2 Device: Mechanical Ventilator Oxygen Delivery: 55 LPM Height: 152.4 cm (5') Weight: 88.4 kg (194 lb 14.2 oz)  Estimated body mass index is 38.06 kg/m  as calculated from the following:    Height as of this encounter: 1.524 m (5').    Weight as of this encounter: 88.4 kg (194 lb 14.2 oz).      Past Medical History:   Diagnosis Date    Antiplatelet or antithrombotic long-term use     Arthritis     Congestive heart failure (H)     Dyspnea on exertion     Heart attack (H)     Heart murmur     Hypertension     Irregular heart beat     Oxygen dependent     2L continuous at home.    Pulmonary hypertension (H)     Sleep apnea     Bipap    Stented coronary artery     x 1    Thyroid disease     Walking troubles        Past Surgical History:   Procedure Laterality Date    APPENDECTOMY      COLONOSCOPY      COLONOSCOPY N/A 2/28/2022    Procedure: COLONOSCOPY;  Surgeon:  "Kolby Dunn MD;  Location: [facility] OR    CV CORONARY ANGIOGRAM N/A 11/21/2019    Procedure: Coronary Angiogram;  Surgeon: Tay Andre MD;  Location:  HEART CARDIAC CATH LAB    CV LEFT HEART CATH N/A 11/21/2019    Procedure: Left Heart Cath;  Surgeon: Tay Andre MD;  Location:  HEART CARDIAC CATH LAB    CV PCI STENT DRUG ELUTING N/A 11/21/2019    Procedure: PCI Stent Drug Eluting;  Surgeon: Tay Andre MD;  Location: [facility] HEART CARDIAC CATH LAB    GYN SURGERY      Hyst.    ORTHOPEDIC SURGERY      B\" TKs       No family history on file.    Social History     Tobacco Use    Smoking status: Never    Smokeless tobacco: Never   Substance Use Topics    Alcohol use: Not on file     Comment: 2x/year     Ari GIFFORD  [facility]  11/10/2023      "

## 2023-11-11 NOTE — PROGRESS NOTES
[facility]  Respiratory Care Note      [facility] ICU VENTILATOR RESPIRATORY NOTE  Date of Admission: 10/15/2023  Date of Intubation (most recent): 10/22/2023  Reason for Mechanical Ventilation: Respiratory Failure  Number of Days on Mechanical Ventilation: 21  Met Criteria for Pressure Support Trial: Yes  Length of Pressure Support Trial: 5 hrs in morning and was placed back on at 1500 and currently remains on it.   Reason for Stopping Pressure Support Trial: Elevated BP  Reason for No Pressure Support Trial:   Significant Events Today:   ETT appearance on chest x-ray: 4.5 cm above Talisha     Plan: Will continue to monitor and assess the pt's current respiratory status and needs, in hopes of liberating her from the ventilator.       Vent Mode: CMV/AC  (Continuous Mandatory Ventilation/ Assist Control)  FiO2 (%): 50 %  Resp Rate (Set): 14 breaths/min  Tidal Volume (Set, mL): 480 mL  PEEP (cm H2O): 5 cmH2O  Pressure Support (cm H2O): 10 cmH2O  Resp: 27    Vital signs:  Temp: 99  F (37.2  C) Temp src: Axillary BP: 134/63 Pulse: 87   Resp: 27 SpO2: 94 % O2 Device: Mechanical Ventilator Oxygen Delivery: 55 LPM Height: 152.4 cm (5') Weight: 85 kg (187 lb 6.3 oz)  Estimated body mass index is 36.6 kg/m  as calculated from the following:    Height as of this encounter: 1.524 m (5').    Weight as of this encounter: 85 kg (187 lb 6.3 oz).    Past Medical History:   Diagnosis Date    Antiplatelet or antithrombotic long-term use     Arthritis     Congestive heart failure (H)     Dyspnea on exertion     Heart attack (H)     Heart murmur     Hypertension     Irregular heart beat     Oxygen dependent     2L continuous at home.    Pulmonary hypertension (H)     Sleep apnea     Bipap    Stented coronary artery     x 1    Thyroid disease     Walking troubles        Past Surgical History:   Procedure Laterality Date    APPENDECTOMY      COLONOSCOPY      COLONOSCOPY N/A 2/28/2022    Procedure: COLONOSCOPY;   "Surgeon: Kolby Dunn MD;  Location: [facility] OR    CV CORONARY ANGIOGRAM N/A 11/21/2019    Procedure: Coronary Angiogram;  Surgeon: Tay Andre MD;  Location:  HEART CARDIAC CATH LAB    CV LEFT HEART CATH N/A 11/21/2019    Procedure: Left Heart Cath;  Surgeon: Tay Andre MD;  Location:  HEART CARDIAC CATH LAB    CV PCI STENT DRUG ELUTING N/A 11/21/2019    Procedure: PCI Stent Drug Eluting;  Surgeon: Tay Andre MD;  Location: [facility] HEART CARDIAC CATH LAB    GYN SURGERY      Hyst.    ORTHOPEDIC SURGERY      B\" TKs       No family history on file.    Social History     Tobacco Use    Smoking status: Never    Smokeless tobacco: Never   Substance Use Topics    Alcohol use: Not on file     Comment: 2x/year     Ari Richardson [facility]  11/11/2023      "

## 2023-11-11 NOTE — PLAN OF CARE
ICU End of Shift Summary. For vital signs, labs, and complete assessment please see Documentation Flowsheet    Pertinent Assessment:  More alert today, able to nod Y/N to 2 questions before drifting off to sleep this AM. Scarcely answered questions afterward  Lungs coarse, small amount of secretions  Miralax given and senna given w/o results  Voiding well via purewick  Passive ROM done Q4hrs    Major Shift Events:  Cpap for ~2-3 hrs this AM. Less tolerant today so flipped back at 1000  Cpap again at 1500 and tolerated very well  Art Line out  Discharge/Transfer needs: TBD    Bedside shift Report Completed: Y  Bedside Safety Check Completed: Y

## 2023-11-11 NOTE — PLAN OF CARE
ICU End of Shift Summary.  For vital signs and complete assessments, please see documentation flowsheets.      Pertinent assessments:   Neuro: RASS of -1, does not follow commands, Will open eyes briefly   Cardiac: NSR with occasional PVCs and PACs. MAPS >65 with misael running.   Resp: vent dependent. On SMV most of night   GI: TF @ 25ml/hr with 300cc flushes q4h. No BM this shift   : Purewick in place, adequate UOP  Skin: See flowsheets   Lines: R radial A-line, PIVx1, PICC in RUE   Drips: None     Major Shift Events:     Misael started @ 2124 to maintain MAP greater than 65, then turned off at 0513 d/t SBPs in 170-180s.   PRN labetalol and hydralazine not given d/t pt not sustaining SBP greater than 180 for at least five minutes.    Plan (Upcoming Events): Wean pressor, wean from vent as able, continue ICU supportive cares.   Discharge/Transfer Needs: TBD     Bedside Shift Report Completed : Yes  Bedside Safety Check Completed: Yes

## 2023-11-11 NOTE — PROGRESS NOTES
[facility]  Hospitalist Progress Note  Hermilo Ratliff MD 11/11/2023    Reason for Stay (Diagnosis): resp failure         Assessment and Plan:      Summary of Stay: Matthew Bowen is a 78 year old female w/PMH chronic hypoxic respiratory failure due to pulmonary HTN, ALAINA requiring CPAP, chronic diastolic HF, CAD, CKD stage 3, morbid obesity, HTN,  depression who presents from home with family with fever, cough. Acutely worsened respiratory status on 10/21 requiring intubation. Unclear etiology for acute on chronic hypoxic respiratory failure, most likely infectious v autoimmune v other in setting of underlying lung disease.  currently being treated for presumed ARDS.  Oxygen requirements remain stable on mechanical ventilator with ongoing daily weaning trials.       Plans today:  - changed scheduled oxycodone to prn  - minimize use of any potential sedative meds to optimize neuro status  -Continue daily weaning trials.  is tolerating several hours of daily weaning   - I updated dtr at bedside today     Acute hypoxic respiratory failure s/p intubation  Possible ARDS  Pneumonia, recurrent w/bronchiectasis  -Presents with recurrent PNA, 3rd time in last month or so. Last completed treatment 2 weeks ago with persistent cough. Presents with worsening fatigue, productive cough, weakness and falls.  -On admission, patient febrile to 101.1, white count of 21.  Lactic acid was within normal limits.  She underwent a CT scan that showed a lingular consolidative opacity with air bronchograms with bilateral upper lobe groundglass opacities.  She was started on ceftriaxone and azithromycin.  - blood cx 10/15 and 10/18 NGTD  -Sputum culture from 10/19 and 10/21 grew Staph epi and C. albicans. ID feels candida that is a colonizer and not related to illness.   -COVID, influenza, Respiratory viral panel negative.  Strep and legionella antigens negative.   - MRSA PCR neg  - Fungitell neg.  Aspergillus ag neg.   Blastomyces ab neg.  Nocardia cx neg.  AFB sputum stain neg  -intubated 10/21 due to worsening respiratory status and bronch done showing serosanguinous and fibrinous return with thick mucoid secretions noted  -seen by pulm, steroids decreased from 125 to 60mg daily on 10/31.   - plan for prolonged taper of prednisone from 60 mg q day to decreasing by 5 mg every 2 weeks (see orders).  - was treated with bumex gtt; transitioned to oral Bumex on 11/7  - started atovaquone for PJP ppx 11/3.  - now off all other systemic antimicrobials (beyond ppx atovaquone)  - family considering trach; they are holding off for now as they are hoping pt will able to be extubated prior to trach need     Abx  Azithromycin 10/15-10/19  Ceftriaxone 10/15-10/17  Cefepime 10/18-31  Metronidazole 10/20-31  Vancomycin 10/21-31     Infectious/metabolic encephalopathy  - showing small improvements; opens eyes; tracks movement; moves upper shoulders/chest; no purposeful movements of hands/LE  - sedative gtts have discontinued   - receiving intermittent IV dilaudid and oxycodone prn; minimize as able to encourage continued clearing of encephalopathy    Shock/hypotension  - resolved  -suspected to be infectious  - EF is normal on TTE  - was receiving midodrine; now discontinued     HTN hx  - labile BP; receiving intermittent anti-htn prn  - resume home dose of amlodipine 5 mg daily     Hypernatremia  - resolved  - increased free water to 300 mls q 4 hours.     Gluteal cleft irritant dermatitis   - WOC consulted, appreciate recs     Leukocytosis  - due to steroids.    - based on chart review WBC count has been 9-12 range historically.       Generalized weakness. Chronic arthritis. Falls at home. Left Foot pain:   -Having multifactorial weakness, with diffuse body aches and pains.  Imaging on admission including CT head, cervical spine, CT CAP did not show acute fracture.  She had an x-ray of her foot also showed degenerative changes without  fracture.    -readdress when appropriate     Hx ALAINA, pulm HTN, chronic diastolic HF:   - Metolazone/lasix PRN for volume overload/natriuresis.   - Given her softer BPs holding PTA antihypertensives.       CKD stage 3  Uremia  Cr 1.21 on admission. Currently ~1.55. Suspect underlying renal function may be worse than creatinine reflects as eGFR by is 13. Urea has been steadily increasing since admission while creatinine has stabilized.  -transitioned to low-protein tube feeds, nephrology consulted by my colleague and they discussed case with no formal consult at this time.   -serial labs     HTN: holding PTA antihypertensives     -Acute (on Chronic) Diastolic Heart Failure exacerbation  - normal EF on TTE  - resolved with IV diuresis  - receiving daily IV Bumex  - monitor volume status/electrolytes daily     Hx morbid obesity, anemia, CAD, hypothyroidism, mood disorder: on ASA, statin, plavix, lyrica, zoloft at home           Diet: NPO per Anesthesia Guidelines for Procedure/Surgery Except for: Meds  Adult Formula Drip Feeding: Continuous Novasource Renal; Orogastric tube; Goal Rate: 25 mL/hr x 22 hours (please hold 1 hour before and after levothyroxine); mL/hr; Decrease rate to 25 mL/hr    DVT Prophylaxis: Heparin SQ  Aleman Catheter: PRESENT, indication: Strict 1-2 Hour I&O  Lines: PRESENT      PICC 10/22/23 Triple Lumen Right Basilic multiple med gtt. ready for use-Site Assessment: WDL except;Ecchymotic  Arterial Line 10/22/23 Radial-Site Assessment: WDL      Cardiac Monitoring: ACTIVE order. Indication: Tachyarrhythmias, acute (48 hours)  Code Status: Full Code     DISPO:  unclear.  Remains intubated/sedated in ICU.  Anticipate LTACH need on discharge           Interval History (Subjective):      Pt intubated.  Unable to provide any historical information                  Physical Exam:      Last Vital Signs:  BP (!) 182/68   Pulse 75   Temp 98.4  F (36.9  C) (Axillary)   Resp 27   Ht 1.524 m (5')   Wt 85 kg  (187 lb 6.3 oz)   SpO2 95%   BMI 36.60 kg/m        Intake/Output Summary (Last 24 hours) at 11/11/2023 1210  Last data filed at 11/11/2023 1100  Gross per 24 hour   Intake 1848.89 ml   Output 2400 ml   Net -551.11 ml       Constitutional: Intubated     Respiratory: Clear to auscultation bilaterally, no crackles or wheezing   Cardiovascular: Regular rate and rhythm, normal S1 and S2, and no murmur noted   Abdomen: Normal bowel sounds, soft, non-distended, non-tender   Skin: No rashes, no cyanosis, dry to touch   Neuro: Alert and oriented x3, no weakness, numbness, memory loss   Extremities: No edema, normal range of motion   Other(s): Dtr at bedside       All other systems: Negative          Medications:      All current medications were reviewed with changes reflected in problem list.         Data:      All new lab and imaging data was reviewed.   Labs:  Recent Labs   Lab 11/11/23  0744 11/11/23  0558   NA  --  139   POTASSIUM  --  3.1*   CHLORIDE  --  99   CO2  --  31*   ANIONGAP  --  9   GLC 97 110*   BUN  --  108.6*   CR  --  0.93   GFRESTIMATED  --  63   BARBARA  --  8.6*     Recent Labs   Lab 11/11/23  0558 11/10/23  0412 11/09/23  0629   WBC 14.7* 16.1* 19.1*   HGB 8.4* 8.1* 8.9*   HCT 28.0* 27.0* 29.1*   MCV 88 88 87   * 98* 131*      Imaging:   Recent Results (from the past 24 hour(s))   XR Abdomen Port 1 View    Narrative    ABDOMEN PORTABLE ONE VIEW   11/10/2023 1:49 PM     HISTORY: Confirm NGT placement.    COMPARISON: 10/22/2023.      Impression    IMPRESSION: Nasogastric tube tip within the distal gastric lumen.  Enteric feeding tube tip at the duodenal bulb region. Nonobstructive  bowel.    YULY DICK MD         SYSTEM ID:  [id number]

## 2023-11-11 NOTE — PROGRESS NOTES
ICU staff  DOS 11/11/2023    Required a touch of phenylephrine overnight for lower MAP; is now hypertensive. Doing OK on 10/5, though RSBI is 90s and RR 40s at my evaluation so I asked for her to switch back to support mode.    Vitals:   Temp:  [97.1  F (36.2  C)-98.9  F (37.2  C)] 98.4  F (36.9  C)  Pulse:  [60-89] 75  Resp:  [23-28] 27  BP: (176-185)/(68-79) 182/68  MAP:  [60 mmHg-121 mmHg] 107 mmHg  Arterial Line BP: (101-185)/(42-80) 181/73  FiO2 (%):  [40 %-50 %] 50 %  SpO2:  [91 %-96 %] 95 %     Vent Mode: CMV/AC  (Continuous Mandatory Ventilation/ Assist Control)  FiO2 (%): 50 %  Resp Rate (Set): 14 breaths/min  Tidal Volume (Set, mL): 480 mL  PEEP (cm H2O): 5 cmH2O  Pressure Support (cm H2O): 10 cmH2O  Resp: 27    I/O last 3 completed shifts:  In: 2198.89 [I.V.:58.89; NG/GT:1590]  Out: 2250 [Urine:2250]    Exam:  Gen: Intermittently alert, tachypneic at my visit which improved with SIMV  HEENT: NC/AT, intubated  Pulm: Volumes reasonable on 10/5, though tachypneic after several hours  Cor: RRR  Abdomen/GI: Soft, nondistended  : Deferred  Extremities: Warm, mild edema  Skin: Well-perfused  Neuro: Eyes open intermittently, reportedly following at times  Psych: Unable to assess    Data:   Labs reviewed  - BUN down to 108, Cr 0.93  No new micro  Imaging reviewed  - AXR shows feeding tube in the duodenal bulb (vs pylorus?)    Assessment/plan:  77 y/o woman with bronchiectasis, chronic pulmonary infections, chronic hypoxemia admitted 10/15 with weakness and falls. Hypoxemia progressed to requiring intubation 10/22. Slow progress, though her oxygenation has improved and she is tolerating spontaneous breathing trials with de-escalating levels of support.  CNS: Increasingly awake.   # Weakness/frequent falls  # Acute/chronic pain  # Toxic/metabolic encephalopathy  - Changed oxycodone to prn only  - Continue supportive cares  Pulm: SIMV rate down to 14, tolerated 3-4 hours of pressure support today.  # Acute on  chronic mixed respiratory failure  # Chronic hypoxemia on home oxygen  # Bronchiectasis  # Recurrent pneumonia  # Inflammatory/organizing pneumonia  - Continue PS trials  - SIMV wean  - On steroid taper, pulmonology following  CV: Hyper/hypotension likely a product of higher pressures when working harder on PS vs lower when resting on SIMV.   # Shock, resolved  # Essential hypertension  # CAD  # Diastolic heart failure  # Moderate aortic stenosis  - Back on amlodipine, getting diuretics  - Hold off on resuming isosorbide for now, will see how BP looks after some time resting on SIMV  - PRN antihypertensives  GI: Abdomen benign  # Nutrition  - Continue tube feedings  : UOP adequate, Na normalized  # CARMEN on CKD, at baseline  # Hypernatremia, resolved  - Continue diuresis  Heme: Hb 8.4 (8.1), plt 113  # Anemia of chronic disease  # Thrombocytopenia  - No indications to transfuse  - Platelets have been steadily declining since admission, suspect sepsis or myelosuppression as opposed to something like DMITRI; 4t score 2  Msk: Extremities warm, well-perfused.   Endo: Glucose   # Hypothyroidism  - Continue levothyroxine  ID: Afebrile, WBCs 14.7.   # Steroid-induced leukocytosis  # HCAP s/p treatment  - Following off antibiotic  ICU: SQH, PPI.  - Family updated at bedside    This patient is critically ill to my assessment and requires ICU monitoring and cares. A total of 35 minutes critical care time spent on 11/11/2023, exclusive of procedures.     Rui Payton MD, PhD  Surgical critical care  11/11/2023, 12:29 PM    Clinically Significant Risk Factors        # Hypokalemia: Lowest K = 3.1 mmol/L in last 2 days, will replace as needed       # Hypoalbuminemia: Lowest albumin = 2.9 g/dL at 10/22/2023  5:20 AM, will monitor as appropriate   # Thrombocytopenia: Lowest platelets = 98 in last 2 days, will monitor for bleeding          # Obesity: Estimated body mass index is 36.6 kg/m  as calculated from the  following:    Height as of this encounter: 1.524 m (5').    Weight as of this encounter: 85 kg (187 lb 6.3 oz).

## 2023-11-11 NOTE — PROGRESS NOTES
Received pt on PS weaning trial that started from 1115 am till 1927 pm. Pt switched back to SIMV support just to give her some rest for the night and resume to PS weaning trial in the morning. Pt is tolerating well. RT will continue to monitor pt's respiratory status.

## 2023-11-11 NOTE — PROGRESS NOTES
[facility] ICU VENTILATOR RESPIRATORY NOTE  Date of Admission: 10/15/23  Date of Intubation (most recent): 10/22/23  Reason for Mechanical Ventilation: Respiratory failure  Number of Days on Mechanical Ventilation: 21  Met Criteria for Pressure Support Trial: Yes  Length of Pressure Support Trial: currently on CPAP/PS of 10/6, trial began @ 0457    Vent Mode: (S) CPAP/PS  (Continuous positive airway pressure with Pressure Support)  FiO2 (%): 40 %  Resp Rate (Set): 14 breaths/min  Tidal Volume (Set, mL): 480 mL  PEEP (cm H2O): 6 cmH2O  Pressure Support (cm H2O): 10 cmH2O  Resp: 23    Vital signs:  Temp: 98.9  F (37.2  C) Temp src: Temporal BP: (!) 185/79 Pulse: 84   Resp: 23 SpO2: 95 % O2 Device: Mechanical Ventilator Oxygen Delivery: 55 LPM Height: 152.4 cm (5') Weight: 85 kg (187 lb 6.3 oz)  Estimated body mass index is 36.6 kg/m  as calculated from the following:    Height as of this encounter: 1.524 m (5').    Weight as of this encounter: 85 kg (187 lb 6.3 oz).

## 2023-11-12 NOTE — PROGRESS NOTES
Respiratory Care Note    [facility] V[facility]PIRATORY [facility]  Date of Admission: 10/15/2023  Date of Intubation (most recent): 10/22/2023  Reason for Mechanical Ventilation: Respiratory Failure  Number of Days on Mechanical Ventilation: 22  Met Criteria for Pressure Support Trial: Yes  Length of Pressure Support Trial: 4 hrs in morning and another 2 hrs this afternoon. Both times on 10/5.      Reason for Stopping Pressure Support Trial: Elevated BP, getting tired  Reason for No Pressure Support Trial:   Significant Events Today: Still suctioning out a moderate amount of thick creamy secretions.  ETT appearance on chest x-ray: 4.5 cm above Talisha     Plan: Will continue to monitor and assess the pt's current respiratory status and needs, in hopes of liberating her from the ventilator.     Vent Mode: SIMV/PS  (Synchronized Intermittent Mandatory Ventilation with Pressure Support)  FiO2 (%): 40 %  Resp Rate (Set): 14 breaths/min  Tidal Volume (Set, mL): 480 mL  PEEP (cm H2O): 5 cmH2O  Pressure Support (cm H2O): 10 cmH2O  Resp: 22    Vital signs:  Temp: 98.3  F (36.8  C) Temp src: Temporal BP: (!) 154/113 Pulse: 74   Resp: 22 SpO2: 93 % O2 Device: Mechanical Ventilator Oxygen Delivery: 55 LPM Height: 152.4 cm (5') Weight: 87.7 kg (193 lb 5.5 oz)  Estimated body mass index is 37.76 kg/m  as calculated from the following:    Height as of this encounter: 1.524 m (5').    Weight as of this encounter: 87.7 kg (193 lb 5.5 oz).      Past Medical History:   Diagnosis Date    Antiplatelet or antithrombotic long-term use     Arthritis     Congestive heart failure (H)     Dyspnea on exertion     Heart attack (H)     Heart murmur     Hypertension     Irregular heart beat     Oxygen dependent     2L continuous at home.    Pulmonary hypertension (H)     Sleep apnea     Bipap    Stented coronary artery     x 1    Thyroid disease     Walking troubles        Past Surgical History:   Procedure  "Laterality Date    APPENDECTOMY      COLONOSCOPY      COLONOSCOPY N/A 2/28/2022    Procedure: COLONOSCOPY;  Surgeon: Kolby Dunn MD;  Location: [facility] OR    CV CORONARY ANGIOGRAM N/A 11/21/2019    Procedure: Coronary Angiogram;  Surgeon: Tay Andre MD;  Location: [facility] HEART CARDIAC CATH LAB    CV LEFT HEART CATH N/A 11/21/2019    Procedure: Left Heart Cath;  Surgeon: Tay Andre MD;  Location: [facility]ARD[facility] CATH LAB    CV PCI STENT DRUG ELUTING N/A 11/21/2019    Procedure: PCI Stent Drug Eluting;  Surgeon: Tay Andre MD;  Location: [facility] CARD[facility]AC CATH LAB    GYN SURGERY      Hyst.    ORTHOPEDIC SURGERY      B\" TKs       No family history on file.    Social History     Tobacco Use    Smoking status: Never    Smokeless tobacco: Never   Substance Use Topics    Alcohol use: Not on file     Comment: 2x/year     Ari GIFFORD  [facility]  11/12/2023    "

## 2023-11-12 NOTE — PLAN OF CARE
ICU End of Shift Summary. For vital signs, labs, and complete assessment please see Documentation Flowsheet    Pertinent Assessment:  More alert, still only intermittently answering Y/N questions with head nods. Intermittently following commands  Lungs coarse, small secretions  Tele: SA w/ PAC's an PVC's. Murmer noted  Positive bowel sounds, no BM despite 3 days of senna, miralax, and suppository  Voiding via purewick  Edema +1 to +3  Major Shift Events:  Up to chair for 2 hrs, weaned on vent ~4-5 hrs today  Suppository given w/o results    Discharge/Transfer needs: TBD    Bedside shift Report Completed: Y  Bedside Safety Check Completed: Y

## 2023-11-12 NOTE — PROGRESS NOTES
Received pt on PS weaning trial that started from 1500 till 1933 pm. Pt switched back to SIMV support just to give her some rest for the night and resume to PS weaning trial in the morning. Pt is tolerating well. RT will continue to monitor pt's respiratory status.

## 2023-11-12 NOTE — PLAN OF CARE
ICU End of Shift Summary.  For vital signs and complete assessments, please see documentation flowsheets.      Pertinent assessments:   Neuro: RASS of -1, opens eyes spontaneously, does not track. Occasionally will answer 1-2 yes or no questions with blinking/squeezing eyes shut.   Cardiac: NSR   Resp: vent dependent. On SIMV most of night   GI: TF @ 25ml/hr with 300cc flushes q4h. No BM this shift   : Purewick in place, adequate UOP  Skin: See flowsheets   Lines: PIVx1, PICC in RUE   Drips: None     Major Shift Events:     No acute events overnight     PS trial started by RT this AM     Plan (Upcoming Events): Wean from vent as able, continue ICU supportive cares   Discharge/Transfer Needs: TBD     Bedside Shift Report Completed : Yes  Bedside Safety Check Completed: Yes

## 2023-11-12 NOTE — PROGRESS NOTES
ICU staff  DOS 11/12/2023    No major changes. Did some pressure support this morning at 8/5. Is much more awake today and follows some commands for me.    Vitals:   Temp:  [98  F (36.7  C)-99.2  F (37.3  C)] 99.2  F (37.3  C)  Pulse:  [] 85  Resp:  [21-33] 33  BP: (110-177)/(52-91) 159/72  MAP:  [101 mmHg] 101 mmHg  Arterial Line BP: (158)/(67) 158/67  FiO2 (%):  [50 %] 50 %  SpO2:  [91 %-97 %] 91 %     Vent Mode: CPAP/PS  (Continuous positive airway pressure with Pressure Support)  FiO2 (%): 50 %  Resp Rate (Set): 14 breaths/min  Tidal Volume (Set, mL): 480 mL  PEEP (cm H2O): 5 cmH2O  Pressure Support (cm H2O): 10 cmH2O  Resp: (!) 33    I/O last 3 completed shifts:  In: 2380 [NG/GT:1830]  Out: 2550 [Urine:2550]    Exam:  Gen: Awake, some interactivity  HEENT: NC/AT, eyes open, has glasses on today  Pulm: Scattered wheezes  Cor: RRR  Abdomen/GI: Soft, nondistended  : Deferred  Extremities: Warm, some pitting edema  Skin: Well-perfused  Neuro: Eyes open, follows some commands, is weak  Psych: calm    Data:   Labs reviewed and not much changed, BUN still coming down nicely  No new cultures or imaging    Assessment/plan:  79 y/o woman with chronic hypoxemia, recurrent pulmonary infections, bronchiectasis admitted 10/15 with weakness and falls. Had progressive hypoxemia requiring iCU transfer and intubation 10/22. Making some slow progress -- is on SIMV wean, tolerating longer periods of pressure support trials at 8-10/5-8, and is much more awake.   CNS: More alert today, interacting more.  # Weakness/frequent falls  # Acute/chronic pain  # Toxic/metabolic encephalopathy  - PRN oxycodone  - Has a history of ICU delirium, so continue supportive cares to minimize this  Pulm: Doing better overall with ventilator wean, though is now intubated x3 weeks.   # Acute on chronic mixed respiratory failure  # Chronic hypoxemia on home oxygen  # Bronchiectasis  # Organizing pneumonia  - Continue PS trials as tolerated, rest  on IMV  - SIMV wean continues, down to PS 10, rate 14  - Steroid taper, pulmonology following  CV: BP steadier overnight.  # Shock, resolved  # Essential hypertension  # CAD s/p PCI  # Diastolic heart failure  # Moderate aortic stenosis  - Continues on amlodipine and prn agents  - Resume isosorbide when able  - On bumetanide now in place of home torsemide  GI: Abdomen benign.  # Nutrition  - Continue tube feedings  - Bowel regimen  : UOP good, near-even on bumetanide 1 mg daily  # CARMEN on CKD, resolved  # Uremia, improving  # Hypernatremia, resolved  - Continue diuresis with bumetanide  Heme: Hb 8.4 (8.4)  # Anemia of chronic disease  # Thrombocytopenia, improving  - No indications for transfusion  Msk: Extremities warm, well-perfused.   # Deconditioning  - PT/OT if able, OOB to chair  - Will need rehabilitation post ICU stay  Endo: Glucose 104-117  # Hypothyroidism  - Continues on levothyroxine  ID: Afebrile, WBCs 14  # Steroid-induced leukocytosis  # HCAP, resolved  - Completed antibiotic therapy  - Atovaquone for PJP prophylaxis  - Follow off antibiotics  ICU: SQH, PPI  - Family updated this morning at bedside    This patient is critically ill to my assessment and requires ICU monitoring and cares. A total of 35 minutes critical care time spent on 11/12/2023, exclusive of procedures.     Rui Payton MD, PhD  Surgical critical care  11/12/2023, 9:46 AM    Clinically Significant Risk Factors        # Hypokalemia: Lowest K = 3.1 mmol/L in last 2 days, will replace as needed       # Hypoalbuminemia: Lowest albumin = 2.9 g/dL at 10/22/2023  5:20 AM, will monitor as appropriate   # Thrombocytopenia: Lowest platelets = 113 in last 2 days, will monitor for bleeding          # Obesity: Estimated body mass index is 37.76 kg/m  as calculated from the following:    Height as of this encounter: 1.524 m (5').    Weight as of this encounter: 87.7 kg (193 lb 5.5 oz).

## 2023-11-12 NOTE — PROGRESS NOTES
[facility]  Hospitalist Progress Note  Hermilo Ratliff MD 11/12/2023    Reason for Stay (Diagnosis): resp failure         Assessment and Plan:      Summary of Stay: Matthew Bowen is a 78 year old female w/PMH chronic hypoxic respiratory failure due to pulmonary HTN, ALAINA requiring CPAP, chronic diastolic HF, CAD, CKD stage 3, morbid obesity, HTN,  depression who presents from home with family with fever, cough. Acutely worsened respiratory status on 10/21 requiring intubation. Unclear etiology for acute on chronic hypoxic respiratory failure, most likely infectious v autoimmune v other in setting of underlying lung disease.  currently being treated for presumed ARDS.  Oxygen requirements remain stable on mechanical ventilator with ongoing daily weaning trials.       Plans today:  - increased Bumex from once a day to twice a day dosing  - start scheduled potassium supplement  - minimize use of any potential sedative meds to optimize neuro status  -Continue daily weaning trials.  is tolerating several hours of daily weaning   - I updated dtr and spouse at bedside today     Acute hypoxic respiratory failure s/p intubation  Possible ARDS  Pneumonia, recurrent w/bronchiectasis  -Presents with recurrent PNA, 3rd time in last month or so. Last completed treatment 2 weeks ago with persistent cough. Presents with worsening fatigue, productive cough, weakness and falls.  -On admission, patient febrile to 101.1, white count of 21.  Lactic acid was within normal limits.  She underwent a CT scan that showed a lingular consolidative opacity with air bronchograms with bilateral upper lobe groundglass opacities.  She was started on ceftriaxone and azithromycin.  - blood cx 10/15 and 10/18 NGTD  -Sputum culture from 10/19 and 10/21 grew Staph epi and C. albicans. ID feels candida that is a colonizer and not related to illness.   -COVID, influenza, Respiratory viral panel negative.  Strep and legionella antigens  negative.   - MRSA PCR neg  - Fungitell neg.  Aspergillus ag neg.  Blastomyces ab neg.  Nocardia cx neg.  AFB sputum stain neg  -intubated 10/21 due to worsening respiratory status and bronch done showing serosanguinous and fibrinous return with thick mucoid secretions noted  -seen by pulm, steroids decreased from 125 to 60mg daily on 10/31.   - plan for prolonged taper of prednisone from 60 mg q day to decreasing by 5 mg every 2 weeks (see orders).  - was treated with bumex gtt; transitioned to oral Bumex on 11/7  - started atovaquone for PJP ppx 11/3.  - now off all other systemic antimicrobials (beyond ppx atovaquone)  - family considering trach; they are holding off for now as they are hoping pt will able to be extubated prior to trach need     Abx  Azithromycin 10/15-10/19  Ceftriaxone 10/15-10/17  Cefepime 10/18-31  Metronidazole 10/20-31  Vancomycin 10/21-31     Infectious/metabolic encephalopathy  - showing small improvements; opens eyes; tracks movement; moves upper shoulders/chest; no purposeful movements of hands/LE  - sedative gtts have discontinued   - receiving intermittent IV dilaudid and oxycodone prn; minimize as able to encourage continued clearing of encephalopathy     Shock/hypotension  - resolved  -suspected to be infectious  - EF is normal on TTE  - was receiving midodrine; now discontinued     HTN hx  - labile BP; receiving intermittent anti-htn prn  - resume home dose of amlodipine 5 mg daily     Hypernatremia  - resolved  - increased free water to 300 mls q 4 hours.     Gluteal cleft irritant dermatitis   - WOC consulted, appreciate recs     Leukocytosis  - due to steroids.    - based on chart review WBC count has been 9-12 range historically.       Generalized weakness. Chronic arthritis. Falls at home. Left Foot pain:   -Having multifactorial weakness, with diffuse body aches and pains.  Imaging on admission including CT head, cervical spine, CT CAP did not show acute fracture.  She had an  x-ray of her foot also showed degenerative changes without fracture.    -readdress when appropriate     Hx ALAINA, pulm HTN, chronic diastolic HF:   - Metolazone/lasix PRN for volume overload/natriuresis.   - Given her softer BPs holding PTA antihypertensives.       CKD stage 3  Uremia  Cr 1.21 on admission. Currently ~1.55. Suspect underlying renal function may be worse than creatinine reflects as eGFR by is 13. Urea has been steadily increasing since admission while creatinine has stabilized.  -transitioned to low-protein tube feeds, nephrology consulted by my colleague and they discussed case with no formal consult at this time.   -serial labs     HTN: holding PTA antihypertensives     -Acute (on Chronic) Diastolic Heart Failure exacerbation  - normal EF on TTE  - resolved with IV diuresis  - receiving daily IV Bumex  - monitor volume status/electrolytes daily     Hx morbid obesity, anemia, CAD, hypothyroidism, mood disorder: on ASA, statin, plavix, lyrica, zoloft at home           Diet: NPO per Anesthesia Guidelines for Procedure/Surgery Except for: Meds  Adult Formula Drip Feeding: Continuous Novasource Renal; Orogastric tube; Goal Rate: 25 mL/hr x 22 hours (please hold 1 hour before and after levothyroxine); mL/hr; Decrease rate to 25 mL/hr    DVT Prophylaxis: Heparin SQ  Aleman Catheter: PRESENT, indication: Strict 1-2 Hour I&O  Lines: PRESENT      PICC 10/22/23 Triple Lumen Right Basilic multiple med gtt. ready for use-Site Assessment: WDL except;Ecchymotic  Arterial Line 10/22/23 Radial-Site Assessment: WDL      Cardiac Monitoring: ACTIVE order. Indication: Tachyarrhythmias, acute (48 hours)  Code Status: Full Code     DISPO:  unclear.  Remains intubated/sedated in ICU.  Anticipate LTACH need on discharge        Interval History (Subjective):      No significant issues overnight.  Remains intubated.  Oxygen requirement stable on the ventilator.  Continues to undergo daily pressure support trials, typically  several hours a day.                    Physical Exam:      Last Vital Signs:  BP (!) 159/72   Pulse 85   Temp 99.2  F (37.3  C) (Axillary)   Resp (!) 33   Ht 1.524 m (5')   Wt 87.7 kg (193 lb 5.5 oz)   SpO2 91%   BMI 37.76 kg/m        Intake/Output Summary (Last 24 hours) at 11/12/2023 1121  Last data filed at 11/12/2023 0800  Gross per 24 hour   Intake 2330 ml   Output 2150 ml   Net 180 ml       Constitutional: Intubated.  Up in bedside chair.  Family present. Eyes open, tracks voice with eye movement.  Does not move UE.       Respiratory: Clear to auscultation bilaterally, no crackles or wheezing   Cardiovascular: Regular rate and rhythm, normal S1 and S2, and no murmur noted   Abdomen: Normal bowel sounds, soft, non-distended, non-tender   Skin: No rashes, no cyanosis, dry to touch   Neuro: As above.  Opens eyes to voice   Extremities: Trace-1+ BLE edema   Other(s):        All other systems: Negative          Medications:      All current medications were reviewed with changes reflected in problem list.         Data:      All new lab and imaging data was reviewed.   Labs:  Recent Labs   Lab 11/12/23  0808 11/12/23  0612 11/12/23  0417 11/11/23  1232 11/11/23  1230 11/11/23  0744 11/11/23  0558 11/10/23  0801 11/10/23  0412   NA  --  137  --   --   --   --  139  --  141   POTASSIUM  --  3.2*  3.2*  --   --  4.1  --  3.1*   < > 3.1*   CHLORIDE  --  97*  --   --   --   --  99  --  100   CO2  --  30*  --   --   --   --  31*  --  29   ANIONGAP  --  10  --   --   --   --  9  --  12   * 117* 114*   < >  --    < > 110*   < > 107*   BUN  --  94.1*  --   --   --   --  108.6*  --  126.0*   CR  --  0.83  --   --   --   --  0.93  --  1.06*   GFRESTIMATED  --  72  --   --   --   --  63  --  54*   BARBARA  --  8.6*  --   --   --   --  8.6*  --  8.7*    < > = values in this interval not displayed.     Recent Labs   Lab 11/12/23  0612   WBC 14.5*   HGB 8.4*   HCT 28.4*   MCV 90   *      Imaging:   No results  found for this or any previous visit (from the past 24 hour(s)).

## 2023-11-13 NOTE — PLAN OF CARE
ICU End of Shift Summary.  For vital signs and complete assessments, please see documentation flowsheets.      Pertinent assessments:   Neuro: RASS of -1, opens eyes spontaneously, does not track. Occasionally will answer 1-2 yes or no questions with blinking/squeezing eyes shut.   Cardiac: NSR   Resp: vent dependent. On SIMV most of night   GI: TF @ 25ml/hr with 300cc flushes q4h. BMx1 this shift.   : Purewick in place, adequate UOP  Skin: See flowsheets   Lines: PIVx1, PICC in RUE   Drips: None     Major Shift Events:     PRN dilaudid given x1.   FiO2 increased to 55%   PRN Senna x1   Failed PS trial this AM     Plan (Upcoming Events): Wean from vent as able, continue ICU supportive cares   Discharge/Transfer Needs: TBD     Bedside Shift Report Completed : Yes  Bedside Safety Check Completed: Yes

## 2023-11-13 NOTE — PROGRESS NOTES
Vitals: Patient remains on vent- no changes to this for this writer. Low grade temp 100.1. SR  Neuro: Awakes to voice, lethargic, unable to move upper arms but able to move R foot. Unable to do hand grasps   GI: LBM overnight, obese belly, tube feeding running @35   : incontinent of urine through purewick- good urine output   Activity: x2  with lift, able to tolerate being up in chair for a few hours   Diet: NPO, tube feeding adjusted   Pain: appears in no pain   Plan: ID/OT/PT/nutrition following, phos/potassium protocol, wean vent, continue tube feedings

## 2023-11-13 NOTE — PROGRESS NOTES
11/13/23 1300   Appointment Info   Signing Clinician's Name / Credentials (OT) NANY Davis   Rehab Comments (OT) Re-evaluation   General Information   Onset of Illness/Injury or Date of Surgery 10/21/23   Patient/Family Therapy Goal Statement (OT) Pt family wants pt to return to prior level of function   Additional Occupational Profile Info/Pertinent History of Current Problem 78 year old female w/PMH chronic hypoxic respiratory failure due to pulmonary HTN, ALAINA requiring CPAP, chronic diastolic HF, CAD, CKD stage 3, morbid obesity, HTN,  depression who presents from home with family with fever, cough. Acutely worsened respiratory status on 10/21 requiring intubation. Unclear etiology for acute on chronic hypoxic respiratory failure, most likely infectious v autoimmune v other in setting of underlying lung disease.  currently being treated for presumed ARDS.  Oxygen requirements remain stable on mechanical ventilator with ongoing daily weaning trials.       Plans today:  - continue minimize use of any potential sedative meds to optimize neuro status  -Continue daily weaning trials.  - vent management per intensivist.  Noted adjustment in TV by tele ICU overnight due to high peak pressures on SIMV  - I updated dtr at bedside again today   Existing Precautions/Restrictions fall;oxygen therapy device and L/min  (vent)   Cognitive Status Examination   Follows Commands 50-74% accuracy   Cognitive Status Comments Pt demonstrated increased alertness this date, able to follow approximately 50% of commands. > 50% at beginning of session and <50% as pt fatigued   Visual Perception   Visual Impairment/Limitations corrective lenses for distance   Sensory   Sensory Comments Pt unable to communicate sensation due to ETT   Posture   Posture forward head position;protracted shoulders   Posture Comments Right lean when seated supported in chair   Range of Motion Comprehensive   Comment, General Range of Motion Active ROM  limited to minimal hip flexion and knee extension RLE, trace hip flexion LLE, L shoulder shrugs, cervical ROM   Strength Comprehensive (MMT)   Comment, General Manual Muscle Testing (MMT) Assessment Global weakness, minimal against gravity movement in RLE, LLE and L shoulder and neck   Coordination   Coordination Comments Significant impairment   Bed Mobility   Comment (Bed Mobility) Dependent   Transfers   Transfer Comments Dependent via lift   Balance   Balance Comments Impaired in supportive sitting   Activities of Daily Living   BADL Assessment/Intervention bathing;upper body dressing;lower body dressing;grooming;toileting   Bathing Assessment/Intervention   Comment, (Bathing) Dependent   Upper Body Dressing Assessment/Training   Comment, (Upper Body Dressing) Dependent   Lower Body Dressing Assessment/Training   Comment, (Lower Body Dressing) Dependent   Grooming Assessment/Training   Comment, (Grooming) Dependent   Toileting   Comment, (Toileting) Dependent   Clinical Impression   Criteria for Skilled Therapeutic Interventions Met (OT) Yes, treatment indicated   OT Diagnosis Decline in ADL participation   Influenced by the following impairments ARDS respiratory failure, prolonged hospitalization, prolonged intubation   OT Problem List-Impairments impacting ADL problems related to;activity tolerance impaired;balance;communication;coordination;mobility;motor control;range of motion (ROM);strength;postural control   Assessment of Occupational Performance 5 or more Performance Deficits   Identified Performance Deficits grooming, bathing, toileting, dressing, functional mobility   Planned Therapy Interventions (OT) ADL retraining;cognition;motor coordination training;ROM;strengthening;progressive activity/exercise   Clinical Decision Making Complexity (OT) comprehensive assessment/high complexity   Risk & Benefits of therapy have been explained evaluation/treatment results reviewed;care plan/treatment goals  reviewed;risks/benefits reviewed;current/potential barriers reviewed;participants voiced agreement with care plan;participants included;spouse/significant other;daughter   OT Total Evaluation Time   OT Re-Eval Minutes (68446) 10   OT Goals   Therapy Frequency (OT) 5 times/week   OT Predicted Duration/Target Date for Goal Attainment 11/22/23   OT Goals Hygiene/Grooming;Upper Body Dressing;Toilet Transfer/Toileting;Cognition   OT: Hygiene/Grooming minimal assist;from wheelchair   OT: Upper Body Dressing Moderate assist;from wheelchair   OT: Toilet Transfer/Toileting Maximum assist;toilet transfer;cleaning and garment management   OT: Cognitive Patient/caregiver will verbalize understanding of cognitive assessment results/recommendations as needed for safe discharge planning   OT: Goal 1 Pt's family will independently demonstrate PROM program to maintain pt's joint integrity for future self-care tasks.   Interventions   Interventions Quick Adds Therapeutic Activity   Therapeutic Activities   Therapeutic Activity Minutes (60554) 31   Symptoms noted during/after treatment fatigue   Treatment Detail/Skilled Intervention OT; Pt seated upright in bed. Pt able to open eyes when prompted, move RLE and LLE demonstrating improved command following and alertness. Time taken to organize significant lines and drains to promote safe pt mobility. Pt required total assist x2 to roll in bed to place sling. Pt lifted to chair with assist x3 and sling. Pt positioned in chair to promote alertness and skin and joint integrity. Pt instructed on LE and neck AAROM to improve motor control and ROM. Pt ability to follow commands decreased as she fatigued., Pt seated in chair with call light nad family present upon OT departure.   OT Discharge Planning   OT Plan OT; Co-treat pt, EOB sitting with lift? Lift to chair, MARILEE CEVALLOS, communication strategy with head nods   OT Discharge Recommendation (DC Rec) LTACH-pending meets medical criteria   OT  Rationale for DC Rec Patient presents significantly below baseline, dependent for all mobility. Pt ability to follow commands and move some extremities appears to be returing, pt will require continued therapy in LTACH to progress to prior level of function while having medical needs met. OT will continue to follow and upgrade recommendations as appropriate   OT Brief overview of current status Dependent with mobility, improved command following and AROM in RLE, LLE and neck this date

## 2023-11-13 NOTE — PROGRESS NOTES
RT called to bedside for increased peak pressures on vent delivered breaths. Patient is on 480/14/+5/PS12/55%. During SIMV, patient's spontaneous breaths peak pressure reaches 15-20 with her vent delivered breaths reaching over 40's.     480 ml for patient IBW is 10.5 ml/kg. Patient's tidal volume was turned down to 8 ml/kg at 360 with patient's peak pressures decreasing from 40's to low 30's, spontaneous breath peak pressures remaining stable at 15-20.    Tele MD made aware and agreed to vent change- order changed from tidal volume of 480 to 360 ml.    Kvng Chopra, RT on 11/13/2023 at 6:23 AM

## 2023-11-13 NOTE — PROGRESS NOTES
CLINICAL NUTRITION SERVICES - BRIEF NOTE      CURRENT NUTRITION SUPPORT  Access: NG tube (11/10)  Formula/Rate/Provisions:  Novasource Renal @ 25 mL/hr x 22 hours = 550 mL, 1100 kcal, 50 g protein, 101 g CHO, 0 g fiber, 394 mL free water    Flushes: H2O- 300 ml q 4 hrs- MD managing    Modulars: Prosource TF 20 x 2 pkts/day = 160 kcal/40 g protein    Total energy/protein provisions: 1260 kcal (~15 kcal/kg, 105% low end estimated needs)/90 g protein (2 g/kg) per DW 86 kg    NEW FINDINGS   Patient's renal labs continue to improved, now requiring K and P protocols on renal formula. Discussed during rounds--will change patient to standard formula.     Labs:  - Na 134  - P 2  - BUN 85.4 (trending down)    Medications/Infusions:  - lipitor  - Nephronex  - Bumex  - Levothyroxine  - pantoprazole  - prednisone  - vit D3    ASSESSED NUTRITION NEEDS PER APPROVED PRACTICE GUIDELINES:   Dosing Weight 86 kg (actual body weight) - energy; 45.5 kg (ideal body weight) - protein   Estimated Energy Needs: 946-1204 kcal/day (11-14 Kcal/Kg actual body weight)   Justification: vented   Estimated Protein Needs: 91 g protein/day (2 g pro/Kg IBW)   Justification: vented, hypercatabolism with acute illness   Estimated Fluid Needs: per MD     INTERVENTIONS  Implementation  Enteral Nutrition - Modify composition - change to Promote at 55 ml/hr x 22 hrs (1210 ml) which provides 1210 kcal, 75 g protein, 157 g CHO, 0 g fiber, 1015 ml free H2O.    Decrease Prosource to 1 pkt/day = 80 kcal/20 g protein  Add 3 pkts Nutrisource fiber/day = 45 kcal/9 g fiber    Total energy/protein provisions, all sources = 1335 kcal (15+ kcal/kg, 111% estimated needs)/95 g protein (~2.1 g/kg)    Follow plan for trach, patient will benefit from higher provisions as she begins interacting more with therapies, etc.    Monitoring/Evaluation  Will continue to monitor and evaluate per protocol.    Elizabeth Ortiz, RD, LD, CNSC  Pager - 3rd floor/ICU: [phone]  Pager -  All other floors: [phone]  Pager - Weekend/holiday: [phone]  Office: [phone]

## 2023-11-13 NOTE — PROGRESS NOTES
MICU Progress Note    Matthew Bowen MRN# [id number]   Age: 78 year old YOB: 1945     Date of Admission: 10/15/2023  Date of Service: 11/13/2023   ==================================================  24 HOUR EVENTS:  Elevated peak pressures overnight, tidal volume decreased to 360 with improvement in pressures      Changes for Today:  Continue current cares    ==================================================    ASSESSMENT AND PLAN:    77 y/o woman with chronic hypoxemia, recurrent pulmonary infections, bronchiectasis admitted 10/15 with weakness and falls. Had progressive hypoxemia requiring iCU transfer and intubation 10/22. Making some slow progress -- is on SIMV wean, tolerating longer periods of pressure support trials at 8-10/5-8, and is much more awake.      Neuro/psych:   -Sedation: No scheduled sedation.    ## Weakness/frequent falls  ## Toxic metabolic encephalopathy  History of ICU delirium, so working to minimize this      Pulmonary:   ## Acute on chronic mixed respiratory failure  ## Bronchiectasis  ## Probable organizing pneumonia  Presented with recurrent pneumonia, third time this month.  Extensive infectious evaluation including bronchoscopy and sputum cultures were unrevealing.  AFB and fungal work-up negative.  Started on high-dose steroids with some improvement in her breathing.  -Continue supportive cares as she improves.  Considering tracheostomy but holding off at this time per family's wishes  -Currently on prednisone 60 mg/day, reduced to this on 10/31.  Has a long prednisone taper of 5 mg every 2 weeks.      Cardiac:  ## Shock-resolved  ## Essential hypertension  ## CAD status post PCI  ## Diastolic heart failure  ## Moderate aortic stenosis  Continuing on amlodipine, will resume isosorbide when able      Renal:   ## CARMEN on CKD-resolved  ## Uremia  ## Hypernatremia-resolved  ## Hypervolemia  Seems to be diuresing well with consistent net negative I/O balance and decreasing  weight.  -Continue Bumex 1 mg twice daily      Infectious Disease:   ## Pneumonia on imaging, but likely inflammatory rather than infectious  Has undergone extensive infectious evaluation as above and pulmonary.  -ID continues to follow peripherally.  -Is off all antimicrobials at this time      GI/:   -Nutrition: Tube feeds      Endocrine:   #Hypothyroidism  -On levothyroxine      Heme:   ## Anemia  Likely multifactorial from illness and phlebotomy.  -Continue to trend        Prophylaxis:    -GI: PPI   -DVT: Heparin    Family Disposition: Updated at bedside  CODE: Full        Attestation:      CCT 55 min excluding procedures        This document was generated with the assistance of voice recognition software. Unintentional transcription errors may occur. Please contact the author for any clarification.    Axel Mullins M.D.  Pulmonary & Critical Care  Pager: Click Here to page    ==================================================    PHYSICAL EXAM  Temp:  [98.3  F (36.8  C)-100.1  F (37.8  C)] 99.2  F (37.3  C)  Pulse:  [68-95] 85  Resp:  [16-32] 32  BP: (108-174)/() 129/64  FiO2 (%):  [40 %-55 %] 55 %  SpO2:  [87 %-97 %] 94 %    Vent Mode: SIMV/PS  (Synchronized Intermittent Mandatory Ventilation with Pressure Support)  FiO2 (%): 55 %  Resp Rate (Set): 14 breaths/min  Tidal Volume (Set, mL): 360 mL  PEEP (cm H2O): 5 cmH2O  Pressure Support (cm H2O): 10 cmH2O  Resp: (!) 32      General: Intermittently opens eyes but does not interact.  Does not follow commands or seem to track.  Resp: Coarse throughout  Cardiac: RRR, NS1,S2, No m/r/g  Abdomen: Soft, nondistended, positive bowel sounds  Extremities: Trace to 1+ edema in left upper extremity and bilateral lower extremities.  Right upper extremity grossly edematous, likely secondary to PICC.  Skin: Warm and dry, no jaundice or rash      Date 11/13/23 0700 - 11/14/23 0659   Shift 0242-1978 6224-1768 1475-5677 24 Hour Total   INTAKE   NG/   630    Enteral 200   200   Shift Total(mL/kg) 830(9.79)   830(9.79)   OUTPUT   Urine 1100   1100   Shift Total(mL/kg) 1100(12.97)   1100(12.97)   Weight (kg) 84.8 84.8 84.8 84.8       =====================================================  LABORATORY DATA    Labs reviewed by me personally, significant abnormalities detailed in assessment and plan.      ROUTINE ICU LABS (Last four results)  CMP  Recent Labs   Lab 11/13/23  1218 11/13/23  0807 11/13/23  0537 11/13/23  0422 11/12/23  1214 11/12/23  1212 11/12/23  0808 11/12/23  0612 11/11/23  1232 11/11/23  1230 11/11/23  0744 11/11/23  0558 11/10/23  0801 11/10/23  0412   NA  --   --  134*  --   --   --   --  137  --   --   --  139  --  141   POTASSIUM  --   --  3.6  --   --  4.1  --  3.2*  3.2*  --  4.1  --  3.1*   < > 3.1*   CHLORIDE  --   --  94*  --   --   --   --  97*  --   --   --  99  --  100   CO2  --   --  32*  --   --   --   --  30*  --   --   --  31*  --  29   ANIONGAP  --   --  8  --   --   --   --  10  --   --   --  9  --  12   * 104* 119* 115*   < >  --    < > 117*   < >  --    < > 110*   < > 107*   BUN  --   --  85.4*  --   --   --   --  94.1*  --   --   --  108.6*  --  126.0*   CR  --   --  0.83  --   --   --   --  0.83  --   --   --  0.93  --  1.06*   GFRESTIMATED  --   --  72  --   --   --   --  72  --   --   --  63  --  54*   BARBARA  --   --  8.7*  --   --   --   --  8.6*  --   --   --  8.6*  --  8.7*   MAG  --   --  1.8  --   --   --   --  1.9  --   --   --  1.9  --  1.9   PHOS  --   --  2.0*  --   --   --   --  2.5  --   --   --  2.9  --  3.7    < > = values in this interval not displayed.     CBC  Recent Labs   Lab 11/13/23  0537 11/12/23  0612 11/11/23  0558 11/10/23  0412   WBC 12.7* 14.5* 14.7* 16.1*   RBC 3.03* 3.17* 3.20* 3.07*   HGB 7.9* 8.4* 8.4* 8.1*   HCT 27.0* 28.4* 28.0* 27.0*   MCV 89 90 88 88   MCH 26.1* 26.5 26.3* 26.4*   MCHC 29.3* 29.6* 30.0* 30.0*   RDW 23.0* 23.3* 23.2* 23.6*    139* 113* 98*     INRNo lab results found in  "last 7 days.  Arterial Blood Gas  Recent Labs   Lab 11/07/23  0910   PH 7.42   PCO2 40   PO2 41*   HCO3 26   O2PER 30     Venous Blood Gas   Recent Labs   Lab 11/07/23  0910   O2PER 30         No results for input(s): \"CULT\" in the last 168 hours.      No results found for this or any previous visit (from the past 48 hour(s)).        ==================================================          "

## 2023-11-13 NOTE — PROGRESS NOTES
[facility]  Infectious Disease Progress Note          Assessment and Plan:   Date of Admission:  10/15/2023  Date of Consult (When I saw the patient): 10/20/23        Assessment & Plan  Matthew Bowen is a 78 year old who was admitted on 10/15/2023.      Impression: 1 78-year-old female, some chronic underlying lung disease, on oxygen, bronchiectasis probable, some history of pneumonia but now 6 weeks or so of worsening respiratory symptoms, 2 prior courses of antibiotics, now admitted with infiltrates, no major sepsis but elevated procalcitonin and white count implying bacterial, not really improving on antibiotics concern for more chronic type pathogen in this patient with underlying lung disease by this history     2 chronic lung disease on oxygen some underlying bronchiectasis, some prior pneumonias  3 chronic kidney disease     REC 1 continue to follow at a distance, slight low-grade fever without obvious new infection no new positive microbiology.  Off antibiotics at this point on steroids, slow clinical improvement  2 continue to watch, continue prophylactic may have Mepron we will continue to follow peripherally, discussed with daughter                  Interval History:     Intubated and sedated slight improvement i still ongoing, minimal low-grade fever, no new positive microbiology              Medications:      alteplase  1-2 mg Intracatheter Once    amLODIPine  5 mg Oral Daily    aspirin  81 mg Oral or Feeding Tube Daily    atorvastatin  20 mg Oral or Feeding Tube QPM    atovaquone  1,500 mg Per Feeding Tube Daily    B and C vitamin Complex with folic acid  5 mL Oral or Feeding Tube Daily    bumetanide  1 mg Per Feeding Tube BID    chlorhexidine  15 mL Mouth/Throat Q12H    clotrimazole   Topical BID    heparin ANTICOAGULANT  5,000 Units Subcutaneous Q12H    insulin aspart  1-6 Units Subcutaneous Q4H    ipratropium - albuterol 0.5 mg/2.5 mg/3 mL  3 mL Nebulization 4x daily     levothyroxine  150 mcg Oral or Feeding Tube QAM AC    pantoprazole  40 mg Per Feeding Tube QAM AC    potassium & sodium phosphates  1 packet Oral or Feeding Tube Q4H    potassium chloride  20 mEq Per Feeding Tube BID    predniSONE  60 mg Oral Daily    Followed by    [START ON 11/16/2023] predniSONE  55 mg Oral Daily    Followed by    [START ON 11/30/2023] predniSONE  50 mg Oral Daily    Followed by    [START ON 12/14/2023] predniSONE  45 mg Oral Daily    Followed by    [START ON 12/28/2023] predniSONE  40 mg Oral Daily    Followed by    [START ON 1/11/2024] predniSONE  35 mg Oral Daily    Followed by    [START ON 1/25/2024] predniSONE  30 mg Oral Daily    Followed by    [START ON 2/8/2024] predniSONE  25 mg Oral Daily    Followed by    [START ON 2/22/2024] predniSONE  20 mg Oral Daily    pregabalin  75 mg Per Feeding Tube Daily    protein modular  2 packet Per Feeding Tube Daily    [Held by provider] QUEtiapine  50 mg Per Feeding Tube BID    sertraline  150 mg Oral or Feeding Tube Daily    sodium chloride (PF)  10-40 mL Intracatheter Q7 Days    sodium chloride (PF)  3 mL Intracatheter Q8H    vitamin D3  50 mcg Oral or Feeding Tube Daily                  Physical Exam:   Blood pressure (!) 144/78, pulse 93, temperature 100.1  F (37.8  C), temperature source Axillary, resp. rate (!) 32, height 1.524 m (5'), weight 84.8 kg (186 lb 15.2 oz), SpO2 95%.  Wt Readings from Last 2 Encounters:   11/13/23 84.8 kg (186 lb 15.2 oz)   02/28/22 94.3 kg (208 lb)     Vital Signs with Ranges  Temp:  [98.3  F (36.8  C)-100.1  F (37.8  C)] 100.1  F (37.8  C)  Pulse:  [67-93] 93  Resp:  [16-32] 32  BP: (108-174)/() 144/78  FiO2 (%):  [40 %-55 %] 55 %  SpO2:  [87 %-97 %] 95 %    Constitutional: Intubated and sedated about same slight improvement in oxygenation     Lungs: Clear to auscultation bilaterally, no crackles or wheezing   Cardiovascular: Regular rate and rhythm, normal S1 and S2, and no murmur noted   Abdomen: Normal bowel  sounds, soft, non-distended, non-tender   Skin: No rashes, no cyanosis, no edema   Other:           Data:   All microbiology laboratory data reviewed.  Recent Labs   Lab Test 11/13/23 0537 11/12/23 0612 11/11/23 0558   WBC 12.7* 14.5* 14.7*   HGB 7.9* 8.4* 8.4*   HCT 27.0* 28.4* 28.0*   MCV 89 90 88    139* 113*     Recent Labs   Lab Test 11/13/23 0537 11/12/23 0612 11/11/23 0558   CR 0.83 0.83 0.93     No lab results found.  Recent Labs   Lab Test 02/12/19  0010   CULT Light growth  Normal deshaun

## 2023-11-13 NOTE — PROGRESS NOTES
"SPIRITUAL HEALTH SERVICES Progress Note  [facility] ICU    Referral Source: Follow-up    Introduced self and role to Pt's daughter Roque at bedside - Pt \"Jeni\" is intubated and her eyes were open during our visit. Roque was working at the bedside so our visit was brief. I offered tea lights to Roque to keep in the room during their celebration of Diwali - Roque welcomed the lights and shared with me their family's own lights to honor the holiday in another corner of the room.    Plan: I will continue to follow family while on the unit. [facility] remains available.    Yelena Borja MDiv  Staff   [facility] Pager: [phone]    [facility] available 24/7 for emergent requests/referrals, either by having the on-call  paged or by entering an ASAP/STAT consult in Epic (this will also page the on-call ).    "

## 2023-11-13 NOTE — PROGRESS NOTES
Critical access hospital ICU VENTILATOR RESPIRATORY NOTE  Date of Admission: 10/15/23  Date of Intubation (most recent): 10/22/23  Reason for Mechanical Ventilation: Respiratory failure  Number of Days on Mechanical Ventilation: 21  Met Criteria for Pressure Support Trial: No  Reason for No Pressure Support Trial: Tachypnea   ETT appearance on chest x-ray: Normal    Plan:  Ps attempt this Am didn't mj, pt became very tachypneic and low mve. Rt will continue to support with plan of care.

## 2023-11-13 NOTE — PROGRESS NOTES
[facility]  Hospitalist Progress Note  Hermilo Ratliff MD 11/13/2023    Reason for Stay (Diagnosis): resp failure         Assessment and Plan:      Summary of Stay: Matthew Bowen is a 78 year old female w/PMH chronic hypoxic respiratory failure due to pulmonary HTN, ALAINA requiring CPAP, chronic diastolic HF, CAD, CKD stage 3, morbid obesity, HTN,  depression who presents from home with family with fever, cough. Acutely worsened respiratory status on 10/21 requiring intubation. Unclear etiology for acute on chronic hypoxic respiratory failure, most likely infectious v autoimmune v other in setting of underlying lung disease.  currently being treated for presumed ARDS.  Oxygen requirements remain stable on mechanical ventilator with ongoing daily weaning trials.       Plans today:  - continue minimize use of any potential sedative meds to optimize neuro status  -Continue daily weaning trials.  - vent management per intensivist.  Noted adjustment in TV by tele ICU overnight due to high peak pressures on SIMV  - I updated dtr at bedside again today     Acute hypoxic respiratory failure s/p intubation  Possible ARDS  Pneumonia, recurrent w/bronchiectasis  -Presents with recurrent PNA, 3rd time in last month or so. Last completed treatment 2 weeks ago with persistent cough. Presents with worsening fatigue, productive cough, weakness and falls.  -On admission, patient febrile to 101.1, white count of 21.  Lactic acid was within normal limits.  She underwent a CT scan that showed a lingular consolidative opacity with air bronchograms with bilateral upper lobe groundglass opacities.  She was started on ceftriaxone and azithromycin.  - blood cx 10/15 and 10/18 NGTD  -Sputum culture from 10/19 and 10/21 grew Staph epi and C. albicans. ID feels candida that is a colonizer and not related to illness.   -COVID, influenza, Respiratory viral panel negative.  Strep and legionella antigens negative.   - MRSA PCR  neg  - Fungitell neg.  Aspergillus ag neg.  Blastomyces ab neg.  Nocardia cx neg.  AFB sputum stain neg  -intubated 10/21 due to worsening respiratory status and bronch done showing serosanguinous and fibrinous return with thick mucoid secretions noted  -seen by pulm, steroids decreased from 125 to 60mg daily on 10/31.   - plan for prolonged taper of prednisone from 60 mg q day to decreasing by 5 mg every 2 weeks (see orders).  - was treated with bumex gtt; transitioned to oral Bumex on 11/7  - started atovaquone for PJP ppx 11/3.  - now off all other systemic antimicrobials (beyond ppx atovaquone)  - family considering trach; they are holding off for now as they are hoping pt will able to be extubated prior to trach need     Antibiotic history  Azithromycin 10/15-10/19  Ceftriaxone 10/15-10/17  Cefepime 10/18-31  Metronidazole 10/20-31  Vancomycin 10/21-31     Infectious/metabolic encephalopathy  - showing small improvements; opens eyes; tracks movement; moves upper shoulders/chest; no purposeful movements of hands/LE  - sedative gtts have discontinued   - receiving intermittent IV dilaudid and oxycodone prn; minimize as able to encourage continued clearing of encephalopathy     Shock/hypotension  - resolved  -suspected to be infectious  - EF is normal on TTE  - was receiving midodrine; now discontinued     HTN hx  - labile BP; receiving intermittent anti-htn prn  - resume home dose of amlodipine 5 mg daily     Hypernatremia  - resolved  - increased free water to 300 mls q 4 hours.     Gluteal cleft irritant dermatitis   - WOC consulted, appreciate recs     Leukocytosis  - due to steroids.    - based on chart review WBC count has been 9-12 range historically.       Generalized weakness. Chronic arthritis. Falls at home. Left Foot pain:   -Having multifactorial weakness, with diffuse body aches and pains.  Imaging on admission including CT head, cervical spine, CT CAP did not show acute fracture.  She had an x-ray  of her foot also showed degenerative changes without fracture.    -readdress when appropriate     Hx ALAINA, pulm HTN, chronic diastolic HF:   - Metolazone/lasix PRN for volume overload/natriuresis.   - Given her softer BPs holding PTA antihypertensives.       CKD stage 3  Uremia  Cr 1.21 on admission. Currently ~1.55. Suspect underlying renal function may be worse than creatinine reflects as eGFR by is 13. Urea has been steadily increasing since admission while creatinine has stabilized.  -transitioned to low-protein tube feeds, nephrology consulted by my colleague and they discussed case with no formal consult at this time.   -serial labs     HTN: holding PTA antihypertensives     -Acute (on Chronic) Diastolic Heart Failure exacerbation  - normal EF on TTE  - resolved with IV diuresis  - receiving daily IV Bumex  - monitor volume status/electrolytes daily     Hx morbid obesity, anemia, CAD, hypothyroidism, mood disorder: on ASA, statin, plavix, lyrica, zoloft at home           Diet: NPO per Anesthesia Guidelines for Procedure/Surgery Except for: Meds  Adult Formula Drip Feeding: Continuous Novasource Renal; Orogastric tube; Goal Rate: 25 mL/hr x 22 hours (please hold 1 hour before and after levothyroxine); mL/hr; Decrease rate to 25 mL/hr    DVT Prophylaxis: Heparin SQ  Aleman Catheter: PRESENT, indication: Strict 1-2 Hour I&O  Lines: PRESENT      PICC 10/22/23 Triple Lumen Right Basilic multiple med gtt. ready for use-Site Assessment: WDL except;Ecchymotic  Arterial Line 10/22/23 Radial-Site Assessment: WDL      Cardiac Monitoring: ACTIVE order. Indication: Tachyarrhythmias, acute (48 hours)  Code Status: Full Code     DISPO:  unclear.  Remains intubated/sedated in ICU.  Anticipate LTACH need on discharge        Interval History (Subjective):      Remains intubated.  Was out of bed and up in bedside chair yesterday.  Overnight had high peak pressures and tidal volume decreased by telemetry ICU.  Nurses reported  patient had a bowel movement after multiple days of constipation                  Physical Exam:      Last Vital Signs:  BP (!) 144/78   Pulse 93   Temp 100.1  F (37.8  C) (Axillary)   Resp (!) 32   Ht 1.524 m (5')   Wt 84.8 kg (186 lb 15.2 oz)   SpO2 95%   BMI 36.51 kg/m        Intake/Output Summary (Last 24 hours) at 11/13/2023 1126  Last data filed at 11/13/2023 1000  Gross per 24 hour   Intake 2500 ml   Output 2300 ml   Net 200 ml       Constitutional: Opens eyes to voice.  No volitional movement of extremities.     Respiratory: Clear to auscultation bilaterally, no crackles or wheezing   Cardiovascular: Regular rate and rhythm, normal S1 and S2, and no murmur noted   Abdomen: Normal bowel sounds, soft, non-distended, non-tender   Skin: No rashes, no cyanosis, dry to touch   Neuro: Alert and oriented x3, no weakness, numbness, memory loss   Extremities: Trace peripheral edema, normal range of motion   Other(s): Daughter present at bedside again today.       All other systems: Negative          Medications:      All current medications were reviewed with changes reflected in problem list.         Data:      All new lab and imaging data was reviewed.   Labs:  Recent Labs   Lab 11/13/23  0807 11/13/23  0537 11/13/23  0422 11/12/23  1214 11/12/23  1212 11/12/23  0808 11/12/23  0612 11/11/23  0744 11/11/23  0558   NA  --  134*  --   --   --   --  137  --  139   POTASSIUM  --  3.6  --   --  4.1  --  3.2*  3.2*   < > 3.1*   CHLORIDE  --  94*  --   --   --   --  97*  --  99   CO2  --  32*  --   --   --   --  30*  --  31*   ANIONGAP  --  8  --   --   --   --  10  --  9   * 119* 115*   < >  --    < > 117*   < > 110*   BUN  --  85.4*  --   --   --   --  94.1*  --  108.6*   CR  --  0.83  --   --   --   --  0.83  --  0.93   GFRESTIMATED  --  72  --   --   --   --  72  --  63   BARBARA  --  8.7*  --   --   --   --  8.6*  --  8.6*    < > = values in this interval not displayed.     Recent Labs   Lab 11/13/23  0537    WBC 12.7*   HGB 7.9*   HCT 27.0*   MCV 89         Imaging:   No results found for this or any previous visit (from the past 24 hour(s)).

## 2023-11-13 NOTE — PROGRESS NOTES
[facility] ICU VENTILATOR RESPIRATORY NOTE  Date of Admission: 10/15/23  Date of Intubation (most recent): 10/22/23  Reason for Mechanical Ventilation: Respiratory failure  Number of Days on Mechanical Ventilation: 21  Met Criteria for Pressure Support Trial: Yes  Length of Pressure Support Trial: Failed, lasted for 4mins.  CPAP/PS of 10/5, trial began @ 0507 and  ended @ 0511    Plan: Will continue to monitor and assess the pt's current respiratory status and needs.    Vent Mode: (S) SIMV/PS  (Synchronized Intermittent Mandatory Ventilation with Pressure Support)  FiO2 (%): (S) 55 %  Resp Rate (Set): 14 breaths/min  Tidal Volume (Set, mL): 480 mL  PEEP (cm H2O): 5 cmH2O  Pressure Support (cm H2O): 10 cmH2O  Resp: (!) 32        Vital signs:  Temp: 98.9  F (37.2  C) Temp src: Temporal BP: 121/60 Pulse: 86   Resp: (!) 32 SpO2: 97 % O2 Device: Mechanical Ventilator Oxygen Delivery: 55 LPM Height: 152.4 cm (5') Weight: 84.8 kg (186 lb 15.2 oz)  Estimated body mass index is 36.51 kg/m  as calculated from the following:    Height as of this encounter: 1.524 m (5').    Weight as of this encounter: 84.8 kg (186 lb 15.2 oz).

## 2023-11-13 NOTE — PLAN OF CARE
Problem: Enteral Nutrition  Goal: Safe, Effective Therapy Delivery  Outcome: Progressing  Goal: Feeding Tolerance  Outcome: Progressing   Goal Outcome Evaluation: renal labs have improved, patient now requiring replacement of K and P, changed formula from renal to standard. RD continues to monitor closely.

## 2023-11-14 NOTE — PROGRESS NOTES
MICU Progress Note    Matthew Bowen MRN# [id number]   Age: 78 year old YOB: 1945     Date of Admission: 10/15/2023  Date of Service: 11/14/2023   ==================================================  24 HOUR EVENTS:  -Did well through most of the night, though desaturated in the morning requiring FiO2 increased from 55-80.  Chest x-ray reveals endotracheal tube 5.8 cm above the ashley and new right upper lobe opacity  -Intake and output positive 480 from yesterday      Changes for Today:  Advance endotracheal tube 3 cm  Change from SIMV to CMV due to tachypnea and discomfort    ==================================================    ASSESSMENT AND PLAN:    79 y/o woman with chronic hypoxemia, recurrent pulmonary infections, bronchiectasis admitted 10/15 with weakness and falls. Had progressive hypoxemia requiring iCU transfer and intubation 10/22. Making some slow progress -- is on SIMV wean, tolerating longer periods of pressure support trials at 8-10/5-8, and is much more awake.        Neuro/psych:   -Sedation: No scheduled sedation.    ## Weakness/frequent falls  ## Toxic metabolic encephalopathy  History of ICU delirium, so working to minimize this      Pulmonary:   ## Acute on chronic mixed respiratory failure  ## Bronchiectasis  ## Probable organizing pneumonia  Presented with recurrent pneumonia, third time this month.  Extensive infectious evaluation including bronchoscopy and sputum cultures were unrevealing.  AFB and fungal work-up negative.  Started on high-dose steroids with some improvement in her breathing.  New right upper lobe infiltrate on 11/14 not seen the day prior.  Possibly from secretions in the setting of her ET tube being slightly dislodged at 5.8 cm above the ashley.  Could also represent new infection versus return of organizing pneumonia in the setting of steroid wean.  Fever curve with a slight increase, though white count continues to trend down.  For the time being I  will manage conservatively with repositioning the endotracheal tube and watching how she progresses through the day.  -Continue supportive cares as she improves.  Considering tracheostomy but holding off at this time per family's wishes  -Currently on prednisone 60 mg/day, reduced to this on 10/31.  Has a long prednisone taper of 5 mg every 2 weeks.      Cardiac:  ## Shock-resolved  ## Essential hypertension  ## CAD status post PCI  ## Diastolic heart failure  ## Moderate aortic stenosis  Continuing on amlodipine, will resume isosorbide when able      Renal:   ## CARMEN on CKD-resolved  ## Uremia  ## Hypernatremia-resolved  ## Hypervolemia  Seems to be diuresing well with consistent net negative I/O balance and decreasing weight.  -Continue Bumex 1 mg twice daily      Infectious Disease:   ## Pneumonia on imaging, but likely inflammatory rather than infectious  Has undergone extensive infectious evaluation as above in pulmonary.  -ID continues to follow peripherally.  -Is off all therapeutic antimicrobials at this time  -Continue atovaquone for PJP prophylaxis      GI/:   -Nutrition: Tube feeds      Endocrine:  ## Steroid-induced hyperglycemia  -Sliding scale insulin    ## Hypothyroidism  -On levothyroxine    MSK:  ## Right upper extremity edema  Likely secondary to impaired venous return from PICC line.  Right upper extremity ultrasound 10/15 without evidence of thrombosis.      Heme:   ## Anemia  Likely multifactorial from illness and phlebotomy.  -Continue to trend        Prophylaxis:    -GI: PPI   -DVT: Heparin    Family Disposition: Daughter updated by phone  CODE: Full        Attestation:      CCT 50 min excluding procedures        This document was generated with the assistance of voice recognition software. Unintentional transcription errors may occur. Please contact the author for any clarification.    Axel Mullins M.D.  Pulmonary & Critical Care  Pager: Click Here to  page    ==================================================    PHYSICAL EXAM  Temp:  [97.2  F (36.2  C)-100.4  F (38  C)] 100.4  F (38  C)  Pulse:  [] 77  Resp:  [14-42] 37  BP: (129-210)/() 145/57  FiO2 (%):  [55 %-80 %] 80 %  SpO2:  [87 %-97 %] 93 %    Vent Mode: SIMV/PS  (Synchronized Intermittent Mandatory Ventilation with Pressure Support)  FiO2 (%): 80 %  Resp Rate (Set): 14 breaths/min  Tidal Volume (Set, mL): 360 mL  PEEP (cm H2O): 5 cmH2O  Pressure Support (cm H2O): 10 cmH2O  Resp: (!) 37      General: Intermittently opens eyes but does not interact.  Events appear a bit more purposeful today, but does not follow commands or seem to track.  Resp: Coarse throughout  Cardiac: RRR, NS1,S2, No m/r/g  Abdomen: Soft, nondistended, positive bowel sounds  Extremities: Trace to 1+ edema in left upper extremity and bilateral lower extremities.  Right upper extremity grossly edematous, likely secondary to PICC.  Skin: Warm and dry, no jaundice or rash      =====================================================  LABORATORY DATA    Labs reviewed by me personally, significant abnormalities detailed in assessment and plan.      ROUTINE ICU LABS (Last four results)  CMP  Recent Labs   Lab 11/14/23  0416 11/14/23  0400 11/14/23  0002 11/13/23  1948 11/13/23  0807 11/13/23  0537 11/12/23  1214 11/12/23  1212 11/12/23  0808 11/12/23  0612 11/11/23  0744 11/11/23  0558     --   --   --   --  134*  --   --   --  137  --  139   POTASSIUM 3.9  --   --   --   --  3.6  --  4.1  --  3.2*  3.2*   < > 3.1*   CHLORIDE 98  --   --   --   --  94*  --   --   --  97*  --  99   CO2 32*  --   --   --   --  32*  --   --   --  30*  --  31*   ANIONGAP 8  --   --   --   --  8  --   --   --  10  --  9   * 130* 97 107*   < > 119*   < >  --    < > 117*   < > 110*   BUN 77.5*  --   --   --   --  85.4*  --   --   --  94.1*  --  108.6*   CR 0.82  --   --   --   --  0.83  --   --   --  0.83  --  0.93   GFRESTIMATED 73  --   --    "--   --  72  --   --   --  72  --  63   BARBARA 8.8  --   --   --   --  8.7*  --   --   --  8.6*  --  8.6*   MAG 1.9  --   --   --   --  1.8  --   --   --  1.9  --  1.9   PHOS 2.6  --   --   --   --  2.0*  --   --   --  2.5  --  2.9    < > = values in this interval not displayed.     CBC  Recent Labs   Lab 11/14/23  0416 11/13/23  0537 11/12/23  0612 11/11/23  0558   WBC 11.1* 12.7* 14.5* 14.7*   RBC 3.11* 3.03* 3.17* 3.20*   HGB 8.2* 7.9* 8.4* 8.4*   HCT 27.6* 27.0* 28.4* 28.0*   MCV 89 89 90 88   MCH 26.4* 26.1* 26.5 26.3*   MCHC 29.7* 29.3* 29.6* 30.0*   RDW 22.8* 23.0* 23.3* 23.2*    150 139* 113*     INRNo lab results found in last 7 days.  Arterial Blood Gas  Recent Labs   Lab 11/07/23  0910   PH 7.42   PCO2 40   PO2 41*   HCO3 26   O2PER 30     Venous Blood Gas   Recent Labs   Lab 11/07/23  0910   O2PER 30         No results for input(s): \"CULT\" in the last 168 hours.      Recent Results (from the past 48 hour(s))   XR Chest Port 1 View    Narrative    XR CHEST PORT 1 VIEW 11/14/2023 7:31 AM    HISTORY: worsening hypoxemia    COMPARISON: 11/3/2023      Impression    IMPRESSION: Worsening patchy airspace opacities in the right upper  lobe concerning for pneumonia. This is superimposed on diffuse mixed  interstitial and airspace opacity throughout the lungs, either due to  atypical pneumonia, ARDS or pulmonary edema. Bibasilar atelectasis,  left greater than right. No pneumothorax. No significant pleural  effusion. Mild cardiomegaly.     Right arm PICC tip is at the SVC/right atrial junction. Endotracheal  tube tip is 5.8 cm above the ashley. NG tube coursing below the left  hemidiaphragm.    LYUBOV KUMAR MD         SYSTEM ID:  [id number]           ==================================================          "

## 2023-11-14 NOTE — PLAN OF CARE
ICU End of Shift Summary. For vital signs and complete assessments, please see documentation flowsheets.     Pertinent assessments: Neuro status unchanged- opens eyes spontaneously, doesn't FC, no movement BUE, intermittently spontaneously moves BLE. SR with PVCs on tele. Given PRN IV dilaudid x2 for pain. Vent settings 55% / PEEP 5. Minimal secretions. Purewick in place, a few episodes of unmeasured urine d/t not working.   Major Shift Events: TF advanced to goal of 55mL/hr and tolerating, failed vent wean this AM  Plan (Upcoming Events): wean vent as able, continue ICU cares  Discharge/Transfer Needs: TBD    Bedside Shift Report Completed: yes   Bedside Safety Check Completed: yes

## 2023-11-14 NOTE — PROGRESS NOTES
[facility] Nurse Inpatient Assessment     Consulted for: buttock wound      Patient History (according to Hospitalist provider note(s) 10/25/23:      Matthew Bowen is a 78 year old female w/PMH chronic hypoxic respiratory failure due to pulmonary HTN, ALAINA requiring CPAP, chronic diastolic HF, CAD, CKD stage 3, morbid obesity, HTN,  depression who presents from home with family with fever, cough.     Acute hypoxic respiratory failure s/p intubation  Possible ARDS  Pneumonia, recurrent w/bronchiectasis  -Presents with recurrent PNA, 3rd time in last month or so. Last completed treatment 2 weeks ago with persistent cough. Presents with worsening fatigue, productive cough, weakness and falls.  -On admission, patient febrile to 101.1, white count of 21.  Lactic acid was within normal limits.  She underwent a CT scan that showed a lingular consolidative opacity with air bronchograms with bilateral upper lobe groundglass opacities.  She was started on ceftriaxone and azithromycin.  -Sputum culture from 10/19 better that showed gram positive cocci and yeast. Discussed with ID, yeast prevalent in sputum samples and likely candida that is not related to illness.  Need to await speciation.   -Patient initially on ceftriaxone and azithromycin.  Broadened to cefepime and azithro on 10/18.  Flagyl added on 10/20.  Finished azithromycin course on 10/20.   -COVID, influenza, Respiratory viral panel negative.  Strep and legionella antigens negative.   -intubated 10/21 due to worsening respiratory status and bronch done showing serosanguinous and fibrinous return with thick mucoid secretions noted  -ID, pulm and ICU team all following.  -remains on cefepime since 18th, flagyll 20th and Vanc since 21st-cont and await further recs  -on solumedrol 125 daily 10/24, continue for now    Assessment:      Areas visualized during today's visit: Sacrum/coccyx    Wound location: gluteal cleft    Last photo:  11/14/23    Wound due to: Incontinence Associated Dermatitis (IAD)  Wound history/plan of care: wound is moisture related and pt was having almost constant loose stools, not over any bony prominence.   Wound base: 100 % dermis, superficial     Palpation of the wound bed: normal      Drainage: scant     Description of drainage: serosanguinous     Measurements (length x width x depth, in cm): 0.3 x 0.2  x  0.1 cm      Tunneling: N/A     Undermining: N/A  Periwound skin: Intact      Color: normal and consistent with surrounding tissue      Temperature: normal   Odor: none  Pain: unable to assess due to  sedation , none  Pain interventions prior to dressing change: N/A  Treatment goal: Protection  STATUS: improving  Supplies ordered: supplies stored on unit and discussed with RN and family present in room     Wound location: Right inner buttock/perineum area  Last photo: 11/14/23    Wound due to: Friction vs Pressure injury  Wound history/plan of care: Patient with urinary incontinence, currently using Purwick.  Unable to determine if this is pressure from Purwick or friction from rubbing.    Wound base: 100 % dermis     Palpation of the wound bed: normal      Drainage: scant     Description of drainage: serosanguinous     Measurements (length x width x depth, in cm): 1.5x1.3cm      Tunneling: N/A     Undermining: N/A  Periwound skin: Intact      Color: normal and consistent with surrounding tissue      Temperature: normal   Odor: none  Pain: unable to assess due to  sedation   Pain interventions prior to dressing change: N/A  Treatment goal: Heal  and Protection  STATUS: initial assessment  Supplies ordered: at bedside      Treatment Plan:     Gluteal cleft and right perineum wound(s): BID and PRN with incontinence episodes  Cleanse the area with Darlyn cleanse and protect, very gently with soft cloth.   2. Apply thin layer of critic aid paste on open or red areas   3. With repeat application, do not scrub the paste,  only remove soiled paste and reapply.   4. If complete removal of paste is necessary use baby oil/mineral oil (#[id number]) and soft wash cloth.   5. Use only one Covidien pad in between mattress and pt. No brief in bed.   6. No Purwick on patient     Orders: Updated    RECOMMEND PRIMARY TEAM ORDER: None, at this time  Education provided: plan of care and wound progress  Discussed plan of care with: Family and Nurse  [facility] nurse follow-up plan: weekly  Notify WO if wound(s) deteriorate.  Nursing to notify the Provider(s) and re-consult the [facility] Nurse if new skin concern.    DATA:     Current support surface: Standard  Low air loss (JENNIFER pump, Isolibrium, Pulsate, skin guard, etc)  Containment of urine/stool: Incontinent pad in bed and Purewick external catheter   BMI: Body mass index is 36.16 kg/m .   Active diet order: Orders Placed This Encounter      NPO per Anesthesia Guidelines for Procedure/Surgery Except for: Meds     Output: I/O last 3 completed shifts:  In: 3095 [NG/GT:2220]  Out: 2700 [Urine:2700]     Labs:   Recent Labs   Lab 11/14/23  0416   HGB 8.2*   WBC 11.1*     Pressure injury risk assessment:   Sensory Perception: 2-->very limited  Moisture: 3-->occasionally moist  Activity: 1-->bedfast  Mobility: 1-->completely immobile  Nutrition: 3-->adequate  Friction and Shear: 2-->potential problem  Compa Score: 12    PATRICIA Lorenzana  [facility] [employer]  Dept. Office Number: [phone]

## 2023-11-14 NOTE — PROGRESS NOTES
TELE:  Notified of desat requiring increase in fio2 to 80%. Camera'd in.  Appears synchronous, not especially labored.  CXR ordered; signed out to Dr. Mullins of day team.

## 2023-11-14 NOTE — PROGRESS NOTES
Pending sale to [facility] ICU VENTILATOR RESPIRATORY NOTE  Date of Admission: 10/15/23  Date of Intubation (most recent): 10/22/23  Reason for Mechanical Ventilation: Respiratory failure  Number of Days on Mechanical Ventilation: 24  Met Criteria for Pressure Support Trial: Yes  Length of Pressure Support Trial: Failed, lasted for 4mins.  CPAP/PS of 10/5, trial began @ 0451 and  ended @ 0455 due to increased in RR 40s     Plan: Will continue to monitor and assess the pt's current respiratory status and needs.    Vent Mode: (S) SIMV/PS  (Synchronized Intermittent Mandatory Ventilation with Pressure Support)  FiO2 (%): 55 %  Resp Rate (Set): 14 breaths/min  Tidal Volume (Set, mL): 360 mL  PEEP (cm H2O): 5 cmH2O  Pressure Support (cm H2O): 10 cmH2O  Resp: (!) 36    Vital signs:  Temp: 99  F (37.2  C) Temp src: Temporal BP: (!) 179/87 Pulse: 80   Resp: (!) 36 SpO2: 97 % O2 Device: Mechanical Ventilator Oxygen Delivery: 55 LPM Height: 152.4 cm (5') Weight: 84 kg (185 lb 2.4 oz)  Estimated body mass index is 36.16 kg/m  as calculated from the following:    Height as of this encounter: 1.524 m (5').    Weight as of this encounter: 84 kg (185 lb 2.4 oz).

## 2023-11-14 NOTE — PROGRESS NOTES
[facility]  Hospitalist Progress Note  Hermilo Ratliff MD 11/14/2023    Reason for Stay (Diagnosis): resp failure         Assessment and Plan:      Summary of Stay: Matthew Bowen is a 78 year old female w/PMH chronic hypoxic respiratory failure due to pulmonary HTN, ALAINA requiring CPAP, chronic diastolic HF, CAD, CKD stage 3, morbid obesity, HTN,  depression who presents from home with family with fever, cough. Acutely worsened respiratory status on 10/21 requiring intubation. Unclear etiology for acute on chronic hypoxic respiratory failure, most likely infectious v autoimmune v other in setting of underlying lung disease.  currently being treated for presumed ARDS.  Oxygen requirements remain stable on mechanical ventilator with ongoing daily weaning trials.       Plans today:  - noted increase in FIO2 this AM to 80%; had been stable at 60% previously.    - CXR today  - vent management per intensivist  - continue to attempt daily PS trials for weaning  - continue to minimize sedation  - reduce free water flushes to 220 ml every 4 hours     Acute hypoxic respiratory failure s/p intubation  Presumed ARDS  Pneumonia, recurrent w/bronchiectasis  -Presents with recurrent PNA, 3rd time in last month or so. Last completed treatment 2 weeks ago with persistent cough. Presents with worsening fatigue, productive cough, weakness and falls.  -On admission, patient febrile to 101.1, white count of 21.  Lactic acid was within normal limits.  She underwent a CT scan that showed a lingular consolidative opacity with air bronchograms with bilateral upper lobe groundglass opacities.  She was started on ceftriaxone and azithromycin.  - blood cx 10/15 and 10/18 NGTD  -Sputum culture from 10/19 and 10/21 grew Staph epi and C. albicans. ID feels candida that is a colonizer and not related to illness.   -COVID, influenza, Respiratory viral panel negative.  Strep and legionella antigens negative.   - MRSA PCR neg  -  Fungitell neg.  Aspergillus ag neg.  Blastomyces ab neg.  Nocardia cx neg.  AFB sputum stain neg  -intubated 10/21 due to worsening respiratory status and bronch done showing serosanguinous and fibrinous return with thick mucoid secretions noted  -seen by pulm, steroids decreased from 125 to 60mg daily on 10/31.   - plan for prolonged taper of prednisone from 60 mg q day to decreasing by 5 mg every 2 weeks (see orders).  - was treated with bumex gtt; transitioned to oral Bumex on 11/7  - started atovaquone for PJP ppx 11/3.  - Off all systemic antimicrobials (beyond ppx atovaquone) since 10/31  - family considering trach/PEG; they are holding off for now as they are hoping pt will able to be extubated prior to trach need     Antibiotic history  Azithromycin 10/15-10/19  Ceftriaxone 10/15-10/17  Cefepime 10/18-31  Metronidazole 10/20-31  Vancomycin 10/21-31     Infectious/metabolic encephalopathy  - showing small improvements; opens eyes; tracks movement; moves upper shoulders/chest; no purposeful movements of hands/LE  - sedative gtts have discontinued   - receiving intermittent IV dilaudid and oxycodone prn; minimize as able to encourage continued clearing of encephalopathy    Global weakness     Shock/hypotension  - resolved  -suspected to be infectious  - EF is normal on TTE  - was receiving midodrine; now discontinued     HTN hx  - labile BP; receiving intermittent anti-htn prn  - resume home dose of amlodipine 5 mg daily     Hypernatremia  - resolved  - increased free water to 300 mls q 4 hours.     Gluteal cleft irritant dermatitis   - WOC consulted, appreciate recs     Leukocytosis  - due to steroids.    - based on chart review WBC count has been 9-12 range historically.       Generalized weakness. Chronic arthritis. Falls at home. Left Foot pain:   -Having multifactorial weakness, with diffuse body aches and pains.  Imaging on admission including CT head, cervical spine, CT CAP did not show acute fracture.   She had an x-ray of her foot also showed degenerative changes without fracture.    -readdress when appropriate     Hx ALAINA, pulm HTN, chronic diastolic HF:   - Metolazone/lasix PRN for volume overload/natriuresis.   - Given her softer BPs holding PTA antihypertensives.       CKD stage 3  Uremia  Cr 1.21 on admission. Currently ~1.55. Suspect underlying renal function may be worse than creatinine reflects as eGFR by is 13. Urea has been steadily increasing since admission while creatinine has stabilized.  -transitioned to low-protein tube feeds, nephrology consulted by my colleague and they discussed case with no formal consult at this time.   -serial labs     HTN: holding PTA antihypertensives     -Acute (on Chronic) Diastolic Heart Failure exacerbation  - normal EF on TTE  - resolved with IV diuresis  - receiving daily IV Bumex  - monitor volume status/electrolytes daily     Hx morbid obesity, anemia, CAD, hypothyroidism, mood disorder: on ASA, statin, plavix, lyrica, zoloft at home           Diet: NPO per Anesthesia Guidelines for Procedure/Surgery Except for: Meds  Adult Formula Drip Feeding: Continuous Novasource Renal; Orogastric tube; Goal Rate: 25 mL/hr x 22 hours (please hold 1 hour before and after levothyroxine); mL/hr; Decrease rate to 25 mL/hr    DVT Prophylaxis: Heparin SQ  Aleman Catheter: PRESENT, indication: Strict 1-2 Hour I&O  Lines: PRESENT      PICC 10/22/23 Triple Lumen Right Basilic multiple med gtt. ready for use-Site Assessment: WDL except;Ecchymotic  Arterial Line 10/22/23 Radial-Site Assessment: WDL      Cardiac Monitoring: ACTIVE order. Indication: Tachyarrhythmias, acute (48 hours)  Code Status: Full Code     DISPO:  unclear.  Remains intubated/sedated in ICU.  Anticipate LTACH need on discharge           Interval History (Subjective):      Remains on mechanical ventilator.  Required increase in FiO2 to 80% this morning.                  Physical Exam:      Last Vital Signs:  BP (!) 145/57    Pulse 83   Temp 100.4  F (38  C) (Axillary)   Resp (!) 41   Ht 1.524 m (5')   Wt 84 kg (185 lb 2.4 oz)   SpO2 92%   BMI 36.16 kg/m        Intake/Output Summary (Last 24 hours) at 11/14/2023 1001  Last data filed at 11/14/2023 0800  Gross per 24 hour   Intake 3140 ml   Output 2250 ml   Net 890 ml       Constitutional: On ventilator.  Will briefly open eyes to voice.     Respiratory: Clear to auscultation bilaterally, no crackles or wheezing   Cardiovascular: Regular rate and rhythm, normal S1 and S2, and no murmur noted   Abdomen: Normal bowel sounds, soft, non-distended, non-tender   Skin: No rashes, no cyanosis, dry to touch   Neuro: As above   Extremities: No edema, normal range of motion   Other(s):        All other systems: Negative          Medications:      All current medications were reviewed with changes reflected in problem list.         Data:      All new lab and imaging data was reviewed.   Labs:  Recent Labs   Lab 11/14/23  0854 11/14/23  0416 11/14/23  0400 11/13/23  0807 11/13/23  0537 11/12/23  1214 11/12/23  1212 11/12/23  0808 11/12/23  0612   NA  --  138  --   --  134*  --   --   --  137   POTASSIUM  --  3.9  --   --  3.6  --  4.1  --  3.2*  3.2*   CHLORIDE  --  98  --   --  94*  --   --   --  97*   CO2  --  32*  --   --  32*  --   --   --  30*   ANIONGAP  --  8  --   --  8  --   --   --  10   * 134* 130*   < > 119*   < >  --    < > 117*   BUN  --  77.5*  --   --  85.4*  --   --   --  94.1*   CR  --  0.82  --   --  0.83  --   --   --  0.83   GFRESTIMATED  --  73  --   --  72  --   --   --  72   BARBARA  --  8.8  --   --  8.7*  --   --   --  8.6*    < > = values in this interval not displayed.     Recent Labs   Lab 11/14/23  0416   WBC 11.1*   HGB 8.2*   HCT 27.6*   MCV 89         Imaging:   Recent Results (from the past 24 hour(s))   XR Chest Port 1 View    Narrative    XR CHEST PORT 1 VIEW 11/14/2023 7:31 AM    HISTORY: worsening hypoxemia    COMPARISON: 11/3/2023       Impression    IMPRESSION: Worsening patchy airspace opacities in the right upper  lobe concerning for pneumonia. This is superimposed on diffuse mixed  interstitial and airspace opacity throughout the lungs, either due to  atypical pneumonia, ARDS or pulmonary edema. Bibasilar atelectasis,  left greater than right. No pneumothorax. No significant pleural  effusion. Mild cardiomegaly.     Right arm PICC tip is at the SVC/right atrial junction. Endotracheal  tube tip is 5.8 cm above the ashley. NG tube coursing below the left  hemidiaphragm.    LYUBOV KUMAR MD         SYSTEM ID:  [id number]

## 2023-11-15 NOTE — PROGRESS NOTES
[facility] ICU VENTILATOR RESPIRATORY NOTE  Date of Admission: 10/15/2023  Date of Intubation (most recent): 10/22/2023  Reason for Mechanical Ventilation: Respiratory failure  Number of Days on Mechanical Ventilation: 24  Met Criteria for Pressure Support Trial: No  Reason for No Pressure Support Trial: FiO2>50%  Significant Events Today: ETT was secured 19 cm at teeth and provider ordered 3 cm re-advancement. Pt now secured 22 cm @ teeth.   ETT appearance on chest x-ray: 5.8 cm above ashley (already re-advanced 3 cm )    Vent Mode: CMV/AC  (Continuous Mandatory Ventilation/ Assist Control)  FiO2 (%): 75 %  Resp Rate (Set): 14 breaths/min  Tidal Volume (Set, mL): 360 mL  PEEP (cm H2O): 5 cmH2O  Pressure Support (cm H2O): 10 cmH2O  Resp: 21      Plan: RT to continue to monitor and assess.    Gregory Blanco, RT

## 2023-11-15 NOTE — PROCEDURES
Procedures    Procedure:  Bronchoscopy  Indication: Therapeutic and diagnostic in the setting of desaturation, increased secretions, worsening chest x-ray.  Concern for possible mucous plugs  Providers:  Axel Mullins M.D.  Medications:   2 mg versed and 200 mcg fentanyl    Time Out:  Performed    After clinical evaluation and reviewing the indication, risks, alternatives and benefits of the procedure the patient was deemed to be in satisfactory condition to undergo the procedure.      Immediately before administration of medications the patient was re-assessed for adequacy to receive sedatives including the heart rate, respiratory rate, mental status, oxygen saturation, blood pressure and adequacy of pulmonary ventilation. These same parameters were continuously monitored throughout the procedure.     Maneuvers / Procedure:     The bronchoscope was inserted through the mouth via ETT.  A complete airway examination was performed from the distal trachea to the subsegmental level in each lobe of both lungs. There were no endobronchial lesion, there were scant secretions, and the mucosa was edematous and inflamed appearing.      A BAL was performed in the RUL Anterior.  A total of 50 ml of fluid was instilled.   A total of 40 ml of fluid was returned.  Return fluid was cloudy and pink.  The sample was sent for cell count and culture    Complications: None    Estimated Blood Loss: 0 ml    The patient tolerated the procedure well without undue discomfort, hypotension or arrhythmia, or hypoxia.    The procedure was performed in ICU    The case is discussed with the patient's  and daughter after the procedure.    Axel Mullins M.D.  Pulmonary & Critical Care  Pager: Click Here to page

## 2023-11-15 NOTE — PROGRESS NOTES
[facility] ICU VENTILATOR RESPIRATORY NOTE  Date of Admission: 10/21/23  Date of Intubation (most recent): 10/22/23  Reason for Mechanical Ventilation: Respiratory Failure  Number of Days on Mechanical Ventilation: 25  Met Criteria for Pressure Support Trial: No pt is on  PEEP +12  Significant Events Today: bronchoscopy, Veletri started and PEEP increased to 12  ETT appearance on chest x-ray: In appropriate place    Respiratory Therapy  Patient remains intubated on mechanical ventilator with the following settings:    Vent Mode: CMV/AC  (Continuous Mandatory Ventilation/ Assist Control)  FiO2 (%): 60 %  Resp Rate (Set): 14 breaths/min  Tidal Volume (Set, mL): 360 mL  PEEP (cm H2O): (S) 12 cmH2O  Pressure Support (cm H2O): 10 cmH2O  Resp: (!) 32    Temp: 99.1  F (37.3  C) Temp src: Temporal BP: (!) 83/46 Pulse: 55   Resp: (!) 32 SpO2: 93 % O2 Device: Mechanical Ventilator      BS were coarse and diminished with occasional expiratory wheezes . Suctioned large amounts of cloudy thick secretions through the ETT.  Will continue to follow and assess the patient's respiratory needs.    Stephanie Smith,   11/15/2023 5:52 PM

## 2023-11-15 NOTE — PLAN OF CARE
ICU End of Shift Summary.  For vital signs and complete assessments, please see documentation flowsheets     Pertinent assessments:   Neuro: RASS of -1, opens eyes spontaneously, does not track.   Cardiac: NSR   Resp: vent dependent. On CMV/AV most of day shift  GI: TF @ 55ml/hr with 220cc flushes q4h. No BM this shift --miralax given   : removed purewick due to further aggravating gluteal wound (see wound order). Bumex 2x good UOP  Skin: See flowsheets   Lines: PIVx1, PICC in RUE   Drips: None      Major Shift Events:   CXR worsening airspace in r upper lobe  Tmax 100.4  Advanced ETT to 22 cm @ teeth  Adjusted water flushes  PRN dilaudid & oxy given x2.   FiO2 increased to 70%, switched from SIMV to CMV/AV  PRN Miralax       Plan (Upcoming Events): Wean from vent as able, continue ICU supportive cares   Discharge/Transfer Needs: TBD     Bedside Shift Report Completed : Yes  Bedside Safety Check Completed: Yes

## 2023-11-15 NOTE — PLAN OF CARE
ICU End of Shift Summary.  For vital signs and complete assessments, please see documentation flowsheets.      Pertinent assessments: Tmax of 100  Neuro: RASS of -1, opens eyes spontaneously, does not track.   Cardiac: NSR, HR 60-09s  Resp: vent dependent. On VC/AC   GI: TF @ 55ml/hr with 220cc flushes q4h  : incontinent of urine, good UOP. Unable to use purewick d/t skin breakdown from prior purewick use.   Lines: PIVx1, PICC in RUE   Drips: None     Major Shift Events:     PRN oxycodone given x3     FiO2 increased from 70 to 75%     Consulted with telehub to see if urinary indwelling catheter could be put in for accurate I&Os- no new orders at this time.       Plan (Upcoming Events): Wean from vent as able, continue ICU supportive cares   Discharge/Transfer Needs: TBD     Bedside Shift Report Completed : Yes  Bedside Safety Check Completed: Yes

## 2023-11-15 NOTE — PROGRESS NOTES
[facility]    Medicine Progress Note - Hospitalist Service    Date of Admission:  10/15/2023    Assessment & Plan   Matthew Bowen is a 78 year old female w/PMH chronic hypoxic respiratory failure due to pulmonary HTN, ALAINA requiring CPAP, chronic diastolic HF, CAD, CKD stage 3, morbid obesity, HTN,  depression who presents from home with family with fever, cough. Acutely worsened respiratory status on 10/21 requiring intubation. Unclear etiology for acute on chronic hypoxic respiratory failure, most likely infectious v autoimmune v other in setting of underlying lung disease.  currently being treated for presumed ARDS.  Oxygen requirements remain stable on mechanical ventilator with ongoing daily weaning trials.       Plans today:  -Worsening oxygenation status.  -Bronchoscopy.  -Reduce free water flushes to 150 cc every 4 hours.  -Restart antibiotics with IV Zosyn.  -Increase bumetanide to 1 mg 3 times a day.  -Start Precedex drip.  -Restart epoprostenol.  -Had prednisone 60 mg this morning.  -Give additional methylprednisolone 125 mg IV today.     Acute hypoxic respiratory failure s/p intubation  Presumed ARDS  Pneumonia, recurrent w/bronchiectasis  -Presents with recurrent PNA, 3rd time in last month or so. Last completed treatment 2 weeks ago with persistent cough. Presents with worsening fatigue, productive cough, weakness and falls.  -On admission, patient febrile to 101.1, white count of 21.  Lactic acid was within normal limits.  She underwent a CT scan that showed a lingular consolidative opacity with air bronchograms with bilateral upper lobe groundglass opacities.  She was started on ceftriaxone and azithromycin.  - blood cx 10/15 and 10/18 NGTD  -Sputum culture from 10/19 and 10/21 grew Staph epi and C. albicans. ID feels candida that is a colonizer and not related to illness.   -COVID, influenza, Respiratory viral panel negative.  Strep and legionella antigens negative.   - MRSA  PCR neg  - Fungitell neg.  Aspergillus ag neg.  Blastomyces ab neg.  Nocardia cx neg.  AFB sputum stain neg  -intubated 10/21 due to worsening respiratory status and bronch done showing serosanguinous and fibrinous return with thick mucoid secretions noted  -seen by pulm, steroids decreased from 125 to 60mg daily on 10/31.   - plan for prolonged taper of prednisone from 60 mg q day to decreasing by 5 mg every 2 weeks (see orders).  -Additional methylprednisolone 125 mg on 11/15.  - was treated with bumex gtt; transitioned to oral Bumex on 11/7  - started atovaquone for PJP ppx 11/3.  - Off all systemic antimicrobials (beyond ppx atovaquone) since 10/31  - family considering trach/PEG; they are holding off for now as they are hoping pt will able to be extubated prior to trach need     Antibiotic history  Azithromycin 10/15-10/19  Ceftriaxone 10/15-10/17  Cefepime 10/18-31  Metronidazole 10/20-31  Vancomycin 10/21-31  Zosyn 11/15-     Infectious/metabolic encephalopathy  - sedative gtts previously discontinued   - receiving intermittent IV dilaudid and oxycodone prn; minimize as able to encourage continued clearing of encephalopathy  -Requiring reinitiation of continuous IV Precedex 11/15.     Shock/hypotension  - resolved  -suspected to be infectious  - EF is normal on TTE  - was receiving midodrine; now discontinued  - labile BP; receiving intermittent anti-htn prn  - resume home dose of amlodipine 5 mg daily     Hypernatremia  - resolved  -Decrease free water flushes to 150 cc every 4 hours.     Gluteal cleft irritant dermatitis   - WOC consulted, appreciate recs     Leukocytosis  - due to steroids.    - based on chart review WBC count has been 9-12 range historically.       Generalized weakness.   Chronic arthritis.   Falls at home.   Left Foot pain:   -Having multifactorial weakness, with diffuse body aches and pains.  Imaging on admission including CT head, cervical spine, CT CAP did not show acute fracture.   She had an x-ray of her foot also showed degenerative changes without fracture.    -readdress when appropriate     Hx ALAINA.  Pulmonary hypertension.  Acute exacerbation of chronic heart failure with preserved ejection fraction.  -Increase bumetanide to 1 mg 3 times a day.     CKD stage 3  Uremia  -Cr 1.21 on admission. Currently 0.8.   -transitioned to low-protein tube feeds, nephrology consulted by my colleague and they discussed case with no formal consult at this time.   -serial labs  -Avoid nephrotoxins as able.     Hypertension.  -Blood pressure has been labile.  -Continue amlodipine 5 mg a day.  -Holding hydralazine.  -Increasing bumetanide to 1 mg 3 times a day.     Hx morbid obesity  -Complicates care.    Anemia.  -Recheck hemoglobin intermittently.    CAD.  Hyperlipidemia.  -Continue aspirin 81 mg a day.  -Continue atorvastatin 20 mg a day.    Hypothyroidism.  -Continue levothyroxine 150 mcg a day.    Mood disorder  -Continue sertraline 150 mg a day.            Diet: NPO per Anesthesia Guidelines for Procedure/Surgery Except for: Meds  Adult Formula Drip Feeding: Continuous Promote; Orogastric tube; Goal Rate: 55 mL/hr x 22 hours (please hold 1 hour before and after levothyroxine); mL/hr; (11/13) Initial rate of 35 ml/hr advancing by 10 ml q 4 hrs to new goal rate of 55 ml/hr    DVT Prophylaxis: Heparin SQ  Aleman Catheter: Not present  Lines: PRESENT      PICC 10/22/23 Triple Lumen Right Basilic multiple med gtt. ready for use-Site Assessment: WDL except (dressing loose)      Cardiac Monitoring: ACTIVE order. Indication: Tachyarrhythmias, acute (48 hours)  Code Status: Full Code      Clinically Significant Risk Factors              # Hypoalbuminemia: Lowest albumin = 2.9 g/dL at 10/22/2023  5:20 AM, will monitor as appropriate            # Obesity: Estimated body mass index is 36.12 kg/m  as calculated from the following:    Height as of this encounter: 1.524 m (5').    Weight as of this encounter: 83.9 kg  (184 lb 15.5 oz).             Disposition Plan      Expected Discharge Date: 11/21/2023      Destination: home with help/services;home with family              Francis KATEY Batista DO  Hospitalist Service  [facility]  Securely message with [employer] (more info)  Text page via [employer] Paging/Directory   ______________________________________________________________________    Interval History   Intubated and sedated.    Physical Exam   Vital Signs: Temp: 98.2  F (36.8  C) Temp src: Temporal BP: (!) 82/45 Pulse: 78   Resp: (!) 34 SpO2: 91 % O2 Device: Mechanical Ventilator    Weight: 184 lbs 15.46 oz    Gen: Intubated and sedated.  Eyes:  PERRL, sclera anicteric.  OP: ET tube in place.  CV:  Regular, no loud murmurs.  Lung: Coarse breath sounds b/l, crackles at bases, normal effort.  Ab:  +BS, soft.  Skin:  Warm, dry to touch.  No rash.  Ext:  1+ pitting edema LE b/l.      Medical Decision Making       45 MINUTES SPENT BY ME on the date of service doing chart review, history, exam, documentation & further activities per the note.      Data     I have personally reviewed the following data over the past 24 hrs:    10.2  \   7.4 (L)   / 172     137 97 (L) 68.4 (H) /  103 (H)   4.6 34 (H) 0.84 \     Procal: 1.23 (H) CRP: N/A Lactic Acid: N/A         Imaging results reviewed over the past 24 hrs:   Recent Results (from the past 24 hour(s))   XR Chest Port 1 View    Narrative    CHEST ONE VIEW  11/15/2023 10:05 AM     HISTORY: Intubated, possible aspiration event yesterday, assess for  change.    COMPARISON: Chest radiograph 11/14/2023, 11/3/2023      Impression    IMPRESSION:     Lines and tubes: Endotracheal tube tip 4.7 cm above the ashley. Right  PICC tip at the mid SVC and enteric tube courses below the diaphragm.    Mild worsened bibasilar opacities, particularly in the left upper lobe  and right lower lobe, which could reflect infection, edema or ARDS.  Probable trace pleural effusions. No  pneumothorax. Similar  cardiomediastinal silhouette.      TRAE BANGURA MD         SYSTEM ID:  [id number]

## 2023-11-15 NOTE — PROGRESS NOTES
Matthew Bowen was admitted on 10/15/2023 for weakness and falls.    PMH: chronic hypoxemia, recurrent pulmonary infections, bronchiectasis.    Airway: 8.0 22 at teeth    Oxygen Therapy: Vent day 25: 14/360/+5/75%    SBT: Not complete today due to increased oxygenation needs    Respiratory Medications: duoneb QID, albuterol Q4PRN    Breath Sounds and Secretions: coarse and diminished. Moderate amount of thick, cloudy secretions.     ABG 11/15 @ 0600 7.47/49/64/35 on above vent settings    Shift Note: Patient remained intubated and sedated throughout the shift without any acute respiratory concerns. Recent CXR shows right upper lobe infiltrate. Patient was intermittently tachypnic with RN administering medication to assist. Goal is to wean oxygen dependent on patient condition.        ABG drawn at 0600 to assess ARDS status via PF ratio. On 75%, PF 84. Severe ARDS status.

## 2023-11-15 NOTE — PROGRESS NOTES
MICU Progress Note    Matthew Bowen MRN# [id number]   Age: 78 year old YOB: 1945     Date of Admission: 10/15/2023  Date of Service: 11/15/2023   ==================================================  24 HOUR EVENTS:  -Continues to have high oxygen requirements  -I/O balance +2.2 L over the past 2 days  -Mixed respiratory metabolic alkalosis    Changes for Today:  -Begin Precedex  -Recheck procalcitonin  -Recheck chest x-ray  -Reduce free water to 150 every 4 hours  -Increase diuresis  -Restart Flolan  -Increase prednisone  -Start antibiotics      ==================================================    ASSESSMENT AND PLAN:    79 y/o woman with chronic hypoxemia, recurrent pulmonary infections, bronchiectasis admitted 10/15 with weakness and falls. Had progressive hypoxemia requiring iCU transfer and intubation 10/22.  Had been making slow progress until 11/1 14 when she began to deteriorate, possibly due to an aspiration event.    Neuro/psych:   -Sedation: No scheduled sedation.    ## Weakness/frequent falls  ## Toxic metabolic encephalopathy  History of ICU delirium, so working to minimize this      Pulmonary:   ## Acute on chronic mixed respiratory failure  ## Bronchiectasis  ## Probable organizing pneumonia  Presented with recurrent pneumonia, third time this month.  Extensive infectious evaluation including bronchoscopy and sputum cultures were unrevealing.  AFB and fungal work-up negative.  Started on high-dose steroids with some improvement in her breathing.  New right upper lobe infiltrate on 11/14 not seen the day prior.  Possibly from secretions in the setting of her ET tube being slightly dislodged at 5.8 cm above the ashley.  Could also represent new infection versus return of organizing pneumonia in the setting of steroid wean.  Fever curve with a slight increase, though white count continues to trend down.  For the time being I will manage conservatively with repositioning the endotracheal  tube and watching how she progresses through the day.  Bedside pulmonary ultrasound 11/15 with dense consolidation in the left base and pulmonary edema throughout without pleural effusion.  -Discussed tracheostomy with the patient's family, the will likely wish to proceed with this, but will take some time today to discuss it  -Repeat bronchoscopy 11/15 with significant edema around the endotracheal tube  -Restart Flolan 11/15  -Increase steroids 11/15.  Was previously on 60 mg/day, increased to methylprednisolone 125 mg/day      -Once she is stable, recommend reducing to 80 mg/day for a week or 2, then 60 mg/day for a week or 2, then a prolonged taper by 5 mg every 2 weeks.        Cardiac:  ## Shock-resolved  ## Essential hypertension  ## CAD status post PCI  ## Diastolic heart failure  ## Moderate aortic stenosis  Continuing on amlodipine, will resume isosorbide when able      Renal:   ## CARMEN on CKD-resolved  ## Uremia  ## Hypernatremia-resolved  ## Hypervolemia  Seems to be diuresing well with consistent net negative I/O balance and decreasing weight.  -Had been on Bumex 1 mg twice daily, increased to 3 times daily on 11/15      Infectious Disease:   ## Opacities on imaging, possible aspiration.  The endotracheal tube was found to be somewhat retracted on 11/14 with new right upper lobe opacity concerning for aspiration.  Since that time fever curve is possibly slightly elevated though white blood cell count has continued to decline.  Repeat procalcitonin on 11/15 slightly elevated from previous on 11/4.  Of note her kidney function was significantly worse on 11/4 so the actual increase in procalcitonin is likely greater than the numbers would suggest.  Has undergone extensive infectious evaluation as above in pulmonary.  -ID continues to follow peripherally.  -Restart empiric antibiotics for possible aspiration  -Continue atovaquone for PJP prophylaxis      GI/:   -Nutrition: Tube feeds      Endocrine:  ##  Steroid-induced hyperglycemia  -Sliding scale insulin    ## Hypothyroidism  -On levothyroxine    MSK:  ## Right upper extremity edema  Likely secondary to impaired venous return from PICC line.  Right upper extremity ultrasound 10/15 without evidence of thrombosis.      Heme:   ## Anemia  Likely multifactorial from illness and phlebotomy.  -Continue to trend        Prophylaxis:    -GI: PPI   -DVT: Heparin    Family Disposition: Daughter updated by phone  CODE: Full        Attestation:      CCT 65 min excluding procedures        This document was generated with the assistance of voice recognition software. Unintentional transcription errors may occur. Please contact the author for any clarification.    Axel Mullins M.D.  Pulmonary & Critical Care  Pager: Click Here to page    ==================================================    PHYSICAL EXAM  Temp:  [99.2  F (37.3  C)-100  F (37.8  C)] 99.2  F (37.3  C)  Pulse:  [66-96] 72  Resp:  [14-45] 20  BP: ()/() 139/95  FiO2 (%):  [70 %-80 %] 70 %  SpO2:  [90 %-96 %] 94 %    Vent Mode: CMV/AC  (Continuous Mandatory Ventilation/ Assist Control)  FiO2 (%): 70 %  Resp Rate (Set): 14 breaths/min  Tidal Volume (Set, mL): 360 mL  PEEP (cm H2O): 5 cmH2O  Pressure Support (cm H2O): 10 cmH2O  Resp: 20      General: Intermittently opens eyes, today she seems to be tracking.   Resp: Coarse throughout  Cardiac: RRR, NS1,S2, No m/r/g  Abdomen: Soft, nondistended, positive bowel sounds  Extremities: Trace to 1 to 2+ edema in left upper extremity and bilateral lower extremities.  Right upper extremity grossly edematous, likely secondary to PICC.  Skin: Warm and dry, no jaundice or rash      =====================================================  LABORATORY DATA    Labs reviewed by me personally, significant abnormalities detailed in assessment and plan.      ROUTINE ICU LABS (Last four results)  CMP  Recent Labs   Lab 11/15/23  0747 11/15/23  0603 11/15/23  0407  "11/15/23  0006 11/14/23  0854 11/14/23 0416 11/13/23  0807 11/13/23 0537 11/12/23  1214 11/12/23  1212 11/12/23  0808 11/12/23  0612   NA  --  137  --   --   --  138  --  134*  --   --   --  137   POTASSIUM  --  4.6  --   --   --  3.9  --  3.6  --  4.1  --  3.2*  3.2*   CHLORIDE  --  97*  --   --   --  98  --  94*  --   --   --  97*   CO2  --  34*  --   --   --  32*  --  32*  --   --   --  30*   ANIONGAP  --  6*  --   --   --  8  --  8  --   --   --  10   * 105* 109* 114*   < > 134*   < > 119*   < >  --    < > 117*   BUN  --  68.4*  --   --   --  77.5*  --  85.4*  --   --   --  94.1*   CR  --  0.84  --   --   --  0.82  --  0.83  --   --   --  0.83   GFRESTIMATED  --  71  --   --   --  73  --  72  --   --   --  72   BARBARA  --  8.7*  --   --   --  8.8  --  8.7*  --   --   --  8.6*   MAG  --  1.8  --   --   --  1.9  --  1.8  --   --   --  1.9   PHOS  --  3.2  --   --   --  2.6  --  2.0*  --   --   --  2.5    < > = values in this interval not displayed.     CBC  Recent Labs   Lab 11/15/23  0603 11/14/23 0416 11/13/23 0537 11/12/23  0612   WBC 10.2 11.1* 12.7* 14.5*   RBC 2.85* 3.11* 3.03* 3.17*   HGB 7.4* 8.2* 7.9* 8.4*   HCT 25.7* 27.6* 27.0* 28.4*   MCV 90 89 89 90   MCH 26.0* 26.4* 26.1* 26.5   MCHC 28.8* 29.7* 29.3* 29.6*   RDW 22.5* 22.8* 23.0* 23.3*    163 150 139*     INRNo lab results found in last 7 days.  Arterial Blood Gas  Recent Labs   Lab 11/15/23  0603   PH 7.47*   PCO2 49*   PO2 64*   HCO3 35*   O2PER 75     Venous Blood Gas   Recent Labs   Lab 11/15/23  0603   O2PER 75         No results for input(s): \"CULT\" in the last 168 hours.      Recent Results (from the past 48 hour(s))   XR Chest Port 1 View    Narrative    XR CHEST PORT 1 VIEW 11/14/2023 7:31 AM    HISTORY: worsening hypoxemia    COMPARISON: 11/3/2023      Impression    IMPRESSION: Worsening patchy airspace opacities in the right upper  lobe concerning for pneumonia. This is superimposed on diffuse mixed  interstitial and " airspace opacity throughout the lungs, either due to  atypical pneumonia, ARDS or pulmonary edema. Bibasilar atelectasis,  left greater than right. No pneumothorax. No significant pleural  effusion. Mild cardiomegaly.     Right arm PICC tip is at the SVC/right atrial junction. Endotracheal  tube tip is 5.8 cm above the ashley. NG tube coursing below the left  hemidiaphragm.    LYUBOV KUMAR MD         SYSTEM ID:  [id number]           ==================================================

## 2023-11-15 NOTE — PROGRESS NOTES
Notified provider about indwelling mitchell catheter discussed removal or continued need.    Did provider choose to remove indwelling mitchell catheter? NO    Provider's mitchell indication for keeping indwelling mitchell catheter: Indication for continued use: Deep Sedation/Paralysis    Is there an order for indwelling mitchell catheter? YES    *If there is a plan to keep mitchell catheter in place at discharge daily notification with provider is not necessary, but please add a notation in the treatment team sticky note that the patient will be discharging with the catheter.

## 2023-11-16 NOTE — PLAN OF CARE
Problem: Enteral Nutrition  Goal: Feeding Tolerance  Outcome: Progressing   Goal Outcome Evaluation:    Patient tolerating TF at goal. Currently meeting 100% estimated energy and protein needs. Continue current TF regimen.    Monalisa Pena RD, LD  Clinical Dietitian - [facility]

## 2023-11-16 NOTE — PLAN OF CARE
Goal Outcome Evaluation:      Plan of Care Reviewed With: patient, family    Overall Patient Progress: decliningOverall Patient Progress: declining       ICU End of Shift Summary.  For vital signs and complete assessments, please see documentation flowsheets.     Pertinent assessments: RASS goal 0 to -1. LS wheezes/diminished, BS audible - no BM this shift. Aleman in place. TF running at goal of 55mL/hr, 150mL flush every 4 hours. Tele - SR  Major Shift Events:   - Around 0800 this shift, pt became agitated: tachypnec in the 40s - 50s, tachycardic in 110s, hypertensive, and desatted to 80s with 100% FiO2 on the vent. Physician notified. Received PRN ativan   - Attempted PRN ativan, hydralazine, and dilaudid - ineffective. Precedex order received. Family notified of agitation, arrived at bedside   - Bronchoscopy completed at bedside: sputum sample sent to lab, see provider note  - IV antibiotics restarted  - One time fentanyl and versed given for procedure   - Vent settings modified  - Velitri initiated  - Pt more comfortable this afternoon  - Aleman catheter re-inserted for sedation and wound healing   Plan (Upcoming Events): Wean FiO2 as tolerated  Discharge/Transfer Needs: TBD    Bedside Shift Report Completed : Y  Bedside Safety Check Completed: Y

## 2023-11-16 NOTE — PROGRESS NOTES
Matthew Bowen was admitted on 10/15/2023 for weakness and falls.    PMH: chronic hypoxemia, recurrent pulmonary infections, bronchiectasis.    Airway: 8.0 22 at teeth    Oxygen Therapy: Vent day 26: 14/360/+12/60%    SBT: Not complete today due to increased oxygenation needs and increased PEEP    Respiratory Medications: duoneb QID, albuterol Q4PRN    Breath Sounds and Secretions: coarse and diminished. Moderate amount of thick, cloudy secretions.     Shift Note: Patient remained intubated and sedated throughout the shift without any acute respiratory concerns. Patient continues to breathe well over set rate when not sedated with increased agitation. Patient did rest well for majority of the night.

## 2023-11-16 NOTE — PROGRESS NOTES
[facility]  Infectious Disease Progress Note          Assessment and Plan:   Date of Admission:  10/15/2023  Date of Consult (When I saw the patient): 10/20/23        Assessment & Plan  Matthew Bowen is a 78 year old who was admitted on 10/15/2023.      Impression: 1 78-year-old female, some chronic underlying lung disease, on oxygen, bronchiectasis probable, some history of pneumonia but now 6 weeks or so of worsening respiratory symptoms, 2 prior courses of antibiotics, now admitted with infiltrates, no major sepsis but elevated procalcitonin and white count implying bacterial, not really improving on antibiotics concern for more chronic type pathogen in this patient with underlying lung disease by this history     2 chronic lung disease on oxygen some underlying bronchiectasis, some prior pneumonias  3 chronic kidney disease     REC 1 not restarted on antibiotics, concern for an aspiration event, recultures including bronchoscopy done with all results pending.  2 we will follow, await cultures, Zosyn for now  3 discussed with daughter and family                  Interval History:     Intubated and sedated slight improvement i still ongoing, minimal low-grade fever, no new positive microbiology possible aspiration event, recultured including bronchoscopy all is pending, temp down currently back on Zosyn              Medications:      amLODIPine  5 mg Oral Daily    aspirin  81 mg Oral or Feeding Tube Daily    atorvastatin  20 mg Oral or Feeding Tube QPM    atovaquone  1,500 mg Per Feeding Tube Daily    B and C vitamin Complex with folic acid  5 mL Oral or Feeding Tube Daily    bumetanide  1 mg Intravenous Q8H    chlorhexidine  15 mL Mouth/Throat Q12H    clotrimazole   Topical BID    fiber modular (NUTRISOURCE FIBER)  1 packet Per Feeding Tube TID    heparin ANTICOAGULANT  5,000 Units Subcutaneous Q12H    insulin aspart  1-6 Units Subcutaneous Q4H    ipratropium - albuterol 0.5 mg/2.5 mg/3  mL  3 mL Nebulization 4x daily    levothyroxine  150 mcg Oral or Feeding Tube QAM AC    methylPREDNISolone  125 mg Intravenous Daily    pantoprazole  40 mg Per Feeding Tube QAM AC    piperacillin-tazobactam  4.5 g Intravenous Q6H    potassium chloride  20 mEq Per Feeding Tube BID    pregabalin  75 mg Per Feeding Tube Daily    protein modular  1 packet Per Feeding Tube Daily    QUEtiapine  100 mg Oral BID    sertraline  150 mg Oral or Feeding Tube Daily    sodium chloride (PF)  10-40 mL Intracatheter Q7 Days    sodium chloride (PF)  3 mL Intracatheter Q8H    vitamin D3  50 mcg Oral or Feeding Tube Daily                  Physical Exam:   Blood pressure (!) 143/67, pulse 72, temperature 98.9  F (37.2  C), temperature source Temporal, resp. rate 23, height 1.524 m (5'), weight 83.1 kg (183 lb 3.2 oz), SpO2 92%.  Wt Readings from Last 2 Encounters:   11/16/23 83.1 kg (183 lb 3.2 oz)   02/28/22 94.3 kg (208 lb)     Vital Signs with Ranges  Temp:  [85.3  F (29.6  C)-100.2  F (37.9  C)] 98.9  F (37.2  C)  Pulse:  [] 72  Resp:  [11-45] 23  BP: ()/(36-82) 143/67  FiO2 (%):  [55 %-100 %] 70 %  SpO2:  [89 %-99 %] 92 %    Constitutional: Intubated and sedated about same slight improvement in oxygenation     Lungs: Clear to auscultation bilaterally, no crackles or wheezing   Cardiovascular: Regular rate and rhythm, normal S1 and S2, and no murmur noted   Abdomen: Normal bowel sounds, soft, non-distended, non-tender   Skin: No rashes, no cyanosis, no edema   Other:           Data:   All microbiology laboratory data reviewed.  Recent Labs   Lab Test 11/16/23  0411 11/15/23  0603 11/14/23 0416   WBC 10.1 10.2 11.1*   HGB 7.1* 7.4* 8.2*   HCT 24.5* 25.7* 27.6*   MCV 90 90 89    172 163     Recent Labs   Lab Test 11/16/23  0411 11/15/23  0603 11/14/23 0416   CR 1.03* 0.84 0.82     No lab results found.  Recent Labs   Lab Test 02/12/19  0010   CULT Light growth  Normal deshaun           Walk in

## 2023-11-16 NOTE — PROGRESS NOTES
[facility]    Medicine Progress Note - Hospitalist Service    Date of Admission:  10/15/2023    Assessment & Plan     Matthew Bowen is a 78 year old female w/PMH chronic hypoxic respiratory failure due to pulmonary HTN, ALAINA requiring CPAP, chronic diastolic HF, CAD, CKD stage 3, morbid obesity, HTN,  depression who presents from home with family with fever, cough. Acutely worsened respiratory status on 10/21 requiring intubation. Unclear etiology for acute on chronic hypoxic respiratory failure, most likely infectious v autoimmune v other in setting of underlying lung disease.  currently being treated for presumed ARDS.  Oxygen requirements remain stable on mechanical ventilator with ongoing daily weaning trials.       Plans today:  -Increased agitation.  -Required initiation of midazolam drip evening of 11/15.  -Start quetiapine 100 mg twice a day to help with agitation.  -Decrease amlodipine to 2.5 mg a day with continued hold parameters.     Acute hypoxic respiratory failure s/p intubation  Presumed ARDS  Pneumonia, recurrent w/bronchiectasis  -Presents with recurrent PNA, 3rd time in last month or so. Last completed treatment 2 weeks ago with persistent cough. Presents with worsening fatigue, productive cough, weakness and falls.  -On admission, patient febrile to 101.1, white count of 21.  Lactic acid was within normal limits.  She underwent a CT scan that showed a lingular consolidative opacity with air bronchograms with bilateral upper lobe groundglass opacities.  She was started on ceftriaxone and azithromycin.  - blood cx 10/15 and 10/18 NGTD  -Sputum culture from 10/19 and 10/21 grew Staph epi and C. albicans. ID feels candida that is a colonizer and not related to illness.   -COVID, influenza, Respiratory viral panel negative.  Strep and legionella antigens negative.   - MRSA PCR neg  - Fungitell neg.  Aspergillus ag neg.  Blastomyces ab neg.  Nocardia cx neg.  AFB sputum stain  neg  -intubated 10/21 due to worsening respiratory status and bronch done showing serosanguinous and fibrinous return with thick mucoid secretions noted  -seen by pulm, steroids decreased from 125 to 60mg daily on 10/31.   - plan for prolonged taper of prednisone from 60 mg q day to decreasing by 5 mg every 2 weeks (see orders).  -Additional methylprednisolone 125 mg on 11/15.  - was treated with bumex gtt; transitioned to oral Bumex on 11/7  - started atovaquone for PJP ppx 11/3.  - family considering trach/PEG; they are holding off for now as they are hoping pt will able to be extubated prior to trach need  -Bronchoscopy done 11/15.  -Most culture still pending.  -Gram stain with 1+ yeast.  -Restarted Zosyn 11/15.  -Continue ID input appreciated.     Antibiotic history  Azithromycin 10/15-10/19  Ceftriaxone 10/15-10/17  Cefepime 10/18-31  Metronidazole 10/20-31  Vancomycin 10/21-31  Zosyn 11/15-     Infectious/metabolic encephalopathy  - sedative gtts previously discontinued   - receiving intermittent IV dilaudid and oxycodone prn; minimize as able to encourage continued clearing of encephalopathy  -Requiring reinitiation of continuous IV Precedex 11/15.  -Started on IV midazolam later in the evening on 11/15.  -Now adding quetiapine 100 mg twice a day.     Shock/hypotension  -suspected to be infectious  - EF is normal on TTE  - was receiving midodrine; now discontinued  -Continues to have labile blood pressures.  -Decrease amlodipine to 2.5 mg a day.  -Change bumetanide to 1 mg IV 3 times a day.  -IV hydralazine as needed.     Hypernatremia  - resolved  -Decrease free water flushes to 60 cc every 4 hours.     Gluteal cleft irritant dermatitis   - WOC consulted, appreciate recs     Leukocytosis  - due to steroids.    - based on chart review WBC count has been 9-12 range historically.       Generalized weakness.   Chronic arthritis.   Falls at home.   Left Foot pain:   -Having multifactorial weakness, with diffuse  body aches and pains.  Imaging on admission including CT head, cervical spine, CT CAP did not show acute fracture.  She had an x-ray of her foot also showed degenerative changes without fracture.    -readdress when appropriate     Hx ALAINA.  Pulmonary hypertension.  Acute exacerbation of chronic heart failure with preserved ejection fraction.  -Increase bumetanide to 1 mg 3 times a day.     CKD stage 3  Uremia  -Cr 1.21 on admission. Currently, 1.   -transitioned to low-protein tube feeds, nephrology consulted by my colleague and they discussed case with no formal consult at this time.   -serial labs  -Avoid nephrotoxins as able.     Hypertension.  -Blood pressure has been labile.  -Decrease amlodipine to 2.5 mg a day.  -Holding scheduled hydralazine and isosorbide.  -Continue bumetanide to 1 mg 3 times a day.  -IV hydralazine if significantly hypertensive.     Hx morbid obesity  -Complicates care.     Anemia.  -Hemoglobin continues to slowly drift down.    -Suspect iatrogenic causes of continued slow drift.  -Hemoglobin 7.1 morning of 11/16.    -Recheck hemoglobin this afternoon and tomorrow morning.  -Transfuse for hemoglobin less than 7.     CAD.  Hyperlipidemia.  -Continue aspirin 81 mg a day.  -Continue atorvastatin 20 mg a day.     Hypothyroidism.  -Continue levothyroxine 150 mcg a day.     Mood disorder  -Continue sertraline 150 mg a day.             Diet: NPO per Anesthesia Guidelines for Procedure/Surgery Except for: Meds  Adult Formula Drip Feeding: Continuous Promote; Orogastric tube; Goal Rate: 55 mL/hr x 22 hours (please hold 1 hour before and after levothyroxine); mL/hr; (11/13) Initial rate of 35 ml/hr advancing by 10 ml q 4 hrs to new goal rate of 55 ml/hr    DVT Prophylaxis: Heparin SQ  Aleman Catheter: PRESENT, indication: Strict 1-2 Hour I&O, Deep Sedation/Paralysis;Wound Healing  Lines: PRESENT      PICC 10/22/23 Triple Lumen Right Basilic multiple med gtt. ready for use-Site Assessment: DESIREE       Cardiac Monitoring: ACTIVE order. Indication: Tachyarrhythmias, acute (48 hours)  Code Status: Full Code      Clinically Significant Risk Factors              # Hypoalbuminemia: Lowest albumin = 2.9 g/dL at 10/22/2023  5:20 AM, will monitor as appropriate            # Obesity: Estimated body mass index is 35.78 kg/m  as calculated from the following:    Height as of this encounter: 1.524 m (5').    Weight as of this encounter: 83.1 kg (183 lb 3.2 oz).             Disposition Plan     Expected Discharge Date: 11/21/2023      Destination: home with help/services;home with family              Francis KATEY Batista, DO  Hospitalist Service  [facility]  Securely message with [employer] (more info)  Text page via [email or web address] Paging/Directory   ______________________________________________________________________    Interval History   Intubated and sedated.    Physical Exam   Vital Signs: Temp: 98.9  F (37.2  C) Temp src: Oral BP: 108/55 Pulse: 56   Resp: 22 SpO2: 98 % O2 Device: Mechanical Ventilator    Weight: 183 lbs 3.24 oz    Gen: Intermitted and sedated.  OP: ET tube in place.  CV:  Regular, no loud murmurs.  Lung:  CTA b/l, normal effort.  Ab:  +BS, soft.  Skin:  Warm, dry to touch.  No rash.  Ext:  1+ pitting edema LE b/l.      Medical Decision Making       51 MINUTES SPENT BY ME on the date of service doing chart review, history, exam, documentation & further activities per the note.      Data     I have personally reviewed the following data over the past 24 hrs:    10.1  \   7.1 (L)   / 175     138 97 (L) 79.0 (H) /  139 (H)   5.1 30 (H) 1.03 (H) \       Imaging results reviewed over the past 24 hrs:   Recent Results (from the past 24 hour(s))   XR Chest Port 1 View    Narrative    EXAM: XR CHEST PORT 1 VIEW  LOCATION: [facility]  DATE: 11/15/2023    INDICATION: Post bronchi.  COMPARISON: 11/15/2023 9:19 AM      Impression    IMPRESSION: Endotracheal tube, esophagogastric  tube, and right upper extremity PICC again identified and relatively unchanged. Esophagogastric tube courses below the level of the diaphragm, though outside field of view. Patchy bilateral pulmonary   opacities with consolidative component of the left lower lobe again identified. Minimal improvement in the right lung. No pneumothorax.   XR Abdomen Port 1 View    Narrative    EXAM: XR ABDOMEN PORT 1 VIEW  LOCATION: [facility]  DATE: 11/15/2023    INDICATION: NG placement  COMPARISON: Chest radiograph today. Abdomen radiograph 11/10/2023.      Impression    IMPRESSION: Feeding tube terminates in the midline of the abdomen in a position consistent with a termination in the distal stomach. Nonobstructive bowel gas pattern.

## 2023-11-16 NOTE — PLAN OF CARE
Continues on full vent support. Tele SR. Afebrile but diaphoretic at times. Hgb low this afternoon, will be giving 1 PRBC. Weaned off Versed onto Dex 0.5. Loose stool continue. Great urine output in Mitchell. See Flowsheet charting.       Goal Outcome Evaluation:      Plan of Care Reviewed With: patient, family    Overall Patient Progress: no changeOverall Patient Progress: no change    Outcome Evaluation: 1 unit PRBC, Weaned off Versed to Dex, weaned FiO2 45%    Notified provider about indwelling mitchell catheter discussed removal or continued need.    Did provider choose to remove indwelling mitchell catheter? No    Provider's mitchell indication for keeping indwelling mitchell catheter: Indication for continued use: Deep Sedation/Paralysis    Is there an order for indwelling mitchell catheter? YES    *If there is a plan to keep mitchell catheter in place at discharge daily notification with provider is not necessary, but please add a notation in the treatment team sticky note that the patient will be discharging with the catheter.

## 2023-11-16 NOTE — PROGRESS NOTES
TELE ICU NOTE          Assessment and Recommendations:        Matthew Bowen is a 78 year old patient being cared for at [facility] ICU for respiratory failure thought to be related to sepsis. My assessment and recommendations are:    Agitated delirium on mechanical ventilation: start versed gtt  Iatrogenic bradycardia: decrease dexmedetomidine gtt  Pulmonary edema: will give additional dose of bumex if oxygen needs continue to increase  Hypoxic respiratory failure, s/p bronchoscopy with BAL, gram stain with yeast: collect peripheral blood cultures and urine cultures; plan to start antifungals if either one is also growing yeast    30 minutes of critical care time  TELE ICU will continue to follow. Please call  [phone] with questions.  Francis Driver MD  DOS 11/15/2023 I was physically located at [facility] at the time of service           Subjective:        Called about vent dyssynchrony and agitation.                        Physical Exam:          Vital Signs:  Temp:  [85.3  F (29.6  C)-100.2  F (37.9  C)] 98.1  F (36.7  C)  Pulse:  [] 49  Resp:  [11-51] 28  BP: ()/() 127/67  FiO2 (%):  [55 %-100 %] 60 %  SpO2:  [83 %-99 %] 96 %  I/O:  I/O last 3 completed shifts:  In: 2611.31 [I.V.:246.31; NG/GT:1210]  Out: 625 [Urine:625]  Ventilator:  Vent Mode: CMV/AC  (Continuous Mandatory Ventilation/ Assist Control)  FiO2 (%): 60 %  Resp Rate (Set): 14 breaths/min  Tidal Volume (Set, mL): 3600 mL  PEEP (cm H2O): 12 cmH2O  Resp: 28    Viewed patient on camera. Respiratory rate in 40's with breath stacking and use of accessory muscles & abdominal muscles. Normal O2 sat.     Weight:  Vitals:    11/13/23 0430 11/14/23 0520 11/15/23 0445   Weight: 84.8 kg (186 lb 15.2 oz) 84 kg (185 lb 2.4 oz) 83.9 kg (184 lb 15.5 oz)              Pertinent Medications and Data:      Medications  I have reviewed this patient's current medications   dexmedeTOMIDine 0.6 mcg/kg/hr (11/16/23 0000)     dextrose      epoprostenol 20 ng/kg/min (11/15/23 1922)    midazolam 4 mg/hr (11/16/23 0000)       Other Data  CXR personally reviewed.

## 2023-11-16 NOTE — PROGRESS NOTES
CLINICAL NUTRITION SERVICES - REASSESSMENT NOTE    Recommendations Ordered by Registered Dietitian (RD):   Continue current EN regimen   Malnutrition:   % Intake: Decreased intake does not meet criteria- meeting needs via EN  % Weight Loss: Unable to assess due to fluid status  Subcutaneous Fat Loss: None observed  Muscle Loss: None observed--very difficult with number of monitors, tubing, lines, SCDs on in addition to fluid status  Fluid Accumulation/Edema: trace-mild     Malnutrition Diagnosis: Unable to assess     EVALUATION OF PROGRESS TOWARD GOALS   Diet:  NPO    Nutrition Support:      - Patient previously on renal formula however renal labs improved. Switched to standard formula 11/13:  Promote at 55 ml/hr x 22 hrs (1210 ml) which provides 1210 kcal, 75 g protein, 157 g CHO, 0 g fiber, 1015 ml free H2O.  Prosource 1 pkt/day = 80 kcal/20 g protein  3 pkts Nutrisource fiber/day = 45 kcal/9 g fiber     Total energy/protein provisions, all sources = 1335 kcal (15+ kcal/kg, 111% estimated needs)/95 g protein (~2.1 g/kg)    - FWF reduced to 60 mL q4hrs per MD 11/16    Intake/Tolerance:    TF @ goal for > 1 week meeting 100% of estimated needs    ASSESSED NUTRITION NEEDS:  Dosing Weight 86 kg (actual body weight) - energy; 45.5 kg (ideal body weight) - protein   Estimated Energy Needs: 946-1204 kcal/day (11-14 Kcal/Kg actual body weight)   Justification: vented   Estimated Protein Needs: 91 g protein/day (2 g pro/Kg IBW)   Justification: vented, hypercatabolism with acute illness   Estimated Fluid Needs: per MD     NEW FINDINGS:   Meds:  Nephronex  Bumex  Medium sliding scale insulin  Vitamin D3    Labs:  BUN 79 (H), Cr 1.03 (H) - continues to overall trend down  -194 (H)    Stool: patient has BM 11/13 after multiple days of constipation. Soft BM overnight last night.    General:   - likely going forward with trach, family still discussing  - Bronchoscopy 11/15    Skin: new friction vs pressure injury - right  inner buttock/perineum area    Previous Goals:   Total avg nutritional intake to meet a minimum of 11 kcal/kg per energy DW of 86 kg and 2 g PRO/kg daily per protein DW of 45.5 kg    Evaluation: Met    Previous Nutrition Diagnosis:   Inadequate oral intake related to respiratory needs necessitating NPO status as evidenced by reliance on NS to meet needs    Evaluation: No change    CURRENT NUTRITION DIAGNOSIS  Inadequate oral intake related to respiratory needs necessitating NPO status as evidenced by reliance on NS to meet needs      INTERVENTIONS  Recommendations / Nutrition Prescription  Continue current EN regimen    Implementation  EN Composition and EN Schedule - continue    Goals  Total avg nutritional intake to meet a minimum of 11 kcal/kg per energy DW of 86 kg and 2 g PRO/kg daily per protein DW of 45.5 kg      MONITORING AND EVALUATION:  Progress towards goals will be monitored and evaluated per protocol and Practice Guidelines    Monalisa Pena RD, LD  Clinical Dietitian - [facility]

## 2023-11-16 NOTE — PLAN OF CARE
Goal Outcome Evaluation:      Plan of Care Reviewed With: patient    Overall Patient Progress: decliningOverall Patient Progress: declining     ICU End of Shift Summary.  For vital signs and complete assessments, please see documentation flowsheets.      Pertinent assessments: Required a switch from dex to versed and lower RASS to maintain vent synchrony overnight. RASS -3. Tele SB- SR. BP hypertensive to soft. Lungs coarse. Small to mod amounts of secretions oral and ett. TF at goal, soft BMs overnight. Wound care provided to sacral area. Aleman with adequate urine output.     Lines: picc, PIV  Drips: versed    Plan (Upcoming Events): continue to wean as able, await culture results

## 2023-11-16 NOTE — PROGRESS NOTES
[facility] ICU VENTILATOR RESPIRATORY NOTE  Date of Admission: 10/21/23  Date of Intubation (most recent): 10/22/23  Reason for Mechanical Ventilation: Respiratory Failure  Number of Days on Mechanical Ventilation: 26  Met Criteria for Pressure Support Trial: No pt is on PEEP +14  ETT appearance on chest x-ray: In appropriate place    Respiratory Therapy  Patient remains intubated on mechanical ventilator with the following settings:    Vent Mode: CMV/AC  (Continuous Mandatory Ventilation/ Assist Control)  FiO2 (%): 45 %  Resp Rate (Set): 14 breaths/min  Tidal Volume (Set, mL): 360 mL  PEEP (cm H2O): 14 cmH2O  Resp: 24    Temp: 98.9  F (37.2  C) Temp src: Temporal BP: (!) 80/41 Pulse: 66   Resp: 24 SpO2: 92 % O2 Device: Mechanical Ventilator      BS were coarse and diminished. Suctioned a large amount of cloudy thick secretions through the ETT. Will continue to follow and assess the patient's respiratory needs.    Stephanie Smith, RT  11/16/2023 4:49 PM

## 2023-11-16 NOTE — PROGRESS NOTES
MICU Progress Note    Matthew Bowen MRN# [id number]   Age: 78 year old YOB: 1945     Date of Admission: 10/15/2023  Date of Service: 11/16/2023   ==================================================  24 HOUR EVENTS:  -Continues to have high oxygen requirements  -Increased agitation ON, started on versed  -I/O balance continues to rise despite increased bumex y'day  -Mixed respiratory metabolic alkalosis  -Fever curve improving, WBC continues to trend down  -Hemoglobin 7.1 today    -Discussed with the family, hemoglobin consent in the chart should she need a transfusion      Changes for Today:  -Change bumex to IV  -Decrease free water from 150 to 60 every 4 hours  -Start Seroquel  -Recheck hgb this afternoon        ==================================================    ASSESSMENT AND PLAN:    77 y/o woman with chronic hypoxemia, recurrent pulmonary infections, bronchiectasis admitted 10/15 with weakness and falls. Had progressive hypoxemia requiring iCU transfer and intubation 10/22.  Had been making slow progress until 11/1 14 when she began to deteriorate, possibly due to an aspiration event.    Neuro/psych:   -Sedation:, Versed, Seroquel  Had been on sedation initially after intubation, after weaning sedation down and took her quite a while to wake up, then developed agitation on 11/15 so was started on Precedex.  However agitation did not completely respond to Precedex and she developed bradycardia so Versed was initiated overnight 11/15.  -Adding Seroquel in an attempt to minimize Versed    ## Weakness/frequent falls  ## Toxic metabolic encephalopathy  History of ICU delirium, so working to minimize this      Pulmonary:   ## Acute on chronic mixed respiratory failure  ## Bronchiectasis  ## Probable organizing pneumonia  Presented with recurrent pneumonia, third time this month.  Extensive infectious evaluation including bronchoscopy and sputum cultures were unrevealing.  AFB and fungal work-up  negative.  Started on high-dose steroids with some improvement in her breathing.  New right upper lobe infiltrate on 11/14 not seen the day prior.  Possibly from secretions in the setting of her ET tube being slightly dislodged at 5.8 cm above the ashley.  Could also represent new infection versus return of organizing pneumonia in the setting of steroid wean.  Fever curve with a slight increase, though white count continues to trend down.  For the time being I will manage conservatively with repositioning the endotracheal tube and watching how she progresses through the day.  Bedside pulmonary ultrasound 11/15 with dense consolidation in the left base and pulmonary edema throughout without pleural effusion.  -Discussed tracheostomy with the patient's family, they would like to pursue this option, however will likely need to wait until she is more stable for this.  -Repeat bronchoscopy 11/15 with significant edema around the endotracheal tube  -Restart Flolan 11/15  -Increase steroids 11/15.  Was previously on 60 mg/day, increased to methylprednisolone 125 mg/day      -Once she is stable, recommend reducing to 80 mg/day for a week or 2, then 60 mg/day for a week or 2, then a prolonged taper by 5 mg every 2 weeks.        Cardiac:  ## Shock-resolved  ## Essential hypertension  ## CAD status post PCI  ## Diastolic heart failure  ## Moderate aortic stenosis  Continuing on amlodipine, will resume isosorbide when able      Renal:   ## CARMEN on CKD-resolved  ## Uremia  ## Hypernatremia-resolved  ## Hypervolemia  Seems to be diuresing well with consistent net negative I/O balance and decreasing weight.  -Had been on Bumex 1 mg twice daily, increased to 3 times daily on 11/15 without much response so changed to IV on 11/16      Infectious Disease:   ## Opacities on imaging, possible aspiration.  The endotracheal tube was found to be somewhat retracted on 11/14 with new right upper lobe opacity concerning for aspiration.  Fever  curve possibly slightly elevated initially though white blood cell count has continued to decline.  Repeat procalcitonin on 11/15 slightly elevated from previous on 11/4.  Of note her kidney function was significantly worse on 11/4 so the actual increase in procalcitonin is likely greater than the numbers would suggest.  Has undergone extensive infectious evaluation as above in pulmonary.  -ID continues to follow peripherally.  -Restart empiric antibiotics for possible aspiration  -Continue atovaquone for PJP prophylaxis  -BAL from 11/15 with greater than 25 PMNs, 1+ budding yeast    ANTIBIOTICS:  Zosyn 11/15-current      GI/:   -Nutrition: Tube feeds      Endocrine:  ## Steroid-induced hyperglycemia  -Sliding scale insulin    ## Hypothyroidism  -On levothyroxine      MSK:  ## Right upper extremity edema  Likely secondary to impaired venous return from PICC line.  Right upper extremity ultrasound 10/15 without evidence of thrombosis.      Heme:   ## Anemia  Likely multifactorial from illness and phlebotomy, with possible component of dilution  -Continue to trend        Prophylaxis:    -GI: PPI   -DVT: Heparin    Family Disposition: Daughter updated by phone  CODE: Full        Attestation:      CCT 50 min excluding procedures        This document was generated with the assistance of voice recognition software. Unintentional transcription errors may occur. Please contact the author for any clarification.    Axel Mullins M.D.  Pulmonary & Critical Care  Pager: Click Here to page    ==================================================    PHYSICAL EXAM  Temp:  [85.3  F (29.6  C)-100.2  F (37.9  C)] 98.9  F (37.2  C)  Pulse:  [] 69  Resp:  [11-46] 26  BP: ()/() 111/57  FiO2 (%):  [55 %-100 %] 70 %  SpO2:  [83 %-99 %] 93 %    Vent Mode: CMV/AC  (Continuous Mandatory Ventilation/ Assist Control)  FiO2 (%): (S) 70 %  Resp Rate (Set): 14 breaths/min  Tidal Volume (Set, mL): 360 mL  PEEP (cm H2O): 12  cmH2O  Resp: 26      General: Intermittently opens eyes, but not tracking.  Less active than yesterday, likely due to resuming sedation  Resp: Clear anteriorly and laterally  Cardiac: RRR, NS1,S2, No m/r/g  Abdomen: Soft, nondistended, positive bowel sounds  Extremities: 1 to 2+ edema in left upper extremity and bilateral lower extremities.  Right upper extremity grossly edematous, likely secondary to PICC.  Skin: Warm and dry, no jaundice or rash      =====================================================  LABORATORY DATA    Labs reviewed by me personally, significant abnormalities detailed in assessment and plan.      ROUTINE ICU LABS (Last four results)  CMP  Recent Labs   Lab 11/16/23  0831 11/16/23  0411 11/16/23  0410 11/16/23  0048 11/15/23  0747 11/15/23  0603 11/14/23  0854 11/14/23  0416 11/13/23  0807 11/13/23  0537   NA  --  138  --   --   --  137  --  138  --  134*   POTASSIUM  --  5.1  --   --   --  4.6  --  3.9  --  3.6   CHLORIDE  --  97*  --   --   --  97*  --  98  --  94*   CO2  --  30*  --   --   --  34*  --  32*  --  32*   ANIONGAP  --  11  --   --   --  6*  --  8  --  8   * 151* 145* 138*   < > 105*   < > 134*   < > 119*   BUN  --  79.0*  --   --   --  68.4*  --  77.5*  --  85.4*   CR  --  1.03*  --   --   --  0.84  --  0.82  --  0.83   GFRESTIMATED  --  55*  --   --   --  71  --  73  --  72   BARBARA  --  8.7*  --   --   --  8.7*  --  8.8  --  8.7*   MAG  --  1.9  --   --   --  1.8  --  1.9  --  1.8   PHOS  --  3.7  --   --   --  3.2  --  2.6  --  2.0*    < > = values in this interval not displayed.     CBC  Recent Labs   Lab 11/16/23  0411 11/15/23  0603 11/14/23 0416 11/13/23  0537   WBC 10.1 10.2 11.1* 12.7*   RBC 2.72* 2.85* 3.11* 3.03*   HGB 7.1* 7.4* 8.2* 7.9*   HCT 24.5* 25.7* 27.6* 27.0*   MCV 90 90 89 89   MCH 26.1* 26.0* 26.4* 26.1*   MCHC 29.0* 28.8* 29.7* 29.3*   RDW 22.1* 22.5* 22.8* 23.0*    172 163 150     INRNo lab results found in last 7 days.  Arterial Blood  "Gas  Recent Labs   Lab 11/15/23  0603   PH 7.47*   PCO2 49*   PO2 64*   HCO3 35*   O2PER 75     Venous Blood Gas   Recent Labs   Lab 11/15/23  0603   O2PER 75         No results for input(s): \"CULT\" in the last 168 hours.      Recent Results (from the past 48 hour(s))   XR Chest Port 1 View    Narrative    CHEST ONE VIEW  11/15/2023 10:05 AM     HISTORY: Intubated, possible aspiration event yesterday, assess for  change.    COMPARISON: Chest radiograph 11/14/2023, 11/3/2023      Impression    IMPRESSION:     Lines and tubes: Endotracheal tube tip 4.7 cm above the ashley. Right  PICC tip at the mid SVC and enteric tube courses below the diaphragm.    Mild worsened bibasilar opacities, particularly in the left upper lobe  and right lower lobe, which could reflect infection, edema or ARDS.  Probable trace pleural effusions. No pneumothorax. Similar  cardiomediastinal silhouette.      TRAE BANGURA MD         SYSTEM ID:  [id number]   XR Chest Port 1 View    Narrative    EXAM: XR CHEST PORT 1 VIEW  LOCATION: [facility]  DATE: 11/15/2023    INDICATION: Post bronchi.  COMPARISON: 11/15/2023 9:19 AM      Impression    IMPRESSION: Endotracheal tube, esophagogastric tube, and right upper extremity PICC again identified and relatively unchanged. Esophagogastric tube courses below the level of the diaphragm, though outside field of view. Patchy bilateral pulmonary   opacities with consolidative component of the left lower lobe again identified. Minimal improvement in the right lung. No pneumothorax.   XR Abdomen Port 1 View    Narrative    EXAM: XR ABDOMEN PORT 1 VIEW  LOCATION: [facility]  DATE: 11/15/2023    INDICATION: NG placement  COMPARISON: Chest radiograph today. Abdomen radiograph 11/10/2023.      Impression    IMPRESSION: Feeding tube terminates in the midline of the abdomen in a position consistent with a termination in the distal stomach. Nonobstructive bowel gas " pattern.           ==================================================

## 2023-11-16 NOTE — PROVIDER NOTIFICATION
2000: called tele RNAngus, and asked for tele team to camera into room to view pt. Breathing over the vent RR in 40s, using acc muscles. Also informed of bronch results with yeast growing.    Viewed in and discussed, per MD complete cxr, add versed push + drip, urine and blood cultures.    2200: requested abd xray as keofeed migrated    Order received    0325: called tele hub RN/MD to request versed boluses from pump, and to address RASS goal. Pt dyssynchronous w/ vent if sedation lightened.   MD entering orders.

## 2023-11-17 NOTE — PROGRESS NOTES
[facility]  Infectious Disease Progress Note          Assessment and Plan:   Date of Admission:  10/15/2023  Date of Consult (When I saw the patient): 10/20/23        Assessment & Plan  Matthew Bowen is a 78 year old who was admitted on 10/15/2023.      Impression: 1 78-year-old female, some chronic underlying lung disease, on oxygen, bronchiectasis probable, some history of pneumonia but now 6 weeks or so of worsening respiratory symptoms, 2 prior courses of antibiotics, now admitted with infiltrates, no major sepsis but elevated procalcitonin and white count implying bacterial, not really improving on antibiotics concern for more chronic type pathogen in this patient with underlying lung disease by this history     2 chronic lung disease on oxygen some underlying bronchiectasis, some prior pneumonias  3 chronic kidney disease     REC 1 not restarted on antibiotics, concern for an aspiration event, recultures including bronchoscopy done with all results pending.  2 we will follow,  Zosyn for now not clear new infection, sputum with only Candida  3 1 of 4 bottles positive on the blood cultures for staph epi almost certainly a contaminant, did not think we need to start Vanco at this point, probably repeat blood cultures x2 and if surprise further positives consider line removal for some short amount of Vanco                  Interval History:     Intubated and sedated slight improvement i still ongoing, minimal low-grade fever, no new positive microbiology possible aspiration event, recultured including bronchoscopy all is pending, temp down currently back on Zosyn blood cultures noted, sputum only with Candida              Medications:      amLODIPine  2.5 mg Oral Daily    aspirin  81 mg Oral or Feeding Tube Daily    atorvastatin  20 mg Oral or Feeding Tube QPM    atovaquone  1,500 mg Per Feeding Tube Daily    B and C vitamin Complex with folic acid  5 mL Oral or Feeding Tube Daily     bumetanide  1 mg Intravenous Q8H    chlorhexidine  15 mL Mouth/Throat Q12H    clotrimazole   Topical BID    fiber modular (NUTRISOURCE FIBER)  1 packet Per Feeding Tube TID    heparin ANTICOAGULANT  5,000 Units Subcutaneous Q12H    insulin aspart  1-6 Units Subcutaneous Q4H    ipratropium - albuterol 0.5 mg/2.5 mg/3 mL  3 mL Nebulization 4x daily    levothyroxine  150 mcg Oral or Feeding Tube QAM AC    methylPREDNISolone  125 mg Intravenous Daily    pantoprazole  40 mg Per Feeding Tube QAM AC    piperacillin-tazobactam  4.5 g Intravenous Q6H    pregabalin  75 mg Per Feeding Tube Daily    protein modular  1 packet Per Feeding Tube Daily    QUEtiapine  150 mg Oral or Feeding Tube BID    sertraline  150 mg Oral or Feeding Tube Daily    sodium chloride (PF)  10-40 mL Intracatheter Q7 Days    sodium chloride (PF)  3 mL Intracatheter Q8H    vitamin D3  50 mcg Oral or Feeding Tube Daily                  Physical Exam:   Blood pressure 127/72, pulse 81, temperature 99.3  F (37.4  C), temperature source Temporal, resp. rate 27, height 1.524 m (5'), weight 84.2 kg (185 lb 10 oz), SpO2 96%.  Wt Readings from Last 2 Encounters:   11/17/23 84.2 kg (185 lb 10 oz)   02/28/22 94.3 kg (208 lb)     Vital Signs with Ranges  Temp:  [97.9  F (36.6  C)-99.3  F (37.4  C)] 99.3  F (37.4  C)  Pulse:  [47-98] 81  Resp:  [14-63] 27  BP: ()/(41-90) 127/72  FiO2 (%):  [40 %-70 %] 60 %  SpO2:  [87 %-100 %] 96 %    Constitutional: Intubated and sedated about same slight improvement in oxygenation     Lungs: Clear to auscultation bilaterally, no crackles or wheezing   Cardiovascular: Regular rate and rhythm, normal S1 and S2, and no murmur noted   Abdomen: Normal bowel sounds, soft, non-distended, non-tender   Skin: No rashes, no cyanosis, no edema   Other:           Data:   All microbiology laboratory data reviewed.  Recent Labs   Lab Test 11/17/23  0520 11/16/23  1458 11/16/23  0411 11/15/23  0603   WBC 13.1*  --  10.1 10.2   HGB 9.2* 6.8*  7.1* 7.4*   HCT 30.8*  --  24.5* 25.7*   MCV 88  --  90 90     --  175 172     Recent Labs   Lab Test 11/17/23  0520 11/16/23  0411 11/15/23  0603   CR 1.08* 1.03* 0.84     No lab results found.  Recent Labs   Lab Test 02/12/19  0010   CULT Light growth  Normal deshaun

## 2023-11-17 NOTE — PLAN OF CARE
Goal Outcome Evaluation:      Plan of Care Reviewed With: patient    Overall Patient Progress: decliningOverall Patient Progress: declining     ICU End of Shift Summary.  For vital signs and complete assessments, please see documentation flowsheets.      Neuro: RASS -2 most of the night until about 0430. 0430 more restless, RR 32-42- increased dex, gave prn ativan and dilaudid, RR down to high 20s. Did require increase in fio2 from 45-60%. Did not tolerate laying to the R side, required up to 80% fio2 to maintain sats >90%. Lungs coarse, dim. Tele SB-SR. Active BS, tolerating TF, soft BMs. Good uop in [place]. Scattered bruising, wound care provided to sacral area.    Lines: picc, piv  Drips: dex    Major Shift Events:   + blood culture MRSE, notified tele hub  Plan (Upcoming Events): wean vent as able, trach when able? Continue abx + follow cultures

## 2023-11-17 NOTE — PROGRESS NOTES
"SPIRITUAL HEALTH SERVICES Progress Note  [facility] ICU    Referral Source: Follow-up    Brief supportive visit with Pt Jeni's spouse Genaro and niece Nasrin - Jeni is intubated and resting. I wished Genaro a belated bright Diwali celebration. Genaro noted that he and Jeni have been watching Jeopardy, The Price is Right, and other game shows to pass the time. Genaro reflected on Jeni's current condition, naming, \"It's all in the hands of the good Lord and the doctors.\" Genaro continues to greatly appreciate the prayers of their many family members and friends.    Plan: Genaro welcomes supportive visits. I will continue to follow while on the unit. [facility] remains available.    Yelena Borja MDiv  Staff   [facility] Pager: [phone]    [facility] available 24/7 for emergent requests/referrals, either by having the on-call  paged or by entering an ASAP/STAT consult in Epic (this will also page the on-call ).    "

## 2023-11-17 NOTE — PROGRESS NOTES
Matthew Bowen was admitted on 10/15/2023 for weakness and falls.    PMH: chronic hypoxemia, recurrent pulmonary infections, bronchiectasis.    Airway: 8.0 22 at teeth    Oxygen Therapy: Vent day 27: 14/360/+14/50%    SBT: Not complete today due to increased oxygenation needs and increased PEEP    Respiratory Medications: duoneb QID, albuterol Q4PRN, Veletri     Breath Sounds and Secretions: coarse and diminished. Moderate amount of thick, cloudy secretions.     Shift Note: Patient remained intubated and sedated throughout the shift without any acute respiratory concerns. Patient continues to breathe well over set rate when not sedated with increased agitation. Patient did rest well for majority of the night. Patient does have worsening saturations when laid on right side- RN aware.

## 2023-11-17 NOTE — PROGRESS NOTES
[facility] ICU VENTILATOR RESPIRATORY NOTE    Date of Admission: 10/21/23    Date of Intubation (most recent): 10/22/23    Reason for Mechanical Ventilation: Respiratory Failure    Number of Days on Mechanical Ventilation: 27    Met Criteria for Pressure Support Trial: No    Length of Pressure Support Trial: n/a    Reason for Stopping Pressure Support Trial: n/a    Reason for No Pressure Support Trial: PEEP +14, FiO2 50-70%    Significant Events Today: Patient receiving continuous Veletri inline with ventilator    ABG Results:   Recent Labs   Lab 11/15/23  0603   PH 7.47*   PCO2 49*   PO2 64*   HCO3 35*   O2PER 75       ETT appearance on chest x-ray: 4.7 cm above ashley    Plan:  Patient remains on ventilator with settings of:  Vent Mode: CMV/AC  (Continuous Mandatory Ventilation/ Assist Control)  FiO2 (%): 55 %  Resp Rate (Set): 14 breaths/min  Tidal Volume (Set, mL): 360 mL  PEEP (cm H2O): 14 cmH2O  Resp: (!) 31

## 2023-11-17 NOTE — PROGRESS NOTES
[facility]    Medicine Progress Note - Hospitalist Service    Date of Admission:  10/15/2023    Assessment & Plan     Matthew Bowen is a 78 year old female w/PMH chronic hypoxic respiratory failure due to pulmonary HTN, ALAINA requiring CPAP, chronic diastolic HF, CAD, CKD stage 3, morbid obesity, HTN,  depression who presents from home with family with fever, cough. Acutely worsened respiratory status on 10/21 requiring intubation. Unclear etiology for acute on chronic hypoxic respiratory failure, most likely infectious v autoimmune v other in setting of underlying lung disease.  currently being treated for presumed ARDS.  Oxygen requirements remain stable on mechanical ventilator with ongoing daily weaning trials.       Plans today:  -1 blood culture from 11/15 growing methicillin resistant staph epi.  Question contaminant.  -Will discuss with infectious disease.  -Continue to monitor culture results.  -Repeat blood cultures today.  -Continue IV Zosyn.     Acute hypoxic respiratory failure s/p intubation  Presumed ARDS  Pneumonia, recurrent w/bronchiectasis  -Presents with recurrent PNA, 3rd time in last month or so. Last completed treatment 2 weeks ago with persistent cough. Presents with worsening fatigue, productive cough, weakness and falls.  -On admission, patient febrile to 101.1, white count of 21.  Lactic acid was within normal limits.  She underwent a CT scan that showed a lingular consolidative opacity with air bronchograms with bilateral upper lobe groundglass opacities.  She was started on ceftriaxone and azithromycin.  - blood cx 10/15 and 10/18 NGTD  -Sputum culture from 10/19 and 10/21 grew Staph epi and C. albicans. ID feels candida that is a colonizer and not related to illness.   -COVID, influenza, Respiratory viral panel negative.  Strep and legionella antigens negative.   - MRSA PCR neg  - Fungitell neg.  Aspergillus ag neg.  Blastomyces ab neg.  Nocardia cx neg.  AFB  sputum stain neg  -intubated 10/21 due to worsening respiratory status and bronch done showing serosanguinous and fibrinous return with thick mucoid secretions noted  -seen by pulm, steroids decreased from 125 to 60mg daily on 10/31.   - plan for prolonged taper of prednisone from 60 mg q day to decreasing by 5 mg every 2 weeks (see orders).  -Additional methylprednisolone 125 mg on 11/15.  - was treated with bumex gtt; transitioned to oral Bumex on 11/7  - started atovaquone for PJP ppx 11/3.  - family considering trach/PEG; they are holding off for now as they are hoping pt will able to be extubated prior to trach need  -Bronchoscopy done 11/15.  -Gram stain with 1+ yeast.  -Restarted Zosyn 11/15.  -1 blood cultures from 11/15 now with MRSE.  -Repeat 2 blood cultures today.  -Continued ID input appreciated.     Antibiotic history  Azithromycin 10/15-10/19  Ceftriaxone 10/15-10/17  Cefepime 10/18-31  Metronidazole 10/20-31  Vancomycin 10/21-31  Zosyn 11/15-present     Infectious/metabolic encephalopathy  - sedative gtts previously discontinued   - receiving intermittent IV dilaudid and oxycodone prn; minimize as able to encourage continued clearing of encephalopathy  -Requiring reinitiation of continuous IV Precedex 11/15.  -Started on IV midazolam later in the evening on 11/15.  -Increase quetiapine to 150 mg twice a day.     Shock/hypotension  Hypertension  -suspected to be infectious  - EF is normal on TTE  - was receiving midodrine; now discontinued  -Continues to have labile blood pressures.  -Continue amlodipine to 2.5 mg a day.  -Continue bumetanide to 1 mg IV 3 times a day.  -IV hydralazine as needed.     Hypernatremia  - resolved  -free water flushes to 60 cc every 4 hours.     Gluteal cleft irritant dermatitis   - WOC consulted, appreciate recs     Leukocytosis  - due to steroids.    - based on chart review WBC count has been 9-12 range historically.       Generalized weakness.   Chronic arthritis.    Falls at home.   Left Foot pain:   -Having multifactorial weakness, with diffuse body aches and pains.  Imaging on admission including CT head, cervical spine, CT CAP did not show acute fracture.  She had an x-ray of her foot also showed degenerative changes without fracture.    -readdress when appropriate     Hx ALAINA.  Pulmonary hypertension.  Acute exacerbation of chronic heart failure with preserved ejection fraction.  -Increase bumetanide to 1 mg 3 times a day.     CKD stage 3  Uremia  -Cr 1.21 on admission. Currently, 1.   -transitioned to low-protein tube feeds, nephrology consulted by my colleague and they discussed case with no formal consult at this time.   -serial labs  -Avoid nephrotoxins as able.     Hypertension.  -Blood pressure has been labile.  -continue amlodipine 2.5 mg a day.  -Holding scheduled hydralazine and isosorbide.  -Continue bumetanide to 1 mg 3 times a day.  -IV hydralazine if significantly hypertensive.     Hx morbid obesity  -Complicates care.     Anemia.  -Hemoglobin continues to slowly drift down.    -Suspect iatrogenic causes of continued slow drift.  -Transfuse for hemoglobin less than 7.  -Required 1 unit packed red blood cells 11/16.     CAD.  Hyperlipidemia.  -Continue aspirin 81 mg a day.  -Continue atorvastatin 20 mg a day.     Hypothyroidism.  -Continue levothyroxine 150 mcg a day.     Mood disorder  -Continue sertraline 150 mg a day.             Diet: NPO per Anesthesia Guidelines for Procedure/Surgery Except for: Meds  Adult Formula Drip Feeding: Continuous Promote; Orogastric tube; Goal Rate: 55 mL/hr x 22 hours (please hold 1 hour before and after levothyroxine); mL/hr; (11/13) Initial rate of 35 ml/hr advancing by 10 ml q 4 hrs to new goal rate of 55 ml/hr    DVT Prophylaxis: Heparin SQ  Aleman Catheter: PRESENT, indication: Strict 1-2 Hour I&O, Deep Sedation/Paralysis;Wound Healing  Lines: PRESENT      PICC 10/22/23 Triple Lumen Right Basilic multiple med gtt. ready for  use-Site Assessment: WDL      Cardiac Monitoring: ACTIVE order. Indication: Tachyarrhythmias, acute (48 hours)  Code Status: Full Code      Clinically Significant Risk Factors              # Hypoalbuminemia: Lowest albumin = 2.9 g/dL at 10/22/2023  5:20 AM, will monitor as appropriate            # Obesity: Estimated body mass index is 36.25 kg/m  as calculated from the following:    Height as of this encounter: 1.524 m (5').    Weight as of this encounter: 84.2 kg (185 lb 10 oz).             Disposition Plan     Expected Discharge Date: 11/21/2023      Destination: home with help/services;home with family              Francis Batista,   Hospitalist Service  [facility]  Securely message with [employer] (more info)  Text page via [employer] Paging/Directory   ______________________________________________________________________    Interval History   Intubated and sedated.    Physical Exam   Vital Signs: Temp: 99.3  F (37.4  C) Temp src: Temporal BP: 127/72 Pulse: 81   Resp: 27 SpO2: 96 % O2 Device: Mechanical Ventilator    Weight: 185 lbs 10.04 oz    Gen: Intubated and sedated.  Eyes:  PERRL, sclera anicteric.  OP: ET tube in place.  CV:  Regular, no murmurs.  Lung:  CTA b/l, normal effort.  Ab:  +BS, soft.  Skin:  Warm, dry to touch.  No rash.  Ext:  1+ pitting edema LE b/l.      Medical Decision Making       42 MINUTES SPENT BY ME on the date of service doing chart review, history, exam, documentation & further activities per the note.      Data     I have personally reviewed the following data over the past 24 hrs:    13.1 (H)  \   9.2 (L)   / 217     142 101 85.3 (H) /  135 (H)   5.0 29 1.08 (H) \       Imaging results reviewed over the past 24 hrs:   No results found for this or any previous visit (from the past 24 hour(s)).

## 2023-11-17 NOTE — PROGRESS NOTES
MICU Progress Note    Matthew Bowen MRN# [id number]   Age: 78 year old YOB: 1945     Date of Admission: 10/15/2023  Date of Service: 11/17/2023   ==================================================  24 HOUR EVENTS:  -Waxing and waning oxygen requirements, at times tolerates being as low as 40%, then rapidly needs to be turned up to 70%  -Continues to have episodes of agitation requiring as needed medications  -WBC slightly elevated today, though this could be steroid response  -Blood culture with 1 of 2 bottles positive for methicillin-resistant Staphylococcus epidermidis  -I/O balance now negative after changing Bumex to IV  -Hemoglobin 9.2 this morning after receiving 1 unit yesterday      Changes for Today:  -Seroquel 100 mg twice daily started yesterday, increased to 150 twice daily today      ==================================================    ASSESSMENT AND PLAN:    77 y/o woman with chronic hypoxemia, recurrent pulmonary infections, bronchiectasis admitted 10/15 with weakness and falls. Had progressive hypoxemia requiring iCU transfer and intubation 10/22.  Had been making slow progress until 11/1 14 when she began to deteriorate, possibly due to an aspiration event.      Neuro/psych:   -Sedation:, Versed, Seroquel, Precedex  Had been on sedation initially after intubation, after weaning sedation down and took her quite a while to wake up, then developed agitation on 11/15 so was started on Precedex.  However agitation did not completely respond to Precedex and she developed bradycardia so Versed was initiated overnight 11/15.  -Adding Seroquel in an attempt to minimize Versed    ## Weakness/frequent falls  ## Toxic metabolic encephalopathy  History of ICU delirium, so working to minimize this      Pulmonary:   ## Acute on chronic mixed respiratory failure  ## Bronchiectasis  ## Probable organizing pneumonia  Presented with recurrent pneumonia, third time this month.  Extensive infectious  evaluation including bronchoscopy and sputum cultures were unrevealing.  AFB and fungal work-up negative.  Started on high-dose steroids with some improvement in her breathing.  New right upper lobe infiltrate on 11/14 not seen the day prior.  Possibly from secretions in the setting of her ET tube being slightly dislodged at 5.8 cm above the ashley.  Could also represent new infection versus return of organizing pneumonia in the setting of steroid wean.  Fever curve with a slight increase, though white count continues to trend down.  Bedside pulmonary ultrasound 11/15 with dense consolidation in the left base and pulmonary edema throughout without pleural effusion.  -Discussed tracheostomy with the patient's family, they would like to pursue this option, however will likely need to wait until she is more stable for this.  -Repeat bronchoscopy 11/15 with significant edema around the endotracheal tube  -Restart Flolan 11/15  -Increase steroids 11/15.  Was previously on 60 mg/day, increased to methylprednisolone 125 mg/day      -Once she is stable, recommend reducing to 80 mg/day for a week or 2, then 60 mg/day for a week or 2, then a prolonged taper by 5 mg every 2 weeks.        Cardiac:  ## Shock-resolved  ## Essential hypertension  ## CAD status post PCI  ## Diastolic heart failure  ## Moderate aortic stenosis  Continuing on amlodipine, will resume isosorbide when able      Renal:   ## CARMEN on CKD-resolved  ## Uremia  ## Hypernatremia-resolved  ## Hypervolemia  Seems to be diuresing well with consistent net negative I/O balance and decreasing weight.  -Had been on Bumex 1 mg twice daily, increased to 3 times daily on 11/15 without much response so changed to IV on 11/16      Infectious Disease:   ## Opacities on imaging, possible aspiration.  The endotracheal tube was found to be somewhat retracted on 11/14 with new right upper lobe opacity concerning for aspiration.  Fever curve possibly slightly elevated initially  though white blood cell count has continued to decline.  Repeat procalcitonin on 11/15 slightly elevated from previous on 11/4.  Of note her kidney function was significantly worse on 11/4 so the actual increase in procalcitonin is likely greater than the numbers would suggest.  Has undergone extensive infectious evaluation as above in pulmonary.  -ID continues to follow peripherally.  -Restart empiric antibiotics for possible aspiration  -Continue atovaquone for PJP prophylaxis  -BAL from 11/15 with greater than 25 PMNs, 1+ budding yeast    ## Possible bacteremia versus contamination  1 bottle from blood culture 11/15 positive for staph epi on day 2 of incubation.  Unclear significance.  Fever curve is actually improving, though procalcitonin from 11/15 is elevated.  White count has also started to trend up a bit, though this could be due to to the increased steroid dose.  -We will discuss with ID    ANTIBIOTICS:  Zosyn 11/15-current      GI/:   -Nutrition: Tube feeds      Endocrine:  ## Steroid-induced hyperglycemia  -Sliding scale insulin    ## Hypothyroidism  -On levothyroxine      MSK:  ## Right upper extremity edema  Likely secondary to impaired venous return from PICC line.  Right upper extremity ultrasound 10/15 without evidence of thrombosis.      Heme:   ## Anemia  Likely multifactorial from illness and phlebotomy, with possible component of dilution.  For the most part has remained above 7, though was 6.8 on 11/16.  Given 1 unit with good response.  -Continue to trend        Prophylaxis:    -GI: PPI   -DVT: Heparin      Family Disposition: Daughter updated by phone  CODE: Full        Attestation:      CCT 55 min excluding procedures        This document was generated with the assistance of voice recognition software. Unintentional transcription errors may occur. Please contact the author for any clarification.    Axel Mullins M.D.  Pulmonary & Critical Care  Pager: Click Here to  page    ==================================================    PHYSICAL EXAM  Temp:  [97.9  F (36.6  C)-98.9  F (37.2  C)] 97.9  F (36.6  C)  Pulse:  [47-98] 87  Resp:  [16-43] 37  BP: ()/(41-80) 117/62  FiO2 (%):  [40 %-70 %] 70 %  SpO2:  [87 %-100 %] 87 %    Vent Mode: CMV/AC  (Continuous Mandatory Ventilation/ Assist Control)  FiO2 (%): (S) 70 %  Resp Rate (Set): 14 breaths/min  Tidal Volume (Set, mL): 360 mL  PEEP (cm H2O): 14 cmH2O  Resp: (!) 37      General: Intermittently opens eyes, but not tracking.   Resp: Clear anteriorly and laterally  Cardiac: RRR, NS1,S2, No m/r/g  Abdomen: Soft, nondistended, positive bowel sounds  Extremities: 1 to 2+ edema in left upper extremity and bilateral lower extremities.  Right upper extremity grossly edematous, likely secondary to PICC.  Skin: Warm and dry, no jaundice or rash      =====================================================  LABORATORY DATA    Labs reviewed by me personally, significant abnormalities detailed in assessment and plan.      ROUTINE ICU LABS (Last four results)  CMP  Recent Labs   Lab 11/17/23  0800 11/17/23  0537 11/17/23  0520 11/16/23  2351 11/16/23  0831 11/16/23  0411 11/15/23  0747 11/15/23  0603 11/14/23  0854 11/14/23  0416   NA  --   --  142  --   --  138  --  137  --  138   POTASSIUM  --   --  5.0  --   --  5.1  --  4.6  --  3.9   CHLORIDE  --   --  101  --   --  97*  --  97*  --  98   CO2  --   --  29  --   --  30*  --  34*  --  32*   ANIONGAP  --   --  12  --   --  11  --  6*  --  8   * 120* 147* 121*   < > 151*   < > 105*   < > 134*   BUN  --   --  85.3*  --   --  79.0*  --  68.4*  --  77.5*   CR  --   --  1.08*  --   --  1.03*  --  0.84  --  0.82   GFRESTIMATED  --   --  52*  --   --  55*  --  71  --  73   BARBARA  --   --  8.6*  --   --  8.7*  --  8.7*  --  8.8   MAG  --   --  2.0  --   --  1.9  --  1.8  --  1.9   PHOS  --   --  4.0  --   --  3.7  --  3.2  --  2.6    < > = values in this interval not displayed.     CBC  Recent  "Labs   Lab 11/17/23  0520 11/16/23  1458 11/16/23  0411 11/15/23  0603 11/14/23  0416   WBC 13.1*  --  10.1 10.2 11.1*   RBC 3.50*  --  2.72* 2.85* 3.11*   HGB 9.2* 6.8* 7.1* 7.4* 8.2*   HCT 30.8*  --  24.5* 25.7* 27.6*   MCV 88  --  90 90 89   MCH 26.3*  --  26.1* 26.0* 26.4*   MCHC 29.9*  --  29.0* 28.8* 29.7*   RDW 21.6*  --  22.1* 22.5* 22.8*     --  175 172 163     INRNo lab results found in last 7 days.  Arterial Blood Gas  Recent Labs   Lab 11/15/23  0603   PH 7.47*   PCO2 49*   PO2 64*   HCO3 35*   O2PER 75     Venous Blood Gas   Recent Labs   Lab 11/15/23  0603   O2PER 75         No results for input(s): \"CULT\" in the last 168 hours.      Recent Results (from the past 48 hour(s))   XR Chest Port 1 View    Narrative    EXAM: XR CHEST PORT 1 VIEW  LOCATION: [facility]  DATE: 11/15/2023    INDICATION: Post bronchi.  COMPARISON: 11/15/2023 9:19 AM      Impression    IMPRESSION: Endotracheal tube, esophagogastric tube, and right upper extremity PICC again identified and relatively unchanged. Esophagogastric tube courses below the level of the diaphragm, though outside field of view. Patchy bilateral pulmonary   opacities with consolidative component of the left lower lobe again identified. Minimal improvement in the right lung. No pneumothorax.   XR Abdomen Port 1 View    Narrative    EXAM: XR ABDOMEN PORT 1 VIEW  LOCATION: [facility]  DATE: 11/15/2023    INDICATION: NG placement  COMPARISON: Chest radiograph today. Abdomen radiograph 11/10/2023.      Impression    IMPRESSION: Feeding tube terminates in the midline of the abdomen in a position consistent with a termination in the distal stomach. Nonobstructive bowel gas pattern.           ==================================================          "

## 2023-11-18 NOTE — PLAN OF CARE
ICU End of Shift Summary.  For vital signs and complete assessments, please see documentation flowsheets.      Pertinent assessments:   Neuro: RASS -1 most of shift, with periods for restlessness  Cardiac: Tele: SR  Resp: LS coarse, FiO2 increased to 65%  GI: 1 Large loose BM, TF at goal of 55mL/hr  : Aleman in place with good UOP  Skin: Bruising to abdomen, interdry in place in abdominal folds, barrier cream applied to buttock and sacrum.  Lines:PICC to right arm, PIV SL in left arm  Drips: Dex, TKO    Major Shift Events:   Periods of restlessness with RR in the 40's  PRN versed given x2 with relief of restlessness.   1 LG BM  Plan (Upcoming Events): Wean FiO2 as able.  Discharge/Transfer Needs: TBD     Bedside Shift Report Completed : Yes  Bedside Safety Check Completed: Yes

## 2023-11-18 NOTE — PROGRESS NOTES
"Intensivist Daily Note  11/18/2023      Brief History:  Matthew Bowen is a 78 year old female with PMH chronic hypoxic respiratory failure, recurrent pulmonary infections, bronchiectasis who was admitted on 10/15/2023 with weakness and falls, course complicated by acute on chronic hypoxic resp failure requiring intubation on 10/22. Now with prolonged hospital stay further complicated by respiratory deterioration on 11/14 ? Aspiration event and persistent ventilatory requirement.    Interval Events:  -slightly more awake today than previous days.  Intermittently responsive to family  -Transient increase in FiO2 requirements from 55% up to 75% overnight and this morning, back down to 55% now  -intermittent tachypnea with agitation       PMH:  Past Medical History:   Diagnosis Date    Antiplatelet or antithrombotic long-term use     Arthritis     Congestive heart failure (H)     Dyspnea on exertion     Heart attack (H)     Heart murmur     Hypertension     Irregular heart beat     Oxygen dependent     2L continuous at home.    Pulmonary hypertension (H)     Sleep apnea     Bipap    Stented coronary artery     x 1    Thyroid disease     Walking troubles        PSurgHx:  Past Surgical History:   Procedure Laterality Date    APPENDECTOMY      COLONOSCOPY      COLONOSCOPY N/A 2/28/2022    Procedure: COLONOSCOPY;  Surgeon: Kolby Dunn MD;  Location: [facility] OR    CV CORONARY ANGIOGRAM N/A 11/21/2019    Procedure: Coronary Angiogram;  Surgeon: Tay Andre MD;  Location:  HEART CARDIAC CATH LAB    CV LEFT HEART CATH N/A 11/21/2019    Procedure: Left Heart Cath;  Surgeon: Tay Andre MD;  Location:  HEART CARDIAC CATH LAB    CV PCI STENT DRUG ELUTING N/A 11/21/2019    Procedure: PCI Stent Drug Eluting;  Surgeon: Tay Andre MD;  Location: [facility]ATH [facility]    GYN SURGERY      Hyst.    ORTHOPEDIC SURGERY      B\" TKs       Family History:  Family History       No data available      "       Social History:  Social History     Socioeconomic History    Marital status:      Spouse name: Not on file    Number of children: Not on file    Years of education: Not on file    Highest education level: Not on file   Occupational History    Not on file   Tobacco Use    Smoking status: Never    Smokeless tobacco: Never   Substance and Sexual Activity    Alcohol use: Not on file     Comment: 2x/year    Drug use: Not on file    Sexual activity: Not on file   Other Topics Concern    Parent/sibling w/ CABG, MI or angioplasty before 65F 55M? Not Asked   Social History Narrative    Not on file     Social Determinants of Health     Financial Resource Strain: Not on file   Food Insecurity: Not on file   Transportation Needs: Not on file   Physical Activity: Not on file   Stress: Not on file   Social Connections: Not on file   Interpersonal Safety: Not on file   Housing Stability: Not on file       Allergy:  No Known Allergies     Medications:  Current Facility-Administered Medications   Medication    acetaminophen (TYLENOL) tablet 650 mg    Or    acetaminophen (TYLENOL) Suppository 650 mg    albuterol (PROVENTIL HFA/VENTOLIN HFA) inhaler    albuterol (PROVENTIL) neb solution 2.5 mg    amLODIPine (NORVASC) tablet 2.5 mg    aspirin (ASA) chewable tablet 81 mg    atorvastatin (LIPITOR) tablet 20 mg    atovaquone (MEPRON) suspension 1,500 mg    B and C vitamin Complex with folic acid (NEPHRONEX) liquid 5 mL    bisacodyl (DULCOLAX) suppository 10 mg    bumetanide (BUMEX) injection 1 mg    carboxymethylcellulose PF (REFRESH PLUS) 0.5 % ophthalmic solution 1 drop    cetirizine (zyrTEC) tablet 10 mg    chlorhexidine (PERIDEX) 0.12 % solution 15 mL    clotrimazole (LOTRIMIN) 1 % cream    dexmedeTOMIDine (PRECEDEX) 4 mcg/mL in sodium chloride 0.9 % 100 mL infusion    dextrose 10% infusion    glucose gel 15-30 g    Or    dextrose 50 % injection 25-50 mL    Or    glucagon injection 1 mg    epoprostenol (VELETRI) 20 mcg/mL  in sterile water inhalation solution    fiber modular (NUTRISOURCE FIBER) packet 1 packet    heparin ANTICOAGULANT injection 5,000 Units    hydrALAZINE (APRESOLINE) injection 10-20 mg    HYDROmorphone (DILAUDID) injection 0.2 mg    insulin aspart (NovoLOG) injection (RAPID ACTING)    ipratropium - albuterol 0.5 mg/2.5 mg/3 mL (DUONEB) neb solution 3 mL    labetalol (NORMODYNE/TRANDATE) injection 10-20 mg    levothyroxine (SYNTHROID/LEVOTHROID) tablet 150 mcg    lidocaine (LMX4) cream    lidocaine 1 % 0.1-1 mL    melatonin tablet 1 mg    methylPREDNISolone sodium succinate (solu-MEDROL) 250 mg in sodium chloride 0.9 % 59 mL intermittent infusion    midazolam (VERSED) injection 1 mg    naloxone (NARCAN) injection 0.2 mg    Or    naloxone (NARCAN) injection 0.4 mg    Or    naloxone (NARCAN) injection 0.2 mg    Or    naloxone (NARCAN) injection 0.4 mg    ondansetron (ZOFRAN ODT) ODT tab 4 mg    Or    ondansetron (ZOFRAN) injection 4 mg    oxyCODONE (ROXICODONE) tablet 5 mg    pantoprazole (PROTONIX) 2 mg/mL suspension 40 mg    piperacillin-tazobactam (ZOSYN) 4.5 g vial to attach to  mL bag    polyethylene glycol (MIRALAX) Packet 17 g    pregabalin (LYRICA) solution 75 mg    protein modular (PROSOURCE TF20) packet 1 packet    QUEtiapine (SEROquel) tablet 150 mg    senna-docusate (SENOKOT-S/PERICOLACE) 8.6-50 MG per tablet 1 tablet    sertraline (ZOLOFT) tablet 150 mg    sodium chloride (PF) 0.9% PF flush 10-20 mL    sodium chloride (PF) 0.9% PF flush 10-40 mL    sodium chloride (PF) 0.9% PF flush 10-40 mL    sodium chloride (PF) 0.9% PF flush 3 mL    sodium chloride (PF) 0.9% PF flush 3 mL    Vitamin D3 (CHOLECALCIFEROL) tablet 50 mcg         Physical examination:  Vital signs:  Temp: 99.1  F (37.3  C) Temp src: Oral BP: (!) 160/90 Pulse: 92   Resp: (!) 37 SpO2: 94 % O2 Device: Mechanical Ventilator      Vent Mode: CMV/AC  (Continuous Mandatory Ventilation/ Assist Control)  FiO2 (%): 55 %  Resp Rate (Set): 14  breaths/min  Tidal Volume (Set, mL): 360 mL  PEEP (cm H2O): 14 cmH2O  Resp: (!) 37    2. INTAKE/ OUTPUT:   I/O last 3 completed shifts:  In: 2544.5 [I.V.:564.5; NG/GT:850]  Out: 3655 [Urine:3655]    General: Sedated, slightly agitated, attempts to open eyes and respond to family  HEENT: neck supple, symmetrical  Lungs: Clear to auscultation, no wheezing  CVS: Normal S1 & S2, no murmur  Abdomen: Bowel sounds present, soft, non tender  Extremities/musculoskeletal: UE edema present bilaterally  Neurology:  no focal motor deficits  Skin: no rash  Exam of Line sites: No erythema or discharge.    Data:    ROUTINE ICU LABS (Last four results)  CMP  Recent Labs   Lab 11/18/23  1207 11/18/23  0811 11/18/23  0505 11/18/23  0418 11/17/23  0537 11/17/23  0520 11/16/23  0831 11/16/23  0411 11/15/23  0747 11/15/23  0603   NA  --   --  144  --   --  142  --  138  --  137   POTASSIUM  --   --  4.5  --   --  5.0  --  5.1  --  4.6   CHLORIDE  --   --  101  --   --  101  --  97*  --  97*   CO2  --   --  30*  --   --  29  --  30*  --  34*   ANIONGAP  --   --  13  --   --  12  --  11  --  6*   * 120* 156* 150*   < > 147*   < > 151*   < > 105*   BUN  --   --  81.6*  --   --  85.3*  --  79.0*  --  68.4*   CR  --   --  1.16*  --   --  1.08*  --  1.03*  --  0.84   GFRESTIMATED  --   --  48*  --   --  52*  --  55*  --  71   BARBARA  --   --  8.6*  --   --  8.6*  --  8.7*  --  8.7*   MAG  --   --  2.1  --   --  2.0  --  1.9  --  1.8   PHOS  --   --  4.0  --   --  4.0  --  3.7  --  3.2   PROTTOTAL  --   --  6.4  --   --   --   --   --   --   --    ALBUMIN  --   --  2.7*  --   --   --   --   --   --   --    BILITOTAL  --   --  0.5  --   --   --   --   --   --   --    ALKPHOS  --   --  89  --   --   --   --   --   --   --    AST  --   --  45  --   --   --   --   --   --   --    ALT  --   --  66*  --   --   --   --   --   --   --     < > = values in this interval not displayed.     CBC  Recent Labs   Lab 11/18/23  0505 11/17/23  0572  11/16/23  1458 11/16/23  0411 11/15/23  0603   WBC 12.5* 13.1*  --  10.1 10.2   RBC 3.46* 3.50*  --  2.72* 2.85*   HGB 9.1* 9.2* 6.8* 7.1* 7.4*   HCT 30.5* 30.8*  --  24.5* 25.7*   MCV 88 88  --  90 90   MCH 26.3* 26.3*  --  26.1* 26.0*   MCHC 29.8* 29.9*  --  29.0* 28.8*   RDW 21.5* 21.6*  --  22.1* 22.5*    217  --  175 172     INRNo lab results found in last 7 days.  Arterial Blood Gas  Recent Labs   Lab 11/15/23  0603   PH 7.47*   PCO2 49*   PO2 64*   HCO3 35*   O2PER 75       Recent Results (from the past 24 hour(s))   CT Chest w/o Contrast   Result Value    Radiologist flags Pneumomediastinum (Urgent)    Narrative    EXAM: CT CHEST W/O CONTRAST  LOCATION: [facility]  DATE: 11/18/2023    INDICATION: ongoing hypoxia  COMPARISON: Chest radiograph 11/15/2023 and CTA chest 10/21/2023.  TECHNIQUE: CT chest without IV contrast. Multiplanar reformats were obtained. Dose reduction techniques were used.  CONTRAST: None.    FINDINGS:   LUNGS AND PLEURA: Endotracheal tube is in satisfactory position. Mild bronchiectasis. Increased extensive areas of consolidation and groundglass and nodular opacities within both lungs. No pleural effusion. No definite pneumothorax. Gas abutting the   medial aspect of the right lung is favored to represent pneumomediastinum.    MEDIASTINUM/AXILLAE: Cardiomegaly. Normal caliber thoracic aorta with marked calcified atherosclerotic plaque. Right PICC terminates in the mid SVC. Stable mild thoracic lymphadenopathy, for example right paratracheal lymph node measures 1.4 cm short   axis (series 3, image 47), presumably 1.5 cm. Persistent dilatation of the central pulmonary arteries with the pulmonary trunk measuring up to 4.6 cm. Development of partially visualized subcutaneous emphysema within the lower neck and within the right   aspect of the upper mediastinum. No periesophageal stranding or fluid collection.    CORONARY ARTERY CALCIFICATION: Severe.    UPPER  ABDOMEN: Enteric tube courses into the stomach and out of the field of view. Tiny hypodensity in the hepatic dome is stable and too small to characterize.    MUSCULOSKELETAL: Thoracic spondylosis. No destructive osseous lesion.      Impression    IMPRESSION:   1.  Increase in extensive bilateral pulmonary opacities, which could represent multifocal pneumonia and/or diffuse alveolar damage.  2.  Development of subcutaneous emphysema within the lower neck and trace pneumomediastinum.  3.  Stable mild mediastinal lymphadenopathy, which may be reactive.    [Urgent Result: Pneumomediastinum]    Finding was identified on 11/18/2023 2:39 PM CST.     Dr. Madera was contacted by me on 11/18/2023 2:53 PM CST and verbalized understanding of the critical result.             Assessment and Plan:    77 y/o woman with chronic hypoxemia, recurrent pulmonary infections, bronchiectasis admitted 10/15 with weakness and falls. Had progressive hypoxemia requiring iCU transfer and intubation 10/22.  Had been making slow progress until 11/14 when she began to deteriorate, possibly due to an aspiration event.    Plan for today:  -Obtain repeat CT chest - done and showing increase extensive bilateral opacities and development of subcutaneous emphysema with pneumomediastinum.  Bronchiectasis appears slightly worse to my eye.  -Given negative bronch from 11/15 and worsening CT findings today, favor increasing steroids to methylpred 250 mg BID x 3 days  -ESR, CRP  -Check MRSA nares  -Continue to wean vent as tolerated, ideally would wean peep first   -Decrease Flolan to 10 today        Neuro/psych:  # Sedation/Analgesia   # Weakness/frequent falls  # Toxic metabolic encephalopathy  # Suspected ICU delirium  -Had been on Versed for prolonged time, trying to keep off now given concern for ICU delirium  -Continue Seroquel  -Continue precedex gtt  -Target RASS 0/-1 as able       Pulmonary:   # Acute on chronic mixed hypercarbic and hypoxic  respiratory failure  # Bronchiectasis  # Probable organizing pneumonia  # Pneumomediastinum    Presented with recurrent pneumonia, third time this month.  Extensive infectious evaluation including bronchoscopy, sputum cultures, AFB and fungal work-up negative. Started on high-dose steroids with some improvement. New RUL infiltrate on 11/14 ? Aspiration in the setting of her ETT being slightly dislodged. Could also represent new infection versus return of organizing pneumonia in the setting of steroid wean.  Fever curve with a slight increase, though white count continues to trend down.  Bedside pulmonary ultrasound 11/15 with dense consolidation in the left base and pulmonary edema throughout without pleural effusion.  Bronch 11/15 with cultures negative/NGTD but showing edema around ETT.  -Discussed tracheostomy with the patient's family, they would like to pursue this option, however will likely need to wait until she is more stable for this.  -Decrease Flolan to 10 today  -Increase steroids 11/18 to methylpred 250 mg BID x 3 days given worsening CT findings.  Will need prolonged taper  -Check ESR and CRP         Cardiac:  # Shock-resolved  # Essential hypertension  # CAD status post PCI  # Diastolic heart failure  # Moderate aortic stenosis  -TTE 10/21 with normal LVEF 60-65%. Normal RV size and function, moderate aortic stenosis  -Continuing on amlodipine, will resume isosorbide when able  -Continue PTA Lipitor        Renal:   # CARMEN on CKD-resolved  # Uremia  # Hypernatremia-resolved  # Hypervolemia  Seems to be diuresing well with consistent net negative I/O balance and decreasing weight.  -Continue Bumex 1 mg TID  -Monitor electrolytes, replace as needed        Infectious Disease:   # ? Pneumonia, possible aspiration.  The endotracheal tube was found to be somewhat retracted on 11/14 with new right upper lobe opacity concerning for aspiration.  Fever curve possibly slightly elevated initially though white  blood cell count has continued to decline.  Repeat procalcitonin on 11/15 slightly elevated from previous on 11/4.  Of note her kidney function was significantly worse on 11/4 so the actual increase in procalcitonin is likely greater than the numbers would suggest.  Has undergone extensive infectious evaluation as above in pulmonary.  -ID continues to follow peripherally.  -Restarted empiric antibiotics for possible aspiration  -Continue atovaquone for PJP prophylaxis  -BAL from 11/15 with greater than 25 PMNs, 1+ budding yeast     ## Possible bacteremia versus contamination  1 bottle from blood culture 11/15 positive for staph epi on day 2 of incubation.    -Discussed with ID, they feel this is contaminant     ANTIBIOTICS:  Zosyn 11/15-current        GI/:   # At risk for malnutrition  -Receiving tube feeds per RD recs        Endocrine:  # Steroid-induced hyperglycemia  -Sliding scale insulin     # Hypothyroidism  -On PTA levothyroxine        MSK:  # Right upper extremity edema  Likely secondary to impaired venous return from PICC line.  Right upper extremity ultrasound 11/1 without evidence of thrombosis.        Heme:   # Anemia - multifactorial from illness and phlebotomy/   -Continue to trend; transfuse for < 7      General Cares/Prophylaxis:    DVT Prophylaxis: Heparin SQ  GI Prophylaxis: PPI  Restraints: no  Family Communication: daughter updated at bedside  Code Status: FULL  Disposition:  ICU status      Clinically Significant Risk Factors              # Hypoalbuminemia: Lowest albumin = 2.7 g/dL at 11/18/2023  5:05 AM, will monitor as appropriate            # Obesity: Estimated body mass index is 35.48 kg/m  as calculated from the following:    Height as of this encounter: 1.524 m (5').    Weight as of this encounter: 82.4 kg (181 lb 10.5 oz).                    Billing: Critical care time 55 min excluding procedure time.      Nicole Fontanez MD  Pulmonary and Critical Care  Medicine

## 2023-11-18 NOTE — PROGRESS NOTES
[facility] ICU VENTILATOR RESPIRATORY NOTE    Date of Admission: 10/15/2023  Date of Intubation (most recent): 10/22/2023  Reason for Mechanical Ventilation: Respiratory failure  Number of Days on Mechanical Ventilation: 28  Met Criteria for Pressure Support Trial: No, on high vent settings  Significant Events Today: FiO2 back up to 75%  ETT appearance on chest x-ray: 4.7cm above ashley    BS: coarse/diminished  Secretions: scant/small, thick, creamy  ETT: 8.0 secured 22 @ teeth    Vent Mode: CMV/AC  (Continuous Mandatory Ventilation/ Assist Control)  FiO2 (%): 70 %  Resp Rate (Set): 14 breaths/min  Tidal Volume (Set, mL): 360 mL  PEEP (cm H2O): 14 cmH2O  Resp: (!) 33        Plan:  Continue to wean down FiO2

## 2023-11-18 NOTE — PROGRESS NOTES
[facility]     Hospitalist Progress Note     Assessment & Plan     ASSESSMENT    Matthew Bowen is a 78 year old female w/PMH chronic hypoxic respiratory failure due to pulmonary HTN, ALAINA requiring CPAP, chronic diastolic HF, CAD, CKD stage 3, morbid obesity, HTN,  depression who presents from home with family with fever, cough. Acutely worsened respiratory status on 10/21 requiring intubation. Unclear etiology for acute on chronic hypoxic respiratory failure, most likely infectious v autoimmune v other in setting of underlying lung disease.  currently being treated for presumed ARDS.  Oxygen requirements remain stable on mechanical ventilator with ongoing daily weaning trials.       Still having difficulty weaning Fi02. CT of the chest obtained today with evidence of subcutaneous emphysema and extensive intraparenchymal disease. Discussed with pulmonology and starting pulse-dose steroids to see if can make any improvement in vent weaning.    PLAN    Acute Hypoxic Respiratory Failure s/p intubation  Presumed ARDS  Pneumonia, recurrent w/bronchiectasis  -Presents with recurrent PNA, 3rd time in last month or so. Last completed treatment 2 weeks ago with persistent cough. Presents with worsening fatigue, productive cough, weakness and falls.  -On admission, patient febrile to 101.1, white count of 21.  Lactic acid was within normal limits.  She underwent a CT scan that showed a lingular consolidative opacity with air bronchograms with bilateral upper lobe groundglass opacities.  She was started on ceftriaxone and azithromycin.  - blood cx 10/15 and 10/18 NGTD  -Sputum culture from 10/19 and 10/21 grew Staph epi and C. albicans. ID feels candida that is a colonizer and not related to illness.   -COVID, influenza, Respiratory viral panel negative.  Strep and legionella antigens negative.   - MRSA PCR neg  - Fungitell neg.  Aspergillus ag neg.  Blastomyces ab neg.  Nocardia cx neg.  AFB sputum stain  neg  -intubated 10/21 due to worsening respiratory status and bronch done showing serosanguinous and fibrinous return with thick mucoid secretions noted  -seen by pulm, steroids decreased from 125 to 60mg daily on 10/31.   - plan for prolonged taper of prednisone from 60 mg q day to decreasing by 5 mg every 2 weeks (see orders).  -Additional methylprednisolone 125 mg on 11/15.  - was treated with bumex gtt; transitioned to oral Bumex on 11/7  - started atovaquone for PJP ppx 11/3.  - family considering trach/PEG; they are holding off for now as they are hoping pt will able to be extubated prior to trach need  -Bronchoscopy done 11/15.  -Gram stain with 1+ yeast.  -Restarted Zosyn 11/15.  -1 blood cultures from 11/15 now with MRSE.  -Repeat 2 blood cultures today.  -Continued ID input appreciated.     Acute Metabolic encephalopathy  - sedative gtts previously discontinued   - receiving intermittent IV dilaudid and oxycodone prn; minimize as able to encourage continued clearing of encephalopathy  -Requiring reinitiation of continuous IV Precedex 11/15.  -Started on IV midazolam later in the evening on 11/15.  -Increase quetiapine to 150 mg twice a day.     Shock/hypotension  Hypertension  -suspected to be infectious  - EF is normal on TTE  - was receiving midodrine; now discontinued  -Continues to have labile blood pressures.  -Continue amlodipine to 2.5 mg a day.  -Continue bumetanide to 1 mg IV 3 times a day.  -IV hydralazine as needed.     Hypernatremia  - resolved  -free water flushes to 60 cc every 4 hours.     Gluteal cleft irritant dermatitis   - WOC consulted, appreciate recs     Generalized weakness.   Chronic arthritis.   Falls at home.   Left Foot pain:   -Having multifactorial weakness, with diffuse body aches and pains.  Imaging on admission including CT head, cervical spine, CT CAP did not show acute fracture.  She had an x-ray of her foot also showed degenerative changes without fracture.    -readdress  when appropriate     Hx ALAINA.  Pulmonary hypertension.  Acute exacerbation of chronic heart failure with preserved ejection fraction.  -Increase bumetanide to 1 mg 3 times a day.     CKD stage 3  Uremia  -Cr 1.21 on admission. Currently, 1.   -transitioned to low-protein tube feeds, nephrology consulted by my colleague and they discussed case with no formal consult at this time.   -serial labs  -Avoid nephrotoxins as able.    Other Issues  -Essential Hypertension: Controlled on current regimen  -Morbid Obesity: Complicates all aspects of care  -Complicates care.  -Anemia of Chronic Disease: Transfuse for Hg <7  -CAD: Home meds  -Hypothyroidism: Continue levothyroxine 150 mcg a day.  -Mood disorder: Continue sertraline 150 mg a day.    DVT Prophy  -Heparin    Disposition  -Continue ICU care      Balwinder Madera MD    Subjective     Seen at bedside. Nursing able to get patient up to mobile chair. CT of the chest obtained today with evidence of subcutaneous emphysema and extensive intraparenchymal disease.        Objective   Blood pressure (!) 140/79, pulse 78, temperature 99.1  F (37.3  C), temperature source Oral, resp. rate (!) 34, height 1.524 m (5'), weight 82.4 kg (181 lb 10.5 oz), SpO2 94%.    PHYSICAL EXAM  General: In no acute distress but slow to respond, does not follow commands.  CV: RRR.  Lungs: Scattered rhonchi. Nl WOB.  Abd: Non-tender.  Ext: Trace edema.    LABS AND IMAGING  Reviewed and pertinent results discussed in assessment and plan.

## 2023-11-18 NOTE — PLAN OF CARE
ICU End of Shift Summary.  For vital signs and complete assessments, please see documentation flowsheets.      Pertinent assessments: RASS -1 to +1. Intermittently restless, minimal improvement with PRN dilaudid. Some improvement with Seroquel; dose increased. Tmax 99.6. Tele SB/SR. -130. LS coarse, fine crackles laterally. Diuresing well with Bumex. Avg UOP this shift, 175mL/hr. Net I/O since admit -3.3 kg. Tolerating TF @ goal. BM x2, incontinent.   Major Shift Events: FiO2 needs initially increased to 70%, ended day at 55%. Desats and increased restlessness with turn to R. Repeat BC drawn. Scheduled K+ discontinued, replacement protocol instead.  Plan (Upcoming Events): Wean O2 as able  Discharge/Transfer Needs: TBD     Bedside Shift Report Completed : Y  Bedside Safety Check Completed: Y    Goal Outcome Evaluation:       Overall Patient Progress: no changeOverall Patient Progress: no change    Outcome Evaluation: FiO2 down to 55%, Desat with turns to R. No change in Precedex gtt. Repeat blood cultures

## 2023-11-19 NOTE — PROGRESS NOTES
"Intensivist Daily Note  11/19/2023      Brief History:  Matthew Bowen is a 78 year old female with PMH chronic hypoxic respiratory failure, recurrent pulmonary infections, bronchiectasis who was admitted on 10/15/2023 with weakness and falls, course complicated by acute on chronic hypoxic resp failure requiring intubation on 10/22. Now with prolonged hospital stay further complicated by respiratory deterioration on 11/14 ? Aspiration event and persistent ventilatory requirement.    Interval Events:  -CT chest yesterday 11/18 showing increase in extensive bilateral pulmonary opacities ? Multifocal pneumonia vs DAD.  Subcutaneous emphysema and trace pneumomediastinum also present  -New fevers overnight  -Overall stable oxygenation with wean in Flolan to 10 yesterday afternoon        PMH:  Past Medical History:   Diagnosis Date    Antiplatelet or antithrombotic long-term use     Arthritis     Congestive heart failure (H)     Dyspnea on exertion     Heart attack (H)     Heart murmur     Hypertension     Irregular heart beat     Oxygen dependent     2L continuous at home.    Pulmonary hypertension (H)     Sleep apnea     Bipap    Stented coronary artery     x 1    Thyroid disease     Walking troubles        PSurgHx:  Past Surgical History:   Procedure Laterality Date    APPENDECTOMY      COLONOSCOPY      COLONOSCOPY N/A 2/28/2022    Procedure: COLONOSCOPY;  Surgeon: Kolby Dunn MD;  Location: [facility] OR    CV CORONARY ANGIOGRAM N/A 11/21/2019    Procedure: Coronary Angiogram;  Surgeon: Tay Andre MD;  Location:  HE[facility] CARDI[facility] CATH LAB    CV LEFT HEART CATH N/A 11/21/2019    Procedure: Left Heart Cath;  Surgeon: Tay Andre MD;  Location:  HE[facility] CARDI[facility] CATH LAB    CV PCI STENT DRUG ELUTING N/A 11/21/2019    Procedure: PCI Stent Drug Eluting;  Surgeon: Tay Andre MD;  Location: [facility]    GYN SURGERY      Hyst.    ORTHOPEDIC SURGERY      B\" TKs       Family " History:  Family History       No data available            Social History:  Social History     Socioeconomic History    Marital status:      Spouse name: Not on file    Number of children: Not on file    Years of education: Not on file    Highest education level: Not on file   Occupational History    Not on file   Tobacco Use    Smoking status: Never    Smokeless tobacco: Never   Substance and Sexual Activity    Alcohol use: Not on file     Comment: 2x/year    Drug use: Not on file    Sexual activity: Not on file   Other Topics Concern    Parent/sibling w/ CABG, MI or angioplasty before 65F 55M? Not Asked   Social History Narrative    Not on file     Social Determinants of Health     Financial Resource Strain: Not on file   Food Insecurity: Not on file   Transportation Needs: Not on file   Physical Activity: Not on file   Stress: Not on file   Social Connections: Not on file   Interpersonal Safety: Not on file   Housing Stability: Not on file       Allergy:  No Known Allergies     Medications:  Current Facility-Administered Medications   Medication    acetaminophen (TYLENOL) tablet 650 mg    Or    acetaminophen (TYLENOL) Suppository 650 mg    albuterol (PROVENTIL HFA/VENTOLIN HFA) inhaler    albuterol (PROVENTIL) neb solution 2.5 mg    amLODIPine (NORVASC) tablet 2.5 mg    aspirin (ASA) chewable tablet 81 mg    atorvastatin (LIPITOR) tablet 20 mg    atovaquone (MEPRON) suspension 1,500 mg    B and C vitamin Complex with folic acid (NEPHRONEX) liquid 5 mL    bisacodyl (DULCOLAX) suppository 10 mg    [Held by provider] bumetanide (BUMEX) injection 1 mg    carboxymethylcellulose PF (REFRESH PLUS) 0.5 % ophthalmic solution 1 drop    cetirizine (zyrTEC) tablet 10 mg    chlorhexidine (PERIDEX) 0.12 % solution 15 mL    clotrimazole (LOTRIMIN) 1 % cream    dexmedeTOMIDine (PRECEDEX) 4 mcg/mL in sodium chloride 0.9 % 100 mL infusion    dextrose 10% infusion    glucose gel 15-30 g    Or    dextrose 50 % injection  25-50 mL    Or    glucagon injection 1 mg    fiber modular (NUTRISOURCE FIBER) packet 1 packet    heparin ANTICOAGULANT injection 5,000 Units    hydrALAZINE (APRESOLINE) injection 10-20 mg    HYDROmorphone (DILAUDID) injection 0.2 mg    insulin aspart (NovoLOG) injection (RAPID ACTING)    ipratropium - albuterol 0.5 mg/2.5 mg/3 mL (DUONEB) neb solution 3 mL    labetalol (NORMODYNE/TRANDATE) injection 10-20 mg    levothyroxine (SYNTHROID/LEVOTHROID) tablet 150 mcg    lidocaine (LMX4) cream    lidocaine 1 % 0.1-1 mL    melatonin tablet 1 mg    methylPREDNISolone sodium succinate (solu-MEDROL) 250 mg in sodium chloride 0.9 % 59 mL intermittent infusion    midazolam (VERSED) injection 1 mg    naloxone (NARCAN) injection 0.2 mg    Or    naloxone (NARCAN) injection 0.4 mg    Or    naloxone (NARCAN) injection 0.2 mg    Or    naloxone (NARCAN) injection 0.4 mg    ondansetron (ZOFRAN ODT) ODT tab 4 mg    Or    ondansetron (ZOFRAN) injection 4 mg    oxyCODONE (ROXICODONE) tablet 5 mg    pantoprazole (PROTONIX) 2 mg/mL suspension 40 mg    piperacillin-tazobactam (ZOSYN) 4.5 g vial to attach to  mL bag    polyethylene glycol (MIRALAX) Packet 17 g    pregabalin (LYRICA) solution 75 mg    protein modular (PROSOURCE TF20) packet 1 packet    QUEtiapine (SEROquel) tablet 150 mg    senna-docusate (SENOKOT-S/PERICOLACE) 8.6-50 MG per tablet 1 tablet    sertraline (ZOLOFT) tablet 150 mg    sodium chloride (PF) 0.9% PF flush 10-20 mL    sodium chloride (PF) 0.9% PF flush 10-40 mL    sodium chloride (PF) 0.9% PF flush 10-40 mL    sodium chloride (PF) 0.9% PF flush 3 mL    sodium chloride (PF) 0.9% PF flush 3 mL    Vitamin D3 (CHOLECALCIFEROL) tablet 50 mcg         Physical examination:  Vital signs:  Temp: 99.7  F (37.6  C) Temp src: Bladder BP: (!) 178/102 Pulse: 71   Resp: 21 SpO2: 92 % O2 Device: Mechanical Ventilator      Vent Mode: CMV/AC  (Continuous Mandatory Ventilation/ Assist Control)  FiO2 (%): 60 %  Resp Rate (Set): 14  breaths/min  Tidal Volume (Set, mL): 360 mL  PEEP (cm H2O): 14 cmH2O  Resp: 21    2. INTAKE/ OUTPUT:   I/O last 3 completed shifts:  In: 2666.74 [I.V.:736.74; NG/GT:855]  Out: 3070 [Urine:3070]    General: Sedated, slightly agitated, attempts to open eyes and respond to family  HEENT: neck supple, symmetrical  Lungs: Clear to auscultation, no wheezing  CVS: Normal S1 & S2, no murmur  Abdomen: Bowel sounds present, soft, non tender  Extremities/musculoskeletal: UE edema present bilaterally  Neurology:  no focal motor deficits  Skin: no rash  Exam of Line sites: No erythema or discharge.    Data:    ROUTINE ICU LABS (Last four results)  CMP  Recent Labs   Lab 11/19/23  0855 11/19/23  0526 11/19/23  0348 11/19/23  0005 11/18/23  0811 11/18/23  0505 11/17/23  0537 11/17/23  0520 11/16/23  0831 11/16/23  0411   NA  --  146*  --   --   --  144  --  142  --  138   POTASSIUM  --  4.3  --   --   --  4.5  --  5.0  --  5.1   CHLORIDE  --  104  --   --   --  101  --  101  --  97*   CO2  --  31*  --   --   --  30*  --  29  --  30*   ANIONGAP  --  11  --   --   --  13  --  12  --  11   * 152* 170* 230*   < > 156*   < > 147*   < > 151*   BUN  --  79.6*  --   --   --  81.6*  --  85.3*  --  79.0*   CR  --  1.14*  --   --   --  1.16*  --  1.08*  --  1.03*   GFRESTIMATED  --  49*  --   --   --  48*  --  52*  --  55*   BARBARA  --  8.6*  --   --   --  8.6*  --  8.6*  --  8.7*   MAG  --  2.3  --   --   --  2.1  --  2.0  --  1.9   PHOS  --  4.0  --   --   --  4.0  --  4.0  --  3.7   PROTTOTAL  --   --   --   --   --  6.4  --   --   --   --    ALBUMIN  --   --   --   --   --  2.7*  --   --   --   --    BILITOTAL  --   --   --   --   --  0.5  --   --   --   --    ALKPHOS  --   --   --   --   --  89  --   --   --   --    AST  --   --   --   --   --  45  --   --   --   --    ALT  --   --   --   --   --  66*  --   --   --   --     < > = values in this interval not displayed.     CBC  Recent Labs   Lab 11/19/23  0526 11/18/23  4611  11/17/23  0520 11/16/23  1458 11/16/23  0411   WBC 13.8* 12.5* 13.1*  --  10.1   RBC 3.33* 3.46* 3.50*  --  2.72*   HGB 8.7* 9.1* 9.2* 6.8* 7.1*   HCT 29.5* 30.5* 30.8*  --  24.5*   MCV 89 88 88  --  90   MCH 26.1* 26.3* 26.3*  --  26.1*   MCHC 29.5* 29.8* 29.9*  --  29.0*   RDW 21.1* 21.5* 21.6*  --  22.1*    222 217  --  175     INRNo lab results found in last 7 days.  Arterial Blood Gas  Recent Labs   Lab 11/15/23  0603   PH 7.47*   PCO2 49*   PO2 64*   HCO3 35*   O2PER 75       Recent Results (from the past 24 hour(s))   CT Chest w/o Contrast   Result Value    Radiologist flags Pneumomediastinum (Urgent)    Narrative    EXAM: CT CHEST W/O CONTRAST  LOCATION: [facility]  DATE: 11/18/2023    INDICATION: ongoing hypoxia  COMPARISON: Chest radiograph 11/15/2023 and CTA chest 10/21/2023.  TECHNIQUE: CT chest without IV contrast. Multiplanar reformats were obtained. Dose reduction techniques were used.  CONTRAST: None.    FINDINGS:   LUNGS AND PLEURA: Endotracheal tube is in satisfactory position. Mild bronchiectasis. Increased extensive areas of consolidation and groundglass and nodular opacities within both lungs. No pleural effusion. No definite pneumothorax. Gas abutting the   medial aspect of the right lung is favored to represent pneumomediastinum.    MEDIASTINUM/AXILLAE: Cardiomegaly. Normal caliber thoracic aorta with marked calcified atherosclerotic plaque. Right PICC terminates in the mid SVC. Stable mild thoracic lymphadenopathy, for example right paratracheal lymph node measures 1.4 cm short   axis (series 3, image 47), presumably 1.5 cm. Persistent dilatation of the central pulmonary arteries with the pulmonary trunk measuring up to 4.6 cm. Development of partially visualized subcutaneous emphysema within the lower neck and within the right   aspect of the upper mediastinum. No periesophageal stranding or fluid collection.    CORONARY ARTERY CALCIFICATION: Severe.    UPPER  ABDOMEN: Enteric tube courses into the stomach and out of the field of view. Tiny hypodensity in the hepatic dome is stable and too small to characterize.    MUSCULOSKELETAL: Thoracic spondylosis. No destructive osseous lesion.      Impression    IMPRESSION:   1.  Increase in extensive bilateral pulmonary opacities, which could represent multifocal pneumonia and/or diffuse alveolar damage.  2.  Development of subcutaneous emphysema within the lower neck and trace pneumomediastinum.  3.  Stable mild mediastinal lymphadenopathy, which may be reactive.    [Urgent Result: Pneumomediastinum]    Finding was identified on 11/18/2023 2:39 PM CST.     Dr. Madera was contacted by me on 11/18/2023 2:53 PM CST and verbalized understanding of the critical result.             Assessment and Plan:    79 y/o woman with chronic hypoxemia, recurrent pulmonary infections, bronchiectasis admitted 10/15 with weakness and falls. Had progressive hypoxemia requiring iCU transfer and intubation 10/22.  Had been making slow progress until 11/14 when she began to deteriorate, possibly due to an aspiration event.    Plan for today:  -Stop Flolan  -If able to further adjust vent, favor weaning peep next  -Repeat blood and sputum cultures  -Given fevers despite Zosyn therapy, will broaden to Vancomycin and Meropenem  -Continue high dose steroids (Methylpred 250 mg BID)        Neuro/psych:  # Sedation/Analgesia   # Weakness/frequent falls  # Toxic metabolic encephalopathy  # Suspected ICU delirium  -Had been on Versed for prolonged time, trying to keep off now given concern for ICU delirium  -Continue Seroquel  -Continue precedex gtt  -Target RASS 0/-1 as able       Pulmonary:   # Acute on chronic mixed hypercarbic and hypoxic respiratory failure  # Bronchiectasis  # Probable organizing pneumonia  # Pneumomediastinum    Presented with recurrent pneumonia, third time this month.  Extensive infectious evaluation including bronchoscopy, sputum  cultures, AFB and fungal work-up negative. Started on high-dose steroids with some improvement. New RUL infiltrate on 11/14 ? Aspiration in the setting of her ETT being slightly dislodged. Could also represent new infection versus return of organizing pneumonia in the setting of steroid wean.  Fever curve with a slight increase, though white count continues to trend down.  Bedside pulmonary ultrasound 11/15 with dense consolidation in the left base and pulmonary edema throughout without pleural effusion.  Bronch 11/15 with cultures negative/NGTD but showing edema around ETT.    -Discussed tracheostomy with the patient's family, they would like to pursue this option, however will likely need to wait until she is more stable for this.  -Stop Flolan  -Increase steroids 11/18 to methylpred 250 mg BID x 3 days given worsening CT findings.  Will need prolonged taper        Cardiac:  # Shock-resolved  # Essential hypertension  # CAD status post PCI  # Diastolic heart failure  # Moderate aortic stenosis  -TTE 10/21 with normal LVEF 60-65%. Normal RV size and function, moderate aortic stenosis  -Continuing on amlodipine, will resume isosorbide when able  -Continue PTA Lipitor        Renal:   # CARMEN on CKD-resolved  # Uremia  # Hypernatremia- Na 146 today  # Hypervolemia  Seems to be diuresing well with consistent net negative I/O balance and decreasing weight.  -Continue Bumex 1 mg TID; metolazone 5 mg x1 given rising Na  -Monitor electrolytes, replace as needed  -Check cystatin C tomorrow        Infectious Disease:   # ? Pneumonia, possible aspiration.  # New fevers overnight 11/18-19  The endotracheal tube was found to be somewhat retracted on 11/14 with new right upper lobe opacity concerning for aspiration.  Fever curve possibly slightly elevated initially though white blood cell count has continued to decline.  Repeat procalcitonin on 11/15 slightly elevated from previous on 11/4.  Of note her kidney function was  significantly worse on 11/4 so the actual increase in procalcitonin is likely greater than the numbers would suggest.  Has undergone extensive infectious evaluation as above in pulmonary.  -ID continues to follow peripherally.  -Broadened to Vancomycin and Meropenem (11/19-  -HOLD atovaquone for PJP prophylaxis  -BAL from 11/15 with greater than 25 PMNs, 1+ budding yeast  -Repeat blood and sputum cultures     ## Possible bacteremia versus contamination  1 bottle from blood culture 11/15 positive for staph epi on day 2 of incubation.    -Discussed with ID, they feel this is contaminant     ANTIBIOTICS:  Zosyn 11/15-11/19  Vancomycin (11/19-  Meropenem (11/19-        GI/:   # At risk for malnutrition  -Receiving tube feeds per RD recs        Endocrine:  # Steroid-induced hyperglycemia  -Sliding scale insulin (medium)  -Will monitor closely with increase in steroids     # Hypothyroidism  -On PTA levothyroxine        MSK:  # Right upper extremity edema  Likely secondary to impaired venous return from PICC line.  Right upper extremity ultrasound 11/1 without evidence of thrombosis.        Heme:   # Anemia - multifactorial from illness and phlebotomy/   -Continue to trend; transfuse for < 7      General Cares/Prophylaxis:    DVT Prophylaxis: Heparin SQ  GI Prophylaxis: PPI  Restraints: no  Family Communication: daughter updated at bedside.  Extensively reviewed CT chest findings and my concern for what appears to be significant progression of pulmonary abnormalities despite aggressive treatment with antibiotics and steroids.  Additionally discussed concern that bronchiectasis appears worse, concerning for traction bronchiectasis which would imply the presence of underlying fibrosis even if tanesha fibrosis (honeycombing) was not seen on CT chest.  We additionally discussed that given progression of lung findings, I would be concerned about her ability to wean off the ventilator long term, if tracheostomy were pursued.   She was encouraged to have conversations with family members about whether long term ventilatory support would be aligned with her mother's wishes.  Code Status: FULL  Disposition:  ICU status      Clinically Significant Risk Factors         # Hypernatremia: Highest Na = 146 mmol/L in last 2 days, will monitor as appropriate      # Hypoalbuminemia: Lowest albumin = 2.7 g/dL at 11/18/2023  5:05 AM, will monitor as appropriate            # Obesity: Estimated body mass index is 35.22 kg/m  as calculated from the following:    Height as of this encounter: 1.524 m (5').    Weight as of this encounter: 81.8 kg (180 lb 5.4 oz).                    Billing: Critical care time 65 min excluding procedure time.      Nicole Fontanez MD  Pulmonary and Critical Care Medicine

## 2023-11-19 NOTE — PHARMACY-VANCOMYCIN DOSING SERVICE
Pharmacy Vancomycin Initial Note  Date of Service 2023  Patient's  1945  78 year old, female    Indication: Ventilator-Associated Pneumonia, c/o MRSE    Current estimated CrCl = Estimated Creatinine Clearance: 38.5 mL/min (A) (based on SCr of 1.14 mg/dL (H)).    Creatinine for last 3 days  2023:  5:20 AM Creatinine 1.08 mg/dL  2023:  5:05 AM Creatinine 1.16 mg/dL  2023:  5:26 AM Creatinine 1.14 mg/dL    Recent Vancomycin Level(s) for last 3 days  No results found for requested labs within last 3 days.      Vancomycin IV Administrations (past 72 hours)        No vancomycin orders with administrations in past 72 hours.                    Nephrotoxins and other renal medications (From now, onward)      Start     Dose/Rate Route Frequency Ordered Stop    23 0900  vancomycin (VANCOCIN) 1,250 mg in 0.9% NaCl 250 mL intermittent infusion         1,250 mg  over 90 Minutes Intravenous EVERY 24 HOURS 23 0955      23 1000  vancomycin (VANCOCIN) 2,000 mg in 0.9% NaCl 500 mL intermittent infusion         2,000 mg  over 2 Hours Intravenous ONCE 23 0955      23 1000  [Held by provider]  bumetanide (BUMEX) injection 1 mg        (On hold since today at 0724 until manually unheld; held by Ari Pardo MDHold Reason: Abnormal Electrolytes)    1 mg Intravenous EVERY 8 HOURS 23 0931              Contrast Orders - past 72 hours (72h ago, onward)      None            [employer] Prediction of Planned Initial Vancomycin Regimen  Loading dose: 2000 mg at 10:00 2023.  Regimen: 1250 mg IV every 24 hours.  Start time: 09:54 on 2023  Exposure target: AUC24 (range)400-600 mg/L.hr   AUC24,ss: 525 mg/L.hr  Probability of AUC24 > 400: 79 %  Ctrough,ss: 16.3 mg/L  Probability of Ctrough,ss > 20: 32 %  Probability of nephrotoxicity (Lodise JAE ): 12 %        Plan:  Vancomycin 2000mg (24.5mg/kg) loading dose, then start vancomycin  1250 mg IV q24h.    Vancomycin monitoring method: AUC  Vancomycin therapeutic monitoring goal: 400-600 mg*h/L  Pharmacy will check vancomycin levels as appropriate in 1-3 Days.    Serum creatinine levels will be ordered every 48 hours.  Cystatin C w/ GFR x1 tomorrow.      Pelon Rutledge, Pharm.D., BCPS

## 2023-11-19 NOTE — PROGRESS NOTES
[facility]     Hospitalist Progress Note     Assessment & Plan     ASSESSMENT    Matthew Bowen is a 78 year old female w/PMH chronic hypoxic respiratory failure due to pulmonary HTN, ALAINA requiring CPAP, chronic diastolic HF, CAD, CKD stage 3, morbid obesity, HTN,  depression who presents with with fever, cough and found to have multifocal pneumonia. Acutely worsened respiratory status on 10/21 requiring intubation. Hospital course with prolonged respiratory failure with ARDS and difficulty weaning from vent, although tracheostomy has been deferred partially due to family preference and also due to the fact that increased procedural risk given high ongoing oxygen needs. CT chest obtained 11/18 with evidence of severe ground-glass opacities with traction bronchiectasis. Unclear how much of her ongoing disease is reversible. Pulmonology started high-dose steroids 11/18.    On about the same Fi02 needs as yesterday. Having fevers again today. Considering broadening antibiotics. Goals of care discussions started with the family.    PLAN    Acute Hypoxic Respiratory Failure w/ ARDS  Septic Shock 2/2 Multifocal Pneumonia, Recurrent  -Presented with recurrent PNA and has been treated with multiple courses of antibiotics:  Antibiotic history  Azithromycin 10/15-10/19  Ceftriaxone 10/15-10/17  Cefepime 10/18-31  Metronidazole 10/20-31  Vancomycin 10/21-31  Zosyn 11/15-present  -Has had high oxygen needs meeting ARDS criteria and Flolan (has since been weaned off)  -Some concern for non-infectious causes of pneumonia and severe ground-glass opacities with traction bronchiectasis on imaging, started on high-dose steroids 11/18  -Patient having recurrent fevers, broadening antibiotics to meropenem today     Acute on Chronic HFpEF  -Hold diuretics for today given hypernatremia    Acute Metabolic encephalopathy  -Sedative gtts previously discontinued   -Receiving intermittent IV dilaudid and oxycodone prn;  minimize as able to encourage continued clearing of encephalopathy  -Requiring reinitiation of continuous IV Precedex 11/15  -Increase quetiapine to 150 mg twice a day    Other Issues  -Essential Hypertension: Controlled on current regimen  -Morbid Obesity: Complicates all aspects of care  -Complicates care.  -Anemia of Chronic Disease: Transfuse for Hg <7  -CAD: Home meds  -CKD Stage III: Cr currently at baseline.  -Hypothyroidism: Continue levothyroxine 150 mcg a day.  -Mood disorder: Continue sertraline 150 mg a day.  -Gluteal cleft irritant dermatitis: WOC consulted, appreciate recs  Clinically Significant Risk Factors         # Hypernatremia: Highest Na = 146 mmol/L in last 2 days, will monitor as appropriate      # Hypoalbuminemia: Lowest albumin = 2.7 g/dL at 11/18/2023  5:05 AM, will monitor as appropriate            # Obesity: Estimated body mass index is 35.22 kg/m  as calculated from the following:    Height as of this encounter: 1.524 m (5').    Weight as of this encounter: 81.8 kg (180 lb 5.4 oz).                 DVT Prophy  -Heparin    Disposition  -Continue ICU care      Balwinder Madera MD    Subjective     Seen at bedside. Nursing able to get patient up to mobile chair. CT of the chest obtained today with evidence of subcutaneous emphysema and extensive intraparenchymal disease.        Objective   Blood pressure (!) 151/89, pulse 74, temperature 99  F (37.2  C), resp. rate 30, height 1.524 m (5'), weight 81.8 kg (180 lb 5.4 oz), SpO2 93%.    PHYSICAL EXAM  General: In no acute distress but slow to respond, does not follow commands.  CV: RRR.  Lungs: Scattered rhonchi. Nl WOB.  Abd: Non-tender.  Ext: Trace edema.    LABS AND IMAGING  Reviewed and pertinent results discussed in assessment and plan.

## 2023-11-19 NOTE — PROGRESS NOTES
[facility] ICU VENTILATOR RESPIRATORY NOTE    Date of Admission: 10/21/23    Date of Intubation (most recent): 10/22/23    Reason for Mechanical Ventilation: Respiratory Failure    Number of Days on Mechanical Ventilation: 28    Met Criteria for Pressure Support Trial: No    Length of Pressure Support Trial: n/a    Reason for Stopping Pressure Support Trial: n/a    Reason for No Pressure Support Trial: PEEP +14, FiO2 55-75%    Significant Events Today: Patient transported from ICU to CT and back on transport ventilator. Receiving continuous Veletri inline with ventilator, decreased from 20 ng/kg/min to 10 ng/kg/min this afternoon.     ABG Results:   Recent Labs   Lab 11/15/23  0603   PH 7.47*   PCO2 49*   PO2 64*   HCO3 35*   O2PER 75       ETT appearance on chest x-ray: 4.7 cm above ashley    Plan:  Patient remains on ventilator with settings of:  Vent Mode: CMV/AC  (Continuous Mandatory Ventilation/ Assist Control)  FiO2 (%): 55 %  Resp Rate (Set): 14 breaths/min  Tidal Volume (Set, mL): 360 mL  PEEP (cm H2O): 14 cmH2O  Resp: 29

## 2023-11-19 NOTE — PROGRESS NOTES
[facility] ICU VENTILATOR RESPIRATORY NOTE  Date of Admission: 10/15/2023  Date of Intubation (most recent): 10/22/2023  Reason for Mechanical Ventilation: Respiratory failure  Number of Days on Mechanical Ventilation: 29  Met Criteria for Pressure Support Trial: No  Reason for No Pressure Support Trial: PEEP +14  ETT appearance on chest x-ray: 4.7cm above ashley    Pt remains on 1/2 strength flolan. FiO2 currently 50%    Vent Mode: CMV/AC  (Continuous Mandatory Ventilation/ Assist Control)  FiO2 (%): 50 %  Resp Rate (Set): 14 breaths/min  Tidal Volume (Set, mL): 360 mL  PEEP (cm H2O): 14 cmH2O  Resp: (!) 34    BS coarse/diminished   Secretions scant/small, thick, white    Plan:  Continue to attempt to wean FiO2 and PEEP    Elbasejal Brennan RT

## 2023-11-19 NOTE — PLAN OF CARE
ICU End of Shift Summary.  For vital signs and complete assessments, please see documentation flowsheets.      Pertinent assessments: Continues on full Vent support, FIO2 increased to 60% , tachypnea and use of abdominal muscles PRN versed X1, dilaudid X2 given with no change. LS: coarse/Diminished,  elevated BP did not meet parameters for PRN antihypertensives, Tmax 100.6,  RASS -1, opens eyes to voice but not following commands, mitchell in place good UOP- diuretic(Bumex held this AM for elevated NA+ but evening dose given, BS active, X3 loose stools on this shift, Pt up in chair for 4 hours this shift.    Major Shift Events: New antibiotics started( Vanco & Meropenum), Blood cultures and sputum collected, Veletri weaned off.    Plan (Upcoming Events): wean Vent, sedation as able.    Discharge/Transfer Needs: TBD     Bedside Shift Report Completed : Y  Bedside Safety Check Completed: Y    Goal Outcome Evaluation:      Plan of Care Reviewed With: patient, spouse, child    Overall Patient Progress: no changeOverall Patient Progress: no change

## 2023-11-19 NOTE — PLAN OF CARE
ICU End of Shift Summary.  For vital signs and complete assessments, please see documentation flowsheets.      Pertinent assessments: RASS -1, open eyes responds with eyes opening and nodding head to families questions. CPOt 0-1. Tmax 99.1. Tele SR, occas PVCs. LS coarse, fine crackles posteriorly, expiratory wheeze intermittent. Minimal secretions. Diuresing well with Bumex. UOP this shift, 1200mL. BMx1. Tolerating TF @ goal.   Major Shift Events: Up to chair for several hours, CT chest, FiO2 from 70%>45%.   Plan (Upcoming Events): Wean FiO2 as able  Discharge/Transfer Needs: TBD     Bedside Shift Report Completed :   Bedside Safety Check Completed:    Goal Outcome Evaluation:      Plan of Care Reviewed With: patient, spouse, child    Overall Patient Progress: no changeOverall Patient Progress: no change    Outcome Evaluation: Less agitation today, FiO2 down from 70% to 45%. Not tolerating turns to R,

## 2023-11-20 NOTE — PROGRESS NOTES
[facility]     Hospitalist Progress Note     Assessment & Plan     ASSESSMENT    Matthew Bowen is a 78 year old female w/PMH chronic hypoxic respiratory failure due to pulmonary HTN, ALAINA requiring CPAP, chronic diastolic HF, CAD, CKD stage 3, morbid obesity, HTN,  depression who presents with with fever, cough and found to have multifocal pneumonia. Acutely worsened respiratory status on 10/21 requiring intubation. Hospital course with prolonged respiratory failure with ARDS and difficulty weaning from vent, although tracheostomy has been deferred partially due to family preference and also due to the fact that increased procedural risk given high ongoing oxygen needs. CT chest obtained 11/18 with evidence of severe ground-glass opacities with traction bronchiectasis. Unclear how much of her ongoing disease is reversible. Pulmonology started high-dose steroids 11/18 but no benefit as of yet.    Severe agitation issues and vent dyssynchrony this morning. Discussed further sedation but daughter at bedside prefers to keep patient awake until  able to see patient. Discussed goals of care with daughter, realistic about prognosis but want to wait until brother gets in from [place] and after her parent's wedding anniversary before making major care decisions.    PLAN    Acute Hypoxic Respiratory Failure w/ ARDS  Septic Shock 2/2 Multifocal Pneumonia, Recurrent  -Presented with recurrent PNA and has been treated with multiple courses of antibiotics:  Antibiotic history  Azithromycin 10/15-10/19  Ceftriaxone 10/15-10/17  Cefepime 10/18-31  Metronidazole 10/20-31  Vancomycin 10/21-31  Zosyn 11/15-present  -Has had high oxygen needs meeting ARDS criteria and Flolan (has since been weaned off)  -Some concern for non-infectious causes of pneumonia and severe ground-glass opacities with traction bronchiectasis on imaging, started on high-dose steroids 11/18  -Patient having recurrent fevers,  broadened antibiotics to meropenem + vancomycin 11/19 and sputum cx sent     Acute on Chronic HFpEF  -Continue IV Bumex with metolazone given hypernatremia    Acute Metabolic encephalopathy  -Suspect multifactorial due to ICU delirium, prolonged versed infusion  -Requiring reinitiation of continuous IV Precedex 11/15 and on Seroquel 150 mg twice a day  -Having severe agitation this morning, likely to need to go back on Versed ggt    Other Issues  -Essential Hypertension: Controlled on current regimen  -Morbid Obesity: Complicates all aspects of care  -Complicates care.  -Anemia of Chronic Disease: Transfuse for Hg <7  -CAD: Home meds  -CKD Stage III: Cr currently at baseline.  -Hypothyroidism: Continue levothyroxine 150 mcg a day.  -Mood disorder: Continue sertraline 150 mg a day.  -Gluteal cleft irritant dermatitis: WOC consulted, appreciate recs  Clinically Significant Risk Factors         # Hypernatremia: Highest Na = 146 mmol/L in last 2 days, will monitor as appropriate      # Hypoalbuminemia: Lowest albumin = 2.7 g/dL at 11/18/2023  5:05 AM, will monitor as appropriate            # Obesity: Estimated body mass index is 34.4 kg/m  as calculated from the following:    Height as of this encounter: 1.524 m (5').    Weight as of this encounter: 79.9 kg (176 lb 2.4 oz).                 DVT Prophy  -Heparin    Disposition  -Continue ICU care      Balwinder Madera MD    Subjective     Very awake this morning with agitation issues and vent dyssynchrony. Discussed further sedation but daughter at bedside prefers to keep patient awake until  able to see patient.        Objective   Blood pressure 118/66, pulse 84, temperature 97.9  F (36.6  C), resp. rate 30, height 1.524 m (5'), weight 79.9 kg (176 lb 2.4 oz), SpO2 (!) 89%.    PHYSICAL EXAM  General: In no acute distress but slow to respond, does not follow commands.  CV: RRR.  Lungs: Scattered rhonchi. Nl WOB.  Abd: Non-tender.  Ext: Trace edema.    LABS AND  IMAGING  Reviewed and pertinent results discussed in assessment and plan.

## 2023-11-20 NOTE — PROGRESS NOTES
[facility]    Pulmonary, Allergy, Critical Care and Sleep Medicine    Pulmonary Consult Progress Note    Matthew Bowen MRN# [id number]   Age: 78 year old YOB: 1945     Date of Admission: 10/15/2023  Date of Service: 11/20/2023     ==================================================  INTERVAL EVENTS:  Possibly slowly improving from earlier in the week, though remains significantly ill with high oxygen requirements.        CHANGES FOR TODAY:  Continue current therapies, weight to see if increased steroid dose is effective      ==================================================    ASSESSMENT AND RECOMMENDATIONS:    ## Acute hypoxemic respiratory failure  ## Severe ARDS  ## IgG deficiency  ## Recurrent pneumonia    Matthew Bowen is a 78 year old female, w/ PMHx most significant for hypertension, ALAINA, chronic diastolic heart failure and CKD, admitted w/ acute hypoxemic respiratory failure.  She had been treated for pneumonia x3 in the last 2 to 3 months.  She received broad-spectrum antibiotics during this current admission, she has been intubated since 10/22, her chest imaging studies showed bilateral groundglass opacities and dense consolidation, she did have work-up with bronchoscopy and BAL, I did not see the cell count on the BAL, the cultures have been negative, she did have an autoimmune work-up that was negative except for mild elevation of the RF and borderline nonspecific elevated MARLI, notably she did have low IgG level.  She has been treated with the high-dose steroids for 7 days now with Solu-Medrol 125 mg once daily.  She was diuresed without much improvement in breathing.   Improved a bit through 11/13, then with acute worsening of unclear reason. Repeat CT scan 11/18 with worsening infiltrates.  With the worsening ARDS, and no specific etiology, this could be inflammatory pneumonitis such as acute interstitial pneumonitis (Hamman Rich syndrome), other etiologies  include organizing, she did have some CT scan findings that could be consistent with Atoll sign on the CT scan 10/21/2023.  Interestingly there are case reports of organizing pneumonia with immunoglobulin deficiency, IVIG treatment could be considered in these cases, but would continue aggressive steroids at this point.          Axel Mullins M.D.  Pulmonary & Critical Care  Pager: Click Here to page      I spent 50 minutes dedicated to this care so far today excluding procedures, including review of medical records, review of imaging (results & images), time with patient and time in documentation.    Pulmonary will continue to follow. We are in house at [facility] on Monday, Wednesday, and Friday. For assistance on other days, please page the on-call pulmonologist through [facility] or the .    ==================================================      PHYSICAL EXAM  /65   Pulse 72   Temp 98.2  F (36.8  C)   Resp 20   Ht 1.524 m (5')   Wt 79.9 kg (176 lb 2.4 oz)   SpO2 97%   BMI 34.40 kg/m        Intake/Output Summary (Last 24 hours) at 11/20/2023 1430  Last data filed at 11/20/2023 1327  Gross per 24 hour   Intake 2582.31 ml   Output 3255 ml   Net -672.69 ml       Vitals:    11/18/23 0613 11/19/23 0630 11/20/23 0500   Weight: 82.4 kg (181 lb 10.5 oz) 81.8 kg (180 lb 5.4 oz) 79.9 kg (176 lb 2.4 oz)             General: Sedated, intubated  Resp: Coarse rhonchi throughout  Cardiac: RRR, NS1,S2, No m/r/g  Abdomen: Soft, positive bowel sounds  Extremities: Trace lower extremity edema  Skin: Warm and dry, no jaundice or rash  Neuro: Sedated, unresponsive      Recent Labs   Lab Test 11/20/23  0906 11/19/23  0526 11/18/23  0505   WBC 16.4* 13.8* 12.5*   RBC 2.99* 3.33* 3.46*   HGB 7.8* 8.7* 9.1*    212 222       Recent Labs   Lab Test 11/20/23  1203 11/20/23  0823 11/20/23  0453 11/19/23  0855 11/19/23  0526 11/18/23  0811 11/18/23  0505   NA  --   --  146*  --  146*  --  144   POTASSIUM  --   --  3.7  " 3.7  --  4.3  --  4.5   CHLORIDE  --   --  104  --  104  --  101   CO2  --   --  30*  --  31*  --  30*   BUN  --   --  78.3*  --  79.6*  --  81.6*   CR  --   --  1.01*  1.02*  --  1.14*  --  1.16*   * 177* 174*   < > 152*   < > 156*   BARBARA  --   --  8.8  --  8.6*  --  8.6*   MAG  --   --  2.3  --  2.3  --  2.1    < > = values in this interval not displayed.           No results for input(s): \"CULT\" in the last 168 hours.      Recent Results (from the past 48 hour(s))   XR Chest Port 1 View    Narrative    CHEST ONE VIEW  11/20/2023 12:50 PM     HISTORY: ETT position, suspicion high in trachea.    COMPARISON: November 15, 2023      Impression    IMPRESSION: Endotracheal tube tip 3.4 cm from the ashley. No  significant change in bilateral infiltrates. Remaining tubes and lines  not significantly changed.          "

## 2023-11-20 NOTE — PROGRESS NOTES
[facility]  Infectious Disease Progress Note          Assessment and Plan:   Date of Admission:  10/15/2023  Date of Consult (When I saw the patient): 10/20/23        Assessment & Plan  Matthew Bowen is a 78 year old who was admitted on 10/15/2023.      Impression: 1 78-year-old female, some chronic underlying lung disease, on oxygen, bronchiectasis probable, some history of pneumonia but now 6 weeks or so of worsening respiratory symptoms, 2 prior courses of antibiotics, now admitted with infiltrates, no major sepsis but elevated procalcitonin and white count implying bacterial, not really improving on antibiotics concern for more chronic type pathogen in this patient with underlying lung disease by this history     2 chronic lung disease on oxygen some underlying bronchiectasis, some prior pneumonias  3 chronic kidney disease     REC 1 further worsening respiratory status, now broadened out to meropenem and Vanco, however no new microbiology the bronchoscopy without new pathogens very unlikely antibiotics or antibiotic change are going to be the answer here.  2 discussed in detail with daughter                  Interval History:     Intubated and sedated worsened respiratory status again and some low-grade fever in the 101 range.  Already had bronchoscopy last week with no new pathogens, single blood culture should be a contaminant, follow-up cultures are so far negative, no new positive micro               Medications:      amLODIPine  2.5 mg Oral Daily    aspirin  81 mg Oral or Feeding Tube Daily    atorvastatin  20 mg Oral or Feeding Tube QPM    [Held by provider] atovaquone  1,500 mg Per Feeding Tube Daily    B and C vitamin Complex with folic acid  5 mL Oral or Feeding Tube Daily    chlorhexidine  15 mL Mouth/Throat Q12H    clotrimazole   Topical BID    fiber modular (NUTRISOURCE FIBER)  1 packet Per Feeding Tube TID    heparin ANTICOAGULANT  5,000 Units Subcutaneous Q12H    insulin  aspart  1-6 Units Subcutaneous Q4H    ipratropium - albuterol 0.5 mg/2.5 mg/3 mL  3 mL Nebulization 4x daily    levothyroxine  150 mcg Oral or Feeding Tube QAM AC    meropenem  500 mg Intravenous Q12H    methylPREDNISolone  250 mg Intravenous Q12H [facility] (08/20)    pantoprazole  40 mg Per Feeding Tube QAM AC    pregabalin  75 mg Per Feeding Tube Daily    protein modular  1 packet Per Feeding Tube Daily    QUEtiapine  150 mg Oral or Feeding Tube BID    sertraline  150 mg Oral or Feeding Tube Daily    sodium chloride (PF)  10-40 mL Intracatheter Q7 Days    sodium chloride (PF)  3 mL Intracatheter Q8H    vancomycin place bedolla - receiving intermittent dosing  1 each Intravenous See Admin Instructions    vitamin D3  50 mcg Oral or Feeding Tube Daily                  Physical Exam:   Blood pressure 120/65, pulse 72, temperature 98.2  F (36.8  C), resp. rate 20, height 1.524 m (5'), weight 79.9 kg (176 lb 2.4 oz), SpO2 97%.  Wt Readings from Last 2 Encounters:   11/20/23 79.9 kg (176 lb 2.4 oz)   02/28/22 94.3 kg (208 lb)     Vital Signs with Ranges  Temp:  [97.5  F (36.4  C)-100.8  F (38.2  C)] 98.2  F (36.8  C)  Pulse:  [58-98] 72  Resp:  [13-69] 20  BP: ()/() 120/65  FiO2 (%):  [60 %-100 %] 80 %  SpO2:  [85 %-99 %] 97 %    Constitutional: Intubated and sedated about same slight improvement in oxygenation     Lungs: Clear to auscultation bilaterally, no crackles or wheezing   Cardiovascular: Regular rate and rhythm, normal S1 and S2, and no murmur noted   Abdomen: Normal bowel sounds, soft, non-distended, non-tender   Skin: No rashes, no cyanosis, no edema   Other:           Data:   All microbiology laboratory data reviewed.  Recent Labs   Lab Test 11/20/23  0906 11/19/23  0526 11/18/23  0505   WBC 16.4* 13.8* 12.5*   HGB 7.8* 8.7* 9.1*   HCT 27.4* 29.5* 30.5*   MCV 92 89 88    212 222     Recent Labs   Lab Test 11/20/23  0453 11/19/23  0526 11/18/23  0505   CR 1.01*  1.02* 1.14* 1.16*     Recent Labs    Lab Test 11/18/23  1811   SED 89*     Recent Labs   Lab Test 02/12/19  0010   CULT Light growth  Normal deshaun

## 2023-11-20 NOTE — PROGRESS NOTES
[facility] ICU VENTILATOR RESPIRATORY NOTE    Date of Admission: 10/21/23    Date of Intubation (most recent): 10/22/23    Reason for Mechanical Ventilation: Respiratory Failure    Number of Days on Mechanical Ventilation: 29    Met Criteria for Pressure Support Trial: No    Length of Pressure Support Trial: n/a    Reason for Stopping Pressure Support Trial: n/a    Reason for No Pressure Support Trial: PEEP +14, FiO2 50-60%    Significant Events Today: Veletri stopped this afternoon     ABG Results:   Recent Labs   Lab 11/19/23  0939 11/15/23  0603   PH  --  7.47*   PCO2  --  49*   PO2  --  64*   HCO3  --  35*   O2PER 60 75       ETT appearance on chest x-ray: 4.7 cm above ashley    Plan:  Patient remains on ventilator with settings of:  Vent Mode: CMV/AC  (Continuous Mandatory Ventilation/ Assist Control)  FiO2 (%): 60 %  Resp Rate (Set): 14 breaths/min  Tidal Volume (Set, mL): 360 mL  PEEP (cm H2O): 14 cmH2O  Resp: 30

## 2023-11-20 NOTE — PLAN OF CARE
Continues on full vent support. Tele SR and SB. RR and WOB increased this morning. Added fent gtt after 5mg Versed total push. Dex 1.2. Afebrile. Loose stools continue in rectal tube. Adequate output in mitchell. See Flowsheet charting. POC reviewed with family at bedside.     Goal Outcome Evaluation:      Plan of Care Reviewed With: patient, family    Overall Patient Progress: no changeOverall Patient Progress: no change    Outcome Evaluation: Increased WOB/RR50's - added Fent gtt.  Notified provider about indwelling mitchell catheter discussed removal or continued need.    Did provider choose to remove indwelling mitchell catheter? NO    Provider's mitchell indication for keeping indwelling mitchell catheter: Indication for continued use: Deep Sedation/Paralysis    Is there an order for indwelling mitchell catheter? YES    *If there is a plan to keep mitchell catheter in place at discharge daily notification with provider is not necessary, but please add a notation in the treatment team sticky note that the patient will be discharging with the catheter.

## 2023-11-20 NOTE — PLAN OF CARE
ICU End of Shift Summary.  For vital signs and complete assessments, please see documentation flowsheets.     Neuro: Pt Restless throughout the night, dex increased & prn versed and dilaudid given as needed  Cardiac: SR occ PVCs  Resp: Pt is vented, increased FiO2 from 60>70%. Pt respirations were as high as 55 at times  LS: course and crackles, minimal secretions  GI: x3 incont loose stools, rectal tube placed and working well   : Aleman in place  Lines: R PICC triple lumen and LPIV  Drips: Dex    Major Shift Events:   Increased agitation  Increased FiO2  Rectal tube placed  10mg IV Hydralazine given x2 for SBP >180    Plan (Upcoming Events): Possibly increasing pain and sedation meds  Discharge/Transfer Needs: tbd     Bedside Shift Report Completed: y   Bedside Safety Check Completed: y              Topical Sulfur Applications Pregnancy And Lactation Text: This medication is Pregnancy Category C and has an unknown safety profile during pregnancy. It is unknown if this topical medication is excreted in breast milk.

## 2023-11-20 NOTE — PROGRESS NOTES
[facility] Intensive Care Progress Note    Date of Service (when I saw the patient): 11/20/2023     Assessment & Plan   Matthew Bowen is a 78 year old female with PMH chronic hypoxic respiratory failure, recurrent pulmonary infections, bronchiectasis who was admitted on 10/15/2023 with weakness and falls, course complicated by acute on chronic hypoxic resp failure requiring intubation on 10/22. Now with prolonged hospital stay further complicated by respiratory deterioration on 11/14, possible aspiration event and persistent ventilatory requirement.     Main Plans for Today   Chest x-ray to assess ET tube position in setting of leak, follow-up culture results and continue broad-spectrum antibiotics, continue high-dose steroids, wean FiO2.     Neurology:  Encephalopathy of critical illness. Sedation in a patient with prolonged mechanical ventilation who was on Versed infusion.  History of generalized weakness and frequent falls. dyssynchronous with the ventilator this morning.  Plan: Continue Precedex, and fentanyl infusion to increase his synchrony with the vent, increase the Versed as needed.  RASS goal 0 to -1.       Cardiovascular:  History of hypertension coronary artery disease.  Shock resolved.  TTE on 10/21 shows normal biventricular function.  Plan: Monitor.  Continue diuretics.  -    Pulmonary:        Acute on chronic mixed hypercapnic And hypoxemic respiratory failure.  Probable multifocal organizing pneumonia.  Subcutaneous emphysema and trace pneumomediastinum. Oxygen dependent at home on 2 L oxygen. History of bronchiectasis and ALAINA. Prolonged mechanical ventilation for more than a month. Worsening respiratory status since November 14. Audible ETT leak during mechanical ventilation, without affecting tidal volumes. Plan: Continue protective mechanical ventilation, PEEP of 12, increased respiratory rate, decrease FiO2 to <60% with goal SPO2 over 89%.   Continue broad-spectrum antibiotics, high-dose steroids, DuoNebs and diuretics. Chest x-ray to assess ET tube position. Appreciate pulmonary consult.       Vent Mode: CMV/AC  (Continuous Mandatory Ventilation/ Assist Control)  FiO2 (%): 70 %  Resp Rate (Set): 18 breaths/min  Tidal Volume (Set, mL): 360 mL  PEEP (cm H2O): 12 cmH2O  Resp: 24      Renal  Nonoliguric acute kidney injury (increased respiratory Cystatin C0.  History of CKD.  Mild hyponatremia.   Plan: Avoid nephrotoxics, decrease medication doses appropriately, optimize hemodynamics, continue metolazone.       ID:         No clear infectious pathogen found during admission.  Multiple courses of antibiotics given.  Pancultured and spectrum antibiotics started on 11/19/23.  Plan: Continue broad-spectrum antibiotics, follow-up culture results via.       GI  No GI concerns.  Tolerating enteral feeding.  Plan: Monitor.      Nutrition  Malnutrition risk.  Plan: Continue enteral feeding via NG.      Endocrine:  Steroid-induced hyperglycemia.  History of high hypothyroidism . Plan: Monitor and treat glycemia.  Continue levothyroxine.       Heme/Onc:  Anemia of critical illness.  No current signs of bleeding.  Leukocytosis likely secondary to steroid use.  Plan: Monitor CBC.       DVT Prophylaxis: Heparin SQ  GI Prophylaxis: PPI    Restraints: Restraints for medical healing needed: YES    Family update by me today: Yes     Yandy Zavala MD    Time Spent on this Encounter   Billing:  I spent 57 minutes bedside and on the inpatient unit today managing the critical care of Matthew Bowen in relation to the issues listed in this note.    Main Plans for Today   Chest x-ray to assess ET tube position in setting of leak, follow-up culture results and continue broad-spectrum antibiotics, continue high-dose steroids, wean FiO2.         Physical Exam   Temp: 97.9  F (36.6  C) Temp src: Bladder Temp  Min: 97.5  F (36.4  C)  Max: 100.8  F (38.2  C) BP: 112/64 Pulse: 68    Resp: 24 SpO2: 96 % O2 Device: Mechanical Ventilator    Vitals:    11/18/23 0613 11/19/23 0630 11/20/23 0500   Weight: 82.4 kg (181 lb 10.5 oz) 81.8 kg (180 lb 5.4 oz) 79.9 kg (176 lb 2.4 oz)     I/O last 3 completed shifts:  In: 2582.31 [I.V.:967.31; NG/GT:570]  Out: 3255 [Urine:2955; Stool:300]    Neurologic: Sedated, appears occasionally dyssynchronous with the vent.   Cardiovascular: RRR, no murmurs.   Respiratory: ET tube in situ, audible leak, coarse breath sounds bilaterally.   GI: Soft nontender non distended.   Skin/Extremities: Moderate edema, pulses all 4 extremities.   Lines: No erythema or discharge at entry site for PICC Line  Current lines are required for patient management    Medications    dexmedeTOMIDine 1.2 mcg/kg/hr (11/20/23 1327)    dextrose      fentaNYL 125 mcg/hr (11/20/23 1327)      amLODIPine  2.5 mg Oral Daily    aspirin  81 mg Oral or Feeding Tube Daily    atorvastatin  20 mg Oral or Feeding Tube QPM    [Held by provider] atovaquone  1,500 mg Per Feeding Tube Daily    B and C vitamin Complex with folic acid  5 mL Oral or Feeding Tube Daily    chlorhexidine  15 mL Mouth/Throat Q12H    clotrimazole   Topical BID    fiber modular (NUTRISOURCE FIBER)  1 packet Per Feeding Tube TID    heparin ANTICOAGULANT  5,000 Units Subcutaneous Q12H    insulin aspart  1-6 Units Subcutaneous Q4H    ipratropium - albuterol 0.5 mg/2.5 mg/3 mL  3 mL Nebulization 4x daily    levothyroxine  150 mcg Oral or Feeding Tube QAM AC    meropenem  500 mg Intravenous Q12H    methylPREDNISolone  250 mg Intravenous Q12H MARLON (08/20)    pantoprazole  40 mg Per Feeding Tube QAM AC    pregabalin  75 mg Per Feeding Tube Daily    protein modular  1 packet Per Feeding Tube Daily    QUEtiapine  150 mg Oral or Feeding Tube BID    sertraline  150 mg Oral or Feeding Tube Daily    sodium chloride (PF)  10-40 mL Intracatheter Q7 Days    sodium chloride (PF)  3 mL Intracatheter Q8H    vancomycin place bedolla - receiving intermittent  dosing  1 each Intravenous See Admin Instructions    vitamin D3  50 mcg Oral or Feeding Tube Daily       Data   Recent Labs   Lab 11/20/23  1203 11/20/23  0906 11/20/23  0823 11/20/23  0453 11/19/23  0855 11/19/23  0526 11/18/23  0811 11/18/23  0505   WBC  --  16.4*  --   --   --  13.8*  --  12.5*   HGB  --  7.8*  --   --   --  8.7*  --  9.1*   MCV  --  92  --   --   --  89  --  88   PLT  --  203  --   --   --  212  --  222   NA  --   --   --  146*  --  146*  --  144   POTASSIUM  --   --   --  3.7  3.7  --  4.3  --  4.5   CHLORIDE  --   --   --  104  --  104  --  101   CO2  --   --   --  30*  --  31*  --  30*   BUN  --   --   --  78.3*  --  79.6*  --  81.6*   CR  --   --   --  1.01*  1.02*  --  1.14*  --  1.16*   ANIONGAP  --   --   --  12  --  11  --  13   BARBARA  --   --   --  8.8  --  8.6*  --  8.6*   *  --  177* 174*   < > 152*   < > 156*   ALBUMIN  --   --   --   --   --   --   --  2.7*   PROTTOTAL  --   --   --   --   --   --   --  6.4   BILITOTAL  --   --   --   --   --   --   --  0.5   ALKPHOS  --   --   --   --   --   --   --  89   ALT  --   --   --   --   --   --   --  66*   AST  --   --   --   --   --   --   --  45    < > = values in this interval not displayed.     Recent Results (from the past 24 hour(s))   XR Chest Port 1 View    Narrative    CHEST ONE VIEW  11/20/2023 12:50 PM     HISTORY: ETT position, suspicion high in trachea.    COMPARISON: November 15, 2023      Impression    IMPRESSION: Endotracheal tube tip 3.4 cm from the ashley. No  significant change in bilateral infiltrates. Remaining tubes and lines  not significantly changed.     REG IZAGUIRRE MD         SYSTEM ID:  [id number]      Yandy Zavala MD  Anesthesiology Critical Care

## 2023-11-20 NOTE — PROGRESS NOTES
Respiratory Therapy Note    [facility] ICU VENTILATOR RESPIRATORY NOTE  Date of Admission: 10/15/23  Date of Intubation (most recent): 10/21/23  Reason for Mechanical Ventilation: Respiratory Failure  Number of Days on Mechanical Ventilation: 30  Met Criteria for Pressure Support Trial: No  Reason for No Pressure Support Trial: Patient's PEEP +12 cmH20 and FiO2 %  Significant Events Today: Patient's respiratory rate increased to 18 bpm per MD    Plan:  Continue to monitor patient's respiratory status.    Vent Mode: CMV/AC  (Continuous Mandatory Ventilation/ Assist Control)  FiO2 (%): 55 %  Resp Rate (Set): 18 breaths/min  Tidal Volume (Set, mL): 360 mL  PEEP (cm H2O): 12 cmH2O  Resp: 15    Patient suctioned for scant/moderate, cloudy, thick secretions from ETT. Patient will continue to receive Duoneb QID. RT to follow.     RT Kermit  5:04 PM November 20, 2023

## 2023-11-20 NOTE — PROGRESS NOTES
[facility] ICU VENTILATOR RESPIRATORY NOTE    Date of Admission: 10/15/2023    Date of Intubation (most recent): 10/21/2023    Reason for Mechanical Ventilation: Respiratory failure    Number of Days on Mechanical Ventilation: 30    Met Criteria for Pressure Support Trial: No    Reason for No Pressure Support Trial: High vent support    Significant Events Today: FiO2 up to 70%    ETT appearance on chest x-ray: In good position, 4.7cm above ashley, secured 22 @ teeth    PPlat: Unable to assess 2/2 pt agitation, interrupting maneuver   PEEPi: Unable to assess 2/2 pt agitation, interrupting maneuver   BS: Coarse, diminished  Secretions: Minimal, white, thick    Vent Mode: CMV/AC  (Continuous Mandatory Ventilation/ Assist Control)  FiO2 (%): 70 %  Resp Rate (Set): 14 breaths/min  Tidal Volume (Set, mL): 360 mL  PEEP (cm H2O): 14 cmH2O  Resp: 17      Plan:  Continue to wean down vent support    Elba Brennan RT

## 2023-11-21 NOTE — PLAN OF CARE
Neuro: sedated with precedex and fentanyl, minimally responsive initially but more awake towards end of shift  Cardiac: SB, BP stable  Pulm: vent at 55%, LS coarse/diminished, minimal secretions  GI: RT in place, TF infusing,   : mitchell with adequate output,   ID: afebrile, receiving vanco and merrem  Skin: dry, bruising noted in multiple places  Access: PIV and PICC    Plan: continue to wean fentanyl down, monitor respiratory status, attempt to wean O2 more

## 2023-11-21 NOTE — PROGRESS NOTES
[facility]  Infectious Disease Progress Note          Assessment and Plan:   Date of Admission:  10/15/2023  Date of Consult (When I saw the patient): 10/20/23        Assessment & Plan  Matthew Bowen is a 78 year old who was admitted on 10/15/2023.      Impression: 1 78-year-old female, some chronic underlying lung disease, on oxygen, bronchiectasis probable, some history of pneumonia but now 6 weeks or so of worsening respiratory symptoms, 2 prior courses of antibiotics, now admitted with infiltrates, no major sepsis but elevated procalcitonin and white count implying bacterial, not really improving on antibiotics concern for more chronic type pathogen in this patient with underlying lung disease by this history     2 chronic lung disease on oxygen some underlying bronchiectasis, some prior pneumonias  3 chronic kidney disease     REC 1 further worsening respiratory status, now broadened out to meropenem and Vanco, however no new microbiology the bronchoscopy without new pathogens very unlikely antibiotics or antibiotic change are going to be the answer but of note fever resolved, looks better up in the chair oxygenation somewhat better if this continues probably completed course again, creatinine okay at this point but fairly unlikely Vanco as needed and may be stop it in the next couple of days unless we have growth and simply do meropenem                  Interval History:     Intubated and sedated worsened respiratory status again and some low-grade fever in the 101 range.  Already had bronchoscopy last week with no new pathogens, single blood culture should be a contaminant, follow-up cultures are so far negative, no new positive micro   seen improvement, temp down, unclear whether antibiotic related or not still no new microbiologic growth.              Medications:      amLODIPine  2.5 mg Oral Daily    aspirin  81 mg Oral or Feeding Tube Daily    atorvastatin  20 mg Oral or Feeding  Tube QPM    [Held by provider] atovaquone  1,500 mg Per Feeding Tube Daily    B and C vitamin Complex with folic acid  5 mL Oral or Feeding Tube Daily    chlorhexidine  15 mL Mouth/Throat Q12H    clotrimazole   Topical BID    fiber modular (NUTRISOURCE FIBER)  1 packet Per Feeding Tube TID    heparin ANTICOAGULANT  5,000 Units Subcutaneous Q12H    insulin aspart  1-6 Units Subcutaneous Q4H    ipratropium - albuterol 0.5 mg/2.5 mg/3 mL  3 mL Nebulization 4x daily    levothyroxine  150 mcg Oral or Feeding Tube QAM AC    meropenem  500 mg Intravenous Q12H    methylPREDNISolone  250 mg Intravenous Q12H MARLON (08/20)    pantoprazole  40 mg Per Feeding Tube QAM AC    pregabalin  75 mg Per Feeding Tube Daily    protein modular  1 packet Per Feeding Tube Daily    QUEtiapine  150 mg Oral or Feeding Tube BID    sertraline  150 mg Oral or Feeding Tube Daily    sodium chloride (PF)  10-40 mL Intracatheter Q7 Days    sodium chloride (PF)  3 mL Intracatheter Q8H    vancomycin  1,000 mg Intravenous Q24H    vitamin D3  50 mcg Oral or Feeding Tube Daily                  Physical Exam:   Blood pressure (!) 159/82, pulse 103, temperature 99.5  F (37.5  C), resp. rate (!) 39, height 1.524 m (5'), weight 81.4 kg (179 lb 7.3 oz), SpO2 (!) 88%.  Wt Readings from Last 2 Encounters:   11/21/23 81.4 kg (179 lb 7.3 oz)   02/28/22 94.3 kg (208 lb)     Vital Signs with Ranges  Temp:  [96.4  F (35.8  C)-99.5  F (37.5  C)] 99.5  F (37.5  C)  Pulse:  [] 103  Resp:  [10-54] 39  BP: ()/(48-82) 159/82  FiO2 (%):  [45 %-65 %] 50 %  SpO2:  [87 %-100 %] 88 %    Constitutional: Intubated and sedated about same slight improvement in oxygenation     Lungs: Clear to auscultation bilaterally, no crackles or wheezing   Cardiovascular: Regular rate and rhythm, normal S1 and S2, and no murmur noted   Abdomen: Normal bowel sounds, soft, non-distended, non-tender   Skin: No rashes, no cyanosis, no edema   Other:           Data:   All microbiology  laboratory data reviewed.  Recent Labs   Lab Test 11/21/23  0428 11/20/23  0906 11/19/23  0526   WBC 14.3* 16.4* 13.8*   HGB 7.5* 7.8* 8.7*   HCT 26.6* 27.4* 29.5*   MCV 93 92 89    203 212     Recent Labs   Lab Test 11/21/23 0428 11/20/23  0453 11/19/23  0526   CR 0.94 1.01*  1.02* 1.14*     Recent Labs   Lab Test 11/18/23  1811   SED 89*     Recent Labs   Lab Test 02/12/19  0010   CULT Light growth  Normal deshaun

## 2023-11-21 NOTE — PROGRESS NOTES
[facility]     Hospitalist Progress Note     Assessment & Plan     ASSESSMENT    Matthew Bowen is a 78 year old female w/PMH chronic hypoxic respiratory failure due to pulmonary HTN, ALAINA requiring CPAP, chronic diastolic HF, CAD, CKD stage 3, morbid obesity, HTN,  depression who presents with with fever, cough and found to have multifocal pneumonia. Acutely worsened respiratory status on 10/21 requiring intubation. Hospital course with prolonged respiratory failure with ARDS and difficulty weaning from vent, although tracheostomy has been deferred partially due to family preference and also due to the fact that increased procedural risk given high ongoing oxygen needs. CT chest obtained 11/18 with evidence of severe ground-glass opacities with traction bronchiectasis. Unclear how much of her ongoing disease is reversible. Pulmonology started high-dose steroids 11/18 but no benefit as of yet.    Still on high vent settings although more synchronous with added sedation. Further goals of care discussions on hold until son is able to come in from [place], likely near the end of the week.    PLAN    Acute Hypoxic Respiratory Failure w/ ARDS  Septic Shock 2/2 Multifocal Pneumonia, Recurrent  -Presented with recurrent PNA and has been treated with multiple courses of antibiotics:  Antibiotic history  Azithromycin 10/15-10/19  Ceftriaxone 10/15-10/17  Cefepime 10/18-31  Metronidazole 10/20-31  Vancomycin 10/21-31  Zosyn 11/15-present  -Has had high oxygen needs meeting ARDS criteria and Flolan (has since been weaned off)  -Some concern for non-infectious causes of pneumonia and severe ground-glass opacities with traction bronchiectasis on imaging, started on high-dose steroids 11/18  -Patient having recurrent fevers, broadened antibiotics to meropenem + vancomycin 11/19 and sputum cx sent (NGTD, fevers now resolved)     Acute on Chronic HFpEF  -Hold Bumex in setting of CARMEN and hypernatremia     Acute  Metabolic encephalopathy  -Suspect multifactorial due to ICU delirium, prolonged versed infusion  -Requiring reinitiation of continuous IV Precedex 11/15 and on Seroquel 150 mg twice a day  -Having severe agitation this morning, likely to need to go back on Versed ggt    CARMEN on CKD Stage III  -Cr wnl but found to have very poor renal function with cystatin C testing  -Renally dose medications    Other Issues  -Essential Hypertension: Controlled on current regimen  -Morbid Obesity: Complicates all aspects of care  -Complicates care.  -Anemia of Chronic Disease: Transfuse for Hg <7  -CAD: Home meds  -Hypothyroidism: Continue levothyroxine 150 mcg a day.  -Mood disorder: Continue sertraline 150 mg a day.  -Gluteal cleft irritant dermatitis: WOC consulted, appreciate recs  Clinically Significant Risk Factors         # Hypernatremia: Highest Na = 148 mmol/L in last 2 days, will monitor as appropriate      # Hypoalbuminemia: Lowest albumin = 2.7 g/dL at 11/18/2023  5:05 AM, will monitor as appropriate                           DVT Prophy  -Heparin    Disposition  -Continue ICU care      Balwinder Madera MD    Subjective     Seen at bedside. Still on high vent settings although more synchronous with added sedation.        Objective   Blood pressure 121/67, pulse (!) 41, temperature 97.2  F (36.2  C), resp. rate 21, height 1.524 m (5'), weight 81.4 kg (179 lb 7.3 oz), SpO2 95%.    PHYSICAL EXAM  General: In no acute distress but slow to respond, does not follow commands.  CV: RRR.  Lungs: Scattered rhonchi. Nl WOB.  Abd: Non-tender.  Ext: Trace edema.    LABS AND IMAGING  Reviewed and pertinent results discussed in assessment and plan.

## 2023-11-21 NOTE — PROGRESS NOTES
[facility] Nurse Inpatient Assessment     Consulted for: buttock wound      Patient History (according to Hospitalist provider note(s) 10/25/23:      Matthew Bowen is a 78 year old female w/PMH chronic hypoxic respiratory failure due to pulmonary HTN, ALAINA requiring CPAP, chronic diastolic HF, CAD, CKD stage 3, morbid obesity, HTN,  depression who presents from home with family with fever, cough.     Acute hypoxic respiratory failure s/p intubation  Possible ARDS  Pneumonia, recurrent w/bronchiectasis  -Presents with recurrent PNA, 3rd time in last month or so. Last completed treatment 2 weeks ago with persistent cough. Presents with worsening fatigue, productive cough, weakness and falls.  -On admission, patient febrile to 101.1, white count of 21.  Lactic acid was within normal limits.  She underwent a CT scan that showed a lingular consolidative opacity with air bronchograms with bilateral upper lobe groundglass opacities.  She was started on ceftriaxone and azithromycin.  -Sputum culture from 10/19 better that showed gram positive cocci and yeast. Discussed with ID, yeast prevalent in sputum samples and likely candida that is not related to illness.  Need to await speciation.   -Patient initially on ceftriaxone and azithromycin.  Broadened to cefepime and azithro on 10/18.  Flagyl added on 10/20.  Finished azithromycin course on 10/20.   -COVID, influenza, Respiratory viral panel negative.  Strep and legionella antigens negative.   -intubated 10/21 due to worsening respiratory status and bronch done showing serosanguinous and fibrinous return with thick mucoid secretions noted  -ID, pulm and ICU team all following.  -remains on cefepime since 18th, flagyll 20th and Vanc since 21st-cont and await further recs  -on solumedrol 125 daily 10/24, continue for now    Assessment:      Areas visualized during today's visit: Verbal education only- pt was up in chair when WOC came for  assessment. Per ICU RN wounds to buttocks/gluteal cleft have improved greatly with new wound care and addition of rectal tube.    Wound location: gluteal cleft    Last photo: 11/14/23    Wound due to: Incontinence Associated Dermatitis (IAD)  Wound history/plan of care: wound is moisture related and pt was having almost constant loose stools, not over any bony prominence.   Wound base: 100 % dermis, superficial     Palpation of the wound bed: normal      Drainage: scant     Description of drainage: serosanguinous     Measurements (length x width x depth, in cm): 0.3 x 0.2  x  0.1 cm      Tunneling: N/A     Undermining: N/A  Periwound skin: Intact      Color: normal and consistent with surrounding tissue      Temperature: normal   Odor: none  Pain: unable to assess due to  sedation , none  Pain interventions prior to dressing change: N/A  Treatment goal: Protection  STATUS: improving  Supplies ordered: supplies stored on unit and discussed with RN and family present in room     Wound location: Right inner buttock/perineum area  Last photo: 11/14/23    Wound due to: Friction vs Pressure injury  Wound history/plan of care: Patient with urinary incontinence, currently using Purwick.  Unable to determine if this is pressure from Purwick or friction from rubbing.    Wound base: 100 % dermis     Palpation of the wound bed: normal      Drainage: scant     Description of drainage: serosanguinous     Measurements (length x width x depth, in cm): 1.5x1.3cm      Tunneling: N/A     Undermining: N/A  Periwound skin: Intact      Color: normal and consistent with surrounding tissue      Temperature: normal   Odor: none  Pain: unable to assess due to  sedation   Pain interventions prior to dressing change: N/A  Treatment goal: Heal  and Protection  STATUS: initial assessment  Supplies ordered: at bedside      Treatment Plan:     Gluteal cleft and right perineum wound(s): BID and PRN with incontinence episodes  Cleanse the area with  Darlyn cleanse and protect, very gently with soft cloth.   2. Apply thin layer of critic aid paste on open or red areas   3. With repeat application, do not scrub the paste, only remove soiled paste and reapply.   4. If complete removal of paste is necessary use baby oil/mineral oil (#[id number]) and soft wash cloth.   5. Use only one Covidien pad in between mattress and pt. No brief in bed.   6. No Purwick on patient     Orders: Reviewed    RECOMMEND PRIMARY TEAM ORDER: None, at this time  Education provided: plan of care and wound progress  Discussed plan of care with: Family and Nurse  [facility] nurse follow-up plan: weekly  Notify [facility] if wound(s) deteriorate.  Nursing to notify the Provider(s) and re-consult the [facility] Nurse if new skin concern.    DATA:     Current support surface: Standard  Low air loss (JENNIFER pump, Isolibrium, Pulsate, skin guard, etc)  Containment of urine/stool: Incontinent pad in bed and Purewick external catheter   BMI: Body mass index is 35.05 kg/m .   Active diet order: Orders Placed This Encounter      NPO per Anesthesia Guidelines for Procedure/Surgery Except for: Meds     Output: I/O last 3 completed shifts:  In: 3091.73 [I.V.:1086.73; NG/GT:850]  Out: 1450 [Urine:1050; Stool:400]     Labs:   Recent Labs   Lab 11/21/23  0428 11/19/23  0526 11/18/23  0505   ALBUMIN  --   --  2.7*   HGB 7.5*   < > 9.1*   WBC 14.3*   < > 12.5*    < > = values in this interval not displayed.     Pressure injury risk assessment:   Sensory Perception: 3-->slightly limited  Moisture: 3-->occasionally moist  Activity: 1-->bedfast  Mobility: 2-->very limited  Nutrition: 3-->adequate  Friction and Shear: 2-->potential problem  Compa Score: 14    PATRICIA Cardenas  [facility] [employer] [facility]  Dept. Office Number: [phone]

## 2023-11-21 NOTE — PHARMACY-VANCOMYCIN DOSING SERVICE
Pharmacy Vancomycin Note  Date of Service 2023  Patient's  1945   78 year old, female    Indication: Ventilator-Associated Pneumonia  Day of Therapy: 3  Current vancomycin regimen:  1250 mg IV q24h  Current vancomycin monitoring method: AUC  Current vancomycin therapeutic monitoring goal: 400-600 mg*h/L    InsightRX Prediction of Current Vancomycin Regimen  Regimen: 1250 mg IV every 24 hours.  Exposure target: AUC24 (range)400-600 mg/L.hr   AUC24,ss: 649 mg/L.hr  Probability of AUC24 > 400: 100 %  Ctrough,ss: 19.7 mg/L  Probability of Ctrough,ss > 20: 48 %  Probability of nephrotoxicity (Lodise JAE ): 17 %    Current estimated CrCl = Estimated Creatinine Clearance: 46.6 mL/min (based on SCr of 0.94 mg/dL).    Creatinine for last 3 days  2023:  5:26 AM Creatinine 1.14 mg/dL  2023:  4:53 AM Creatinine 1.02 mg/dL;  4:53 AM Creatinine 1.01 mg/dL  2023:  4:28 AM Creatinine 0.94 mg/dL    Recent Vancomycin Levels (past 3 days)  2023:  4:28 AM Vancomycin 24.2 ug/mL    Vancomycin IV Administrations (past 72 hours)                     vancomycin (VANCOCIN) 1,250 mg in 0.9% NaCl 250 mL intermittent infusion (mg) 1,250 mg New Bag 23 0757    vancomycin (VANCOCIN) 2,000 mg in 0.9% NaCl 500 mL intermittent infusion (mg) 2,000 mg New Bag 23 1058                    Nephrotoxins and other renal medications (From now, onward)      Start     Dose/Rate Route Frequency Ordered Stop    23 1034  vancomycin place bedolla - receiving intermittent dosing         1 each Intravenous SEE ADMIN INSTRUCTIONS 23 1044                 Contrast Orders - past 72 hours (72h ago, onward)      None          Interpretation of levels and current regimen:  Vancomycin level is reflective of AUC greater than 600  Urine output: estimated ~50ml/hr.    Additional info:   GFR Calculated with Cystatin C  Cystatin C with GFR  Collected: 23 9239   Result status: Final   Resulting lab: UU  LABORATORY   Reference range: >=60 mL/min/1.73m2   Value: 12 Low      InsightRX Prediction of Planned New Vancomycin Regimen  Regimen: 1000 mg IV every 24 hours.  Exposure target: AUC24 (range)400-600 mg/L.hr   AUC24,ss: 525 mg/L.hr  Probability of AUC24 > 400: 95 %  Ctrough,ss: 15.9 mg/L  Probability of Ctrough,ss > 20: 16 %  Probability of nephrotoxicity (Lodise JAE 2009): 11 %    Plan:  Decrease Dose to 1000mg q24h.  Vancomycin monitoring method: AUC.  Vancomycin therapeutic monitoring goal: 400-600 mg*h/L.  Pharmacy will check vancomycin levels as appropriate in 1-3 Days.  Serum creatinine levels will be ordered a minimum of twice weekly.    Lianna Osorio RPH

## 2023-11-21 NOTE — PROGRESS NOTES
[facility] Intensive Care Progress Note    Date of Service (when I saw the patient): 11/21/2023     Assessment & Plan   Matthew Bowen is a 78 year old female with PMH chronic hypoxic respiratory failure, recurrent pulmonary infections, bronchiectasis who was admitted on 10/15/2023 with weakness and falls, course complicated by acute on chronic hypoxic resp failure requiring intubation on 10/22. Now with prolonged hospital stay further complicated by respiratory deterioration on 11/14, possible aspiration event and persistent ventilatory requirement.      Main Plans for Today   Wean PEEP to 10, wean FiO2 as possible to keep SPO2 over 89%, RASS goal 0 to -1, increase free water flushes to address hypernatremia, monitor sodium.  Continue goals of care discussion with the family, they seem to be interested in tracheostomy function continues to improve.      Neurology:  Encephalopathy of critical illness. Sedation in a patient with prolonged mechanical ventilation who was on Versed infusion.  History of generalized weakness and frequent falls.  The dyssynchrony with the vent improved after fentanyl infusion was started. Plan: Continue Precedex, and fentanyl infusion.  RASS goal 0 to -1.         Cardiovascular:  History of hypertension coronary artery disease.  Shock resolved.  TTE on 10/21 shows normal biventricular function.  Plan: Monitor.  Hold diuretics.  -     Pulmonary:        Acute on chronic mixed hypercapnic And hypoxemic respiratory failure.  Probable multifocal organizing pneumonia.  Subcutaneous emphysema and trace pneumomediastinum. Oxygen dependent at home on 2 L oxygen. History of bronchiectasis and ALAINA. Prolonged mechanical ventilation for more than a month. Worsening respiratory status since November 14. Audible ETT leak during mechanical ventilation, without affecting tidal volumes. On  broad-spectrum antibiotics, high-dose steroids, DuoNebs Plan: Continue  protective mechanical ventilation, Wean PEEP to 10, wean FiO2 as possible to keep SPO2 over 89%, family seems to be interested in tracheostomy function continues to improve.    Vent Mode: CMV/AC  (Continuous Mandatory Ventilation/ Assist Control)  FiO2 (%): 45 %  Resp Rate (Set): 18 breaths/min  Tidal Volume (Set, mL): 360 mL  PEEP (cm H2O): 10 cmH2O  Resp: 30      Renal  Nonoliguric acute kidney injury.   History of CKD.  Hyponatremia plan: Avoid nephrotoxics, decrease medication doses appropriately, optimize hemodynamics, increase free water flushes.  Monitor sodium.  Hold diuretics,      ID:         No clear infectious pathogen found during admission. Multiple courses of antibiotics given.  Pancultured and spectrum antibiotics started on 11/19/23.  Plan: Continue broad-spectrum antibiotics, follow-up culture results via.      GI  No GI concerns.  Tolerating enteral feeding.  Plan: Monitor.        Nutrition  Malnutrition risk.  Plan: Continue enteral feeding via NG.        Endocrine:  Steroid-induced hyperglycemia.  History of hypothyroidism. Plan: Monitor and treat glycemia.  Continue levothyroxine.         Heme/Onc:  Anemia of critical illness.  No current signs of bleeding. Leukocytosis likely secondary to steroid use.  Plan: Monitor CBC.         DVT Prophylaxis: Heparin SQ  GI Prophylaxis: PPI     Restraints: Restraints for medical healing needed: YES     Family update by me today: Yes        Yandy Zavala MD    Time Spent on this Encounter   Billing:  I spent 42 minutes bedside and on the inpatient unit today managing the critical care of Matthew Bowen in relation to the issues listed in this note.        Physical Exam   Temp: 99  F (37.2  C) Temp src: Bladder Temp  Min: 96.4  F (35.8  C)  Max: 99  F (37.2  C) BP: (!) 164/79 Pulse: 104   Resp: 30 SpO2: (!) 89 % O2 Device: Mechanical Ventilator    Vitals:    11/19/23 0630 11/20/23 0500 11/21/23 0500   Weight: 81.8 kg (180 lb 5.4 oz) 79.9 kg (176 lb 2.4  oz) 81.4 kg (179 lb 7.3 oz)     I/O last 3 completed shifts:  In: 3091.73 [I.V.:1086.73; NG/GT:850]  Out: 1450 [Urine:1050; Stool:400]    Neurologic: Sedated, appears occasionally dyssynchronous with the vent.   Cardiovascular: RRR, no murmurs.   Respiratory: ET tube in situ, audible leak, coarse breath sounds bilaterally.   GI: Soft nontender non distended.   Skin/Extremities: Moderate edema, pulses all 4 extremities.   Lines: No erythema or discharge at entry site for PICC Line  Current lines are required for patient management    Medications    dexmedeTOMIDine 1.1 mcg/kg/hr (11/21/23 1454)    dextrose      fentaNYL 125 mcg/hr (11/21/23 1400)      amLODIPine  2.5 mg Oral Daily    aspirin  81 mg Oral or Feeding Tube Daily    atorvastatin  20 mg Oral or Feeding Tube QPM    [Held by provider] atovaquone  1,500 mg Per Feeding Tube Daily    B and C vitamin Complex with folic acid  5 mL Oral or Feeding Tube Daily    chlorhexidine  15 mL Mouth/Throat Q12H    clotrimazole   Topical BID    fiber modular (NUTRISOURCE FIBER)  1 packet Per Feeding Tube TID    heparin ANTICOAGULANT  5,000 Units Subcutaneous Q12H    insulin aspart  1-6 Units Subcutaneous Q4H    ipratropium - albuterol 0.5 mg/2.5 mg/3 mL  3 mL Nebulization 4x daily    levothyroxine  150 mcg Oral or Feeding Tube QAM AC    meropenem  500 mg Intravenous Q12H    methylPREDNISolone  250 mg Intravenous Q12H MARLON (08/20)    pantoprazole  40 mg Per Feeding Tube QAM AC    pregabalin  75 mg Per Feeding Tube Daily    protein modular  1 packet Per Feeding Tube Daily    QUEtiapine  150 mg Oral or Feeding Tube BID    sertraline  150 mg Oral or Feeding Tube Daily    sodium chloride (PF)  10-40 mL Intracatheter Q7 Days    sodium chloride (PF)  3 mL Intracatheter Q8H    vancomycin  1,000 mg Intravenous Q24H    vitamin D3  50 mcg Oral or Feeding Tube Daily       Data   Recent Labs   Lab 11/21/23  1159 11/21/23  0803 11/21/23  0428 11/20/23  1203 11/20/23  0906 11/20/23  0823  11/20/23  0453 11/19/23  0855 11/19/23  0526 11/18/23  0811 11/18/23  0505   WBC  --   --  14.3*  --  16.4*  --   --   --  13.8*  --  12.5*   HGB  --   --  7.5*  --  7.8*  --   --   --  8.7*  --  9.1*   MCV  --   --  93  --  92  --   --   --  89  --  88   PLT  --   --  194  --  203  --   --   --  212  --  222   NA  --   --  148*  --   --   --  146*  --  146*  --  144   POTASSIUM  --   --  4.2  --   --   --  3.7  3.7  --  4.3  --  4.5   CHLORIDE  --   --  107  --   --   --  104  --  104  --  101   CO2  --   --  30*  --   --   --  30*  --  31*  --  30*   BUN  --   --  87.1*  --   --   --  78.3*  --  79.6*  --  81.6*   CR  --   --  0.94  --   --   --  1.01*  1.02*  --  1.14*  --  1.16*   ANIONGAP  --   --  11  --   --   --  12  --  11  --  13   BARBARA  --   --  8.7*  --   --   --  8.8  --  8.6*  --  8.6*   * 126* 216*   < >  --    < > 174*   < > 152*   < > 156*   ALBUMIN  --   --   --   --   --   --   --   --   --   --  2.7*   PROTTOTAL  --   --   --   --   --   --   --   --   --   --  6.4   BILITOTAL  --   --   --   --   --   --   --   --   --   --  0.5   ALKPHOS  --   --   --   --   --   --   --   --   --   --  89   ALT  --   --   --   --   --   --   --   --   --   --  66*   AST  --   --   --   --   --   --   --   --   --   --  45    < > = values in this interval not displayed.     No results found for this or any previous visit (from the past 24 hour(s)).     Yandy Zavala MD  Anesthesiology Critical Care

## 2023-11-21 NOTE — PLAN OF CARE
Continues on full vent support. Weaned PEEP to 10, FiO2 45. Tmax 100.2. Tele SB/SR/ST. ST after 5 hours in chair. Versed prn x2 today. Dex 1.1, Fent 125. 100 mL stool in rectal tube. Adequate urine output in [place]. See flowsheet charting. Updated Family at bedside.       Goal Outcome Evaluation:      Plan of Care Reviewed With: patient, family          Outcome Evaluation: Weaned FiO2 to 50 with PEEP 10, up to chair for 5 hours

## 2023-11-21 NOTE — PROGRESS NOTES
Respiratory Therapy Note    [facility] ICU VENTILATOR RESPIRATORY NOTE  Date of Admission: 10/15/23  Date of Intubation (most recent): 10/21/23  Reason for Mechanical Ventilation: Respiratory Failure  Number of Days on Mechanical Ventilation: 31  Met Criteria for Pressure Support Trial: No  Reason for No Pressure Support Trial: Patient's PEEP +10 cmH20 and FiO2 45-55%  Significant Events Today: Patient's PEEP decreased to +10 cmH20 per MD     Plan:  Continue to monitor patient's respiratory status.     Vent Mode: CMV/AC  (Continuous Mandatory Ventilation/ Assist Control)  FiO2 (%): 50 %  Resp Rate (Set): 18 breaths/min  Tidal Volume (Set, mL): 360 mL  PEEP (cm H2O): 10 cmH2O  Resp: 15     Patient suctioned for scant, cloudy, thick secretions from ETT. Patient will continue to receive Duoneb QID. RT to follow.     RT Kermit  5:37 PM November 21, 2023

## 2023-11-21 NOTE — PROGRESS NOTES
[facility] ICU VENTILATOR RESPIRATORY NOTE     Date of Admission: 10/15/2023     Date of Intubation (most recent): 10/21/2023     Reason for Mechanical Ventilation: Respiratory failure     Number of Days on Mechanical Ventilation: 31     Met Criteria for Pressure Support Trial: No     Reason for No Pressure Support Trial: High vent support     Significant Events Today: FiO2 down to 50%     ETT appearance on chest x-ray: In good position, 4.7cm above ashley, secured 22 @ teeth     PPlat: Unable to assess 2/2 pt agitation, interrupting maneuver   PEEPi: Unable to assess 2/2 pt agitation, interrupting maneuver   BS: Coarse, diminished  Secretions: Minimal, white, thick     Vent Mode: CMV/AC  (Continuous Mandatory Ventilation/ Assist Control)  FiO2 (%): 50 %  Resp Rate (Set): 18 breaths/min  Tidal Volume (Set, mL): 360 mL  PEEP (cm H2O): 12 cmH2O  Resp: 17       Plan:  Continue to wean down vent support     Elba Brennan RT

## 2023-11-21 NOTE — PLAN OF CARE
ICU End of Shift Summary.  For vital signs and complete assessments, please see documentation flowsheets.      Neuro: Pt maintained a RASS of -1 throughout night  Cardiac: SB  Resp: Pt is vented, desynchronous with vent most of night, but improvement from previous night   LS: course and crackles, minimal secretions  GI: TF @ 55cc/hr w/ 60cc Q4 flushes  Rectal tube in place  : Aleman in place  Lines: R PICC triple lumen and LPIV  Drips: Dex/fent     Major Shift Events:   Pt's HR briefly dropped to 37, Dex was decreased      Plan (Upcoming Events): care conference on Friday  Discharge/Transfer Needs: tbd     Bedside Shift Report Completed: y   Bedside Safety Check Completed: y

## 2023-11-21 NOTE — PROGRESS NOTES
"SPIRITUAL HEALTH SERVICES Progress Note  [facility] ICU    Referral Source: Follow-up    Brief supportive visit with Pt \"Jeni\" and spouse Genaro at chairside - Jeni's eyes were open and watching the television. Genaro reported, \"She's doing much better this afternoon... The Lord answers our prayers.\" Genaro was in the middle of catching Jeni up on her emails and messages from loved ones.    Plan: Genaro continues to express appreciation for supportive check-ins. I will continue to follow while on the unit. [facility] remains available.    Yelena Borja MDiv  Staff   [facility] Pager: [phone]    [facility] available 24/7 for emergent requests/referrals, either by having the on-call  paged or by entering an ASAP/STAT consult in Epic (this will also page the on-call ).    "

## 2023-11-22 NOTE — PLAN OF CARE
Goal Outcome Evaluation:       No changes to EN, continue to monitor need to change TF formula pending renal labs.     Niesha Rollins RD, LD  Clinical Dietitian     Pager - 3rd floor/ICU: [phone]  Pager - All other floors: [phone]  Pager - Weekend/holiday: [phone]  Office phone: [phone]

## 2023-11-22 NOTE — PROGRESS NOTES
[facility]  Infectious Disease Progress Note          Assessment and Plan:   Date of Admission:  10/15/2023  Date of Consult (When I saw the patient): 10/20/23        Assessment & Plan  Matthew Bowen is a 78 year old who was admitted on 10/15/2023.      Impression: 1 78-year-old female, some chronic underlying lung disease, on oxygen, bronchiectasis probable, some history of pneumonia but now 6 weeks or so of worsening respiratory symptoms, 2 prior courses of antibiotics, now admitted with infiltrates, no major sepsis but elevated procalcitonin and white count implying bacterial, not really improving on antibiotics concern for more chronic type pathogen in this patient with underlying lung disease by this history     2 chronic lung disease on oxygen some underlying bronchiectasis, some prior pneumonias  3 chronic kidney disease     REC 1 On  meropenem and Vanco,  no new microbiology info of note the bronchoscopy without new pathogens very unlikely antibiotics or antibiotic change are going to be the answer but of note some improvement with antibiotics,  looks better up in the chair oxygenation somewhat better if this continues probably completed course again, creatinine okay at this point but fairly unlikely Vanco as needed and may be stop it in the next couple of days unless we have growth and simply do meropenem  2 some low-grade fever again, only growth in cultures with Candida, definitely not a respiratory pathogen although the patient is at some risk for systemic Candida infection based on colonization, for now holding off on antifungal therapy                  Interval History:     Intubated and sedated worsened respiratory status again and some low-grade fever in the 100+ range .  Already had bronchoscopy last week with no new pathogens, single blood culture should be a contaminant, follow-up cultures are so far negative, no new positive micro   seen improvement, temp down, unclear  whether antibiotic related or not still no new microbiologic growth only Candida in sputum.              Medications:      amLODIPine  2.5 mg Oral Daily    aspirin  81 mg Oral or Feeding Tube Daily    atorvastatin  20 mg Oral or Feeding Tube QPM    [Held by provider] atovaquone  1,500 mg Per Feeding Tube Daily    B and C vitamin Complex with folic acid  5 mL Oral or Feeding Tube Daily    chlorhexidine  15 mL Mouth/Throat Q12H    clotrimazole   Topical BID    fiber modular (NUTRISOURCE FIBER)  1 packet Per Feeding Tube TID    heparin ANTICOAGULANT  5,000 Units Subcutaneous Q12H    insulin aspart  1-6 Units Subcutaneous Q4H    ipratropium - albuterol 0.5 mg/2.5 mg/3 mL  3 mL Nebulization 4x daily    levothyroxine  150 mcg Oral or Feeding Tube QAM AC    meropenem  500 mg Intravenous Q12H    pantoprazole  40 mg Per Feeding Tube QAM AC    pregabalin  75 mg Per Feeding Tube Daily    protein modular  1 packet Per Feeding Tube Daily    QUEtiapine  150 mg Oral or Feeding Tube BID    sertraline  150 mg Oral or Feeding Tube Daily    sodium chloride (PF)  10-40 mL Intracatheter Q7 Days    sodium chloride (PF)  3 mL Intracatheter Q8H    vancomycin  1,000 mg Intravenous Q24H    vitamin D3  50 mcg Oral or Feeding Tube Daily                  Physical Exam:   Blood pressure 123/66, pulse 66, temperature 97.3  F (36.3  C), resp. rate 26, height 1.524 m (5'), weight 82.5 kg (181 lb 14.1 oz), SpO2 93%.  Wt Readings from Last 2 Encounters:   11/22/23 82.5 kg (181 lb 14.1 oz)   02/28/22 94.3 kg (208 lb)     Vital Signs with Ranges  Temp:  [97.3  F (36.3  C)-100.4  F (38  C)] 97.3  F (36.3  C)  Pulse:  [] 66  Resp:  [10-49] 26  BP: ()/(51-88) 123/66  FiO2 (%):  [45 %-50 %] 50 %  SpO2:  [86 %-100 %] 93 %    Constitutional: Intubated and sedated about same slight improvement in oxygenation     Lungs: Clear to auscultation bilaterally, no crackles or wheezing   Cardiovascular: Regular rate and rhythm, normal S1 and S2, and no  murmur noted   Abdomen: Normal bowel sounds, soft, non-distended, non-tender   Skin: No rashes, no cyanosis, no edema   Other:           Data:   All microbiology laboratory data reviewed.  Recent Labs   Lab Test 11/21/23  0428 11/20/23  0906 11/19/23  0526   WBC 14.3* 16.4* 13.8*   HGB 7.5* 7.8* 8.7*   HCT 26.6* 27.4* 29.5*   MCV 93 92 89    203 212     Recent Labs   Lab Test 11/22/23  0359 11/21/23  0428 11/20/23  0453   CR 0.89  0.89 0.94 1.01*  1.02*     Recent Labs   Lab Test 11/18/23  1811   SED 89*     Recent Labs   Lab Test 02/12/19  0010   CULT Light growth  Normal deshaun

## 2023-11-22 NOTE — PLAN OF CARE
ICU End of Shift Summary.  For vital signs and complete assessments, please see documentation flowsheets.      Neuro: Pt was a RASS of -1 for the first half of the night and then became more restless.  Cardiac: SR/SB  Resp: Pt is vented,more compliant with the vent tonight  LS: course and crackles, minimal secretions  GI: TF @ 55cc/hr w/ 100cc Q4 flushes  Rectal tube in place  : Aleman in place  Lines: R PICC triple lumen and LPIV  Drips: Dex/fent     Major Shift Events:   Dex and fent increased this morning dt increased agitation and restlessness  FiO2 increased from 45% -> 50%     Plan (Upcoming Events): care conference on Friday  Discharge/Transfer Needs: tbd     Bedside Shift Report Completed: y   Bedside Safety Check Completed: y

## 2023-11-22 NOTE — PROGRESS NOTES
[facility] ICU VENTILATOR RESPIRATORY NOTE     Date of Admission: 10/15/2023     Date of Intubation (most recent): 10/21/2023     Reason for Mechanical Ventilation: Respiratory failure     Number of Days on Mechanical Ventilation: 32     Met Criteria for Pressure Support Trial: No     Reason for No Pressure Support Trial: High vent support     Significant Events Today: None     ETT appearance on chest x-ray: In good position, 4.7cm above ashley, secured 22 @ teeth     PPlat: 29  PEEPi: Unable to assess 2/2 pt agitation, interrupting maneuver   BS: Coarse, diminished  Secretions: Minimal, white, thick     Vent Mode: CMV/AC  (Continuous Mandatory Ventilation/ Assist Control)  FiO2 (%): 50 %  Resp Rate (Set): 18 breaths/min  Tidal Volume (Set, mL): 350 mL  PEEP (cm H2O): 10 cmH2O  Resp: 26    BP (!) 163/86   Pulse 86   Temp 98.1  F (36.7  C)   Resp 26   Ht 1.524 m (5')   Wt 82.5 kg (181 lb 14.1 oz)   SpO2 90%   BMI 35.52 kg/m           Plan:  Continue to wean down vent support     Elbasejal Brennan, RT

## 2023-11-22 NOTE — PROGRESS NOTES
[facility]     Hospitalist Progress Note     Assessment & Plan     ASSESSMENT    Matthew Bowen is a 78 year old female w/PMH chronic hypoxic respiratory failure due to pulmonary HTN, ALAINA requiring CPAP, chronic diastolic HF, CAD, CKD stage 3, morbid obesity, HTN,  depression who presents with with fever, cough and found to have multifocal pneumonia. Acutely worsened respiratory status on 10/21 requiring intubation. Hospital course with prolonged respiratory failure with ARDS and difficulty weaning from vent, although tracheostomy has been deferred partially due to family preference and also due to the fact that increased procedural risk given high ongoing oxygen needs. CT chest obtained 11/18 with evidence of severe ground-glass opacities with traction bronchiectasis. Unclear how much of her ongoing disease is reversible. Pulmonology started high-dose steroids 11/18, with modest interval improvement in O2 weaning    Making modest improvements in Fi02/PEEP weaning after addition of high-dose steroids. Brother from [place] is flying in today and plan for care conference on Friday to make decision on tracheostomy.    PLAN    Acute Hypoxic Respiratory Failure w/ ARDS  Septic Shock 2/2 Multifocal Pneumonia, Recurrent  -Presented with recurrent PNA and has been treated with multiple courses of antibiotics:  Antibiotic history  Azithromycin 10/15-10/19  Ceftriaxone 10/15-10/17  Cefepime 10/18-31  Metronidazole 10/20-31  Vancomycin 10/21-31  Zosyn 11/15-11/19  Meropenem 11/19 - current  Vancomycin 11/19 - current  -Has had high oxygen needs meeting ARDS criteria and Flolan (has since been weaned off)  -Concern for non-infectious pneumonia and severe ground-glass opacities with traction bronchiectasis on imaging, started on high-dose steroids 11/18  -Patient having recurrent fevers, started on meropenem + vanco 11/19   -Sputum cx sent with candida but usually not clinically relevant, ID  following    Hypernatremia  -Will increase FWF today     Acute on Chronic HFpEF  -Hold Bumex in setting of CARMEN and hypernatremia     Acute Metabolic encephalopathy  -Suspect multifactorial due to ICU delirium, prolonged versed infusion  -Requiring reinitiation of continuous IV Precedex 11/15 and on Seroquel 150 mg twice a day  -Currently doing well with a fentanyl drip    CARMEN on CKD Stage III  -Cr wnl but found to have very poor renal function with cystatin C testing  -Renally dose medications    Other Issues  -Essential Hypertension: Controlled on current regimen  -Morbid Obesity: Complicates all aspects of care  -Complicates care.  -Anemia of Chronic Disease: Transfuse for Hg <7  -CAD: Home meds  -Hypothyroidism: Continue levothyroxine 150 mcg a day.  -Mood disorder: Continue sertraline 150 mg a day.  -Gluteal cleft irritant dermatitis: WOC consulted, appreciate recs  Clinically Significant Risk Factors         # Hypernatremia: Highest Na = 151 mmol/L in last 2 days, will monitor as appropriate      # Hypoalbuminemia: Lowest albumin = 2.7 g/dL at 11/18/2023  5:05 AM, will monitor as appropriate                           DVT Prophy  -Heparin    Disposition  -Continue ICU care      Balwinder Madera MD    Subjective     Having recurrence of fevers despite meropenem + vancomycin. Otherwise doing fairly well. Weaned to 10 of PEEP yesterday and sats are fine. Weaning Fi02 this morning. Discussed case with daughter at bedside.        Objective   Blood pressure 114/60, pulse 84, temperature 99.7  F (37.6  C), resp. rate 22, height 1.524 m (5'), weight 82.5 kg (181 lb 14.1 oz), SpO2 (!) 88%.    PHYSICAL EXAM  General: In no acute distress but slow to respond, does not follow commands.  CV: RRR.  Lungs: Scattered rhonchi. Nl WOB.  Abd: Non-tender.  Ext: Trace edema.    LABS AND IMAGING  Reviewed and pertinent results discussed in assessment and plan.

## 2023-11-22 NOTE — PROGRESS NOTES
CLINICAL NUTRITION SERVICES - REASSESSMENT NOTE    Recommendations Ordered by Registered Dietitian (RD):   Continue EN as ordered - continue to monitor need to change to renal formula  Continue Renal MVI as ordered - new PI?   Malnutrition:   % Intake: Decreased intake does not meet criteria- meeting needs via EN  % Weight Loss: Unable to assess due to fluid status  Subcutaneous Fat Loss: None observed  Muscle Loss: None observed--very difficult with number of monitors, tubing, lines, SCDs on in addition to fluid status  Fluid Accumulation/Edema: trace-mild     Malnutrition Diagnosis: Unable to assess     EVALUATION OF PROGRESS TOWARD GOALS   Diet:  NPO     Nutrition Support: Patient previously on renal formula however renal labs improved. Switched to standard formula 11/13:    Promote at 55 ml/hr x 22 hrs (1210 ml) which provides 1210 kcal, 75 g protein, 157 g CHO, 0 g fiber, 1015 ml free H2O.  Prosource 1 pkt/day = 80 kcal/20 g protein  3 pkts Nutrisource fiber/day = 45 kcal/9 g fiber     Total energy/protein provisions, all sources = 1335 kcal (15+ kcal/kg, 111% estimated needs)/95 g protein (~2.1 g/kg)    Free water flushes per MD - 100 mL Q 2 hours     Intake/Tolerance:  6 day average = 1169 kcal (97% of prescribed formula)     ASSESSED NUTRITION NEEDS:  Dosing Weight 86 kg (actual body weight) - energy; 45.5 kg (ideal body weight) - protein   Estimated Energy Needs: 946-1204 kcal/day (11-14 Kcal/Kg actual body weight)   Justification: vented   Estimated Protein Needs: 91 g protein/day (2 g pro/Kg IBW)   Justification: vented, hypercatabolism with acute illness   Estimated Fluid Needs: per MD     NEW FINDINGS:   - Pulmonology following   - WOC following, per their note on 11/21: gluteal cleft - Incontinence Associated Dermatitis (IAD), right inner buttock/perineum area - friction vs PI   - labs:  Labs:  Electrolytes  Potassium (mmol/L)   Date Value   11/22/2023 5.2   11/21/2023 4.2   11/20/2023 3.7   11/20/2023  3.7   11/22/2019 4.6   11/21/2019 4.4   11/20/2019 4.5     Phosphorus (mg/dL)   Date Value   11/22/2023 4.7 (H)   11/21/2023 4.8 (H)   11/20/2023 4.1   11/19/2023 4.0   11/18/2023 4.0    Blood Glucose  Glucose (mg/dL)   Date Value   11/22/2023 206 (H)   11/22/2019 112 (H)   11/21/2019 97   11/20/2019 109 (H)   02/13/2019 122 (H)   02/12/2019 161 (H)     GLUCOSE BY METER POCT (mg/dL)   Date Value   11/22/2023 99   11/22/2023 187 (H)   11/22/2023 192 (H)   11/21/2023 173 (H)   11/21/2023 168 (H)     Hemoglobin A1C (%)   Date Value   10/22/2023 5.6    Inflammatory Markers  WBC (10e9/L)   Date Value   11/22/2019 10.8   11/20/2019 10.5   02/12/2019 10.1     WBC Count (10e3/uL)   Date Value   11/21/2023 14.3 (H)   11/20/2023 16.4 (H)   11/19/2023 13.8 (H)     Albumin (g/dL)   Date Value   11/18/2023 2.7 (L)   10/22/2023 2.9 (L)   10/15/2023 4.4   02/13/2019 2.7 (L)      Magnesium (mg/dL)   Date Value   11/22/2023 2.4 (H)   11/21/2023 2.2   11/20/2023 2.3     Sodium (mmol/L)   Date Value   11/22/2023 147 (H)   11/22/2023 147 (H)   11/21/2023 147 (H)   11/22/2019 141   11/21/2019 142   11/20/2019 143    Renal  Urea Nitrogen (mg/dL)   Date Value   11/22/2023 93.2 (H)   11/21/2023 87.1 (H)   11/20/2023 78.3 (H)   11/22/2019 34 (H)   11/21/2019 47 (H)   11/20/2019 54 (H)     Creatinine (mg/dL)   Date Value   11/22/2023 0.89   11/22/2023 0.89   11/21/2023 0.94   11/22/2019 1.30 (H)   11/21/2019 1.49 (H)   11/20/2019 1.58 (H)     Additional  Triglycerides (mg/dL)   Date Value   11/21/2019 80     Ketones Urine (mg/dL)   Date Value   10/15/2023 Negative        - medications:   amLODIPine  2.5 mg Oral Daily    aspirin  81 mg Oral or Feeding Tube Daily    atorvastatin  20 mg Oral or Feeding Tube QPM    [Held by provider] atovaquone  1,500 mg Per Feeding Tube Daily    B and C vitamin Complex with folic acid  5 mL Oral or Feeding Tube Daily    chlorhexidine  15 mL Mouth/Throat Q12H    clotrimazole   Topical BID    fiber modular  (NUTRISOURCE FIBER)  1 packet Per Feeding Tube TID    heparin ANTICOAGULANT  5,000 Units Subcutaneous Q12H    insulin aspart  1-6 Units Subcutaneous Q4H    ipratropium - albuterol 0.5 mg/2.5 mg/3 mL  3 mL Nebulization 4x daily    levothyroxine  150 mcg Oral or Feeding Tube QAM AC    meropenem  500 mg Intravenous Q12H    pantoprazole  40 mg Per Feeding Tube QAM AC    pregabalin  75 mg Per Feeding Tube Daily    protein modular  1 packet Per Feeding Tube Daily    QUEtiapine  150 mg Oral or Feeding Tube BID    sertraline  150 mg Oral or Feeding Tube Daily    sodium chloride (PF)  10-40 mL Intracatheter Q7 Days    sodium chloride (PF)  3 mL Intracatheter Q8H    vancomycin  1,000 mg Intravenous Q24H    vitamin D3  50 mcg Oral or Feeding Tube Daily       dexmedeTOMIDine 1 mcg/kg/hr (11/22/23 0924)    dextrose      fentaNYL 125 mcg/hr (11/22/23 0915)      acetaminophen **OR** acetaminophen, albuterol, albuterol, bisacodyl, artificial tears, cetirizine, dextrose, glucose **OR** dextrose **OR** glucagon, hydrALAZINE, HYDROmorphone, labetalol, lidocaine 4%, lidocaine (buffered or not buffered), melatonin, midazolam, naloxone **OR** naloxone **OR** naloxone **OR** naloxone, ondansetron **OR** ondansetron, oxyCODONE, polyethylene glycol, senna-docusate, sodium chloride (PF), sodium chloride (PF), sodium chloride (PF)     - weight: weight trending down - previous diuresis? Continue to monitor   Vitals:    10/15/23 0925 10/15/23 1646 10/16/23 0644 10/20/23 1900   Weight: 86.2 kg (190 lb) 85.6 kg (188 lb 11.4 oz) 81.7 kg (180 lb 1.9 oz) 85.4 kg (188 lb 4.4 oz)    10/21/23 1930 10/23/23 0545 10/25/23 0430 10/26/23 0500   Weight: 85.7 kg (188 lb 14.4 oz) 87 kg (191 lb 12.8 oz) 88.1 kg (194 lb 4.8 oz) 89.2 kg (196 lb 10.4 oz)    10/27/23 0600 10/28/23 0341 10/29/23 0400 10/30/23 0500   Weight: 89 kg (196 lb 3.4 oz) 93.8 kg (206 lb 12.7 oz) 94.8 kg (208 lb 15.9 oz) 94.9 kg (209 lb 3.5 oz)    10/31/23 0420 11/01/23 0358 11/02/23 0400  11/03/23 0615   Weight: 96 kg (211 lb 10.3 oz) 94.7 kg (208 lb 12.4 oz) 93.9 kg (207 lb 0.2 oz) 91.9 kg (202 lb 9.6 oz)    11/05/23 0500 11/06/23 0530 11/08/23 0600 11/09/23 0030   Weight: 91.4 kg (201 lb 8 oz) 89.8 kg (197 lb 15.6 oz) 88.8 kg (195 lb 12.3 oz) 88.4 kg (194 lb 14.2 oz)    11/11/23 0330 11/12/23 0400 11/13/23 0430 11/14/23 0520   Weight: 85 kg (187 lb 6.3 oz) 87.7 kg (193 lb 5.5 oz) 84.8 kg (186 lb 15.2 oz) 84 kg (185 lb 2.4 oz)    11/15/23 0445 11/16/23 0615 11/17/23 0606 11/18/23 0613   Weight: 83.9 kg (184 lb 15.5 oz) 83.1 kg (183 lb 3.2 oz) 84.2 kg (185 lb 10 oz) 82.4 kg (181 lb 10.5 oz)    11/19/23 0630 11/20/23 0500 11/21/23 0500 11/22/23 0535   Weight: 81.8 kg (180 lb 5.4 oz) 79.9 kg (176 lb 2.4 oz) 81.4 kg (179 lb 7.3 oz) 82.5 kg (181 lb 14.1 oz)     Previous Goals:   Total avg nutritional intake to meet a minimum of 11 kcal/kg per energy DW of 86 kg and 2 g PRO/kg daily per protein DW of 45.5 kg     Evaluation: Met    Previous Nutrition Diagnosis:   Inadequate oral intake related to respiratory needs necessitating NPO status as evidenced by reliance on NS to meet needs     Evaluation: No change    CURRENT NUTRITION DIAGNOSIS  Inadequate oral intake related to respiratory needs necessitating NPO status as evidenced by reliance on NS to meet needs       INTERVENTIONS  Recommendations / Nutrition Prescription  Continue EN as ordered - continue to monitor need to change to renal formula   Continue Renal MVI as ordered - new PI?    Implementation  EN Composition, EN Schedule, Feeding Tube Flush, Multivitamin/Mineral: as above  Collaboration and Referral of Nutrition care: discussed patient during interdisciplinary rounds this morning     Goals  Total avg nutritional intake to meet a minimum of 11 kcal/kg per energy DW of 86 kg and 2 g PRO/kg daily per protein DW of 45.5 kg       MONITORING AND EVALUATION:  Progress towards goals will be monitored and evaluated per protocol and Practice  Guidelines    Niesha Rollins RD, LD  Clinical Dietitian     3rd floor/ICU: [phone]  All other floors: [phone]  Weekend/holiday: [phone]  Office: [phone]

## 2023-11-22 NOTE — PROGRESS NOTES
[facility] ICU VENTILATOR RESPIRATORY NOTE     Date of Admission: 10/15/2023     Date of Intubation (most recent): 10/21/2023     Reason for Mechanical Ventilation: Respiratory failure     Number of Days on Mechanical Ventilation: 32     Met Criteria for Pressure Support Trial: No     Reason for No Pressure Support Trial: Peep 10, 50%     Significant Events Today: No significant events today     ETT appearance on chest x-ray: In good position, 4.7cm above ashley, secured 22 @ teeth     BS: Coarse, diminished  Secretions: Minimal, white, thick     Vent Mode: CMV/AC  (Continuous Mandatory Ventilation/ Assist Control)  FiO2 (%): 50 %  Resp Rate (Set): 18 breaths/min  Tidal Volume (Set, mL): 360 mL  PEEP (cm H2O): 10 cmH2O  Resp: 19     Plan:  Continue to wean down vent support     Swati Drake RN on 11/22/2023 at 5:31 PM

## 2023-11-22 NOTE — PLAN OF CARE
Continues on full vent support FiO2 50%, PEEP 10. Unable to wean today - but pt tolerated being up in chair for 8 hours comfortably and without a raise in HR like yesterday. Also not using abdominal muscle use as much as yesterday. Tele SR. Tmax 99.7. Dex 0.9, Fent 100. Rectal tube for loose stools. Adequate output in Mitchell. A2 and lift with cares. See flowsheet charting. Care conference Friday.       Goal Outcome Evaluation:      Plan of Care Reviewed With: patient, family    Overall Patient Progress: no changeOverall Patient Progress: no change    Outcome Evaluation: Continues on 50% FiO2, unable to wean with PEEP 10, up to chair for 8 hours and tollerated well.      Notified provider about indwelling mitchell catheter discussed removal or continued need.    Did provider choose to remove indwelling mitchell catheter? NO    Provider's mitchell indication for keeping indwelling mitchell catheter: Indication for continued use: Deep Sedation/Paralysis    Is there an order for indwelling mitchell catheter? YES    *If there is a plan to keep mitchell catheter in place at discharge daily notification with provider is not necessary, but please add a notation in the treatment team sticky note that the patient will be discharging with the catheter.

## 2023-11-22 NOTE — PROGRESS NOTES
[facility] Intensive Care Progress Note    Date of Service (when I saw the patient): 11/22/2023     Assessment & Plan   Matthew Bowen is a 78 year old female with PMH chronic hypoxic respiratory failure, recurrent pulmonary infections, bronchiectasis who was admitted on 10/15/2023 with weakness and falls, course complicated by acute on chronic hypoxic resp failure requiring intubation on 10/22. Now with prolonged hospital stay further complicated by respiratory deterioration on 11/14, possible aspiration event and persistent ventilatory requirement.      Main Plans for Today   Continue PEEP to 10, wean FiO2 as possible to keep SPO2 over 89%, RASS goal 0 to -1, increase free water flushes to address hypernatremia, monitor sodium.  Continue goals of care discussion with the family, they seem to be interested in tracheostomy function continues to improve. Family conference on Friday.     Neurology:  Encephalopathy of critical illness. Sedation in a patient with prolonged mechanical ventilation who was on Versed infusion.  History of generalized weakness and frequent falls.  The dyssynchrony with the vent improved after fentanyl infusion was started. Plan: Continue Precedex, and fentanyl infusion.  RASS goal 0 to -1.         Cardiovascular:  History of hypertension coronary artery disease.  Shock resolved.  TTE on 10/21 shows normal biventricular function.  Plan: Monitor.  Hold diuretics.  -     Pulmonary:        Acute on chronic mixed hypercapnic And hypoxemic respiratory failure.  Probable multifocal organizing pneumonia.  Subcutaneous emphysema and trace pneumomediastinum. Oxygen dependent at home on 2 L oxygen. History of bronchiectasis and ALAINA. Prolonged mechanical ventilation for more than a month. Worsening respiratory status since November 14. Audible ETT leak during mechanical ventilation, without affecting tidal volumes. On  broad-spectrum antibiotics, high-dose  steroids, DuoNebs.  Plan: Continue protective mechanical ventilation, Wean PEEP to 10, wean FiO2 as possible to keep SPO2 over 89%, family seems to be interested in tracheostomy function continues to improve. Maybe repeat chest CT before the family conference on Friday.        Vent Mode: CMV/AC  (Continuous Mandatory Ventilation/ Assist Control)  FiO2 (%): 50 %  Resp Rate (Set): 18 breaths/min  Tidal Volume (Set, mL): 360 mL  PEEP (cm H2O): 10 cmH2O  Resp: 26      Renal  Nonoliguric acute kidney injury.   History of CKD.  Hypernatremia. Plan: Avoid nephrotoxics, decrease medication doses appropriately, optimize hemodynamics, increase free water flushes.  Monitor sodium.      ID:         No clear infectious pathogen found during admission. Multiple courses of antibiotics given.  Pancultured and spectrum antibiotics started on 11/19/23.  Febrile this morning. Plan: Panculture, continue broad-spectrum antibiotics. .      GI  No GI concerns.  Tolerating enteral feeding.  Plan: Monitor.        Nutrition  Malnutrition risk.  Plan: Continue enteral feeding via NG.        Endocrine:  Steroid-induced hyperglycemia.  History of hypothyroidism. Plan: Monitor and treat glycemia.  Continue levothyroxine.         Heme/Onc:  Anemia of critical illness.  No current signs of bleeding. Leukocytosis likely secondary to steroid use.  Plan: Monitor CBC.         DVT Prophylaxis: Heparin SQ  GI Prophylaxis: PPI     Restraints: Restraints for medical healing needed: YES     Family update by me today: Yes     Yandy Zavala MD    Time Spent on this Encounter   Billing:  I spent 42 minutes bedside and on the inpatient unit today managing the critical care of Matthew Bowen in relation to the issues listed in this note.      Physical Exam   Temp: 97.3  F (36.3  C) Temp src: Bladder Temp  Min: 97.3  F (36.3  C)  Max: 100.4  F (38  C) BP: 123/66 Pulse: 66   Resp: 26 SpO2: 93 % O2 Device: Mechanical Ventilator    Vitals:    11/20/23 0500  11/21/23 0500 11/22/23 0535   Weight: 79.9 kg (176 lb 2.4 oz) 81.4 kg (179 lb 7.3 oz) 82.5 kg (181 lb 14.1 oz)     I/O last 3 completed shifts:  In: 3417.8 [I.V.:962.8; NG/GT:1190]  Out: 1360 [Urine:1310; Stool:50]    Neurologic: Sedated, appears occasionally dyssynchronous with the vent.   Cardiovascular: RRR, no murmurs.   Respiratory: ET tube in situ, audible leak, coarse breath sounds bilaterally.   GI: Soft nontender non distended.   Skin/Extremities: Moderate edema, pulses all 4 extremities.   Lines: No erythema or discharge at entry site for PICC Line  Current lines are required for patient management    Medications    dexmedeTOMIDine 0.9 mcg/kg/hr (11/22/23 1358)    dextrose      fentaNYL 100 mcg/hr (11/22/23 1358)      amLODIPine  2.5 mg Oral Daily    aspirin  81 mg Oral or Feeding Tube Daily    atorvastatin  20 mg Oral or Feeding Tube QPM    [Held by provider] atovaquone  1,500 mg Per Feeding Tube Daily    B and C vitamin Complex with folic acid  5 mL Oral or Feeding Tube Daily    chlorhexidine  15 mL Mouth/Throat Q12H    clotrimazole   Topical BID    fiber modular (NUTRISOURCE FIBER)  1 packet Per Feeding Tube TID    heparin ANTICOAGULANT  5,000 Units Subcutaneous Q12H    insulin aspart  1-6 Units Subcutaneous Q4H    ipratropium - albuterol 0.5 mg/2.5 mg/3 mL  3 mL Nebulization 4x daily    levothyroxine  150 mcg Oral or Feeding Tube QAM AC    meropenem  500 mg Intravenous Q12H    pantoprazole  40 mg Per Feeding Tube QAM AC    pregabalin  75 mg Per Feeding Tube Daily    protein modular  1 packet Per Feeding Tube Daily    QUEtiapine  150 mg Oral or Feeding Tube BID    sertraline  150 mg Oral or Feeding Tube Daily    sodium chloride (PF)  10-40 mL Intracatheter Q7 Days    sodium chloride (PF)  3 mL Intracatheter Q8H    vancomycin  1,000 mg Intravenous Q24H    vitamin D3  50 mcg Oral or Feeding Tube Daily       Data   Recent Labs   Lab 11/22/23  1401 11/22/23  1228 11/22/23  0806 11/22/23  0406 11/22/23  1726  11/22/23  0006 11/21/23  2324 11/21/23  0803 11/21/23  0428 11/20/23  1203 11/20/23  0906 11/20/23  0823 11/20/23  0453 11/19/23  0855 11/19/23  0526 11/18/23  0811 11/18/23  0505   WBC  --   --   --   --   --   --   --   --  14.3*  --  16.4*  --   --   --  13.8*  --  12.5*   HGB  --   --   --   --   --   --   --   --  7.5*  --  7.8*  --   --   --  8.7*  --  9.1*   MCV  --   --   --   --   --   --   --   --  93  --  92  --   --   --  89  --  88   PLT  --   --   --   --   --   --   --   --  194  --  203  --   --   --  212  --  222   *  --   --   --  147*  147*  --  147*   < > 148*  --   --   --  146*  --  146*  --  144   POTASSIUM  --   --   --   --  5.2  --   --   --  4.2  --   --   --  3.7  3.7  --  4.3  --  4.5   CHLORIDE  --   --   --   --  109*  --   --   --  107  --   --   --  104  --  104  --  101   CO2  --   --   --   --  28  --   --   --  30*  --   --   --  30*  --  31*  --  30*   BUN  --   --   --   --  93.2*  --   --   --  87.1*  --   --   --  78.3*  --  79.6*  --  81.6*   CR  --   --   --   --  0.89  0.89  --   --   --  0.94  --   --   --  1.01*  1.02*  --  1.14*  --  1.16*   ANIONGAP  --   --   --   --  10  --   --   --  11  --   --   --  12  --  11  --  13   BARBARA  --   --   --   --  8.5*  --   --   --  8.7*  --   --   --  8.8  --  8.6*  --  8.6*   GLC  --  119* 99 187* 206*   < >  --    < > 216*   < >  --    < > 174*   < > 152*   < > 156*   ALBUMIN  --   --   --   --   --   --   --   --   --   --   --   --   --   --   --   --  2.7*   PROTTOTAL  --   --   --   --   --   --   --   --   --   --   --   --   --   --   --   --  6.4   BILITOTAL  --   --   --   --   --   --   --   --   --   --   --   --   --   --   --   --  0.5   ALKPHOS  --   --   --   --   --   --   --   --   --   --   --   --   --   --   --   --  89   ALT  --   --   --   --   --   --   --   --   --   --   --   --   --   --   --   --  66*   AST  --   --   --   --   --   --   --   --   --   --   --   --   --   --   --   --  45    < >  = values in this interval not displayed.     Recent Results (from the past 24 hour(s))   XR Chest Port 1 View    Narrative    EXAM: XR CHEST PORT 1 VIEW  LOCATION: [facility]  DATE: 11/22/2023    INDICATION: respiratory failure, ETT position.  COMPARISON: 11/20/2023      Impression    IMPRESSION: Stable moderate cardiomegaly. Right upper lobe consolidation and diffuse ill-defined hazy interstitial and patchy opacities in the remaining lung are unchanged. No significant pleural effusion or pneumothorax.    Endotracheal tube terminates 2.5 cm above the ashley. Right PICC terminates in the SVC. Feeding tube extends below the diaphragm beyond the field-of-view.      Yandy Zavala MD  Anesthesiology Critical Care

## 2023-11-23 NOTE — PROGRESS NOTES
[facility]  Infectious Disease Progress Note          Assessment and Plan:   Date of Admission:  10/15/2023  Date of Consult (When I saw the patient): 10/20/23        Assessment & Plan  Matthew Bowen is a 78 year old who was admitted on 10/15/2023.      Impression: 1 78-year-old female, some chronic underlying lung disease, on oxygen, bronchiectasis probable, some history of pneumonia but now 6 weeks or so of worsening respiratory symptoms, 2 prior courses of antibiotics, now admitted with infiltrates, no major sepsis but elevated procalcitonin and white count implying bacterial, not really improving on antibiotics concern for more chronic type pathogen in this patient with underlying lung disease by this history     2 chronic lung disease on oxygen some underlying bronchiectasis, some prior pneumonias  3 chronic kidney disease     REC 1 On  meropenem and Vanco,  no new microbiology info of note the bronchoscopy without new pathogens very unlikely antibiotics or antibiotic change are going to be the answer but of note some improvement with antibiotics,  looks better up in the chair oxygenation somewhat better if this continues probably completed course again, creatinine okay at this point but fairly unlikely Vanco as needed and may be stop it in the next couple of days unless we have growth and simply do meropenem  2 some low-grade fever again, only growth in cultures with Candida, definitely not a respiratory pathogen although the patient is at some risk for systemic Candida infection based on colonization, for now holding off on antifungal therapy  Temp down, basically the same, latest cultures again negative except for Candida                  Interval History:     Intubated and sedated worsened respiratory status again and some low-grade fever in the 100+ range, down again now.  Already had bronchoscopy last week with no new pathogens, single blood culture should be a contaminant,  follow-up cultures are so far negative, no new positive micro   seen improvement, temp down, unclear whether antibiotic related or not still no new microbiologic growth only Candida in sputum.  Only 1 blood culture with staph epi almost certainly not an issue but is getting Vanco              Medications:      amLODIPine  2.5 mg Oral Daily    aspirin  81 mg Oral or Feeding Tube Daily    atorvastatin  20 mg Oral or Feeding Tube QPM    [Held by provider] atovaquone  1,500 mg Per Feeding Tube Daily    B and C vitamin Complex with folic acid  5 mL Oral or Feeding Tube Daily    chlorhexidine  15 mL Mouth/Throat Q12H    clotrimazole   Topical BID    fiber modular (NUTRISOURCE FIBER)  1 packet Per Feeding Tube TID    heparin ANTICOAGULANT  5,000 Units Subcutaneous Q12H    insulin aspart  1-6 Units Subcutaneous Q4H    ipratropium - albuterol 0.5 mg/2.5 mg/3 mL  3 mL Nebulization 4x daily    levothyroxine  150 mcg Oral or Feeding Tube QAM AC    meropenem  500 mg Intravenous Q12H    methylPREDNISolone  62.5 mg Intravenous Q8H    pantoprazole  40 mg Per Feeding Tube QAM AC    pregabalin  75 mg Per Feeding Tube Daily    protein modular  1 packet Per Feeding Tube Daily    QUEtiapine  150 mg Oral or Feeding Tube BID    sertraline  150 mg Oral or Feeding Tube Daily    sodium chloride (PF)  10-40 mL Intracatheter Q7 Days    sodium chloride (PF)  3 mL Intracatheter Q8H    vancomycin  1,000 mg Intravenous Q24H    vitamin D3  50 mcg Oral or Feeding Tube Daily                  Physical Exam:   Blood pressure 130/69, pulse 77, temperature 97.2  F (36.2  C), resp. rate 24, height 1.524 m (5'), weight 82.5 kg (181 lb 14.1 oz), SpO2 93%.  Wt Readings from Last 2 Encounters:   11/22/23 82.5 kg (181 lb 14.1 oz)   02/28/22 94.3 kg (208 lb)     Vital Signs with Ranges  Temp:  [96.8  F (36  C)-97.9  F (36.6  C)] 97.2  F (36.2  C)  Pulse:  [52-85] 77  Resp:  [12-30] 24  BP: (113-163)/(54-81) 130/69  FiO2 (%):  [50 %] 50 %  SpO2:  [90 %-100 %] 93  %    Constitutional: Intubated and sedated about same slight improvement in oxygenation     Lungs: Clear to auscultation bilaterally, no crackles or wheezing   Cardiovascular: Regular rate and rhythm, normal S1 and S2, and no murmur noted   Abdomen: Normal bowel sounds, soft, non-distended, non-tender   Skin: No rashes, no cyanosis, no edema   Other:           Data:   All microbiology laboratory data reviewed.  Recent Labs   Lab Test 11/23/23  0401 11/21/23  0428 11/20/23  0906   WBC 18.1* 14.3* 16.4*   HGB 7.2* 7.5* 7.8*   HCT 25.5* 26.6* 27.4*   MCV 93 93 92    194 203     Recent Labs   Lab Test 11/23/23  0401 11/22/23  0359 11/21/23  0428   CR 0.81 0.89  0.89 0.94     Recent Labs   Lab Test 11/18/23  1811   SED 89*     Recent Labs   Lab Test 02/12/19  0010   CULT Light growth  Normal deshaun

## 2023-11-23 NOTE — PLAN OF CARE
ICU End of Shift Summary. For vital signs and complete assessments, please see documentation flowsheets.     Pertinent assessments: Neuro status unchanged. Intermittently restless/agitated, especially with stimulation. Dex and fent for sedation with RASS goal 0 to -1. SB/SR on tele. Adequate UOP via mitchell. Rectal tube remains in place with minimal output. Wound care done per orders.  Major Shift Events: periods of restlessness  Plan (Upcoming Events): family conference Friday   Discharge/Transfer Needs: TBD    Bedside Shift Report Completed: yes  Bedside Safety Check Completed: yes                Goal Outcome Evaluation:      Plan of Care Reviewed With: patient    Overall Patient Progress: no changeOverall Patient Progress: no change

## 2023-11-23 NOTE — PLAN OF CARE
Goal Outcome Evaluation:      Plan of Care Reviewed With: patient    Overall Patient Progress: no change     ICU End of Shift Summary.  For vital signs and complete assessments, please see documentation flowsheets.      Pertinent assessments:   Neuro: Pt intermittently alert, meeting RASS goal of 0 to -1, cough reflex in tack, blinking spontaneously, able to shrug shoulder and nod head. R/t weakness not able to  of plantar/dorsiflex. Some spontaneous movement for feet.   Cardiac: SB/NSR  Resp: Breath sounds coarse, vent settings remained the same throughout the day.   GI: BS normoactive, had approx 475 stool out through rectal tube  with slight leakage when up to chair  :Mitchell in place for strict I&O, good UoP  Skin: Still denuded on sacrum/coccyx, and scattered bruising (mostly on abd and thighs)  Lines: R triple lumen PICC  Drips: Dex, fent    Major Shift Events:   Pt up to chair this afternoon for approx 3 hours, on steroid taper, MRSA swab collected  Plan (Upcoming Events): CT in AM, family care conference tomorrow at approx 1500 to decide goals of care (Trach?)  Discharge/Transfer Needs: N/A at this time        Notified provider about indwelling mitchell catheter discussed removal or continued need.    Did provider choose to remove indwelling mitchell catheter? NO    Provider's mitchell indication for keeping indwelling mitchell catheter: Indication for continued use: Strict 1-2 Hour I & O if external catheters are not an option    Is there an order for indwelling mitchell catheter? YES    *If there is a plan to keep mitchell catheter in place at discharge daily notification with provider is not necessary, but please add a notation in the treatment team sticky note that the patient will be discharging with the catheter.

## 2023-11-23 NOTE — PROGRESS NOTES
[facility] Intensive Care Progress Note    Date of Service (when I saw the patient): 11/23/2023     Assessment & Plan   Matthew Bowen is a 78 year old female with PMH chronic hypoxic respiratory failure, recurrent pulmonary infections, bronchiectasis who was admitted on 10/15/2023 with weakness and falls, course complicated by acute on chronic hypoxic resp failure requiring intubation on 10/22. Now with prolonged hospital stay further complicated by respiratory deterioration on 11/14, possible aspiration event and persistent ventilatory requirement.      Main Plans for Today   Continue PEEP to 10, wean FiO2 as possible to keep SPO2 over 89%, RASS goal 0 to -1, minimize sedation to RASS goal 0 to -1.  Decrease free water flushes because hypernatremia is improving.   Continue goals of care discussion with the family, they seem to be interested in tracheostomy function continues to improve. Family conference on Friday.    Plan to do noncontrast chest CT before the family conference.  Taper high-dose steroids.  MRSA nasal swab.     Neurology:  Encephalopathy of critical illness. Sedation in a patient with prolonged mechanical ventilation who was on Versed infusion.  History of generalized weakness and frequent falls.  The dyssynchrony with the vent improved after fentanyl infusion was started. Plan: Minimize Precedex, and fentanyl infusion.  RASS goal 0 to -1.         Cardiovascular:  History of hypertension coronary artery disease.  Shock resolved.  TTE on 10/21 shows normal biventricular function.  Plan: Monitor.  Continue aspirin, Lipitor, Norvasc.  -     Pulmonary:        Acute on chronic mixed hypercapnic And hypoxemic respiratory failure.  Probable multifocal organizing pneumonia.  Subcutaneous emphysema and trace pneumomediastinum. Oxygen dependent at home on 2 L oxygen. History of bronchiectasis and ALAINA. Prolonged mechanical ventilation for more than a month.  Worsening respiratory status since November 14, now gradually improving.  Intermittent audible ETT leak during mechanical ventilation, without affecting tidal volumes. On  broad-spectrum antibiotics, high-dose steroids, DuoNebs.  Plan: Continue protective mechanical ventilation, Wean PEEP to 10, wean FiO2 as possible to keep SPO2 over 89%.  family seems to be interested in tracheostomy function continues to improve. Repeat chest CT before the family conference on Friday.  Taper high-dose steroids.    Vent Mode: CMV/AC  (Continuous Mandatory Ventilation/ Assist Control)  FiO2 (%): 50 %  Resp Rate (Set): 18 breaths/min  Tidal Volume (Set, mL): 360 mL  PEEP (cm H2O): 10 cmH2O  Resp: 17      Renal  Nonoliguric acute kidney injury.   History of CKD.  Hypernatremia resolved. Plan: Decrease free water flushes.  Monitor BMP and urine output.     ID:         No clear infectious pathogen found during admission. Multiple courses of antibiotics given.  Pancultured and spectrum antibiotics started on 11/19/23.  Febrile on 11/22/23. Plan: Continue broad-spectrum antibiotics and follow-up culture results.     GI  No GI concerns.  Tolerating enteral feeding.  Plan: Monitor.        Nutrition  Malnutrition risk.  Plan: Continue enteral feeding via NG.        Endocrine:  Steroid-induced hyperglycemia.  History of hypothyroidism. Plan: Monitor and treat glycemia.  Continue levothyroxine.         Heme/Onc:  Anemia of critical illness.  No current signs of bleeding. Leukocytosis likely secondary to steroid use.  Plan: Monitor CBC.         DVT Prophylaxis: Heparin SQ  GI Prophylaxis: PPI     Restraints: Restraints for medical healing needed: YES     Family update by me today: Yes     Yandy Zavala MD    Time Spent on this Encounter   Billing:  I spent 43 minutes bedside and on the inpatient unit today managing the critical care of Matthew ONEILL Andrés in relation to the issues listed in this note.    Physical Exam   Temp: 99  F (37.2  C)  Temp src: Bladder Temp  Min: 97.2  F (36.2  C)  Max: 99  F (37.2  C) BP: (!) 159/74 Pulse: 81   Resp: 17 SpO2: 97 % O2 Device: Mechanical Ventilator    Vitals:    11/20/23 0500 11/21/23 0500 11/22/23 0535   Weight: 79.9 kg (176 lb 2.4 oz) 81.4 kg (179 lb 7.3 oz) 82.5 kg (181 lb 14.1 oz)     I/O last 3 completed shifts:  In: 3295.63 [I.V.:705.63; NG/GT:1380]  Out: 2555 [Urine:2305; Stool:250]    Neurologic: Moderately sedated, occasionally interactive, weak overall.   Cardiovascular: RRR, no murmurs.   Respiratory: ET tube in situ, audible leak, coarse breath sounds bilaterally.   GI: Soft nontender non distended.   Skin/Extremities: Moderate edema, pulses all 4 extremities.   Lines: No erythema or discharge at entry site for PICC Line  Current lines are required for patient management    Medications    dexmedeTOMIDine 0.9 mcg/kg/hr (11/23/23 1440)    dextrose      fentaNYL 100 mcg/hr (11/23/23 1400)      amLODIPine  2.5 mg Oral Daily    aspirin  81 mg Oral or Feeding Tube Daily    atorvastatin  20 mg Oral or Feeding Tube QPM    [Held by provider] atovaquone  1,500 mg Per Feeding Tube Daily    B and C vitamin Complex with folic acid  5 mL Oral or Feeding Tube Daily    chlorhexidine  15 mL Mouth/Throat Q12H    clotrimazole   Topical BID    fiber modular (NUTRISOURCE FIBER)  1 packet Per Feeding Tube TID    heparin ANTICOAGULANT  5,000 Units Subcutaneous Q12H    insulin aspart  1-6 Units Subcutaneous Q4H    ipratropium - albuterol 0.5 mg/2.5 mg/3 mL  3 mL Nebulization 4x daily    levothyroxine  150 mcg Oral or Feeding Tube QAM AC    meropenem  500 mg Intravenous Q12H    methylPREDNISolone  62.5 mg Intravenous Q8H    pantoprazole  40 mg Per Feeding Tube QAM AC    pregabalin  75 mg Per Feeding Tube Daily    protein modular  1 packet Per Feeding Tube Daily    QUEtiapine  150 mg Oral or Feeding Tube BID    sertraline  150 mg Oral or Feeding Tube Daily    sodium chloride (PF)  10-40 mL Intracatheter Q7 Days    sodium  chloride (PF)  3 mL Intracatheter Q8H    vancomycin  1,000 mg Intravenous Q24H    vitamin D3  50 mcg Oral or Feeding Tube Daily       Data   Recent Labs   Lab 11/23/23  1647 11/23/23  1128 11/23/23  0753 11/23/23  0401 11/23/23  0027 11/22/23  2218 11/22/23  1625 11/22/23  1401 11/22/23  0406 11/22/23  0359 11/21/23  0803 11/21/23  0428 11/20/23  1203 11/20/23  0906 11/18/23  0811 11/18/23  0505   WBC  --   --   --  18.1*  --   --   --   --   --   --   --  14.3*  --  16.4*   < > 12.5*   HGB  --   --   --  7.2*  --   --   --   --   --   --   --  7.5*  --  7.8*   < > 9.1*   MCV  --   --   --  93  --   --   --   --   --   --   --  93  --  92   < > 88   PLT  --   --   --  198  --   --   --   --   --   --   --  194  --  203   < > 222   NA  --   --   --  144  --  142  --  146*  --  147*  147*   < > 148*  --   --    < > 144   POTASSIUM  --   --   --  5.2  --   --   --   --   --  5.2  --  4.2  --   --    < > 4.5   CHLORIDE  --   --   --  108*  --   --   --   --   --  109*  --  107  --   --    < > 101   CO2  --   --   --  30*  --   --   --   --   --  28  --  30*  --   --    < > 30*   BUN  --   --   --  82.7*  --   --   --   --   --  93.2*  --  87.1*  --   --    < > 81.6*   CR  --   --   --  0.81  --   --   --   --   --  0.89  0.89  --  0.94  --   --    < > 1.16*   ANIONGAP  --   --   --  6*  --   --   --   --   --  10  --  11  --   --    < > 13   BARBARA  --   --   --  8.4*  --   --   --   --   --  8.5*  --  8.7*  --   --    < > 8.6*   * 132* 119* 157*   < >  --    < >  --    < > 206*   < > 216*   < >  --    < > 156*   ALBUMIN  --   --   --   --   --   --   --   --   --   --   --   --   --   --   --  2.7*   PROTTOTAL  --   --   --   --   --   --   --   --   --   --   --   --   --   --   --  6.4   BILITOTAL  --   --   --   --   --   --   --   --   --   --   --   --   --   --   --  0.5   ALKPHOS  --   --   --   --   --   --   --   --   --   --   --   --   --   --   --  89   ALT  --   --   --   --   --   --   --   --   --    --   --   --   --   --   --  66*   AST  --   --   --   --   --   --   --   --   --   --   --   --   --   --   --  45    < > = values in this interval not displayed.     No results found for this or any previous visit (from the past 24 hour(s)).     Yandy Zavala MD  Anesthesiology Critical Care

## 2023-11-23 NOTE — PROGRESS NOTES
[facility]    Pulmonary, Allergy, Critical Care and Sleep Medicine    Pulmonary Consult Progress Note    Matthew Bowen MRN# [id number]   Age: 78 year old YOB: 1945     Date of Admission: 10/15/2023  Date of Service: 11/22/2023     ==================================================  INTERVAL EVENTS:  Seems to be continuing to slowly improve from earlier in the week.      CHANGES FOR TODAY:  Continue current therapies  Agree with tracheostomy      ==================================================    ASSESSMENT AND RECOMMENDATIONS:    ## Acute hypoxemic respiratory failure  ## Severe ARDS  ## IgG deficiency  ## Recurrent pneumonia    Matthew Bowen is a 78 year old female, w/ PMHx most significant for hypertension, ALAINA, chronic diastolic heart failure and CKD, admitted w/ acute hypoxemic respiratory failure.  She had been treated for pneumonia x3 in the last 2 to 3 months.  She received broad-spectrum antibiotics during this current admission, she has been intubated since 10/22, her chest imaging studies showed bilateral groundglass opacities and dense consolidation, she did have work-up with bronchoscopy and BAL, I did not see the cell count on the BAL, the cultures have been negative, she did have an autoimmune work-up that was negative except for mild elevation of the RF and borderline nonspecific elevated MARLI, notably she did have low IgG level.  She was  been treated with the high-dose steroids for 7 days with Solu-Medrol 125 mg once daily.  She was diuresed without much improvement in breathing.   Improved a bit through 11/13, then with acute worsening of unclear reason. Repeat CT scan 11/18 with worsening infiltrates.  With the worsening ARDS, and no specific etiology, this could be inflammatory pneumonitis such as acute interstitial pneumonitis (Hamman Rich syndrome), other etiologies include organizing, she did have some CT scan findings that could be consistent with  Atoll sign on the CT scan 10/21/2023.  Interestingly there are case reports of organizing pneumonia with immunoglobulin deficiency, IVIG treatment could be considered in these cases, but would continue aggressive steroids at this point.    She worsened on 11/18, possibly due to aspiration vs too rapid steroid reduction. Given her improvement now, I think it reasonable to proceed with tracheostomy. Recommend continuing high dose steroids through next week, then can try a very slow reduction.    Once she is stable I recommend continuing prednisone 120mg per day for at least 2 weeks, then down to 100mg for 2 weeks, then 80mg for 2 weeks, then decreasing by 5mg every 2 weeks until off.  She should be on PJP prophylaxis until she is below 20mg per day of prednisone.      Axel Mullins M.D.  Pulmonary & Critical Care  Pager: Click Here to page      I spent 45 minutes dedicated to this care so far today excluding procedures, including review of medical records, review of imaging (results & images), time with patient and time in documentation.    Pulmonary will continue to follow. We are in house at [facility] on Monday, Wednesday, and Friday. For assistance on other days, please page the on-call pulmonologist through [facility] or the .    ==================================================      PHYSICAL EXAM  /62   Pulse 60   Temp 97.9  F (36.6  C)   Resp 19   Ht 1.524 m (5')   Wt 82.5 kg (181 lb 14.1 oz)   SpO2 92%   BMI 35.52 kg/m            Vitals:    11/20/23 0500 11/21/23 0500 11/22/23 0535   Weight: 79.9 kg (176 lb 2.4 oz) 81.4 kg (179 lb 7.3 oz) 82.5 kg (181 lb 14.1 oz)             General: Intubated but sitting up in chair. Eyes open but unclear if she is tracking or responding. Appears calm.  Resp: Coarse rhonchi in the RUL, otherwise clear  Cardiac: RRR, NS1,S2, No m/r/g  Abdomen: Soft, positive bowel sounds  Extremities: Trace lower extremity edema  Skin: Warm and dry, no jaundice or  "rash  Neuro: Sedated, unresponsive      Recent Labs   Lab Test 11/21/23  0428 11/20/23  0906 11/19/23  0526   WBC 14.3* 16.4* 13.8*   RBC 2.85* 2.99* 3.33*   HGB 7.5* 7.8* 8.7*    203 212       Recent Labs   Lab Test 11/22/23  1625 11/22/23  1401 11/22/23  1228 11/22/23  0806 11/22/23  0406 11/22/23  0359 11/22/23  0006 11/21/23  2324 11/21/23  0803 11/21/23  0428 11/20/23  0823 11/20/23  0453   NA  --  146*  --   --   --  147*  147*  --  147*   < > 148*  --  146*   POTASSIUM  --   --   --   --   --  5.2  --   --   --  4.2  --  3.7  3.7   CHLORIDE  --   --   --   --   --  109*  --   --   --  107  --  104   CO2  --   --   --   --   --  28  --   --   --  30*  --  30*   BUN  --   --   --   --   --  93.2*  --   --   --  87.1*  --  78.3*   CR  --   --   --   --   --  0.89  0.89  --   --   --  0.94  --  1.01*  1.02*   *  --  119* 99   < > 206*   < >  --    < > 216*   < > 174*   BARBARA  --   --   --   --   --  8.5*  --   --   --  8.7*  --  8.8   MAG  --   --   --   --   --  2.4*  --   --   --  2.2  --  2.3    < > = values in this interval not displayed.           No results for input(s): \"CULT\" in the last 168 hours.      Recent Results (from the past 48 hour(s))   XR Chest Port 1 View    Narrative    EXAM: XR CHEST PORT 1 VIEW  LOCATION: [facility]  DATE: 11/22/2023    INDICATION: respiratory failure, ETT position.  COMPARISON: 11/20/2023      Impression    IMPRESSION: Stable moderate cardiomegaly. Right upper lobe consolidation and diffuse ill-defined hazy interstitial and patchy opacities in the remaining lung are unchanged. No significant pleural effusion or pneumothorax.    Endotracheal tube terminates 2.5 cm above the ashley. Right PICC terminates in the SVC. Feeding tube extends below the diaphragm beyond the field-of-view.         "

## 2023-11-23 NOTE — PROGRESS NOTES
[facility] ICU VENTILATOR RESPIRATORY NOTE     Date of Admission: 10/15/2023     Date of Intubation (most recent): 10/21/2023     Reason for Mechanical Ventilation: Respiratory failure     Number of Days on Mechanical Ventilation: 33     Met Criteria for Pressure Support Trial: No     Reason for No Pressure Support Trial: Peep 10, 50%     Significant Events Today: No significant events today     ETT appearance on chest x-ray: In good position, 4.7cm above ashley, secured 22 @ teeth     BS: Coarse, diminished  Secretions: Minimal, white, thick     Vent Mode: CMV/AC  (Continuous Mandatory Ventilation/ Assist Control)  FiO2 (%): 50 %  Resp Rate (Set): 18 breaths/min  Tidal Volume (Set, mL): 360 mL  PEEP (cm H2O): 10 cmH2O  Resp: 19      Plan:  Continue to wean down vent support     Yandy Ventura, RT

## 2023-11-23 NOTE — PROGRESS NOTES
[facility]    Medicine Progress Note - Hospitalist Service    Date of Admission:  10/15/2023    Assessment & Plan     Matthew Bowen is a 78 year old female w/PMH chronic hypoxic respiratory failure due to pulmonary HTN, ALAINA requiring CPAP, chronic diastolic HF, CAD, CKD stage 3, morbid obesity, HTN,  depression who presents with with fever, cough and found to have multifocal pneumonia. Acutely worsened respiratory status on 10/21 requiring intubation. Hospital course with prolonged respiratory failure with ARDS and difficulty weaning from vent, although tracheostomy has been deferred partially due to family preference and also due to the fact that increased procedural risk given high ongoing oxygen needs. CT chest obtained 11/18 with evidence of severe ground-glass opacities with traction bronchiectasis. Unclear how much of her ongoing disease is reversible. Pulmonology started high-dose steroids 11/18, with modest interval improvement in O2 weaning        PLAN     Acute Hypoxic Respiratory Failure w/ ARDS  Septic Shock 2/2 Multifocal Pneumonia, Recurrent  -Presented with recurrent PNA and has been treated with multiple courses of antibiotics:  Antibiotic history  Azithromycin 10/15-10/19  Ceftriaxone 10/15-10/17  Cefepime 10/18-31  Metronidazole 10/20-31  Vancomycin 10/21-31  Zosyn 11/15-11/19  Meropenem 11/19 - current  Vancomycin 11/19 - current  -Has had high oxygen needs meeting ARDS criteria and Flolan (has since been weaned off)  -Concern for non-infectious pneumonia and severe ground-glass opacities with traction bronchiectasis on imaging, started on high-dose steroids 11/18  -Patient having recurrent fevers, started on meropenem + vanco 11/19   -Sputum cx sent with candida but usually not clinically relevant, ID following  -Wean IV methylprednisolone over the next several days.     Hypernatremia  -Improved.  -Continue free water flushes.     Acute exacerbation of chronic heart  failure with preserved ejection fraction.  -Holding Bumex in setting of CARMEN and hypernatremia      Acute Metabolic encephalopathy  -Suspect multifactorial due to ICU delirium, prolonged versed infusion  -Requiring reinitiation of continuous IV Precedex 11/15 and on Seroquel 150 mg twice a day  -Currently doing well with a fentanyl drip     Acute kidney injury on CKD Stage III  -Avoid nephrotoxins as able.  -Recheck metabolic panel tomorrow.     Hypertension.  Continue amlodipine 2.5 mg a day.    Hypothyroidism.  -Continue levothyroxine 150 mcg a day.    Hyperlipidemia.  -Continue atorvastatin 20 mg a day.    GERD.  -Continue pantoprazole 40 mg a day.              Diet: NPO per Anesthesia Guidelines for Procedure/Surgery Except for: Meds  Adult Formula Drip Feeding: Continuous Promote; Orogastric tube; Goal Rate: 55 mL/hr x 22 hours (please hold 1 hour before and after levothyroxine); mL/hr; (11/13) Initial rate of 35 ml/hr advancing by 10 ml q 4 hrs to new goal rate of 55 ml/hr    DVT Prophylaxis: Heparin SQ  Aleman Catheter: PRESENT, indication: Strict 1-2 Hour I&O, Strict 1-2 Hour I&O;Wound Healing  Lines: PRESENT      PICC 10/22/23 Triple Lumen Right Basilic multiple med gtt. ready for use-Site Assessment: WDL      Cardiac Monitoring: ACTIVE order. Indication: Tachyarrhythmias, acute (48 hours)  Code Status: Full Code      Clinically Significant Risk Factors         # Hypernatremia: Highest Na = 151 mmol/L in last 2 days, will monitor as appropriate      # Hypoalbuminemia: Lowest albumin = 2.7 g/dL at 11/18/2023  5:05 AM, will monitor as appropriate                       Disposition Plan             Francis Batista DO  Hospitalist Service  [facility]  Securely message with [employer] (more info)  Text page via [employer] Paging/Directory   ______________________________________________________________________    Interval History   Intubated and sedated.    Physical Exam   Vital Signs: Temp: 97.7   F (36.5  C) Temp src: Bladder BP: (!) 149/77 Pulse: 82   Resp: 16 SpO2: (!) 88 % O2 Device: Mechanical Ventilator    Weight: 181 lbs 14.07 oz    Gen: Intubated and sedated.  Eyes:  PERRL, sclera anicteric.  OP: ET tube in place.  CV:  Regular, no murmurs.  Lung: Coarse breath sounds b/l, normal effort.  Ab:  +BS, soft.  Skin:  Warm, dry to touch.  No rash.  Ext:  No pitting edema LE b/l.      Medical Decision Making       39 MINUTES SPENT BY ME on the date of service doing chart review, history, exam, documentation & further activities per the note.      Data     I have personally reviewed the following data over the past 24 hrs:    18.1 (H)  \   7.2 (L)   / 198     144 108 (H) 82.7 (H) /  132 (H)   5.2 30 (H) 0.81 \       Imaging results reviewed over the past 24 hrs:   No results found for this or any previous visit (from the past 24 hour(s)).

## 2023-11-24 NOTE — PLAN OF CARE
ICU End of Shift Summary. For vital signs and complete assessments, please see documentation flowsheets.     Pertinent assessments: Neuro status somewhat improved, pt nodding yes/no to questions and more interactive with family at bedside. Dex and fent for RASS goal 0 to -1. SR/SB on tele. Adequate UOP via mitchell. Rectal tube remains in place. Wound care done per orders.   Major Shift Events: K+ 5.5, notified telehub- no new orders  Plan (Upcoming Events): CT, family meeting today   Discharge/Transfer Needs: TBD    Bedside Shift Report Completed: yes  Bedside Safety Check Completed: yes

## 2023-11-24 NOTE — PLAN OF CARE
Goal Outcome Evaluation:      Plan of Care Reviewed With: patient, family    Overall Patient Progress: no changeOverall Patient Progress: no change    Outcome Evaluation: repeat CT and family care conference at 1500 for goals of care    ICU End of Shift Summary.  For vital signs and complete assessments, please see documentation flowsheets.    Pertinent assessments: pt rass is -1, opens eyes. Continues on vent. Lungs course.SR/ SB. Edema +1-2. Foely and rectal tubes patent. TF running at goal. Dex decreased. Family at bedside   Major Shift Events: Repeat CT scan and family conference for goals of care.   Plan (Upcoming Events): TBD  Discharge/Transfer Needs: TBD    Bedside Shift Report Completed: yes  Bedside Safety Check Completed: yes  Notified provider about indwelling mitchell catheter discussed removal or continued need.    Did provider choose to remove indwelling mitchell catheter? YES    Provider's mitchell indication for keeping indwelling mitchell catheter: Indication for continued use: Wound Healing    Is there an order for indwelling mitchell catheter? YES    *If there is a plan to keep mitchell catheter in place at discharge daily notification with provider is not necessary, but please add a notation in the treatment team sticky note that the patient will be discharging with the catheter.

## 2023-11-24 NOTE — PROGRESS NOTES
[facility] ICU VENTILATOR RESPIRATORY NOTE  Date of Admission: 10/15/2023  Date of Intubation (most recent): 10/21/2023  Reason for Mechanical Ventilation: Respiratory failure  Number of Days on Mechanical Ventilation: 33  Met Criteria for Pressure Support Trial: No  Reason for No Pressure Support Trial: Pt is on PEEP +10  Significant Events Today: changed ETAD (ETT secure device) and suction tubings. Pt sated on chair with no complications and tolerated well.   ETT secured: 22 cm @ Teeth  ETT appearance on chest x-ray: 2.5 cm above ashley  Venous Blood Gas  Recent Labs   Lab 11/19/23  0939   PHV 7.41   PCO2V 52*   PO2V 34   HCO3V 33*   LEON 7.2*   O2PER 60     Vent Mode: CMV/AC  (Continuous Mandatory Ventilation/ Assist Control)  FiO2 (%): 50 %  Resp Rate (Set): 18 breaths/min  Tidal Volume (Set, mL): 360 mL  PEEP (cm H2O): 10 cmH2O  Resp: 21        Plan:  CT before family conference. RT to continue to monitor and assess the pt's current respiratory status and needs.      Gregory Blanco, RT

## 2023-11-24 NOTE — PROGRESS NOTES
[facility] ICU VENTILATOR RESPIRATORY NOTE     Date of Admission: 10/15/2023     Date of Intubation (most recent): 10/21/2023     Reason for Mechanical Ventilation: Respiratory failure     Number of Days on Mechanical Ventilation: 34     Met Criteria for Pressure Support Trial: No     Reason for No Pressure Support Trial: Peep 10, 50%     Significant Events Today: No significant events today     ETT appearance on chest x-ray: In good position, 4.7cm above ashley, secured 22 @ teeth     BS: Coarse, diminished  Secretions: Minimal, white, thick     Vent Mode: CMV/AC  (Continuous Mandatory Ventilation/ Assist Control)  FiO2 (%): 50 %  Resp Rate (Set): 18 breaths/min  Tidal Volume (Set, mL): 360 mL  PEEP (cm H2O): 10 cmH2O  Resp: 19      Plan:  Continue to wean down vent support     Yandy Ventura, RT

## 2023-11-24 NOTE — PROGRESS NOTES
[facility]  Infectious Disease Progress Note          Assessment and Plan:   Date of Admission:  10/15/2023  Date of Consult (When I saw the patient): 10/20/23        Assessment & Plan  Matthew Bowen is a 78 year old who was admitted on 10/15/2023.      Impression: 1 78-year-old female, some chronic underlying lung disease, on oxygen, bronchiectasis probable, some history of pneumonia but now 6 weeks or so of worsening respiratory symptoms, 2 prior courses of antibiotics, now admitted with infiltrates, no major sepsis but elevated procalcitonin and white count implying bacterial, not really improving on antibiotics concern for more chronic type pathogen in this patient with underlying lung disease by this history     2 chronic lung disease on oxygen some underlying bronchiectasis, some prior pneumonias  3 chronic kidney disease     REC 1 On  meropenem and Vanco,  no new microbiology info of note the bronchoscopy without new pathogens very unlikely antibiotics or antibiotic change are going to be the answer but of note some improvement with antibiotics,  looks better up in the chair oxygenation somewhat better if this continues probably completed course again, creatinine okay at this point but fairly unlikely Vanco as needed and may be stop it in the next couple of days unless we have growth and simply do meropenem  2 some low-grade fever again, only growth in cultures with Candida, definitely not a respiratory pathogen although the patient is at some risk for systemic Candida infection based on colonization, for now holding off on antifungal therapy  Temp down, basically the same, latest cultures again negative except for Dora  Slight improvement today, no new positive micro awake and alert sitting in the chair                Interval History:     Still intubated and hypoxic but awake and sitting in chair.  already had bronchoscopy last week with no new pathogens, single blood culture  should be a contaminant, follow-up cultures are so far negative, no new positive micro   seen improvement, temp down, unclear whether antibiotic related or not still no new microbiologic growth only Candida in sputum.  Only 1 blood culture with staph epi almost certainly not an issue but is getting Vanco              Medications:      [START ON 11/25/2023] amLODIPine  5 mg Oral or Feeding Tube Daily    aspirin  81 mg Oral or Feeding Tube Daily    atorvastatin  20 mg Oral or Feeding Tube QPM    [Held by provider] atovaquone  1,500 mg Per Feeding Tube Daily    B and C vitamin Complex with folic acid  5 mL Oral or Feeding Tube Daily    chlorhexidine  15 mL Mouth/Throat Q12H    clotrimazole   Topical BID    enoxaparin ANTICOAGULANT  40 mg Subcutaneous Q24H    fiber modular (NUTRISOURCE FIBER)  1 packet Per Feeding Tube TID    insulin aspart  1-6 Units Subcutaneous Q4H    ipratropium - albuterol 0.5 mg/2.5 mg/3 mL  3 mL Nebulization 4x daily    levothyroxine  150 mcg Oral or Feeding Tube QAM AC    meropenem  500 mg Intravenous Q12H    methylPREDNISolone  62.5 mg Intravenous Q8H    pantoprazole  40 mg Per Feeding Tube QAM AC    pregabalin  75 mg Per Feeding Tube Daily    protein modular  1 packet Per Feeding Tube Daily    QUEtiapine  150 mg Oral or Feeding Tube BID    sertraline  150 mg Oral or Feeding Tube Daily    sodium chloride (PF)  10-40 mL Intracatheter Q7 Days    sodium chloride (PF)  3 mL Intracatheter Q8H    vancomycin  1,000 mg Intravenous Q24H    vitamin D3  50 mcg Oral or Feeding Tube Daily                  Physical Exam:   Blood pressure 125/58, pulse 62, temperature 97.3  F (36.3  C), resp. rate 24, height 1.524 m (5'), weight 81.7 kg (180 lb 2.9 oz), SpO2 94%.  Wt Readings from Last 2 Encounters:   11/24/23 81.7 kg (180 lb 2.9 oz)   02/28/22 94.3 kg (208 lb)     Vital Signs with Ranges  Temp:  [97.2  F (36.2  C)-99  F (37.2  C)] 97.3  F (36.3  C)  Pulse:  [45-84] 62  Resp:  [14-38] 24  BP:  (101-175)/(48-98) 125/58  FiO2 (%):  [45 %-50 %] 45 %  SpO2:  [89 %-97 %] 94 %    Constitutional: Intubated and sedated about same slight improvement in oxygenation     Lungs: Clear to auscultation bilaterally, no crackles or wheezing   Cardiovascular: Regular rate and rhythm, normal S1 and S2, and no murmur noted   Abdomen: Normal bowel sounds, soft, non-distended, non-tender   Skin: No rashes, no cyanosis, no edema   Other:           Data:   All microbiology laboratory data reviewed.  Recent Labs   Lab Test 11/23/23  0401 11/21/23  0428 11/20/23  0906   WBC 18.1* 14.3* 16.4*   HGB 7.2* 7.5* 7.8*   HCT 25.5* 26.6* 27.4*   MCV 93 93 92    194 203     Recent Labs   Lab Test 11/24/23  0416 11/23/23  0401 11/22/23  0359   CR 0.67 0.81 0.89  0.89     Recent Labs   Lab Test 11/18/23  1811   SED 89*     Recent Labs   Lab Test 02/12/19  0010   CULT Light growth  Normal deshaun

## 2023-11-24 NOTE — PROGRESS NOTES
[facility]    Medicine Progress Note - Hospitalist Service    Date of Admission:  10/15/2023    Assessment & Plan     Matthew Bowen is a 78 year old female with history of chronic hypoxic respiratory failure due to pulmonary HTN, ALAINA (uses CPAP with sleep at baseline), and chronic diastolic congestive heart failure. She also has history of CAD, CKD stage 3, obesity, HTN,  and depression. She presented to the ED on 10/15/23 with fever, cough, and falls. ED evaluation showed low grade temperature elevation in the 99s. She was hypoxic, requiring supplemental oxygen at 2 lpm. Laboratory evaluation showed WBC 21.5, procalcitonin 0.16, creatinine 1.21, and bicarb 30. CXR suggested pulmonary vascular congestion. CT of chest showed lingular and bilateral upper lung pneumonia. No traumatic injury was found on imaging. She was admitted to the hospital and treated for multifocal pneumonia with Rocephin and Zithromax. She developed worsened respiratory status on 10/21 requiring transfer to ICU and intubation. Hospital course was prolonged respiratory failure, ARDS, and difficulty weaning from vent. Tracheostomy was deferred partially due to family preference and also due to the fact that increased procedural risk given high ongoing oxygen needs. CT chest obtained 11/18 suggested severe ground-glass opacities with traction bronchiectasis. It was unclear how much of her disease is reversible. Pulmonology started high-dose steroids on 11/18, with modest interval improvement in O2 weaning.     Problem list:    Multifocal community acquired pneumonia  Recent pneumonias prior to admission  Possible bronchiectasis  Sepsis (leukocytosis and fever)  Acute respiratory failure  Intubation   Adult respiratory distress syndrome  -Was initially admitted to medicine and treated with Rocephin and Zithromax. Deteriorated and transferred to the ICU and intubated on 10/21/23.   -Antibiotic coverage changed to cefepime on  10/25 through 10/31.  -Received Zosyn from 11/15 through 11/19.   -Meropenem and vancomycin were started 11/19- continue  -Continue vent support per intensivist  -Continue solumedrol 62.5 mg IV every 8 hours  -Will need ENT consult Monday for trach (I consulted today in hopes of facilitating procedure for early next week but on-call ENT is not familiar with the process and does not currently do tracheostomies- it is fine to consult Monday).    Chronic hypoxic respiratory failure  Obstructive sleep apnea  Chronic diastolic heart failure  Pulmonary hypertension   -Is normally on 2 lpm of oxygen  -PTA torsemide is on hold  -On vent. If extubated will need CPAP as PTA     Falls prior to admission  Subacute rib fracture  -imaging negative for acute fractures and bleeding but did show subacute rib fractures     CKD stage 3  Acute kidney injury earlier in stay  -Cr 1.21 on admission and peaked in 2.1 range but has now normalized  -holding diuretics as discussed above; may need to resume diuretic soon (PTA was on torsemide 60 mg each am)     HTN  -Continue amlodipine; increase to PTA dose of 5 mg daily tomorrow  -PTA hydralazine and imdur on hold     Obesity    CAD  -Stable  -Continue PTA ASA, statin, and plavix    Hypothyroidism  -Continue PTA levothyroxine    Mood disorder  -Continue PTA Lyrica and Zoloft        Diet: NPO per Anesthesia Guidelines for Procedure/Surgery Except for: Meds  Adult Formula Drip Feeding: Continuous Promote; Orogastric tube; Goal Rate: 55 mL/hr x 22 hours (please hold 1 hour before and after levothyroxine); mL/hr; (11/13) Initial rate of 35 ml/hr advancing by 10 ml q 4 hrs to new goal rate of 55 ml/hr    DVT Prophylaxis: Heparin subcutaneous- change to Lovenox as renal function has normalized  Aleman Catheter: PRESENT, indication: Strict 1-2 Hour I&O, Wound Healing;Strict 1-2 Hour I&O  Lines: PRESENT      PICC 10/22/23 Triple Lumen Right Basilic multiple med gtt. ready for use-Site Assessment:  WDL      Cardiac Monitoring: ACTIVE order. Indication: Tachyarrhythmias, acute (48 hours)  Code Status: Full Code      Clinically Significant Risk Factors        # Hyperkalemia: Highest K = 5.5 mmol/L in last 2 days, will monitor as appropriate  # Hypernatremia: Highest Na = 146 mmol/L in last 2 days, will monitor as appropriate      # Hypoalbuminemia: Lowest albumin = 2.7 g/dL at 11/18/2023  5:05 AM, will monitor as appropriate                       Disposition Plan    unsure         Erich Kerr MD  Hospitalist Service  [facility]  Securely message with [employer] (more info)  Text page via [employer] Paging/Directory   ______________________________________________________________________    Interval History   No events overnight.     Physical Exam   Vital Signs: Temp: 98.4  F (36.9  C) Temp src: Bladder BP: (!) 156/83 Pulse: 62   Resp: (!) 33 SpO2: 96 % O2 Device: Mechanical Ventilator    Weight: 180 lbs 2.9 oz    GENERAL:  Comfortable. Cooperative.  PSYCH: pleasant, oriented, No acute distress.  EYES: PERRLA, Normal conjunctiva.  HEART:  Regular rate and rhythm. No JVD. Pulses normal. No edema.  LUNGS:  Clear to auscultation, normal Respiratory effort.  ABDOMEN:  Soft, no hepatosplenomegaly, normal bowel sounds.  EXTREMETIES: No clubbing, cyanosis or ischemia  SKIN:  Dry to touch, No rash.      Medical Decision Making       65 MINUTES SPENT BY ME on the date of service doing chart review, history, exam, documentation & further activities per the note.      Data     I have personally reviewed the following data over the past 24 hrs:    N/A  \   N/A   / N/A     N/A N/A N/A /  146 (H)   5.5 (H) N/A 0.67 \       Imaging results reviewed over the past 24 hrs:   Recent Results (from the past 24 hour(s))   CT Chest w/o Contrast    Narrative    CT CHEST WITHOUT CONTRAST 11/24/2023 9:47 AM     HISTORY: Follow up on persistent respiratory failure, pulmonary  changes and ventilator  dependency.    TECHNIQUE: CT scan of the chest was performed without IV contrast.  Multiplanar reformats were obtained. Dose reduction techniques were  used.  CONTRAST: None.  COMPARISON: 11/18/2023    FINDINGS:     LUNGS AND PLEURA: Pulmonary infiltrates again seen throughout both  lungs. There is increased airspace consolidation with air bronchograms  in both lower lobes and in the posterior right upper lobe when  compared to previous. There are groundglass opacities with septal  thickening throughout the remainder of both lungs. Mild cylindrical  bronchiectasis. No pleural effusion or pneumothorax.    MEDIASTINUM/AXILLAE: Endotracheal tube in good position above the  ashley. A PICC line tip is in the SVC. Mild mediastinal  lymphadenopathy likely reactive. Severe coronary artery calcification.    UPPER ABDOMEN: Negative.      Impression    IMPRESSION:  1.  Extensive bilateral pulmonary infiltrates again seen. Increasing  airspace consolidation in the right upper lobe and both lower lobes  when compared to previous.     Recent Labs   Lab 11/24/23  0817 11/24/23  0416 11/24/23  0413 11/24/23  0014 11/23/23  0753 11/23/23  0401 11/23/23  0027 11/22/23  2218 11/22/23  1625 11/22/23  1401 11/22/23  0406 11/22/23  0359 11/21/23  0803 11/21/23  0428 11/20/23  1203 11/20/23  0906 11/18/23  0811 11/18/23  0505   WBC  --   --   --   --   --  18.1*  --   --   --   --   --   --   --  14.3*  --  16.4*   < > 12.5*   HGB  --   --   --   --   --  7.2*  --   --   --   --   --   --   --  7.5*  --  7.8*   < > 9.1*   MCV  --   --   --   --   --  93  --   --   --   --   --   --   --  93  --  92   < > 88   PLT  --   --   --   --   --  198  --   --   --   --   --   --   --  194  --  203   < > 222   NA  --   --   --   --   --  144  --  142  --  146*  --  147*  147*   < > 148*  --   --    < > 144   POTASSIUM  --  5.5*  --   --   --  5.2  --   --   --   --   --  5.2  --  4.2  --   --    < > 4.5   CHLORIDE  --   --   --   --   --  108*  --    --   --   --   --  109*  --  107  --   --    < > 101   CO2  --   --   --   --   --  30*  --   --   --   --   --  28  --  30*  --   --    < > 30*   BUN  --   --   --   --   --  82.7*  --   --   --   --   --  93.2*  --  87.1*  --   --    < > 81.6*   CR  --  0.67  --   --   --  0.81  --   --   --   --   --  0.89  0.89  --  0.94  --   --    < > 1.16*   ANIONGAP  --   --   --   --   --  6*  --   --   --   --   --  10  --  11  --   --    < > 13   BARBARA  --   --   --   --   --  8.4*  --   --   --   --   --  8.5*  --  8.7*  --   --    < > 8.6*   *  --  143* 159*   < > 157*   < >  --    < >  --    < > 206*   < > 216*   < >  --    < > 156*   ALBUMIN  --   --   --   --   --   --   --   --   --   --   --   --   --   --   --   --   --  2.7*   PROTTOTAL  --   --   --   --   --   --   --   --   --   --   --   --   --   --   --   --   --  6.4   BILITOTAL  --   --   --   --   --   --   --   --   --   --   --   --   --   --   --   --   --  0.5   ALKPHOS  --   --   --   --   --   --   --   --   --   --   --   --   --   --   --   --   --  89   ALT  --   --   --   --   --   --   --   --   --   --   --   --   --   --   --   --   --  66*   AST  --   --   --   --   --   --   --   --   --   --   --   --   --   --   --   --   --  45    < > = values in this interval not displayed.

## 2023-11-25 NOTE — PROGRESS NOTES
[facility] ICU VENTILATOR RESPIRATORY NOTE    Date of Admission: 10/15/23    Date of Intubation (most recent): 10/21/23    Reason for Mechanical Ventilation: Respiratory Failure    Number of Days on Mechanical Ventilation: 35    Met Criteria for Pressure Support Trial: No    Reason for No Pressure Support Trial: Peep >+8    Significant Events Today: Pt remained stable throughout the shift.     ABG Results:   Venous Blood Gas  Recent Labs   Lab 11/19/23  0939   PHV 7.41   PCO2V 52*   PO2V 34   HCO3V 33*   LEON 7.2*   O2PER 60       ETT appearance on chest x-ray: ETT is at 4.7cm above the ashley    Plan: ENT consult Monday for Trach    Vent Mode: CMV/AC  (Continuous Mandatory Ventilation/ Assist Control)  FiO2 (%): (S) 40 %  Resp Rate (Set): 18 breaths/min  Tidal Volume (Set, mL): 360 mL  PEEP (cm H2O): 10 cmH2O  Resp: 20    Bs coarse/diminished. Suctioned for thick tan secretions. Duoneb given in line. Will continue to assess and monitor.    Broderick Berry RT on 11/25/2023 at 5:34 AM

## 2023-11-25 NOTE — PLAN OF CARE
Assumed care of this patient from [id number]  Goal Outcome Evaluation:        Plan of Care Reviewed With: patient, spouse    Overall Patient Progress: improving       ICU End of Shift Summary.  For vital signs and complete assessments, please see documentation flowsheets.      Pertinent assessments:   Neuro: RASS -1. On Precedex and Fentanyl, CPOT 0,  interact well with family  Cardiac: Tele; SR/ SB, BP stable  Resp: LS: dim/ coarse on full vent support  GI: Obese, tolerated TF, loose stools continues, rectal tube in place  : Aleman in place, adequate UOP  Skin: no changes,   Lines: PICC and PIV  Drips: Fentanyl and Precedex    Major Shift Events:   K this am 5.5, notified MD at  1530, order placed to recheck, was again 5.5, MD notified, Oswaldo ordered and given  Care conference completed  Up on chair for few hours  Plan (Upcoming Events): continue current care, trach and Peg next week  Discharge/Transfer Needs: TBD     Bedside Shift Report Completed : yes  Bedside Safety Check Completed: yes

## 2023-11-25 NOTE — PROGRESS NOTES
[facility] Intensive Care Progress Note    Date of Service (when I saw the patient): 11/24/2023     Assessment & Plan   Matthew Bowen is a 78 year old female with PMH chronic hypoxic respiratory failure, recurrent pulmonary infections, bronchiectasis who was admitted on 10/15/2023 with weakness and falls, course complicated by acute on chronic hypoxic resp failure requiring intubation on 10/22. Now with prolonged hospital stay further complicated by respiratory deterioration on 11/14, possible aspiration event and persistent ventilatory requirement.      Main Plans for Today   Continue PEEP to 10, wean FiO2 as possible to keep SPO2 over 89%, RASS goal 0 to -1, minimize sedation to RASS goal 0 to -1.  Stop vancomycin as MRSA swab is negative.  Continue meropenem.  Family conference this afternoon along with the pulmonary doctor, Dr. Mullins and the nurse concluded that the family desires to proceed with tracheostomy.  We spent approximately 30 minutes responding to their questions and discussing the patient's clinical.     Neurology:  Encephalopathy of critical illness. Sedation in a patient with prolonged mechanical ventilation who was on Versed infusion.  History of generalized weakness and frequent falls.  The dyssynchrony with the vent improved after fentanyl infusion was started. Plan: Minimize Precedex, and fentanyl infusion.  RASS goal 0 to -1.  PT/OT.         Cardiovascular:  History of hypertension coronary artery disease.  Shock resolved.  TTE on 10/21 shows normal biventricular function.  Plan: Monitor.  Continue aspirin, Lipitor, Norvasc.  -     Pulmonary:        Acute on chronic mixed hypercapnic And hypoxemic respiratory failure.  Probable multifocal organizing pneumonia.  Subcutaneous emphysema and trace pneumomediastinum. Oxygen dependent at home on 2 L oxygen. History of bronchiectasis and ALAINA. Prolonged mechanical ventilation for more than a month.  Worsening respiratory status since November 14, now gradually improving.  Intermittent audible ETT leak during mechanical ventilation, without affecting tidal volumes. On  broad-spectrum antibiotics, high-dose steroids, DuoNebs.  Plan: Continue protective mechanical ventilation, Wean PEEP to 10, wean FiO2 as possible to keep SPO2 over 89%.  family seems to be interested in tracheostomy function continues to improve. Repeat chest CT before the family conference.  Continue to taper high-dose steroids.    Vent Mode: CMV/AC  (Continuous Mandatory Ventilation/ Assist Control)  FiO2 (%): 45 %  Resp Rate (Set): 18 breaths/min  Tidal Volume (Set, mL): 360 mL  PEEP (cm H2O): 10 cmH2O  Resp: 22    Renal  Nonoliguric acute kidney injury.   History of CKD.  Hypernatremia resolved.  Borderline hypokalemia. Plan: Decrease free water flushes.  Monitor BMP and urine output.  Start Lokelma.      ID:         No clear infectious pathogen found during admission. Multiple courses of antibiotics given.  Pancultured and spectrum antibiotics started on 11/19/23.  Febrile on 11/22/23. Plan: Continue Meropenem, stop vancomycin since MRSA swab is negative, and follow-up culture results.     GI  No GI concerns.  Tolerating enteral feeding.  Plan: Monitor.        Nutrition  Malnutrition risk.  Plan: Continue enteral feeding via NG.        Endocrine:  Steroid-induced hyperglycemia.  History of hypothyroidism. Plan: Monitor and treat glycemia.  Continue levothyroxine.         Heme/Onc:  Anemia of critical illness.  No current signs of bleeding. Leukocytosis likely secondary to steroid use.  Plan: Monitor CBC.         DVT Prophylaxis: Heparin SQ  GI Prophylaxis: PPI     Restraints: Restraints for medical healing needed: YES     Family update by me today: Yes        Yandy Zavala MD    Time Spent on this Encounter   Billing:  I spent 57 minutes bedside and on the inpatient unit today managing the critical care of Matthew ONEILL Andrés in relation  to the issues listed in this note.      Physical Exam   Temp: 97.3  F (36.3  C) Temp src: Bladder Temp  Min: 97.2  F (36.2  C)  Max: 98.6  F (37  C) BP: 117/68 Pulse: 59   Resp: 22 SpO2: 94 % O2 Device: Mechanical Ventilator    Vitals:    11/21/23 0500 11/22/23 0535 11/24/23 0440   Weight: 81.4 kg (179 lb 7.3 oz) 82.5 kg (181 lb 14.1 oz) 81.7 kg (180 lb 2.9 oz)     I/O last 3 completed shifts:  In: 2226.67 [I.V.:556.67; NG/GT:845]  Out: 2150 [Urine:1650; Stool:500]    Neurologic: Moderately sedated, occasionally interactive, weak overall.   Cardiovascular: RRR, no murmurs.   Respiratory: ET tube in situ, audible leak, coarse breath sounds bilaterally.   GI: Soft nontender non distended.   Skin/Extremities: Moderate edema, pulses all 4 extremities.   Lines: No erythema or discharge at entry site for PICC Line  Current lines are required for patient management.    Medications    dexmedeTOMIDine 0.6 mcg/kg/hr (11/24/23 1813)    dextrose      fentaNYL 100 mcg/hr (11/24/23 1800)      [START ON 11/25/2023] amLODIPine  5 mg Oral or Feeding Tube Daily    aspirin  81 mg Oral or Feeding Tube Daily    atorvastatin  20 mg Oral or Feeding Tube QPM    [Held by provider] atovaquone  1,500 mg Per Feeding Tube Daily    B and C vitamin Complex with folic acid  5 mL Oral or Feeding Tube Daily    chlorhexidine  15 mL Mouth/Throat Q12H    clotrimazole   Topical BID    enoxaparin ANTICOAGULANT  40 mg Subcutaneous Q24H    fiber modular (NUTRISOURCE FIBER)  1 packet Per Feeding Tube TID    insulin aspart  1-6 Units Subcutaneous Q4H    ipratropium - albuterol 0.5 mg/2.5 mg/3 mL  3 mL Nebulization 4x daily    levothyroxine  150 mcg Oral or Feeding Tube QAM AC    meropenem  500 mg Intravenous Q12H    methylPREDNISolone  62.5 mg Intravenous Q8H    pantoprazole  40 mg Per Feeding Tube QAM AC    pregabalin  75 mg Per Feeding Tube Daily    protein modular  1 packet Per Feeding Tube Daily    QUEtiapine  150 mg Oral or Feeding Tube BID     sertraline  150 mg Oral or Feeding Tube Daily    sodium chloride (PF)  10-40 mL Intracatheter Q7 Days    sodium chloride (PF)  3 mL Intracatheter Q8H    vitamin D3  50 mcg Oral or Feeding Tube Daily       Data   Recent Labs   Lab 11/24/23  1642 11/24/23  1609 11/24/23  1216 11/24/23  0817 11/24/23  0416 11/23/23  0753 11/23/23  0401 11/23/23  0027 11/22/23  2218 11/22/23  0406 11/22/23  0359 11/21/23  0803 11/21/23  0428 11/20/23  1203 11/20/23  0906 11/18/23  0811 11/18/23  0505   WBC  --   --   --   --   --   --  18.1*  --   --   --   --   --  14.3*  --  16.4*   < > 12.5*   HGB  --   --   --   --   --   --  7.2*  --   --   --   --   --  7.5*  --  7.8*   < > 9.1*   MCV  --   --   --   --   --   --  93  --   --   --   --   --  93  --  92   < > 88   PLT  --   --   --   --   --   --  198  --   --   --   --   --  194  --  203   < > 222     --   --   --   --   --  144  --  142   < > 147*  147*   < > 148*  --   --    < > 144   POTASSIUM 5.5*  --   --   --  5.5*  --  5.2  --   --   --  5.2  --  4.2  --   --    < > 4.5   CHLORIDE 106  --   --   --   --   --  108*  --   --   --  109*  --  107  --   --    < > 101   CO2 30*  --   --   --   --   --  30*  --   --   --  28  --  30*  --   --    < > 30*   BUN 64.2*  --   --   --   --   --  82.7*  --   --   --  93.2*  --  87.1*  --   --    < > 81.6*   CR 0.63  --   --   --  0.67  --  0.81  --   --   --  0.89  0.89  --  0.94  --   --    < > 1.16*   ANIONGAP 8  --   --   --   --   --  6*  --   --   --  10  --  11  --   --    < > 13   BARBARA 8.8  --   --   --   --   --  8.4*  --   --   --  8.5*  --  8.7*  --   --    < > 8.6*   * 152* 107*   < >  --    < > 157*   < >  --    < > 206*   < > 216*   < >  --    < > 156*   ALBUMIN  --   --   --   --   --   --   --   --   --   --   --   --   --   --   --   --  2.7*   PROTTOTAL  --   --   --   --   --   --   --   --   --   --   --   --   --   --   --   --  6.4   BILITOTAL  --   --   --   --   --   --   --   --   --   --   --   --    --   --   --   --  0.5   ALKPHOS  --   --   --   --   --   --   --   --   --   --   --   --   --   --   --   --  89   ALT  --   --   --   --   --   --   --   --   --   --   --   --   --   --   --   --  66*   AST  --   --   --   --   --   --   --   --   --   --   --   --   --   --   --   --  45    < > = values in this interval not displayed.     Recent Results (from the past 24 hour(s))   CT Chest w/o Contrast    Narrative    CT CHEST WITHOUT CONTRAST 11/24/2023 9:47 AM     HISTORY: Follow up on persistent respiratory failure, pulmonary  changes and ventilator dependency.    TECHNIQUE: CT scan of the chest was performed without IV contrast.  Multiplanar reformats were obtained. Dose reduction techniques were  used.  CONTRAST: None.  COMPARISON: 11/18/2023    FINDINGS:     LUNGS AND PLEURA: Pulmonary infiltrates again seen throughout both  lungs. There is increased airspace consolidation with air bronchograms  in both lower lobes and in the posterior right upper lobe when  compared to previous. There are groundglass opacities with septal  thickening throughout the remainder of both lungs. Mild cylindrical  bronchiectasis. No pleural effusion or pneumothorax.    MEDIASTINUM/AXILLAE: Endotracheal tube in good position above the  ashley. A PICC line tip is in the SVC. Mild mediastinal  lymphadenopathy likely reactive. Severe coronary artery calcification.    UPPER ABDOMEN: Negative.      Impression    IMPRESSION:  1.  Extensive bilateral pulmonary infiltrates again seen. Increasing  airspace consolidation in the right upper lobe and both lower lobes  when compared to previous.    INES COYNE MD         SYSTEM ID:  [id number]      Yandy Zavala MD  Anesthesiology Critical Care

## 2023-11-25 NOTE — PROGRESS NOTES
[facility]    Medicine Progress Note - Hospitalist Service    Date of Admission:  10/15/2023    Assessment & Plan   Matthew Bowen is a 78 year old female with history of chronic hypoxic respiratory failure due to pulmonary HTN, ALAINA (uses CPAP with sleep at baseline), and chronic diastolic congestive heart failure. She also has history of CAD, CKD stage 3, obesity, HTN,  and depression. She presented to the ED on 10/15/23 with fever, cough, and falls. ED evaluation showed low grade temperature elevation in the 99s. She was hypoxic, requiring supplemental oxygen at 2 lpm. Laboratory evaluation showed WBC 21.5, procalcitonin 0.16, creatinine 1.21, and bicarb 30. CXR suggested pulmonary vascular congestion. CT of chest showed lingular and bilateral upper lung pneumonia. No traumatic injury was found on imaging. She was admitted to the hospital and treated for multifocal pneumonia with Rocephin and Zithromax. She developed worsened respiratory status on 10/21 requiring transfer to ICU and intubation. Hospital course was prolonged respiratory failure, ARDS, and difficulty weaning from vent. Tracheostomy was deferred partially due to family preference and also due to the fact that increased procedural risk given high ongoing oxygen needs. CT chest obtained 11/18 suggested severe ground-glass opacities with traction bronchiectasis. It was unclear how much of her disease is reversible. Pulmonology started high-dose steroids on 11/18, with modest interval improvement in O2 weaning.     Problem list:    Multifocal community acquired pneumonia  Recent pneumonias prior to admission  Possible bronchiectasis  Sepsis (leukocytosis and fever)  Acute respiratory failure  Intubation   Adult respiratory distress syndrome  -Was initially admitted to medicine and treated with Rocephin and Zithromax. Deteriorated and transferred to the ICU and intubated on 10/21/23.   -Antibiotic coverage changed to cefepime on  10/25 through 10/31.  -Received Zosyn from 11/15 through 11/19.   -Meropenem and vancomycin were started 11/19- continue  -Continue vent support per intensivist  -Continue solumedrol 62.5 mg IV every 8 hours  -Will need ENT consult Monday for trach (I consulted today in hopes of facilitating procedure for early next week but on-call ENT is not familiar with the process and does not currently do tracheostomies- it is fine to consult Monday).    Chronic hypoxic respiratory failure  Obstructive sleep apnea  Chronic diastolic heart failure  Pulmonary hypertension   -Is normally on 2 lpm of oxygen  -PTA torsemide is on hold  -On vent. If extubated will need CPAP as PTA    Leukocytosis  -Suspect due to infection and steroids. Stable but still high. Monitor with daily CBC    Anemia  -Has been persistent during stay. HGB was 11.6 on presentation and trended down- generally in 7-8's.   -Suspect related to critical illness, phlebotomy, etc.  -On Protonix for ulcer prevention  -Had transfusion of 1 unit RBCs on 11/17  -HGB 6.9 this am. Type and screen, transfuse, and recheck HGB at 1400.    Hyperkalemia  -Potassium was 5.5 yesterday. Lokelma given and 5.4 this am.  -No obvious cause such as CARMEN, medicine, IVF, acidosis, etc.  -Lokelma 15 grams once today. Repeat BMP at 1400    Hypernatremia, mild  -Increase free water from 60 ml q 2 hours to 75 ml q 2 hours.   -Repeat BMP at 1400 and daily.     Falls prior to admission  Subacute rib fracture  -imaging negative for acute fractures and bleeding but did show subacute rib fractures     CKD stage 3  Acute kidney injury earlier in stay  -Cr 1.21 on admission and peaked in 2.1 range but has now normalized  -holding diuretics as discussed above; may need to resume diuretic at some point (PTA was on torsemide 60 mg each am)     HTN  -Continue amlodipine; increase to PTA dose of 5 mg daily tomorrow  -PTA hydralazine and imdur on hold     Obesity    CAD  -Stable  -Continue PTA ASA,  statin, and plavix    Hypothyroidism  -Continue PTA levothyroxine    Mood disorder  -Continue PTA Lyrica and Zoloft          Diet: NPO per Anesthesia Guidelines for Procedure/Surgery Except for: Meds  Adult Formula Drip Feeding: Continuous Promote; Orogastric tube; Goal Rate: 55 mL/hr x 22 hours (please hold 1 hour before and after levothyroxine); mL/hr; (11/13) Initial rate of 35 ml/hr advancing by 10 ml q 4 hrs to new goal rate of 55 ml/hr    DVT Prophylaxis: Enoxaparin (Lovenox) SQ  Aleman Catheter: PRESENT, indication: Strict 1-2 Hour I&O, Wound Healing;Strict 1-2 Hour I&O  Lines: PRESENT      PICC 10/22/23 Triple Lumen Right Basilic multiple med gtt. ready for use-Site Assessment: WDL      Cardiac Monitoring: ACTIVE order. Indication: Tachyarrhythmias, acute (48 hours)  Code Status: Full Code      Clinically Significant Risk Factors        # Hyperkalemia: Highest K = 5.5 mmol/L in last 2 days, will monitor as appropriate  # Hypernatremia: Highest Na = 146 mmol/L in last 2 days, will monitor as appropriate      # Hypoalbuminemia: Lowest albumin = 2.7 g/dL at 11/18/2023  5:05 AM, will monitor as appropriate                       Disposition Plan    unsure         Erich Kerr MD  Hospitalist Service  [facility]  Securely message with [employer] (more info)  Text page via [facility] Paging/Directory   ______________________________________________________________________    Interval History   No events overnight. Plan is to proceed with trach early next week.     Physical Exam   Vital Signs: Temp: (!) 96.7  F (35.9  C) Temp src: Tympanic BP: (!) 145/80 Pulse: (!) 43   Resp: 13 SpO2: 98 % O2 Device: Mechanical Ventilator    Weight: 180 lbs 2.9 oz    GENERAL:  Comfortable. Cooperative.  PSYCH: pleasant, oriented, No acute distress.  EYES: PERRLA, Normal conjunctiva.  HEART:  Regular rate and rhythm. No JVD. Pulses normal. No edema.  LUNGS:  Clear to auscultation, normal Respiratory  effort.  ABDOMEN:  Soft, no hepatosplenomegaly, normal bowel sounds.  EXTREMETIES: No clubbing, cyanosis or ischemia  SKIN:  Dry to touch, No rash.      Medical Decision Making       60 MINUTES SPENT BY ME on the date of service doing chart review, history, exam, documentation & further activities per the note.      Data     I have personally reviewed the following data over the past 24 hrs:    17.3 (H)  \   6.9 (LL)   / 203     146 (H) 111 (H) 60.6 (H) /  148 (H)   5.4 (H) 28 0.57 \       Imaging results reviewed over the past 24 hrs:   Recent Results (from the past 24 hour(s))   CT Chest w/o Contrast    Narrative    CT CHEST WITHOUT CONTRAST 11/24/2023 9:47 AM     HISTORY: Follow up on persistent respiratory failure, pulmonary  changes and ventilator dependency.    TECHNIQUE: CT scan of the chest was performed without IV contrast.  Multiplanar reformats were obtained. Dose reduction techniques were  used.  CONTRAST: None.  COMPARISON: 11/18/2023    FINDINGS:     LUNGS AND PLEURA: Pulmonary infiltrates again seen throughout both  lungs. There is increased airspace consolidation with air bronchograms  in both lower lobes and in the posterior right upper lobe when  compared to previous. There are groundglass opacities with septal  thickening throughout the remainder of both lungs. Mild cylindrical  bronchiectasis. No pleural effusion or pneumothorax.    MEDIASTINUM/AXILLAE: Endotracheal tube in good position above the  ashley. A PICC line tip is in the SVC. Mild mediastinal  lymphadenopathy likely reactive. Severe coronary artery calcification.    UPPER ABDOMEN: Negative.      Impression    IMPRESSION:  1.  Extensive bilateral pulmonary infiltrates again seen. Increasing  airspace consolidation in the right upper lobe and both lower lobes  when compared to previous.    INES COYNE MD         SYSTEM ID:  [id number]     Recent Labs   Lab 11/25/23  0600 11/25/23  0410 11/24/23  2348 11/24/23  2004 11/24/23  1642  11/24/23  0817 11/24/23  0416 11/23/23  0753 11/23/23  0401 11/21/23  0803 11/21/23  0428   WBC 17.3*  --   --   --   --   --   --   --  18.1*  --  14.3*   HGB 6.9*  --   --   --   --   --   --   --  7.2*  --  7.5*   MCV 93  --   --   --   --   --   --   --  93  --  93     --   --   --   --   --   --   --  198  --  194   *  --   --   --  144  --   --   --  144   < > 148*   POTASSIUM 5.4*  --   --   --  5.5*  --  5.5*  --  5.2   < > 4.2   CHLORIDE 111*  --   --   --  106  --   --   --  108*   < > 107   CO2 28  --   --   --  30*  --   --   --  30*   < > 30*   BUN 60.6*  --   --   --  64.2*  --   --   --  82.7*   < > 87.1*   CR 0.57  --   --   --  0.63  --  0.67  --  0.81   < > 0.94   ANIONGAP 7  --   --   --  8  --   --   --  6*   < > 11   BARBARA 8.0*  --   --   --  8.8  --   --   --  8.4*   < > 8.7*   * 169* 158*   < > 165*   < >  --    < > 157*   < > 216*    < > = values in this interval not displayed.

## 2023-11-25 NOTE — PROGRESS NOTES
[facility] Intensive Care Progress Note    Date of Service (when I saw the patient): 11/25/2023     Assessment & Plan   Matthew Bowen is a 78 year old female with PMH chronic hypoxic respiratory failure, recurrent pulmonary infections, bronchiectasis who was admitted on 10/15/2023 with weakness and falls, course complicated by acute on chronic hypoxic resp failure requiring intubation on 10/22. Now with prolonged hospital stay further complicated by respiratory deterioration on 11/14, possible aspiration event and persistent ventilatory requirement.      Main Plans for Today   Wean PEEP to 8 and start weaning trials on the ventilator.     Agree with 1 unit packed red blood cells for stability drifting hemoglobin less than 7 g/dL.  Monitor hemoglobin.    Switch enoxaparin to heparin for DVT prophylaxis due to CARMEN.    Keep the patient RASS goal 0 to -1, allow maximum interaction with the family, up into chair,  PT/OT.    Agree with free water increased enterally, and continue Lokelma.  Monitor sodium and potassium.    Consult ENT tomorrow for tracheostomy early next week.      Neurology:  Encephalopathy of critical illness. Sedation in a patient with prolonged mechanical ventilation who was on Versed infusion.  History of generalized weakness and frequent falls.  The dyssynchrony with the vent improved after fentanyl infusion was started. Plan: Minimize Precedex, and fentanyl infusion.  RASS goal 0 to -1.  PT/OT.         Cardiovascular:  History of hypertension coronary artery disease.  Shock resolved.  TTE on 10/21 shows normal biventricular function.  Plan: Monitor.  Continue aspirin, Lipitor, Norvasc.  -     Pulmonary:        Acute on chronic mixed hypercapnic And hypoxemic respiratory failure.  Probable multifocal organizing pneumonia.  Subcutaneous emphysema and trace pneumomediastinum. Oxygen dependent at home on 2 L oxygen. History of bronchiectasis and ALAINA.  Prolonged mechanical ventilation for more than a month. Worsening respiratory status since November 14, now gradually improving.  Intermittent audible ETT leak during mechanical ventilation, without affecting tidal volumes. On  broad-spectrum antibiotics, high-dose steroids, DuoNebs.  Plan: Continue protective mechanical ventilation, Wean PEEP to 10, wean FiO2 as possible to keep SPO2 over 89%.  family seems to be interested in tracheostomy function continues to improve. Re    Vent Mode: CMV/AC  (Continuous Mandatory Ventilation/ Assist Control)  FiO2 (%): 40 %  Resp Rate (Set): 18 breaths/min  Tidal Volume (Set, mL): 360 mL  PEEP (cm H2O): 10 cmH2O  Resp: 27    Renal  Nonoliguric acute kidney injury.   History of CKD.  Hypernatremia.   Borderline hyperkalemia. Plan: Increase free water flushes.  Monitor BMP and urine output.  Continue Lokelma.      ID:         No clear infectious pathogen found during admission. Multiple courses of antibiotics given.  Pancultured and spectrum antibiotics started on 11/19/23.  Febrile on 11/22/23.  On vancomycin since MRSA swab is negative and she has CARMEN. Plan: Continue meropenem and follow-up culture results.     GI  No GI concerns.  Tolerating enteral feeding.  Plan: Monitor.        Nutrition  Malnutrition risk.  Plan: Continue enteral feeding via NG.        Endocrine:  Steroid-induced hyperglycemia.  History of hypothyroidism. Plan: Monitor and treat glycemia.  Continue levothyroxine.         Heme/Onc:  Anemia of critical illness.  No current signs of bleeding. Leukocytosis likely secondary to steroid use.  Plan: Monitor CBC.         DVT Prophylaxis: Heparin SQ  GI Prophylaxis: PPI     Restraints: Restraints for medical healing needed: YES     Family update by me today: Yes         Yandy Zavala MD    Time Spent on this Encounter   Billing:  I spent 43 minutes bedside and on the inpatient unit today managing the critical care of Matthew ONEILL Andrés in relation to the issues  listed in this note.      Physical Exam   Temp: 98.2  F (36.8  C) Temp src: Bladder Temp  Min: 74.8  F (23.8  C)  Max: 107.8  F (42.1  C) BP: (!) 149/73 Pulse: 76   Resp: 27 SpO2: 94 % O2 Device: Mechanical Ventilator    Vitals:    11/21/23 0500 11/22/23 0535 11/24/23 0440   Weight: 81.4 kg (179 lb 7.3 oz) 82.5 kg (181 lb 14.1 oz) 81.7 kg (180 lb 2.9 oz)     I/O last 3 completed shifts:  In: 2784.9 [I.V.:489.9; NG/GT:675]  Out: 2025 [Urine:1950; Stool:75]    Neurologic: Moderately sedated,  interactive, weak overall.   Cardiovascular: RRR, no murmurs.   Respiratory: ET tube in situ, audible leak, coarse breath sounds bilaterally.   GI: Soft nontender non distended.   Skin/Extremities: Moderate edema, pulses all 4 extremities.   Lines: No erythema or discharge at entry site for PICC Line  Current lines are required for patient management.    Medications    dexmedeTOMIDine 0.3 mcg/kg/hr (11/25/23 1711)    dextrose      fentaNYL 75 mcg/hr (11/25/23 1717)      amLODIPine  5 mg Oral or Feeding Tube Daily    aspirin  81 mg Oral or Feeding Tube Daily    atorvastatin  20 mg Oral or Feeding Tube QPM    [Held by provider] atovaquone  1,500 mg Per Feeding Tube Daily    B and C vitamin Complex with folic acid  5 mL Oral or Feeding Tube Daily    chlorhexidine  15 mL Mouth/Throat Q12H    clotrimazole   Topical BID    fiber modular (NUTRISOURCE FIBER)  1 packet Per Feeding Tube TID    heparin ANTICOAGULANT  5,000 Units Subcutaneous Q8H    insulin aspart  1-6 Units Subcutaneous Q4H    ipratropium - albuterol 0.5 mg/2.5 mg/3 mL  3 mL Nebulization 4x daily    levothyroxine  150 mcg Oral or Feeding Tube QAM AC    meropenem  500 mg Intravenous Q12H    methylPREDNISolone  62.5 mg Intravenous Q8H    pantoprazole  40 mg Per Feeding Tube QAM AC    pregabalin  75 mg Per Feeding Tube Daily    protein modular  1 packet Per Feeding Tube Daily    QUEtiapine  150 mg Oral or Feeding Tube BID    sertraline  150 mg Oral or Feeding Tube Daily     sodium chloride (PF)  10-40 mL Intracatheter Q7 Days    sodium chloride (PF)  3 mL Intracatheter Q8H    vitamin D3  50 mcg Oral or Feeding Tube Daily       Data   Recent Labs   Lab 11/25/23  1612 11/25/23  1410 11/25/23  1200 11/25/23  0750 11/25/23  0600 11/24/23 2004 11/24/23  1642 11/23/23  0753 11/23/23  0401 11/21/23  0803 11/21/23  0428   WBC  --   --   --   --  17.3*  --   --   --  18.1*  --  14.3*   HGB  --  8.4*  --   --  6.9*  --   --   --  7.2*  --  7.5*   MCV  --   --   --   --  93  --   --   --  93  --  93   PLT  --   --   --   --  203  --   --   --  198  --  194   NA  --  144  --   --  146*  --  144  --  144   < > 148*   POTASSIUM  --  4.7  --   --  5.4*  --  5.5*   < > 5.2   < > 4.2   CHLORIDE  --  109*  --   --  111*  --  106  --  108*   < > 107   CO2  --  27  --   --  28  --  30*  --  30*   < > 30*   BUN  --  56.8*  --   --  60.6*  --  64.2*  --  82.7*   < > 87.1*   CR  --  0.54  --   --  0.57  --  0.63   < > 0.81   < > 0.94   ANIONGAP  --  8  --   --  7  --  8  --  6*   < > 11   BARBARA  --  8.3*  --   --  8.0*  --  8.8  --  8.4*   < > 8.7*   * 136* 117*   < > 148*   < > 165*   < > 157*   < > 216*    < > = values in this interval not displayed.     No results found for this or any previous visit (from the past 24 hour(s)).     Yandy Zavala MD  Anesthesiology Critical Care

## 2023-11-25 NOTE — PLAN OF CARE
Goal Outcome Evaluation:          ICU End of Shift Summary.  For vital signs and complete assessments, please see documentation flowsheets.      Pertinent assessments:   Neuro: RASS -1. On Precedex and Fentanyl. Opens eyes and follows limited commands. Seems weak  Cardiac: SB, BP stable  Resp: LS coarse, minimal secretions  GI:Rectal tube in place  : Aleman in place, adequate UOP  Skin: no changes,   Lines: PICC and PIV  Drips: Fentanyl and Precedex    Major Shift Events: Slept well    Plan (Upcoming Events): Trach anticipated for next week  Discharge/Transfer Needs: TBD     Bedside Shift Report Completed : y  Bedside Safety Check Completed: y

## 2023-11-25 NOTE — PLAN OF CARE
Goal Outcome Evaluation:     Notified provider about indwelling mitchell catheter discussed removal or continued need.    Did provider choose to remove indwelling mitchell catheter? NO    Provider's mitchell indication for keeping indwelling mitchell catheter: Indication for continued use: Strict 1-2 Hour I & O if external catheters are not an option    Is there an order for indwelling mitchell catheter? YES    *If there is a plan to keep mitchell catheter in place at discharge daily notification with provider is not necessary, but please add a notation in the treatment team sticky note that the patient will be discharging with the catheter.

## 2023-11-25 NOTE — PROGRESS NOTES
[facility] ICU VENTILATOR RESPIRATORY NOTE  Date of Admission: 10/15/2023  Date of Intubation (most recent): 10/21/2023  Reason for Mechanical Ventilation: Respiratory failure  Number of Days on Mechanical Ventilation: 34  Met Criteria for Pressure Support Trial: No  Reason for No Pressure Support Trial: Pt is on PEEP +10  Significant Events Today: Pt take to CT this morning prior to family conference.   ETT secured: 22 cm @ Teeth  ETT appearance on chest x-ray: 2.5 cm above ashley  Venous Blood Gas  Recent Labs   Lab 11/19/23  0939   PHV 7.41   PCO2V 52*   PO2V 34   HCO3V 33*   LEON 7.2*   O2PER 60     Vent Mode: CMV/AC  (Continuous Mandatory Ventilation/ Assist Control)  FiO2 (%): 45 %  Resp Rate (Set): 18 breaths/min  Tidal Volume (Set, mL): 360 mL  PEEP (cm H2O): 10 cmH2O  Resp: 17        Plan:  CT before family conference. RT to continue to monitor and assess the pt's current respiratory status and needs.      Gregory Blanco, RT

## 2023-11-25 NOTE — PLAN OF CARE
Goal Outcome Evaluation:     ICU End of Shift Summary.  For vital signs and complete assessments, please see documentation flowsheets.      Pertinent assessments: pt rass goal -1, fent and dex infusing. Pt follows commands and can nod to answer questions. All VSS. LS coarse, fi02 40, rr 18, peep 8, vol 360. PS trial today, did well. Aleman and RT with good output. TF@55 w/75 fwf Q2.family at bedside all day.  Major Shift Events: PS today, did very well, up to chair majority of the day Peep reduced to 8 from 10. Weaned down fent/dex. Fwf now 75 q2  Plan (Upcoming Events): continue with current icu cares, abx. ENT consult potentially Monday for trach.  Discharge/Transfer Needs: tbd     Bedside Shift Report Completed : y  Bedside Safety Check Completed:   y

## 2023-11-26 NOTE — PROGRESS NOTES
[facility]    Medicine Progress Note - Hospitalist Service    Date of Admission:  10/15/2023    Assessment & Plan     Matthew Bowen is a 78 year old female with history of chronic hypoxic respiratory failure due to pulmonary HTN, ALAINA (uses CPAP with sleep at baseline), and chronic diastolic congestive heart failure. She also has history of CAD, CKD stage 3, obesity, HTN,  and depression. She presented to the ED on 10/15/23 with fever, cough, and falls. ED evaluation showed low grade temperature elevation in the 99s. She was hypoxic, requiring supplemental oxygen at 2 lpm. Laboratory evaluation showed WBC 21.5, procalcitonin 0.16, creatinine 1.21, and bicarb 30. CXR suggested pulmonary vascular congestion. CT of chest showed lingular and bilateral upper lung pneumonia. No traumatic injury was found on imaging. She was admitted to the hospital and treated for multifocal pneumonia with Rocephin and Zithromax. She developed worsened respiratory status on 10/21 requiring transfer to ICU and intubation. Hospital course was prolonged respiratory failure, ARDS, and difficulty weaning from vent. Tracheostomy was deferred partially due to family preference and also due to the fact that increased procedural risk given high ongoing oxygen needs. CT chest obtained 11/18 suggested severe ground-glass opacities with traction bronchiectasis. It was unclear how much of her disease is reversible. Pulmonology started high-dose steroids on 11/18, with modest interval improvement in O2 weaning. Family conference was held on 11/24 and decision was made to pursue tracheostomy for continued vent weaning.     Problem list:    Multifocal community acquired pneumonia  Recent pneumonias prior to admission  Possible bronchiectasis  Sepsis (leukocytosis and fever)  Acute respiratory failure  Intubation   Adult respiratory distress syndrome  -Was initially admitted to medicine and treated with Rocephin and Zithromax.  Deteriorated and transferred to the ICU and intubated on 10/21/23.   -Antibiotic coverage changed to cefepime on 10/25 through 10/31.  -Received Zosyn from 11/15 through 11/19.   -Meropenem and vancomycin were started 11/19- continue  -Continue vent support per intensivist  -Continue solumedrol 62.5 mg IV every 8 hours  -ENT consulted for trach early this week    Chronic hypoxic respiratory failure  Obstructive sleep apnea  Chronic diastolic heart failure  Pulmonary hypertension   -Is normally on 2 lpm of oxygen  -PTA torsemide is on hold  -On vent. If extubated will need CPAP as PTA    Leukocytosis  -Suspect due to infection and steroids. Stable but still high. Monitor with daily CBC    Anemia  -Has been persistent during stay. HGB was 11.6 on presentation and trended down- generally in 7-8's.   -Suspect related to critical illness, phlebotomy, etc.  -On Protonix for ulcer prevention  -Had transfusion of 1 unit RBCs on 11/17  -Had transfusion of 1 unit of RBCs on 11/25/23 for HGB 6.9 with rise to 8.4 and 8 today.    Hyperkalemia  -Potassium was 5.5 yesterday. Lokelma given and 5.4 this am.  -No obvious cause such as CARMEN, medicine, IVF, acidosis, etc. She does have CKD and is normally on torsemide 60 mg daily.  -Repeat Lokelma today. Repeat BMP at 1400  -Start torsemide 20 mg daily (PTA on 60 mg dailiy). Monitor daily BMP and potassium level.     Hypernatremia, mild  -Resolved  -Continue 75 ml q 2 hours.   -Monitor sodium with resumption of torsemide at dose reduced from PTA     Falls prior to admission  Subacute rib fracture  -imaging negative for acute fractures and bleeding but did show subacute rib fractures     CKD stage 3  Acute kidney injury earlier in stay  -Cr 1.21 on admission and peaked in 2.1 range but has now normalized  -Have been holding diuretics as discussed above. Resume torsemide at 20 mg daily today mostly due to recurrent hypokalemia (PTA was on torsemide 60 mg each am)  -Monitor renal function  closely     HTN  -Continue PTA amlodipine  -PTA hydralazine and imdur have been on hold.   -BP rising so resume hydralazine at 25 mg TID and titrate as possible. Also restarting some torsemide as discussed above     Obesity    CAD  -Stable  -Continue PTA ASA, statin, and plavix    Hypothyroidism  -Continue PTA levothyroxine    Mood disorder  -Continue PTA Lyrica and Zoloft          Diet: NPO per Anesthesia Guidelines for Procedure/Surgery Except for: Meds  Adult Formula Drip Feeding: Continuous Promote; Orogastric tube; Goal Rate: 55 mL/hr x 22 hours (please hold 1 hour before and after levothyroxine); mL/hr; (11/13) Initial rate of 35 ml/hr advancing by 10 ml q 4 hrs to new goal rate of 55 ml/hr    DVT Prophylaxis: Heparin SQ  Aleman Catheter: PRESENT, indication: Strict 1-2 Hour I&O, Wound Healing;Strict 1-2 Hour I&O  Lines: PRESENT      PICC 10/22/23 Triple Lumen Right Basilic multiple med gtt. ready for use-Site Assessment: WDL      Cardiac Monitoring: ACTIVE order. Indication: Tachyarrhythmias, acute (48 hours)  Code Status: Full Code      Clinically Significant Risk Factors        # Hyperkalemia: Highest K = 5.5 mmol/L in last 2 days, will monitor as appropriate  # Hypernatremia: Highest Na = 146 mmol/L in last 2 days, will monitor as appropriate      # Hypoalbuminemia: Lowest albumin = 2.7 g/dL at 11/18/2023  5:05 AM, will monitor as appropriate                       Disposition Plan    here for several more days         Erich Kerr MD  Hospitalist Service  [facility]  Securely message with [employer] (more info)  Text page via [facility] Paging/Directory   ______________________________________________________________________    Interval History   No new problems. Up in chair watching football. Feels tired but denies pain.     Physical Exam   Vital Signs: Temp: 97.9  F (36.6  C) Temp src: Axillary BP: (!) 180/77 Pulse: 56   Resp: 26 SpO2: 95 % O2 Device: Mechanical Ventilator    Weight:  181 lbs 3.49 oz    GENERAL:  Comfortable. Cooperative.  PSYCH: pleasant, oriented, No acute distress.  EYES: PERRLA, Normal conjunctiva.  HEART:  Regular rate and rhythm. No JVD. Pulses normal. No edema.  LUNGS:  Clear to auscultation, normal Respiratory effort.  ABDOMEN:  Soft, no hepatosplenomegaly, normal bowel sounds.  EXTREMETIES: No clubbing, cyanosis or ischemia  SKIN:  Dry to touch, No rash.      Medical Decision Making       60 MINUTES SPENT BY ME on the date of service doing chart review, history, exam, documentation & further activities per the note.      Data     I have personally reviewed the following data over the past 24 hrs:    15.8 (H)  \   8.0 (L)   / 197     141 107 56.3 (H) /  139 (H)   5.5 (H) 29 0.54 \       Imaging results reviewed over the past 24 hrs:   Recent Results (from the past 24 hour(s))   XR Chest Port 1 View    Narrative    EXAM: XR CHEST PORTABLE 1 VIEW  LOCATION: [facility]  DATE: 11/26/2023    INDICATION: Respiratory failure.  COMPARISON: Chest x-ray on 11/22/2023.      Impression    IMPRESSION: Single AP view of the chest was obtained. Endotracheal tube tip projects over mid thoracic trachea approximately 4 cm from the ashley. Enteric tube crosses the diaphragm with the distal tip outside the field-of-view. Right upper extremity   PICC tip projects over high/mid SVC. Cardiomegaly. Left basilar and right mid/upper lobe patchy pulmonary opacity, could represent atelectasis versus infection. No significant pleural effusion or pneumothorax.       Recent Labs   Lab 11/26/23  1204 11/26/23  0746 11/26/23  0454 11/25/23  1612 11/25/23  1410 11/25/23  0750 11/25/23  0600 11/23/23  0753 11/23/23  0401   WBC  --   --  15.8*  --   --   --  17.3*  --  18.1*   HGB  --   --  8.0*  --  8.4*  --  6.9*  --  7.2*   MCV  --   --  89  --   --   --  93  --  93   PLT  --   --  197  --   --   --  203  --  198   NA  --   --  141  --  144  --  146*   < > 144   POTASSIUM  --   --   5.5*  --  4.7  --  5.4*   < > 5.2   CHLORIDE  --   --  107  --  109*  --  111*   < > 108*   CO2  --   --  29  --  27  --  28   < > 30*   BUN  --   --  56.3*  --  56.8*  --  60.6*   < > 82.7*   CR  --   --  0.54  --  0.54  --  0.57   < > 0.81   ANIONGAP  --   --  5*  --  8  --  7   < > 6*   BARBARA  --   --  8.5*  --  8.3*  --  8.0*   < > 8.4*   * 161* 157*   < > 136*   < > 148*   < > 157*    < > = values in this interval not displayed.

## 2023-11-26 NOTE — PROGRESS NOTES
[facility]  Infectious Disease Progress Note          Assessment and Plan:   Date of Admission:  10/15/2023  Date of Consult (When I saw the patient): 10/20/23        Assessment & Plan  Matthew Bowen is a 78 year old who was admitted on 10/15/2023.      Impression: 1 78-year-old female, some chronic underlying lung disease, on oxygen, bronchiectasis probable, some history of pneumonia but now 6 weeks or so of worsening respiratory symptoms, 2 prior courses of antibiotics, now admitted with infiltrates, no major sepsis but elevated procalcitonin and white count implying bacterial, not really improving on antibiotics concern for more chronic type pathogen in this patient with underlying lung disease by this history     2 chronic lung disease on oxygen some underlying bronchiectasis, some prior pneumonias  3 chronic kidney disease     REC 1 On  meropenem and Vanco,  no new microbiology info of note , the bronchoscopy again  without new pathogens very unlikely antibiotics or antibiotic change are going to be the answer but of note some improvement with antibiotics,  looks better up in the chair oxygenation somewhat better if this continues probably completed course again, creatinine okay at this point but fairly unlikely Vanco needed agree with discontinuing it, meropenem for now but probably stop sometime early this week again  2 some low-grade fever intermittent, only growth in cultures with Candida, definitely not a respiratory pathogen although the patient is at some risk for systemic Candida infection based on colonization, for now holding off on antifungal therapy  Temp down, slight improvement again, latest cultures again negative except for Dora  Discussed with MD daughter again  As noted by Dr. Zavala that somehow pneumocystis prophylaxis was discontinued, definitely needs to be on coverage go back to Mepron there should be no contraindication to this                Interval History:      Still intubated and hypoxic but awake and sitting in chair.  already had bronchoscopy last week with no new pathogens, single blood culture should be a contaminant, follow-up cultures are so far negative, no new positive micro   seen improvement, temp down, unclear whether antibiotic related or not still no new microbiologic growth only Candida in sputum.  Only 1 blood culture with staph epi almost certainly not an issue and follow-ups of all been negative              Medications:      amLODIPine  5 mg Oral or Feeding Tube Daily    aspirin  81 mg Oral or Feeding Tube Daily    atorvastatin  20 mg Oral or Feeding Tube QPM    [Held by provider] atovaquone  1,500 mg Per Feeding Tube Daily    B and C vitamin Complex with folic acid  5 mL Oral or Feeding Tube Daily    chlorhexidine  15 mL Mouth/Throat Q12H    clotrimazole   Topical BID    fiber modular (NUTRISOURCE FIBER)  1 packet Per Feeding Tube TID    heparin ANTICOAGULANT  5,000 Units Subcutaneous Q8H    insulin aspart  1-6 Units Subcutaneous Q4H    ipratropium - albuterol 0.5 mg/2.5 mg/3 mL  3 mL Nebulization 4x daily    levothyroxine  150 mcg Oral or Feeding Tube QAM AC    meropenem  500 mg Intravenous Q12H    methylPREDNISolone  62.5 mg Intravenous Q8H    pantoprazole  40 mg Per Feeding Tube QAM AC    pregabalin  75 mg Per Feeding Tube Daily    protein modular  1 packet Per Feeding Tube Daily    QUEtiapine  150 mg Oral or Feeding Tube BID    sertraline  150 mg Oral or Feeding Tube Daily    sodium chloride (PF)  10-40 mL Intracatheter Q7 Days    sodium chloride (PF)  3 mL Intracatheter Q8H    vitamin D3  50 mcg Oral or Feeding Tube Daily                  Physical Exam:   Blood pressure (!) 180/82, pulse 57, temperature 97.9  F (36.6  C), temperature source Axillary, resp. rate 10, height 1.524 m (5'), weight 82.2 kg (181 lb 3.5 oz), SpO2 96%.  Wt Readings from Last 2 Encounters:   11/26/23 82.2 kg (181 lb 3.5 oz)   02/28/22 94.3 kg (208 lb)     Vital Signs  with Ranges  Temp:  [97.9  F (36.6  C)-107.8  F (42.1  C)] 97.9  F (36.6  C)  Pulse:  [45-98] 57  Resp:  [10-35] 10  BP: ()/() 180/82  FiO2 (%):  [40 %] 40 %  SpO2:  [92 %-98 %] 96 %    Constitutional: Intubated and sedated about same slight improvement in oxygenation     Lungs: Clear to auscultation bilaterally, no crackles or wheezing   Cardiovascular: Regular rate and rhythm, normal S1 and S2, and no murmur noted   Abdomen: Normal bowel sounds, soft, non-distended, non-tender   Skin: No rashes, no cyanosis, no edema   Other:           Data:   All microbiology laboratory data reviewed.  Recent Labs   Lab Test 11/26/23 0454 11/25/23  1410 11/25/23  0600 11/23/23  0401   WBC 15.8*  --  17.3* 18.1*   HGB 8.0* 8.4* 6.9* 7.2*   HCT 27.1*  --  24.7* 25.5*   MCV 89  --  93 93     --  203 198     Recent Labs   Lab Test 11/26/23  0454 11/25/23  1410 11/25/23  0600   CR 0.54 0.54 0.57     Recent Labs   Lab Test 11/18/23  1811   SED 89*     Recent Labs   Lab Test 02/12/19  0010   CULT Light growth  Normal deshaun

## 2023-11-26 NOTE — PROGRESS NOTES
[facility] ICU VENTILATOR RESPIRATORY NOTE     Date of Admission: 10/15/23     Date of Intubation (most recent): 10/21/23     Reason for Mechanical Ventilation: Respiratory Failure     Number of Days on Mechanical Ventilation: 36     Met Criteria for Pressure Support Trial: No     Length of Pressure Support Trial: Ongoing     Significant Events Today: Pt placed on Pressure support Trial 12/+8 per MD orders     ABG Results:   Venous Blood Gas  Recent Labs   Lab 11/26/23  0454 11/25/23  1002 11/19/23  0939   PHV 7.37 7.27* 7.41   PCO2V 52* 64* 52*   PO2V 38 41 34   HCO3V 30* 29* 33*   LEON 4.3* 1.7 7.2*   O2PER 40 40 60           ETT appearance on chest x-ray: ETT is at 4.7cm above the ashley     Vent Mode: CPAP/PS  (Continuous positive airway pressure with Pressure Support)  FiO2 (%): 40 %  Resp Rate (Set): 18 breaths/min  Tidal Volume (Set, mL): 360 mL  PEEP (cm H2O): 8 cmH2O  Pressure Support (cm H2O): 12 cmH2O  Resp: (!) 31    Bs coarse/diminished. Suctioned for small white thick secretions. Duoneb given in line as ordered. Will continue to  assess and monitor.    Broderick Berry RT on 11/26/2023 at 7:26 AM

## 2023-11-26 NOTE — PLAN OF CARE
Goal Outcome Evaluation:     ICU End of Shift Summary.  For vital signs and complete assessments, please see documentation flowsheets.      Pertinent assessments: pt rass goal -1, fent and dex infusing. Pt follows commands and can nod to answer questions. Hypertensive during the day, hydralazine added TID. LS coarse, fi02 40, rr 18, peep 8, vol 360. PS trial today early in AM, lasted about 2 hours before RR and bp increased. Up to chair in the afternoon. Mitchell and RT with good output. TF@55 w/75 fwf Q2. family at bedside all day. Dilaudid given x1 for pain.  Major Shift Events: none  Plan (Upcoming Events): continue with current icu cares, abx. ENT consult potentially Monday for trach.  Discharge/Transfer Needs: tbd       Bedside Shift Report Completed : y  Bedside Safety Check Completed:   y        Notified provider about indwelling mitchell catheter discussed removal or continued need.    Did provider choose to remove indwelling mitchell catheter? NO    Provider's mitchell indication for keeping indwelling mitchell catheter: Indication for continued use: Strict 1-2 Hour I & O if external catheters are not an option    Is there an order for indwelling mitchell catheter? YES    *If there is a plan to keep mitchell catheter in place at discharge daily notification with provider is not necessary, but please add a notation in the treatment team sticky note that the patient will be discharging with the catheter.

## 2023-11-26 NOTE — CONSULTS
[facility]    Otolaryngology Consultation   ENT Specialty Care    Date of Admission:  10/15/2023    Assessment & Plan   Matthew Bowen is a 78 year old female who was admitted on 10/15/2023. Her PMHx is significant for chronic hypoxic respiratory failure, recurrent pulmonary infections, bronchiectasis who was admitted on 10/15/2023 with weakness and falls, course complicated by acute on chronic hypoxic resp failure requiring intubation on 10/22. Now with prolonged hospital stay further complicated by respiratory deterioration on 11/14, possible aspiration event and persistent ventilatory requirement.  Daughter  is a pediatrician and is present at bedside.  The otolaryngology service is consulted for a tracheostomy.  Arrangements will be made to try to accomplish this in the coming days.       Code Status: Full Code      Clinically Significant Risk Factors        # Hyperkalemia: Highest K = 5.5 mmol/L in last 2 days, will monitor as appropriate  # Hypernatremia: Highest Na = 146 mmol/L in last 2 days, will monitor as appropriate      # Hypoalbuminemia: Lowest albumin = 2.7 g/dL at 11/18/2023  5:05 AM, will monitor as appropriate                       Yaneth Velázquez MD    ______________________________________________________________________    Reason for Consult   Reason for consult: chronic hypoxic respiratory failure with persistent ventilatory requirement    Primary Care Physician   [facility]    Chief Complaint  Trach consult for patient with history notable for:  Acute on chronic mixed hypercapnic And hypoxemic respiratory failure.  Probable multifocal organizing pneumonia.  Subcutaneous emphysema and trace pneumomediastinum. Oxygen dependent at home on 2 L oxygen. History of bronchiectasis and ALAINA. Prolonged mechanical ventilation for more than a month. Worsening respiratory status since November 14, now gradually stabilizing.     History of Present Illness   As above            Physical Exam   Temp: 97.9  F (36.6  C) Temp src: Axillary BP: (!) 180/77 Pulse: 56   Resp: 26 SpO2: 95 % O2 Device: Mechanical Ventilator    Vital Signs with Ranges  Temp:  [97.9  F (36.6  C)-107.8  F (42.1  C)] 97.9  F (36.6  C)  Pulse:  [45-98] 56  Resp:  [10-35] 26  BP: ()/() 180/77  FiO2 (%):  [40 %] 40 %  SpO2:  [94 %-98 %] 95 %  181 lbs 3.49 oz  Sitting up, eyes open, ETT in place.  Somewhat thick neck            Data

## 2023-11-26 NOTE — PLAN OF CARE
Goal Outcome Evaluation:          ICU End of Shift Summary.  For vital signs and complete assessments, please see documentation flowsheets.      Pertinent assessments:   Neuro: unchanged  Cardiac: BP elevated - Labetolol given with good result  Resp: Press support weaning   GI: Rectal Tube in place  : Aleman draining well  Skin: wdl  Lines: PICC  Drips: Fent, Precedex    Major Shift Events: None    Plan (Upcoming Events): evaluate for respiratory status which seems to be improving       Bedside Shift Report Completed : TBD  Bedside Safety Check Completed: TBD

## 2023-11-26 NOTE — PROGRESS NOTES
[facility] Intensive Care Progress Note    Date of Service (when I saw the patient): 11/26/2023     Assessment & Plan   Matthew Bowen is a 78 year old female with PMH chronic hypoxic respiratory failure, recurrent pulmonary infections, bronchiectasis who was admitted on 10/15/2023 with weakness and falls, course complicated by acute on chronic hypoxic resp failure requiring intubation on 10/22. Now with prolonged hospital stay further complicated by respiratory deterioration on 11/14, possible aspiration event and persistent ventilatory requirement.     Hospital course improved gradually this week to the point she is more interactive with the family, she is on PEEP of 8 during pressure support to twice daily, working with PT. Myself and pulmonary had a family conference to discuss goals of care on November 24. We consulted ENT for  tracheostomy early next week.     Pulmonary medicine recommended continuing high dose steroids through next week, then can try a very slow reduction.     Once she is stable they recommend continuing prednisone 120mg per day for at least 2 weeks, then down to 100mg for 2 weeks, then 80mg for 2 weeks, then decreasing by 5mg every 2 weeks until off.  They also recommend to be PJP prophylaxis until she is below 20 mg of prednisone.      Main Plans for Today   Continue PEEP to 8 and twice daily weaning trials on the ventilator.     Minimize sedation to RASS goal 0 to -1 to support interaction with the family, and allow working with PT and OT.     Restart home hydralazine and home torsemide on lower dose due to more hypertension today.     Discussed with ID and restarted PJP prophylaxis that was held last week and for unclear reason.     Pulmonary medicine recommended continuing high dose steroids through next week, then can try a very slow reduction.     Once she is stable they recommend continuing prednisone 120mg per day for at least 2 weeks,  then down to 100mg for 2 weeks, then 80mg for 2 weeks, then decreasing by 5mg every 2 weeks until off.  They also recommend to be PJP prophylaxis until she is below 20 mg of prednisone.         Neurology:  Encephalopathy of critical illness. Sedation in a patient with prolonged mechanical ventilation who was on Versed infusion.  History of generalized weakness and frequent falls.  The dyssynchrony with the vent improved after fentanyl infusion was started. Plan: Minimize Precedex, and fentanyl infusion.  RASS goal 0 to -1.  PT/OT.   Consult ENT for tracheostomy early next week.      Cardiovascular:  History of hypertension coronary artery disease.  Shock resolved.  TTE on 10/21 shows normal biventricular function.  Hydralazine and labetalol IV as needed for given today for HTN. Plan: Monitor.  Continue aspirin, Lipitor, Norvasc. Restart home hydralazine and home torsemide on lower dose due to more hypertension today.   -     Pulmonary:        Acute on chronic mixed hypercapnic And hypoxemic respiratory failure.  Probable multifocal organizing pneumonia.  Subcutaneous emphysema and trace pneumomediastinum. Oxygen dependent at home on 2 L oxygen. History of bronchiectasis and ALAINA. Prolonged mechanical ventilation for more than a month. Worsening respiratory status since November 14, now gradually improving.  Intermittent audible ETT leak during mechanical ventilation, without affecting tidal volumes. On  broad-spectrum antibiotics, high-dose steroids, DuoNebs.  Plan: Continue protective mechanical ventilation, PEEP to 8, wean FiO2 as possible to keep SPO2 over 89%.    Pulmonary medicine recommended continuing high dose steroids through next week, then can try a very slow reduction.     Once she is stable they recommend continuing prednisone 120mg per day for at least 2 weeks, then down to 100mg for 2 weeks, then 80mg for 2 weeks, then decreasing by 5mg every 2 weeks until off.  They also recommend to be PJP  prophylaxis until she is below 20 mg of prednisone.     Consult ENT for tracheostomy early next week.        Vent Mode: CMV/AC  (Continuous Mandatory Ventilation/ Assist Control)  FiO2 (%): 40 %  Resp Rate (Set): 18 breaths/min  Tidal Volume (Set, mL): 360 mL  PEEP (cm H2O): 8 cmH2O  Pressure Support (cm H2O): 12 cmH2O  Resp: 21    Renal  Nonoliguric acute kidney injury.   History of CKD.  Hypernatremia.   Borderline hyperkalemia. Plan: Increase free water flushes.  Monitor BMP and urine output.  Continue Lokelma.  Agree to monitor Cystatin C for her renal function.      ID:         No clear infectious pathogen found during admission. Multiple courses of antibiotics given.  Pancultured and spectrum antibiotics started on 11/19/23.  Febrile on 11/22/23.  On vancomycin since MRSA swab is negative and she has CARMEN. Plan: Continue meropenem and follow-up culture results. Discussed with ID and restarted PJP prophylaxis that was held last week and for unclear reason.      GI  No GI concerns.  Tolerating enteral feeding.  Plan: Monitor.        Nutrition  Malnutrition risk.  Plan: Continue enteral feeding via NG.        Endocrine:  Steroid-induced hyperglycemia.  History of hypothyroidism. Plan: Monitor and treat glycemia.  Continue levothyroxine.         Heme/Onc:  Anemia of critical illness.  No current signs of bleeding. Leukocytosis likely secondary to steroid use.  Plan: Monitor CBC.         DVT Prophylaxis: Heparin SQ  GI Prophylaxis: PPI     Restraints: Restraints for medical healing needed: YES    Yandy Zavala MD    Time Spent on this Encounter   Billing:  I spent 49 minutes bedside and on the inpatient unit today managing the critical care of Matthew Bowen in relation to the issues listed in this note.    Physical Exam   Temp: 97.7  F (36.5  C) Temp src: Axillary Temp  Min: 97.7  F (36.5  C)  Max: 98.6  F (37  C) BP: (!) 190/86 Pulse: 58   Resp: 21 SpO2: 94 % O2 Device: Mechanical Ventilator    Vitals:     11/22/23 0535 11/24/23 0440 11/26/23 0500   Weight: 82.5 kg (181 lb 14.1 oz) 81.7 kg (180 lb 2.9 oz) 82.2 kg (181 lb 3.5 oz)     I/O last 3 completed shifts:  In: 2601.35 [I.V.:321.35; NG/GT:660]  Out: 2450 [Urine:2375; Stool:75]    Neurologic: Moderately sedated, following commands and interactive, weak overall.   Cardiovascular: RRR, no murmurs.   Respiratory: ET tube in situ, audible leak, coarse breath sounds bilaterally.   GI: Soft nontender non distended.   Skin/Extremities: Mild edema, pulses all 4 extremities.   Lines: No erythema or discharge at entry site for PICC Line  Current lines are required for patient management.       Medications    dexmedeTOMIDine 0.6 mcg/kg/hr (11/26/23 1300)    dextrose      fentaNYL 100 mcg/hr (11/26/23 1304)      amLODIPine  5 mg Oral or Feeding Tube Daily    aspirin  81 mg Oral or Feeding Tube Daily    atorvastatin  20 mg Oral or Feeding Tube QPM    atovaquone  1,500 mg Oral or Feeding Tube Daily    B and C vitamin Complex with folic acid  5 mL Oral or Feeding Tube Daily    chlorhexidine  15 mL Mouth/Throat Q12H    clotrimazole   Topical BID    fiber modular (NUTRISOURCE FIBER)  1 packet Per Feeding Tube TID    heparin ANTICOAGULANT  5,000 Units Subcutaneous Q8H    insulin aspart  1-6 Units Subcutaneous Q4H    ipratropium - albuterol 0.5 mg/2.5 mg/3 mL  3 mL Nebulization 4x daily    levothyroxine  150 mcg Oral or Feeding Tube QAM AC    meropenem  500 mg Intravenous Q12H    methylPREDNISolone  62.5 mg Intravenous Q8H    pantoprazole  40 mg Per Feeding Tube QAM AC    pregabalin  75 mg Per Feeding Tube Daily    protein modular  1 packet Per Feeding Tube Daily    QUEtiapine  150 mg Oral or Feeding Tube BID    sertraline  150 mg Oral or Feeding Tube Daily    sodium chloride (PF)  10-40 mL Intracatheter Q7 Days    sodium chloride (PF)  3 mL Intracatheter Q8H    torsemide  20 mg Oral or Feeding Tube Daily    vitamin D3  50 mcg Oral or Feeding Tube Daily       Data   Recent Labs    Lab 11/26/23  1336 11/26/23  1204 11/26/23  0746 11/26/23  0454 11/25/23  1612 11/25/23  1410 11/25/23  0750 11/25/23  0600 11/23/23  0753 11/23/23  0401   WBC  --   --   --  15.8*  --   --   --  17.3*  --  18.1*   HGB  --   --   --  8.0*  --  8.4*  --  6.9*  --  7.2*   MCV  --   --   --  89  --   --   --  93  --  93   PLT  --   --   --  197  --   --   --  203  --  198     --   --  141  --  144  --  146*   < > 144   POTASSIUM 4.9  --   --  5.5*  --  4.7  --  5.4*   < > 5.2   CHLORIDE 109*  --   --  107  --  109*  --  111*   < > 108*   CO2 28  --   --  29  --  27  --  28   < > 30*   BUN 56.1*  --   --  56.3*  --  56.8*  --  60.6*   < > 82.7*   CR 0.51  --   --  0.54  --  0.54  --  0.57   < > 0.81   ANIONGAP 7  --   --  5*  --  8  --  7   < > 6*   BARBARA 8.1*  --   --  8.5*  --  8.3*  --  8.0*   < > 8.4*   * 139* 161* 157*   < > 136*   < > 148*   < > 157*    < > = values in this interval not displayed.     Recent Results (from the past 24 hour(s))   XR Chest Port 1 View    Narrative    EXAM: XR CHEST PORTABLE 1 VIEW  LOCATION: [facility]  DATE: 11/26/2023    INDICATION: Respiratory failure.  COMPARISON: Chest x-ray on 11/22/2023.      Impression    IMPRESSION: Single AP view of the chest was obtained. Endotracheal tube tip projects over mid thoracic trachea approximately 4 cm from the ashley. Enteric tube crosses the diaphragm with the distal tip outside the field-of-view. Right upper extremity   PICC tip projects over high/mid SVC. Cardiomegaly. Left basilar and right mid/upper lobe patchy pulmonary opacity, could represent atelectasis versus infection. No significant pleural effusion or pneumothorax.       Yandy Zavala MD  Anesthesiology Critical Care

## 2023-11-26 NOTE — PROGRESS NOTES
[facility] ICU VENTILATOR RESPIRATORY NOTE     Date of Admission: 10/15/23     Date of Intubation (most recent): 10/21/23     Reason for Mechanical Ventilation: Respiratory Failure     Number of Days on Mechanical Ventilation: 35     Met Criteria for Pressure Support Trial: No     Pressure Support Trial: Peep +8/ PS +10    Venous Blood Gas  Recent Labs   Lab 11/25/23  1002 11/19/23  0939   PHV 7.27* 7.41   PCO2V 64* 52*   PO2V 41 34   HCO3V 29* 33*   LEON 1.7 7.2*   O2PER 40 60         ETT appearance on chest x-ray: ETT is at 4.7cm above the ashley     Plan: ENT consult Monday for Trach     Vent Mode: CMV/AC  (Continuous Mandatory Ventilation/ Assist Control)  FiO2 (%): 40 %  Resp Rate (Set): 18 breaths/min  Tidal Volume (Set, mL): 360 mL  PEEP (cm H2O): 10 cmH2O  Resp: 15     Bs coarse/diminished. Suctioned for thick tan secretions. Duoneb given in line. Will continue to assess and monitor.

## 2023-11-26 NOTE — PROGRESS NOTES
[facility] ICU VENTILATOR RESPIRATORY NOTE     Date of Admission: 10/15/23     Date of Intubation (most recent): 10/21/23     Reason for Mechanical Ventilation: Respiratory Failure     Number of Days on Mechanical Ventilation: 36     Significant Events Today: Pt placed on Pressure support Trial 12/+8 , weaning will be BID, per MD order.    Venous Blood Gas  Recent Labs   Lab 11/26/23  0454 11/25/23  1002   PHV 7.37 7.27*   PCO2V 52* 64*   PO2V 38 41   HCO3V 30* 29*   LEON 4.3* 1.7   O2PER 40 40      Vent Mode: CMV/AC  (Continuous Mandatory Ventilation/ Assist Control)  FiO2 (%): 40 %  Resp Rate (Set): 18 breaths/min  Tidal Volume (Set, mL): 360 mL  PEEP (cm H2O): 8 cmH2O  Pressure Support (cm H2O): 12 cmH2O  Resp: 21     ETT appearance on chest x-ray: ETT is at 4.7cm above the ashley      Bs coarse/diminished. Suctioned for small white thick secretions. Duoneb given in line as ordered. Will continue to  assess and monitor.     Polly Rae, RT on 11/26/2023 at 3:08 PM

## 2023-11-27 NOTE — PROGRESS NOTES
[facility] ICU VENTILATOR RESPIRATORY NOTE     Date of Admission: 10/15/23     Date of Intubation (most recent): 10/21/23     Reason for Mechanical Ventilation: Respiratory Failure     Number of Days on Mechanical Ventilation: 37     Met Criteria for Pressure Support Trial: Yes     Length of Pressure Support Trial: Pt did two trials today this am. First, lasted approx. 1.5 hrs. Second lasted approx. 4 hrs     Significant Events Today: Pt placed on Pressure support Trial 12/+8      ABG Results:   Venous Blood Gas  Recent Labs   Lab 11/26/23  0454 11/25/23  1002   PHV 7.37 7.27*   PCO2V 52* 64*   PO2V 38 41   HCO3V 30* 29*   LEON 4.3* 1.7   O2PER 40 40     ETT appearance on chest x-ray: ETT is at 4.7cm above the ashley      Vent Mode: CMV/AC  (Continuous Mandatory Ventilation/ Assist Control)  FiO2 (%): 45 %  Resp Rate (Set): 18 breaths/min  Tidal Volume (Set, mL): 360 mL  PEEP (cm H2O): 8 cmH2O  Pressure Support (cm H2O): 12 cmH2O  Resp: (!) 38    BS coarse/diminished. Suctioned for a moderate amount of white thin secretions. Will continue to monitor.    Swati Drake RN on 11/27/2023

## 2023-11-27 NOTE — PROGRESS NOTES
[facility] ICU VENTILATOR RESPIRATORY NOTE     Date of Admission: 10/15/23     Date of Intubation (most recent): 10/21/23     Reason for Mechanical Ventilation: Respiratory Failure     Number of Days on Mechanical Ventilation: 37     Met Criteria for Pressure Support Trial: Yes     Length of Pressure Support Trial: Ongoing     Significant Events Today: Pt placed on Pressure support Trial 12/+8 per MD orders     ABG Results:   Venous Blood Gas  Recent Labs   Lab 11/26/23  0454 11/25/23  1002   PHV 7.37 7.27*   PCO2V 52* 64*   PO2V 38 41   HCO3V 30* 29*   LEON 4.3* 1.7   O2PER 40 40     ETT appearance on chest x-ray: ETT is at 4.7cm above the ashley      Vent Mode: CPAP/PS  (Continuous positive airway pressure with Pressure Support)  FiO2 (%): 40 %  Resp Rate (Set): 18 breaths/min  Tidal Volume (Set, mL): 360 mL  PEEP (cm H2O): 8 cmH2O  Pressure Support (cm H2O): 12 cmH2O  Resp: (!) 38    BS coarse/diminished. Suctioned for small white thin secretions. Will continue to assess and monitor.    Broderick Berry RT on 11/27/2023 at 5:50 AM

## 2023-11-27 NOTE — PROGRESS NOTES
Notified provider about indwelling mitchell catheter discussed removal or continued need.    Did provider choose to remove indwelling mitchell catheter? NO    Provider's mitchell indication for keeping indwelling mitchell catheter: Indication for continued use: Strict 1-2 Hour I & O if external catheters are not an option    Is there an order for indwelling mitchell catheter? YES    *If there is a plan to keep mitchell catheter in place at discharge daily notification with provider is not necessary, but please add a notation in the treatment team sticky note that the patient will be discharging with the catheter.     (5) normal, left/(5) normal, right

## 2023-11-27 NOTE — PLAN OF CARE
Goal Outcome Evaluation:               ICU End of Shift Summary.  For vital signs and complete assessments, please see documentation flowsheets.      Pertinent assessments:   Neuro: Episodes of agitation resulting in desaturation. Versed IV bolus' relieved issue  Cardiac: Episodes of HTN requiring PRN Apresoline  Resp: Weaning with PS this AM  GI: WDL  : WDL  Skin: areas of skin breakdown noted in cheryl-rectal area. Rectal tube with some leaking.  Lines: PICC  Drips: Fent and precedex        Plan (Upcoming Events): Evaluate for potential Trach and Peg  Discharge/Transfer Needs: TBD     Bedside Shift Report Completed : y  Bedside Safety Check Completed: y

## 2023-11-27 NOTE — PLAN OF CARE
VS: VSS, bouts of hypotension and bradycardia at EOS.   NEURO: RASS 0 -> -1, small bout of agitation in AM, scheduled lyrica & seroquel alleviated, opening eyes spontaneously during day, able to follow some commands via nodding.   CARDIAC/TELE: SB in AM and EOS, BP labile, amlodipine & hydralazine scheduled, dex decreased from 0.7 -> 0.5.  RESP: CMV: 40, 18, 8, 360, LS: course, diminished, wheezing on expiration, pt PS trial from 2138-8286 tolerated well, O2: 91-96%.   GI/: TF: 55/hr w/ 75/Q2 fwf, BS audible & active, mitchell and rectal tube with good output throughout shift.  SKIN: Scattered bruising on stomach, large red blanchable are on sacral area, increased edema noted on left hand and fingers.  IV: L PIV, R 3x lumen PICC.  DRIPS: Fent: 150, Dex: 0.5.     POC: continue current ICU cares, continue w/ PS weans, tracheostomy placement scheduled for 1030 11/29, possible PEG placement as well?    Goal Outcome Evaluation:      Plan of Care Reviewed With: patient, spouse, family, child    Overall Patient Progress: no changeOverall Patient Progress: no change

## 2023-11-27 NOTE — PROGRESS NOTES
ICU staff  DOS 11/27/2023    No major events reported. This morning Ms Bowen is opening eyes to exam. ENT is going to evaluate her today for tracheostomy.    Vitals:   Temp:  [97.2  F (36.2  C)-98.3  F (36.8  C)] 97.3  F (36.3  C)  Pulse:  [] 57  Resp:  [0-45] 15  BP: ()/() 130/72  FiO2 (%):  [40 %-45 %] 45 %  SpO2:  [88 %-96 %] 94 %     Vent Mode: CMV/AC  (Continuous Mandatory Ventilation/ Assist Control)  FiO2 (%): 45 %  Resp Rate (Set): 18 breaths/min  Tidal Volume (Set, mL): 360 mL  PEEP (cm H2O): 8 cmH2O  Pressure Support (cm H2O): 12 cmH2O  Resp: 15    I/O last 3 completed shifts:  In: 2339.58 [I.V.:344.58; NG/GT:675]  Out: 3000 [Urine:3000]    Dex 0.7-0.9  Fentanyl 100-150    Exam:  Gen: Sedated but rousable  HEENT: NC/AT  Pulm: Mechanically ventilated, symmetric chest rise  Cor: RRR  Abdomen/GI: Soft, nondistended  : Deferred  Extremities: Warm, some edema  Skin: Well-perfused  Neuro: Eyes to voice, semi-purposeful  Psych: Unable to assess    Data:   Labs reviewed  - WBCs stable at 17  Nothing new from cultures or imaging    Assessment/plan:  79 y/o woman with bronchiectasis, chronic hypoxemia, recurrent pulmonary infections admitted since 10/15 and intubated since 10/22 with acute on chronic respiratory failure. Course marked by cycles of improvement/setback in her respiratory performance. Question of organizing/inflammatory pneumonia, is on high-dose steroids. Still requiring vent support.  CNS: More alert today, has had some pain.   # Acute/chronic pain  # Weakness/frequent falls  - On fentanyl infusion, on pregabalin as per baseline  - Has prn acetaminophen  - Consider adding oxycodone as a prn to facilitate coming off fentanyl; will likely need some taper given duration of IV infusion  Pulm: Tolerating 8/40 today.  # Acute/chronic mixed respiratory failure  # Bronchiectasis  # Chronic hypoxemia  # Multifocal organizing pneumonia  - Continue slow ventilator taper  - ENT assessing for  tracheostomy  - Continue steroids at high dose this week per pulmonology  CV: Off pressors, a bit hypertensive at times.  # Essential hypertension  # CAD s/p PCI  - Continue amlodipine, hydralazine  GI: Abdomen benign.  # Nutrition  - Continues on tube feedings  : UOP adequate, lytes OK.  # CARMEN on CKD  - Creatinine below baseline, reflective of decreased muscle mass  - GFR 18 by cystatin c  - Continue supportive cares  Heme: Hb 9 (8)  # Anemia of chronic disease  - No indication to transfuse  Msk: Extremities warm, well-perfused.   Endo: Glucose 115-199  # Stress/steroid hyperglycemia  - Sliding scale insulin  ID: Afebrile, WBCs 17  # Leukocytosis, likely secondary to steroids  # ?Pneumonia  - Nothing on cultures from 11/19, continues on meropenem  - Atovaquone for PJP prophylaxis  ICU: SQH, PPI  - Family updated    This patient is critically ill to my assessment and requires ICU monitoring and cares. A total of 35 minutes critical care time spent on 11/27/2023, exclusive of procedures.     Rui Payton MD, PhD  Surgical critical care  11/27/2023, 12:35 PM    Clinically Significant Risk Factors        # Hyperkalemia: Highest K = 5.5 mmol/L in last 2 days, will monitor as appropriate       # Hypoalbuminemia: Lowest albumin = 2.7 g/dL at 11/18/2023  5:05 AM, will monitor as appropriate

## 2023-11-28 NOTE — CONSULTS
Interventional Radiology Note  Inpatient - [facility]  11/28/2023    O:  /71 (BP Location: Left arm)   Pulse 52   Temp 98  F (36.7  C) (Axillary)   Resp 18   Ht 1.524 m (5')   Wt 86 kg (189 lb 9.5 oz)   SpO2 95%   BMI 37.03 kg/m        A:  78 year old female with history of chronic hypoxic respiratory failure due to pulmonary HTN, ALAINA (uses CPAP with sleep at baseline), and chronic diastolic congestive heart failure. She also has history of CAD, CKD stage 3, obesity, HTN,  and depression. She presented to the ED on 10/15/23 with fever, cough, and falls. She was admitted for pneumonia treatment.  She developed worsened respiratory status on 10/21 requiring transfer to ICU and intubation. Hospital course was prolonged respiratory failure, ARDS, and difficulty weaning from vent. Tracheostomy was deferred partially due to family preference and also due to the fact that increased procedural risk given high ongoing oxygen needs. Now planning for tracheostomy, IR asked to place G tube    P:    -Plan for gastrostomy tube placement tomorrow ~ 10am  -NPO/no tube feeds after midnight tonight  -Pt on meropenem, no no additional antibiotics needed for procedure      Laina Rivero PA-C  Interventional Radiology  [facility] desk phone *[phone]

## 2023-11-28 NOTE — PROGRESS NOTES
[facility]    Medicine Progress Note - Hospitalist Service    Date of Admission:  10/15/2023    Assessment & Plan   Matthew Bowen is a 78 year old female with history of chronic hypoxic respiratory failure due to pulmonary HTN, ALAINA (uses CPAP with sleep at baseline), and chronic diastolic congestive heart failure. She also has history of CAD, CKD stage 3, obesity, HTN,  and depression. She presented to the ED on 10/15/23 with fever, cough, and falls. ED evaluation showed low grade temperature elevation in the 99s. She was hypoxic, requiring supplemental oxygen at 2 lpm. Laboratory evaluation showed WBC 21.5, procalcitonin 0.16, creatinine 1.21, and bicarb 30. CXR suggested pulmonary vascular congestion. CT of chest showed lingular and bilateral upper lung pneumonia. No traumatic injury was found on imaging. She was admitted to the hospital and treated for multifocal pneumonia with Rocephin and Zithromax. She developed worsened respiratory status on 10/21 requiring transfer to ICU and intubation. Hospital course was prolonged respiratory failure, ARDS, and difficulty weaning from vent. Tracheostomy was deferred partially due to family preference and also due to the fact that increased procedural risk given high ongoing oxygen needs. CT chest obtained 11/18 suggested severe ground-glass opacities with traction bronchiectasis. It was unclear how much of her disease is reversible. Pulmonology started high-dose steroids on 11/18, with modest interval improvement in O2 weaning. Family conference was held on 11/24 and decision was made to pursue tracheostomy for continued vent weaning.     Multifocal community acquired pneumonia  Recent pneumonias prior to admission  Possible bronchiectasis  Sepsis (leukocytosis and fever)  Acute respiratory failure  Adult respiratory distress syndrome prolonged ventilation:  Summary:  Was initially admitted to medicine and treated with Rocephin and Zithromax.  Deteriorated and transferred to the ICU and intubated on 10/21/23.   Antibiotic coverage changed to cefepime on 10/25 through 10/31.  Received Zosyn from 11/15 through 11/19.  Meropenem and vancomycin were started 11/19.    -  ID following, continue meropenem.  Consider stopping soon.  -Continue vent support, stable on current settings.  Intensivist also following.  Plan currently is for high dose steroids + trach with gradual attempts a vent wean.   PJP prophylaxis.  -ENT tentative plan for trach Wed 11/29.  I have also asked IR to consult and see if we can coordinate FT placement similar time.     Chronic hypoxic respiratory failure  Obstructive sleep apnea  Chronic diastolic heart failure, not felt to have current exac  Pulmonary hypertension:  -Baseline on 2 lpm of oxygen  -PTA torsemide was on hold, resumed recently at lower dose.  -On vent.      Anemia  -Has been persistent during stay. HGB was 11.6 on presentation and trended down- generally in 7-8's.   -Suspect related to critical illness, phlebotomy, etc.  -On Protonix for ulcer prevention  -Had transfusion of 1 unit RBCs on 11/17  -Had transfusion of 1 unit of RBCs on 11/25/23 for HGB 6.9 with rise to 8 range.     Mild Hyperkalemia:  -Potassium was 5.5 recently. Lokelma given and 5.4 11/26.  -No obvious cause such as CARMEN, medicine, IVF, acidosis, etc. She does have CKD and is normally on torsemide 60 mg daily.  - K today 5.2.  Recheck again tomorrow.     Hypernatremia, mild  -Resolved  -Monitor sodium with resumption of torsemide at dose reduced from PTA     Falls prior to admission  Subacute rib fracture  Pain, Anxiety:  -imaging negative for acute fractures and bleeding but did show subacute rib fractures.  Had been on fentanyl drip much of stay.  Dose recently increased to 150 mcg/hr.  More relaxed on that dose.  I did explain to patient/daughter today that I would not increase further and will try to wean that dose back down to 100 mcg/hr this week.        CKD stage 3  Acute kidney injury earlier in stay  -Cr 1.21 on admission and peaked in 2.1 range but has now normalized  -Have been holding diuretics as discussed above. Resume torsemide at 20 mg daily 11/26  (PTA was on torsemide 60 mg each am)  -Monitor renal function closely     HTN  -Continue PTA amlodipine  -PTA hydralazine and imdur had been on hold.   -BP rising so resumed hydralazine at 25 mg TID recently.  Also on PTA amlodipine.  If BP trending up still will increase hydralazine back to PTA 50 mg TID dose.     -  Torsemide as above     Obesity:  -  Increases complexity of care     CAD:  -Stable  -Continue PTA ASA, statin.  Historically has been on plavix but it has been on hold now since early in her stay.  Last PCI in 2019 and even though she has residual RCA dz that in itself is not an indication for DAPT at this point especially with anemia.     Hypothyroidism  -Continue PTA levothyroxine     Mood disorder  -Continue PTA Lyrica and Zoloft     Access issues:  -  s/p PICC line.  Some generalized edema including PICC line arm.  Monitor.    Nutrition  Hyperglycemia:  -  sliding scale insulin PRN.  Continue Tube Feeds.  IR consulted for consideration of FT placement Wed in coordination with trach.  Recent Labs   Lab 11/27/23  1551 11/27/23  1209 11/27/23  0902 11/27/23  0532 11/27/23  0417   * 115* 140* 172* 199*       Medical Decision Making       Over 50 MINUTES SPENT BY ME on the date of service doing chart review, history, exam, documentation & further activities per the note.      Labs/Imaging Reviewed:  See Information above and Data section below    PPE Used:  Mask       Diet: NPO per Anesthesia Guidelines for Procedure/Surgery Except for: Meds  Adult Formula Drip Feeding: Continuous Promote; Orogastric tube; Goal Rate: 55 mL/hr x 22 hours (please hold 1 hour before and after levothyroxine); mL/hr; (11/13) Initial rate of 35 ml/hr advancing by 10 ml q 4 hrs to new goal rate of 55 ml/hr     DVT Prophylaxis: Heparin SQ  Aleman Catheter: PRESENT, indication: Strict 1-2 Hour I&O, Strict 1-2 Hour I&O  Lines: PRESENT      PICC 10/22/23 Triple Lumen Right Basilic multiple med gtt. ready for use-Site Assessment: WDL      Cardiac Monitoring: ACTIVE order. Indication: Tachyarrhythmias, acute (48 hours)  Code Status: Full Code      Clinically Significant Risk Factors                             Disposition Plan   Needs ongoing ICU care, eventually possibly LTACH.       Sonny Alvarado DO  Hospitalist Service  [facility]  Securely message with [employer] (more info)  Text page via [facility] Paging/Directory   ______________________________________________________________________    Interval History   Assumed care, history reviewed.  Patient without new pain/worsening SOB complaints.  Has been comfortable overall but at times a little anxious/agitated.      Physical Exam   Vital Signs: Temp: 97.9  F (36.6  C) Temp src: Axillary BP: (!) 146/73 Pulse: 67   Resp: (!) 38 SpO2: 95 % O2 Device: Mechanical Ventilator    Weight: 181 lbs 14.07 oz    GEN:  Awake, somewhat responds to questions, appears comfortable, no overt distress.  HEENT:  Normocephalic/atraumatic, no scleral icterus, no nasal discharge, mouth moist.  CV:  Regular rate and rhythm, no murmur.  LUNGS:  Coarse bilaterally without wheezing/retractions.  Symmetric chest rise on inhalation noted.  ABD:  Active bowel sounds, soft, non-tender/non-distended.  No rebound/guarding/rigidity.  EXT:  No edema.  No cyanosis.  No acute joint synovitis noted.  SKIN:  Dry to touch, no exanthems noted in the visualized areas.    Medications    dexmedeTOMIDine 0.6 mcg/kg/hr (11/27/23 1805)    dextrose      fentaNYL 150 mcg/hr (11/27/23 1800)      amLODIPine  5 mg Oral or Feeding Tube Daily    aspirin  81 mg Oral or Feeding Tube Daily    atorvastatin  20 mg Oral or Feeding Tube QPM    atovaquone  1,500 mg Oral or Feeding Tube Daily    B and C vitamin Complex  with folic acid  5 mL Oral or Feeding Tube Daily    chlorhexidine  15 mL Mouth/Throat Q12H    clotrimazole   Topical BID    fiber modular (NUTRISOURCE FIBER)  1 packet Per Feeding Tube TID    heparin ANTICOAGULANT  5,000 Units Subcutaneous Q8H    hydrALAZINE  25 mg Oral or Feeding Tube TID    insulin aspart  1-6 Units Subcutaneous Q4H    ipratropium - albuterol 0.5 mg/2.5 mg/3 mL  3 mL Nebulization 4x daily    levothyroxine  150 mcg Oral or Feeding Tube QAM AC    meropenem  500 mg Intravenous Q12H    methylPREDNISolone  62.5 mg Intravenous Q8H    pantoprazole  40 mg Per Feeding Tube QAM AC    pregabalin  75 mg Per Feeding Tube Daily    protein modular  1 packet Per Feeding Tube Daily    QUEtiapine  150 mg Oral or Feeding Tube BID    sertraline  150 mg Oral or Feeding Tube Daily    sodium chloride (PF)  10-40 mL Intracatheter Q7 Days    sodium chloride (PF)  3 mL Intracatheter Q8H    torsemide  20 mg Oral or Feeding Tube Daily    vitamin D3  50 mcg Oral or Feeding Tube Daily       Data     Labs and Imaging results below reviewed today.    I have personally reviewed the following data over the past 24 hrs:    17.3 (H)  \   9.0 (L)   / 231     142 103 64.3 (H) /  159 (H)   5.2 31 (H) 0.60 \       Recent Labs   Lab 11/27/23  0532 11/26/23  0454 11/25/23  1410 11/25/23  0600   WBC 17.3* 15.8*  --  17.3*   HGB 9.0* 8.0* 8.4* 6.9*   HCT 30.4* 27.1*  --  24.7*   MCV 89 89  --  93    197  --  203     7-Day Micro Results       Collected Updated Procedure Result Status      11/23/2023 1837 11/23/2023 2107 MRSA MSSA PCR, Nasal Swab [[id number]]    Swab from Nose    Final result Component Value   MRSA Target DNA Negative   SA Target DNA Negative            11/22/2023 1551 11/24/2023 1106 Urine Culture [[id number]]   (Abnormal)   Urine, Aleman Catheter    Final result Component Value   Culture 10,000-50,000 CFU/mL Candida albicans    Susceptibilities not routinely done, refer to antibiogram to view typical  susceptibility profiles               11/22/2023 1303 11/24/2023 1425 Respiratory Aerobic Bacterial Culture with Gram Stain [[id number]]   (Abnormal)   Sputum from Endotracheal    Final result Component Value   Culture 1+ Candida albicans    Susceptibilities not routinely done, refer to antibiogram to view typical susceptibility profiles   Gram Stain Result >25 PMNs/low power field    1+ Yeast               11/22/2023 1110 11/27/2023 1316 Blood Culture Special Organism [[id number]]   Blood from Hand, Left    Preliminary result Component Value   Culture No growth after 5 days  [P]                11/22/2023 1105 11/27/2023 1316 Blood Culture Special Organism [[id number]]   Blood from Arm, Left    Preliminary result Component Value   Culture No growth after 5 days  [P]                      Recent Labs   Lab 11/27/23  1551 11/27/23  1209 11/27/23  0902 11/27/23  0532 11/26/23  1615 11/26/23  1336 11/26/23  0746 11/26/23  0454 11/25/23  0750 11/25/23  0600   NA  --   --   --  142  --  144  --  141   < > 146*   POTASSIUM  --   --   --  5.2  --  4.9  --  5.5*   < > 5.4*   CHLORIDE  --   --   --  103  --  109*  --  107   < > 111*   CO2  --   --   --  31*  --  28  --  29   < > 28   ANIONGAP  --   --   --  8  --  7  --  5*   < > 7   * 115* 140* 172*   < > 148*   < > 157*   < > 148*   BUN  --   --   --  64.3*  --  56.1*  --  56.3*   < > 60.6*   CR  --   --   --  0.60  --  0.51  --  0.54   < > 0.57   GFRESTIMATED  --   --   --  >90  --  >90  --  >90   < > >90   BARBARA  --   --   --  8.7*  --  8.1*  --  8.5*   < > 8.0*   MAG  --   --   --  2.0  --   --   --  2.1  --  2.2   PHOS  --   --   --  3.9  --   --   --  3.5  --  3.9    < > = values in this interval not displayed.       No results found for this or any previous visit (from the past 24 hour(s)).

## 2023-11-28 NOTE — PLAN OF CARE
ICU End of Shift Summary.  For vital signs and complete assessments, please see documentation flowsheets.      Pertinent assessments: A&O. Answers y/n questions. Follows commands. Genralized weakness. Dex and Fentanyl infusing. Rass goal 0 to -1. Afebrile. LS coarse/dim/expiratory wheezes. Solumedrol. Merrem. Tele SR/SB. BP HTN with pain. +BS. RT brown loose. TF infusing at goal, keofeed. FWF 75 ml every 2 hrs. Aleman UOP good. Wt up 3.5 kg from yesterday. Generalized edema. Bilat hands edema decreased with elevation. Scattered Bruises. Wt up 3.5 kg from yesterday.  Major Shift Events: Denied pain most of night. Caught of with her. Became HTN, elevated RR, aggitated and restless, increased vent Fio2 needs from 35% to 50%. Hydralozine prn given, Dilauid 0.2, 5 mg oxycodone, and increased Dex from 0.5 to 1.1 to normalized VSS and achieve pain control.    Plan (Upcoming Events): Coordinate pain plan. Needs Pain consult.  Trach and Peg placement Wed. At 1030.   Discharge/Transfer Needs: TBD     Bedside Shift Report Completed : yes  Bedside Safety Check Completed: yes    Goal Outcome Evaluation:      Plan of Care Reviewed With: patient, child    Overall Patient Progress: no changeOverall Patient Progress: no change

## 2023-11-28 NOTE — PROGRESS NOTES
[facility]  WO Nurse Inpatient Assessment     Consulted for: buttock wound, perianal    Summary:Patient initially assessed for IAD of gluteal cleft and buttock. This is healed on 11/28. However, [facility] identified a pressure injury perianally due to the fecal management system that was placed on 11/20/23      Patient History (according to Hospitalist provider note(s) 10/25/23:      Matthew Bowen is a 78 year old female w/PMH chronic hypoxic respiratory failure due to pulmonary HTN, ALAINA requiring CPAP, chronic diastolic HF, CAD, CKD stage 3, morbid obesity, HTN,  depression who presents from home with family with fever, cough.     Acute hypoxic respiratory failure s/p intubation  Possible ARDS  Pneumonia, recurrent w/bronchiectasis  -Presents with recurrent PNA, 3rd time in last month or so. Last completed treatment 2 weeks ago with persistent cough. Presents with worsening fatigue, productive cough, weakness and falls.  -On admission, patient febrile to 101.1, white count of 21.  Lactic acid was within normal limits.  She underwent a CT scan that showed a lingular consolidative opacity with air bronchograms with bilateral upper lobe groundglass opacities.  She was started on ceftriaxone and azithromycin.  -Sputum culture from 10/19 better that showed gram positive cocci and yeast. Discussed with ID, yeast prevalent in sputum samples and likely candida that is not related to illness.  Need to await speciation.   -Patient initially on ceftriaxone and azithromycin.  Broadened to cefepime and azithro on 10/18.  Flagyl added on 10/20.  Finished azithromycin course on 10/20.   -COVID, influenza, Respiratory viral panel negative.  Strep and legionella antigens negative.   -intubated 10/21 due to worsening respiratory status and bronch done showing serosanguinous and fibrinous return with thick mucoid secretions noted  -ID, pulm and ICU team all following.  -remains on cefepime since 18th, flagyll 20th  and Vanc since 21st-cont and await further recs  -on solumedrol 125 daily 10/24, continue for now    Assessment:      Areas visualized during today's visit:  perianal/buttock  Wound location: gluteal cleft    Last photo: 11/28/23    Wound due to: Incontinence Associated Dermatitis (IAD)  Wound history/plan of care: wound is moisture related and pt was having almost constant loose stools, not over any bony prominence. Rectal tube now in place  Wound base: 100 % epidermis,      Palpation of the wound bed: normal      Drainage: none     Description of drainage: none     Measurements (length x width x depth, in cm): healed     Tunneling: N/A     Undermining: N/A  Periwound skin: Intact      Color: normal and consistent with surrounding tissue, hyperpigmented/pink      Temperature: normal   Odor: none  Pain: unable to assess due to  sedation , none  Pain interventions prior to dressing change: N/A  Treatment goal: Protection  STATUS: healed  Supplies ordered: supplies stored on unit and discussed with RN and family present in room     Wound location: Right inner buttock/perineum area  Last photo: 11/28/23    Wound due to: Friction vs Pressure injury  Wound history/plan of care: Patient with urinary incontinence, currently using Purwick.  Unable to determine if this is pressure from Purwick or friction from rubbing.    Wound base: 100 % dermis     Palpation of the wound bed: normal      Drainage: none     Description of drainage: none     Measurements (length x width x depth, in cm): healed     Tunneling: N/A     Undermining: N/A  Periwound skin: Intact      Color: normal and consistent with surrounding tissue, pink hyperpigmented      Temperature: normal   Odor: none  Pain: unable to assess due to  sedation   Pain interventions prior to dressing change: N/A  Treatment goal: Protection  STATUS: healed  Supplies ordered: at bedside    Pressure Injury Location: Perianal    Last photo: 11/28/23    Wound type: Pressure  Injury     Pressure Injury Stage: 2, hospital acquired      This is a Medical Device Related Pressure Injury (MDRPI) due to fecal management system  Wound history/plan of care:   Patient had constant loose stools with skin breakdown. A rectal tube was placed on 11/20. During WOC revisit of IAD wounds, wound was identified 6 o clock perianally    Wound base: 100 % dermis,      Palpation of the wound bed: normal      Drainage: scant     Description of drainage: serous     Measurements (length x width x depth, in cm) 1.2  x 0.7  x  0.1 cm      Tunneling N/A     Undermining N/A  Periwound skin: Intact      Color: normal and consistent with surrounding tissue      Temperature: normal   Odor: none  Pain: unable to assess due to  sedation , none  Pain intervention prior to dressing change: patient tolerated well  Treatment goal: Heal  and Protection  STATUS: initial assessment  Supplies ordered: supplies stored on unit    My PI Risk Assessment     Sensory Perception: 1 - Completely Limited     Moisture: 2 - Very moist      Activity: 1 - Bedfast      Mobility: 1 - Completely immobile      Nutrition: 2 - Probably inadequate      Friction/Shear: 1 - Problem     TOTAL: 8        Treatment Plan:     Perianal wound and intergluteal protection: BID and PRN with incontinence episodes  Cleanse the area with Darlyn cleanse and protect, very gently with soft cloth.   2. Apply thin layer of critic aid paste on open or red areas   3. With repeat application, do not scrub the paste, only remove soiled paste and reapply.   4. If complete removal of paste is necessary use baby oil/mineral oil (#[id number]) and soft wash cloth.   5. Use only one Covidien pad in between mattress and pt. No brief in bed.   6. No Purwick on patient     Orders: Reviewed    RECOMMEND PRIMARY TEAM ORDER: None, at this time  Education provided: plan of care and wound progress  Discussed plan of care with: Family and Nurse  WOC nurse follow-up plan: twice weekly  Notify  WOC if wound(s) deteriorate.  Nursing to notify the Provider(s) and re-consult the WOC Nurse if new skin concern.    DATA:     Current support surface: Standard  Low air loss (JENNIFER pump, Isolibrium, Pulsate, skin guard, etc)  Containment of urine/stool: Incontinent pad in bed and Purewick external catheter   BMI: Body mass index is 37.03 kg/m .   Active diet order: Orders Placed This Encounter      NPO per Anesthesia Guidelines for Procedure/Surgery Except for: Meds      NPO per Anesthesia Guidelines for Procedure/Surgery Except for: Meds     Output: I/O last 3 completed shifts:  In: 2956.63 [I.V.:516.63; NG/GT:1230]  Out: 2690 [Urine:2565; Stool:125]     Labs:   Recent Labs   Lab 11/28/23  0956 11/28/23  0540   HGB  --  9.8*   INR 1.28*  --    WBC  --  23.1*     Pressure injury risk assessment:   Sensory Perception: 2-->very limited  Moisture: 3-->occasionally moist  Activity: 1-->bedfast  Mobility: 2-->very limited  Nutrition: 2-->probably inadequate  Friction and Shear: 2-->potential problem  Compa Score: 12    Olena Crowe RN CWOCN  Contact [facility] Nurse ([place])  Dept. Office Number: [phone]

## 2023-11-28 NOTE — PROGRESS NOTES
Matthew Bowen was admitted on 10/15/2023 for multifocal CAP, ARDS with prolonged ventilation    PMH: hronic hypoxic respiratory failure due to pulmonary HTN, ALAINA (uses CPAP with sleep at baseline), and chronic diastolic congestive heart failure     Airway: 8.0 22 at teeth    Oxygen Therapy: Vent day 38: 18/360/+/45%    SBT: 12/8/45%    Respiratory Medications: duoneb QID, albuterol Q4PRN    Breath Sounds and Secretions: coarse, moderate amount of thick, creamy secretions.     ABG @     Shift Note: Patient remained intubated and sedated throughout shift. No acute respiratory concerns noted.

## 2023-11-28 NOTE — PROGRESS NOTES
[facility]    Medicine Progress Note - Hospitalist Service    Date of Admission:  10/15/2023    Assessment & Plan   Matthew Bowen is a 78 year old female with history of chronic hypoxic respiratory failure due to pulmonary HTN, ALAINA (uses CPAP with sleep at baseline), and chronic diastolic congestive heart failure. She also has history of CAD, CKD stage 3, obesity, HTN,  and depression. She presented to the ED on 10/15/23 with fever, cough, and falls. ED evaluation showed low grade temperature elevation in the 99s. She was hypoxic, requiring supplemental oxygen at 2 lpm. Laboratory evaluation showed WBC 21.5, procalcitonin 0.16, creatinine 1.21, and bicarb 30. CXR suggested pulmonary vascular congestion. CT of chest showed lingular and bilateral upper lung pneumonia. No traumatic injury was found on imaging. She was admitted to the hospital and treated for multifocal pneumonia with Rocephin and Zithromax. She developed worsened respiratory status on 10/21 requiring transfer to ICU and intubation. Hospital course was prolonged respiratory failure, ARDS, and difficulty weaning from vent. Tracheostomy was deferred partially due to family preference and also due to the fact that increased procedural risk given high ongoing oxygen needs. CT chest obtained 11/18 suggested severe ground-glass opacities with traction bronchiectasis. It was unclear how much of her disease is reversible. Pulmonology started high-dose steroids on 11/18, with modest interval improvement in O2 weaning. Family conference was held on 11/24 and decision was made to pursue tracheostomy for continued vent weaning.     Multifocal community acquired pneumonia  Recent pneumonias prior to admission  Possible bronchiectasis  Sepsis (leukocytosis and fever)  Acute respiratory failure  Adult respiratory distress syndrome prolonged ventilation:  Summary:  Was initially admitted to medicine and treated with Rocephin and Zithromax.  Deteriorated and transferred to the ICU and intubated on 10/21/23.   Antibiotic coverage changed to cefepime on 10/25 through 10/31.  Received Zosyn from 11/15 through 11/19.  Meropenem and vancomycin were started 11/19.    -  ID following, continue meropenem.  Consider stopping soon.  -Continue vent support, stable on current settings.  Intensivist also following.  Plan currently is for high dose steroids + trach with gradual attempts a vent wean.   PJP prophylaxis.  See pulm note for recommended taper once ready to start taper process.  Consider starting later this week post trach.  -ENT plan for trach Wed 11/29.  IR is also coordinating FT placement same AM.  NPO/TF's to be on hold at midnight tonight, heparin subcut on hold.     Chronic hypoxic respiratory failure  Obstructive sleep apnea  Chronic diastolic heart failure, not felt to have current exac  Pulmonary hypertension:  -Baseline on 2 lpm of oxygen  -PTA torsemide was on hold, resumed recently at lower dose.  -On vent.      Anemia  -Has been persistent during stay. HGB was 11.6 on presentation and trended down- generally in 7-8's.   -Suspect related to critical illness, phlebotomy, etc.  -On Protonix for ulcer prevention  -Had transfusion of 1 unit RBCs on 11/17  -Had transfusion of 1 unit of RBCs on 11/25/23 for HGB 6.9 with rise to 8 range.     Mild Intermittent Hyperkalemia:  -Potassium was 5.5 recently. Lokelma given and 5.4 11/26.  -No obvious cause such as CARMEN, medicine, IVF, acidosis, etc. She does have CKD and is normally on torsemide 60 mg daily.  - K creeping up again.  VBG with stable pH, not extremely acidotic.  Will give another Lokelma.  If continues having issues may need to consider changing TF formula.    Hypernatremia, mild  -Resolved  -Monitor sodium with resumption of torsemide at dose reduced from PTA     Falls prior to admission  Subacute rib fracture  Pain, Anxiety:  -Imaging negative for acute fractures and bleeding but did show  subacute rib fractures.  Had been on fentanyl drip much of stay.  Dose recently increased to 150 mcg/hr.  More relaxed on that dose.  Pain team consulted.  Working on plan with them to begin transitioning off fentanyl drip to oral/feeding tube based pain control.     CKD stage 3  Acute kidney injury earlier in stay  -Cr 1.21 on admission and peaked in 2.1 range but has now normalized  -Have been holding diuretics as discussed above. Resume torsemide at 20 mg daily 11/26  (PTA was on torsemide 60 mg each am)  -Monitor renal function closely     HTN  -Continue PTA amlodipine  -PTA hydralazine and imdur had been on hold.   -BP rising so resumed hydralazine at 25 mg TID recently.  Also on PTA amlodipine.  If BP trending up still will increase hydralazine back to PTA 50 mg TID dose.     -  Torsemide as above     Obesity:  -  Increases complexity of care     CAD:  -Stable  -Continue PTA ASA, statin.  Historically has been on plavix but it has been on hold now since early in her stay.  Last PCI in 2019 and even though she has residual RCA dz that in itself is not an indication for DAPT at this point especially with anemia.     Hypothyroidism  -Continue PTA levothyroxine     Mood disorder  -Continue PTA Lyrica and Zoloft     Access issues:  -  s/p PICC line.  Some generalized edema including PICC line arm.  Monitor.    Nutrition  Hyperglycemia:  -  sliding scale insulin PRN.  Continue Tube Feeds, off at midnight tonight for procedure in AM.  Dextrose PRN if drops.  IR consulted for consideration of FT placement Wed in coordination with trach.  Recent Labs   Lab 11/28/23  1556 11/28/23  1152 11/28/23  0818 11/28/23  0800 11/28/23  0544   * 168* 139* 149* 174*       Medical Decision Making       Over 50 MINUTES SPENT BY ME on the date of service doing chart review, history, exam, documentation & further activities per the note.      Labs/Imaging Reviewed:  See Information above and Data section below    PPE Used:   Mask       Diet: NPO per Anesthesia Guidelines for Procedure/Surgery Except for: Meds  NPO per Anesthesia Guidelines for Procedure/Surgery Except for: Meds  Adult Formula Drip Feeding: Continuous Novasource Renal; Nasogastric tube; Goal Rate: 30 ml/hr x 22 hrs (hold 1 hr before and after levothyroxine); mL/hr; Okay to start new formula at goal rate    DVT Prophylaxis: Heparin SQ  Aleman Catheter: PRESENT, indication: Strict 1-2 Hour I&O, Strict 1-2 Hour I&O  Lines: PRESENT      PICC 10/22/23 Triple Lumen Right Basilic multiple med gtt. ready for use-Site Assessment: WDL      Cardiac Monitoring: ACTIVE order. Indication: Tachyarrhythmias, acute (48 hours)  Code Status: Full Code      Clinically Significant Risk Factors        # Coagulation Defect: INR = 1.28 (Ref range: 0.85 - 1.15) and/or PTT = N/A, will monitor for bleeding            # Moderate Malnutrition: based on nutrition assessment           Disposition Plan   Needs ongoing ICU care, eventually possibly LTACH.       Sonny Alvarado DO  Hospitalist Service  [facility]  Securely message with TeamSupport (more info)  Text page via [facility] Paging/Directory   ______________________________________________________________________    Interval History   Patient without new pain/worsening SOB complaints.  RN reported early AM she was more agitated and precedex increased.  She since was more relaxed and it was weaned again.      Physical Exam   Vital Signs: Temp: 97.4  F (36.3  C) Temp src: Axillary BP: (!) 165/87 Pulse: 70   Resp: 12 SpO2: 100 % O2 Device: Mechanical Ventilator    Weight: 189 lbs 9.53 oz    GEN:  Awake, somewhat responds to questions similar to prior, appears comfortable, no overt distress.  HEENT:  Normocephalic/atraumatic, no scleral icterus, no nasal discharge, mouth moist.  CV:  Slightly katiana earlier when I saw her, regular, no loud murmur.  LUNGS:  Coarse bilaterally, no wheezes/retractions.  Symmetric chest rise on inhalation  noted.  ABD:  Active bowel sounds, soft, non-tender/non-distended.  No rebound/guarding/rigidity.  EXT:  Trace edema.  No cyanosis.  No acute joint synovitis noted.  SKIN:  Dry to touch, no exanthems noted in the visualized areas.    Medications    dexmedeTOMIDine 0.8 mcg/kg/hr (11/28/23 1700)    dextrose      fentaNYL 150 mcg/hr (11/28/23 1700)      amLODIPine  5 mg Oral or Feeding Tube Daily    aspirin  81 mg Oral or Feeding Tube Daily    atorvastatin  20 mg Oral or Feeding Tube QPM    atovaquone  1,500 mg Oral or Feeding Tube Daily    B and C vitamin Complex with folic acid  5 mL Oral or Feeding Tube Daily    chlorhexidine  15 mL Mouth/Throat Q12H    clotrimazole   Topical BID    fiber modular (NUTRISOURCE FIBER)  1 packet Per Feeding Tube TID    [Held by provider] heparin ANTICOAGULANT  5,000 Units Subcutaneous Q8H    hydrALAZINE  25 mg Oral or Feeding Tube TID    insulin aspart  1-6 Units Subcutaneous Q4H    ipratropium - albuterol 0.5 mg/2.5 mg/3 mL  3 mL Nebulization 4x daily    levothyroxine  150 mcg Oral or Feeding Tube QAM AC    lidocaine   Topical TID    meropenem  500 mg Intravenous Q12H    methylPREDNISolone  62.5 mg Intravenous Q8H    pantoprazole  40 mg Per Feeding Tube QAM AC    pregabalin  75 mg Per Feeding Tube Daily    protein modular  1 packet Per Feeding Tube BID    QUEtiapine  150 mg Oral or Feeding Tube BID    sertraline  150 mg Oral or Feeding Tube Daily    sodium chloride (PF)  10-40 mL Intracatheter Q7 Days    sodium chloride (PF)  3 mL Intracatheter Q8H    torsemide  20 mg Oral or Feeding Tube Daily    vitamin D3  50 mcg Oral or Feeding Tube Daily       Data     Labs and Imaging results below reviewed today.    I have personally reviewed the following data over the past 24 hrs:    23.1 (H)  \   9.8 (L)   / 309     139 99 67.5 (H) /  132 (H)   5.7 (H) 30 (H) 0.65 \     INR:  1.28 (H) PTT:  N/A   D-dimer:  N/A Fibrinogen:  N/A       Recent Labs   Lab 11/28/23  0549 11/27/23  8527  11/26/23  0454   WBC 23.1* 17.3* 15.8*   HGB 9.8* 9.0* 8.0*   HCT 33.2* 30.4* 27.1*   MCV 88 89 89    231 197     7-Day Micro Results       Collected Updated Procedure Result Status      11/23/2023 1837 11/23/2023 2107 MRSA MSSA PCR, Nasal Swab [[id number]]    Swab from Nose    Final result Component Value   MRSA Target DNA Negative   SA Target DNA Negative            11/22/2023 1551 11/24/2023 1106 Urine Culture [[id number]]   (Abnormal)   Urine, Aleman Catheter    Final result Component Value   Culture 10,000-50,000 CFU/mL Candida albicans    Susceptibilities not routinely done, refer to antibiogram to view typical susceptibility profiles               11/22/2023 1303 11/24/2023 1425 Respiratory Aerobic Bacterial Culture with Gram Stain [[id number]]   (Abnormal)   Sputum from Endotracheal    Final result Component Value   Culture 1+ Candida albicans    Susceptibilities not routinely done, refer to antibiogram to view typical susceptibility profiles   Gram Stain Result >25 PMNs/low power field    1+ Yeast               11/22/2023 1110 11/28/2023 1316 Blood Culture Special Organism [[id number]]   Blood from Hand, Left    Preliminary result Component Value   Culture No growth after 6 days  [P]                11/22/2023 1105 11/28/2023 1316 Blood Culture Special Organism [[id number]]   Blood from Arm, Left    Preliminary result Component Value   Culture No growth after 6 days  [P]                      Recent Labs   Lab 11/28/23  1556 11/28/23  1152 11/28/23  0818 11/28/23  0544 11/28/23  0540 11/27/23  0902 11/27/23  0532 11/26/23  1615 11/26/23  1336 11/26/23  0746 11/26/23  0454   NA  --   --   --   --  139  --  142  --  144  --  141   POTASSIUM  --   --   --   --  5.7*  --  5.2  --  4.9  --  5.5*   CHLORIDE  --   --   --   --  99  --  103  --  109*  --  107   CO2  --   --   --   --  30*  --  31*  --  28  --  29   ANIONGAP  --   --   --   --  10  --  8  --  7  --  5*   * 168* 139*   < > 178*    < > 172*   < > 148*   < > 157*   BUN  --   --   --   --  67.5*  --  64.3*  --  56.1*  --  56.3*   CR  --   --   --   --  0.65  --  0.60  --  0.51  --  0.54   GFRESTIMATED  --   --   --   --  90  --  >90  --  >90  --  >90   BARBARA  --   --   --   --  8.9  --  8.7*  --  8.1*  --  8.5*   MAG  --   --   --   --  2.0  --  2.0  --   --   --  2.1   PHOS  --   --   --   --  4.5  --  3.9  --   --   --  3.5    < > = values in this interval not displayed.       No results found for this or any previous visit (from the past 24 hour(s)).

## 2023-11-28 NOTE — PROGRESS NOTES
CLINICAL NUTRITION SERVICES - REASSESSMENT NOTE     Nutrition Prescription    RECOMMENDATIONS FOR MDs/PROVIDERS TO ORDER:  Recommend close monitoring of Mg and P with change in formula    Malnutrition Status:    % Intake: Decreased intake does not meet criteria- meeting estimated needs with EN  % Weight Loss: Up to 5% in 1 month (moderate)  Subcutaneous Fat Loss: None observed  Muscle Loss: Temporal:  mild to moderate, Thoracic region (clavicle, trapezius):  moderate, and Posterior calf:  moderate to severe  Fluid Accumulation/Edema: Mild  Malnutrition Diagnosis: at least moderate malnutrition in the context of acute illness    Recommendations already ordered by Registered Dietitian (RD):  Enteral Nutrition - Modify composition, concentration, and rate - change to Novasource Renal at 30 ml/hr x 22 hrs (660 ml) which will provide 1320 kcal, 60 g protein, 121 g CHO, 0 g fiber, 473 ml free H2O  Increase Prosource TF 20 to 2 pkts/day = 160 kcal/40 g protein  Continue Nutrisource fiber x 3 pkts/day = 45 kcal/9 g fiber    Total energy/protein provisions, all sources = 1525 kcal (19 kcal/kg)/100 g protein (2.2 g/kg)    Future/Additional Recommendations:  Monitor tolerance to new formula, labs, GI function, participation in therapies and weight--adjust assessed needs and provisions prn     EVALUATION OF THE PROGRESS TOWARD GOALS   Diet: NPO    Nutrition Support:    Access: NGT (11/10)  Formula/Rate/Provisions: Promote at 55 ml/hr x 22 hrs (1210 ml) which provides 1210 kcal, 75 g protein, 157 g CHO, 0 g fiber, 1015 ml free H2O.   Flushes: H2O- 75 ml q 2 hrs (MD managing)    Modulars:   Prosource TF 20 x 1 pkt/day = 80 kcal/20 g protein  Nutrisource Fiber x 3 pkts/day = 45 kcal/9 g protein    Total energy/protein provisions, all sources = 1335 kcal (15+ kcal/kg, 111% estimated needs)/95 g protein (~2.1 g/kg)     Intake/Tolerance:  5-day average intake = 1188 ml/day (98% prescribed volume)    Brief Nutrition support  history:  10/22-10/28- Vital HP  10/28-11/13- Novasource Renal  11/13-current- Promote     NEW FINDINGS   General: patient's K is persistently elevated. Discussed possibility of changing to renal formula, discussed that Mg and P will also need to be monitored because all electrolytes will be decreased on new formula. Spoke with Jeni's  and daughter and updated about transition to renal formula. Patient's daughter, a physician, notes that Jeni takes a diuretic at home and she suspects this has been keeping her K+ low at home.     Weight: weights up and down to fluid status, still has + 2 edema, pt is at baseline weight, but suspect loss of lean mass, pt currently +4.4 L per I/Os, based on admission weight and lowest weight patient has lost 5.7 kg (6.6%) over less about 5.5 weeks  Date/Time Weight Weight Method   11/28/23 0400 86 kg (189 lb 9.5 oz) Bed scale   11/27/23 0400 82.5 kg (181 lb 14.1 oz) Bed scale   11/26/23 0500 82.2 kg (181 lb 3.5 oz) Bed scale   11/24/23 0440 81.7 kg (180 lb 2.9 oz) Bed scale   11/22/23 0535 82.5 kg (181 lb 14.1 oz) Bed scale   11/21/23 0500 81.4 kg (179 lb 7.3 oz) Bed scale   11/20/23 0500 79.9 kg (176 lb 2.4 oz) Bed scale   11/19/23 0630 81.8 kg (180 lb 5.4 oz) Bed scale   11/18/23 0613 82.4 kg (181 lb 10.5 oz) Bed scale   11/17/23 0606 84.2 kg (185 lb 10 oz) Bed scale   11/16/23 0615 83.1 kg (183 lb 3.2 oz) Bed scale   11/15/23 0445 83.9 kg (184 lb 15.5 oz) Bed scale   11/14/23 0520 84 kg (185 lb 2.4 oz) Bed scale   11/13/23 0430 84.8 kg (186 lb 15.2 oz) Bed scale   11/12/23 0400 87.7 kg (193 lb 5.5 oz) Bed scale   11/11/23 0330 85 kg (187 lb 6.3 oz) Bed scale   11/09/23 0030 88.4 kg (194 lb 14.2 oz) Bed scale   11/08/23 0600 88.8 kg (195 lb 12.3 oz) Bed scale   11/06/23 0530 89.8 kg (197 lb 15.6 oz) Bed scale   11/05/23 0500 91.4 kg (201 lb 8 oz) Bed scale   11/03/23 0615 91.9 kg (202 lb 9.6 oz) Bed scale   11/02/23 0400 93.9 kg (207 lb 0.2 oz) Bed scale   11/01/23 0358 94.7  kg (208 lb 12.4 oz) Bed scale   10/31/23 0420 96 kg (211 lb 10.3 oz) Bed scale   10/30/23 0500 94.9 kg (209 lb 3.5 oz) Bed scale   10/29/23 0400 94.8 kg (208 lb 15.9 oz) Bed scale   10/28/23 0341 93.8 kg (206 lb 12.7 oz) Bed scale   10/27/23 0600 89 kg (196 lb 3.4 oz) Bed scale   10/26/23 0500 89.2 kg (196 lb 10.4 oz) Bed scale   10/25/23 0430 88.1 kg (194 lb 4.8 oz) Bed scale   10/23/23 0545 87 kg (191 lb 12.8 oz) Bed scale   10/21/23 1930 85.7 kg (188 lb 14.4 oz) Bed scale   10/20/23 1900 85.4 kg (188 lb 4.4 oz) Bed scale   10/16/23 0644 81.7 kg (180 lb 1.9 oz) Bed scale   10/15/23 1646 85.6 kg (188 lb 11.4 oz) Bed scale   10/15/23 0925 86.2 kg (190 lb) --       Labs:   - K 5.7- K has drifted up and down over past several days, now the highest it's been despite receiving Lokelma  - BUN 67.5    GI: rectal tube- 100 ml    Skin: WOC following for:  Right inner buttock/perineum area- friction vs PI d/t fecal management system  Perianal PI, stage 2, hospital acquired d/t medical device    Meds:   - Lipitor  - Nephronex  - sliding scale insulin  - levothyroxine  - methylprednisolone  - torsemide  - vit D3    dexmedeTOMIDine 0.8 mcg/kg/hr (11/28/23 1500)    dextrose      fentaNYL 150 mcg/hr (11/28/23 1500)        ASSESSED NUTRITION NEEDS PER APPROVED PRACTICE GUIDELINES  Dosing Weight: 79.9 kg (lowest weight) for energy, and 45.5 kg (IBW) for protein  Estimated Energy Needs: 9690-7576 kcals/day (15 - 20 kcals/kg)  Justification: Vented--moving to [facility] tomorrow, anticipate increased activity, weight loss  Estimated Protein Needs: >91 grams protein/day (>2 grams of pro/kg)  Justification: Hypercatabolism with critical illness and Obesity guidelines  Estimated Fluid Needs: Per provider pending fluid status    MALNUTRITION  As noted above    Previous Goals   Total avg nutritional intake to meet a minimum of 11 kcal/kg per energy DW of 86 kg and 2 g PRO/kg daily per protein DW of 45.5 kg      Evaluation: Met    Previous  Nutrition Diagnosis  Inadequate oral intake related to respiratory needs necessitating NPO status as evidenced by reliance on NS to meet needs      Evaluation: No change    CURRENT NUTRITION DIAGNOSIS  Inadequate oral intake related to respiratory needs necessitating NPO status as evidenced by reliance on NS to meet needs        INTERVENTIONS  Implementation  Enteral Nutrition - Modify composition, concentration, and rate - change to Novasource Renal at 30 ml/hr x 22 hrs (660 ml) which will provide 1320 kcal, 60 g protein, 121 g CHO, 0 g fiber, 473 ml free H2O  Increase Prosource TF 20 to 2 pkts/day = 160 kcal/40 g protein  Continue Nutrisource fiber x 3 pkts/day = 45 kcal/9 g fiber    Total energy/protein provisions, all sources = 1525 kcal (19 kcal/kg)/100 g protein (2.2 g/kg)  Collaboration and Referral of Nutrition care: Patient discussed this morning during IDT rounds    Goals  Total avg nutritional intake to meet a minimum of 15 kcal/kg per energy DW of 79.9 kg and 2 g PRO/kg daily per DW of 45.5 kg    Monitoring/Evaluation  Progress toward goals will be monitored and evaluated per protocol.    Elizabeth Ortiz, VINCENT, LD, [facility]  Pager - 3rd floor/ICU: [phone]  Pager - All other floors: [phone]  Pager - Weekend/holiday: [phone]  Office: [phone]

## 2023-11-28 NOTE — PLAN OF CARE
Problem: Enteral Nutrition  Goal: Safe, Effective Therapy Delivery  Outcome: Progressing  Goal: Feeding Tolerance  Outcome: Progressing   Goal Outcome Evaluation: Pt's K has been persistently high--changed to renal formula, increased provisions slightly d/t muscle loss, weight loss, monitor closely.

## 2023-11-28 NOTE — PLAN OF CARE
VS: VSS, BP labile.   NEURO: RASS 0 -> -1, pt calm/drowsy throughout, dex titrated from 1.1 to 0.8 in AM, opening eyes spontaneously, able to follow commands via nodding.   CARDIAC/TELE: SR/SB, BP labile throughout, amlodipine & hydralazine scheduled, AM hydralzine held d/t hypotension.  RESP: CMV: 35, 18, 8, 360, LS: course, diminished, still wheezing on expiration, no weaning trials today d/t sedation levels and rest for surgery tomorrow (11/29), O2: 91-96%.   GI/: TF: switched to Nova Source Renal @ 30/hr w/ 100/Q2 fwf, BS audible & active, mitchell and rectal tube with good output throughout shift.  SKIN: Scattered bruising on stomach and groin area, large red blanchable are on sacral area, IAD of gluteal cleft and buttock is healed and a perianal pressure injury identified per WOC.  MUSCO/SKEL: oxy x1 and dilaudid x1 given for LE pain, pain team unrelatedly  consulted, lidocaine 5% topical cream TID started.  IV: L PIV, R 3x lumen PICC.  DRIPS: Fent: 150, Dex: 0.8.     POC: continue current ICU cares, tracheostomy and PEG placement scheduled for 1000 tomorrow 11/29.     Goal Outcome Evaluation:      Plan of Care Reviewed With: patient, spouse, child, grandchild(lori)    Overall Patient Progress: improvingOverall Patient Progress: improving

## 2023-11-28 NOTE — CONSULTS
[facility] ACUTE PAIN SERVICE    ([facility], [facility], [facility], [facility])  Pain consult    Assessment/Plan:  Matthew Bowen is a 78 year old female who was admitted on 10/15/2023.  I was asked to see the patient for ongoing pain and assistance required for non-IV regimen.  Pneumonia with sepsis and respiratory failure.  History of ALAINA, obesity, rib fracture, CKD.    shows ongoing use of Lyrica.  Follows with [facility] pain clinic.  Also has chronic back pain.  Unable to describe pain although shakes her legs and grimaces at times, other times appears quite sleepy.  She is on Lyrica once daily, Seroquel twice daily, fentanyl, Versed, IV Dilaudid push, and oxycodone.    PLAN:  Acute pain of unknown cause, perhaps related to critical illness polymyopathy?  Question if the rectal tube is causing pain.  Would recommend deflating the balloon and reinserting.  She does have a subacute rib fracture although it does not point out any rib pain on exam.  This is in the setting of chronic low back pain.   Multimodal Medication Therapy:   Adjuvants: Suggest scheduling Tylenol, would increase Lyrica to home dose which is 75 mg 3 times daily  Opioids: Would suggest decreasing fentanyl drip to 125 mcgs (and decrease by 25mcg Q12H)  Suggest scheduling oxycodone BID  Oxycodone Q4h PRN   Suggest decreasing IVP dilaudid to 0.2mg Q6h PRN  Non-medication interventions- Ice   Constipation Prophylaxis-  liquid diarrhea   Follow up /Discharge Recommendations - We recommend prescribing the following at the time of discharge:  TBD          Subjective:    Today met with the patient and her .  She appears somewhat sleepy does respond by blinking and nodding slightly after several prompts.  Her  feels that she is having pain in her lower extremities and has been rubbing her feet and calves.  10 about her Lyrica and what she takes this for her.  He does not know exactly.  But I can tell from  care everywhere and chart review that she has followed with [facility] pain clinic and [facility] orthopedics for chronic back pain and spinal stenosis and previously had L3-L5 laminectomy..    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  Previously worked at the [employer].       Past Surgical History:   . Laterality Date    BREAST REDUCTION     SECTION    colonoscopy - polyps found    CORONARY STENT PLACEMENT 2019   Synergy drug-eluting stent    HYSTERECTOMY    KNEE REPLACEMENT Left    KNEE REPLACEMENT Right    LAMINECTOMY 2013   L3-L5 done at [place]    LEFT parotidectomy 2019   Dr. Crowell    tympanoplasty right    uvalectomy     Oxygen dependent   Patient Active Problem List   Diagnosis    Pneumonia    Non-STEMI (non-ST elevated myocardial infarction) (H)    Dyspnea on exertion    Acute kidney injury superimposed on chronic kidney disease     Oxygen dependent    Generalized weakness    Contusion of right thigh, initial encounter    Closed head injury, initial encounter    Pneumonia due to infectious organism, unspecified laterality, unspecified part of lung        History   Drug Use Not on file         Tobacco Use      Smoking status: Never      Smokeless tobacco: Never         amLODIPine  5 mg Oral or Feeding Tube Daily    aspirin  81 mg Oral or Feeding Tube Daily    atorvastatin  20 mg Oral or Feeding Tube QPM    atovaquone  1,500 mg Oral or Feeding Tube Daily    B and C vitamin Complex with folic acid  5 mL Oral or Feeding Tube Daily    chlorhexidine  15 mL Mouth/Throat Q12H    clotrimazole   Topical BID    fiber modular (NUTRISOURCE FIBER)  1 packet Per Feeding Tube TID    [Held by provider] heparin ANTICOAGULANT  5,000 Units Subcutaneous Q8H    hydrALAZINE  25 mg Oral or Feeding Tube TID    insulin aspart  1-6 Units Subcutaneous Q4H    ipratropium - albuterol 0.5 mg/2.5 mg/3 mL  3 mL Nebulization 4x daily    levothyroxine  150 mcg Oral or Feeding Tube QAM AC     meropenem  500 mg Intravenous Q12H    methylPREDNISolone  62.5 mg Intravenous Q8H    pantoprazole  40 mg Per Feeding Tube QAM AC    pregabalin  75 mg Per Feeding Tube Daily    protein modular  1 packet Per Feeding Tube Daily    QUEtiapine  150 mg Oral or Feeding Tube BID    sertraline  150 mg Oral or Feeding Tube Daily    sodium chloride (PF)  10-40 mL Intracatheter Q7 Days    sodium chloride (PF)  3 mL Intracatheter Q8H    torsemide  20 mg Oral or Feeding Tube Daily    vitamin D3  50 mcg Oral or Feeding Tube Daily       Objective:  Vital signs in last 24 hours:  B/P: 146/80, T: 97, P: 60, R: 21   Blood pressure (!) 146/80, pulse 60, temperature 97  F (36.1  C), temperature source Axillary, resp. rate 21, height 1.524 m (5'), weight 86 kg (189 lb 9.5 oz), SpO2 94%.      Weight:   Wt Readings from Last 2 Encounters:   11/28/23 86 kg (189 lb 9.5 oz)   02/28/22 94.3 kg (208 lb)           Intake/Output:    Intake/Output Summary (Last 24 hours) at 11/28/2023 1259  Last data filed at 11/28/2023 1200  Gross per 24 hour   Intake 3027.43 ml   Output 2865 ml   Net 162.43 ml        Review of Systems:   As per subjective, all others negative.    Physical Exam:     General Appearance:  Sleepy    Head:  Normocephalic, without obvious abnormality, atraumatic   Eyes:  Pupils pinpoint    ENT/Throat:  ETT tube     Lymph/Neck: Supple, symmetrical, trachea midline, no adenopathy, thyroid: not enlarged, symmetric    Lungs:    respirations unlabored   Chest Wall:  No tenderness or deformity   Cardiovascular/Heart:  No SOB   Abdomen:   Soft, non-tender,    Musculoskeletal: Extremities dry edema throughout    Skin: Skin is dry     Neurologic: Sleepy   Anasarca           Imaging:  Personally Reviewed.    Results for orders placed or performed during the hospital encounter of 10/15/23   CT Head w/o Contrast    Impression    IMPRESSION:  1.  No acute intracranial injury.       Cervical spine CT w/o contrast    Impression    IMPRESSION:  1.   No acute cervical spine fracture.   2.  Reversal of the usual cervical lordosis is nonspecific and can be seen with muscle spasm/soft tissue injury, but is unchanged versus 01/25/2023 and may be positional/chronic.       XR Femur Right 2 Views    Impression    IMPRESSION: Advanced degenerative arthritis of both hips with hypertrophic changes. Multiple osteochondral loose bodies superior of the right femoral neck.    Mild degenerative arthritis of both SI joints. Lower lumbar facet arthropathy and degenerative interspace narrowing. Incidental note of ossification of the right iliolumbar ligament.    Right total knee arthroplasty. No knee joint effusion.   XR Pelvis 1/2 Views    Impression    IMPRESSION: Advanced degenerative arthritis of both hips with hypertrophic changes. Multiple osteochondral loose bodies superior of the right femoral neck.    Mild degenerative arthritis of both SI joints. Lower lumbar facet arthropathy and degenerative interspace narrowing. Incidental note of ossification of the right iliolumbar ligament.    Right total knee arthroplasty. No knee joint effusion.   XR Chest 1 View    Impression    IMPRESSION: Cardiomegaly with central vascular congestion. Stable prominent pericardial fat pad along the left cardiac margin. Mild bibasilar atelectasis/scarring. No focal pneumonic consolidation or pleural effusion.   CT Chest/Abdomen/Pelvis w Contrast    Impression    IMPRESSION:  1.  Lingular consolidative opacity with air bronchograms suspicious for infection/pneumonia.  2.  Additional bilateral upper lobe groundglass opacities are likely infectious/inflammatory.  3.  Subacute left posterior 11th and 12th rib fractures.  4.  No convincing acute process within the abdomen or pelvis.   XR Video Swallow with SLP or OT    Impression    IMPRESSION:   1. Mild flash penetration was noted when the patient swallowed thin  barium.  2. No evidence for aspiration.    Please see further details in report by  speech pathology.     STONEY ALMONTE MD         SYSTEM ID:  [id number]   XR Foot Left 2 Views    Impression    IMPRESSION: Degenerative changes throughout the midfoot which are  moderate at the navicular cuneiform and second TMT joints. No evidence  of acute fracture. Bulky distal Achilles tendon enthesopathy.  Calcaneal heel spur.     OSIRIS DOE MD         SYSTEM ID:  [id number]   XR Chest Port 1 View    Impression    IMPRESSION: Worsening right upper and left basilar opacities  consistent with worsening infection.  Probable small pleural effusion.  No pneumothorax. Similar enlarged cardiomediastinal silhouette. Aortic  calcification.    TRAE BANGURA MD         SYSTEM ID:  [id number]   XR Chest Port 1 View    Impression    IMPRESSION: Cardiac enlargement. Enlargement of the central pulmonary arteries. Focal consolidation right upper lobe and consolidation throughout the left lower lobe. Possible small volume of left basilar pleural fluid. No pneumothorax. No significant   change.     CT Chest Pulmonary Embolism w Contrast    Impression    IMPRESSION:  1.  Extensive new/increased bilateral groundglass opacities and consolidation, favor multifocal pneumonia and/or ARDS.  2.  Findings suggestive of pulmonary hypertension.  3.  No pulmonary embolus.   XR Chest Port 1 View    Impression    IMPRESSION:   1. Interval placement of a right upper extremity peripherally inserted central catheter with distal tip obscured, but most likely in the mid superior vena cava.  2. No other significant change since the recent comparison study.  3. Moderately extensive airspace opacities within both lungs, most prominent in the upper right lung and lower left lung, again noted.    XR Abdomen Port 1 View    Impression    IMPRESSION: NG tube tip and sidehole in the stomach. Moderate degenerative change in the spine and both hips.   XR Chest Port 1 View    Impression    IMPRESSION:    Endotracheal tube tip is 2.2 cm above  the ashley. Unchanged right PICC with tip in the mid SVC.    Multifocal patchy airspace opacities most prominent within the right upper lung and left lower lung have not significant changed. No new airspace opacities. No pleural effusions or pneumothorax.    Stable cardiomediastinal silhouette. Aortic atherosclerotic calcifications.   XR Chest Port 1 View    Impression    IMPRESSION: Endotracheal tube tip 5.4 cm above ashley. Enteric tube tip below diaphragm. Right PICC tip in the low SVC. Increased multifocal bilateral infiltrates, right greater than left. There may be small left pleural effusion. Enlarged cardiac   silhouette. Obscured pulmonary vascularity. Aortic calcifications.   US Abdomen Limited    Impression    IMPRESSION:  Some gallbladder sludge is present, no shadowing stones or  cholecystitis demonstrated.    REG IZAGUIRRE MD         SYSTEM ID:  [id number]   XR Chest Port 1 View    Impression    IMPRESSION: Endotracheal tube tip approximately 3 cm from the ashley.  Extensive bilateral infiltrates appear slightly worse, although this  may be related to different level of inspiration. No significant  change in remaining tubes and lines.    REG IZAGUIRRE MD         SYSTEM ID:  [id number]   XR Chest Port 1 View    Impression    IMPRESSION: Endotracheal tube terminates 4.8 cm above the ashley. Enteric decompression tube descends below the diaphragm, tip outside the field-of-view. Right upper extremity PICC tip in the mid SVC.    Bilateral hazy airspace opacities, slightly improved compared to 10/23/2023. No definite pleural effusion or pneumothorax.    Stable cardiomediastinal silhouette.   XR Chest Port 1 View    Impression    IMPRESSION: Endotracheal tube terminates approximately 4 cm from the  ashley. Enteric tube courses below the diaphragm. Right PICC  terminates at the mid SVC.    Bilateral, right greater than left, airspace opacities appear slightly  worsened compared to prior. Probable small left  layering pleural  effusion. No pneumothorax. Similar cardiomediastinal silhouette and  aortic calcifications.    TRAE BANGURA MD         SYSTEM ID:  [id number]   XR Chest Port 1 View    Impression    IMPRESSION: ET tube tip is 4.4 cm proximal to the ashley. Nasogastric  tube in place. Right PICC line tip at the mid SVC, stable in position.  Diffuse bilateral pulmonary opacities appear mildly increased on the  left and stable on the right. Stable cardiac silhouette that is  borderline prominent.    YULY DICK MD         SYSTEM ID:  [id number]   US Upper Extremity Venous Duplex Right    Impression    IMPRESSION: Negative for deep vein thrombosis in the right upper  extremity.    ARMANI JULIAN MD         SYSTEM ID:  [id number]   XR Chest Port 1 View    Impression    IMPRESSION: Enlarged cardiopericardial silhouette. Pulmonary vascular  congestion and interstitial pulmonary edema appear similar to mildly  increased compared to prior. Dense left basilar consolidation which  may represent atelectasis or aspiration/pneumonia appears similar.  Probable trace bilateral pleural effusions. No pneumothorax.    Endotracheal tube with distal tip projecting at the mid thoracic  trachea approximately 4.5 cm proximal to the ashley. Right upper  extremity PICC with distal tip projecting at the superior vena cava.  Enteric tube courses below the diaphragm with distal tip beyond the  inferior field of view.    TAMRA ESCAMILLA MD         SYSTEM ID:  [id number]   XR Abdomen Port 1 View    Impression    IMPRESSION: Nasogastric tube tip within the distal gastric lumen.  Enteric feeding tube tip at the duodenal bulb region. Nonobstructive  bowel.    YULY DICK MD         SYSTEM ID:  [id number]   XR Chest Port 1 View    Impression    IMPRESSION: Worsening patchy airspace opacities in the right upper  lobe concerning for pneumonia. This is superimposed on diffuse mixed  interstitial and airspace opacity throughout the lungs, either due  to  atypical pneumonia, ARDS or pulmonary edema. Bibasilar atelectasis,  left greater than right. No pneumothorax. No significant pleural  effusion. Mild cardiomegaly.     Right arm PICC tip is at the SVC/right atrial junction. Endotracheal  tube tip is 5.8 cm above the ashley. NG tube coursing below the left  hemidiaphragm.    LYUBOV KUMAR MD         SYSTEM ID:  [id number]   XR Chest Port 1 View    Impression    IMPRESSION:     Lines and tubes: Endotracheal tube tip 4.7 cm above the ashley. Right  PICC tip at the mid SVC and enteric tube courses below the diaphragm.    Mild worsened bibasilar opacities, particularly in the left upper lobe  and right lower lobe, which could reflect infection, edema or ARDS.  Probable trace pleural effusions. No pneumothorax. Similar  cardiomediastinal silhouette.      TRAE BANGURA MD         SYSTEM ID:  [id number]   XR Chest Port 1 View    Impression    IMPRESSION: Endotracheal tube, esophagogastric tube, and right upper extremity PICC again identified and relatively unchanged. Esophagogastric tube courses below the level of the diaphragm, though outside field of view. Patchy bilateral pulmonary   opacities with consolidative component of the left lower lobe again identified. Minimal improvement in the right lung. No pneumothorax.   XR Abdomen Port 1 View    Impression    IMPRESSION: Feeding tube terminates in the midline of the abdomen in a position consistent with a termination in the distal stomach. Nonobstructive bowel gas pattern.   CT Chest w/o Contrast   Result Value Ref Range    Radiologist flags Pneumomediastinum (Urgent)     Impression    IMPRESSION:   1.  Increase in extensive bilateral pulmonary opacities, which could represent multifocal pneumonia and/or diffuse alveolar damage.  2.  Development of subcutaneous emphysema within the lower neck and trace pneumomediastinum.  3.  Stable mild mediastinal lymphadenopathy, which may be reactive.    [Urgent Result:  Pneumomediastinum]    Finding was identified on 11/18/2023 2:39 PM CST.     Dr. Madera was contacted by me on 11/18/2023 2:53 PM CST and verbalized understanding of the critical result.     XR Chest Port 1 View    Impression    IMPRESSION: Endotracheal tube tip 3.4 cm from the ashley. No  significant change in bilateral infiltrates. Remaining tubes and lines  not significantly changed.     REG IZAGUIRRE MD         SYSTEM ID:  [id number]   XR Chest Port 1 View    Impression    IMPRESSION: Stable moderate cardiomegaly. Right upper lobe consolidation and diffuse ill-defined hazy interstitial and patchy opacities in the remaining lung are unchanged. No significant pleural effusion or pneumothorax.    Endotracheal tube terminates 2.5 cm above the ashley. Right PICC terminates in the SVC. Feeding tube extends below the diaphragm beyond the field-of-view.   CT Chest w/o Contrast    Impression    IMPRESSION:  1.  Extensive bilateral pulmonary infiltrates again seen. Increasing  airspace consolidation in the right upper lobe and both lower lobes  when compared to previous.    INES COYNE MD         SYSTEM ID:  [id number]   XR Chest Port 1 View    Impression    IMPRESSION: Single AP view of the chest was obtained. Endotracheal tube tip projects over mid thoracic trachea approximately 4 cm from the ashley. Enteric tube crosses the diaphragm with the distal tip outside the field-of-view. Right upper extremity   PICC tip projects over high/mid SVC. Cardiomegaly. Left basilar and right mid/upper lobe patchy pulmonary opacity, could represent atelectasis versus infection. No significant pleural effusion or pneumothorax.     Echocardiogram Complete   Result Value Ref Range    LVEF  60-65%         Lab Results:  Personally Reviewed.   Last Comprehensive Metabolic Panel:  Sodium   Date Value Ref Range Status   11/28/2023 139 135 - 145 mmol/L Final     Comment:     Reference intervals for this test were updated on 09/26/2023 to  "more accurately reflect our healthy population. There may be differences in the flagging of prior results with similar values performed with this method. Interpretation of those prior results can be made in the context of the updated reference intervals.    11/22/2019 141 133 - 144 mmol/L Final     Potassium   Date Value Ref Range Status   11/28/2023 5.7 (H) 3.4 - 5.3 mmol/L Final   11/22/2019 4.6 3.4 - 5.3 mmol/L Final     Chloride   Date Value Ref Range Status   11/28/2023 99 98 - 107 mmol/L Final   11/22/2019 108 94 - 109 mmol/L Final     Carbon Dioxide   Date Value Ref Range Status   11/22/2019 27 20 - 32 mmol/L Final     Carbon Dioxide (CO2)   Date Value Ref Range Status   11/28/2023 30 (H) 22 - 29 mmol/L Final     Anion Gap   Date Value Ref Range Status   11/28/2023 10 7 - 15 mmol/L Final   11/22/2019 6 3 - 14 mmol/L Final     Glucose   Date Value Ref Range Status   11/22/2019 112 (H) 70 - 99 mg/dL Final     GLUCOSE BY METER POCT   Date Value Ref Range Status   11/28/2023 168 (H) 70 - 99 mg/dL Final     Urea Nitrogen   Date Value Ref Range Status   11/28/2023 67.5 (H) 8.0 - 23.0 mg/dL Final   11/22/2019 34 (H) 7 - 30 mg/dL Final     Creatinine   Date Value Ref Range Status   11/28/2023 0.65 0.51 - 0.95 mg/dL Final   11/22/2019 1.30 (H) 0.52 - 1.04 mg/dL Final     GFR Estimate   Date Value Ref Range Status   11/28/2023 90 >60 mL/min/1.73m2 Final   11/22/2019 40 (L) >60 mL/min/[1.73_m2] Final     Comment:     Non  GFR Calc  Starting 12/18/2018, serum creatinine based estimated GFR (eGFR) will be   calculated using the Chronic Kidney Disease Epidemiology Collaboration   (CKD-EPI) equation.       Calcium   Date Value Ref Range Status   11/28/2023 8.9 8.8 - 10.2 mg/dL Final   11/22/2019 8.9 8.5 - 10.1 mg/dL Final        UA: No results found for: \"UAMP\", \"UBARB\", \"BENZODIAZEUR\", \"UCANN\", \"UCOC\", \"OPIT\", \"UPCP\"           Please see A&P for additional details of medical decision making.  MANAGEMENT " DISCUSSED with the following over the past 24 hours: Discussed with nurse, , daughter, and patient   NOTE(S)/MEDICAL RECORDS REVIEWED over the past 24 hours: Reviewed notes from medicine, medical ICU MD, nursing, infectious disease, interventional radiology  Tests personally interpreted in the past 24 hours:  - CHEST CT showing bilateral infiltrates  Tests REVIEWED in the past 24 hours:  - Estimated Creatinine Clearance: 69.5 mL/min (based on SCr of 0.65 mg/dL).  SUPPLEMENTAL HISTORY, in addition to the patient's history, over the past 24 hours obtained from:   - Spouse or significant other  - And daughter  Medical complexity over the past 24 hours:  -------------------------- HIGH RISK FOR MORBIDITY -------------------------------------------------------------  - Parenteral (IV) CONTROLLED SUBSTANCES ordered        Agnes Mayen APRN, CNS-BC, CNP, ACHPN  Acute Care Pain Management Program   Hours of pain coverage 7a-1700- after 1700 please call the house officer    [facility] ([place], [place], [place], [place])   Page via bettermarks- Click HERE to page Agnes or Jose text web console

## 2023-11-29 NOTE — ANESTHESIA POSTPROCEDURE EVALUATION
Patient: Matthew Bowen    Procedure: Procedure(s):  Tracheostomy       Anesthesia Type:  General    Note:  Disposition: ICU            ICU Sign Out: Anesthesiologist/ICU physician sign out WAS performed   Postop Pain Control: Uneventful            Sign Out: Well controlled pain   PONV: No   Neuro/Psych: Uneventful            Sign Out: Acceptable/Baseline neuro status   Airway/Respiratory: Uneventful            Sign Out: AIRWAY IN SITU/Resp. Support   CV/Hemodynamics: Uneventful            Sign Out: Acceptable CV status   Other NRE: NONE   DID A NON-ROUTINE EVENT OCCUR? No           Last vitals:  Vitals:    11/29/23 0840 11/29/23 0900 11/29/23 1000   BP: (!) 185/96 (!) 159/95 (!) 151/74   Pulse: 58 67 56   Resp: 28 29 23   Temp:      SpO2: 94% (!) 73% 95%       Electronically Signed By: Kolby Epperson MD  November 29, 2023  12:29 PM

## 2023-11-29 NOTE — PROGRESS NOTES
ICU staff  DOS 11/28/2023    No major events reported. Was awake when I saw her around 0600 this morning and would visually track but didn't follow other commands.    Vitals:   Temp:  [96.4  F (35.8  C)-98  F (36.7  C)] 97.4  F (36.3  C)  Pulse:  [] 70  Resp:  [9-41] 12  BP: ()/() 165/87  FiO2 (%):  [30 %-100 %] 35 %  SpO2:  [82 %-100 %] 100 %     Vent Mode: CMV/AC  (Continuous Mandatory Ventilation/ Assist Control)  FiO2 (%): 35 %  Resp Rate (Set): 18 breaths/min  Tidal Volume (Set, mL): 360 mL  PEEP (cm H2O): 8 cmH2O  Pressure Support (cm H2O): 12 cmH2O  Resp: 12    I/O last 3 completed shifts:  In: 3031.63 [I.V.:516.63; NG/GT:1305]  Out: 2690 [Urine:2565; Stool:125]    Dex 0.9-1.1  Fentanyl 150    Exam:  Gen: Intubated, sedated  HEENT: NC/AT  Pulm: Clear  Cor: RRR  Abdomen/GI: Soft, nondistended  : Deferred  Extremities: Warm, edematous  Skin: Well-perfused  Neuro: Eyes to voice  Psych: Unable to assess    Data:   Labs reviewed  - WBCs stable at 23  Nothing new on imaging or cultures    Assessment/plan:  79 y/o woman with chronic hypoxemia, recurrent pulmonary infections, bronchiectasis. Hospitalized since 10/15 and mechanically ventilated since 10/21 with acute on chronic mixed respiratory failure, possibly secondary to inflammatory/organizing pneumonia.   CNS: Intubated, appears comfortable at my visit though has had some more pain overall.  # Acute/chronic pain  # Weakness/frequent falls  - Fentanyl, pregabalin  - Appreciate pain service evaluation -- recommended some scheduled/prn oxycodone  Pulm: Continues on mechanical ventilation, tracheostomy now planned for 11/29.  # Acute/chronic mixed respiratory failure  # Bronchiectasis  # Chronic hypoxemia on home O2  # Multifocal organizing pneumonia  - Continue ventilator support  - ENT planning tracheostomy 11/29  - High-dose steroids this week, anticipate tapering after 12/3  CV: BP can be labile at times  # Essential hypertension  # CAD s/p  PCI  - Continues on amlodipine, hydralazine  GI: Abdomen benign  # Nutritional support  - Continue tube feedings  : UOP adequate overall  # CARMEN on CKD  - Continue supportive cares  Heme: Hb 9.8 (9)  # Anemia of chronic disease  - No indications for transfusion  Msk: Extremities warm, well-perfused.   Endo: Glucose 174  # Stress/steroid hyperglycemia  - Continue sliding scale insulin  ID: Afebrile, WBCs 23  # Steroid-related leukocytosis  # ?Pneumonia  - Continues on meropenem  - Atovaquone for PJP while on steroids  ICU: SQH (held for procedure), PPI    This patient is critically ill to my assessment and requires ICU monitoring and cares. A total of 35 minutes critical care time spent on 11/28/2023, exclusive of procedures.     Rui Payton MD, PhD  Surgical critical care  11/28/2023, 6:38 PM    Clinically Significant Risk Factors        # Hyperkalemia: Highest K = 5.7 mmol/L in last 2 days, will monitor as appropriate       # Hypoalbuminemia: Lowest albumin = 2.7 g/dL at 11/18/2023  5:05 AM, will monitor as appropriate  # Coagulation Defect: INR = 1.28 (Ref range: 0.85 - 1.15) and/or PTT = N/A, will monitor for bleeding            # Moderate Malnutrition: based on nutrition assessment

## 2023-11-29 NOTE — PROGRESS NOTES
[facility] ICU VENTILATOR RESPIRATORY NOTE  Date of Admission: 10/15/23  Date of Intubation (most recent): 10/22/23  Reason for Mechanical Ventilation: Respiratory failure  Number of Days on Mechanical Ventilation: 39  Met Criteria for Pressure Support Trial: Yes  Reason for Stopping Pressure Support Trial: Going to OR this AM  Vent Mode: CMV/AC  (Continuous Mandatory Ventilation/ Assist Control)  FiO2 (%): 35 %  Resp Rate (Set): 18 breaths/min  Tidal Volume (Set, mL): 360 mL  PEEP (cm H2O): 8 cmH2O  Inspiratory Pressure (cm H2O) (Drager Sanam): 29  Resp: 18     Significant Events Today: None  ABG Results: Venous Blood Gas  Recent Labs   Lab 11/28/23  1040 11/26/23  0454 11/25/23  1002   PHV 7.37 7.37 7.27*   PCO2V 58* 52* 64*   PO2V 36 38 41   HCO3V 33* 30* 29*   LEON 7.0* 4.3* 1.7   O2PER 40 40 40      ETT appearance on chest x-ray:   Narrative & Impression   EXAM: XR CHEST PORTABLE 1 VIEW  LOCATION: [facility]  DATE: 11/26/2023     INDICATION: Respiratory failure.  COMPARISON: Chest x-ray on 11/22/2023.                                                                      IMPRESSION: Single AP view of the chest was obtained. Endotracheal tube tip projects over mid thoracic trachea approximately 4 cm from the ashley. Enteric tube crosses the diaphragm with the distal tip outside the field-of-view. Right upper extremity   PICC tip projects over high/mid SVC. Cardiomegaly. Left basilar and right mid/upper lobe patchy pulmonary opacity, could represent atelectasis versus infection. No significant pleural effusion or pneumothorax.       Plan:  Continue to monitor    RT Omid on 11/29/2023 at 6:01 AM

## 2023-11-29 NOTE — SEDATION DOCUMENTATION
Post Procedure Summary:  Prior to the start of the procedure and with procedural staff participation, I verbally confirmed the patient s identity using two indicators, relevant allergies, that the procedure was appropriate and matched the consent or emergent situation, and that the correct equipment/implants were available. Immediately prior to starting the procedure I conducted the Time Out with the procedural staff and re-confirmed the patient s name, procedure, and site/side. ([facility] protocol was followed.)  Yes       Sedatives:  see anesthesia record    Vital signs, airway and pulse oximetry were monitored and remained stable throughout the procedure and sedation was maintained until the procedure was complete.  The patient was monitored by staff until sedation discharge criteria were met.    Patient tolerance: Patient tolerated the procedure well with no immediate complications.    Time of sedation in minutes: 8 minutes from beginning to end of physician one to one monitoring.

## 2023-11-29 NOTE — PROGRESS NOTES
Clinical Nutrition Brief Note     Please refer to previous RD notes for more information. Patient had trach and GT placed today.     When GT ok to use per MD restart EN at half rate of 15 mL/hr x 8 hours. If tolerated ok to increase to goal rate of 30 mL/hr as outlined below:     Novasource Renal at 30 ml/hr x 22 hrs (660 ml) which will provide 1320 kcal, 60 g protein, 121 g CHO, 0 g fiber, 473 ml free H2O  Increase Prosource TF 20 to 2 pkts/day = 160 kcal/40 g protein  Continue Nutrisource fiber x 3 pkts/day = 45 kcal/9 g fiber     Total energy/protein provisions, all sources = 1525 kcal (19 kcal/kg)/100 g protein (2.2 g/kg)    Niesha Rollins RD, LD  Clinical Dietitian     Pager - 3rd floor/ICU: [phone]  Pager - All other floors: [phone]  Pager - Weekend/holiday: [phone]  Office phone: [phone]

## 2023-11-29 NOTE — PLAN OF CARE
Goal Outcome Evaluation:          ICU End of Shift Summary.  For vital signs and complete assessments, please see documentation flowsheets.      Pertinent assessments:   Neuro: responding to stimulation. Occasionally restless but recovered quickly  Cardiac:SB with stable BP  Resp: WDL  GI: WDL  : Aleman draining well  Skin: reddened and excoriated areas perineum. Bathed and barrier cream applied  Lines:picc  Drips:fent, precedex    Major Shift Events: Slept well through night    Plan (Upcoming Events): Trach and PEG anticipated for today  Discharge/Transfer Needs: TBD     Bedside Shift Report Completed : y  Bedside Safety Check Completed: y

## 2023-11-29 NOTE — PROGRESS NOTES
ICU staff  DOS 11/29/2023    No major overnight events reported. Tracheostomy and PEG today.    Vitals:   Temp:  [96.9  F (36.1  C)-99  F (37.2  C)] 96.9  F (36.1  C)  Pulse:  [42-81] 56  Resp:  [7-38] 23  BP: ()/() 183/104  FiO2 (%):  [35 %-50 %] 50 %  SpO2:  [73 %-100 %] 95 %     Vent Mode: CMV/AC  (Continuous Mandatory Ventilation/ Assist Control)  FiO2 (%): 50 %  Resp Rate (Set): 18 breaths/min  Tidal Volume (Set, mL): 360 mL  PEEP (cm H2O): 8 cmH2O  Inspiratory Pressure (cm H2O) (Drager Sanam): 29  Resp: 23    I/O last 3 completed shifts:  In: 2152.95 [I.V.:617.95; NG/GT:785]  Out: 1675 [Urine:1650; Stool:25]    Dex 0.8  Fentanyl 150    Exam:  Gen: Intubated, sedated  HEENT: NC/AT, eyes to voice this morning  Pulm: Tolerating ventilator  Cor: RRR  Abdomen/GI: Soft, nondistended  : Deferred  Extremities: Warm, edematous  Skin: Well-perfused  Neuro: Eyes open, not following commands  Psych: Unable to assess    Data:   Labs reviewed and without significant change    Assessment/plan:  77 y/o woman with bronchiectasis, chronic hypoxemia, recurrent pulmonary infections admitted 10/15, intubated 10/21 with acute on chronic respiratory failure possibly secondary to inflammatory/organizing pneumonia. Tracheostomy/PEG today.  CNS: Intubated/sedated at my visit.  # Acute/chronic pain  # Weakness/frequent falls  # Toxic/metabolic encephalopathy  - Continue supportive cares  - Wean down fentanyl, support with scheduled oxycodone if needed  - Appreciate pain service evaluation  Pulm: Tolerating mechanical ventilation  # Acute on chronic mixed respiratory failure  # Ventilator dependence  # Bronchiectasis  # Chronic hypoxemia on home O2  # Multifocal organizing pneumonia  - Continue ventilator support, now s/p tracheostomy  - PS trials as tolerated  - High-dose steroids per pulmonary, anticipate taper starting week of 12/4  CV: No significant changes -- BP labile at times.  # Essential hypertension  # CAD  -  Continue amlodipine, hydralazine  GI: Abdomen benign.  # Nutrition  - Continue tube feedings  : UOP adequate.  # CARMEN on CKD  - GFR lower than creatinine would suggest based on cystatin c  - Continue torsemide  Heme: Hb 8.4 (9.8)  # Anemia of chronic disease   - No indication to transfuse  Msk: Extremities warm, well-perfused.   Endo: Glucose 138-162  # Stress/steroid hyperglycemia  - Continue sliding scale insulin  ID: Afebrile, WBCs 16  # Leukocytosis secondary to steroid  # ?Pneumonia  - On meropenem empirically  - Atovaquone while on steroids for PJP prophylaxis  ICU: SQH, PPI    This patient is critically ill to my assessment and requires ICU monitoring and cares. A total of 30 minutes critical care time spent on 11/29/2023, exclusive of procedures.     Rui Payton MD, PhD  Surgical critical care  11/29/2023, 2:14 PM    Clinically Significant Risk Factors        # Hyperkalemia: Highest K = 5.7 mmol/L in last 2 days, will monitor as appropriate       # Hypoalbuminemia: Lowest albumin = 2.7 g/dL at 11/18/2023  5:05 AM, will monitor as appropriate  # Coagulation Defect: INR = 1.28 (Ref range: 0.85 - 1.15) and/or PTT = N/A, will monitor for bleeding            # Moderate Malnutrition: based on nutrition assessment

## 2023-11-29 NOTE — PROGRESS NOTES
Discussed plan for tracheotomy for this patient with her family members, discussed potential risks and complications.  Questions answered to their satisfaction, consent from  obtained.

## 2023-11-29 NOTE — ANESTHESIA CARE TRANSFER NOTE
Patient: Matthew Bowen    Procedure: Procedure(s):  Tracheostomy       Diagnosis: Failure respiratory (H) [J96.90]  Diagnosis Additional Information: No value filed.    Anesthesia Type:   General     Note:    Oropharynx: endotracheal tube in place  Level of Consciousness: iatrogenic sedation        Dentition: dentition unchanged  Vital Signs Stable: post-procedure vital signs reviewed and stable  Report to RN Given: handoff report given  Patient transferred to: ICU    ICU Handoff: Call for PAUSE to initiate/utilize ICU HANDOFF, Identified Patient, Identified Responsible Provider, Reviewed the Pertinent Medical History, Discussed Surgical Course, Reviewed Intra-OP Anesthesia Management and Issues during Anesthesia, Set Expectations for Post Procedure Period and Allowed Opportunity for Questions and Acknowledgement of Understanding      Vitals:  Vitals Value Taken Time   /66    Temp 96.44  F (35.8  C) 11/29/23 1223   Pulse 48 11/29/23 1223   Resp 10 11/29/23 1223   SpO2 100 % 11/29/23 1223   Vitals shown include unfiled device data.    Electronically Signed By: LOW Varghese CRNA  November 29, 2023  12:24 PM

## 2023-11-29 NOTE — PROGRESS NOTES
[facility]    Medicine Progress Note - Hospitalist Service    Date of Admission:  10/15/2023    Assessment & Plan   Matthew Bowen is a 78 year old female with history of chronic hypoxic respiratory failure due to pulmonary HTN, ALAINA (uses CPAP with sleep at baseline), and chronic diastolic congestive heart failure. She also has history of CAD, CKD stage 3, obesity, HTN,  and depression. She presented to the ED on 10/15/23 with fever, cough, and falls. ED evaluation showed low grade temperature elevation in the 99s. She was hypoxic, requiring supplemental oxygen at 2 lpm. Laboratory evaluation showed WBC 21.5, procalcitonin 0.16, creatinine 1.21, and bicarb 30. CXR suggested pulmonary vascular congestion. CT of chest showed lingular and bilateral upper lung pneumonia. No traumatic injury was found on imaging. She was admitted to the hospital and treated for multifocal pneumonia with Rocephin and Zithromax. She developed worsened respiratory status on 10/21 requiring transfer to ICU and intubation. Hospital course was prolonged respiratory failure, ARDS, and difficulty weaning from vent. Tracheostomy was deferred partially due to family preference and also due to the fact that increased procedural risk given high ongoing oxygen needs. CT chest obtained 11/18 suggested severe ground-glass opacities with traction bronchiectasis. It was unclear how much of her disease is reversible. Pulmonology started high-dose steroids on 11/18, with modest interval improvement in O2 weaning. Family conference was held on 11/24 and decision was made to pursue tracheostomy/G tube for continued vent weaning and feeds.     Multifocal community acquired pneumonia, Recent pneumonias prior to admission, Possible bronchiectasis, Sepsis (leukocytosis and fever), Acute respiratory failure, Adult respiratory distress syndrome prolonged ventilation, trach placement:  Summary:  Was initially admitted to medicine and  treated with Rocephin and Zithromax. Deteriorated and transferred to the ICU and intubated on 10/21/23.   Antibiotic coverage changed to cefepime on 10/25 through 10/31.  Received Zosyn from 11/15 through 11/19.  Meropenem and vancomycin were started 11/19.   ID following, continue meropenem through 11/29/23.  ID has signed off. Continue vent support, stable on current settings.  Intensivist also following.  Plan currently is for high dose steroids + trach with gradual attempts a vent wean.   PJP prophylaxis.  See pulm note for recommended taper once ready to start taper process.  Consider starting later this week post trach.  Getting tracheostomy 11/29 per ENT>, getting PEG per IR.  Heparin is being held     Chronic hypoxic respiratory failure, Obstructive sleep apnea, Chronic diastolic heart failure, not felt to have current exac, Pulmonary hypertension:  -Baseline on 2 lpm of oxygen at home.  Not getting a trach.  Has puffy edema of the extremities.  Patient currently is on torsemide.  Will increase the dose while following her creatinine which is currently normal.  On vent.  Will need an LTACH.  Case management consulted for discharge planning.      Anemia  -Has been persistent during stay. HGB was 11.6 on presentation and trended down- generally in 7-8's.   -Suspect related to critical illness, phlebotomy, etc.  -On Protonix for ulcer prevention  -Had transfusion of 1 unit RBCs on 11/17  -Had transfusion of 1 unit of RBCs on 11/25/23 for HGB 6.9 with rise to 8 range. Hemoglobin 8.4 11/29.      Mild Intermittent Hyperkalemia:  -Potassium was 5.5 recently. Lokelma given and 5.4 11/26.  No obvious cause such as CARMEN, medicine, IVF, acidosis, etc as she has a normal creatinine.. She does have chronic kidney disease per chart though I do not believe this as her creatinine is 0.62 with a gfr >90.  This is likely due to her home diuretics,  and is normally on torsemide 60 mg daily. Consider Lokelma if potassium  continues to creep up vs changing tube feeds.     Hypernatremia, mild  -Resolved, sodium 138 as of 11/29  -Monitor sodium with resumption of torsemide       Falls prior to admission, Subacute rib fracture, Pain, Anxiety:  -Imaging negative for acute fractures and bleeding but did show subacute rib fractures.  Had been on fentanyl drip much of stay.  Dose recently increased to 150 mcg/hr.  More relaxed on that dose.  Pain team consulted.  Working on plan with them to begin transitioning off fentanyl drip to oral/feeding tube based pain control.     CKD stage 3, Acute kidney injury earlier in stay  -Cr 1.21 on admission and peaked in 2.1 range but has now normalized in the setting of pneumonia, hypoxia, etc.  Had been holding diuretics as discussed above. Resumed torsemide at 20 mg daily 11/26, will increase to bid 11/29.  (PTA was on torsemide 60 mg each am)  -Monitor renal function closely, creatinine and GFR are now normal, I suspect her previous elevated creatinine had etiologies other than chronic kidney disease.       HTN  -Continue PTA amlodipine, torsemide, hydralazine/  not sure why she is not on a  beta blocker with her coronary artery disease.  Home imdur on hold  Blood pressure has been 105-150s/50-90s, goal is to prevent hypotension. BP is currently tolerable.      Obesity:  -  Increases complexity of care, has BMI of 36 as of 11.29     CAD:  -Continue on home ASA, statin.  Historically has been on plavix but it has been on hold now since early in her stay.  Last PCI in 2019 and even though she has residual RCA dz that in itself is not an indication for DAPT at this point especially with anemia. Not sure why she is not on a beta blocker    Hypothyroidism  -Continue PTA levothyroxine    Hyperlipidemia  -is on lipitor     Mood disorder  -Continue PTA Lyrica and Zoloft     Access issues:  -  s/p PICC line.  Some generalized edema including PICC line arm.  Monitor.    Nutrition  Hyperglycemia:  -  sliding  scale insulin PRN.  Continue Tube Feeds, off at midnight tonight for procedure in AM.  Dextrose PRN if drops.  IR consulted for PEG placement 11/29/23 in coordination with trach.  Recent Labs   Lab 11/29/23  0921 11/29/23  0508 11/29/23  0354 11/28/23  2347 11/28/23 2012   * 162* 150* 138* 125*       Medical Decision Making       Over 55 MINUTES SPENT BY ME on the date of service doing chart review, history, exam, documentation & further activities per the note.   Patient is new to me    Labs/Imaging Reviewed:  See Information above and Data section below    Discussed with night nurse, Dr Payton, care team during bedside rounds       Diet: NPO per Anesthesia Guidelines for Procedure/Surgery Except for: Meds  Adult Formula Drip Feeding: Continuous Novasource Renal; Nasogastric tube; Goal Rate: 30 ml/hr x 22 hrs (hold 1 hr before and after levothyroxine); mL/hr; Okay to start new formula at goal rate    DVT Prophylaxis: Heparin SQ  Aleman Catheter: PRESENT, indication: Strict 1-2 Hour I&O, Strict 1-2 Hour I&O  Lines: PRESENT      PICC 10/22/23 Triple Lumen Right Basilic multiple med gtt. ready for use-Site Assessment: WDL      Cardiac Monitoring: ACTIVE order. Indication: Tachyarrhythmias, acute (48 hours)  Code Status: Full Code      Clinically Significant Risk Factors        # Coagulation Defect: INR = 1.28 (Ref range: 0.85 - 1.15) and/or PTT = N/A, will monitor for bleeding            # Moderate Malnutrition: based on nutrition assessment           Disposition Plan   Needs ongoing ICU care, eventually will need LTACH for vent weaning       Rosendo Rich MD  Hospitalist Service  [facility]  Securely message with Yext (more info)  Text page via [email or web address] Paging/Directory   ______________________________________________________________________    Interval History   Patient is new to me, has no new complaints.  Continues on the vent.  Patient has a busy day today getting a tracheostomy  per ENT as well as a PEG tube per IR.  Patient needs to be seen by case management for discharge planning.  Will need on LTAC at discharge.           Physical Exam   Vital Signs: Temp: 96.9  F (36.1  C) Temp src: Axillary BP: (!) 159/95 Pulse: 67   Resp: 29 SpO2: (!) 73 % O2 Device: Mechanical Ventilator    Weight: 187 lbs 9.78 oz    GEN:  Awake, somewhat responds to questions similar to prior but not very interactive, appears comfortable, no overt distress.  Appears elderly and frail, lying in bed on the vent   HEENT: ET tube in place, mouth moist.  CV: Regular rate and rhythm, no murmurs   LUNGS:  Coarse bilaterally, no wheezes/retractions.  Symmetric chest rise on inhalation noted.  ABD:  Active bowel sounds, soft, non-tender/non-distended.  No rebound/guarding/rigidity.  EXT: Puffy edema of all of her extremities.  No cyanosis.   SKIN:  Dry to touch, no exanthems noted in the visualized areas.    Medications    dexmedeTOMIDine 0.8 mcg/kg/hr (11/29/23 0900)    dextrose 30 mL/hr at 11/29/23 1000    fentaNYL 150 mcg/hr (11/29/23 0900)      amLODIPine  5 mg Oral or Feeding Tube Daily    aspirin  81 mg Oral or Feeding Tube Daily    atorvastatin  20 mg Oral or Feeding Tube QPM    atovaquone  1,500 mg Oral or Feeding Tube Daily    B and C vitamin Complex with folic acid  5 mL Oral or Feeding Tube Daily    chlorhexidine  15 mL Mouth/Throat Q12H    clotrimazole   Topical BID    fiber modular (NUTRISOURCE FIBER)  1 packet Per Feeding Tube TID    [Held by provider] heparin ANTICOAGULANT  5,000 Units Subcutaneous Q8H    hydrALAZINE  25 mg Oral or Feeding Tube TID    insulin aspart  1-6 Units Subcutaneous Q4H    ipratropium - albuterol 0.5 mg/2.5 mg/3 mL  3 mL Nebulization 4x daily    levothyroxine  150 mcg Oral or Feeding Tube QAM AC    lidocaine   Topical TID    meropenem  500 mg Intravenous Q12H    methylPREDNISolone  62.5 mg Intravenous Q8H    pantoprazole  40 mg Per Feeding Tube QAM AC    pregabalin  75 mg Per Feeding  Tube Daily    protein modular  1 packet Per Feeding Tube BID    QUEtiapine  150 mg Oral or Feeding Tube BID    sertraline  150 mg Oral or Feeding Tube Daily    sodium chloride (PF)  10-40 mL Intracatheter Q7 Days    sodium chloride (PF)  3 mL Intracatheter Q8H    torsemide  20 mg Oral or Feeding Tube BID    vitamin D3  50 mcg Oral or Feeding Tube Daily       Data     Labs and Imaging results below reviewed today.    I have personally reviewed the following data over the past 24 hrs:    16.2 (H)  \   8.4 (L)   / 228     138 100 69.4 (H) /  159 (H)   5.0 31 (H) 0.62 \     INR:  N/A PTT:  N/A   D-dimer:  N/A Fibrinogen:  N/A       Recent Labs   Lab 11/29/23  0508 11/28/23  0540 11/27/23  0532   WBC 16.2* 23.1* 17.3*   HGB 8.4* 9.8* 9.0*   HCT 28.3* 33.2* 30.4*   MCV 88 88 89    309 231     7-Day Micro Results       Collected Updated Procedure Result Status      11/23/2023 1837 11/23/2023 2107 MRSA MSSA PCR, Nasal Swab [[id number]]    Swab from Nose    Final result Component Value   MRSA Target DNA Negative   SA Target DNA Negative            11/22/2023 1551 11/24/2023 1106 Urine Culture [[id number]]   (Abnormal)   Urine, Aleman Catheter    Final result Component Value   Culture 10,000-50,000 CFU/mL Candida albicans    Susceptibilities not routinely done, refer to antibiogram to view typical susceptibility profiles               11/22/2023 1303 11/24/2023 1425 Respiratory Aerobic Bacterial Culture with Gram Stain [[id number]]   (Abnormal)   Sputum from Endotracheal    Final result Component Value   Culture 1+ Candida albicans    Susceptibilities not routinely done, refer to antibiogram to view typical susceptibility profiles   Gram Stain Result >25 PMNs/low power field    1+ Yeast               11/22/2023 1110 11/28/2023 1316 Blood Culture Special Organism [[id number]]   Blood from Hand, Left    Preliminary result Component Value   Culture No growth after 6 days  [P]                11/22/2023 1105  11/28/2023 1316 Blood Culture Special Organism [[id number]]   Blood from Arm, Left    Preliminary result Component Value   Culture No growth after 6 days  [P]                      Recent Labs   Lab 11/29/23  0921 11/29/23  0508 11/29/23  0354 11/28/23 2012 11/28/23 1959 11/28/23  0544 11/28/23  0540 11/27/23  0902 11/27/23  0532   NA  --  138  --   --   --   --  139  --  142   POTASSIUM  --  5.0  --   --  5.1  --  5.7*  --  5.2   CHLORIDE  --  100  --   --   --   --  99  --  103   CO2  --  31*  --   --   --   --  30*  --  31*   ANIONGAP  --  7  --   --   --   --  10  --  8   * 162* 150*   < >  --    < > 178*   < > 172*   BUN  --  69.4*  --   --   --   --  67.5*  --  64.3*   CR  --  0.62  --   --   --   --  0.65  --  0.60   GFRESTIMATED  --  >90  --   --   --   --  90  --  >90   BARBARA  --  8.4*  --   --   --   --  8.9  --  8.7*   MAG  --  2.1  --   --   --   --  2.0  --  2.0   PHOS  --  4.1  --   --   --   --  4.5  --  3.9    < > = values in this interval not displayed.       No results found for this or any previous visit (from the past 24 hour(s)).

## 2023-11-29 NOTE — BRIEF OP NOTE
[facility]    Brief Operative Note    Pre-operative diagnosis: Failure respiratory (H) [J96.90]  Post-operative diagnosis Same    Procedure: Tracheostomy, N/A - Neck    Surgeon: Surgeon(s) and Role:     * Daron Faye MD - Primary  Anesthesia: General   Estimated Blood Loss: Less than 15 ml    Drains: None  Specimens: * No specimens in log *  Findings:   None.  Complications: None.  Implants: #6 cufffed Shiley trach tube

## 2023-11-29 NOTE — ANESTHESIA PREPROCEDURE EVALUATION
"Anesthesia Pre-Procedure Evaluation    Patient: Matthew Bowen   MRN: [id number] : 1945        Procedure : Procedure(s):  Tracheostomy          Past Medical History:   Diagnosis Date    Antiplatelet or antithrombotic long-term use     Arthritis     Congestive heart failure (H)     Dyspnea on exertion     Heart attack (H)     Heart murmur     Hypertension     Irregular heart beat     Oxygen dependent     2L continuous at home.    Pulmonary hypertension (H)     Sleep apnea     Bipap    Stented coronary artery     x 1    Thyroid disease     Walking troubles       Past Surgical History:   Procedure Laterality Date    APPENDECTOMY      COLONOSCOPY      COLONOSCOPY N/A 2022    Procedure: COLONOSCOPY;  Surgeon: Kolby Dunn MD;  Location: [facility] OR    CV CORONARY ANGIOGRAM N/A 2019    Procedure: Coronary Angiogram;  Surgeon: Tay Andre MD;  Location:  HEART CARDIAC CATH LAB    CV LEFT HEART CATH N/A 2019    Procedure: Left Heart Cath;  Surgeon: Tay Andre MD;  Location:  HEART CARDIAC CATH LAB    CV PCI STENT DRUG ELUTING N/A 2019    Procedure: PCI Stent Drug Eluting;  Surgeon: Tay Andre MD;  Location: [facility] HEART CARDIAC CATH LAB    GYN SURGERY      Hyst.    ORTHOPEDIC SURGERY      B\" TKs      No Known Allergies   Social History     Tobacco Use    Smoking status: Never    Smokeless tobacco: Never   Substance Use Topics    Alcohol use: Not on file     Comment: 2x/year      Wt Readings from Last 1 Encounters:   23 85.1 kg (187 lb 9.8 oz)        Anesthesia Evaluation   Pt has had prior anesthetic. Type: General.    No history of anesthetic complications       ROS/MED HX  ENT/Pulmonary: Comment: Respiratory failure    (+) sleep apnea, uses CPAP,                           recent URI, unresolved,         Neurologic:  - neg neurologic ROS     Cardiovascular:  - neg cardiovascular ROS   (+)  hypertension- -   -  - -   Taking blood thinners   CHF     OLMSTEAD.    " "           Irregular Heartbeat/Palpitations, valvular problems/murmurs  murmur.   pulmonary hypertension,      METS/Exercise Tolerance:     Hematologic:  - neg hematologic  ROS     Musculoskeletal:  - neg musculoskeletal ROS     GI/Hepatic:  - neg GI/hepatic ROS     Renal/Genitourinary:     (+) renal disease, type: CRI,            Endo:  - neg endo ROS   (+)               Obesity,       Psychiatric/Substance Use:     (+) psychiatric history depression       Infectious Disease:  - neg infectious disease ROS     Malignancy:  - neg malignancy ROS     Other:  - neg other ROS          Physical Exam    Airway             Respiratory Devices and Support    ETT:      Dental           Cardiovascular             Pulmonary                   OUTSIDE LABS:  CBC:   Lab Results   Component Value Date    WBC 16.2 (H) 11/29/2023    WBC 23.1 (H) 11/28/2023    HGB 8.4 (L) 11/29/2023    HGB 9.8 (L) 11/28/2023    HCT 28.3 (L) 11/29/2023    HCT 33.2 (L) 11/28/2023     11/29/2023     11/28/2023     BMP:   Lab Results   Component Value Date     11/29/2023     11/28/2023    POTASSIUM 5.0 11/29/2023    POTASSIUM 5.1 11/28/2023    CHLORIDE 100 11/29/2023    CHLORIDE 99 11/28/2023    CO2 31 (H) 11/29/2023    CO2 30 (H) 11/28/2023    BUN 69.4 (H) 11/29/2023    BUN 67.5 (H) 11/28/2023    CR 0.62 11/29/2023    CR 0.65 11/28/2023     (H) 11/29/2023     (H) 11/29/2023     COAGS:   Lab Results   Component Value Date    PTT 34 09/01/2009    INR 1.28 (H) 11/28/2023     POC: No results found for: \"BGM\", \"HCG\", \"HCGS\"  HEPATIC:   Lab Results   Component Value Date    ALBUMIN 2.7 (L) 11/18/2023    PROTTOTAL 6.4 11/18/2023    ALT 66 (H) 11/18/2023    AST 45 11/18/2023    ALKPHOS 89 11/18/2023    BILITOTAL 0.5 11/18/2023     OTHER:   Lab Results   Component Value Date    PH 7.47 (H) 11/15/2023    LACT 1.1 10/22/2023    A1C 5.6 10/22/2023    BARBARA 8.4 (L) 11/29/2023    PHOS 4.1 11/29/2023    MAG 2.1 11/29/2023    LIPASE " 49 10/22/2023    TSH 0.51 10/15/2023    SED 89 (H) 11/18/2023       Anesthesia Plan    ASA Status:  4    NPO Status:  NPO Appropriate    Anesthesia Type: General.     - Airway: ETT   Induction: Intravenous, Propofol.   Maintenance: Balanced.        Consents    Anesthesia Plan(s) and associated risks, benefits, and realistic alternatives discussed. Questions answered and patient/representative(s) expressed understanding.     - Discussed:     - Discussed with:  Patient            Postoperative Care    Pain management: IV analgesics, Oral pain medications, Multi-modal analgesia.   PONV prophylaxis: Ondansetron (or other 5HT-3), Dexamethasone or Solumedrol     Comments:               Kolby Epperson MD    I have reviewed the pertinent notes and labs in the chart from the past 30 days and (re)examined the patient.  Any updates or changes from those notes are reflected in this note.

## 2023-11-29 NOTE — PROGRESS NOTES
[facility]IN [facility]    ([facility], [facility], [facility], [facility], [facility])  Pain follow-up follow-up    Assessment/Plan:  Matthew Bowen is a 78 year old female who was admitted on 10/15/2023.  I was asked to see the patient for ongoing pain and assistance required for non-IV regimen.  Pneumonia with sepsis and respiratory failure.  History of ALAINA, obesity, rib fracture, CKD.    shows ongoing use of Lyrica.  Follows with [facility] pain clinic.  Also has chronic back pain.  Unable to describe pain although shakes her legs and grimaces at times, other times appears quite sleepy.  She is on Lyrica once daily, Seroquel twice daily, fentanyl, Versed, IV Dilaudid push, and oxycodone.     PLAN:  Acute pain of unknown cause, perhaps related to critical illness polymyopathy?  Chronic low back pain. After Trach placement -suggest decreasing fentanyl drip to 125mcg and at the same time would increase Lyrica to 3 times daily.  Multimodal Medication Therapy:   Adjuvants: Suggest scheduling Tylenol, would increase Lyrica to home dose which is 75 mg 3 times daily  Opioids: Would suggest decreasing fentanyl drip to 125 mcgs (and decrease by 25mcg Q12H)  Suggest scheduling oxycodone BID  Oxycodone Q4h PRN   Suggest decreasing IVP dilaudid to 0.2mg Q6h PRN  Non-medication interventions- Ice   Constipation Prophylaxis-  liquid diarrhea   Follow up /Discharge Recommendations - We recommend prescribing the following at the time of discharge:  TBD            Subjective:    Today I met with the patient at the bedside.  We are accompanied by her daughter and .    Patient shakes head no (very weak) when asked about pain.     Today patient will be having trach and G-tube placement.  Discussed weaning fentanyl after this. Family is open to this.            Oxygen dependent   Patient Active Problem List   Diagnosis    Pneumonia    Non-STEMI (non-ST elevated myocardial infarction) (H)    Dyspnea on  exertion    Acute kidney injury superimposed on chronic kidney disease     Oxygen dependent    Generalized weakness    Contusion of right thigh, initial encounter    Closed head injury, initial encounter    Pneumonia due to infectious organism, unspecified laterality, unspecified part of lung        History   Drug Use Not on file         Tobacco Use      Smoking status: Never      Smokeless tobacco: Never         amLODIPine  5 mg Oral or Feeding Tube Daily    aspirin  81 mg Oral or Feeding Tube Daily    atorvastatin  20 mg Oral or Feeding Tube QPM    atovaquone  1,500 mg Oral or Feeding Tube Daily    B and C vitamin Complex with folic acid  5 mL Oral or Feeding Tube Daily    chlorhexidine  15 mL Mouth/Throat Q12H    clotrimazole   Topical BID    fiber modular (NUTRISOURCE FIBER)  1 packet Per Feeding Tube TID    glucagon        [Held by provider] heparin ANTICOAGULANT  5,000 Units Subcutaneous Q8H    hydrALAZINE  25 mg Oral or Feeding Tube TID    insulin aspart  1-6 Units Subcutaneous Q4H    ipratropium - albuterol 0.5 mg/2.5 mg/3 mL  3 mL Nebulization 4x daily    levothyroxine  150 mcg Oral or Feeding Tube QAM AC    lidocaine (PF)        lidocaine   Topical TID    lidocaine 1% with EPINEPHrine 1:100,000        meropenem  500 mg Intravenous Q12H    methylPREDNISolone  62.5 mg Intravenous Q8H    pantoprazole  40 mg Per Feeding Tube QAM AC    pregabalin  75 mg Per Feeding Tube Daily    protein modular  1 packet Per Feeding Tube BID    QUEtiapine  150 mg Oral or Feeding Tube BID    sertraline  150 mg Oral or Feeding Tube Daily    sodium chloride (PF)  10-40 mL Intracatheter Q7 Days    sodium chloride (PF)  3 mL Intracatheter Q8H    torsemide  20 mg Oral or Feeding Tube BID    vitamin D3  50 mcg Oral or Feeding Tube Daily       Objective:  Vital signs in last 24 hours:  B/P: 159/95, T: 96.9, P: 67, R: 29   Blood pressure (!) 159/95, pulse 67, temperature 96.9  F (36.1  C), temperature source Axillary, resp. rate 29,  height 1.524 m (5'), weight 85.1 kg (187 lb 9.8 oz), SpO2 (!) 73%.      Weight:   Wt Readings from Last 2 Encounters:   11/29/23 85.1 kg (187 lb 9.8 oz)   02/28/22 94.3 kg (208 lb)           Intake/Output:    Intake/Output Summary (Last 24 hours) at 11/29/2023 1125  Last data filed at 11/29/2023 1100  Gross per 24 hour   Intake 1576.04 ml   Output 1925 ml   Net -348.96 ml        Review of Systems:   As per subjective, all others negative.    Physical Exam:     General Appearance:  Very sleepy but wakes to commands   Head:  Normocephalic, without obvious abnormality, atraumatic   Eyes:  PERRL, conjunctiva/corneas clear, EOM's intact   ENT/Throat: ETT at the lips     Lymph/Neck: Supple, symmetrical, trachea midline, no adenopathy, thyroid: not enlarged, symmetric    Lungs:    respirations unlabored   Chest Wall:  No tenderness or deformity   Cardiovascular/Heart:  Vented    Abdomen:   Soft, non-tender,    Rectal tube    Musculoskeletal: Upper extremity edema noted right greater than left and lower extremity edema is trace   Skin: Skin is intact   Neurologic: Sleepy at times barely follows commands cannot give me a thumbs up can barely shake head yes or no          Imaging:  Personally Reviewed.    Results for orders placed or performed during the hospital encounter of 10/15/23   CT Head w/o Contrast    Impression    IMPRESSION:  1.  No acute intracranial injury.       Cervical spine CT w/o contrast    Impression    IMPRESSION:  1.  No acute cervical spine fracture.   2.  Reversal of the usual cervical lordosis is nonspecific and can be seen with muscle spasm/soft tissue injury, but is unchanged versus 01/25/2023 and may be positional/chronic.       XR Femur Right 2 Views    Impression    IMPRESSION: Advanced degenerative arthritis of both hips with hypertrophic changes. Multiple osteochondral loose bodies superior of the right femoral neck.    Mild degenerative arthritis of both SI joints. Lower lumbar facet  arthropathy and degenerative interspace narrowing. Incidental note of ossification of the right iliolumbar ligament.    Right total knee arthroplasty. No knee joint effusion.   XR Pelvis 1/2 Views    Impression    IMPRESSION: Advanced degenerative arthritis of both hips with hypertrophic changes. Multiple osteochondral loose bodies superior of the right femoral neck.    Mild degenerative arthritis of both SI joints. Lower lumbar facet arthropathy and degenerative interspace narrowing. Incidental note of ossification of the right iliolumbar ligament.    Right total knee arthroplasty. No knee joint effusion.   XR Chest 1 View    Impression    IMPRESSION: Cardiomegaly with central vascular congestion. Stable prominent pericardial fat pad along the left cardiac margin. Mild bibasilar atelectasis/scarring. No focal pneumonic consolidation or pleural effusion.   CT Chest/Abdomen/Pelvis w Contrast    Impression    IMPRESSION:  1.  Lingular consolidative opacity with air bronchograms suspicious for infection/pneumonia.  2.  Additional bilateral upper lobe groundglass opacities are likely infectious/inflammatory.  3.  Subacute left posterior 11th and 12th rib fractures.  4.  No convincing acute process within the abdomen or pelvis.   XR Video Swallow with SLP or OT    Impression    IMPRESSION:   1. Mild flash penetration was noted when the patient swallowed thin  barium.  2. No evidence for aspiration.    Please see further details in report by speech pathology.     STONEY ALMONTE MD         SYSTEM ID:  [id number]   XR Foot Left 2 Views    Impression    IMPRESSION: Degenerative changes throughout the midfoot which are  moderate at the navicular cuneiform and second TMT joints. No evidence  of acute fracture. Bulky distal Achilles tendon enthesopathy.  Calcaneal heel spur.     OSIRIS DOE MD         SYSTEM ID:  [id number]   XR Chest Port 1 View    Impression    IMPRESSION: Worsening right upper and left basilar  opacities  consistent with worsening infection.  Probable small pleural effusion.  No pneumothorax. Similar enlarged cardiomediastinal silhouette. Aortic  calcification.    TRAE BANGURA MD         SYSTEM ID:  [id number]   XR Chest Port 1 View    Impression    IMPRESSION: Cardiac enlargement. Enlargement of the central pulmonary arteries. Focal consolidation right upper lobe and consolidation throughout the left lower lobe. Possible small volume of left basilar pleural fluid. No pneumothorax. No significant   change.     CT Chest Pulmonary Embolism w Contrast    Impression    IMPRESSION:  1.  Extensive new/increased bilateral groundglass opacities and consolidation, favor multifocal pneumonia and/or ARDS.  2.  Findings suggestive of pulmonary hypertension.  3.  No pulmonary embolus.   XR Chest Port 1 View    Impression    IMPRESSION:   1. Interval placement of a right upper extremity peripherally inserted central catheter with distal tip obscured, but most likely in the mid superior vena cava.  2. No other significant change since the recent comparison study.  3. Moderately extensive airspace opacities within both lungs, most prominent in the upper right lung and lower left lung, again noted.    XR Abdomen Port 1 View    Impression    IMPRESSION: NG tube tip and sidehole in the stomach. Moderate degenerative change in the spine and both hips.   XR Chest Port 1 View    Impression    IMPRESSION:    Endotracheal tube tip is 2.2 cm above the ashley. Unchanged right PICC with tip in the mid SVC.    Multifocal patchy airspace opacities most prominent within the right upper lung and left lower lung have not significant changed. No new airspace opacities. No pleural effusions or pneumothorax.    Stable cardiomediastinal silhouette. Aortic atherosclerotic calcifications.   XR Chest Port 1 View    Impression    IMPRESSION: Endotracheal tube tip 5.4 cm above ashley. Enteric tube tip below diaphragm. Right PICC tip in  the low SVC. Increased multifocal bilateral infiltrates, right greater than left. There may be small left pleural effusion. Enlarged cardiac   silhouette. Obscured pulmonary vascularity. Aortic calcifications.   US Abdomen Limited    Impression    IMPRESSION:  Some gallbladder sludge is present, no shadowing stones or  cholecystitis demonstrated.    REG IZAGUIRRE MD         SYSTEM ID:  [id number]   XR Chest Port 1 View    Impression    IMPRESSION: Endotracheal tube tip approximately 3 cm from the ashley.  Extensive bilateral infiltrates appear slightly worse, although this  may be related to different level of inspiration. No significant  change in remaining tubes and lines.    REG IZAGUIRRE MD         SYSTEM ID:  [id number]   XR Chest Port 1 View    Impression    IMPRESSION: Endotracheal tube terminates 4.8 cm above the ashley. Enteric decompression tube descends below the diaphragm, tip outside the field-of-view. Right upper extremity PICC tip in the mid SVC.    Bilateral hazy airspace opacities, slightly improved compared to 10/23/2023. No definite pleural effusion or pneumothorax.    Stable cardiomediastinal silhouette.   XR Chest Port 1 View    Impression    IMPRESSION: Endotracheal tube terminates approximately 4 cm from the  ashley. Enteric tube courses below the diaphragm. Right PICC  terminates at the mid SVC.    Bilateral, right greater than left, airspace opacities appear slightly  worsened compared to prior. Probable small left layering pleural  effusion. No pneumothorax. Similar cardiomediastinal silhouette and  aortic calcifications.    TRAE BANGURA MD         SYSTEM ID:  [id number]   XR Chest Port 1 View    Impression    IMPRESSION: ET tube tip is 4.4 cm proximal to the ashley. Nasogastric  tube in place. Right PICC line tip at the mid SVC, stable in position.  Diffuse bilateral pulmonary opacities appear mildly increased on the  left and stable on the right. Stable cardiac silhouette that  is  borderline prominent.    YULY DICK MD         SYSTEM ID:  [id number]   US Upper Extremity Venous Duplex Right    Impression    IMPRESSION: Negative for deep vein thrombosis in the right upper  extremity.    ARMANI JULIAN MD         SYSTEM ID:  [id number]   XR Chest Port 1 View    Impression    IMPRESSION: Enlarged cardiopericardial silhouette. Pulmonary vascular  congestion and interstitial pulmonary edema appear similar to mildly  increased compared to prior. Dense left basilar consolidation which  may represent atelectasis or aspiration/pneumonia appears similar.  Probable trace bilateral pleural effusions. No pneumothorax.    Endotracheal tube with distal tip projecting at the mid thoracic  trachea approximately 4.5 cm proximal to the ashley. Right upper  extremity PICC with distal tip projecting at the superior vena cava.  Enteric tube courses below the diaphragm with distal tip beyond the  inferior field of view.    TAMRA ESCAMILLA MD         SYSTEM ID:  [id number]   XR Abdomen Port 1 View    Impression    IMPRESSION: Nasogastric tube tip within the distal gastric lumen.  Enteric feeding tube tip at the duodenal bulb region. Nonobstructive  bowel.    YULY DICK MD         SYSTEM ID:  [id number]   XR Chest Port 1 View    Impression    IMPRESSION: Worsening patchy airspace opacities in the right upper  lobe concerning for pneumonia. This is superimposed on diffuse mixed  interstitial and airspace opacity throughout the lungs, either due to  atypical pneumonia, ARDS or pulmonary edema. Bibasilar atelectasis,  left greater than right. No pneumothorax. No significant pleural  effusion. Mild cardiomegaly.     Right arm PICC tip is at the SVC/right atrial junction. Endotracheal  tube tip is 5.8 cm above the ashley. NG tube coursing below the left  hemidiaphragm.    LYUBOV KUMAR MD         SYSTEM ID:  [id number]   XR Chest Port 1 View    Impression    IMPRESSION:     Lines and tubes: Endotracheal tube tip 4.7 cm  above the ashley. Right  PICC tip at the mid SVC and enteric tube courses below the diaphragm.    Mild worsened bibasilar opacities, particularly in the left upper lobe  and right lower lobe, which could reflect infection, edema or ARDS.  Probable trace pleural effusions. No pneumothorax. Similar  cardiomediastinal silhouette.      TRAE BANGURA MD         SYSTEM ID:  [id number]   XR Chest Port 1 View    Impression    IMPRESSION: Endotracheal tube, esophagogastric tube, and right upper extremity PICC again identified and relatively unchanged. Esophagogastric tube courses below the level of the diaphragm, though outside field of view. Patchy bilateral pulmonary   opacities with consolidative component of the left lower lobe again identified. Minimal improvement in the right lung. No pneumothorax.   XR Abdomen Port 1 View    Impression    IMPRESSION: Feeding tube terminates in the midline of the abdomen in a position consistent with a termination in the distal stomach. Nonobstructive bowel gas pattern.   CT Chest w/o Contrast   Result Value Ref Range    Radiologist flags Pneumomediastinum (Urgent)     Impression    IMPRESSION:   1.  Increase in extensive bilateral pulmonary opacities, which could represent multifocal pneumonia and/or diffuse alveolar damage.  2.  Development of subcutaneous emphysema within the lower neck and trace pneumomediastinum.  3.  Stable mild mediastinal lymphadenopathy, which may be reactive.    [Urgent Result: Pneumomediastinum]    Finding was identified on 11/18/2023 2:39 PM CST.     Dr. Madera was contacted by me on 11/18/2023 2:53 PM CST and verbalized understanding of the critical result.     XR Chest Port 1 View    Impression    IMPRESSION: Endotracheal tube tip 3.4 cm from the ashley. No  significant change in bilateral infiltrates. Remaining tubes and lines  not significantly changed.     REG IZAGUIRRE MD         SYSTEM ID:  [id number]   XR Chest Port 1 View    Impression     IMPRESSION: Stable moderate cardiomegaly. Right upper lobe consolidation and diffuse ill-defined hazy interstitial and patchy opacities in the remaining lung are unchanged. No significant pleural effusion or pneumothorax.    Endotracheal tube terminates 2.5 cm above the ashley. Right PICC terminates in the SVC. Feeding tube extends below the diaphragm beyond the field-of-view.   CT Chest w/o Contrast    Impression    IMPRESSION:  1.  Extensive bilateral pulmonary infiltrates again seen. Increasing  airspace consolidation in the right upper lobe and both lower lobes  when compared to previous.    INES COYNE MD         SYSTEM ID:  [id number]   XR Chest Port 1 View    Impression    IMPRESSION: Single AP view of the chest was obtained. Endotracheal tube tip projects over mid thoracic trachea approximately 4 cm from the ashley. Enteric tube crosses the diaphragm with the distal tip outside the field-of-view. Right upper extremity   PICC tip projects over high/mid SVC. Cardiomegaly. Left basilar and right mid/upper lobe patchy pulmonary opacity, could represent atelectasis versus infection. No significant pleural effusion or pneumothorax.     Echocardiogram Complete   Result Value Ref Range    LVEF  60-65%         Lab Results:  Personally Reviewed.   Last Comprehensive Metabolic Panel:  Sodium   Date Value Ref Range Status   11/29/2023 138 135 - 145 mmol/L Final     Comment:     Reference intervals for this test were updated on 09/26/2023 to more accurately reflect our healthy population. There may be differences in the flagging of prior results with similar values performed with this method. Interpretation of those prior results can be made in the context of the updated reference intervals.    11/22/2019 141 133 - 144 mmol/L Final     Potassium   Date Value Ref Range Status   11/29/2023 5.0 3.4 - 5.3 mmol/L Final   11/22/2019 4.6 3.4 - 5.3 mmol/L Final     Chloride   Date Value Ref Range Status   11/29/2023 100 98 -  "107 mmol/L Final   11/22/2019 108 94 - 109 mmol/L Final     Carbon Dioxide   Date Value Ref Range Status   11/22/2019 27 20 - 32 mmol/L Final     Carbon Dioxide (CO2)   Date Value Ref Range Status   11/29/2023 31 (H) 22 - 29 mmol/L Final     Anion Gap   Date Value Ref Range Status   11/29/2023 7 7 - 15 mmol/L Final   11/22/2019 6 3 - 14 mmol/L Final     Glucose   Date Value Ref Range Status   11/29/2023 162 (H) 70 - 99 mg/dL Final   11/22/2019 112 (H) 70 - 99 mg/dL Final     GLUCOSE BY METER POCT   Date Value Ref Range Status   11/29/2023 159 (H) 70 - 99 mg/dL Final     Urea Nitrogen   Date Value Ref Range Status   11/29/2023 69.4 (H) 8.0 - 23.0 mg/dL Final   11/22/2019 34 (H) 7 - 30 mg/dL Final     Creatinine   Date Value Ref Range Status   11/29/2023 0.62 0.51 - 0.95 mg/dL Final   11/22/2019 1.30 (H) 0.52 - 1.04 mg/dL Final     GFR Estimate   Date Value Ref Range Status   11/29/2023 >90 >60 mL/min/1.73m2 Final   11/22/2019 40 (L) >60 mL/min/[1.73_m2] Final     Comment:     Non  GFR Calc  Starting 12/18/2018, serum creatinine based estimated GFR (eGFR) will be   calculated using the Chronic Kidney Disease Epidemiology Collaboration   (CKD-EPI) equation.       Calcium   Date Value Ref Range Status   11/29/2023 8.4 (L) 8.8 - 10.2 mg/dL Final   11/22/2019 8.9 8.5 - 10.1 mg/dL Final        UA: No results found for: \"UAMP\", \"UBARB\", \"BENZODIAZEUR\", \"UCANN\", \"UCOC\", \"OPIT\", \"UPCP\"           Please see A&P for additional details of medical decision making.  MANAGEMENT DISCUSSED with the following over the past 24 hours: Discussed with patient, daughter,    NOTE(S)/MEDICAL RECORDS REVIEWED over the past 24 hours: Reviewed notes from hospital MD and nursing  Tests personally interpreted in the past 24 hours:  - IR G-tube showing G-tube was placed today  Tests ORDERED & REVIEWED in the past 24 hours:  - Reviewed WBC  SUPPLEMENTAL HISTORY, in addition to the patient's history, over the past 24 hours " obtained from:   - Spouse or significant other  Medical complexity over the past 24 hours:  -------------------------- HIGH RISK FOR MORBIDITY -------------------------------------------------------------  - Parenteral (IV) CONTROLLED SUBSTANCES ordered        Agnes Mayen APRN, CNS-BC, CNP, ACHPN  Acute Care Pain Management Program   Hours of pain coverage 7a-1700- after 1700 please call the house officer    [address] ([place], [place], [place], [place], [place])   Page via [employer]- Click HERE to page Agnes or Jose text web console

## 2023-11-29 NOTE — PROGRESS NOTES
[facility] ICU VENTILATOR RESPIRATORY NOTE     Date of Admission: 10/15/23     Date of Intubation (most recent): 10/21/23     Reason for Mechanical Ventilation: Respiratory Failure     Number of Days on Mechanical Ventilation: 38     Met Criteria for Pressure Support Trial: Yes     Length of Pressure Support Trial: Pt did not wean trach/peg planned tomorrow     Significant Events Today: Pt rested going for a trach/peg tomorrow     ABG Results:   Venous Blood Gas  Recent Labs   Lab 11/28/23  1040 11/26/23  0454 11/25/23  1002   PHV 7.37 7.37 7.27*   PCO2V 58* 52* 64*   PO2V 36 38 41   HCO3V 33* 30* 29*   LEON 7.0* 4.3* 1.7   O2PER 40 40 40     ETT appearance on chest x-ray: ETT is at 4.7cm above the ashley      Vent Mode: CMV/AC  (Continuous Mandatory Ventilation/ Assist Control)  FiO2 (%): 35 %  Resp Rate (Set): 18 breaths/min  Tidal Volume (Set, mL): 360 mL  PEEP (cm H2O): 8 cmH2O  Pressure Support (cm H2O): 12 cmH2O  Resp: 12    BS coarse/diminished. Suctioned for a moderate amount of white thin secretions. Will continue to monitor.    Swati Drake RN on 11/28/2023

## 2023-11-29 NOTE — OP NOTE
Preop diagnosis:  Prolonged intubation with respiratory failure  Post op diagnosis: Same  Procedure:  Tracheotomy  Surgeon: Daron Faye  Anesthesia: General  Estimated blood loss: 15 ml  Complications: None  Tubes: size 6 Cuffed Shiley tracheotomy tube  Indications for procedure:  Respiratory failure with prolonged intubation. Asked to place tracheostomy for long-term airway management. Procedure and associated risks and complications discussed with patients family. They signed consent.  Details of procedure:  Patient was brought into the operating suite and placed in supine position on the operating table. She was already under tracheal intubated. Neck was extended and landmarks were palpable. Lidocaine with epinephrine infiltrated along the lower anterior neck midway between the cricoid cartilage and sternal notch. Sterile prep drape applied. Skin incision made with a scalpel. Section carried through the subcutaneous fat until midline of the strap muscles was identified and the median raphe was divided lateralizing the strap muscles off the anterior trachea. Thyroid isthmus was encountered and this was divided vertically with electrocautery. Hemostasis was accomplished with bipolar cautery as needed. With the trachea exposed, the cricoid hook was placed and then a window of the anterior tracheal cartilage was excised from approximately level of the third tracheal ring. Endotracheal tube was withdrawn and #6 cuffed Shiley tracheotomy tube was inserted and hooked to the anesthesia circuit. Good ventilation and return of CO2 on monitoring was noted. Had some oozing from the inferior aspect of the wound bed so I removed the tracheotomy tube, intubated the tracheostoma with a small endotracheal tube and then was able to visualize the wound bed to a greater degree. Bipolar cautery along the cut edge of the thyroid isthmus was carried out along each lateral aspect and inferiorly and this seemed to control the bleeding  such that at the termination procedures no active bleeding. The endotracheal tube was removed from the trach site and the cuffed Shiley tracheotomy tube was inserted and secured at the skin with silk sutures at four points and Velcro trach collar trials applied. No evident active wound bleeding at the termination of procedure.

## 2023-11-29 NOTE — PROGRESS NOTES
[facility] ICU VENTILATOR RESPIRATORY NOTE     Date of Admission: 10/15/23     Date of Intubation (most recent): 10/21/23     Reason for Mechanical Ventilation: Respiratory Failure     Number of Days on Mechanical Ventilation: 39     Met Criteria for Pressure Support Trial: Yes     Length of Pressure Support Trial: Pt did not wean trach/peg done today     Significant Events Today: Pt rested post trach placement. Nurse noted continuous bleeding from the trach site. Clots starting to form under trach. Suctioned for small amount of thick bloody secretions down the trach. Trach very sensitive for pt. Tried to take off as much pressure on site as possible with flexi connector and the tubing arm.     ABG Results:   Venous Blood Gas  Recent Labs   Lab 11/28/23  1040 11/26/23  0454 11/25/23  1002   PHV 7.37 7.37 7.27*   PCO2V 58* 52* 64*   PO2V 36 38 41   HCO3V 33* 30* 29*   LEON 7.0* 4.3* 1.7   O2PER 40 40 40     #6 shiley placed today in surgery      Vent Mode: CMV/AC  (Continuous Mandatory Ventilation/ Assist Control)  FiO2 (%): 50 %  Resp Rate (Set): 18 breaths/min  Tidal Volume (Set, mL): 360 mL  PEEP (cm H2O): 8 cmH2O  Inspiratory Pressure (cm H2O) (Drager Sanam): 29  Resp: 15    BS coarse/diminished. Suctioned for a small amount of bloody thick secretions. Will continue to monitor.    Swati Drake RN on 11/28/2023

## 2023-11-30 NOTE — ANESTHESIA CARE TRANSFER NOTE
Patient: Matthew Bowen    Procedure: Procedure(s):  Exploration of tracheostomy wound and control of bleeding       Diagnosis: Bleeding [R58]  Diagnosis Additional Information: No value filed.    Anesthesia Type:   No value filed.     Note:    Oropharynx: oropharynx clear of all foreign objects, oral gastic tube in place and ventilatory support (trach in place)  Level of Consciousness: iatrogenic sedation  Patient oxygen source: Ambu bag for transportation, vent in room.    Independent Airway: airway patency not satisfactory and stable  Dentition: dentition unchanged  Vital Signs Stable: post-procedure vital signs reviewed and stable  Report to RN Given: handoff report given  Patient transferred to: ICU    ICU Handoff: Call for PAUSE to initiate/utilize ICU HANDOFF, Identified Patient, Identified Responsible Provider, Reviewed the Pertinent Medical History, Discussed Surgical Course, Reviewed Intra-OP Anesthesia Management and Issues during Anesthesia, Set Expectations for Post Procedure Period and Allowed Opportunity for Questions and Acknowledgement of Understanding  Vitals:  Vitals Value Taken Time   BP     Temp     Pulse 108 11/29/23 1918   Resp 24 11/29/23 1918   SpO2 98 % 11/29/23 1918       Electronically Signed By: LOW Ortiz CRNA  November 29, 2023  7:28 PM

## 2023-11-30 NOTE — PROGRESS NOTES
[facility] ICU VENTILATOR RESPIRATORY NOTE  Date of Admission: 10/15/23  Date of Intubation (most recent): 10/22/23  Reason for Mechanical Ventilation: Respiratory failure  Number of Days on Mechanical Ventilation: 40  Met Criteria for Pressure Support Trial: No  Reason for No Pressure Support Trial: Unstable airway  Vent Mode: CMV/AC  (Continuous Mandatory Ventilation/ Assist Control)  FiO2 (%): 35 %  Resp Rate (Set): 18 breaths/min  Tidal Volume (Set, mL): 360 mL  PEEP (cm H2O): 8 cmH2O  Resp: 28     Significant Events Today: Back from OR, less bleeding noted from both stoma and airway  ABG Results: Venous Blood Gas  Recent Labs   Lab 11/28/23  1040 11/26/23  0454 11/25/23  1002   PHV 7.37 7.37 7.27*   PCO2V 58* 52* 64*   PO2V 36 38 41   HCO3V 33* 30* 29*   LEON 7.0* 4.3* 1.7   O2PER 40 40 40      ETT appearance on chest x-ray: EXAM: XR CHEST PORTABLE 1 VIEW  LOCATION: [facility]  DATE: 11/26/2023     INDICATION: Respiratory failure.  COMPARISON: Chest x-ray on 11/22/2023.                                                                      IMPRESSION: Single AP view of the chest was obtained. Endotracheal tube tip projects over mid thoracic trachea approximately 4 cm from the ashley. Enteric tube crosses the diaphragm with the distal tip outside the field-of-view. Right upper extremity   PICC tip projects over high/mid SVC. Cardiomegaly. Left basilar and right mid/upper lobe patchy pulmonary opacity, could represent atelectasis versus infection. No significant pleural effusion or pneumothorax.    Plan:  Will continue to monitor and adjust as able.    Ferny Feliz RT on 11/30/2023 at 5:41 AM

## 2023-11-30 NOTE — ANESTHESIA POSTPROCEDURE EVALUATION
Patient: Matthew Bowen    Procedure: Procedure(s):  Exploration of tracheostomy wound and control of bleeding       Anesthesia Type:  General    Note:  Disposition: Outpatient   Postop Pain Control: Uneventful            Sign Out: Well controlled pain   PONV: No   Neuro/Psych: Uneventful            Sign Out: Acceptable/Baseline neuro status   Airway/Respiratory: Uneventful            Sign Out: AIRWAY IN SITU/Resp. Support; O2 supplementation               Oxygen: Other               Airway in situ/Resp. Support: Tracheostomy                 Reason: Planned Pre-op   CV/Hemodynamics: Uneventful            Sign Out: Acceptable CV status; No obvious hypovolemia; No obvious fluid overload   Other NRE: NONE   DID A NON-ROUTINE EVENT OCCUR? No           Last vitals:  Vitals:    11/29/23 1800 11/29/23 1815 11/29/23 1918   BP: (!) 161/83     Pulse: 78 67 108   Resp: (!) 82 (!) 35 24   Temp: 98.2  F (36.8  C) 98.4  F (36.9  C)    SpO2: 96% 97% 98%       Electronically Signed By: Cuba Cohen MD  November 29, 2023  7:34 PM

## 2023-11-30 NOTE — PROGRESS NOTES
[facility] ACUTE PAIN SERVICE    ([facility], [facility], [facility], [facility])  Pain follow-up follow-up    Assessment/Plan:  Matthew Bowen is a 78 year old female who was admitted on 10/15/2023.  I was asked to see the patient for ongoing pain and assistance required for non-IV regimen.  Pneumonia with sepsis and respiratory failure.  History of ALAINA, obesity, rib fracture, CKD.    shows ongoing use of Lyrica.  Follows with [facility] pain clinic, for chronic back pain.  Unable to describe pain although intermittently grimaces and sometimes has episodes of tachycardia.     PLAN:  Acute pain of unknown cause, perhaps related to critical illness polymyopathy?  Chronic low back pain. After Trach placement -suggest progressively tapering down fentanyl.  Can supplement with as needed oxycodone.  Discussed with pharmacy.  And creatinine clearance has been corrected and is less than 17 and unable to offer  Multimodal Medication Therapy:   Adjuvants: Suggest scheduling Tylenol, unable to increase Lyrica further  Opioids: Would suggest decreasing fentanyl drip to 100mcgs (and decrease by 25mcg Q6H)  Oxycodone 10mg Q4h PRN   IVP dilaudid to 0.2mg Q3h PRN  Non-medication interventions- Ice   Constipation Prophylaxis-  liquid diarrhea   Follow up /Discharge Recommendations - We recommend prescribing the following at the time of discharge:  TBD            Subjective:    Today I met with the patient and her daughter.  She now has a trach and PEG.  She is unable to report a particular location of pain.  With her daughter's assistance touched shoulders, elbows, hands, feet, knees, legs, and asked about low back pain.  The patient does not endorse pain in any of these locations.  Nor does she endorse pain in the rectal tube.    Discussed with nurse regarding pain plan.  Discussed with attending physician regarding pain plan.  Also discussed Lyrica dosing with pharmacist.      Oxygen dependent    Patient Active Problem List   Diagnosis    Pneumonia    Non-STEMI (non-ST elevated myocardial infarction) (H)    Dyspnea on exertion    Acute kidney injury superimposed on chronic kidney disease     Oxygen dependent    Generalized weakness    Contusion of right thigh, initial encounter    Closed head injury, initial encounter    Pneumonia due to infectious organism, unspecified laterality, unspecified part of lung        History   Drug Use Not on file         Tobacco Use      Smoking status: Never      Smokeless tobacco: Never         amLODIPine  5 mg Oral or Feeding Tube Daily    aspirin  81 mg Oral or Feeding Tube Daily    atorvastatin  20 mg Oral or Feeding Tube QPM    atovaquone  1,500 mg Oral or Feeding Tube Daily    B and C vitamin Complex with folic acid  5 mL Oral or Feeding Tube Daily    chlorhexidine  15 mL Mouth/Throat Q12H    clotrimazole   Topical BID    fiber modular (NUTRISOURCE FIBER)  1 packet Per Feeding Tube TID    [Held by provider] heparin ANTICOAGULANT  5,000 Units Subcutaneous Q8H    hydrALAZINE  25 mg Oral or Feeding Tube TID    insulin aspart  1-6 Units Subcutaneous Q4H    ipratropium - albuterol 0.5 mg/2.5 mg/3 mL  3 mL Nebulization 4x daily    levothyroxine  150 mcg Oral or Feeding Tube QAM AC    lidocaine   Topical TID    methylPREDNISolone  62.5 mg Intravenous Q8H    pantoprazole  40 mg Per Feeding Tube QAM AC    pregabalin  75 mg Per Feeding Tube Daily    protein modular  1 packet Per Feeding Tube BID    QUEtiapine  150 mg Oral or Feeding Tube BID    sertraline  150 mg Oral or Feeding Tube Daily    sodium chloride (PF)  10-40 mL Intracatheter Q7 Days    sodium chloride (PF)  3 mL Intracatheter Q8H    torsemide  20 mg Oral or Feeding Tube BID    vitamin D3  50 mcg Oral or Feeding Tube Daily       Objective:  Vital signs in last 24 hours:  B/P: 159/95, T: 96.9, P: 67, R: 29   Blood pressure (!) 143/74, pulse 82, temperature 98.8  F (37.1  C), temperature source Temporal, resp. rate 22,  height 1.524 m (5'), weight 85.5 kg (188 lb 7.9 oz), SpO2 94%.      Weight:   Wt Readings from Last 2 Encounters:   11/30/23 85.5 kg (188 lb 7.9 oz)   02/28/22 94.3 kg (208 lb)           Intake/Output:    Intake/Output Summary (Last 24 hours) at 11/29/2023 1125  Last data filed at 11/29/2023 1100  Gross per 24 hour   Intake 1576.04 ml   Output 1925 ml   Net -348.96 ml        Review of Systems:   As per subjective, all others negative.    Physical Exam:     General Appearance:  Very sleepy but wakes to commands   Head:  Normocephalic, without obvious abnormality, atraumatic   Eyes:  PERRL, conjunctiva/corneas clear, EOM's intact   ENT/Throat: Trach now in place with some blood on the dressing   Lymph/Neck: Trach is in place   Lungs:    respirations unlabored   Chest Wall:  No tenderness or deformity   Cardiovascular/Heart:  Vented    Abdomen:   Soft, but G-tube in place, large hematoma  Rectal tube    Musculoskeletal: Upper extremity edema noted right greater than left and lower extremity edema is trace   Skin: Skin is intact-edematous though, and also with large hematoma on her abdomen   Neurologic: Sleepy at times barely follows commands cannot give me a thumbs up can barely shake head yes or no          Imaging:  Personally Reviewed.    Results for orders placed or performed during the hospital encounter of 10/15/23   CT Head w/o Contrast    Impression    IMPRESSION:  1.  No acute intracranial injury.       Cervical spine CT w/o contrast    Impression    IMPRESSION:  1.  No acute cervical spine fracture.   2.  Reversal of the usual cervical lordosis is nonspecific and can be seen with muscle spasm/soft tissue injury, but is unchanged versus 01/25/2023 and may be positional/chronic.       XR Femur Right 2 Views    Impression    IMPRESSION: Advanced degenerative arthritis of both hips with hypertrophic changes. Multiple osteochondral loose bodies superior of the right femoral neck.    Mild degenerative arthritis of  both SI joints. Lower lumbar facet arthropathy and degenerative interspace narrowing. Incidental note of ossification of the right iliolumbar ligament.    Right total knee arthroplasty. No knee joint effusion.   XR Pelvis 1/2 Views    Impression    IMPRESSION: Advanced degenerative arthritis of both hips with hypertrophic changes. Multiple osteochondral loose bodies superior of the right femoral neck.    Mild degenerative arthritis of both SI joints. Lower lumbar facet arthropathy and degenerative interspace narrowing. Incidental note of ossification of the right iliolumbar ligament.    Right total knee arthroplasty. No knee joint effusion.   XR Chest 1 View    Impression    IMPRESSION: Cardiomegaly with central vascular congestion. Stable prominent pericardial fat pad along the left cardiac margin. Mild bibasilar atelectasis/scarring. No focal pneumonic consolidation or pleural effusion.   CT Chest/Abdomen/Pelvis w Contrast    Impression    IMPRESSION:  1.  Lingular consolidative opacity with air bronchograms suspicious for infection/pneumonia.  2.  Additional bilateral upper lobe groundglass opacities are likely infectious/inflammatory.  3.  Subacute left posterior 11th and 12th rib fractures.  4.  No convincing acute process within the abdomen or pelvis.   XR Video Swallow with SLP or OT    Impression    IMPRESSION:   1. Mild flash penetration was noted when the patient swallowed thin  barium.  2. No evidence for aspiration.    Please see further details in report by speech pathology.     STONEY ALMONTE MD         SYSTEM ID:  [id number]   XR Foot Left 2 Views    Impression    IMPRESSION: Degenerative changes throughout the midfoot which are  moderate at the navicular cuneiform and second TMT joints. No evidence  of acute fracture. Bulky distal Achilles tendon enthesopathy.  Calcaneal heel spur.     OSIRIS DOE MD         SYSTEM ID:  [id number]   XR Chest Port 1 View    Impression    IMPRESSION: Worsening  right upper and left basilar opacities  consistent with worsening infection.  Probable small pleural effusion.  No pneumothorax. Similar enlarged cardiomediastinal silhouette. Aortic  calcification.    TRAE BANGURA MD         SYSTEM ID:  [id number]   XR Chest Port 1 View    Impression    IMPRESSION: Cardiac enlargement. Enlargement of the central pulmonary arteries. Focal consolidation right upper lobe and consolidation throughout the left lower lobe. Possible small volume of left basilar pleural fluid. No pneumothorax. No significant   change.     CT Chest Pulmonary Embolism w Contrast    Impression    IMPRESSION:  1.  Extensive new/increased bilateral groundglass opacities and consolidation, favor multifocal pneumonia and/or ARDS.  2.  Findings suggestive of pulmonary hypertension.  3.  No pulmonary embolus.   XR Chest Port 1 View    Impression    IMPRESSION:   1. Interval placement of a right upper extremity peripherally inserted central catheter with distal tip obscured, but most likely in the mid superior vena cava.  2. No other significant change since the recent comparison study.  3. Moderately extensive airspace opacities within both lungs, most prominent in the upper right lung and lower left lung, again noted.    XR Abdomen Port 1 View    Impression    IMPRESSION: NG tube tip and sidehole in the stomach. Moderate degenerative change in the spine and both hips.   XR Chest Port 1 View    Impression    IMPRESSION:    Endotracheal tube tip is 2.2 cm above the ashley. Unchanged right PICC with tip in the mid SVC.    Multifocal patchy airspace opacities most prominent within the right upper lung and left lower lung have not significant changed. No new airspace opacities. No pleural effusions or pneumothorax.    Stable cardiomediastinal silhouette. Aortic atherosclerotic calcifications.   XR Chest Port 1 View    Impression    IMPRESSION: Endotracheal tube tip 5.4 cm above ashley. Enteric tube tip below  diaphragm. Right PICC tip in the low SVC. Increased multifocal bilateral infiltrates, right greater than left. There may be small left pleural effusion. Enlarged cardiac   silhouette. Obscured pulmonary vascularity. Aortic calcifications.   US Abdomen Limited    Impression    IMPRESSION:  Some gallbladder sludge is present, no shadowing stones or  cholecystitis demonstrated.    REG IZAGUIRRE MD         SYSTEM ID:  [id number]   XR Chest Port 1 View    Impression    IMPRESSION: Endotracheal tube tip approximately 3 cm from the ashley.  Extensive bilateral infiltrates appear slightly worse, although this  may be related to different level of inspiration. No significant  change in remaining tubes and lines.    REG IZAGUIRRE MD         SYSTEM ID:  [id number]   XR Chest Port 1 View    Impression    IMPRESSION: Endotracheal tube terminates 4.8 cm above the ashley. Enteric decompression tube descends below the diaphragm, tip outside the field-of-view. Right upper extremity PICC tip in the mid SVC.    Bilateral hazy airspace opacities, slightly improved compared to 10/23/2023. No definite pleural effusion or pneumothorax.    Stable cardiomediastinal silhouette.   XR Chest Port 1 View    Impression    IMPRESSION: Endotracheal tube terminates approximately 4 cm from the  ashley. Enteric tube courses below the diaphragm. Right PICC  terminates at the mid SVC.    Bilateral, right greater than left, airspace opacities appear slightly  worsened compared to prior. Probable small left layering pleural  effusion. No pneumothorax. Similar cardiomediastinal silhouette and  aortic calcifications.    TRAE BANGURA MD         SYSTEM ID:  [id number]   XR Chest Port 1 View    Impression    IMPRESSION: ET tube tip is 4.4 cm proximal to the ashley. Nasogastric  tube in place. Right PICC line tip at the mid SVC, stable in position.  Diffuse bilateral pulmonary opacities appear mildly increased on the  left and stable on the right. Stable  cardiac silhouette that is  borderline prominent.    YULY DICK MD         SYSTEM ID:  [id number]   US Upper Extremity Venous Duplex Right    Impression    IMPRESSION: Negative for deep vein thrombosis in the right upper  extremity.    ARMANI JULIAN MD         SYSTEM ID:  [id number]   XR Chest Port 1 View    Impression    IMPRESSION: Enlarged cardiopericardial silhouette. Pulmonary vascular  congestion and interstitial pulmonary edema appear similar to mildly  increased compared to prior. Dense left basilar consolidation which  may represent atelectasis or aspiration/pneumonia appears similar.  Probable trace bilateral pleural effusions. No pneumothorax.    Endotracheal tube with distal tip projecting at the mid thoracic  trachea approximately 4.5 cm proximal to the ashley. Right upper  extremity PICC with distal tip projecting at the superior vena cava.  Enteric tube courses below the diaphragm with distal tip beyond the  inferior field of view.    TAMRA ESCAMILLA MD         SYSTEM ID:  [id number]   XR Abdomen Port 1 View    Impression    IMPRESSION: Nasogastric tube tip within the distal gastric lumen.  Enteric feeding tube tip at the duodenal bulb region. Nonobstructive  bowel.    YULY DICK MD         SYSTEM ID:  [id number]   XR Chest Port 1 View    Impression    IMPRESSION: Worsening patchy airspace opacities in the right upper  lobe concerning for pneumonia. This is superimposed on diffuse mixed  interstitial and airspace opacity throughout the lungs, either due to  atypical pneumonia, ARDS or pulmonary edema. Bibasilar atelectasis,  left greater than right. No pneumothorax. No significant pleural  effusion. Mild cardiomegaly.     Right arm PICC tip is at the SVC/right atrial junction. Endotracheal  tube tip is 5.8 cm above the ashley. NG tube coursing below the left  hemidiaphragm.    LYUBOV KUMAR MD         SYSTEM ID:  [id number]   XR Chest Port 1 View    Impression    IMPRESSION:     Lines and tubes:  Endotracheal tube tip 4.7 cm above the ashley. Right  PICC tip at the mid SVC and enteric tube courses below the diaphragm.    Mild worsened bibasilar opacities, particularly in the left upper lobe  and right lower lobe, which could reflect infection, edema or ARDS.  Probable trace pleural effusions. No pneumothorax. Similar  cardiomediastinal silhouette.      TRAE BANGURA MD         SYSTEM ID:  [id number]   XR Chest Port 1 View    Impression    IMPRESSION: Endotracheal tube, esophagogastric tube, and right upper extremity PICC again identified and relatively unchanged. Esophagogastric tube courses below the level of the diaphragm, though outside field of view. Patchy bilateral pulmonary   opacities with consolidative component of the left lower lobe again identified. Minimal improvement in the right lung. No pneumothorax.   XR Abdomen Port 1 View    Impression    IMPRESSION: Feeding tube terminates in the midline of the abdomen in a position consistent with a termination in the distal stomach. Nonobstructive bowel gas pattern.   CT Chest w/o Contrast   Result Value Ref Range    Radiologist flags Pneumomediastinum (Urgent)     Impression    IMPRESSION:   1.  Increase in extensive bilateral pulmonary opacities, which could represent multifocal pneumonia and/or diffuse alveolar damage.  2.  Development of subcutaneous emphysema within the lower neck and trace pneumomediastinum.  3.  Stable mild mediastinal lymphadenopathy, which may be reactive.    [Urgent Result: Pneumomediastinum]    Finding was identified on 11/18/2023 2:39 PM CST.     Dr. Madera was contacted by me on 11/18/2023 2:53 PM CST and verbalized understanding of the critical result.     XR Chest Port 1 View    Impression    IMPRESSION: Endotracheal tube tip 3.4 cm from the ashley. No  significant change in bilateral infiltrates. Remaining tubes and lines  not significantly changed.     REG IZAGUIRRE MD         SYSTEM ID:  [id number]   XR Chest  Port 1 View    Impression    IMPRESSION: Stable moderate cardiomegaly. Right upper lobe consolidation and diffuse ill-defined hazy interstitial and patchy opacities in the remaining lung are unchanged. No significant pleural effusion or pneumothorax.    Endotracheal tube terminates 2.5 cm above the ashley. Right PICC terminates in the SVC. Feeding tube extends below the diaphragm beyond the field-of-view.   CT Chest w/o Contrast    Impression    IMPRESSION:  1.  Extensive bilateral pulmonary infiltrates again seen. Increasing  airspace consolidation in the right upper lobe and both lower lobes  when compared to previous.    INES COYNE MD         SYSTEM ID:  [id number]   XR Chest Port 1 View    Impression    IMPRESSION: Single AP view of the chest was obtained. Endotracheal tube tip projects over mid thoracic trachea approximately 4 cm from the ashley. Enteric tube crosses the diaphragm with the distal tip outside the field-of-view. Right upper extremity   PICC tip projects over high/mid SVC. Cardiomegaly. Left basilar and right mid/upper lobe patchy pulmonary opacity, could represent atelectasis versus infection. No significant pleural effusion or pneumothorax.     Echocardiogram Complete   Result Value Ref Range    LVEF  60-65%         Lab Results:  Personally Reviewed.   Last Comprehensive Metabolic Panel:  Sodium   Date Value Ref Range Status   11/30/2023 138 135 - 145 mmol/L Final     Comment:     Reference intervals for this test were updated on 09/26/2023 to more accurately reflect our healthy population. There may be differences in the flagging of prior results with similar values performed with this method. Interpretation of those prior results can be made in the context of the updated reference intervals.    11/22/2019 141 133 - 144 mmol/L Final     Potassium   Date Value Ref Range Status   11/30/2023 4.5 3.4 - 5.3 mmol/L Final   11/22/2019 4.6 3.4 - 5.3 mmol/L Final     Chloride   Date Value Ref Range  "Status   11/30/2023 101 98 - 107 mmol/L Final   11/22/2019 108 94 - 109 mmol/L Final     Carbon Dioxide   Date Value Ref Range Status   11/22/2019 27 20 - 32 mmol/L Final     Carbon Dioxide (CO2)   Date Value Ref Range Status   11/30/2023 28 22 - 29 mmol/L Final     Anion Gap   Date Value Ref Range Status   11/30/2023 9 7 - 15 mmol/L Final   11/22/2019 6 3 - 14 mmol/L Final     Glucose   Date Value Ref Range Status   11/30/2023 139 (H) 70 - 99 mg/dL Final   11/22/2019 112 (H) 70 - 99 mg/dL Final     GLUCOSE BY METER POCT   Date Value Ref Range Status   11/30/2023 129 (H) 70 - 99 mg/dL Final     Urea Nitrogen   Date Value Ref Range Status   11/30/2023 70.1 (H) 8.0 - 23.0 mg/dL Final   11/22/2019 34 (H) 7 - 30 mg/dL Final     Creatinine   Date Value Ref Range Status   11/30/2023 0.69 0.51 - 0.95 mg/dL Final   11/22/2019 1.30 (H) 0.52 - 1.04 mg/dL Final     GFR Estimate   Date Value Ref Range Status   11/30/2023 88 >60 mL/min/1.73m2 Final   11/22/2019 40 (L) >60 mL/min/[1.73_m2] Final     Comment:     Non  GFR Calc  Starting 12/18/2018, serum creatinine based estimated GFR (eGFR) will be   calculated using the Chronic Kidney Disease Epidemiology Collaboration   (CKD-EPI) equation.       Calcium   Date Value Ref Range Status   11/30/2023 8.5 (L) 8.8 - 10.2 mg/dL Final   11/22/2019 8.9 8.5 - 10.1 mg/dL Final        UA: No results found for: \"UAMP\", \"UBARB\", \"BENZODIAZEUR\", \"UCANN\", \"UCOC\", \"OPIT\", \"UPCP\"           Please see A&P for additional details of medical decision making.  MANAGEMENT DISCUSSED with the following over the past 24 hours: Discussed with patient, daughter, ICU MD, Pharmacist   NOTE(S)/MEDICAL RECORDS REVIEWED over the past 24 hours: Reviewed notes from hospital MD and nursing  Tests personally interpreted in the past 24 hours:  - IR G-tube showing G-tube was placed today  Tests ORDERED & REVIEWED in the past 24 hours:  - Reviewed WBC  SUPPLEMENTAL HISTORY, in addition to the patient's " history, over the past 24 hours obtained from:   - Spouse or significant other  Medical complexity over the past 24 hours:  -------------------------- HIGH RISK FOR MORBIDITY -------------------------------------------------------------  - Parenteral (IV) CONTROLLED SUBSTANCES ordered        Agnes Mayen APRN, CNS-BC, CNP, ACHPN  Acute Care Pain Management Program   Hours of pain coverage 7a-1700- after 1700 please call the house officer    [employer] ([employer], [place], [place], [place])   Page via [employer]- Click HERE to page Agnes or Jose text web console

## 2023-11-30 NOTE — PLAN OF CARE
ICU End of Shift Summary.  For vital signs and complete assessments, please see documentation flowsheets.      Pertinent assessments: Tmax 99.7. Opens eyes to voice. Sleepy through much of night. Remained on full vent support via trach, FiO2 titrated down to 35%. Very minimal bleeding from trach site since returning from OR second time. BP's low with sleep after hydralazine given, had to wake pt up and BP was ok then. Also titrated Precedex and Fent gtt down a bit to help improve BP's. Held hydralazine this am due to overnight BP's, although since last turn BP's are on the rise, will follow closely and given hydralazine if warranted. Bradycardic throughout much of night, currently 60's-70's. Little stool overnight. Adequate urine output. TF resumed via Peg tube, on hold currently for levothyroxine administration, to be advanced to goal rate when restarted. Turned and repositioned throughout night.   Major Shift Events:   Plan (Upcoming Events): Plan to titrate off of gtts and adjust to PO/TF meds.   Discharge/Transfer Needs: Most likely discharge to LTACH     Bedside Shift Report Completed   Bedside Safety Check Completed    Goal Outcome Evaluation:      Plan of Care Reviewed With: patient    Overall Patient Progress: no changeOverall Patient Progress: no change

## 2023-11-30 NOTE — PROGRESS NOTES
ICU staff  DOS 11/30/2023    Returned to OR last night for bleeding at tracheostomy site, no issues since.    Vitals:   Temp:  [97.3  F (36.3  C)-99.7  F (37.6  C)] 98.6  F (37  C)  Pulse:  [] 84  Resp:  [0-82] 29  BP: ()/() 152/73  FiO2 (%):  [35 %-50 %] 35 %  SpO2:  [89 %-100 %] 100 %     Vent Mode: CMV/AC  (Continuous Mandatory Ventilation/ Assist Control)  FiO2 (%): 35 %  Resp Rate (Set): 18 breaths/min  Tidal Volume (Set, mL): 360 mL  PEEP (cm H2O): 8 cmH2O  Pressure Support (cm H2O): 12 cmH2O  Resp: 29    I/O last 3 completed shifts:  In: 2424.19 [I.V.:904.19; NG/GT:1220]  Out: 1825 [Urine:1805; Stool:20]    Dex 0.4  Fentanyl 125    Exam:  Gen: Eyes open at times, semi-purposeful movements, no distress  HEENT: NC/AT, trach looks dry  Pulm: Tolerating mechanical ventilation, lungs clear  Cor: RRR  Abdomen/GI: Soft, nondistended  : Deferred  Extremities: Warm, edematous  Skin: Well-perfused  Neuro: Eyes open to voice, seems to track, semi-purposeful movements  Psych: Unable to assess    Data:   Labs reviewed  - Hb 8.1 (9.4)  Nothing new on cultures or imaging    Assessment/plan:  79 y/o woman with bronchiectasis, recurring pulmonary infections, chronic hypoxemia on home O2 admitted 10/15 with acute on chronic mixed respiratory failure. Intubated 10/21. Tracheostomy 11/29.   CNS: No significant changes. More awake overall.  # Acute/chronic pain  # Weakness/frequent falls  # Toxic/metabolic encephalopathy  - Supportive cares  - Weaning fentanyl infusion  - Enteral analgesics (oxy, APAP, pregabalin)  Pulm: Tolerating vent.  # Acute on chronic mixed respiratory failure  # Ventilator dependence  # Bronchiectasis  # Chronic hypoxemia on home oxygen  # Multifocal organizing pneumonia  - PS trials as able, ultimate goal is work toward trach dome  - High-dose steroids per pulmonary, taper starting next week (12/4)  CV: BP/HR remain labile.  # Essential hypertension  # Aortic stenosis  # Diastolic  heart failure  - Amlodipine, hydralazine  GI: Abdomen benign.  # Nutrition  - Continue tube feedings  : UOP adequate overall  # CARMEN on CKD  - GFR low based on cystatin C; Cr below baseline but has lost significant amount of lean mass  - Torsemide  Heme: Hb 8.1  # Anemia, multifactorial  - No indication to transfuse  Msk: Extremities warm, well-perfused.   Endo: Glucose 116-144  # Stress/steroid hyperglycemia  - Insulin sliding scale prn  ID: Afebrile, WBCs 18  # Leukocytosis  # ?Pneumonia  - On meropenem, ID following  - Atovaquone while on steroids  ICU: SQH, PPI    This patient is critically ill to my assessment and requires ICU monitoring and cares. A total of 30 minutes critical care time spent on 11/30/2023, exclusive of procedures.     Rui Payton MD, PhD  Surgical critical care  11/30/2023, 3:48 PM    Clinically Significant Risk Factors              # Hypoalbuminemia: Lowest albumin = 2.7 g/dL at 11/18/2023  5:05 AM, will monitor as appropriate  # Coagulation Defect: INR = 1.28 (Ref range: 0.85 - 1.15) and/or PTT = N/A, will monitor for bleeding            # Moderate Malnutrition: based on nutrition assessment

## 2023-11-30 NOTE — OP NOTE
Preoperative diagnosis:  Tracheotomy site bleeding  Postoperative diagnosis: Same  Procedure: Exploration of tracheotomy wound with control of bleeding  Surgeon: Daron Faye  Anesthesia: general  Estimated blood loss: 15 mL  Complications: None  Indication for procedure:  Patient underwent tracheostomy earlier today. Few hour postoperatively developed bleeding from the trach site. Attempted packing along inferior wound edge at bedside but without adequate control bleeding. Plan is to take patient to urgently to the OR for control of the trach wound bleeding.    Description of procedure:  Patient was brought into the operating suite and placed in the supine position the operating table. There was clotted blood around the trach tube itself. No vigorous hemorrhage noted actively. The patient was endotracheally intubated per anesthesia personnel and the tracheotomy tube was removed from the trach site and the endotracheal tube advanced beyond the tracheotomy site under direct visualization through the trach wound. The patient was adequately ventilated with endotracheal tube at this point. The blood clot was evacuated from the trach wound. The there was a small bleeding area along the inferior aspect of the tracheostomy wound anterior anterior to the trachea. It was not bleeding vigorously, appeared more venous than arterial. There was slight ooze along the margin of the tracheotomy incision in the trachea. Bipolar cautery was used to control the bleeding. I further bipolar cauterized the cut edges of the thyroid isthmus to try to minimize any bleeding from that area. After of further inspection of the wound following wound irrigation and with Valsalva there was no evidence of active bleeding.  The endotracheal tube was withdrawn under direct visualization through the tracheotomy site.   6. Cuffed Shiley tracheostomy tube was then inserted through the tracheotomy site and anesthesia circuit hooked to that. Cuff was  inflated in good ventilation and return of CO2 and monitoring was reported by anesthesia. Trach tube was secured at the skin with four separate sutures of 2-0 Prolene. Velcro trach collar was applied. She was transferred back to ICU without any evidence of active bleeding.

## 2023-11-30 NOTE — PROGRESS NOTES
Formerly [facility], [facility] ICU VENTILATOR RESPIRATORY NOTE  Date of Admission: 10/15/2023  Date of Intubation (most recent): Trach placed 11/30/2023  Reason for Mechanical Ventilation: Respiratory Failure   Number of Days on Mechanical Ventilation: 40  Met Criteria for Pressure Support Trial: yes 12/8   Length of Pressure Support Trial: 3 hr 38 minutes   Reason for Stopping Pressure Support Trial: end of designated trial, pt appeared to be more tired and RR 30-33.     CMV 18/360/+8/35%. BS coarse, suctioned for small red streaked secretions. Continues to receive duoneb QID. Trach care done, supplies and spare trach at bedside.

## 2023-11-30 NOTE — PLAN OF CARE
Patient afebrile, Rass 0 to -1 at goal.  Cardiac: SB ST depression. 's -190's on hydralazine.  Resp: LS coarse, Patient was trached and received a PEG tube as well today. Had some complications with bleeding regarding trach. ENT came to bedside to help stop bleeding, however still continued to bleed after he left. Patient had to go back to OR for repair.  Paged Dr. Rich, Dr. Harris,(on call) and Dr. Payton to update about bleeding. Patient came back at end of shift, no more bleeding.   : Aleman in place for strict I and O.  Good urine output.  GI: Rectal tube in place. Low amount out due to not eating today. Bowel sounds present. New PEG tube available to use. Did not start anything to to returning to surgery.   Skin: see flow sheet. New facial abrasion from removal of ET stickers in OR.   Lines: PICC, PIV.  Drips: Fentanyl and Dex

## 2023-11-30 NOTE — PROGRESS NOTES
Patient developed bleeding at trach site few hours ago.  Attempted control with wound packing at bedside but unsuccessful.  Will need wound exploration in OR ASAP for adequate access to control the bleeding. Discussed with patient's  and her daughter, daughter signed consent.  Will proceed to OR promptly.

## 2023-11-30 NOTE — ANESTHESIA PREPROCEDURE EVALUATION
"Anesthesia Pre-Procedure Evaluation    Patient: Matthew Bowen   MRN: [id number] : 1945        Procedure : Procedure(s):  Exploration of tracheostomy wound and control of bleeding          Past Medical History:   Diagnosis Date    Antiplatelet or antithrombotic long-term use     Arthritis     Congestive heart failure (H)     Dyspnea on exertion     Heart attack (H)     Heart murmur     Hypertension     Irregular heart beat     Oxygen dependent     2L continuous at home.    Pulmonary hypertension (H)     Sleep apnea     Bipap    Stented coronary artery     x 1    Thyroid disease     Walking troubles       Past Surgical History:   Procedure Laterality Date    APPENDECTOMY      COLONOSCOPY      COLONOSCOPY N/A 2022    Procedure: COLONOSCOPY;  Surgeon: Kolby Dunn MD;  Location: [facility] OR    CV CORONARY ANGIOGRAM N/A 2019    Procedure: Coronary Angiogram;  Surgeon: Tay Andre MD;  Location:  HEART CARDIAC CATH LAB    CV LEFT HEART CATH N/A 2019    Procedure: Left Heart Cath;  Surgeon: Tay Andre MD;  Location:  HEART CARDIAC CATH LAB    CV PCI STENT DRUG ELUTING N/A 2019    Procedure: PCI Stent Drug Eluting;  Surgeon: Tay Andre MD;  Location: [facility] HEART CARDIAC CATH LAB    GYN SURGERY      Hyst.    IR GASTROSTOMY TUBE PERCUTANEOUS PLCMNT  2023    ORTHOPEDIC SURGERY      B\" TKs      No Known Allergies   Social History     Tobacco Use    Smoking status: Never    Smokeless tobacco: Never   Substance Use Topics    Alcohol use: Not on file     Comment: 2x/year      Wt Readings from Last 1 Encounters:   23 85.1 kg (187 lb 9.8 oz)        Anesthesia Evaluation   Pt has had prior anesthetic. Type: General.    History of anesthetic complications  - PONV.      ROS/MED HX  ENT/Pulmonary:     (+) sleep apnea,                            recent URI,          Neurologic:  - neg neurologic ROS     Cardiovascular:     (+)  hypertension- -   -  - -   Taking " blood thinners   CHF     OLMSTEAD.               Irregular Heartbeat/Palpitations, valvular problems/murmurs     pulmonary hypertension,      METS/Exercise Tolerance:     Hematologic: Comments: Lab Test        11/29/23 11/29/23 11/28/23 11/28/23 11/27/23                       1642          0508          0956          0540          0532          WBC           --          16.2*         --          23.1*        17.3*         HGB          9.4*         8.4*          --          9.8*         9.0*          MCV           --          88            --          88           89            PLT           --          228           --          309          231           INR           --           --          1.28*         --           --            Lab Test        11/29/23 11/29/23 11/29/23 11/29/23 11/28/23 11/28/23 11/28/23 11/28/23 11/27/23 11/27/23                       1622          1222          0921          0508 2012 1959          0544          0540          0902          0532          NA            --           --           --          138           --           --           --          139           --          142           POTASSIUM     --           --           --          5.0           --          5.1           --          5.7*          --          5.2           CHLORIDE      --           --           --          100           --           --           --          99            --          103           CO2           --           --           --          31*           --           --           --          30*           --          31*           BUN           --           --           --          69.4*         --           --           --          67.5*         --          64.3*         CR            --           --           --          0.62          --           --           --          0.65          --          0.60          ANIONGAP      --            "--           --          7             --           --           --          10            --          8             BARBARA           --           --           --          8.4*          --           --           --          8.9           --          8.7*          GLC          152*         178*         159*         162*           < >         --            < >        178*           < >        172*           < > = values in this interval not displayed.                     (+)      anemia,          Musculoskeletal:  - neg musculoskeletal ROS     GI/Hepatic:  - neg GI/hepatic ROS     Renal/Genitourinary:     (+) renal disease, type: CRI,            Endo:     (+)          thyroid problem, hypothyroidism,           Psychiatric/Substance Use:  - neg psychiatric ROS     Infectious Disease:  - neg infectious disease ROS     Malignancy:  - neg malignancy ROS     Other:  - neg other ROS          Physical Exam    Airway   unable to assess          Respiratory Devices and Support    TRACH:      Dental    unable to assess        Cardiovascular   cardiovascular exam normal          Pulmonary    Unable to assess               OUTSIDE LABS:  CBC:   Lab Results   Component Value Date    WBC 16.2 (H) 11/29/2023    WBC 23.1 (H) 11/28/2023    HGB 9.4 (L) 11/29/2023    HGB 8.4 (L) 11/29/2023    HCT 28.3 (L) 11/29/2023    HCT 33.2 (L) 11/28/2023     11/29/2023     11/28/2023     BMP:   Lab Results   Component Value Date     11/29/2023     11/28/2023    POTASSIUM 5.0 11/29/2023    POTASSIUM 5.1 11/28/2023    CHLORIDE 100 11/29/2023    CHLORIDE 99 11/28/2023    CO2 31 (H) 11/29/2023    CO2 30 (H) 11/28/2023    BUN 69.4 (H) 11/29/2023    BUN 67.5 (H) 11/28/2023    CR 0.62 11/29/2023    CR 0.65 11/28/2023     (H) 11/29/2023     (H) 11/29/2023     COAGS:   Lab Results   Component Value Date    PTT 34 09/01/2009    INR 1.28 (H) 11/28/2023     POC: No results found for: \"BGM\", \"HCG\", \"HCGS\"  HEPATIC:   Lab " Results   Component Value Date    ALBUMIN 2.7 (L) 11/18/2023    PROTTOTAL 6.4 11/18/2023    ALT 66 (H) 11/18/2023    AST 45 11/18/2023    ALKPHOS 89 11/18/2023    BILITOTAL 0.5 11/18/2023     OTHER:   Lab Results   Component Value Date    PH 7.47 (H) 11/15/2023    LACT 1.1 10/22/2023    A1C 5.6 10/22/2023    BARBARA 8.4 (L) 11/29/2023    PHOS 4.1 11/29/2023    MAG 2.1 11/29/2023    LIPASE 49 10/22/2023    TSH 0.51 10/15/2023    SED 89 (H) 11/18/2023       Anesthesia Plan    ASA Status:  4, emergent    NPO Status:  NPO Appropriate    Anesthesia Type: General.     - Airway: Tracheostomy      Maintenance: Balanced.        Consents    Anesthesia Plan(s) and associated risks, benefits, and realistic alternatives discussed. Questions answered and patient/representative(s) expressed understanding.     - Discussed:     - Discussed with:  Patient      - Extended Intubation/Ventilatory Support Discussed: No.      - Patient is DNR/DNI Status: No     Use of blood products discussed: No .     Postoperative Care    Pain management: IV analgesics.   PONV prophylaxis: Dexamethasone or Solumedrol, Ondansetron (or other 5HT-3)     Comments:    Other Comments: Bleeding from trach           Cuba Cohen MD    I have reviewed the pertinent notes and labs in the chart from the past 30 days and (re)examined the patient.  Any updates or changes from those notes are reflected in this note.

## 2023-11-30 NOTE — PROGRESS NOTES
[facility]    Medicine Progress Note - Hospitalist Service    Date of Admission:  10/15/2023    Assessment & Plan   Matthew Bowen is a 78 year old female with history of chronic hypoxic respiratory failure due to pulmonary HTN, ALAINA (uses CPAP with sleep at baseline), and chronic diastolic congestive heart failure. She also has history of CAD, CKD stage 3, obesity, HTN,  and depression. She presented to the ED on 10/15/23 with fever, cough, and falls. ED evaluation showed low grade temperature elevation in the 99s. She was hypoxic, requiring supplemental oxygen at 2 lpm. Laboratory evaluation showed WBC 21.5, procalcitonin 0.16, creatinine 1.21, and bicarb 30. CXR suggested pulmonary vascular congestion. CT of chest showed lingular and bilateral upper lung pneumonia. No traumatic injury was found on imaging. She was admitted to the hospital and treated for multifocal pneumonia with Rocephin and Zithromax. She developed worsened respiratory status on 10/21 requiring transfer to ICU and intubation. Hospital course was prolonged respiratory failure, ARDS, and difficulty weaning from vent. Tracheostomy was deferred partially due to family preference and also due to the fact that increased procedural risk given high ongoing oxygen needs. CT chest obtained 11/18 suggested severe ground-glass opacities with traction bronchiectasis. It was unclear how much of her disease is reversible. Pulmonology started high-dose steroids on 11/18, with modest interval improvement in O2 weaning. Family conference was held on 11/24 and decision was made to pursue tracheostomy/G tube for continued vent weaning and feeds.     Multifocal community acquired pneumonia, Recent pneumonias prior to admission, Possible bronchiectasis, Sepsis (leukocytosis and fever), Acute respiratory failure, Adult respiratory distress syndrome prolonged ventilation, trach placement:  Summary:  Was initially admitted to medicine and  treated with Rocephin and Zithromax. Deteriorated and transferred to the ICU and intubated on 10/21/23.   Antibiotic coverage changed to cefepime on 10/25 through 10/31.  Received Zosyn from 11/15 through 11/19.  Meropenem and vancomycin were started 11/19.   ID following, continue meropenem through 11/29/23.  ID has signed off. Continue vent support, stable on current settings.  Intensivist also following.  Plan currently is for high dose steroids + trach with gradual attempts a vent wean.   PJP prophylaxis.  See pulm note for recommended taper once ready to start taper process.  Consider starting later this week post trach.  Did get a tracheostomy 11/29 per ENT and PEG per IR.  Patient's tracheostomy was complicated by ongoing bleeding.  ENT took the patient back to the OR and cauterized the area.  Patient does not appear to be actively bleeding at this point.  Patient's hemoglobin has been stable 8.1-9.4.     Chronic hypoxic respiratory failure, Obstructive sleep apnea, Chronic diastolic heart failure, not felt to have current exac, Pulmonary hypertension:  -Baseline on 2 lpm of oxygen at home.  Did get a trach.  Has puffy edema of the extremities.  Patient currently is on torsemide.  Will increase the dose while following her creatinine which is currently normal.  On vent.  Will need an LTACH at discharge.  Case management consulted for discharge planning.  Patient's creatinine is stable on her increased torsemide dose.     Anemia  -Has been persistent during stay. HGB was 11.6 on presentation and trended down- generally in 7-8's.   -Suspect related to critical illness, phlebotomy, etc.  -On Protonix for ulcer prevention  -Had transfusion of 1 unit RBCs on 11/17  -Had transfusion of 1 unit of RBCs on 11/25/23 for HGB 6.9 with rise to 8 range. Hemoglobin 8.4 11/29.  And is stable at 8.1 today.     Mild Intermittent Hyperkalemia:  -Potassium was 5.5 recently. Lokelma given and 5.4 11/26.  No obvious cause such as  CARMEN, medicine, IVF, acidosis, etc as she has a normal creatinine.. She does have chronic kidney disease per chart though I do not believe this as her creatinine is 0.62 with a gfr >90.  This is likely due to her home diuretics,  and is normally on torsemide 60 mg daily. Consider Lokelma if potassium continues to creep up vs changing tube feeds.  Potassium is 4.5 today.    Hypernatremia, mild  -Resolved, sodium 138 as of 11/29  -Monitor sodium with resumption of torsemide  ,  sodium is 138 today     Falls prior to admission, Subacute rib fracture, Pain, Anxiety:  -Imaging negative for acute fractures and bleeding but did show subacute rib fractures.  Had been on fentanyl drip much of stay.  Dose recently increased to 150 mcg/hr.  More relaxed on that dose.  Pain team consulted.  Working on plan with them to begin transitioning off fentanyl drip to oral/feeding tube based pain control.     CKD stage 3, Acute kidney injury earlier in stay  -Cr 1.21 on admission and peaked in 2.1 range but has now normalized in the setting of pneumonia, hypoxia, etc.  Had been holding diuretics as discussed above. Resumed torsemide at 20 mg daily 11/26, will increase to bid 11/29.  (PTA was on torsemide 60 mg each am)  -Monitor renal function closely, creatinine and GFR are now normal, I suspect her previous elevated creatinine had etiologies other than chronic kidney disease.    Creatinine is normal 0.69 on torsemide     HTN  -Continue PTA amlodipine, torsemide, hydralazine not sure why she is not on a  beta blocker with her coronary artery disease.  Home imdur on hold  Blood pressure has been high and low, 90s-150s/50-90s, goal is to prevent hypotension. BP is currently tolerable.      Obesity:  -  Increases complexity of care, has BMI of 36 as of 11.29     CAD:  -Continue on home ASA, statin.  Historically has been on plavix but it has been on hold now since early in her stay.  Last PCI in 2019 and even though she has residual RCA dz  that in itself is not an indication for DAPT at this point especially with anemia. Not sure why she is not on a beta blocker    Hypothyroidism  -Continue PTA levothyroxine    Hyperlipidemia  -is on lipitor, not sure that there is any real benefit of this at this point.      Mood disorder  -Continue PTA Lyrica and Zoloft     Access issues:  -  s/p PICC line.  Some generalized edema including PICC line arm.  Monitor.    Nutrition  Hyperglycemia:  -  sliding scale insulin PRN.  Continue Tube Feeds, off at midnight tonight for procedure in AM.  Dextrose PRN if drops.  IR consulted for PEG placement 11/29/23 in coordination with trach.  Now can use her PEG, will discharge post pyloric feeding tube   Recent Labs   Lab 11/30/23  0803 11/30/23  0422 11/30/23  0420 11/30/23  0006 11/29/23  1958   * 144* 139* 123* 116*         Labs/Imaging Reviewed:  See Information above and Data section below    Discussed with night nurse, Dr Payton, care team during bedside rounds       Diet: NPO for Medical/Clinical Reasons Except for: Other; Specify: NPO For oral intake and gastric feedings for 6 hours post insertion Gastrostomy G/GJ tube. May feed through Jejunal or Distal PORT immediately for GJ tubes ONLY.  Adult Formula Drip Feeding: Continuous Novasource Renal; Gastrostomy; Goal Rate: 30 ml/hr x 22 hrs (hold 1 hr before and after levothyroxine); mL/hr; When GT ok to use per MD restart EN at half rate of 15 mL/hr x 8 hours. If tolerated ok to increa...    DVT Prophylaxis: Heparin SQ  Aleman Catheter: PRESENT, indication: Strict 1-2 Hour I&O, Strict 1-2 Hour I&O  Lines: PRESENT      PICC 10/22/23 Triple Lumen Right Basilic multiple med gtt. ready for use-Site Assessment: WDL      Cardiac Monitoring: ACTIVE order. Indication: Tachyarrhythmias, acute (48 hours)  Code Status: Full Code      Clinically Significant Risk Factors        # Coagulation Defect: INR = 1.28 (Ref range: 0.85 - 1.15) and/or PTT = N/A, will monitor for  bleeding            # Moderate Malnutrition: based on nutrition assessment           Disposition Plan   Needs ongoing ICU care, eventually will need LTACH for vent weaning       Rosendo Rich MD  Hospitalist Service  [facility]  Securely message with "[employer]allegra (more info)  Text page via [employer] Paging/Directory   ______________________________________________________________________    Interval History   Since I saw the patient last she had her PEG tube as well as her tracheostomy placed.  She had ongoing bleeding yesterday afternoon from her PEG tube site requiring ENT to take her back to the OR and did cauterize the area.  Her hemoglobins been stable.  Blood pressure also is currently stable.         Physical Exam   Vital Signs: Temp: 98.8  F (37.1  C) Temp src: Temporal BP: (!) 146/72 Pulse: 95   Resp: (!) 39 SpO2: 91 % O2 Device: Mechanical Ventilator    Weight: 188 lbs 7.89 oz    GEN:  Awake, somewhat responds to questions similar to prior but not very interactive, appears comfortable, no overt distress.  Appears elderly and frail, lying in bed on the vent   HEENT: Tracheostomy in place, mucous membranes moist  CV: Regular rate and rhythm, no murmurs   LUNGS:  Coarse bilaterally unchanged, no wheezes/retractions.  Symmetric chest rise on inhalation noted.  ABD:  Active bowel sounds, soft, non-tender/non-distended.  No rebound/guarding/rigidity.  PEG tube in place  EXT: Puffy edema of all of her extremities seems a little bit better today.  No cyanosis.   SKIN:  Dry to touch, no exanthems noted in the visualized areas.    Medications    dexmedeTOMIDine 0.4 mcg/kg/hr (11/30/23 0900)    dextrose Stopped (11/30/23 0420)    fentaNYL 125 mcg/hr (11/30/23 0900)    sodium chloride 0.9%        amLODIPine  5 mg Oral or Feeding Tube Daily    aspirin  81 mg Oral or Feeding Tube Daily    atorvastatin  20 mg Oral or Feeding Tube QPM    atovaquone  1,500 mg Oral or Feeding Tube Daily    B and C vitamin  Complex with folic acid  5 mL Oral or Feeding Tube Daily    chlorhexidine  15 mL Mouth/Throat Q12H    clotrimazole   Topical BID    fiber modular (NUTRISOURCE FIBER)  1 packet Per Feeding Tube TID    [Held by provider] heparin ANTICOAGULANT  5,000 Units Subcutaneous Q8H    hydrALAZINE  25 mg Oral or Feeding Tube TID    insulin aspart  1-6 Units Subcutaneous Q4H    ipratropium - albuterol 0.5 mg/2.5 mg/3 mL  3 mL Nebulization 4x daily    levothyroxine  150 mcg Oral or Feeding Tube QAM AC    lidocaine   Topical TID    methylPREDNISolone  62.5 mg Intravenous Q8H    pantoprazole  40 mg Per Feeding Tube QAM AC    pregabalin  75 mg Per Feeding Tube Daily    protein modular  1 packet Per Feeding Tube BID    QUEtiapine  150 mg Oral or Feeding Tube BID    sertraline  150 mg Oral or Feeding Tube Daily    sodium chloride (PF)  10-40 mL Intracatheter Q7 Days    sodium chloride (PF)  3 mL Intracatheter Q8H    torsemide  20 mg Oral or Feeding Tube BID    vitamin D3  50 mcg Oral or Feeding Tube Daily       Data     Labs and Imaging results below reviewed today.    I have personally reviewed the following data over the past 24 hrs:    18.0 (H)  \   8.1 (L)   / 231     138 101 70.1 (H) /  129 (H)   4.5 28 0.69 \       Recent Labs   Lab 11/30/23  0420 11/29/23  1642 11/29/23  0508 11/28/23  0540   WBC 18.0*  --  16.2* 23.1*   HGB 8.1* 9.4* 8.4* 9.8*   HCT 27.3*  --  28.3* 33.2*   MCV 88  --  88 88     --  228 309     7-Day Micro Results       Collected Updated Procedure Result Status      11/23/2023 1837 11/23/2023 2107 MRSA MSSA PCR, Nasal Swab [[id number]]    Swab from Nose    Final result Component Value   MRSA Target DNA Negative   SA Target DNA Negative                  Recent Labs   Lab 11/30/23  0803 11/30/23  0422 11/30/23  0420 11/29/23  0921 11/29/23  0508 11/28/23 2012 11/28/23  1959 11/28/23  0544 11/28/23  0540   NA  --   --  138  --  138  --   --   --  139   POTASSIUM  --   --  4.5  --  5.0  --  5.1  --  5.7*    CHLORIDE  --   --  101  --  100  --   --   --  99   CO2  --   --  28  --  31*  --   --   --  30*   ANIONGAP  --   --  9  --  7  --   --   --  10   * 144* 139*   < > 162*   < >  --    < > 178*   BUN  --   --  70.1*  --  69.4*  --   --   --  67.5*   CR  --   --  0.69  --  0.62  --   --   --  0.65   GFRESTIMATED  --   --  88  --  >90  --   --   --  90   BARBARA  --   --  8.5*  --  8.4*  --   --   --  8.9   MAG  --   --  2.1  --  2.1  --   --   --  2.0   PHOS  --   --  4.6*  --  4.1  --   --   --  4.5    < > = values in this interval not displayed.       Recent Results (from the past 24 hour(s))   IR Gastrostomy Tube Percutaneous Plcmnt    Narrative    IR GASTROSTOMY TUBE PERCUTANEOUS PLCMNT  11/29/2023 12:15 PM     HISTORY: Patient has a G-tube for long-term tube feeds. The tube is  malfunctioning.    COMPARISON: None    DESCRIPTION OF PROCEDURE: The patient was placed in a supine position  on the fluoroscopy table. The existing tube and skin entry site were  prepped and draped in the usual sterile manner. The balloon of the  existing tube was deflated and it was removed. A new 18 [place] JOCELYN  G-tube was placed into the stomach. The balloon was inflated with 4 cc  normal saline. Contrast was injected into the stomach and showed it to  be in satisfactory position. A fluoroscopic image was saved.    The patient tolerated the procedure well. There were no immediate  postprocedure complications. The patient's vital signs were monitored  by radiology nursing staff under my supervision and remained stable  throughout the study.    FLUOROSCOPY TIME: 2.4 minutes    RADIATION DOSE: 27 mGy Air Kerma    CONTRAST: 30 cc Gastrografin      Impression    IMPRESSION: G-tube replaced as above.    NINI CARNES MD         SYSTEM ID:  [id number]

## 2023-11-30 NOTE — PROGRESS NOTES
XC    Pt seen at bedside for bleeding around trach site which was placed around noon today. RN reports this has been bleeding and saturating dressing since shortly after she returned from the OR. Will check a Hb, ENT has already been paged as has Dr Payton to assist with the mgmt of the actual trach site.    Ferny Harris,

## 2023-11-30 NOTE — CONSULTS
Care Management Follow Up    Length of Stay (days): 46    Expected Discharge Date: 11/22/2023     Concerns to be Addressed: all concerns addressed in this encounter     Patient plan of care discussed at interdisciplinary rounds: Yes    Anticipated Discharge Disposition: LTACH     Anticipated Discharge Services:    Anticipated Discharge DME:      Education Provided on the Discharge Plan: yes   Patient/Family in Agreement with the Plan:   Referrals Placed by CM/SW: Post Acute Facilities  Additional Information:  Consult placed for discharge planning. CM has been following along with patients plan of care and progress since admission and subsequent tx to ICU. Admitted to ICU and intubated on 10/21 after deterioration of respiratory status.  Trach and PEG placed on 11/29. Trajectory of next steps in care will be potential transfer to LTACH when patient is able to meet criteria for them to accept.  Will discuss with family. Contacted [facility] to follow and monitor for clinical readiness. One criteria will be for patient to be off any drips.  Will continue to follow to assist with discharge planning.    Addendum:  Discussed with LTACH transition with spouse. Offered [facility] or [facility] and he would prefer [facility]. He understands that criteria will need to be met before transfer. Offered in the next days to have a liaison reach out to him to discuss also suggested a tour.        Maggi Doran RN BSN OCN  Care Coordinator  [facility]  [phone]

## 2023-12-01 NOTE — PROGRESS NOTES
Care Management Follow Up    Length of Stay (days): 47    Expected Discharge Date: 11/22/2023     Concerns to be Addressed: all concerns addressed in this encounter     Patient plan of care discussed at interdisciplinary rounds: Yes    Anticipated Discharge Disposition: LTACH     Patient/family educated on Medicare website which has current facility and service quality ratings:    Education Provided on the Discharge Plan:    Patient/Family in Agreement with the Plan: yes    Referrals Placed by CM/SW: Post Acute Facilities    Additional Information:  [facility] reports pt is clinically appropriate for LTACH and will follow along.  They are questioning if ENT will be doing the first trach change or if it will be done at [facility].  Message left for , waiting call back.  Will continue to follow for discharge planning.    Jacque Law RN   Inpatient Care Coordination  [facility]   Phone: [phone]

## 2023-12-01 NOTE — PLAN OF CARE
ICU End of Shift Summary.  For vital signs and complete assessments, please see documentation flowsheets.      Pertinent assessments: Afebrile. More alert tonight, denies pain but does show signs of being uncomfortable, repositioning seems to alleviate. Minimal vent support overnight, plan for another PS support trial again today. Minimal secretions. No bleeding noted at trach site. Good urine output overnight, large amounts at beginning of shift, with large amount of urine bypassing mitchell, no further bypassing overnight. Minimal stool output via rectal tube, watch closely and remove if doesn't  today. Turned and repositioned throughout night.   Major Shift Events:    - Fent gtt decreased to 50mcg from 100 mcg- utilizing prn oxy to taper down gtt   - Dex gtt decreased to 0.3 from 0.4    Plan (Upcoming Events): Up in chair during day. PS trial. Discharge plan ongoing- most likely to LTACH.   Discharge/Transfer Needs: Needs intense PT/OT     Bedside Shift Report Completed   Bedside Safety Check Completed    Goal Outcome Evaluation:      Plan of Care Reviewed With: patient    Overall Patient Progress: improvingOverall Patient Progress: improving

## 2023-12-01 NOTE — PROGRESS NOTES
[facility]    On 11/30 I received referral from Angi Doran, Care Manager.  I reviewed the chart for potential admission to [facility] pending clinical readiness and bed availability.  Once the patient is off of sedation infusions, has trach/ PEG, is able to demonstrate successful weaning trials & continues to tolerate weaning trials, we anticipate her to be clinically appropriate for [facility]. She will require prior authorization for LTACH admission from her insurance (I will do this).     Will continue to follow for appropriateness and bed availability.    Nuzhat Alarcon, PT, DPT  LTACH Referral Specialist    [facility]    [address] [address]  Admissions Office: [phone]  Fax: [phone]     CONFIDENTIAL Protected under [place] Statute  145.61 et seq

## 2023-12-01 NOTE — PROGRESS NOTES
[facility] ACUTE PAIN SERVICE    ([facility], [facility], [facility], [facility], [facility])  Pain follow-up follow-up    Assessment/Plan:  Matthew Bowen is a 78 year old female who was admitted on 10/15/2023.  I was asked to see the patient for ongoing pain and assistance required for non-IV regimen.  Pneumonia with sepsis and respiratory failure.  History of ALAINA, obesity, rib fracture, CKD.    shows ongoing use of Lyrica.  Follows with [facility] pain clinic, for chronic back pain.  Has been hospitalized since 10/15, now with Trach and Peg. Has been on fentanyl (high dose) since 11/20 (receiving IV dilaudid since 10/22. Today Fentanyl gtt is down to 50mcg/hour. Taking PRN oxycodone and unable to up titrate lyrica any further due to altered renal function.      PLAN:  Acute pain of unknown cause, perhaps related to critical illness polymyopathy?  Chronic low back pain.  Seems to be doing well with trach and PEG and fentanyl has been reduced to 50mcg/hour.  Multimodal Medication Therapy:   Adjuvants: Suggest scheduling Tylenol, unable to increase Lyrica further- monitoring Cystatin C   Opioids: Would suggest fentanyl drip to 25mcgs and stop tomorrow 12/2  Oxycodone 5-10mg Q4h PRN may need to stick with the 5 mg dose  IVP dilaudid to 0.2mg Q3h PRN  Non-medication interventions- Ice   Constipation Prophylaxis-  liquid diarrhea   Follow up /Discharge Recommendations - We recommend prescribing the following at the time of discharge:  TBD            Subjective:    Patient visited at the bedside in the ICU.  When asked about pain she is unable to respond.  She cannot nod her head yes or shake her head no.  She is extremely weak.  She is sitting up in the chair though and does not display any other nonverbal signs of pain.  No grimacing.  No tachycardia.  Currently fentanyl drip is at 50 mcg an hour.  Nurse at the bedside has not noticed any increase in agitation or pain.  Discussed tapering down to 25  today.  Apparently the 10 mg dose of oxycodone was far too sedating.  Although would note that Precedex is still in place      Oxygen dependent   Patient Active Problem List   Diagnosis    Pneumonia    Non-STEMI (non-ST elevated myocardial infarction) (H)    Dyspnea on exertion    Acute kidney injury superimposed on chronic kidney disease     Oxygen dependent    Generalized weakness    Contusion of right thigh, initial encounter    Closed head injury, initial encounter    Pneumonia due to infectious organism, unspecified laterality, unspecified part of lung        History   Drug Use Not on file         Tobacco Use      Smoking status: Never      Smokeless tobacco: Never         amLODIPine  5 mg Oral or Feeding Tube Daily    aspirin  81 mg Oral or Feeding Tube Daily    atorvastatin  20 mg Oral or Feeding Tube QPM    atovaquone  1,500 mg Oral or Feeding Tube Daily    B and C vitamin Complex with folic acid  5 mL Oral or Feeding Tube Daily    chlorhexidine  15 mL Mouth/Throat Q12H    clotrimazole   Topical BID    fiber modular (NUTRISOURCE FIBER)  1 packet Per Feeding Tube TID    heparin ANTICOAGULANT  5,000 Units Subcutaneous Q8H    hydrALAZINE  25 mg Oral or Feeding Tube TID    insulin aspart  1-6 Units Subcutaneous Q4H    ipratropium - albuterol 0.5 mg/2.5 mg/3 mL  3 mL Nebulization 4x daily    levothyroxine  150 mcg Oral or Feeding Tube QAM AC    lidocaine   Topical TID    methylPREDNISolone  62.5 mg Intravenous Q8H    pantoprazole  40 mg Per Feeding Tube QAM AC    pregabalin  75 mg Per Feeding Tube Daily    protein modular  1 packet Per Feeding Tube BID    QUEtiapine  150 mg Oral or Feeding Tube BID    sertraline  150 mg Oral or Feeding Tube Daily    sodium chloride (PF)  10-40 mL Intracatheter Q7 Days    sodium chloride (PF)  3 mL Intracatheter Q8H    torsemide  20 mg Oral or Feeding Tube BID    vitamin D3  50 mcg Oral or Feeding Tube Daily       Objective:  Vital signs in last 24 hours:  B/P: 159/95, T: 96.9, P:  67, R: 29   Blood pressure (!) 145/72, pulse 86, temperature 98.1  F (36.7  C), temperature source Axillary, resp. rate 24, height 1.524 m (5'), weight 85.5 kg (188 lb 7.9 oz), SpO2 93%.      Weight:   Wt Readings from Last 2 Encounters:   11/30/23 85.5 kg (188 lb 7.9 oz)   02/28/22 94.3 kg (208 lb)           Intake/Output:    Intake/Output Summary (Last 24 hours) at 11/29/2023 1125  Last data filed at 11/29/2023 1100  Gross per 24 hour   Intake 1576.04 ml   Output 1925 ml   Net -348.96 ml        Review of Systems:   As per subjective, all others negative.    Physical Exam:     General Appearance:  Very sleepy but wakes to command   Head:  Normocephalic, without obvious abnormality, atraumatic   Eyes:  PERRL, conjunctiva/corneas clear, EOM's intact   ENT/Throat: Trach now in place with some blood on the dressing   Lymph/Neck: Trach is in place   Lungs:    respirations unlabored   Chest Wall:  No tenderness or deformity   Cardiovascular/Heart:  Vented    Abdomen:   Soft, but G-tube in place, large hematoma on right upper and lower quadrant  Rectal tube    Musculoskeletal: Upper extremity edema noted right greater than left and lower extremity edema is trace   Skin: Skin is intact-edematous though, and also with large hematoma on her abdomen   Neurologic: Sleepy at times barely follows commands cannot give me a thumbs up can barely shake head yes or no          Imaging:  Personally Reviewed.    Results for orders placed or performed during the hospital encounter of 10/15/23   CT Head w/o Contrast    Impression    IMPRESSION:  1.  No acute intracranial injury.       Cervical spine CT w/o contrast    Impression    IMPRESSION:  1.  No acute cervical spine fracture.   2.  Reversal of the usual cervical lordosis is nonspecific and can be seen with muscle spasm/soft tissue injury, but is unchanged versus 01/25/2023 and may be positional/chronic.       XR Femur Right 2 Views    Impression    IMPRESSION: Advanced degenerative  arthritis of both hips with hypertrophic changes. Multiple osteochondral loose bodies superior of the right femoral neck.    Mild degenerative arthritis of both SI joints. Lower lumbar facet arthropathy and degenerative interspace narrowing. Incidental note of ossification of the right iliolumbar ligament.    Right total knee arthroplasty. No knee joint effusion.   XR Pelvis 1/2 Views    Impression    IMPRESSION: Advanced degenerative arthritis of both hips with hypertrophic changes. Multiple osteochondral loose bodies superior of the right femoral neck.    Mild degenerative arthritis of both SI joints. Lower lumbar facet arthropathy and degenerative interspace narrowing. Incidental note of ossification of the right iliolumbar ligament.    Right total knee arthroplasty. No knee joint effusion.   XR Chest 1 View    Impression    IMPRESSION: Cardiomegaly with central vascular congestion. Stable prominent pericardial fat pad along the left cardiac margin. Mild bibasilar atelectasis/scarring. No focal pneumonic consolidation or pleural effusion.   CT Chest/Abdomen/Pelvis w Contrast    Impression    IMPRESSION:  1.  Lingular consolidative opacity with air bronchograms suspicious for infection/pneumonia.  2.  Additional bilateral upper lobe groundglass opacities are likely infectious/inflammatory.  3.  Subacute left posterior 11th and 12th rib fractures.  4.  No convincing acute process within the abdomen or pelvis.   XR Video Swallow with SLP or OT    Impression    IMPRESSION:   1. Mild flash penetration was noted when the patient swallowed thin  barium.  2. No evidence for aspiration.    Please see further details in report by speech pathology.     STONEY ALMONTE MD         SYSTEM ID:  [id number]   XR Foot Left 2 Views    Impression    IMPRESSION: Degenerative changes throughout the midfoot which are  moderate at the navicular cuneiform and second TMT joints. No evidence  of acute fracture. Bulky distal Achilles tendon  enthesopathy.  Calcaneal heel spur.     OSIRIS DOE MD         SYSTEM ID:  [id number]   XR Chest Port 1 View    Impression    IMPRESSION: Worsening right upper and left basilar opacities  consistent with worsening infection.  Probable small pleural effusion.  No pneumothorax. Similar enlarged cardiomediastinal silhouette. Aortic  calcification.    TRAE BANGURA MD         SYSTEM ID:  [id number]   XR Chest Port 1 View    Impression    IMPRESSION: Cardiac enlargement. Enlargement of the central pulmonary arteries. Focal consolidation right upper lobe and consolidation throughout the left lower lobe. Possible small volume of left basilar pleural fluid. No pneumothorax. No significant   change.     CT Chest Pulmonary Embolism w Contrast    Impression    IMPRESSION:  1.  Extensive new/increased bilateral groundglass opacities and consolidation, favor multifocal pneumonia and/or ARDS.  2.  Findings suggestive of pulmonary hypertension.  3.  No pulmonary embolus.   XR Chest Port 1 View    Impression    IMPRESSION:   1. Interval placement of a right upper extremity peripherally inserted central catheter with distal tip obscured, but most likely in the mid superior vena cava.  2. No other significant change since the recent comparison study.  3. Moderately extensive airspace opacities within both lungs, most prominent in the upper right lung and lower left lung, again noted.    XR Abdomen Port 1 View    Impression    IMPRESSION: NG tube tip and sidehole in the stomach. Moderate degenerative change in the spine and both hips.   XR Chest Port 1 View    Impression    IMPRESSION:    Endotracheal tube tip is 2.2 cm above the ashley. Unchanged right PICC with tip in the mid SVC.    Multifocal patchy airspace opacities most prominent within the right upper lung and left lower lung have not significant changed. No new airspace opacities. No pleural effusions or pneumothorax.    Stable cardiomediastinal silhouette.  Aortic atherosclerotic calcifications.   XR Chest Port 1 View    Impression    IMPRESSION: Endotracheal tube tip 5.4 cm above ashley. Enteric tube tip below diaphragm. Right PICC tip in the low SVC. Increased multifocal bilateral infiltrates, right greater than left. There may be small left pleural effusion. Enlarged cardiac   silhouette. Obscured pulmonary vascularity. Aortic calcifications.   US Abdomen Limited    Impression    IMPRESSION:  Some gallbladder sludge is present, no shadowing stones or  cholecystitis demonstrated.    REG IZAGUIRRE MD         SYSTEM ID:  [id number]   XR Chest Port 1 View    Impression    IMPRESSION: Endotracheal tube tip approximately 3 cm from the ashley.  Extensive bilateral infiltrates appear slightly worse, although this  may be related to different level of inspiration. No significant  change in remaining tubes and lines.    REG IZAGUIRRE MD         SYSTEM ID:  [id number]   XR Chest Port 1 View    Impression    IMPRESSION: Endotracheal tube terminates 4.8 cm above the sahley. Enteric decompression tube descends below the diaphragm, tip outside the field-of-view. Right upper extremity PICC tip in the mid SVC.    Bilateral hazy airspace opacities, slightly improved compared to 10/23/2023. No definite pleural effusion or pneumothorax.    Stable cardiomediastinal silhouette.   XR Chest Port 1 View    Impression    IMPRESSION: Endotracheal tube terminates approximately 4 cm from the  ashley. Enteric tube courses below the diaphragm. Right PICC  terminates at the mid SVC.    Bilateral, right greater than left, airspace opacities appear slightly  worsened compared to prior. Probable small left layering pleural  effusion. No pneumothorax. Similar cardiomediastinal silhouette and  aortic calcifications.    TRAE BANGURA MD         SYSTEM ID:  [id number]   XR Chest Port 1 View    Impression    IMPRESSION: ET tube tip is 4.4 cm proximal to the ashley. Nasogastric  tube in place. Right  PICC line tip at the mid SVC, stable in position.  Diffuse bilateral pulmonary opacities appear mildly increased on the  left and stable on the right. Stable cardiac silhouette that is  borderline prominent.    YULY DICK MD         SYSTEM ID:  [id number]   US Upper Extremity Venous Duplex Right    Impression    IMPRESSION: Negative for deep vein thrombosis in the right upper  extremity.    ARMANI JULIAN MD         SYSTEM ID:  [id number]   XR Chest Port 1 View    Impression    IMPRESSION: Enlarged cardiopericardial silhouette. Pulmonary vascular  congestion and interstitial pulmonary edema appear similar to mildly  increased compared to prior. Dense left basilar consolidation which  may represent atelectasis or aspiration/pneumonia appears similar.  Probable trace bilateral pleural effusions. No pneumothorax.    Endotracheal tube with distal tip projecting at the mid thoracic  trachea approximately 4.5 cm proximal to the ashley. Right upper  extremity PICC with distal tip projecting at the superior vena cava.  Enteric tube courses below the diaphragm with distal tip beyond the  inferior field of view.    TAMRA ESCAMILLA MD         SYSTEM ID:  [id number]   XR Abdomen Port 1 View    Impression    IMPRESSION: Nasogastric tube tip within the distal gastric lumen.  Enteric feeding tube tip at the duodenal bulb region. Nonobstructive  bowel.    YULY DICK MD         SYSTEM ID:  [id number]   XR Chest Port 1 View    Impression    IMPRESSION: Worsening patchy airspace opacities in the right upper  lobe concerning for pneumonia. This is superimposed on diffuse mixed  interstitial and airspace opacity throughout the lungs, either due to  atypical pneumonia, ARDS or pulmonary edema. Bibasilar atelectasis,  left greater than right. No pneumothorax. No significant pleural  effusion. Mild cardiomegaly.     Right arm PICC tip is at the SVC/right atrial junction. Endotracheal  tube tip is 5.8 cm above the ashley. NG tube coursing  below the left  hemidiaphragm.    LYUBOV KUMAR MD         SYSTEM ID:  [id number]   XR Chest Port 1 View    Impression    IMPRESSION:     Lines and tubes: Endotracheal tube tip 4.7 cm above the ashley. Right  PICC tip at the mid SVC and enteric tube courses below the diaphragm.    Mild worsened bibasilar opacities, particularly in the left upper lobe  and right lower lobe, which could reflect infection, edema or ARDS.  Probable trace pleural effusions. No pneumothorax. Similar  cardiomediastinal silhouette.      TRAE BANGURA MD         SYSTEM ID:  [id number]   XR Chest Port 1 View    Impression    IMPRESSION: Endotracheal tube, esophagogastric tube, and right upper extremity PICC again identified and relatively unchanged. Esophagogastric tube courses below the level of the diaphragm, though outside field of view. Patchy bilateral pulmonary   opacities with consolidative component of the left lower lobe again identified. Minimal improvement in the right lung. No pneumothorax.   XR Abdomen Port 1 View    Impression    IMPRESSION: Feeding tube terminates in the midline of the abdomen in a position consistent with a termination in the distal stomach. Nonobstructive bowel gas pattern.   CT Chest w/o Contrast   Result Value Ref Range    Radiologist flags Pneumomediastinum (Urgent)     Impression    IMPRESSION:   1.  Increase in extensive bilateral pulmonary opacities, which could represent multifocal pneumonia and/or diffuse alveolar damage.  2.  Development of subcutaneous emphysema within the lower neck and trace pneumomediastinum.  3.  Stable mild mediastinal lymphadenopathy, which may be reactive.    [Urgent Result: Pneumomediastinum]    Finding was identified on 11/18/2023 2:39 PM CST.     Dr. Madera was contacted by me on 11/18/2023 2:53 PM CST and verbalized understanding of the critical result.     XR Chest Port 1 View    Impression    IMPRESSION: Endotracheal tube tip 3.4 cm from the ashley.  No  significant change in bilateral infiltrates. Remaining tubes and lines  not significantly changed.     REG IZAGUIRRE MD         SYSTEM ID:  [id number]   XR Chest Port 1 View    Impression    IMPRESSION: Stable moderate cardiomegaly. Right upper lobe consolidation and diffuse ill-defined hazy interstitial and patchy opacities in the remaining lung are unchanged. No significant pleural effusion or pneumothorax.    Endotracheal tube terminates 2.5 cm above the ashley. Right PICC terminates in the SVC. Feeding tube extends below the diaphragm beyond the field-of-view.   CT Chest w/o Contrast    Impression    IMPRESSION:  1.  Extensive bilateral pulmonary infiltrates again seen. Increasing  airspace consolidation in the right upper lobe and both lower lobes  when compared to previous.    INES COYNE MD         SYSTEM ID:  [id number]   XR Chest Port 1 View    Impression    IMPRESSION: Single AP view of the chest was obtained. Endotracheal tube tip projects over mid thoracic trachea approximately 4 cm from the ashley. Enteric tube crosses the diaphragm with the distal tip outside the field-of-view. Right upper extremity   PICC tip projects over high/mid SVC. Cardiomegaly. Left basilar and right mid/upper lobe patchy pulmonary opacity, could represent atelectasis versus infection. No significant pleural effusion or pneumothorax.     Echocardiogram Complete   Result Value Ref Range    LVEF  60-65%         Lab Results:  Personally Reviewed.   Last Comprehensive Metabolic Panel:  Sodium   Date Value Ref Range Status   12/01/2023 136 135 - 145 mmol/L Final     Comment:     Reference intervals for this test were updated on 09/26/2023 to more accurately reflect our healthy population. There may be differences in the flagging of prior results with similar values performed with this method. Interpretation of those prior results can be made in the context of the updated reference intervals.    11/22/2019 141 133 - 144 mmol/L  "Final     Potassium   Date Value Ref Range Status   12/01/2023 3.6 3.4 - 5.3 mmol/L Final   11/22/2019 4.6 3.4 - 5.3 mmol/L Final     Chloride   Date Value Ref Range Status   12/01/2023 99 98 - 107 mmol/L Final   11/22/2019 108 94 - 109 mmol/L Final     Carbon Dioxide   Date Value Ref Range Status   11/22/2019 27 20 - 32 mmol/L Final     Carbon Dioxide (CO2)   Date Value Ref Range Status   12/01/2023 28 22 - 29 mmol/L Final     Anion Gap   Date Value Ref Range Status   12/01/2023 9 7 - 15 mmol/L Final   11/22/2019 6 3 - 14 mmol/L Final     Glucose   Date Value Ref Range Status   12/01/2023 155 (H) 70 - 99 mg/dL Final   11/22/2019 112 (H) 70 - 99 mg/dL Final     GLUCOSE BY METER POCT   Date Value Ref Range Status   12/01/2023 146 (H) 70 - 99 mg/dL Final     Urea Nitrogen   Date Value Ref Range Status   12/01/2023 75.8 (H) 8.0 - 23.0 mg/dL Final   11/22/2019 34 (H) 7 - 30 mg/dL Final     Creatinine   Date Value Ref Range Status   12/01/2023 0.70 0.51 - 0.95 mg/dL Final   11/22/2019 1.30 (H) 0.52 - 1.04 mg/dL Final     GFR Estimate   Date Value Ref Range Status   12/01/2023 88 >60 mL/min/1.73m2 Final   11/22/2019 40 (L) >60 mL/min/[1.73_m2] Final     Comment:     Non  GFR Calc  Starting 12/18/2018, serum creatinine based estimated GFR (eGFR) will be   calculated using the Chronic Kidney Disease Epidemiology Collaboration   (CKD-EPI) equation.       Calcium   Date Value Ref Range Status   12/01/2023 8.3 (L) 8.8 - 10.2 mg/dL Final   11/22/2019 8.9 8.5 - 10.1 mg/dL Final        UA: No results found for: \"UAMP\", \"UBARB\", \"BENZODIAZEUR\", \"UCANN\", \"UCOC\", \"OPIT\", \"UPCP\"           Please see A&P for additional details of medical decision making.  MANAGEMENT DISCUSSED with the following over the past 24 hours: Discussed with patient, daughter, ICU MD, Pharmacist   NOTE(S)/MEDICAL RECORDS REVIEWED over the past 24 hours: Reviewed notes from hospital MD and nursing  Tests personally interpreted in the past 24 " hours:  - IR G-tube showing G-tube was placed today  Tests ORDERED & REVIEWED in the past 24 hours:  - Reviewed WBC  SUPPLEMENTAL HISTORY, in addition to the patient's history, over the past 24 hours obtained from:   - Spouse or significant other  Medical complexity over the past 24 hours:  -------------------------- HIGH RISK FOR MORBIDITY -------------------------------------------------------------  - Parenteral (IV) CONTROLLED SUBSTANCES ordered        Agnes Mayen APRN, CNS-BC, CNP, ACHPN  Acute Care Pain Management Program   Hours of pain coverage 7a-1700- after 1700 please call the house officer    [employer] ([employer], [place], [place], [place])   Page via [employer]- Click HERE to page Agnes or Jose text web console

## 2023-12-01 NOTE — PLAN OF CARE
Patient afebrile  Rass 0 to -1  Cardiac: Tele SR with ST depression. 's - 160's this shift.  Resp: LS coarse. Was on pressure support trial for about 3 hours today 12/5 stating in mid- 90's.  : Aleman in place, good urine output.   GI:  TF continued at goal, Patient started having loose stools again. Has rectal tube in place.  Skin: +2 edema, see flow sheet.  Lines: PICC, PIV  Drips:  Fentanyl, Dex.    Lowered fentanyl today and had patient up to chair for a few hours today. Working on transitioning pain medications to oral over the next few days. Social work following to help with transitioning to LTAC.

## 2023-12-01 NOTE — PROGRESS NOTES
[facility] ICU VENTILATOR RESPIRATORY NOTE  Date of Admission: 10/15/23  Date of Intubation (most recent): 10/22/23  Reason for Mechanical Ventilation:Respiratory failure  Number of Days on Mechanical Ventilation: 7  Met Criteria for Pressure Support Trial: Yes  Reason for No Pressure Support Trial: Patient care  Vent Mode: CMV/AC  (Continuous Mandatory Ventilation/ Assist Control)  FiO2 (%): 35 %  Resp Rate (Set): 18 breaths/min  Tidal Volume (Set, mL): 360 mL  PEEP (cm H2O): 8 cmH2O  Pressure Support (cm H2O): 12 cmH2O  Resp: 26     Significant Events Today: Less bleeding noted from trach  ABG Results: Venous Blood Gas  Recent Labs   Lab 11/28/23  1040 11/26/23  0454 11/25/23  1002   PHV 7.37 7.37 7.27*   PCO2V 58* 52* 64*   PO2V 36 38 41   HCO3V 33* 30* 29*   LEON 7.0* 4.3* 1.7   O2PER 40 40 40      ETT appearance on chest x-ray:   Narrative & Impression   EXAM: XR CHEST PORTABLE 1 VIEW  LOCATION: [facility]  DATE: 11/26/2023     INDICATION: Respiratory failure.  COMPARISON: Chest x-ray on 11/22/2023.                                                                      IMPRESSION: Single AP view of the chest was obtained. Endotracheal tube tip projects over mid thoracic trachea approximately 4 cm from the ashley. Enteric tube crosses the diaphragm with the distal tip outside the field-of-view. Right upper extremity   PICC tip projects over high/mid SVC. Cardiomegaly. Left basilar and right mid/upper lobe patchy pulmonary opacity, could represent atelectasis versus infection. No significant pleural effusion or pneumothorax.       Plan:  Continue to monitor    RT Omid on 12/1/2023 at 6:01 AM

## 2023-12-01 NOTE — PROGRESS NOTES
ICU staff  DOS 12/1/2023    No major changes overnight. This morning Ms Bowen is more awake and interactive.    Vitals:   Temp:  [98.1  F (36.7  C)-99  F (37.2  C)] 98.1  F (36.7  C)  Pulse:  [] 86  Resp:  [14-33] 24  BP: ()/(39-87) 145/72  FiO2 (%):  [35 %] 35 %  SpO2:  [89 %-100 %] 93 %     Vent Mode: CPAP/PS  (Continuous positive airway pressure with Pressure Support)  FiO2 (%): 35 %  Resp Rate (Set): 18 breaths/min  Tidal Volume (Set, mL): 360 mL  PEEP (cm H2O): 8 cmH2O  Pressure Support (cm H2O): 10 cmH2O  Resp: 24    I/O last 3 completed shifts:  In: 2205.72 [I.V.:235.72; NG/GT:1370]  Out: 4535 [Urine:4385; Stool:150]    Dex 0.3  Fentanyl 50    Exam:  Gen: Alert, no apparent distress though seems uncomfortable  HEENT: NC/AT, trach in situ  Pulm: Fairly clear  Cor: RRR  Abdomen/GI: Soft, nondistended  : Deferred  Extremities: Warm, mildly edematous  Skin: Well-perfused  Neuro: Eyes open, tracks, interacts  Psych: Appears calm    Data:   Labs reviewed and without significant change  Nothing new on imaging or micro    Assessment/plan:  77 y/o woman with chronic hypoxemic respiratory failure on home O2, bronchiectasis, recurrent pulmonary infections, likely organizing pneumonia. Admitted 10/15, intubated 10/22 for acute on chronic mixed respiratory failure, is now ventilator-dependent and underwent tracheostomy 11/29.  CNS: More alert.  # Acute on chronic pain  # Weakness/frequent falls  # Toxic/metabolic encephalopathy  - Supportive cares  - Wean fentanyl infusion, goal of 25 today  - Consider scheduled acetaminophen  Pulm: Tolerating PS at my visit  # acute on chronic mixed respiratory failure  # Ventilator dependence  # Bronchiectasis  # Chronic hypoxemia on home oxygen  # Multifocal organizing pneumonia  - Continue PS trials as tolerated with eventual goal of trach dome trials  - Steroids per pulmonary, taper to start 12/4  CV: No significant changes  # Essential hypertension  # Aortic stenosis,  moderate  # Diastolic heart failure  # CAD  - Continue aspirin  - Continue amlodipine, hydralazine  GI: Abdomen benign.  # Nutrition  - Continue tube feedings  : UOP adequate.  # CARMEN on CKD  - GFR lower than Cr suggests based on cystatin c  - Continues on torsemide  Heme: Hb 8 (8.1)  # Anemia, multifactorial (chronic disease, acute blood loss)  - No indication to transfuse  Msk: Extremities warm, well-perfused.   Endo: Glucose 118-155  # Stress/steroid hyperglycemia  - Insulin prn  ID: Afebrile, leukocytosis unchanged  # Leukocytosis, secondary to steroids  # ?Pneumonia  - Meropenem, clarify duration  - Atovaquone for PJP prophylaxis  ICU: SQH, PPI    This patient is critically ill to my assessment and requires ICU monitoring and cares. A total of 35 minutes critical care time spent on 12/1/2023, exclusive of procedures.     Rui Payton MD, PhD  Surgical critical care  12/1/2023, 9:35 AM    Clinically Significant Risk Factors              # Hypoalbuminemia: Lowest albumin = 2.7 g/dL at 11/18/2023  5:05 AM, will monitor as appropriate             # Moderate Malnutrition: based on nutrition assessment

## 2023-12-01 NOTE — PROGRESS NOTES
WO Nurse Inpatient Assessment     Consulted for: buttock wound, perianal    Summary:Patient initially assessed for IAD of gluteal cleft and buttock. This is healed on 11/28. However, [facility] identified a pressure injury perianally due to the fecal management system that was placed on 11/20/23      Patient History (according to Hospitalist provider note(s) 10/25/23:      Matthew Bowen is a 78 year old female w/PMH chronic hypoxic respiratory failure due to pulmonary HTN, ALAINA requiring CPAP, chronic diastolic HF, CAD, CKD stage 3, morbid obesity, HTN,  depression who presents from home with family with fever, cough.     Acute hypoxic respiratory failure s/p intubation  Possible ARDS  Pneumonia, recurrent w/bronchiectasis  -Presents with recurrent PNA, 3rd time in last month or so. Last completed treatment 2 weeks ago with persistent cough. Presents with worsening fatigue, productive cough, weakness and falls.  -On admission, patient febrile to 101.1, white count of 21.  Lactic acid was within normal limits.  She underwent a CT scan that showed a lingular consolidative opacity with air bronchograms with bilateral upper lobe groundglass opacities.  She was started on ceftriaxone and azithromycin.  -Sputum culture from 10/19 better that showed gram positive cocci and yeast. Discussed with ID, yeast prevalent in sputum samples and likely candida that is not related to illness.  Need to await speciation.   -Patient initially on ceftriaxone and azithromycin.  Broadened to cefepime and azithro on 10/18.  Flagyl added on 10/20.  Finished azithromycin course on 10/20.   -COVID, influenza, Respiratory viral panel negative.  Strep and legionella antigens negative.   -intubated 10/21 due to worsening respiratory status and bronch done showing serosanguinous and fibrinous return with thick mucoid secretions noted  -ID, pulm and ICU team all following.  -remains on cefepime since 18th, flagyll 20th  and Vanc since 21st-cont and await further recs  -on solumedrol 125 daily 10/24, continue for now    Assessment:      Areas visualized during today's visit:  perianal/buttock  Wound location: gluteal cleft    Last photo: 11/28/23    Wound due to: Incontinence Associated Dermatitis (IAD)  Wound history/plan of care: wound is moisture related and pt was having almost constant loose stools, not over any bony prominence. Rectal tube now in place     Area remains healed     Wound location: Right inner buttock/perineum area  Last photo: 11/28/23    Wound due to: Friction vs Pressure injury  Wound history/plan of care: Patient with urinary incontinence, currently using Purwick.  Unable to determine if this is pressure from Purwick or friction from rubbing.    Area remains healed    Pressure Injury Location: Perianal  Last photo: 12/1/23    Wound type: Pressure Injury     Pressure Injury Stage: 2, hospital acquired      This is a Medical Device Related Pressure Injury (MDRPI) due to fecal management system  Wound history/plan of care:   Patient had constant loose stools with skin breakdown. A rectal tube was placed on 11/20. During WOC revisit of IAD wounds, wound was identified 6 o clock perianally  Wound base: 100 % dermis-starting to reepithelialize      Palpation of the wound bed: normal      Drainage: scant     Description of drainage: serous     Measurements (length x width x depth, in cm) 0.8  x 0.4  x  0.1 cm      Tunneling N/A     Undermining N/A  Periwound skin: Intact      Color: normal and consistent with surrounding tissue      Temperature: normal   Odor: none  Pain: unable to assess due to  sedation , none  Pain intervention prior to dressing change: patient tolerated well  Treatment goal: Heal  and Protection  STATUS: improving  Supplies ordered: supplies stored on unit    My PI Risk Assessment     Sensory Perception: 1 - Completely Limited     Moisture: 2 - Very moist      Activity: 1 - Bedfast       Mobility: 1 - Completely immobile      Nutrition: 2 - Probably inadequate      Friction/Shear: 1 - Problem     TOTAL: 8        Treatment Plan:     Perianal wound and intergluteal protection: BID and PRN with incontinence episodes  Cleanse the area with Darlyn cleanse and protect, very gently with soft cloth.   2. Apply thin layer of critic aid paste on open or red areas   3. With repeat application, do not scrub the paste, only remove soiled paste and reapply.   4. If complete removal of paste is necessary use baby oil/mineral oil (#[id number]) and soft wash cloth.   5. Use only one Covidien pad in between mattress and pt. No brief in bed.   6. No Purwick on patient     Orders: Reviewed    RECOMMEND PRIMARY TEAM ORDER: None, at this time  Education provided: plan of care and wound progress  Discussed plan of care with: Patient and Nurse  WOC nurse follow-up plan: twice weekly  Notify WOC if wound(s) deteriorate.  Nursing to notify the Provider(s) and re-consult the WOC Nurse if new skin concern.    DATA:     Current support surface: Standard  Low air loss (JENNIFER pump, Isolibrium, Pulsate, skin guard, etc)  Containment of urine/stool: Incontinent pad in bed and Purewick external catheter   BMI: Body mass index is 36.81 kg/m .   Active diet order: Orders Placed This Encounter      NPO for Medical/Clinical Reasons Except for: Other; Specify: NPO For oral intake and gastric feedings for 6 hours post insertion Gastrostomy G/GJ tube. May feed through Jejunal or Distal PORT immediately for GJ tubes ONLY.     Output: I/O last 3 completed shifts:  In: 2205.72 [I.V.:235.72; NG/GT:1370]  Out: 4535 [Urine:4385; Stool:150]     Labs:   Recent Labs   Lab 12/01/23  0415 11/29/23  0508 11/28/23  0956   HGB 8.0*   < >  --    INR  --   --  1.28*   WBC 18.6*   < >  --     < > = values in this interval not displayed.     Pressure injury risk assessment:   Sensory Perception: 2-->very limited  Moisture: 3-->occasionally moist  Activity:  1-->bedfast  Mobility: 1-->completely immobile  Nutrition: 3-->adequate  Friction and Shear: 2-->potential problem  Compa Score: 12    PATRICIA Lorenzana [facility]  Dept. Office Number: [phone]

## 2023-12-01 NOTE — PROGRESS NOTES
SPIRITUAL HEALTH SERVICES Progress Note  [facility] ICU    Referral Source: Follow-up    Brief supportive visit with Pt Jeni's spouse Genaro and brother-in-law at bedside - Jeni was sitting up in the chair. Genaro reflected on Jeni's new trach and their anticipation of LTACH placement soon. Today is Jeni and Genaro's 60th wedding anniversary. I celebrated this with them.     Plan: Informed Genaro how to request additional SH support if needed at anytime. [facility] remains available.    Yelena Borja MDiv  Staff   [facility] Pager: [phone]    [facility] available 24/7 for emergent requests/referrals, either by having the on-call  paged or by entering an ASAP/STAT consult in Epic (this will also page the on-call ).

## 2023-12-01 NOTE — PROGRESS NOTES
[facility] ICU VENTILATOR RESPIRATORY NOTE    Date of Admission: 10/15/23    Date of Intubation (most recent): 10/21/23, trach on 11/30/23    Reason for Mechanical Ventilation: respiratory failure    Number of Days on Mechanical Ventilation: 41    Met Criteria for Pressure Support Trial: yes    Length of Pressure Support Trial: ongoing    Significant Events Today: switched to PS around 7:30 am, has been tolerating well    Plan:  Patient remains on ventilator with settings of:  Vent Mode: CPAP/PS  (Continuous positive airway pressure with Pressure Support)  FiO2 (%): 35 %  Resp Rate (Set): 18 breaths/min  Tidal Volume (Set, mL): 360 mL  PEEP (cm H2O): 8 cmH2O  Pressure Support (cm H2O): 10 cmH2O  Resp: 22

## 2023-12-01 NOTE — PROGRESS NOTES
[facility]    Medicine Progress Note - Hospitalist Service    Date of Admission:  10/15/2023    Assessment & Plan   Matthew Bowen is a 78 year old female with history of chronic hypoxic respiratory failure due to pulmonary HTN, ALAINA (uses CPAP with sleep at baseline), and chronic diastolic congestive heart failure. She also has history of CAD, CKD stage 3, obesity, HTN,  and depression. She presented to the ED on 10/15/23 with fever, cough, and falls. ED evaluation showed low grade temperature elevation in the 99s. She was hypoxic, requiring supplemental oxygen at 2 lpm. Laboratory evaluation showed WBC 21.5, procalcitonin 0.16, creatinine 1.21, and bicarb 30. CXR suggested pulmonary vascular congestion. CT of chest showed lingular and bilateral upper lung pneumonia. No traumatic injury was found on imaging. She was admitted to the hospital and treated for multifocal pneumonia with Rocephin and Zithromax. She developed worsened respiratory status on 10/21 requiring transfer to ICU and intubation. Hospital course was prolonged respiratory failure, ARDS, and difficulty weaning from vent. Tracheostomy was deferred partially due to family preference and also due to the fact that increased procedural risk given high ongoing oxygen needs. CT chest obtained 11/18 suggested severe ground-glass opacities with traction bronchiectasis. It was unclear how much of her disease is reversible. Pulmonology started high-dose steroids on 11/18, with modest interval improvement in O2 weaning. Family conference was held on 11/24 and decision was made to pursue tracheostomy/G tube for continued vent weaning and feeds.     Multifocal community acquired pneumonia, Recent pneumonias prior to admission, Possible bronchiectasis, Sepsis (leukocytosis and fever), Acute respiratory failure, Adult respiratory distress syndrome prolonged ventilation, trach placement:  Summary:  Was initially admitted to medicine and  treated with Rocephin and Zithromax. Deteriorated and transferred to the ICU and intubated on 10/21/23.   Antibiotic coverage changed to cefepime on 10/25 through 10/31.  Received Zosyn from 11/15 through 11/19.  Meropenem and vancomycin were started 11/19.   ID following, continue meropenem through 11/29/23.  ID has signed off. Continue vent support, stable on current settings.  Intensivist also following.  Plan currently is for high dose steroids + trach with gradual attempts a vent wean.   PJP prophylaxis.  See pulm note for recommended taper once ready to start taper process.  Consider starting later this week post trach.  Did get a tracheostomy 11/29 per ENT and PEG per IR.  Patient's tracheostomy was complicated by ongoing bleeding.  ENT took the patient back to the OR and cauterized the area.  Patient does not appear to be actively bleeding at this point.  Patient's hemoglobin has been stable 8.1-9.4.  Hemoglobin is 8 as of 12/1/2023 no signs of current bleeding.  Patient is going to need LTAC at discharge, looking at [facility] as the patient's family's first choice.  Case management/social work helping with this.     Chronic hypoxic respiratory failure, Obstructive sleep apnea, Chronic diastolic heart failure, not felt to have current exac, Pulmonary hypertension:  -Baseline on 2 lpm of oxygen at home.  Did get a trach.  Has puffy edema of the extremities.  Patient currently is on torsemide.  Will increase the dose while following her creatinine which is currently normal.  On vent.  Will need an LTACH at discharge.  Case management consulted for discharge planning.  Patient's creatinine is stable on her increased torsemide dose.  Creatinine is only 0.7 with a potassium of 3.6.    Pain  Patient been followed by the pain service.  Her goal needs to be to wean off of her fentanyl drip.  She is on oxycodone and nursing is asking for a range 5-10 mg so that they can limit the amount of narcotics she gets so that  she is not sedated during the day.     Anemia  -Has been persistent during stay. HGB was 11.6 on presentation and trended down- generally in 7-8's.   -Suspect related to critical illness, phlebotomy, etc.  -On Protonix for ulcer prevention  -Had transfusion of 1 unit RBCs on 11/17  -Had transfusion of 1 unit of RBCs on 11/25/23 for HGB 6.9 with rise to 8 range. Hemoglobin   stable at 8 12/1/2023 without signs of active bleeding     Mild Intermittent Hyperkalemia:  -Potassium was 5.5 recently. Lokelma given and 5.4 11/26.  No obvious cause such as CARMEN, medicine, IVF, acidosis, etc as she has a normal creatinine.. She does have chronic kidney disease per chart though I do not believe this as her creatinine is 0.62 with a gfr >90.  This is likely due to her home diuretics,  and is normally on torsemide 60 mg daily. Consider Lokelma if potassium continues to creep up vs changing tube feeds.  Potassium is good at 3.6 12/1/2023    Hypernatremia, mild  -Resolved, sodium 138 as of 11/29  -Monitor sodium with resumption of torsemide  ,  sodium is 136 today     Falls prior to admission, Subacute rib fracture, Pain, Anxiety:  -Imaging negative for acute fractures and bleeding but did show subacute rib fractures.  Had been on fentanyl drip much of stay.  Dose recently increased to 150 mcg/hr.  More relaxed on that dose.  Pain team consulted.  Working on plan with them to begin transitioning off fentanyl drip to oral/feeding tube based pain control.     CKD stage 3, Acute kidney injury earlier in stay  -Cr 1.21 on admission and peaked in 2.1 range but has now normalized in the setting of pneumonia, hypoxia, etc.  Had been holding diuretics as discussed above. Resumed torsemide at 20 mg daily 11/26, will increase to bid 11/29.  (PTA was on torsemide 60 mg each am)  -Monitor renal function closely, creatinine and GFR are now normal, I suspect her previous elevated creatinine had etiologies other than chronic kidney disease.     Creatinine is normal 0.7 on torsemide     HTN  -Continue PTA amlodipine, torsemide, hydralazine not sure why she is not on a  beta blocker with her coronary artery disease.  Home imdur on hold  Blood pressure has been labile, blood pressure 107-170/46-78,  goal is to prevent hypotension. BP is currently tolerable.  If the patient continues to be elevated rather than be low, consider starting her on low-dose beta-blocker.     Obesity:  -  Increases complexity of care, has BMI of 36 as of 11.29     CAD:  -Continue on home ASA, statin.  Historically has been on plavix but it has been on hold now since early in her stay.  Last PCI in 2019 and even though she has residual RCA dz that in itself is not an indication for DAPT at this point especially with anemia. Not sure why she is not on a beta blocker    Hypothyroidism  -Continue PTA levothyroxine    Hyperlipidemia  -is on lipitor, not sure that there is any real benefit of this at this point.      Mood disorder  -Continue PTA Lyrica and Zoloft     Access issues:  -  s/p PICC line.  Some generalized edema including PICC line arm.  Monitor.    Nutrition  Hyperglycemia:  -  sliding scale insulin PRN.  Continue Tube Feeds, off at midnight tonight for procedure in AM.  Dextrose PRN if drops.  IR consulted for PEG placement 11/29/23 in coordination with trach.  Now can use her PEG, will discharge post pyloric feeding tube   Recent Labs   Lab 12/01/23  0906 12/01/23  0415 12/01/23  0318 12/01/23  0002 11/30/23  2045   * 155* 146* 118* 151*         Labs/Imaging Reviewed:  See Information above and Data section below    Discussed with night nurse, Dr Payton, care team during bedside rounds       Diet: NPO for Medical/Clinical Reasons Except for: Other; Specify: NPO For oral intake and gastric feedings for 6 hours post insertion Gastrostomy G/GJ tube. May feed through Jejunal or Distal PORT immediately for GJ tubes ONLY.  Adult Formula Drip Feeding: Continuous Novasource  Renal; Gastrostomy; Goal Rate: 30 ml/hr x 22 hrs (hold 1 hr before and after levothyroxine); mL/hr; When GT ok to use per MD restart EN at half rate of 15 mL/hr x 8 hours. If tolerated ok to increa...    DVT Prophylaxis: Heparin SQ  Aleman Catheter: PRESENT, indication: Strict 1-2 Hour I&O, Strict 1-2 Hour I&O  Lines: PRESENT      PICC 10/22/23 Triple Lumen Right Basilic multiple med gtt. ready for use-Site Assessment: WDL except;Edematous      Cardiac Monitoring: ACTIVE order. Indication: Tachyarrhythmias, acute (48 hours)  Code Status: Full Code      Clinically Significant Risk Factors                   # Moderate Malnutrition: based on nutrition assessment           Disposition Plan   Needs ongoing ICU care, eventually will need LTACH for vent and tube feed weaning.  Family is looking at [facility] as has their first choice option.       Rosendo Rich MD  Hospitalist Service  [facility]  Securely message with [employer] (more info)  Text page via [employer] Paging/Directory   ______________________________________________________________________    Interval History     Patient today is stable on the ventilator via her trach.  No new changes per nursing.  No signs of ongoing bleeding.  Goal is to get her fentanyl wean down.         Physical Exam   Vital Signs: Temp: 98.1  F (36.7  C) Temp src: Axillary BP: (!) 145/72 Pulse: 86   Resp: 24 SpO2: 93 % O2 Device: Mechanical Ventilator    Weight: 188 lbs 7.89 oz    GEN:  Awake, somewhat responds to questions similar to prior but not very interactive, appears comfortable, no overt distress.  Appears elderly and frail, lying in bed on the vent   HEENT: Tracheostomy in place, mucous membranes moist  CV: Regular rate and rhythm, no murmurs   LUNGS:  Coarse bilaterally unchanged, no wheezes/retractions.  Symmetric chest rise on inhalation noted.  ABD:  Active bowel sounds, soft, non-tender/non-distended.  No rebound/guarding/rigidity.  PEG tube in place  EXT:  Puffy edema of all of her extremities seems a little bit better today.  No cyanosis.   SKIN:  Dry to touch, no exanthems noted in the visualized areas.    Medications    dexmedeTOMIDine 0.3 mcg/kg/hr (12/01/23 0800)    dextrose Stopped (11/30/23 0420)    fentaNYL 50 mcg/hr (12/01/23 0800)    sodium chloride 0.9%        amLODIPine  5 mg Oral or Feeding Tube Daily    aspirin  81 mg Oral or Feeding Tube Daily    atorvastatin  20 mg Oral or Feeding Tube QPM    atovaquone  1,500 mg Oral or Feeding Tube Daily    B and C vitamin Complex with folic acid  5 mL Oral or Feeding Tube Daily    chlorhexidine  15 mL Mouth/Throat Q12H    clotrimazole   Topical BID    fiber modular (NUTRISOURCE FIBER)  1 packet Per Feeding Tube TID    heparin ANTICOAGULANT  5,000 Units Subcutaneous Q8H    hydrALAZINE  25 mg Oral or Feeding Tube TID    insulin aspart  1-6 Units Subcutaneous Q4H    ipratropium - albuterol 0.5 mg/2.5 mg/3 mL  3 mL Nebulization 4x daily    levothyroxine  150 mcg Oral or Feeding Tube QAM AC    lidocaine   Topical TID    methylPREDNISolone  62.5 mg Intravenous Q8H    pantoprazole  40 mg Per Feeding Tube QAM AC    pregabalin  75 mg Per Feeding Tube Daily    protein modular  1 packet Per Feeding Tube BID    QUEtiapine  150 mg Oral or Feeding Tube BID    sertraline  150 mg Oral or Feeding Tube Daily    sodium chloride (PF)  10-40 mL Intracatheter Q7 Days    sodium chloride (PF)  3 mL Intracatheter Q8H    torsemide  20 mg Oral or Feeding Tube BID    vitamin D3  50 mcg Oral or Feeding Tube Daily       Data     Labs and Imaging results below reviewed today.    I have personally reviewed the following data over the past 24 hrs:    18.6 (H)  \   8.0 (L)   / 234     136 99 75.8 (H) /  174 (H)   3.6 28 0.70 \       Recent Labs   Lab 12/01/23  0415 11/30/23 0420 11/29/23  1642 11/29/23  0508   WBC 18.6* 18.0*  --  16.2*   HGB 8.0* 8.1* 9.4* 8.4*   HCT 26.9* 27.3*  --  28.3*   MCV 89 88  --  88    231  --  228     7-Day Micro  Results       No results found for the last 168 hours.          Recent Labs   Lab 12/01/23  0906 12/01/23  0415 12/01/23  0318 11/30/23  0422 11/30/23  0420 11/29/23  0921 11/29/23  0508   NA  --  136  --   --  138  --  138   POTASSIUM  --  3.6  --   --  4.5  --  5.0   CHLORIDE  --  99  --   --  101  --  100   CO2  --  28  --   --  28  --  31*   ANIONGAP  --  9  --   --  9  --  7   * 155* 146*   < > 139*   < > 162*   BUN  --  75.8*  --   --  70.1*  --  69.4*   CR  --  0.70  --   --  0.69  --  0.62   GFRESTIMATED  --  88  --   --  88  --  >90   ABRBARA  --  8.3*  --   --  8.5*  --  8.4*   MAG  --  2.0  --   --  2.1  --  2.1   PHOS  --  4.3  --   --  4.6*  --  4.1    < > = values in this interval not displayed.       No results found for this or any previous visit (from the past 24 hour(s)).

## 2023-12-02 NOTE — PROGRESS NOTES
[facility] ICU VENTILATOR RESPIRATORY NOTE  Date of Admission: 10/15/2023  Date of Intubation (most recent): 10/21/2023, trached 11/30  Reason for Mechanical Ventilation: Respiratory Failure  Number of Days on Mechanical Ventilation: 42  Met Criteria for Pressure Support Trial: Yes  Length of Pressure Support Trial: PS all day yesterday, currently on another SBT 10/8    Vent Mode: (S) CPAP/PS  (Continuous positive airway pressure with Pressure Support)  FiO2 (%): 35 %  Resp Rate (Set): 18 breaths/min  Tidal Volume (Set, mL): 360 mL  PEEP (cm H2O): 8 cmH2O  Pressure Support (cm H2O): 10 cmH2O  Resp: 10      BS: Coarse  Secretions: Minimal    Trach cares done, bleeding/drainage from stoma. Gauze changed Q4.    BP (!) 163/79   Pulse 79   Temp 98.2  F (36.8  C) (Oral)   Resp 10   Ht 1.524 m (5')   Wt 85.5 kg (188 lb 7.9 oz)   SpO2 92%   BMI 36.81 kg/m      Plan:  Continue SBT    Elba Brennan, RT

## 2023-12-02 NOTE — PROGRESS NOTES
CLINICAL NUTRITION SERVICES - REASSESSMENT NOTE     Nutrition Prescription    RECOMMENDATIONS FOR MDs/PROVIDERS TO ORDER:  Consider assessing for iron deficiency    Malnutrition Status:    Moderate malnutrition in the context of acute on chronic illness    Recommendations already ordered by Registered Dietitian (RD):  Continue TF and modulars as ordered    Future/Additional Recommendations:  Suspect potential micronutrient deficiencies given MCV WNL w/ RDW trending up and low hgb, hematocrit, MCH, MCHC; pt is already receiving b6, b12, folate via renal multivitamin. Consider zinc especially dt presence of pressure injury and potentially copper pending iron labs as copper may be needed for heme synthesis and iron utilization.     Consider Rogelio or Expedite if needed to promote wound healing    Consider transitioning back to regular MVI if continues to need K replacement    Continue monitoring protein needs dt h/o CKD       EVALUATION OF THE PROGRESS TOWARD GOALS   Diet: NPO, Adult Formula Drip Feeding: Continuous   Nutrition Support: Pt with OG tube placed 11/29/23. Receiving Novasource Renal @ 30ml/hr x 22 hours (held 1 hour before and after levothyroxine) w/ 100ml water flushes every 2 hours  Intake: Novasource Renal providing 1320kcals, 60g protein, 121g CHO, 66g fat, 473ml free water, 1200ml water from flushes for a total of 1673ml fluid daily.    1 pkt prosource TF20 BID and 1 pkts Nutrisource fiber TID brings totals to 1525 kcals, 100g fiber, 133g CHO, 9g fiber daily     NEW FINDINGS   Pt declined feelings of hunger.     12/1: Pressure injury improving per WOC  11/29: Trache and PEG placed    12/02/23 0700 82.3 kg (181 lb 7 oz)   11/30/23 0430 85.5 kg (188 lb 7.9 oz)   11/29/23 0400 85.1 kg (187 lb 9.8 oz)   11/28/23 0400 86 kg (189 lb 9.5 oz)   11/27/23 0400 82.5 kg (181 lb 14.1 oz)   11/26/23 0500 82.2 kg (181 lb 3.5 oz)   11/24/23 0440 81.7 kg (180 lb 2.9 oz)   11/22/23 0535 82.5 kg (181 lb 14.1 oz)  "  11/21/23 0500 81.4 kg (179 lb 7.3 oz)   11/20/23 0500 79.9 kg (176 lb 2.4 oz)   11/19/23 0630 81.8 kg (180 lb 5.4 oz)   11/18/23 0613 82.4 kg (181 lb 10.5 oz)     11/02/23 0400 93.9 kg (207 lb 0.2 oz)-pt fluid up 5.3L (11.7 lbs) per I/Os     10/30/23 0500 94.9 kg (209 lb 3.5 oz)   10/29/23 0400 94.8 kg (208 lb 15.9 oz)     10/28/23 0341 93.8 kg (206 lb 12.7 oz)   10/27/23 0600 89 kg (196 lb 3.4 oz)   10/26/23 0500 89.2 kg (196 lb 10.4 oz)   10/25/23 0430 88.1 kg (194 lb 4.8 oz)   10/23/23 0545 87 kg (191 lb 12.8 oz)   10/21/23 1930 85.7 kg (188 lb 14.4 oz)   10/20/23 1900 85.4 kg (188 lb 4.4 oz)   10/16/23 0644 81.7 kg (180 lb 1.9 oz)   10/15/23 1646 85.6 kg (188 lb 11.4 oz)   10/15/23 0925 86.2 kg (190 lb)   09/06/2023                87.6 kg (193 lb 3.2 oz)     6% wt loss in 3 months  06/12/2023                88 kg (194 lb)                   6.5% wt loss in 6 months  12/02/2022                88.9 kg (196 lb)                7.4% wt loss in 1 year    Net fluid down 1.3L (2.9lbs) since 11/18 per I/Os    ANTHROPOMETRICS  Height: 152.4 cm (5' 0\") vs 4' 11'' on 6/12/23  Most Recent Weight: 93.8 kg (206 lb 12.7 oz)    IBW: 45.5 kg  BMI: Obesity Grade III BMI >40  Weight History:   Wt Readings from Last 30 Encounters:   12/02/23 82.3 kg (181 lb 7 oz)   02/28/22 94.3 kg (208 lb)   11/22/19 101.6 kg (223 lb 14.4 oz)   02/14/19 104.9 kg (231 lb 3.2 oz)   11/02/16 97 kg (213 lb 12.8 oz)   10/26/16 96.4 kg (212 lb 9.6 oz)     ASSESSED NUTRITION NEEDS PER APPROVED PRACTICE GUIDELINES  Dosing Weight: 79.9 kg (lowest weight) for energy, and 45.5 kg (IBW) for protein  Estimated Energy Needs: 6752-7006 kcals/day (15 - 20 kcals/kg) vs 1352 kcals (modified [place] equation (FULTON) 2010 equation)  Justification: Vented  Estimated Protein Needs: >91 grams protein/day (>2 grams of pro/kg)  Justification: Hypercatabolism with critical illness and Obesity guidelines  Estimated Fluid Needs: 2543-6682 ml (1ml/kcal) or Per provider " pending fluid status    PHYSICAL FINDINGS  See malnutrition section below.  Dry skin  abrasion/excoriation buttocks/IT, perineum   Bruised abdomen/arm  Pressure injury stage 2    GI CONCERNS  LBM 12/01  150ml out from rectal tube yesterday, 0 ml so far today    LABS  Reviewed: UN 76.2, hgb 8.9, hematocrit 30.0, rbc 3.42, MCH 26.0, MCHC 29.7, RDW 21.1    MEDICATIONS  Reviewed: nephronex, nutrisource fiber, novolog, synthroid/levothroid, solu-medrol, protonix, kayciel, prosource TF20, demadex, cholecalciferol, melatonin     MALNUTRITION:  % Weight Loss:  Weight loss does not meet criteria for malnutrition as pt loss is not significant or related to fluid losses  % Intake:  No decreased intake noted  Subcutaneous Fat Loss:  None observed  Muscle Loss:  Temporal region mild depletion and Patellar region mild-moderate depletion  Fluid Retention:  Mild 1+    Malnutrition Diagnosis: Moderate malnutrition  In Context of:  Acute illness or injury  Chronic illness or disease    Previous Goals   Total avg nutritional intake to meet a minimum of 15 kcal/kg per energy DW of 79.9 kg and 2 g PRO/kg daily per DW of 45.5 kg   Evaluation: Met    Previous Nutrition Diagnosis  Inadequate oral intake related to respiratory needs necessitating NPO status as evidenced by reliance on NS to meet needs      Evaluation: No change    CURRENT NUTRITION DIAGNOSIS  Inadequate oral intake related to NPO status secondary to respiratory failure as evidenced by mechanical ventilation    INTERVENTIONS  Implementation  Enteral Nutrition - Continue as ordered    Goals  Total avg nutritional intake to meet a minimum of 1352 kcal/kg and 91 g PRO/kg daily (per dosing wt 79.9 kg (lowest weight) for energy, and 45.5 kg (IBW) for protein).    Monitoring/Evaluation  Progress toward goals will be monitored and evaluated per protocol. TF tolerance, wt, labs, wound healing

## 2023-12-02 NOTE — PLAN OF CARE
Problem: Enteral Nutrition  Goal: Feeding Tolerance  Outcome: Met   Goal Outcome Evaluation:      Plan of Care Reviewed With: patient, family    Overall Patient Progress: improvingOverall Patient Progress: improving    Outcome Evaluation: Pt tolerating TF with no feelings of hunger.  Suspect potential micronutrient deficiencies given MCV WNL w/ RDW trending up and low hgb, hematocrit, MCH, MCHC; pt is already receiving b6, b12, folate via renal multivitamin. Consider assessing for iron deficiency. Consider zinc especially dt presence of pressure injury. Consider copper deficienty pending iron labs as copper may be needed for heme synthesis and iron utilization. Continue TF as ordered.

## 2023-12-02 NOTE — PLAN OF CARE
ICU End of Shift Summary.  For vital signs and complete assessments, please see documentation flowsheets.        Pertinent assessments:   Neuro: Alert, answering questions with head nods of yes and no. Afebrile. C/o Pain generalized. PRN and repositioning effective.   Cardiac: SR with occasional PVCs. SBP elevated 140-150s, started on metoprolol.  Resp: Trach intact, changed over from pressure support to VC-AC at start of shift and remained on this setting for all of shift. LS coarse, diminished.   GI: BS active, c/o gas, venting pt via GT. Venting did help in gas discomfort pt was having. Completed 3x this shift. BM 3x; 1 large, 2 small  : Mitchell remains intact. Adequate output of light yellow to pale urine  Skin: Bruising noted on L thigh from removal of stat lock sticker.   Lines: PICC- triple lumen and PIV  Drips: Fent stopped at 1500    Major Shift Events:   ~Pt did have difficulty throughout shift getting comfortable. PRNs and repositioning were effective.     Plan (Upcoming Events): Con't with cares and assessments. First trach change within next few days. Transition to [place].   Discharge/Transfer Needs: TBD     Bedside Shift Report Completed : Yes  Bedside Safety Check Completed: Yes    Notified provider about indwelling mitchell catheter discussed removal or continued need.    Did provider choose to remove indwelling mitchell catheter? YES    Provider's mitchell indication for keeping indwelling mitchell catheter: Indication for continued use: Strict 1-2 Hour I & O if external catheters are not an option    Is there an order for indwelling mitchell catheter? YES    *If there is a plan to keep mitchell catheter in place at discharge daily notification with provider is not necessary, but please add a notation in the treatment team sticky note that the patient will be discharging with the catheter.

## 2023-12-02 NOTE — PROGRESS NOTES
[facility]    Medicine Progress Note - Hospitalist Service    Date of Admission:  10/15/2023    Assessment & Plan   Matthew Bowen is a 78 year old female with history of chronic hypoxic respiratory failure due to pulmonary HTN, ALAINA (uses CPAP with sleep at baseline), and chronic diastolic congestive heart failure. She also has history of CAD, CKD stage 3, obesity, HTN,  and depression. She presented to the ED on 10/15/23 with fever, cough, and falls. ED evaluation showed low grade temperature elevation in the 99s. She was hypoxic, requiring supplemental oxygen at 2 lpm. Laboratory evaluation showed WBC 21.5, procalcitonin 0.16, creatinine 1.21, and bicarb 30. CXR suggested pulmonary vascular congestion. CT of chest showed lingular and bilateral upper lung pneumonia. No traumatic injury was found on imaging. She was admitted to the hospital and treated for multifocal pneumonia with Rocephin and Zithromax. She developed worsened respiratory status on 10/21 requiring transfer to ICU and intubation. Hospital course was prolonged respiratory failure, ARDS, and difficulty weaning from vent. Tracheostomy was deferred partially due to family preference and also due to the fact that increased procedural risk given high ongoing oxygen needs. CT chest obtained 11/18 suggested severe ground-glass opacities with traction bronchiectasis. It was unclear how much of her disease is reversible. Pulmonology started high-dose steroids on 11/18, with modest interval improvement in O2 weaning. Family conference was held on 11/24 and decision was made to pursue tracheostomy/G tube for continued vent weaning and feeds.     Multifocal community acquired pneumonia, Recent pneumonias prior to admission, Possible bronchiectasis, Sepsis (leukocytosis and fever), Acute respiratory failure, Adult respiratory distress syndrome prolonged ventilation, trach placement:  Summary:  Was initially admitted to medicine and  treated with Rocephin and Zithromax. Deteriorated and transferred to the ICU and intubated on 10/21/23.   Antibiotic coverage changed to cefepime on 10/25 through 10/31.  Received Zosyn from 11/15 through 11/19.  Meropenem and vancomycin were started 11/19.   ID following, continue meropenem through 11/29/23.  ID has signed off. Continue vent support, stable on current settings.  Intensivist also following.  Plan currently is for high dose steroids + trach with gradual attempts a vent wean.   PJP prophylaxis.  See pulm note for recommended taper once ready to start taper process.  Consider starting later this week post trach.  Did get a tracheostomy 11/29 per ENT and PEG per IR.  Patient's tracheostomy was complicated by ongoing bleeding.  ENT took the patient back to the OR and cauterized the area.  Patient does not appear to be actively bleeding at this point.  Patient's hemoglobin has been stable 8.1-9.4.  Hemoglobin is 8.9 as of 12/2/2023 no signs of current bleeding.  Patient is going to need LTAC at discharge, looking at [facility] as the patient's family's first choice.  Case management/social work helping with this.  Patient will need a trach change in the next week per ENT.  This may be able to be done at [facility] we will need to figure out the timing and logistics of this prior to discharge.     Chronic hypoxic respiratory failure, Obstructive sleep apnea, Chronic diastolic heart failure, not felt to have current exac, Pulmonary hypertension:  -Baseline on 2 lpm of oxygen at home.  Did get a trach.  Has puffy edema of the extremities.  Patient currently is on torsemide.  Will increase the dose while following her creatinine which is currently normal.  On vent.  Will need an LTACH at discharge.  Case management consulted for discharge planning.  Patient's creatinine is stable on her increased torsemide dose.  Creatinine is only 0.64 with a potassium of down at 2.9.      Hypokalemia   patient is on potassium  replacement, potassium 2.9 in the setting of torsemide.    Pain  Patient been followed by the pain service.  Her goal needs to be to wean off of her fentanyl drip.  She is on oxycodone and nursing is asking for a range 5-10 mg so that they can limit the amount of narcotics she gets so that she is not sedated during the day.  10 mg of oxycodone tends to sedate the patient too much.  Patient is on Lyrica only once a day though she took it twice a day at home this is due to her kidney function and GFR.     Anemia  -Has been persistent during stay. HGB was 11.6 on presentation and trended down- generally in 7-8's.   -Suspect related to critical illness, phlebotomy, etc.  -On Protonix for ulcer prevention  -Had transfusion of 1 unit RBCs on 11/17  -Had transfusion of 1 unit of RBCs on 11/25/23 for HGB 6.9 with rise to 8 range. Hemoglobin   stable at 8.9 12/2/2023 without signs of active bleeding     Mild Intermittent Hyperkalemia:  -Potassium was 5.5 recently. Lokelma given and 5.4 11/26.  No obvious cause such as CARMEN, medicine, IVF, acidosis, etc as she has a normal creatinine.. She does have chronic kidney disease per chart though I do not believe this as her creatinine is 0.62 with a gfr >90.  This is likely due to her home diuretics,  and is normally on torsemide 60 mg daily. Consider Lokelma if potassium continues to creep up vs changing tube feeds.   Now the patient is going the other way in the setting of torsemide with her potassium being low at 2.9.  Replace as above.    Hypernatremia, mild  -Resolved, sodium 138 as of 11/29  -Monitor sodium with resumption of torsemide  ,  sodium is 139 as of 12/2/2023    Falls prior to admission, Subacute rib fracture, Pain, Anxiety:  -Imaging negative for acute fractures and bleeding but did show subacute rib fractures.  Had been on fentanyl drip much of stay.  Dose recently increased to 150 mcg/hr.  More relaxed on that dose.  Pain team consulted.  Working on plan with them  to begin transitioning off fentanyl drip to oral/feeding tube based pain control.  Need to get off of the fentanyl drip to be able to go to LTAC     CKD stage 3, Acute kidney injury earlier in stay  -Cr 1.21 on admission and peaked in 2.1 range but has now normalized in the setting of pneumonia, hypoxia, etc.  Had been holding diuretics as discussed above. Resumed torsemide at 20 mg daily 11/26, will increase to bid 11/29.  (PTA was on torsemide 60 mg each am)  -Monitor renal function closely, creatinine and GFR are now normal, I suspect her previous elevated creatinine had etiologies other than chronic kidney disease.    Creatinine is normal 0.64 on torsemide     HTN  -Continue PTA amlodipine, torsemide, hydralazine not sure why she is not on a  beta blocker with her coronary artery disease.  Home imdur on hold  Blood pressure has been labile, blood pressure 1 during the stay but now has been creeping up and has been more consistently hypertensive,  goal is to prevent hypotension.  will start the patient on a low-dose beta-blocker given her history of heart disease..     Obesity:  -  Increases complexity of care, has BMI of 36 as of 11.29     CAD:  -Continue on home ASA, statin.  Historically has been on plavix but it has been on hold now since early in her stay.  Last PCI in 2019 and even though she has residual RCA dz that in itself is not an indication for DAPT at this point especially with anemia. Not sure why she is not on a beta blocker, will be starting low-dose beta-blocker given her hypertension.    Hypothyroidism  -Continue PTA levothyroxine    Pressure injury/wounds  Patient's been followed by the wound care nurse.  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  She also has a right inner buttock and perineal wound due to either friction or possible pressure injury.  Patient has a perianal pressure injury that is hospital-acquired stage II likely related to her fecal  management system and skin breakdown related to her loose stools.  Continue on wound care per recommendations by WOCN    Hyperlipidemia  -is on lipitor, not sure that there is any real benefit of this at this point.      Mood disorder  -Continue PTA Lyrica and Zoloft     Access issues:  -  s/p PICC line.  Some generalized edema including PICC line arm.  Monitor.    Nutrition  Hyperglycemia:  -  sliding scale insulin PRN.  Continue Tube Feeds, off at midnight tonight for procedure in AM.  Dextrose PRN if drops.  IR consulted for PEG placement 11/29/23 in coordination with anu.  Now can use her PEG, postpyloric feeding tube has subsequently been removed.  Recent Labs   Lab 12/02/23  0807 12/02/23  0405 12/02/23  0404 12/01/23  2333 12/01/23  1957   * 172* 155* 148* 108*         Labs/Imaging Reviewed:  See Information above and Data section below    Discussed with night nurse, Dr Payton, entire care team during bedside rounds       Diet: NPO for Medical/Clinical Reasons Except for: Other; Specify: NPO For oral intake and gastric feedings for 6 hours post insertion Gastrostomy G/GJ tube. May feed through Jejunal or Distal PORT immediately for GJ tubes ONLY.  Adult Formula Drip Feeding: Continuous Novasource Renal; Gastrostomy; Goal Rate: 30 ml/hr x 22 hrs (hold 1 hr before and after levothyroxine); mL/hr; When GT ok to use per MD restart EN at half rate of 15 mL/hr x 8 hours. If tolerated ok to increa...    DVT Prophylaxis: Heparin SQ  Aleman Catheter: PRESENT, indication: Strict 1-2 Hour I&O, Strict 1-2 Hour I&O  Lines: PRESENT      PICC 10/22/23 Triple Lumen Right Basilic multiple med gtt. ready for use-Site Assessment: WDL      Cardiac Monitoring: ACTIVE order. Indication: Tachyarrhythmias, acute (48 hours)  Code Status: Full Code      Clinically Significant Risk Factors                   # Moderate Malnutrition: based on nutrition assessment           Disposition Plan   Needs ongoing ICU care for anu and  PEG tube, vent weaning, eventually will need LTAC for vent and tube feed weaning.  Family is looking at [facility] as has their first choice option.  Patient at this point is stable enough to discharge at any time once is safe discharge plan has been gotten.       Rosendo Rich MD  Hospitalist Service  [facility]  Securely message with [employer] (more info)  Text page via [facility] Paging/Directory   ______________________________________________________________________    Interval History     Patient seen again today.  Sitting up in bed continues on the ventilator through her tracheostomy.  She is actually more alert and mouthing words to me.  No signs of ongoing bleeding from her tracheostomy site.  Is tolerating her tube feeds through her PEG tube.  Her fentanyl drip has been weaned down yet again overnight.         Physical Exam   Vital Signs: Temp: 97.9  F (36.6  C) Temp src: Oral BP: (!) 181/76 Pulse: 105   Resp: (!) 9 SpO2: 91 % O2 Device: Mechanical Ventilator    Weight: 181 lbs 7.02 oz    GEN:  Awake, somewhat responds to questions similar to prior but not very interactive, appears comfortable, no overt distress.  Appears elderly and frail, lying in bed on the vent   HEENT: Tracheostomy in place, mucous membranes moist  CV: Regular rate and rhythm, no murmurs   LUNGS:  Coarse bilaterally unchanged, no wheezes/retractions.  Symmetric chest rise on inhalation noted.  ABD:  Active bowel sounds, soft, non-tender/non-distended.  No rebound/guarding/rigidity.  PEG tube in place  EXT: Puffy edema of all of her extremities seems a little bit better today.  No cyanosis.   SKIN:  Dry to touch, no exanthems noted in the visualized areas.    Medications    dexmedeTOMIDine Stopped (12/01/23 1600)    dextrose Stopped (11/30/23 0420)    fentaNYL 25 mcg/hr (12/02/23 0700)      acetaminophen  650 mg Oral or Feeding Tube 4x Daily    amLODIPine  5 mg Oral or Feeding Tube Daily    aspirin  81 mg Oral or  Feeding Tube Daily    atorvastatin  20 mg Oral or Feeding Tube QPM    atovaquone  1,500 mg Oral or Feeding Tube Daily    B and C vitamin Complex with folic acid  5 mL Oral or Feeding Tube Daily    chlorhexidine  15 mL Mouth/Throat Q12H    clotrimazole   Topical BID    fiber modular (NUTRISOURCE FIBER)  1 packet Per Feeding Tube TID    heparin ANTICOAGULANT  5,000 Units Subcutaneous Q8H    hydrALAZINE  25 mg Oral or Feeding Tube TID    insulin aspart  1-6 Units Subcutaneous Q4H    ipratropium - albuterol 0.5 mg/2.5 mg/3 mL  3 mL Nebulization 4x daily    levothyroxine  150 mcg Oral or Feeding Tube QAM AC    lidocaine   Topical TID    methylPREDNISolone  62.5 mg Intravenous Q8H    metoprolol  12.5 mg Per Feeding Tube BID    pantoprazole  40 mg Per Feeding Tube QAM AC    pregabalin  75 mg Per Feeding Tube Daily    protein modular  1 packet Per Feeding Tube BID    QUEtiapine  150 mg Oral or Feeding Tube BID    sertraline  150 mg Oral or Feeding Tube Daily    sodium chloride (PF)  10-40 mL Intracatheter Q7 Days    sodium chloride (PF)  3 mL Intracatheter Q8H    torsemide  20 mg Oral or Feeding Tube BID    vitamin D3  50 mcg Oral or Feeding Tube Daily       Data     Labs and Imaging results below reviewed today.    I have personally reviewed the following data over the past 24 hrs:    22.4 (H)  \   8.9 (L)   / 276     139 97 (L) 76.2 (H) /  139 (H)   2.9 (L) 31 (H) 0.64 \       Recent Labs   Lab 12/02/23 0405 12/01/23 0415 11/30/23  0420   WBC 22.4* 18.6* 18.0*   HGB 8.9* 8.0* 8.1*   HCT 30.0* 26.9* 27.3*   MCV 88 89 88    234 231     7-Day Micro Results       No results found for the last 168 hours.          Recent Labs   Lab 12/02/23  0807 12/02/23  0405 12/02/23  0404 12/01/23  0906 12/01/23 0415 11/30/23  0422 11/30/23  0420   NA  --  139  --   --  136  --  138   POTASSIUM  --  2.9*  --   --  3.6  --  4.5   CHLORIDE  --  97*  --   --  99  --  101   CO2  --  31*  --   --  28  --  28   ANIONGAP  --  11  --   --   9  --  9   * 172* 155*   < > 155*   < > 139*   BUN  --  76.2*  --   --  75.8*  --  70.1*   CR  --  0.64  --   --  0.70  --  0.69   GFRESTIMATED  --  90  --   --  88  --  88   BARBARA  --  8.5*  --   --  8.3*  --  8.5*   MAG  --  2.1  --   --  2.0  --  2.1   PHOS  --  3.1  --   --  4.3  --  4.6*    < > = values in this interval not displayed.       No results found for this or any previous visit (from the past 24 hour(s)).

## 2023-12-02 NOTE — PROGRESS NOTES
[facility] ICU VENTILATOR RESPIRATORY NOTE    Date of Admission: 10/15/23    Date of Intubation (most recent): 10/21/23, trach on 11/30/23    Reason for Mechanical Ventilation: respiratory failure    Number of Days on Mechanical Ventilation: 42    Met Criteria for Pressure Support Trial: yes    Length of Pressure Support Trial: 2.5 hours, stopped due to tachypnea and tachycardia    Plan:  Patient remains on ventilator with settings of:  Vent Mode: CMV/AC  (Continuous Mandatory Ventilation/ Assist Control)  FiO2 (%): 40 %  Resp Rate (Set): 18 breaths/min  Tidal Volume (Set, mL): 360 mL  PEEP (cm H2O): 8 cmH2O  Pressure Support (cm H2O): 10 cmH2O  Inspiratory Pressure (cm H2O) (Drager Sanam): 12  Resp: 22

## 2023-12-02 NOTE — PROGRESS NOTES
ICU staff  DOS 12/2/2023    No major events. A bit restless this morning, interactive. Off dex, nearly off fentanyl infusion.    Vitals:   Temp:  [97.9  F (36.6  C)-98.6  F (37  C)] 97.9  F (36.6  C)  Pulse:  [] 105  Resp:  [9-36] 9  BP: (124-188)/(49-85) 181/76  FiO2 (%):  [35 %-40 %] 40 %  SpO2:  [90 %-100 %] 91 %     Vent Mode: (S) CMV/AC  (Continuous Mandatory Ventilation/ Assist Control)  FiO2 (%): 40 %  Resp Rate (Set): 18 breaths/min  Tidal Volume (Set, mL): 360 mL  PEEP (cm H2O): 8 cmH2O  Pressure Support (cm H2O): 10 cmH2O  Inspiratory Pressure (cm H2O) (Drager Sanam): 12  Resp: (!) 9    I/O last 3 completed shifts:  In: 2173.56 [I.V.:73.56; NG/GT:1410]  Out: 4355 [Urine:4205; Stool:150]    Dex off  Fentanyl 25    Exam:  Gen: Alert, interacts, no apparent distress  HEENT: NC/AT, tracheostomy in situ  Pulm: Back on AC this morning  Cor: RRR  Abdomen/GI: Soft, nondistended  : UOP adequate  Extremities: Warm, edematous  Skin: Well-perfused  Neuro: Eyes open, BARBOUR, interacts/responds  Psych: Appears uncomfortable    Data:   Labs reviewed and without much change  No imaging/culture data    Assessment/plan:  77 y/o woman with chronic hypoxemic respiratory failure, bronchiectasis, recurring pulmonary infections. Admitted mid-October with worsening respiratory failure that ultimately required intubation. Now s/p tracheostomy and PEG 11/29.   CNS: More alert overall, interacting and responding to staff and family.  # Acute on chronic pain  # Weakness/frequent falls  # Toxic/metabolic encephalopathy  - Wean fentanyl off  - Continue scheduled APAP, prn oxycodone, pregabalin  - Continue therapies  Pulm: Tolerating periods of PS, resting on full support  # Acute on chronic mixed respiratory failure  # Ventilator dependence  # Bronchiectasis  # Chronic hypoxemia on home oxygen  # Multifocal organizing pneumonia  - PS trials as tolerated, rest on vent overnight; ultimate goal of trach dome trials  - Steroids per  pulmonary, taper starting 12/4  CV: No significant changes.   # Essential hypertension  # Aortic stenosis  # Diastolic heart failure  # CAD  - Metoprolol added, continues amlodipine/hydralazine  - Aspirin  GI: Abdomen benign  # Nutrition  - Continue tube feedings  : UOP adequate  # CARMEN on CKD  # Hypokalemia  - Continue torsemide  - Lyte replacement  - GFR lower than creatinine would suggest based on cystatin c  Heme: Hb 8.9 (8)  # Anemia of chronic disease  - No indications to transfuse  Msk: Extremities warm, well-perfused.   # Deconditioning  - Continue PT/OT  Endo: Glucose 148-172  # Stress/steroid hyperglycemia  # Hypothyroidism  - Insulin prn  - Levothyroxine  ID: Afebrile, WBCs 22.  # Stable leukocytosis  # ?Pneumonia  - Completed meropenem  ICU: SQH, PPI    This patient is critically ill to my assessment and requires ICU monitoring and cares. A total of 35 minutes critical care time spent on 12/2/2023, exclusive of procedures.     Rui Payton MD, PhD  Surgical critical care  12/2/2023, 9:26 AM    Clinically Significant Risk Factors        # Hypokalemia: Lowest K = 2.9 mmol/L in last 2 days, will replace as needed       # Hypoalbuminemia: Lowest albumin = 2.7 g/dL at 11/18/2023  5:05 AM, will monitor as appropriate             # Moderate Malnutrition: based on nutrition assessment

## 2023-12-02 NOTE — PLAN OF CARE
Goal Outcome Evaluation:      Plan of Care Reviewed With: patient    Overall Patient Progress: improvingOverall Patient Progress: improving     Neuro: Alert, awake most of the night- denied anxiety- restless at times. Requesting frequent repositioning.   Cardiac: tele SR, BP hypertensive  Resp: lungs coarse, dim. Small amounts tan thick secretions from trach  GI: TF at goal, rectal tube with minimal output.   : mitchell in place w/ good clear, yellow output    Lines: picc, piv  Drips: fent    Major Shift Events:   Decreased fent from 50 to 25, utilizing prn oxy. Pain 5-7 before oxy, decreased to 3-4 after- pt states tolerable despite looking uncomfortable  K+ 2.9 this AM, replacing per protocol  Hypertensive despite scheduled and prn hydralazine-discussed with tele hub - no new orders  Plan (Upcoming Events): wean off fent, transition to oxy+tyl  Discharge/Transfer Needs: off gtts to go to [facility]     Bedside Shift Report Completed : y  Bedside Safety Check Completed: y

## 2023-12-03 NOTE — PLAN OF CARE
ICU End of Shift Summary.  For vital signs and complete assessments, please see documentation flowsheets.      Pertinent assessments:   Neuro: alert- following commands, answering yes and no to questions- moderate pain throughout shift - PRNs given, some relief.   Cardiac: SR. HTN - PRN hydralazine given x1.   Resp: Trached vent supported. Minimal secretions.  GI: TF @ goal rate FWF 100cc q4. Smear BM - brown, loose. Passing flatus.   : mitchell - leaking pt. Says yes to bladder feeling full - bladder scan 325. Catheter flushed and with sterile gloves advanced catheter, 400mL OP after advancement. No leaking and pt. Says yes to relief.   Skin: see flowsheets for details.  Lines: R PICC, L PIV      Major Shift Events:    - pt. Feeling anxious and restless most of shift, frequent repositioning per pt. Request. PRN dilaudid and oxy given every q 3 hours for pain/anxiety.       Plan (Upcoming Events): Continue plan of care  Discharge/Transfer Needs: TBD     Bedside Shift Report Completed : Y  Bedside Safety Check Completed: Y

## 2023-12-03 NOTE — PROGRESS NOTES
[facility]    Medicine Progress Note - Hospitalist Service    Date of Admission:  10/15/2023    Assessment & Plan   Matthew Bowen is a 78 year old female with history of chronic hypoxic respiratory failure due to pulmonary HTN, ALAINA (uses CPAP with sleep at baseline), and chronic diastolic congestive heart failure. She also has history of CAD, CKD stage 3, obesity, HTN,  and depression. She presented to the ED on 10/15/23 with fever, cough, and falls. ED evaluation showed low grade temperature elevation in the 99s. She was hypoxic, requiring supplemental oxygen at 2 lpm. Laboratory evaluation showed WBC 21.5, procalcitonin 0.16, creatinine 1.21, and bicarb 30. CXR suggested pulmonary vascular congestion. CT of chest showed lingular and bilateral upper lung pneumonia. No traumatic injury was found on imaging. She was admitted to the hospital and treated for multifocal pneumonia with Rocephin and Zithromax. She developed worsened respiratory status on 10/21 requiring transfer to ICU and intubation. Hospital course was prolonged respiratory failure, ARDS, and difficulty weaning from vent. Tracheostomy was deferred partially due to family preference and also due to the fact that increased procedural risk given high ongoing oxygen needs. CT chest obtained 11/18 suggested severe ground-glass opacities with traction bronchiectasis. It was unclear how much of her disease is reversible. Pulmonology started high-dose steroids on 11/18, with modest interval improvement in O2 weaning. Family conference was held on 11/24 and decision was made to pursue tracheostomy/G tube for continued vent weaning and feeds.     Multifocal community acquired pneumonia, Recent pneumonias prior to admission, Possible bronchiectasis, Sepsis (leukocytosis and fever), Acute respiratory failure, Adult respiratory distress syndrome prolonged ventilation, trach placement:  Summary:  Was initially admitted to medicine and  treated with Rocephin and Zithromax. Deteriorated and transferred to the ICU and intubated on 10/21/23.   Antibiotic coverage changed to cefepime on 10/25 through 10/31.  Received Zosyn from 11/15 through 11/19.  Meropenem and vancomycin were started 11/19.   ID following, continue meropenem through 11/29/23.  ID has signed off. Continue vent support, stable on current settings.  Intensivist also following.  Plan currently is for high dose steroids + trach with gradual attempts a vent wean.   PJP prophylaxis.  See pulm note for recommended taper once ready to start taper process.  Consider starting later this week post trach.  Did get a tracheostomy 11/29 per ENT and PEG per IR.  Patient's tracheostomy was complicated by ongoing bleeding.  ENT took the patient back to the OR and cauterized the area.  Patient does not appear to be actively bleeding at this point.  Patient's hemoglobin has been stable 8.1-9.4.  Hemoglobin is 8.2 as of 12/3/2023 no signs of current bleeding.  Patient is going to need LTAC at discharge, looking at [place] as the patient's family's first choice.  Case management/social work helping with this.  Patient will need a trach change in the next week per ENT.  This may be able to be done at [place] we will need to figure out the timing and logistics of this prior to discharge.     Chronic hypoxic respiratory failure, Obstructive sleep apnea, Chronic diastolic heart failure, not felt to have current exac, Pulmonary hypertension:  -Baseline on 2 lpm of oxygen at home.  Did get a trach.  Has puffy edema of the extremities.  Patient currently is on torsemide.  Will increase the dose while following her creatinine which is currently normal.  On vent.  Will need an LTACH at discharge.  Case management consulted for discharge planning.  Patient's creatinine is stable on her increased torsemide dose.  Creatinine is only 0.64 with a potassium 3.3 on replacement protocol.  Continue on current torsemide  dose      Hypokalemia   patient is on potassium replacement, potassium 3.3 in the setting of torsemide.    Pain  Patient been followed by the pain service.  Her goal needs to be to wean off of her fentanyl drip.  She is on oxycodone and nursing is asking for a range 5-10 mg so that they can limit the amount of narcotics she gets so that she is not sedated during the day.  10 mg of oxycodone tends to sedate the patient too much.  Patient is on Lyrica only once a day though she took it twice a day at home this is due to her kidney function and GFR.  Nursing has done a great job weaning of the fentanyl drip. Now off.     Anemia  -Has been persistent during stay. HGB was 11.6 on presentation and trended down- generally in 7-8's.   -Suspect related to critical illness, phlebotomy, etc.  -On Protonix for ulcer prevention  -Had transfusion of 1 unit RBCs on 11/17  -Had transfusion of 1 unit of RBCs on 11/25/23 for HGB 6.9 with rise to 8 range. Hemoglobin   stable at 8.2 12/3/2023 without signs of active bleeding     Mild Intermittent Hyperkalemia:  -Potassium was 5.5 recently. Lokelma given and 5.4 11/26.  No obvious cause such as CARMEN, medicine, IVF, acidosis, etc as she has a normal creatinine.. She does have chronic kidney disease per chart though I do not believe this as her creatinine is 0.62 with a gfr >90.  This is likely due to her home diuretics,  and is normally on torsemide 60 mg daily. Consider Lokelma if potassium continues to creep up vs changing tube feeds.   Now the patient is going the other way in the setting of torsemide with her potassium being low at 3.3.  Replace as above.    Hypernatremia, mild  -Resolved, sodium 138 as of 11/29  -Monitor sodium with resumption of torsemide  ,  sodium is normal     Falls prior to admission, Subacute rib fracture, Pain, Anxiety:  -Imaging negative for acute fractures and bleeding but did show subacute rib fractures.  Had been on fentanyl drip much of stay.  Dose  recently increased to 150 mcg/hr.  More relaxed on that dose.  Pain team consulted.  Working on plan with them to begin transitioning off fentanyl drip to oral/feeding tube based pain control.  Has not been weaned off fentanyl drip      CKD stage 3, Acute kidney injury earlier in stay  -Cr 1.21 on admission and peaked in 2.1 range but has now normalized in the setting of pneumonia, hypoxia, etc.  Had been holding diuretics as discussed above. Resumed torsemide at 20 mg daily 11/26, will increase to bid 11/29.  (PTA was on torsemide 60 mg each am)  -Monitor renal function closely, creatinine and GFR are now normal, I suspect her previous elevated creatinine had etiologies other than chronic kidney disease.    Creatinine is normal 0.64 on torsemide, stable     HTN  -Continue PTA amlodipine, torsemide, hydralazine not sure why she is not on a  beta blocker with her coronary artery disease.  Home imdur on hold  Blood pressure has been labile, blood pressure during the stay has been labile with being low at times and high.  Goal is to prevent hypotension.  Patient is on lopressor, hydralazine, and norvasc.  Will increase her norvasc to bid dosing with parameters.        Obesity:  -  Increases complexity of care, has BMI of 36 as of 11.29     CAD:  -Continue on home ASA, statin.  Historically has been on plavix but it has been on hold now since early in her stay.  Last PCI in 2019 and even though she has residual RCA dz that in itself is not an indication for DAPT at this point especially with anemia. Not sure why she is not on a beta blocker, started on low-dose beta-blocker given her hypertension.    Hypothyroidism  -Continue PTA levothyroxine    Pressure injury/wounds  Patient's been followed by the wound care nurse.  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  She also has a right inner buttock and perineal wound due to either friction or possible pressure injury.  Patient has a  perianal pressure injury that is hospital-acquired stage II likely related to her fecal management system and skin breakdown related to her loose stools.  Continue on wound care per recommendations by WOCN.  Need to get rid of rectal tube.     Hyperlipidemia  -is on lipitor, not sure that there is any real benefit of this at this point.      Mood disorder, anxiety  -Continue PTA Lyrica and Zoloft, having more anxiety, will use atarax. a     Access issues:  -  s/p PICC line.  Some generalized edema including PICC line arm.  Monitor.    Nutrition  Hyperglycemia:  -  sliding scale insulin PRN.  Continue Tube Feeds, off at midnight tonight for procedure in AM.  Dextrose PRN if drops.  IR consulted for PEG placement 11/29/23 in coordination with trach.  Now can use her PEG, postpyloric feeding tube has subsequently been removed.  Recent Labs   Lab 12/03/23  0524 12/03/23  0350 12/03/23  0013 12/02/23 2013 12/02/23  1616   * 139* 143* 134* 146*         Labs/Imaging Reviewed:  See Information above and Data section below    Discussed with night nurse, Dr Payton, entire care team during bedside rounds once again.  I was able to update  bedside yesterday afternoon.        Diet: NPO for Medical/Clinical Reasons Except for: Other; Specify: NPO For oral intake and gastric feedings for 6 hours post insertion Gastrostomy G/GJ tube. May feed through Jejunal or Distal PORT immediately for GJ tubes ONLY.  Adult Formula Drip Feeding: Continuous Novasource Renal; Gastrostomy; Goal Rate: 30 ml/hr x 22 hrs (hold 1 hr before and after levothyroxine); mL/hr; When GT ok to use per MD restart EN at half rate of 15 mL/hr x 8 hours. If tolerated ok to increa...    DVT Prophylaxis: Heparin SQ  Aleman Catheter: PRESENT, indication: Strict 1-2 Hour I&O, Strict 1-2 Hour I&O  Lines: PRESENT      PICC 10/22/23 Triple Lumen Right Basilic multiple med gtt. ready for use-Site Assessment: WDL      Cardiac Monitoring: ACTIVE order.  Indication: Tachyarrhythmias, acute (48 hours)  Code Status: Full Code      Clinically Significant Risk Factors                   # Moderate Malnutrition: based on nutrition assessment           Disposition Plan   Needs ongoing ICU care for trach and PEG tube, vent weaning, eventually will need LTAC for vent and tube feed weaning to [place].  Fentanyl drip off, can leave at anytime at this point.  Will [facility] outpatient follow up for trach replacement. .        Rosendo Rich MD  Hospitalist Service  [facility]  Securely message with [employer] (more info)  Text page via [facility] Paging/Directory   ______________________________________________________________________    Interval History     Patient is stable this morning once again.  Lying in bed tolerating the vent as well as her tube feeds.  Blood pressure has been creeping up.  Her fentanyl drip has been weaned off.  Has no new complaints.  Currently stable and could go to [place] at any time at this point.  Per nursing having a little more anxiety will tray atarax.          Physical Exam   Vital Signs: Temp: 98.5  F (36.9  C) Temp src: Temporal BP: (!) 159/81 Pulse: 64   Resp: 26 SpO2: 96 % O2 Device: Mechanical Ventilator    Weight: 181 lbs 7.02 oz    No changes  GEN:  Awake, somewhat responds to questions similar to prior but not very interactive, appears comfortable, no overt distress.  Appears elderly and frail, lying in bed on the vent   HEENT: Tracheostomy in place without evidence of bleeding, mucous membranes moist  CV: Regular rate and rhythm, no murmurs   LUNGS:  Coarse bilaterally unchanged, no wheezes/retractions.  Symmetric chest rise on inhalation noted.  ABD:  Active bowel sounds, soft, non-tender/non-distended.  No rebound/guarding/rigidity.  PEG tube in place  EXT: edema is improved.  No cyanosis.       Medications    dexmedeTOMIDine Stopped (12/01/23 1600)    dextrose Stopped (11/30/23 0420)      acetaminophen  650 mg Oral or  Feeding Tube 4x Daily    amLODIPine  5 mg Oral or Feeding Tube BID    aspirin  81 mg Oral or Feeding Tube Daily    atorvastatin  20 mg Oral or Feeding Tube QPM    atovaquone  1,500 mg Oral or Feeding Tube Daily    B and C vitamin Complex with folic acid  5 mL Oral or Feeding Tube Daily    chlorhexidine  15 mL Mouth/Throat Q12H    clotrimazole   Topical BID    fiber modular (NUTRISOURCE FIBER)  1 packet Per Feeding Tube TID    heparin ANTICOAGULANT  5,000 Units Subcutaneous Q8H    hydrALAZINE  25 mg Oral or Feeding Tube TID    insulin aspart  1-6 Units Subcutaneous Q4H    ipratropium - albuterol 0.5 mg/2.5 mg/3 mL  3 mL Nebulization 4x daily    levothyroxine  150 mcg Oral or Feeding Tube QAM AC    lidocaine   Topical TID    methylPREDNISolone  62.5 mg Intravenous Q8H    metoprolol  12.5 mg Per Feeding Tube BID    pantoprazole  40 mg Per Feeding Tube QAM AC    pregabalin  75 mg Per Feeding Tube Daily    protein modular  1 packet Per Feeding Tube BID    QUEtiapine  150 mg Oral or Feeding Tube BID    sertraline  150 mg Oral or Feeding Tube Daily    sodium chloride (PF)  10-40 mL Intracatheter Q7 Days    sodium chloride (PF)  3 mL Intracatheter Q8H    torsemide  20 mg Oral or Feeding Tube BID    vitamin D3  50 mcg Oral or Feeding Tube Daily       Data     Labs and Imaging results below reviewed today.    I have personally reviewed the following data over the past 24 hrs:    19.6 (H)  \   8.2 (L)   / 250     141 97 (L) 70.0 (H) /  129 (H)   3.3 (L) 35 (H) 0.64 \       Recent Labs   Lab 12/03/23  0524 12/02/23  0405 12/01/23  0415   WBC 19.6* 22.4* 18.6*   HGB 8.2* 8.9* 8.0*   HCT 27.6* 30.0* 26.9*   MCV 89 88 89    276 234     7-Day Micro Results       No results found for the last 168 hours.          Recent Labs   Lab 12/03/23  0524 12/03/23  0350 12/03/23  0013 12/02/23  1225 12/02/23  1220 12/02/23  0807 12/02/23  0405 12/01/23  0906 12/01/23  0415     --   --   --   --   --  139  --  136   POTASSIUM 3.3*   --   --   --  3.7  --  2.9*  --  3.6   CHLORIDE 97*  --   --   --   --   --  97*  --  99   CO2 35*  --   --   --   --   --  31*  --  28   ANIONGAP 9  --   --   --   --   --  11  --  9   * 139* 143*   < >  --    < > 172*   < > 155*   BUN 70.0*  --   --   --   --   --  76.2*  --  75.8*   CR 0.64  --   --   --   --   --  0.64  --  0.70   GFRESTIMATED 90  --   --   --   --   --  90  --  88   BARBARA 8.5*  --   --   --   --   --  8.5*  --  8.3*   MAG 1.9  --   --   --   --   --  2.1  --  2.0   PHOS 2.5  --   --   --   --   --  3.1  --  4.3    < > = values in this interval not displayed.       No results found for this or any previous visit (from the past 24 hour(s)).

## 2023-12-03 NOTE — PROGRESS NOTES
ICU staff  DOS 12/3/2023    No major changes. Is more awake today and seems more comfortable. Ms Bowen is also off sedating/analgesic infusions.    Vitals:   Temp:  [97.8  F (36.6  C)-98.5  F (36.9  C)] 98.2  F (36.8  C)  Pulse:  [58-89] 61  Resp:  [10-46] 24  BP: (115-187)/() 120/60  FiO2 (%):  [40 %] 40 %  SpO2:  [93 %-99 %] 96 %     Vent Mode: CPAP/PS  (Continuous positive airway pressure with Pressure Support)  FiO2 (%): 40 %  Resp Rate (Set): 18 breaths/min  Tidal Volume (Set, mL): 360 mL  PEEP (cm H2O): 8 cmH2O  Pressure Support (cm H2O): 10 cmH2O  Inspiratory Pressure (cm H2O) (Drager Sanam): 12  Resp: 24    I/O last 3 completed shifts:  In: 1717 [I.V.:37; NG/GT:1050]  Out: 3580 [Urine:3580]    Exam:  Gen: Alert, interactive  HEENT: NC/AT, tracheostomy in place  Pulm: Doing well on PS 10/8 at my visit  Cor: RRR  Abdomen/GI: Soft, nondistended  : Deferred  Extremities: Warm, edematous  Skin: Well-perfused  Neuro: Eyes open, interacting, purposeful  Psych: Appears more comfortable and less anxious today    Data:   Labs reviewed, no major changes  Nothing new on cultures or imaging    Assessment/plan:  77 y/o woman with history of chronic hypoxemia, bronchiectasis, recurrent pulmonary infections admitted 10/15, intubated 10/21 for acute on chronic mixed respiratory failure likely related to multifocal organizing pneumonia. Tracheostomy and PEG 11/29. Showing some improvement and is likely ready for LTACH soon.  CNS: More alert today.  # Acute on chronic pain  # Weakness/frequent falls  # Toxic/metabolic encephalopathy, improving  - Off fentanyl and dex  - Has APAP, pregabalin, oxycodone for pain control  - Continue therapies, out of bed as able  Pulm: Tolerating pressure support trials  # Acute on chronic mixed respiratory failure  # Ventilator dependence  # Bronchiectasis  # Chronic hypoxemia on home oxygen  # Multifocal organizing pneumonia  - Continue pressure support trials with eventual goal of  trach dome  - Rest as needed on full support mode  - On high-dose steroids for inflammatory pneumonia, pulmonary planned on taper to start 12/4:  Per 11/26 note: Once she is stable they recommend continuing prednisone 120mg per day for at least 2 weeks, then down to 100mg for 2 weeks, then 80mg for 2 weeks, then decreasing by 5mg every 2 weeks until off.  They also recommend to be PJP prophylaxis until she is below 20 mg of prednisone.    CV: Hypertensive at times.  # Essential hypertension  # Chronic diastolic heart failure  # CAD s/p PCI 2019  - Continue amlodipine, hydralazine, metoprolol  - On aspirin  GI: Abdomen benign.  # Nutrition  - Continue tube feedings  : UOP adequate  # CARMEN on CKD, resolved  # Hypokalemia  # Hypervolemia  - Continues on torsemide  - Replacing K  - Creatinine below baseline, but has likely lost significant lean body mass; cystatin c suggests GFR is lower than reflected by creatinine   Heme: Hb 8.2 (8.9)  # Anemia of chronic disease  - No indications to transfuse  Msk: Extremities warm, well-perfused.   Endo: Glucose   # Stress/steroid hyperglycemia  # Hypothyroidism  - Insulin prn  - Continue levothyroxine  ID: Afebrile, WBCs 19 and unchanged. Completed abx.  ICU: SQH, PPI.  - Case management has seen the patient, is probably medically ready for LTACH when bed available.    This patient is critically ill to my assessment and requires ICU monitoring and cares. A total of 40 minutes critical care time spent on 12/3/2023, exclusive of procedures.     Rui Payton MD, PhD  Surgical critical care  12/3/2023, 12:20 PM      Clinically Significant Risk Factors        # Hypokalemia: Lowest K = 2.9 mmol/L in last 2 days, will replace as needed       # Hypoalbuminemia: Lowest albumin = 2.7 g/dL at 11/18/2023  5:05 AM, will monitor as appropriate             # Moderate Malnutrition: based on nutrition assessment

## 2023-12-03 NOTE — PROGRESS NOTES
[facility] ICU VENTILATOR RESPIRATORY NOTE  Date of Admission: 10/15/2023  Date of Intubation (most recent): 10/21/2023, trached 11/30  Reason for Mechanical Ventilation: Respiratory Failure  Number of Days on Mechanical Ventilation: 43  Met Criteria for Pressure Support Trial: Yes  Length of Pressure Support Trial: PS for about 2 hours yesterday     Vent Mode: CMV/AC  (Continuous Mandatory Ventilation/ Assist Control)  FiO2 (%): 40 %  Resp Rate (Set): 18 breaths/min  Tidal Volume (Set, mL): 360 mL  PEEP (cm H2O): 8 cmH2O  Pressure Support (cm H2O): 10 cmH2O  Inspiratory Pressure (cm H2O) (Drager Sanam): 12  Resp: 26       BS: Coarse  Secretions: Minimal     Trach cares done, some bleeding/drainage from stoma. Gauze changed frequently     BP (!) 159/81   Pulse 64   Temp 98.5  F (36.9  C) (Temporal)   Resp 26   Ht 1.524 m (5')   Wt 82.3 kg (181 lb 7 oz)   SpO2 96%   BMI 35.43 kg/m         Plan:  Continue SBT trials     Elba Mika, RT

## 2023-12-04 NOTE — PROGRESS NOTES
[facility] ICU VENTILATOR RESPIRATORY NOTE  Date of Admission: 10/15/2023  Date of Intubation (most recent): 10/21/2023, trached 11/30  Reason for Mechanical Ventilation: Respiratory Failure  Number of Days on Mechanical Ventilation: 44  Met Criteria for Pressure Support Trial: Yes    Vent Mode: CMV/AC  (Continuous Mandatory Ventilation/ Assist Control)  FiO2 (%): 40 %  Resp Rate (Set): 18 breaths/min  Tidal Volume (Set, mL): 360 mL  PEEP (cm H2O): 8 cmH2O  Pressure Support (cm H2O): 10 cmH2O  Resp: 12       BS: Coarse  Secretions: Minimal     Trach cares done, some bleeding/drainage from stoma. Gauze changed frequently     BP (!) 155/70   Pulse 56   Temp 97.8  F (36.6  C) (Temporal)   Resp 12   Ht 1.524 m (5')   Wt 82.3 kg (181 lb 7 oz)   SpO2 96%   BMI 35.43 kg/m      Plan:  Continue SBT trials     Elba Mika, RT

## 2023-12-04 NOTE — PROGRESS NOTES
[facility] ICU VENTILATOR RESPIRATORY NOTE    Date of Admission: 10/15/23    Date of Intubation (most recent): 10/21/23, trach on 11/30/23    Reason for Mechanical Ventilation: respiratory failure    Number of Days on Mechanical Ventilation: 43    Met Criteria for Pressure Support Trial: yes    Length of Pressure Support Trial: ongoing, tolerating well.    Plan:  Patient remains on ventilator with settings of:  Vent Mode: CPAP/PS  (Continuous positive airway pressure with Pressure Support)  FiO2 (%): 40 %  Resp Rate (Set): 18 breaths/min  Tidal Volume (Set, mL): 360 mL  PEEP (cm H2O): 8 cmH2O  Pressure Support (cm H2O): 10 cmH2O  Inspiratory Pressure (cm H2O) (Drager Sanam): 12  Resp: 22

## 2023-12-04 NOTE — PROGRESS NOTES
Will not see today:  PAIN MANAGEMENT SERVICE CHART CHECK  This patient's chart has been reviewed by the Pain Service. It has been determined that no change is necessary to the pain management regimen at this time. Now on oral regimen. Defer precedex to ICU MD. Pain team will sign off for now. Please re-consult if needed.     Thank you!      Agnes KELSEY, CNS-BC, CNP, ACHPN  Acute Care Pain Management Program   Hours of pain coverage 7a-1700- after 1700 please call the house officer   [facility] ([place], [place], [place], [place])   Page via [employer]- Click HERE to page Agnes or Jose text web console

## 2023-12-04 NOTE — PLAN OF CARE
Problem: Enteral Nutrition  Goal: Safe, Effective Therapy Delivery  Outcome: Progressing   Goal Outcome Evaluation: patient needing electrolyte replacements in the setting of renal formula now that home diuretic dosing has been ordered. Changed to jevity 1.5 @ 45 ml/hr. Will monitor for tolerance, electrolytes.

## 2023-12-04 NOTE — PROGRESS NOTES
CLINICAL NUTRITION SERVICES - BRIEF NOTE     Nutrition Prescription      Recommendations already ordered by Registered Dietitian (RD):  Enteral Nutrition - Modify composition - Jevity 1.5 @ 45 ml/hr x 22 hrs (990 ml), which provides 1485 kcal, 63 g protein, 214 g CHO, ~21 g fiber, 756 ml free H2O    Decrease Prosource TF 20 to 1 pkt/day = 80 kcal/20 g protein    Discontinue fiber modular    Total energy/protein provisions, all sources = 1565 kcal (~20 kcal/kg)/83 g protein (1.8 g/kg per IBW, 1 g/day per actual weight)    Future/Additional Recommendations:  Monitor tolerance, labs, skin integrity--adjust EN and modulars prn     CURRENT NUTRITION  Access: PEG placed (11/29)  Formula/Rate/Provisions: Novasource Renal @ 30 ml/hr x 22 hrs (660 ml), which provides 1320 kcal, 60 g protein, 121 g CHO, 473 ml free H2O.  Flushes: H2O- 100 ml q 2 hrs    Modulars: Nutrisource fiber x 3 pkts/day= 45 kcal/9 g fiber, Prosource TF 20 x 2 = 160 kcal/40 g protein    Total energy/protein provisions: 1525 kcal/100 g protein       NEW FINDINGS   Patient discussed during rounds this morning. Changed to renal formula last week due to persistent hyperkalemia, now needing replacement of K and P due to resumption of patient's home dose of torsemide. Will change formula back to standards. [facility]    Labs:  - BUN 74  - P 2.4    Medications/Infusions:  - Lipitor  - Nephronex  - levothyroxine  - methylprednisolone  - pantoprazole  - Neutra-Phos  - torsemide  - vitamin D3    ASSESSED NUTRITION NEEDS PER APPROVED PRACTICE GUIDELINES  Dosing Weight: 79.9 kg (lowest weight) for energy, and 45.5 kg (IBW) for protein  Estimated Energy Needs: 2723-4143+ kcals/day (15 - 20+ kcals/kg)  Justification: Maintenance (anticipate higher needs once patient participating with therapies)  Estimated Protein Needs: 68-91 grams protein/day (1.5-2 grams of pro/kg)  Justification: Hypercatabolism with critical illness, stage 2 PI,  CKD  Estimated Fluid Needs: Per provider pending fluid status    Implementation  Enteral Nutrition - Modify composition - Jevity 1.5 @ 45 ml/hr x 22 hrs (990 ml), which provides 1485 kcal, 63 g protein, 214 g CHO, ~21 g fiber, 756 ml free H2O    Decrease Prosource TF 20 to 1 pkt/day = 80 kcal/20 g protein    Discontinue fiber modular    Total energy/protein provisions, all sources = 1565 kcal (~20 kcal/kg)/83 g protein (1.8 g/kg per IBW, 1 g/day per actual weight)    Monitoring/Evaluation  Will continue to monitor and evaluate per protocol.    Elizabeth Ortiz RD, LD, [facility]  Pager - 3rd floor/ICU: [phone]  Pager - All other floors: [phone]  Pager - Weekend/holiday: [phone]  Office: [phone]

## 2023-12-04 NOTE — PROGRESS NOTES
[facility]    Medicine Progress Note - Hospitalist Service    Date of Admission:  10/15/2023    Assessment & Plan   Matthew Bowen is a 78 year old female with history of chronic hypoxic respiratory failure due to pulmonary HTN, ALAINA (uses CPAP with sleep at baseline), and chronic diastolic congestive heart failure. She also has history of CAD, CKD stage 3, obesity, HTN,  and depression. She presented to the ED on 10/15/23 with fever, cough, and falls. ED evaluation showed low grade temperature elevation in the 99s. She was hypoxic, requiring supplemental oxygen at 2 lpm. Laboratory evaluation showed WBC 21.5, procalcitonin 0.16, creatinine 1.21, and bicarb 30. CXR suggested pulmonary vascular congestion. CT of chest showed lingular and bilateral upper lung pneumonia. No traumatic injury was found on imaging. She was admitted to the hospital and treated for multifocal pneumonia with Rocephin and Zithromax. She developed worsened respiratory status on 10/21 requiring transfer to ICU and intubation. Hospital course was prolonged respiratory failure, ARDS, and difficulty weaning from vent. Tracheostomy was deferred partially due to family preference and also due to the fact that increased procedural risk given high ongoing oxygen needs. CT chest obtained 11/18 suggested severe ground-glass opacities with traction bronchiectasis. It was unclear how much of her disease is reversible. Pulmonology started high-dose steroids on 11/18, with modest interval improvement in O2 weaning. Family conference was held on 11/24 and decision was made to pursue tracheostomy/G tube for continued vent weaning and feeds.     Multifocal community acquired pneumonia, Recent pneumonias prior to admission, Possible bronchiectasis, Sepsis (leukocytosis and fever), Acute respiratory failure, Adult respiratory distress syndrome prolonged ventilation, trach placement:  Summary:  Was initially admitted to medicine and  treated with Rocephin and Zithromax. Deteriorated and transferred to the ICU and intubated on 10/21/23.   Antibiotic coverage changed to cefepime on 10/25 through 10/31.  Received Zosyn from 11/15 through 11/19.  Meropenem and vancomycin were started 11/19.   ID following, continue meropenem through 11/29/23.  ID has signed off. Continue vent support, stable on current settings.  Intensivist also following.  Plan currently is for high dose steroids + trach with gradual attempts a vent wean.   PJP prophylaxis.  See pulm note for recommended taper once ready to start taper process.  Consider starting later this week post trach.  Did get a tracheostomy 11/29 per ENT and PEG per IR.  Patient's tracheostomy was complicated by ongoing bleeding.  ENT took the patient back to the OR and cauterized the area.  Patient does not appear to be actively bleeding at this point.  Patient's hemoglobin has been stable 8.1-9.4.  Hemoglobin is 7.8 as of 12/4/2023 no signs of current bleeding.  Patient is going to need LTAC at discharge, looking at [place] as the patient's family's first choice.  Case management/social work helping with this.  Patient will need a trach change in the next week per ENT.  This may be able to be done at [place] we will need to figure out the timing and logistics of this prior to discharge.     Chronic hypoxic respiratory failure, Obstructive sleep apnea, Chronic diastolic heart failure, not felt to have current exac, Pulmonary hypertension:  -Baseline on 2 lpm of oxygen at home.  Did get a trach.  Has puffy edema of the extremities.  Patient currently is on torsemide.  Will increase the dose while following her creatinine which is currently normal.  On vent.  Will need an LTACH at discharge.  Case management consulted for discharge planning.  Patient's creatinine is stable on her increased torsemide dose.  Creatinine is only 0.67 with a potassium 3.7 on replacement protocol as of 12/4/2023.  Continue on  current torsemide dose      Hypokalemia   patient is on potassium replacement, potassium 3.7 as of 12/4/23 in the setting of torsemide.  Continue to replace    Pain  Patient been followed by the pain service.  Her goal needs to be to wean off of her fentanyl drip.  She is on oxycodone and nursing is asking for a range 5-10 mg so that they can limit the amount of narcotics she gets so that she is not sedated during the day.  10 mg of oxycodone tends to sedate the patient too much.  Patient is on Lyrica only once a day though she took it twice a day at home this is due to her kidney function and GFR.  Nursing has done a great job weaning of the fentanyl drip. Now off.  Patient also is actually doing better with Atarax and did not get oxycodone overnight leading to her to be more alert today.  Anxiety seems to be a bigger player with the patient's pain.     Anemia  -Has been persistent during stay. HGB was 11.6 on presentation and trended down- generally in 7-8's.   -Suspect related to critical illness, phlebotomy, etc.  -On Protonix for ulcer prevention  -Had transfusion of 1 unit RBCs on 11/17  -Had transfusion of 1 unit of RBCs on 11/25/23 for HGB 6.9 with rise to 8 range. Hemoglobin 7.8 12/4/2023 without signs of active bleeding     Mild Intermittent Hyperkalemia:  -Potassium was 5.5 recently. Lokelma given and 5.4 11/26.  No obvious cause such as CARMEN, medicine, IVF, acidosis, etc as she has a normal creatinine.. She does have chronic kidney disease per chart though I do not believe this as her creatinine is 0.62 with a gfr >90.  This is likely due to her home diuretics,  and is normally on torsemide 60 mg daily. Consider Lokelma if potassium continues to creep up vs changing tube feeds.   Now the patient is going the other way in the setting of torsemide with her potassium being low at 3.3.  Replace as above. Now has been hypokalemic with diuretics.     Hypernatremia, mild  -Monitor sodium with resumption of  torsemide  ,  sodium is 145 12/4/23    Falls prior to admission, Subacute rib fracture, Pain, Anxiety:  -Imaging negative for acute fractures and bleeding but did show subacute rib fractures.  Had been on fentanyl drip much of stay.  Dose recently increased to 150 mcg/hr.  More relaxed on that dose.  Pain team consulted.  Working on plan with them to begin transitioning off fentanyl drip to oral/feeding tube based pain control.  Has now been weaned off fentanyl drip      Chronic kidney disease stage 3, Acute kidney injury earlier in stay  -Cr 1.21 on admission and peaked in 2.1 range but has now normalized in the setting of pneumonia, hypoxia, etc.  Had been holding diuretics as discussed above. Resumed torsemide at 20 mg daily 11/26, will increase to bid 11/29.  (PTA was on torsemide 60 mg each am)  -Monitor renal function closely, creatinine and GFR are now normal, I suspect her previous elevated creatinine had etiologies other than chronic kidney disease.    Creatinine is normal 0.667 on torsemide, stable     Hypertension    -Continue PTA amlodipine, torsemide, hydralazine not sure why she is not on a  beta blocker with her coronary artery disease.  Home imdur on hold  Blood pressure has been labile, blood pressure during the stay has been labile with being low at times and high.  Goal is to prevent hypotension.  Patient is on lopressor, hydralazine, and norvasc, and torsemide.  Patient has had some sinus bradycardia at times so her beta-blocker was held yesterday uncomfortable having the patient's pulse in the 50s at this point.  Blood pressure is doing much better we will make any changes today.       Obesity:  -  Increases complexity of care, has BMI of 36 as of 11.29     Coronary artery disease:  -Continue on home ASA, statin.  Historically has been on plavix but it has been on hold now since early in her stay.  Last PCI in 2019 and even though she has residual RCA dz that in itself is not an indication for  DAPT at this point especially with anemia. Not sure why she is not on a beta blocker, started on low-dose beta-blocker given her hypertension.    Hypothyroidism  -Continue PTA levothyroxine    Pressure injury/wounds  Patient's been followed by the wound care nurse.  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  She also has a right inner buttock and perineal wound due to either friction or possible pressure injury.  Patient has a perianal pressure injury that is hospital-acquired stage II likely related to her fecal management system and skin breakdown related to her loose stools.  Continue on wound care per recommendations by WOCN.  Rectal tube has been removed.      Hyperlipidemia  -is on lipitor, not sure that there is any real benefit of this at this point.      Mood disorder, anxiety  -Continue PTA Lyrica and Zoloft, having more anxiety, will use atarax which has given good results     Access issues:  -  s/p PICC line.  Some generalized edema including PICC line arm.  Monitor.    Nutrition  Hyperglycemia:  -  sliding scale insulin PRN.  Continue Tube Feeds, off at midnight tonight for procedure in AM.  Dextrose PRN if drops.  IR consulted for PEG placement 11/29/23 in coordination with trach.  Now can use her PEG, postpyloric feeding tube has subsequently been removed.  Recent Labs   Lab 12/04/23  0544 12/04/23  0417 12/04/23  0015 12/03/23  1949 12/03/23  1627   * 135* 149* 156* 163*         Labs/Imaging Reviewed:  See Information above and Data section below    Discussed with night nurse, Dr Hernandez, entire care team during bedside rounds once again         Diet: NPO for Medical/Clinical Reasons Except for: Other; Specify: NPO For oral intake and gastric feedings for 6 hours post insertion Gastrostomy G/GJ tube. May feed through Jejunal or Distal PORT immediately for GJ tubes ONLY.  Adult Formula Drip Feeding: Continuous Novasource Renal; Gastrostomy; Goal Rate: 30 ml/hr  x 22 hrs (hold 1 hr before and after levothyroxine); mL/hr; When GT ok to use per MD restart EN at half rate of 15 mL/hr x 8 hours. If tolerated ok to increa...    DVT Prophylaxis: Heparin SQ  Aleman Catheter: PRESENT, indication: Strict 1-2 Hour I&O, Strict 1-2 Hour I&O  Lines: PRESENT      PICC 10/22/23 Triple Lumen Right Basilic multiple med gtt. ready for use-Site Assessment: WDL      Cardiac Monitoring: ACTIVE order. Indication: Tachyarrhythmias, acute (48 hours)  Code Status: Full Code  , needs ongoing advance care planning.  Not sure the family/patient has true insight into her chronic underlying medical issues    Clinically Significant Risk Factors                   # Moderate Malnutrition: based on nutrition assessment           Disposition Plan   Needs ongoing ICU care for trach and PEG tube, vent weaning, eventually will need LTAC for vent and tube feed weaning to [place].  Fentanyl drip off, can leave at anytime at this point.  Will [facility] outpatient follow up for trach replacement.this week per ENT .        Rosendo Rich MD  Hospitalist Service  [facility]  Securely message with MOGL (more info)  Text page via [facility] Paging/Directory   ______________________________________________________________________    Interval History     Patient actually doing better.  She is more alert and did not require oxycodone overnight as she is actually been doing better with Atarax.  She is off of fentanyl.  Her pressures are currently stable.  Really no new changes.  She is comfortable on the ventilator.  Has had some bradycardia with her beta-blocker.         Physical Exam   Vital Signs: Temp: 97.8  F (36.6  C) Temp src: Temporal BP: (!) 155/70 Pulse: 56   Resp: 12 SpO2: 96 % O2 Device: Mechanical Ventilator    Weight: 181 lbs 7.02 oz    Exam is unchanged from yesterday  GEN:  Awake, is more alert, responds to questions and more interactive, appears comfortable, no overt distress.  Appears  elderly and frail, lying in bed on the vent   HEENT: Tracheostomy in place without evidence of bleeding, mucous membranes moist  CV: Regular rate and rhythm, no murmurs   LUNGS:  Coarse bilaterally unchanged, no wheezes/retractions.  Symmetric chest rise on inhalation noted.  ABD:  Active bowel sounds, soft, non-tender/non-distended.  No rebound/guarding/rigidity.  PEG tube in place  EXT: edema is improved.  No cyanosis.       Medications    dexmedeTOMIDine Stopped (12/01/23 1600)    dextrose Stopped (11/30/23 0420)      acetaminophen  650 mg Oral or Feeding Tube 4x Daily    amLODIPine  5 mg Oral or Feeding Tube BID    aspirin  81 mg Oral or Feeding Tube Daily    atorvastatin  20 mg Oral or Feeding Tube QPM    atovaquone  1,500 mg Oral or Feeding Tube Daily    B and C vitamin Complex with folic acid  5 mL Oral or Feeding Tube Daily    chlorhexidine  15 mL Mouth/Throat Q12H    clotrimazole   Topical BID    fiber modular (NUTRISOURCE FIBER)  1 packet Per Feeding Tube TID    heparin ANTICOAGULANT  5,000 Units Subcutaneous Q8H    hydrALAZINE  25 mg Oral or Feeding Tube TID    insulin aspart  1-6 Units Subcutaneous Q4H    ipratropium - albuterol 0.5 mg/2.5 mg/3 mL  3 mL Nebulization 4x daily    levothyroxine  150 mcg Oral or Feeding Tube QAM AC    lidocaine   Topical TID    methylPREDNISolone  62.5 mg Intravenous Q8H    metoprolol  12.5 mg Per Feeding Tube BID    pantoprazole  40 mg Per Feeding Tube QAM AC    potassium & sodium phosphates  1 packet Oral or Feeding Tube Q4H    pregabalin  75 mg Per Feeding Tube Daily    protein modular  1 packet Per Feeding Tube BID    QUEtiapine  150 mg Oral or Feeding Tube BID    sertraline  150 mg Oral or Feeding Tube Daily    sodium chloride (PF)  10-40 mL Intracatheter Q7 Days    sodium chloride (PF)  3 mL Intracatheter Q8H    torsemide  20 mg Oral or Feeding Tube BID    vitamin D3  50 mcg Oral or Feeding Tube Daily       Data     Labs and Imaging results below reviewed today.    I  have personally reviewed the following data over the past 24 hrs:    16.1 (H)  \   7.8 (L)   / 232     145 101 74.0 (H) /  115 (H)   3.7 38 (H) 0.67 \       Recent Labs   Lab 12/04/23  0544 12/03/23  0524 12/02/23  0405   WBC 16.1* 19.6* 22.4*   HGB 7.8* 8.2* 8.9*   HCT 26.2* 27.6* 30.0*   MCV 89 89 88    250 276     7-Day Micro Results       No results found for the last 168 hours.          Recent Labs   Lab 12/04/23  0544 12/04/23  0417 12/04/23  0015 12/03/23  1627 12/03/23  1259 12/03/23  0904 12/03/23  0524 12/02/23  0807 12/02/23  0405     --   --   --   --   --  141  --  139   POTASSIUM 3.7  --   --   --  3.9  --  3.3*   < > 2.9*   CHLORIDE 101  --   --   --   --   --  97*  --  97*   CO2 38*  --   --   --   --   --  35*  --  31*   ANIONGAP 6*  --   --   --   --   --  9  --  11   * 135* 149*   < >  --    < > 129*   < > 172*   BUN 74.0*  --   --   --   --   --  70.0*  --  76.2*   CR 0.67  --   --   --   --   --  0.64  --  0.64   GFRESTIMATED 89  --   --   --   --   --  90  --  90   BARBARA 8.6*  --   --   --   --   --  8.5*  --  8.5*   MAG 2.1  --   --   --   --   --  1.9  --  2.1   PHOS 2.4*  --   --   --   --   --  2.5  --  3.1    < > = values in this interval not displayed.       No results found for this or any previous visit (from the past 24 hour(s)).

## 2023-12-04 NOTE — PLAN OF CARE
ICU End of Shift Summary.  For vital signs and complete assessments, please see documentation flowsheets.      Pertinent assessments:   Neuro: alert- following commands, answering yes and no to questions- denied pain during shift.- PRN atarax given for anxiety- pt. States relief.   Cardiac: SR. SB 55s - after 2100 metoprolol dose and while asleep. MAPs greater than 65.    Resp: Trached vent supported. Minimal secretions.  GI: TF @ goal rate FWF 100cc q4. Passing flatus.   : mitchell in place- good UOP.   Skin: see flowsheets for details.  Lines: R PICC, L PIV        Plan (Upcoming Events): Continue plan of care  Discharge/Transfer Needs: TBD     Bedside Shift Report Completed : Y  Bedside Safety Check Completed: Y

## 2023-12-04 NOTE — PROGRESS NOTES
Care Management Follow Up    Length of Stay (days): 50    Expected Discharge Date: 11/22/2023     Concerns to be Addressed: all concerns addressed in this encounter     Patient plan of care discussed at interdisciplinary rounds: Yes    Anticipated Discharge Disposition: LTACH     Anticipated Discharge Services:    Anticipated Discharge DME:      Patient/family educated on Medicare website which has current facility and service quality ratings:    Education Provided on the Discharge Plan:    Patient/Family in Agreement with the Plan: yes    Referrals Placed by CM/SW: Post Acute Facilities  Private pay costs discussed: transportation costs    Additional Information:  [facility] contacted for review this am. Discussed with their liaison this afternoon. Pending their review they would like CM to tentatively set up transportation for tomorrow by 1300 in anticipation of discharge.Bedside RN feels that cpap on transport will be appropriate. [facility] liaison will place a call to spouse.    For potential discharge 12/5:  [facility] will confirm in am after MD review  Liaison will be in contact in am with number for MD to provide peer to peer report.   Discharge summary will need to be completed before discharge  Discharge readmit orders will need to be entered in am  [facility] transport set up for 12/5 at [id number] due to trach with cpap and inability to transport via wheelchair. PCS form placed on chart    Will continue to follow for discharge planning for LTACH.    Maggi Doran RN BSN OCN  Care Coordinator  [facility]  [phone]

## 2023-12-04 NOTE — PLAN OF CARE
ICU End of Shift Summary.  For vital signs and complete assessments, please see documentation flowsheets.        Pertinent assessments:   Neuro: Alert, answers with head nods of yes/no and shoulder shrugs. Afebrile, no c/o pain, no PRN oxy given this shift. Pt does have atarax available for pain and anxiety  Cardiac: SR, BP WDL.   Resp: LS coarse, trach remains intact with some minimal bloody drainage. Pt needing minimal suctioning. Pressure support for most of shift 10/8 at 40% for FiO2.   GI: PEG tube remains intact, TF at goal with FWF at goal. Passing gas, minimal c/o gas discomfort. BM 3x this shift  : mitchell remains intact, adequate amount of urine  Skin: No new bruising or skin tears noted  Lines: L PICC  Drips: None    Major Shift Events:   ~worked with OT today. Working independently on hand strengthening exercises. Up in chair for over 4 hours.    Plan (Upcoming Events): Con't to with pressure support on vent. Awaiting transition to [place]  Discharge/Transfer Needs: TBD     Bedside Shift Report Completed : yes  Bedside Safety Check Completed: Yes    Notified provider about indwelling mitchell catheter discussed removal or continued need.    Did provider choose to remove indwelling mitchell catheter? YES    Provider's mitchell indication for keeping indwelling mitchell catheter: Indication for continued use: Strict 1-2 Hour I & O if external catheters are not an option    Is there an order for indwelling mitchell catheter? YES    *If there is a plan to keep mitchell catheter in place at discharge daily notification with provider is not necessary, but please add a notation in the treatment team sticky note that the patient will be discharging with the catheter.

## 2023-12-04 NOTE — PROGRESS NOTES
ICU Progress Note    Date of Service: 12/04/23    A/P:  78F chronic hypoxemic respiratory failure, pHTN, ALAINA, HFpEF, CAD, CKD3, HTN admitted 10/15 w/ fever and cough felt to be 2/2 pneumonia. Course c/b progressive hypoxemia requiring mechanical ventilation, ARDS, and difficulty to wean from the ventilator now s/p trach/PEG.     I have personally reviewed the daily labs, imaging studies, cultures and discussed the case with referring physician and consulting physicians.     Neuro  # Toxic metabolic encephalopathy   # Chronic pain  - off sedation   - acetaminophen QID   - quetiapine   - sertraline   - pregabalin     Pulmonary  # Acute on chronic hypoxemic respiratory failure  # Bronchiectasis   # Hx ALAINA  # Multifocal organizing pneumonia   - vent settings below   - on high-dose steroid taper   - atovaquone PJP ppx     Cardiac  # HTN  # HFpEF  # CAD - s/p PCI 2019  # Pulmonary hypertension   - MAP > 65  - amlodipine   - hydralazine   - metoprolol   - ASA     Renal  # Uremia   # Hypervolemia  - monitor UOP, Cr, I/O  - electrolyte replacement protocols   - torsemide     GI  # Protein calorie malnutrition   # CARMEN on CKD - resolved  - RD to manage TF     Heme/Onc  # Leukocytosis   # Anemia - 2/2 critical and chronic illness   - monitor CBC    ID  # Pneumonia   - completed ABx     Endo  # Stress hyperglycemia   # Hypothyroidism  - monitor BG  - sliding scale insulin   - levothyroxine     Clinically Significant Risk Factors        # Hypokalemia: Lowest K = 3.3 mmol/L in last 2 days, will replace as needed       # Hypoalbuminemia: Lowest albumin = 2.7 g/dL at 11/18/2023  5:05 AM, will monitor as appropriate             # Moderate Malnutrition: based on nutrition assessment                  PPX  1. DVT: SQH   2. VAP: HOB 30 degrees, chlorhexidine rinse  3. Stress Ulcer: PPI  4. Restraints: Nonviolent soft two point restraints required and necessary for patient safety and continued cares and good effect as patient continues to  pull at necessary lines, tubes despite education and distraction. Will readdress daily.   5. Wound care - per unit routine   6. Feeding - TF    Interval Hx:  NAEON. Awaiting LTACH.     Unable to obtain ROS 2/2 sedation/intubation.     BP (!) 149/70   Pulse 63   Temp 98  F (36.7  C) (Temporal)   Resp 12   Ht 1.524 m (5')   Wt 82.3 kg (181 lb 7 oz)   SpO2 96%   BMI 35.43 kg/m    Gen: sitting in chair, NAD  Neuro: pupils equal  HEENT: anicteric  Card: RRR  Pulm: clear b/l  Abd: soft, non-distended  MSK: no edema, no acute joint abnormality  Skin: no obvious rash    Vent Mode: CMV/AC  (Continuous Mandatory Ventilation/ Assist Control)  FiO2 (%): 40 %  Resp Rate (Set): 18 breaths/min  Tidal Volume (Set, mL): 360 mL  PEEP (cm H2O): 8 cmH2O  Pressure Support (cm H2O): 10 cmH2O  Resp: 12        Intake/Output Summary (Last 24 hours) at 12/4/2023 0933  Last data filed at 12/4/2023 0800  Gross per 24 hour   Intake 1610 ml   Output 3840 ml   Net -2230 ml       Labs: reviewed    Imaging: reviewed    Billing: Patient is critically ill. Total critical care time today, excluding procedures, was 45 minutes.    Ari Hernandez MD  Pulmonary Disease and Critical Care Medicine   [facility]

## 2023-12-05 PROBLEM — J96.21 ACUTE ON CHRONIC RESPIRATORY FAILURE WITH HYPOXEMIA (H): Status: ACTIVE | Noted: 2023-01-01

## 2023-12-05 NOTE — PROGRESS NOTES
Occupational Therapy Discharge Summary    Reason for therapy discharge:    Discharged to [facility]    Progress towards therapy goal(s). See goals on Care Plan in [facility] electronic health record for goal details.  Goals not met.  Barriers to achieving goals:   limited tolerance for therapy and discharge from facility.    Therapy recommendation(s):    Continued therapy is recommended.  Rationale/Recommendations:  Pt is motivated to participate and has supportive family.  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

## 2023-12-05 NOTE — PROGRESS NOTES
[facility] ICU VENTILATOR RESPIRATORY NOTE  Date of Admission: 10/15/2023  Date of Intubation (most recent): 10/21/2023, trached 11/30  Reason for Mechanical Ventilation: Respiratory Failure  Number of Days on Mechanical Ventilation: 45  Met Criteria for Pressure Support Trial: Yes, pt on a PS trial of 10/8 since 0500      Vent Mode: (S) CPAP/PS  (Continuous positive airway pressure with Pressure Support)  FiO2 (%): 40 %  Resp Rate (Set): 18 breaths/min  Tidal Volume (Set, mL): 360 mL  PEEP (cm H2O): 8 cmH2O  Pressure Support (cm H2O): 10 cmH2O  Resp: 18         PPlat: 23-29  PEEPi: 0.4  BS: Coarse  Secretions: Minimal     Trach cares done, scant bleeding/drainage from stoma. Gauze changed frequently     BP (!) 153/70   Pulse 67   Temp 97.7  F (36.5  C) (Temporal)   Resp 18   Ht 1.524 m (5')   Wt 82.3 kg (181 lb 7 oz)   SpO2 96%   BMI 35.43 kg/m      Plan:  Continue SBT trials     Elba Mika, RT

## 2023-12-05 NOTE — PROGRESS NOTES
ENT    POD # 6 trach    Airway is stable and patent. No recent bleeding or purulence.    #6 Shiley trach changed to same. Stoma healing well without tissue breakdown, fresh blood, or clot noted.   She tolerated the change well. OK to transfer.    Ari Bass MD  ENT [facility]  [phone]

## 2023-12-05 NOTE — PLAN OF CARE
Goal Outcome Evaluation:      Plan of Care Reviewed With: patient, family    Overall Patient Progress: improvingOverall Patient Progress: improving    Outcome Evaluation: Discharge tomorrow to [facility] , transport 1300    ICU End of Shift Summary.  For vital signs and complete assessments, please see documentation flowsheets.    Pertinent assessments: pt is alert, able to repsond yes or no and following commands. Will mouth words at times. SR/SB. Lungs course to diminished at times. Up to chair. On cpap mode most of the day. Aleman patent. Had small bm- softening up.   Major Shift Events: Up to chair. Tolerated cpap mode. Changed tube feed and will increase rate at 2200.   Plan (Upcoming Events): [facility] to [facility] tomorrow with transport at 1300.  Discharge/Transfer Needs: [facility] transport 1300 on 12/5/23    Bedside Shift Report Completed: yes  Bedside Safety Check Completed: yes

## 2023-12-05 NOTE — SIGNIFICANT EVENT
Pt arrived on the unit.  She appears comfortable.   VSS and stable on the vent.    Admission orders placed.  Med rec completed.  Full H&P to follow.       Discussed with: charge/bedside RN    Agapito Tavares MD

## 2023-12-05 NOTE — PLAN OF CARE
Physical Therapy Discharge Summary    Reason for therapy discharge:    Discharged to [facility]    Progress towards therapy goal(s). See goals on Care Plan in [facility] electronic health record for goal details.  Goals partially met.  Barriers to achieving goals:   discharge from facility.    Therapy recommendation(s):    Continued therapy is recommended.  Rationale/Recommendations:  continued PT at [facility] recommended to progress towards meeting functional mobility goals.

## 2023-12-05 NOTE — PLAN OF CARE
Goal Outcome Evaluation:          ICU End of Shift Summary.  For vital signs and complete assessments, please see documentation flowsheets.      Pertinent assessments:   Neuro: Slept well, responding approp when awake. Denies pain  Cardiac: WDL  Resp: PS well in AM  GI: TF and incontinence of bowel with loose stool  : mitchell draining well  Skin: perirectal areas integrity improving  Lines: PICC        Plan (Upcoming Events): transfer to [facility] planned for 1300 today  Discharge/Transfer Needs: all parameters met     Bedside Shift Report Completed : y  Bedside Safety Check Completed: y

## 2023-12-05 NOTE — PROGRESS NOTES
ICU Progress Note    Date of Service: 12/05/23    A/P:  78F chronic hypoxemic respiratory failure, pHTN, ALAINA, HFpEF, CAD, CKD3, HTN admitted 10/15 w/ fever and cough felt to be 2/2 pneumonia. Course c/b progressive hypoxemia requiring mechanical ventilation, ARDS, and difficulty to wean from the ventilator now s/p trach/PEG.     I have personally reviewed the daily labs, imaging studies, cultures and discussed the case with referring physician and consulting physicians.     Neuro  # Toxic metabolic encephalopathy   # Chronic pain  - off sedation   - acetaminophen QID   - quetiapine   - sertraline   - pregabalin     Pulmonary  # Acute on chronic hypoxemic respiratory failure  # Bronchiectasis   # Hx ALAINA  # Multifocal organizing pneumonia   - vent settings below   - PST as able   - on high-dose steroid taper   - atovaquone PJP ppx     Cardiac  # HTN  # HFpEF  # CAD - s/p PCI 2019  # Pulmonary hypertension   - MAP > 65  - amlodipine   - hydralazine   - metoprolol   - ASA     Renal  # Uremia   # Hypervolemia  - monitor UOP, Cr, I/O  - electrolyte replacement protocols   - torsemide     GI  # Protein calorie malnutrition   # CARMEN on CKD - resolved  - RD to manage TF     Heme/Onc  # Leukocytosis   # Anemia - 2/2 critical and chronic illness   - monitor CBC    ID  # Pneumonia   - completed ABx     Endo  # Stress hyperglycemia   # Hypothyroidism  - monitor BG  - sliding scale insulin   - levothyroxine     Clinically Significant Risk Factors         # Hypernatremia: Highest Na = 150 mmol/L in last 2 days, will monitor as appropriate      # Hypoalbuminemia: Lowest albumin = 2.7 g/dL at 11/18/2023  5:05 AM, will monitor as appropriate             # Moderate Malnutrition: based on nutrition assessment                  PPX  1. DVT: SQH   2. VAP: HOB 30 degrees, chlorhexidine rinse  3. Stress Ulcer: PPI  4. Restraints: Nonviolent soft two point restraints required and necessary for patient safety and continued cares and good  effect as patient continues to pull at necessary lines, tubes despite education and distraction. Will readdress daily.   5. Wound care - per unit routine   6. Feeding - TF    Interval Hx:  NAEON. Plan for discharge to [place] today.     Unable to obtain ROS 2/2 sedation/intubation.     BP (!) 163/72   Pulse 77   Temp 97.7  F (36.5  C) (Temporal)   Resp 28   Ht 1.524 m (5')   Wt 82.3 kg (181 lb 7 oz)   SpO2 94%   BMI 35.43 kg/m    Gen: supine, NAD  Neuro: pupils equal, alert, tracking  HEENT: anicteric  Card: RRR  Pulm: clear b/l  Abd: soft, non-distended  MSK: no edema, no acute joint abnormality  Skin: no obvious rash    Vent Mode: CPAP/PS  (Continuous positive airway pressure with Pressure Support)  FiO2 (%): 40 %  Resp Rate (Set): 18 breaths/min  Tidal Volume (Set, mL): 360 mL  PEEP (cm H2O): 8 cmH2O  Pressure Support (cm H2O): 10 cmH2O  Resp: 28        Intake/Output Summary (Last 24 hours) at 12/4/2023 0933  Last data filed at 12/4/2023 0800  Gross per 24 hour   Intake 1610 ml   Output 3840 ml   Net -2230 ml       Labs: reviewed    Imaging: reviewed    Billing: Patient is critically ill. Total critical care time today, excluding procedures, was 45 minutes.    Ari Hernandez MD  Pulmonary Disease and Critical Care Medicine   [facility]

## 2023-12-05 NOTE — PROGRESS NOTES
Notified provider about indwelling mitchell catheter discussed removal or continued need.    Did provider choose to remove indwelling mitchell catheter? YES    Provider's mitchell indication for keeping indwelling mitchell catheter: Indication for continued use: Strict 1-2 Hour I & O if external catheters are not an option    Is there an order for indwelling mitchell catheter? YES    *If there is a plan to keep mitchell catheter in place at discharge daily notification with provider is not necessary, but please add a notation in the treatment team sticky note that the patient will be discharging with the catheter.

## 2023-12-05 NOTE — PROGRESS NOTES
[facility] Nurse Inpatient Assessment     Consulted for: buttock wound, perianal      Patient History (according to Hospitalist provider note(s) 10/25/23:      Matthew Bowen is a 78 year old female w/PMH chronic hypoxic respiratory failure due to pulmonary HTN, ALAINA requiring CPAP, chronic diastolic HF, CAD, CKD stage 3, morbid obesity, HTN,  depression who presents from home with family with fever, cough.     Acute hypoxic respiratory failure s/p intubation  Possible ARDS  Pneumonia, recurrent w/bronchiectasis  -Presents with recurrent PNA, 3rd time in last month or so. Last completed treatment 2 weeks ago with persistent cough. Presents with worsening fatigue, productive cough, weakness and falls.  -On admission, patient febrile to 101.1, white count of 21.  Lactic acid was within normal limits.  She underwent a CT scan that showed a lingular consolidative opacity with air bronchograms with bilateral upper lobe groundglass opacities.  She was started on ceftriaxone and azithromycin.  -Sputum culture from 10/19 better that showed gram positive cocci and yeast. Discussed with ID, yeast prevalent in sputum samples and likely candida that is not related to illness.  Need to await speciation.   -Patient initially on ceftriaxone and azithromycin.  Broadened to cefepime and azithro on 10/18.  Flagyl added on 10/20.  Finished azithromycin course on 10/20.   -COVID, influenza, Respiratory viral panel negative.  Strep and legionella antigens negative.   -intubated 10/21 due to worsening respiratory status and bronch done showing serosanguinous and fibrinous return with thick mucoid secretions noted  -ID, pulm and ICU team all following.  -remains on cefepime since 18th, flagyll 20th and Vanc since 21st-cont and await further recs  -on solumedrol 125 daily 10/24, continue for now    Assessment:      Areas visualized during today's visit:  perianal/buttock    Pressure Injury Location:  Perianal  Last photo: 12/1/23    Wound type: Pressure Injury     Pressure Injury Stage: 2, hospital acquired      This is a Medical Device Related Pressure Injury (MDRPI) due to fecal management system  Wound history/plan of care:   Patient had constant loose stools with skin breakdown. A rectal tube was placed on 11/20. During WOC revisit of IAD wounds, wound was identified 6 o clock perianally  Wound base: 100 % dermis     Palpation of the wound bed: normal      Drainage: scant     Description of drainage: serous     Measurements (length x width x depth, in cm) 0.8  x 0.4  x  0.1 cm      Tunneling N/A     Undermining N/A  Periwound skin: Intact      Color: normal and consistent with surrounding tissue      Temperature: normal   Odor: none  Pain: no grimacing or signs of discomfort, none  Pain intervention prior to dressing change: patient tolerated well  Treatment goal: Heal  and Protection  STATUS: unchanged  Supplies ordered: supplies stored on unit    My PI Risk Assessment     Sensory Perception: 1 - Completely Limited     Moisture: 2 - Very moist      Activity: 1 - Bedfast      Mobility: 1 - Completely immobile      Nutrition: 2 - Probably inadequate      Friction/Shear: 1 - Problem     TOTAL: 8        Treatment Plan:     Perianal wound and intergluteal protection: BID and PRN with incontinence episodes  Cleanse the area with Darlyn cleanse and protect, very gently with soft cloth.   2. Apply thin layer of critic aid paste on open or red areas   3. With repeat application, do not scrub the paste, only remove soiled paste and reapply.   4. If complete removal of paste is necessary use baby oil/mineral oil (#[id number]) and soft wash cloth.   5. Use only one Covidien pad in between mattress and pt. No brief in bed.   6. No Purwick on patient     Orders: Reviewed    RECOMMEND PRIMARY TEAM ORDER: None, at this time  Education provided: plan of care and wound progress  Discussed plan of care with: Patient and Nurse  WOC  nurse follow-up plan: twice weekly  Notify [facility] if wound(s) deteriorate.  Nursing to notify the Provider(s) and re-consult the [facility] Nurse if new skin concern.    DATA:     Current support surface: Standard  Low air loss (JENNIFER pump, Isolibrium, Pulsate, skin guard, etc)  Containment of urine/stool: Incontinent pad in bed and Purewick external catheter   BMI: Body mass index is 35.43 kg/m .   Active diet order: Orders Placed This Encounter      NPO for Medical/Clinical Reasons Except for: Other; Specify: NPO For oral intake and gastric feedings for 6 hours post insertion Gastrostomy G/GJ tube. May feed through Jejunal or Distal PORT immediately for GJ tubes ONLY.     Output: I/O last 3 completed shifts:  In: 1380 [NG/GT:900]  Out: 2740 [Urine:2740]     Labs:   Recent Labs   Lab 12/05/23  0419   HGB 8.5*   WBC 17.7*     Pressure injury risk assessment:   Sensory Perception: 2-->very limited  Moisture: 3-->occasionally moist  Activity: 2-->chairfast  Mobility: 1-->completely immobile  Nutrition: 3-->adequate  Friction and Shear: 2-->potential problem  Compa Score: 13    PATRICIA Lorenzana [facility] [facility]  Dept. Office Number: [phone]

## 2023-12-05 NOTE — PLAN OF CARE
Problem: Mechanical Ventilation Invasive  Goal: Effective Communication  Outcome: Progressing  Goal: Mechanical Ventilation Liberation  Outcome: Progressing  Goal: Absence of Ventilator-Induced Lung Injury  Outcome: Progressing   Goal Outcome Evaluation:  RT PROGRESS NOTE     DATA:     CURRENT SETTINGS: AC, 18, 360, +8             TRACH TYPE / SIZE:  6 Aj TG 11/30/23             MODE:  A/C             FIO2:  40%     ACTION:             THERAPIES:  Duo-neb qid             SUCTION:                           FREQUENCY:  x1                        AMOUNT:   Small                        CONSISTENCY: Thick                         COLOR:  Yellow/pink             SPONTANEOUS COUGH EFFORT/STRENGTH OF EFFORT (not elicited by suctioning): did not observed                              WEANING PHASE:                           WEAN MODE:                            WEAN TIME:                           END WEAN REASON:        RESPONSE:             BS: Clear             VITAL SIGNS:   Sat 97%, HR 77, RR 24             EMOTIONAL NEEDS / CONCERNS:                  RISK FOR SELF DECANNULATION:                          RISK DUE TO:                          INTERVENTION/S IN PLACE IS/ARE:         NOTE / PLAN:   Pt was admitted on full vent support with the above settings. Pt came in from [facility]. The skin was assessed and stoma looks goods, no skin breakdown noted at stoma.

## 2023-12-05 NOTE — DISCHARGE SUMMARY
[facility]  Hospitalist Discharge Summary      Date of Admission:  10/15/2023  Date of Discharge:  12/5/2023, transfer to [facility]  Discharging Provider: Rosendo Rich MD  Discharge Service: Hospitalist Service  Primary Care Physician   [facility]    Discharge Diagnoses   Multifocal community-acquired pneumonia  Likely bronchiectasis  Sepsis due to pneumonia  Acute respiratory failure leading to ARDS  Tracheostomy placement  PEG tube placement  Chronic respiratory failure  Obstructive sleep apnea  Chronic diastolic CHF  Pulmonary hypertension  Hypokalemia  Pain  Chronic anemia  Hyper/hypokalemia  Hypernatremia  History of falls with subacute rib fracture  Anxiety  Stage III chronic kidney disease, per chart  Hypertension  Obesity coronary artery disease  Hypothyroidism  Pressure injuries and wounds  PICC line placement  Hyperglycemia    Hospital Course     Matthew Bowen is a 78 year old female with history of chronic hypoxic respiratory failure due to pulmonary hypertension, ALAINA (prior to admission used CPAP with sleep at baseline), and chronic diastolic congestive heart failure. She also has history of coronary artery disease, chronic kidney disease stage 3, obesity, hypertension,  and depression/anxiety. She presented to the ED on 10/15/23 with fever, cough, and falls. ED evaluation showed low grade temperature elevation in the 99s. She was hypoxic, requiring supplemental oxygen at 2 lpm. Laboratory evaluation showed WBC 21.5, procalcitonin 0.16, creatinine 1.21, and bicarb 30. CXR suggested pulmonary vascular congestion. CT of chest showed lingular and bilateral upper lung pneumonia. No traumatic injury was found on imaging. She was admitted to the hospital and treated for multifocal pneumonia with Rocephin and Zithromax. She developed worsened respiratory status on 10/21 requiring transfer to ICU and intubation. Hospital and ICU course was prolonged due to her  respiratory failure, ARDS, and difficulty weaning from vent. Tracheostomy was deferred partially due to family preference and also due to the fact that increased procedural risk given high ongoing oxygen needs. CT chest obtained 11/18 suggested severe ground-glass opacities with traction bronchiectasis. It was unclear how much of her disease is reversible. Pulmonology started high-dose steroids on 11/18, with modest interval improvement in O2 weaning. Family conference was held on 11/24 and decision was made to pursue tracheostomy/G tube placement for continued vent weaning and feeds with goal to get to an LTAC.  Is going to be going to [place].     Multifocal community acquired pneumonia, Recent pneumonias prior to admission, Possible bronchiectasis, Sepsis (leukocytosis and fever), Acute respiratory failure, Adult respiratory distress syndrome prolonged ventilation, trach placement:  Summary:  Was initially admitted to medicine and treated with Rocephin and Zithromax. Deteriorated and transferred to the ICU and intubated on 10/21/23.   Antibiotic coverage changed to cefepime on 10/25 through 10/31.  Received Zosyn from 11/15 through 11/19.  Meropenem and vancomycin were started 11/19.   ID was consulted and followed continued meropenem through 11/29/23.  ID subsequently signed off. Continue vent support, stable on current settings.  Intensivist also was following.  She was continued on high dose steroids + trach placement with gradual attempts a vent wean.   Continue on PJP prophylaxis.  Did get a tracheostomy 11/29 per ENT and PEG per IR at the same time.  Patient's post procedure tracheostomy was complicated by ongoing bleeding.  ENT took the patient back to the OR and cauterized the area.  Patient does not appear to be actively bleeding at this point.  Patient's hemo.globin has been stable 8.1-9.4.  Hemoglobin is 8.5 as of 12/5/2023 at the time of discharge.  no signs of current bleeding.  Patient is going to  [facility] at discharge. Patient had a trach exchange 12/5/2023 prior to discharge by ENT.  This can be done at [facility].  Will need a steroid taper per pulmonary, she is stable they recommend continuing prednisone 120mg per day for at least 2 weeks, then down to 100mg for 2 weeks, then 80mg for 2 weeks, then decreasing by 5mg every 2 weeks until off.  They also recommend to be PJP prophylaxis until she is below 20 mg of prednisone.        Chronic hypoxic respiratory failure, Obstructive sleep apnea, Chronic diastolic heart failure, not felt to have current exac, Pulmonary hypertension:  -Baseline on 2 lpm of oxygen at home.  Did get a trach this stay.  Has had puffy edema of the extremities.  Patient is on torsemide and is back to her home dosing.  Her edema is much improved and her creatinine and electrolytes are stable.    Creatinine 0.72 at the time of discharge with a potassium of 3.8.  Patient's sodium now is up to 150.  We will cut her torsemide dose and her electrolytes will need to be followed at [facility].     Hypokalemia   patient is on potassium replacement, potassium 3.8 as of 12/5/2023 at the time of discharge in the setting of torsemide.  Continue to replace.  Patient's torsemide will be decreased given her sodium is up to 150.     Pain  Patient been followed by the pain service and had required a fentanyl drip which has subsequently been weaned off.  She has a history of rib fractures following a fall.  She is doing much better and in fact is only on oxycodone 5 mg as needed but the medications its giving her the most benefit is Atarax consistent treating her anxiety as well.     Anemia  -Has been persistent during stay. HGB was 11.6 on presentation and trended down- generally in 7-8's.  At the time of discharge was 8.5 on 12/5/2023.  Patient did get transfused while she was hospitalized.  -Suspect related to critical illness, phlebotomy, etc.  -On Protonix for ulcer prevention       Mild  Intermittent Hyperkalemia:  -Potassium was 5.5 recently. Lokelma given and 5.4 11/26.  No obvious cause such as CARMEN, medicine, IVF, acidosis, etc as she has a normal creatinine.. She does have chronic kidney disease per chart though I do not believe this as her creatinine is 0.62 with a gfr >90.  This is likely due to her home diuretics,  and is normally on torsemide 60 mg daily. Consider Lokelma if potassium continues to creep up vs changing tube feeds.   Now the patient is going the other way in the setting of torsemide with her potassium being low at and has requrired replacement. At discharge her potassium was normal at 3.8.      Hypernatremia, mild  -Monitor sodium with resumption of torsemide,  sodium is 150 12/5/23 so we will decrease the patient's torsemide.  May need to consider increasing her free water.     Falls prior to admission, Subacute rib fracture, Pain, Anxiety:  -Imaging negative for acute fractures and bleeding but did show subacute rib fractures.  Had been on fentanyl drip much of stay but now has been weaned off. Only getting oxycodone intermittently now, but on atarax with good effect     Chronic kidney disease stage 3, Acute kidney injury earlier in stay  -Cr 1.21 on admission and peaked in 2.1 range but has now normalized in the setting of pneumonia, hypoxia, etc.  Had been holding diuretics as discussed above. Resumed torsemide at 20 mg daily 11/26, will increase to bid 11/29 and now cut to daily at the time of discharge due to her sodium being up to 150.    I suspect her previous elevated creatinine had etiologies other than chronic kidney disease. I doubt she truly has chronic kidney disease at this point.       Hypertension    -Continue PTA amlodipine, torsemide, hydralazine not sure why she is not on a  beta blocker with her coronary artery disease.  Home imdur was on hold.  During her stay her Blood pressure shas been low at times and high at other.  Goal is to prevent hypotension.   Patient is on lopressor, hydralazine, and norvasc, and torsemide.  she currently is tolerating her current regimen.      Obesity:  -  Increases complexity of care, has BMI of 35 as of 12/2/23     Coronary artery disease:  -Continue on home ASA, statin.  Historically has been on plavix but it has been on hold now since early in her stay.  Last PCI in 2019 and even though she has residual RCA dz that in itself is not an indication for DAPT at this point especially with anemia. Not sure why she is not on a beta blocker at home, started on low-dose beta-blocker given her hypertension. Did have some bradycardia here but seems to be tolerating it.       Hypothyroidism  -Continue PTA levothyroxine     Pressure injury/wounds  Patient's been followed by the wound care nurse.  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  She also has a right inner buttock and perineal wound due to either friction or possible pressure injury.  Patient has a perianal pressure injury that is hospital-acquired stage II likely related to her fecal management system and skin breakdown related to her loose stools.  Continue on wound care per recommendations by WOCN.  Rectal tube has been removed.       Hyperlipidemia  -is on lipitor, not sure that there is any real benefit of this at this point.      Mood disorder, anxiety  -Continue PTA Lyrica and Zoloft, having more anxiety, will use atarax which has given good results     Access issues:  -  s/p PICC line.  Some generalized edema including PICC line arm.  Monitor. Placed a triple lumen line 10/22/232     Nutrition  Hyperglycemia:  -  sliding scale insulin PRN.  Continue Tube Feeds..  Dextrose PRN if drops.  IR consulted for PEG placement 11/29/23 in coordination with trach.  Now can use her PEG, postpyloric feeding tube was subsequently  removed.          Recent Labs   Lab 12/04/23  0544 12/04/23  0417 12/04/23  0015 12/03/23  1949 12/03/23  1627   * 135*  149* 156* 163*              Clinically Significant Risk Factors     # Moderate Malnutrition: based on nutrition assessment      Significant Results and Procedures   Most Recent 3 CBC's:  Recent Labs   Lab Test 12/05/23 0419 12/04/23 0544 12/03/23 0524   WBC 17.7* 16.1* 19.6*   HGB 8.5* 7.8* 8.2*   MCV 90 89 89    232 250     Most Recent 3 BMP's:  Recent Labs   Lab Test 12/05/23  0419 12/05/23  0403 12/05/23  0006 12/04/23  0748 12/04/23  0544 12/03/23  1627 12/03/23  1259 12/03/23  0904 12/03/23 0524   *  --   --   --  145  --   --   --  141   POTASSIUM 3.8  --   --   --  3.7  --  3.9  --  3.3*   CHLORIDE 103  --   --   --  101  --   --   --  97*   CO2 38*  --   --   --  38*  --   --   --  35*   BUN 78.4*  --   --   --  74.0*  --   --   --  70.0*   CR 0.72  --   --   --  0.67  --   --   --  0.64   ANIONGAP 9  --   --   --  6*  --   --   --  9   BARBARA 8.7*  --   --   --  8.6*  --   --   --  8.5*   * 180* 169*   < > 115*   < >  --    < > 129*    < > = values in this interval not displayed.   ,   Results for orders placed or performed during the hospital encounter of 10/15/23   CT Head w/o Contrast    Narrative    EXAM: CT HEAD WITHOUT CONTRAST  LOCATION: [facility]  DATE: 10/15/2023    INDICATION: Trauma, head injury.  COMPARISON: Brain MRI 11/19/2021.  TECHNIQUE: Routine CT Head without IV contrast. Multiplanar reformats. Dose reduction techniques were used.    FINDINGS:  INTRACRANIAL CONTENTS: No hemorrhage. Presumed arachnoid cyst along the upper midline cerebellum unchanged versus the prior MRI. No CT evidence of acute infarct. Mild presumed chronic small vessel ischemic changes. Minor generalized volume loss. No   hydrocephalus.     VISUALIZED ORBITS/SINUSES/MASTOIDS: No intraorbital abnormality. No paranasal sinus mucosal disease. Small amount of fluid right mastoid air cells.    BONES/SOFT TISSUES: No acute abnormality.      Impression    IMPRESSION:  1.  No acute  intracranial injury.       Cervical spine CT w/o contrast    Narrative    EXAM: CT CERVICAL SPINE WITHOUT CONTRAST  LOCATION: [facility]  DATE: 10/15/2023    INDICATION: Trauma, neck pain.  COMPARISON: Neck CT 01/25/2023  TECHNIQUE: Routine CT Cervical Spine without IV contrast. Multiplanar reformats. Dose reduction techniques were used.    FINDINGS:  Mild reversal of the usual cervical lordosis. 2 mm anterolisthesis of C3 on C4 or not significantly changed. Cervical vertebral body heights preserved.  No acute cervical spine fracture. No prevertebral soft tissue swelling. Multilevel degenerative   changes without evidence for high-grade spinal canal narrowing.      Impression    IMPRESSION:  1.  No acute cervical spine fracture.   2.  Reversal of the usual cervical lordosis is nonspecific and can be seen with muscle spasm/soft tissue injury, but is unchanged versus 01/25/2023 and may be positional/chronic.       XR Femur Right 2 Views    Narrative    EXAM: XR PELVIS 1/2 VIEWS, XR FEMUR RIGHT 2 VIEWS  LOCATION: [facility]  DATE: 10/15/2023    INDICATION: Trauma, pain.  COMPARISON: 04/29/2022      Impression    IMPRESSION: Advanced degenerative arthritis of both hips with hypertrophic changes. Multiple osteochondral loose bodies superior of the right femoral neck.    Mild degenerative arthritis of both SI joints. Lower lumbar facet arthropathy and degenerative interspace narrowing. Incidental note of ossification of the right iliolumbar ligament.    Right total knee arthroplasty. No knee joint effusion.   XR Pelvis 1/2 Views    Narrative    EXAM: XR PELVIS 1/2 VIEWS, XR FEMUR RIGHT 2 VIEWS  LOCATION: [facility]  DATE: 10/15/2023    INDICATION: Trauma, pain.  COMPARISON: 04/29/2022      Impression    IMPRESSION: Advanced degenerative arthritis of both hips with hypertrophic changes. Multiple osteochondral loose bodies superior of the right femoral  neck.    Mild degenerative arthritis of both SI joints. Lower lumbar facet arthropathy and degenerative interspace narrowing. Incidental note of ossification of the right iliolumbar ligament.    Right total knee arthroplasty. No knee joint effusion.   XR Chest 1 View    Narrative    EXAM: XR CHEST 1 VIEW  LOCATION: [facility]  DATE: 10/15/2023    INDICATION: cough, recent pna, increased weakness  COMPARISON: CT chest 09/01/2023, chest x-ray 08/18/2023      Impression    IMPRESSION: Cardiomegaly with central vascular congestion. Stable prominent pericardial fat pad along the left cardiac margin. Mild bibasilar atelectasis/scarring. No focal pneumonic consolidation or pleural effusion.   CT Chest/Abdomen/Pelvis w Contrast    Narrative    EXAM: CT CHEST/ABDOMEN/PELVIS W CONTRAST  LOCATION: [facility]  DATE: 10/15/2023    INDICATION: Cough and abdominal pain.  COMPARISON: CT chest 09/01/2023.  TECHNIQUE: CT scan of the chest, abdomen, and pelvis was performed following injection of IV contrast. Multiplanar reformats were obtained. Dose reduction techniques were used.   CONTRAST: 95mL Isovue 370    FINDINGS:   LUNGS AND PLEURA: Lingula consolidative opacity with air bronchogram. Additional scattered groundglass opacities within the right upper lobe and left upper lobe. Similar bibasilar bronchiectasis. No pleural effusion. No new or growing suspicious   pulmonary nodule.    MEDIASTINUM/AXILLAE: No lymphadenopathy. No thoracic aortic aneurysms.    CORONARY ARTERY CALCIFICATION: Severe.    HEPATOBILIARY: Similar subcentimeter hepatic dome hypodensity, likely benign. No suspicious mass. Gallbladder is unremarkable.    PANCREAS: Normal.    SPLEEN: Normal.    ADRENAL GLANDS: Similar mild adrenal gland thickening.    KIDNEYS/BLADDER: Mild left cortical thinning and scarring. Left lower pole nonobstructing calculus. Since 2021 similar left mid pole hypodensity, likely benign. No  collecting system dilatation. Urinary bladder is unremarkable.    BOWEL: No obstruction or inflammatory change. Moderate stool in the colon.    LYMPH NODES: No enlarged lymph node.    VASCULATURE: Nonaneurysmal abdominal aorta. Severe atherosclerosis.    PELVIC ORGANS: Uterus is absent.    MUSCULOSKELETAL: Subacute left posterior left 11th and 12th rib fractures. Moderate to severe bilateral hip degenerative changes. No convincing acute fracture of the pelvis. Moderate degenerative changes of the thoracolumbar spine.       Impression    IMPRESSION:  1.  Lingular consolidative opacity with air bronchograms suspicious for infection/pneumonia.  2.  Additional bilateral upper lobe groundglass opacities are likely infectious/inflammatory.  3.  Subacute left posterior 11th and 12th rib fractures.  4.  No convincing acute process within the abdomen or pelvis.   XR Video Swallow with SLP or OT    Narrative    VIDEO SWALLOW WITH SLP OR OT    10/17/2023 9:50 AM     HISTORY: Recurrent pneumonia. Evaluate for aspiration.    COMPARISON: None.    TECHNIQUE: Fluoroscopic assistance was provided to speech pathology  for evaluation of the patient's swallowing mechanism. Small amounts of  thin, pudding, and solid consistencies of barium were administered  during real-time fluoroscopy in the lateral projection. A total of 1  minute of fluoroscopy time was utilized for the exam. A total of 7  fluoroscopic cine clips were obtained.    FINDINGS: Patient was able to swallow the varying consistencies of  barium without difficulty. Mild flash penetration was noted when the  patient swallowed thin barium. No deep laryngeal penetration or tanesha  tracheal aspiration. Note that only the cervical esophagus was  evaluated.       Impression    IMPRESSION:   1. Mild flash penetration was noted when the patient swallowed thin  barium.  2. No evidence for aspiration.    Please see further details in report by speech pathology.     STONEY ALMONTE MD          SYSTEM ID:  [id number]   XR Foot Left 2 Views    Narrative    FOOT LEFT TWO VIEWS 10/17/2023 12:10 PM     HISTORY: Foot pain, rule out fracture.    COMPARISON: None.       Impression    IMPRESSION: Degenerative changes throughout the midfoot which are  moderate at the navicular cuneiform and second TMT joints. No evidence  of acute fracture. Bulky distal Achilles tendon enthesopathy.  Calcaneal heel spur.     OSIRIS DOE MD         SYSTEM ID:  [id number]   XR Chest Port 1 View    Narrative    CHEST ONE VIEW  10/20/2023 1:44 PM     HISTORY: Shortness of breath. Increased O2 requirements.    COMPARISON: Chest radiograph 10/15/2023.      Impression    IMPRESSION: Worsening right upper and left basilar opacities  consistent with worsening infection.  Probable small pleural effusion.  No pneumothorax. Similar enlarged cardiomediastinal silhouette. Aortic  calcification.    TRAE BANGURA MD         SYSTEM ID:  [id number]   XR Chest Port 1 View    Narrative    EXAM: XR CHEST PORTABLE 1 VIEW  LOCATION: [facility]  DATE: 10/21/2023    INDICATION: Unresolving pneumonia, increased O2 requirements.  COMPARISON: 10/20/2023 CXR and 10/15/2023 CT CAP.      Impression    IMPRESSION: Cardiac enlargement. Enlargement of the central pulmonary arteries. Focal consolidation right upper lobe and consolidation throughout the left lower lobe. Possible small volume of left basilar pleural fluid. No pneumothorax. No significant   change.     CT Chest Pulmonary Embolism w Contrast    Narrative    EXAM: CT CHEST PULMONARY EMBOLISM W CONTRAST  LOCATION: [facility]  DATE: 10/21/2023    INDICATION: worsening hypoxemia. R O PE.; Female sex; Not pregnant; No prior imaging in the last 24 hours; Pulmonary Embolism Rule Out Criteria (PERC) score > 0; Revised Pickens Score (RGS) not >= 11; No D dimer result available; D dimer not ordered  COMPARISON: Same day chest radiograph,  10/15/2023  TECHNIQUE: CT chest pulmonary angiogram during arterial phase injection of IV contrast. Multiplanar reformats and MIP reconstructions were performed. Dose reduction techniques were used.   CONTRAST: 65mL Isovue 370    FINDINGS:  ANGIOGRAM CHEST: Pulmonary arteries are dilated but negative for pulmonary emboli. Thoracic aorta is negative for dissection. No CT evidence of right heart strain.    LUNGS AND PLEURA: Slight improvement in dense consolidation in the lingula. However, there are multiple new/worsening areas of groundglass opacification and consolidation throughout both lungs. No significant pleural effusion.    MEDIASTINUM/AXILLAE: Enlarged heart. No pericardial effusion. Interval enlargement of mediastinal and hilar lymph nodes, likely reactive to adjacent inflammation, no specific follow-up recommended.    CORONARY ARTERY CALCIFICATION: Severe.    UPPER ABDOMEN: Unremarkable.    MUSCULOSKELETAL: No acute bony abnormality. Unchanged old left rib fractures.        Impression    IMPRESSION:  1.  Extensive new/increased bilateral groundglass opacities and consolidation, favor multifocal pneumonia and/or ARDS.  2.  Findings suggestive of pulmonary hypertension.  3.  No pulmonary embolus.   XR Chest Port 1 View    Narrative    EXAM: CHEST SINGLE VIEW PORTABLE  LOCATION: [facility]  DATE/TIME: 10/22/2023 1:03 AM CDT    INDICATION: Nurse placed peripherally inserted central catheter.  COMPARISON: 10/21/2023 at 0827 hours.      Impression    IMPRESSION:   1. Interval placement of a right upper extremity peripherally inserted central catheter with distal tip obscured, but most likely in the mid superior vena cava.  2. No other significant change since the recent comparison study.  3. Moderately extensive airspace opacities within both lungs, most prominent in the upper right lung and lower left lung, again noted.    XR Abdomen Port 1 View    Narrative    EXAM: XR ABDOMEN PORT 1  VIEW  LOCATION: [facility]  DATE: 10/22/2023    INDICATION: Tube placement  COMPARISON: None.      Impression    IMPRESSION: NG tube tip and sidehole in the stomach. Moderate degenerative change in the spine and both hips.   XR Chest Port 1 View    Narrative    EXAM: XR CHEST PORT 1 VIEW  LOCATION: [facility]  DATE: 10/22/2023    INDICATION: ARDS vs. multifocal pneumonia, status post endotracheal tube placement  COMPARISON: Chest radiograph 10/22/2023 12:37 AM..      Impression    IMPRESSION:    Endotracheal tube tip is 2.2 cm above the ashley. Unchanged right PICC with tip in the mid SVC.    Multifocal patchy airspace opacities most prominent within the right upper lung and left lower lung have not significant changed. No new airspace opacities. No pleural effusions or pneumothorax.    Stable cardiomediastinal silhouette. Aortic atherosclerotic calcifications.   XR Chest Port 1 View    Narrative    EXAM: XR CHEST PORT 1 VIEW  LOCATION: [facility]  DATE: 10/22/2023    INDICATION: Endotracheal tube positioning  COMPARISON: 10/22/2023      Impression    IMPRESSION: Endotracheal tube tip 5.4 cm above ashley. Enteric tube tip below diaphragm. Right PICC tip in the low SVC. Increased multifocal bilateral infiltrates, right greater than left. There may be small left pleural effusion. Enlarged cardiac   silhouette. Obscured pulmonary vascularity. Aortic calcifications.   US Abdomen Limited    Narrative    US ABDOMEN LIMITED 10/23/2023 3:51 PM    CLINICAL HISTORY: Question possible cholecystitis, right upper  quadrant tenderness prior to intubation  TECHNIQUE: Limited abdominal ultrasound.    COMPARISON: February 12, 2019    FINDINGS:    GALLBLADDER: Gallbladder sludge. No gallstones, wall thickening, or  pericholecystic fluid. Negative sonographic Bass's sign.    BILE DUCTS: There is no biliary dilatation. The common duct measures 6  mm.    LIVER:  Unremarkable where seen.    RIGHT KIDNEY: No hydronephrosis.    PANCREAS: The visualized portions of the pancreas are normal.    No ascites.      Impression    IMPRESSION:  Some gallbladder sludge is present, no shadowing stones or  cholecystitis demonstrated.    REG IZAGUIRRE MD         SYSTEM ID:  [id number]   XR Chest Port 1 View    Narrative    CHEST ONE VIEW  10/23/2023 9:52 AM     HISTORY: Check ETT position.    COMPARISON: October 22, 2023      Impression    IMPRESSION: Endotracheal tube tip approximately 3 cm from the ashley.  Extensive bilateral infiltrates appear slightly worse, although this  may be related to different level of inspiration. No significant  change in remaining tubes and lines.    REG IZAGUIRRE MD         SYSTEM ID:  [id number]   XR Chest Port 1 View    Narrative    EXAM: XR CHEST PORT 1 VIEW  LOCATION: [facility]  DATE: 10/26/2023    INDICATION: Re evaluate ETT placement. Develops cuff leak with repositioning.  COMPARISON: 10/23/2023      Impression    IMPRESSION: Endotracheal tube terminates 4.8 cm above the ashley. Enteric decompression tube descends below the diaphragm, tip outside the field-of-view. Right upper extremity PICC tip in the mid SVC.    Bilateral hazy airspace opacities, slightly improved compared to 10/23/2023. No definite pleural effusion or pneumothorax.    Stable cardiomediastinal silhouette.   XR Chest Port 1 View    Narrative    CHEST ONE VIEW  10/30/2023 12:17 PM     HISTORY: Increasing oxygen.    COMPARISON: Chest radiograph 10/26/2023.      Impression    IMPRESSION: Endotracheal tube terminates approximately 4 cm from the  ashley. Enteric tube courses below the diaphragm. Right PICC  terminates at the mid SVC.    Bilateral, right greater than left, airspace opacities appear slightly  worsened compared to prior. Probable small left layering pleural  effusion. No pneumothorax. Similar cardiomediastinal silhouette and  aortic  calcifications.    TRAE BANGURA MD         SYSTEM ID:  [id number]   XR Chest Port 1 View    Narrative    CHEST ONE VIEW PORTABLE  10/31/2023 12:01 PM     HISTORY: ETT placement.    COMPARISON: Chest x-ray 10/30/2023.      Impression    IMPRESSION: ET tube tip is 4.4 cm proximal to the ashley. Nasogastric  tube in place. Right PICC line tip at the mid SVC, stable in position.  Diffuse bilateral pulmonary opacities appear mildly increased on the  left and stable on the right. Stable cardiac silhouette that is  borderline prominent.    YULY DICK MD         SYSTEM ID:  [id number]    Upper Extremity Venous Duplex Right    Narrative    ULTRASOUND RIGHT UPPER EXTREMITY VENOUS DUPLEX November 1, 2023 1:27  PM     HISTORY: Right upper extremity swelling with PICC, request made for  evaluation of deep vein thrombosis.    COMPARISON: None.    TECHNIQUE: Color Doppler and spectral waveform analysis performed  throughout the deep and superficial veins of the right upper chest  cavity.    FINDINGS: The right internal jugular, subclavian, axillary, and  brachial veins demonstrate normal blood flow, compression (where  applicable), and augmentation. Basilic and cephalic veins are patent.  Radial and ulnar veins are compressible. Contralateral left subclavian  vein is patent.      Impression    IMPRESSION: Negative for deep vein thrombosis in the right upper  extremity.    ARMANI JULIAN MD         SYSTEM ID:  [id number]   XR Chest Port 1 View    Narrative    CHEST ONE VIEW  11/3/2023 8:01 AM     HISTORY: Hypoxemic respiratory failure, volume overload    COMPARISON: 10/31/2023.      Impression    IMPRESSION: Enlarged cardiopericardial silhouette. Pulmonary vascular  congestion and interstitial pulmonary edema appear similar to mildly  increased compared to prior. Dense left basilar consolidation which  may represent atelectasis or aspiration/pneumonia appears similar.  Probable trace bilateral pleural effusions. No  pneumothorax.    Endotracheal tube with distal tip projecting at the mid thoracic  trachea approximately 4.5 cm proximal to the ashley. Right upper  extremity PICC with distal tip projecting at the superior vena cava.  Enteric tube courses below the diaphragm with distal tip beyond the  inferior field of view.    TAMRA ESCAMILLA MD         SYSTEM ID:  [id number]   XR Abdomen Port 1 View    Narrative    ABDOMEN PORTABLE ONE VIEW   11/10/2023 1:49 PM     HISTORY: Confirm NGT placement.    COMPARISON: 10/22/2023.      Impression    IMPRESSION: Nasogastric tube tip within the distal gastric lumen.  Enteric feeding tube tip at the duodenal bulb region. Nonobstructive  bowel.    YULY DICK MD         SYSTEM ID:  [id number]   XR Chest Port 1 View    Narrative    XR CHEST PORT 1 VIEW 11/14/2023 7:31 AM    HISTORY: worsening hypoxemia    COMPARISON: 11/3/2023      Impression    IMPRESSION: Worsening patchy airspace opacities in the right upper  lobe concerning for pneumonia. This is superimposed on diffuse mixed  interstitial and airspace opacity throughout the lungs, either due to  atypical pneumonia, ARDS or pulmonary edema. Bibasilar atelectasis,  left greater than right. No pneumothorax. No significant pleural  effusion. Mild cardiomegaly.     Right arm PICC tip is at the SVC/right atrial junction. Endotracheal  tube tip is 5.8 cm above the ashley. NG tube coursing below the left  hemidiaphragm.    LYUBOV KUMAR MD         SYSTEM ID:  [id number]   XR Chest Port 1 View    Narrative    CHEST ONE VIEW  11/15/2023 10:05 AM     HISTORY: Intubated, possible aspiration event yesterday, assess for  change.    COMPARISON: Chest radiograph 11/14/2023, 11/3/2023      Impression    IMPRESSION:     Lines and tubes: Endotracheal tube tip 4.7 cm above the ashley. Right  PICC tip at the mid SVC and enteric tube courses below the diaphragm.    Mild worsened bibasilar opacities, particularly in the left upper lobe  and right lower lobe,  which could reflect infection, edema or ARDS.  Probable trace pleural effusions. No pneumothorax. Similar  cardiomediastinal silhouette.      TRAE BANGURA MD         SYSTEM ID:  [id number]   XR Chest Port 1 View    Narrative    EXAM: XR CHEST PORT 1 VIEW  LOCATION: [facility]  DATE: 11/15/2023    INDICATION: Post bronchi.  COMPARISON: 11/15/2023 9:19 AM      Impression    IMPRESSION: Endotracheal tube, esophagogastric tube, and right upper extremity PICC again identified and relatively unchanged. Esophagogastric tube courses below the level of the diaphragm, though outside field of view. Patchy bilateral pulmonary   opacities with consolidative component of the left lower lobe again identified. Minimal improvement in the right lung. No pneumothorax.   XR Abdomen Port 1 View    Narrative    EXAM: XR ABDOMEN PORT 1 VIEW  LOCATION: [facility]  DATE: 11/15/2023    INDICATION: NG placement  COMPARISON: Chest radiograph today. Abdomen radiograph 11/10/2023.      Impression    IMPRESSION: Feeding tube terminates in the midline of the abdomen in a position consistent with a termination in the distal stomach. Nonobstructive bowel gas pattern.   CT Chest w/o Contrast     Value    Radiologist flags Pneumomediastinum (Urgent)    Narrative    EXAM: CT CHEST W/O CONTRAST  LOCATION: [facility]  DATE: 11/18/2023    INDICATION: ongoing hypoxia  COMPARISON: Chest radiograph 11/15/2023 and CTA chest 10/21/2023.  TECHNIQUE: CT chest without IV contrast. Multiplanar reformats were obtained. Dose reduction techniques were used.  CONTRAST: None.    FINDINGS:   LUNGS AND PLEURA: Endotracheal tube is in satisfactory position. Mild bronchiectasis. Increased extensive areas of consolidation and groundglass and nodular opacities within both lungs. No pleural effusion. No definite pneumothorax. Gas abutting the   medial aspect of the right lung is favored to represent  pneumomediastinum.    MEDIASTINUM/AXILLAE: Cardiomegaly. Normal caliber thoracic aorta with marked calcified atherosclerotic plaque. Right PICC terminates in the mid SVC. Stable mild thoracic lymphadenopathy, for example right paratracheal lymph node measures 1.4 cm short   axis (series 3, image 47), presumably 1.5 cm. Persistent dilatation of the central pulmonary arteries with the pulmonary trunk measuring up to 4.6 cm. Development of partially visualized subcutaneous emphysema within the lower neck and within the right   aspect of the upper mediastinum. No periesophageal stranding or fluid collection.    CORONARY ARTERY CALCIFICATION: Severe.    UPPER ABDOMEN: Enteric tube courses into the stomach and out of the field of view. Tiny hypodensity in the hepatic dome is stable and too small to characterize.    MUSCULOSKELETAL: Thoracic spondylosis. No destructive osseous lesion.      Impression    IMPRESSION:   1.  Increase in extensive bilateral pulmonary opacities, which could represent multifocal pneumonia and/or diffuse alveolar damage.  2.  Development of subcutaneous emphysema within the lower neck and trace pneumomediastinum.  3.  Stable mild mediastinal lymphadenopathy, which may be reactive.    [Urgent Result: Pneumomediastinum]    Finding was identified on 11/18/2023 2:39 PM CST.     Dr. Madera was contacted by me on 11/18/2023 2:53 PM CST and verbalized understanding of the critical result.     XR Chest Port 1 View    Narrative    CHEST ONE VIEW  11/20/2023 12:50 PM     HISTORY: ETT position, suspicion high in trachea.    COMPARISON: November 15, 2023      Impression    IMPRESSION: Endotracheal tube tip 3.4 cm from the ashley. No  significant change in bilateral infiltrates. Remaining tubes and lines  not significantly changed.     REG IZAGUIRRE MD         SYSTEM ID:  [id number]   XR Chest Port 1 View    Narrative    EXAM: XR CHEST PORT 1 VIEW  LOCATION: [facility]  DATE:  11/22/2023    INDICATION: respiratory failure, ETT position.  COMPARISON: 11/20/2023      Impression    IMPRESSION: Stable moderate cardiomegaly. Right upper lobe consolidation and diffuse ill-defined hazy interstitial and patchy opacities in the remaining lung are unchanged. No significant pleural effusion or pneumothorax.    Endotracheal tube terminates 2.5 cm above the ashley. Right PICC terminates in the SVC. Feeding tube extends below the diaphragm beyond the field-of-view.   CT Chest w/o Contrast    Narrative    CT CHEST WITHOUT CONTRAST 11/24/2023 9:47 AM     HISTORY: Follow up on persistent respiratory failure, pulmonary  changes and ventilator dependency.    TECHNIQUE: CT scan of the chest was performed without IV contrast.  Multiplanar reformats were obtained. Dose reduction techniques were  used.  CONTRAST: None.  COMPARISON: 11/18/2023    FINDINGS:     LUNGS AND PLEURA: Pulmonary infiltrates again seen throughout both  lungs. There is increased airspace consolidation with air bronchograms  in both lower lobes and in the posterior right upper lobe when  compared to previous. There are groundglass opacities with septal  thickening throughout the remainder of both lungs. Mild cylindrical  bronchiectasis. No pleural effusion or pneumothorax.    MEDIASTINUM/AXILLAE: Endotracheal tube in good position above the  ashley. A PICC line tip is in the SVC. Mild mediastinal  lymphadenopathy likely reactive. Severe coronary artery calcification.    UPPER ABDOMEN: Negative.      Impression    IMPRESSION:  1.  Extensive bilateral pulmonary infiltrates again seen. Increasing  airspace consolidation in the right upper lobe and both lower lobes  when compared to previous.    INES COYNE MD         SYSTEM ID:  [id number]   XR Chest Port 1 View    Narrative    EXAM: XR CHEST PORTABLE 1 VIEW  LOCATION: [facility]  DATE: 11/26/2023    INDICATION: Respiratory failure.  COMPARISON: Chest x-ray on  2023.      Impression    IMPRESSION: Single AP view of the chest was obtained. Endotracheal tube tip projects over mid thoracic trachea approximately 4 cm from the ashley. Enteric tube crosses the diaphragm with the distal tip outside the field-of-view. Right upper extremity   PICC tip projects over high/mid SVC. Cardiomegaly. Left basilar and right mid/upper lobe patchy pulmonary opacity, could represent atelectasis versus infection. No significant pleural effusion or pneumothorax.     IR Gastrostomy Tube Percutaneous Plcmnt    Narrative    IR GASTROSTOMY TUBE PERCUTANEOUS PLCMNT  2023 12:15 PM     HISTORY: Patient has a G-tube for long-term tube feeds. The tube is  malfunctioning.    COMPARISON: None    DESCRIPTION OF PROCEDURE: The patient was placed in a supine position  on the fluoroscopy table. The existing tube and skin entry site were  prepped and draped in the usual sterile manner. The balloon of the  existing tube was deflated and it was removed. A new 18 [place] JOCELYN  G-tube was placed into the stomach. The balloon was inflated with 4 cc  normal saline. Contrast was injected into the stomach and showed it to  be in satisfactory position. A fluoroscopic image was saved.    The patient tolerated the procedure well. There were no immediate  postprocedure complications. The patient's vital signs were monitored  by radiology nursing staff under my supervision and remained stable  throughout the study.    FLUOROSCOPY TIME: 2.4 minutes    RADIATION DOSE: 27 mGy Air Kerma    CONTRAST: 30 cc Gastrografin      Impression    IMPRESSION: G-tube replaced as above.    NINI CARNES MD         SYSTEM ID:  [id number]   Echocardiogram Complete     Value    LVEF  60-65%    Narrative    [id number]  [id number]  [id number]  [id number]^ZOE^[facility]  Echocardiography Laboratory  [address] [place] [address]     Name: ANA VINSON  MRN: [id number]  : 1945  Study Date:  10/21/2023 09:51 AM  Age: 78 yrs  Gender: Female  Patient Location: [facility]  Reason For Study: SOB  Ordering Physician: KELLY ENRIQUEZ  Performed By: Johnathan Boston RDCS     BSA: 1.8 m2  Height: 60 in  Weight: 188 lb  HR: 79  BP: 106/48 mmHg  ______________________________________________________________________________  Procedure  Complete Portable Echo Adult. Complete Echo Adult.  ______________________________________________________________________________  Interpretation Summary     Left ventricular systolic function is normal. The visual ejection fraction is  60-65%. No regional wall motion abnormalities noted.  The right ventricle is normal in size and function.  Moderate valvular aortic stenosis with aortic valve area is 1.1 cm^2. The  mean  AoV pressure gradient is 29.5 mmHg.  Comapred to the prior study, aortic stenosis has progressed from mild to  moderate. Otherwise, no change.  ______________________________________________________________________________  Left Ventricle  The left ventricle is normal in size. There is normal left ventricular wall  thickness. Grade I or early diastolic dysfunction. Left ventricular systolic  function is normal. The visual ejection fraction is 60-65%. No regional wall  motion abnormalities noted.     Right Ventricle  The right ventricle is normal in size and function.     Atria  The left atrium is moderately dilated. Right atrial size is normal.     Mitral Valve  The mitral valve leaflets appear normal. There is no evidence of stenosis,  fluttering, or prolapse. There is trace mitral regurgitation.     Tricuspid Valve  Normal tricuspid valve. There is trace tricuspid regurgitation. Right  ventricular systolic pressure could not be approximated due to inadequate  tricuspid regurgitation.     Aortic Valve  Normal tricuspid aortic valve. Moderate valvular aortic stenosis. The  calculated aortic valve are is 1.1 cm^2. The mean AoV pressure gradient is  29.5 mmHg.     Pulmonic  Valve  The pulmonic valve is not well visualized. There is trace pulmonic valvular  regurgitation.     Vessels  Normal size aorta. Normal size ascending aorta. IVC diameter <2.1 cm  collapsing >50% with sniff suggests a normal RA pressure of 3 mmHg.     Pericardium  There is no pericardial effusion.     Rhythm  Sinus rhythm was noted.  ______________________________________________________________________________  MMode/2D Measurements & Calculations     IVC diam: 1.9 cm  Ao root diam: 3.1 cm  LA dimension: 3.7 cm  asc Aorta Diam: 3.9 cm  LA/Ao: 1.2  LVOT diam: 1.9 cm  LVOT area: 2.9 cm2  Ao root diam index Ht(cm/m): 2.0  Ao root diam index BSA (cm/m2): 1.7  Asc Ao diam index BSA (cm/m2): 2.1  Asc Ao diam index Ht(cm/m): 2.6  LA Volume (BP): 90.7 ml  LA Volume Index (BP): 49.8 ml/m2     RV Base: 3.1 cm  TAPSE: 2.2 cm     Time Measurements  Aortic HR: 75.4 BPM     Doppler Measurements & Calculations  MV E max sj: 115.0 cm/sec  MV A max sj: 140.5 cm/sec  MV E/A: 0.82  MV dec slope: 389.4 cm/sec2  MV dec time: 0.30 sec  Ao V2 max: 372.5 cm/sec  Ao max P.0 mmHg  Ao V2 mean: 255.3 cm/sec  Ao mean P.5 mmHg  Ao V2 VTI: 74.6 cm  IVAN(I,D): 1.1 cm2  IVAN(V,D): 1.0 cm2  LV V1 max P.2 mmHg  LV V1 max: 133.7 cm/sec  LV V1 VTI: 29.4 cm  CO(LVOT): 6.4 l/min  CI(LVOT): 3.5 l/min/m2  SV(LVOT): 84.3 ml  SI(LVOT): 46.3 ml/m2  AV Sj Ratio (DI): 0.36  IVAN Index (cm2/m2): 0.62  E/E' av.9  Lateral E/e': 14.3     Medial E/e': 19.6  RV S Sj: 15.4 cm/sec     ______________________________________________________________________________  Report approved by: Lindsey Quiroz 10/21/2023 05:27 PM             Follow up/instructions: Patient is going to [facility] for ongoing vent weaning and tube feeding    Pending test results at discharge:      Unresulted Labs Ordered in the Past 30 Days of this Admission       Date and Time Order Name Status Description    2023  1:05 PM Blood Culture Special Organism  Preliminary     11/22/2023 10:47 AM Blood Culture Special Organism Preliminary     11/15/2023 11:13 AM Fungus Culture, non-blood - BAL Site 1 Preliminary     11/15/2023 11:13 AM Acid-Fast Bacilli Culture and Stain In process     10/22/2023 11:01 AM Acid-Fast Bacilli Culture and Stain In process             Discharge Orders      Alonso Cisneros    Has in room, placed on at 1530       Discharge Disposition   Transferred to [facility]  Condition at discharge: Stable      Consultations This Hospital Stay   PHYSICAL THERAPY ADULT IP CONSULT  SPEECH LANGUAGE PATH ADULT IP CONSULT  CARE MANAGEMENT / SOCIAL WORK IP CONSULT  PULMONARY IP CONSULT  INFECTIOUS DISEASES IP CONSULT  PHARMACY TO DOSE VANCO  VASCULAR ACCESS ADULT IP CONSULT  VASCULAR ACCESS ADULT IP CONSULT  CARDIOLOGY IP CONSULT  NUTRITION SERVICES ADULT IP CONSULT  PHARMACY IP CONSULT  WOUND OSTOMY CONTINENCE NURSE  IP CONSULT  PHYSICAL THERAPY ADULT IP CONSULT  NEPHROLOGY IP CONSULT  ENT IP CONSULT  OCCUPATIONAL THERAPY ADULT IP CONSULT  PHARMACY TO DOSE VANCO  THORACIC SURGERY IP CONSULT  ENT IP CONSULT  INTERVENTIONAL RADIOLOGY ADULT/PEDS IP CONSULT  PAIN MANAGEMENT ADULT IP CONSULT  CARE MANAGEMENT / SOCIAL WORK IP CONSULT  PHARMACY IP CONSULT    Code Status   Full Code    Time Spent on this Encounter   I, Rosendo Rich MD, personally saw the patient today and spent greater than 30 minutes discharging this patient.  Discussed with night nursing, discussed with care team on bedside rounds, updated Dr. Hernandez           This document was created using voice recognition technology.  Please excuse any typographical errors that may have occurred.  Please call with any questions.       Rosendo Rich MD  [facility]  [address] [place] [address]  Phone: [phone]  Fax: [phone]  ______________________________________________________________________    Physical Exam   Vital Signs: Temp: 97.7  F (36.5  C) Temp src:  Temporal BP: (!) 163/72 Pulse: 75   Resp: 28 SpO2: 95 % O2 Device: Mechanical Ventilator    Weight: 181 lbs 7.02 oz    Exam is unchanged from yesterday though a little anxious this am  GEN:  Awake, is alert, responds to questions and more interactive daily, appears comfortable, no overt distress but appears anxious.   Appears elderly and frail, lying in bed on the vent   HEENT: Tracheostomy in place without evidence of bleeding, mucous membranes moist  CV: Regular rate and rhythm, no murmurs   LUNGS:  Coarse bilaterally unchanged, no wheezes/retractions.  Symmetric chest rise on inhalation noted.  ABD:  Active bowel sounds, soft, non-tender/non-distended.  No rebound/guarding/rigidity.  PEG tube in place  EXT: edema is much improved.  No cyanosis.       Discharge Medications  per pulmonary,  prednisone 120mg per day for at least 2 weeks, then down to 100mg for 2 weeks, then 80mg for 2 weeks, then decreasing by 5mg every 2 weeks until off.  They also recommend to be PJP prophylaxis until she is below 20 mg of prednisone.     Current Discharge Medication List        CONTINUE these medications which have NOT CHANGED    Details   Acetaminophen (TYLENOL PO) Take 1,000 mg by mouth 3 times daily      amLODIPine (NORVASC) 5 MG tablet Take 5 mg by mouth daily       aspirin 81 MG EC tablet Take 81 mg by mouth daily      atorvastatin (LIPITOR) 20 MG tablet Take 20 mg by mouth every evening       clopidogrel (PLAVIX) 75 MG tablet Take 1 tablet (75 mg) by mouth daily  Qty: 30 tablet, Refills: 0    Associated Diagnoses: Non-STEMI (non-ST elevated myocardial infarction) (H)      hydrALAZINE (APRESOLINE) 50 MG tablet Take 50 mg by mouth 3 times daily       isosorbide dinitrate (ISORDIL) 20 MG tablet Take 20 mg by mouth 3 times daily       levothyroxine (SYNTHROID/LEVOTHROID) 150 MCG tablet Take 150 mcg by mouth daily      Multiple Vitamins-Minerals (MULTIVITAMIN ADULTS PO) Take 1 tablet by mouth daily      potassium chloride ER  (KLOR-CON M) 20 MEQ CR tablet Take 40 mEq by mouth daily       pregabalin (LYRICA) 75 MG capsule Take 75 mg by mouth 2 times daily      sertraline (ZOLOFT) 100 MG tablet Take 150 mg by mouth daily      torsemide (DEMADEX) 20 MG tablet Take 60 mg by mouth every morning       vitamin D3 (CHOLECALCIFEROL) 50 mcg (2000 units) tablet Take 2,000 Units by mouth daily       hypromellose (ARTIFICIAL TEARS) 0.5 % SOLN ophthalmic solution 1 drop 4 times daily as needed for dry eyes           STOP taking these medications       albuterol (PROAIR HFA/PROVENTIL HFA/VENTOLIN HFA) 108 (90 Base) MCG/ACT inhaler Comments:   Reason for Stopping:         cetirizine (ZYRTEC) 10 MG tablet Comments:   Reason for Stopping:             Allergies   No Known Allergies

## 2023-12-05 NOTE — PROGRESS NOTES
Care Management Discharge Note    Discharge Date: 12/05/2023       Discharge Disposition: [facility]ACH    Discharge Services:      Discharge DME:      Discharge Transportation: agency    Private pay costs discussed: Not applicable    Does the patient's insurance plan have a 3 day qualifying hospital stay waiver?  No    Education Provided on the Discharge Plan:  no  Persons Notified of Discharge Plans: . Staff aware  Patient/Family in Agreement with the Plan: yes    Handoff Referral Completed: No    Additional Information:  Patient discharging today to [facility] via [facility] stretcher transport due to trache and oxygen needs. CM received call from [facility] and they are ready to receive her. Their MD, Dr. Tavares had a question for our Hospitalist: Does ENT need to replace the trache?  If so, it needs to happen before discharge, as they have no way to contact an ENT provider to do it.  This question was sent to the Hospitalist.  also provided the phone number for the [facility] doctor to complete a doctor to doctor phone call, [phone], in the message.   [facility] can access the discharge summary and orders so  does not need to fax these.  was notified.CM to alert staff at morning rounds.    Addendum 1050  CM was informed from [facility] that there needs to be a plan in place for changing trache by an appointment with an off site ENT or if it is ok if pulmonary MD changes it on site. Moved ride back to [id number] to accommodate making this plan.  was notified and CM called ICU to inform of ride time change.      Carline Murdock, RN, BSN, CM  Inpatient Care Coordination  [facility]  [phone]

## 2023-12-05 NOTE — PROGRESS NOTES
[facility]  ([address])    Ms. Matthew Bowen has been accepted for admission to [facility] today.       Ride: [phone]  via medical stretcher transport    RN to RN report: Please call [address] at [phone] for nurse to nurse report.before the pt discharges.     Accepting MD: Dr. Agapito Tavares. Please contact Dr. Tavares  for a provider to provider report before the pt discharges.    Documentation needed prior to discharge: A visible discharge summary and discharge/readmission orders           Hattie Ortiz, PT  [facility] Referral Specialist    [facility]      [address] [address]  Admissions Office: [phone]  Fax: [phone]     CONFIDENTIAL Protected under [place] Statute  145.61 et seq

## 2023-12-05 NOTE — PLAN OF CARE
Shift Events: trach exchanged by ENT    Neuro: alert and calm, able to mouth words and nod to communicate  CV: SR, BP WNL  ID: afebrile  Pulm: LS dim, tolerated PST this morning 10/8 40%  GI: TF at goal, BS+  : good UO per mitchell  Lines/gtts: PICC SL  Skin: scattered bruising, barrier cream to perianal excoriation   Labs: BGs 160s    For vital signs and complete assessments, see documentation flowsheets.     Plan: transfer to [place] to continue vent weaning and therapies    Notified provider about indwelling mitchell catheter discussed removal or continued need.    Did provider choose to remove indwelling mitchell catheter? NO    Provider's mitchell indication for keeping indwelling mitchell catheter: Indication for continued use: Strict 1-2 Hour I & O if external catheters are not an option    Is there an order for indwelling mitchell catheter? YES    *If there is a plan to keep mitchell catheter in place at discharge daily notification with provider is not necessary, but please add a notation in the treatment team sticky note that the patient will be discharging with the catheter.

## 2023-12-05 NOTE — PROGRESS NOTES
[facility] ICU VENTILATOR RESPIRATORY NOTE  Date of Admission: 10/15/2023  Date of Intubation (most recent): 10/21/2023, trached 11/30  Reason for Mechanical Ventilation: Respiratory failure  Number of Days on Mechanical Ventilation: 44  Met Criteria for Pressure Support Trial: Yes  Length of Pressure Support Trial: Ongoing weaning trial PS 10/+5 since 08:56 and pt tolerating well.   Significant Events Today: Pt continue to tolerate weaning trial since 08:56  ABG Results: Venous Blood Gas  Recent Labs   Lab 11/28/23  1040   PHV 7.37   PCO2V 58*   PO2V 36   HCO3V 33*   LEON 7.0*   O2PER 40     Vent Mode: CPAP/PS  (Continuous positive airway pressure with Pressure Support)  FiO2 (%): 40 %  Resp Rate (Set): 18 breaths/min  Tidal Volume (Set, mL): 360 mL  PEEP (cm H2O): 8 cmH2O  Pressure Support (cm H2O): 10 cmH2O  Resp: 24      Plan:  Switch pt back to full vent support at bedtime. Continue weaning trial in AM.     Gregory Blanco, RT

## 2023-12-06 NOTE — CONSULTS
CLINICAL NUTRITION SERVICES  -  ASSESSMENT NOTE      RECOMMENDATIONS FOR MD/PROVIDER TO ORDER:   Manage hypernatremia   Recommendations Ordered by Registered Dietitian (RD):   FWF per MD  Adjust TF for hold time w/ levothyroxine    Recommend TF as follows:  Type of Feeding Tube: PEG (placed 11/29/23)  Enteral Frequency: Continuous  Enteral Regimen: Jevity 1.5 at 45 mL/hr x 22 hrs (holding 1 hr before/after levothyroxine)  Modulars: 2 pkts Prosource TF20  Total Enteral Provisions: 990 mL daily provides 1645 kcal (21 kcal per kg), 103g protein (1.3 g per kg actual wt), 214g CHO, 21g fiber and 752mL free water. Meets < 100% of DRI's.  Free Water Flush: 200 mL q4 hours  TF + fluid flush = 1952 mL per day    Weekly BMP, Mag, and Phos  Pharmacy consult  Daily weights   Future/Additional Recommendations:   Continue to monitor tolerance, Na, stooling   Malnutrition:   % Weight Loss:  Weight loss does not meet criteria for malnutrition  % Intake:  No decreased intake noted  Subcutaneous Fat Loss:  None observed  Muscle Loss:  Temporal region moderate depletion, Clavicle bone region moderate depletion, and Dorsal hand region severe depletion  Fluid Retention:  Mild in bilateral upper and lower extremities, non-pitting. Suspect r/t clinical status vs. Malnutrition as patient was eating well PTA and meeting needs via TF since initiation of EN    Malnutrition Diagnosis: Patient does not meet two of the above criteria necessary for diagnosing malnutrition     REASON FOR ASSESSMENT  Matthew oBwen is a 78 year old female seen by Registered Dietitian for Provider Order - Registered Dietitian to Assess and Order TF per Medical Nutrition Therapy Protocol    PMH:  PMH complex medical history including chronic hypoxic respiratory failure due to pulmonary HTN on 2L home O2, ALAINA requiring CPAP, chronic diastolic HF, CAD, CKD stage 3, morbid obesity, HTN,  depression, recent falls, hx recurrent pneumonia. Admitted 10/15/23 and treated  for CAP with multiple courses of antibiotics.  Worsened requiring intubation, developed ARDS. Cultures negative. Autoimmune work-up grossly negative but did have low IgG level. 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 S/p trach and PEG.        NUTRITION HISTORY  - Information obtained from patient and chart, family member at bedside during visit  - NKFA  - patient eating well PTA, no changes in intakes per patient  - EN started 10/22/23 via OGT  - PEG placed 11/29/23  - Changed to renal formula last week due to persistent hyperkalemia, now needing replacement of K and P due to resumption of patient's home dose of torsemide. TF formula changed back to standard on 12/4/23, K+ has increased but still remains WNL at this time.    CURRENT NUTRITION ORDERS  Diet Order:     None    Current TF Orders:  Access: PEG placed (11/29)   Jevity 1.5 Devon (or equivalent) @ goal of  45ml/hr  (1080ml/day) provides: 1620 kcals, 68 g PRO, 820 ml free H20, 232 g CHO, and 22 g fiber daily.  Modulars: 1 pkt Prosource TF20 provides additional 80 kcal, 20g protein/day  FWF: 200 mL Q4 hours    Current Intake/Tolerance:  Patient able to mouth words, tolerating TF at goal rate and denies n/v, c/d  Asking for food - brief education provided on NPO diet    NUTRITION FOCUSED PHYSICAL ASSESSMENT FOR DIAGNOSING MALNUTRITION)  Yes         Observed:    Muscle wasting (refer to documentation in Malnutrition section) and Fluid retention (refer to documentation in Malnutrition section)    Obtained from Chart/Interdisciplinary Team:  Pressure Injury      ANTHROPOMETRICS  Height: 5'  Weight: 82.3 kg  BMI: 35.43  Weight Status:  Obesity Grade II BMI 35-39.9  UBW: ~180# per patient  Weight History:   12/02/23 0700 82.3 kg (181 lb 7 oz)   11/30/23 0430 85.5 kg (188 lb 7.9 oz)   11/29/23 0400 85.1 kg (187 lb 9.8 oz)   11/28/23 0400 86 kg (189 lb 9.5 oz)   11/27/23 0400 82.5 kg (181 lb 14.1 oz)   11/26/23  0500 82.2 kg (181 lb 3.5 oz)   11/24/23 0440 81.7 kg (180 lb 2.9 oz)   11/22/23 0535 82.5 kg (181 lb 14.1 oz)   11/21/23 0500 81.4 kg (179 lb 7.3 oz)   11/20/23 0500 79.9 kg (176 lb 2.4 oz)   11/19/23 0630 81.8 kg (180 lb 5.4 oz)   11/18/23 0613 82.4 kg (181 lb 10.5 oz)      11/02/23 0400 93.9 kg (207 lb 0.2 oz)-pt fluid up 5.3L (11.7 lbs) per I/Os      10/30/23 0500 94.9 kg (209 lb 3.5 oz)   10/29/23 0400 94.8 kg (208 lb 15.9 oz)      10/28/23 0341 93.8 kg (206 lb 12.7 oz)   10/27/23 0600 89 kg (196 lb 3.4 oz)   10/26/23 0500 89.2 kg (196 lb 10.4 oz)   10/25/23 0430 88.1 kg (194 lb 4.8 oz)   10/23/23 0545 87 kg (191 lb 12.8 oz)   10/21/23 1930 85.7 kg (188 lb 14.4 oz)   10/20/23 1900 85.4 kg (188 lb 4.4 oz)   10/16/23 0644 81.7 kg (180 lb 1.9 oz)   10/15/23 1646 85.6 kg (188 lb 11.4 oz)   10/15/23 0925 86.2 kg (190 lb)   09/06/2023                87.6 kg (193 lb 3.2 oz)     6% wt loss in 3 months  06/12/2023                88 kg (194 lb)                   6.5% wt loss in 6 months  12/02/2022                88.9 kg (196 lb)                7.4% wt loss in 1 year    LABS  Labs reviewed  Labs:  Electrolytes  Potassium (mmol/L)   Date Value   12/06/2023 4.4   12/05/2023 3.8   12/04/2023 3.7   11/22/2019 4.6   11/21/2019 4.4   11/20/2019 4.5     Phosphorus (mg/dL)   Date Value   12/05/2023 4.0   12/04/2023 2.4 (L)   12/03/2023 2.5   12/02/2023 3.1   12/01/2023 4.3    Blood Glucose  Glucose (mg/dL)   Date Value   12/06/2023 137 (H)   11/22/2019 112 (H)   11/21/2019 97   11/20/2019 109 (H)   02/13/2019 122 (H)   02/12/2019 161 (H)     GLUCOSE BY METER POCT (mg/dL)   Date Value   12/06/2023 171 (H)   12/06/2023 177 (H)   12/06/2023 184 (H)   12/05/2023 177 (H)   12/05/2023 178 (H)     Hemoglobin A1C (%)   Date Value   10/22/2023 5.6    Inflammatory Markers  WBC (10e9/L)   Date Value   11/22/2019 10.8   11/20/2019 10.5   02/12/2019 10.1     WBC Count (10e3/uL)   Date Value   12/06/2023 22.9 (H)   12/05/2023 17.7 (H)    12/04/2023 16.1 (H)     Albumin (g/dL)   Date Value   11/18/2023 2.7 (L)   10/22/2023 2.9 (L)   10/15/2023 4.4   02/13/2019 2.7 (L)      Magnesium (mg/dL)   Date Value   12/05/2023 2.3   12/04/2023 2.1   12/03/2023 1.9     Sodium (mmol/L)   Date Value   12/06/2023 155 (H)   12/05/2023 150 (H)   12/04/2023 145   11/22/2019 141   11/21/2019 142   11/20/2019 143    Renal  Urea Nitrogen (mg/dL)   Date Value   12/06/2023 76.5 (H)   12/05/2023 78.4 (H)   12/04/2023 74.0 (H)   11/22/2019 34 (H)   11/21/2019 47 (H)   11/20/2019 54 (H)     Creatinine (mg/dL)   Date Value   12/06/2023 0.73   12/05/2023 0.72   12/04/2023 0.67   11/22/2019 1.30 (H)   11/21/2019 1.49 (H)   11/20/2019 1.58 (H)     Additional  Triglycerides (mg/dL)   Date Value   11/21/2019 80     Ketones Urine (mg/dL)   Date Value   11/22/2023 Negative        MEDICATIONS  Medications reviewed   acetaminophen  650 mg Oral or Feeding Tube 4x Daily    acetylcysteine  2 mL Nebulization BID    amLODIPine  5 mg Oral or Feeding Tube BID    aspirin  81 mg Oral or Feeding Tube Daily    atorvastatin  20 mg Oral or Feeding Tube QPM    B and C vitamin Complex with folic acid  5 mL Oral or Feeding Tube Daily    [START ON 12/7/2023] cholecalciferol  50 mcg Per Feeding Tube Daily    [START ON 12/7/2023] heparin ANTICOAGULANT  5,000 Units Subcutaneous Q8H    insulin aspart  1-6 Units Subcutaneous Q4H MARLON    ipratropium - albuterol 0.5 mg/2.5 mg/3 mL  3 mL Nebulization 4x daily    levothyroxine  150 mcg Oral or Feeding Tube QAM AC    lidocaine   Topical TID    metoprolol  12.5 mg Per Feeding Tube BID    miconazole   Topical BID    [START ON 12/7/2023] pantoprazole  40 mg Per Feeding Tube QAM AC    predniSONE  60 mg Oral or Feeding Tube BID w/meals    pregabalin  75 mg Per Feeding Tube Daily    protein modular  1 packet Per Feeding Tube Daily    QUEtiapine  150 mg Oral or Feeding Tube BID    sertraline  150 mg Oral or Feeding Tube Daily    sodium chloride (PF)  10-40 mL  Intracatheter Q7 Days    sulfamethoxazole-trimethoprim  20 mL Oral Once per day on Monday Wednesday Friday       D5W 100 mL/hr at 12/06/23 1124      acetaminophen, albuterol, bisacodyl, cetirizine, glucose **OR** dextrose **OR** glucagon, hydrALAZINE, hydrOXYzine HCl, naloxone **OR** naloxone **OR** naloxone **OR** naloxone, oxyCODONE, sodium chloride (PF), sodium chloride (PF)     ASSESSED NUTRITION NEEDS PER APPROVED PRACTICE GUIDELINES:  Dosing Weight: 79.9 kg (lowest weight) for energy, and 45.5 kg (IBW) for protein  Estimated Energy Needs: 5391-4820+ kcals/day (15 - 20+ kcals/kg)  Justification: Maintenance (anticipate higher needs once patient participating with therapies)  Estimated Protein Needs:  grams protein/day (2-2.5 grams of pro/kg)  Justification: Hypercatabolism with critical illness, stage 2 PI, CKD  Estimated Fluid Needs: Per provider pending fluid status    NUTRITION DIAGNOSIS:  Inadequate oral intake related to dysphagia as evidenced by NPO status anmd reliance on EN.    NUTRITION INTERVENTIONS  Recommendations / Nutrition Prescription  See top of note.    Implementation  Nutrition education: Provided education on role of dietitian, EN  EN Composition, EN Schedule, and Feeding Tube Flush    Nutrition Goals  TF to meet % nutrition needs  BG WNL  Electrolytes WNL  Normalize stooling as able    MONITORING AND EVALUATION:  Progress towards goals will be monitored and evaluated per protocol and Practice Guidelines      CHETAN Vasquez RDN  [facility]  Office: [phone] or Jose

## 2023-12-06 NOTE — PLAN OF CARE
Problem: Adult Inpatient Plan of Care  Goal: Optimal Comfort and Wellbeing  Outcome: Progressing     Problem: Mechanical Ventilation Invasive  Goal: Effective Communication  Outcome: Progressing  Goal: Mechanical Ventilation Liberation  Outcome: Progressing     Problem: Enteral Nutrition  Goal: Absence of Aspiration Signs and Symptoms  Outcome: Progressing   Goal Outcome Evaluation:         Pt is alert, able to mouth words to communicate needs, on trach/vent whole night. On continuous TF, tolerated it well. Aleman cath patent and draining well, incontinent BM x1. At 0445 c/o of generalized pain at rate of 8/10, prn oxycodone given with effect, rechecked pain and rate was down to 4/10. Slept on and off for 4 to 5 hrs of the shift. Vital is stable. Blood sugar checked every 4hrs, insulin coverage given. All needs met.     /66 (BP Location: Left arm)   Pulse 83   Temp 97.6  F (36.4  C) (Oral)   Resp 29   SpO2 92%          Latest Reference Range & Units 12/05/23 20:34 12/05/23 22:04 12/05/23 23:44 12/06/23 04:29 12/06/23 06:28   GLUCOSE BY METER POCT 70 - 99 mg/dL 135 (H) 178 (H) 177 (H) 184 (H) 177 (H)   (H): Data is abnormally high

## 2023-12-06 NOTE — PROGRESS NOTES
RT PROGRESS NOTE     DATA:     CURRENT SETTINGS:             TRACH TYPE / SIZE:  #6 Aj (placed 12/5)             MODE:   PS 12/8             FIO2:   40%     ACTION:             THERAPIES:   Duoneb QID, Mucomyst BID             SUCTION:                           FREQUENCY:   x2                        AMOUNT:   Small                        CONSISTENCY:   Thick                        COLOR:   Pale Yellow             SPONTANEOUS COUGH EFFORT/STRENGTH OF EFFORT (not elicited by suctioning): Moderate Spontaneous Cough                           WEANING PHASE:   Phase 1                        WEAN MODE:    PS 12/8                        WEAN TIME:   8:48am                        END WEAN REASON:   Still Weaning     RESPONSE:             BS:   Coarse/Diminished             VITAL SIGNS:   Sating 92-98%, HR 73-90, RR 22-30             EMOTIONAL NEEDS / CONCERNS:  NA                RISK FOR SELF DECANNULATION:  No    NOTE / PLAN:   Full vent settings are AC 18, 360, +8, 40%.  RT will continue to monitor.

## 2023-12-06 NOTE — PROGRESS NOTES
12/06/23 1500   Name of Certified Therapeutic Rec Specialist   Name of Certified Therapeutic Rec Specialist BERTHA Sy   Appointment Type   Type of Therapeutic Rec Session Therapeutic Rec Assessment   General Information   Patient Profile Review See Profile for full history and prior level of function   Daily Contact with Relatives or Friends Phone call;Visit   Community Involvement   Community Involvement Retired   Spiritual Practice Other (comments)  ([place])   Hobbies/Interests   Cards Cribbage   Games Yahtzee;Scrabble;Other (comments)  (dominoes)   Word Puzzles Word Search;Crossword   Craft/Art Other (comments)  (sewing)   Music   Music Preferences Classical;Country;Other (comments)  (Old time - Jose Enrique Christy)   Listens to Recorded Music Yes   Brought Own Equipment Yes   Media   Newspapers Daily   Computer Has own at home;Will use tablet/phone   TV / Movies TV   Reading Magazines;Books   Reading Preferences Mystery   Sports / Physical Activities   Sports Fan Baseball;Basketball;Football;Hockey   Impression   Open to Socializing with Others Unable (please describe in comments);Other (comments)  (on vent/trach)   Barriers to Leisure Endurance;Mobility   Patient, family and / or staff in agreement with Plan of Care Yes   Treatment Plan   Assessment Assessment completed, pt and  were oriented to role of rec therapy,  assisted with leisure questions. pt was provided with leisure resource list. Therapist will monitor pt and update as appropriate to see pt for 1:1 tx to work on endurance.

## 2023-12-06 NOTE — PROGRESS NOTES
"SPEECH PATHOLOGY: SPEECH/LANGUAGE EVALUATION     12/06/23 8300   Appointment Info   Signing Clinician's Name / Credentials (SLP) Jose Alvarado MA CCC SLP   General Information   Referring Physician Chaitanya   Patient/Family Therapy Goal Statement (SLP) Eating and drinking   Pertinent History of Current Problem Per pulmonary note, dated today: \"78 year old female w/PMH complex medical history including chronic hypoxic respiratory failure due to pulmonary HTN on 2L home O2, ALAINA requiring CPAP, chronic diastolic HF, CAD, CKD stage 3, morbid obesity, HTN,  depression, recent falls, treated for pneumonia x3 in the last 2 to 3 months. Presented to the ED on 10/15/23 with fever, cough, and falls. Treated with broad spectrum antibiotics for pneumonia and developed worsening respiratory status on 10/21 requiring transfer to ICU and intubation. ICU course prolonged due to her respiratory failure, ARDS, and difficulty weaning from vent. Her chest imaging studies showed bilateral groundglass opacities and dense consolidation, she did have work-up with bronchoscopy and BAL, predominant neutrophils on cell count via BAL, the cultures have been negative, she did have an autoimmune work-up that was negative except for mild elevation of the RF and borderline nonspecific elevated MARLI, notably she did have low IgG level. Diuresed without much improvement in breathing. Improved a bit through 11/13, then with acute worsening of unclear reason. Repeat CT scan 11/18 with worsening infiltrates, etiology unclear, differential included: inflammatory pneumonitis such as acute interstitial pneumonitis (Hamman Rich syndrome), organizing, she did have some CT scan findings that could be consistent with Atoll sign on the CT scan 10/21/2023.\"   General Observations Patient alert, cooperative.  present.   Type of Evaluation   Type of Evaluation Speech, Language, Cognition   Oral Motor   Oral Musculature anomalies present   Structural " Abnormalities none present   Mucosal Quality dry   Dentition (Oral Motor)   Dentition (Oral Motor) adequate dentition   Facial Symmetry (Oral Motor)   Facial Symmetry (Oral Motor) left side impairment  (hx Bell's palsy)   Left Side Facial Asymmetry minimal impairment;moderate impairment   Lip Function (Oral Motor)   Lip Range of Motion (Oral Motor) retraction impairment;unable/difficult to assess   Tongue Function (Oral Motor)   Tongue ROM (Oral Motor) WNL   Tongue Strength (Oral Motor) unable/difficult to assess   Cough/Swallow/Gag Reflex (Oral Motor)   Volitional Swallow (Oral Motor) weak   Vocal Quality/Secretion Management (Oral Motor)   Vocal Quality (Oral Motor) aphonia   Comment, Vocal Quality/Secretion Management (Oral Motor) aphonic d/t trach   General Swallowing Observations   Current Diet/Method of Nutritional Intake (General Swallowing Observations, NIS) NPO;gastrostomy tube (PEG)   Respiratory Support ventilator   Past History of Dysphagia Patient was discharged from speech during acute admission following a video swallow study on 10/17/23 with recommendations for regular textures and thin liquids. Patient had subsequent respiratory decline and returned to NPO.   Motor Speech   Speech Intelligibility (Motor Speech) word level   Articulation (Motor Speech) imprecise articulation   Word Level, Speech Intelligibility (Motor Speech) minimal impairment   Auditory Comprehension   Follows Commands (Auditory Comprehension) 1-step command   Yes/No Questions (Auditory Comprehension) simple/factual questions   Simple/Factual Questions (Auditory Comprehension) over 90% accuracy;impaired;other (see comments)  (possibly compromised by hearing impairment, reported by )   1 Step, Follows Commands (Auditory Comprehension) intact   Verbal Expression   Confrontational Naming (Verbal Expression) objects   Automatic Speech (Verbal Expression) counting   Counting, Automatic Speech (Verbal Expression) intact   Objects,  Confrontational Naming (Verbal Expression) impaired;other (see comments)  (reduced intelligiblity via mouthing may have compromised validity of performance.)   Cognition   Cognitive Status Alert   Orientation Status (Cognition) oriented to;person;place  (generally, not oriented to time (stating it was 1946 and the month of May), nor the situation)   Clinical Impression   Criteria for Skilled Therapeutic Interventions Met (SLP Eval) Yes, treatment indicated   SLP Diagnosis   (dysphonia, cognitive-linguistic deficits)   Risks & Benefits of therapy have been explained care plan/treatment goals reviewed;evaluation/treatment results reviewed;risks/benefits reviewed;participants voiced agreement with care plan;participants included;patient;spouse/significant other   Clinical Impression Comments Patient presents with mild receptive aphasia, possibly compromised by hearing deficits, as well as reduce verbal expression, likely compromised by cognitive-linguistic deficits and aphonia. Mouthing is best means of communication d/t poor UE function for written expression or letter/picture board.   SLP Total Evaluation Time   Eval: Sound production with lang comprehension and expression Minutes (52196) 25   SLP Goals   Therapy Frequency (SLP Eval) 5 times/week   SLP Predicted Duration/Target Date for Goal Attainment 02/09/24   SLP Goals Swallow;Speaking Valve;SLP Goal 1   SLP: Safely tolerate diet without signs/symptoms of aspiration One P.O. texture;With use of compensatory swallow strategies;With use of swallow precautions;With assistance/supervision   SLP: Tolerate valve placement without change in vital signs 30 minutes or longer   SLP: Demonstrate clear voicing at 3 foot distance from listener with 90% intelligibility;for sentence-length speech   SLP: Goal 1 Patient will use external aids, min cues to orient x4   SLP Discharge Planning   SLP Plan Speaking valve, once cleared by pulmonary; simple communication/cognitive tasks    SLP Discharge Recommendation Transitional Care Facility   SLP Rationale for DC Rec swallow, communication, cognition below baseline

## 2023-12-06 NOTE — PLAN OF CARE
Problem: Enteral Nutrition  Goal: Absence of Aspiration Signs and Symptoms  Outcome: Progressing  Goal: Safe, Effective Therapy Delivery  Outcome: Progressing  Goal: Feeding Tolerance  Outcome: Progressing     Problem: Fall Injury Risk  Goal: Absence of Fall and Fall-Related Injury  Outcome: Progressing  Intervention: Promote Injury-Free Environment  Recent Flowsheet Documentation  Taken 12/6/2023 1453 by Rosaura Owens RN  Safety Promotion/Fall Prevention:   activity supervised   clutter free environment maintained   room door open   room near nurse's station   supervised activity     Problem: Pain Acute  Goal: Optimal Pain Control and Function  Outcome: Progressing   Goal Outcome Evaluation:    Patient denied pain in am shift, was calm and cooperative with cares. Blood pressure was 187/79 at 0750, 161/70 at 0928, 142/70 at 1320, respiratory rates were high in ma shift (28 at 0750, 29 at 0848, 22 at 1132, 26 at 1320). Patient has a lot medications and lab orders this shift (see chart for these), NA is 155 today (150 on 12/5)-5 % dextrose at 100 ml per hour was ordered x 10 hours (started at 1124)-BMP to be rechecked today at 1700 and in am, chloride 110 (was 103 on 12/5), CO2 41 (was 38 on 12/5), anion gap 4 (was 9 on 12/5), BG was 171 at 1156, lidocaine ointment topical to most painful area not used this shift (patient denied pain), chest xray to be done in am for hypoxic respiratory failure, trach, vent, WBC 22.9 (was 17.7 on 12/5), HGB 8.9 (8.5 on 12/5), was up in chair in am shift, last bowel movement was on night (smear)

## 2023-12-06 NOTE — PLAN OF CARE
Problem: Mechanical Ventilation Invasive  Goal: Effective Communication  Outcome: Progressing  Goal: Mechanical Ventilation Liberation  Outcome: Progressing  Goal: Absence of Device-Related Skin and Tissue Injury  Outcome: Progressing  Goal: Absence of Ventilator-Induced Lung Injury  Outcome: Progressing

## 2023-12-06 NOTE — PROGRESS NOTES
Patient was admitted to the unit from [facility] ICU at 1650 in a stable condition for vent weaning (was admitted to [facility] for bilateral pneumonia), history of CAD, ALAINA (uses CPAP at home at baseline), anxiety, acute on chronic hypoxemic respiratory failure, CHF, daughter and spouse came with patient, blood pressure was 171/77 at 1807, 181/86 at 1839-IVP hydralazine given. Patient is alert, unable to determine orientation at this time,able to follow some commands, nods for yes to questions

## 2023-12-06 NOTE — CONSULTS
Care Management Initial Consult    General Information  Assessment completed with: Roque Darby  Type of CM/SW Visit: CM Role Introduction    Primary Care Provider verified and updated as needed:     Readmission within the last 30 days: no previous admission in last 30 days      Reason for Consult: discharge planning  Advance Care Planning:            Communication Assessment  Patient's communication style: spoken language (English or Bilingual)    Hearing Difficulty or Deaf: yes ([place] (RIGHT EAR))   Wear Glasses or Blind: yes    Cognitive  Cognitive/Neuro/Behavioral: .WDL except, speech, orientation  Level of Consciousness: alert  Arousal Level: opens eyes spontaneously  Orientation: other (see comments) (unable to determine at this time)  Mood/Behavior: calm     Speech: unable to speak (at this time)    Living Environment:   People in home: spouse     Current living Arrangements: house      Able to return to prior arrangements: other (see comments)  Living Arrangement Comments: to be determined    Family/Social Support:  Care provided by: spouse/significant other  Provides care for: no one  Marital Status:   , Children  Ramrha       Description of Support System: Involved, Supportive         Current Resources:   Patient receiving home care services: No     Community Resources: Housekeeping/Chore Agency  Equipment currently used at home: walker, standard  Supplies currently used at home: Oxygen Tubing/Supplies    Employment/Financial:  Employment Status: homemaker        Financial Concerns:             Does the patient's insurance plan have a 3 day qualifying hospital stay waiver?  No    Lifestyle & Psychosocial Needs:  Social Determinants of Health     Food Insecurity: Not on file   Depression: Not at risk (1/15/2019)    PHQ-2     PHQ-2 Score: 0   Housing Stability: Not on file   Tobacco Use: Low Risk  (11/30/2023)    Patient History     Smoking Tobacco Use: Never     Smokeless Tobacco Use: Never      Passive Exposure: Not on file   Financial Resource Strain: Not on file   Alcohol Use: Not on file   Transportation Needs: Not on file   Physical Activity: Not on file   Interpersonal Safety: Not on file   Stress: Not on file   Social Connections: Not on file       Functional Status:  Prior to admission patient needed assistance:   Dependent ADLs:: Ambulation-walker, Bathing, Transfers, Positioning, Incontinence, Grooming, Eating, Dressing  Dependent IADLs:: Cleaning, Cooking, Laundry, Shopping, Meal Preparation, Medication Management, Money Management, Transportation, Incontinence         Mental Health Status:          Chemical Dependency Status:                Values/Beliefs:  Spiritual, Cultural Beliefs, Lutheran Practices, Values that affect care:    Description of Beliefs that Will Affect Care: none            Additional Information:    Met with patient's daughter, Roque, briefly this afternoon.  Gave her contact info for NATHANIEL and RN CC.  Discussed that we will schedule a care progression meeting in the next 2 weeks and we will be following bijal during her [facility] admission.  Encouraged daughter to call with any questions.      Doretha Yañez RN, BSN  Care Coordination  [facility]  [phone]

## 2023-12-06 NOTE — CONSULTS
Consultation - Pulmonary Medicine  Matthew Bowen,  1945, MRN [id number]    Acute on chronic respiratory failure with hypoxemia (H) [J96.21]   Code status:  Full Code       Extended Emergency Contact Information  Primary Emergency Contact: Yuliya Bowen  Address: [address] [place] [address]            [place] [address] [facility]  Home Phone: [phone]  Mobile Phone: [phone]  Relation: Spouse  Secondary Emergency Contact: Roque Bowen   [facility]  Home Phone: [phone]  Mobile Phone: [phone]  Relation: Daughter       Impressions:   Principal Problem:    Acute on chronic respiratory failure with hypoxemia (H)  78 year old female w/PMH complex medical history including chronic hypoxic respiratory failure due to pulmonary HTN on 2L home O2, ALAINA requiring CPAP, chronic diastolic HF, CAD, CKD stage 3, morbid obesity, HTN,  depression, recent falls, hx recurrent pneumonia. Admitted 10/15/23 and treated for CAP with multiple courses of antibiotics.  Worsened requiring intubation, developed ARDS. Cultures negative. Autoimmune work-up grossly negative but did have low IgG level. 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 S/p trach and PEG.       Problems:  Acute on chronic hypoxic/hypercapnic respiratory failure in setting of underlying pulmonary HTN, c/b pneumonia, severe ARDS, possibly organizing pneumonia now s/p trach  Multifocal CAP: received multiple courses of antibiotics.  Completed . Serial Sputum cx with candida, beta-D-glucan neg. BAL 10/22 cytology neg.   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 IVIG considered but ultimately not given  Tracheostomy in place: Initial placement on  c/b post op bleeding s/p exploration of tracheotomy & cauterization by ENT.  6 Aj changed by ENT on   Oropharyngeal dysphagia status post PEG tube   ALAINA on CPAP PTA  Chronic  diastolic HF, Volume overload on diuretics   CARMEN on CKD - improved  Obesity  MDD, Anxiety   Hypernatremia: worse today  Leukocytosis wo fever: in setting of high dose steroids. Procal 1.23 on 11/15. CRP 85 on 11/18  Bronchiectasis  Severely deconditioned        Recommendations and Plans:   Phase 1 weans: PS 8-12/8 during the day.  AC night   Steroid taper per pulmonary medicine at previous facility: 120mg per day for at least 2 weeks, then down to 100mg for 2 weeks, then 80mg for 2 weeks, then decreasing by 5mg every 2 weeks until off  PJP ppx: Bactrim.  Continue until on <20mg prednisone daily   Pulm toilet: Duonebs QID. Add mucomyst BID given bronchiectasis on previous CT image  Routine trach care per protocol  Keep same 6 Shiley trach for now, changed yesterday  Hypernatremia management per primary: diuretic on hold today   GI ppx: PPI  DVT ppx: heparin subcutaneous   VAP ppx: Chlorhexidine   CxR tomorrow to get updated baseline   PT/OT/SLP  Consider BDT and in-line PMV trials tomorrow for phonation            Chief Complaint Acute on chronic respiratory failure with hypoxemia (H)       HPI   I have been asked by Dr. Tavares to see Matthew Bowen in consultation for trach and ventilator management.    Matthew Bowen is a 78 year old year old female admitted to [facility] on 12/5/2023 for ongoing treatment of respiratory failure.    The referring facility is [facility].  Records from that facility are available to assist in historical details.  Further history is provided by  at bedside.    78 year old female w/PMH complex medical history including chronic hypoxic respiratory failure due to pulmonary HTN on 2L home O2, ALAINA requiring CPAP, chronic diastolic HF, CAD, CKD stage 3, morbid obesity, HTN,  depression, recent falls, treated for pneumonia x3 in the last 2 to 3 months. Presented to the ED on 10/15/23 with fever, cough, and falls. Treated with broad spectrum antibiotics for  pneumonia and developed worsening respiratory status on 10/21 requiring transfer to ICU and intubation. ICU course prolonged due to her respiratory failure, ARDS, and difficulty weaning from vent. Her chest imaging studies showed bilateral groundglass opacities and dense consolidation, she did have work-up with bronchoscopy and BAL, predominant neutrophils on cell count via BAL, the cultures have been negative, she did have an autoimmune work-up that was negative except for mild elevation of the RF and borderline nonspecific elevated MARLI, notably she did have low IgG level.  Started on high dose steroids end of Oct for possible organizing pneumonia. Diuresed without much improvement in breathing. Improved a bit through 11/13, then with acute worsening of unclear reason, possibly d/t too rapid steroid taper. Repeat CT scan 11/18 with worsening infiltrates, etiology unclear, differential included: inflammatory pneumonitis such as acute interstitial pneumonitis (Hamman Rich syndrome), organizing, she did have some CT scan findings that could be consistent with Atoll sign on the CT scan 10/21/2023.  Interestingly there are case reports of organizing pneumonia with immunoglobulin deficiency, IVIG considered but not given. Prior to admission here, on IV solumedrol 62.5mg q8h.    12/5: admitted to LTAC on AC vent 40%, PEEP 8. Suctioned for small secretions.     Today sitting in chair on PS 12/8 wean and seems to be tolerating without issue.  Denies sob, n/v.  +generalized pain.   at bedside.       Medical History  [unfilled]  [facility] Social History  Reviewed, and she  reports that she has never smoked. She has never used smokeless tobacco.    Smoking history:   History   Smoking Status    Never   Smokeless Tobacco    Never    Family History  Reviewed, and family history is not on file.   Allergies  No Known Allergies           Current Medications:    acetaminophen  650 mg Oral or Feeding Tube 4x Daily     amLODIPine  5 mg Oral or Feeding Tube BID    aspirin  81 mg Oral or Feeding Tube Daily    atorvastatin  20 mg Oral or Feeding Tube QPM    atovaquone  1,500 mg Oral or Feeding Tube Daily    B and C vitamin Complex with folic acid  5 mL Oral or Feeding Tube Daily    [START ON 12/7/2023] cholecalciferol  50 mcg Per Feeding Tube Daily    enoxaparin ANTICOAGULANT  40 mg Subcutaneous Q24H    insulin aspart  1-6 Units Subcutaneous Q4H MARLON    ipratropium - albuterol 0.5 mg/2.5 mg/3 mL  3 mL Nebulization 4x daily    levothyroxine  150 mcg Oral or Feeding Tube QAM AC    lidocaine   Topical TID    metoprolol  12.5 mg Per Feeding Tube BID    miconazole   Topical BID    [START ON 12/7/2023] pantoprazole  40 mg Per Feeding Tube QAM AC    predniSONE  60 mg Oral or Feeding Tube BID w/meals    pregabalin  75 mg Per Feeding Tube Daily    protein modular  1 packet Per Feeding Tube Daily    QUEtiapine  150 mg Oral or Feeding Tube BID    sertraline  150 mg Oral or Feeding Tube Daily    sodium chloride (PF)  10-40 mL Intracatheter Q7 Days    sulfamethoxazole-trimethoprim  20 mL Oral Once per day on Monday Wednesday Friday          Review of Systems:  A 10-system review was obtained and is negative with the exception of the symptoms noted above. Physical Exam:  Temp:  [97.6  F (36.4  C)-99  F (37.2  C)] 97.9  F (36.6  C)  Pulse:  [64-90] 87  Resp:  [10-30] 29  BP: (139-187)/(63-86) 161/70  FiO2 (%):  [40 %] 40 %  SpO2:  [92 %-97 %] 92 %  [unfilled]  Ventilator settings:   Vent Mode: CPAP/PS  (Continuous positive airway pressure with Pressure Support)  FiO2 (%): 40 %  Resp Rate (Set): 18 breaths/min  Tidal Volume (Set, mL): 360 mL  PEEP (cm H2O): 8 cmH2O  Pressure Support (cm H2O): 12 cmH2O  Resp: 29      EXAM:  Physical Exam  Gen: NAD in chair on PS wean   HEENT: NT, trach midline/intact  CV: RRR, no m/g/r  Resp: CTAB; non-labored   Abd: soft, nontender, BS+  Skin: no rashes or lesions  Ext: no edema, extremely weak   Neuro: alert,  follows commands        Pertinent Labs:  Lab Results: personally reviewed.   Recent Labs   Lab 12/06/23  0803   WBC 22.9*   HGB 8.9*   HCT 31.0*        Recent Labs   Lab 12/06/23  0803 12/05/23  0419 12/04/23  0544   * 150* 145   CO2 41* 38* 38*   BUN 76.5* 78.4* 74.0*        Pertinent Radiology:  Radiology results: images and reports personally viewed; radiology read below   XR CHEST 11/26/2023  papo AP view of the chest was obtained. Endotracheal tube tip projects over mid thoracic trachea approximately 4 cm from the ashley. Enteric tube crosses the diaphragm with the distal tip outside the field-of-view. Right upper extremity PICC tip projects over high/mid SVC. Cardiomegaly. Left basilar and right mid/upper lobe patchy pulmonary opacity, could represent atelectasis versus infection. No significant pleural effusion or pneumothorax.    CT CHEST WITHOUT CONTRAST 11/24/2023                                 FINDINGS:      LUNGS AND PLEURA: Pulmonary infiltrates again seen throughout both  lungs. There is increased airspace consolidation with air bronchograms  in both lower lobes and in the posterior right upper lobe when  compared to previous. There are groundglass opacities with septal  thickening throughout the remainder of both lungs. Mild cylindrical  bronchiectasis. No pleural effusion or pneumothorax.    IMPRESSION:  1.  Extensive bilateral pulmonary infiltrates again seen. Increasing  airspace consolidation in the right upper lobe and both lower lobes  when compared to previous.        Other pertinent data:  Echocardiogram 10/21/2023  Interpretation Summary     Left ventricular systolic function is normal. The visual ejection fraction is  60-65%. No regional wall motion abnormalities noted.  The right ventricle is normal in size and function.  Moderate valvular aortic stenosis with aortic valve area is 1.1 cm^2. The  mean  AoV pressure gradient is 29.5 mmHg.  Comapred to the prior study, aortic  stenosis has progressed from mild to  moderate. Otherwise, no change.  _____________________________      Tracheostomy tube data:  Date of initial placement: 11/29/2023  Current tube - type: Shiley , size: 6       Extensive record review is performed.  Key information about patient is reviewed in detail.  The respiratory plan of care is discussed with RT.  This patient will be rounded on regularly while requiring mechanical ventilator support.  Please contact us with questions or concerns.    Total time spent on pt examination and coordination of care was 60min   Adilson Prakash CNP  Pulmonary Medicine  [facility]  Pager [phone]

## 2023-12-06 NOTE — PLAN OF CARE
Problem: Enteral Nutrition  Goal: Safe, Effective Therapy Delivery  Outcome: Progressing     Problem: Fall Injury Risk  Goal: Absence of Fall and Fall-Related Injury  Outcome: Progressing  Intervention: Promote Injury-Free Environment  Recent Flowsheet Documentation  Taken 12/5/2023 1829 by Rosaura Owens RN  Safety Promotion/Fall Prevention:   activity supervised   clutter free environment maintained   room door open   room near nurse's station   supervised activity     Problem: Pain Acute  Goal: Optimal Pain Control and Function  Outcome: Progressing   Goal Outcome Evaluation:  Patient denied pain so far this shift via nodding, calm and cooperative, blood pressure was 171/77 respiration 27 at 1807, rechecked to be 181/86 at 1839-hydralazine 5 mg IVP was given at 1844, tube feeding (Jevity 1.5 at 45 ml/hour started per order). Patient feeding tube site noted to be reddened, 2 buttons seen, purple and gray PICC line ports noted to be sluggish, had blood return though-red port had good blood return and flushed okay

## 2023-12-06 NOTE — H&P
LTACH    History and Physical - Hospitalist Service       Date of Admission:  12/5/2023    Assessment & Plan      #Acute on Chronic Hypoxic Respiratory Failure  #Bronchiectasis?  #Multifocal Pneumonia  #ARDS, PTA  #pHTN  #Chronic Hypoxic Respiratory Failure  #ALAINA  - pulmonary consulted and following  - treated with high dose steroids previously  - presently on slow steroid taper  - prednisone 120mg daily (60mg in split doses) x2 weeks (then 100mg x2 weeks, 80mg x2 weeks, then taper down 5mg every 2 weeks)  - bactrim 800-160mg three times weekly for PJP ppx(was on atovaquone, switched upon admission)  - trach exchange 12/5 by ENT PTA  - nebs per pulm    #HFpEF, not in exacerbation  - on torsemide, held on admission for Na 150  - can resume at 20mg when appropriate and titrate as needed    #Hypernatremia, Acute  - likely 2/2 intravascular depletion 2/2 over-diuresis and missing FWF on her first night admitted  - Na 155 today (145 on 12/4)  - D5W @ 100cc/hr x10h  - FWF 200cc q4h (missed last night)  - repeat BMP @ 1700  - further D5W if needed upon repeat Na  - plan to correct to 145 by tomorrow given acute hypernatremia     #Pain, Acute  - oxycodone 5mg prn    #Normocytic, Hypochromic Anemia  - suspect 2/2 critical illness and phlebotomy  - received pRBCs during hospitalization prior to LTACH  - transfuse to maintain Hgb > 7.0  - pantoprazole    #CKD  #CARMEN, resolved PTA  - follow renal function  - monitor for new CARMEN given intravascular depletion and hypernatremia    #CAD  - asa  - atorvastatin  - metoprolol tartrate 12.5mg BID    #Hypothyroidism  - levothyroxine    #Depression  #Anxiety  - pregabalin  - sertraline    #PEG  - PEG placed 11/29  - RD consulted and following              Diet: Adult Formula Drip Feeding: Continuous Jevity 1.5; Gastrostomy; Goal Rate: 45 ml/hr x 22 hrs (hold 1 hr before and after levothyroxine); mL/hr; 12/4: initial rate of 30 ml/hr x 4 hrs. If tolerated increase to goal rate of 45 ml/hr     DVT Prophylaxis: Heparin SQ  Aleman Catheter: Not present  Lines: PRESENT      PICC 10/22/23 Triple Lumen Right Basilic multiple med gtt. ready for use-Site Assessment: WDL except      Cardiac Monitoring: None  Code Status: Full Code      Clinically Significant Risk Factors Present on Admission         # Hypernatremia: Highest Na = 150 mmol/L in last 2 days, will monitor as appropriate        # Drug Induced Platelet Defect: home medication list includes an antiplatelet medication   # Hypertension: Home medication list includes antihypertensive(s)                 Disposition Plan      Expected Discharge Date: 12/13/2023    Discharge Delays: Complex Discharge    Discharge Comments: Vent, Trach, Feeding tube, WOC          Agapito Tavares MD  Hospitalist Service  [facility]  Securely message with LogFire (more info)  Text page via [facility] Paging/Directory     ______________________________________________________________________    Chief Complaint   Acute Respiratory Failure s/p Trach    History of Present Illness   Matthew Bowen is a 78y F PMHx chronic hypoxic respiratory failure 2/2 pHTN, ALAINA, HFpEF, CAD, CKD3, Obesity, HTN, depression, and anxiety. She presented initially on 10/15 with fever, cough, and falls with a temperature. Further w/u revealed multifocal pneumonia and she was started on antibiotics. Her respiratory status worsened as she developed ARDS and was intubated on 10/21. She had a prolonged ICU stay 2/2 difficulty vent weaning. Eventually she underwent trach/peg placement. Of note, she was treated with high dose steroids for her lung condition and remains on a high dose long taper of steroids. Pt was transferred to [facility] for ongoing vent weaning and therapies.       Past Medical History    Past Medical History:   Diagnosis Date    Antiplatelet or antithrombotic long-term use     Arthritis     Congestive heart failure (H)     Dyspnea on exertion     Heart attack (H)     Heart murmur     Hypertension   "   Irregular heart beat     Oxygen dependent     2L continuous at home.    Pulmonary hypertension (H)     Sleep apnea     Bipap    Stented coronary artery     x 1    Thyroid disease     Walking troubles        Past Surgical History   Past Surgical History:   Procedure Laterality Date    APPENDECTOMY      COLONOSCOPY      COLONOSCOPY N/A 2/28/2022    Procedure: COLONOSCOPY;  Surgeon: Kolby Dunn MD;  Location: [facility] OR    CV CORONARY ANGIOGRAM N/A 11/21/2019    Procedure: Coronary Angiogram;  Surgeon: Tay Andre MD;  Location: [facility] HEART CARDIAC CATH LAB    CV LEFT HEART CATH N/A 11/21/2019    Procedure: Left Heart Cath;  Surgeon: Tay Andre MD;  Location: [facility] CARD[facility]AC CATH LAB    CV PCI STENT DRUG ELUTING N/A 11/21/2019    Procedure: PCI Stent Drug Eluting;  Surgeon: Tay Andre MD;  Location: [facility] HEART CARDIAC CATH LAB    GYN SURGERY      Hyst.    IR GASTROSTOMY TUBE PERCUTANEOUS PLCMNT  11/29/2023    ORTHOPEDIC SURGERY      B\" TKs    TRACHEOSTOMY N/A 11/29/2023    Procedure: Tracheotomy;  Surgeon: Daron Faye MD;  Location: [facility] OR    TRACHEOSTOMY N/A 11/29/2023    Procedure: Exploration of tracheotomy wound with control of bleeding;  Surgeon: Daron Faye MD;  Location: [facility] OR       Prior to Admission Medications   Prior to Admission Medications   Prescriptions Last Dose Informant Patient Reported? Taking?   Acetaminophen (TYLENOL PO)   Yes No   Sig: Take 1,000 mg by mouth 3 times daily   Multiple Vitamins-Minerals (MULTIVITAMIN ADULTS PO)   Yes No   Sig: Take 1 tablet by mouth daily   amLODIPine (NORVASC) 5 MG tablet   Yes No   Sig: Take 5 mg by mouth daily    aspirin 81 MG EC tablet   Yes No   Sig: Take 81 mg by mouth daily   atorvastatin (LIPITOR) 20 MG tablet   Yes No   Sig: Take 20 mg by mouth every evening    clopidogrel (PLAVIX) 75 MG tablet   No No   Sig: Take 1 tablet (75 mg) by mouth daily   hydrALAZINE (APRESOLINE) 50 MG tablet   Yes No   Sig: Take 50 " mg by mouth 3 times daily    hypromellose (ARTIFICIAL TEARS) 0.5 % SOLN ophthalmic solution   Yes No   Si drop 4 times daily as needed for dry eyes   isosorbide dinitrate (ISORDIL) 20 MG tablet   Yes No   Sig: Take 20 mg by mouth 3 times daily    levothyroxine (SYNTHROID/LEVOTHROID) 150 MCG tablet   Yes No   Sig: Take 150 mcg by mouth daily   potassium chloride ER (KLOR-CON M) 20 MEQ CR tablet   Yes No   Sig: Take 40 mEq by mouth daily    pregabalin (LYRICA) 75 MG capsule   Yes No   Sig: Take 75 mg by mouth 2 times daily   sertraline (ZOLOFT) 100 MG tablet   Yes No   Sig: Take 150 mg by mouth daily   torsemide (DEMADEX) 20 MG tablet   Yes No   Sig: Take 60 mg by mouth every morning    vitamin D3 (CHOLECALCIFEROL) 50 mcg (2000 units) tablet   Yes No   Sig: Take 2,000 Units by mouth daily       Facility-Administered Medications: None        Review of Systems    The 10 point Review of Systems is negative other than noted in the HPI.    Social History   I have reviewed this patient's social history and updated it with pertinent information if needed.  Social History     Tobacco Use    Smoking status: Never    Smokeless tobacco: Never         Family History     No significant family history.      Allergies   No Known Allergies     Physical Exam   Vital Signs: Temp: 97.6  F (36.4  C) Temp src: Oral BP: 139/66 Pulse: 86   Resp: 30 SpO2: 93 % O2 Device: Mechanical Ventilator    Weight: 0 lbs 0 oz  Gen: awake, alert, comfortable  HEET: EOMI, MMM  Neck: supple, trach in place  CV: RRR  Pulm: coarse breath sounds bl  GI: soft, NT/ND  MSK: radial pulses intact, no edema, cyanosis, or clubbing      Medical Decision Making       65 MINUTES SPENT BY ME on the date of service doing chart review, history, exam, documentation & further activities per the note.      Data     I have personally reviewed the following data over the past 24 hrs:    22.9 (H)  \   8.9 (L)   / 327     155 (H) 110 (H) 76.5 (H) /  171 (H)   4.4 41 (H) 0.73  \       Imaging results reviewed over the past 24 hrs:   No results found for this or any previous visit (from the past 24 hour(s)).

## 2023-12-06 NOTE — PHARMACY-CONSULT NOTE
Pharmacy Tube Feeding Consult    Medication reviewed for administration by feeding tube and for potential food/drug interactions.    Recommendation: Recommend holding tube feedings for 1 hours before and 1 hours after administration of  levothyroxine  ..     Pharmacy will continue to follow as new medications are ordered.    Elsie Wood, [facility],BCCCP, BCCP

## 2023-12-06 NOTE — PHARMACY-ADMISSION MEDICATION HISTORY
Pharmacy Note: LTACH Admission Drug Regimen Review    Initial admission medication history was completed at [facility]. Please see Pharmacy - Admission Medication History note from 10/15/2023.    Medication orders were signed & held for discharge from transferring facility? Yes    Changes made to PTA medication list:  Added/upddated: TBD at LTACH discharge  Deleted: None  Changed:   -Amlodipine 5 mg daily was increased to bid  -hydrALAZINE 50 mg po tid, was reduced to 25 mg tid  -torsemide 60 mg qam was reduced to 20 mg daily  -pregabalin (LYRICA)75 mg bid, was reduced to 75 mg daily.    No outpatient medications have been marked as taking for the 12/5/23 encounter (Hospital Encounter).       Admission drug regimen review has been completed. Pertinent information or medication issues identified include:    Medications that have been added at [facility]: melatonin, metoprolol, pantoprazole, QUEtiapine. Other meds for PJP ppx: atovaquone, steroid, sulfamethoxazole-trimethoprim.    Steroid IV to PO, taper dosing every 2 weeks until off per pulmonology/hospitalist; completed methylPREDNISonlone 62.5 mg IV q8h, last dose this evening, 12/5/23, start Prednisone PO taper at 120 mg per day or 60 mg PO bid x 14 days tomorrow am 12/6/2023, then down to 100mg for 2 weeks, then 80mg for 2 weeks, then decreasing by 5mg every 2 weeks until off.    Medication that were discontinued upon LTACH ADM this evening:     chlorhexidine (PERIDEX) 0.12 % solution 15 mL  Dose: 15 mL  Freq: EVERY 12 HOURS Route: MT  Start: 10/22/23 2000 End: 12/05/23 1703      pantoprazole (PROTONIX) 2 mg/mL suspension 40 mg  Dose: 40 mg  Freq: EVERY MORNING BEFORE BREAKFAST Route: PER FEEDING  Start: 10/23/23 0730 End: 12/05/23 1703     torsemide (DEMADEX) tablet 20 mg  Dose: 20 mg  Freq: 2 TIMES DAILY (Diuretics and Nitrates) Route: ORAL OR FEED  Start: 11/29/23 1600 End: 12/05/23 0707     melatonin tablet 1 mg  Dose: 1  mg  Freq: AT BEDTIME PRN Route: ORAL OR FEED  PRN Reason: sleep  Start: 10/27/23 1157 End: 12/05/23 1703       The following PTA medications were not continued during hospitalization at [facility]: Clopidogrel, isosorbide dinitrate, multivitamin. Please assess whether a future restart would be appropriate.     Thank you for the opportunity to participate in the care of this patient.    Hattie Jaquez RPH  12/5/2023 6:38 PM

## 2023-12-06 NOTE — PLAN OF CARE
Problem: Mechanical Ventilation Invasive  Goal: Effective Communication  Outcome: Progressing  Goal: Mechanical Ventilation Liberation  Outcome: Progressing  Goal: Absence of Device-Related Skin and Tissue Injury  Outcome: Progressing  Goal: Absence of Ventilator-Induced Lung Injury  Outcome: Progressing      RT PROGRESS NOTE     DATA:     CURRENT SETTINGS: AC 18/360/+8             TRACH TYPE / SIZE:  #6 Aj TG placed 11/30/23             MODE:   AC             FIO2:   40%     ACTION:             THERAPIES:   DUO NEB QID             SUCTION:                           FREQUENCY:   X2                        AMOUNT:   Small                        CONSISTENCY:   Thick                        COLOR:   White/yellow             SPONTANEOUS COUGH EFFORT/STRENGTH OF EFFORT (not elicited by suctioning): Yes:Strong                              WEANING PHASE:                           WEAN MODE:                            WEAN TIME:                           END WEAN REASON:        RESPONSE:             BS:   Clear/diminish             VITAL SIGNS:   SAT 94-96%, HR 64-85, RR 21-27             EMOTIONAL NEEDS / CONCERNS:  N/A                RISK FOR SELF DECANNULATION:  N/A                        RISK DUE TO:                          INTERVENTION/S IN PLACE IS/ARE:  N/A       NOTE / PLAN:   Pt was admit today  around 17:03 pm and is on full vent support, mj well. Cont current therapy and keep sat >/=90%

## 2023-12-07 NOTE — PROGRESS NOTES
"SPEECH PATHOLOGY: BLUE DYE TEST and SPEAKING VALVE EVALUATION     12/07/23 1500   Appointment Info   Signing Clinician's Name / Credentials (SLP) Jose Alvarado MA CCC SLP   General Information   Onset of Illness/Injury or Date of Surgery 10/15/23   Referring Physician NILSON Prakash   Patient/Family Therapy Goal Statement (SLP) Eating and drinking   Pertinent History of Current Problem Per pulmonary note, dated yesterday: \"78 year old female w/PMH complex medical history including chronic hypoxic respiratory failure due to pulmonary HTN on 2L home O2, ALAINA requiring CPAP, chronic diastolic HF, CAD, CKD stage 3, morbid obesity, HTN,  depression, recent falls, treated for pneumonia x3 in the last 2 to 3 months. Presented to the ED on 10/15/23 with fever, cough, and falls. Treated with broad spectrum antibiotics for pneumonia and developed worsening respiratory status on 10/21 requiring transfer to ICU and intubation. ICU course prolonged due to her respiratory failure, ARDS, and difficulty weaning from vent. Her chest imaging studies showed bilateral groundglass opacities and dense consolidation, she did have work-up with bronchoscopy and BAL, predominant neutrophils on cell count via BAL, the cultures have been negative, she did have an autoimmune work-up that was negative except for mild elevation of the RF and borderline nonspecific elevated MARLI, notably she did have low IgG level. Diuresed without much improvement in breathing. Improved a bit through 11/13, then with acute worsening of unclear reason. Repeat CT scan 11/18 with worsening infiltrates, etiology unclear, differential included: inflammatory pneumonitis such as acute interstitial pneumonitis (Hamman Rich syndrome), organizing, she did have some CT scan findings that could be consistent with Atoll sign on the CT scan 10/21/2023.\"   General Observations Patient alert, cooperative.  present.   Type of Evaluation   Type of Evaluation Swallow " Evaluation;Artificial Airway (Speaking Valve)   Tracheostomy Assessment (Speaking Valve)   Cuff, Tracheostomy Tube cuffed, inflated   Date of Tracheostomy 11/30/23   Tube Size, Tracheostomy 6   Type, Tracheostomy Tube Shiley   Respiratory Status (Speaking Valve)   Oxygen Supply Ventilator   Ventilator Mode PS   Ventilator PEEP 5   Ventilator Type Drager   Ventilator FIO2 40   Ventilator Pressure Support 10   Speaking Valve Trials (Speaking Valve)   Outcome of Trial (Speaking Valve) tolerance is good   Voice Production (Speaking Valve Trial) voicing achieved;fair strength/quality   Breath Support (Speaking Valve Trial) exhales through mouth   Recommendations (Speaking Valve Trials) continue speaking valve trials with SLP present   Secretions/Suction, Cuff Deflation (Speaking Valve) tracheal suctioning post cuff deflation   Secretions During Valve Use (Speaking Valve Trial) secretions managed well during valve use   Airflow/Phonation Air flow around trach adequate with finger occlusion   Speaking Valve placed on tracheostomy tube;placed with inline adapter   Cuff Inflated at Onset of Evaluation Yes   Orders received to deflate cuff for PMSV trial Yes   Oxygen saturation before cuff deflation 95 %   Oxygen saturation with PMSV placement 95 %   Total amount of time with PMSV placement: 20   Respiratory rate before cuff deflation 27 Per Minute   Respiratory rate after cuff deflation 26 Per Minute   Respiratory Rate with PMSV placement 26 Per Minute   Leak Speech leak speech attempted/achieved;able to phonate with occlusion of trach   Total amount of time with leak speech 1 minute   Oral Motor   Oral Musculature anomalies present   Structural Abnormalities none present   Mucosal Quality dry   Dentition (Oral Motor)   Dentition (Oral Motor) adequate dentition   Facial Symmetry (Oral Motor)   Facial Symmetry (Oral Motor) left side impairment  (hx Bell's palsy)   Left Side Facial Asymmetry minimal impairment;moderate  impairment   Lip Function (Oral Motor)   Lip Range of Motion (Oral Motor) retraction impairment;unable/difficult to assess   Tongue Function (Oral Motor)   Tongue ROM (Oral Motor) WNL   Tongue Strength (Oral Motor) unable/difficult to assess   Jaw Function (Oral Motor)   Jaw Function (Oral Motor) WNL   Cough/Swallow/Gag Reflex (Oral Motor)   Soft Palate/Velum (Oral Motor) WNL   Volitional Throat Clear/Cough (Oral Motor) WNL   Volitional Swallow (Oral Motor) weak   Vocal Quality/Secretion Management (Oral Motor)   Vocal Quality (Oral Motor) aphonia   General Swallowing Observations   Current Diet/Method of Nutritional Intake (General Swallowing Observations, NIS) NPO;gastrostomy tube (PEG)   Respiratory Support ventilator   Comment, Secretions/Suctioning Blue Dye Test: The reason for the current test was to evaluate swallow/secretion management. The patient had xerostomia and the patient had minimal  tracheal secretions. The dye was given while on vent. A spontaneous swallow was elicited. Hyolaryngeal movement appeared reduced. There was no blue dye noted around trach site. Upon initial tracheal suctioning with cuff down there was no blue dye observed, confirmed by RT present. Hand off to RT, Debra, was completed in room. Follow-up suctioning for delayed aspiration to be completed by RT. Please see RT notes for details.   Past History of Dysphagia Patient was discharged from speech during acute admission following a video swallow study on 10/17/23 with recommendations for regular textures and thin liquids. Patient had subsequent respiratory decline and returned to NPO.   Clinical Impression   Criteria for Skilled Therapeutic Interventions Met (SLP Eval) Yes, treatment indicated   SLP Diagnosis Dysphonia   Risks & Benefits of therapy have been explained evaluation/treatment results reviewed;care plan/treatment goals reviewed;risks/benefits reviewed;participants voiced agreement with care plan;participants  included;patient;spouse/significant other   Clinical Impression Comments Patient presents with no immediate aspiration of secretions per blue dye test. Demonstrates good tolerance of speaking valve in-line while on vent. Notable for reduced/inadequate vocal volume, but somewhat responsive to verbal cues. Decreased initiation and output. Disorientation noted to place, time.   SLP Total Evaluation Time   Eval: oral/pharyngeal swallow function, clinical swallow Minutes (93961) 13   Eval: use and/or fitting of voice prosthetic device to supp speech (not aug. comm) Minutes (91125) 25   Swallowing Dysfunction &/or Oral Function for Feeding   Treatment of Swallowing Dysfunction &/or Oral Function for Feeding Minutes (39537) 20   Treatment Detail/Skilled Intervention To improve intelligibility with use of speaking valve, patient was instructed in use of strategy for loud voice using diaphragmatic support. Patient demonstrated understanding of education as evidenced by effort, but was only modestly able to significantly improve vocal volume, still requiring close listening at single word level in known contexts. Cognitive-linguistic: to address impairment in completing linguistically-based problem solving, reasoning, judgment, attention, memory, and executive function for baseline activities. Orientation- Person: Patient was oriented to self, family. Orientation-place: Patient was oriented to state, but not city or building. Orientation-time: Patient was not oriented to year. Unable to use external aids (white board information) in her room.   SLP Discharge Planning   SLP Plan Speaking valve, simple communication/cognitive tasks; swallow evaluation once tolerating speaking valve consistently on trach mask   SLP Discharge Recommendation Transitional Care Facility   SLP Rationale for DC Rec swallow, communication, cognition below baseline

## 2023-12-07 NOTE — PLAN OF CARE
Problem: Enteral Nutrition  Goal: Feeding Tolerance  Outcome: Progressing     Problem: Pain Acute  Goal: Optimal Pain Control and Function  Outcome: Progressing  Intervention: Prevent or Manage Pain  Recent Flowsheet Documentation  Taken 12/7/2023 0100 by Ligia Jacobs, RN  Sensory Stimulation Regulation:   care clustered   lighting decreased   quiet environment promoted  Medication Review/Management: medications reviewed   Goal Outcome Evaluation:       Pt slept > 6 hours, no s/s of pain observed, repositioning done  Every 2 hourly.  Pt seems to tolerate tube feeding well, all safety  Measures in progress, will continue to monitor.

## 2023-12-07 NOTE — PLAN OF CARE
7 PM to 7 AM RT PROGRESS NOTE     DATA:     CURRENT SETTINGS:             TRACH TYPE / SIZE:  6 Shiley placed 12/5/23             MODE:   AC 18, 360, +8, 35%.    ACTION:             THERAPIES:   QID Duo neb and BID Mucomyst neb given             SUCTION:                           FREQUENCY:  X 1                        AMOUNT:  small                         CONSISTENCY: thick                          COLOR:  pale yellow              SPONTANEOUS COUGH EFFORT/STRENGTH OF EFFORT (not elicited by suctioning): Good                              WEANING PHASE:   1                        WEAN MODE:    PS 12/8, 40%                        WEAN TIME:   3844-4651, for 12 hours and 19 minutes. Pt denied SOB or WOB.                        END WEAN REASON:   HS     RESPONSE:             BS:   Clear and dim             VITAL SIGNS:   SATs %, HR 70-86, RR 24-28             RISK FOR SELF DECANNULATION:  No    NOTE / PLAN:   Consider decreasing PEEP? Continue with same.      Problem: Mechanical Ventilation Invasive  Goal: Effective Communication  Outcome: Progressing  Goal: Mechanical Ventilation Liberation  Outcome: Progressing  Goal: Absence of Device-Related Skin and Tissue Injury  Outcome: Progressing  Goal: Absence of Ventilator-Induced Lung Injury  Outcome: Progressing

## 2023-12-07 NOTE — PROGRESS NOTES
Pulmonary Progress Note    Admit Date: 12/5/2023  CODE: Full Code    HPI:   78 year old female w/PMH complex medical history including chronic hypoxic respiratory failure due to pulmonary HTN on 2L home O2, ALAINA requiring CPAP, chronic diastolic HF, CAD, CKD stage 3, morbid obesity, HTN,  depression, recent falls, hx recurrent pneumonia. Admitted 10/15/23 and treated for CAP with multiple courses of antibiotics.  Worsened requiring intubation, developed ARDS. Cultures negative. Autoimmune work-up grossly negative but did have low IgG level. 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 S/p trach and PEG.      Assessment/Plan:     Acute on chronic hypoxic/hypercapnic respiratory failure in setting of underlying pulmonary HTN, c/b pneumonia, severe ARDS, possibly organizing pneumonia now s/p trach  Multifocal CAP: received multiple courses of antibiotics.  Completed 11/29. Serial Sputum cx with candida, beta-D-glucan neg. BAL 10/22 cytology neg.   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 IVIG considered but ultimately not given  Tracheostomy in place: Initial placement on 11/29 c/b post op bleeding s/p exploration of tracheotomy & cauterization by ENT.  6 Shiley changed by ENT on 12/5  Oropharyngeal dysphagia status post PEG tube   ALAINA on CPAP PTA  Chronic diastolic HF, Volume overload on diuretics   CARMEN on CKD - improved  Obesity  MDD, Anxiety   Hypernatremia: improved   Leukocytosis wo fever: in setting of high dose steroids. Procal 1.23 on 11/15. CRP 85 on 11/18  Bronchiectasis  Severely deconditioned     Recommendations:  Phase 1 weans: PS 8-12/8 during the day.  AC night.  Tolerating PS 10/5 today   Turn PEEP to 5  Steroid taper per pulmonary medicine at previous facility: 120mg per day for at least 2 weeks, then down to 100mg for 2 weeks, then 80mg for 2 weeks, then decreasing by 5mg every 2 weeks until  off  PJP ppx: Bactrim.  Continue until on <20mg prednisone daily   Pulm toilet: Duonebs QID. Mucomyst BID   Routine trach care per protocol  Keep same 6 Shiley trach for now  Hypernatremia improved, consider restarting torsemide tomorrow   GI ppx: PPI  DVT ppx: heparin subcutaneous   VAP ppx: Chlorhexidine   CxR reviewed: persistent RUL and LLL opacities not significantly change from previous image   PT/OT/SLP    Subjective:   - in bed with no apparent acute complaints, can follow simple commands. Daughter at bedside     Tracheal secretions:  Overnight - 1x, small, thick   Yesterday - 2x, small, thick     Cough strength: moderate    Ventilator weaning results  12/6: PS 12/8 for ~12hr, tolerated well     Clinical status discussed today with respiratory therapist     ROS: 10-system review obtained and negative with the exception of the symptoms noted above.    Medications:      acetaminophen  650 mg Oral or Feeding Tube 4x Daily    acetylcysteine  2 mL Nebulization BID    amLODIPine  5 mg Oral or Feeding Tube BID    aspirin  81 mg Oral or Feeding Tube Daily    atorvastatin  20 mg Oral or Feeding Tube QPM    B and C vitamin Complex with folic acid  5 mL Oral or Feeding Tube Daily    chlorhexidine  15 mL Mouth/Throat BID    cholecalciferol  50 mcg Per Feeding Tube Daily    heparin ANTICOAGULANT  5,000 Units Subcutaneous Q8H    insulin aspart  1-6 Units Subcutaneous Q4H MARLON    ipratropium - albuterol 0.5 mg/2.5 mg/3 mL  3 mL Nebulization 4x daily    levothyroxine  150 mcg Oral or Feeding Tube QAM AC    metoprolol  12.5 mg Per Feeding Tube BID    miconazole   Topical BID    pantoprazole  40 mg Per Feeding Tube QAM AC    predniSONE  60 mg Per Feeding Tube BID    pregabalin  75 mg Per Feeding Tube Daily    protein modular  1 packet Per Feeding Tube BID 09 12    QUEtiapine  150 mg Oral or Feeding Tube BID    sertraline  150 mg Oral or Feeding Tube Daily    sodium chloride (PF)  10 mL Intracatheter Q8H    sodium chloride (PF)   10-40 mL Intracatheter Q7 Days    sulfamethoxazole-trimethoprim  20 mL Oral Once per day on Monday Wednesday Friday         Exam/Data:   Vitals  BP (!) 148/72 (BP Location: Left arm)   Pulse 72   Temp 97.6  F (36.4  C) (Oral)   Resp 25   SpO2 100%      I/O last 3 completed shifts:  In: 3704 [I.V.:1710; NG/GT:1994]  Out: 1150 [Urine:1150]  Weight change:     Vent Mode: CPAP/PS  (Continuous positive airway pressure with Pressure Support)  FiO2 (%): 40 %  Resp Rate (Set): 18 breaths/min  Tidal Volume (Set, mL): 360 mL  PEEP (cm H2O): 5 cmH2O  Pressure Support (cm H2O): 10 cmH2O  Resp: 25      EXAM:  Gen: NAD in bed on AC  HEENT: NT, trach midline/intact  CV: RRR, no m/g/r  Resp: CTAB, diminished, non-labored, no wheeze   Abd: soft, nontender, BS+  Skin: no rashes or lesions  Ext: no edema, very weak   Neuro: alert, follows commands     Labs:    Complete Blood Count   Recent Labs   Lab 12/06/23  0803 12/05/23  0419 12/04/23  0544 12/03/23  0524   WBC 22.9* 17.7* 16.1* 19.6*   HGB 8.9* 8.5* 7.8* 8.2*    273 232 250     Basic Metabolic Panel  Recent Labs   Lab 12/07/23  0900 12/07/23  0624 12/07/23  0444 12/07/23  0033 12/06/23  2028 12/06/23  1736 12/06/23  1156 12/06/23  0803 12/05/23  0753 12/05/23  0419   NA  --  146*  --   --   --  151*  --  155*  --  150*   POTASSIUM  --  3.8  --   --   --  4.2  --  4.4  --  3.8   CHLORIDE  --  102  --   --   --  105  --  110*  --  103   CO2  --  39*  --   --   --  38*  --  41*  --  38*   BUN  --  63.3*  --   --   --  72.0*  --  76.5*  --  78.4*   CR  --  0.71  --   --   --  0.75  --  0.73  --  0.72   * 85 174* 184*   < > 216*   < > 137*   < > 196*    < > = values in this interval not displayed.       Radiology: Personally reviewed; radiology read below    XR CHEST 12/7/2023  Endotracheal tube seen on the prior study has been replaced with a tracheostomy tube which appears in good position in the mid trachea. PICC catheter from right upper extremity approach is  unchanged with tip in the superior vena cava. There is airspace consolidation with minimal volume loss in the right upper lobe consistent with right upper lobe pneumonia. There is persistent opacity with bronchial thickening at the left base spine the heart which is stable. Stable cardiac size. Normal pulmonary vascularity. No pneumothorax or pleural effusion. Calcified aortic arch.    XR CHEST 11/26/2023  papo AP view of the chest was obtained. Endotracheal tube tip projects over mid thoracic trachea approximately 4 cm from the ashley. Enteric tube crosses the diaphragm with the distal tip outside the field-of-view. Right upper extremity PICC tip projects over high/mid SVC. Cardiomegaly. Left basilar and right mid/upper lobe patchy pulmonary opacity, could represent atelectasis versus infection. No significant pleural effusion or pneumothorax.     CT CHEST WITHOUT CONTRAST 11/24/2023                                 LUNGS AND PLEURA: Pulmonary infiltrates again seen throughout both  lungs. There is increased airspace consolidation with air bronchograms  in both lower lobes and in the posterior right upper lobe when  compared to previous. There are groundglass opacities with septal  thickening throughout the remainder of both lungs. Mild cylindrical  bronchiectasis. No pleural effusion or pneumothorax.     IMPRESSION:  1.  Extensive bilateral pulmonary infiltrates again seen. Increasing  airspace consolidation in the right upper lobe and both lower lobes  when compared to previous.    Adilson Prakash CNP  Pulmonary Medicine  [facility]  Pager [phone]

## 2023-12-07 NOTE — PLAN OF CARE
Problem: Fall Injury Risk  Goal: Absence of Fall and Fall-Related Injury  Outcome: Progressing  Intervention: Identify and Manage Contributors  Recent Flowsheet Documentation  Taken 12/6/2023 1550 by Essie Womack RN  Medication Review/Management: medications reviewed  Intervention: Promote Injury-Free Environment  Recent Flowsheet Documentation  Taken 12/6/2023 1550 by Essie Womack RN  Safety Promotion/Fall Prevention: activity supervised     Problem: Pain Acute  Goal: Optimal Pain Control and Function  Outcome: Progressing  Intervention: Prevent or Manage Pain  Recent Flowsheet Documentation  Taken 12/6/2023 1550 by Essie Womack RN  Sensory Stimulation Regulation: television on  Medication Review/Management: medications reviewed     Problem: Fall Injury Risk  Goal: Absence of Fall and Fall-Related Injury  Intervention: Promote Injury-Free Environment  Recent Flowsheet Documentation  Taken 12/6/2023 1550 by Essie Womack RN  Safety Promotion/Fall Prevention: activity supervised   Goal Outcome Evaluation:  Patient remains alert and oriented x2, on vent wean, did well. Sodium recheck 5.1, MD continued iv fluid. Family were here and pleasant. Tolerating tfeed well.   Vital signs same. Cares and treatment done duely.

## 2023-12-07 NOTE — CONSULTS
WOC RN Consult Note    Today's Visit: WOC consult for sacral wound.  Full head to toe assessment completed, no skin concerns noted.  Feeding tube with 2 buttons, placed 11/29. Nursing to remove buttons 12/9, order entered,  WOC will sign off.  MARIUM BolesN, RN, PHN, HNB-BC, CWOCN  Pager no longer is use, please contact through [employer] Reminder group: [facility]

## 2023-12-07 NOTE — PROGRESS NOTES
[facility]    Medicine Progress Note - Hospitalist Service    Date of Admission:  12/5/2023    Hospital Course  Matthew Bowen is a 78y F PMHx chronic hypoxic respiratory failure 2/2 pHTN, ALAINA, HFpEF, CAD, CKD3, Obesity, HTN, depression, and anxiety. She presented initially on 10/15 with fever, cough, and falls with a temperature. Further w/u revealed multifocal pneumonia and she was started on antibiotics. Her respiratory status worsened as she developed ARDS and was intubated on 10/21. She had a prolonged ICU stay 2/2 difficulty vent weaning. Eventually she underwent trach/peg placement. Of note, she was treated with high dose steroids for her lung condition and remains on a high dose long taper of steroids. Pt was transferred to [facility] for ongoing vent weaning and therapies.      [facility] Course  12/7: Pt required 2L total of D5W in 24h, Na now 146, D5W stopped. FWF remain at 200cc q4h. Recheck Na tomorrow morning and adjust accordingly. BP elevated this AM, will give scheduled meds and f/u.     Assessment & Plan      #Acute on Chronic Hypoxic Respiratory Failure  #Bronchiectasis?  #Multifocal Pneumonia  #ARDS, PTA  #pHTN  #Chronic Hypoxic Respiratory Failure  #ALAINA  - pulmonary consulted and following  - treated with high dose steroids previously  - presently on slow steroid taper  - prednisone 120mg daily (60mg in split doses) x2 weeks (then 100mg x2 weeks, 80mg x2 weeks, then taper down 5mg every 2 weeks)  - bactrim 800-160mg three times weekly for PJP ppx(was on atovaquone, switched upon admission)  - trach exchange 12/5 by ENT PTA  - nebs per pulm    #HTN  - amlodipine 5mg BID (suspect broken up 2/2 labile BP)  - metoprolol tartrate 12.5mg BID    #Hypernatremia, Acute - resolved  - likely 2/2 intravascular depletion 2/2 over-diuresis and missing FWF on her first night admitted  - Na 145 (12/4) -> 150 -> 155 -> 151 -> 146 (12/7)  - received 2L D5W total  - FWF 200cc q4h  - repeat BMP tomorrow    #HFpEF, not  in exacerbation  - on torsemide, held on admission for Na 150  - can resume at 20mg when appropriate and titrate as needed    #Pain, Acute  - oxycodone 5mg prn    #Normocytic, Hypochromic Anemia  - suspect 2/2 critical illness and phlebotomy  - received pRBCs during hospitalization prior to LTACH  - transfuse to maintain Hgb > 7.0  - pantoprazole    #CKD  #CARMEN, resolved PTA  - follow renal function  - monitor for new CARMEN given intravascular depletion and hypernatremia    #CAD  - asa  - atorvastatin  - metoprolol tartrate 12.5mg BID    #Hypothyroidism  - levothyroxine    #Depression  #Anxiety  - pregabalin  - sertraline    #PEG  - PEG placed 11/29  - RD consulted and following              Diet: Adult Formula Drip Feeding: Continuous Jevity 1.5; Gastrostomy; Goal Rate: 45; mL/hr; TF to run x22 hrs, hold 1 hr before/after levothyroxine    DVT Prophylaxis: Heparin SQ  Aleman Catheter: PRESENT, indication: Strict 1-2 Hour I&O;Retention  Lines: PRESENT      PICC 10/22/23 Triple Lumen Right Basilic multiple med gtt. ready for use-Site Assessment: WDL except;Edematous      Cardiac Monitoring: None  Code Status: Full Code      Clinically Significant Risk Factors         # Hypernatremia: Highest Na = 155 mmol/L in last 2 days, will monitor as appropriate                            Disposition Plan     Expected Discharge Date: 12/13/2023    Discharge Delays: Complex Discharge    Discharge Comments: Vent, Trach, Feeding tube, WOC            Agapito Tavares MD  Hospitalist Service  LTACH  Securely message with [employer] (more info)  Text page via [employer] Paging/Directory   ______________________________________________________________________    Interval History   - Na 151 last night, down to 146 this AM after additional 1L D5W  - pt resting comfortably in bed today  - discussed her Na levels back to acceptable range, stopping fluids, maintain FWF, recheck tomorrow  - discussed BP and close monitoring with some possible adjustments  during admission  - daughter at bedside today, all questions answered  - no other acute concerns    Physical Exam   Vital Signs: Temp: 97.6  F (36.4  C) Temp src: Oral BP: (!) 195/90 Pulse: 79   Resp: 27 SpO2: 96 % O2 Device: None (Room air)    Weight: 0 lbs 0 oz  Gen: awake, alert, comfortable  HEET: EOMI, MMM  Neck: supple, trach in place  CV: RRR  Pulm: coarse breath sounds bl  GI: soft, NT/ND  MSK: radial pulses intact, no edema, cyanosis, or clubbing    Medical Decision Making       45 MINUTES SPENT BY ME on the date of service doing chart review, history, exam, documentation & further activities per the note.      Data     I have personally reviewed the following data over the past 24 hrs:    N/A  \   N/A   / N/A     146 (H) 102 63.3 (H) /  85   3.8 39 (H) 0.71 \       Imaging results reviewed over the past 24 hrs:   No results found for this or any previous visit (from the past 24 hour(s)).

## 2023-12-07 NOTE — PROGRESS NOTES
RT PROGRESS NOTE     DATA:     CURRENT SETTINGS:             TRACH TYPE / SIZE:  #6 Aj (placed 12/5)             MODE:   PS 10/5             FIO2:   40%     ACTION:             THERAPIES:   Duoneb QID, Mucomyst BID             SUCTION:                           FREQUENCY:   x4                        AMOUNT:   Small                        CONSISTENCY:   Thick                        COLOR:   Pale Yellow/White             SPONTANEOUS COUGH EFFORT/STRENGTH OF EFFORT (not elicited by suctioning): Moderate Spontaneous Cough                           WEANING PHASE:   Phase 1                        WEAN MODE:    PS 10/5                        WEAN TIME:   10:56am                        END WEAN REASON:   Still Weaning     RESPONSE:             BS:   Faint Coarse/Diminished             VITAL SIGNS:   Sating %, HR 69-79, RR 22-27             EMOTIONAL NEEDS / CONCERNS:  NA                RISK FOR SELF DECANNULATION:  No      NOTE / PLAN:   Full vent settings are AC 18, 360, +5, 40%.  Wean started late today due to high BP this morning.  BDT done today with no immediate or delayed aspiration.  In-line PMV done with SLP, tolerated well (see SLP note).  RT will continue to monitor.

## 2023-12-08 NOTE — PLAN OF CARE
Problem: Mechanical Ventilation Invasive  Goal: Effective Communication  Outcome: Progressing  Goal: Mechanical Ventilation Liberation  Outcome: Progressing  Goal: Absence of Device-Related Skin and Tissue Injury  Outcome: Progressing  Goal: Absence of Ventilator-Induced Lung Injury  Outcome: Progressing   RT PROGRESS NOTE   [id number]  DATA:     CURRENT SETTINGS:             TRACH TYPE / SIZE: #6 Aj, placed on 12/5              MODE:  AC 18 360 +5 50%               FIO2:        ACTION:             THERAPIES: Duoneb QID, Mucomyst BID               SUCTION:  X5 for small white thickish                         FREQUENCY:                           AMOUNT:                           CONSISTENCY:                           COLOR:                SPONTANEOUS COUGH EFFORT/STRENGTH OF EFFORT (not elicited by suctioning):                               WEANING PHASE:  1                         WEAN MODE: PS 8/5 since 0755 a.m.                           WEAN TIME:                           END WEAN REASON:        RESPONSE:             BS:                VITAL SIGNS:                EMOTIONAL NEEDS / CONCERNS:                  RISK FOR SELF DECANNULATION:                          RISK DUE TO:                          INTERVENTION/S IN PLACE IS/ARE:         NOTE / PLAN:   Goal Outcome Evaluation:    PMV in-line for 20  min with SLP    New order:      If patient able to do PS 8/5 for >/=10hr for two consecutive days, then may attempt phase 2: TM/cuff up 2hr max BID, otherwise PST during the day and AC night       Cont to monitor closely.

## 2023-12-08 NOTE — PLAN OF CARE
"  Problem: Adult Inpatient Plan of Care  Goal: Optimal Comfort and Wellbeing  Outcome: Progressing  Intervention: Provide Person-Centered Care  Recent Flowsheet Documentation  Taken 12/7/2023 1730 by Alma Jenkins RN  Trust Relationship/Rapport: care explained     Problem: Anxiety Signs/Symptoms  Goal: Improved Mood Symptoms (Anxiety Signs/Symptoms)  Outcome: Progressing   Goal Outcome Evaluation:       Patient was grimacing, anxious at bedtime, mouthing \"I can't breathe\" while on full vent support. Respirations 30, ZV=960/74. Given scheduled dose of metoprolol, and tylenol. Reassured of cares, and repositioned in bed. Will continue to monitor.                 "

## 2023-12-08 NOTE — PLAN OF CARE
Problem: Adult Inpatient Plan of Care  Goal: Absence of Hospital-Acquired Illness or Injury  Intervention: Prevent Skin Injury  Recent Flowsheet Documentation  Taken 12/8/2023 0219 by Kendall Rascon RN  Body Position:   left   turned     Problem: Adult Inpatient Plan of Care  Goal: Absence of Hospital-Acquired Illness or Injury  Intervention: Prevent Infection  Recent Flowsheet Documentation  Taken 12/8/2023 0100 by Kendall Rascon, RN  Infection Prevention:   equipment surfaces disinfected   hand hygiene promoted     Problem: Adult Inpatient Plan of Care  Goal: Optimal Comfort and Wellbeing  Outcome: Progressing  Intervention: Provide Person-Centered Care  Recent Flowsheet Documentation  Taken 12/8/2023 0100 by Kendall Rascon RN  Trust Relationship/Rapport: care explained     Problem: Mechanical Ventilation Invasive  Goal: Effective Communication  Intervention: Ensure Effective Communication  Recent Flowsheet Documentation  Taken 12/8/2023 0100 by Knedall Rascon RN  Trust Relationship/Rapport: care explained   Goal Outcome Evaluation:         Pt is alert, able to mouth words to communicate needs, on trach/vent, continuous TF, tolerated well, mitchell cath patent and draining well, incontinent BM x 2, loose, Large. Dr. Cesar ordered Cdiff test, sample taken and sent to lab.  BS checked per order and insulin coverage given. slept most of the shift. VSS, All needs met.   BP (!) 151/67   Pulse 62   Temp 98.3  F (36.8  C) (Oral)   Resp 22   SpO2 98%          Latest Reference Range & Units 12/07/23 21:04 12/07/23 23:42 12/08/23 04:00   GLUCOSE BY METER POCT 70 - 99 mg/dL 157 (H) 163 (H) 116 (H)   (H): Data is abnormally high

## 2023-12-08 NOTE — PLAN OF CARE
Problem: Adult Inpatient Plan of Care  Goal: Absence of Hospital-Acquired Illness or Injury  Outcome: Progressing  Intervention: Identify and Manage Fall Risk  Recent Flowsheet Documentation  Taken 12/8/2023 0800 by Haider Goodson RN  Safety Promotion/Fall Prevention:   lighting adjusted   assistive device/personal items within reach   room door open   room near nurse's station   supervised activity   increased rounding and observation  Intervention: Prevent Infection  Recent Flowsheet Documentation  Taken 12/8/2023 0800 by Haider Goodson RN  Infection Prevention:   equipment surfaces disinfected   hand hygiene promoted   personal protective equipment utilized  Goal: Optimal Comfort and Wellbeing  Outcome: Progressing  Intervention: Provide Person-Centered Care  Recent Flowsheet Documentation  Taken 12/8/2023 0800 by Haider Goodson RN  Trust Relationship/Rapport:   care explained   empathic listening provided     Problem: Pain Acute  Goal: Optimal Pain Control and Function  Outcome: Progressing   Goal Outcome Evaluation: Pt awake, alert, and oriented. Not in respiratory distress. Tolerating well with her tube feeding. Patient reported of having pain - relieved with Tylenol and repositioning. Stool specimen was sent to lab r/o C. Diff, and  result came negative. PRN Cetirizine was given for generalized itchiness - noted effective. Needs attended. Pt can have ice chips now, one at a time as per provider, but still NPO and continuous tube feeding. Needs attended. Call light within reach. 1500 Pt reported no concern/issues; left with family and therapist, pt was doing her PT sessions. Plan of care ongoing.     Haider Goodson RN

## 2023-12-08 NOTE — PROGRESS NOTES
[facility]    Medicine Progress Note - Hospitalist Service    Date of Admission:  12/5/2023    Hospital Course  Matthew Bowen is a 78y F PMHx chronic hypoxic respiratory failure 2/2 pHTN, ALAINA, HFpEF, CAD, CKD3, Obesity, HTN, depression, and anxiety. She presented initially on 10/15 with fever, cough, and falls with a temperature. Further w/u revealed multifocal pneumonia and she was started on antibiotics. Her respiratory status worsened as she developed ARDS and was intubated on 10/21. She had a prolonged ICU stay 2/2 difficulty vent weaning. Eventually she underwent trach/peg placement. Of note, she was treated with high dose steroids for her lung condition and remains on a high dose long taper of steroids. Pt was transferred to [facility] for ongoing vent weaning and therapies.      [facility] Course  12/7: Pt required 2L total of D5W in 24h, Na now 146, D5W stopped. FWF remain at 200cc q4h. Recheck Na tomorrow morning and adjust accordingly. BP elevated this AM, will give scheduled meds and f/u.   12/8: Diarrhea ovn. Na stable today. Resume torsemide 20mg daily.     Assessment & Plan      #Acute on Chronic Hypoxic Respiratory Failure  #Bronchiectasis?  #Multifocal Pneumonia  #ARDS, PTA  #pHTN  #Chronic Hypoxic Respiratory Failure  #ALAINA  - pulmonary consulted and following  - treated with high dose steroids previously  - presently on slow steroid taper  - prednisone 120mg daily (60mg in split doses) x2 weeks (then 100mg x2 weeks, 80mg x2 weeks, then taper down 5mg every 2 weeks)  - bactrim 800-160mg three times weekly for PJP ppx(was on atovaquone, switched upon admission)  - trach exchange 12/5 by ENT PTA  - nebs per pulm    #Diarrhea  - ovn (12/7)  - C. diff negative    #HTN  - amlodipine 5mg BID (suspect broken up 2/2 labile BP)  - metoprolol tartrate 12.5mg BID    #Hypernatremia, Acute - resolved  - likely 2/2 intravascular depletion 2/2 over-diuresis and missing FWF on her first night admitted  - Na 145 (12/4)  -> 150 -> 155 -> 151 -> 146 (12/7) -> 145  - FWF 200cc q4h  - repeat BMP tomorrow    #HFpEF, not in exacerbation  - on torsemide, held on admission for Na 150  - resume torsemide 20mg daily (12/8)    #Pain, Acute  - oxycodone 5mg prn    #Normocytic, Hypochromic Anemia  - suspect 2/2 critical illness and phlebotomy  - received pRBCs during hospitalization prior to LTACH  - transfuse to maintain Hgb > 7.0  - pantoprazole    #CKD  #CARMEN, resolved PTA  - follow renal function  - monitor for new CARMEN given intravascular depletion and hypernatremia    #CAD  - asa  - atorvastatin  - metoprolol tartrate 12.5mg BID    #Hypothyroidism  - levothyroxine    #Depression  #Anxiety  - pregabalin  - sertraline    #PEG  - PEG placed 11/29  - RD consulted and following            Diet: Adult Formula Drip Feeding: Continuous Jevity 1.5; Gastrostomy; Goal Rate: 45; mL/hr; TF to run x22 hrs, hold 1 hr before/after levothyroxine    DVT Prophylaxis: Heparin SQ  Aleman Catheter: PRESENT, indication: Strict 1-2 Hour I&O;Retention  Lines: PRESENT      PICC 10/22/23 Triple Lumen Right Basilic multiple med gtt. ready for use-Site Assessment: WDL      Cardiac Monitoring: None  Code Status: Full Code      Clinically Significant Risk Factors         # Hypernatremia: Highest Na = 155 mmol/L in last 2 days, will monitor as appropriate                            Disposition Plan     Expected Discharge Date: 12/13/2023    Discharge Delays: Complex Discharge    Discharge Comments: Vent, Trach, Feeding tube, WOC            Agapito Tavares MD  Hospitalist Service  LTACH  Securely message with [employer] (more info)  Text page via [facility] Paging/Directory   ______________________________________________________________________    Interval History   - diarrhea ovn, C. diff ordered  - sitting up in the chair  -  visiting  - discussed stable sodium and restarting torsemide, both agreeable  - otherwise she is doing well today  - no other acute  concerns    Physical Exam   Vital Signs: Temp: 98.3  F (36.8  C) Temp src: Oral BP: (!) 151/67 Pulse: 62   Resp: 22 SpO2: 98 % O2 Device: Mechanical Ventilator    Weight: 0 lbs 0 oz  Gen: awake, alert, comfortable  HEET: EOMI, MMM  Neck: supple, trach in place  CV: RRR  Pulm: coarse breath sounds bl  GI: soft, NT/ND  MSK: radial pulses intact, no edema, cyanosis, or clubbing    Medical Decision Making       45 MINUTES SPENT BY ME on the date of service doing chart review, history, exam, documentation & further activities per the note.      Data     I have personally reviewed the following data over the past 24 hrs:    19.1 (H)  \   7.8 (L)   / 259     N/A N/A N/A /  116 (H)   N/A N/A N/A \       Imaging results reviewed over the past 24 hrs:   Recent Results (from the past 24 hour(s))   XR Chest Port 1 View    Narrative    EXAM: XR CHEST PORT 1 VIEW  LOCATION: [facility]  DATE: 12/7/2023    INDICATION: hypoxic resp failure, trach, vent  COMPARISON: 11/26/2023.      Impression    IMPRESSION: Endotracheal tube seen on the prior study has been replaced with a tracheostomy tube which appears in good position in the mid trachea. PICC catheter from right upper extremity approach is unchanged with tip in the superior vena cava. There   is airspace consolidation with minimal volume loss in the right upper lobe consistent with right upper lobe pneumonia. There is persistent opacity with bronchial thickening at the left base spine the heart which is stable. Stable cardiac size. Normal   pulmonary vascularity. No pneumothorax or pleural effusion. Calcified aortic arch.

## 2023-12-08 NOTE — PLAN OF CARE
7 PM to 7 AM RT PROGRESS NOTE     DATA:     CURRENT SETTINGS:             TRACH TYPE / SIZE:  6 Shiley placed 12/5/23             MODE:   AC 18, 360, +5, 40%.    ACTION:             THERAPIES:   QID Duo neb and BID Mucomyst neb given             SUCTION:                           FREQUENCY:  X 2                        AMOUNT:  mod                         CONSISTENCY: thick                          COLOR:  white to pale yellow             SPONTANEOUS COUGH EFFORT/STRENGTH OF EFFORT (not elicited by suctioning): Good                              WEANING PHASE:   1                        WEAN MODE:    PS 10/5, 40%                        WEAN TIME:   5241-6175, for 9 hours and 34 minutes. Pt denied SOB or WOB.                        END WEAN REASON:   Time for treatments. Then HS     RESPONSE:             BS:   Clear and dim             VITAL SIGNS:   SATs 95-98%, HR 78, RR 24-28             RISK FOR SELF DECANNULATION:  No    NOTE / PLAN:   Continue with same.      Problem: Mechanical Ventilation Invasive  Goal: Effective Communication  Outcome: Progressing  Goal: Mechanical Ventilation Liberation  Outcome: Progressing  Goal: Absence of Device-Related Skin and Tissue Injury  Outcome: Progressing  Goal: Absence of Ventilator-Induced Lung Injury  Outcome: Progressing

## 2023-12-08 NOTE — PROGRESS NOTES
Pulmonary Progress Note    Admit Date: 12/5/2023  CODE: Full Code    HPI:   78 year old female w/PMH complex medical history including chronic hypoxic respiratory failure due to pulmonary HTN on 2L home O2, ALAINA requiring CPAP, chronic diastolic HF, CAD, CKD stage 3, morbid obesity, HTN,  depression, recent falls, hx recurrent pneumonia. Admitted 10/15/23 and treated for CAP with multiple courses of antibiotics.  Worsened requiring intubation, developed ARDS. Cultures negative. Autoimmune work-up grossly negative but did have low IgG level. 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 S/p trach and PEG.      Assessment/Plan:     Acute on chronic hypoxic/hypercapnic respiratory failure in setting of underlying pulmonary HTN, c/b pneumonia, severe ARDS, possibly organizing pneumonia now s/p trach  Multifocal CAP: received multiple courses of antibiotics.  Completed 11/29. Serial Sputum cx with candida, beta-D-glucan neg. BAL 10/22 cytology neg.   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 IVIG considered but ultimately not given  Tracheostomy in place: Initial placement on 11/29 c/b post op bleeding s/p exploration of tracheotomy & cauterization by ENT.  6 Elyssaley changed by ENT on 12/5. No bleeding issue since admission here.   Oropharyngeal dysphagia status post PEG tube: passed BDT on 12/7  ALAINA on CPAP PTA  Chronic diastolic HF, Volume overload: diuretic on hold d/t hypernatremia   CARMEN on CKD - improved  Obesity  MDD, Anxiety   Hypernatremia: improved   Leukocytosis wo fever: in setting of high dose steroids. Procal 1.23 on 11/15. CRP 85 on 11/18  Bronchiectasis  Severely deconditioned     Weaning well requiring less PS each day.  Passed BDT, able to voice with in-line PMV trial. Remains afebrile.  Still with some elevated BP. Na remains normalized.      Recommendations:  Phase 1 weans: PST during the  day.  AC night.  Tolerating PS 8/5 today.  If patient able to do PS 8/5 for >/=10hr for two consecutive days, then may attempt phase 2: TM/cuff up 2hr max BID, otherwise PST during the day and AC night   PMV trials with SLP only for now   Steroid taper: 120mg per day for at least 2 weeks, then down to 100mg for 2 weeks, then 80mg for 2 weeks, then decreasing by 5mg every 2 weeks until off  PJP ppx: Bactrim.  Continue until on <20mg prednisone daily   Pulm toilet: Duonebs QID. Mucomyst BID   Routine trach care per protocol  Keep same 6 Shiley trach for now  torsemide restarted today - monitor Na closely   GI ppx: PPI  DVT ppx: heparin subcutaneous   VAP ppx: Chlorhexidine    PT/OT/SLP  BP management per primary service     Subjective:   - in bed on PS 8/5.  Denies sob, acute pain, n/v, can follow simple commands.  at bedside     Tracheal secretions:  Overnight - 2x, mod, thick   Yesterday - 4x, small, thick     Cough strength: moderate    Ventilator weaning results  12/6: PS 12/8 for ~12hr, tolerated well   12/7: PS 10/5 for 9.5hr, tolerated well     Clinical status discussed today with respiratory therapist     ROS: 10-system review obtained and negative with the exception of the symptoms noted above.    Medications:      acetaminophen  650 mg Oral or Feeding Tube 4x Daily    acetylcysteine  2 mL Nebulization BID    amLODIPine  5 mg Oral or Feeding Tube BID    aspirin  81 mg Oral or Feeding Tube Daily    atorvastatin  20 mg Oral or Feeding Tube QPM    B and C vitamin Complex with folic acid  5 mL Oral or Feeding Tube Daily    chlorhexidine  15 mL Mouth/Throat BID    cholecalciferol  50 mcg Per Feeding Tube Daily    heparin ANTICOAGULANT  5,000 Units Subcutaneous Q8H    insulin aspart  1-6 Units Subcutaneous Q6H    ipratropium - albuterol 0.5 mg/2.5 mg/3 mL  3 mL Nebulization 4x daily    levothyroxine  150 mcg Oral or Feeding Tube QAM AC    metoprolol  12.5 mg Per Feeding Tube BID    miconazole   Topical BID     pantoprazole  40 mg Per Feeding Tube QAM AC    predniSONE  60 mg Per Feeding Tube BID    pregabalin  75 mg Per Feeding Tube Daily    protein modular  1 packet Per Feeding Tube BID 09 12    QUEtiapine  150 mg Oral or Feeding Tube BID    sertraline  150 mg Oral or Feeding Tube Daily    sodium chloride (PF)  10 mL Intracatheter Q8H    sodium chloride (PF)  10-40 mL Intracatheter Q7 Days    sulfamethoxazole-trimethoprim  20 mL Oral Once per day on Monday Wednesday Friday         Exam/Data:   Vitals  BP (!) 171/77 (BP Location: Left arm)   Pulse 75   Temp 98.3  F (36.8  C) (Oral)   Resp 22   SpO2 97%      I/O last 3 completed shifts:  In: 2118 [I.V.:30; NG/GT:2088]  Out: 1500 [Urine:1500]  Weight change:     Vent Mode: (S) CPAP/PS  (Continuous positive airway pressure with Pressure Support)  FiO2 (%): 40 %  Resp Rate (Set): 18 breaths/min  Tidal Volume (Set, mL): 380 mL  PEEP (cm H2O): 5 cmH2O  Pressure Support (cm H2O): (S) 8 cmH2O  Resp: 22      EXAM:  Gen: NAD in bed on PS 8/5  HEENT: NT, trach midline/intact  CV: RRR, no m/g/r  Resp: CTAB, non-labored, no wheeze   Abd: large, soft, nontender, BS+  Skin: no rashes or lesions  Ext: no edema, very weak, able to move all   Neuro: alert, follows commands     Labs:    Complete Blood Count   Recent Labs   Lab 12/08/23  0544 12/06/23  0803 12/05/23  0419 12/04/23  0544   WBC 19.1* 22.9* 17.7* 16.1*   HGB 7.8* 8.9* 8.5* 7.8*    327 273 232     Basic Metabolic Panel  Recent Labs   Lab 12/08/23  0544 12/08/23  0400 12/07/23  2342 12/07/23  2104 12/07/23  0900 12/07/23  0624 12/06/23  2028 12/06/23  1736 12/06/23  1156 12/06/23  0803     --   --   --   --  146*  --  151*  --  155*   POTASSIUM 4.4  --   --   --   --  3.8  --  4.2  --  4.4   CHLORIDE 101  --   --   --   --  102  --  105  --  110*   CO2 35*  --   --   --   --  39*  --  38*  --  41*   BUN 65.6*  --   --   --   --  63.3*  --  72.0*  --  76.5*   CR 0.67  --   --   --   --  0.71  --  0.75  --  0.73    * 116* 163* 157*   < > 85   < > 216*   < > 137*    < > = values in this interval not displayed.       Radiology: Personally reviewed; radiology read below    XR CHEST 12/7/2023  Endotracheal tube seen on the prior study has been replaced with a tracheostomy tube which appears in good position in the mid trachea. PICC catheter from right upper extremity approach is unchanged with tip in the superior vena cava. There is airspace consolidation with minimal volume loss in the right upper lobe consistent with right upper lobe pneumonia. There is persistent opacity with bronchial thickening at the left base spine the heart which is stable. Stable cardiac size. Normal pulmonary vascularity. No pneumothorax or pleural effusion. Calcified aortic arch.    XR CHEST 11/26/2023  papo AP view of the chest was obtained. Endotracheal tube tip projects over mid thoracic trachea approximately 4 cm from the ashley. Enteric tube crosses the diaphragm with the distal tip outside the field-of-view. Right upper extremity PICC tip projects over high/mid SVC. Cardiomegaly. Left basilar and right mid/upper lobe patchy pulmonary opacity, could represent atelectasis versus infection. No significant pleural effusion or pneumothorax.     CT CHEST WITHOUT CONTRAST 11/24/2023                                 LUNGS AND PLEURA: Pulmonary infiltrates again seen throughout both  lungs. There is increased airspace consolidation with air bronchograms  in both lower lobes and in the posterior right upper lobe when  compared to previous. There are groundglass opacities with septal  thickening throughout the remainder of both lungs. Mild cylindrical  bronchiectasis. No pleural effusion or pneumothorax.     IMPRESSION:  1.  Extensive bilateral pulmonary infiltrates again seen. Increasing  airspace consolidation in the right upper lobe and both lower lobes  when compared to previous.    Adilson Prakash CNP  Pulmonary Medicine  [facility]  Pager  [phone]

## 2023-12-09 NOTE — PLAN OF CARE
Goal Outcome Evaluation:       Pt calm and cooperative with cares. PRN Atarax given per daughter request for itching with relief. PICC line patent and intact.On Mechanical vent. No distress noted. Pt tolerated TF well. /71 at 2053. Scheduled metoprolol and Amlodipine given. Reached /56 at 2251.Right hand edema noted. Hand elevated with pillow. Pt had loose BM x 2. All cares met.  Problem: Adult Inpatient Plan of Care  Goal: Absence of Hospital-Acquired Illness or Injury  Intervention: Identify and Manage Fall Risk  Recent Flowsheet Documentation  Taken 12/8/2023 1700 by Edenilson Rosas RN  Safety Promotion/Fall Prevention: lighting adjusted  Intervention: Prevent Infection  Recent Flowsheet Documentation  Taken 12/8/2023 1700 by Edenilson Rosas RN  Infection Prevention:   hand hygiene promoted   personal protective equipment utilized   rest/sleep promoted  Goal: Optimal Comfort and Wellbeing  Intervention: Provide Person-Centered Care  Recent Flowsheet Documentation  Taken 12/8/2023 1700 by Edenilson Rosas RN  Trust Relationship/Rapport:   care explained   choices provided     Problem: Mechanical Ventilation Invasive  Goal: Effective Communication  Intervention: Ensure Effective Communication  Recent Flowsheet Documentation  Taken 12/8/2023 1700 by Edenilson Rosas RN  Communication Enhancement Strategies: call light answered in person  Family/Support System Care: presence promoted  Trust Relationship/Rapport:   care explained   choices provided  Goal: Mechanical Ventilation Liberation  Intervention: Promote Extubation and Mechanical Ventilation Liberation  Recent Flowsheet Documentation  Taken 12/8/2023 1700 by Edenilson Rosas RN  Medication Review/Management: medications reviewed  Goal: Absence of Ventilator-Induced Lung Injury  Intervention: Prevent Ventilator-Associated Pneumonia  Recent Flowsheet Documentation  Taken 12/8/2023 1700 by Edenilson Rosas RN  Oral Care: swabbed with sterile  water     Problem: Enteral Nutrition  Goal: Absence of Aspiration Signs and Symptoms  Intervention: Minimize Aspiration Risk  Recent Flowsheet Documentation  Taken 12/8/2023 1700 by Edenilson Rosas RN  Enteral Feeding Safety: tubing labeled as enteral feeding  Oral Care: swabbed with sterile water  Goal: Safe, Effective Therapy Delivery  Intervention: Prevent Feeding-Related Adverse Events  Recent Flowsheet Documentation  Taken 12/8/2023 1700 by Edenilson Rosas RN  Enteral Feeding Safety: tubing labeled as enteral feeding     Problem: Fall Injury Risk  Goal: Absence of Fall and Fall-Related Injury  Intervention: Identify and Manage Contributors  Recent Flowsheet Documentation  Taken 12/8/2023 1700 by Edenilson Rosas RN  Medication Review/Management: medications reviewed  Intervention: Promote Injury-Free Environment  Recent Flowsheet Documentation  Taken 12/8/2023 1700 by Edenilson Rosas RN  Safety Promotion/Fall Prevention: lighting adjusted     Problem: Pain Acute  Goal: Optimal Pain Control and Function  Intervention: Prevent or Manage Pain  Recent Flowsheet Documentation  Taken 12/8/2023 1700 by Edenilson Rosas RN  Sensory Stimulation Regulation:   lighting decreased   care clustered   television on  Medication Review/Management: medications reviewed     Problem: Anxiety Signs/Symptoms  Goal: Enhanced Social, Occupational or Functional Skills (Anxiety Signs/Symptoms)  Intervention: Promote Social, Occupational and Functional Ability  Recent Flowsheet Documentation  Taken 12/8/2023 1700 by Edenilson Rosas RN  Trust Relationship/Rapport:   care explained   choices provided

## 2023-12-09 NOTE — PLAN OF CARE
Problem: Adult Inpatient Plan of Care  Goal: Absence of Hospital-Acquired Illness or Injury  Outcome: Progressing  Intervention: Identify and Manage Fall Risk  Recent Flowsheet Documentation  Taken 12/9/2023 0838 by Haider Goodson RN  Safety Promotion/Fall Prevention:   assistive device/personal items within reach   room door open   room near nurse's station   supervised activity   safety round/check completed  Intervention: Prevent Skin Injury  Recent Flowsheet Documentation  Taken 12/9/2023 1034 by Haider Goodson RN  Body Position:   turned   right  Intervention: Prevent Infection  Recent Flowsheet Documentation  Taken 12/9/2023 0838 by Haider Goodson RN  Infection Prevention:   hand hygiene promoted   equipment surfaces disinfected   personal protective equipment utilized  Goal: Optimal Comfort and Wellbeing  Outcome: Progressing  Intervention: Provide Person-Centered Care  Recent Flowsheet Documentation  Taken 12/9/2023 0838 by Haider Goodson RN  Trust Relationship/Rapport:   care explained   choices provided   Goal Outcome Evaluation: Pt noted awake, alert and oriented. Not in respiratory distress. Pt tolerating well with her tube feeding. PRN Cetirizine this morning for generalized itchiness - effective; lotion was also applied for dry skin. PRN Loperamide was given for diarrhea. NA reported of leaked on mitchell catheter insertion site; writer unable to check as pt was sitting on the chair, writer reported it to next shift nurse for further evaluation. Started using the Calmoseptine for redness to rectal area/gluteal cleft.  Needs attended. Call light within reach. 1500 Pt had no concerns/issues; daughter was visiting her mom. Plan of care ongoing.     Haider Goodson RN

## 2023-12-09 NOTE — PLAN OF CARE
Problem: Mechanical Ventilation Invasive  Goal: Effective Communication  Outcome: Progressing  Goal: Mechanical Ventilation Liberation  Outcome: Progressing  Goal: Absence of Device-Related Skin and Tissue Injury  Outcome: Progressing  Goal: Absence of Ventilator-Induced Lung Injury  Outcome: Progressing         RT PROGRESS NOTE   [id number]  DATA:     CURRENT SETTINGS:             TRACH TYPE / SIZE: #6 Aj, placed on 12/5              MODE:  AC 18 360 +5             FIO2: 40%     ACTION:             THERAPIES: Duoneb QID, Mucomyst BID               SUCTION:                           FREQUENCY:  3                         AMOUNT: small                           CONSISTENCY:   thick                         COLOR:  white              SPONTANEOUS COUGH EFFORT/STRENGTH OF EFFORT (not elicited by suctioning):                               WEANING PHASE:  1                         WEAN MODE: NONE                          WEAN TIME:                           END WEAN REASON:        RESPONSE:             BS:   Coarse              VITAL SIGNS:   Blood pressure (!) 147/70, pulse 79, temperature 98.6  F (37  C), temperature source Oral, resp. rate 20, weight 81.6 kg (179 lb 14.3 oz), SpO2 95%.              EMOTIONAL NEEDS / CONCERNS:                  RISK FOR SELF DECANNULATION:                          RISK DUE TO:                          INTERVENTION/S IN PLACE IS/ARE:         NOTE / PLAN: Patient remains on full-vent support and is tolerating. Patient did not wean today due to hypertension  to 168.   Plan: RT will evaluate weaning for tomorrow morning.

## 2023-12-09 NOTE — PLAN OF CARE
Problem: Mechanical Ventilation Invasive  Goal: Effective Communication  Outcome: Progressing  Goal: Mechanical Ventilation Liberation  Outcome: Progressing  Goal: Absence of Device-Related Skin and Tissue Injury  Outcome: Progressing  Goal: Absence of Ventilator-Induced Lung Injury  Outcome: Progressing      RT PROGRESS NOTE     DATA:     CURRENT SETTINGS: Vent Mode: CMV/AC  (Continuous Mandatory Ventilation/ Assist Control)  FiO2 (%): 45 %  Resp Rate (Set): 18 breaths/min  Tidal Volume (Set, mL): 541 mL  PEEP (cm H2O): 5 cmH2O  Pressure Support (cm H2O): 8 cmH2O  Resp: 23                 TRACH TYPE / SIZE:  #6 Shiley TG placed 12/05/23             MODE:   AC             FIO2:   45%     ACTION:             THERAPIES:   DUO NEB QID/Mucomyst BID              SUCTION:                           FREQUENCY:  X 3                        AMOUNT:   Small                        CONSISTENCY:   Thick                        COLOR:   White/yellow             SPONTANEOUS COUGH EFFORT/STRENGTH OF EFFORT (not elicited by suctioning): Yes:Strong                              WEANING PHASE: 1                        WEAN MODE:  PS 8/5                          WEAN TIME: 12 hours and 34 minutes.                           END WEAN REASON:        RESPONSE:             BS:   Clear/diminish             VITAL SIGNS:  Blood pressure (!) 148/70, pulse 63, temperature 97.6  F (36.4  C), temperature source Oral, resp. rate 23, weight 81.6 kg (179 lb 14.3 oz), SpO2 97%.               EMOTIONAL NEEDS / CONCERNS:  N/A                RISK FOR SELF DECANNULATION:  N/A                        RISK DUE TO:                          INTERVENTION/S IN PLACE IS/ARE:  N/A       NOTE / PLAN:     12/08/23 1251  Ventilator weaning phase 1  UNTIL DISCONTINUED        Comments: If patient able to do PS 8/5 for >/=10hr for two consecutive days, then may attempt phase 2: TM/cuff up 2hr max BID, otherwise PST during the day and AC night

## 2023-12-09 NOTE — PROGRESS NOTES
[facility]    Medicine Progress Note - Hospitalist Service    Date of Admission:  12/5/2023    Hospital Course  Matthew Bowen is a 78y F PMHx chronic hypoxic respiratory failure 2/2 pHTN, ALAINA, HFpEF, CAD, CKD3, Obesity, HTN, depression, and anxiety. She presented initially on 10/15 with fever, cough, and falls with a temperature. Further w/u revealed multifocal pneumonia and she was started on antibiotics. Her respiratory status worsened as she developed ARDS and was intubated on 10/21. She had a prolonged ICU stay 2/2 difficulty vent weaning. Eventually she underwent trach/peg placement. Of note, she was treated with high dose steroids for her lung condition and remains on a high dose long taper of steroids. Pt was transferred to [facility] for ongoing vent weaning and therapies.      [facility] Course  12/7: Pt required 2L total of D5W in 24h, Na now 146, D5W stopped. FWF remain at 200cc q4h. Recheck Na tomorrow morning and adjust accordingly. BP elevated this AM, will give scheduled meds and f/u.   12/8: Diarrhea ovn. Na stable today. Resume torsemide 20mg daily.   12/9: Calmoseptine ordered. Loperamide 2mg QID prn.     Assessment & Plan      #Acute on Chronic Hypoxic Respiratory Failure  #Bronchiectasis?  #Multifocal Pneumonia  #ARDS, PTA  #pHTN  #Chronic Hypoxic Respiratory Failure  #ALAINA  - pulmonary consulted and following  - treated with high dose steroids previously  - presently on slow steroid taper  - prednisone 120mg daily (60mg in split doses) x2 weeks (then 100mg x2 weeks, 80mg x2 weeks, then taper down 5mg every 2 weeks)  - bactrim 800-160mg three times weekly for PJP ppx (was on atovaquone, switched upon admission)  - trach exchange 12/5 by ENT PTA  - nebs per pulm    #Diarrhea  - ovn (12/7)  - C. diff negative    #HTN  - amlodipine 5mg BID (suspect broken up 2/2 labile BP)  - metoprolol tartrate 12.5mg BID  - pt was on hydralazine 50mg TID and isosorbide dinitrate 20mg TID PTA, consider resuming as  needed or tolerated    #Hypernatremia, Acute - resolved  - likely 2/2 intravascular depletion 2/2 over-diuresis and missing FWF on her first night admitted  - Na 145 (12/4) -> 150 -> 155 -> 151 -> 146 (12/7) -> 145  - FWF 200cc q4h    #HFpEF, not in exacerbation  - on torsemide, held on admission for Na 150  - resume torsemide 20mg daily (12/8)    #Pain, Acute  - oxycodone 5mg prn    #Normocytic, Hypochromic Anemia  - suspect 2/2 critical illness and phlebotomy  - received pRBCs during hospitalization prior to LTACH  - transfuse to maintain Hgb > 7.0  - pantoprazole    #CKD  #CARMEN, resolved PTA  - follow renal function  - monitor for new CARMEN given intravascular depletion and hypernatremia    #CAD  - asa  - atorvastatin    #Hypothyroidism  - levothyroxine    #Depression  #Anxiety  - pregabalin  - sertraline    #PEG  - PEG placed 11/29  - RD consulted and following            Diet: Adult Formula Drip Feeding: Continuous Jevity 1.5; Gastrostomy; Goal Rate: 45; mL/hr; TF to run x22 hrs, hold 1 hr before/after levothyroxine  NPO for Medical/Clinical Reasons Except for: Ice Chips, NPO but receiving Tube Feeding    DVT Prophylaxis: Heparin SQ  Aleman Catheter: PRESENT, indication: Strict 1-2 Hour I&O  Lines: PRESENT      PICC 10/22/23 Triple Lumen Right Basilic multiple med gtt. ready for use-Site Assessment: WDL      Cardiac Monitoring: None  Code Status: Full Code      Clinically Significant Risk Factors                                    Disposition Plan     Expected Discharge Date: 12/14/2023    Discharge Delays: Complex Discharge    Discharge Comments: Vent, Trach, Feeding tube, WOC            Agapito Tavares MD  Hospitalist Service  LTACH  Securely message with [employer] (more info)  Text page via [facility] Paging/Directory   ______________________________________________________________________    Interval History   - no acute events ovn  - pt resting comfortably in bed sleeping  -  in room, updated on condition  -   concerned about pt's itching, glad she is getting the cetirizine, something she takes at home for chronic itching  - no other acute concerns    Physical Exam   Vital Signs: Temp: 97.6  F (36.4  C) Temp src: Oral BP: (!) 148/70 Pulse: 63   Resp: 23 SpO2: 97 % O2 Device: Mechanical Ventilator    Weight: 179 lbs 14.33 oz  Gen: awake, alert, comfortable  HEET: EOMI, MMM  Neck: supple, trach in place  CV: RRR  Pulm: coarse breath sounds bl  GI: soft, NT/ND  MSK: radial pulses intact, no edema, cyanosis, or clubbing    Medical Decision Making       40 MINUTES SPENT BY ME on the date of service doing chart review, history, exam, documentation & further activities per the note.      Data     I have personally reviewed the following data over the past 24 hrs:    N/A  \   N/A   / N/A     N/A N/A N/A /  139 (H)   N/A N/A N/A \       Imaging results reviewed over the past 24 hrs:   No results found for this or any previous visit (from the past 24 hour(s)).

## 2023-12-09 NOTE — PLAN OF CARE
Problem: Adult Inpatient Plan of Care  Goal: Absence of Hospital-Acquired Illness or Injury  Intervention: Prevent Infection  Recent Flowsheet Documentation  Taken 12/9/2023 0143 by Marlene Copeland RN  Infection Prevention:   rest/sleep promoted   single patient room provided     Problem: Fall Injury Risk  Goal: Absence of Fall and Fall-Related Injury  Intervention: Identify and Manage Contributors  Recent Flowsheet Documentation  Taken 12/9/2023 0143 by Marlene Copeland RN  Medication Review/Management: medications reviewed  Intervention: Promote Injury-Free Environment  Recent Flowsheet Documentation  Taken 12/9/2023 0143 by Marlene Copeland RN  Safety Promotion/Fall Prevention:   room door open   room near nurse's station   safety round/check completed   lighting adjusted     Problem: Anxiety Signs/Symptoms  Goal: Enhanced Social, Occupational or Functional Skills (Anxiety Signs/Symptoms)  Intervention: Promote Social, Occupational and Functional Ability  Recent Flowsheet Documentation  Taken 12/9/2023 0143 by Marlene Copeland RN  Trust Relationship/Rapport:   care explained   choices provided   Goal Outcome Evaluation:    Patient appeared to rest well between cares, vss, loose bm x 2, no anxiety noted, continues on vent, no prn's adm, picc line flushed and patent, loose stool x 4, skin denuded and bleeding, barrier cream applied x 3, foam drsg applied this am, will continue to monitor.

## 2023-12-10 NOTE — PLAN OF CARE
Problem: Adult Inpatient Plan of Care  Goal: Absence of Hospital-Acquired Illness or Injury  Intervention: Prevent Infection  Recent Flowsheet Documentation  Taken 12/10/2023 0105 by Marlene Copeland RN  Infection Prevention:   rest/sleep promoted   single patient room provided     Problem: Fall Injury Risk  Goal: Absence of Fall and Fall-Related Injury  Intervention: Identify and Manage Contributors  Recent Flowsheet Documentation  Taken 12/10/2023 0105 by Marlene Copeland RN  Medication Review/Management: medications reviewed  Intervention: Promote Injury-Free Environment  Recent Flowsheet Documentation  Taken 12/10/2023 0105 by Marlene Copeland RN  Safety Promotion/Fall Prevention:   room door open   room near nurse's station   safety round/check completed   lighting adjusted     Problem: Pain Acute  Goal: Optimal Pain Control and Function  Intervention: Prevent or Manage Pain  Recent Flowsheet Documentation  Taken 12/10/2023 0105 by Marlene Copeland RN  Sensory Stimulation Regulation: lighting decreased  Medication Review/Management: medications reviewed  Intervention: Optimize Psychosocial Wellbeing  Recent Flowsheet Documentation  Taken 12/10/2023 0105 by Marlene Copeland RN  Supportive Measures:   active listening utilized   counseling provided     Problem: Anxiety Signs/Symptoms  Goal: Improved Mood Symptoms (Anxiety Signs/Symptoms)  Intervention: Optimize Emotion and Mood  Recent Flowsheet Documentation  Taken 12/10/2023 0105 by Marlene Copeland RN  Supportive Measures:   active listening utilized   counseling provided   Goal Outcome Evaluation:    Patient appeared to rest well between cares, vss, no anxiety noted, continues on vent, no prn's adm, picc line flushed and patent, barrier cream applied to buttocks, discomfort noted with application, preventative measures in place and monitored to prevent falls, will continue to monitor.

## 2023-12-10 NOTE — PROGRESS NOTES
[facility]    Medicine Progress Note - Hospitalist Service    Date of Admission:  12/5/2023    Hospital Course  Matthew Bowen is a 78y F PMHx chronic hypoxic respiratory failure 2/2 pHTN, ALAINA, HFpEF, CAD, CKD3, Obesity, HTN, depression, and anxiety. She presented initially on 10/15 with fever, cough, and falls with a temperature. Further w/u revealed multifocal pneumonia and she was started on antibiotics. Her respiratory status worsened as she developed ARDS and was intubated on 10/21. She had a prolonged ICU stay 2/2 difficulty vent weaning. Eventually she underwent trach/peg placement. Of note, she was treated with high dose steroids for her lung condition and remains on a high dose long taper of steroids. Pt was transferred to [facility] for ongoing vent weaning and therapies.      [facility] Course  12/7-12/10: Hypernatremia to 155 2/2 over-diuresis, intravascular depletion, missing first night of FWF. 2L D5W given, resolved, Na now stable. Torsemide held initially but now restarted at 20mg daily. BP remains elevated, start isosorbide dinitrate 20mg TID. Loperamide for diarrhea.        Assessment & Plan      #Acute on Chronic Hypoxic Respiratory Failure  #Bronchiectasis?  #Multifocal Pneumonia  #ARDS, PTA  #pHTN  #Chronic Hypoxic Respiratory Failure  #ALAINA  - pulmonary consulted and following  - treated with high dose steroids previously  - presently on slow steroid taper  - prednisone 120mg daily (60mg in split doses) x2 weeks (then 100mg x2 weeks, 80mg x2 weeks, then taper down 5mg every 2 weeks) - pulm may decrease dose this week  - bactrim 800-160mg three times weekly for PJP ppx (was on atovaquone, switched upon admission)  - trach exchange 12/5 by ENT PTA  - nebs per pulm    #Diarrhea  - ovn (12/7)  - C. diff negative  - loperamide prn    #HTN  - amlodipine 5mg BID (suspect broken up 2/2 labile BP)  - metoprolol tartrate 12.5mg BID  - restart isosorbide dinitrate 20mg TID (12/10)  - pt was on hydralazine 50mg  TID and isosorbide dinitrate 20mg TID PTA, consider resuming as needed or tolerated    #Hypernatremia, Acute - resolved  - likely 2/2 intravascular depletion 2/2 over-diuresis and missing FWF on her first night admitted  - Na 145 (12/4) -> 150 -> 155 -> 151 -> 146 (12/7) -> 145  - FWF 200cc q4h    #HFpEF, not in exacerbation  - on torsemide, held on admission for Na 150  - resume torsemide 20mg daily (12/8)    #Pain, Acute  - oxycodone 5mg prn    #Normocytic, Hypochromic Anemia  - suspect 2/2 critical illness and phlebotomy  - received pRBCs during hospitalization prior to LTACH  - transfuse to maintain Hgb > 7.0  - pantoprazole    #CKD  #CARMEN, resolved PTA  - follow renal function  - monitor for new CARMEN given intravascular depletion and hypernatremia    #CAD  - asa  - atorvastatin    #Hypothyroidism  - levothyroxine    #Depression  #Anxiety  - pregabalin  - sertraline    #PEG  - PEG placed 11/29  - RD consulted and following            Diet: Adult Formula Drip Feeding: Continuous Jevity 1.5; Gastrostomy; Goal Rate: 45; mL/hr; TF to run x22 hrs, hold 1 hr before/after levothyroxine  NPO for Medical/Clinical Reasons Except for: Ice Chips, NPO but receiving Tube Feeding    DVT Prophylaxis: Heparin SQ  Aleman Catheter: PRESENT, indication: Strict 1-2 Hour I&O  Lines: PRESENT      PICC 10/22/23 Triple Lumen Right Basilic multiple med gtt. ready for use-Site Assessment: WDL      Cardiac Monitoring: None  Code Status: Full Code      Clinically Significant Risk Factors                                    Disposition Plan     Expected Discharge Date: 12/14/2023    Discharge Delays: Complex Discharge    Discharge Comments: Vent, Trach, Feeding tube, WOC            Agapito Tavares MD  Hospitalist Service  LTACH  Securely message with [employer] (more info)  Text page via [employer] Paging/Directory   ______________________________________________________________________    Interval History   - no acute events ovn  - resting comfortably in  bed  - indicates she is comfortable presently  - no family present this morning  - no other acute concerns    Physical Exam   Vital Signs: Temp: 97.4  F (36.3  C) Temp src: Oral BP: (!) 143/68 Pulse: 77   Resp: 26 SpO2: 96 % O2 Device: Mechanical Ventilator    Weight: 181 lbs 7.02 oz  Gen: awake, alert, comfortable  HEET: EOMI, MMM  Neck: supple, trach in place  CV: RRR  Pulm: coarse breath sounds bl  GI: soft, NT/ND  MSK: radial pulses intact, no edema, cyanosis, or clubbing    Medical Decision Making       40 MINUTES SPENT BY ME on the date of service doing chart review, history, exam, documentation & further activities per the note.      Data         Imaging results reviewed over the past 24 hrs:   No results found for this or any previous visit (from the past 24 hour(s)).

## 2023-12-10 NOTE — PLAN OF CARE
Problem: Mechanical Ventilation Invasive  Goal: Effective Communication  Outcome: Progressing  Goal: Mechanical Ventilation Liberation  Outcome: Progressing  Goal: Absence of Device-Related Skin and Tissue Injury  Outcome: Progressing  Goal: Absence of Ventilator-Induced Lung Injury  Outcome: Progressing     RT PROGRESS NOTE   [id number]  DATA:     CURRENT SETTINGS:             TRACH TYPE / SIZE: #6 Aj, placed on 12/5              MODE:  AC 18 360 +5             FIO2: 40%     ACTION:             THERAPIES: Duoneb QID, Mucomyst BID               SUCTION:                           FREQUENCY:  3                         AMOUNT: small                           CONSISTENCY:   thick                         COLOR:  white /yellow              SPONTANEOUS COUGH EFFORT/STRENGTH OF EFFORT (not elicited by suctioning):                               WEANING PHASE:  1                         WEAN MODE: PS 8/5, 40%                         WEAN TIME:  6 hrs and 15 minutes.                         END WEAN REASON:  increased WOB     RESPONSE:             BS:   Coarse              VITAL SIGNS:  Blood pressure (!) 153/72, pulse 82, temperature 97.9  F (36.6  C), temperature source Oral, resp. rate 24, weight 82.3 kg (181 lb 7 oz), SpO2 97%.              EMOTIONAL NEEDS / CONCERNS:                  RISK FOR SELF DECANNULATION:                          RISK DUE TO:                          INTERVENTION/S IN PLACE IS/ARE:         NOTE / PLAN: Patient currently is on full-vent support and is tolerating well. Patient weaned today on PS8/5, 40% for 6 hrs and 15 minutes and the weaning terminated due to increased WOB. Plan: RT will resume the weaning for tomorrow in AM.

## 2023-12-10 NOTE — PLAN OF CARE
Problem: Adult Inpatient Plan of Care  Goal: Absence of Hospital-Acquired Illness or Injury  Intervention: Identify and Manage Fall Risk  Recent Flowsheet Documentation  Taken 12/9/2023 1700 by Hebert Franklin RN  Safety Promotion/Fall Prevention:   assistive device/personal items within reach   room door open   room near nurse's station   supervised activity   safety round/check completed  Intervention: Prevent and Manage VTE (Venous Thromboembolism) Risk  Recent Flowsheet Documentation  Taken 12/9/2023 1700 by Hebert Franklin RN  VTE Prevention/Management: SCDs (sequential compression devices) on  Intervention: Prevent Infection  Recent Flowsheet Documentation  Taken 12/9/2023 1700 by Hebert Franklin RN  Infection Prevention:   hand hygiene promoted   equipment surfaces disinfected   personal protective equipment utilized  Goal: Optimal Comfort and Wellbeing  Intervention: Provide Person-Centered Care  Recent Flowsheet Documentation  Taken 12/9/2023 1700 by Hebert Franklin RN  Trust Relationship/Rapport:   care explained   choices provided     Problem: Anxiety Signs/Symptoms  Goal: Enhanced Social, Occupational or Functional Skills (Anxiety Signs/Symptoms)  Intervention: Promote Social, Occupational and Functional Ability  Recent Flowsheet Documentation  Taken 12/9/2023 1700 by Hebert Franklin RN  Trust Relationship/Rapport:   care explained   choices provided   Goal Outcome Evaluation:             Pt alert  and oriented. On vent. VSS are stable. Complains of generalized body itching Hatrax PRN given for itching and anxiety with good effect. Adequate intake and output.  On tube feeding 45 ml/hr. Last BM: 12/9/23.  Incontinent of bowel. Aleman is in use, no evidence of leaking or bypassing. PRN Aleman change order is in place just incase the existing Aleman bypasses any time. Skin looks at a base line. Uses call light appropriately.  Mood pleasant and cooperative for care. Handling  hospitalization well.  Continue to monitor.      Hebert Franklin RN

## 2023-12-10 NOTE — PLAN OF CARE
Problem: Adult Inpatient Plan of Care  Goal: Optimal Comfort and Wellbeing  Outcome: Progressing     Problem: Enteral Nutrition  Goal: Absence of Aspiration Signs and Symptoms  Outcome: Progressing  Goal: Safe, Effective Therapy Delivery  Outcome: Progressing  Goal: Feeding Tolerance  Outcome: Progressing     Problem: Fall Injury Risk  Goal: Absence of Fall and Fall-Related Injury  Outcome: Progressing  Intervention: Promote Injury-Free Environment  Recent Flowsheet Documentation  Taken 12/10/2023 0850 by Haider Goodson RN  Safety Promotion/Fall Prevention:   room door open   room near nurse's station   assistive device/personal items within reach   supervised activity   safety round/check completed     Problem: Pain Acute  Goal: Optimal Pain Control and Function  Outcome: Progressing   Goal Outcome Evaluation: Pt noted awake, alert, and oriented. Not in respiratory distress. Pt tolerating well with tube feeding. Pt did well with ice chips. Denied any pain and discomfort. She sat on her WC for ~4 hours this morning shift. Vesco paste was applied to the redness/open area on  her intergluteal cleft and open to L abdominal fold, and Calmoseptine was also  applied to buttock and sacral area for protection. Needs attended. Call light within reach.  1420 Pt was put back to bed; brief was changed, no BM and brief noted dry. 1500 end of shift rounds, pt reported no concerns and issues; speech therapy ongoing,  was in the room. Plan of care ongoing.     Haider Goodson RN

## 2023-12-10 NOTE — PLAN OF CARE
Problem: Mechanical Ventilation Invasive  Goal: Effective Communication  Outcome: Progressing  Goal: Mechanical Ventilation Liberation  Outcome: Progressing  Goal: Absence of Device-Related Skin and Tissue Injury  Outcome: Progressing  Goal: Absence of Ventilator-Induced Lung Injury  Outcome: Progressing      RT PROGRESS NOTE     DATA:     CURRENT SETTINGS: Vent Mode: CMV/AC  (Continuous Mandatory Ventilation/ Assist Control)  FiO2 (%): 40 %  Resp Rate (Set): 18 breaths/min  Tidal Volume (Set, mL): 360 mL  PEEP (cm H2O): 5 cmH2O  Resp: 26                 TRACH TYPE / SIZE:  #6 Shiley TG placed 12/05/23             MODE:   AC             FIO2:   40%     ACTION:             THERAPIES:   DUO NEB QID/Mucomyst BID              SUCTION:                           FREQUENCY:  X 2                        AMOUNT:   Small                        CONSISTENCY:   Thick                        COLOR:   White/yellow             SPONTANEOUS COUGH EFFORT/STRENGTH OF EFFORT (not elicited by suctioning): Yes:Strong                              WEANING PHASE: none at this shift.                         WEAN MODE:                          WEAN TIME:                         END WEAN REASON:        RESPONSE:             BS:   Clear/diminish             VITAL SIGNS:  Blood pressure (!) 143/68, pulse 77, temperature 97.4  F (36.3  C), temperature source Oral, resp. rate 26, weight 82.3 kg (181 lb 7 oz), SpO2 96%.               EMOTIONAL NEEDS / CONCERNS:  N/A                RISK FOR SELF DECANNULATION:  N/A                        RISK DUE TO:                          INTERVENTION/S IN PLACE IS/ARE:  N/A       NOTE / PLAN:     12/08/23 1251  Ventilator weaning phase 1  UNTIL DISCONTINUED        Comments: If patient able to do PS 8/5 for >/=10hr for two consecutive days, then may attempt phase 2: TM/cuff up 2hr max BID, otherwise PST during the day and AC night

## 2023-12-11 NOTE — PLAN OF CARE
Problem: Fall Injury Risk  Goal: Absence of Fall and Fall-Related Injury  Outcome: Progressing  Intervention: Identify and Manage Contributors  Recent Flowsheet Documentation  Taken 12/11/2023 0113 by Marlene Copeland RN  Medication Review/Management: medications reviewed  Intervention: Promote Injury-Free Environment  Recent Flowsheet Documentation  Taken 12/11/2023 0113 by Marlene Copeland RN  Safety Promotion/Fall Prevention:   room door open   room near nurse's station   safety round/check completed   lighting adjusted     Problem: Pain Acute  Goal: Optimal Pain Control and Function  Outcome: Progressing  Intervention: Prevent or Manage Pain  Recent Flowsheet Documentation  Taken 12/11/2023 0113 by Marlene Copeland RN  Sensory Stimulation Regulation: lighting decreased  Medication Review/Management: medications reviewed  Intervention: Optimize Psychosocial Wellbeing  Recent Flowsheet Documentation  Taken 12/11/2023 0113 by Marlene Copeland RN  Supportive Measures:   active listening utilized   counseling provided     Problem: Anxiety Signs/Symptoms  Goal: Improved Mood Symptoms (Anxiety Signs/Symptoms)  Outcome: Progressing  Intervention: Optimize Emotion and Mood  Recent Flowsheet Documentation  Taken 12/11/2023 0113 by Marlene Copeland RN  Supportive Measures:   active listening utilized   counseling provided   Goal Outcome Evaluation:    Patient appeared to rest well between cares, vss, no anxiety noted, continues on vent, no prn's adm, picc line flushed and patent, barrier cream applied to denuded buttocks, discomfort noted with application, preventative measures in place and monitored to prevent falls, will continue to monitor.

## 2023-12-11 NOTE — PLAN OF CARE
Goal Outcome Evaluation:       Patient alert and oriented. Cooperative with cares. Pain managed with scheduled tylenol. BP was 153/72 at the beginning of the shift. Scheduled BP medication given with effect. Pt  tolerated TF well. Pt had loose watery BM x 2. PRN Imodium given x 1. Good amount of urine output noted. After each incontinent episode, Calmoseptine and visco paste applied on between butt checks.   Problem: Adult Inpatient Plan of Care  Goal: Absence of Hospital-Acquired Illness or Injury  Intervention: Identify and Manage Fall Risk  Recent Flowsheet Documentation  Taken 12/10/2023 1700 by Edenilson Rosas RN  Safety Promotion/Fall Prevention:   room door open   room near nurse's station  Intervention: Prevent and Manage VTE (Venous Thromboembolism) Risk  Recent Flowsheet Documentation  Taken 12/10/2023 1700 by Edenilson Rosas RN  VTE Prevention/Management: SCDs (sequential compression devices) off  Intervention: Prevent Infection  Recent Flowsheet Documentation  Taken 12/10/2023 1700 by Edenilson Rosas RN  Infection Prevention:   hand hygiene promoted   rest/sleep promoted   single patient room provided  Goal: Optimal Comfort and Wellbeing  Intervention: Monitor Pain and Promote Comfort  Recent Flowsheet Documentation  Taken 12/10/2023 1630 by Edenilson Rosas RN  Pain Management Interventions: medication (see MAR)  Intervention: Provide Person-Centered Care  Recent Flowsheet Documentation  Taken 12/10/2023 1700 by Edenilson Rosas RN  Trust Relationship/Rapport:   care explained   choices provided     Problem: Mechanical Ventilation Invasive  Goal: Effective Communication  Intervention: Ensure Effective Communication  Recent Flowsheet Documentation  Taken 12/10/2023 1700 by Edenilson Rosas RN  Communication Enhancement Strategies: call light answered in person  Family/Support System Care: presence promoted  Trust Relationship/Rapport:   care explained   choices provided  Goal: Mechanical  Ventilation Liberation  Intervention: Promote Extubation and Mechanical Ventilation Liberation  Recent Flowsheet Documentation  Taken 12/10/2023 1700 by Edenilson Rosas RN  Medication Review/Management: medications reviewed  Environmental Support: calm environment promoted     Problem: Enteral Nutrition  Goal: Absence of Aspiration Signs and Symptoms  Intervention: Minimize Aspiration Risk  Recent Flowsheet Documentation  Taken 12/10/2023 1700 by Edenilson Rosas RN  Enteral Feeding Safety: tubing labeled as enteral feeding  Goal: Safe, Effective Therapy Delivery  Intervention: Prevent Feeding-Related Adverse Events  Recent Flowsheet Documentation  Taken 12/10/2023 1700 by Edenilson Rosas RN  Enteral Feeding Safety: tubing labeled as enteral feeding     Problem: Fall Injury Risk  Goal: Absence of Fall and Fall-Related Injury  Intervention: Identify and Manage Contributors  Recent Flowsheet Documentation  Taken 12/10/2023 1700 by Edenilson Rosas RN  Medication Review/Management: medications reviewed  Intervention: Promote Injury-Free Environment  Recent Flowsheet Documentation  Taken 12/10/2023 1700 by Edenilson Rosas RN  Safety Promotion/Fall Prevention:   room door open   room near nurse's station     Problem: Pain Acute  Goal: Optimal Pain Control and Function  Intervention: Develop Pain Management Plan  Recent Flowsheet Documentation  Taken 12/10/2023 1630 by Edenilson Rosas RN  Pain Management Interventions: medication (see MAR)  Intervention: Prevent or Manage Pain  Recent Flowsheet Documentation  Taken 12/10/2023 1700 by Edenilson Rosas RN  Sensory Stimulation Regulation: lighting decreased  Medication Review/Management: medications reviewed  Intervention: Optimize Psychosocial Wellbeing  Recent Flowsheet Documentation  Taken 12/10/2023 1700 by Edenilson Rosas RN  Supportive Measures: active listening utilized     Problem: Anxiety Signs/Symptoms  Goal: Improved Mood Symptoms (Anxiety  Signs/Symptoms)  Intervention: Optimize Emotion and Mood  Recent Flowsheet Documentation  Taken 12/10/2023 1700 by Edenilson Rosas RN  Supportive Measures: active listening utilized  Goal: Enhanced Social, Occupational or Functional Skills (Anxiety Signs/Symptoms)  Intervention: Promote Social, Occupational and Functional Ability  Recent Flowsheet Documentation  Taken 12/10/2023 1700 by Edenilson Rosas RN  Social Functional Ability Promotion: self-expression encouraged  Trust Relationship/Rapport:   care explained   choices provided

## 2023-12-11 NOTE — PROGRESS NOTES
[facility]    Medicine Progress Note - Hospitalist Service    Date of Admission:  12/5/2023    Hospital Course  Matthew Bowen is a 78y F PMHx chronic hypoxic respiratory failure 2/2 pHTN, ALAINA, HFpEF, CAD, CKD3, Obesity, HTN, depression, and anxiety. She presented initially on 10/15 with fever, cough, and falls with a temperature. Further w/u revealed multifocal pneumonia and she was started on antibiotics. Her respiratory status worsened as she developed ARDS and was intubated on 10/21. She had a prolonged ICU stay 2/2 difficulty vent weaning. Eventually she underwent trach/peg placement. Of note, she was treated with high dose steroids for her lung condition and remains on a high dose long taper of steroids. Pt was transferred to [facility] for ongoing vent weaning and therapies.      [facility] Course  12/7-12/10: Hypernatremia to 155 2/2 over-diuresis, intravascular depletion, missing first night of FWF. 2L D5W given, resolved, Na now stable. Torsemide held initially but now restarted at 20mg daily. BP remains elevated, start isosorbide dinitrate 20mg TID. Loperamide for diarrhea.    12/11.  On full mechanical ventilation.  No weaning at this time per pulmonology. Continue with current medical management for other problems.    Assessment & Plan   - No new changes at this time    Acute on Chronic Hypoxic Respiratory Failure  Bronchiectasis?  Multifocal Pneumonia  ARDS, PTA  pHTN  Chronic Hypoxic Respiratory Failure  ALAINA  - pulmonary consulted and following  - treated with high dose steroids previously  - presently on slow steroid taper  - prednisone 120mg daily (60mg in split doses) x2 weeks (then 100mg x2 weeks, 80mg x2 weeks, then taper down 5mg every 2 weeks) - pulm may decrease dose this week  - bactrim 800-160mg three times weekly for PJP ppx (was on atovaquone, switched upon admission)  - trach exchange 12/5 by ENT PTA  - good bronchial hygiene with bronchodilators    Diarrhea  - ovn (12/7)  - C. diff  negative  - loperamide prn    HTN  - amlodipine 5mg BID (suspect broken up 2/2 labile BP)  - metoprolol tartrate 12.5mg BID  - restart isosorbide dinitrate 20mg TID (12/10)  - pt was on hydralazine 50mg TID and isosorbide dinitrate 20mg TID PTA, consider resuming as needed or tolerated    Hypernatremia, Acute - mild  - likely 2/2 intravascular depletion 2/2 over-diuresis and missing FWF on her first night admitted  - Na 145 (12/4) -> 150 -> 155 -> 151 -> 146 (12/7) -> 145  - FWF 300cc q4h    HFpEF, not in exacerbation  - Torsemide, held on admission for Na 150  - Torsemide 20mg daily     Pain, Acute  - oxycodone 5mg prn    Normocytic, Hypochromic Anemia  - suspect 2/2 critical illness and phlebotomy  - received pRBCs during hospitalization prior to LTACH  - transfuse to maintain Hgb > 7.0  - pantoprazole    CKD  CARMEN, resolved PTA  - stable  - monitor with BMP    CAD  - asa  - atorvastatin    Hypothyroidism  - levothyroxine    Depression  Anxiety  - pregabalin  - sertraline    PEG  - PEG placed 11/29  - RD consulted and following    Diet: Adult Formula Drip Feeding: Continuous Jevity 1.5; Gastrostomy; Goal Rate: 45; mL/hr; TF to run x22 hrs, hold 1 hr before/after levothyroxine  NPO for Medical/Clinical Reasons Except for: Ice Chips, NPO but receiving Tube Feeding    DVT Prophylaxis: Heparin SQ  Aleman Catheter: PRESENT, indication: Strict 1-2 Hour I&O  Lines: PRESENT      PICC 10/22/23 Triple Lumen Right Basilic multiple med gtt. ready for use-Site Assessment: WDL      Cardiac Monitoring: None  Code Status: Full Code      Clinically Significant Risk Factors         # Hypernatremia: Highest Na = 147 mmol/L in last 2 days, will monitor as appropriate   # Hypercalcemia: corrected calcium is >10.1, will monitor as appropriate    # Hypoalbuminemia: Lowest albumin = 3.3 g/dL at 12/11/2023  9:03 AM, will monitor as appropriate                     Disposition Plan     Expected Discharge Date: 12/14/2023    Discharge Delays:  Complex Discharge    Discharge Comments: Vent, Trach, Feeding tube, WOC          Javad Mihsra MD  Hospitalist Service  LTACH  Securely message with [employer] (more info)  Text page via [employer] Paging/Directory   ______________________________________________________________________    Interval History   No new events overnight per RN.  Patient is in bed comfortably.  On full mechanical ventilation.  Daughter at bedside.  She states she feels okay.  RN notes patient has a sore bottom.  Planning for wound care  Physical Exam   Vital Signs: Temp: 97.4  F (36.3  C) Temp src: Oral BP: (!) 142/70 Pulse: 85   Resp: 22 SpO2: 97 % O2 Device: Mechanical Ventilator    Weight: 179 lbs 3.74 oz  General: She is a well grown well-developed adult female resting in bed comfortably  HEENT: Head is normocephalic atraumatic eyes pupils appear equal round and reactive to light.  Viable trach noted on neck.  Lungs: She has a normal respiratory effort on auscultation good air entry is noted.  No wheezing  Heart: She has a good S1 and S2 no obvious murmurs peripheral pulses are palpable.  Abdomen: Soft nontender bowel sounds are noted  Musculoskeletal: She has good muscle tone  Digits appear acyanotic  Skin: No obvious rashes noted, she is warm to touch please refer to nursing/wound care note for complete skin exam.      Medical Decision Making       40 MINUTES SPENT BY ME on the date of service doing chart review, history, exam, documentation & further activities per the note.      Data   Lab Results   Component Value Date    WBC 16.7 12/11/2023    WBC 10.8 11/22/2019     Lab Results   Component Value Date    RBC 3.17 12/11/2023    RBC 4.34 11/22/2019     Lab Results   Component Value Date    HGB 8.5 12/11/2023    HGB 12.4 11/22/2019     Lab Results   Component Value Date    HCT 29.0 12/11/2023    HCT 39.9 11/22/2019     Lab Results   Component Value Date    MCV 92 12/11/2023    MCV 92 11/22/2019     Lab Results   Component Value Date    MCH  26.8 12/11/2023    MCH 28.6 11/22/2019     Lab Results   Component Value Date    MCHC 29.3 12/11/2023    MCHC 31.1 11/22/2019     Lab Results   Component Value Date    RDW 20.7 12/11/2023    RDW 14.9 11/22/2019     Lab Results   Component Value Date     12/11/2023     11/22/2019     Last Comprehensive Metabolic Panel:  Sodium   Date Value Ref Range Status   12/11/2023 147 (H) 135 - 145 mmol/L Final     Comment:     Reference intervals for this test were updated on 09/26/2023 to more accurately reflect our healthy population. There may be differences in the flagging of prior results with similar values performed with this method. Interpretation of those prior results can be made in the context of the updated reference intervals.    11/22/2019 141 133 - 144 mmol/L Final     Potassium   Date Value Ref Range Status   12/11/2023 4.5 3.4 - 5.3 mmol/L Final   11/22/2019 4.6 3.4 - 5.3 mmol/L Final     Chloride   Date Value Ref Range Status   12/11/2023 101 98 - 107 mmol/L Final   11/22/2019 108 94 - 109 mmol/L Final     Carbon Dioxide   Date Value Ref Range Status   11/22/2019 27 20 - 32 mmol/L Final     Carbon Dioxide (CO2)   Date Value Ref Range Status   12/11/2023 36 (H) 22 - 29 mmol/L Final     Anion Gap   Date Value Ref Range Status   12/11/2023 10 7 - 15 mmol/L Final   11/22/2019 6 3 - 14 mmol/L Final     Glucose   Date Value Ref Range Status   12/11/2023 114 (H) 70 - 99 mg/dL Final   11/22/2019 112 (H) 70 - 99 mg/dL Final     GLUCOSE BY METER POCT   Date Value Ref Range Status   12/11/2023 144 (H) 70 - 99 mg/dL Final     Urea Nitrogen   Date Value Ref Range Status   12/11/2023 74.0 (H) 8.0 - 23.0 mg/dL Final   11/22/2019 34 (H) 7 - 30 mg/dL Final     Creatinine   Date Value Ref Range Status   12/11/2023 0.73 0.51 - 0.95 mg/dL Final   11/22/2019 1.30 (H) 0.52 - 1.04 mg/dL Final     GFR Estimate   Date Value Ref Range Status   12/11/2023 84 >60 mL/min/1.73m2 Final   11/22/2019 40 (L) >60 mL/min/[1.73_m2]  Final     Comment:     Non  GFR Calc  Starting 12/18/2018, serum creatinine based estimated GFR (eGFR) will be   calculated using the Chronic Kidney Disease Epidemiology Collaboration   (CKD-EPI) equation.       Calcium   Date Value Ref Range Status   12/11/2023 9.6 8.8 - 10.2 mg/dL Final   11/22/2019 8.9 8.5 - 10.1 mg/dL Final     Bilirubin Total   Date Value Ref Range Status   12/11/2023 0.2 <=1.2 mg/dL Final   02/13/2019 0.2 0.2 - 1.3 mg/dL Final     Alkaline Phosphatase   Date Value Ref Range Status   12/11/2023 120 40 - 150 U/L Final     Comment:     Reference intervals for this test were updated on 11/14/2023 to more accurately reflect our healthy population. There may be differences in the flagging of prior results with similar values performed with this method. Interpretation of those prior results can be made in the context of the updated reference intervals.   02/13/2019 83 40 - 150 U/L Final     ALT   Date Value Ref Range Status   12/11/2023 40 0 - 50 U/L Final     Comment:     Reference intervals for this test were updated on 6/12/2023 to more accurately reflect our healthy population. There may be differences in the flagging of prior results with similar values performed with this method. Interpretation of those prior results can be made in the context of the updated reference intervals.     02/13/2019 59 (H) 0 - 50 U/L Final     AST   Date Value Ref Range Status   12/11/2023 34 0 - 45 U/L Final     Comment:     Reference intervals for this test were updated on 6/12/2023 to more accurately reflect our healthy population. There may be differences in the flagging of prior results with similar values performed with this method. Interpretation of those prior results can be made in the context of the updated reference intervals.   02/13/2019 48 (H) 0 - 45 U/L Final

## 2023-12-11 NOTE — PLAN OF CARE
Problem: Adult Inpatient Plan of Care  Goal: Absence of Hospital-Acquired Illness or Injury  Outcome: Progressing  Intervention: Identify and Manage Fall Risk  Recent Flowsheet Documentation  Taken 12/11/2023 0906 by Haider Goodson RN  Safety Promotion/Fall Prevention:   activity supervised   assistive device/personal items within reach   lighting adjusted   room door open   room near nurse's station   supervised activity  Intervention: Prevent Infection  Recent Flowsheet Documentation  Taken 12/11/2023 0906 by Haider Goodson RN  Infection Prevention:   hand hygiene promoted   equipment surfaces disinfected   personal protective equipment utilized   rest/sleep promoted  Goal: Optimal Comfort and Wellbeing  Outcome: Progressing  Intervention: Provide Person-Centered Care  Recent Flowsheet Documentation  Taken 12/11/2023 0854 by Haider Goodson RN  Trust Relationship/Rapport:   care explained   choices provided   questions answered     Problem: Enteral Nutrition  Goal: Absence of Aspiration Signs and Symptoms  Outcome: Progressing  Goal: Safe, Effective Therapy Delivery  Outcome: Progressing  Goal: Feeding Tolerance  Outcome: Progressing   Goal Outcome Evaluation: Pt noted awake, oriented, not in respiratory distress. Tube feeding patent and pt tolerating well. Pt complaint of abdominal tightness - discomfort relieved with gastric venting. Denied any pain. Buttons (2)  to gastric tube was removed, with 1 sutures each buttons. Needs attended. Call light within reach. 1500 Aleman cath container was emptied. Family at bedside;  pt reported no concerns/issues; with ongoing session with therapist. Plan of care ongoing.     Haider Goodson RN

## 2023-12-11 NOTE — PLAN OF CARE
Problem: Mechanical Ventilation Invasive  Goal: Effective Communication  Outcome: Progressing  Goal: Mechanical Ventilation Liberation  Outcome: Progressing  Goal: Absence of Device-Related Skin and Tissue Injury  Outcome: Progressing  Goal: Absence of Ventilator-Induced Lung Injury  Outcome: Progressing      RT PROGRESS NOTE     DATA:     CURRENT SETTINGS: Vent Mode: CMV/AC  (Continuous Mandatory Ventilation/ Assist Control)  FiO2 (%): 40 %  Resp Rate (Set): 18 breaths/min  Tidal Volume (Set, mL): 360 mL  PEEP (cm H2O): 5 cmH2O  Pressure Support (cm H2O): 8 cmH2O  Resp: 22                 TRACH TYPE / SIZE:  #6 Shiley TG placed 12/05/23             MODE:   AC             FIO2:   40%     ACTION:             THERAPIES:   DUO NEB QID/Mucomyst BID              SUCTION:                           FREQUENCY:  X 3                        AMOUNT:   Small                        CONSISTENCY:   Thick                        COLOR:   White/yellow             SPONTANEOUS COUGH EFFORT/STRENGTH OF EFFORT (not elicited by suctioning): Yes:Strong                              WEANING PHASE: none at this shift.                         WEAN MODE:                          WEAN TIME:                         END WEAN REASON:        RESPONSE:             BS:   Clear/diminish             VITAL SIGNS:  Blood pressure 129/62, pulse 80, temperature 97.9  F (36.6  C), temperature source Oral, resp. rate 22, weight 81.3 kg (179 lb 3.7 oz), SpO2 97%.               EMOTIONAL NEEDS / CONCERNS:  N/A                RISK FOR SELF DECANNULATION:  N/A                        RISK DUE TO:                          INTERVENTION/S IN PLACE IS/ARE:  N/A       NOTE / PLAN:     12/08/23 1251  Ventilator weaning phase 1  UNTIL DISCONTINUED        Comments: If patient able to do PS 8/5 for >/=10hr for two consecutive days, then may attempt phase 2: TM/cuff up 2hr max BID, otherwise PST during the day and AC night

## 2023-12-11 NOTE — PROGRESS NOTES
Pulmonary Progress Note    Admit Date: 12/5/2023  CODE: Full Code    HPI:   78 year old female w/PMH complex medical history including chronic hypoxic respiratory failure due to pulmonary HTN on 2L home O2, ALAINA requiring CPAP, chronic diastolic HF, CAD, CKD stage 3, morbid obesity, HTN,  depression, recent falls, hx recurrent pneumonia. Admitted 10/15/23 and treated for CAP with multiple courses of antibiotics.  Worsened requiring intubation, developed ARDS. Cultures negative. Autoimmune work-up grossly negative but did have low IgG level. 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 S/p trach and PEG.  [facility] and tolerating PST during the day thus far.   Assessment/Plan:     Acute on chronic hypoxic/hypercapnic respiratory failure in setting of underlying pulmonary HTN, c/b pneumonia, severe ARDS, possibly organizing pneumonia now s/p trach  Multifocal CAP: received multiple courses of antibiotics.  Completed 11/29. Serial Sputum cx with candida, beta-D-glucan neg. BAL 10/22 cytology neg.   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 IVIG considered but ultimately not given  Tracheostomy in place: Initial placement on 11/29 c/b post op bleeding s/p exploration of tracheotomy & cauterization by ENT.  6 Aj changed by ENT on 12/5. No bleeding issue since admission here.   Oropharyngeal dysphagia status post PEG tube: passed BDT on 12/7  ALAINA on CPAP PTA  Chronic diastolic HF, Volume overload: diuretic on hold d/t hypernatremia   CARMEN on CKD - improved  Obesity  MDD, Anxiety   Hypernatremia: stable  Leukocytosis wo fever: in setting of high dose steroids. Downtrending   Bronchiectasis  Severely deconditioned with risk for steroid myopathy     Continues on phase 1 weans, sometimes inhibited by elevated BP suspect to be ongoing challenge in setting of high dose steroids.        Recommendations:  Phase 1 weans: PST during the day.  AC night.  Tolerating PS 8/5 today.  May consider trach dome trial tomorrow   PMV trials with SLP only for now   Steroid taper: 120mg per day for at least 2 weeks, then down to 100mg for 2 weeks, then 80mg for 2 weeks, then decreasing by 5mg every 2 weeks until off. Discussed with Dr. Bermeo and may consider doing one week of 120mg/day, then continue taper as originally ordered from there.  Will re assess on the 14th  PJP ppx: Bactrim.  Continue until on <20mg prednisone daily   Pulm toilet: Duonebs QID. Mucomyst BID   Routine trach care per protocol  Keep same 6 Shiley trach for now  Torsemide - monitor Na closely   GI ppx: PPI  DVT ppx: heparin subcutaneous   VAP ppx: Chlorhexidine    PT/OT/SLP  BP management per primary service     Subjective:   - in bed on PS 8/5.  Denies sob, acute pain, n/v, can follow simple commands. Daughter at bedside   - tolerated in-line PMV trial today and sang a song per daughter     Tracheal secretions:  Overnight - 3x, small, thick   Yesterday - 3x, small, thick     Cough strength: moderate    Ventilator weaning results  12/6: PS 12/8 for ~12hr, tolerated well   12/7: PS 10/5 for 9.5hr, tolerated well   12/8: PS 8/5 for 12.5hr   12/9: No wean d/t elevated BP   12/10: PS 8/5 for 6hr 15min, stopped d/t increased WOB    Clinical status discussed today with respiratory therapist     ROS: 10-system review obtained and negative with the exception of the symptoms noted above.    Medications:      acetaminophen  650 mg Oral or Feeding Tube 4x Daily    acetylcysteine  2 mL Nebulization BID    amLODIPine  5 mg Oral or Feeding Tube BID    aspirin  81 mg Oral or Feeding Tube Daily    atorvastatin  20 mg Oral or Feeding Tube QPM    B and C vitamin Complex with folic acid  5 mL Oral or Feeding Tube Daily    chlorhexidine  15 mL Mouth/Throat BID    cholecalciferol  50 mcg Per Feeding Tube Daily    heparin ANTICOAGULANT  5,000 Units  Subcutaneous Q8H    insulin aspart  1-6 Units Subcutaneous Q6H    ipratropium - albuterol 0.5 mg/2.5 mg/3 mL  3 mL Nebulization 4x daily    isosorbide dinitrate  20 mg Per Feeding Tube TID    levothyroxine  150 mcg Oral or Feeding Tube QAM AC    metoprolol  12.5 mg Per Feeding Tube BID    miconazole   Topical BID    pantoprazole  40 mg Per Feeding Tube QAM AC    predniSONE  60 mg Per Feeding Tube BID    pregabalin  75 mg Per Feeding Tube Daily    protein modular  1 packet Per Feeding Tube BID 09 12    QUEtiapine  150 mg Oral or Feeding Tube BID    sertraline  150 mg Oral or Feeding Tube Daily    sodium chloride (PF)  10 mL Intracatheter Q8H    sodium chloride (PF)  10-40 mL Intracatheter Q7 Days    sulfamethoxazole-trimethoprim  20 mL Oral Once per day on Monday Wednesday Friday    torsemide  20 mg Per Feeding Tube Daily         Exam/Data:   Vitals  BP (!) 142/70 (BP Location: Left arm, Patient Position: Semi-Gonzalez's, Cuff Size: Adult Small)   Pulse 85   Temp 97.4  F (36.3  C) (Oral)   Resp 22   Wt 81.3 kg (179 lb 3.7 oz)   SpO2 93%   BMI 35.00 kg/m       I/O last 3 completed shifts:  In: 2071 [I.V.:90; NG/GT:1981]  Out: 950 [Urine:950]  Weight change: -1 kg (-2 lb 3.3 oz)    Vent Mode: (S) CPAP/PS  (Continuous positive airway pressure with Pressure Support)  FiO2 (%): 36 %  Resp Rate (Set): 18 breaths/min  Tidal Volume (Set, mL): 360 mL  PEEP (cm H2O): 5 cmH2O  Pressure Support (cm H2O): 8 cmH2O  Resp: 22      EXAM:  Gen: NAD in bed on PS 8/5  HEENT: NT, trach midline/intact  CV: RRR, no m/g/r  Resp: CTAB, non-labored, no wheeze   Abd: large, soft, nontender, BS+  Skin: no rashes or lesions  Ext: no edema, very weak, able to move all   Neuro: alert, follows commands, mouths words     Labs:    Complete Blood Count   Recent Labs   Lab 12/11/23  0609 12/08/23  0544 12/06/23  0803 12/05/23  0419   WBC 16.7* 19.1* 22.9* 17.7*   HGB 8.5* 7.8* 8.9* 8.5*    259 327 273     Basic Metabolic Panel  Recent Labs    Lab 12/11/23  0609 12/11/23  0547 12/10/23  2351 12/10/23  1803 12/08/23  1016 12/08/23  0544 12/07/23  0900 12/07/23  0624 12/06/23 2028 12/06/23  1736   *  --   --   --   --  145  --  146*  --  151*   POTASSIUM 5.0  --   --   --   --  4.4  --  3.8  --  4.2   CHLORIDE 101  --   --   --   --  101  --  102  --  105   CO2 36*  --   --   --   --  35*  --  39*  --  38*   BUN 75.4*  --   --   --   --  65.6*  --  63.3*  --  72.0*   CR 0.74  --   --   --   --  0.67  --  0.71  --  0.75   GLC 96 115* 209* 188*   < > 105*   < > 85   < > 216*    < > = values in this interval not displayed.       Radiology: Personally reviewed; radiology read below    XR CHEST 12/7/2023  Endotracheal tube seen on the prior study has been replaced with a tracheostomy tube which appears in good position in the mid trachea. PICC catheter from right upper extremity approach is unchanged with tip in the superior vena cava. There is airspace consolidation with minimal volume loss in the right upper lobe consistent with right upper lobe pneumonia. There is persistent opacity with bronchial thickening at the left base spine the heart which is stable. Stable cardiac size. Normal pulmonary vascularity. No pneumothorax or pleural effusion. Calcified aortic arch.    XR CHEST 11/26/2023  papo AP view of the chest was obtained. Endotracheal tube tip projects over mid thoracic trachea approximately 4 cm from the ashley. Enteric tube crosses the diaphragm with the distal tip outside the field-of-view. Right upper extremity PICC tip projects over high/mid SVC. Cardiomegaly. Left basilar and right mid/upper lobe patchy pulmonary opacity, could represent atelectasis versus infection. No significant pleural effusion or pneumothorax.     CT CHEST WITHOUT CONTRAST 11/24/2023                                 LUNGS AND PLEURA: Pulmonary infiltrates again seen throughout both  lungs. There is increased airspace consolidation with air bronchograms  in both  lower lobes and in the posterior right upper lobe when  compared to previous. There are groundglass opacities with septal  thickening throughout the remainder of both lungs. Mild cylindrical  bronchiectasis. No pleural effusion or pneumothorax.     IMPRESSION:  1.  Extensive bilateral pulmonary infiltrates again seen. Increasing  airspace consolidation in the right upper lobe and both lower lobes  when compared to previous.    Adilson Prakash CNP  Pulmonary Medicine  [facility]  Pager [phone]

## 2023-12-11 NOTE — PLAN OF CARE
Problem: Mechanical Ventilation Invasive  Goal: Effective Communication  Outcome: Progressing  Goal: Mechanical Ventilation Liberation  Outcome: Progressing  Goal: Absence of Device-Related Skin and Tissue Injury  Outcome: Progressing  Goal: Absence of Ventilator-Induced Lung Injury  Outcome: Progressing    RT PROGRESS NOTE   [id number]  DATA:     CURRENT SETTINGS:             TRACH TYPE / SIZE: #6 Aj, placed on 12/5              MODE:  AC 18 360 +5             FIO2: 35%     ACTION:             THERAPIES: Duoneb QID, Mucomyst BID               SUCTION:                           FREQUENCY:  4                        AMOUNT: small to moderate                            CONSISTENCY:   thick                         COLOR:  white /yellow              SPONTANEOUS COUGH EFFORT/STRENGTH OF EFFORT (not elicited by suctioning):                               WEANING PHASE:  1                         WEAN MODE: PS 8/5, 36%                         WEAN TIME:  6 hrs and 22 minutes.                         END WEAN REASON:  increased WOB     RESPONSE:             BS:   Coarse              VITAL SIGNS:  Blood pressure 139/63, pulse 80, temperature 97.9  F (36.6  C), temperature source Oral, resp. rate 24, weight 81.3 kg (179 lb 3.7 oz), SpO2 96%. .              EMOTIONAL NEEDS / CONCERNS:                  RISK FOR SELF DECANNULATION:                          RISK DUE TO:                          INTERVENTION/S IN PLACE IS/ARE:         NOTE / PLAN: Patient currently is on full-vent support and is tolerating well. Patient weaned today on PS 8/5 for 6 hrs and 22 minutes and terminated due to increased WOB. Plan: RT will resume the weaning in AM and closely monitor.

## 2023-12-12 NOTE — PROGRESS NOTES
Pulmonary Progress Note    Admit Date: 12/5/2023  CODE: Full Code    HPI:   78 year old female w/PMH complex medical history including chronic hypoxic respiratory failure due to pulmonary HTN on 2L home O2, ALAINA requiring CPAP, chronic diastolic HF, CAD, CKD stage 3, morbid obesity, HTN,  depression, recent falls, hx recurrent pneumonia. Admitted 10/15/23 and treated for CAP with multiple courses of antibiotics.  Worsened requiring intubation, developed ARDS. Cultures negative. Autoimmune work-up grossly negative but did have low IgG level. 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 S/p trach and PEG.  Transferred to [facility] on 12/5 and tolerating PST during the day thus far.   Assessment/Plan:     Acute on chronic hypoxic/hypercapnic respiratory failure in setting of underlying pulmonary HTN, c/b pneumonia, severe ARDS, possibly organizing pneumonia now s/p trach  Multifocal CAP: received multiple courses of antibiotics.  Completed 11/29. Serial Sputum cx with candida, beta-D-glucan neg. BAL 10/22 cytology neg.   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 IVIG considered but ultimately not given  Tracheostomy in place: Initial placement on 11/29 c/b post op bleeding s/p exploration of tracheotomy & cauterization by ENT.  6 Aj changed by ENT on 12/5. No bleeding issue since admission here.   Oropharyngeal dysphagia status post PEG tube: passed BDT on 12/7  ALAINA on CPAP PTA  Chronic diastolic HF, Volume overload: diuretic on hold d/t hypernatremia   CARMEN on CKD - BUN uptrending   Obesity  MDD, Anxiety   Hypernatremia: stable  Leukocytosis wo fever: in setting of high dose steroids. Downtrending   Bronchiectasis  Severely deconditioned with risk for steroid myopathy       Recommendations:  Start Phase 2 weans: TM/PMV 2-3hr BID.  Otherwise PS day with AC at night    Steroid taper: 120mg per day for at  least 2 weeks, then down to 100mg for 2 weeks, then 80mg for 2 weeks, then decreasing by 5mg every 2 weeks until off  PJP ppx: Bactrim.  Continue until on <20mg prednisone daily   Pulm toilet: Duonebs QID. Mucomyst BID   Routine trach care per protocol  Keep same 6 Shiley trach for now  Torsemide - monitor BUN as uptrending, sCr not a good marker of renal function in this patient  GI ppx: PPI  DVT ppx: heparin subcutaneous   VAP ppx: Chlorhexidine    PT/OT/SLP  BP management per primary service     Subjective:   - in chair on tm/pmv.  Understandable voice, no acute complaints, some dyspnea on occasion    Tracheal secretions:  Overnight - 1x, small, thick   Yesterday - 4x, small/mod, thick     Cough strength: moderate    Ventilator weaning results  12/6: PS 12/8 for ~12hr, tolerated well   12/7: PS 10/5 for 9.5hr, tolerated well   12/8: PS 8/5 for 12.5hr   12/9: No wean d/t elevated BP   12/10: PS 8/5 for 6hr 15min, stopped d/t increased WOB  12/11: PS 8/5 for ~6hr     Clinical status discussed today with respiratory therapist     ROS: 10-system review obtained and negative with the exception of the symptoms noted above.    Medications:      acetaminophen  650 mg Oral or Feeding Tube 4x Daily    acetylcysteine  2 mL Nebulization BID    amLODIPine  5 mg Oral or Feeding Tube BID    aspirin  81 mg Oral or Feeding Tube Daily    atorvastatin  20 mg Oral or Feeding Tube QPM    B and C vitamin Complex with folic acid  5 mL Oral or Feeding Tube Daily    chlorhexidine  15 mL Mouth/Throat BID    cholecalciferol  50 mcg Per Feeding Tube Daily    heparin ANTICOAGULANT  5,000 Units Subcutaneous Q8H    insulin aspart  1-6 Units Subcutaneous Q6H    ipratropium - albuterol 0.5 mg/2.5 mg/3 mL  3 mL Nebulization 4x daily    isosorbide dinitrate  20 mg Per Feeding Tube TID    levothyroxine  150 mcg Oral or Feeding Tube QAM AC    metoprolol  12.5 mg Per Feeding Tube BID    miconazole   Topical BID    pantoprazole  40 mg Per Feeding Tube  QAM AC    predniSONE  60 mg Per Feeding Tube BID    pregabalin  75 mg Per Feeding Tube Daily    protein modular  1 packet Per Feeding Tube BID 09 12    QUEtiapine  150 mg Oral or Feeding Tube BID    sertraline  150 mg Oral or Feeding Tube Daily    sodium chloride (PF)  10 mL Intracatheter Q8H    sodium chloride (PF)  10-40 mL Intracatheter Q7 Days    sulfamethoxazole-trimethoprim  20 mL Oral Once per day on Monday Wednesday Friday    torsemide  20 mg Per Feeding Tube Daily         Exam/Data:   Vitals  /65 (BP Location: Left arm, Patient Position: Semi-Gonzalez's)   Pulse 73   Temp 98.4  F (36.9  C) (Oral)   Resp 26   Wt 81.5 kg (179 lb 10.8 oz)   SpO2 93%   BMI 35.09 kg/m       I/O last 3 completed shifts:  In: 2407 [I.V.:30; NG/GT:2377]  Out: 1850 [Urine:1850]  Weight change: 0.2 kg (7.1 oz)    Vent Mode: (S) CPAP/PS  (Continuous positive airway pressure with Pressure Support)  FiO2 (%): 40 %  Resp Rate (Set): 18 breaths/min  Tidal Volume (Set, mL): 360 mL  PEEP (cm H2O): 5 cmH2O  Pressure Support (cm H2O): 8 cmH2O  Resp: 26      EXAM:  Gen: NAD in chair on tm/pmv  HEENT: NT, trach midline/intact, no stridor   CV: RRR, no m/g/r  Resp: CTAB, non-labored, no wheeze   Abd: large, soft, nontender, BS+  Skin: no rashes or lesions  Ext: no edema, very weak, able to move all   Neuro: alert, follows commands    Labs:    Complete Blood Count   Recent Labs   Lab 12/12/23  0611 12/11/23  0609 12/08/23  0544 12/06/23  0803   WBC 16.3* 16.7* 19.1* 22.9*   HGB 7.5* 8.5* 7.8* 8.9*    245 259 327     Basic Metabolic Panel  Recent Labs   Lab 12/12/23  0611 12/12/23  0609 12/11/23  2326 12/11/23  1726 12/11/23  1151 12/11/23  0903 12/11/23  0609 12/08/23  1016 12/08/23  0544     --   --   --   --  147* 146*  --  145   POTASSIUM 5.0  --   --   --   --  4.5 5.0  --  4.4   CHLORIDE 98  --   --   --   --  101 101  --  101   CO2 36*  --   --   --   --  36* 36*  --  35*   BUN 82.6*  --   --   --   --  74.0* 75.4*   --  65.6*   CR 0.81  --   --   --   --  0.73 0.74  --  0.67   * 100* 212* 170*   < > 114* 96   < > 105*    < > = values in this interval not displayed.       Radiology: Personally reviewed; radiology read below    XR CHEST 12/7/2023  Endotracheal tube seen on the prior study has been replaced with a tracheostomy tube which appears in good position in the mid trachea. PICC catheter from right upper extremity approach is unchanged with tip in the superior vena cava. There is airspace consolidation with minimal volume loss in the right upper lobe consistent with right upper lobe pneumonia. There is persistent opacity with bronchial thickening at the left base spine the heart which is stable. Stable cardiac size. Normal pulmonary vascularity. No pneumothorax or pleural effusion. Calcified aortic arch.    XR CHEST 11/26/2023  papo AP view of the chest was obtained. Endotracheal tube tip projects over mid thoracic trachea approximately 4 cm from the ashley. Enteric tube crosses the diaphragm with the distal tip outside the field-of-view. Right upper extremity PICC tip projects over high/mid SVC. Cardiomegaly. Left basilar and right mid/upper lobe patchy pulmonary opacity, could represent atelectasis versus infection. No significant pleural effusion or pneumothorax.     CT CHEST WITHOUT CONTRAST 11/24/2023                                 LUNGS AND PLEURA: Pulmonary infiltrates again seen throughout both  lungs. There is increased airspace consolidation with air bronchograms  in both lower lobes and in the posterior right upper lobe when  compared to previous. There are groundglass opacities with septal  thickening throughout the remainder of both lungs. Mild cylindrical  bronchiectasis. No pleural effusion or pneumothorax.     IMPRESSION:  1.  Extensive bilateral pulmonary infiltrates again seen. Increasing  airspace consolidation in the right upper lobe and both lower lobes  when compared to previous.    Adilson  Prakash, CNP  Pulmonary Medicine  [facility]  Pager [phone]

## 2023-12-12 NOTE — PROGRESS NOTES
[facility]    Medicine Progress Note - Hospitalist Service    Date of Admission:  12/5/2023    Hospital Course  Matthew Bowen is a 78y F PMHx chronic hypoxic respiratory failure 2/2 pHTN, ALAINA, HFpEF, CAD, CKD3, Obesity, HTN, depression, and anxiety. She presented initially on 10/15 with fever, cough, and falls with a temperature. Further w/u revealed multifocal pneumonia and she was started on antibiotics. Her respiratory status worsened as she developed ARDS and was intubated on 10/21. She had a prolonged ICU stay 2/2 difficulty vent weaning. Eventually she underwent trach/peg placement. Of note, she was treated with high dose steroids for her lung condition and remains on a high dose long taper of steroids. Pt was transferred to [facility] for ongoing vent weaning and therapies.      [facility] Course  12/7-12/10: Hypernatremia to 155 2/2 over-diuresis, intravascular depletion, missing first night of FWF. 2L D5W given, resolved, Na now stable. Torsemide held initially but now restarted at 20mg daily. BP remains elevated, start isosorbide dinitrate 20mg TID. Loperamide for diarrhea.    12/11.  On full mechanical ventilation.  No weaning at this time per pulmonology. Continue with current medical management for other problems.  12/12.  Remains weak requiring maximum assist and is deconditioned.  She has a sodium of 141 and on ventilating phase 1.    Assessment & Plan   -Continue current medical management no new changes at this time.    Acute on Chronic Hypoxic Respiratory Failure  Bronchiectasis?  Multifocal Pneumonia  ARDS, PTA  pHTN  Chronic Hypoxic Respiratory Failure  ALAINA  - pulmonary consulted and following  - treated with high dose steroids previously  - presently on slow steroid taper  - prednisone 120mg daily (60mg in split doses) x2 weeks (then 100mg x2 weeks, 80mg x2 weeks, then taper down 5mg every 2 weeks) - pulm may decrease dose this week  - bactrim 800-160mg three times weekly for PJP ppx (was on  atovaquone, switched upon admission)  - trach exchange 12/5 by ENT PTA  - good bronchial hygiene with bronchodilators    Diarrhea  - ovn (12/7)  - C. diff negative  - loperamide prn    HTN  - amlodipine 5mg BID (suspect broken up 2/2 labile BP)  - metoprolol tartrate 12.5mg BID  - restart isosorbide dinitrate 20mg TID (12/10)  - pt was on hydralazine 50mg TID and isosorbide dinitrate 20mg TID PTA, consider resuming as needed or tolerated    Hypernatremia, Acute - mild  - likely 2/2 intravascular depletion 2/2 over-diuresis and missing FWF on her first night admitted  - Na 145 (12/4) -> 150 -> 155 -> 151 -> 146 (12/7) -> 145  - FWF 300cc q4h    HFpEF, not in exacerbation  - Torsemide, held on admission for Na 150  - Torsemide 20mg daily     Pain, Acute  - oxycodone 5mg prn    Normocytic, Hypochromic Anemia  - suspect 2/2 critical illness and phlebotomy  - received pRBCs during hospitalization prior to LTACH  - transfuse to maintain Hgb > 7.0  - pantoprazole    CKD  CARMEN, resolved PTA  - stable  - monitor with BMP    CAD  - asa  - atorvastatin    Hypothyroidism  - levothyroxine    Depression  Anxiety  - pregabalin  - sertraline    PEG  - PEG placed 11/29  - RD consulted and following    Diet: Adult Formula Drip Feeding: Continuous Jevity 1.5; Gastrostomy; Goal Rate: 45; mL/hr; TF to run x22 hrs, hold 1 hr before/after levothyroxine  NPO for Medical/Clinical Reasons Except for: Ice Chips, NPO but receiving Tube Feeding    DVT Prophylaxis: Heparin SQ  Aleman Catheter: PRESENT, indication: Strict 1-2 Hour I&O  Lines: PRESENT      PICC 10/22/23 Triple Lumen Right Basilic multiple med gtt. ready for use-Site Assessment: WDL except      Cardiac Monitoring: None  Code Status: Full Code      Clinically Significant Risk Factors         # Hypernatremia: Highest Na = 147 mmol/L in last 2 days, will monitor as appropriate   # Hypercalcemia: corrected calcium is >10.1, will monitor as appropriate    # Hypoalbuminemia: Lowest albumin  = 2.9 g/dL at 12/12/2023  6:11 AM, will monitor as appropriate                     Disposition Plan     Expected Discharge Date: 12/20/2023    Discharge Delays: Complex Discharge    Discharge Comments: Vent, Trach, Feeding tube, WOC          Javad Mishra MD  Hospitalist Service  LTACH  Securely message with [employer] (more info)  Text page via [employer] Paging/Directory   ______________________________________________________________________    Interval History   Patient is in bed comfortably.  She is on vent weaning phase 1 and we done in speaking valve.  She states she feels okay.   at the bedside.  No new events overnight per RN.  No new complaints at this time.  Physical Exam   Vital Signs: Temp: 98.4  F (36.9  C) Temp src: Oral BP: 139/65 Pulse: 67   Resp: 24 SpO2: 95 % O2 Device: Trach dome Oxygen Delivery: 50 LPM  Weight: 179 lbs 10.8 oz  General: She is a well grown well-developed adult female resting in bed comfortably  HEENT: Head is normocephalic atraumatic eyes pupils appear equal round and reactive to light.  Viable trach noted on neck.  Lungs: She has a normal respiratory effort on auscultation good air entry is noted.  No wheezing  Heart: She has a good S1 and S2 no obvious murmurs peripheral pulses are palpable.  Abdomen: Soft nontender bowel sounds are noted  Musculoskeletal: She has good muscle tone  Digits appear acyanotic  Skin: No obvious rashes noted, she is warm to touch please refer to nursing/wound care note for complete skin exam.      Medical Decision Making       40 MINUTES SPENT BY ME on the date of service doing chart review, history, exam, documentation & further activities per the note.      Data   Lab Results   Component Value Date    WBC 16.3 12/12/2023    WBC 10.8 11/22/2019     Lab Results   Component Value Date    RBC 2.84 12/12/2023    RBC 4.34 11/22/2019     Lab Results   Component Value Date    HGB 7.5 12/12/2023    HGB 12.4 11/22/2019     Lab Results   Component Value Date     HCT 25.6 12/12/2023    HCT 39.9 11/22/2019     Lab Results   Component Value Date    MCV 90 12/12/2023    MCV 92 11/22/2019     Lab Results   Component Value Date    MCH 26.4 12/12/2023    MCH 28.6 11/22/2019     Lab Results   Component Value Date    MCHC 29.3 12/12/2023    MCHC 31.1 11/22/2019     Lab Results   Component Value Date    RDW 20.5 12/12/2023    RDW 14.9 11/22/2019     Lab Results   Component Value Date     12/12/2023     11/22/2019       Last Comprehensive Metabolic Panel:  Sodium   Date Value Ref Range Status   12/12/2023 141 135 - 145 mmol/L Final     Comment:     Reference intervals for this test were updated on 09/26/2023 to more accurately reflect our healthy population. There may be differences in the flagging of prior results with similar values performed with this method. Interpretation of those prior results can be made in the context of the updated reference intervals.    11/22/2019 141 133 - 144 mmol/L Final     Potassium   Date Value Ref Range Status   12/12/2023 5.0 3.4 - 5.3 mmol/L Final   11/22/2019 4.6 3.4 - 5.3 mmol/L Final     Chloride   Date Value Ref Range Status   12/12/2023 98 98 - 107 mmol/L Final   11/22/2019 108 94 - 109 mmol/L Final     Carbon Dioxide   Date Value Ref Range Status   11/22/2019 27 20 - 32 mmol/L Final     Carbon Dioxide (CO2)   Date Value Ref Range Status   12/12/2023 36 (H) 22 - 29 mmol/L Final     Anion Gap   Date Value Ref Range Status   12/12/2023 7 7 - 15 mmol/L Final   11/22/2019 6 3 - 14 mmol/L Final     Glucose   Date Value Ref Range Status   12/12/2023 112 (H) 70 - 99 mg/dL Final   11/22/2019 112 (H) 70 - 99 mg/dL Final     GLUCOSE BY METER POCT   Date Value Ref Range Status   12/12/2023 127 (H) 70 - 99 mg/dL Final     Urea Nitrogen   Date Value Ref Range Status   12/12/2023 82.6 (H) 8.0 - 23.0 mg/dL Final   11/22/2019 34 (H) 7 - 30 mg/dL Final     Creatinine   Date Value Ref Range Status   12/12/2023 0.81 0.51 - 0.95 mg/dL Final    11/22/2019 1.30 (H) 0.52 - 1.04 mg/dL Final     GFR Estimate   Date Value Ref Range Status   12/12/2023 74 >60 mL/min/1.73m2 Final   11/22/2019 40 (L) >60 mL/min/[1.73_m2] Final     Comment:     Non  GFR Calc  Starting 12/18/2018, serum creatinine based estimated GFR (eGFR) will be   calculated using the Chronic Kidney Disease Epidemiology Collaboration   (CKD-EPI) equation.       Calcium   Date Value Ref Range Status   12/12/2023 8.7 (L) 8.8 - 10.2 mg/dL Final   11/22/2019 8.9 8.5 - 10.1 mg/dL Final     Bilirubin Total   Date Value Ref Range Status   12/12/2023 0.2 <=1.2 mg/dL Final   02/13/2019 0.2 0.2 - 1.3 mg/dL Final     Alkaline Phosphatase   Date Value Ref Range Status   12/12/2023 113 40 - 150 U/L Final     Comment:     Reference intervals for this test were updated on 11/14/2023 to more accurately reflect our healthy population. There may be differences in the flagging of prior results with similar values performed with this method. Interpretation of those prior results can be made in the context of the updated reference intervals.   02/13/2019 83 40 - 150 U/L Final     ALT   Date Value Ref Range Status   12/12/2023 35 0 - 50 U/L Final     Comment:     Reference intervals for this test were updated on 6/12/2023 to more accurately reflect our healthy population. There may be differences in the flagging of prior results with similar values performed with this method. Interpretation of those prior results can be made in the context of the updated reference intervals.     02/13/2019 59 (H) 0 - 50 U/L Final     AST   Date Value Ref Range Status   12/12/2023 37 0 - 45 U/L Final     Comment:     Reference intervals for this test were updated on 6/12/2023 to more accurately reflect our healthy population. There may be differences in the flagging of prior results with similar values performed with this method. Interpretation of those prior results can be made in the context of the updated reference  intervals.   02/13/2019 48 (H) 0 - 45 U/L Final

## 2023-12-12 NOTE — PLAN OF CARE
Problem: Pain Acute  Goal: Optimal Pain Control and Function  Outcome: Progressing  Intervention: Prevent or Manage Pain  Recent Flowsheet Documentation  Taken 12/12/2023 0000 by Carla Paz RN  Medication Review/Management: medications reviewed   Goal Outcome Evaluation:       Patient was calm and cooperative with cares, denied pain and slept most of the shift, patient was repositioned every 2 hours, BS read 212  mg/dl @0000, coverage given per order and 100 mg/dl this AM ,No coverage All other patient needs were met.

## 2023-12-12 NOTE — PLAN OF CARE
Problem: Adult Inpatient Plan of Care  Goal: Optimal Comfort and Wellbeing  Intervention: Provide Person-Centered Care  Recent Flowsheet Documentation  Taken 12/12/2023 1200 by Sarah Beth Ward RN  Trust Relationship/Rapport:   care explained   questions answered   choices provided     Problem: Mechanical Ventilation Invasive  Goal: Effective Communication  Intervention: Ensure Effective Communication  Recent Flowsheet Documentation  Taken 12/12/2023 1200 by Sarah Beth Ward RN  Communication Enhancement Strategies:   call light answered in person   speaking valve use encouraged  Family/Support System Care: presence promoted  Trust Relationship/Rapport:   care explained   questions answered   choices provided  Goal: Mechanical Ventilation Liberation  Intervention: Promote Extubation and Mechanical Ventilation Liberation  Recent Flowsheet Documentation  Taken 12/12/2023 1200 by Sarah Beth Ward RN  Medication Review/Management: medications reviewed  Environmental Support: calm environment promoted     Problem: Enteral Nutrition  Goal: Absence of Aspiration Signs and Symptoms  Intervention: Minimize Aspiration Risk  Recent Flowsheet Documentation  Taken 12/12/2023 1200 by Sarah Beth Ward RN  Enteral Feeding Safety: tubing labeled as enteral feeding  Goal: Safe, Effective Therapy Delivery  Intervention: Prevent Feeding-Related Adverse Events  Recent Flowsheet Documentation  Taken 12/12/2023 1200 by Sarah Beth Ward RN  Enteral Feeding Safety: tubing labeled as enteral feeding     Problem: Pain Acute  Goal: Optimal Pain Control and Function  Intervention: Prevent or Manage Pain  Recent Flowsheet Documentation  Taken 12/12/2023 1200 by Sarah Beth Ward RN  Sensory Stimulation Regulation: care clustered  Medication Review/Management: medications reviewed  Intervention: Optimize Psychosocial Wellbeing  Recent Flowsheet Documentation  Taken 12/12/2023 1200 by Sarah Beth Ward RN  Supportive Measures:  active listening utilized     Pt denies pain. Up in chair for 2 1/2 hrs, wit phase 2 vent weaning. Trach dome tolerated well while in chair. Pt back to bed with full vent support. Pt's spouse visiting at bed side, supportive of pt.  @ 1200. Pt resting in bed at this time, will continue monitoring.

## 2023-12-12 NOTE — PLAN OF CARE
Problem: Mechanical Ventilation Invasive  Goal: Effective Communication  Outcome: Progressing  Goal: Mechanical Ventilation Liberation  Outcome: Progressing  Goal: Absence of Ventilator-Induced Lung Injury  Outcome: Progressing   Goal Outcome Evaluation:  RT PROGRESS NOTE     DATA:     CURRENT SETTINGS: AC, 18, 360, +5             TRACH TYPE / SIZE:  6 Shiandrea TG 12/5/23             MODE:  A/C             FIO2:  35%     ACTION:             THERAPIES:  Duo-neb qid             SUCTION:                           FREQUENCY:  x1                        AMOUNT: Small                        CONSISTENCY: Thick                         COLOR:  White             SPONTANEOUS COUGH EFFORT/STRENGTH OF EFFORT (not elicited by suctioning): did not observed                              WEANING PHASE:                           WEAN MODE:                            WEAN TIME:                           END WEAN REASON:        RESPONSE:             BS: Clear/coarse             VITAL SIGNS:   Sat 92-96%, HR 74-86, RR 22-26             EMOTIONAL NEEDS / CONCERNS:                  RISK FOR SELF DECANNULATION:                          RISK DUE TO:                          INTERVENTION/S IN PLACE IS/ARE:         NOTE / PLAN:   Cont with plan and resume kelechi in the morning.

## 2023-12-12 NOTE — PLAN OF CARE
Problem: Mechanical Ventilation Invasive  Goal: Effective Communication  Outcome: Not Progressing, difficult to understand and meet her needs  Intervention: Ensure Effective Communication  Recent Flowsheet Documentation  Taken 12/11/2023 1731 by Silvina Tuttle RN  Communication Enhancement Strategies: call light answered in person  Family/Support System Care: presence promoted  Trust Relationship/Rapport:   care explained   choices provided   questions answered     Problem: Enteral Nutrition  Goal: Absence of Aspiration Signs and Symptoms  Outcome: Progressing   Goal Outcome Evaluation: Tolerating tube feeds, some loose stools  Pt VSS. Alert and oriented x4.   Complains of pain 0/.  Denies nausea, dizziness, shortness of breath and lightheadedness. On trach vent . Bedrest and chair.  Adequate intake and output.  On tube feeding continuous.  Last BM: early this morning .  Incontinent of bowel and bladder has mitchell. Skin looks bruised, abdomena and dry, itchy.  PRNS: atarax for anx and itch. Uses call light appropriately.  Mood calm,  here beginning of shift, the no one, handling hospitalization well.   Continue to monitor.     Silvina Tuttle RN, CMSRN, [facility]  LTAC, [facility]

## 2023-12-12 NOTE — CONSULTS
SPIRITUAL HEALTH SERVICES (SHS)  SPIRITUAL ASSESSMENT Progress Note  [facility]. Unit LTACH    REFERRAL SOURCE: Consult (Routine) Thank you for the referral.      Jeni has transferred from [facility] where I  first met her. She is much more alert and I introduced myself to her and her daughter, Roque.  I re-introduced myself to her  Genaro.  They are open to SHS visits.      PLAN: [facility] will follow during [facility]     Shey Yao, Ph.D., [facility]      SHS available 24/7 for emergency requests/referrals, either by having the on-call  paged or by entering an ASAP/STAT consult in Epic (this will also page the on-call ).

## 2023-12-12 NOTE — PLAN OF CARE
Problem: Mechanical Ventilation Invasive  Goal: Effective Communication  Outcome: Progressing  Goal: Mechanical Ventilation Liberation  Outcome: Progressing  Goal: Absence of Device-Related Skin and Tissue Injury  Outcome: Progressing  Goal: Absence of Ventilator-Induced Lung Injury  Outcome: Progressing    RT PROGRESS NOTE   [id number]  DATA:     CURRENT SETTINGS:             TRACH TYPE / SIZE: #6 Aj, placed on 12/5              MODE:  AC 18 360 +5             FIO2: 35%     ACTION:             THERAPIES: Duoneb QID, Mucomyst BID               SUCTION:                           FREQUENCY:  4                        AMOUNT: small to moderate                            CONSISTENCY:   thick                         COLOR:  white /yellow              SPONTANEOUS COUGH EFFORT/STRENGTH OF EFFORT (not elicited by suctioning):                               WEANING PHASE:  1                         WEAN MODE: PS 8/5, 35%and TM/PMV 40%/50L                         WEAN TIME: PS 8/5 for 1 hr and 23 min. First weaning :TM/PMV 40%/50L 2 hrs and 9 minutes. Second weaning: TM/PMV 40%/50L for 2 hrs and 38 minutes.                         END WEAN REASON:  increased WOB     RESPONSE:             BS:   Coarse              VITAL SIGNS:  Blood pressure (!) 141/75, pulse 77, temperature 97.7  F (36.5  C), temperature source Oral, resp. rate 22, weight 81.5 kg (179 lb 10.8 oz), SpO2 97%.            EMOTIONAL NEEDS / CONCERNS:                  RISK FOR SELF DECANNULATION:                          RISK DUE TO:                          INTERVENTION/S IN PLACE IS/ARE:         NOTE / PLAN: Patient currently is on full-vent support and is tolerating well. Patient weaned today on: PS 8/5 for 1 hr and 23 min. First weaning :TM/PMV 40%/50L 2 hrs and 9 minutes. Second weaning: TM/PMV 40%/50L for 2 hrs and 38 minutes. Patient tolerated well the weaning. Patient had weak voice and coughs. Plan: resume the weaning in AM.

## 2023-12-13 NOTE — PLAN OF CARE
Problem: Pain Acute  Goal: Optimal Pain Control and Function  Outcome: Progressing  Intervention: Optimize Psychosocial Wellbeing  Recent Flowsheet Documentation  Taken 12/13/2023 1000 by Alma Jenkins RN  Supportive Measures: active listening utilized     Problem: Anxiety Signs/Symptoms  Goal: Optimized Energy Level (Anxiety Signs/Symptoms)  Outcome: Progressing  Goal: Optimized Cognitive Function (Anxiety Signs/Symptoms)  Outcome: Progressing   Goal Outcome Evaluation:       Patient weaning on trach dome this morning, oxygen saturation down to 88% with exertion during ADLS, and physical therapy. Doing better with short pause between tasks, allowing patient to catch a breath. Assisted with mechanical lift up to chair, continue to monitor.  Complained of back pain after sitting up in chair, more anxious when switched back to the ventilator. Was lifted back to bed, given zyrtec for itching, and scheduled tylenol for back pain. Now relaxed after medications, and changing position helped alleviate the back pain.

## 2023-12-13 NOTE — PROGRESS NOTES
CLINICAL NUTRITION SERVICES - REASSESSMENT NOTE     Nutrition Prescription    RECOMMENDATIONS FOR MDs/PROVIDERS TO ORDER:  Watch Na and adjusted water flushes as needed     Malnutrition Status:    % Intake: No decreased intake noted    % Weight Loss: None noted  Subcutaneous Fat Loss: None observed  Muscle Loss:  Temporal region moderate depletion, and Dorsal hand region severe depletion   Fluid Accumulation/Edema: Per document record, generalized edema 2+ (mild), Mild in bilateral upper and lower extremities   Malnutrition Diagnosis: Patient does not meet two of the established criteria necessary for diagnosing malnutrition    Recommendations already ordered by Registered Dietitian (RD):  Continued TF without changing goal rate and TF formula.   But free water flashes: from 200 mL q4 hours to 240 mL q4 hours adjusted   TF + fluid flush = 2192 mL per day    Future/Additional Recommendations:  Monitor electrolyte and stool consistency     EVALUATION OF THE PROGRESS TOWARD GOALS   Diet: NPO  Nutrition Support: Type of Feeding Tube: PEG (placed 11/29/23)  Enteral Frequency: Continuous  Enteral Regimen: Jevity 1.5 at 45 mL/hr x 22 hrs (holding 1 hr before/after levothyroxine)  Modulars: 2 pkts Prosource TF 20.  Total Enteral Provisions: 990 mL daily provides 1645 kcal (20 kcal per kg), 103g protein (1.3 g per kg actual wt), 214g CHO, 21g fiber and 752mL free water. Meets = 100% of DRI's.  Free Water Flush: 200 mL q4 hours  TF + fluid flush = 1952 mL per day    Intake: Total TF intake  was 2113 mL (1200 mL free water and 913 formula)  met the estimated requirement. 92% of prescribed volume of the formula received (12/12).    ASSESSED NUTRITION NEEDS:  Dosing Weight: 81.3kg (lowest weight) for energy, and 45.5 kg (IBW) for protein  Estimated Energy Needs: 9332-1634+ kcals/day (20 - 25+ kcals/kg)  Justification: Maintenance (anticipate higher needs once patient participating with therapies)  Estimated Protein Needs:   grams protein/day (2-2.5 grams of pro/kg)  Justification: Hypercatabolism with critical illness, stage 2 PI, CKD  Estimated Fluid Needs: Per provider pending fluid status    NEW FINDINGS   Skin: Bruised abdomen, Buttocks/IT redness   BM: LBM was 12/13, loose stool, fecal incontinence  Meds: Norvasc, Lipitor, Nephronex, Vitamin D3, heparin, Novolog, Prosource TF20   Labs: BUN=86.6 (H)   Electrolytes: Mg=2.4 (H)   BG: WNL  Weight: Weight is  stable x 2 week    Date/Time Weight Weight Method   12/13/23 0130 84.5 kg (186 lb 4.6 oz) Bed scale   12/12/23 0700 81.5 kg (179 lb 10.8 oz) --   12/11/23 0005 81.3 kg (179 lb 3.7 oz) Bed scale   12/10/23 0018 82.3 kg (181 lb 7 oz) Bed scale   12/08/23 2347 81.6 kg (179 lb 14.3 oz) Bed scale   Weight pretty stable but question on accuracy  of today's weight (Bed scale)     MALNUTRITION  % Intake: No decreased intake noted   % Weight Loss: None noted  Subcutaneous Fat Loss: None observed  Muscle Loss:  Temporal region moderate depletion, and Dorsal hand region severe depletion   Fluid Accumulation/Edema: Per document record, generalized edema 2+ (mild), Mild in bilateral upper and lower extremities   Malnutrition Diagnosis: Patient does not meet two of the established criteria necessary for diagnosing malnutrition    Previous Goals   TF to meet % nutrition needs-met  BG WNL- met  Electrolytes WNL-not met (hypermagnesemia)   Normalize stooling as able- progressing    Previous Nutrition Diagnosis  Inadequate oral intake related to dysphagia as evidenced by NPO status anmd reliance on EN.   Evaluation: No change    CURRENT NUTRITION DIAGNOSIS   Inadequate oral intake related to NPO status secondary to dysphagia  as evidenced by ongoing needs for enteral tube feed to meet nutrition needs     INTERVENTIONS  Implementation  increase the water flushes   Goals  Meet nutritional needs from TF %   BG WNL  Electrolyte WNL  Normalizing stool   Weight maintenance      Monitoring/Evaluation  Progress toward goals will be monitored and evaluated per protocol.

## 2023-12-13 NOTE — PLAN OF CARE
Problem: Adult Inpatient Plan of Care  Goal: Plan of Care Review  Description: The Plan of Care Review/Shift note should be completed every shift.  The Outcome Evaluation is a brief statement about your assessment that the patient is improving, declining, or no change.  This information will be displayed automatically on your shift  note.  Outcome: Progressing   Goal Outcome Evaluation:       Patient was A/O X 4 , denied pain and slept most of the shift, patient was repositioned every 2 hours, incontinent of BM x 1, BG was 162 @ 0000  1 unit of Chanel log  insulin was given and 122 this AM, no coverage, All other patient needs were met.

## 2023-12-13 NOTE — PLAN OF CARE
Problem: Enteral Nutrition  Goal: Feeding Tolerance  Outcome: Progressing  Intervention: Prevent and Manage Feeding Intolerance  Description: Initiate early enteral nutrition support to maintain gut integrity and function; monitor for refeeding syndrome.  Monitor abdominal girth and distension; assess need for promotility agent if suspect reduced gastric emptying or delayed bowel motility.  Monitor stool pattern and characteristics; implement bowel regimen to aid in correction of constipation or diarrhea; consider the impact of medications.  Advocate for and adjust infusion rate, formulation or volume based on feeding tolerance and clinical status.  Minimize unnecessary disruptions to feeding (e.g., prolonged NPO status for procedures, frequent gastric residual checks); use enteral nutrition protocol to optimize delivery.  Flowsheets (Taken 12/13/2023 1314)  Nutrition Support Management: weight trending reviewed   Goal Outcome Evaluation:  Patient tolerating TF well. Na uptrending - increased water flushes.

## 2023-12-13 NOTE — PLAN OF CARE
Problem: Mechanical Ventilation Invasive  Goal: Effective Communication  Outcome: Progressing  Goal: Mechanical Ventilation Liberation  Outcome: Progressing  Goal: Absence of Device-Related Skin and Tissue Injury  Outcome: Progressing  Goal: Absence of Ventilator-Induced Lung Injury  Outcome: Progressing   Goal Outcome Evaluation:             RT PROGRESS NOTE     DATA:     CURRENT SETTINGS:             TRACH TYPE / SIZE:  #6 phuong changed 12/5             MODE:   ac 18, 360, peep 5, 30%                ACTION:             THERAPIES:   duoneb qid, mucomyst bid             SUCTION:                           FREQUENCY:   x5                        AMOUNT:   scant to moderate                        CONSISTENCY:   thick thin                        COLOR:   white to yellow             SPONTANEOUS COUGH EFFORT/STRENGTH OF EFFORT (not elicited by suctioning): moderate                              WEANING PHASE:   2                        WEAN MODE:    50L 50% tm with pmv                        WEAN TIME:   pt was able to wean 4 hours in the morning.  Pt tolerated well.  Pt started wean again at 15:20                     RESPONSE:             BS:   clear              VITAL SIGNS:   Blood pressure 122/60, pulse 78, temperature 98.5  F (36.9  C), temperature source Oral, resp. rate 22, weight 84.5 kg (186 lb 4.6 oz), SpO2 97%.               EMOTIONAL NEEDS / CONCERNS:  anxiety                RISK FOR SELF DECANNULATION:  no                                                NOTE / PLAN:   TM/PMV 4h bid with break on AC, and AC at night.

## 2023-12-13 NOTE — PROGRESS NOTES
Pulmonary Progress Note    Admit Date: 12/5/2023  CODE: Full Code    HPI:   78 year old female w/PMH complex medical history including chronic hypoxic respiratory failure due to pulmonary HTN on 2L home O2, ALAINA requiring CPAP, chronic diastolic HF, CAD, CKD stage 3, morbid obesity, HTN,  depression, recent falls, hx recurrent pneumonia. Admitted 10/15/23 and treated for CAP with multiple courses of antibiotics.  Worsened requiring intubation, developed ARDS. Cultures negative. Autoimmune work-up grossly negative but did have low IgG level. 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 S/p trach and PEG.  [facility] Doing with with PST and did almost 5hr total on trach dome yesterday.   Assessment/Plan:     Acute on chronic hypoxic/hypercapnic respiratory failure in setting of underlying pulmonary HTN, c/b pneumonia, severe ARDS, possibly organizing pneumonia now s/p trach  Multifocal CAP: received multiple courses of antibiotics.  Completed 11/29. Serial Sputum cx with candida, beta-D-glucan neg. BAL 10/22 cytology neg.   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 IVIG considered but ultimately not given  Tracheostomy in place: Initial placement on 11/29 c/b post op bleeding s/p exploration of tracheotomy & cauterization by ENT.  Susanne Rocha changed by ENT on 12/5. No bleeding issue since admission here.   Oropharyngeal dysphagia status post PEG tube: passed BDT on 12/7  ALAINA on CPAP PTA  Chronic diastolic HF, Volume overload: diuretic on hold d/t hypernatremia   CARMEN on CKD - BUN uptrending   Obesity  MDD, Anxiety   Hypernatremia: stable  Leukocytosis wo fever: in setting of high dose steroids. Downtrending   Bronchiectasis  Severely deconditioned with risk for steroid myopathy       Recommendations:  Phase 2 weans: TM/PMV 4hr BID.  Break on AC mid day and continue AC at night  Steroid  taper: 120mg per day for at least 2 weeks, then down to 100mg for 2 weeks, then 80mg for 2 weeks, then decreasing by 5mg every 2 weeks until off  PJP ppx: Bactrim.  Continue until on <20mg prednisone daily   Pulm toilet: Duonebs QID. Mucomyst BID   Routine trach care per protocol  Keep same 6 Shiley trach for now, likely downsize trach next week   BUN continues to uptrend, change Torsemide to EOD dosing and monitor   GI ppx: PPI  DVT ppx: heparin subcutaneous   VAP ppx: Chlorhexidine    PT/OT/SLP  SLP following: likely bedside swallow assessment this week followed by VFSS likely next Tues prior to diet initiation  BP management per primary service     Subjective:   - in chair on tm/pmv.  Understandable voice, no acute complaints  - daughter at bedside     Tracheal secretions:  Overnight - 1x, small, thick   Yesterday - 4x, small/mod, thick     Cough strength: moderate    Ventilator weaning results  12/6: PS 12/8 for ~12hr, tolerated well   12/7: PS 10/5 for 9.5hr, tolerated well   12/8: PS 8/5 for 12.5hr   12/9: No wean d/t elevated BP   12/10: PS 8/5 for 6hr 15min, stopped d/t increased WOB  12/11: PS 8/5 for  1.5hr.  Then TM/PMV x2 for total of almost 5hr   12/12:  PS 8/5 for 1 hr 23 min. TM/PMV 40%/50L x2 for total of almost 5hr    Clinical status discussed today with respiratory therapist     ROS: 10-system review obtained and negative with the exception of the symptoms noted above.    Medications:      acetaminophen  650 mg Oral or Feeding Tube 4x Daily    acetylcysteine  2 mL Nebulization BID    amLODIPine  5 mg Oral or Feeding Tube BID    aspirin  81 mg Oral or Feeding Tube Daily    atorvastatin  20 mg Oral or Feeding Tube QPM    B and C vitamin Complex with folic acid  5 mL Oral or Feeding Tube Daily    chlorhexidine  15 mL Mouth/Throat BID    cholecalciferol  50 mcg Per Feeding Tube Daily    heparin ANTICOAGULANT  5,000 Units Subcutaneous Q8H    insulin aspart  1-6 Units Subcutaneous Q6H    ipratropium -  albuterol 0.5 mg/2.5 mg/3 mL  3 mL Nebulization 4x daily    isosorbide dinitrate  20 mg Per Feeding Tube TID    levothyroxine  150 mcg Oral or Feeding Tube QAM AC    metoprolol  12.5 mg Per Feeding Tube BID    miconazole   Topical BID    pantoprazole  40 mg Per Feeding Tube QAM AC    predniSONE  60 mg Per Feeding Tube BID    pregabalin  75 mg Per Feeding Tube Daily    protein modular  1 packet Per Feeding Tube BID 09 12    QUEtiapine  150 mg Oral or Feeding Tube BID    sertraline  150 mg Oral or Feeding Tube Daily    sodium chloride (PF)  10 mL Intracatheter Q8H    sodium chloride (PF)  10-40 mL Intracatheter Q7 Days    sulfamethoxazole-trimethoprim  20 mL Oral Once per day on Monday Wednesday Friday    torsemide  20 mg Per Feeding Tube Daily         Exam/Data:   Vitals  BP (!) 156/75 (BP Location: Left arm)   Pulse 76   Temp 97.6  F (36.4  C) (Oral)   Resp 24   Wt 84.5 kg (186 lb 4.6 oz)   SpO2 94%   BMI 36.38 kg/m       I/O last 3 completed shifts:  In: 2168 [I.V.:30; NG/GT:2138]  Out: 2275 [Urine:2275]  Weight change:     Vent Mode: CMV/AC  (Continuous Mandatory Ventilation/ Assist Control)  FiO2 (%): 40 %  Resp Rate (Set): 18 breaths/min  Tidal Volume (Set, mL): 360 mL  PEEP (cm H2O): 5 cmH2O  Pressure Support (cm H2O): 8 cmH2O  Resp: 24    Currently on 50L/40%TM/PMV  EXAM:  Gen: NAD in chair on tm/pmv  HEENT: NT, trach midline/intact, no stridor, weak voice    CV: RRR, no m/g/r  Resp: CTAB, non-labored, no wheeze   Abd: large, soft, nontender, BS+  Skin: no rashes or lesions  Ext: no edema, very weak, able to move all   Neuro: alert, follows commands    Labs:    Complete Blood Count   Recent Labs   Lab 12/13/23  0558 12/12/23  0611 12/11/23  0609 12/08/23  0544   WBC 16.2* 16.3* 16.7* 19.1*   HGB 8.1* 7.5* 8.5* 7.8*    215 245 259     Basic Metabolic Panel  Recent Labs   Lab 12/13/23  0558 12/13/23  0535 12/13/23  0025 12/12/23  1659 12/12/23  1156 12/12/23  0611 12/11/23  1151 12/11/23  0903  12/11/23  0609     --   --   --   --  141  --  147* 146*   POTASSIUM 4.3  --   --   --   --  5.0  --  4.5 5.0   CHLORIDE 101  --   --   --   --  98  --  101 101   CO2 34*  --   --   --   --  36*  --  36* 36*   BUN 86.8*  --   --   --   --  82.6*  --  74.0* 75.4*   CR 0.76  --   --   --   --  0.81  --  0.73 0.74   * 122* 162* 163*   < > 112*   < > 114* 96    < > = values in this interval not displayed.       Radiology: Personally reviewed; radiology read below    XR CHEST 12/7/2023  Endotracheal tube seen on the prior study has been replaced with a tracheostomy tube which appears in good position in the mid trachea. PICC catheter from right upper extremity approach is unchanged with tip in the superior vena cava. There is airspace consolidation with minimal volume loss in the right upper lobe consistent with right upper lobe pneumonia. There is persistent opacity with bronchial thickening at the left base spine the heart which is stable. Stable cardiac size. Normal pulmonary vascularity. No pneumothorax or pleural effusion. Calcified aortic arch.    XR CHEST 11/26/2023  papo AP view of the chest was obtained. Endotracheal tube tip projects over mid thoracic trachea approximately 4 cm from the ashley. Enteric tube crosses the diaphragm with the distal tip outside the field-of-view. Right upper extremity PICC tip projects over high/mid SVC. Cardiomegaly. Left basilar and right mid/upper lobe patchy pulmonary opacity, could represent atelectasis versus infection. No significant pleural effusion or pneumothorax.     CT CHEST WITHOUT CONTRAST 11/24/2023                                 LUNGS AND PLEURA: Pulmonary infiltrates again seen throughout both  lungs. There is increased airspace consolidation with air bronchograms  in both lower lobes and in the posterior right upper lobe when  compared to previous. There are groundglass opacities with septal  thickening throughout the remainder of both lungs. Mild  cylindrical  bronchiectasis. No pleural effusion or pneumothorax.     IMPRESSION:  1.  Extensive bilateral pulmonary infiltrates again seen. Increasing  airspace consolidation in the right upper lobe and both lower lobes  when compared to previous.    Adilson Prakash CNP  Pulmonary Medicine  [facility]  Pager [phone]

## 2023-12-13 NOTE — PLAN OF CARE
Problem: Mechanical Ventilation Invasive  Goal: Effective Communication  Outcome: Progressing  Goal: Mechanical Ventilation Liberation  Outcome: Progressing  Goal: Absence of Ventilator-Induced Lung Injury  Outcome: Progressing   Goal Outcome Evaluation:  RT PROGRESS NOTE     DATA:     CURRENT SETTINGS: AC, 18, 360, +5             TRACH TYPE / SIZE:  6 Shiandrea TG 12/5/23             MODE:  A/C             FIO2:  40%     ACTION:             THERAPIES:  Duo-neb qid             SUCTION:                           FREQUENCY:  x1                        AMOUNT: Small                        CONSISTENCY: Thick                         COLOR:  White             SPONTANEOUS COUGH EFFORT/STRENGTH OF EFFORT (not elicited by suctioning): Yes/ok                              WEANING PHASE:                           WEAN MODE:                            WEAN TIME:                           END WEAN REASON:        RESPONSE:             BS: Coarse             VITAL SIGNS:   Sat 93-99%, HR 60-84, RR 22-29             EMOTIONAL NEEDS / CONCERNS:                  RISK FOR SELF DECANNULATION:                          RISK DUE TO:                          INTERVENTION/S IN PLACE IS/ARE:         NOTE / PLAN:   Cont with plan.

## 2023-12-13 NOTE — PROGRESS NOTES
[facility]    Medicine Progress Note - Hospitalist Service    Date of Admission:  12/5/2023    Hospital Course  Matthew Bowen is a 78y F PMHx chronic hypoxic respiratory failure 2/2 pHTN, ALAINA, HFpEF, CAD, CKD3, Obesity, HTN, depression, and anxiety. She presented initially on 10/15 with fever, cough, and falls with a temperature. Further w/u revealed multifocal pneumonia and she was started on antibiotics. Her respiratory status worsened as she developed ARDS and was intubated on 10/21. She had a prolonged ICU stay 2/2 difficulty vent weaning. Eventually she underwent trach/peg placement. Of note, she was treated with high dose steroids for her lung condition and remains on a high dose long taper of steroids. Pt was transferred to [facility] for ongoing vent weaning and therapies.      [facility] Course  12/7-12/10: Hypernatremia to 155 2/2 over-diuresis, intravascular depletion, missing first night of FWF. 2L D5W given, resolved, Na now stable. Torsemide held initially but now restarted at 20mg daily. BP remains elevated, start isosorbide dinitrate 20mg TID. Loperamide for diarrhea.    12/11.  On full mechanical ventilation.  No weaning at this time per pulmonology. Continue with current medical management for other problems.  12/12.  Remains weak requiring maximum assist and is deconditioned.  She has a sodium of 141 and on ventilating phase 1.  12/13.  Making some progress with therapies.  Remains weak however.  On vent weaning phase 1.    Assessment & Plan   -Continue current medical management no new changes at this time.    Acute on Chronic Hypoxic Respiratory Failure  Bronchiectasis?  Multifocal Pneumonia  ARDS, PTA  pHTN  Chronic Hypoxic Respiratory Failure  ALAINA  - pulmonary consulted and following  - treated with high dose steroids previously  - presently on slow steroid taper  - prednisone 120mg daily (60mg in split doses) x2 weeks (then 100mg x2 weeks, 80mg x2 weeks, then taper down 5mg every 2 weeks) - pulm  may decrease dose this week  - bactrim 800-160mg three times weekly for PJP ppx (was on atovaquone, switched upon admission)  - trach exchange 12/5 by ENT PTA  - good bronchial hygiene with bronchodilators    Diarrhea  - ovn (12/7)  - C. diff negative  - loperamide prn    HTN  - amlodipine 5mg BID (suspect broken up 2/2 labile BP)  - metoprolol tartrate 12.5mg BID  - restart isosorbide dinitrate 20mg TID (12/10)  - pt was on hydralazine 50mg TID and isosorbide dinitrate 20mg TID PTA, consider resuming as needed or tolerated    Hypernatremia, Acute - mild  - likely 2/2 intravascular depletion 2/2 over-diuresis and missing FWF on her first night admitted  - Na 145 (12/4) -> 150 -> 155 -> 151 -> 146 (12/7) -> 145  - FWF 300cc q4h    HFpEF, not in exacerbation  - Torsemide, held on admission for Na 150  - Torsemide 20mg daily     Pain, Acute  - oxycodone 5mg prn    Normocytic, Hypochromic Anemia  - suspect 2/2 critical illness and phlebotomy  - received pRBCs during hospitalization prior to LTACH  - transfuse to maintain Hgb > 7.0  - pantoprazole    CKD  CARMEN, resolved PTA  - stable  - monitor with BMP    CAD  - asa  - atorvastatin    Hypothyroidism  - levothyroxine    Depression  Anxiety  - pregabalin  - sertraline    PEG  - PEG placed 11/29  - RD consulted and following    Diet: Adult Formula Drip Feeding: Continuous Jevity 1.5; Gastrostomy; Goal Rate: 45; mL/hr; TF to run x22 hrs, hold 1 hr before/after levothyroxine  NPO for Medical/Clinical Reasons Except for: Ice Chips, NPO but receiving Tube Feeding    DVT Prophylaxis: Heparin SQ  Aleman Catheter: PRESENT, indication: Strict 1-2 Hour I&O  Lines: PRESENT      PICC 10/22/23 Triple Lumen Right Basilic multiple med gtt. ready for use-Site Assessment: WDL      Cardiac Monitoring: None  Code Status: Full Code      Clinically Significant Risk Factors              # Hypoalbuminemia: Lowest albumin = 2.9 g/dL at 12/12/2023  6:11 AM, will monitor as appropriate                      Disposition Plan     Expected Discharge Date: 12/20/2023    Discharge Delays: Complex Discharge    Discharge Comments: Vent, Trach, Feeding tube, WOC          Javad Mishra MD  Hospitalist Service  LTACH  Securely message with [employer] (more info)  Text page via [employer] Paging/Directory   ______________________________________________________________________    Interval History   No new events overnight per RN.  Patient is in the chair comfortably.  Daughter notes she has been walking this morning with therapies.  She appears tired.  She reports no new complaints at this time.  Making some progress    Physical Exam   Vital Signs: Temp: 97.6  F (36.4  C) Temp src: Oral BP: (!) 141/72 Pulse: 75   Resp: 26 SpO2: 92 % O2 Device: Trach dome Oxygen Delivery: 50 LPM  Weight: 186 lbs 4.62 oz  General: She is a well grown well-developed adult female resting in bed comfortably  HEENT: Head is normocephalic atraumatic eyes pupils appear equal round and reactive to light.  Viable trach noted on neck.  Lungs: She has a normal respiratory effort on auscultation good air entry is noted.  No wheezing  Heart: She has a good S1 and S2 no obvious murmurs peripheral pulses are palpable.  Abdomen: Soft nontender bowel sounds are noted  Musculoskeletal: She has good muscle tone  Digits appear acyanotic  Skin: No obvious rashes noted, she is warm to touch please refer to nursing/wound care note for complete skin exam.      Medical Decision Making       40 MINUTES SPENT BY ME on the date of service doing chart review, history, exam, documentation & further activities per the note.      Data   Lab Results   Component Value Date    WBC 16.2 12/13/2023    WBC 10.8 11/22/2019     Lab Results   Component Value Date    RBC 3.01 12/13/2023    RBC 4.34 11/22/2019     Lab Results   Component Value Date    HGB 8.1 12/13/2023    HGB 12.4 11/22/2019     Lab Results   Component Value Date    HCT 27.3 12/13/2023    HCT 39.9 11/22/2019     Lab Results    Component Value Date    MCV 91 12/13/2023    MCV 92 11/22/2019     Lab Results   Component Value Date    MCH 26.9 12/13/2023    MCH 28.6 11/22/2019     Lab Results   Component Value Date    MCHC 29.7 12/13/2023    MCHC 31.1 11/22/2019     Lab Results   Component Value Date    RDW 20.5 12/13/2023    RDW 14.9 11/22/2019     Lab Results   Component Value Date     12/13/2023     11/22/2019       Last Comprehensive Metabolic Panel:  Sodium   Date Value Ref Range Status   12/13/2023 144 135 - 145 mmol/L Final     Comment:     Reference intervals for this test were updated on 09/26/2023 to more accurately reflect our healthy population. There may be differences in the flagging of prior results with similar values performed with this method. Interpretation of those prior results can be made in the context of the updated reference intervals.    11/22/2019 141 133 - 144 mmol/L Final     Potassium   Date Value Ref Range Status   12/13/2023 4.3 3.4 - 5.3 mmol/L Final   11/22/2019 4.6 3.4 - 5.3 mmol/L Final     Chloride   Date Value Ref Range Status   12/13/2023 101 98 - 107 mmol/L Final   11/22/2019 108 94 - 109 mmol/L Final     Carbon Dioxide   Date Value Ref Range Status   11/22/2019 27 20 - 32 mmol/L Final     Carbon Dioxide (CO2)   Date Value Ref Range Status   12/13/2023 34 (H) 22 - 29 mmol/L Final     Anion Gap   Date Value Ref Range Status   12/13/2023 9 7 - 15 mmol/L Final   11/22/2019 6 3 - 14 mmol/L Final     Glucose   Date Value Ref Range Status   12/13/2023 110 (H) 70 - 99 mg/dL Final   11/22/2019 112 (H) 70 - 99 mg/dL Final     GLUCOSE BY METER POCT   Date Value Ref Range Status   12/13/2023 130 (H) 70 - 99 mg/dL Final     Urea Nitrogen   Date Value Ref Range Status   12/13/2023 86.8 (H) 8.0 - 23.0 mg/dL Final   11/22/2019 34 (H) 7 - 30 mg/dL Final     Creatinine   Date Value Ref Range Status   12/13/2023 0.76 0.51 - 0.95 mg/dL Final   11/22/2019 1.30 (H) 0.52 - 1.04 mg/dL Final     GFR Estimate    Date Value Ref Range Status   12/13/2023 80 >60 mL/min/1.73m2 Final   11/22/2019 40 (L) >60 mL/min/[1.73_m2] Final     Comment:     Non  GFR Calc  Starting 12/18/2018, serum creatinine based estimated GFR (eGFR) will be   calculated using the Chronic Kidney Disease Epidemiology Collaboration   (CKD-EPI) equation.       Calcium   Date Value Ref Range Status   12/13/2023 9.1 8.8 - 10.2 mg/dL Final   11/22/2019 8.9 8.5 - 10.1 mg/dL Final     Bilirubin Total   Date Value Ref Range Status   12/12/2023 0.2 <=1.2 mg/dL Final   02/13/2019 0.2 0.2 - 1.3 mg/dL Final     Alkaline Phosphatase   Date Value Ref Range Status   12/12/2023 113 40 - 150 U/L Final     Comment:     Reference intervals for this test were updated on 11/14/2023 to more accurately reflect our healthy population. There may be differences in the flagging of prior results with similar values performed with this method. Interpretation of those prior results can be made in the context of the updated reference intervals.   02/13/2019 83 40 - 150 U/L Final     ALT   Date Value Ref Range Status   12/12/2023 35 0 - 50 U/L Final     Comment:     Reference intervals for this test were updated on 6/12/2023 to more accurately reflect our healthy population. There may be differences in the flagging of prior results with similar values performed with this method. Interpretation of those prior results can be made in the context of the updated reference intervals.     02/13/2019 59 (H) 0 - 50 U/L Final     AST   Date Value Ref Range Status   12/12/2023 37 0 - 45 U/L Final     Comment:     Reference intervals for this test were updated on 6/12/2023 to more accurately reflect our healthy population. There may be differences in the flagging of prior results with similar values performed with this method. Interpretation of those prior results can be made in the context of the updated reference intervals.   02/13/2019 48 (H) 0 - 45 U/L Final

## 2023-12-13 NOTE — PLAN OF CARE
Problem: Mechanical Ventilation Invasive  Goal: Effective Communication  Outcome: Progressing  Intervention: Ensure Effective Communication  Recent Flowsheet Documentation  Taken 12/12/2023 2000 by Christopher Walter RN  Communication Enhancement Strategies: call light answered in person  Family/Support System Care: support provided  Trust Relationship/Rapport: care explained     Problem: Enteral Nutrition  Goal: Absence of Aspiration Signs and Symptoms  Outcome: Progressing  Intervention: Minimize Aspiration Risk  Recent Flowsheet Documentation  Taken 12/12/2023 2137 by Christopher Walter RN  Oral Care: mouth wash rinse  Head of Bed (HOB) Positioning: HOB at 20-30 degrees     Problem: Pain Acute  Goal: Optimal Pain Control and Function  Outcome: Progressing  Intervention: Prevent or Manage Pain  Recent Flowsheet Documentation  Taken 12/12/2023 2000 by Christopher Walter RN  Sensory Stimulation Regulation: lighting decreased  Medication Review/Management: medications reviewed  Intervention: Optimize Psychosocial Wellbeing  Recent Flowsheet Documentation  Taken 12/12/2023 2000 by Christopher Walter RN  Supportive Measures: active listening utilized   Goal Outcome Evaluation:  VSS. Alert and oriented x4. Complains of back pain, repositioned every 2 hrs and given PRN oxy, was effective. Denies nausea, dizziness, shortness of breath and light headedness. On mechanical vent. On cont tube feeding and well tolerated. Last BM today. Last . Incontinent bowel and bladder. Aleman intact. Uses of call lights appropriately. Visited by family. She was anxious but now calm and sleeping comfortably.

## 2023-12-14 NOTE — PROGRESS NOTES
[facility]    Medicine Progress Note - Hospitalist Service    Date of Admission:  12/5/2023    Hospital Course  Matthew Bowen is a 78y F PMHx chronic hypoxic respiratory failure 2/2 pHTN, ALAINA, HFpEF, CAD, CKD3, Obesity, HTN, depression, and anxiety. She presented initially on 10/15 with fever, cough, and falls with a temperature. Further w/u revealed multifocal pneumonia and she was started on antibiotics. Her respiratory status worsened as she developed ARDS and was intubated on 10/21. She had a prolonged ICU stay 2/2 difficulty vent weaning. Eventually she underwent trach/peg placement. Of note, she was treated with high dose steroids for her lung condition and remains on a high dose long taper of steroids. Pt was transferred to [facility] for ongoing vent weaning and therapies.      [facility] Course  12/7-12/10: Hypernatremia to 155 2/2 over-diuresis, intravascular depletion, missing first night of FWF. 2L D5W given, resolved, Na now stable. Torsemide held initially but now restarted at 20mg daily. BP remains elevated, start isosorbide dinitrate 20mg TID. Loperamide for diarrhea.    12/11.  On full mechanical ventilation.  No weaning at this time per pulmonology. Continue with current medical management for other problems.  12/12.  Remains weak requiring maximum assist and is deconditioned.  She has a sodium of 141 and on ventilating phase 1.  12/13.  Making some progress with therapies.  Remains weak however.  On vent weaning phase 1.  12/14. Just about the same. Fairly stable on vent weaning phase 2.    Assessment & Plan   -No new changes at this time. Continue with current medical management    Acute on Chronic Hypoxic Respiratory Failure  Bronchiectasis?  Multifocal Pneumonia  ARDS, PTA  pHTN  Chronic Hypoxic Respiratory Failure  ALAINA  - pulmonary consulted and following  - treated with high dose steroids previously  - presently on slow steroid taper  - prednisone 120mg daily (60mg in split doses) x2 weeks (then  100mg x2 weeks, 80mg x2 weeks, then taper down 5mg every 2 weeks) - pulm may decrease dose this week  - bactrim 800-160mg three times weekly for PJP ppx (was on atovaquone, switched upon admission)  - trach exchange 12/5 by ENT PTA  - good bronchial hygiene with bronchodilators    Diarrhea  - fairly stable at this time  - C. diff negative  - loperamide prn    HTN  - amlodipine 5mg BID (suspect broken up 2/2 labile BP)  - metoprolol tartrate 12.5mg BID  - restart isosorbide dinitrate 20mg TID (12/10)  - pt was on hydralazine 50mg TID and isosorbide dinitrate 20mg TID PTA, consider resuming as needed or tolerated    Hypernatremia, Acute - mild  - stable at this time. Na 144 at this time  - likely 2/2 intravascular depletion 2/2 over-diuresis and missing FWF on her first night admitted  - FWF 300cc q4h    HFpEF, not in exacerbation  - Torsemide, held on admission for Na 150  - Torsemide 20mg daily     Pain, Acute  - oxycodone 5mg prn    Normocytic, Hypochromic Anemia  - suspect 2/2 critical illness and phlebotomy  - received pRBCs during hospitalization prior to LTACH  - transfuse to maintain Hgb > 7.0  - pantoprazole    CKD  CARMEN, resolved PTA  - stable  - monitor with BMP    CAD  - asa  - atorvastatin    Hypothyroidism  - levothyroxine    Depression  Anxiety  - pregabalin  - sertraline    PEG  - PEG placed 11/29  - RD consulted and following    Diet: Adult Formula Drip Feeding: Continuous Jevity 1.5; Gastrostomy; Goal Rate: 45; mL/hr; TF to run x22 hrs, hold 1 hr before/after levothyroxine  NPO for Medical/Clinical Reasons Except for: Ice Chips, NPO but receiving Tube Feeding    DVT Prophylaxis: Heparin SQ  Aleman Catheter: PRESENT, indication: Strict 1-2 Hour I&O  Lines: PRESENT      PICC 10/22/23 Triple Lumen Right Basilic multiple med gtt. ready for use-Site Assessment: WDL      Cardiac Monitoring: None  Code Status: Full Code      Clinically Significant Risk Factors              # Hypoalbuminemia: Lowest albumin =  2.9 g/dL at 12/12/2023  6:11 AM, will monitor as appropriate                     Disposition Plan      Expected Discharge Date: 12/21/2023    Discharge Delays: Complex Discharge    Discharge Comments: Vent, Trach, Feeding tube, WOC          Javad Mishra MD  Hospitalist Service  LTACH  Securely message with [employer] (more info)  Text page via [employer] Paging/Directory   ______________________________________________________________________    Interval History   Patient is resting in bed comfortably in a chair, daughter at bedside. Seems to be making progress. No new events overnight per RN      Physical Exam   Vital Signs: Temp: 98.5  F (36.9  C) Temp src: Oral BP: 131/60 Pulse: 78   Resp: 24 SpO2: 97 % O2 Device: Trach dome Oxygen Delivery: 50 LPM  Weight: 188 lbs 11.42 oz  General: She is a well grown well-developed adult female resting in bed comfortably  HEENT: Head is normocephalic atraumatic eyes pupils appear equal round and reactive to light.  Viable trach noted on neck.  Lungs: She has a normal respiratory effort on auscultation good air entry is noted.  No wheezing  Heart: She has a good S1 and S2 no obvious murmurs peripheral pulses are palpable.  Abdomen: Soft nontender bowel sounds are noted  Musculoskeletal: She has good muscle tone  Digits appear acyanotic  Skin: No obvious rashes noted, she is warm to touch please refer to nursing/wound care note for complete skin exam.      Medical Decision Making       40 MINUTES SPENT BY ME on the date of service doing chart review, history, exam, documentation & further activities per the note.      Data     Lab Results   Component Value Date    WBC 16.2 12/13/2023    WBC 10.8 11/22/2019     Lab Results   Component Value Date    RBC 3.01 12/13/2023    RBC 4.34 11/22/2019     Lab Results   Component Value Date    HGB 8.1 12/13/2023    HGB 12.4 11/22/2019     Lab Results   Component Value Date    HCT 27.3 12/13/2023    HCT 39.9 11/22/2019     Lab Results   Component  Value Date    MCV 91 12/13/2023    MCV 92 11/22/2019     Lab Results   Component Value Date    MCH 26.9 12/13/2023    MCH 28.6 11/22/2019     Lab Results   Component Value Date    MCHC 29.7 12/13/2023    MCHC 31.1 11/22/2019     Lab Results   Component Value Date    RDW 20.5 12/13/2023    RDW 14.9 11/22/2019     Lab Results   Component Value Date     12/13/2023     11/22/2019       Last Comprehensive Metabolic Panel:  Sodium   Date Value Ref Range Status   12/13/2023 144 135 - 145 mmol/L Final     Comment:     Reference intervals for this test were updated on 09/26/2023 to more accurately reflect our healthy population. There may be differences in the flagging of prior results with similar values performed with this method. Interpretation of those prior results can be made in the context of the updated reference intervals.    11/22/2019 141 133 - 144 mmol/L Final     Potassium   Date Value Ref Range Status   12/13/2023 4.3 3.4 - 5.3 mmol/L Final   11/22/2019 4.6 3.4 - 5.3 mmol/L Final     Chloride   Date Value Ref Range Status   12/13/2023 101 98 - 107 mmol/L Final   11/22/2019 108 94 - 109 mmol/L Final     Carbon Dioxide   Date Value Ref Range Status   11/22/2019 27 20 - 32 mmol/L Final     Carbon Dioxide (CO2)   Date Value Ref Range Status   12/13/2023 34 (H) 22 - 29 mmol/L Final     Anion Gap   Date Value Ref Range Status   12/13/2023 9 7 - 15 mmol/L Final   11/22/2019 6 3 - 14 mmol/L Final     Glucose   Date Value Ref Range Status   11/22/2019 112 (H) 70 - 99 mg/dL Final     GLUCOSE BY METER POCT   Date Value Ref Range Status   12/14/2023 136 (H) 70 - 99 mg/dL Final     Urea Nitrogen   Date Value Ref Range Status   12/13/2023 86.8 (H) 8.0 - 23.0 mg/dL Final   11/22/2019 34 (H) 7 - 30 mg/dL Final     Creatinine   Date Value Ref Range Status   12/13/2023 0.76 0.51 - 0.95 mg/dL Final   11/22/2019 1.30 (H) 0.52 - 1.04 mg/dL Final     GFR Estimate   Date Value Ref Range Status   12/13/2023 80 >60  mL/min/1.73m2 Final   11/22/2019 40 (L) >60 mL/min/[1.73_m2] Final     Comment:     Non  GFR Calc  Starting 12/18/2018, serum creatinine based estimated GFR (eGFR) will be   calculated using the Chronic Kidney Disease Epidemiology Collaboration   (CKD-EPI) equation.       Calcium   Date Value Ref Range Status   12/13/2023 9.1 8.8 - 10.2 mg/dL Final   11/22/2019 8.9 8.5 - 10.1 mg/dL Final     Bilirubin Total   Date Value Ref Range Status   12/12/2023 0.2 <=1.2 mg/dL Final   02/13/2019 0.2 0.2 - 1.3 mg/dL Final     Alkaline Phosphatase   Date Value Ref Range Status   12/12/2023 113 40 - 150 U/L Final     Comment:     Reference intervals for this test were updated on 11/14/2023 to more accurately reflect our healthy population. There may be differences in the flagging of prior results with similar values performed with this method. Interpretation of those prior results can be made in the context of the updated reference intervals.   02/13/2019 83 40 - 150 U/L Final     ALT   Date Value Ref Range Status   12/12/2023 35 0 - 50 U/L Final     Comment:     Reference intervals for this test were updated on 6/12/2023 to more accurately reflect our healthy population. There may be differences in the flagging of prior results with similar values performed with this method. Interpretation of those prior results can be made in the context of the updated reference intervals.     02/13/2019 59 (H) 0 - 50 U/L Final     AST   Date Value Ref Range Status   12/12/2023 37 0 - 45 U/L Final     Comment:     Reference intervals for this test were updated on 6/12/2023 to more accurately reflect our healthy population. There may be differences in the flagging of prior results with similar values performed with this method. Interpretation of those prior results can be made in the context of the updated reference intervals.   02/13/2019 48 (H) 0 - 45 U/L Final

## 2023-12-14 NOTE — PLAN OF CARE
Problem: Adult Inpatient Plan of Care  Goal: Absence of Hospital-Acquired Illness or Injury  Outcome: Progressing  Intervention: Identify and Manage Fall Risk  Recent Flowsheet Documentation  Taken 12/14/2023 0840 by Haider Goodson RN  Safety Promotion/Fall Prevention:   assistive device/personal items within reach   lighting adjusted   room door open   room near nurse's station   safety round/check completed  Intervention: Prevent Infection  Recent Flowsheet Documentation  Taken 12/14/2023 0840 by Haider Goodson RN  Infection Prevention:   hand hygiene promoted   equipment surfaces disinfected   personal protective equipment utilized  Goal: Optimal Comfort and Wellbeing  Outcome: Progressing  Intervention: Provide Person-Centered Care  Recent Flowsheet Documentation  Taken 12/14/2023 0815 by Haider Goodson RN  Trust Relationship/Rapport:   care explained   choices provided     Problem: Enteral Nutrition  Goal: Absence of Aspiration Signs and Symptoms  Outcome: Progressing  Goal: Safe, Effective Therapy Delivery  Outcome: Progressing  Goal: Feeding Tolerance  Outcome: Progressing   Goal Outcome Evaluation: Pt noted awake, and oriented. Not in respiratory distress. Pt tolerating her enteral feeding well. BP was elevated at the start of the shift; BP went down after antihypertensive medications was administered. Reported of having tolerable pain, relieved with Tylenol. Visco paste applied to intergluteal cleft - noted effective, skin is improving. Pt stayed in the WC for ~four hours. Needs attended. Call light within reach. Family stayed  with patient at bedside this morning shift. 1500 Pt reported no concerns/issues. Plan of care ongoing.     Haider Goodson RN

## 2023-12-14 NOTE — PLAN OF CARE
Problem: Mechanical Ventilation Invasive  Goal: Effective Communication  Outcome: Progressing  Goal: Mechanical Ventilation Liberation  Outcome: Progressing  Goal: Absence of Device-Related Skin and Tissue Injury  Outcome: Progressing  Goal: Absence of Ventilator-Induced Lung Injury  Outcome: Progressing    RT PROGRESS NOTE     DATA:     CURRENT SETTINGS:             TRACH TYPE / SIZE:  #6 shiley changed 12/05             MODE:   AC18, 360, peep 5, 30%                ACTION:             THERAPIES:   duoneb qid, mucomyst bid             SUCTION:                           FREQUENCY: 1                         AMOUNT:   small                         CONSISTENCY:   thick /thin                        COLOR:   white /yellow             SPONTANEOUS COUGH EFFORT/STRENGTH OF EFFORT (not elicited by suctioning): moderate                              WEANING PHASE:   2                        WEAN MODE:    50L 50%TM/ PMV                        WEAN TIME:    4 hours and 20 minutes and tolerated well.                     RESPONSE:             BS:   clear              VITAL SIGNS:   Blood pressure 132/62, pulse 60, temperature 97.5  F (36.4  C), temperature source Oral, resp. rate 23, weight 84.5 kg (186 lb 4.6 oz), SpO2 94%.                 EMOTIONAL NEEDS / CONCERNS:  anxiety                RISK FOR SELF DECANNULATION:  no                                                NOTE / PLAN: Patient currently is on full-vent support and is tolerating well. Patient weaned on TM/PMV 50%/50L and tolerated well. Plan: will resume the weaning in AM and closely monitor.

## 2023-12-14 NOTE — PROGRESS NOTES
Pulmonary Progress Note    Admit Date: 12/5/2023  CODE: Full Code    HPI:   78 year old female w/PMH complex medical history including chronic hypoxic respiratory failure due to pulmonary HTN on 2L home O2, ALAINA requiring CPAP, chronic diastolic HF, CAD, CKD stage 3, morbid obesity, HTN,  depression, recent falls, hx recurrent pneumonia. Admitted 10/15/23 and treated for CAP with multiple courses of antibiotics.  Worsened requiring intubation, developed ARDS. Cultures negative. Autoimmune work-up grossly negative but did have low IgG level. 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 S/p trach and PEG.  [facility] Progressing with weans and now tolerating up to 8hr trach dome with speaking valve per day.   Assessment/Plan:     Acute on chronic hypoxic/hypercapnic respiratory failure in setting of underlying pulmonary HTN, c/b pneumonia, severe ARDS, possibly organizing pneumonia now s/p trach.  50L/50% when on TM/PMV, 30%FiO2 when on vent.   Multifocal CAP: received multiple courses of antibiotics.  Completed 11/29. Serial Sputum cx with candida, beta-D-glucan neg. BAL 10/22 cytology neg.   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 IVIG considered but ultimately not given  Tracheostomy in place: Initial placement on 11/29 c/b post op bleeding s/p exploration of tracheotomy & cauterization by ENT.  Susanne Rocha changed by ENT on 12/5. No bleeding issue since admission here.   Oropharyngeal dysphagia status post PEG tube: passed BDT on 12/7  ALAINA on CPAP PTA  Chronic diastolic HF, Volume overload: on torsemide   Obesity  MDD, Anxiety - stable   Leukocytosis wo fever: in setting of high dose steroids. Stable/downtrending   Bronchiectasis  Severely deconditioned with risk for steroid myopathy   Azotemia - CARMEN on CKD in acute care. Monitor closely while on torsemide and bactrim      Recommendations:  Phase 2 weans: TM/PMV 4hr BID.  Break on AC mid day and continue AC at night  Steroid taper: 120mg per day for at least 2 weeks, then down to 100mg for 2 weeks, then 80mg for 2 weeks, then decreasing by 5mg every 2 weeks until off  Likely repeat CxR on Mon  PJP ppx: Bactrim.  Continue until on <20mg prednisone daily   Pulm toilet: Duonebs QID. Mucomyst BID   Routine trach care per protocol  Keep same 6 Shiley trach for now, likely downsize trach next week   Torsemide 20 daily EOD  GI ppx: PPI  DVT ppx: heparin subcutaneous   VAP ppx: Chlorhexidine    PT/OT/SLP  SLP following: likely bedside swallow assessment this week followed by VFSS likely next Tues prior to diet initiation  BP management per primary service     Subjective:   - in chair on tm/pmv, no acute complaints  - daughter and  at bedside   - stood for 11min today with assistance per dtr     Tracheal secretions:  Overnight - 1x, small, thick   Yesterday - 5x, small/mod, thin/thick     Cough strength: moderate    Ventilator weaning results  12/6: PS 12/8 for ~12hr, tolerated well   12/7: PS 10/5 for 9.5hr, tolerated well   12/8: PS 8/5 for 12.5hr   12/9: No wean d/t elevated BP   12/10: PS 8/5 for 6hr 15min, stopped d/t increased WOB  12/11: PS 8/5 for  1.5hr.  Then TM/PMV x2 for total of almost 5hr   12/12:  PS 8/5 for 1 hr 23 min. TM/PMV 40%/50L x2 for total of almost 5hr  12/13: TM/PMV 4hr x2; total 8hr 20min     Clinical status discussed today with respiratory therapist     ROS: 10-system review obtained and negative with the exception of the symptoms noted above.    Medications:      acetaminophen  650 mg Oral or Feeding Tube 4x Daily    acetylcysteine  2 mL Nebulization BID    amLODIPine  5 mg Oral or Feeding Tube BID    aspirin  81 mg Oral or Feeding Tube Daily    atorvastatin  20 mg Oral or Feeding Tube QPM    B and C vitamin Complex with folic acid  5 mL Oral or Feeding Tube Daily    chlorhexidine  15 mL Mouth/Throat BID     cholecalciferol  50 mcg Per Feeding Tube Daily    heparin ANTICOAGULANT  5,000 Units Subcutaneous Q8H    insulin aspart  1-6 Units Subcutaneous Q6H    ipratropium - albuterol 0.5 mg/2.5 mg/3 mL  3 mL Nebulization 4x daily    isosorbide dinitrate  20 mg Per Feeding Tube TID    levothyroxine  150 mcg Oral or Feeding Tube QAM AC    metoprolol  12.5 mg Per Feeding Tube BID    miconazole   Topical BID    pantoprazole  40 mg Per Feeding Tube QAM AC    predniSONE  60 mg Per Feeding Tube BID    pregabalin  75 mg Per Feeding Tube Daily    protein modular  1 packet Per Feeding Tube BID 09 12    QUEtiapine  150 mg Oral or Feeding Tube BID    sertraline  150 mg Oral or Feeding Tube Daily    sodium chloride (PF)  10 mL Intracatheter Q8H    sodium chloride (PF)  10-40 mL Intracatheter Q7 Days    sulfamethoxazole-trimethoprim  20 mL Oral Once per day on Monday Wednesday Friday    [START ON 12/15/2023] torsemide  20 mg Per Feeding Tube Every Other Day         Exam/Data:   Vitals  /60 (BP Location: Left arm, Patient Position: Semi-Gonzalez's, Cuff Size: Adult Small)   Pulse 91   Temp 98.5  F (36.9  C) (Oral)   Resp 28   Wt 85.6 kg (188 lb 11.4 oz)   SpO2 98%   BMI 36.86 kg/m       I/O last 3 completed shifts:  In: 2265 [NG/GT:2265]  Out: 2600 [Urine:2600]  Weight change: 4.1 kg (9 lb 0.6 oz)    Vent Mode: CMV/AC  (Continuous Mandatory Ventilation/ Assist Control)  FiO2 (%): 30 %  Resp Rate (Set): 18 breaths/min  Tidal Volume (Set, mL): 360 mL  PEEP (cm H2O): 5 cmH2O  Resp: 28    Currently on 50L/50%TM/PMV    EXAM:  Gen: NAD in chair on tm/pmv  HEENT: NT, trach midline/intact, no stridor, weak voice    CV: RRR, no m/g/r  Resp: CTAB with fine crackles RLL, non-labored, no wheeze   Abd: large, soft, nontender, BS+  Skin: no rashes or lesions  Ext: no edema, very weak, able to move all   Neuro: alert, follows commands    Labs:    Complete Blood Count   Recent Labs   Lab 12/13/23  0558 12/12/23  0611 12/11/23  0609  12/08/23  0544   WBC 16.2* 16.3* 16.7* 19.1*   HGB 8.1* 7.5* 8.5* 7.8*    215 245 259     Basic Metabolic Panel  Recent Labs   Lab 12/14/23  0620 12/14/23  0014 12/13/23  1650 12/13/23  1158 12/13/23  0558 12/12/23  1156 12/12/23  0611 12/11/23  1151 12/11/23  0903 12/11/23  0609   NA  --   --   --   --  144  --  141  --  147* 146*   POTASSIUM  --   --   --   --  4.3  --  5.0  --  4.5 5.0   CHLORIDE  --   --   --   --  101  --  98  --  101 101   CO2  --   --   --   --  34*  --  36*  --  36* 36*   BUN  --   --   --   --  86.8*  --  82.6*  --  74.0* 75.4*   CR  --   --   --   --  0.76  --  0.81  --  0.73 0.74   * 186* 210* 130* 110*   < > 112*   < > 114* 96    < > = values in this interval not displayed.       Radiology: Personally reviewed; radiology read below    XR CHEST 12/7/2023  Endotracheal tube seen on the prior study has been replaced with a tracheostomy tube which appears in good position in the mid trachea. PICC catheter from right upper extremity approach is unchanged with tip in the superior vena cava. There is airspace consolidation with minimal volume loss in the right upper lobe consistent with right upper lobe pneumonia. There is persistent opacity with bronchial thickening at the left base spine the heart which is stable. Stable cardiac size. Normal pulmonary vascularity. No pneumothorax or pleural effusion. Calcified aortic arch.    XR CHEST 11/26/2023  papo AP view of the chest was obtained. Endotracheal tube tip projects over mid thoracic trachea approximately 4 cm from the ashley. Enteric tube crosses the diaphragm with the distal tip outside the field-of-view. Right upper extremity PICC tip projects over high/mid SVC. Cardiomegaly. Left basilar and right mid/upper lobe patchy pulmonary opacity, could represent atelectasis versus infection. No significant pleural effusion or pneumothorax.     CT CHEST WITHOUT CONTRAST 11/24/2023                                 LUNGS AND PLEURA:  Pulmonary infiltrates again seen throughout both  lungs. There is increased airspace consolidation with air bronchograms  in both lower lobes and in the posterior right upper lobe when  compared to previous. There are groundglass opacities with septal  thickening throughout the remainder of both lungs. Mild cylindrical  bronchiectasis. No pleural effusion or pneumothorax.     IMPRESSION:  1.  Extensive bilateral pulmonary infiltrates again seen. Increasing  airspace consolidation in the right upper lobe and both lower lobes  when compared to previous.    Adilson Prakash CNP  Pulmonary Medicine  [facility]  Pager [phone]

## 2023-12-14 NOTE — PROGRESS NOTES
Problem: Mechanical Ventilation Invasive  Goal: Effective Communication  Outcome: Not Progressing, difficult to understand and meet her needs  Intervention: Ensure Effective Communication  Recent Flowsheet Documentation  Taken 12/11/2023 1731 by Silvina Tuttle RN  Communication Enhancement Strategies: call light answered in person  Family/Support System Care: presence promoted  Trust Relationship/Rapport:   care explained   choices provided   questions answered     Problem: Enteral Nutrition  Goal: Absence of Aspiration Signs and Symptoms  Outcome: Progressing   Goal Outcome Evaluation: Tolerating tube feeds, some loose stools  Pt VSS. Alert and oriented x4.   Complains of pain 7/10 in back, given oxy at HS.  Denies nausea, dizziness, shortness of breath and lightheadedness. On trach vent . Bedrest and chair.  Adequate intake and output.  On tube feeding continuous.  Last BM: early this morning .  Incontinent of bowel and bladder has mitchell. Skin looks bruised, abdomena and dry, itchy.  PRNS: atarax for anx and itch. Uses call light appropriately.  Mood calm,  handling hospitalization well.   Continue to monitor.      Silvina Tuttle, PATRICIA, CMSRN, XENIA-BC  LTAC, [facility]

## 2023-12-14 NOTE — PLAN OF CARE
Problem: Enteral Nutrition  Goal: Feeding Tolerance  Outcome: Progressing     Problem: Fall Injury Risk  Goal: Absence of Fall and Fall-Related Injury  Outcome: Progressing  Intervention: Identify and Manage Contributors  Recent Flowsheet Documentation  Taken 12/14/2023 0100 by Ligia Jacobs, PATRICIA  Medication Review/Management: medications reviewed  Intervention: Promote Injury-Free Environment  Recent Flowsheet Documentation  Taken 12/14/2023 0100 by Ligia Jacobs, RN  Safety Promotion/Fall Prevention:   increased rounding and observation   increase visualization of patient   lighting adjusted   room door open   room near nurse's station   Goal Outcome Evaluation:  Pt had high B/P  185/88 at start of the shift, was given prn Hydralazine ivp 5 mg  Also had Oxycodone 5 mg for back pain 6/10 at 0027, pt did verbalized some relief after.  Rechecked B/P at 0100 hwt088/62, pt slept > 6 hours, repositioning maintained every 2 hourly  Pt seems to tolerate tube feeding, will continue to monitor.

## 2023-12-14 NOTE — PLAN OF CARE
Problem: Mechanical Ventilation Invasive  Goal: Effective Communication  Outcome: Progressing  Goal: Mechanical Ventilation Liberation  Outcome: Progressing  Goal: Absence of Device-Related Skin and Tissue Injury  Outcome: Progressing  Goal: Absence of Ventilator-Induced Lung Injury  Outcome: Progressing   Goal Outcome Evaluation:    RT PROGRESS NOTE     DATA:#     CURRENT SETTINGS: 18, 360, 5             TRACH TYPE / SIZE:  #6 Aj 12/5             MODE:   CMV             FIO2:   30%     ACTION:             THERAPIES:   Duoneb x3, mucomyst x1             SUCTION:                           FREQUENCY:   x3                        AMOUNT:   mod to sm                        CONSISTENCY:   thick to thin                        COLOR:   white             SPONTANEOUS COUGH EFFORT/STRENGTH OF EFFORT (not elicited by suctioning): fair                              WEANING PHASE:   2                        WEAN MODE:    TM/pmv 50%, 50 lpm                        WEAN TIME:   4h 19 min, resume at 16:26                        END WEAN REASON:   per order  Low tone of voice, mj pmv well.     RESPONSE:             BS:   clear and diminished             VITAL SIGNS:   SPO2 92-96%, HR 78, RR 24-28             EMOTIONAL NEEDS / CONCERNS:  No                RISK FOR SELF DECANNULATION:  No                           NOTE / PLAN:     Pt to cont on tm into teresa, full support at night. Titrate O2 and flow to RA as able.

## 2023-12-15 NOTE — PLAN OF CARE
Problem: Adult Inpatient Plan of Care  Goal: Optimal Comfort and Wellbeing  Outcome: Progressing  Intervention: Provide Person-Centered Care  Recent Flowsheet Documentation  Taken 12/14/2023 1840 by Silvina Tuttle RN  Trust Relationship/Rapport:   care explained   choices provided  Aquaphor ordered and applied over whole body for itching. Given PRN Cetirazine     Problem: Mechanical Ventilation Invasive  Goal: Effective Communication  Outcome: Progressing  Intervention: Ensure Effective Communication  Recent Flowsheet Documentation  Taken 12/14/2023 1840 by Silvina Tuttle RN  Family/Support System Care: support provided  Trust Relationship/Rapport:   care explained   choices provided   Goal Outcome Evaluation: Can communicate some with speaking valve on.  A little hard to understand.     Pt VSS. Alert and oriented x4.   Complains of pain in back, given oxy at HS.  Denies nausea, dizziness, shortness of breath and lightheadedness. On trach vent . Bedrest and chair.  Adequate intake and output.  On tube feeding continuous.  Last BM: early this morning .  Incontinent of bowel and bladder has mitchell. Skin looks bruised, abdomen and dry, itchy.  PRNS: atarax for anx and itch. Uses call light appropriately.  Mood calm,  handling hospitalization well.   Continue to monitor.      Silvina Tuttle RN, CMSRN, XENIA-BC  LTAC, [facility]

## 2023-12-15 NOTE — PLAN OF CARE
Problem: Mechanical Ventilation Invasive  Goal: Effective Communication  Outcome: Progressing  Goal: Mechanical Ventilation Liberation  Outcome: Progressing  Goal: Absence of Ventilator-Induced Lung Injury  Outcome: Progressing   Goal Outcome Evaluation:  RT PROGRESS NOTE     DATA:     CURRENT SETTINGS: AC, 18, 360, +5             TRACH TYPE/SIZE:  6 Shiley TG 12/5/23             MODE:  A/C             FIO2:  40%     ACTION:             THERAPIES:  Duo-neb/Muco/volera qid             SUCTION:                           FREQUENCY:  x3                        AMOUNT: Small/scant                        CONSISTENCY: Thick                         COLOR:  White             SPONTANEOUS COUGH EFFORT/STRENGTH OF EFFORT (not elicited by suctioning): Yes/ok                              WEANING PHASE:  2                        WEAN MODE:  60L/55%TM                        WEAN TIME:  4:48                          END WEAN REASON: Break per order.     RESPONSE:             BS: Clear             VITAL SIGNS:  Sat 91-93%, HR 73-89, RR 20-31             EMOTIONAL NEEDS / CONCERNS:                  RISK FOR SELF DECANNULATION:                          RISK DUE TO:                          INTERVENTION/S IN PLACE IS/ARE:         NOTE / PLAN:   Cont with plan. Pt was placed on TM/PMV at the start of the wean but did not tolerate, sats dropped to 84%, FiO2 was increased to 55L/60% which still did not help. PMV was removed and cuff was inflated before sats improved to the low.90's. few minutes later PMV was put in place when pt was up in the chair but her saturation immediately dropped to the low 80's. Chest x-ray was order and volaria was added to tx. PMV was removed and cuff was re-inflated.

## 2023-12-15 NOTE — PROGRESS NOTES
Pulmonary Progress Note    Admit Date: 12/5/2023  CODE: Full Code    HPI:   78 year old female w/PMH complex medical history including chronic hypoxic respiratory failure due to pulmonary HTN on 2L home O2, ALAINA requiring CPAP, chronic diastolic HF, CAD, CKD stage 3, morbid obesity, HTN,  depression, recent falls, hx recurrent pneumonia. Admitted 10/15/23 and treated for CAP with multiple courses of antibiotics.  Worsened requiring intubation, developed ARDS. Cultures negative. Autoimmune work-up grossly negative but did have low IgG level. 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 S/p trach and PEG.  Transferred to [facility] on 12/5. Progressing with weans and now tolerating up to 8hr trach dome with speaking valve per day.   Assessment/Plan:     Acute on chronic hypoxic/hypercapnic respiratory failure in setting of underlying pulmonary HTN, c/b pneumonia, severe ARDS, possibly organizing pneumonia now s/p trach.  50L/50-60% when on TM/PMV, 30-40%FiO2 when on vent.   Multifocal CAP: received multiple courses of antibiotics.  Completed 11/29. Serial Sputum cx with candida, beta-D-glucan neg. BAL 10/22 cytology neg.   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 IVIG considered but ultimately not given  Tracheostomy in place: Initial placement on 11/29 c/b post op bleeding s/p exploration of tracheotomy & cauterization by ENT.  Susanne Rocha changed by ENT on 12/5. No bleeding issue since admission here.   Oropharyngeal dysphagia status post PEG tube: passed BDT on 12/7  ALAINA on CPAP PTA  Chronic diastolic HF, Volume overload: on torsemide   MDD, Anxiety - stable   Leukocytosis wo fever: in setting of high dose steroids. Stable/downtrending   Bronchiectasis  Severely deconditioned with risk for steroid myopathy   Azotemia: hx CKD.  CARMEN in acute care. Monitor closely while on torsemide and bactrim.  Differential  also would include GIB with low hgb but no active bleed.  More itching the past couple days per  which could be d/t uremia.      Recommendations:  Phase 2 weans: TM/PMV 4hr BID.  Break on AC mid day and continue AC at night  More hypoxic with TM/PMV this morning, pt asymptomatic - check CxR, CRP, BNP, C Cystatin C - CxR largely unchanged per radiology, LLL maybe slightly more dense to my eye.  Start Volera QID to promote alveolar recruitment.  CRP slightly up. BNP improved from previous in Oct.  She has fine crackles in her bases b/l presume is developing fibrosis but could be fluid as she's net pos I/Os & wt up ~10lb since admission.    Monitor BUN closely and C Cystatin C given low lean mass.  May need to consider holding torsemide again +/- switching Bactrim back to Atovaquone.  If continues to worsen, low threshold to consult renal as she will benefit from ongoing diuresis.     Steroid taper in place: currently on pred 60 BID  PJP ppx: Bactrim.  Continue until on <20mg prednisone daily.    Pulm toilet: Duonebs QID. Mucomyst BID   Routine trach care per protocol  Keep same 6 Shiley trach for now, likely downsize trach next week   Torsemide 20 daily EOD - adjust as clinically indicated, see above   GI ppx: PPI  DVT ppx: heparin subcutaneous   VAP ppx: Chlorhexidine    PT/OT/SLP  SLP following: VFSS possibly next week pending progression  BP management per primary service     Subjective:   - working with PT and tolerating.  Denies dyspnea, pain, n/v     Tracheal secretions:  Overnight - 3x, smal, thin/thick   Yesterday - 3x, small/mod, thin/thick     Cough strength: moderate    Ventilator weaning results  12/6: PS 12/8 for ~12hr, tolerated well   12/7: PS 10/5 for 9.5hr, tolerated well   12/8: PS 8/5 for 12.5hr   12/9: No wean d/t elevated BP   12/10: PS 8/5 for 6hr 15min, stopped d/t increased WOB  12/11: PS 8/5 for  1.5hr.  Then TM/PMV x2 for total of almost 5hr   12/12:  PS 8/5 for 1 hr 23 min. TM/PMV  40%/50L x2 for total of almost 5hr  12/13: TM/PMV 4hr x2; total 8hr 20min. Break on AC mid diay and AC at night  12/14: 50L/50%M/PMV 4hr x2; total 8hr 20min. Break on AC mid diay and AC at night    Clinical status discussed today with respiratory therapist     ROS: 10-system review obtained and negative with the exception of the symptoms noted above.    Medications:      acetaminophen  650 mg Oral or Feeding Tube 4x Daily    acetylcysteine  2 mL Nebulization BID    amLODIPine  5 mg Oral or Feeding Tube BID    aspirin  81 mg Oral or Feeding Tube Daily    atorvastatin  20 mg Oral or Feeding Tube QPM    B and C vitamin Complex with folic acid  5 mL Oral or Feeding Tube Daily    chlorhexidine  15 mL Mouth/Throat BID    cholecalciferol  50 mcg Per Feeding Tube Daily    heparin ANTICOAGULANT  5,000 Units Subcutaneous Q8H    insulin aspart  1-6 Units Subcutaneous Q6H    ipratropium - albuterol 0.5 mg/2.5 mg/3 mL  3 mL Nebulization 4x daily    isosorbide dinitrate  20 mg Per Feeding Tube TID    levothyroxine  150 mcg Oral or Feeding Tube QAM AC    metoprolol  12.5 mg Per Feeding Tube BID    miconazole   Topical BID    mineral oil-hydrophilic petrolatum   Topical Daily    pantoprazole  40 mg Per Feeding Tube QAM AC    predniSONE  60 mg Per Feeding Tube BID    pregabalin  75 mg Per Feeding Tube Daily    protein modular  1 packet Per Feeding Tube BID 09 12    QUEtiapine  150 mg Oral or Feeding Tube BID    sertraline  150 mg Oral or Feeding Tube Daily    simethicone  40 mg Per Feeding Tube TID    sodium chloride (PF)  10 mL Intracatheter Q8H    sodium chloride (PF)  10-40 mL Intracatheter Q7 Days    sulfamethoxazole-trimethoprim  20 mL Oral Once per day on Monday Wednesday Friday    torsemide  20 mg Per Feeding Tube Every Other Day         Exam/Data:   Vitals  BP (!) 165/75 (BP Location: Left arm, Patient Position: Supine, Cuff Size: Adult Small)   Pulse 89   Temp 97.5  F (36.4  C) (Axillary)   Resp 20   Wt 85.5 kg (188 lb  7.9 oz)   SpO2 91%   BMI 36.81 kg/m       I/O last 3 completed shifts:  In: 2517 [I.V.:30; NG/GT:4826]  Out: 800 [Urine:800]  Weight change: -0.1 kg (-3.5 oz)    Vent Mode: Trach collar  FiO2 (%): 60 %  Resp Rate (Set): 18 breaths/min  Tidal Volume (Set, mL): 360 mL  PEEP (cm H2O): 5 cmH2O  Resp: 20    Currently on 55L/60%TM/cuff up     EXAM:  Gen: NAD in chair on tm/cuff up   HEENT: NT, trach midline/intact, no stridor, weak voice    CV: RRR, no m/g/r  Resp: CTAB with fine crackles bases, non-labored, no wheeze   Abd: large, soft, nontender, BS+  Skin: no rashes or lesions  Ext: no edema, very weak, able to move all   Neuro: alert, follows commands    Labs:    Complete Blood Count   Recent Labs   Lab 12/15/23  0626 12/13/23  0558 12/12/23  0611 12/11/23  0609   WBC 15.8* 16.2* 16.3* 16.7*   HGB 7.5* 8.1* 7.5* 8.5*    210 215 245     Basic Metabolic Panel  Recent Labs   Lab 12/15/23  0626 12/15/23  0548 12/15/23  0038 12/14/23  1701 12/13/23  1158 12/13/23  0558 12/12/23  1156 12/12/23  0611 12/11/23  1151 12/11/23  0903     --   --   --   --  144  --  141  --  147*   POTASSIUM 4.9  --   --   --   --  4.3  --  5.0  --  4.5   CHLORIDE 100  --   --   --   --  101  --  98  --  101   CO2 33*  --   --   --   --  34*  --  36*  --  36*   BUN 92.1*  --   --   --   --  86.8*  --  82.6*  --  74.0*   CR 0.81  --   --   --   --  0.76  --  0.81  --  0.73   * 114* 194* 164*   < > 110*   < > 112*   < > 114*    < > = values in this interval not displayed.       Radiology: Personally reviewed; radiology read below    XR CHEST 12/15/2023  Tracheostomy tube in place with tip near the thoracic inlet. Right upper extremity PICC with tip in the mid SVC. Persistent consolidative opacities with air bronchograms in the right midlung and bilateral lung bases with background diffuse interstitial prominence. Stable cardiomediastinal contours with calcified tortuous aorta. No significant interval change.    XR CHEST  12/7/2023  Endotracheal tube seen on the prior study has been replaced with a tracheostomy tube which appears in good position in the mid trachea. PICC catheter from right upper extremity approach is unchanged with tip in the superior vena cava. There is airspace consolidation with minimal volume loss in the right upper lobe consistent with right upper lobe pneumonia. There is persistent opacity with bronchial thickening at the left base spine the heart which is stable. Stable cardiac size. Normal pulmonary vascularity. No pneumothorax or pleural effusion. Calcified aortic arch.    XR CHEST 11/26/2023  papo AP view of the chest was obtained. Endotracheal tube tip projects over mid thoracic trachea approximately 4 cm from the ashley. Enteric tube crosses the diaphragm with the distal tip outside the field-of-view. Right upper extremity PICC tip projects over high/mid SVC. Cardiomegaly. Left basilar and right mid/upper lobe patchy pulmonary opacity, could represent atelectasis versus infection. No significant pleural effusion or pneumothorax.     CT CHEST WITHOUT CONTRAST 11/24/2023                                 LUNGS AND PLEURA: Pulmonary infiltrates again seen throughout both  lungs. There is increased airspace consolidation with air bronchograms  in both lower lobes and in the posterior right upper lobe when  compared to previous. There are groundglass opacities with septal  thickening throughout the remainder of both lungs. Mild cylindrical  bronchiectasis. No pleural effusion or pneumothorax.     IMPRESSION:  1.  Extensive bilateral pulmonary infiltrates again seen. Increasing  airspace consolidation in the right upper lobe and both lower lobes  when compared to previous.    Adilson Prakash CNP  Pulmonary Medicine  [facility]  Pager [phone]

## 2023-12-15 NOTE — PLAN OF CARE
Problem: Enteral Nutrition  Goal: Feeding Tolerance  Outcome: Progressing     Problem: Fall Injury Risk  Goal: Absence of Fall and Fall-Related Injury  Outcome: Progressing  Intervention: Identify and Manage Contributors  Recent Flowsheet Documentation  Taken 12/15/2023 0146 by Ligia Jacobs, PATRICIA  Medication Review/Management: medications reviewed  Intervention: Promote Injury-Free Environment  Recent Flowsheet Documentation  Taken 12/15/2023 0146 by Ligia Jacobs, RN  Safety Promotion/Fall Prevention:   increased rounding and observation   increase visualization of patient   lighting adjusted   room door open   room near nurse's station   Goal Outcome Evaluation:       Pt slept > 6 hours, was given Oxycodone 5 mg for back pain 6/10 at 0044  Pt was able to settle down and sleep after this.  All safety measures intact and  Pt repositioned every 2 hourly, will continue to monitor.

## 2023-12-15 NOTE — PLAN OF CARE
Goal Outcome Evaluation:           Problem: Mechanical Ventilation Invasive  Goal: Effective Communication  Outcome: Progressing  Goal: Mechanical Ventilation Liberation  Outcome: Progressing  Goal: Absence of Device-Related Skin and Tissue Injury  Outcome: Progressing  Goal: Absence of Ventilator-Induced Lung Injury  Outcome: Progressing   RT PROGRESS NOTE     DATA:     CURRENT SETTINGS:             TRACH TYPE / SIZE:  #6 phuong changed 12/05             MODE:   AC18, 360, peep 5, 30%                ACTION:             THERAPIES:   duoneb qid, mucomyst bid             SUCTION:                           FREQUENCY: 3                         AMOUNT:   small                         CONSISTENCY:   thick /thin                        COLOR:   white /yellow             SPONTANEOUS COUGH EFFORT/STRENGTH OF EFFORT (not elicited by suctioning): moderate                              WEANING PHASE:   2                        WEAN MODE:    50L 50%TM/ PMV                        WEAN TIME:    4 hours and tolerated well.                     RESPONSE:             BS:   coarse /diminished.              VITAL SIGNS:   Blood pressure (!) 141/65, pulse 68, temperature 97.3  F (36.3  C), temperature source Axillary, resp. rate 21, weight 85.6 kg (188 lb 11.4 oz), SpO2 96%.               EMOTIONAL NEEDS / CONCERNS:  anxiety                RISK FOR SELF DECANNULATION:  no                                                NOTE / PLAN: Patient weaned on TM/PMV for 4 hrs and tolerated well Plan: will resume the weaning in AM.

## 2023-12-15 NOTE — PROGRESS NOTES
[facility]    Medicine Progress Note - Hospitalist Service    Date of Admission:  12/5/2023    Hospital Course  Matthew Bowen is a 78y F PMHx chronic hypoxic respiratory failure 2/2 pHTN, ALAINA, HFpEF, CAD, CKD3, Obesity, HTN, depression, and anxiety. She presented initially on 10/15 with fever, cough, and falls with a temperature. Further w/u revealed multifocal pneumonia and she was started on antibiotics. Her respiratory status worsened as she developed ARDS and was intubated on 10/21. She had a prolonged ICU stay 2/2 difficulty vent weaning. Eventually she underwent trach/peg placement. Of note, she was treated with high dose steroids for her lung condition and remains on a high dose long taper of steroids. Pt was transferred to [facility] for ongoing vent weaning and therapies.      [facility] Course  12/7-12/10: Hypernatremia to 155 2/2 over-diuresis, intravascular depletion, missing first night of FWF. 2L D5W given, resolved, Na now stable. Torsemide held initially but now restarted at 20mg daily. BP remains elevated, start isosorbide dinitrate 20mg TID. Loperamide for diarrhea.    12/11.  On full mechanical ventilation.  No weaning at this time per pulmonology. Continue with current medical management for other problems.  12/12.  Remains weak requiring maximum assist and is deconditioned.  She has a sodium of 141 and on ventilating phase 1.  12/13.  Making some progress with therapies.  Remains weak however.  On vent weaning phase 1.  12/14. Just about the same. Fairly stable on vent weaning phase 2.  12/15. Patient had an event in the morning when she desaturated per RT. Pulmonology plans to evaluate with a chest x-ray, BNP and CRP. She remains on phase 2 vent weaning.    Assessment & Plan     Acute on Chronic Hypoxic Respiratory Failure  Bronchiectasis?  Multifocal Pneumonia  ARDS, PTA  pHTN  Chronic Hypoxic Respiratory Failure  ALAINA  - pulmonary consulted and following  - treated with high dose steroids  previously  - presently on slow steroid taper  - prednisone 120mg daily (60mg in split doses) x2 weeks (then 100mg x2 weeks, 80mg x2 weeks, then taper down 5mg every 2 weeks) - pulm may decrease dose this week  - bactrim 800-160mg three times weekly for PJP ppx (was on atovaquone, switched upon admission)  - trach exchange 12/5 by ENT PTA  - good bronchial hygiene with bronchodilators    Diarrhea  - fairly stable at this time  - C. diff negative  - loperamide prn    HTN  - amlodipine 5mg BID (suspect broken up 2/2 labile BP)  - metoprolol tartrate 12.5mg BID  - restart isosorbide dinitrate 20mg TID (12/10)  - pt was on hydralazine 50mg TID and isosorbide dinitrate 20mg TID PTA, consider resuming as needed or tolerated    Hypernatremia, Acute - mild  - stable at this time. Na 144 at this time  - likely 2/2 intravascular depletion 2/2 over-diuresis and missing FWF on her first night admitted  - FWF 300cc q4h    HFpEF, not in exacerbation  - Torsemide, held on admission for Na 150  - Torsemide 20mg daily     Pain, Acute  - oxycodone 5mg prn    Normocytic, Hypochromic Anemia  - suspect 2/2 critical illness and phlebotomy  - received pRBCs during hospitalization prior to LTACH  - transfuse to maintain Hgb > 7.0  - pantoprazole    CKD  CARMEN, resolved PTA  - stable  - monitor with BMP    CAD  - asa  - atorvastatin    Hypothyroidism  - levothyroxine    Depression  Anxiety  - pregabalin  - sertraline    PEG  - PEG placed 11/29  - RD consulted and following    Diet: Adult Formula Drip Feeding: Continuous Jevity 1.5; Gastrostomy; Goal Rate: 45; mL/hr; TF to run x22 hrs, hold 1 hr before/after levothyroxine  NPO for Medical/Clinical Reasons Except for: Ice Chips, NPO but receiving Tube Feeding    DVT Prophylaxis: Heparin SQ  Aleman Catheter: PRESENT, indication: Strict 1-2 Hour I&O  Lines: PRESENT      PICC 10/22/23 Triple Lumen Right Basilic multiple med gtt. ready for use-Site Assessment: WDL      Cardiac Monitoring: None  Code  Status: Full Code      Clinically Significant Risk Factors              # Hypoalbuminemia: Lowest albumin = 2.9 g/dL at 12/12/2023  6:11 AM, will monitor as appropriate                     Disposition Plan     Expected Discharge Date: 12/21/2023    Discharge Delays: Complex Discharge    Discharge Comments: Vent, Trach, Feeding tube, WOC          Javad Mishra MD  Hospitalist Service  LTACH  Securely message with TapFwd (more info)  Text page via [employer] Paging/Directory   ______________________________________________________________________    Interval History   No new events overnight per RN.  Patient reports she is doing good.  She remains on vent weaning phase 2.  Continues to work well with therapy.    Physical Exam   Vital Signs: Temp: 97.5  F (36.4  C) Temp src: Axillary BP: 123/59 Pulse: 83   Resp: 22 SpO2: 93 % O2 Device: Trach dome Oxygen Delivery: 55 LPM  Weight: 188 lbs 7.89 oz  General: She is a well grown well-developed adult female resting in bed comfortably  HEENT: Head is normocephalic atraumatic eyes pupils appear equal round and reactive to light.  Viable trach noted on neck.  Lungs: She has a normal respiratory effort on auscultation good air entry is noted.  No wheezing  Heart: She has a good S1 and S2 no obvious murmurs peripheral pulses are palpable.  Abdomen: Soft nontender bowel sounds are noted  Musculoskeletal: She has good muscle tone  Digits appear acyanotic  Skin: No obvious rashes noted, she is warm to touch please refer to nursing/wound care note for complete skin exam.      Medical Decision Making       40 MINUTES SPENT BY ME on the date of service doing chart review, history, exam, documentation & further activities per the note.      Data   Lab Results   Component Value Date    WBC 15.8 12/15/2023    WBC 10.8 11/22/2019     Lab Results   Component Value Date    RBC 2.83 12/15/2023    RBC 4.34 11/22/2019     Lab Results   Component Value Date    HGB 7.5 12/15/2023    HGB 12.4  11/22/2019     Lab Results   Component Value Date    HCT 25.7 12/15/2023    HCT 39.9 11/22/2019     Lab Results   Component Value Date    MCV 91 12/15/2023    MCV 92 11/22/2019     Lab Results   Component Value Date    MCH 26.5 12/15/2023    MCH 28.6 11/22/2019     Lab Results   Component Value Date    MCHC 29.2 12/15/2023    MCHC 31.1 11/22/2019     Lab Results   Component Value Date    RDW 20.8 12/15/2023    RDW 14.9 11/22/2019     Lab Results   Component Value Date     12/15/2023     11/22/2019       Last Comprehensive Metabolic Panel:  Sodium   Date Value Ref Range Status   12/15/2023 142 135 - 145 mmol/L Final     Comment:     Reference intervals for this test were updated on 09/26/2023 to more accurately reflect our healthy population. There may be differences in the flagging of prior results with similar values performed with this method. Interpretation of those prior results can be made in the context of the updated reference intervals.    11/22/2019 141 133 - 144 mmol/L Final     Potassium   Date Value Ref Range Status   12/15/2023 4.9 3.4 - 5.3 mmol/L Final   11/22/2019 4.6 3.4 - 5.3 mmol/L Final     Chloride   Date Value Ref Range Status   12/15/2023 100 98 - 107 mmol/L Final   11/22/2019 108 94 - 109 mmol/L Final     Carbon Dioxide   Date Value Ref Range Status   11/22/2019 27 20 - 32 mmol/L Final     Carbon Dioxide (CO2)   Date Value Ref Range Status   12/15/2023 33 (H) 22 - 29 mmol/L Final     Anion Gap   Date Value Ref Range Status   12/15/2023 9 7 - 15 mmol/L Final   11/22/2019 6 3 - 14 mmol/L Final     Glucose   Date Value Ref Range Status   12/15/2023 115 (H) 70 - 99 mg/dL Final   11/22/2019 112 (H) 70 - 99 mg/dL Final     GLUCOSE BY METER POCT   Date Value Ref Range Status   12/15/2023 135 (H) 70 - 99 mg/dL Final     Urea Nitrogen   Date Value Ref Range Status   12/15/2023 92.1 (H) 8.0 - 23.0 mg/dL Final   11/22/2019 34 (H) 7 - 30 mg/dL Final     Creatinine   Date Value Ref Range  Status   12/15/2023 0.81 0.51 - 0.95 mg/dL Final   11/22/2019 1.30 (H) 0.52 - 1.04 mg/dL Final     GFR Estimate   Date Value Ref Range Status   12/15/2023 74 >60 mL/min/1.73m2 Final   11/22/2019 40 (L) >60 mL/min/[1.73_m2] Final     Comment:     Non  GFR Calc  Starting 12/18/2018, serum creatinine based estimated GFR (eGFR) will be   calculated using the Chronic Kidney Disease Epidemiology Collaboration   (CKD-EPI) equation.       Calcium   Date Value Ref Range Status   12/15/2023 9.3 8.8 - 10.2 mg/dL Final   11/22/2019 8.9 8.5 - 10.1 mg/dL Final     Bilirubin Total   Date Value Ref Range Status   12/12/2023 0.2 <=1.2 mg/dL Final   02/13/2019 0.2 0.2 - 1.3 mg/dL Final     Alkaline Phosphatase   Date Value Ref Range Status   12/12/2023 113 40 - 150 U/L Final     Comment:     Reference intervals for this test were updated on 11/14/2023 to more accurately reflect our healthy population. There may be differences in the flagging of prior results with similar values performed with this method. Interpretation of those prior results can be made in the context of the updated reference intervals.   02/13/2019 83 40 - 150 U/L Final     ALT   Date Value Ref Range Status   12/12/2023 35 0 - 50 U/L Final     Comment:     Reference intervals for this test were updated on 6/12/2023 to more accurately reflect our healthy population. There may be differences in the flagging of prior results with similar values performed with this method. Interpretation of those prior results can be made in the context of the updated reference intervals.     02/13/2019 59 (H) 0 - 50 U/L Final     AST   Date Value Ref Range Status   12/12/2023 37 0 - 45 U/L Final     Comment:     Reference intervals for this test were updated on 6/12/2023 to more accurately reflect our healthy population. There may be differences in the flagging of prior results with similar values performed with this method. Interpretation of those prior results can be  made in the context of the updated reference intervals.   02/13/2019 48 (H) 0 - 45 U/L Final

## 2023-12-15 NOTE — PLAN OF CARE
Problem: Anxiety Signs/Symptoms  Goal: Enhanced Social, Occupational or Functional Skills (Anxiety Signs/Symptoms)  Intervention: Promote Social, Occupational and Functional Ability  Recent Flowsheet Documentation  Taken 12/15/2023 0900 by Maisha Philippe RN  Social Functional Ability Promotion: self-expression encouraged  Trust Relationship/Rapport:   care explained   thoughts/feelings acknowledged     Problem: Pain Acute  Goal: Optimal Pain Control and Function  Outcome: Progressing  Intervention: Optimize Psychosocial Wellbeing  Recent Flowsheet Documentation  Taken 12/15/2023 0900 by Maisha Philippe RN  Supportive Measures: active listening utilized     Problem: Mechanical Ventilation Invasive  Goal: Mechanical Ventilation Liberation  Intervention: Promote Extubation and Mechanical Ventilation Liberation  Recent Flowsheet Documentation  Taken 12/15/2023 0900 by Maisha Philippe RN  Environmental Support: calm environment promoted   Goal Outcome Evaluation:       Patient alert, oriented to self, no signs of pain noted. Trached, vented, mitchell in place. BP= 165/75 mmHg. Gave scheduled meds, BP rechecked = 123/59mmHg. CHG bath done. Turned and repositioned. Blood glucose = 135mg/dl. Got up in the chair this AM x1. Resting in bed at this time.

## 2023-12-16 NOTE — PLAN OF CARE
Problem: Mechanical Ventilation Invasive  Goal: Effective Communication  Outcome: Progressing  Goal: Mechanical Ventilation Liberation  Outcome: Progressing  Goal: Absence of Device-Related Skin and Tissue Injury  Outcome: Progressing  Goal: Absence of Ventilator-Induced Lung Injury  Outcome: Progressing   RT PROGRESS NOTE     DATA:     CURRENT SETTINGS: 18, 360, +5             TRACH TYPE / SIZE: #6 Shiley Taper guard, placed on 12/5             MODE: AC             FIO2: 40%     ACTION:             THERAPIES: Duoneb QID and Mucomyst BID. Volara QID.              SUCTION:                           FREQUENCY: X3                        AMOUNT: scant to small                         CONSISTENCY: thick                        COLOR: white             SPONTANEOUS COUGH EFFORT/STRENGTH OF EFFORT (not elicited by suctioning): good productive                            WEANING PHASE: #2                        WEAN MODE: TM/PMV as tolerates 4-5hr BID                         WEAN TIME:                         END WEAN REASON:        RESPONSE:             BS: coarse and diminished              VITAL SIGNS: /57 (BP Location: Left arm)   Pulse 79   Temp 98  F (36.7  C) (Axillary)   Resp 30   Wt 85.5 kg (188 lb 7.9 oz)   SpO2 94%   BMI 36.81 kg/m                  EMOTIONAL NEEDS / CONCERNS:                  RISK FOR SELF DECANNULATION:                          RISK DUE TO:                          INTERVENTION/S IN PLACE IS/ARE:         NOTE / PLAN: TM/PMV as tolerates 4-5hr BID.  Break on AC mid day and continue AC at night

## 2023-12-16 NOTE — PLAN OF CARE
Problem: Mechanical Ventilation Invasive  Goal: Effective Communication  Outcome: Progressing  Goal: Mechanical Ventilation Liberation  Outcome: Progressing  Goal: Absence of Ventilator-Induced Lung Injury  Outcome: Progressing   Goal Outcome Evaluation:  RT PROGRESS NOTE     DATA:     CURRENT SETTINGS: AC, 18, 360, +5             TRACH TYPE/SIZE:  6 Bivona 12/16/23             MODE:  A/C             FIO2:  40%     ACTION:             THERAPIES:  Duo-neb/Muco/volera qid             SUCTION:                           FREQUENCY:  x3                        AMOUNT: Small/scant                        CONSISTENCY: Thick                         COLOR:  White             SPONTANEOUS COUGH EFFORT/STRENGTH OF EFFORT (not elicited by suctioning): Yes/ok                              WEANING PHASE:  2                        WEAN MODE:  55L/55%TM/PMV                        WEAN TIME: Since 1506                         END WEAN REASON:      RESPONSE:             BS: Coarse             VITAL SIGNS:  Sat 91-93%, HR 73-89, RR 20-31             EMOTIONAL NEEDS / CONCERNS:                  RISK FOR SELF DECANNULATION:                          RISK DUE TO:                          INTERVENTION/S IN PLACE IS/ARE:         NOTE / PLAN:   Cont with plan. RT noticed pt has a damage cuff at the start of the shift. Cuff was inflate but could not retain the air. Vt was increased from 360ml to 500ml temporarily to compensate for the leak. The Doctor was notified and the trach was changed to 6 Bivona. Vt is back down to 360ml. Pt started TM/PMV late this shift and maintain her saturation in the low 90's.

## 2023-12-16 NOTE — PLAN OF CARE
Problem: Anxiety Signs/Symptoms  Goal: Optimized Energy Level (Anxiety Signs/Symptoms)  Outcome: Progressing  Goal: Enhanced Social, Occupational or Functional Skills (Anxiety Signs/Symptoms)  Intervention: Promote Social, Occupational and Functional Ability  Recent Flowsheet Documentation  Taken 12/16/2023 0028 by Rosalio Rivas Jr RN  Trust Relationship/Rapport: care explained     Patient is alert, unable to talk due to vent attached. Patient  slept on and off all through the night, tolerated vent well, with O2@ sat above 90%. PRN Atarax was given at around 2332hrs. Blood pressure at around 2043hrs was 153/77 taken before scheduled antihypertensive meds were given. BP was 122/57 at around 2339hrs.   Vital signs:  Temp: 98  F (36.7  C) Temp src: Axillary BP: 122/57 Pulse: 78   Resp: 26 SpO2: 94 % O2 Device: Mechanical Ventilator Oxygen Delivery: 55 LPM

## 2023-12-16 NOTE — PROGRESS NOTES
[facility]    Medicine Progress Note - Hospitalist Service    Date of Admission:  12/5/2023    Hospital Course  Matthew Bowen is a 78y F PMHx chronic hypoxic respiratory failure 2/2 pHTN, ALAIAN, HFpEF, CAD, CKD3, Obesity, HTN, depression, and anxiety. She presented initially on 10/15 with fever, cough, and falls with a temperature. Further w/u revealed multifocal pneumonia and she was started on antibiotics. Her respiratory status worsened as she developed ARDS and was intubated on 10/21. She had a prolonged ICU stay 2/2 difficulty vent weaning. Eventually she underwent trach/peg placement. Of note, she was treated with high dose steroids for her lung condition and remains on a high dose long taper of steroids. Pt was transferred to [facility] for ongoing vent weaning and therapies.      [facility] Course  12/7-12/10: Hypernatremia to 155 2/2 over-diuresis, intravascular depletion, missing first night of FWF. 2L D5W given, resolved, Na now stable. Torsemide held initially but now restarted at 20mg daily. BP remains elevated, start isosorbide dinitrate 20mg TID. Loperamide for diarrhea.    12/11.  On full mechanical ventilation.  No weaning at this time per pulmonology. Continue with current medical management for other problems.  12/12.  Remains weak requiring maximum assist and is deconditioned.  She has a sodium of 141 and on ventilating phase 1.  12/13.  Making some progress with therapies.  Remains weak however.  On vent weaning phase 1.  12/14. Just about the same. Fairly stable on vent weaning phase 2.  12/15. Patient had an event in the morning when she desaturated per RT. Pulmonology plans to evaluate with a chest x-ray, BNP and CRP. She remains on phase 2 vent weaning.  12/16.  Making progress but still weak.On vent weaning phase 2.  Plan to continue current medical management    Assessment & Plan   -No new changes at this time.    Acute on Chronic Hypoxic Respiratory Failure  Bronchiectasis?  Multifocal  Pneumonia  ARDS, PTA  pHTN  Chronic Hypoxic Respiratory Failure  ALAINA  - pulmonary consulted and following  - treated with high dose steroids previously  - presently on slow steroid taper  - prednisone 120mg daily (60mg in split doses) x2 weeks (then 100mg x2 weeks, 80mg x2 weeks, then taper down 5mg every 2 weeks) - pulm may decrease dose this week  - bactrim 800-160mg three times weekly for PJP ppx (was on atovaquone, switched upon admission)  - trach exchange 12/5 by ENT PTA  - good bronchial hygiene with bronchodilators    Diarrhea  - fairly stable at this time  - C. diff negative  - loperamide prn    HTN  - amlodipine 5mg BID (suspect broken up 2/2 labile BP)  - metoprolol tartrate 12.5mg BID  - restart isosorbide dinitrate 20mg TID (12/10)  - pt was on hydralazine 50mg TID and isosorbide dinitrate 20mg TID PTA, consider resuming as needed or tolerated    Hypernatremia, Acute - mild  - stable at this time. Na 144 at this time  - likely 2/2 intravascular depletion 2/2 over-diuresis and missing FWF on her first night admitted  - FWF 300cc q4h    HFpEF, not in exacerbation  - Torsemide, held on admission for Na 150  - Torsemide 20mg daily     Pain, Acute  - oxycodone 5mg prn    Normocytic, Hypochromic Anemia  - suspect 2/2 critical illness and phlebotomy  - received pRBCs during hospitalization prior to LTACH  - transfuse to maintain Hgb > 7.0  - pantoprazole    CKD  CARMEN, resolved PTA  - stable  - monitor with BMP    CAD  - asa  - atorvastatin    Hypothyroidism  - levothyroxine    Depression  Anxiety  - pregabalin  - sertraline    PEG  - PEG placed 11/29  - RD consulted and following    Diet: Adult Formula Drip Feeding: Continuous Jevity 1.5; Gastrostomy; Goal Rate: 45; mL/hr; TF to run x22 hrs, hold 1 hr before/after levothyroxine  NPO for Medical/Clinical Reasons Except for: Ice Chips, NPO but receiving Tube Feeding    DVT Prophylaxis: Heparin SQ  Aleman Catheter: PRESENT, indication: Strict 1-2 Hour I&O  Lines:  PRESENT      PICC 10/22/23 Triple Lumen Right Basilic multiple med gtt. ready for use-Site Assessment: WDL      Cardiac Monitoring: None  Code Status: Full Code      Clinically Significant Risk Factors              # Hypoalbuminemia: Lowest albumin = 2.9 g/dL at 12/12/2023  6:11 AM, will monitor as appropriate                     Disposition Plan     Expected Discharge Date: 12/21/2023    Discharge Delays: Complex Discharge    Discharge Comments: Vent, Trach, Feeding tube, WOC          Javad Mishra MD  Hospitalist Service  LTACH  Securely message with [employer] (more info)  Text page via [facility] Paging/Directory   ______________________________________________________________________    Interval History   Patient is in bed comfortably.  RN notes no new events overnight.  Overall she is just about the same continues to make progress with therapies and remains on vent weaning phase 2.  No new complaints at this time.    Physical Exam   Vital Signs: Temp: 98  F (36.7  C) Temp src: Axillary BP: 122/57 Pulse: 74   Resp: 26 SpO2: 94 % O2 Device: Mechanical Ventilator Oxygen Delivery: 55 LPM  Weight: 187 lbs 6.26 oz  General: She is a well grown well-developed adult female resting in bed comfortably  HEENT: Head is normocephalic atraumatic eyes pupils appear equal round and reactive to light.  Viable trach noted on neck.  Lungs: She has a normal respiratory effort on auscultation good air entry is noted.  No wheezing  Heart: She has a good S1 and S2 no obvious murmurs peripheral pulses are palpable.  Abdomen: Soft nontender bowel sounds are noted  Musculoskeletal: She has good muscle tone  Digits appear acyanotic  Skin: No obvious rashes noted, she is warm to touch please refer to nursing/wound care note for complete skin exam.    Medical Decision Making       40 MINUTES SPENT BY ME on the date of service doing chart review, history, exam, documentation & further activities per the note.      Data   Lab Results   Component  Value Date    WBC 15.8 12/15/2023    WBC 10.8 11/22/2019     Lab Results   Component Value Date    RBC 2.83 12/15/2023    RBC 4.34 11/22/2019     Lab Results   Component Value Date    HGB 7.5 12/15/2023    HGB 12.4 11/22/2019     Lab Results   Component Value Date    HCT 25.7 12/15/2023    HCT 39.9 11/22/2019     Lab Results   Component Value Date    MCV 91 12/15/2023    MCV 92 11/22/2019     Lab Results   Component Value Date    MCH 26.5 12/15/2023    MCH 28.6 11/22/2019     Lab Results   Component Value Date    MCHC 29.2 12/15/2023    MCHC 31.1 11/22/2019     Lab Results   Component Value Date    RDW 20.8 12/15/2023    RDW 14.9 11/22/2019     Lab Results   Component Value Date     12/15/2023     11/22/2019       Last Comprehensive Metabolic Panel:  Sodium   Date Value Ref Range Status   12/15/2023 142 135 - 145 mmol/L Final     Comment:     Reference intervals for this test were updated on 09/26/2023 to more accurately reflect our healthy population. There may be differences in the flagging of prior results with similar values performed with this method. Interpretation of those prior results can be made in the context of the updated reference intervals.    11/22/2019 141 133 - 144 mmol/L Final     Potassium   Date Value Ref Range Status   12/15/2023 4.9 3.4 - 5.3 mmol/L Final   11/22/2019 4.6 3.4 - 5.3 mmol/L Final     Chloride   Date Value Ref Range Status   12/15/2023 100 98 - 107 mmol/L Final   11/22/2019 108 94 - 109 mmol/L Final     Carbon Dioxide   Date Value Ref Range Status   11/22/2019 27 20 - 32 mmol/L Final     Carbon Dioxide (CO2)   Date Value Ref Range Status   12/15/2023 33 (H) 22 - 29 mmol/L Final     Anion Gap   Date Value Ref Range Status   12/15/2023 9 7 - 15 mmol/L Final   11/22/2019 6 3 - 14 mmol/L Final     Glucose   Date Value Ref Range Status   11/22/2019 112 (H) 70 - 99 mg/dL Final     GLUCOSE BY METER POCT   Date Value Ref Range Status   12/16/2023 138 (H) 70 - 99 mg/dL Final      Urea Nitrogen   Date Value Ref Range Status   12/15/2023 92.1 (H) 8.0 - 23.0 mg/dL Final   11/22/2019 34 (H) 7 - 30 mg/dL Final     Creatinine   Date Value Ref Range Status   12/15/2023 0.81 0.51 - 0.95 mg/dL Final   11/22/2019 1.30 (H) 0.52 - 1.04 mg/dL Final     GFR Estimate   Date Value Ref Range Status   12/15/2023 74 >60 mL/min/1.73m2 Final   11/22/2019 40 (L) >60 mL/min/[1.73_m2] Final     Comment:     Non  GFR Calc  Starting 12/18/2018, serum creatinine based estimated GFR (eGFR) will be   calculated using the Chronic Kidney Disease Epidemiology Collaboration   (CKD-EPI) equation.       Calcium   Date Value Ref Range Status   12/15/2023 9.3 8.8 - 10.2 mg/dL Final   11/22/2019 8.9 8.5 - 10.1 mg/dL Final     Bilirubin Total   Date Value Ref Range Status   12/12/2023 0.2 <=1.2 mg/dL Final   02/13/2019 0.2 0.2 - 1.3 mg/dL Final     Alkaline Phosphatase   Date Value Ref Range Status   12/12/2023 113 40 - 150 U/L Final     Comment:     Reference intervals for this test were updated on 11/14/2023 to more accurately reflect our healthy population. There may be differences in the flagging of prior results with similar values performed with this method. Interpretation of those prior results can be made in the context of the updated reference intervals.   02/13/2019 83 40 - 150 U/L Final     ALT   Date Value Ref Range Status   12/12/2023 35 0 - 50 U/L Final     Comment:     Reference intervals for this test were updated on 6/12/2023 to more accurately reflect our healthy population. There may be differences in the flagging of prior results with similar values performed with this method. Interpretation of those prior results can be made in the context of the updated reference intervals.     02/13/2019 59 (H) 0 - 50 U/L Final     AST   Date Value Ref Range Status   12/12/2023 37 0 - 45 U/L Final     Comment:     Reference intervals for this test were updated on 6/12/2023 to more accurately reflect our  healthy population. There may be differences in the flagging of prior results with similar values performed with this method. Interpretation of those prior results can be made in the context of the updated reference intervals.   02/13/2019 48 (H) 0 - 45 U/L Final

## 2023-12-16 NOTE — PLAN OF CARE
Problem: Enteral Nutrition  Goal: Absence of Aspiration Signs and Symptoms  Outcome: Progressing   Goal Outcome Evaluation:Pt tolerating tube feeding, with no signs or symptoms of aspirations.  Pt alert and oriented. Morning BP was elevated, 179/84 ; other vital signs stable. QA=431/60 after scheduled Metoprolol and Amlodipine.   PRN Atarax given for anxiety, to good effect.  Pt had one medium BM.  Pt's   and daughter visited.  All care needs met.    Krys Hilton RN

## 2023-12-17 PROBLEM — I35.0 AORTIC STENOSIS: Status: ACTIVE | Noted: 2021-05-24

## 2023-12-17 PROBLEM — D11.9 WARTHIN TUMOR: Status: ACTIVE | Noted: 2019-11-25

## 2023-12-17 PROBLEM — N18.32 STAGE 3B CHRONIC KIDNEY DISEASE (H): Status: ACTIVE | Noted: 2021-05-24

## 2023-12-17 PROBLEM — I51.7 CARDIOMEGALY: Status: ACTIVE | Noted: 2018-11-12

## 2023-12-17 PROBLEM — I20.9 ANGINA PECTORIS (H): Status: ACTIVE | Noted: 2019-02-20

## 2023-12-17 PROBLEM — I27.20 PULMONARY HTN (H): Status: ACTIVE | Noted: 2021-05-24

## 2023-12-17 PROBLEM — D32.9 MENINGIOMA (H): Status: ACTIVE | Noted: 2021-06-01

## 2023-12-17 PROBLEM — R09.02 HYPOXIA: Status: ACTIVE | Noted: 2021-11-17

## 2023-12-17 PROBLEM — I27.29 NOCTURNAL HYPOXEMIA DUE TO PULMONARY HYPERTENSION (H): Status: ACTIVE | Noted: 2019-11-25

## 2023-12-17 PROBLEM — G47.36 NOCTURNAL HYPOXEMIA DUE TO PULMONARY HYPERTENSION (H): Status: ACTIVE | Noted: 2019-11-25

## 2023-12-17 PROBLEM — I50.33 ACUTE ON CHRONIC DIASTOLIC HEART FAILURE (H): Status: ACTIVE | Noted: 2021-09-22

## 2023-12-17 PROBLEM — F33.9 DEPRESSION, RECURRENT (H): Status: ACTIVE | Noted: 2023-01-01

## 2023-12-17 PROBLEM — R73.03 PREDIABETES: Status: ACTIVE | Noted: 2023-01-01

## 2023-12-17 PROBLEM — D64.9 ANEMIA: Status: ACTIVE | Noted: 2022-03-01

## 2023-12-17 PROBLEM — J98.4 RESTRICTIVE LUNG DISEASE: Status: ACTIVE | Noted: 2021-05-24

## 2023-12-17 PROBLEM — K11.8 PAROTID MASS: Status: ACTIVE | Noted: 2023-01-01

## 2023-12-17 PROBLEM — I44.0 1ST DEGREE AV BLOCK: Status: ACTIVE | Noted: 2022-01-10

## 2023-12-17 PROBLEM — I25.10 CORONARY ATHEROSCLEROSIS: Status: ACTIVE | Noted: 2021-05-24

## 2023-12-17 PROBLEM — R68.84 JAW PAIN: Status: ACTIVE | Noted: 2021-05-24

## 2023-12-17 PROBLEM — M47.812 CERVICAL SPINE ARTHRITIS: Status: ACTIVE | Noted: 2023-01-01

## 2023-12-17 NOTE — PLAN OF CARE
Problem: Adult Inpatient Plan of Care  Goal: Optimal Comfort and Wellbeing  Outcome: Progressing  Intervention: Provide Person-Centered Care  Recent Flowsheet Documentation  Taken 12/16/2023 [id number] by Danie Nichols, RN  Trust Relationship/Rapport:   care explained   choices provided   reassurance provided   thoughts/feelings acknowledged     Problem: Anxiety Signs/Symptoms  Goal: Optimized Energy Level (Anxiety Signs/Symptoms)  Outcome: Progressing   Goal Outcome Evaluation:  Patient is alert and oriented x 4, vital signs are stable. /80 (BP Location: Left arm)   Pulse 62   Temp 97.8  F (36.6  C) (Axillary)   Resp 23   Wt 85 kg (187 lb 6.3 oz)   SpO2 96%   BMI 36.60 kg/m   Pt had been hypertensive and tachypneic intermittently throughout the day, BP was at baseline all night..Pt still struggling with anxiety causing increase in respiratory rate. >40/min at one point. She had 3 large loose BM after 04:30. Loperamide given at 0530.     Also gave hydroxyzine @ 2125; pt refused any alernatives.   Danie Nichols RN on 12/17/2023 at 7:09 AM]]

## 2023-12-17 NOTE — PROGRESS NOTES
[facility]    Medicine Progress Note - Hospitalist Service    Date of Admission:  12/5/2023    Hospital Course  Matthew Bowen is a 78y F PMHx chronic hypoxic respiratory failure 2/2 pHTN, ALAINA, HFpEF, CAD, CKD3, Obesity, HTN, depression, and anxiety. She presented initially on 10/15 with fever, cough, and falls with a temperature. Further w/u revealed multifocal pneumonia and she was started on antibiotics. Her respiratory status worsened as she developed ARDS and was intubated on 10/21. She had a prolonged ICU stay 2/2 difficulty vent weaning. Eventually she underwent trach/peg placement. Of note, she was treated with high dose steroids for her lung condition and remains on a high dose long taper of steroids. Pt was transferred to [facility] for ongoing vent weaning and therapies.      [facility] Course  12/7-12/10: Hypernatremia to 155 2/2 over-diuresis, intravascular depletion, missing first night of FWF. 2L D5W given, resolved, Na now stable. Torsemide held initially but now restarted at 20mg daily. BP remains elevated, start isosorbide dinitrate 20mg TID. Loperamide for diarrhea.      12/11 - 12/17: Patient started weaning during the week per pulmonology.  She has been making progress with therapies throughout the week. 12/17, Continues to make progress.Remains on vent weaning phase 2.  Care conference tomorrow.  Plan to continue current medical management.     Assessment & Plan   -No new changes at this time    Acute on Chronic Hypoxic Respiratory Failure  Bronchiectasis?  Multifocal Pneumonia  ARDS, PTA  pHTN  Chronic Hypoxic Respiratory Failure  ALAINA  - pulmonary consulted and following  - treated with high dose steroids previously  - currently on slow steroid taper  - prednisone 120mg daily (60mg in split doses) x2 weeks (then 100mg x2 weeks, 80mg x2 weeks, then taper down 5mg every 2 weeks) - pulm may decrease dose this week  - bactrim 800-160mg three times weekly for PJP ppx (was on atovaquone, switched upon  admission)  - trach exchange 12/5 by ENT PTA  - good bronchial hygiene with bronchodilators    Diarrhea  - fairly stable at this time  - C. diff negative  - loperamide prn    HTN  - amlodipine 5mg BID (suspect broken up 2/2 labile BP)  - metoprolol tartrate 12.5mg BID  - isosorbide dinitrate 20mg TID (12/10)  - pt was on hydralazine 50mg TID and isosorbide dinitrate 20mg TID     Hypernatremia, - mild  - stable at this time. Na 142 on 12/15/2023  - likely 2/2 intravascular depletion 2/2 over-diuresis and missing FWF on her first night admitted  - FWF 300cc q4h    HFpEF, not in exacerbation  - Torsemide, temporarily held on admission for Na 150  - Continue with torsemide 20mg daily     Pain, Acute  - oxycodone 5mg prn    Normocytic, Hypochromic Anemia  - suspect 2/2 critical illness and phlebotomy  - received pRBCs during hospitalization prior to LTACH  - transfuse to maintain Hgb > 7.0  - pantoprazole    CKD  CARMEN, resolved PTA  - stable  - monitor with BMP    CAD  - asa  - atorvastatin    Hypothyroidism  - levothyroxine    Depression  Anxiety  - pregabalin  - sertraline    PEG  - PEG placed 11/29  - RD consulted and following    Diet: Adult Formula Drip Feeding: Continuous Jevity 1.5; Gastrostomy; Goal Rate: 45; mL/hr; TF to run x22 hrs, hold 1 hr before/after levothyroxine  NPO for Medical/Clinical Reasons Except for: Ice Chips, NPO but receiving Tube Feeding    DVT Prophylaxis: Heparin SQ  Aleman Catheter: PRESENT, indication: Strict 1-2 Hour I&O  Lines: PRESENT      PICC 10/22/23 Triple Lumen Right Basilic multiple med gtt. ready for use-Site Assessment: WDL      Cardiac Monitoring: None  Code Status: Full Code      Clinically Significant Risk Factors              # Hypoalbuminemia: Lowest albumin = 2.9 g/dL at 12/12/2023  6:11 AM, will monitor as appropriate                     Disposition Plan     Expected Discharge Date: 12/21/2023    Discharge Delays: Complex Discharge    Discharge Comments: Vent, Trach,  Feeding tube, WOC          Javad Mishra MD  Hospitalist Service  LTACH  Securely message with [employer] (more info)  Text page via [facility] Paging/Directory   ______________________________________________________________________    Interval History   No new events overnight per RN.  Patient in bed comfortably.  She states she is doing well.  She remains on vent weaning phase 2.  Family members and friends at bedside.  She reports no new complaints at this time.    Physical Exam   Vital Signs: Temp: 97.5  F (36.4  C) Temp src: Oral BP: 109/57 Pulse: (P) 77   Resp: 26 SpO2: (P) 97 % O2 Device: (P) Mechanical Ventilator Oxygen Delivery: 55 LPM  Weight: 186 lbs 4.62 oz  General: She is a well grown well-developed adult female resting in bed comfortably  HEENT: Head is normocephalic atraumatic eyes pupils appear equal round and reactive to light.  Viable trach noted on neck.  Lungs: She has a normal respiratory effort on auscultation good air entry is noted.  No wheezing  Heart: She has a good S1 and S2 no obvious murmurs peripheral pulses are palpable.  Abdomen: Soft nontender bowel sounds are noted  Musculoskeletal: She has good muscle tone  Digits appear acyanotic  Skin: No obvious rashes noted, she is warm to touch please refer to nursing/wound care note for complete skin exam.    Medical Decision Making       40 MINUTES SPENT BY ME on the date of service doing chart review, history, exam, documentation & further activities per the note.      Data   Lab Results   Component Value Date    WBC 15.8 12/15/2023    WBC 10.8 11/22/2019     Lab Results   Component Value Date    RBC 2.83 12/15/2023    RBC 4.34 11/22/2019     Lab Results   Component Value Date    HGB 7.5 12/15/2023    HGB 12.4 11/22/2019     Lab Results   Component Value Date    HCT 25.7 12/15/2023    HCT 39.9 11/22/2019     Lab Results   Component Value Date    MCV 91 12/15/2023    MCV 92 11/22/2019     Lab Results   Component Value Date    MCH 26.5 12/15/2023     MCH 28.6 11/22/2019     Lab Results   Component Value Date    MCHC 29.2 12/15/2023    MCHC 31.1 11/22/2019     Lab Results   Component Value Date    RDW 20.8 12/15/2023    RDW 14.9 11/22/2019     Lab Results   Component Value Date     12/15/2023     11/22/2019       Last Comprehensive Metabolic Panel:  Sodium   Date Value Ref Range Status   12/15/2023 142 135 - 145 mmol/L Final     Comment:     Reference intervals for this test were updated on 09/26/2023 to more accurately reflect our healthy population. There may be differences in the flagging of prior results with similar values performed with this method. Interpretation of those prior results can be made in the context of the updated reference intervals.    11/22/2019 141 133 - 144 mmol/L Final     Potassium   Date Value Ref Range Status   12/15/2023 4.9 3.4 - 5.3 mmol/L Final   11/22/2019 4.6 3.4 - 5.3 mmol/L Final     Chloride   Date Value Ref Range Status   12/15/2023 100 98 - 107 mmol/L Final   11/22/2019 108 94 - 109 mmol/L Final     Carbon Dioxide   Date Value Ref Range Status   11/22/2019 27 20 - 32 mmol/L Final     Carbon Dioxide (CO2)   Date Value Ref Range Status   12/15/2023 33 (H) 22 - 29 mmol/L Final     Anion Gap   Date Value Ref Range Status   12/15/2023 9 7 - 15 mmol/L Final   11/22/2019 6 3 - 14 mmol/L Final     Glucose   Date Value Ref Range Status   11/22/2019 112 (H) 70 - 99 mg/dL Final     GLUCOSE BY METER POCT   Date Value Ref Range Status   12/17/2023 133 (H) 70 - 99 mg/dL Final     Urea Nitrogen   Date Value Ref Range Status   12/15/2023 92.1 (H) 8.0 - 23.0 mg/dL Final   11/22/2019 34 (H) 7 - 30 mg/dL Final     Creatinine   Date Value Ref Range Status   12/15/2023 0.81 0.51 - 0.95 mg/dL Final   11/22/2019 1.30 (H) 0.52 - 1.04 mg/dL Final     GFR Estimate   Date Value Ref Range Status   12/15/2023 74 >60 mL/min/1.73m2 Final   11/22/2019 40 (L) >60 mL/min/[1.73_m2] Final     Comment:     Non  GFR  Calc  Starting 12/18/2018, serum creatinine based estimated GFR (eGFR) will be   calculated using the Chronic Kidney Disease Epidemiology Collaboration   (CKD-EPI) equation.       Calcium   Date Value Ref Range Status   12/15/2023 9.3 8.8 - 10.2 mg/dL Final   11/22/2019 8.9 8.5 - 10.1 mg/dL Final     Bilirubin Total   Date Value Ref Range Status   12/12/2023 0.2 <=1.2 mg/dL Final   02/13/2019 0.2 0.2 - 1.3 mg/dL Final     Alkaline Phosphatase   Date Value Ref Range Status   12/12/2023 113 40 - 150 U/L Final     Comment:     Reference intervals for this test were updated on 11/14/2023 to more accurately reflect our healthy population. There may be differences in the flagging of prior results with similar values performed with this method. Interpretation of those prior results can be made in the context of the updated reference intervals.   02/13/2019 83 40 - 150 U/L Final     ALT   Date Value Ref Range Status   12/12/2023 35 0 - 50 U/L Final     Comment:     Reference intervals for this test were updated on 6/12/2023 to more accurately reflect our healthy population. There may be differences in the flagging of prior results with similar values performed with this method. Interpretation of those prior results can be made in the context of the updated reference intervals.     02/13/2019 59 (H) 0 - 50 U/L Final     AST   Date Value Ref Range Status   12/12/2023 37 0 - 45 U/L Final     Comment:     Reference intervals for this test were updated on 6/12/2023 to more accurately reflect our healthy population. There may be differences in the flagging of prior results with similar values performed with this method. Interpretation of those prior results can be made in the context of the updated reference intervals.   02/13/2019 48 (H) 0 - 45 U/L Final

## 2023-12-17 NOTE — PLAN OF CARE
Problem: Mechanical Ventilation Invasive  Goal: Effective Communication  Outcome: Progressing  Goal: Mechanical Ventilation Liberation  Outcome: Progressing  Goal: Absence of Device-Related Skin and Tissue Injury  Outcome: Progressing  Goal: Absence of Ventilator-Induced Lung Injury  Outcome: Progressing   Goal Outcome Evaluation:       RT PROGRESS NOTE     DATA:     CURRENT SETTINGS: AC 18, 360, +5, 45%             TRACH TYPE / SIZE:# 6 BIVONA Changed  on 12/16/2023              MODE: AC              FIO2: 45%      ACTION:             THERAPIES             SUCTION:                           FREQUENCY: X4                        AMOUNT: Small to moderate                         CONSISTENCY: thick                         COLOR: yellow /blood tinged x1             SPONTANEOUS COUGH EFFORT/STRENGTH OF EFFORT (not elicited by suctioning):                               WEANING PHASE: 2                        WEAN MODE: TM/PMV                        WEAN TIME: 4 hrs 40 min and Since 16:15                         END WEAN REASON:Ongoing        RESPONSE:             BS: Clear               VITAL SIGNS:  Sat 94-96%, HR 74-77, RR 20-22             EMOTIONAL NEEDS / CONCERNS:                  RISK FOR SELF DECANNULATION:                          RISK DUE TO:                          INTERVENTION/S IN PLACE IS/ARE:         NOTE / PLAN:    Cont. Current plan of care

## 2023-12-17 NOTE — PLAN OF CARE
Problem: Mechanical Ventilation Invasive  Goal: Effective Communication  Outcome: Progressing  Goal: Mechanical Ventilation Liberation  Outcome: Progressing  Goal: Absence of Ventilator-Induced Lung Injury  Outcome: Progressing     RT PROGRESS NOTE     DATA:     CURRENT SETTINGS: AC, 18, 360, +5             TRACH TYPE/SIZE:  6 Bivona 12/16/23             MODE:  A/C             FIO2:  45%     ACTION:             THERAPIES:  Duo-neb/Muco/volera qid             SUCTION:                           FREQUENCY:  x3                        AMOUNT: Small                        CONSISTENCY: Thick                         COLOR:  White             SPONTANEOUS COUGH EFFORT/STRENGTH OF EFFORT (not elicited by suctioning): none noted                              WEANING PHASE:  2                        WEAN MODE:  55L/55%TM/PMV                        WEAN TIME: TM + PMV for 5hrs 10mins                        END WEAN REASON: Per order      RESPONSE:             BS: Coarse             VITAL SIGNS:  /80 (BP Location: Left arm)   Pulse 62   Temp 97.8  F (36.6  C) (Axillary)   Resp 23   Wt 85 kg (187 lb 6.3 oz)   SpO2 96%   BMI 36.60 kg/m                 EMOTIONAL NEEDS / CONCERNS:    no              RISK FOR SELF DECANNULATION:no                          RISK DUE TO:                          INTERVENTION/S IN PLACE IS/ARE:         NOTE / PLAN  Pt tolerated TM with PMV well, no respiratory distress noted at the end of the wean. Continue current POC

## 2023-12-17 NOTE — PLAN OF CARE
Problem: Anxiety Signs/Symptoms  Goal: Optimized Energy Level (Anxiety Signs/Symptoms)  Outcome: Progressing   Goal Outcome Evaluation: Pt complained of feeling anxious. PRN Atarax given for anxiety, and PRN Zyrtec fro itching.     Pt alert and oriented to person, place, and situation. BP was elevated at start of day shift (156/74), after scheduled metoprolol, CJ=323/57.  Pt complained of itching and anxiety. PRN Cetirizine and Zyrtec given at 1007, to good effect.   Pt remains on continuous tube feeding. Trach was capped during the day.  Pt spent time up in chair, and various family members visited throughout the day.  All care needs met.    Krys Hilton RN

## 2023-12-18 NOTE — PLAN OF CARE
Problem: Fall Injury Risk  Goal: Absence of Fall and Fall-Related Injury  Outcome: Progressing  Intervention: Identify and Manage Contributors  Recent Flowsheet Documentation  Taken 12/18/2023 1113 by Sarah Beth Ward RN  Medication Review/Management: medications reviewed  Intervention: Promote Injury-Free Environment  Recent Flowsheet Documentation  Taken 12/18/2023 1113 by Sarah Beth Ward RN  Safety Promotion/Fall Prevention:   activity supervised   room door open   clutter free environment maintained   room near nurse's station     Problem: Pain Acute  Goal: Optimal Pain Control and Function  Outcome: Progressing  Intervention: Prevent or Manage Pain  Recent Flowsheet Documentation  Taken 12/18/2023 1113 by Sarah Beth Ward RN  Sensory Stimulation Regulation: care clustered  Bowel Elimination Promotion: adequate fluid intake promoted  Medication Review/Management: medications reviewed  Intervention: Optimize Psychosocial Wellbeing  Recent Flowsheet Documentation  Taken 12/18/2023 1113 by Sarah Beth Ward RN  Supportive Measures:   active listening utilized   positive reinforcement provided   Pt denies pain or discomfort, VSS. Family visiting at bed side, very supportive, pt's status updated. Pt tolerated trach dome well.  @ 1200. Pt gets anxious needs reassurance. Up in chair for 2 hrs. Pt resting in bed at this time, will continue moitoring.

## 2023-12-18 NOTE — PROGRESS NOTES
[facility]    Medicine Progress Note - Hospitalist Service    Date of Admission:  12/5/2023    Hospital Course  Matthew Bowen is a 78y F PMHx chronic hypoxic respiratory failure 2/2 pHTN, ALAINA, HFpEF, CAD, CKD3, Obesity, HTN, depression, and anxiety. She presented initially on 10/15 with fever, cough, and falls with a temperature. Further w/u revealed multifocal pneumonia and she was started on antibiotics. Her respiratory status worsened as she developed ARDS and was intubated on 10/21. She had a prolonged ICU stay 2/2 difficulty vent weaning. Eventually she underwent trach/peg placement. Of note, she was treated with high dose steroids for her lung condition and remains on a high dose long taper of steroids. Pt was transferred to [facility] for ongoing vent weaning and therapies.      [facility] Course  12/7-12/10: Hypernatremia to 155 2/2 over-diuresis, intravascular depletion, missing first night of FWF. 2L D5W given, resolved, Na now stable. Torsemide held initially but now restarted at 20mg daily. BP remains elevated, start isosorbide dinitrate 20mg TID. Loperamide for diarrhea.    12/11 - 12/17: Patient started weaning during the week per pulmonology.  She has been making progress with therapies throughout the week. 12/17, Continues to make progress. Remains on vent weaning phase 2.  Care conference tomorrow.  Plan to continue current medical management.     12/18: Care Conference today. Nephro consulted for uptrending BUN, low cystatin C, difficulty maintaining I/O balance. Bactrim -> atovaquone 1.5g daily. Biotene mouth spray scheduled. FWF down to 200cc q4h.     Assessment & Plan   Acute on Chronic Hypoxic Respiratory Failure  Bronchiectasis?  Multifocal Pneumonia  ARDS, PTA  pHTN  Chronic Hypoxic Respiratory Failure  ALAINA  - pulmonary consulted and following  - treated with high dose steroids previously  - currently on slow steroid taper  - prednisone 120mg daily (60mg in split doses) x2 weeks, last dose  12/19  - start prednisone 100mg daily on 12/20  - Taper regimen after 100mg -> 80mg x2 weeks, then taper down 5mg every 2 weeks  - bactrim changed to atovaquone 1.5g daily (12/18) over worsening renal function  - trach exchange 12/5 by ENT PTA  - good bronchial hygiene with bronchodilators    CKD  CARMEN, resolved PTA  Elevated BUN  Low Cystatin C  - pt with worsening renal function and uptrending BUN  - difficulty with net I/O balance as pt persistently positive  - suspect related to previous CKD and prolonged high dose steroids, among other etiologies  - nephro consulted (12/18), discussed case in person today  - changed bactrim to atovaquone 1.5g daily  - FWF decreased to 200mL q4h  - RD noted we could change to more concentrated TF if needed  - torsemide held  - urine studies ordered  - will need to monitor Na levels closely given hx of hypernatremia   - monitor with BMP    Diarrhea  - fairly stable at this time  - C. diff negative  - loperamide prn    HTN  - amlodipine 5mg BID (suspect broken up 2/2 labile BP)  - metoprolol tartrate 12.5mg BID  - isosorbide dinitrate 20mg TID (12/10)  - pt was on hydralazine 50mg TID and isosorbide dinitrate 20mg TID     HFpEF, not in exacerbation  - torsemide 20mg every other day     Pain, Acute  - oxycodone 5mg prn    Normocytic, Hypochromic Anemia  - suspect 2/2 critical illness and phlebotomy  - received pRBCs during hospitalization prior to LTACH  - transfuse to maintain Hgb > 7.0  - pantoprazole    CAD  - asa  - atorvastatin    Hypothyroidism  - levothyroxine    Depression  Anxiety  - pregabalin  - sertraline    PEG  - PEG placed 11/29  - RD consulted and following    Hypernatremia - stable/resolved  - likely 2/2 intravascular depletion 2/2 over-diuresis and missing FWF on her first night admitted        Diet: Adult Formula Drip Feeding: Continuous Jevity 1.5; Gastrostomy; Goal Rate: 45; mL/hr; TF to run x22 hrs, hold 1 hr before/after levothyroxine  NPO for Medical/Clinical  Reasons Except for: Ice Chips, NPO but receiving Tube Feeding    DVT Prophylaxis: Heparin SQ  Aleman Catheter: PRESENT, indication: Strict 1-2 Hour I&O  Lines: PRESENT      PICC 10/22/23 Triple Lumen Right Basilic multiple med gtt. ready for use-Site Assessment: WDL      Cardiac Monitoring: None  Code Status: Full Code      Clinically Significant Risk Factors              # Hypoalbuminemia: Lowest albumin = 2.9 g/dL at 12/12/2023  6:11 AM, will monitor as appropriate     # Hypertension: Noted on problem list                 Disposition Plan     Expected Discharge Date: 12/21/2023    Discharge Delays: Complex Discharge    Discharge Comments: Vent, Trach, Feeding tube, WOC          Agapito Tavares MD  Hospitalist Service  LTACH  Securely message with [employer] (more info)  Text page via [employer] Paging/Directory   ______________________________________________________________________    Interval History   - no acute events ovn  - Care conference this morning with family  - pt up in the chair and feels well today  - voiced thankfulness for all her cares  - some pain in the right back she has been having for a while now  - no other acute concerns    Physical Exam   Vital Signs: Temp: 97.9  F (36.6  C) Temp src: Oral BP: (!) 155/74 Pulse: 83   Resp: 26 SpO2: 95 % O2 Device: Mechanical Ventilator Oxygen Delivery: (P) 55 LPM  Weight: 186 lbs 4.62 oz  General: She is a well grown well-developed adult female resting in bed comfortably  HEENT: Head is normocephalic atraumatic eyes pupils appear equal round and reactive to light.  Viable trach noted on neck.  Lungs: She has a normal respiratory effort on auscultation good air entry is noted.  No wheezing  Heart: She has a good S1 and S2 no obvious murmurs peripheral pulses are palpable.  Abdomen: Soft nontender bowel sounds are noted  Musculoskeletal: She has good muscle tone  Digits appear acyanotic  Skin: No obvious rashes noted, she is warm to touch please refer to nursing/wound  care note for complete skin exam.    Medical Decision Making       60 MINUTES SPENT BY ME on the date of service doing chart review, history, exam, documentation & further activities per the note.      Data   Lab Results   Component Value Date    WBC 15.8 12/15/2023    WBC 10.8 11/22/2019     Lab Results   Component Value Date    RBC 2.83 12/15/2023    RBC 4.34 11/22/2019     Lab Results   Component Value Date    HGB 7.5 12/15/2023    HGB 12.4 11/22/2019     Lab Results   Component Value Date    HCT 25.7 12/15/2023    HCT 39.9 11/22/2019     Lab Results   Component Value Date    MCV 91 12/15/2023    MCV 92 11/22/2019     Lab Results   Component Value Date    MCH 26.5 12/15/2023    MCH 28.6 11/22/2019     Lab Results   Component Value Date    MCHC 29.2 12/15/2023    MCHC 31.1 11/22/2019     Lab Results   Component Value Date    RDW 20.8 12/15/2023    RDW 14.9 11/22/2019     Lab Results   Component Value Date     12/15/2023     11/22/2019       Last Comprehensive Metabolic Panel:  Sodium   Date Value Ref Range Status   12/15/2023 142 135 - 145 mmol/L Final     Comment:     Reference intervals for this test were updated on 09/26/2023 to more accurately reflect our healthy population. There may be differences in the flagging of prior results with similar values performed with this method. Interpretation of those prior results can be made in the context of the updated reference intervals.    11/22/2019 141 133 - 144 mmol/L Final     Potassium   Date Value Ref Range Status   12/15/2023 4.9 3.4 - 5.3 mmol/L Final   11/22/2019 4.6 3.4 - 5.3 mmol/L Final     Chloride   Date Value Ref Range Status   12/15/2023 100 98 - 107 mmol/L Final   11/22/2019 108 94 - 109 mmol/L Final     Carbon Dioxide   Date Value Ref Range Status   11/22/2019 27 20 - 32 mmol/L Final     Carbon Dioxide (CO2)   Date Value Ref Range Status   12/15/2023 33 (H) 22 - 29 mmol/L Final     Anion Gap   Date Value Ref Range Status   12/15/2023 9 7  - 15 mmol/L Final   11/22/2019 6 3 - 14 mmol/L Final     Glucose   Date Value Ref Range Status   11/22/2019 112 (H) 70 - 99 mg/dL Final     GLUCOSE BY METER POCT   Date Value Ref Range Status   12/18/2023 109 (H) 70 - 99 mg/dL Final     Urea Nitrogen   Date Value Ref Range Status   12/15/2023 92.1 (H) 8.0 - 23.0 mg/dL Final   11/22/2019 34 (H) 7 - 30 mg/dL Final     Creatinine   Date Value Ref Range Status   12/15/2023 0.81 0.51 - 0.95 mg/dL Final   11/22/2019 1.30 (H) 0.52 - 1.04 mg/dL Final     GFR Estimate   Date Value Ref Range Status   12/15/2023 74 >60 mL/min/1.73m2 Final   11/22/2019 40 (L) >60 mL/min/[1.73_m2] Final     Comment:     Non  GFR Calc  Starting 12/18/2018, serum creatinine based estimated GFR (eGFR) will be   calculated using the Chronic Kidney Disease Epidemiology Collaboration   (CKD-EPI) equation.       Calcium   Date Value Ref Range Status   12/15/2023 9.3 8.8 - 10.2 mg/dL Final   11/22/2019 8.9 8.5 - 10.1 mg/dL Final     Bilirubin Total   Date Value Ref Range Status   12/12/2023 0.2 <=1.2 mg/dL Final   02/13/2019 0.2 0.2 - 1.3 mg/dL Final     Alkaline Phosphatase   Date Value Ref Range Status   12/12/2023 113 40 - 150 U/L Final     Comment:     Reference intervals for this test were updated on 11/14/2023 to more accurately reflect our healthy population. There may be differences in the flagging of prior results with similar values performed with this method. Interpretation of those prior results can be made in the context of the updated reference intervals.   02/13/2019 83 40 - 150 U/L Final     ALT   Date Value Ref Range Status   12/12/2023 35 0 - 50 U/L Final     Comment:     Reference intervals for this test were updated on 6/12/2023 to more accurately reflect our healthy population. There may be differences in the flagging of prior results with similar values performed with this method. Interpretation of those prior results can be made in the context of the updated  reference intervals.     02/13/2019 59 (H) 0 - 50 U/L Final     AST   Date Value Ref Range Status   12/12/2023 37 0 - 45 U/L Final     Comment:     Reference intervals for this test were updated on 6/12/2023 to more accurately reflect our healthy population. There may be differences in the flagging of prior results with similar values performed with this method. Interpretation of those prior results can be made in the context of the updated reference intervals.   02/13/2019 48 (H) 0 - 45 U/L Final

## 2023-12-18 NOTE — PLAN OF CARE
REHAB CARE PROGRESSION MEETING:    Medical Team Conference:  SLP present for 15 minutes.  See progress noted dated today tracie by RN Care Coordinator for details. Nicole TEIXEIRA present for conference.

## 2023-12-18 NOTE — PLAN OF CARE
Goal Outcome Evaluation:                        Pr  Problem: Adult Inpatient Plan of Care  Goal: Optimal Comfort and Wellbeing  Outcome: Progressing  Intervention: Provide Person-Centered Care  Recent Flowsheet Documentation  Taken 12/18/2023 0043 by Danie Nichols, PATRICIA  Trust Relationship/Rapport:   care explained   choices provided   reassurance provided   thoughts/feelings acknowledged     Problem: Mechanical Ventilation Invasive  Goal: Effective Communication  Outcome: Progressing  Intervention: Ensure Effective Communication  Recent Flowsheet Documentation  Taken 12/18/2023 0043 by Danie Nichols, RN  Communication Enhancement Strategies: verbal and visual cues paired  Family/Support System Care: support provided  Trust Relationship/Rapport:   care explained   choices provided   reassurance provided   thoughts/feelings acknowledged  Alphabet/communication board utilized  Patient is alert and oriented x 4, vital signs are stable. /58 (BP Location: Left arm, Patient Position: Right side, Cuff Size: Adult Regular)   Pulse 62   Temp 98.8  F (37.1  C) (Axillary)   Resp 26   Wt 84.5 kg (186 lb 4.6 oz)   SpO2 100%   BMI 36.38 kg/m    Pt denies pain. Had family visit earlier in the day notable difference in disposition  Pt continues to have moments of high anxiety,   Respiratory rate >  40 BPM intermittently, hydroxyzine PRN only mildly effective. One time dose of lorazepam given to good effect for anxiety. 2 small loose stools overnight. Abdominal bruising /ecchymosis/ hematomas do not appear to be improving.Continue to monitor.  Danie Nichols RN on 12/18/2023 at 7:50 AM

## 2023-12-18 NOTE — PROGRESS NOTES
REHAB CARE PROGRESSION MEETING:    Medical Team Conference:  PT present for 15 minutes.  See progress noted dated today tracie by RN Care Coordinator for details. Olena Grullon, PT, DPT was present for conference.

## 2023-12-18 NOTE — PLAN OF CARE
Problem: Mechanical Ventilation Invasive  Goal: Effective Communication  Outcome: Progressing  Goal: Mechanical Ventilation Liberation  Outcome: Progressing  Goal: Absence of Device-Related Skin and Tissue Injury  Outcome: Progressing  Goal: Absence of Ventilator-Induced Lung Injury  Outcome: Progressing      RT PROGRESS NOTE     DATA:     CURRENT SETTINGS: AC 18/360/+5             TRACH TYPE / SIZE:  #6 Bivona changed on 12/16/23             MODE:   AC             FIO2:   45%     ACTION:             THERAPIES:   ALB TID/MUCOMYST BID/ VOLERA QID             SUCTION:                           FREQUENCY:   X3                        AMOUNT:   Moderate to small                        CONSISTENCY:   Thick                        COLOR:   White/yellow             SPONTANEOUS COUGH EFFORT/STRENGTH OF EFFORT (not elicited by suctioning): Yes:Strong                              WEANING PHASE:#2                           WEAN MODE: 55% and 55 lpm tm/pmv 4-5 hrs max BID days and full vent at night                          WEAN TIME:   4 hrs and 19 min                        END WEAN REASON:  Per order      RESPONSE:             BS:   Clear/diminish             VITAL SIGNS:   SAT %, HR 62-81, RR 25-27             EMOTIONAL NEEDS / CONCERNS:  N/A                RISK FOR SELF DECANNULATION:  N/A                        RISK DUE TO:                          INTERVENTION/S IN PLACE IS/ARE:  N/A       NOTE / PLAN:   Pt is on full vent support, mj well. Cont weaning tm/pmv 4-5 hrs max BID days as mj and full vent at night and keep sat >/=90%

## 2023-12-18 NOTE — PLAN OF CARE
Problem: Mechanical Ventilation Invasive  Goal: Effective Communication  Outcome: Progressing  Goal: Mechanical Ventilation Liberation  Outcome: Progressing  Goal: Absence of Device-Related Skin and Tissue Injury  Outcome: Progressing  Goal: Absence of Ventilator-Induced Lung Injury  Outcome: Progressing   Goal Outcome Evaluation:               RT PROGRESS NOTE     DATA:     CURRENT SETTINGS:             TRACH TYPE / SIZE:  #6 bivona changed 12/16             MODE:   ac 18, 360, peep 5,             FIO2:   45%     ACTION:             THERAPIES:   duo neb qid, mucomyst bid, volara qid             SUCTION:                           FREQUENCY:   x6                        AMOUNT:   small                        CONSISTENCY:   thick to thin                        COLOR:   yellow red streaked in the morning, now white to pale yellow              SPONTANEOUS COUGH EFFORT/STRENGTH OF EFFORT (not elicited by suctioning): moderate                              WEANING PHASE:   2                        WEAN MODE:    TM with pmv 55L 50%                        WEAN TIME:   8 hours and 45 minutes.  At that time pt began to desaturate and have increased wob, with RR in upper 40's.  No relief with suction.  No relief with taking pmv off and inflating cuff.  No relief with increase in oxygen to 75%.  No relief with coaching.  Pt placed back on vent at that time and continued to have anxiety.  RR did decrease to 36 and Spo2 increased to 100%.  Pt stated she felt better on vent than on the TM.  Pt continued to have anxiety, RN aware and getting medication for the patient.                      RESPONSE:             BS:   coarse crackles, diminished in left lower lobe             VITAL SIGNS:   Blood pressure 113/59, pulse 83, temperature 97.8  F (36.6  C), temperature source Oral, resp. rate (!) 35, weight 84.5 kg (186 lb 4.6 oz), SpO2 100%.               EMOTIONAL NEEDS / CONCERNS:  anxiety, patient c/o being tired throughout day                 RISK FOR SELF DECANNULATION:  no                                            NOTE / PLAN:   Continue to encourage and support weaning process.

## 2023-12-18 NOTE — PROGRESS NOTES
REHAB CARE PROGRESSION MEETING:    Medical Team Conference:  OT present for 15 minutes.  See progress noted dated today tracie by RN Care Coordinator for details. Marlene Villegas OTR/L was present for conference

## 2023-12-18 NOTE — PROGRESS NOTES
Pulmonary Progress Note    Admit Date: 12/5/2023  CODE: Full Code    HPI:   78 year old female w/PMH complex medical history including chronic hypoxic respiratory failure due to pulmonary HTN on 2L home O2, ALAINA requiring CPAP, chronic diastolic HF, CAD, CKD stage 3, morbid obesity, HTN,  depression, recent falls, hx recurrent pneumonia. Admitted 10/15/23 and treated for CAP with multiple courses of antibiotics.  Worsened requiring intubation, developed ARDS. Cultures negative. Autoimmune work-up grossly negative but did have low IgG level. 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 S/p trach and PEG.  Transferred to [facility] on 12/5. Progressing with weans and now tolerating up to 8hr trach dome with speaking valve per day.   Assessment/Plan:     Acute on chronic hypoxic/hypercapnic respiratory failure in setting of underlying pulmonary HTN c/b pneumonia, severe ARDS, possibly organizing pneumonia now s/p trach.    Multifocal CAP: Completed antibiotics 11/29. Serial Sputum cx with candida, beta-D-glucan neg. BAL 10/22 cytology neg.   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 IVIG considered but ultimately not given  Tracheostomy in place: 6 Bivona placed 12/16 d/t cuff leak with Aj   Oropharyngeal dysphagia status post PEG tube: passed BDT on 12/7, tolerating PMV  Severe ALAINA on APAP 6-16 PTA: PSG 10/28/19 AHI 80   Chronic diastolic HF, Pulm HTN, volume overload: torsemide 60 daily PTA  MDD, Anxiety - stable   Leukocytosis wo fever: Stable/downtrending   Bronchiectasis  Severely deconditioned with risk for steroid myopathy   CARMEN on CKD, Azotemia: BUN 98 today. GFR calculated with Cystatin C 10    Recommendations:  Phase 2 weans: TM/PMV days as tolerated. AC night      Steroid taper in place: currently on pred 60 BID  Agree with renal consult.  High risk for further volume overload and will benefit from ongoing  diuresis.  May consider adding thiazide but will defer to renal.    Given GFR calculated with Cystatin C 10, should switch bactrim back to Atovaquone - discussed with primary   Pulm toilet: Ted QID. Mucomyst BID   Routine trach care per protocol  GI ppx: PPI  DVT ppx: heparin subcutaneous   VAP ppx: Chlorhexidine    PT/OT/SLP  SLP following: VFSS possibly next week pending progression.  Trial ice chips   BP management per primary service     Subjective:   - Denies dyspnea, pain, n/v. Some discomfort to right shoulder when sitting in chair     Tracheal secretions:  Overnight - 3x, small/mod, thick  Yesterday - 4x, small/mod, thick     Cough strength: moderate    Ventilator weaning results  12/6: PS 12/8 for ~12hr, tolerated well   12/7: PS 10/5 for 9.5hr, tolerated well   12/8: PS 8/5 for 12.5hr   12/9: No wean d/t elevated BP   12/10: PS 8/5 for 6hr 15min, stopped d/t increased WOB  12/11: PS 8/5 for  1.5hr.  Then TM/PMV x2 for total of almost 5hr   12/12:  PS 8/5 for 1 hr 23 min. TM/PMV 40%/50L x2 for total of almost 5hr  12/13-present: TM/PMV 4-5hr x2. Break on AC mid diay and AC at night    Clinical status discussed today with respiratory therapist     ROS: 10-system review obtained and negative with the exception of the symptoms noted above.    Medications:      acetaminophen  650 mg Oral or Feeding Tube 4x Daily    acetylcysteine  2 mL Nebulization BID    amLODIPine  5 mg Oral or Feeding Tube BID    aspirin  81 mg Oral or Feeding Tube Daily    atorvastatin  20 mg Oral or Feeding Tube QPM    B and C vitamin Complex with folic acid  5 mL Oral or Feeding Tube Daily    chlorhexidine  15 mL Mouth/Throat BID    cholecalciferol  50 mcg Per Feeding Tube Daily    heparin ANTICOAGULANT  5,000 Units Subcutaneous Q8H    insulin aspart  1-6 Units Subcutaneous Q6H    ipratropium - albuterol 0.5 mg/2.5 mg/3 mL  3 mL Nebulization 4x daily    isosorbide dinitrate  20 mg Per Feeding Tube TID    levothyroxine  150 mcg Oral or  Feeding Tube QAM AC    metoprolol  12.5 mg Per Feeding Tube BID    miconazole   Topical BID    mineral oil-hydrophilic petrolatum   Topical Daily    pantoprazole  40 mg Per Feeding Tube QAM AC    [START ON 12/20/2023] predniSONE  50 mg Oral BID w/meals    predniSONE  60 mg Per Feeding Tube BID    pregabalin  75 mg Per Feeding Tube Daily    protein modular  1 packet Per Feeding Tube BID 09 12    QUEtiapine  150 mg Oral or Feeding Tube BID    sertraline  150 mg Oral or Feeding Tube Daily    simethicone  40 mg Per Feeding Tube TID    sodium chloride (PF)  10 mL Intracatheter Q8H    sodium chloride (PF)  10-40 mL Intracatheter Q7 Days    sulfamethoxazole-trimethoprim  20 mL Oral Once per day on Monday Wednesday Friday    torsemide  20 mg Per Feeding Tube Every Other Day         Exam/Data:   Vitals  BP (!) 155/74 (BP Location: Left arm)   Pulse 83   Temp 97.9  F (36.6  C) (Oral)   Resp 26   Wt 84.5 kg (186 lb 4.6 oz)   SpO2 95%   BMI 36.38 kg/m       I/O last 3 completed shifts:  In: 2398 [I.V.:60; NG/GT:2338]  Out: 2475 [Urine:2475]  Weight change:     Vent Mode: CMV/AC  (Continuous Mandatory Ventilation/ Assist Control)  FiO2 (%): 50 %  Resp Rate (Set): 18 breaths/min  Tidal Volume (Set, mL): 360 mL  PEEP (cm H2O): 5 cmH2O  Resp: 26    Currently on 55L/50%TM/PMV  45%FiO2 when on AC overnight     EXAM:  Gen: NAD in chair on tm/pmv  HEENT: NT, trach midline/intact, no stridor, weak/understandable voice    CV: RRR, no m/g/r  Resp: CTAB anteriorly, non-labored, no wheeze   Abd: large, soft, nontender, BS+  Skin: no rashes or lesions  Ext: mild edema, very weak, able to move all   Neuro: alert, follows commands    Labs:    Complete Blood Count   Recent Labs   Lab 12/18/23  0648 12/15/23  0626 12/13/23  0558 12/12/23  0611   WBC 12.7* 15.8* 16.2* 16.3*   HGB 7.4* 7.5* 8.1* 7.5*    187 210 215     Basic Metabolic Panel  Recent Labs   Lab 12/18/23  0648 12/18/23  0606 12/18/23  0002 12/17/23  1724 12/15/23  1208  12/15/23  0626 12/13/23  1158 12/13/23  0558 12/12/23  1156 12/12/23  0611     --   --   --   --  142  --  144  --  141   POTASSIUM 4.7  --   --   --   --  4.9  --  4.3  --  5.0   CHLORIDE 103  --   --   --   --  100  --  101  --  98   CO2 34*  --   --   --   --  33*  --  34*  --  36*   BUN 98.2*  --   --   --   --  92.1*  --  86.8*  --  82.6*   CR 0.75  --   --   --   --  0.81  --  0.76  --  0.81   * 109* 175* 163*   < > 115*   < > 110*   < > 112*    < > = values in this interval not displayed.       Radiology: Personally reviewed; radiology read below    XR CHEST 12/15/2023  Tracheostomy tube in place with tip near the thoracic inlet. Right upper extremity PICC with tip in the mid SVC. Persistent consolidative opacities with air bronchograms in the right midlung and bilateral lung bases with background diffuse interstitial prominence. Stable cardiomediastinal contours with calcified tortuous aorta. No significant interval change.    XR CHEST 12/7/2023  Endotracheal tube seen on the prior study has been replaced with a tracheostomy tube which appears in good position in the mid trachea. PICC catheter from right upper extremity approach is unchanged with tip in the superior vena cava. There is airspace consolidation with minimal volume loss in the right upper lobe consistent with right upper lobe pneumonia. There is persistent opacity with bronchial thickening at the left base spine the heart which is stable. Stable cardiac size. Normal pulmonary vascularity. No pneumothorax or pleural effusion. Calcified aortic arch.    XR CHEST 11/26/2023  papo AP view of the chest was obtained. Endotracheal tube tip projects over mid thoracic trachea approximately 4 cm from the ashley. Enteric tube crosses the diaphragm with the distal tip outside the field-of-view. Right upper extremity PICC tip projects over high/mid SVC. Cardiomegaly. Left basilar and right mid/upper lobe patchy pulmonary opacity, could represent  atelectasis versus infection. No significant pleural effusion or pneumothorax.     CT CHEST WITHOUT CONTRAST 11/24/2023                                 LUNGS AND PLEURA: Pulmonary infiltrates again seen throughout both  lungs. There is increased airspace consolidation with air bronchograms  in both lower lobes and in the posterior right upper lobe when  compared to previous. There are groundglass opacities with septal  thickening throughout the remainder of both lungs. Mild cylindrical  bronchiectasis. No pleural effusion or pneumothorax.     IMPRESSION:  1.  Extensive bilateral pulmonary infiltrates again seen. Increasing  airspace consolidation in the right upper lobe and both lower lobes  when compared to previous.    Adilson Prakash CNP  Pulmonary Medicine  [facility]  Pager [phone]

## 2023-12-18 NOTE — CONSULTS
"  RENAL CONSULT NOTE    REQUESTING PHYSICIAN: MD Tad    REASON FOR CONSULT: rising BUN, cystatin C eGFR 10    PLAN:  Agree with PCP antibx swap (now off Bactrim)   Hold diuretic for now and trend labs/volume/wt/I & O daily  Discuss protein needs/counts with RD and reduce intake (very high BUN)  Reduce FW flush to 200 q 4 for now and scale back as able (agree high risk for volume overload, and if worsening resp status/edema, would opt to add thiazide)  Agree with MWF BMP for now  Reduce Lyrica 75-->25mg daily renally  Hoping to avoid needing dialysis as I do not think it would be well tolerated in her current state (no acute HD need at present, but baseline RF quite poor, so high risk), family would want to pursue all cares, so will need to continue goals of care discussions  Discussed with Dr Tavares    ASSESSMENT:  Mild Acute vs subacute CRAMEN, vs unrecognized progressive CKD 4:  Non-oliguric, very azotemic, but not overtly uremic  Despite normal sCR (suspect d/t severy reduced muscle mass) cystatin C  of 4.1 (eGFR 10) more reflective of severity of her renal dz; she is quite sarcopenic   longstanding CKD (her Cystatin C/GFR has been consistently in this range since her acute illness in oct), no prior neph consult or neph specific w/up that I could find (though daughter reports that pt has prior CARMEN's, and was \"almost on CRRT\")  Baseline sCR prior to acute illness 6324-2165 was 1.4-1.6 range GFR <30  FLC ratio 1.23, no monoclonals on immunofix 2021 ([facility])  very azotemic, likely multifactorial with higher protein intake in the setting of severe malnutrition, catabolic with enhanced tissue breakdown with infection and prolonged high dose steroids;   Prio UA Oct '23, no microhematuria, very modest microhem Nov/Dec may be d/t mitchell, very modest 10-25 range, with min proteiniuria by dip, up/c is pending  ANCA/MARLI neg Oct '23   Prior remote CT imaging 10/2023: cortical thinning/scarring,non-obstructing L renal " "stone  No indication for needing dialysis, and uncertain how well it would be tolerated should it come to that, given that she has decent UO and not overtly uremic/stable lytes etc. Will monitor and manage medically for now; continue goals of care discussion with pt and family     Lytes/Acid-Base:  Hypernatremia:  Resolved, on FR via PEG  2/2 overdiuresis, intravasc contraction, diuretics reduced to every other day dosing for now, trend wts; compensated resp acidosis.     Acute on Chronic hyposix/hypercapnic resp failure:  With underlying Pulm HTN, ALAINA/recurent PNA/recent h/o ARDS; management per pulm team; completed antibx 11/29; trached; on high dose pred with extended taper     Chronic Diastolic HF:  Preserved EF, With pulm HTN, severe ALAINA,volume overload, on Torsemide every other day (currently on hold); on Isosorbide    Volume:  Challenging to assess, some RUE edema (seems dependent,) no sig LE edema, some interstitial prominence on CXR unclear if volume vs post-mnemonic changes/fibrosis, good UO, but net + 6L since admission (was \"dry\" hypernatremic on arrival with aggressive diuresis prior to LTACH admission     Hypertension:  Some lability, but mostly controlled   On BB, Isosorbide, Amlod     Hypothyroid:  On replacement    Neuropathy:  On Lyrica, renally dose        HPI: Matthew Bowen is a 78 year old female for whom we have been asked to see for high BUN.  She is new to our service.   PMHX sig for recent acute on chronic  hypoxic resp failure (was on chonic home 02), ALAINA/CPAP, chronicdiastolic HF ASCAD, stage 3 CKD, obesity,  HTN   She was admitted to [facility] for recurrent falls, 10/15/23, tx for CAP with multifple course of antibx, eventually re intubation, dev ARDS, suspect d/t pulm HTN.  Moderately low IgG, on steroids,no IVIG. . Autoimmune w/up was mostly negative , vasculitis r/o. Now s/p trach and PEG.  [facility]    Looks to have longstanding underlying CKD for some " "time, Baseline sCr 1.4-1.6 in 2022-23.  sCr trending <1 in the setting  of severe sarcopenia very limited mobility.  Cystatin C since Oct' 23 in the 3.5-4 range (GFR 10-14).  Standard BMP/GFR grossly overestimates renal function.  She is urinating well (has mitchell).  No h/o tanesha hematuria.  Does have some microhematuria last 2 cks, likely I nthe setting of imtchell, ? Contamination vs colonization with yeast/bacteria .  UA is otherwise bland    She did have full autoimmune w/up in Oct that was unremarkable (aside from mildly low IgG). ANCA MARLI neg.  Mild cortical thinning on CT abd/pelvis in Oct '23.  No hydro. 2021 FLC ratio was 1.23, no mononclonal proteins at that time     Discussed with pts  JUVENAL and his daughter Roque by phone.  Daughter doesn't think a kidney provider saw her mother in Oct/Nov; and do not believe she has seen a nephrologist I nthe past.  Daughter Roque is a pediatrician.  States that her mother was \"almost on CRRT\" during her recent hospital admission.     REVIEW OF SYSTEMS:  Pt is min verbal d/t trached.  She can nod yes/no to queries.  Denies n/v/c.  No active pain.  Intake taken mostly from pts  and daughter       Past Medical History:   Diagnosis Date    Antiplatelet or antithrombotic long-term use     Arthritis     Congestive heart failure (H)     Dyspnea on exertion     Heart attack (H)     Heart murmur     Hypertension     Irregular heart beat     Obstructive sleep apnea syndrome 1/27/2005    Oxygen dependent     2L continuous at home.    Pulmonary hypertension (H)     Sleep apnea     Bipap    Stented coronary artery     x 1    Thyroid disease     Walking troubles                Social History     Tobacco Use    Smoking status: Never    Smokeless tobacco: Never          No current facility-administered medications on file prior to encounter.  Acetaminophen (TYLENOL PO), Take 1,000 mg by mouth 3 times daily  amLODIPine (NORVASC) 5 MG tablet, Take 5 mg by mouth daily   aspirin " 81 MG EC tablet, Take 81 mg by mouth daily  atorvastatin (LIPITOR) 20 MG tablet, Take 20 mg by mouth every evening   clopidogrel (PLAVIX) 75 MG tablet, Take 1 tablet (75 mg) by mouth daily  hydrALAZINE (APRESOLINE) 50 MG tablet, Take 50 mg by mouth 3 times daily   hypromellose (ARTIFICIAL TEARS) 0.5 % SOLN ophthalmic solution, 1 drop 4 times daily as needed for dry eyes  isosorbide dinitrate (ISORDIL) 20 MG tablet, Take 20 mg by mouth 3 times daily   levothyroxine (SYNTHROID/LEVOTHROID) 150 MCG tablet, Take 150 mcg by mouth daily  Multiple Vitamins-Minerals (MULTIVITAMIN ADULTS PO), Take 1 tablet by mouth daily  potassium chloride ER (KLOR-CON M) 20 MEQ CR tablet, Take 40 mEq by mouth daily   pregabalin (LYRICA) 75 MG capsule, Take 75 mg by mouth 2 times daily  sertraline (ZOLOFT) 100 MG tablet, Take 150 mg by mouth daily  torsemide (DEMADEX) 20 MG tablet, Take 60 mg by mouth every morning   vitamin D3 (CHOLECALCIFEROL) 50 mcg (2000 units) tablet, Take 2,000 Units by mouth daily           ALLERGIES/SENSITIVITIES:  No Known Allergies       PHYSICAL EXAM:  BP (!) 155/74 (BP Location: Left arm)   Pulse 83   Temp 97.9  F (36.6  C) (Oral)   Resp 26   Wt 84.5 kg (186 lb 4.6 oz)   SpO2 95%   BMI 36.38 kg/m      Patient Vitals for the past 72 hrs:   Weight   12/17/23 0540 84.5 kg (186 lb 4.6 oz)   12/16/23 0630 85 kg (187 lb 6.3 oz)     Body mass index is 36.38 kg/m .    Intake/Output Summary (Last 24 hours) at 12/18/2023 1244  Last data filed at 12/18/2023 1113  Gross per 24 hour   Intake 2398 ml   Output 1950 ml   Net 448 ml         General - A & O x 3, NAD, supine in bed, just transferred to bed from chair via sling and assist of 2.   HEENT - trached   Neck - track   Respiratory - rales, diminished, ant exam only d/t positioning   Cardiovascular - rate controlled , SBP variable, but mostly controlled   Abdomen - soft, obese, PEG in place, sig bruising over abd   Extremities -RUE edema 1-2+ , trace BLE edema    Integumentary - various bruising noted, mostly abd   Neurologic - grossly intact  Psych: flat affect   :good UO, clear urine in mitchell   Wt up 3kg since admission.     Laboratory:  Recent Labs   Lab Test 12/18/23  0648 12/15/23  0626 11/29/23  0508 11/28/23  0956   WBC 12.7* 15.8*   < >  --    HGB 7.4* 7.5*   < >  --    MCV 90 91   < >  --     187   < >  --    INR  --   --   --  1.28*    < > = values in this interval not displayed.      Recent Labs   Lab Test 12/18/23  0648 12/15/23  0626   POTASSIUM 4.7 4.9   CHLORIDE 103 100   BUN 98.2* 92.1*      Recent Labs   Lab Test 12/12/23  0611 12/11/23  0903 11/22/23  1551 10/22/23  0520 10/15/23  1108   ALBUMIN 2.9* 3.3*  --    < >  --    BILITOTAL 0.2 0.2  --    < >  --    ALT 35 40  --    < >  --    AST 37 34  --    < >  --    PROTEIN  --   --  10*  --  Negative    < > = values in this interval not displayed.       Personally reviewed today's laboratory studies      Thank you for involving us in the care of this patient. We will continue to follow along with you.      Eduarda Stephenson, P-BC  [facility]  [phone]

## 2023-12-18 NOTE — PROGRESS NOTES
NUTRITION BRIEF NOTE     Chart check for nutrition support patient. Chart reviewed and discussed w/ nephrology NP Eduarda Stephenson.    Current Nutrition Support Orders:   Type of Feeding Tube: PEG (placed 11/29/23)  Enteral Frequency: Continuous  Enteral Regimen: Jevity 1.5 at 45 mL/hr x 22 hrs (holding 1 hr before/after levothyroxine)  Modulars: 2 pkts Prosource TF 20.  Total Enteral Provisions: 990 mL daily provides 1645 kcal (20 kcal per kg), 103g protein (1.3 g per kg actual wt), 214g CHO, 21g fiber and 752mL free water. Meets = 100% of DRI's.  Free Water Flush: 200 mL q4 hours  TF + fluid flush = 1952 mL per day    Updates:   BUN remains elevated, possibly worsened by protein intakes. Will decrease enteral protein intakes and monitor for changes in BUN.    Recommendations:   - Decrease Prosource TF20 to once daily  - Total protein intakes from TF + modulars = 83g/day (1.0g/kg)  - MD/NP, please adjust water flushes as needed    Will continue to follow per protocol. Please page/consult as needed.     Amy Will, VINCENTN, LD

## 2023-12-18 NOTE — CARE CONFERENCE
"Care progression meeting from  am in patient's room.    Family Present: Patient (Jeni), daughter (Roque) and  (Genaro)    Staff Present:  Nicole - ST; Marlene - OT; Olena - PT, Adilson - CNP; Dr Agapito Tavares and Doretha - RN CC.    At the very beginning of care conference, patient wanted to make a statement: She said, \"I am very grateful for all of you helping me.  Everyone has been so kind, loving and helpful. Thank you for everything.\"     OT:  Marlene, Occupational Therapist, shared that she hasn't personally worked with patient yet, but has read patient's notes.  OT has been focusing on the functionality of patients upper extremities, and on finger strength and dexterity.  Patient needs help with ADLS.  No cognitive testing has been done yet but they are planned.  Patient is obviously alert and oriented and able to express complex ideas when her trach is capped.  She is conversant and follows directions well.  Patient is very motivated to get better. OT working with patient 5-6 x week.    PT:  Olena, Physical Therapist, shared that PT is working with patient on bed mobility and sitting balance.  Patient was able to stand for 10 minutes in the standing frame, but requires 2 people to stand without the frame.  PT is working on patients \"gross mobility\" and will continue to see patient 6 x week.  Olena asked if there were any questions?  Daughter asked when patient would be able to shower.  Answer is that usually when trach is capped continuously, they can shower.      ST:  Nicole, Speech Therapist, said that ST is working on voicing and using the speaking valve.  Patient has made much progress since coming here.  Goal is to increase the length of time that patient is able to tolerate having the speaking valve on, and will also work on swallowing when utilizing the speaking valve.   sees patient 5 x week.    VINCENT Reyes, Registered Dietician, is working remotely today, and will continue to follow patient. "  Patient is on Jevity tube feeds continuously,  and appears to be tolerating well.    Pulm:  Adilson, NILSON, said that patient has been doing well with phase 1 weans since she has been here.  Now she is on trials of being off the vent and using speaking valve.  Goal for today will be to keep speaking valve on as long as patient can tolerate, and we will see how she does.   Patient's trach was changed over the weekend, and O2 requirements have varied.  He did also say that patient has been accumulating some fluid, and that patient has been getting a diuretic 2 x day.  Noted that patient's kidney function going down a bit.  He would like to get a Nephrology consult - hopefully they will see patient tomorrow.  BUN is elevated and GFR is less.  He anticipates that some meds may need to be adjusted in the next day or two.  Patient is currently getting Bactrim - may change this to a different antibiotic..  Family asked when patient will be able to eat?  Adilson explained that patients usually can trial swallowing after they tolerate the speaking valve.  Currently patient is ok to have ice chips, for comfort, but shouldn't be drinking water.  He suggested that they can try Biotene for mouth dryness.   Adilson stated that the CXR done last week  was OK, but that left lower lobe was somewhat dense in appearance.  Steroid dose will be tapered down very slowly, current dose of 120 mg will be down to 100 mg on Wed, 12/20. Patient has been tolerating the steroids well and blood sugars have remained in a good range.  Daughter asked about Jeni's oxygen, she was usually on 2-3 L/min at home. She also mentioned that patient used a bipap at home.  Adilson said that they will resume use of home bipap when she is capping continuously.    Dr Altman shared that he will be following patient this week until Saturday.  He anticipates some med changes after Nephrology sees patient. Patient's  asked MD how patient is doing, and Dr explained that  it will take time. Patient was in the ICU for 6 weeks, and is still weak from that.      RN CC - did share with family that we will continue to follow patient as she progresses during her stay at [facility].      Doretha Yañez RN, BSN  Care Coordination  [facility]  [phone]

## 2023-12-19 NOTE — PLAN OF CARE
Problem: Fall Injury Risk  Goal: Absence of Fall and Fall-Related Injury  Outcome: Progressing  Intervention: Identify and Manage Contributors  Recent Flowsheet Documentation  Taken 12/19/2023 0142 by Marlene Copeland RN  Medication Review/Management: medications reviewed  Intervention: Promote Injury-Free Environment  Recent Flowsheet Documentation  Taken 12/19/2023 0142 by Marlene Copeland, RN  Safety Promotion/Fall Prevention:   room door open   room near nurse's station   safety round/check completed   lighting adjusted     Problem: Anxiety Signs/Symptoms  Goal: Improved Sleep (Anxiety Signs/Symptoms)  Outcome: Progressing   Goal Outcome Evaluation:    Patient appeared restless at beginning of shift, removed sensor x 3, SOB with 46 to 50 bpm x 1, prn adm for pain and anxiety with effectiveness aeb patient's ability to rest remainder of shift, alert x 3, continues on vent during noc shift, preventative measures in place and monitored to prevent falls, will continue to monitor.

## 2023-12-19 NOTE — PLAN OF CARE
Problem: Enteral Nutrition  Goal: Absence of Aspiration Signs and Symptoms  Outcome: Progressing  Goal: Safe, Effective Therapy Delivery  Outcome: Progressing  Goal: Feeding Tolerance  Outcome: Progressing     Problem: Fall Injury Risk  Goal: Absence of Fall and Fall-Related Injury  Outcome: Progressing  Intervention: Promote Injury-Free Environment  Recent Flowsheet Documentation  Taken 12/19/2023 1243 by Rosaura Owens RN  Safety Promotion/Fall Prevention:   activity supervised   clutter free environment maintained   room door open   room near nurse's station   supervised activity     Problem: Pain Acute  Goal: Optimal Pain Control and Function  Outcome: Progressing     Problem: Anxiety Signs/Symptoms  Goal: Optimized Energy Level (Anxiety Signs/Symptoms)  Outcome: Progressing   Goal Outcome Evaluation:    Patient was minimally anxious at the start of shift, denied pain though (calmer after morning Seroquel was given). Blood pressure was 160/80 at 0730, 140/67 at 1032. Patient's mitchell bypassed a large amount early in the shift (mitchell has been bypassing/leaking intermittently-mitchell was changed with a 16 Nepali/10 ml latex mitchell (additional 4 ml of normal saline added secondary to mitchell not staying fully inflated), up in chair x 1 this shift. Patient was switched back to  pressure support this shift due to high respiratory rates and possible anxiety (noted to be having intermittent abdominal breathing at the start of the shift too), BG was 123 at 1226, prn artificial saliva solution spray was discontinued (still on schedule 4 times daily), atarax 25 mg was given at 1423 for anxiety, hematoma seen on abdomen and bleeds at times (need to monitor closely and avoid giving heparin there, spouse visited this shift, will have labs in am

## 2023-12-19 NOTE — PROGRESS NOTES
[facility]    Medicine Progress Note - Hospitalist Service    Date of Admission:  12/5/2023    Hospital Course  Matthew Bowen is a 78y F PMHx chronic hypoxic respiratory failure 2/2 pHTN, ALAINA, HFpEF, CAD, CKD3, Obesity, HTN, depression, and anxiety. She presented initially on 10/15 with fever, cough, and falls with a temperature. Further w/u revealed multifocal pneumonia and she was started on antibiotics. Her respiratory status worsened as she developed ARDS and was intubated on 10/21. She had a prolonged ICU stay 2/2 difficulty vent weaning. Eventually she underwent trach/peg placement. Of note, she was treated with high dose steroids for her lung condition and remains on a high dose long taper of steroids. Pt was transferred to [facility] for ongoing vent weaning and therapies.      [facility] Course  12/7-12/10: Hypernatremia to 155 2/2 over-diuresis, intravascular depletion, missing first night of FWF. 2L D5W given, resolved, Na now stable. Torsemide held initially but now restarted at 20mg daily. BP remains elevated, start isosorbide dinitrate 20mg TID. Loperamide for diarrhea.    12/11 - 12/17: Patient started weaning during the week per pulmonology.  She has been making progress with therapies throughout the week. 12/17, Continues to make progress. Remains on vent weaning phase 2.  Care conference tomorrow.  Plan to continue current medical management.     12/18: Care Conference today. Nephro consulted for uptrending BUN, low cystatin C, difficulty maintaining I/O balance. Bactrim -> atovaquone 1.5g daily. Biotene mouth spray scheduled. FWF down to 200cc q4h.   12/19: PS today, holding off further weaning. Repeat labs tomorrow for renal function with med adjustments likely to follow. Last dose of prednisone 120mg today, down to 100mg tomorrow.     Assessment & Plan     CKD  CARMEN, resolved PTA  Elevated BUN  Low Cystatin C  - pt with worsening renal function and uptrending BUN  - difficulty with net I/O balance as  pt persistently positive  - suspect related to previous CKD and prolonged high dose steroids, among other etiologies  - nephro consulted (12/18)  - changed bactrim to atovaquone 1.5g daily (12/18)  - FWF decreased to 200mL q4h (12/18)  - RD noted we could change to more concentrated TF if needed  - torsemide held  - urine studies  - will need to monitor Na levels closely given hx of hypernatremia   - monitor with BMP  - would consider initiation of thiazide for further diuresis and natriuresis given hx of hypernatremia     Acute on Chronic Hypoxic Respiratory Failure  Bronchiectasis?  Multifocal Pneumonia  ARDS, PTA  pHTN  Chronic Hypoxic Respiratory Failure  ALAINA  - pulmonary consulted and following  - treated with high dose steroids previously  - currently on slow steroid taper  - prednisone 120mg daily (60mg in split doses) x2 weeks, last dose 12/19  - start prednisone 100mg daily on 12/20  - Taper regimen after 100mg -> 80mg x2 weeks, then taper down 5mg every 2 weeks  - bactrim changed to atovaquone 1.5g daily (12/18) over worsening renal function  - trach exchange 12/5 by ENT PTA  - good bronchial hygiene with bronchodilators    Diarrhea  - fairly stable at this time  - C. diff negative  - loperamide prn    HTN  - amlodipine 5mg BID (suspect broken up 2/2 labile BP)  - metoprolol tartrate 12.5mg BID  - isosorbide dinitrate 20mg TID (12/10)  - pt was on hydralazine 50mg TID and isosorbide dinitrate 20mg TID     HFpEF, not in exacerbation  - torsemide 20mg every other day     Pain, Acute  - oxycodone 5mg prn    Normocytic, Hypochromic Anemia  - suspect 2/2 critical illness and phlebotomy  - received pRBCs during hospitalization prior to LTACH  - transfuse to maintain Hgb > 7.0  - pantoprazole    CAD  - asa  - atorvastatin    Hypothyroidism  - levothyroxine    Depression  Anxiety  - pregabalin  - sertraline    PEG  - PEG placed 11/29  - RD consulted and following    Hypernatremia - stable/resolved  - likely 2/2  intravascular depletion 2/2 over-diuresis and missing FWF on her first night admitted        Diet: NPO for Medical/Clinical Reasons Except for: Ice Chips, NPO but receiving Tube Feeding  Adult Formula Drip Feeding: Continuous Jevity 1.5; Gastrostomy; Goal Rate: 45; mL/hr; TF to run x22 hrs, hold 1 hr before/after levothyroxine    DVT Prophylaxis: Heparin SQ  Aleman Catheter: PRESENT, indication: Strict 1-2 Hour I&O  Lines: PRESENT      PICC 10/22/23 Triple Lumen Right Basilic multiple med gtt. ready for use-Site Assessment: WDL      Cardiac Monitoring: None  Code Status: Full Code      Clinically Significant Risk Factors              # Hypoalbuminemia: Lowest albumin = 2.9 g/dL at 12/12/2023  6:11 AM, will monitor as appropriate     # Hypertension: Noted on problem list                 Disposition Plan     Expected Discharge Date: 12/21/2023    Discharge Delays: Complex Discharge    Discharge Comments: Vent, Trach, Feeding tube, WOC          Agapito Tavares MD  Hospitalist Service  LTACH  Securely message with marinanow (more info)  Text page via [employer] Paging/Directory   ______________________________________________________________________    Interval History   - no acute events ovn  - pt sitting up in her chair today  - comfortable  - discussed repeat labs tomorrow  - discussed not advancing vent weaning today to let her rest  -  at bedside updated  - no other acute concerns    Physical Exam   Vital Signs: Temp: 98.1  F (36.7  C) Temp src: Oral BP: (!) 160/80 Pulse: 82   Resp: 29 SpO2: 98 % O2 Device: Mechanical Ventilator Oxygen Delivery: 55 LPM  Weight: 186 lbs 4.62 oz  General: She is a well grown well-developed adult female resting in bed comfortably  HEENT: Head is normocephalic atraumatic eyes pupils appear equal round and reactive to light.  Viable trach noted on neck.  Lungs: She has a normal respiratory effort on auscultation good air entry is noted.  No wheezing  Heart: She has a good S1 and S2 no  obvious murmurs peripheral pulses are palpable.  Abdomen: Soft nontender bowel sounds are noted  Musculoskeletal: She has good muscle tone  Digits appear acyanotic  Skin: No obvious rashes noted, she is warm to touch please refer to nursing/wound care note for complete skin exam.    Medical Decision Making       40 MINUTES SPENT BY ME on the date of service doing chart review, history, exam, documentation & further activities per the note.      Data   Lab Results   Component Value Date    WBC 15.8 12/15/2023    WBC 10.8 11/22/2019     Lab Results   Component Value Date    RBC 2.83 12/15/2023    RBC 4.34 11/22/2019     Lab Results   Component Value Date    HGB 7.5 12/15/2023    HGB 12.4 11/22/2019     Lab Results   Component Value Date    HCT 25.7 12/15/2023    HCT 39.9 11/22/2019     Lab Results   Component Value Date    MCV 91 12/15/2023    MCV 92 11/22/2019     Lab Results   Component Value Date    MCH 26.5 12/15/2023    MCH 28.6 11/22/2019     Lab Results   Component Value Date    MCHC 29.2 12/15/2023    MCHC 31.1 11/22/2019     Lab Results   Component Value Date    RDW 20.8 12/15/2023    RDW 14.9 11/22/2019     Lab Results   Component Value Date     12/15/2023     11/22/2019       Last Comprehensive Metabolic Panel:  Sodium   Date Value Ref Range Status   12/18/2023 144 135 - 145 mmol/L Final     Comment:     Reference intervals for this test were updated on 09/26/2023 to more accurately reflect our healthy population. There may be differences in the flagging of prior results with similar values performed with this method. Interpretation of those prior results can be made in the context of the updated reference intervals.    11/22/2019 141 133 - 144 mmol/L Final     Potassium   Date Value Ref Range Status   12/18/2023 4.7 3.4 - 5.3 mmol/L Final   11/22/2019 4.6 3.4 - 5.3 mmol/L Final     Chloride   Date Value Ref Range Status   12/18/2023 103 98 - 107 mmol/L Final   11/22/2019 108 94 - 109 mmol/L  Final     Carbon Dioxide   Date Value Ref Range Status   11/22/2019 27 20 - 32 mmol/L Final     Carbon Dioxide (CO2)   Date Value Ref Range Status   12/18/2023 34 (H) 22 - 29 mmol/L Final     Anion Gap   Date Value Ref Range Status   12/18/2023 7 7 - 15 mmol/L Final   11/22/2019 6 3 - 14 mmol/L Final     Glucose   Date Value Ref Range Status   11/22/2019 112 (H) 70 - 99 mg/dL Final     GLUCOSE BY METER POCT   Date Value Ref Range Status   12/19/2023 136 (H) 70 - 99 mg/dL Final     Urea Nitrogen   Date Value Ref Range Status   12/18/2023 98.2 (H) 8.0 - 23.0 mg/dL Final   11/22/2019 34 (H) 7 - 30 mg/dL Final     Creatinine   Date Value Ref Range Status   12/18/2023 0.75 0.51 - 0.95 mg/dL Final   11/22/2019 1.30 (H) 0.52 - 1.04 mg/dL Final     GFR Estimate   Date Value Ref Range Status   12/18/2023 81 >60 mL/min/1.73m2 Final   11/22/2019 40 (L) >60 mL/min/[1.73_m2] Final     Comment:     Non  GFR Calc  Starting 12/18/2018, serum creatinine based estimated GFR (eGFR) will be   calculated using the Chronic Kidney Disease Epidemiology Collaboration   (CKD-EPI) equation.       Calcium   Date Value Ref Range Status   12/18/2023 9.5 8.8 - 10.2 mg/dL Final   11/22/2019 8.9 8.5 - 10.1 mg/dL Final     Bilirubin Total   Date Value Ref Range Status   12/12/2023 0.2 <=1.2 mg/dL Final   02/13/2019 0.2 0.2 - 1.3 mg/dL Final     Alkaline Phosphatase   Date Value Ref Range Status   12/12/2023 113 40 - 150 U/L Final     Comment:     Reference intervals for this test were updated on 11/14/2023 to more accurately reflect our healthy population. There may be differences in the flagging of prior results with similar values performed with this method. Interpretation of those prior results can be made in the context of the updated reference intervals.   02/13/2019 83 40 - 150 U/L Final     ALT   Date Value Ref Range Status   12/12/2023 35 0 - 50 U/L Final     Comment:     Reference intervals for this test were updated on  6/12/2023 to more accurately reflect our healthy population. There may be differences in the flagging of prior results with similar values performed with this method. Interpretation of those prior results can be made in the context of the updated reference intervals.     02/13/2019 59 (H) 0 - 50 U/L Final     AST   Date Value Ref Range Status   12/12/2023 37 0 - 45 U/L Final     Comment:     Reference intervals for this test were updated on 6/12/2023 to more accurately reflect our healthy population. There may be differences in the flagging of prior results with similar values performed with this method. Interpretation of those prior results can be made in the context of the updated reference intervals.   02/13/2019 48 (H) 0 - 45 U/L Final

## 2023-12-19 NOTE — PROGRESS NOTES
Pulmonary Progress Note    Admit Date: 12/5/2023  CODE: Full Code    HPI:   78 year old female w/PMH complex medical history including chronic hypoxic respiratory failure due to pulmonary HTN on 2L home O2, ALAINA requiring CPAP, chronic diastolic HF, CAD, CKD stage 3, morbid obesity, HTN,  depression, recent falls, hx recurrent pneumonia. Admitted 10/15/23 and treated for CAP with multiple courses of antibiotics.  Worsened requiring intubation, developed ARDS. Cultures negative. Autoimmune work-up grossly negative but did have low IgG level. 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 S/p trach and PEG.  Transferred to [facility] on 12/5. Progressing with weans and now tolerating up to 8hr trach dome with speaking valve per day with breaks on the vent mid day.   Assessment/Plan:     Acute on chronic hypoxic/hypercapnic respiratory failure in setting of underlying pulmonary HTN c/b pneumonia, severe ARDS, possibly organizing pneumonia now s/p trach.    Multifocal CAP: Completed antibiotics 11/29. Serial Sputum cx with candida, beta-D-glucan neg. BAL 10/22 cytology neg.   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 IVIG considered but ultimately not given  Tracheostomy in place: 6 Bivona placed 12/16 d/t cuff leak with Shiley   Oropharyngeal dysphagia status post PEG tube: passed BDT on 12/7, tolerating PMV  Severe ALAINA on APAP 6-16 PTA: PSG 10/28/19 AHI 80   Chronic diastolic HF, Pulm HTN, volume overload: torsemide 60 daily PTA  MDD, Anxiety - stable   Leukocytosis wo fever: Stable/downtrending   Bronchiectasis  Severely deconditioned with risk for steroid myopathy   CARMEN on CKD, Azotemia: BUN 98 today. GFR calculated with Cystatin C 10    Attempted doing TM/PMV continuously yesterday without a mid day break on vent and patient did not tolerate.  After about 9hr, became tachypneic, hypoxic, with inc wob.  Placed back on vent  for the night.  RR 30s thru the night and this morning.  Placed on PS 10/5 and RR dropped to 16-18 and looked more comfortable pulling in large volumes >500mL.      Recommendations:  Phase 1 weans today.  Hold trach dome today and let rest.  PS day.  AC night.  If looks more comfortable on PS may go back to this during the night.    Low threshold to obtain CT chest if any further decline, and may obtain regardless this week with prednisone change coming.   Ok for in-line PMV trials when on vent   If FiO2 >45%, increase PEEP back to 8   Not much room to go on Vt as she's already at 360mL which is ~8mL/kg   Currently on diuretic holiday, checking labs tomorrow.  Renal following   Steroid taper in place: currently on pred 60 BID  Atovaquone for PJP ppx. Bactrim stopped d/t worsening renal function. Dapsone considered in acute care but ultimately not given for unclear reason. G6PD 25 on 11/2.  Pulm toilet: switch duonebs to albuterol as ipratropium may be causing thick secretions.  Albuterol + Metaneb QID, Mucomyst BID  Routine trach care per protocol  GI ppx: PPI  DVT ppx: heparin subcutaneous   VAP ppx: Chlorhexidine    PT/OT/SLP  SLP following: VFSS possibly next week pending progression.  Trial ice chips     Subjective:   - Denies dyspnea, pain, n/v on PS 10/5.      Tracheal secretions:  Overnight - 3x, small/mod, thick  Yesterday - 6x, small, thin/thick     Cough strength: moderate    Ventilator weaning results  12/6: PS 12/8 for ~12hr, tolerated well   12/7: PS 10/5 for 9.5hr, tolerated well   12/8: PS 8/5 for 12.5hr   12/9: No wean d/t elevated BP   12/10: PS 8/5 for 6hr 15min, stopped d/t increased WOB  12/11: PS 8/5 for  1.5hr.  Then TM/PMV x2 for total of almost 5hr   12/12:  PS 8/5 for 1 hr 23 min. TM/PMV 40%/50L x2 for total of almost 5hr  12/13-17: TM/PMV 4-5hr x2. Break on AC mid diay and AC at night  12/18: TM/PMV for 8hr 45min.  Hypoxia/inc wob/tachypnea at end of wean.      Clinical status discussed  today with respiratory therapist     ROS: 10-system review obtained and negative with the exception of the symptoms noted above.    Medications:      acetaminophen  650 mg Oral or Feeding Tube 4x Daily    acetylcysteine  2 mL Nebulization BID    amLODIPine  5 mg Oral or Feeding Tube BID    artificial saliva  1 spray Mouth/Throat 4x Daily    aspirin  81 mg Oral or Feeding Tube Daily    atorvastatin  20 mg Oral or Feeding Tube QPM    [START ON 12/20/2023] atovaquone  1,500 mg Per Feeding Tube Daily    B and C vitamin Complex with folic acid  5 mL Oral or Feeding Tube Daily    chlorhexidine  15 mL Mouth/Throat BID    cholecalciferol  50 mcg Per Feeding Tube Daily    heparin ANTICOAGULANT  5,000 Units Subcutaneous Q8H    insulin aspart  1-6 Units Subcutaneous Q6H    ipratropium - albuterol 0.5 mg/2.5 mg/3 mL  3 mL Nebulization 4x daily    isosorbide dinitrate  20 mg Per Feeding Tube TID    levothyroxine  150 mcg Oral or Feeding Tube QAM AC    metoprolol  12.5 mg Per Feeding Tube BID    miconazole   Topical BID    mineral oil-hydrophilic petrolatum   Topical Daily    pantoprazole  40 mg Per Feeding Tube QAM AC    [START ON 12/20/2023] predniSONE  50 mg Oral BID w/meals    predniSONE  60 mg Per Feeding Tube BID    pregabalin  25 mg Per Feeding Tube Daily    protein modular  1 packet Per Feeding Tube Daily    QUEtiapine  150 mg Oral or Feeding Tube BID    sertraline  150 mg Oral or Feeding Tube Daily    simethicone  40 mg Per Feeding Tube TID    sodium chloride (PF)  10 mL Intracatheter Q8H    sodium chloride (PF)  10-40 mL Intracatheter Q7 Days    [Held by provider] torsemide  20 mg Per Feeding Tube Every Other Day         Exam/Data:   Vitals  BP (!) 140/67 (BP Location: Left arm)   Pulse 72   Temp 98.1  F (36.7  C) (Oral)   Resp 29   Wt 84.5 kg (186 lb 4.6 oz)   SpO2 96%   BMI 36.38 kg/m       I/O last 3 completed shifts:  In: 2338 [I.V.:90; NG/GT:2248]  Out: 1355 [Urine:1355]  Weight change:     Vent Mode: CPAP/PS   (Continuous positive airway pressure with Pressure Support)  FiO2 (%): 45 %  Resp Rate (Set): 18 breaths/min  Tidal Volume (Set, mL): 360 mL  PEEP (cm H2O): 5 cmH2O  Pressure Support (cm H2O): 10 cmH2O  Resp: 29    EXAM:  Gen: NAD in chair on PS 10/5  HEENT: NT, trach midline/intact  CV: RRR, no m/g/r  Resp: CTAB anteriorly, non-labored, no wheeze   Abd: large, soft, nontender, BS+  Skin: no rashes or lesions  Ext: mild edema, very weak, able to move all   Neuro: alert, follows commands    Labs:    Complete Blood Count   Recent Labs   Lab 12/18/23  0648 12/15/23  0626 12/13/23  0558   WBC 12.7* 15.8* 16.2*   HGB 7.4* 7.5* 8.1*    187 210     Basic Metabolic Panel  Recent Labs   Lab 12/19/23  1226 12/19/23  0605 12/19/23  0027 12/18/23  2317 12/18/23  1152 12/18/23  0648 12/15/23  1208 12/15/23  0626 12/13/23  1158 12/13/23  0558   NA  --   --   --   --   --  144  --  142  --  144   POTASSIUM  --   --   --   --   --  4.7  --  4.9  --  4.3   CHLORIDE  --   --   --   --   --  103  --  100  --  101   CO2  --   --   --   --   --  34*  --  33*  --  34*   BUN  --   --   --   --   --  98.2*  --  92.1*  --  86.8*   CR  --   --   --   --   --  0.75  --  0.81  --  0.76   * 136* 184* 200*   < > 133*   < > 115*   < > 110*    < > = values in this interval not displayed.       Radiology: Personally reviewed; radiology read below    XR CHEST 12/15/2023  Tracheostomy tube in place with tip near the thoracic inlet. Right upper extremity PICC with tip in the mid SVC. Persistent consolidative opacities with air bronchograms in the right midlung and bilateral lung bases with background diffuse interstitial prominence. Stable cardiomediastinal contours with calcified tortuous aorta. No significant interval change.    XR CHEST 12/7/2023  Endotracheal tube seen on the prior study has been replaced with a tracheostomy tube which appears in good position in the mid trachea. PICC catheter from right upper extremity approach is  unchanged with tip in the superior vena cava. There is airspace consolidation with minimal volume loss in the right upper lobe consistent with right upper lobe pneumonia. There is persistent opacity with bronchial thickening at the left base spine the heart which is stable. Stable cardiac size. Normal pulmonary vascularity. No pneumothorax or pleural effusion. Calcified aortic arch.    XR CHEST 11/26/2023  papo AP view of the chest was obtained. Endotracheal tube tip projects over mid thoracic trachea approximately 4 cm from the ashley. Enteric tube crosses the diaphragm with the distal tip outside the field-of-view. Right upper extremity PICC tip projects over high/mid SVC. Cardiomegaly. Left basilar and right mid/upper lobe patchy pulmonary opacity, could represent atelectasis versus infection. No significant pleural effusion or pneumothorax.     CT CHEST WITHOUT CONTRAST 11/24/2023                                 LUNGS AND PLEURA: Pulmonary infiltrates again seen throughout both  lungs. There is increased airspace consolidation with air bronchograms  in both lower lobes and in the posterior right upper lobe when  compared to previous. There are groundglass opacities with septal  thickening throughout the remainder of both lungs. Mild cylindrical  bronchiectasis. No pleural effusion or pneumothorax.     IMPRESSION:  1.  Extensive bilateral pulmonary infiltrates again seen. Increasing  airspace consolidation in the right upper lobe and both lower lobes  when compared to previous.    Adilson Prakash CNP  Pulmonary Medicine  [facility]  Pager [phone]

## 2023-12-19 NOTE — PLAN OF CARE
Problem: Mechanical Ventilation Invasive  Goal: Effective Communication  Outcome: Progressing  Goal: Mechanical Ventilation Liberation  Outcome: Progressing  Goal: Absence of Device-Related Skin and Tissue Injury  Outcome: Progressing  Goal: Absence of Ventilator-Induced Lung Injury  Outcome: Progressing   Goal Outcome Evaluation:       RT PROGRESS NOTE     DATA:     CURRENT SETTINGS: AC 16  (decreased from 18), 360, +5, 45%             TRACH TYPE / SIZE:# 6 BIVONA Changed  on 12/16/2023              MODE: AC              FIO2: 45%      ACTION:             THERAPIES : Albuterol neb QID, MetaNeb QID             SUCTION:                           FREQUENCY: X5                        AMOUNT:Small to Moderate                         CONSISTENCY: thick                        COLOR: Pale yellow              SPONTANEOUS COUGH EFFORT/STRENGTH OF EFFORT (not elicited by suctioning): Strong                              WEANING PHASE: 1                        WEAN MODE: CPAP 5/PS 10                         WEAN TIME: 5 hrs and Since 16:10                         END WEAN REASON:Ongoing        RESPONSE:             BS: Decreased Coarse                VITAL SIGNS:  Sat 95-98%, HR 67-79  RR 18-34             EMOTIONAL NEEDS / CONCERNS:Anxiety                   RISK FOR SELF DECANNULATION: no                        RISK DUE TO: no                         INTERVENTION/S IN PLACE IS/ARE:         NOTE / PLAN:  Cont. Current plan of care /PS day.  AC night.  If FiO2 >45%, increase PEEP back to 8.  If patient looks more uncomfortable when placing back on AC for the night, ok to switch back to PS if she tolerates this better.

## 2023-12-19 NOTE — PLAN OF CARE
Problem: Mechanical Ventilation Invasive  Goal: Effective Communication  Outcome: Progressing  Goal: Mechanical Ventilation Liberation  Outcome: Progressing  Goal: Absence of Ventilator-Induced Lung Injury  Outcome: Progressing   Goal Outcome Evaluation:  RT PROGRESS NOTE     DATA:     CURRENT SETTINGS: AC, 18, 360, +5             TRACH TYPE/SIZE:  6 Bivona 12/16/23             MODE:  A/C             FIO2:  45%     ACTION:             THERAPIES:  Duo-neb/Muco/volera qid             SUCTION:                           FREQUENCY:  x2                        AMOUNT: Small                        CONSISTENCY: Thick                         COLOR:  White/yellow             SPONTANEOUS COUGH EFFORT/STRENGTH OF EFFORT (not elicited by suctioning): Yes/ok                              WEANING PHASE:  2                        WEAN MODE:                          WEAN TIME:                         END WEAN REASON:      RESPONSE:             BS: Coarse             VITAL SIGNS:  Sat 94-98%, HR 62-80, RR 21-26             EMOTIONAL NEEDS / CONCERNS:                  RISK FOR SELF DECANNULATION:                          RISK DUE TO:                          INTERVENTION/S IN PLACE IS/ARE:         NOTE / PLAN:   Cont with plan.

## 2023-12-19 NOTE — PLAN OF CARE
Goal Outcome Evaluation:       Patient alert and oriented. Anxious during pt placed back to mechanical vent. PRN ATARAX given with effect. Pt able to calm down and rest quietly for a couple of hours. Pain managed by scheduled tylenol. Pt tolerated TF well. Good amount of clear, yellow urine out put noted. All cares met.  Problem: Adult Inpatient Plan of Care  Goal: Absence of Hospital-Acquired Illness or Injury  Intervention: Identify and Manage Fall Risk  Recent Flowsheet Documentation  Taken 12/18/2023 1800 by Edenilson Rosas RN  Safety Promotion/Fall Prevention:   activity supervised   lighting adjusted  Intervention: Prevent Infection  Recent Flowsheet Documentation  Taken 12/18/2023 1800 by Edenilson Rosas RN  Infection Prevention: cohorting utilized  Goal: Optimal Comfort and Wellbeing  Intervention: Provide Person-Centered Care  Recent Flowsheet Documentation  Taken 12/18/2023 1800 by Edenilson Rosas RN  Trust Relationship/Rapport:   care explained   choices provided     Problem: Mechanical Ventilation Invasive  Goal: Effective Communication  Intervention: Ensure Effective Communication  Recent Flowsheet Documentation  Taken 12/18/2023 1800 by Edenilson Rosas RN  Communication Enhancement Strategies: call light answered in person  Family/Support System Care: presence promoted  Trust Relationship/Rapport:   care explained   choices provided  Goal: Mechanical Ventilation Liberation  Intervention: Promote Extubation and Mechanical Ventilation Liberation  Recent Flowsheet Documentation  Taken 12/18/2023 1800 by Edenilson Rosas RN  Medication Review/Management: medications reviewed  Goal: Absence of Ventilator-Induced Lung Injury  Intervention: Prevent Ventilator-Associated Pneumonia  Recent Flowsheet Documentation  Taken 12/18/2023 1800 by Edenilson Rosas RN  Oral Care: swabbed with sterile water     Problem: Enteral Nutrition  Goal: Absence of Aspiration Signs and Symptoms  Intervention: Minimize  Aspiration Risk  Recent Flowsheet Documentation  Taken 12/18/2023 1800 by Edenilson Rosas RN  Enteral Feeding Safety: tubing labeled as enteral feeding  Oral Care: swabbed with sterile water  Goal: Safe, Effective Therapy Delivery  Intervention: Prevent Feeding-Related Adverse Events  Recent Flowsheet Documentation  Taken 12/18/2023 1800 by Edenilson Rosas RN  Enteral Feeding Safety: tubing labeled as enteral feeding  Goal: Feeding Tolerance  Intervention: Prevent and Manage Feeding Intolerance  Recent Flowsheet Documentation  Taken 12/18/2023 1800 by Edenilson Rosas RN  Nutrition Support Management: weight trending reviewed     Problem: Fall Injury Risk  Goal: Absence of Fall and Fall-Related Injury  Intervention: Identify and Manage Contributors  Recent Flowsheet Documentation  Taken 12/18/2023 1800 by Edenilson Rosas RN  Medication Review/Management: medications reviewed  Intervention: Promote Injury-Free Environment  Recent Flowsheet Documentation  Taken 12/18/2023 1800 by Edenilson Rosas RN  Safety Promotion/Fall Prevention:   activity supervised   lighting adjusted     Problem: Pain Acute  Goal: Optimal Pain Control and Function  Intervention: Prevent or Manage Pain  Recent Flowsheet Documentation  Taken 12/18/2023 1800 by Edenilson Rosas RN  Sensory Stimulation Regulation: care clustered  Medication Review/Management: medications reviewed     Problem: Anxiety Signs/Symptoms  Goal: Enhanced Social, Occupational or Functional Skills (Anxiety Signs/Symptoms)  Intervention: Promote Social, Occupational and Functional Ability  Recent Flowsheet Documentation  Taken 12/18/2023 1800 by Edenilson Rosas RN  Trust Relationship/Rapport:   care explained   choices provided

## 2023-12-20 NOTE — PLAN OF CARE
Problem: Mechanical Ventilation Invasive  Goal: Effective Communication  Outcome: Progressing  Goal: Mechanical Ventilation Liberation  Outcome: Progressing  Goal: Absence of Ventilator-Induced Lung Injury  Outcome: Progressing   Goal Outcome Evaluation:  RT PROGRESS NOTE     DATA:     CURRENT SETTINGS: AC, 18, 360, +5             TRACH TYPE/SIZE:  6 Bivona 12/16/23             MODE:  A/C             FIO2:  45%     ACTION:             THERAPIES:  Duo-neb/Muco/volera qid             SUCTION:                           FREQUENCY:  x2                        AMOUNT: Small                        CONSISTENCY: Thick                         COLOR:  White/yellow             SPONTANEOUS COUGH EFFORT/STRENGTH OF EFFORT (not elicited by suctioning): Yes/ok                              WEANING PHASE:  2                        WEAN MODE:  CPAP/PS 10/5                        WEAN TIME:  8:1                        END WEAN REASON: d/t increased WOB RR 40's     RESPONSE:             BS: Coarse             VITAL SIGNS:  Sat 94-98%, HR 62-80, RR 21-26             EMOTIONAL NEEDS / CONCERNS:                  RISK FOR SELF DECANNULATION:                          RISK DUE TO:                          INTERVENTION/S IN PLACE IS/ARE:         NOTE / PLAN:   Cont with plan. Pt did inline PMV with speech for 25 minutes with RT present in the room. No distress was noted.

## 2023-12-20 NOTE — PLAN OF CARE
Problem: Mechanical Ventilation Invasive  Goal: Effective Communication  Outcome: Progressing  Goal: Mechanical Ventilation Liberation  Outcome: Progressing  Goal: Absence of Device-Related Skin and Tissue Injury  Outcome: Progressing  Goal: Absence of Ventilator-Induced Lung Injury  Outcome: Progressing      RT PROGRESS NOTE     DATA:     CURRENT SETTINGS: AC 16/360/+5             TRACH TYPE / SIZE:  #6 Bivona changed on 12/16/23             MODE:   AC             FIO2:   45%     ACTION:             THERAPIES:   ALB TID/MUCOMYST BID/ VOLERA QID             SUCTION:                           FREQUENCY:   X2                        AMOUNT:   Moderate to small                        CONSISTENCY:   Thick                        COLOR:   White/yellow             SPONTANEOUS COUGH EFFORT/STRENGTH OF EFFORT (not elicited by suctioning): Yes:Strong                              WEANING PHASE:#1                           WEAN MODE: PS 10/5                         WEAN TIME:   4 hrs and 10 min                        END WEAN REASON:  Per order      RESPONSE:             BS:   Clear/diminish             VITAL SIGNS:   SAT %, HR 71-80, RR 22-30             EMOTIONAL NEEDS / CONCERNS:  N/A                RISK FOR SELF DECANNULATION:  N/A                        RISK DUE TO:                          INTERVENTION/S IN PLACE IS/ARE:  N/A       NOTE / PLAN:   Pt is on full vent support, mj well. Cont weaning on PS 8-15 days as mj and if fio2 >45% increase the peep to 8 and full vent at night and keep sat >/=90%

## 2023-12-20 NOTE — PROGRESS NOTES
Pulmonary Progress Note    Admit Date: 12/5/2023  CODE: Full Code    HPI:   78 year old female w/PMH complex medical history including chronic hypoxic respiratory failure due to pulmonary HTN on 2L home O2, severe ALAINA on CPAP, chronic diastolic HF, CAD, CKD stage 3, morbid obesity, HTN,  depression, recent falls, hx recurrent pneumonia. Admitted 10/15/23 and treated for CAP with multiple courses of antibiotics. Developed ARDS. Cultures negative. Autoimmune work-up grossly negative except for low IgG level. 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 S/p trach and PEG.  [facility] Progressing slowly with PST and trach dome.    Assessment/Plan:     Acute on chronic hypoxic/hypercapnic respiratory failure in setting of underlying pulmonary HTN c/b pneumonia, severe ARDS, possibly organizing pneumonia now s/p trach.    Multifocal CAP: Completed treatment 11/29  Serial Sputum cx with candida, beta-D-glucan neg. BAL 10/22 cytology neg.   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 IVIG considered but ultimately not given.  Tracheostomy in place: 6 Bivona placed 12/16  Oropharyngeal dysphagia status post PEG tube: passed BDT on 12/7, tolerating PMV  Severe ALAINA on APAP 6-16 PTA: PSG 10/28/19 AHI 80   Chronic diastolic HF, Pulm HTN, volume overload: torsemide 60 daily PTA  MDD, Anxiety - stable   Leukocytosis on high dose steroid  Bronchiectasis on CT chest   Severely deconditioned with risk for steroid myopathy   CARMEN on CKD, Azotemia: eGFR with Cystatin C is 10.  Diuretic on hold, BUN improved slightly today. Renal following. Making adjustments to FWF, holding PO protein.     Attempted doing TM/PMV continuously 12/18 without a mid day break on vent and patient did not tolerate.  After about 9hr, became tachypneic, hypoxic with inc wob.  Doing PST during the day  currently.     Recommendations:  Phase 1 weans today.  PS day.  AC night.  Consider restarting trach dome trials tomorrow  If c/o sob overnight on AC may try PS as may tolerate this better.   CT chest wo contrast next week to follow up on GGO evolution - sooner if clinical change    Ok for in-line PMV trials when on vent   On diuretic holiday. Low threshold to restart if any resp decline. Renal function is very tenuous, appreciate Nephrology recs.   Steroid taper in place: currently on pred 50 BID  Atovaquone for PJP ppx  Albuterol QID, Mucomyst & metaneb BID  Routine trach care/changes per protocol  GI ppx: PPI  DVT ppx: heparin subcutaneous   VAP ppx: Chlorhexidine, HOB 30 degrees  PT/OT/SLP  Consider VFSS next week pending progression.  Trial ice chips     Subjective:   - Per dtr patient didn't have a good night and c/o being air hungry overnight.  No issue today  - sitting in chair & denies sob, pain, n/v on PST       Tracheal secretions:  Overnight - 2x, small/mod, thick  Yesterday - 5x, small/mod, thick     Cough strength: moderate    Ventilator weaning results  12/6: PS 12/8 for ~12hr, tolerated well   12/7: PS 10/5 for 9.5hr, tolerated well   12/8: PS 8/5 for 12.5hr   12/9: No wean d/t elevated BP   12/10: PS 8/5 for 6hr 15min, stopped d/t increased WOB  12/11: PS 8/5 for  1.5hr.  Then TM/PMV x2 for total of almost 5hr   12/12:  PS 8/5 for 1 hr 23 min. TM/PMV 40%/50L x2 for total of almost 5hr  12/13-17: TM/PMV 4-5hr x2. Break on AC mid diay and AC at night  12/18: TM/PMV for 8hr 45min.  Hypoxia/inc wob/tachypnea at end of wean.    12/19: PS 10/5 for 9hr total. AC night     Clinical status discussed today with respiratory therapist     ROS: 10-system review obtained and negative with the exception of the symptoms noted above.    Medications:      acetaminophen  650 mg Oral or Feeding Tube 4x Daily    acetylcysteine  2 mL Nebulization BID    albuterol  2.5 mg Nebulization 4x Daily    amLODIPine  5 mg Oral or  Feeding Tube BID    artificial saliva  1 spray Mouth/Throat 4x Daily    aspirin  81 mg Oral or Feeding Tube Daily    atorvastatin  20 mg Oral or Feeding Tube QPM    atovaquone  1,500 mg Per Feeding Tube Daily    B and C vitamin Complex with folic acid  5 mL Oral or Feeding Tube Daily    chlorhexidine  15 mL Mouth/Throat BID    cholecalciferol  50 mcg Per Feeding Tube Daily    heparin ANTICOAGULANT  5,000 Units Subcutaneous Q8H    insulin aspart  1-6 Units Subcutaneous Q6H    isosorbide dinitrate  20 mg Per Feeding Tube TID    levothyroxine  150 mcg Oral or Feeding Tube QAM AC    metoprolol  12.5 mg Per Feeding Tube BID    miconazole   Topical BID    mineral oil-hydrophilic petrolatum   Topical Daily    pantoprazole  40 mg Per Feeding Tube QAM AC    [START ON 1/3/2024] predniSONE  40 mg Per Feeding Tube 2 times per day    predniSONE  50 mg Oral BID w/meals    pregabalin  25 mg Per Feeding Tube Daily    protein modular  1 packet Per Feeding Tube Daily    QUEtiapine  150 mg Oral or Feeding Tube BID    sertraline  150 mg Oral or Feeding Tube Daily    simethicone  40 mg Per Feeding Tube TID    sodium chloride (PF)  10 mL Intracatheter Q8H    sodium chloride (PF)  10-40 mL Intracatheter Q7 Days    [Held by provider] torsemide  20 mg Per Feeding Tube Every Other Day         Exam/Data:   Vitals  BP (!) 154/71 (BP Location: Left arm, Patient Position: Chair)   Pulse 69   Temp 97.8  F (36.6  C) (Oral)   Resp 24   Wt 84.5 kg (186 lb 4.6 oz)   SpO2 99%   BMI 36.38 kg/m       I/O last 3 completed shifts:  In: 1192 [I.V.:30; NG/GT:1162]  Out: 2500 [Urine:2500]  Weight change:     Vent Mode: CPAP/PS  (Continuous positive airway pressure with Pressure Support)  FiO2 (%): 45 %  Resp Rate (Set): 16 breaths/min  Tidal Volume (Set, mL): 360 mL  PEEP (cm H2O): 5 cmH2O  Pressure Support (cm H2O): 10 cmH2O  Resp: 24    EXAM:  Gen: NAD in chair on PS wean  HEENT: NT, trach midline/intact  CV: RRR, no m/g/r  Resp: CTAB anteriorly,  non-labored, no wheeze   Abd: large, soft, nontender, BS+  Skin: no rashes or lesions  Ext: mild RUE edema, very weak, able to move all   Neuro: alert, follows commands    Labs:    Complete Blood Count   Recent Labs   Lab 12/20/23  0612 12/18/23  0648 12/15/23  0626   WBC 11.9* 12.7* 15.8*   HGB 7.0* 7.4* 7.5*    205 187     Basic Metabolic Panel  Recent Labs   Lab 12/20/23  1205 12/20/23  0612 12/20/23  0535 12/20/23  0020 12/18/23  1152 12/18/23  0648 12/15/23  1208 12/15/23  0626   NA  --  144  --   --   --  144  --  142   POTASSIUM  --  4.9  --   --   --  4.7  --  4.9   CHLORIDE  --  105  --   --   --  103  --  100   CO2  --  32*  --   --   --  34*  --  33*   BUN  --  84.3*  --   --   --  98.2*  --  92.1*   CR  --  0.70  --   --   --  0.75  --  0.81   * 121* 121* 187*   < > 133*   < > 115*    < > = values in this interval not displayed.         Radiology: Personally reviewed; radiology read below    XR CHEST 12/15/2023  Tracheostomy tube in place with tip near the thoracic inlet. Right upper extremity PICC with tip in the mid SVC. Persistent consolidative opacities with air bronchograms in the right midlung and bilateral lung bases with background diffuse interstitial prominence. Stable cardiomediastinal contours with calcified tortuous aorta. No significant interval change.    XR CHEST 12/7/2023  Endotracheal tube seen on the prior study has been replaced with a tracheostomy tube which appears in good position in the mid trachea. PICC catheter from right upper extremity approach is unchanged with tip in the superior vena cava. There is airspace consolidation with minimal volume loss in the right upper lobe consistent with right upper lobe pneumonia. There is persistent opacity with bronchial thickening at the left base spine the heart which is stable. Stable cardiac size. Normal pulmonary vascularity. No pneumothorax or pleural effusion. Calcified aortic arch.    XR CHEST 11/26/2023  papo AP view  of the chest was obtained. Endotracheal tube tip projects over mid thoracic trachea approximately 4 cm from the ashley. Enteric tube crosses the diaphragm with the distal tip outside the field-of-view. Right upper extremity PICC tip projects over high/mid SVC. Cardiomegaly. Left basilar and right mid/upper lobe patchy pulmonary opacity, could represent atelectasis versus infection. No significant pleural effusion or pneumothorax.     CT CHEST WITHOUT CONTRAST 11/24/2023                                 LUNGS AND PLEURA: Pulmonary infiltrates again seen throughout both  lungs. There is increased airspace consolidation with air bronchograms  in both lower lobes and in the posterior right upper lobe when  compared to previous. There are groundglass opacities with septal  thickening throughout the remainder of both lungs. Mild cylindrical  bronchiectasis. No pleural effusion or pneumothorax.     IMPRESSION:  1.  Extensive bilateral pulmonary infiltrates again seen. Increasing  airspace consolidation in the right upper lobe and both lower lobes  when compared to previous.    Adilson Prakash CNP  Pulmonary Medicine  [facility]  Pager [phone]

## 2023-12-20 NOTE — PROGRESS NOTES
CLINICAL NUTRITION SERVICES - REASSESSMENT NOTE      RECOMMENDATIONS FOR MD/PROVIDER TO ORDER:   Adjust FWF PRN   Recommendations Ordered by Registered Dietitian (RD):  Increase TF provisions to better meet caloric needs w/ decrease in protein modulars    Recommend TF as Follows:   Type of Feeding Tube: PEG (placed 11/29/23)  Enteral Frequency: Continuous  Enteral Regimen: Jevity 1.5 at 50 mL/hr x 22 hrs (holding 1 hr before/after levothyroxine)  Modulars: 1 pkt Prosource TF 20  Total Enteral Provisions: 1100 mL daily provides 1730 kcal (21 kcal per kg), 90g protein (1.1 g per kg actual wt), 238g CHO, 23g fiber and 836mL free water. Meets = 100% of DRI's.  Free Water Flush: 120 mL q4 hours  TF + fluid flush = 1556 mL per day   Future/Additional Recommendations:   Continue to monitor renal function, tolerance   Malnutrition: 12/6  % Weight Loss:  Weight loss does not meet criteria for malnutrition  % Intake:  No decreased intake noted  Subcutaneous Fat Loss:  None observed  Muscle Loss:  Temporal region moderate depletion, Clavicle bone region moderate depletion, and Dorsal hand region severe depletion  Fluid Retention:  Mild in bilateral upper and lower extremities, non-pitting. Suspect r/t clinical status vs. Malnutrition as patient was eating well PTA and meeting needs via TF since initiation of EN     Malnutrition Diagnosis: Patient does not meet two of the above criteria necessary for diagnosing malnutrition     EVALUATION OF PROGRESS TOWARD GOALS   Diet:  Orders Placed This Encounter      NPO for Medical/Clinical Reasons Except for: Ice Chips, NPO but receiving Tube Feeding    Nutrition Support:    Type of Feeding Tube: PEG (placed 11/29/23)  Enteral Frequency: Continuous  Enteral Regimen: Jevity 1.5 at 45 mL/hr x 22 hrs (holding 1 hr before/after levothyroxine)  Modulars: 1 pkt Prosource TF 20  Total Enteral Provisions: 990 mL daily provides 1565 kcal (19 kcal per kg), 83g protein (1.0 g per kg actual wt),  214g CHO, 21g fiber and 752mL free water. Meets = 100% of DRI's.  Free Water Flush: 120 mL q4 hours  TF + fluid flush = 1472 mL per day    Intake/Tolerance:    Patient tolerating TF well at goal rate.    Total EN volume received:  6-day average: 698mL, 70% of prescribed volume received, if intakes accurately recorded.    ASSESSED NUTRITION NEEDS:  Dosing Weight: 81.3kg (lowest weight) for energy  Estimated Energy Needs: 0609-7540+ kcals/day (20 - 25 kcals/kg)  Justification: Maintenance (anticipate higher needs once patient participating with therapies)  Estimated Protein Needs: 81-98 grams protein/day (1-1.2 grams of pro/kg)  Justification: Hypercatabolism with critical illness vs. CKD w/ elevated BUN  Estimated Fluid Needs: Per provider pending fluid status    NEW FINDINGS:   - FWF further decreased by nephrology today    Skin: WNL  I/O: +6L in 2 weeks per chart  BM: Patient stooling 1-4x/day over past week most days 2-3 Bms/day  Meds: tylenol solution QID, nephronex, cholecalciferol, novolog, levothyroxine, protonix, prednisone, simethicone TID, torsemide 20 mg daily (currently held)  Electrolytes: hypermagnesemia ongoing, new hyperphosphatemia  BG: Well managed  Weight: overall increased since admission 2/2 fluid retention. Trending down a little since 12/15 w/ changes in fluid management  12/17/23 0540 84.5 kg (186 lb 4.6 oz) Bed scale   12/16/23 0630 85 kg (187 lb 6.3 oz) Bed scale   12/15/23 0330 85.5 kg (188 lb 7.9 oz) Bed scale   12/14/23 0330 85.6 kg (188 lb 11.4 oz) Bed scale   12/13/23 0130 84.5 kg (186 lb 4.6 oz) Bed scale   12/12/23 0700 81.5 kg (179 lb 10.8 oz) --   12/11/23 0005 81.3 kg (179 lb 3.7 oz) Bed scale   12/10/23 0018 82.3 kg (181 lb 7 oz) Bed scale   12/08/23 2347 81.6 kg (179 lb 14.3 oz) Bed scale     Recent Labs   Lab 12/20/23  0612 12/20/23  0535 12/20/23  0020 12/19/23  1805 12/19/23  1226 12/19/23  0605   * 121* 187* 183* 123* 136*     Labs:  Electrolytes  Potassium (mmol/L)    Date Value   12/20/2023 4.9   12/18/2023 4.7   12/15/2023 4.9   11/22/2019 4.6   11/21/2019 4.4   11/20/2019 4.5     Phosphorus (mg/dL)   Date Value   12/18/2023 4.7 (H)   12/11/2023 3.8   12/05/2023 4.0   12/04/2023 2.4 (L)   12/03/2023 2.5    Blood Glucose  Glucose (mg/dL)   Date Value   12/20/2023 121 (H)   11/22/2019 112 (H)   11/21/2019 97   11/20/2019 109 (H)   02/13/2019 122 (H)   02/12/2019 161 (H)     GLUCOSE BY METER POCT (mg/dL)   Date Value   12/20/2023 121 (H)   12/20/2023 187 (H)   12/19/2023 183 (H)   12/19/2023 123 (H)   12/19/2023 136 (H)     Hemoglobin A1C (%)   Date Value   10/22/2023 5.6    Inflammatory Markers  WBC (10e9/L)   Date Value   11/22/2019 10.8   11/20/2019 10.5   02/12/2019 10.1     WBC Count (10e3/uL)   Date Value   12/20/2023 11.9 (H)   12/18/2023 12.7 (H)   12/15/2023 15.8 (H)     Albumin (g/dL)   Date Value   12/12/2023 2.9 (L)   12/11/2023 3.3 (L)   11/18/2023 2.7 (L)   02/13/2019 2.7 (L)      Magnesium (mg/dL)   Date Value   12/20/2023 2.7 (H)   12/18/2023 2.6 (H)   12/15/2023 2.6 (H)     Sodium (mmol/L)   Date Value   12/20/2023 144   12/18/2023 144   12/15/2023 142   11/22/2019 141   11/21/2019 142   11/20/2019 143    Renal  Urea Nitrogen (mg/dL)   Date Value   12/20/2023 84.3 (H)   12/18/2023 98.2 (H)   12/15/2023 92.1 (H)   11/22/2019 34 (H)   11/21/2019 47 (H)   11/20/2019 54 (H)     Creatinine (mg/dL)   Date Value   12/20/2023 0.70   12/18/2023 0.75   12/15/2023 0.81   11/22/2019 1.30 (H)   11/21/2019 1.49 (H)   11/20/2019 1.58 (H)     Additional  Triglycerides (mg/dL)   Date Value   11/21/2019 80     Ketones Urine (mg/dL)   Date Value   12/18/2023 Negative        Previous Goals:   Meet nutritional needs from TF % - not met per chart recordings  BG WNL - progressing  Electrolyte WNL - not met, hyperphosphatemia  Normalizing stool - met  Weight maintenance - difficult to assess d/t fluid shifts    Previous Nutrition Diagnosis:   Inadequate oral intake related to NPO  status secondary to dysphagia as evidenced by ongoing needs for enteral tube feed to meet nutrition needs.  Evaluation: No change    CURRENT NUTRITION DIAGNOSIS  Inadequate oral intake related to dysphagia as evidenced by NPO status and reliance on enteral nutrition to meet 100% nutrition needs.    INTERVENTIONS  Recommendations / Nutrition Prescription  See top of note.    Implementation  EN Composition, EN Schedule, and Feeding Tube Flush    Goals  Meet nutritional needs from TF %   BG WNL  Electrolyte WNL  Regular stooling  Weight maintenance    MONITORING AND EVALUATION:  Progress towards goals will be monitored and evaluated per protocol and Practice Guidelines    Amy Will RDN, LD  Clinical Dietitian

## 2023-12-20 NOTE — PROGRESS NOTES
Pt alert and oriented but non verbal. Pt is on  a the vent. Pt denied pain but seems to be anxious sometimes during the shift, prn atarax was given around 1423 and scheduled Seroquel given at bedtime, with good effect. Pt has triple lumen PICC and all three are patent. pt repositioned as needed.Staff will continue to monitor.

## 2023-12-20 NOTE — PROGRESS NOTES
"    RENAL PROGRESS NOTE    CC:  High BUN    HPI:Matthew Bowen is a 77 y/o F with a PMH  sig for recent acute on chronic  hypoxic resp failure (was on chonic home 02), ALAINA/CPAP, chronicdiastolic HF ASCAD, stage 3 CKD, obesity,  HTN. Renal consulted for high BUN.   She was admitted to [facility] for recurrent falls, 10/15/23, tx for CAP with multifple course of antibx, eventually re intubation, dev ARDS, suspect d/t pulm HTN.  Moderately low IgG, on steroids,no IVIG. Autoimmune w/up was mostly negative , vasculitis r/o. Now s/p trach and PEG. She was transferre to [facility] on 12/5.   Looks to have longstanding underlying CKD for some time, Baseline sCr 1.4-1.6 in 2022-23.  sCr trending <1 in the setting  of severe sarcopenia very limited mobility.  Cystatin C since Oct' 23 in the 3.5-4 range (GFR 10-14).  Standard BMP/GFR grossly overestimates renal function.  She is urinating well (has mitchell).  No h/o tanesah hematuria.  Does have some microhematuria last 2 cks, likely I nthe setting of mitchell, ? Contamination vs colonization with yeast/bacteria .  UA is otherwise bland               She did have full autoimmune w/up in Oct that was unremarkable (aside from mildly low IgG). ANCA MARLI neg.  Mild cortical thinning on CT abd/pelvis in Oct '23.  No hydro. 2021 FLC ratio was 1.23, no mononclonal proteins at that time               Discussed with pts  JUVENAL and his daughter Roque by phone.  Daughter doesn't think a kidney provider saw her mother in Oct/Nov; and do not believe she has seen a nephrologist I nthe past.  Daughter Roque is a pediatrician.  States that her mother was \"almost on CRRT\" during her recent hospital admission.     ASSESSMENT & PLAN:     PLAN:  -Discontinued Bactrim, now on alternative ABX  -Diuresis on HOLD, wt stable 85>84.5 today, trend labs/volume/wt/I & O daily  -Holding oral protein intake. 12/19 Discuss protein needs/counts with RD and reduce intake (very high BUN)  -Reduce FWF down further " "today to 120 cc Q4 (agree pt is  high risk for volume overload, and if worsening resp status/edema, would opt to add thiazide).  -Continue MWF BMP for now  -Medical management for now. Hoping to avoid needing dialysis as I do not think it would be well tolerated in her current state (no acute HD need at present, but baseline RF VERY poor, so high risk), family would want to pursue all cares, so will need to continue goals of care discussion. Discussed with Dr Tavares again today.    Non-Oliguric Mild Acute vs subacute CARMEN, vs unrecognized progressive CKD 4: Very Azotemic (likely multifactorial with higher protein intake in the setting of severe malnutrition, catabolic with enhanced tissue breakdown with infection and prolonged high dose steroids), no overt uremic symptoms. Despite normal Cr (suspect d/t severy reduced muscle mass) cystatin C  of 4.1 (eGFR 10) more reflective of severity of her renal dz; very sacropenic. She has longstanding CKD (her Cystatin C/GFR has been consistently in this range since her acute illness in oct), no prior neph consult or neph specific w/up that I could find (though daughter reports that pt has prior CARMEN's, and was \"almost on CRRT\"). Baseline Cr prior to acute illness 4807-2058 was 1.4-1.6 range GFR <30  FLC ratio 1.23, no monoclonals on immunofix 2021 ([facility]), ANCA/MARLI neg Oct '23. Prior UA Oct '23, no microhematuria, very modest microhem Nov/Dec may be d/t mitchell, very modest 10-25 range, with min proteiniuria by dip, upCr ~470mg. Prior remote CT imaging 10/2023: cortical thinning/scarring,non-obstructing L renal stone    -There is No indication for needing dialysis, and uncertain how well it would be tolerated should it come to that, given that she has decent UO and not overtly uremic/stable lytes etc. Will monitor and manage medically for now; continue goals of care discussion with pt and family.       Hypernatremia:Likely  2/2 overdiuresis, intravasc contraction, diuretics " "reduced to every other day dosing for now, trend wts; compensated resp acidosis. Resolved, on FR via PEG.     Acute on Chronic hyposix/hypercapnic resp failure: With underlying Pulm HTN, ALAINA/recurent PNA/recent h/o ARDS, management per pulm team. Completed antibx 11/29, +trach, on high dose pred with extended taper.     Chronic Diastolic HFPEF: With pulm HTN, severe ALAINA,volume overload, on Torsemide every other day (currently on hold); on Isosorbide.     Volume: Challenging to assess, some RUE edema (seems dependent) no sig LE edema, some interstitial prominence on CXR unclear if volume vs post-mnemonic changes/fibrosis, good UO, but net + 6L since admission (was \"dry\" hypernatremic on arrival with aggressive diuresis prior to LTACH admission      Hypertension:Some lability/Overall controlled On BB, Isosorbide, Amlodipine     Hypothyroid: On replacement     Neuropathy: On Lyrica, renally dose    SUBJECTIVE:   Pt new to me today  Pt appears comfortable  Pts daughter and speech therapy at bedside  Wt down 85>84.5 today, BUN 98>84. Discusses with Dr Tavares will reduce FWF down to 120 Q 4, trend  +vent/trach Fio2 45, stable  CXR: some interstitial prominence on CXR unclear if volume vs post-mnemonic changes/fibrosis  BUN down trending 98.2>84.3, continue current plan and trend.   We discussed level of renal function, overestimated. Lytes, acid/base, stable. No over hypervolemia/resp stable. Will need ongoing duiresis at some point. Decreased FWFs further, reduced high protein diet intake. Will follow along.   Discussed with pts daughter is Renal disease progression, then they would proceed with dialysis if not able to medically manage. Will follow and have ongoing discussions as needed.   Discussed labs/Plan and answered all questions.         OBJECTIVE:  Physical Exam   Temp: 97.8  F (36.6  C) Temp src: Oral BP: (!) 176/85 Pulse: 74   Resp: 30 SpO2: 96 % O2 Device: Mechanical Ventilator    Vitals:    12/15/23 0330 " 12/16/23 0630 12/17/23 0540   Weight: 85.5 kg (188 lb 7.9 oz) 85 kg (187 lb 6.3 oz) 84.5 kg (186 lb 4.6 oz)     Vital Signs with Ranges  Temp:  [97.6  F (36.4  C)-97.8  F (36.6  C)] 97.8  F (36.6  C)  Pulse:  [67-80] 74  Resp:  [22-32] 30  BP: (134-176)/(60-85) 176/85  FiO2 (%):  [45 %] 45 %  SpO2:  [95 %-100 %] 96 %  I/O last 3 completed shifts:  In: 1192 [I.V.:30; NG/GT:1162]  Out: 2500 [Urine:2500]    No data found.  Intake/Output Summary (Last 24 hours) at 12/20/2023 0913  Last data filed at 12/20/2023 0906  Gross per 24 hour   Intake 1265 ml   Output 2500 ml   Net -1235 ml       PHYSICAL EXAM:  GEN: NAD, aox3, + trach  CV: RRR   Lung: clear and equal; no extra sounds  Ab: soft and NT, +PEG, + bruising  Ext: + edema and well perfused  Skin: no rash  Neuro: grossly intact  Psych: Flat affect  : + mitchell, clear urine  Wt: up 3 Kg since admission        LABORATORY STUDIES:     Recent Labs   Lab 12/20/23  0612 12/18/23  0648 12/15/23  0626   WBC 11.9* 12.7* 15.8*   RBC 2.69* 2.82* 2.83*   HGB 7.0* 7.4* 7.5*   HCT 24.2* 25.5* 25.7*    205 187       Basic Metabolic Panel:  Recent Labs   Lab 12/20/23  0612 12/20/23  0535 12/20/23  0020 12/19/23  1805 12/19/23  1226 12/19/23  0605 12/18/23  1152 12/18/23  0648 12/15/23  1208 12/15/23  0626     --   --   --   --   --   --  144  --  142   POTASSIUM 4.9  --   --   --   --   --   --  4.7  --  4.9   CHLORIDE 105  --   --   --   --   --   --  103  --  100   CO2 32*  --   --   --   --   --   --  34*  --  33*   BUN 84.3*  --   --   --   --   --   --  98.2*  --  92.1*   CR 0.70  --   --   --   --   --   --  0.75  --  0.81   * 121* 187* 183* 123* 136*   < > 133*   < > 115*   BARBARA 9.3  --   --   --   --   --   --  9.5  --  9.3    < > = values in this interval not displayed.       INRNo lab results found in last 7 days.     Recent Labs   Lab Test 12/20/23  0612 12/18/23  0648 11/29/23  0508 11/28/23  0956   INR  --   --   --  1.28*   WBC 11.9* 12.7*   < >  --     HGB 7.0* 7.4*   < >  --     205   < >  --     < > = values in this interval not displayed.       Personally reviewed current labs    Holly RAMOS-BC  [facility]  [phone]

## 2023-12-20 NOTE — PLAN OF CARE
Problem: Enteral Nutrition  Goal: Absence of Aspiration Signs and Symptoms  Outcome: Progressing  Goal: Safe, Effective Therapy Delivery  Outcome: Progressing  Goal: Feeding Tolerance  Outcome: Progressing     Problem: Fall Injury Risk  Goal: Absence of Fall and Fall-Related Injury  Outcome: Progressing  Intervention: Promote Injury-Free Environment  Recent Flowsheet Documentation  Taken 12/20/2023 1141 by Rosaura Owens RN  Safety Promotion/Fall Prevention:   activity supervised   clutter free environment maintained   room door open   room near nurse's station   supervised activity     Problem: Pain Acute  Goal: Optimal Pain Control and Function  Outcome: Progressing     Problem: Anxiety Signs/Symptoms  Goal: Improved Mood Symptoms (Anxiety Signs/Symptoms)  Outcome: Progressing   Goal Outcome Evaluation:    Patient reported a 6/10 back pain at the start of shift-schedule tylenol given with relieve at 0843 (had another schedule dose at 1320). Patient observed to be anxious intermittently- was given schedule Seroquel 150 mg at 0842 (had atarax 25 mg at 1400 for complaint of anxiety with some relieve).Patient has been up in chair since about 10 am, declined to go back to bed (approached multiple times per report)-GAIL was informed to put patient back in bed even though she declines for a break. Blood pressure was 176/85 at 0717, was rechecked to be 154/71 at 1037 after morning medications, respiratory rates was 30 at 0717, 24 at 1124, BG was 170 at 1205, mitchell not bypassing so far this shift (output 500 ml), mag 2.7 (2.6 on 12/18),  as of 12/18, potassium 4.9 (4.7 on 12/18), urea 84.3 (was 98.2 on 12/18), will have BMP done in am-free water reduced to 120 ml every 4 hours, HGB 7.0 (7.4 on 12/18-provider (Chaitanya) informed, platelets 226 (205 on 12/18)-hepatic function in progress, tube feed rate increased to 50 ml per hour this shift per order, prednisone 40 mg through 1/17. Patient reported to be confused  this shift by daughter, had a large loose stool this shift

## 2023-12-20 NOTE — PROGRESS NOTES
[facility]    Medicine Progress Note - Hospitalist Service    Date of Admission:  12/5/2023    Hospital Course  Matthew Bowen is a 78y F PMHx chronic hypoxic respiratory failure 2/2 pHTN, ALAINA, HFpEF, CAD, CKD3, Obesity, HTN, depression, and anxiety. She presented initially on 10/15 with fever, cough, and falls with a temperature. Further w/u revealed multifocal pneumonia and she was started on antibiotics. Her respiratory status worsened as she developed ARDS and was intubated on 10/21. She had a prolonged ICU stay 2/2 difficulty vent weaning. Eventually she underwent trach/peg placement. Of note, she was treated with high dose steroids for her lung condition and remains on a high dose long taper of steroids. Pt was transferred to [facility] for ongoing vent weaning and therapies.      [facility] Course  12/7-12/10: Hypernatremia to 155 2/2 over-diuresis, intravascular depletion, missing first night of FWF. 2L D5W given, resolved, Na now stable. Torsemide held initially but now restarted at 20mg daily. BP remains elevated, start isosorbide dinitrate 20mg TID. Loperamide for diarrhea.    12/11 - 12/17: Patient started weaning during the week per pulmonology.  She has been making progress with therapies throughout the week. 12/17, Continues to make progress. Remains on vent weaning phase 2.  Care conference tomorrow.  Plan to continue current medical management.     12/18: Care Conference today. Nephro consulted for uptrending BUN, low cystatin C, difficulty maintaining I/O balance. Bactrim -> atovaquone 1.5g daily. Biotene mouth spray scheduled. FWF down to 200cc q4h.   12/19: PS today, holding off further weaning. Repeat labs tomorrow for renal function with med adjustments likely to follow. Last dose of prednisone 120mg today, down to 100mg tomorrow.   12/20: Prednisone 100mg starting today. Na 144 today. Decrease FWF to 120cc q4h. Continue to hold torsemide.     Assessment & Plan     CKD  CARMEN, resolved PTA  Elevated  BUN  Low Cystatin C  - pt with worsening renal function and uptrending BUN  - difficulty with net I/O balance as pt persistently positive  - suspect related to previous CKD and prolonged high dose steroids, among other etiologies  - nephro consulted (12/18)  - changed bactrim to atovaquone 1.5g daily (12/18)  - FWF decreased to 120mL q4h (12/20)  - RD noted we could change to more concentrated TF if needed  - torsemide held  - urine studies  - will need to monitor Na levels closely given hx of hypernatremia   - monitor with BMP  - would consider initiation of thiazide for further diuresis and natriuresis given hx of hypernatremia     Acute on Chronic Hypoxic Respiratory Failure  Bronchiectasis?  Multifocal Pneumonia  ARDS, PTA  pHTN  Chronic Hypoxic Respiratory Failure  ALAINA  - pulmonary consulted and following  - treated with high dose steroids previously  - currently on slow steroid taper  - prednisone 100mg daily (50mg in split doses) x2 weeks, started 12/20  - start prednisone 100mg daily on 12/20  - Taper regimen after 100mg -> 80mg x2 weeks, then taper down 5mg every 2 weeks  - bactrim changed to atovaquone 1.5g daily (12/18) over worsening renal function  - trach exchange 12/5 by ENT PTA  - good bronchial hygiene with bronchodilators    Diarrhea  - fairly stable at this time  - C. diff negative  - loperamide prn    HTN  - amlodipine 5mg BID (suspect broken up 2/2 labile BP)  - metoprolol tartrate 12.5mg BID  - isosorbide dinitrate 20mg TID (12/10)  - pt was on hydralazine 50mg TID and isosorbide dinitrate 20mg TID     HFpEF, not in exacerbation  - torsemide 20mg every other day     Pain, Acute  - oxycodone 5mg prn    Normocytic, Hypochromic Anemia  - suspect 2/2 critical illness and phlebotomy  - received pRBCs during hospitalization prior to LTACH  - transfuse to maintain Hgb > 7.0  - pantoprazole    CAD  - asa  - atorvastatin    Hypothyroidism  - levothyroxine    Depression  Anxiety  - pregabalin  -  sertraline    PEG  - PEG placed 11/29  - RD consulted and following    Hypernatremia - stable/resolved  - likely 2/2 intravascular depletion 2/2 over-diuresis and missing FWF on her first night admitted        Diet: NPO for Medical/Clinical Reasons Except for: Ice Chips, NPO but receiving Tube Feeding  Adult Formula Drip Feeding: Continuous Jevity 1.5; Gastrostomy; Goal Rate: 45; mL/hr; TF to run x22 hrs, hold 1 hr before/after levothyroxine    DVT Prophylaxis: Heparin SQ  Aleman Catheter: PRESENT, indication: Strict 1-2 Hour I&O, Strict 1-2 Hour I&O  Lines: PRESENT      PICC 10/22/23 Triple Lumen Right Basilic multiple med gtt. ready for use-Site Assessment: WDL      Cardiac Monitoring: None  Code Status: Full Code      Clinically Significant Risk Factors              # Hypoalbuminemia: Lowest albumin = 2.9 g/dL at 12/12/2023  6:11 AM, will monitor as appropriate     # Hypertension: Noted on problem list                 Disposition Plan     Expected Discharge Date: 12/29/2023    Discharge Delays: Complex Discharge    Discharge Comments: Vent, Trach, Feeding tube, WOC          Agapito Tavares MD  Hospitalist Service  LTACH  Securely message with [employer] (more info)  Text page via [employer] Paging/Directory   ______________________________________________________________________    Interval History   - no acute events ovn  - up in the chair  - Na looks good today, d/w nephrology, will decrease FWF and hold off torsemide or starting thiazide - d/w pt and family who are agreeable with this plan  - no other acute concerns    Physical Exam   Vital Signs: Temp: 97.6  F (36.4  C) Temp src: Oral BP: (!) 149/69 Pulse: 74   Resp: 30 SpO2: 96 % O2 Device: Mechanical Ventilator    Weight: 186 lbs 4.62 oz  General: She is a well grown well-developed adult female resting in bed comfortably  HEENT: Head is normocephalic atraumatic eyes pupils appear equal round and reactive to light.  Viable trach noted on neck.  Lungs: She has a normal  respiratory effort on auscultation good air entry is noted.  No wheezing  Heart: She has a good S1 and S2 no obvious murmurs peripheral pulses are palpable.  Abdomen: Soft nontender bowel sounds are noted  Musculoskeletal: She has good muscle tone  Digits appear acyanotic  Skin: No obvious rashes noted, she is warm to touch please refer to nursing/wound care note for complete skin exam.    Medical Decision Making       55 MINUTES SPENT BY ME on the date of service doing chart review, history, exam, documentation & further activities per the note.      Data   Lab Results   Component Value Date    WBC 15.8 12/15/2023    WBC 10.8 11/22/2019     Lab Results   Component Value Date    RBC 2.83 12/15/2023    RBC 4.34 11/22/2019     Lab Results   Component Value Date    HGB 7.5 12/15/2023    HGB 12.4 11/22/2019     Lab Results   Component Value Date    HCT 25.7 12/15/2023    HCT 39.9 11/22/2019     Lab Results   Component Value Date    MCV 91 12/15/2023    MCV 92 11/22/2019     Lab Results   Component Value Date    MCH 26.5 12/15/2023    MCH 28.6 11/22/2019     Lab Results   Component Value Date    MCHC 29.2 12/15/2023    MCHC 31.1 11/22/2019     Lab Results   Component Value Date    RDW 20.8 12/15/2023    RDW 14.9 11/22/2019     Lab Results   Component Value Date     12/15/2023     11/22/2019       Last Comprehensive Metabolic Panel:  Sodium   Date Value Ref Range Status   12/18/2023 144 135 - 145 mmol/L Final     Comment:     Reference intervals for this test were updated on 09/26/2023 to more accurately reflect our healthy population. There may be differences in the flagging of prior results with similar values performed with this method. Interpretation of those prior results can be made in the context of the updated reference intervals.    11/22/2019 141 133 - 144 mmol/L Final     Potassium   Date Value Ref Range Status   12/18/2023 4.7 3.4 - 5.3 mmol/L Final   11/22/2019 4.6 3.4 - 5.3 mmol/L Final      Chloride   Date Value Ref Range Status   12/18/2023 103 98 - 107 mmol/L Final   11/22/2019 108 94 - 109 mmol/L Final     Carbon Dioxide   Date Value Ref Range Status   11/22/2019 27 20 - 32 mmol/L Final     Carbon Dioxide (CO2)   Date Value Ref Range Status   12/18/2023 34 (H) 22 - 29 mmol/L Final     Anion Gap   Date Value Ref Range Status   12/18/2023 7 7 - 15 mmol/L Final   11/22/2019 6 3 - 14 mmol/L Final     Glucose   Date Value Ref Range Status   11/22/2019 112 (H) 70 - 99 mg/dL Final     GLUCOSE BY METER POCT   Date Value Ref Range Status   12/20/2023 121 (H) 70 - 99 mg/dL Final     Urea Nitrogen   Date Value Ref Range Status   12/18/2023 98.2 (H) 8.0 - 23.0 mg/dL Final   11/22/2019 34 (H) 7 - 30 mg/dL Final     Creatinine   Date Value Ref Range Status   12/18/2023 0.75 0.51 - 0.95 mg/dL Final   11/22/2019 1.30 (H) 0.52 - 1.04 mg/dL Final     GFR Estimate   Date Value Ref Range Status   12/18/2023 81 >60 mL/min/1.73m2 Final   11/22/2019 40 (L) >60 mL/min/[1.73_m2] Final     Comment:     Non  GFR Calc  Starting 12/18/2018, serum creatinine based estimated GFR (eGFR) will be   calculated using the Chronic Kidney Disease Epidemiology Collaboration   (CKD-EPI) equation.       Calcium   Date Value Ref Range Status   12/18/2023 9.5 8.8 - 10.2 mg/dL Final   11/22/2019 8.9 8.5 - 10.1 mg/dL Final     Bilirubin Total   Date Value Ref Range Status   12/12/2023 0.2 <=1.2 mg/dL Final   02/13/2019 0.2 0.2 - 1.3 mg/dL Final     Alkaline Phosphatase   Date Value Ref Range Status   12/12/2023 113 40 - 150 U/L Final     Comment:     Reference intervals for this test were updated on 11/14/2023 to more accurately reflect our healthy population. There may be differences in the flagging of prior results with similar values performed with this method. Interpretation of those prior results can be made in the context of the updated reference intervals.   02/13/2019 83 40 - 150 U/L Final     ALT   Date Value Ref  Range Status   12/12/2023 35 0 - 50 U/L Final     Comment:     Reference intervals for this test were updated on 6/12/2023 to more accurately reflect our healthy population. There may be differences in the flagging of prior results with similar values performed with this method. Interpretation of those prior results can be made in the context of the updated reference intervals.     02/13/2019 59 (H) 0 - 50 U/L Final     AST   Date Value Ref Range Status   12/12/2023 37 0 - 45 U/L Final     Comment:     Reference intervals for this test were updated on 6/12/2023 to more accurately reflect our healthy population. There may be differences in the flagging of prior results with similar values performed with this method. Interpretation of those prior results can be made in the context of the updated reference intervals.   02/13/2019 48 (H) 0 - 45 U/L Final

## 2023-12-20 NOTE — PROGRESS NOTES
Care Management Follow Up    Length of Stay (days): 15    Expected Discharge Date: 12/29/2023     Concerns to be Addressed:       Patient plan of care discussed at interdisciplinary rounds: Yes    Anticipated Discharge Disposition:       Anticipated Discharge Services:    Anticipated Discharge DME:      Patient/family educated on Medicare website which has current facility and service quality ratings:    Education Provided on the Discharge Plan:    Patient/Family in Agreement with the Plan:      Referrals Placed by CM/SW:    Private pay costs discussed: Not applicable    Additional Information:    Patient discussed at IDT rounds and chart reviewed.  Patient is making some progress with therapies. ST is holding off on video swallow until patient is consistently capping.  PT says that patient is slow going, but working hard.  She was able to be in standing frame for 10 min. Patient had a Nephrology consult this week due to increased fluid and elevated BUN and free water flushes reduced to 200 ml every 4 hours and BMP labs M W F.   Family care conference was completed on Monday 12/18.    Doretha Yañez RN, BSN  Care Coordination  [facility]  [phone]

## 2023-12-20 NOTE — PLAN OF CARE
Problem: Enteral Nutrition  Goal: Feeding Tolerance  Outcome: Progressing     Problem: Fall Injury Risk  Goal: Absence of Fall and Fall-Related Injury  Outcome: Progressing  Intervention: Identify and Manage Contributors  Recent Flowsheet Documentation  Taken 12/20/2023 0014 by Ligia Jacobs, RN  Medication Review/Management: medications reviewed  Intervention: Promote Injury-Free Environment  Recent Flowsheet Documentation  Taken 12/20/2023 0014 by Ligia Jacobs, RN  Safety Promotion/Fall Prevention:   increased rounding and observation   increase visualization of patient   lighting adjusted   room near nurse's station   Goal Outcome Evaluation:  Pt had prn Atarax  25 mg for anxiety and Oxycodone 5 mg for generalized pain   7/10 at 0022, did quiet down and was able to sleep after.  V/signs stable, pt repositioned   Every 2 hourly, will continue to monitor.

## 2023-12-20 NOTE — PROGRESS NOTES
Pulmonary Progress Note    Admit Date: 12/5/2023  CODE: Full Code    HPI:   78 year old female w/PMH complex medical history including chronic hypoxic respiratory failure due to pulmonary HTN on 2L home O2, severe ALAINA on CPAP, chronic diastolic HF, CAD, CKD stage 3, morbid obesity, HTN,  depression, recent falls, hx recurrent pneumonia. Admitted 10/15/23 and treated for CAP with multiple courses of antibiotics. Developed ARDS. Cultures negative. Autoimmune work-up grossly negative except for low IgG level. 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 S/p trach and PEG.  [facility] Progressing slowly with PST and trach dome.    Assessment/Plan:     Acute on chronic hypoxic/hypercapnic respiratory failure in setting of underlying pulmonary HTN c/b pneumonia, severe ARDS, possibly organizing pneumonia now s/p trach.    Multifocal CAP: Completed antibiotics 11/29. Serial Sputum cx with candida, beta-D-glucan neg. BAL 10/22 cytology neg.   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 IVIG considered but ultimately not given.  Tracheostomy in place: 6 Bivona placed 12/16  Oropharyngeal dysphagia status post PEG tube: passed BDT on 12/7, tolerating PMV  Severe ALAINA on APAP 6-16 PTA: PSG 10/28/19 AHI 80   Chronic diastolic HF, Pulm HTN, volume overload: torsemide 60 daily PTA  MDD, Anxiety - stable   Leukocytosis wo fever: Stable/downtrending   Bronchiectasis  Severely deconditioned with risk for steroid myopathy   CARMEN on CKD, Azotemia: eGFR with Cystatin C is 10.  Diuretic placed on hold, BUN down today. Renal following      Attempted doing TM/PMV continuously 12/18 without a mid day break on vent and patient did not tolerate.  After about 9hr, became tachypneic, hypoxic with inc wob.  Doing PST during the day.     Recommendations:  Phase 1 weans today.  PS day.  AC night.  Consider restarting trach  dome trials tomorrow   CT chest wo contrast next week to follow up on GGO evolution   Ok for in-line PMV trials when on vent   On diuretic holiday. Renal following   Steroid taper in place: currently on pred 50 BID  Atovaquone for PJP ppx, add on LFTs  Albuterol QID, Mucomyst & metaneb BID  Routine trach care/changes per protocol  GI ppx: PPI  DVT ppx: heparin subcutaneous   VAP ppx: Chlorhexidine, HOB 30 degrees  PT/OT/SLP  Consider VFSS next week pending progression.  Trial ice chips     Subjective:   - Denies dyspnea, pain, n/v on PS 10/5.      Tracheal secretions:  Overnight - 2x, small/mod, thick  Yesterday - 5x, small/mod, thick     Cough strength: moderate    Ventilator weaning results  12/6: PS 12/8 for ~12hr, tolerated well   12/7: PS 10/5 for 9.5hr, tolerated well   12/8: PS 8/5 for 12.5hr   12/9: No wean d/t elevated BP   12/10: PS 8/5 for 6hr 15min, stopped d/t increased WOB  12/11: PS 8/5 for  1.5hr.  Then TM/PMV x2 for total of almost 5hr   12/12:  PS 8/5 for 1 hr 23 min. TM/PMV 40%/50L x2 for total of almost 5hr  12/13-17: TM/PMV 4-5hr x2. Break on AC mid diay and AC at night  12/18: TM/PMV for 8hr 45min.  Hypoxia/inc wob/tachypnea at end of wean.    12/19: PS 10/5 for 9hr total. AC night     Clinical status discussed today with respiratory therapist     ROS: 10-system review obtained and negative with the exception of the symptoms noted above.    Medications:      acetaminophen  650 mg Oral or Feeding Tube 4x Daily    acetylcysteine  2 mL Nebulization BID    albuterol  2.5 mg Nebulization 4x Daily    amLODIPine  5 mg Oral or Feeding Tube BID    artificial saliva  1 spray Mouth/Throat 4x Daily    aspirin  81 mg Oral or Feeding Tube Daily    atorvastatin  20 mg Oral or Feeding Tube QPM    atovaquone  1,500 mg Per Feeding Tube Daily    B and C vitamin Complex with folic acid  5 mL Oral or Feeding Tube Daily    chlorhexidine  15 mL Mouth/Throat BID    cholecalciferol  50 mcg Per Feeding Tube Daily     heparin ANTICOAGULANT  5,000 Units Subcutaneous Q8H    insulin aspart  1-6 Units Subcutaneous Q6H    isosorbide dinitrate  20 mg Per Feeding Tube TID    levothyroxine  150 mcg Oral or Feeding Tube QAM AC    metoprolol  12.5 mg Per Feeding Tube BID    miconazole   Topical BID    mineral oil-hydrophilic petrolatum   Topical Daily    pantoprazole  40 mg Per Feeding Tube QAM AC    [START ON 1/3/2024] predniSONE  40 mg Per Feeding Tube 2 times per day    predniSONE  50 mg Oral BID w/meals    pregabalin  25 mg Per Feeding Tube Daily    protein modular  1 packet Per Feeding Tube Daily    QUEtiapine  150 mg Oral or Feeding Tube BID    sertraline  150 mg Oral or Feeding Tube Daily    simethicone  40 mg Per Feeding Tube TID    sodium chloride (PF)  10 mL Intracatheter Q8H    sodium chloride (PF)  10-40 mL Intracatheter Q7 Days    [Held by provider] torsemide  20 mg Per Feeding Tube Every Other Day         Exam/Data:   Vitals  BP (!) 176/85 (BP Location: Left arm)   Pulse 74   Temp 97.8  F (36.6  C) (Oral)   Resp 30   Wt 84.5 kg (186 lb 4.6 oz)   SpO2 96%   BMI 36.38 kg/m       I/O last 3 completed shifts:  In: 1192 [I.V.:30; NG/GT:1162]  Out: 2500 [Urine:2500]  Weight change:     Vent Mode: CPAP/PS  (Continuous positive airway pressure with Pressure Support)  FiO2 (%): 45 %  Resp Rate (Set): 16 breaths/min  Tidal Volume (Set, mL): 360 mL  PEEP (cm H2O): 5 cmH2O  Pressure Support (cm H2O): 10 cmH2O  Resp: 30    EXAM:  Gen: NAD in chair on PS 10/5  HEENT: NT, trach midline/intact  CV: RRR, no m/g/r  Resp: CTAB anteriorly, non-labored, no wheeze   Abd: large, soft, nontender, BS+  Skin: no rashes or lesions  Ext: mild edema, very weak, able to move all   Neuro: alert, follows commands    Labs:    Complete Blood Count   Recent Labs   Lab 12/20/23  0612 12/18/23  0648 12/15/23  0626   WBC 11.9* 12.7* 15.8*   HGB 7.0* 7.4* 7.5*    205 187     Basic Metabolic Panel  Recent Labs   Lab 12/20/23  0612 12/20/23  0559  12/20/23  0020 12/19/23  1805 12/18/23  1152 12/18/23  0648 12/15/23  1208 12/15/23  0626     --   --   --   --  144  --  142   POTASSIUM 4.9  --   --   --   --  4.7  --  4.9   CHLORIDE 105  --   --   --   --  103  --  100   CO2 32*  --   --   --   --  34*  --  33*   BUN 84.3*  --   --   --   --  98.2*  --  92.1*   CR 0.70  --   --   --   --  0.75  --  0.81   * 121* 187* 183*   < > 133*   < > 115*    < > = values in this interval not displayed.       Radiology: Personally reviewed; radiology read below    XR CHEST 12/15/2023  Tracheostomy tube in place with tip near the thoracic inlet. Right upper extremity PICC with tip in the mid SVC. Persistent consolidative opacities with air bronchograms in the right midlung and bilateral lung bases with background diffuse interstitial prominence. Stable cardiomediastinal contours with calcified tortuous aorta. No significant interval change.    XR CHEST 12/7/2023  Endotracheal tube seen on the prior study has been replaced with a tracheostomy tube which appears in good position in the mid trachea. PICC catheter from right upper extremity approach is unchanged with tip in the superior vena cava. There is airspace consolidation with minimal volume loss in the right upper lobe consistent with right upper lobe pneumonia. There is persistent opacity with bronchial thickening at the left base spine the heart which is stable. Stable cardiac size. Normal pulmonary vascularity. No pneumothorax or pleural effusion. Calcified aortic arch.    XR CHEST 11/26/2023  papo AP view of the chest was obtained. Endotracheal tube tip projects over mid thoracic trachea approximately 4 cm from the ashley. Enteric tube crosses the diaphragm with the distal tip outside the field-of-view. Right upper extremity PICC tip projects over high/mid SVC. Cardiomegaly. Left basilar and right mid/upper lobe patchy pulmonary opacity, could represent atelectasis versus infection. No significant  pleural effusion or pneumothorax.     CT CHEST WITHOUT CONTRAST 11/24/2023                                 LUNGS AND PLEURA: Pulmonary infiltrates again seen throughout both  lungs. There is increased airspace consolidation with air bronchograms  in both lower lobes and in the posterior right upper lobe when  compared to previous. There are groundglass opacities with septal  thickening throughout the remainder of both lungs. Mild cylindrical  bronchiectasis. No pleural effusion or pneumothorax.     IMPRESSION:  1.  Extensive bilateral pulmonary infiltrates again seen. Increasing  airspace consolidation in the right upper lobe and both lower lobes  when compared to previous.    Adilson Prakash CNP  Pulmonary Medicine  [facility]  Pager [phone]

## 2023-12-20 NOTE — PLAN OF CARE
Goal Outcome Evaluation:  Pt repositioned every 2 hours and when needed to prevent pressure sores.

## 2023-12-21 NOTE — PLAN OF CARE
Problem: Mechanical Ventilation Invasive  Goal: Effective Communication  Outcome: Progressing  Goal: Mechanical Ventilation Liberation  Outcome: Progressing  Goal: Absence of Device-Related Skin and Tissue Injury  Outcome: Progressing  Goal: Absence of Ventilator-Induced Lung Injury  Outcome: Progressing   Goal Outcome Evaluation:       RT PROGRESS NOTE     DATA:     CURRENT SETTINGS: AC 16 , 360, +5, 45%             TRACH TYPE / SIZE:# 6 BIVONA Changed  on 12/16/2023              MODE: AC              FIO2: 45%      ACTION:             THERAPIES : Albuterol neb QID,Mucomyst BID andMetaNeb/[place] BID              SUCTION:                           FREQUENCY: X4                        AMOUNT:Small to moderate                         CONSISTENCY: thick                         COLOR: Pale yellow              SPONTANEOUS COUGH EFFORT/STRENGTH OF EFFORT (not elicited by suctioning): Strong                              WEANING PHASE: 1                        WEAN MODE: CPAP 5/PS 10                         WEAN TIME: 7 hrs 37 min                         END WEAN REASON: Increased work of breathing, using accessory muscles , increased RR      RESPONSE:             BS: Coarse              VITAL SIGNS:  Sat 95-97%, HR 76-86,RR 23-35             EMOTIONAL NEEDS / CONCERNS:Anxiety                   RISK FOR SELF DECANNULATION: no                        RISK DUE TO: no                         INTERVENTION/S IN PLACE IS/ARE:         NOTE / PLAN:  Cont. Current plan of care /PS day.  AC night.  If FiO2 >45%, increase PEEP back to 8.  If patient looks more uncomfortable when placing back on AC for the night, ok to switch back to PS if she tolerates this better.

## 2023-12-21 NOTE — PROGRESS NOTES
Patient complained of discomfort (back pain ) and respiratory rates was up, noted to be anxious too-was repositioned and given oxy 5 mg for lower back pain and atarax for anxiety at 1525 (pm RN to follow up on these)

## 2023-12-21 NOTE — PLAN OF CARE
Problem: Enteral Nutrition  Goal: Absence of Aspiration Signs and Symptoms  Outcome: Progressing  Goal: Safe, Effective Therapy Delivery  Outcome: Progressing  Goal: Feeding Tolerance  Outcome: Progressing     Problem: Fall Injury Risk  Goal: Absence of Fall and Fall-Related Injury  Outcome: Progressing  Intervention: Promote Injury-Free Environment  Recent Flowsheet Documentation  Taken 12/21/2023 1048 by Rosaura Owens RN  Safety Promotion/Fall Prevention:   activity supervised   clutter free environment maintained   room door open   room near nurse's station   supervised activity     Problem: Pain Acute  Goal: Optimal Pain Control and Function  Outcome: Progressing     Problem: Anxiety Signs/Symptoms  Goal: Optimized Cognitive Function (Anxiety Signs/Symptoms)  Outcome: Progressing   Goal Outcome Evaluation:  Patient denied pain so far this shift with gestures (mouths words), no sign of pain noted too. Patient was anxious on and off-schedule Seroquel given, blood pressure was 162/77 at 0730, 175/66 at 0819, 166/76 at 0937, 104/53 at 1109 while up in chair and after morning medications, was 151/70 at 1230 prior to given chlorthalidone (133/65 at 1442), family visited, free water increased to 160 ml every 4 hours- (was 144 on 12/20), potassium 5.4 (4.9 on 12/20) still on daily BMP, will have CBC with platelets and magnesium labs in am -HGB was 7.0 yesterday, BG was 157 at 1219, camphor menthol lotion ordered every 6 hrs prn to itchy locations

## 2023-12-21 NOTE — PROGRESS NOTES
Pulmonary Progress Note    Admit Date: 12/5/2023  CODE: Full Code    HPI:   78 year old female w/PMH complex medical history including chronic hypoxic respiratory failure due to pulmonary HTN on 2L home O2, severe ALAINA on CPAP, chronic diastolic HF, CAD, CKD stage 3, morbid obesity, HTN,  depression, recent falls, hx recurrent pneumonia. Admitted 10/15/23 and treated for CAP with multiple courses of antibiotics. Developed ARDS. Cultures negative. Autoimmune work-up grossly negative except for low IgG level. 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 S/p trach and PEG.  [facility] Progressing slowly with PST and trach dome.    Assessment/Plan:     Acute on chronic hypoxic/hypercapnic respiratory failure in setting of underlying pulmonary HTN c/b pneumonia, severe ARDS, possibly organizing pneumonia now s/p trach.    Multifocal CAP: Completed antibiotics 11/29. Serial Sputum cx with candida, beta-D-glucan neg. BAL 10/22 cytology neg.   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 IVIG considered but ultimately not given.  Tracheostomy in place: 6 Bivona placed 12/16  Oropharyngeal dysphagia status post PEG tube: passed BDT on 12/7, tolerating PMV  Severe ALAINA on APAP 6-16 PTA: PSG 10/28/19 AHI 80   Chronic diastolic HF, Pulm HTN, volume overload: torsemide 60 daily PTA  MDD, Anxiety - stable   Leukocytosis wo fever: Stable/downtrending   Bronchiectasis  Severely deconditioned with risk for steroid myopathy   CARMEN on CKD, Azotemia: eGFR with Cystatin C is 10.  Diuretic placed on hold, BUN down today. Renal following      Attempted doing TM/PMV continuously 12/18 without a mid day break on vent and patient did not tolerate.  After about 9hr, became tachypneic, hypoxic with inc wob.  Doing PST during the day.     Recommendations:  Phase 1 weans today.  PS day.  AC night.  Consider restarting trach  dome trials tomorrow   CT chest wo contrast next week to follow up on GGO evolution   Ok for in-line PMV trials when on vent   On diuretic holiday. Renal following   Steroid taper in place: currently on pred 50 BID  Atovaquone for PJP ppx   Albuterol QID, Mucomyst & metaneb BID  Routine trach care/changes per protocol  GI ppx: PPI  DVT ppx: heparin subcutaneous   VAP ppx: Chlorhexidine, HOB 30 degrees  PT/OT/SLP  Consider VFSS next week pending progression.  Trial ice chips     Subjective:   Seen when working with PT, standing    Tracheal secretions:  Overnight -  2x small thick yellow    Cough strength: moderate    Ventilator weaning results  12/6: PS 12/8 for ~12hr, tolerated well   12/7: PS 10/5 for 9.5hr, tolerated well   12/8: PS 8/5 for 12.5hr   12/9: No wean d/t elevated BP   12/10: PS 8/5 for 6hr 15min, stopped d/t increased WOB  12/11: PS 8/5 for  1.5hr.  Then TM/PMV x2 for total of almost 5hr   12/12:  PS 8/5 for 1 hr 23 min. TM/PMV 40%/50L x2 for total of almost 5hr  12/13-17: TM/PMV 4-5hr x2. Break on AC mid diay and AC at night  12/18: TM/PMV for 8hr 45min.  Hypoxia/inc wob/tachypnea at end of wean.    12/19: PS 10/5 for 9hr total. AC night   12/20: PS 10/5 for 8 hours    Clinical status discussed today with respiratory therapist       Medications:      acetaminophen  650 mg Oral or Feeding Tube 4x Daily    acetylcysteine  2 mL Nebulization BID    albuterol  2.5 mg Nebulization 4x Daily    amLODIPine  5 mg Oral or Feeding Tube BID    artificial saliva  1 spray Mouth/Throat 4x Daily    aspirin  81 mg Oral or Feeding Tube Daily    atorvastatin  20 mg Oral or Feeding Tube QPM    atovaquone  1,500 mg Per Feeding Tube Daily    B and C vitamin Complex with folic acid  5 mL Oral or Feeding Tube Daily    chlorhexidine  15 mL Mouth/Throat BID    chlorthalidone  25 mg Per Feeding Tube Daily    cholecalciferol  50 mcg Per Feeding Tube Daily    heparin ANTICOAGULANT  5,000 Units Subcutaneous Q8H    insulin aspart   1-6 Units Subcutaneous Q6H    isosorbide dinitrate  20 mg Per Feeding Tube TID    levothyroxine  150 mcg Oral or Feeding Tube QAM AC    metoprolol  12.5 mg Per Feeding Tube BID    miconazole   Topical BID    mineral oil-hydrophilic petrolatum   Topical Daily    pantoprazole  40 mg Per Feeding Tube QAM AC    [START ON 1/3/2024] predniSONE  40 mg Per Feeding Tube 2 times per day    predniSONE  50 mg Oral BID w/meals    pregabalin  25 mg Per Feeding Tube Daily    protein modular  1 packet Per Feeding Tube Daily    QUEtiapine  150 mg Oral or Feeding Tube BID    sertraline  150 mg Oral or Feeding Tube Daily    simethicone  40 mg Per Feeding Tube TID    sodium chloride (PF)  10 mL Intracatheter Q8H    sodium chloride (PF)  10-40 mL Intracatheter Q7 Days    [Held by provider] torsemide  20 mg Per Feeding Tube Every Other Day         Exam/Data:   Vitals  BP (!) 151/70 (BP Location: Left arm, Patient Position: Chair)   Pulse 74   Temp 98.2  F (36.8  C) (Oral)   Resp 24   Wt 85 kg (187 lb 6.3 oz)   SpO2 96%   BMI 36.60 kg/m       I/O last 3 completed shifts:  In: 1172 [NG/GT:1172]  Out: 1500 [Urine:1500]  Weight change:     Vent Mode: CPAP/PS  (Continuous positive airway pressure with Pressure Support)  FiO2 (%): 45 %  Resp Rate (Set): 16 breaths/min  Tidal Volume (Set, mL): 360 mL  PEEP (cm H2O): 5 cmH2O  Pressure Support (cm H2O): 10 cmH2O  Resp: 24    EXAM:  Gen: NAD in chair on PS 10/5  HEENT: NT, trach midline/intact  CV: RRR, no m/g/r  Resp: CTAB anteriorly, non-labored, no wheeze   Abd: large, soft, nontender, g tube in place  Skin: no rashes or lesions on visible skin  Ext: mild edema, very weak, able to move all ext  Neuro: alert, follows commands    Labs:    Complete Blood Count   Recent Labs   Lab 12/20/23  0612 12/18/23  0648 12/15/23  0626   WBC 11.9* 12.7* 15.8*   HGB 7.0* 7.4* 7.5*    205 187     Basic Metabolic Panel  Recent Labs   Lab 12/21/23  1219 12/21/23  0617 12/21/23  0552 12/21/23  0001  12/20/23  1205 12/20/23  0612 12/18/23  1152 12/18/23  0648 12/15/23  1208 12/15/23  0626   NA  --  146*  --   --   --  144  --  144  --  142   POTASSIUM  --  5.4*  --   --   --  4.9  --  4.7  --  4.9   CHLORIDE  --  105  --   --   --  105  --  103  --  100   CO2  --  32*  --   --   --  32*  --  34*  --  33*   BUN  --  81.4*  --   --   --  84.3*  --  98.2*  --  92.1*   CR  --  0.70  --   --   --  0.70  --  0.75  --  0.81   * 144* 144* 168*   < > 121*   < > 133*   < > 115*    < > = values in this interval not displayed.       Radiology: Personally reviewed; radiology read below    XR CHEST 12/15/2023  Tracheostomy tube in place with tip near the thoracic inlet. Right upper extremity PICC with tip in the mid SVC. Persistent consolidative opacities with air bronchograms in the right midlung and bilateral lung bases with background diffuse interstitial prominence. Stable cardiomediastinal contours with calcified tortuous aorta. No significant interval change.    XR CHEST 12/7/2023  Endotracheal tube seen on the prior study has been replaced with a tracheostomy tube which appears in good position in the mid trachea. PICC catheter from right upper extremity approach is unchanged with tip in the superior vena cava. There is airspace consolidation with minimal volume loss in the right upper lobe consistent with right upper lobe pneumonia. There is persistent opacity with bronchial thickening at the left base spine the heart which is stable. Stable cardiac size. Normal pulmonary vascularity. No pneumothorax or pleural effusion. Calcified aortic arch.    XR CHEST 11/26/2023  papo AP view of the chest was obtained. Endotracheal tube tip projects over mid thoracic trachea approximately 4 cm from the ashley. Enteric tube crosses the diaphragm with the distal tip outside the field-of-view. Right upper extremity PICC tip projects over high/mid SVC. Cardiomegaly. Left basilar and right mid/upper lobe patchy pulmonary  opacity, could represent atelectasis versus infection. No significant pleural effusion or pneumothorax.     CT CHEST WITHOUT CONTRAST 11/24/2023                                 LUNGS AND PLEURA: Pulmonary infiltrates again seen throughout both  lungs. There is increased airspace consolidation with air bronchograms  in both lower lobes and in the posterior right upper lobe when  compared to previous. There are groundglass opacities with septal  thickening throughout the remainder of both lungs. Mild cylindrical  bronchiectasis. No pleural effusion or pneumothorax.     IMPRESSION:  1.  Extensive bilateral pulmonary infiltrates again seen. Increasing  airspace consolidation in the right upper lobe and both lower lobes  when compared to previous.

## 2023-12-21 NOTE — PROGRESS NOTES
Paged by the patient's nurse, Lashawn.  Patient appears to be anxious few hours after receiving hydroxyzine.  Patient is intubated, however, able to communicate with head movements.  Hydroxyzine is helping with her anxiety.    Plan  -Increase hydroxyzine dose from 25 mg every 6 hours to 50 mg every 6 hours.  -Continue to monitor

## 2023-12-21 NOTE — PROGRESS NOTES
[facility]    Medicine Progress Note - Hospitalist Service    Date of Admission:  12/5/2023    Hospital Course  Matthew Bowen is a 78y F PMHx chronic hypoxic respiratory failure 2/2 pHTN, ALAINA, HFpEF, CAD, CKD3, Obesity, HTN, depression, and anxiety. She presented initially on 10/15 with fever, cough, and falls with a temperature. Further w/u revealed multifocal pneumonia and she was started on antibiotics. Her respiratory status worsened as she developed ARDS and was intubated on 10/21. She had a prolonged ICU stay 2/2 difficulty vent weaning. Eventually she underwent trach/peg placement. Of note, she was treated with high dose steroids for her lung condition and remains on a high dose long taper of steroids. Pt was transferred to [facility] for ongoing vent weaning and therapies.      [facility] Course  12/7-12/10: Hypernatremia to 155 2/2 over-diuresis, intravascular depletion, missing first night of FWF. 2L D5W given, resolved, Na now stable. Torsemide held initially but now restarted at 20mg daily. BP remains elevated, start isosorbide dinitrate 20mg TID. Loperamide for diarrhea.    12/11 - 12/17: Patient started weaning during the week per pulmonology.  She has been making progress with therapies throughout the week. 12/17, Continues to make progress. Remains on vent weaning phase 2.  Care conference tomorrow.  Plan to continue current medical management.     12/18: Care Conference today. Nephro consulted for uptrending BUN, low cystatin C, difficulty maintaining I/O balance. Bactrim -> atovaquone 1.5g daily. Biotene mouth spray scheduled. FWF down to 200cc q4h.   12/19: PS today, holding off further weaning. Repeat labs tomorrow for renal function with med adjustments likely to follow. Last dose of prednisone 120mg today, down to 100mg tomorrow.   12/20: Prednisone 100mg starting today. Na 144 today. Decrease FWF to 120cc q4h. Continue to hold torsemide.   12/21: Hydroxyzine increased to 50mg ovn. Na 144 -> 146  with decrease y/d in FWF. Increase  -> 160cc q4h. Start chlorthalidone 25mg daily for natriuresis. K 5.4, will recheck tomorrow before intervention.     Assessment & Plan     CKD  CARMEN, resolved PTA  Elevated BUN  Low Cystatin C  Hypernatremia  Hyperkalemia  - difficulty with net I/O balance as pt persistently positive  - suspect related to previous CKD and prolonged high dose steroids, among other etiologies  - nephro consulted (12/18)  - changed bactrim to atovaquone 1.5g daily (12/18)  - increase to FWF 160mL q4h (12/21) - Na increased today with decrease in FWF y/d  - start chlorthalidone 25mg daily for diuresis/natriuresis (12/21)  - will likely need to add loop diuretic back soon if pt can tolerate to maximize diuresis and I/O balance  - RD noted we could change to more concentrated TF if needed  - torsemide 20mg held  - will need to monitor Na levels closely given hx of hypernatremia   - K 5.4 (12/21), repeat check tomorrow before intervention    Acute on Chronic Hypoxic Respiratory Failure  Bronchiectasis?  Multifocal Pneumonia  ARDS, PTA  pHTN  Chronic Hypoxic Respiratory Failure  ALAINA  - pulmonary consulted and following  - treated with high dose steroids previously  - currently on slow steroid taper  - prednisone 100mg daily (50mg in split doses) x2 weeks, started 12/20  - start prednisone 100mg daily on 12/20  - Taper regimen after 100mg -> 80mg x2 weeks, then taper down 5mg every 2 weeks  - bactrim changed to atovaquone 1.5g daily (12/18) over worsening renal function  - trach exchange 12/5 by ENT PTA  - good bronchial hygiene with bronchodilators    Diarrhea  - fairly stable at this time  - C. diff negative  - loperamide prn    HTN  - amlodipine 5mg BID (suspect broken up 2/2 labile BP)  - metoprolol tartrate 12.5mg BID  - isosorbide dinitrate 20mg TID (12/10)  - pt was on hydralazine 50mg TID and isosorbide dinitrate 20mg TID     HFpEF, not in exacerbation  - torsemide 20mg every other day      Pain, Acute  - oxycodone 5mg prn    Normocytic, Hypochromic Anemia  - suspect 2/2 critical illness and phlebotomy  - received pRBCs during hospitalization prior to LTACH  - transfuse to maintain Hgb > 7.0  - pantoprazole    CAD  - asa  - atorvastatin    Hypothyroidism  - levothyroxine    Depression  Anxiety  - pregabalin  - sertraline    PEG  - PEG placed 11/29  - RD consulted and following        Diet: NPO for Medical/Clinical Reasons Except for: Ice Chips, NPO but receiving Tube Feeding  Adult Formula Drip Feeding: Continuous Jevity 1.5; Gastrostomy; Goal Rate: 50; mL/hr; TF to run x22 hrs, hold 1 hr before/after levothyroxine    DVT Prophylaxis: Heparin SQ  Aleman Catheter: PRESENT, indication: Strict 1-2 Hour I&O, Strict 1-2 Hour I&O  Lines: PRESENT      PICC 10/22/23 Triple Lumen Right Basilic multiple med gtt. ready for use-Site Assessment: WDL      Cardiac Monitoring: None  Code Status: Full Code      Clinically Significant Risk Factors              # Hypoalbuminemia: Lowest albumin = 2.9 g/dL at 12/12/2023  6:11 AM, will monitor as appropriate     # Hypertension: Noted on problem list                 Disposition Plan     Expected Discharge Date: 12/29/2023    Discharge Delays: Complex Discharge    Discharge Comments: Vent, Trach, Feeding tube, WOC          Agapito Tavares MD  Hospitalist Service  LTACH  Securely message with [employer] (more info)  Text page via [employer] Paging/Directory   ______________________________________________________________________    Interval History   - no acute events ovn  - pt resting comfortably sitting up in the chair  - feeling well today  - no family present at time of visit today  - no other acute concerns    Physical Exam   Vital Signs: Temp: 98.1  F (36.7  C) Temp src: Oral BP: (!) 140/70 Pulse: 81   Resp: 24 SpO2: 98 % O2 Device: Mechanical Ventilator Oxygen Delivery: 55 LPM  Weight: 187 lbs 6.26 oz  General: She is a well grown well-developed adult female resting in bed  comfortably  HEENT: Head is normocephalic atraumatic eyes pupils appear equal round and reactive to light.  Viable trach noted on neck.  Lungs: She has a normal respiratory effort on auscultation good air entry is noted.  No wheezing  Heart: She has a good S1 and S2 no obvious murmurs peripheral pulses are palpable.  Abdomen: Soft nontender bowel sounds are noted  Musculoskeletal: She has good muscle tone  Digits appear acyanotic  Skin: No obvious rashes noted, she is warm to touch please refer to nursing/wound care note for complete skin exam.    Medical Decision Making       45 MINUTES SPENT BY ME on the date of service doing chart review, history, exam, documentation & further activities per the note.      Data   Lab Results   Component Value Date    WBC 15.8 12/15/2023    WBC 10.8 11/22/2019     Lab Results   Component Value Date    RBC 2.83 12/15/2023    RBC 4.34 11/22/2019     Lab Results   Component Value Date    HGB 7.5 12/15/2023    HGB 12.4 11/22/2019     Lab Results   Component Value Date    HCT 25.7 12/15/2023    HCT 39.9 11/22/2019     Lab Results   Component Value Date    MCV 91 12/15/2023    MCV 92 11/22/2019     Lab Results   Component Value Date    MCH 26.5 12/15/2023    MCH 28.6 11/22/2019     Lab Results   Component Value Date    MCHC 29.2 12/15/2023    MCHC 31.1 11/22/2019     Lab Results   Component Value Date    RDW 20.8 12/15/2023    RDW 14.9 11/22/2019     Lab Results   Component Value Date     12/15/2023     11/22/2019       Last Comprehensive Metabolic Panel:  Sodium   Date Value Ref Range Status   12/20/2023 144 135 - 145 mmol/L Final     Comment:     Reference intervals for this test were updated on 09/26/2023 to more accurately reflect our healthy population. There may be differences in the flagging of prior results with similar values performed with this method. Interpretation of those prior results can be made in the context of the updated reference intervals.     11/22/2019 141 133 - 144 mmol/L Final     Potassium   Date Value Ref Range Status   12/20/2023 4.9 3.4 - 5.3 mmol/L Final   11/22/2019 4.6 3.4 - 5.3 mmol/L Final     Chloride   Date Value Ref Range Status   12/20/2023 105 98 - 107 mmol/L Final   11/22/2019 108 94 - 109 mmol/L Final     Carbon Dioxide   Date Value Ref Range Status   11/22/2019 27 20 - 32 mmol/L Final     Carbon Dioxide (CO2)   Date Value Ref Range Status   12/20/2023 32 (H) 22 - 29 mmol/L Final     Anion Gap   Date Value Ref Range Status   12/20/2023 7 7 - 15 mmol/L Final   11/22/2019 6 3 - 14 mmol/L Final     Glucose   Date Value Ref Range Status   11/22/2019 112 (H) 70 - 99 mg/dL Final     GLUCOSE BY METER POCT   Date Value Ref Range Status   12/21/2023 144 (H) 70 - 99 mg/dL Final     Urea Nitrogen   Date Value Ref Range Status   12/20/2023 84.3 (H) 8.0 - 23.0 mg/dL Final   11/22/2019 34 (H) 7 - 30 mg/dL Final     Creatinine   Date Value Ref Range Status   12/20/2023 0.70 0.51 - 0.95 mg/dL Final   11/22/2019 1.30 (H) 0.52 - 1.04 mg/dL Final     GFR Estimate   Date Value Ref Range Status   12/20/2023 88 >60 mL/min/1.73m2 Final   11/22/2019 40 (L) >60 mL/min/[1.73_m2] Final     Comment:     Non  GFR Calc  Starting 12/18/2018, serum creatinine based estimated GFR (eGFR) will be   calculated using the Chronic Kidney Disease Epidemiology Collaboration   (CKD-EPI) equation.       Calcium   Date Value Ref Range Status   12/20/2023 9.3 8.8 - 10.2 mg/dL Final   11/22/2019 8.9 8.5 - 10.1 mg/dL Final     Bilirubin Total   Date Value Ref Range Status   12/20/2023 0.2 <=1.2 mg/dL Final   02/13/2019 0.2 0.2 - 1.3 mg/dL Final     Alkaline Phosphatase   Date Value Ref Range Status   12/20/2023 138 40 - 150 U/L Final     Comment:     Reference intervals for this test were updated on 11/14/2023 to more accurately reflect our healthy population. There may be differences in the flagging of prior results with similar values performed with this method.  Interpretation of those prior results can be made in the context of the updated reference intervals.   02/13/2019 83 40 - 150 U/L Final     ALT   Date Value Ref Range Status   12/20/2023 46 0 - 50 U/L Final     Comment:     Reference intervals for this test were updated on 6/12/2023 to more accurately reflect our healthy population. There may be differences in the flagging of prior results with similar values performed with this method. Interpretation of those prior results can be made in the context of the updated reference intervals.     02/13/2019 59 (H) 0 - 50 U/L Final     AST   Date Value Ref Range Status   12/20/2023 37 0 - 45 U/L Final     Comment:     Reference intervals for this test were updated on 6/12/2023 to more accurately reflect our healthy population. There may be differences in the flagging of prior results with similar values performed with this method. Interpretation of those prior results can be made in the context of the updated reference intervals.   02/13/2019 48 (H) 0 - 45 U/L Final

## 2023-12-21 NOTE — PLAN OF CARE
Problem: Adult Inpatient Plan of Care  Goal: Optimal Comfort and Wellbeing  Intervention: Provide Person-Centered Care     Problem: Pain Acute  Goal: Optimal Pain Control and Function  Outcome: Progressing  Intervention: Prevent or Manage Pain     Goal Outcome Evaluation:    Pt alert and oriented x4.  Pt is able to communicate needs by mouthing words.  At 1530 Pt had complaints of itching and was shaking.  BP elevated at 181/82 RR 33 HR 98.   Family requested zyrtec be given for itching.  Writer gave medication was effective in reducing itching.   BP recheck was 151/68 RR 24.  Pt was still in pain was given oxycodone and scheduled tylenol at 1620 with good effect.     Pt had complaints of increasing anxiety was given hydroxyzine at 2034.  Pt was able to rest and watch/listen to TV.

## 2023-12-21 NOTE — PLAN OF CARE
Problem: Enteral Nutrition  Goal: Feeding Tolerance  Outcome: Progressing   Goal Outcome Evaluation:         Pt had prn Oxycodone 5 mg at 0002 for generalized pain 6/10, also had   Hydralazine inj 5 mg at 0040 for B/P 166/76.  Rechecked B/P was 140/70.  Pt also had prn Atarax 50 mg for anxiety at 0214.   Repositioning done every 2 hourly, pt seems to tolerate tube feeding  Well, will continue to monitor.

## 2023-12-21 NOTE — PLAN OF CARE
7 PM to 7 AM RT PROGRESS NOTE     DATA:     CURRENT SETTINGS:             TRACH TYPE / SIZE:  6 Bivona changed 12/16/23             MODE:   AC 16, 360, +5, 50%. O2 increased from 45% for pts comfort due to c/o mild SOB all shift, BP running a little high and pts anxiety.     ACTION:             THERAPIES:   QID Albuterol and BID Mucomyst neb given via BID Volara in [facility] @ 25 x 10 minutes.             SUCTION:                           FREQUENCY:   X 5                        AMOUNT:   mod x 1, small x 4                        CONSISTENCY:   thick                        COLOR:   alida/yellow to white/yellow             SPONTANEOUS COUGH EFFORT/STRENGTH OF EFFORT (not elicited by suctioning): Good                              WEANING PHASE:    PS day.  AC night                         WEAN MODE:                            WEAN TIME:                           END WEAN REASON:  Per day RT's report, PS 10/5, 45%,  8 hours and 1 minute, then RR 40's     RESPONSE:             BS:   Faint rhonchi to Clear and dim post sxn             VITAL SIGNS:   SATs %, HR 82-84, RR 25-35             RISK FOR SELF DECANNULATION:  No     NOTE / PLAN:   Continue with same      Problem: Mechanical Ventilation Invasive  Goal: Effective Communication  Outcome: Progressing  Goal: Mechanical Ventilation Liberation  Outcome: Progressing  Goal: Absence of Device-Related Skin and Tissue Injury  Outcome: Progressing  Goal: Absence of Ventilator-Induced Lung Injury  Outcome: Progressing

## 2023-12-22 NOTE — PROGRESS NOTES
"    RENAL PROGRESS NOTE    CC:  High BUN    HPI:Matthew Bowen is a 77 y/o F with a PMH  sig for recent acute on chronic  hypoxic resp failure (was on chonic home 02), ALAINA/CPAP, chronicdiastolic HF ASCAD, stage 3 CKD, obesity,  HTN. Renal consulted for high BUN.   She was admitted to [facility] for recurrent falls, 10/15/23, tx for CAP with multifple course of antibx, eventually re intubation, dev ARDS, suspect d/t pulm HTN.  Moderately low IgG, on steroids,no IVIG. Autoimmune w/up was mostly negative , vasculitis r/o. Now s/p trach and PEG. She was transferre to [facility] on 12/5.   Looks to have longstanding underlying CKD for some time, Baseline sCr 1.4-1.6 in 2022-23.  sCr trending <1 in the setting  of severe sarcopenia very limited mobility.  Cystatin C since Oct' 23 in the 3.5-4 range (GFR 10-14).  Standard BMP/GFR grossly overestimates renal function.  She is urinating well (has mitchell).  No h/o tanesha hematuria.  Does have some microhematuria last 2 cks, likely I nthe setting of mitchell, ? Contamination vs colonization with yeast/bacteria .  UA is otherwise bland               She did have full autoimmune w/up in Oct that was unremarkable (aside from mildly low IgG). ANCA MARLI neg.  Mild cortical thinning on CT abd/pelvis in Oct '23.  No hydro. 2021 FLC ratio was 1.23, no mononclonal proteins at that time               Discussed with pts  JUVENAL and his daughter Roque by phone this week.  Daughter doesn't think a kidney provider saw her mother in Oct/Nov; and do not believe she has seen a nephrologist in the past.  Daughter Roque is a pediatrician.  States that her mother was \"almost on CRRT\" during her recent hospital admission.     ASSESSMENT & PLAN:     PLAN:  -wt stable 85>84.5>85 today, trend labs/volume/wt/I & O daily  - FWF  increased to 160 cc Q4   -Chlorthalidone started   -Medical management for now. Hoping to avoid needing dialysis as I do not think it would be well tolerated in her current state " "(no acute HD need at present, but baseline RF VERY poor, so high risk), family would want to pursue all cares, so will need to continue goals of care discussion.   -Discussed with RD and will switch her to renal tube feeds, and adjust FWF to continue current fluid intake  -Holding oral protein intake. 12/19 Discuss protein needs/counts with RD and reduce intake (very high BUN).  -Working up a ?bleed. Primary team will transfuse pRBCs per PCP  -Lokelma 10 mg  -BMP daily    Non-Oliguric Mild Acute vs subacute CARMEN, vs unrecognized progressive CKD 4: Very Azotemic (likely multifactorial with higher protein intake in the setting of severe malnutrition, catabolic with enhanced tissue breakdown with infection and prolonged high dose steroids), no overt uremic symptoms. Despite normal Cr (suspect d/t severy reduced muscle mass) cystatin C  of 4.1 (eGFR 10) more reflective of severity of her renal dz; very sacropenic. She has longstanding CKD (her Cystatin C/GFR has been consistently in this range since her acute illness in oct), no prior neph consult or neph specific w/up that I could find (though daughter reports that pt has prior CARMEN's, and was \"almost on CRRT\"). Baseline Cr prior to acute illness [id number] was 1.4-1.6 range GFR <30  FLC ratio 1.23, no monoclonals on immunofix 2021 ([facility]), ANCA/MARLI neg Oct '23. Prior UA Oct '23, no microhematuria, very modest microhem Nov/Dec may be d/t mitchell, very modest 10-25 range, with min proteiniuria by dip, upCr ~470mg. Prior remote CT imaging 10/2023: cortical thinning/scarring,non-obstructing L renal stone    -There is No indication for needing dialysis, and uncertain how well it would be tolerated should it come to that, given that she has decent UO and not overtly uremic/stable lytes etc. Will monitor and manage medically for now; continue goals of care discussion with pt and family.       Hypernatremia:Likely  2/2 overdiuresis, intravasc contraction, diuretics reduced to " "every other day dosing for now, trend wts; compensated resp acidosis. on FR via PEG. Started thiazide, slight increase in FWF     Acute on Chronic hyposix/hypercapnic resp failure: With underlying Pulm HTN, ALAINA/recurent PNA/recent h/o ARDS, management per pulm team. Completed antibx 11/29, +trach, on high dose pred with extended taper.     Chronic Diastolic HFPEF: With pulm HTN, severe ALAINA,volume overload, on Torsemide every other day (currently on hold); on Isosorbide.     Volume: Challenging to assess, some RUE edema (seems dependent) no sig LE edema, some interstitial prominence on CXR unclear if volume vs post-mnemonic changes/fibrosis, good UO, but net + 6L since admission (was \"dry\" hypernatremic on arrival with aggressive diuresis prior to LTACH admission      HTN:Some lability/Overall controlled On BB, Isosorbide, Amlodipine     Hypothyroid: On replacement     Neuropathy: On Lyrica, renally dose    SUBJECTIVE:   Pt appears comfortable, resting  Daughter at bedside, we discussed labs/plan and answered all questions  Wt down 85>84.5>85 today, BUN 98>84. Discusses with Dr Tavares will reduce FWF down to 120 Q 4, trend  +vent/trach Fio2 45, stable  CXR: some interstitial prominence on CXR unclear if volume vs post-mnemonic changes/fibrosis  BUN down trending 98.2>84.3, continue current plan and trend.   We discussed level of renal function, overestimated. Lytes, acid/base, stable. No over hypervolemia/resp stable. Will need ongoing duiresis at some point. Decreased FWFs further, reduced high protein diet intake. Will follow along.   Discussed with RD and will switch her to renal tube feeds, and adjust FWF to continue current fluid intake  Blood transfusion planned for today  Working up for GIB, Discussed case with Dr. Tavares today  Discussed labs/Plan and answered all questions.         OBJECTIVE:  Physical Exam   Temp: 98.6  F (37  C) Temp src: Oral BP: (!) 183/78 Pulse: 73   Resp: 18 SpO2: 96 % O2 Device: " Mechanical Ventilator    Vitals:    12/16/23 0630 12/17/23 0540 12/21/23 0520   Weight: 85 kg (187 lb 6.3 oz) 84.5 kg (186 lb 4.6 oz) 85 kg (187 lb 6.3 oz)     Vital Signs with Ranges  Temp:  [97.8  F (36.6  C)-98.6  F (37  C)] 98.6  F (37  C)  Pulse:  [50-83] 73  Resp:  [17-29] 18  BP: (104-183)/(53-78) 183/78  FiO2 (%):  [45 %] 45 %  SpO2:  [92 %-97 %] 96 %  I/O last 3 completed shifts:  In: 2106 [I.V.:40; NG/GT:2066]  Out: 1450 [Urine:1450]    Patient Vitals for the past 72 hrs:   Weight   12/21/23 0520 85 kg (187 lb 6.3 oz)     Intake/Output Summary (Last 24 hours) at 12/20/2023 0913  Last data filed at 12/20/2023 0906  Gross per 24 hour   Intake 1265 ml   Output 2500 ml   Net -1235 ml       PHYSICAL EXAM:  GEN: NAD, aox3, + trach  CV: RRR   Lung: clear and equal; no extra sounds  Ab: soft and NT, +PEG, + bruising  Ext: + edema and well perfused  Skin: no rash  Neuro: grossly intact  Psych: Flat affect  : + mitchell, clear urine  Wt: up 3 Kg since admission        LABORATORY STUDIES:     Recent Labs   Lab 12/22/23  0627 12/20/23  0612 12/18/23  0648   WBC 10.8 11.9* 12.7*   RBC 2.48* 2.69* 2.82*   HGB 6.5* 7.0* 7.4*   HCT 22.6* 24.2* 25.5*    226 205       Basic Metabolic Panel:  Recent Labs   Lab 12/22/23  0627 12/22/23  0619 12/21/23  2345 12/21/23  1713 12/21/23  1219 12/21/23  0617 12/20/23  1205 12/20/23  0612 12/18/23  1152 12/18/23  0648     --   --   --   --  146*  --  144  --  144   POTASSIUM 5.8*  --   --   --   --  5.4*  --  4.9  --  4.7   CHLORIDE 104  --   --   --   --  105  --  105  --  103   CO2 31*  --   --   --   --  32*  --  32*  --  34*   BUN 81.0*  --   --   --   --  81.4*  --  84.3*  --  98.2*   CR 0.73  --   --   --   --  0.70  --  0.70  --  0.75   * 98 143* 189* 157* 144*   < > 121*   < > 133*   BARBARA 9.3  --   --   --   --  9.7  --  9.3  --  9.5    < > = values in this interval not displayed.       INRNo lab results found in last 7 days.     Recent Labs   Lab Test  12/22/23  0627 12/20/23  0612 11/29/23  0508 11/28/23  0956   INR  --   --   --  1.28*   WBC 10.8 11.9*   < >  --    HGB 6.5* 7.0*   < >  --     226   < >  --     < > = values in this interval not displayed.       Personally reviewed current labs    Holly MANJARREZ-BC  [facility]  [phone]

## 2023-12-22 NOTE — PROGRESS NOTES
Pulmonary Progress Note    Admit Date: 12/5/2023  CODE: Full Code    HPI:   78 year old female w/PMH complex medical history including chronic hypoxic respiratory failure due to pulmonary HTN on 2L home O2, severe ALAINA on CPAP, chronic diastolic HF, CAD, CKD stage 3, morbid obesity, HTN,  depression, recent falls, hx recurrent pneumonia. Admitted 10/15/23 and treated for CAP with multiple courses of antibiotics. Developed ARDS. Cultures negative. Autoimmune work-up grossly negative except for low IgG level. 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 S/p trach and PEG.  [facility] Progressing slowly with PST and trach dome.    Assessment/Plan:     Acute on chronic hypoxic/hypercapnic respiratory failure in setting of underlying pulmonary HTN c/b pneumonia, severe ARDS, possibly organizing pneumonia now s/p trach.    Multifocal CAP: Completed antibiotics 11/29. Serial Sputum cx with candida, beta-D-glucan neg. BAL 10/22 cytology neg.   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 IVIG considered but ultimately not given.  Tracheostomy in place: 6 Bivona placed 12/16  Oropharyngeal dysphagia status post PEG tube: passed BDT on 12/7, tolerating PMV  Severe ALAINA on APAP 6-16 PTA: PSG 10/28/19 AHI 80   Chronic diastolic HF, Pulm HTN, volume overload: torsemide 60 daily PTA  MDD, Anxiety - stable   Leukocytosis wo fever: Stable/downtrending   Bronchiectasis  Severely deconditioned with risk for steroid myopathy   CARMEN on CKD, Azotemia: eGFR with Cystatin C is 10.  Diuretic placed on hold, BUN down today. Renal following      Attempted doing TM/PMV continuously 12/18 without a mid day break on vent and patient did not tolerate.  After about 9hr, became tachypneic, hypoxic with inc wob.  Doing PST during the day.     Recommendations:  Phase 1 weans today.  PS day.  AC night  CT chest wo contrast next  week to follow up on GGO evolution   Ok for in-line PMV trials when on vent   On diuretic holiday. Renal following   Steroid taper in place: currently on pred 50 BID  Atovaquone for PJP ppx   Albuterol QID, Mucomyst & metaneb BID  Routine trach care/changes per protocol  GI ppx: PPI  DVT ppx: heparin subcutaneous   VAP ppx: Chlorhexidine, HOB 30 degrees  PT/OT/SLP  Consider VFSS next week pending progression.  Trial ice chips     Subjective:   Pt laying in bed, no distress on full vent    Tracheal secretions:  Overnight -  2x small thick yellow    Cough strength: moderate    Ventilator weaning results  12/6: PS 12/8 for ~12hr, tolerated well   12/7: PS 10/5 for 9.5hr, tolerated well   12/8: PS 8/5 for 12.5hr   12/9: No wean d/t elevated BP   12/10: PS 8/5 for 6hr 15min, stopped d/t increased WOB  12/11: PS 8/5 for  1.5hr.  Then TM/PMV x2 for total of almost 5hr   12/12:  PS 8/5 for 1 hr 23 min. TM/PMV 40%/50L x2 for total of almost 5hr  12/13-17: TM/PMV 4-5hr x2. Break on AC mid diay and AC at night  12/18: TM/PMV for 8hr 45min.  Hypoxia/inc wob/tachypnea at end of wean.    12/19: PS 10/5 for 9hr total. AC night   12/20: PS 10/5 for 8 hours  12/21: PS 10/5 7hrs and 37min  12/22: on full vent this morning    Clinical status discussed today with respiratory therapist       Medications:      acetaminophen  650 mg Oral or Feeding Tube 4x Daily    acetylcysteine  2 mL Nebulization BID    albuterol  2.5 mg Nebulization 4x Daily    amLODIPine  5 mg Oral or Feeding Tube BID    artificial saliva  1 spray Mouth/Throat 4x Daily    aspirin  81 mg Oral or Feeding Tube Daily    atorvastatin  20 mg Oral or Feeding Tube QPM    atovaquone  1,500 mg Per Feeding Tube Daily    B and C vitamin Complex with folic acid  5 mL Oral or Feeding Tube Daily    chlorhexidine  15 mL Mouth/Throat BID    chlorthalidone  25 mg Per Feeding Tube Daily    cholecalciferol  50 mcg Per Feeding Tube Daily    heparin ANTICOAGULANT  5,000 Units Subcutaneous  Q8H    insulin aspart  1-6 Units Subcutaneous Q6H    isosorbide dinitrate  20 mg Per Feeding Tube TID    levothyroxine  150 mcg Oral or Feeding Tube QAM AC    metoprolol  12.5 mg Per Feeding Tube BID    miconazole   Topical BID    mineral oil-hydrophilic petrolatum   Topical Daily    pantoprazole  40 mg Per Feeding Tube QAM AC    [START ON 1/3/2024] predniSONE  40 mg Per Feeding Tube 2 times per day    predniSONE  50 mg Oral BID w/meals    pregabalin  25 mg Per Feeding Tube Daily    protein modular  1 packet Per Feeding Tube Daily    QUEtiapine  150 mg Oral or Feeding Tube BID    sertraline  150 mg Oral or Feeding Tube Daily    simethicone  40 mg Per Feeding Tube TID    sodium chloride (PF)  10 mL Intracatheter Q8H    sodium chloride (PF)  10-40 mL Intracatheter Q7 Days    sodium zirconium cyclosilicate  10 g Oral Once    [Held by provider] torsemide  20 mg Per Feeding Tube Every Other Day         Exam/Data:   Vitals  BP (!) 183/78 (BP Location: Left arm)   Pulse 77   Temp 98.6  F (37  C) (Oral)   Resp 18   Wt 85 kg (187 lb 6.3 oz)   SpO2 95%   BMI 36.60 kg/m       I/O last 3 completed shifts:  In: 2106 [I.V.:40; NG/GT:2066]  Out: 1450 [Urine:1450]  Weight change:     Vent Mode: CMV/AC  (Continuous Mandatory Ventilation/ Assist Control)  FiO2 (%): 45 %  Resp Rate (Set): 16 breaths/min  Tidal Volume (Set, mL): 360 mL  PEEP (cm H2O): 5 cmH2O  Pressure Support (cm H2O): 10 cmH2O  Resp: 18    EXAM:  Gen: NAD in chair on PS 10/5  HEENT: NT, trach midline/intact  CV: RRR, no m/g/r  Resp: CTAB anteriorly, non-labored, no wheeze   Abd: large, soft, nontender, g tube in place  Skin: no rashes or lesions on visible skin  Ext: mild edema, very weak, able to move all ext  Neuro: alert, follows commands    Labs:    Complete Blood Count   Recent Labs   Lab 12/22/23  0627 12/20/23  0612 12/18/23  0648   WBC 10.8 11.9* 12.7*   HGB 6.5* 7.0* 7.4*    226 205     Basic Metabolic Panel  Recent Labs   Lab 12/22/23  0685  12/22/23  0619 12/21/23  2345 12/21/23  1713 12/21/23  1219 12/21/23  0617 12/20/23  1205 12/20/23  0612 12/18/23  1152 12/18/23  0648     --   --   --   --  146*  --  144  --  144   POTASSIUM 5.8*  --   --   --   --  5.4*  --  4.9  --  4.7   CHLORIDE 104  --   --   --   --  105  --  105  --  103   CO2 31*  --   --   --   --  32*  --  32*  --  34*   BUN 81.0*  --   --   --   --  81.4*  --  84.3*  --  98.2*   CR 0.73  --   --   --   --  0.70  --  0.70  --  0.75   * 98 143* 189*   < > 144*   < > 121*   < > 133*    < > = values in this interval not displayed.       Radiology: Personally reviewed; radiology read below    XR CHEST 12/15/2023  Tracheostomy tube in place with tip near the thoracic inlet. Right upper extremity PICC with tip in the mid SVC. Persistent consolidative opacities with air bronchograms in the right midlung and bilateral lung bases with background diffuse interstitial prominence. Stable cardiomediastinal contours with calcified tortuous aorta. No significant interval change.    XR CHEST 12/7/2023  Endotracheal tube seen on the prior study has been replaced with a tracheostomy tube which appears in good position in the mid trachea. PICC catheter from right upper extremity approach is unchanged with tip in the superior vena cava. There is airspace consolidation with minimal volume loss in the right upper lobe consistent with right upper lobe pneumonia. There is persistent opacity with bronchial thickening at the left base spine the heart which is stable. Stable cardiac size. Normal pulmonary vascularity. No pneumothorax or pleural effusion. Calcified aortic arch.    XR CHEST 11/26/2023  papo AP view of the chest was obtained. Endotracheal tube tip projects over mid thoracic trachea approximately 4 cm from the ashley. Enteric tube crosses the diaphragm with the distal tip outside the field-of-view. Right upper extremity PICC tip projects over high/mid SVC. Cardiomegaly. Left basilar and  right mid/upper lobe patchy pulmonary opacity, could represent atelectasis versus infection. No significant pleural effusion or pneumothorax.     CT CHEST WITHOUT CONTRAST 11/24/2023                                 LUNGS AND PLEURA: Pulmonary infiltrates again seen throughout both  lungs. There is increased airspace consolidation with air bronchograms  in both lower lobes and in the posterior right upper lobe when  compared to previous. There are groundglass opacities with septal  thickening throughout the remainder of both lungs. Mild cylindrical  bronchiectasis. No pleural effusion or pneumothorax.     IMPRESSION:  1.  Extensive bilateral pulmonary infiltrates again seen. Increasing  airspace consolidation in the right upper lobe and both lower lobes  when compared to previous.

## 2023-12-22 NOTE — PLAN OF CARE
Problem: Adult Inpatient Plan of Care  Goal: Plan of Care Review  Description: The Plan of Care Review/Shift note should be completed every shift.  The Outcome Evaluation is a brief statement about your assessment that the patient is improving, declining, or no change.  This information will be displayed automatically on your shift  note.  Outcome: Progressing     Problem: Adult Inpatient Plan of Care  Goal: Optimal Comfort and Wellbeing  Outcome: Progressing  Intervention: Monitor Pain and Promote Comfort  Recent Flowsheet Documentation  Taken 12/22/2023 1300 by Israel Putnam RN  Pain Management Interventions: medication (see MAR)  Taken 12/22/2023 1227 by Israel Putnam, RN  Pain Management Interventions:   medication (see MAR)   repositioned   relaxation techniques promoted  Intervention: Provide Person-Centered Care  Recent Flowsheet Documentation  Taken 12/22/2023 0850 by Israel Putnam RN  Trust Relationship/Rapport: care explained     Problem: Anxiety Signs/Symptoms  Goal: Optimized Energy Level (Anxiety Signs/Symptoms)  Outcome: Not Progressing   Goal Outcome Evaluation: Patient was restless most of this shift.  She reported abd and back pain, she was not able to rate it. Scheduled tylenol and oxycodone given. She continues to be restless so ativan given. She is using abd muscle during for breathing, notified RT and MD. . Hgb this am was 6.5. patient is receiving 1 unit of RBC now. Occult blood stool sent .  Love is patent and intact. Family here to visited. They were updated regarding patient's status. BP this am was 183/78, scheduled bp med given, rechecked for 149/ 72 and 123/58. Explained meds and care plan to patient.

## 2023-12-22 NOTE — PROGRESS NOTES
[facility]    Medicine Progress Note - Hospitalist Service    Date of Admission:  12/5/2023    Hospital Course  Matthew Boewn is a 78y F PMHx chronic hypoxic respiratory failure 2/2 pHTN, ALAINA, HFpEF, CAD, CKD3, Obesity, HTN, depression, and anxiety. She presented initially on 10/15 with fever, cough, and falls with a temperature. Further w/u revealed multifocal pneumonia and she was started on antibiotics. Her respiratory status worsened as she developed ARDS and was intubated on 10/21. She had a prolonged ICU stay 2/2 difficulty vent weaning. Eventually she underwent trach/peg placement. Of note, she was treated with high dose steroids for her lung condition and remains on a high dose long taper of steroids. Pt was transferred to [facility] for ongoing vent weaning and therapies.      [facility] Course  12/7-12/10: Hypernatremia to 155 2/2 over-diuresis, intravascular depletion, missing first night of FWF. 2L D5W given, resolved, Na now stable. Torsemide held initially but now restarted at 20mg daily. BP remains elevated, start isosorbide dinitrate 20mg TID. Loperamide for diarrhea.    12/11 - 12/17: Patient started weaning during the week per pulmonology.  She has been making progress with therapies throughout the week. 12/17, Continues to make progress. Remains on vent weaning phase 2.  Care conference tomorrow.  Plan to continue current medical management.     12/18: Care Conference today. Nephro consulted for uptrending BUN, low cystatin C, difficulty maintaining I/O balance. Bactrim -> atovaquone 1.5g daily. Biotene mouth spray scheduled. FWF down to 200cc q4h.   12/19: PS today, holding off further weaning. Repeat labs tomorrow for renal function with med adjustments likely to follow. Last dose of prednisone 120mg today, down to 100mg tomorrow.   12/20: Prednisone 100mg starting today. Na 144 today. Decrease FWF to 120cc q4h. Continue to hold torsemide.   12/21: Hydroxyzine increased to 50mg ovn. Na 144 -> 146  with decrease y/d in FWF. Increase  -> 160cc q4h. Start chlorthalidone 25mg daily for natriuresis. K 5.4, will recheck tomorrow before intervention.   12/22: Hgb 6.5, 1u pRBC ordered. Will check FOBT. If UGIB would potentially explain elevated BUN. Decrease quetiapine 100mg BID, start olanzapine 2.5mg at bedtime prn. Hydroxyzine -> lorazepam for anxiety. Na 143, maintain FWF today. K now 5.8, lokelma 10g x1 dose.     Assessment & Plan     Anemia, Acute  - Unclear etiology at this time  - possibly fluid shifts vs UGIB  - 1u pRBC ordered with T&S  - FOBT ordered  - if positive UGIB may explain worsening BUN in addition to anemia  - will transition pantoprazole to IV if needed    CKD  CARMEN, resolved PTA  Elevated BUN  Low Cystatin C  Hypernatremia  Hyperkalemia  - difficulty with net I/O balance as pt persistently positive  - suspect related to previous CKD and prolonged high dose steroids, among other etiologies  - nephro consulted (12/18)  - changed bactrim to atovaquone 1.5g daily (12/18)  - FWF 160mL q4h (12/21)  - chlorthalidone 25mg daily for diuresis/natriuresis (12/21)  - will likely need to add loop diuretic back soon if pt can tolerate to maximize diuresis and I/O balance  - RD noted we could change to more concentrated TF if needed  - torsemide 20mg held  - will need to monitor Na levels closely given hx of hypernatremia   - K 5.8 today, lokelma 10g x1 dose, repeat tomorrow with further 10g dose x2 if needed    Depression  Anxiety  - pregabalin  - sertraline  - decrease quetiapine to 100mg q12h  - start olanzapine 2.5mg at bedtime prn  - will transition off quetiapine to olanzapine with hopes to achieve improvement in anxiety and agitation    Acute on Chronic Hypoxic Respiratory Failure  Bronchiectasis?  Multifocal Pneumonia  ARDS, PTA  pHTN  Chronic Hypoxic Respiratory Failure  ALAINA  - pulmonary consulted and following  - treated with high dose steroids previously  - currently on slow steroid taper  -  prednisone 100mg daily (50mg in split doses) x2 weeks, started 12/20  - start prednisone 100mg daily on 12/20  - Taper regimen after 100mg -> 80mg x2 weeks, then taper down 5mg every 2 weeks  - bactrim changed to atovaquone 1.5g daily (12/18) over worsening renal function  - trach exchange 12/5 by ENT PTA  - good bronchial hygiene with bronchodilators    Diarrhea  - fairly stable at this time  - C. diff negative  - loperamide prn    HTN  - amlodipine 5mg BID (suspect broken up 2/2 labile BP)  - metoprolol tartrate 12.5mg BID  - isosorbide dinitrate 20mg TID (12/10)  - pt was on hydralazine 50mg TID and isosorbide dinitrate 20mg TID     HFpEF, not in exacerbation  - torsemide 20mg every other day     Pain, Acute  - oxycodone 5mg prn    Normocytic, Hypochromic Anemia  - suspect 2/2 critical illness and phlebotomy  - received pRBCs during hospitalization prior to LTACH  - transfuse to maintain Hgb > 7.0  - pantoprazole    CAD  - asa  - atorvastatin    Hypothyroidism  - levothyroxine    PEG  - PEG placed 11/29  - RD consulted and following        Diet: NPO for Medical/Clinical Reasons Except for: Ice Chips, NPO but receiving Tube Feeding  Adult Formula Drip Feeding: Continuous Jevity 1.5; Gastrostomy; Goal Rate: 50; mL/hr; TF to run x22 hrs, hold 1 hr before/after levothyroxine    DVT Prophylaxis: Heparin SQ  Aleman Catheter: PRESENT, indication: Strict 1-2 Hour I&O, Strict 1-2 Hour I&O  Lines: PRESENT      PICC 10/22/23 Triple Lumen Right Basilic multiple med gtt. ready for use-Site Assessment: WDL      Cardiac Monitoring: None  Code Status: Full Code      Clinically Significant Risk Factors        # Hyperkalemia: Highest K = 5.4 mmol/L in last 2 days, will monitor as appropriate  # Hypernatremia: Highest Na = 146 mmol/L in last 2 days, will monitor as appropriate      # Hypoalbuminemia: Lowest albumin = 2.9 g/dL at 12/12/2023  6:11 AM, will monitor as appropriate     # Hypertension: Noted on problem list                  Disposition Plan     Expected Discharge Date: 01/01/2024    Discharge Delays: Complex Discharge    Discharge Comments: Vent, Trach, Feeding tube, WOC          Agapito Tavares MD  Hospitalist Service  LTACH  Securely message with [employer] (more info)  Text page via [employer] Paging/Directory   ______________________________________________________________________    Interval History   - hgb 6.5, transfused 1u pRBC  - pt in bed this morning  - daughter updated at bedside  - discussed management of K and Na levels today  - discussed switch to olanzapine and ativan today, daughter agreeable  - no other acute concerns    Physical Exam   Vital Signs: Temp: (P) 97.8  F (36.6  C) Temp src: (P) Axillary BP: (P) 136/60 Pulse: 74   Resp: 18 SpO2: 93 % O2 Device: Mechanical Ventilator    Weight: 187 lbs 6.26 oz  General: She is a well grown well-developed adult female resting in bed comfortably  HEENT: Head is normocephalic atraumatic eyes pupils appear equal round and reactive to light.  Viable trach noted on neck.  Lungs: She has a normal respiratory effort on auscultation good air entry is noted.  No wheezing  Heart: She has a good S1 and S2 no obvious murmurs peripheral pulses are palpable.  Abdomen: Soft nontender bowel sounds are noted  Musculoskeletal: She has good muscle tone  Digits appear acyanotic  Skin: No obvious rashes noted, she is warm to touch please refer to nursing/wound care note for complete skin exam.    Medical Decision Making       45 MINUTES SPENT BY ME on the date of service doing chart review, history, exam, documentation & further activities per the note.      Data   Lab Results   Component Value Date    WBC 15.8 12/15/2023    WBC 10.8 11/22/2019     Lab Results   Component Value Date    RBC 2.83 12/15/2023    RBC 4.34 11/22/2019     Lab Results   Component Value Date    HGB 7.5 12/15/2023    HGB 12.4 11/22/2019     Lab Results   Component Value Date    HCT 25.7 12/15/2023    HCT 39.9 11/22/2019     Lab  Results   Component Value Date    MCV 91 12/15/2023    MCV 92 11/22/2019     Lab Results   Component Value Date    MCH 26.5 12/15/2023    MCH 28.6 11/22/2019     Lab Results   Component Value Date    MCHC 29.2 12/15/2023    MCHC 31.1 11/22/2019     Lab Results   Component Value Date    RDW 20.8 12/15/2023    RDW 14.9 11/22/2019     Lab Results   Component Value Date     12/15/2023     11/22/2019       Last Comprehensive Metabolic Panel:  Sodium   Date Value Ref Range Status   12/21/2023 146 (H) 135 - 145 mmol/L Final     Comment:     Reference intervals for this test were updated on 09/26/2023 to more accurately reflect our healthy population. There may be differences in the flagging of prior results with similar values performed with this method. Interpretation of those prior results can be made in the context of the updated reference intervals.    11/22/2019 141 133 - 144 mmol/L Final     Potassium   Date Value Ref Range Status   12/21/2023 5.4 (H) 3.4 - 5.3 mmol/L Final   11/22/2019 4.6 3.4 - 5.3 mmol/L Final     Chloride   Date Value Ref Range Status   12/21/2023 105 98 - 107 mmol/L Final   11/22/2019 108 94 - 109 mmol/L Final     Carbon Dioxide   Date Value Ref Range Status   11/22/2019 27 20 - 32 mmol/L Final     Carbon Dioxide (CO2)   Date Value Ref Range Status   12/21/2023 32 (H) 22 - 29 mmol/L Final     Anion Gap   Date Value Ref Range Status   12/21/2023 9 7 - 15 mmol/L Final   11/22/2019 6 3 - 14 mmol/L Final     Glucose   Date Value Ref Range Status   11/22/2019 112 (H) 70 - 99 mg/dL Final     GLUCOSE BY METER POCT   Date Value Ref Range Status   12/22/2023 98 70 - 99 mg/dL Final     Urea Nitrogen   Date Value Ref Range Status   12/21/2023 81.4 (H) 8.0 - 23.0 mg/dL Final   11/22/2019 34 (H) 7 - 30 mg/dL Final     Creatinine   Date Value Ref Range Status   12/21/2023 0.70 0.51 - 0.95 mg/dL Final   11/22/2019 1.30 (H) 0.52 - 1.04 mg/dL Final     GFR Estimate   Date Value Ref Range Status    12/21/2023 88 >60 mL/min/1.73m2 Final   11/22/2019 40 (L) >60 mL/min/[1.73_m2] Final     Comment:     Non  GFR Calc  Starting 12/18/2018, serum creatinine based estimated GFR (eGFR) will be   calculated using the Chronic Kidney Disease Epidemiology Collaboration   (CKD-EPI) equation.       Calcium   Date Value Ref Range Status   12/21/2023 9.7 8.8 - 10.2 mg/dL Final   11/22/2019 8.9 8.5 - 10.1 mg/dL Final     Bilirubin Total   Date Value Ref Range Status   12/20/2023 0.2 <=1.2 mg/dL Final   02/13/2019 0.2 0.2 - 1.3 mg/dL Final     Alkaline Phosphatase   Date Value Ref Range Status   12/20/2023 138 40 - 150 U/L Final     Comment:     Reference intervals for this test were updated on 11/14/2023 to more accurately reflect our healthy population. There may be differences in the flagging of prior results with similar values performed with this method. Interpretation of those prior results can be made in the context of the updated reference intervals.   02/13/2019 83 40 - 150 U/L Final     ALT   Date Value Ref Range Status   12/20/2023 46 0 - 50 U/L Final     Comment:     Reference intervals for this test were updated on 6/12/2023 to more accurately reflect our healthy population. There may be differences in the flagging of prior results with similar values performed with this method. Interpretation of those prior results can be made in the context of the updated reference intervals.     02/13/2019 59 (H) 0 - 50 U/L Final     AST   Date Value Ref Range Status   12/20/2023 37 0 - 45 U/L Final     Comment:     Reference intervals for this test were updated on 6/12/2023 to more accurately reflect our healthy population. There may be differences in the flagging of prior results with similar values performed with this method. Interpretation of those prior results can be made in the context of the updated reference intervals.   02/13/2019 48 (H) 0 - 45 U/L Final

## 2023-12-22 NOTE — PLAN OF CARE
Problem: Mechanical Ventilation Invasive  Goal: Effective Communication  Outcome: Progressing  Goal: Mechanical Ventilation Liberation  Outcome: Progressing  Goal: Absence of Device-Related Skin and Tissue Injury  Outcome: Progressing  Goal: Absence of Ventilator-Induced Lung Injury  Outcome: Progressing   Goal Outcome Evaluation:       RT PROGRESS NOTE     DATA:     CURRENT SETTINGS: AC 16 , 360, +8, 50%             TRACH TYPE /   SIZE:# 6 BIVONA Changed  on 12/16/2023              MODE: AC              FIO2: 50%      ACTION:             THERAPIES : Albuterol neb QID,Mucomyst BID andMetaNeb/Volera BID              SUCTION:                           FREQUENCY: X5                        AMOUNT: Small to Moderate                         CONSISTENCY: thick                        COLOR: white /Pale yellow              SPONTANEOUS COUGH EFFORT/STRENGTH OF EFFORT (not elicited by suctioning): fair                              WEANING PHASE: 1                        WEAN MODE: CPAP/PS                         WEAN TIME:  no wean due to low hemoglobin , high BP , Increased work of breathing                         END WEAN REASON: Increased work of breathing, using accessory muscles , increased RR      RESPONSE:             BS:  Decreased Coarse              VITAL SIGNS: Sat 94-96%, HR 73-80 ,RR 17-24             EMOTIONAL NEEDS / CONCERNS:Anxiety                   RISK FOR SELF DECANNULATION: no                        RISK DUE TO: no                         INTERVENTION/S IN PLACE IS/ARE:         NOTE / PLAN:  Cont. Current plan of care / PS day.  AC night.  If FiO2 >45%, increase PEEP back to 8. Fio2  increased to 50%, and PEEP +8

## 2023-12-22 NOTE — PROGRESS NOTES
NUTRITION BRIEF NOTE     Chart check for nutrition support patient. Chart reviewed and discussed with nephrology CNP.    Current Nutrition Support Orders:   Type of Feeding Tube: PEG (placed 11/29/23)  Enteral Frequency: Continuous  Enteral Regimen: Jevity 1.5 at 50 mL/hr x 22 hrs (holding 1 hr before/after levothyroxine)  Modulars: 1 pkt Prosource TF 20  Total Enteral Provisions: 1100 mL daily provides 1730 kcal (21 kcal per kg), 90g protein (1.1 g per kg actual wt), 238g CHO, 23g fiber and 836mL free water. Meets = 100% of DRI's.  Free Water Flush: 160 mL q4 hours  TF + fluid flush = 1796 mL per day    Updates:   -  Potassium rising, now w/ hyperkalemia. CNP Holly Boudreaux requesting TF formula change to renal.  Labs:  Electrolytes  Potassium (mmol/L)   Date Value   12/22/2023 5.8 (H)   12/21/2023 5.4 (H)   12/20/2023 4.9   11/22/2019 4.6   11/21/2019 4.4   11/20/2019 4.5     Phosphorus (mg/dL)   Date Value   12/18/2023 4.7 (H)   12/11/2023 3.8   12/05/2023 4.0   12/04/2023 2.4 (L)   12/03/2023 2.5    Blood Glucose  Glucose (mg/dL)   Date Value   12/22/2023 105 (H)   12/21/2023 144 (H)   11/22/2019 112 (H)   11/21/2019 97   11/20/2019 109 (H)   02/13/2019 122 (H)   02/12/2019 161 (H)     GLUCOSE BY METER POCT (mg/dL)   Date Value   12/22/2023 98   12/21/2023 143 (H)   12/21/2023 189 (H)   12/21/2023 157 (H)   12/21/2023 144 (H)     Hemoglobin A1C (%)   Date Value   10/22/2023 5.6    Inflammatory Markers  WBC (10e9/L)   Date Value   11/22/2019 10.8   11/20/2019 10.5   02/12/2019 10.1     WBC Count (10e3/uL)   Date Value   12/22/2023 10.8   12/20/2023 11.9 (H)   12/18/2023 12.7 (H)     Albumin (g/dL)   Date Value   12/20/2023 3.1 (L)   12/12/2023 2.9 (L)   12/11/2023 3.3 (L)   02/13/2019 2.7 (L)      Magnesium (mg/dL)   Date Value   12/22/2023 2.9 (H)   12/20/2023 2.7 (H)   12/18/2023 2.6 (H)     Sodium (mmol/L)   Date Value   12/22/2023 143   12/21/2023 146 (H)   12/20/2023 144   11/22/2019 141   11/21/2019 142    11/20/2019 143    Renal  Urea Nitrogen (mg/dL)   Date Value   12/22/2023 81.0 (H)   12/21/2023 81.4 (H)   12/20/2023 84.3 (H)   11/22/2019 34 (H)   11/21/2019 47 (H)   11/20/2019 54 (H)     Creatinine (mg/dL)   Date Value   12/22/2023 0.73   12/21/2023 0.70   12/20/2023 0.70   11/22/2019 1.30 (H)   11/21/2019 1.49 (H)   11/20/2019 1.58 (H)     Additional  Triglycerides (mg/dL)   Date Value   11/21/2019 80     Ketones Urine (mg/dL)   Date Value   12/18/2023 Negative        Recommendations:   Switch to renal TF regimen below    Type of Feeding Tube: PEG (placed 11/29/23)  Enteral Frequency: Continuous  Enteral Regimen: Nepro at 45 mL/hr x 22 hrs (holding 1 hr before/after levothyroxine)  Total Enteral Provisions: 990 mL daily provides 1782 kcal (22 kcal per kg), 80g protein (1 g per kg actual wt), 158g CHO, 25g fiber and 720mL free water. Meets > 100% of DRI's.  Free Water Flush: 180 mL q4 hours  TF + fluid flush = 1800 mL per day    Will continue to follow per protocol. Please page/consult as needed.     Amy Will RDN, LD

## 2023-12-22 NOTE — PLAN OF CARE
Problem: Fall Injury Risk  Goal: Absence of Fall and Fall-Related Injury  Outcome: Progressing     Problem: Pain Acute  Goal: Optimal Pain Control and Function  Outcome: Progressing  Intervention: Develop Pain Management Plan  Recent Flowsheet Documentation  Taken 12/22/2023 0038 by Marlene Copeland RN  Pain Management Interventions: medication (see MAR)  Intervention: Prevent or Manage Pain  Recent Flowsheet Documentation  Taken 12/22/2023 0100 by Marlene Copeland RN  Sensory Stimulation Regulation: care clustered     Problem: Anxiety Signs/Symptoms  Goal: Optimized Energy Level (Anxiety Signs/Symptoms)  Outcome: Progressing   Goal Outcome Evaluation:    Patient appeared to rest well during most of noc shift, prn adm at beginning of shift for pain and noted restlessness, SOB noted, prn also adm this am for noted anxiety with effectiveness, vss, alert x 4, hemoglobin low this am at 6.5, Dr. Tavares notified, see new orders.

## 2023-12-22 NOTE — PLAN OF CARE
Problem: Pain Acute  Goal: Optimal Pain Control and Function  Outcome: Not Progressing  Intervention: Prevent or Manage Pain  Recent Flowsheet Documentation  Taken 12/21/2023 1600 by Christopher Walter RN  Sensory Stimulation Regulation: care clustered  Medication Review/Management: medications reviewed  Intervention: Optimize Psychosocial Wellbeing  Recent Flowsheet Documentation  Taken 12/21/2023 1600 by Christopher Walter RN  Supportive Measures: active listening utilized     Problem: Anxiety Signs/Symptoms  Goal: Optimized Energy Level (Anxiety Signs/Symptoms)  12/21/2023 2107 by Christopher Walter RN  Outcome: Not Progressing  12/21/2023 2104 by Christopher Walter RN  Outcome: Not Progressing   Goal Outcome Evaluation:  VSS. Alert and oriented x4. Anxious and tachypneic; given a one time order Ativan 0.5 mg, effective. Pt was complaining itchy on shoulder and lower back, applied PRN dermassara lotion, effective. On mechanical vent. On continuous tube feeding and well tolerated. Incontinent bowel and bladder. Mood was anxious but now quietly sleeping.  Pt was visited by family.

## 2023-12-22 NOTE — PLAN OF CARE
7 PM to 7 AM RT PROGRESS NOTE     DATA:     CURRENT SETTINGS:             TRACH TYPE / SIZE:  6 Bivona changed 12/16/23             MODE:   AC 16, 360, +5, 45%.     ACTION:             THERAPIES:   QID Albuterol and BID Mucomyst neb given via BID Volara in CHFO @ 25 x 10 minutes.             SUCTION:                           FREQUENCY:   X 3                        AMOUNT:   small x 1, mod x 2                        CONSISTENCY:   thick                        COLOR:   white/yellow to alida white/yellow             SPONTANEOUS COUGH EFFORT/STRENGTH OF EFFORT (not elicited by suctioning): Good                              WEANING PHASE:    PS day.  AC night                         WEAN MODE:                            WEAN TIME:                           END WEAN REASON:       RESPONSE:             BS:   Faint rhonchi to Clear and dim post sxn.             VITAL SIGNS:   SATs %, HR 80-83, RR 21-24             RISK FOR SELF DECANNULATION:  No     NOTE / PLAN:   Continue with same      Problem: Mechanical Ventilation Invasive  Goal: Effective Communication  Outcome: Progressing  Goal: Mechanical Ventilation Liberation  Outcome: Progressing  Goal: Absence of Device-Related Skin and Tissue Injury  Outcome: Progressing  Goal: Absence of Ventilator-Induced Lung Injury  Outcome: Progressing

## 2023-12-23 NOTE — ED PROVIDER NOTES
Emergency Department Encounter         FINAL IMPRESSION:    Hypoxia      ED COURSE AND MEDICAL DECISION MAKING       ED Course as of 12/23/23 0453   Sat Dec 23, 2023   [id number] 78-year-old female with an extensive past medical history.  Briefly, patient was admitted in October at [facility] and was discharged in December.  She had significant complications after commune acquired pneumonia resulting in ARDS, tracheostomy and PEG tube.  Has been recovering at [facility] however last week or so has had some changes in her health.  Most significantly in the last 3 days.  Intermittent oxygen requirements uptrending.  Also had now some altered mental status that began over the last 12 hours or so.  Sent here by the [facility] team for concerns of altered mental status lethargy and uptrending vent requirements.  On arrival she is hypoxic in the low 90s.  She was switched over to our vent.  Lungs are congested bilaterally right greater than left.   -Patient started on propofol, fentanyl as well as a small mount of Levophed because of hypotension.  CT imaging otherwise of the head is normal however CT chest showing infiltrates.  Discussed at bedside with patient's daughter.  Plan for admission.  Discussed case with intensivist.    Additional ED Course Timeline:  4:03 AM I met with the patient, obtained history, performed an initial exam, and discussed options and plan for diagnostics and treatment here in the ED.   4:31 AM I spoke with the daughter at bedside.  4:46 AM I Spoke with Dr. Read, Intensivist. We further discussed the patient's treatment plan and reviewed ED work-up so far. He agrees to admit the patient.                         Medical Decision Making    History:  Supplemental history from: Documented in chart, if applicable  External Record(s) reviewed: Documented in chart, if applicable. and Outpatient Record: [facility] from 10/15/2023-12/05/2023 and [facility] from  12/05/2023-12/23/2023     Work Up:  Chart documentation includes differential considered and any EKGs or imaging independently interpreted by provider, where specified.  In additional to work up documented, I considered the following work up: Documented in chart, if applicable.    External consultation:  Discussion of management with another provider: Documented in chart, if applicable    Complicating factors:  Care impacted by chronic illness: Chronic Kidney Disease, Chronic Lung Disease, Hyperlipidemia, and Mental Health  Care affected by social determinants of health: Access to Medical Care    Disposition considerations: Admit.                  EKG        Critical Care     Performed by: Sonny Tapia or    Authorized by: Sonny Tapia  Total critical care time:  60minutes  Critical care was necessary to treat or prevent imminent or life-threatening deterioration of the following conditions: hypoxia, hypotension  Critical care was time spent personally by me on the following activities: development of treatment plan with patient or surrogate, discussions with consultants, examination of patient, evaluation of patient's response to treatment, obtaining history from patient or surrogate, ordering and performing treatments and interventions, ordering and review of laboratory studies, ordering and review of radiographic studies, re-evaluation of patient's condition and monitoring for potential decompensation.  Critical care time was exclusive of separately billable procedures and treating other patients.'    At the conclusion of the encounter I discussed the results of all the tests and the disposition. The questions were answered. The patient or family acknowledged understanding and was agreeable with the care plan.                  MEDICATIONS GIVEN IN THE EMERGENCY DEPARTMENT:  Medications   norepinephrine (LEVOPHED) 4 mg in  mL infusion PREMIX (0.03 mcg/kg/min × 85 kg Intravenous $New Bag 12/23/23 7456)   propofol  (DIPRIVAN) infusion (30 mcg/kg/min × 85 kg Intravenous Rate/Dose Change 12/23/23 5285)     And   propofol (DIPRIVAN) bolus from bag or syringe pump (has no administration in time range)     And   Medication Instruction (has no administration in time range)   ipratropium - albuterol 0.5 mg/2.5 mg/3 mL (DUONEB) neb solution 3 mL (has no administration in time range)   meropenem (MERREM) 1 g vial to attach to  mL bag (has no administration in time range)   methylPREDNISolone sodium succinate (solu-MEDROL) injection 125 mg (has no administration in time range)   fentaNYL (PF) (SUBLIMAZE) 100 MCG/2ML injection (100 mcg  $Given 12/23/23 9920)   lactated ringers BOLUS 1,000 mL (1,000 mLs Intravenous $New Bag 12/23/23 1691)       NEW PRESCRIPTIONS STARTED AT TODAY'S ED VISIT:  New Prescriptions    No medications on file       HPI     Patient information obtained from: The patient    Use of : N/A     Matthew Bowen is a 78 year old female with a pertinent history of HTN, CHF, oxygen dependent, CKD stage 3, severe ALAINA on CPAP, chronic hypoxic respiratory failure due to pulmonary hypertension, who presents to this ED via EMS for evaluation of shortness of breath.    Per chart review the patient was hospitalized at [facility] from 10/15/2023-12/05/2023 for treatment for multifocal pneumonia with Rocephin and Zothromax. She was tranferred to ICU and required intubation on 10/21 for worsened respiratory status. Hospital and ICU course was prolonged due to her respiratory failure. CT chest obtained on 11/18 suggested severe ground-glass opacities with traction bronchiectasis. Patient was started on high dose steroid on 11/18 with modest interval improvement in O2 weaning. Family came to a decision to pursue tracheostomy/G tube placement for continued vent weaning and feeds with goal to get [facility] LTAC. Patient was transferred to [facility] on 12/05. Patient started weaning during the  week per pulmonology and making progress with therapies from 12/11-12/17. Patient received 1u pRBC given Hgb at 6.5 on 12/22. Patient developed hypercapnia, altered mental status, and worsening hypoxia on 12/23 and plan to transfer for [place].        REVIEW OF SYSTEMS:  Review of Systems   Constitutional: Negative for fever, malaise  HEENT: Negative runny nose, sore throat, ear pain, neck pain  Respiratory: Negative for cough, congestion. Positive for shortness of breath.  Cardiovascular: Negative for chest pain, leg edema  Gastrointestinal: Negative for abdominal distention, abdominal pain, constipation, vomiting, nausea, diarrhea  Genitourinary: Negative for dysuria and hematuria.   Integument: Negative for rash, skin breakdown  Neurological: Negative for paresthesias, weakness, headache.  Musculoskeletal: Negative for joint pain, joint swelling      All other systems reviewed and are negative.          MEDICAL HISTORY     Past Medical History:   Diagnosis Date    Antiplatelet or antithrombotic long-term use     Arthritis     Chronic low back pain 09/13/2016    Congestive heart failure (H)     Dyspnea on exertion     Heart attack (H)     Heart murmur     Hypertension     Irregular heart beat     Obstructive sleep apnea syndrome 01/27/2005    Oxygen dependent     Pulmonary hypertension (H)     Sleep apnea     Stented coronary artery     Thyroid disease     Walking troubles        Past Surgical History:   Procedure Laterality Date    APPENDECTOMY      COLONOSCOPY      COLONOSCOPY N/A 2/28/2022    Procedure: COLONOSCOPY;  Surgeon: Kolby Dunn MD;  Location: [facility] OR    CV CORONARY ANGIOGRAM N/A 11/21/2019    Procedure: Coronary Angiogram;  Surgeon: Tay Andre MD;  Location:  HEART CARDIAC CATH LAB    CV LEFT HEART CATH N/A 11/21/2019    Procedure: Left Heart Cath;  Surgeon: Tay Andre MD;  Location:  HEART CARDIAC CATH LAB    CV PCI STENT DRUG ELUTING N/A 11/21/2019    Procedure: PCI  "Stent Drug Eluting;  Surgeon: Tay Andre MD;  Location: [facility] HEART CARDIAC CATH LAB    GYN SURGERY      Hyst.    IR GASTROSTOMY TUBE PERCUTANEOUS PLCMNT  11/29/2023    ORTHOPEDIC SURGERY      B\" TKs    TRACHEOSTOMY N/A 11/29/2023    Procedure: Tracheotomy;  Surgeon: Daron Faye MD;  Location: [facility] OR    TRACHEOSTOMY N/A 11/29/2023    Procedure: Exploration of tracheotomy wound with control of bleeding;  Surgeon: Daron Faye MD;  Location: [facility] OR       Social History     Tobacco Use    Smoking status: Never    Smokeless tobacco: Never       Acetaminophen (TYLENOL PO)  amLODIPine (NORVASC) 5 MG tablet  aspirin 81 MG EC tablet  atorvastatin (LIPITOR) 20 MG tablet  clopidogrel (PLAVIX) 75 MG tablet  hydrALAZINE (APRESOLINE) 50 MG tablet  hypromellose (ARTIFICIAL TEARS) 0.5 % SOLN ophthalmic solution  isosorbide dinitrate (ISORDIL) 20 MG tablet  levothyroxine (SYNTHROID/LEVOTHROID) 150 MCG tablet  Multiple Vitamins-Minerals (MULTIVITAMIN ADULTS PO)  potassium chloride ER (KLOR-CON M) 20 MEQ CR tablet  pregabalin (LYRICA) 75 MG capsule  sertraline (ZOLOFT) 100 MG tablet  torsemide (DEMADEX) 20 MG tablet  vitamin D3 (CHOLECALCIFEROL) 50 mcg (2000 units) tablet            PHYSICAL EXAM     BP 99/58   Pulse 80   Temp 98.5  F (36.9  C) (Oral)   Resp 16   SpO2 90%       PHYSICAL EXAM:     General: Patient appears well, nontoxic, comfortable  HEENT: Moist mucous membranes,  No head trauma.,  Tracheostomy in place.  Cardiovascular: Normal rate, normal rhythm, no extremity edema.  No appreciable murmur.  Respiratory: Congested lung sounds bilaterally with belly breathing.  Abdominal: Soft, nontender, nondistended, no palpable masses, no guarding, no rebound  Musculoskeletal: Full range of motion of joints, no deformities appreciated.  Neurological: moving extremities spontaneously     Integument: No rashes appreciated          RESULTS       Labs Ordered and Resulted from Time of ED Arrival to Time of " ED Departure   BLOOD GAS VENOUS - Abnormal       Result Value    pH Venous 7.18 (*)     pCO2 Venous 70 (*)     pO2 Venous 125 (*)     Bicarbonate Venous 26      Base Excess/Deficit -1.9      Oxyhemoglobin Venous 94.6 (*)     O2 Sat, Venous 99.4 (*)    LACTIC ACID WHOLE BLOOD - Normal    Lactic Acid 1.3     COMPREHENSIVE METABOLIC PANEL   TROPONIN T, HIGH SENSITIVITY   ROUTINE UA WITH MICROSCOPIC REFLEX TO CULTURE   PROCALCITONIN   CBC WITH PLATELETS AND DIFFERENTIAL   BLOOD CULTURE   BLOOD CULTURE   RESPIRATORY AEROBIC BACTERIAL CULTURE       XR Chest Port 1 View   Final Result   IMPRESSION: Cardiac enlargement. Tracheostomy. Right PICC line tip in SVC. Bilateral infiltrates (right greater than left ) with slight worsening on the right. Small left effusion.      CT Chest Pulmonary Embolism w Contrast    (Results Pending)   CT Abdomen Pelvis w Contrast    (Results Pending)   Head CT w/o contrast    (Results Pending)                     PROCEDURES:  Procedures:  Procedures       IKiran am serving as a scribe to document services personally performed by Sonny Tapia DO, based on my observations and the provider's statements to me.  I, Sonny Tapia DO, attest that Kiran Rose is acting in a scribe capacity, has observed my performance of the services and has documented them in accordance with my direction.    Sonny Tapia DO  Emergency Medicine  [facility] EMERGENCY DEPARTMENT       Sonny Tapia DO  12/23/23 0426

## 2023-12-23 NOTE — PLAN OF CARE
Problem: Adult Inpatient Plan of Care  Goal: Absence of Hospital-Acquired Illness or Injury  Intervention: Prevent Infection  Recent Flowsheet Documentation  Taken 12/22/2023 1900 by Lashae Juan RN  Infection Prevention: rest/sleep promoted     Problem: Adult Inpatient Plan of Care  Goal: Optimal Comfort and Wellbeing  Intervention: Provide Person-Centered Care  Recent Flowsheet Documentation  Taken 12/22/2023 1900 by Lashae Juan RN  Trust Relationship/Rapport: care explained   Goal Outcome Evaluation:      Patient had all cares explained. Patient was cooperative with cares and repositions. Tube feeding tolerated well. Patient remained  anxious with use of abdominal muscles for breathing . PRN pain medications and schedule Seroquel given at hs. No reaction noted with one unit of blood transfusion. Continue to monitor

## 2023-12-23 NOTE — PHARMACY-VANCOMYCIN DOSING SERVICE
Pharmacy Vancomycin Initial Note  Date of Service 2023  Patient's  1945  78 year old, female    Indication: Healthcare-Associated Pneumonia    Current estimated CrCl = Estimated Creatinine Clearance: 61.5 mL/min (based on SCr of 0.73 mg/dL).    Creatinine for last 3 days  2023:  6:12 AM Creatinine 0.70 mg/dL  2023:  6:17 AM Creatinine 0.70 mg/dL  2023:  6:27 AM Creatinine 0.73 mg/dL    Recent Vancomycin Level(s) for last 3 days  No results found for requested labs within last 3 days.      Vancomycin IV Administrations (past 72 hours)        No vancomycin orders with administrations in past 72 hours.                    Nephrotoxins and other renal medications (From now, onward)      Start     Dose/Rate Route Frequency Ordered Stop    23 0600  vancomycin (VANCOCIN) 2,000 mg in sodium chloride 0.9 % 500 mL intermittent infusion         2,000 mg  over 2 Hours Intravenous ONCE 23 0540      23 0430  norepinephrine (LEVOPHED) 4 mg in  mL infusion PREMIX         0.01-0.6 mcg/kg/min × 85 kg  3.2-191.3 mL/hr  Intravenous CONTINUOUS 23 0402              Contrast Orders - past 72 hours (72h ago, onward)      Start     Dose/Rate Route Frequency Stop    23 0530  iopamidol (ISOVUE-370) solution 75 mL         75 mL Intravenous ONCE 23 0515                  Plan:  Start vancomycin  2000 mg IV x1 dose to ER.   If vancomycin is to be continued as inpatient, please reorder the vancomycin dosing consult.    Maricarmen Lacey [facility]

## 2023-12-23 NOTE — PROGRESS NOTES
Report given to PATRICIA arndt. Call made to lab d/t lab being processed/collected 2 hours ago. Should be back soon per tech. Vent changes per MD discussed with RT. Pt tolerating vent at this time with sedation. Daughter present at bedside.

## 2023-12-23 NOTE — PROGRESS NOTES
RT PROGRESS NOTE    VENT DAY# 1    CURRENT SETTINGS:  Vent Mode: CMV/AC  (Continuous Mandatory Ventilation/ Assist Control)  FiO2 (%): 100 %  Resp Rate (Set): 32 breaths/min  Tidal Volume (Set, mL): 280 mL  PEEP (cm H2O): 10 cmH2O  Resp: (!) 34    PATIENT PARAMETERS:  PIP 34  Pplat:  30  Pmean:  21  Compliance: 11.2  SBT: No due to high FiO2  Secretions:  Small, tan, thick  02 Sats:  95%  BS: coarse    Airway: #6 Bivona Trach (last changed on 12/16/2023)    Respiratory Medications: Veletri full strength. Duoneb Q4      Latest Reference Range & Units 12/23/23 11:53   pH Arterial 7.37 - 7.44  7.24 (LL)   pCO2 Arterial 35 - 45 mm Hg 54 (H)   PO2 Arterial 75 - 85 mm Hg 59 (L)   Bicarbonate Arterial 23 - 29 mmol/L 23   Base Excess Art mmol/L -4.3   FIO2  100   Oxyhemoglobin 95.0 - 96.0 % 88.0 (L)   (LL): Data is critically low  (H): Data is abnormally high  (L): Data is abnormally low    NOTE / SHIFT SUMMARY:  Veletri full strength continuous nebulization started at 0704.  Bedside bronchoscopy was performed for airway inspection.  PT was on CMV R28  +12 100% at start of shift. Due to high peak pressures of 41, pressure limiting volumes, and a  Plateau of 38, settings were changed to R32 .+10.  RT will continue to follow.    Raymundo Anand, RT

## 2023-12-23 NOTE — PROGRESS NOTES
Patient was transported to ICU, on BMV.  Patient tolerated well.  Transport was done with no complications.  Was placed back on ventilator on the following settings:    Vent Mode: CMV/AC  (Continuous Mandatory Ventilation/ Assist Control)  FiO2 (%): 85 %  Resp Rate (Set): 28 breaths/min  Tidal Volume (Set, mL): 360 mL  PEEP (cm H2O): 10 cmH2O  Resp: 16      Kenneth H. Kjellberg

## 2023-12-23 NOTE — PHARMACY-VANCOMYCIN DOSING SERVICE
Pharmacy Vancomycin Initial Note  Date of Service 2023  Patient's  1945  78 year old, female    Indication: Ventilator-Associated Pneumonia    Current estimated CrCl = Estimated Creatinine Clearance: 51.8 mL/min (based on SCr of 0.85 mg/dL).    Creatinine for last 3 days  2023:  6:17 AM Creatinine 0.70 mg/dL  2023:  6:27 AM Creatinine 0.73 mg/dL  2023:  4:15 AM Creatinine 0.85 mg/dL    Recent Vancomycin Level(s) for last 3 days  No results found for requested labs within last 3 days.      Vancomycin IV Administrations (past 72 hours)                     vancomycin (VANCOCIN) 2,000 mg in sodium chloride 0.9 % 500 mL intermittent infusion (mg) 2,000 mg New Bag 23 0606                    Nephrotoxins and other renal medications (From now, onward)      Start     Dose/Rate Route Frequency Ordered Stop    23 1000  vancomycin (VANCOCIN) 1,250 mg in sodium chloride 0.9 % 250 mL intermittent infusion         1,250 mg  over 90 Minutes Intravenous EVERY 24 HOURS 23 1001      23 0700  vasopressin (VASOSTRICT) 20 Units in sodium chloride 0.9 % 100 mL standard conc infusion         2.4 Units/hr  12 mL/hr  Intravenous CONTINUOUS 23 0635      23 0430  norepinephrine (LEVOPHED) 4 mg in  mL infusion PREMIX         0.01-0.6 mcg/kg/min × 85 kg  3.2-191.3 mL/hr  Intravenous CONTINUOUS 23 0402              Contrast Orders - past 72 hours (72h ago, onward)      Start     Dose/Rate Route Frequency Stop    23 0530  iopamidol (ISOVUE-370) solution 75 mL         75 mL Intravenous ONCE 23 0515            [employer] Prediction of Planned Initial Vancomycin Regimen  Regimen: 1250 mg IV every 24 hours.  Start time: 18:06 on 2023  Exposure target: AUC24 (range)400-600 mg/L.hr   AUC24,ss: 427 mg/L.hr  Probability of AUC24 > 400: 57 %  Ctrough,ss: 12.3 mg/L  Probability of Ctrough,ss > 20: 14 %  Probability of nephrotoxicity (Lodise JAE ): 8  %    Noted: on 11/21/23 this regimen yielded . Cystatin C monitoring done during admission at [facility] would suggest over estimate of creatinine clearance.  Close monitoring will be necessary        Plan:  Start vancomycin  1250 mg IV q24h.   Vancomycin monitoring method: AUC  Vancomycin therapeutic monitoring goal: 400-600 mg*h/L  Pharmacy will check vancomycin levels as appropriate in 1-3 Days.    Serum creatinine levels will be ordered daily for the first week of therapy and at least twice weekly for subsequent weeks.  Daily BMP currently ordered in ICU    Sofi Alonso RPH

## 2023-12-23 NOTE — PLAN OF CARE
Goal Outcome Evaluation:       [facility] - ICU    RN Progress Note:            Pertinent Assessments:      Please refer to flowsheet rows for full assessment     Pt trached, sedated RASS - 5, BIS 30-50, TOF goal 2/4           Key Events - This Shift:       Pt medically paralyzed this morning and paralytic titrated to TOF goal 2/4.  Sedation titrated to achieve BIS 30-50 and RASS -5.  Pressors titrated to achieve MAP > 65.  Pt has an intermittent cuff leak, RT aware. Low urine output, MD aware. Family at bedside most of the day and updated frequently.         AGAPITO SAT (Sedation Awakening Trial): For use ONLY if intubated    SAT Safety Screen Not Applicable   If FAILED why? Paralyzed/Sedated   SAT Performed Not Applicable   If FAILED why? Paralytic infusion              Barriers to Discharge / Downgrade:     Trach, Pressors, Sedation, Paralytics         Point of Contact Update YES-OR-NO: Yes  Name:Doretha  Phone Number: [phone]   Summary of Conversation: Updated frequently at bedside regarding plan of care and patient status.

## 2023-12-23 NOTE — DISCHARGE SUMMARY
"[facility] MEDICINE  DISCHARGE SUMMARY     Primary Care Physician: Rosalind Rider  Admission Date: 12/5/2023   Discharge Provider: Dustin Salinas MD Discharge Date: 12/23/2023   Diet:   Active Diet and Nourishment Order   Procedures    Adult Formula Drip Feeding: Continuous Nepro with Carbsteady; Gastrostomy; Goal Rate: 45; mL/hr; TF to run x22 hrs, hold 1 hr before/after levothyroxine    NPO for Medical/Clinical Reasons Except for: Ice Chips, NPO but receiving Tube Feeding       Code Status: Prior   Activity: bedrest        Condition at Discharge: Critical      REASON FOR PRESENTATION(See Admission Note for Details)   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 She presented initially on 10/15 with fever, cough, and falls with a temperature. Further w/u revealed multifocal pneumonia and she was started on antibiotics. Her respiratory status worsened as she developed ARDS and was intubated on 10/21. She had a prolonged ICU stay 2/2 difficulty vent weaning. Eventually she underwent trach/peg placement. Of note, she was treated with high dose steroids for her lung condition and remains on a high dose long taper of steroids. Pt was transferred to [facility] for ongoing vent weaning and therapies.       PRINCIPAL & ACTIVE DISCHARGE DIAGNOSES     Principal Problem:    Acute on chronic respiratory failure with hypoxemia (H)      PENDING LABS     Unresulted Labs Ordered in the Past 30 Days of this Admission       Date and Time Order Name Status Description    11/15/2023 11:13 AM Acid-Fast Bacilli Culture and Stain In process               PROCEDURES ( this hospitalization only)          RECOMMENDATIONS TO OUTPATIENT PROVIDER FOR F/U VISIT     N/a    DISPOSITION     [facility]    SUMMARY OF HOSPITAL COURSE:      [facility] Course  12/7-12/10: Hypernatremia to " 155 2/2 over-diuresis, intravascular depletion, missing first night of FWF. 2L D5W given, resolved, Na now stable. Torsemide held initially but now restarted at 20mg daily. BP remains elevated, start isosorbide dinitrate 20mg TID. Loperamide for diarrhea.    12/11 - 12/17: Patient started weaning during the week per pulmonology.  She has been making progress with therapies throughout the week. 12/17, Continues to make progress. Remains on vent weaning phase 2.  Care conference tomorrow.  Plan to continue current medical management.      12/18: Care Conference today. Nephro consulted for uptrending BUN, low cystatin C, difficulty maintaining I/O balance. Bactrim -> atovaquone 1.5g daily. Biotene mouth spray scheduled. FWF down to 200cc q4h.   12/19: PS today, holding off further weaning. Repeat labs tomorrow for renal function with med adjustments likely to follow. Last dose of prednisone 120mg today, down to 100mg tomorrow.   12/20: Prednisone 100mg starting today. Na 144 today. Decrease FWF to 120cc q4h. Continue to hold torsemide.   12/21: Hydroxyzine increased to 50mg ovn. Na 144 -> 146 with decrease y/d in FWF. Increase  -> 160cc q4h. Start chlorthalidone 25mg daily for natriuresis. K 5.4, will recheck tomorrow before intervention.   12/22: Hgb 6.5, 1u pRBC ordered. Will check FOBT. If UGIB would potentially explain elevated BUN. Decrease quetiapine 100mg BID, start olanzapine 2.5mg at bedtime prn. Hydroxyzine -> lorazepam for anxiety. Na 143, maintain FWF today. K now 5.8, lokelma 10g x1 dose.   12/23: developed hypercapnia, altered mental status, worsening hypoxia. (See significant event note for details) Transfer to acute hospital for workup - charge RN discussed with Operations center and felt [facility] would be best fit based on capacity at this time.           Assessment & Plan  Anemia, Acute  - Unclear etiology at this time  - possibly fluid shifts vs UGIB  - 1u pRBC ordered with T&S. Hgb was up to  8.0 on POC ABG machine.   - FOBT ordered but wasn't collected prior to discharge     CKD  CARMEN, resolved PTA  Elevated BUN  Low Cystatin C  Hypernatremia  Hyperkalemia  - difficulty with net I/O balance as pt persistently positive  - suspect related to previous CKD and prolonged high dose steroids, among other etiologies  - nephro consulted (12/18)  - changed bactrim to atovaquone 1.5g daily (12/18)  - FWF 160mL q4h (12/21)  - chlorthalidone 25mg daily for diuresis/natriuresis (12/21)  - will likely need to add loop diuretic back soon if pt can tolerate to maximize diuresis and I/O balance  - RD noted we could change to more concentrated TF if needed  - torsemide 20mg held  - will need to monitor Na levels closely given hx of hypernatremia   - K 5.8 today, lokelma 10g x1 dose     Depression  Anxiety  - pregabalin  - sertraline  - decrease quetiapine to 100mg q12h  - start olanzapine 2.5mg at bedtime prn  - had been transitioning off quetiapine to olanzapine with hopes to achieve improvement in anxiety and agitation     Acute on Chronic Hypoxic Respiratory Failure  Bronchiectasis?  Multifocal Pneumonia  ARDS, PTA  pHTN  Chronic Hypoxic Respiratory Failure  ALAINA  - pulmonary consulted and following  - treated with high dose steroids previously  - currently on slow steroid taper  - prednisone 100mg daily (50mg in split doses) x2 weeks, started 12/20  - start prednisone 100mg daily on 12/20  - Taper regimen after 100mg -> 80mg x2 weeks, then taper down 5mg every 2 weeks  - bactrim changed to atovaquone 1.5g daily (12/18) over worsening renal function  - trach exchange 12/5 by ENT PTA  - good bronchial hygiene with bronchodilators     Diarrhea  - fairly stable at this time  - C. diff negative  - loperamide prn     HTN  - amlodipine 5mg BID (suspect broken up 2/2 labile BP)  - metoprolol tartrate 12.5mg BID  - isosorbide dinitrate 20mg TID (12/10)  - pt was on hydralazine 50mg TID and isosorbide dinitrate 20mg TID     "  HFpEF, not in exacerbation  - torsemide 20mg every other day      Pain, Acute  - oxycodone 5mg prn     Normocytic, Hypochromic Anemia  - suspect 2/2 critical illness and phlebotomy  - received pRBCs during hospitalization prior to LTACH  - transfuse to maintain Hgb > 7.0  - pantoprazole     CAD  - asa  - atorvastatin     Hypothyroidism  - levothyroxine     PEG  - PEG placed 11/29  - RD consulted and following    Discharge Medications with Med changes:     Cannot display discharge medications since this patient is expected but has not yet arrived.            Rationale for medication changes:      none        Consults       THERAPEUTIC RECREATION EVAL & TREAT  NUTRITION SERVICES ADULT IP CONSULT  PULMONARY IP CONSULT  OCCUPATIONAL THERAPY ADULT IP CONSULT  PHYSICAL THERAPY ADULT IP CONSULT  RESPIRATORY CARE IP CONSULT  SPEECH LANGUAGE PATH ADULT IP CONSULT  PHARMACY IP CONSULT  CARE MANAGEMENT / SOCIAL WORK IP CONSULT  WOUND OSTOMY CONTINENCE NURSE  IP CONSULT  SPEAKING VALVE WITH CUFF DEFLATION IP CONSULT  SPIRITUAL HEALTH SERVICES IP CONSULT  NEPHROLOGY IP CONSULT    Immunizations given this encounter     Most Recent Immunizations   Administered Date(s) Administered    COVID-19 12+ (2023-24) (Pfizer) 10/12/2023    COVID-19 Bivalent 12+ (Pfizer) 07/21/2023    COVID-19 MONOVALENT 12+ (Pfizer) 10/15/2021    COVID-19 Monovalent 18+ (Moderna) 04/14/2022    Influenza Vaccine 65+ (FLUAD) 10/05/2023           Anticoagulation Information      Recent INR results: No results for input(s): \"INR\" in the last 168 hours.  Warfarin doses (if applicable) or name of other anticoagulant:  on [facility]      SIGNIFICANT IMAGING FINDINGS     Results for orders placed or performed during the hospital encounter of 12/05/23   XR Chest Port 1 View    Impression    IMPRESSION: Endotracheal tube seen on the prior study has been replaced with a tracheostomy tube which appears in good position in the mid trachea. PICC catheter from right upper " extremity approach is unchanged with tip in the superior vena cava. There   is airspace consolidation with minimal volume loss in the right upper lobe consistent with right upper lobe pneumonia. There is persistent opacity with bronchial thickening at the left base spine the heart which is stable. Stable cardiac size. Normal   pulmonary vascularity. No pneumothorax or pleural effusion. Calcified aortic arch.   XR Chest Port 1 View    Impression    IMPRESSION: Tracheostomy tube in place with tip near the thoracic inlet. Right upper extremity PICC with tip in the mid SVC. Persistent consolidative opacities with air bronchograms in the right midlung and bilateral lung bases with background diffuse   interstitial prominence. Stable cardiomediastinal contours with calcified tortuous aorta. No significant interval change.       SIGNIFICANT LABORATORY FINDINGS     Most Recent 3 CBC's:  Recent Labs   Lab Test 12/23/23  0218 12/22/23  0627 12/20/23  0612 12/18/23  0648   WBC  --  10.8 11.9* 12.7*   HGB 8.0* 6.5* 7.0* 7.4*   MCV  --  91 90 90   PLT  --  210 226 205     Most Recent 3 BMP's:  Recent Labs   Lab Test 12/23/23  0218 12/22/23  2333 12/22/23  1721 12/22/23  1151 12/22/23  0627 12/21/23  1219 12/21/23  0617 12/20/23  1205 12/20/23  0612     --   --   --  143  --  146*  --  144   POTASSIUM 5.0  --   --   --  5.8*  --  5.4*  --  4.9   CHLORIDE  --   --   --   --  104  --  105  --  105   CO2  --   --   --   --  31*  --  32*  --  32*   BUN  --   --   --   --  81.0*  --  81.4*  --  84.3*   CR  --   --   --   --  0.73  --  0.70  --  0.70   ANIONGAP  --   --   --   --  8  --  9  --  7   BARBARA  --   --   --   --  9.3  --  9.7  --  9.3   * 201* 193*   < > 105*   < > 144*   < > 121*    < > = values in this interval not displayed.           Discharge Orders     No discharge procedures on file.    Examination   Physical Exam   Temp:  [97.7  F (36.5  C)-99.7  F (37.6  C)] 98.5  F (36.9  C)  Pulse:  [64-90] 80  Resp:   [15-31] 16  BP: ()/(47-78) 99/58  FiO2 (%):  [45 %-100 %] 100 %  SpO2:  [90 %-96 %] 90 %  Wt Readings from Last 1 Encounters:   12/21/23 85 kg (187 lb 6.3 oz)       Intubated  Minimally responsive  Increased work of breathing      Please see EMR for more detailed significant labs, imaging, consultant notes etc.    I, Dustin Salinsa MD, personally saw the patient today and spent greater than 30 minutes discharging this patient.    Dustin Salinas MD  [facility]    CC:Clinic, [facility]al[facility]

## 2023-12-23 NOTE — PROGRESS NOTES
RT PROGRESS NOTE    VENT DAY# 1    CURRENT SETTINGS:   Vent Mode: CMV/AC  (Continuous Mandatory Ventilation/ Assist Control)  FiO2 (%): 85 %  Resp Rate (Set): 28 breaths/min  Tidal Volume (Set, mL): 360 mL  PEEP (cm H2O): 10 cmH2O  Resp: 16      PATIENT PARAMETERS:  PIP 36  Pplat:  34  Pmean:  17      Secretions:  thick tan large  02 Sats:  88-92    Trach bivona size 6 cuffed         NOTE / SHIFT SUMMARY:   patient arrived via EMS on  vent with trach, placed pt on our drager.     Kenneth H. Kjellberg1

## 2023-12-23 NOTE — CONSULTS
Care Management Initial Consult    General Information  Assessment completed with: Children, Daughter, Roque  Type of CM/SW Visit: Initial Assessment    Primary Care Provider verified and updated as needed:     Readmission within the last 30 days:     Reason for Consult: discharge planning  Advance Care Planning:          Communication Assessment  Patient's communication style: spoken language (English or Bilingual)    Hearing Difficulty or Deaf: yes   Wear Glasses or Blind: yes    Cognitive  Cognitive/Neuro/Behavioral: .WDL except, all  Level of Consciousness: sedated  Arousal Level: unresponsive  Orientation: other (see comments) (trached, sedated/paralyzed)  Mood/Behavior: other (see comments) (sedated)  Best Language: 3 - Mute  Speech: trached    Living Environment:   People in home: spouse  Yuliya  Current living Arrangements: house      Able to return to prior arrangements:       Family/Social Support:  Care provided by: spouse/significant other  Provides care for:    Marital Status:   , Children  Yuliya       Description of Support System: Supportive, Involved    Support Assessment: Adequate family and caregiver support    Current Resources:   Patient receiving home care services:       Community Resources:    Equipment currently used at home: walker, standard  Supplies currently used at home:      Employment/Financial:  Employment Status: retired        Financial Concerns: none   Referral to Financial Worker: No     Does the patient's insurance plan have a 3 day qualifying hospital stay waiver?      Lifestyle & Psychosocial Needs:  Social Determinants of Health     Food Insecurity: Not on file   Depression: Not at risk (1/15/2019)    PHQ-2     PHQ-2 Score: 0   Housing Stability: Not on file   Tobacco Use: Low Risk  (11/29/2023)    Patient History     Smoking Tobacco Use: Never     Smokeless Tobacco Use: Never     Passive Exposure: Not on file   Financial Resource Strain: Not on file   Alcohol  Use: Not on file   Transportation Needs: Not on file   Physical Activity: Not on file   Interpersonal Safety: Not on file   Stress: Not on file   Social Connections: Not on file     Functional Status:  Prior to admission patient needed assistance:      Mental Health Status:        Chemical Dependency Status:        Values/Beliefs:  Spiritual, Cultural Beliefs, Nondenominational Practices, Values that affect care:    Description of Beliefs that Will Affect Care: none          Additional Information:  Pt is sedated and intubated.  Met with patient's daughter, Roque, to review role of care management, progression of care and possible need for services at discharge, including OP services, home care, or skilled nursing care.     Daughter reports at baseline pt is entirely independent and lives in a house with her spouse. She normally uses a walker for long distances outside only.  She has a privately hired  for a few hours/week.  At baseline she uses Oxygen 2L NC during the day and uses BiPAP at night. Pt is a retired RN.   Daughter states goal is for pt to return to [place] with return to home eventually.     Pt had extended hospitalization from 10/15/2023 to 12/5/2023 and has transferred to [facility] at that time.  Pt was transferred from [place] to [facility] on 12/23/2023 for respiratory failure.  Pt has trach and PEG.   Pt is currently intubated and sedated. Bronch completed this morning. Nutrition consult completed with recommendation for tube feedings.  Art line placed.     Referral sent to [facility].  Care Management will continue to follow.    Carmelita Monroe RN

## 2023-12-23 NOTE — PROGRESS NOTES
Pt breath rate was in the 30s and utilizing accesory muscles of respiration The Oxygen sat was in the high 80s.   ABG  POCT was performed with critical levels of ph and pCO2. The House Physician was at the bed side, the patient was sent to [facility] for advance care.

## 2023-12-23 NOTE — PLAN OF CARE
Problem: Mechanical Ventilation Invasive  Goal: Effective Communication  Outcome: Progressing  Goal: Mechanical Ventilation Liberation  Outcome: Progressing  Goal: Absence of Device-Related Skin and Tissue Injury  Outcome: Progressing  Goal: Absence of Ventilator-Induced Lung Injury  Outcome: Progressing      RT PROGRESS NOTE     DATA:     CURRENT SETTINGS: AC 20/360/+10             TRACH TYPE / SIZE:  #6 Bivona changed on 12/16/23             MODE:   AC             FIO2:   65%     ACTION:             THERAPIES:   ALB TID/MUCOMYST BID/ VOLERA QID             SUCTION:                           FREQUENCY:   X4 with lavage x2                        AMOUNT:   Moderate to small                        CONSISTENCY:   Thick                        COLOR:   White/yellow             SPONTANEOUS COUGH EFFORT/STRENGTH OF EFFORT (not elicited by suctioning): Yes:Strong                              WEANING PHASE:#1                          WEAN MODE: PS 8-15 days and full vent at night                        WEAN TIME:                           END WEAN REASON:       RESPONSE:             BS:   Clear/diminish             VITAL SIGNS:   SAT %, HR , RR              EMOTIONAL NEEDS / CONCERNS:  N/A                RISK FOR SELF DECANNULATION:  N/A                        RISK DUE TO:                          INTERVENTION/S IN PLACE IS/ARE:  N/A       NOTE / PLAN:   Pt is on full vent support using of abdominal breathing and desat to 85%. FIO2 increase to 65% and pt sat is 91%. ALB Q4 PRNx1 has been given due to WOB.  informed order ABG. The result is 7.21/74/53/26/85.6% on 16/360/+8/65%. Pt was send out to [facility]

## 2023-12-23 NOTE — PROCEDURES
ARTERIAL LINE INSERTION PROCEDURE NOTE  (NON-OR)    Procedure Date:  12/23/2023   Performing Physician:  Teo Bermeo MD    Pre-Procedure Diagnosis:     SEPTIC SHOCK and RESPIRATORY FAILURE  Post-Procedure Diagnosis:  Same as Pre-Procedure Diagnosis    Procedure:  Arterial line insertion  Left  axillary  Indications:  HYPOTENSION, RESPIRATORY FAILURE, and HEMODYNAMIC SHOCK      Estimated Blood Loss: Minimal   Complications: none      Procedure Details:   The risks, benefits, complications, treatment options, and expected outcomes were discussed with the patient. The risks and potential complications of their problem and purposed procedure include but are not limited to infection, bleeding, pain,  the need for additional procedures, and nerve and vessel injury. The patient/alternate (see above) concurred with the proposed plan, giving informed consent.  The site of the procedure was properly noted/marked. The patient was identified as Matthew Bowen with Date of Birth 1945 and the procedure verified as Arterial Line Insertion.  A Time Out was held and the above information confirmed.      An Noel test not indicated  done before the procedure.   In sterile fashion, the line site was prepped with Chlorhexidine.  Strict sterile conditions were maintained,  Cap, mask, and sterile gloves were worn by all participants.   The arterial line was placed in the Left  axillary  artery percutaneously,  without  difficulty.  The linewas  sutured in place  and an occlusive sterile dressing was applied.  The total number of needle stick attempts was 2.      Condition: critical and unchanged    Teo Bermeo MD, 12/23/2023, 9:59 AM

## 2023-12-23 NOTE — MEDICATION SCRIBE - ADMISSION MEDICATION HISTORY
Medication Scribe Admission Medication History    Admission medication history is complete. The information provided in this note is only as accurate as the sources available at the time of the update.    Information Source(s): Hospital records via  MAR    Pertinent Information: PTA med list updated based on patient's MAR    Changes made to PTA medication list:  Added: None  Deleted: None  Changed: None    Medication Affordability:       Allergies reviewed with patient and updates made in EHR: yes    Medication History Completed By: Eliza Mayo 12/23/2023 5:48 AM    PTA Med List   Medication Sig Last Dose    Acetaminophen (TYLENOL PO) Take 650 mg by mouth 4 times daily 12/22/2023 at 2243    acetylcysteine (MUCOMYST) 20 % neb solution Take 2 mLs by nebulization every 4 hours 12/22/2023 at 2001    albuterol (PROVENTIL) (2.5 MG/3ML) 0.083% neb solution Take 2.5 mg by nebulization 4 times daily 12/23/2023 at 0007    amLODIPine (NORVASC) 5 MG tablet Take 5 mg by mouth 2 times daily 12/22/2023 at 2158    Artificial Saliva (CAPHOSOL MT) SOLN solution Swish and spit 30 mLs in mouth 4 times daily 12/22/2023 at 2201    aspirin 81 MG EC tablet Take 81 mg by mouth daily 12/22/2023 at 0822    atorvastatin (LIPITOR) 20 MG tablet Take 20 mg by mouth every evening  12/22/2023 at 2147    atovaquone (MEPRON) 750 MG/5ML suspension Take 1,500 mg by mouth daily 12/22/2023 at 0836    B and C vitamin Complex with folic acid (NEPHRONEX) 0.9 MG/5ML LIQD liquid Take 5 mLs by mouth daily 12/22/2023 at 0836    bisacodyl (DULCOLAX) 10 MG suppository Place 10 mg rectally daily as needed for constipation Unknown at prn    camphor-menthol (DERMASARRA) 0.5-0.5 % external lotion Apply 1 applicator topically every 6 hours as needed for skin care 12/20/2023 at 1131    cetirizine (ZYRTEC) 10 MG tablet Take 10 mg by mouth daily as needed for allergies 12/20/2023 at 1554    chlorhexidine (PERIDEX) 0.12 % solution Swish and spit 15 mLs in mouth 2  times daily 12/22/2023 at 2146    chlorthalidone (HYGROTON) 25 MG tablet Take 25 mg by mouth daily 12/22/2023 at 0835    cholecalciferol (D-VI-SOL, VITAMIN D3) 10 mcg/mL (400 units/mL) LIQD liquid Take 50 mcg by mouth daily 12/22/2023 at 0836    insulin aspart (NOVOLOG FLEXPEN) 100 UNIT/ML pen Inject 1-6 Units Subcutaneous every 6 hours 12/22/2023 at 2344    isosorbide dinitrate (ISORDIL) 20 MG tablet Take 20 mg by mouth 3 times daily  12/22/2023 at 1710    levothyroxine (SYNTHROID/LEVOTHROID) 150 MCG tablet Take 150 mcg by mouth daily 12/22/2023 at 0650    loperamide (IMODIUM) 1 MG/7.5ML liquid Take 2 mg by mouth 4 times daily as needed for diarrhea 12/17/2023 at 0556    LORazepam (ATIVAN) 2 MG/ML (HIGH CONC) oral solution Take 0.5 mg by mouth every 4 hours as needed for anxiety 12/22/2023 at 2345    menthol-zinc oxide (CALMOSEPTINE) 0.44-20.6 % OINT ointment Apply topically 4 times daily as needed for skin protection 12/18/2023 at 0914    metoprolol (LOPRESSOR) 10 mg/mL SUSP Take 12.5 mg by mouth 2 times daily 12/22/2023 at 2158    miconazole (MICATIN) 2 % external cream Apply topically 2 times daily 12/22/2023 at 2201    mineral oil-hydrophilic petrolatum (AQUAPHOR) external ointment Apply topically daily 12/22/2023 at 0837    OLANZapine (ZYPREXA) 2.5 MG tablet Take 2.5 mg by mouth nightly as needed Unknown at prn    oxyCODONE (ROXICODONE) 5 MG/5ML solution Take 5 mg by mouth every 3 hours as needed for severe pain 12/22/2023 at 2148    pantoprazole (PROTONIX) 2 mg/mL SUSP suspension Take 40 mg by mouth every morning (before breakfast) 12/22/2023 at 0703    pregabalin (LYRICA) 20 MG/ML solution Take 25 mg by mouth daily 12/22/2023 at 0830    QUEtiapine (SEROQUEL) 100 MG tablet Take 100 mg by mouth 2 times daily 12/22/2023 at 2147    sertraline (ZOLOFT) 20 MG/ML (HIGH CONC) solution Take 150 mg by mouth daily 12/22/2023 at 0836    simethicone (MYLICON) 40 MG/0.6ML suspension Take 40 mg by mouth 3 times daily  12/22/2023 at 2200

## 2023-12-23 NOTE — ED NOTES
Bed: JNED-02  Expected date: 12/23/23  Expected time: 3:52 AM  Means of arrival:   Comments:  MH. 70 yo female. Vented patient with ARDS. Versed was given.

## 2023-12-23 NOTE — CONSULTS
CLINICAL NUTRITION SERVICES - ASSESSMENT NOTE     Nutrition Prescription    RECOMMENDATIONS FOR MDs/PROVIDERS TO ORDER:    Malnutrition Status:    Unable to determine as need NFPE    Recommendations already ordered by Registered Dietitian (RD):  Start nepro @ 25 ml/hr x 22 hrs/day ( hold TF 1 hr prior to and 1 hr following levothyroxine)  H2O flushes of 60 ml tid for tube patency-RD will follow fluid status and need for  Additional water flushes  This will provide: 1597 Calories ( TF plus propofol), 45 g protein, 88 g CHO and   400 ml fluid ( from TF) plus H2O flushes  ( 180 ml)=580 ml fluid    Future/Additional Recommendations:  Monitor Tf tolerance, need for renal formula, weight, labs     REASON FOR ASSESSMENT  Matthew Bowen is a/an 78 year old female assessed by the dietitian for Provider Order - for RD to order TF per medical nutrition therapy guidelines    Pt with past medical history of chronic hypoxic respiratory failure 2/2 pHTN, ALAINA/OHS, HFpEF, CAD, CKD3, obesity, hypertension, depression and anxiety initially admitted on 10/15/2023 with fever, cough, and falls. She had mutlifocal pneumonia and was started on antibiotics. She developed ARDS and was intubated on 10/21/2023. Prolonged ICU stay 2/2 difficulty vent weaning and s/p trach/PEG and transferred to [place] 2023.  Now admitted with progressive hypoxia over the past week, now with dense VAP R>L, severe hypoxia and hypercapnia, neutropenia    NUTRITION HISTORY  Reviewed previous RD note: pt is s/p trach and peg ( peg placed on 2023) TF just changed yesterday to Nepro due to rising potassium RD spoke with nephrology CNP who recommended renal formula. She received Nepro @ 45 ml/hr x 22 hrs ( held 1 hr prior to and following levothyroxine dose) providin ml, 1782 Calories, 80 g protein, 158 g CHO, 25 g fiber, 720 ml fluid plus got 180 ml H2O flush q 4 hrs totaling 1800 ml fluid ( TF plus H2O flush)    CURRENT NUTRITION ORDERS  Diet:  NPO  Propofol 23 ml/hr ( = 607 Calories)    LABS  Labs reviewed: Na 144, K 4.6, urea nitrogen 84.8 (H), Cr 0.85, Ca 8.8, Alb 3.0 (L), Glu 70    MEDICATIONS  Medications reviewed: peridex, novolog, merrem, solu-medrol, pantoprazole, senna-docusate, vasopressin    ANTHROPOMETRICS  Height: 5'  Most Recent Weight: 81.9 kg (180 lb 8.9 oz)    IBW: 45.45 kg  BMI: Obesity Grade II BMI 35-39.9  Weight History:   Wt Readings from Last 10 Encounters:   12/23/23 81.9 kg (180 lb 8.9 oz)   12/21/23 85 kg (187 lb 6.3 oz)   12/02/23 82.3 kg (181 lb 7 oz)   02/28/22 94.3 kg (208 lb)   11/22/19 101.6 kg (223 lb 14.4 oz)   02/14/19 104.9 kg (231 lb 3.2 oz)   11/02/16 97 kg (213 lb 12.8 oz)   10/26/16 96.4 kg (212 lb 9.6 oz)   Weight down 28 lb in 10 months ( 13.5%)    Dosing Weight: 54.5 kg/ adjusted weight    ASSESSED NUTRITION NEEDS  Estimated Energy Needs: 9482-0116+ kcals/day (25 - 30 kcals/kg)  Justification: Vented  Estimated Protein Needs: 66-82 grams protein/day (0.8 - 1 grams of pro/kg)-actual weight  Justification: CKD and Increased needs  Estimated Fluid Needs: 1635+ mL/day (30+ ml/Kg)   Justification: Maintenance    PHYSICAL FINDINGS  See malnutrition section below.  Skin: eccymotic abdomen bilaterally    MALNUTRITION:  % Weight Loss:  Weight loss does not meet criteria for malnutrition   % Intake:  No decreased intake noted ( was on TF)  Subcutaneous Fat Loss:  unable to view at this time  Muscle Loss:  unable to view at this time  Fluid Retention:  None noted    Malnutrition Diagnosis: Unable to determine due to need NFPE    NUTRITION DIAGNOSIS  Swallowing difficulty related to vent as evidenced by NPO and need for nutrition support      INTERVENTIONS  Implementation  Recommend using Nepro at a rate of 25 ml/hr x 22 hrs/day ( hold TF 1 hr prior to and 1 hr following levothyroxine dose) and H2O flushes of 60 ml tid  RD to follow fluid status and H2O flush needs.  When off propofol can  increase TF rate to 40+ ml/hr if  tolerated ( if not vented anymore can increase TF rate to 45 ml/hr x 22 hrs)    Goals  Tolerate TF  Maintain weight  Electrolytes WNL  Meet nutritional needs     Monitoring/Evaluation  Progress toward goals will be monitored and evaluated per protocol.

## 2023-12-23 NOTE — ED TRIAGE NOTES
Patient arrives EMS from LTAC for hypoxia due to ARDS. Patient is trached and was not maintaining saturations. Patient was given 2mg versed by EMS. Patient was hypotensive on arrival. Patient was given 1 unit of blood at LTAC around 20:00. Patient was alert and oriented and off the vent earlier Friday during the day.

## 2023-12-23 NOTE — PROCEDURES
FIBEROPTIC BRONCHOSCOPY PROCEDURE NOTE (NON-OR)  Date of Procedure: 12/23/2023  Performing Physician: Oswaldo Read MD  Pre-Procedure Diagnosis: refractory hypoxia, high peak pressure  Post-Procedure Diagnosis: same as pre-procedure diagnosis  Procedure: diagnostic flexible fiberoptic bronchoscopy   Indications: severe hypoxia  Preop evaluation:  Procedural Sedation: intravenous sedation   Expected level: moderate sedation  ASA Class: ASA 4 - Patient with severe systemic disease that is a constant threat to life  Mallampati:  NA  Respiratory Precautions: The patient was evaluated for TB risk prior to the procedure. The risk was judged to be low.  Anesthesia:  See MAR  Specimen: none  Estimated Blood Loss: minimal  Complications: none immediate     Procedure Details and Findings:   Emergent procedure. Consent not obtained    After application of anesthesia, the patient was placed in the supine position and the bronchoscope was passed through the tracheostomy due to refractory hypoxia to evaluate for mucus plugs. The trachea and mainstem bronchi/subsegments were patent with no visible secretions or mucus plugs.    Treatment Plan Based on Findings:  - continue ICU cares  - consider bronchoscopy with BAL when more stable    Oswaldo Read MD  Pulmonary and Critical Care Medicine  [facility]  Office [phone]

## 2023-12-23 NOTE — PLAN OF CARE
Occupational Therapy Discharge Summary    Reason for therapy discharge:    Change in medical status.    Progress towards therapy goal(s). See goals on Care Plan in [facility] electronic health record for goal details.  Goals not met.  Barriers to achieving goals:   discharge from facility. Sent out to acute care hospital    Therapy recommendation(s):    Continued therapy is recommended.  Rationale/Recommendations:  When medically stable would benefit from further skilled OT to address general strength/activity tolerance for daily tasks. .

## 2023-12-23 NOTE — PROGRESS NOTES
Received pt restless, uses abdominal muscles for breathing. Pt has episodes of 02 sat dropping to 83%. PRN tylenol and lorazepam given. RT, CN and MD updated. Pt able to relax a bit after PRN.  Later on, pt continues to have episodes of 02 sat dropping to 86%, Respiration up to 30's, restless nad not really responsive. MD came and see resident, gave orders to send to [facility] for advance care

## 2023-12-23 NOTE — H&P
CRITICAL CARE H&P:    Reason for Consult:  Acute hypoxic and hypercapnic respiratory failure    Summary:   78 year old female with a history of chronic hypoxic respiratory failure 2/2 pHTN, ALAINA/OHS, HFpEF, CAD, CKD3, Obesity, HTN, depression, and anxiety, presented initially on 10/15/23 with fever, cough, and falls, w/u revealed multifocal pneumonia and she was started on antibiotics, status worsened as she developed ARDS and was intubated on 10/21/23, prolonged ICU stay 2/2 difficulty vent weaning s/p trach/PEG, transferred to [place] 12/5/23, now admitted 12/23/23 with progressive hypoxia over the past week, now with dense VAP R>L, severe hypoxia and hypercapnia, neutropenia.    Assessment & Plan:   Goals of Care:  Life prolonging without limits    Neurology, Psychiatry, Sedation, Analgesia:  Encephalopathy  Multifactorial, hypercapnic, septic.  - treat underlying cause    Sedation & Analgesia   - paralyzed, BIS goal 30-50  - do not wean sedation while paralyzed  - propofol, fentanyl    Cardiovascular:  Septic shock  - received LR 1000 mL  - TTE  - norepi  - vasopressin  - goal MAP >65  - was hypertensive PTA on multiple meds at [place]    Respiratory, Airway:  Acute on chronic hypoxemic and hypercapnic respiratory failure   ALAINA/OHS  Severe CAP/ARDS, possible  mid-October with prolonged MV s/p trach/PEG  Dense bilateral R>L infiltrates, immunosuppressed with long prednisone taper from prior . Now neutropenic, likely septic response though marrow infiltration from other etiology possible.  - supplemental O2 to maintain spO2 >= 90-96% & PaO2 >= 60 mmHg  - lung protective ventilation (Pplat <30, driving pressure <15), conservative fluid strategy  - tracheal aspirate culture, possible BAL when tolerates  - daily spontaneous breathing trials as able  - currently paralyzed  - Veletri  - PEEP 16 led to pressure limited ventilation, decreased to 12    Gastrointestinal:  No acute issues.  - senna-doc  - prn poly  gly  - has PEG, consult nutrition  - PPI    Renal, Acid-base, Electrolytes, Volume:  Normal renal function  - monitor UOP, weight, I/O, lytes    Infectious Disease:  Neutropenia  Severe VAP  - omi/vanco  - cultures  - Candida in prior specimens unlikely pathogenic  - steroids  - consider BAL when more stable    Hematology, Oncology:  Anemia  Chronic, critical illness. No evidence of active hemorrhage  - monitor with restrictive transfusion threshold    Endocrine:  No acute issues.  - sliding scale aspart    Checklist:  FEN: NPO, has PEG for tube feeding when able  VTE ppx: enoxaparin  GI ppx: PPI  Bowel regimen: senna-doc, prn poly gly  VAP ppx: Head of bed >30 degrees, daily oral care, chlorhexidine  Lines/tube-size:  Trach 6 bivona  PEG  PICC  Skin: monitor, has chronic critical illness   PT/OT/SLP, early mobility: when able  Code Status: full per daughter  Communication: Spoke with daughter who is a pediatrician in person.  Disposition: The patient is critically ill requiring invasive mechanical ventilation and continuous vasopressors.    Restraints  Paralyzed, not indicated    Oswaldo Read MD  Pulmonary and Critical Care Medicine  [facility]  [employer]  Office [phone]  Pager [phone]  he/him    History of Present Illness:  78 year old female with a history of chronic hypoxic respiratory failure 2/2 pHTN, ALAINA/OHS, HFpEF, CAD, CKD3, Obesity, HTN, depression, and anxiety, presented initially on 10/15/23 with fever, cough, and falls, w/u revealed multifocal pneumonia and she was started on antibiotics, status worsened as she developed ARDS and was intubated on 10/21/23, prolonged ICU stay 2/2 difficulty vent weaning s/p trach/PEG, transferred to [place] 12/5/23, now admitted 12/23/23 with progressive hypoxia over the past week, now with dense VAP R>L, severe hypoxia and hypercapnia, neutropenia. Hypoxic to 70s on transfer to ICU bed, improved, with paralysis, veletri, increased PEEP    Medical &  Surgical History:  chronic hypoxic respiratory failure 2/2 pHTN, ALAINA/OHS, HFpEF, CAD, CKD3, Obesity, HTN, depression, and anxiety       Family & Social History:  Reviewed in the electronic medical record    Medications:   Reviewed in the electronic medical record.     Allergies:   Reviewed in the electronic medical record.     Review of Systems:   Unable to assess    Physical Exam:  Vent settings for last 24 hours:  Vent Mode: CMV/AC  (Continuous Mandatory Ventilation/ Assist Control)  FiO2 (%): 100 %  Resp Rate (Set): 28 breaths/min  Tidal Volume (Set, mL): 360 mL  PEEP (cm H2O): 10 cmH2O  Resp: 28      /52   Pulse 75   Temp 98.5  F (36.9  C) (Oral)   Resp 28   Wt 81.9 kg (180 lb 8.9 oz)   SpO2 99%   BMI 35.26 kg/m      Intake/Output last 3 shifts:  No intake/output data recorded.  Intake/Output this shift:  No intake/output data recorded.    Physical Exam  Physical Exam:  Gen: intubated, sedated, paralyzed  HEENT: NT, no TANIA, 6 bivona clean, midline  CV: RRR, no m/g/r  Resp: dense crackles R>L  Abd: soft, nontender, BS+, PEG  Skin: no rashes or lesions  Ext: no edema  Neuro: PERRL, awakening and responding prior to sedation but more somnolent than baseline    IMAGING:  Chest CTA (12/23/23):  - images directly reviewed, formal interpretation follows:  FINDINGS:  ANGIOGRAM CHEST: No evidence of pulmonary embolism. The main pulmonary artery is enlarged measuring 4.6 cm in diameter, this can be seen with pulmonary hypertension.     LUNGS AND PLEURA: No pleural effusion or pneumothorax. Dense basilar consolidative pulmonary opacities and bilateral upper lobe patchy groundglass pulmonary opacities, slightly worsened as compared to 11/24/2023 exam.     MEDIASTINUM/AXILLAE: Cardiomegaly. No significant pericardial effusion. Multiple enlarged mediastinal and hilar lymph nodes, likely reactive. Tracheostomy tube tip is in the mid thoracic trachea. Right upper extremity PICC tip is in the low SVC.     CORONARY  ARTERY CALCIFICATION: Extensive.     UPPER ABDOMEN: Please refer to concurrently performed abdominal CT for findings related to the upper abdomen.     MUSCULOSKELETAL: No suspicious osseous lesion.                                                                      IMPRESSION:  1.  No evidence of pulmonary embolism. The main pulmonary artery is enlarged measuring 4.6 cm in diameter, this can be seen with pulmonary hypertension.  2.  Dense basilar predominant consolidative pulmonary opacities and upper lobe predominant patchy groundglass pulmonary opacities, slightly worsened as compared to 11/24/2023 exam, indeterminate, could be infectious or atelectatic.  3.  Cardiomegaly and extensive atherosclerotic vascular calcification of the coronary arteries.    CT abd/pelvis (12/23/23):  - images directly reviewed, formal interpretation follows:  FINDINGS:   LOWER CHEST: Please refer to chest CT report from 12/23/2023     HEPATOBILIARY: No significant mass or bile duct dilatation. No calcified gallstones.  There is sludge layering in the gallbladder     PANCREAS: Normal.     SPLEEN: Normal size.     ADRENAL GLANDS: No significant nodules.     KIDNEYS/BLADDER: No significant mass, stones, or hydronephrosis. There are simple or benign cysts. No follow up is needed.     BOWEL: There is a PEG tube seen within the stomach. Small bowel is normal in size and caliber. There is a  liquid stool seen throughout the colon without evidence of distention. No significant bowel wall thickening is seen.     LYMPH NODES: No lymphadenopathy.     VASCULATURE: No abdominal aortic aneurysm.     PELVIC ORGANS: No pelvic masses.     MUSCULOSKELETAL: Multilevel discogenic and posterior facet degenerative changes of the lumbar spine are present with multilevel disc osteophyte complexes seen throughout the lumbar spine. Circumferential hip joint space narrowing is seen bilaterally.                                                                       IMPRESSION:   1.  Liquid stool seen throughout the colon, without evidence of distention or wall thickening, may reflect rapid transit state/diarrhea.  2.  Layering biliary sludge    Head CT (12/23/23):  - images directly reviewed, formal interpretation follows:  FINDINGS:  INTRACRANIAL CONTENTS: No acute hemorrhage. Redemonstrated arachnoid cyst along the superior cerebellum. No CT evidence of acute infarct. Moderate presumed chronic small vessel ischemic changes. Mild generalized volume loss. No hydrocephalus.      VISUALIZED ORBITS/SINUSES/MASTOIDS: No intraorbital abnormality. No paranasal sinus mucosal disease. Scattered fluid/membrane thickening in the mastoid air cells bilaterally.     BONES/SOFT TISSUES: No acute abnormality.                                                                      IMPRESSION:  1.  No CT evidence for acute intracranial process.  2.  Stable chronic changes as above.    All pertinent vital signs, ventilator settings, I&Os, laboratory, microbiology, ECGs, & imaging data has been personally reviewed. Total Critical Care time, excluding procedures, was 1 Hour 30 Minutes    Clinically Significant Risk Factors Present on Admission        # Hyperkalemia: Highest K = 5.8 mmol/L in last 2 days, will monitor as appropriate   # Hypocalcemia: Lowest iCa = 1.27 mg/dL in last 2 days, will monitor and replace as appropriate     # Hypoalbuminemia: Lowest albumin = 3 g/dL at 12/23/2023  4:15 AM, will monitor as appropriate   # Drug Induced Platelet Defect: home medication list includes an antiplatelet medication   # Hypertension: Noted on problem list   # Circulatory Shock: required vasopressors within past 24 hours    # Acute Respiratory Failure: Documented O2 saturation < 91%.  Continue supplemental oxygen as needed             # Anemia: based on hgb <11             '

## 2023-12-23 NOTE — PROGRESS NOTES
Pt arrived from ED at approx 0645.  Pt hypotensive titrated Levo.  Pts O2 Sats decreased in the 70's Intensivist at beside.  Fentanyl Bolus given.  Vecuronium Given.  Pt bronched at bedside.  Fentanyl Drip Started. Veletri started.  Patients O2 and blood pressure improving.  Report Given to day shift nurse, Albino FLEMING

## 2023-12-24 NOTE — PROGRESS NOTES
Intensivist update    Critical labs reviewed with RN and Roque (her daughter)  pH 7.21 (improved from last ABG)  Lactate >7  K 6.6  Glucose 50    Ordered hyperkalemia protocol with insulin/dextrose, calcium gluconate.    Informed daughter that prognosis was very poor and few reversible things here. She understands. She wants to continue current care. The patient is DNR.     Teo Bermeo MD (Avi)  [facility] Pulmonary [facility] Critical [facility] ([facility])  Clinic [phone]  Fax [phone]

## 2023-12-24 NOTE — PROGRESS NOTES
RT PROGRESS NOTE    VENT DAY# Ventilation Day(s): 2    Current Ventilator Settings:  Vent Mode: CMV/AC  (Continuous Mandatory Ventilation/ Assist Control)  FiO2 (%): 100 %  Resp Rate (Set): 32 breaths/min  Tidal Volume (Set, mL): 280 mL  PEEP (cm H2O): 10 cmH2O  Resp: (!) 32      Patient Parameters with above settings:  Ventilator - Patient   Patient Resp Rate : 32 breaths/min  Expiratory Vt (ml): 282  Minute Volume (ml): 9.03 L/min  Peak Inspiratory Pressure (cm H2O): 34 cmH2O  Mean Airway Pressure (cm H2O): 20 cmH2O  Plateau Pressure (cm H2O): 31 cmH2O  Dynamic Compliance (mL/cm H2O): 12.5 mL/cm H2O  Airway Resistance: 15.3  Auto/ Intrinsic PEEP (cm H2O): 1.9 cmH2O     SBT completed No   Secretions: Moderate, Yellow, Thick  Breath Sounds: Coarse    Emergency trach supplies including current size, downsize, and obturator at bedside    Respiratory Medications: Veletri continuous, Duo Q4     NOTE / SHIFT SUMMARY:     Today's Changes    Patient remains on full vent support over night. No changes to vent settings. New Veletri syringe placed at 2246. Flow sensor was replaced due to leak error and calibrated. Bacterial/viral filter replaced Q4.     RT will continue to follow.     Bharati Portillo, RT

## 2023-12-24 NOTE — PROGRESS NOTES
RT PROGRESS NOTE    VENT DAY# Ventilation Day(s): 1    Current Ventilator Settings:   Vent Mode: CMV/AC  (Continuous Mandatory Ventilation/ Assist Control)  FiO2 (%): 100 %  Resp Rate (Set): 32 breaths/min  Tidal Volume (Set, mL): 280 mL  PEEP (cm H2O): 10 cmH2O  Resp: (!) 32          Patient Parameters with above settings:  Ventilator - Patient   Patient Resp Rate : 32 breaths/min  Expiratory Vt (ml): 284  Minute Volume (ml): 8.9 L/min  Peak Inspiratory Pressure (cm H2O): 39 cmH2O  Mean Airway Pressure (cm H2O): 22 cmH2O  Plateau Pressure (cm H2O): 36 cmH2O  Dynamic Compliance (mL/cm H2O): 10.1 mL/cm H2O  Airway Resistance: 187.3  Auto/ Intrinsic PEEP (cm H2O): 1.4 cmH2O     SBT completed No   Secretions: small tan yellow.  Breath Sounds: coarse/diminished       Emergency Ambu bag, mask and peep valve at bedside.     (Emergency trach supplies including current size, downsize, and obturator at bedside)    Respiratory Medications: Duoneb Q4h; given as ordered, pt tolerated well.       ABG: @18:41 pH 7.28; pCO2 62; pO2 62; HCO3 21, %O2 Sat 90.7, BE -6.2 on 100 %    NOTE / SHIFT SUMMARY:     Today's Changes    Skin checked and tube moved Q2, this responsibility is shared between RN and RT. Endotracheal tube cuff assessed and appropriate at the time of the assessment. Pt had a cuff leak and 2cc saline was added to the cuff  RT to continue to assess and monitor cardiopulmonary status while in the ICU. For additional vital checks and ventilator check information please see flowsheets.    Georgi Meléndez, RT

## 2023-12-24 NOTE — PLAN OF CARE
Goal Outcome Evaluation:             [facility] - ICU    RN Progress Note:            Pertinent Assessments:      Please refer to flowsheet rows for full assessment     3rd pressor added to maintain MAP >65. MAP drops quickly when a pressor is stopped to change bag. BIS maintaining in low 30s, Fentanyl and propofol titrated to BIS goal. Paralytics titrated to TOF 2/4.            Key Events - This Shift:       Low urine output. Minimal stool output in [employer]. Blood sugars running low--D5 added to maintain >70.         SJN SAT (Sedation Awakening Trial): For use ONLY if intubated    No--on paralytics, FiO2 100%, PEEP 10             Barriers to Discharge / Downgrade:     Unstable, max high O2 needs.          Point of Contact Update No

## 2023-12-24 NOTE — PROGRESS NOTES
Ongoing worsening hypotension requiring escalation of NE to 0.45, ongoing Vasopressin at fixed dose of 2.4U/h.  Have added on Phenylephrine to maintain MAP >65.  Lactic acid this AM noted to be normal.  Will recheck ABG's earlier.    Tracy Jeffrey MD  Pulmonary and Critical Care  (P) [phone]

## 2023-12-24 NOTE — PROGRESS NOTES
RT PROGRESS NOTE    VENT DAY# 2    CURRENT SETTINGS:  Vent Mode: CMV/AC  (Continuous Mandatory Ventilation/ Assist Control)  FiO2 (%): 85 %  Resp Rate (Set): 34 breaths/min  Tidal Volume (Set, mL): 320 mL  PEEP (cm H2O): 8 cmH2O  Resp: 15    PATIENT PARAMETERS:  PIP 35  Pplat:  30  Pmean:  20  Compliance: 13.1  SBT: No     Secretions:  moderate, yellow, thick  02 Sats:  93%  BS: coarse    Airway: #6 bivona trach    Respiratory Medications: Veletri full strength, Duoneb Q4      Latest Reference Range & Units 12/24/23 12:00   pH Arterial 7.37 - 7.44  7.14 (LL)   pCO2 Arterial 35 - 45 mm Hg 49 (H)   PO2 Arterial 75 - 85 mm Hg 66 (L)   Bicarbonate Arterial 23 - 29 mmol/L 17 (L)   Base Excess Art mmol/L -12.1   FIO2  85   Oxyhemoglobin 95.0 - 96.0 % 88.8 (L)   Noel's Test  Artline   (LL): Data is critically low  (H): Data is abnormally high  (L): Data is abnormally low    NOTE / SHIFT SUMMARY:   Increased RR from 32 to 34.  Decreased FiO2 from 100% to 85%. PEEP decreased from +10 to +8 and tidal volume increased from 280 to 320 per MD.  Increased secretions from trach today. Will continue veletri full strength.  RT will continue to follow.     Raymundo Anand, RT

## 2023-12-24 NOTE — PROGRESS NOTES
[facility]  ICU Progress Note    Summary:   Matthew Bowen 79 yo F PMH CAD, HFpEF, HTN, obesity, severe ALAINA, OHS, mild bronchiectasis, chronic hypoxemic respiratory insufficiency (1 LPM NC prior to recent critical illness), pulmonary hypertension, CKD 3, hypothyroidism, mood & anxiety disorders. Recent prolonged hospitalization (10/15/23 - 12/5/23) for critical illness due to culture negative presumed bacterial pneumonia complicated by ARDS or organizing pneumonia & hypoxemic respiratory failure with prolonged mechanical ventilation (s/p tracheostomy & PEG); prolonged steroid course; improved at [facility] to tolerate trach dome & PMV intermittently with ongoing ventilator support otherwise    Presented 12/23/23 from [facility] for progressive hypoxemic respiratory failure & severe ARDS secondary to GNR ventilator associated pneumonia complicated by bloodstream infection & multiorgan failure including encephalopathy, septic shock, ischemic hepatitis & oliguric CARMEN    Interval Events:  Afebrile, on NE, , phenylephrine, FiO2 100. Marginal UO. BMP uptrending Cr, K 5, bicarb down to 19, AG 16, lactic acid 3.4. ABG 7.2 / 47 / 87. FiO2 100, on epoprostenol. Pplat 33, , PEEP 10 (AP 1.4), DP 22, rate 34. WC 1.8 (improved), Hgb 7.7 stable, plts 102 down trending. Procal 2.82. 12/23 blood cultures & sputum culture demonstrated GNR.     Stop propofol, transition to midazolam & fentanyl. Trial off cistatracuim. Discontinue Vancomycin if MRSA nares negative. Start bicarb infusion with D5; remains on D10 for hypoglycemia. Goals of care discussions    Assessment & Plan:   Goals of Care:  DNR  12/24/23 care conference with patient's spouse & children. They recognize that Matthew is dying & requested continuation of current level of support to facilitate family visitation without heroic escalation in her care & an emphasis on comfort   - DNR, no renal replacement continue 3 vasopressors, bicarbonate infusion       Neurology, Psychiatry, Sedation, Analgesia:  Acute metabolic encephalopathy  Multifactorial secondary to respiratory failure with hypercapnia & septic shock     Sedation & Analgesia   - RASS -2 to -3  - midazolam, fentanyl infusions      Cardiovascular:  Septic shock  Warm extremities & wide pulse pressure. Doesn't appears to be meaningfully compensating with CO however. 12/23/23 TTE LVEF 55-60, LVOT VTI 21 (LVOT diamterer 1.9, SV 60 mL, CO 4.3, CI 2.3), RV normal size, TAPSE 1.5, moderate AS (may be severe on POCUS), mild AI, no pericardial effusion, unchanged compared to 10/21/23. Low suspicion for obstructive phenomena  - goal MAP >65  - NE, , phenylephrine infusions      History of HFpEF    Moderate aortic stenosis   Mild aortic insufficiency     Respiratory, Airway:  Acute on chronic hypoxemic hypercapnic respiratory failure   Acute GNR ventilator associated pneumonia, presumed severe ARDS  12/23/23 CT PA demonstrated diffuse patchy ground glass & consolidative opacities, dense right greater than left bibasilar consolidation, worse compared to 11/24/23 study   - supplemental O2 to maintain spO2 >= 90-96% & PaO2 >= 60 mmHg  - unable to provide lung protective ventilation (Pplat <30, driving pressure <15)  - severely decreased compliance, unable ventilate effectively   - continuous epoprostenol   - methylprednisolone 60mg q6h      History of severe presumed ARDS secondary to CAP vs   Prolonged hospitalization (10/15/23 - 12/5/23) for critical illness due to culture negative presumed bacterial pneumonia complicated by ARDS or organizing pneumonia & hypoxemic respiratory failure with prolonged mechanical ventilation (s/p tracheostomy & PEG). During this admission, autoimmune evaluation was largely negative except for mild elevation of RF, borderline nonspecific elevated MARLI, & low IgG level. 10/18/23 bronchoscopy & BAL cell count & differential reportedly clumped; 11/15/23 BAL 1,100 nucleated cells 89%,  neutrophils 8% monocytes, 4% lymphocytes.   - she was being treated with high dose steroid taper for organizing pneumonia      ALAINA/OHS    Bronchiectasis, mild    Gastrointestinal:  Presumed ischemic hepatitis     Renal, Acid-base, Electrolytes, Volume:  Oliguric CARMEN   - ordered UA, Yuliana, Ucr, Uurea   - q8h BMPs, ABG    Metabolic acidosis   Suspect AGMA + NAGMA in the setting of CARMEN/uremia & lactic acidosis   - q8h lactate  - sodium bicarbonate infusion      Fluid positive  12/24 weight 180; 12/15 weight 188    Infectious Disease:  Severe GNR VAP, immunosuppressed      - meropenem     PJP prophylaxis   - atovaquone      Hematology, Oncology:  Anemia  Chronic, critical illness. No evidence of active hemorrhage  - monitor with restrictive transfusion threshold    Neutropenia      Endocrine:  No acute issues.  - sliding scale aspart     Checklist:  FEN: NPO, has PEG for tube feeding when able  VTE ppx: enoxaparin  GI ppx: PPI  Bowel regimen: senna-doc, prn poly gly  VAP ppx: Head of bed >30 degrees, daily oral care, chlorhexidine  Lines/tube-size:  Trach 6 bivona  PEG  PICC  Skin: monitor, has chronic critical illness   PT/OT/SLP, early mobility: when able  Code Status: full per daughter  Communication: Spoke with daughter who is a pediatrician in person.  Disposition: The patient is critically ill requiring invasive mechanical ventilation and continuous vasopressors.    Physical Exam:  Neurologic: Sedated. Paralyzed.   HEENT: Head and face normal. No nasal discharge. Oropharynx normal. Eyelids, conjunctiva, & sclera normal.   Neck: Tracheostomy.   Respiratory: Coarse breath sounds bilaterally. No wheezes    Cardiovascular: Regular rate & irregular  No murmurs, rubs. or gallops.  Gastrointestinal: Bowel sounds present. Obese. Soft. PEG in place  Musculoskeletal: Skeletal configuration normal and muscle mass normal for age. Joint appearance overall normal.  Skin, Hair, & Nails: Skin color normal. No skin lesions.  Hair &  nails normal.  Extremities: 2+ lower extremity pitting edema. Right upper extremity large diameter than left.     All pertinent vital signs, ventilator settings, I&Os, laboratory, microbiology, ECGs, & imaging data has been personally reviewed. Total Critical Care time, excluding procedures, was 50 minutes     BEAR aMtias MD  Critical Care Medicine

## 2023-12-25 NOTE — PROGRESS NOTES
Per my discussion with Dr. Jeffrey and the patient's daughter at the bedside at midnight, a decision to not increase vasopressors was made as this is considered an escalation of cares.  The daughter and patient's  desired to keep patient alive earlier so that visitors could say their final goodbye.  Daughter confirmed with me that no additional visitors are expected to stop by at this point.     I will disregard any MAP goals as well as all other vital sign parameters as long as the patient appears comfortable.    Adalid Baker RN on 12/25/2023 at 12:33 AM

## 2023-12-25 NOTE — PROGRESS NOTES
RN to Provider Notification Note      Situation/Background:  Now unable to  SpO2 waveform or reading. Attempted finger as well as forehead probe.   Arterial waveform strong and present. Unable to palpate pulse but pulse is audible using doppler.     Family at bedside and communicated significance of likely decreasing perfusion. Patient family requested SpO2 probe be removed as the monitor is not picking up a reading anymore and is alarming.    Family/loved ones are at bedside.  Patient appears comfortable (CPOT 0, RASS -4)    FYI on change of status/ that the patient is likely nearing her expiration.    Provider Notified:  Dr. Jeffrey    Time Notified:  21:23    Provider Response:  Acknowledged patient status.       Adalid Baker RN on 12/24/2023 at 9:36 PM

## 2023-12-25 NOTE — DEATH PRONOUNCEMENT
MD DEATH PRONOUNCEMENT    Called to pronounce Matthew Bowen dead.    Physical Exam: Unresponsive to noxious stimuli, Spontaneous respirations absent, Breath sounds absent, Heart sounds absent, Pupillary light reflex absent, and Corneal blink reflex absent    Patient was pronounced dead at 4:30 AM, 2023.    Preliminary Cause of Death: ARDS due to GNR bacteremia, septic shock, ischemic hepatitis and oliguric CARMEN    Principal Problem:    Hypoxia       Infectious disease present?: YES    Communicable disease present? (examples: HIV, chicken pox, TB, Ebola, CJD) :  NO    Multi-drug resistant organism present? (example: MRSA): NO    Please consider an autopsy if any of the following exist:  NO Unexpected or unexplained death during or following any dental, medical, or surgical diagnostic treatment procedures.   NO Death of mother at or up to seven days after delivery.     NO All  and pediatric deaths.     NO Death where the cause is sufficiently obscure to delay completion of the death certificate.   NO Deaths in which autopsy would confirm a suspected illness/condition that would affect surviving family members or recipients of transplanted organs.     The following deaths must be reported to the 's Office:  NO A death that may be due entirely or in part to any factors other than natural disease (recent surgery, recent trauma, suspected abuse/neglect).   NO A death that may be an accident, suicide, or homicide.     NO Any sudden, unexpected death in which there is no prior history of significant heart disease or any other condition associated with sudden death.   NO A death under suspicious, unusual, or unexpected circumstances.    NO Any death which is apparently due to natural causes but in which the  does not have a personal physician familiar with the patient s medical history, social, or environmental situation or the circumstances of the terminal event.   NO Any death  apparently due to Sudden Infant Death Syndrome.     NO Deaths that occur during, in association with, or as consequences of a diagnostic, therapeutic, or anesthetic procedure.   NO Any death in which a fracture of a major bone has occurred within the past (6) six months.   NO A death of persons note seen by their physician within 120 days of demise.     NO Any death in which the  was an inmate of a public institution or was in the custody of Law Enforcement personnel.   NO  All unexpected deaths of children   NO Solid organ donors   NO Unidentified bodies   NO Deaths of persons whose bodies are to be cremated or otherwise disposed of so that the bodies will later be unavailable for examination;   NO Deaths unattended by a physician outside of a licensed healthcare facility or licensed residential hospice program   NO Deaths occurring within 24 hours of arrival to a health care facility if death is unexpected.    NO Deaths associated with the decedent s employment.   NO Deaths attributed to acts of terrorism.   NO Any death in which there is uncertainty as to whether it is a medical examiner s care should be discussed with the medical investigator.        Body disposition: Body released to the  home.      Tracy Jeffrey MD  Pulmonary and Critical Care  (p) [phone]

## 2023-12-25 NOTE — DISCHARGE SUMMARY
[facility] MEDICINE  DISCHARGE SUMMARY     Primary Care Physician: Rosalind Rider  Admission Date: 2023   Discharge Provider: Teo Bermeo MD Discharge Date: 2023   Diet: n/a   Code Status: No CPR- Pre-arrest intubation OK   Activity: n/a        Condition at Discharge:       REASON FOR PRESENTATION(See Admission Note for Details)   Worsening ARDS, acute on chronic respiratory failure with hypoxemia    PRINCIPAL & ACTIVE DISCHARGE DIAGNOSES     Principal Problem:    Hypoxia      SIGNIFICANT FINDINGS (Imaging, labs):   See EMR    PENDING LABS     N/a    PROCEDURES ( this hospitalization only)      Arterial line placement  bronchoscopy    RECOMMENDATIONS TO OUTPATIENT PROVIDER FOR F/U VISIT     N/a    DISPOSITION         SUMMARY OF HOSPITAL COURSE:      78 year old female with a history of chronic hypoxic respiratory failure 2/2 pHTN, ALAINA/OHS, HFpEF, CAD, CKD3, Obesity, HTN, depression, and anxiety, presented initially on 10/15/23 with fever, cough, and falls, w/u revealed multifocal pneumonia and she was started on antibiotics, status worsened as she developed ARDS and was intubated on 10/21/23, prolonged ICU stay 2/2 difficulty vent weaning s/p trach/PEG, transferred to [place] 23, now admitted 23 with progressive hypoxia over the past week, now with dense VAP R>L, severe hypoxia and hypercapnia, neutropenia. Developed GNR bacteremia with profound septic shock and multi-organ failure. Sputum also grew out non lactose fermenting GNR; speciation not available at the time of this writing.  Developed worsening and profound shock with multiorgan failure, ARDS with hypoxemic respiratory failure refractory to high PEEP and FiO2, LPV and inhaled epoprostenol. Pressor requirements escalated.  Discussed with family and decision made not to escalate care. Code status changed to DNR on . Condition continued to decline. She  on   at 4:30am    Discharge Medications with Med changes:     N/a        Rationale for medication changes:      N/a        Consults   Pulmonary/critical care  Spiritual care      Immunizations given this encounter     N/a       Anticoagulation Information      N/a      Discharge Orders     No discharge procedures on file.  Examination     Vital Signs in last 24 hours:   Unresponsive  No heart or lung sounds  No pulses.  Pupillay reflex absent        Please see EMR for more detailed significant labs, imaging, consultant notes etc.  Total time spent on discharge: 30 minutes    Teo (Don) MD Elvie  [facility] Pulmonary & Critical Care ([facility])  Clinic [phone]  Fax [phone]     CC:Clinic, Rosalind Munguia

## 2023-12-26 LAB
BACTERIA BLD CULT: ABNORMAL

## 2023-12-26 NOTE — PLAN OF CARE
Speech Language Therapy Discharge Summary    Reason for therapy discharge:    Discharged to short-term acute [facility] hospital    Progress towards therapy goal(s). See goals on Care Plan in Epic electronic health record for goal details.  Goals not met.  Barriers to achieving goals:   discharge from facility.    Therapy recommendation(s):    No further therapy is recommended. Change in medical status

## 2023-12-27 LAB
BACTERIA SPT CULT: ABNORMAL
BACTERIA SPT CULT: ABNORMAL
GRAM STAIN RESULT: ABNORMAL
GRAM STAIN RESULT: ABNORMAL

## 2023-12-27 ASSESSMENT — ENCOUNTER SYMPTOMS: FEVER: 1

## 2024-01-11 LAB
ACID FAST STAIN (ARUP): NORMAL

## (undated) DEVICE — DEFIB PRO-PADZ LVP LQD GEL ADULT 8900-2105-01

## (undated) DEVICE — SOL NACL 0.9% IRRIG 1000ML BOTTLE 2F7124

## (undated) DEVICE — BAG CLEAR TRASH 1.3M 39X33" P4040C

## (undated) DEVICE — KIT HAND CONTROL ANGIOTOUCH ACIST 65CM AT-P65

## (undated) DEVICE — CATH ANGIO JUDKINS R4 6FRX100CM INFINITI 534621T

## (undated) DEVICE — SU PROLENE 2-0 CT-2 30" 8411H

## (undated) DEVICE — PEN MARKING SKIN

## (undated) DEVICE — MANIFOLD NEPTUNE 4 PORT 700-20

## (undated) DEVICE — GUIDEWIRE VASC 0.014INX190CM J TIP CGRXT190HJ

## (undated) DEVICE — GLOVE BIOGEL PI ULTRATOUCH SZ 7.5 41175

## (undated) DEVICE — SU SILK 2-0 PSL 18" 673H

## (undated) DEVICE — ENDO SNARE POLYPECTOMY OVAL 15MM LOOP SD-240U-15

## (undated) DEVICE — CATH BALLOON EMERGE 2.5X15MM H7493918915250

## (undated) DEVICE — TRACH TUBE HOLDER POSEY FOAM 8197M

## (undated) DEVICE — Device

## (undated) DEVICE — SYR 10ML LL W/O NDL 302995

## (undated) DEVICE — CATH ANGIO JUDKINS JL4 6FRX100CM INFINITI 534620T

## (undated) DEVICE — ESU CORD BIPOLAR GREEN 10-4000

## (undated) DEVICE — WIRE GUIDE 0.035"X260CM SAFE-T-J EXCHANGE G00517

## (undated) DEVICE — BAG RED BIOHAZARD 37X50" 40GAL A7450PR

## (undated) DEVICE — ESU PENCIL W/HOLSTER E2350H

## (undated) DEVICE — PACK ENT PLASTICS RIDGES

## (undated) DEVICE — RAD INFLATOR BASIC COMPAK  IN4130

## (undated) DEVICE — LINEN HALF SHEET 5512

## (undated) DEVICE — KIT PROCEDURE W/CLEAN-A-SCOPE LINERS V2 200800

## (undated) DEVICE — CATH LAUNCHER 6FR EBU 3.5 LA6EBU35

## (undated) DEVICE — SU VICRYL 2-0 TIE 12X18" J905T

## (undated) DEVICE — TUBING SUCTION MEDI-VAC SOFT 3/16"X20' N520A

## (undated) DEVICE — LINEN TOWEL PACK X10 5473

## (undated) DEVICE — SUCTION CANISTER MEDIVAC LINER 3000ML W/LID 65651-530

## (undated) DEVICE — DRAPE SPLIT EENT 76X124" 3X28" 9447

## (undated) DEVICE — SYR 10ML FINGER CONTROL W/O NDL 309695

## (undated) DEVICE — SOL WATER IRRIG 1000ML BOTTLE 2F7114

## (undated) DEVICE — SU VICRYL 3-0 TIE 12X18" J904T

## (undated) DEVICE — LINEN FULL SHEET 5511

## (undated) DEVICE — GLOVE BIOGEL PI ULTRATOUCH SZ 7.0 41170

## (undated) DEVICE — INTRO GLIDESHEATH SLENDER 6FR 10X45CM 60-1060

## (undated) DEVICE — SLEEVE TR BAND RADIAL COMPRESSION DEVICE 24CM TRB24-REG

## (undated) DEVICE — PAD CHUX UNDERPAD 30X36" P3036C

## (undated) DEVICE — DRSG DRAIN 4X4" 7086

## (undated) DEVICE — CATH ANGIO INFINITI PIGTAIL 145 6 SH 6FRX110CM  534-652S

## (undated) DEVICE — ESU ELEC NDL 1" COATED/INSULATED E1465

## (undated) DEVICE — KIT LG BORE TOUHY ACCESS PLUS MAP152

## (undated) DEVICE — SU VICRYL 2-0 TIE 54" J607H

## (undated) DEVICE — ESU GROUND PAD ADULT W/CORD E7507

## (undated) DEVICE — MANIFOLD KIT ANGIO AUTOMATED 014613

## (undated) DEVICE — CLIP ETHICON LIGACLIP SM BLUE LT100

## (undated) DEVICE — SLEEVE TR BAND RADIAL COMPRESSION DEVICE 29CM XX-RF06L

## (undated) RX ORDER — CHLORHEXIDINE GLUCONATE ORAL RINSE 1.2 MG/ML
SOLUTION DENTAL
Status: DISPENSED
Start: 2023-01-01

## (undated) RX ORDER — FENTANYL CITRATE 50 UG/ML
INJECTION, SOLUTION INTRAMUSCULAR; INTRAVENOUS
Status: DISPENSED
Start: 2022-02-28

## (undated) RX ORDER — ONDANSETRON 2 MG/ML
INJECTION INTRAMUSCULAR; INTRAVENOUS
Status: DISPENSED
Start: 2022-02-28

## (undated) RX ORDER — LIDOCAINE HYDROCHLORIDE AND EPINEPHRINE BITARTRATE 20; .01 MG/ML; MG/ML
INJECTION, SOLUTION SUBCUTANEOUS
Status: DISPENSED
Start: 2023-01-01

## (undated) RX ORDER — ALBUTEROL SULFATE 90 UG/1
AEROSOL, METERED RESPIRATORY (INHALATION)
Status: DISPENSED
Start: 2023-01-01

## (undated) RX ORDER — DEXAMETHASONE SODIUM PHOSPHATE 4 MG/ML
INJECTION, SOLUTION INTRA-ARTICULAR; INTRALESIONAL; INTRAMUSCULAR; INTRAVENOUS; SOFT TISSUE
Status: DISPENSED
Start: 2022-02-28

## (undated) RX ORDER — LIDOCAINE HYDROCHLORIDE 10 MG/ML
INJECTION, SOLUTION EPIDURAL; INFILTRATION; INTRACAUDAL; PERINEURAL
Status: DISPENSED
Start: 2022-02-28

## (undated) RX ORDER — GLYCOPYRROLATE 0.2 MG/ML
INJECTION INTRAMUSCULAR; INTRAVENOUS
Status: DISPENSED
Start: 2022-02-28

## (undated) RX ORDER — PROPOFOL 10 MG/ML
INJECTION, EMULSION INTRAVENOUS
Status: DISPENSED
Start: 2022-02-28

## (undated) RX ORDER — FENTANYL CITRATE 50 UG/ML
INJECTION, SOLUTION INTRAMUSCULAR; INTRAVENOUS
Status: DISPENSED
Start: 2023-01-01